# Patient Record
Sex: MALE | Race: WHITE | Employment: OTHER | URBAN - METROPOLITAN AREA
[De-identification: names, ages, dates, MRNs, and addresses within clinical notes are randomized per-mention and may not be internally consistent; named-entity substitution may affect disease eponyms.]

---

## 2017-03-07 ENCOUNTER — OFFICE VISIT (OUTPATIENT)
Dept: CARDIOLOGY CLINIC | Age: 67
End: 2017-03-07

## 2017-03-07 VITALS
BODY MASS INDEX: 44.05 KG/M2 | WEIGHT: 297.4 LBS | RESPIRATION RATE: 18 BRPM | SYSTOLIC BLOOD PRESSURE: 94 MMHG | HEIGHT: 69 IN | DIASTOLIC BLOOD PRESSURE: 60 MMHG | OXYGEN SATURATION: 91 % | HEART RATE: 91 BPM

## 2017-03-07 DIAGNOSIS — N18.4 CKD (CHRONIC KIDNEY DISEASE) STAGE 4, GFR 15-29 ML/MIN (HCC): ICD-10-CM

## 2017-03-07 DIAGNOSIS — R06.02 SOB (SHORTNESS OF BREATH): Primary | ICD-10-CM

## 2017-03-07 DIAGNOSIS — I25.10 CORONARY ARTERY DISEASE INVOLVING NATIVE CORONARY ARTERY OF NATIVE HEART WITHOUT ANGINA PECTORIS: ICD-10-CM

## 2017-03-07 DIAGNOSIS — I48.91 ATRIAL FIBRILLATION, UNSPECIFIED TYPE (HCC): ICD-10-CM

## 2017-03-07 PROBLEM — N18.9 CKD (CHRONIC KIDNEY DISEASE): Status: ACTIVE | Noted: 2017-03-07

## 2017-03-07 PROBLEM — N18.9 CKD (CHRONIC KIDNEY DISEASE): Status: RESOLVED | Noted: 2017-03-07 | Resolved: 2017-03-07

## 2017-03-07 RX ORDER — ACETAMINOPHEN 500 MG
1000 TABLET ORAL
COMMUNITY

## 2017-03-07 RX ORDER — METOPROLOL SUCCINATE 100 MG/1
100 TABLET, EXTENDED RELEASE ORAL 2 TIMES DAILY
Qty: 180 TAB | Refills: 2 | Status: SHIPPED | OUTPATIENT
Start: 2017-03-07 | End: 2017-05-31 | Stop reason: SDUPTHER

## 2017-03-07 RX ORDER — LISINOPRIL 2.5 MG/1
TABLET ORAL
Refills: 3 | COMMUNITY
Start: 2016-12-18 | End: 2017-03-17

## 2017-03-07 RX ORDER — METOLAZONE 2.5 MG/1
2.5 TABLET ORAL DAILY
Qty: 90 TAB | Refills: 2 | Status: SHIPPED | OUTPATIENT
Start: 2017-03-07 | End: 2017-05-25

## 2017-03-07 RX ORDER — PRAVASTATIN SODIUM 40 MG/1
40 TABLET ORAL DAILY
Qty: 90 TAB | Refills: 2 | Status: ON HOLD | OUTPATIENT
Start: 2017-03-07 | End: 2017-03-17

## 2017-03-07 RX ORDER — PRAVASTATIN SODIUM 40 MG/1
TABLET ORAL
Refills: 2 | COMMUNITY
Start: 2016-12-19 | End: 2017-03-07 | Stop reason: SDUPTHER

## 2017-03-07 RX ORDER — FUROSEMIDE 40 MG/1
TABLET ORAL DAILY
COMMUNITY
End: 2017-03-13 | Stop reason: DRUGHIGH

## 2017-03-07 RX ORDER — LEVOTHYROXINE SODIUM 137 UG/1
TABLET ORAL
Refills: 3 | COMMUNITY
Start: 2016-12-19 | End: 2017-07-10 | Stop reason: SDUPTHER

## 2017-03-07 RX ORDER — GLUCOSAMINE SULFATE 1500 MG
2000 POWDER IN PACKET (EA) ORAL DAILY
COMMUNITY
End: 2019-02-11

## 2017-03-07 RX ORDER — METOLAZONE 2.5 MG/1
TABLET ORAL
Refills: 2 | COMMUNITY
Start: 2016-12-20 | End: 2017-03-07 | Stop reason: SDUPTHER

## 2017-03-07 RX ORDER — METOPROLOL SUCCINATE 100 MG/1
TABLET, EXTENDED RELEASE ORAL
Refills: 1 | COMMUNITY
Start: 2017-01-17 | End: 2017-03-07 | Stop reason: SDUPTHER

## 2017-03-07 RX ORDER — TAMSULOSIN HYDROCHLORIDE 0.4 MG/1
CAPSULE ORAL
Refills: 2 | COMMUNITY
Start: 2016-12-14 | End: 2020-01-23

## 2017-03-07 RX ORDER — PARICALCITOL 1 UG/1
1 CAPSULE, LIQUID FILLED ORAL DAILY
COMMUNITY
End: 2019-03-13

## 2017-03-07 RX ORDER — DUTASTERIDE 0.5 MG/1
CAPSULE, LIQUID FILLED ORAL
Refills: 2 | COMMUNITY
Start: 2016-12-14 | End: 2019-02-11

## 2017-03-07 RX ORDER — WARFARIN SODIUM 5 MG/1
TABLET ORAL
Refills: 0 | COMMUNITY
Start: 2016-12-15 | End: 2017-03-07

## 2017-03-07 RX ORDER — MULTIVIT WITH MINERALS/HERBS
1 TABLET ORAL DAILY
COMMUNITY
End: 2017-04-05 | Stop reason: ALTCHOICE

## 2017-03-07 RX ORDER — BISMUTH SUBSALICYLATE 262 MG
1 TABLET,CHEWABLE ORAL DAILY
COMMUNITY
End: 2021-03-19

## 2017-03-07 NOTE — PROGRESS NOTES
[address] [place] [address] 601, [place], [address] [facility]     Paul Gray is a 75 y.o. male who is a new patient referred by Dr. Casey Miles to establish cardiac care. Subjective:     Mr. Zeke Lundberg recently relocated from [place] and presents to the clinic to establish cardiac care. He  has CAD s/p CABG 2010. He was vacationing in [place] when he experienced shortness of breath prompting ED evaluation. He was recommended to have stenting at that time but he waited until he returned to [place] to have this conducted. He reports being in atrial fibrillation for more than a year now. His rhythm is controlled on Metoprolol and Metalozone. He's currently anticoagulated with Coumadin. He denies any bleeding issues. His recent INR was 6 which is unusual for him as he usually ranges in the 2-3 levels. His wife notes worsening bilateral ankle edema. He is adherent on diuretic therapy. He denies any bowel or bladder issues. His activity is limited primarily by bilateral knee arthritis. He currently ambulates with a cane. His blood pressures usually run in the 62R systolic and 95J diastolic range. He suspects this is due combination therapy from rhythm control medications. He denies any lightheadedness or dizziness. He denies any near-syncopal or syncopal episodes. His family history includes CAD on paternal side with stenting. Patient denies any exertional chest pain, palpitations, syncope, orthopnea or paroxysmal nocturnal dyspnea.       Patient Active Problem List    Diagnosis Date Noted    SOB (shortness of breath) 03/07/2017    Atrial fibrillation ([facility] 03/07/2017    Coronary artery disease involving native coronary artery without angina pectoris 03/07/2017    CKD (chronic kidney disease) stage 4, GFR 15-29 ml/min ([address] 03/07/2017      TEJAS Golden  Past Medical History:   Diagnosis Date    CAD (coronary atherosclerotic disease)     S/P CABG x 2 2010      No past surgical history on file.  Allergies   Allergen Reactions    Ciprofloxacin Nausea Only    Percocet [Oxycodone-Acetaminophen] Other (comments)     hallucinations      FH is + for CAD in his father, alive at 80  Social History     Social History    Marital status:      Spouse name: N/A    Number of children: N/A    Years of education: N/A     Occupational History    Not on file. Social History Main Topics    Smoking status: Never Smoker    Smokeless tobacco: Never Used    Alcohol use Yes    Drug use: Not on file    Sexual activity: Not on file     Other Topics Concern    Not on file     Social History Narrative    No narrative on file      Current Outpatient Prescriptions   Medication Sig    dutasteride (AVODART) 0.5 mg capsule TAKE ONE CAPSULE BY MOUTH EVERY DAY    levothyroxine (SYNTHROID) 137 mcg tablet TAKE 1 TABLET(S) EVERY DAY BY ORAL ROUTE FOR 90 DAYS.  lisinopril (PRINIVIL, ZESTRIL) 2.5 mg tablet TAKE 1 TABLET EVERY DAY    tamsulosin (FLOMAX) 0.4 mg capsule TAKE 2 CAPSULES BY MOUTH EVERY DAY    insulin lispro protamine/insulin lispro (HUMALOG MIX 75-25) 100 unit/mL (75-25) injection by SubCUTAneous route.  furosemide (LASIX) 40 mg tablet Take  by mouth daily.  paricalcitol (ZEMPLAR) 1 mcg capsule Take 1 mcg by mouth daily.  multivitamin (ONE A DAY) tablet Take 1 Tab by mouth daily.  b complex vitamins tablet Take 1 Tab by mouth daily.  cholecalciferol (VITAMIN D3) 1,000 unit cap Take  by mouth daily.  calcium-cholecalciferol, d3, (CALCIUM 600 + D) 600-125 mg-unit tab Take  by mouth.  OTHER     acetaminophen (TYLENOL EXTRA STRENGTH) 500 mg tablet Take  by mouth every six (6) hours as needed for Pain.  apixaban (ELIQUIS) 5 mg tablet Take 1 Tab by mouth two (2) times a day. Indications: DEEP VEIN THROMBOSIS PREVENTION    metoprolol succinate (TOPROL-XL) 100 mg tablet Take 1 Tab by mouth two (2) times a day.  pravastatin (PRAVACHOL) 40 mg tablet Take 1 Tab by mouth daily.  metOLazone (ZAROXOLYN) 2.5 mg tablet Take 1 Tab by mouth daily. No current facility-administered medications for this visit. Review of Systems  Constitutional: Negative for fever, chills, malaise/fatigue and diaphoresis. Respiratory: Negative for cough, hemoptysis, sputum production and wheezing. Positive for shortness of breath. Cardiovascular: Negative for chest pain, palpitations, orthopnea, claudication and PND. Posiitive for bilateral ankle edema. Gastrointestinal: Negative for heartburn, nausea, vomiting, blood in stool and melena. Genitourinary: Negative for dysuria and flank pain. Musculoskeletal: Positive for bilateral knee pain. Positive for back pain. Skin: Negative for rash. Neurological: Negative for focal weakness, seizures, loss of consciousness, weakness and headaches. Endo/Heme/Allergies: Does not bruise/bleed easily. Psychiatric/Behavioral: Negative for memory loss. The patient does not have insomnia. Physical Exam:    Visit Vitals    BP 94/60 (BP 1 Location: Right arm, BP Patient Position: Sitting)    Pulse 91    Resp 18    Ht 5' 9\" (1.753 m)    Wt 297 lb 6.4 oz (134.9 kg)    SpO2 91%    BMI 43.92 kg/m2     Wt Readings from Last 3 Encounters:   03/07/17 297 lb 6.4 oz (134.9 kg)     Gen: NAD    Mental Status - Alert. General Appearance - Not in acute distress. Neck - no JVD    Chest and Lung Exam   Inspection: Accessory muscles - No use of accessory muscles in breathing. Auscultation:   Breath sounds: - Normal.     Cardiovascular   Inspection: Jugular vein - Bilateral - Inspection Normal.   Palpation/Percussion:   Apical Impulse: - Normal.   Auscultation: Rhythm - Regular. Heart Sounds - S1 WNL and S2 WNL. No S3 or S4. Murmurs & Other Heart Sounds: Auscultation of the heart reveals - No Murmurs. Peripheral Vascular   Upper Extremity: Inspection - Bilateral - No Cyanotic nailbeds or Digital clubbing.    Lower Extremity:   Palpation: Edema - Bilateral - No edema. Abdomen: Soft, non-tender, bowel sounds are active. Neuro: A&O times 3, CN and motor grossly WNL    Cardiographics  EKG 03/07/17- Atrial fibrillation, diffuse ST depression in 1, 2 AVF and V5-V6     Assessment:     Encounter Diagnoses   Name Primary?  SOB (shortness of breath) Yes    Coronary artery disease involving native coronary artery of native heart without angina pectoris     Atrial fibrillation, unspecified type ([facility]     CKD (chronic kidney disease) stage 4, GFR 15-29 ml/min ([facility])       Plan:     Mr. Sascha Jackson has CAD s/p stenting 2010. He's currently anticoagulated with Coumadin. Denies any bleeding issues, however his last INR was 4.9. I suggested that he discontinue Coumadin and start Eliquis 5mg BID. We gave him the option to start Eliquis if his insurance is able to cover this. Will send prescription today. He's had AF for over a year now with a history of cardioversion. He is currently on Metoprolol  for rhythm control management. Continue with current regimen. I encouraged him to continue monitoring his blood pressure. He continues to experience BETTS. Will get a Lexiscan Cardiolite and echocardiogram to evaluate progressive BETTS and increasing edema.   BETTS was his presenting symptom prior to his CABG      Written by Nathanael Sanchez, as dictated by Rosa Elena Andrews MD.

## 2017-03-13 ENCOUNTER — HOSPITAL ENCOUNTER (INPATIENT)
Age: 67
LOS: 4 days | Discharge: HOME | DRG: 291 | End: 2017-03-17
Attending: EMERGENCY MEDICINE | Admitting: INTERNAL MEDICINE
Payer: MEDICARE

## 2017-03-13 ENCOUNTER — APPOINTMENT (OUTPATIENT)
Dept: GENERAL RADIOLOGY | Age: 67
DRG: 291 | End: 2017-03-13
Attending: EMERGENCY MEDICINE
Payer: MEDICARE

## 2017-03-13 DIAGNOSIS — I50.9 ACUTE ON CHRONIC CONGESTIVE HEART FAILURE, UNSPECIFIED CONGESTIVE HEART FAILURE TYPE: Primary | ICD-10-CM

## 2017-03-13 DIAGNOSIS — R60.0 BILATERAL LEG EDEMA: ICD-10-CM

## 2017-03-13 DIAGNOSIS — R53.83 FATIGUE, UNSPECIFIED TYPE: ICD-10-CM

## 2017-03-13 DIAGNOSIS — R06.09 DYSPNEA ON EXERTION: ICD-10-CM

## 2017-03-13 DIAGNOSIS — J90 PLEURAL EFFUSION: ICD-10-CM

## 2017-03-13 DIAGNOSIS — R60.0 PEDAL EDEMA: ICD-10-CM

## 2017-03-13 DIAGNOSIS — Z71.89 COUNSELING REGARDING ADVANCED CARE PLANNING AND GOALS OF CARE: ICD-10-CM

## 2017-03-13 DIAGNOSIS — R06.02 SOB (SHORTNESS OF BREATH): ICD-10-CM

## 2017-03-13 DIAGNOSIS — I51.7 CARDIOMEGALY: ICD-10-CM

## 2017-03-13 PROBLEM — E66.01 MORBID OBESITY (HCC): Status: ACTIVE | Noted: 2017-03-13

## 2017-03-13 PROBLEM — I48.20 CHRONIC A-FIB (HCC): Status: ACTIVE | Noted: 2017-03-07

## 2017-03-13 PROBLEM — E11.9 DIABETES MELLITUS TYPE 2, CONTROLLED (HCC): Status: ACTIVE | Noted: 2017-03-13

## 2017-03-13 PROBLEM — Z95.1 HX OF CABG: Status: ACTIVE | Noted: 2017-03-13

## 2017-03-13 LAB
ALBUMIN SERPL BCP-MCNC: 3.4 G/DL (ref 3.5–5)
ALBUMIN/GLOB SERPL: 1.2 {RATIO} (ref 1.1–2.2)
ALP SERPL-CCNC: 37 U/L (ref 45–117)
ALT SERPL-CCNC: 24 U/L (ref 12–78)
ANION GAP BLD CALC-SCNC: 10 MMOL/L (ref 5–15)
AST SERPL W P-5'-P-CCNC: 28 U/L (ref 15–37)
BASOPHILS # BLD AUTO: 0 K/UL (ref 0–0.1)
BASOPHILS # BLD: 0 % (ref 0–1)
BILIRUB SERPL-MCNC: 0.6 MG/DL (ref 0.2–1)
BNP SERPL-MCNC: ABNORMAL PG/ML (ref 0–125)
BUN SERPL-MCNC: 148 MG/DL (ref 6–20)
BUN/CREAT SERPL: 31 (ref 12–20)
CALCIUM SERPL-MCNC: 8.5 MG/DL (ref 8.5–10.1)
CHLORIDE SERPL-SCNC: 104 MMOL/L (ref 97–108)
CK MB CFR SERPL CALC: 3.6 % (ref 0–2.5)
CK MB SERPL-MCNC: 3.6 NG/ML (ref 5–25)
CK SERPL-CCNC: 99 U/L (ref 39–308)
CO2 SERPL-SCNC: 28 MMOL/L (ref 21–32)
CREAT SERPL-MCNC: 4.83 MG/DL (ref 0.7–1.3)
DIFFERENTIAL METHOD BLD: ABNORMAL
EOSINOPHIL # BLD: 0.1 K/UL (ref 0–0.4)
EOSINOPHIL NFR BLD: 1 % (ref 0–7)
ERYTHROCYTE [DISTWIDTH] IN BLOOD BY AUTOMATED COUNT: 16.8 % (ref 11.5–14.5)
EST. AVERAGE GLUCOSE BLD GHB EST-MCNC: 183 MG/DL
GLOBULIN SER CALC-MCNC: 2.9 G/DL (ref 2–4)
GLUCOSE BLD STRIP.AUTO-MCNC: 159 MG/DL (ref 65–100)
GLUCOSE BLD STRIP.AUTO-MCNC: 169 MG/DL (ref 65–100)
GLUCOSE SERPL-MCNC: 138 MG/DL (ref 65–100)
HBA1C MFR BLD: 8 % (ref 4.2–6.3)
HCT VFR BLD AUTO: 40.3 % (ref 36.6–50.3)
HGB BLD-MCNC: 12 G/DL (ref 12.1–17)
INR PPP: 2 (ref 0.9–1.1)
LYMPHOCYTES # BLD AUTO: 12 % (ref 12–49)
LYMPHOCYTES # BLD: 0.9 K/UL (ref 0.8–3.5)
MCH RBC QN AUTO: 24.7 PG (ref 26–34)
MCHC RBC AUTO-ENTMCNC: 29.8 G/DL (ref 30–36.5)
MCV RBC AUTO: 82.9 FL (ref 80–99)
MONOCYTES # BLD: 0.7 K/UL (ref 0–1)
MONOCYTES NFR BLD AUTO: 10 % (ref 5–13)
NEUTS SEG # BLD: 5.5 K/UL (ref 1.8–8)
NEUTS SEG NFR BLD AUTO: 77 % (ref 32–75)
PLATELET # BLD AUTO: 149 K/UL (ref 150–400)
POTASSIUM SERPL-SCNC: 5 MMOL/L (ref 3.5–5.1)
PROT SERPL-MCNC: 6.3 G/DL (ref 6.4–8.2)
PROTHROMBIN TIME: 21.1 SEC (ref 9–11.1)
RBC # BLD AUTO: 4.86 M/UL (ref 4.1–5.7)
RBC MORPH BLD: ABNORMAL
SERVICE CMNT-IMP: ABNORMAL
SERVICE CMNT-IMP: ABNORMAL
SODIUM SERPL-SCNC: 142 MMOL/L (ref 136–145)
TROPONIN I SERPL-MCNC: 0.12 NG/ML
TROPONIN I SERPL-MCNC: 0.12 NG/ML
WBC # BLD AUTO: 7.2 K/UL (ref 4.1–11.1)

## 2017-03-13 PROCEDURE — 80053 COMPREHEN METABOLIC PANEL: CPT | Performed by: EMERGENCY MEDICINE

## 2017-03-13 PROCEDURE — 36415 COLL VENOUS BLD VENIPUNCTURE: CPT | Performed by: INTERNAL MEDICINE

## 2017-03-13 PROCEDURE — 65660000000 HC RM CCU STEPDOWN

## 2017-03-13 PROCEDURE — 83880 ASSAY OF NATRIURETIC PEPTIDE: CPT | Performed by: EMERGENCY MEDICINE

## 2017-03-13 PROCEDURE — 96374 THER/PROPH/DIAG INJ IV PUSH: CPT

## 2017-03-13 PROCEDURE — 82550 ASSAY OF CK (CPK): CPT | Performed by: EMERGENCY MEDICINE

## 2017-03-13 PROCEDURE — 71020 XR CHEST PA LAT: CPT

## 2017-03-13 PROCEDURE — 93005 ELECTROCARDIOGRAM TRACING: CPT

## 2017-03-13 PROCEDURE — 85025 COMPLETE CBC W/AUTO DIFF WBC: CPT | Performed by: EMERGENCY MEDICINE

## 2017-03-13 PROCEDURE — 99285 EMERGENCY DEPT VISIT HI MDM: CPT

## 2017-03-13 PROCEDURE — 85610 PROTHROMBIN TIME: CPT | Performed by: INTERNAL MEDICINE

## 2017-03-13 PROCEDURE — 84484 ASSAY OF TROPONIN QUANT: CPT | Performed by: EMERGENCY MEDICINE

## 2017-03-13 PROCEDURE — 83036 HEMOGLOBIN GLYCOSYLATED A1C: CPT | Performed by: INTERNAL MEDICINE

## 2017-03-13 PROCEDURE — 74011250637 HC RX REV CODE- 250/637: Performed by: INTERNAL MEDICINE

## 2017-03-13 PROCEDURE — 82962 GLUCOSE BLOOD TEST: CPT

## 2017-03-13 PROCEDURE — 74011250636 HC RX REV CODE- 250/636: Performed by: INTERNAL MEDICINE

## 2017-03-13 PROCEDURE — 74011250636 HC RX REV CODE- 250/636: Performed by: EMERGENCY MEDICINE

## 2017-03-13 RX ORDER — MAGNESIUM SULFATE 100 %
4 CRYSTALS MISCELLANEOUS AS NEEDED
Status: DISCONTINUED | OUTPATIENT
Start: 2017-03-13 | End: 2017-03-17 | Stop reason: HOSPADM

## 2017-03-13 RX ORDER — FUROSEMIDE 10 MG/ML
40 INJECTION INTRAMUSCULAR; INTRAVENOUS 2 TIMES DAILY
Status: DISCONTINUED | OUTPATIENT
Start: 2017-03-13 | End: 2017-03-13

## 2017-03-13 RX ORDER — FUROSEMIDE 40 MG/1
80 TABLET ORAL DAILY
COMMUNITY
End: 2017-03-17

## 2017-03-13 RX ORDER — BISACODYL 5 MG
5 TABLET, DELAYED RELEASE (ENTERIC COATED) ORAL DAILY PRN
Status: DISCONTINUED | OUTPATIENT
Start: 2017-03-13 | End: 2017-03-17 | Stop reason: HOSPADM

## 2017-03-13 RX ORDER — SODIUM CHLORIDE 0.9 % (FLUSH) 0.9 %
5-10 SYRINGE (ML) INJECTION AS NEEDED
Status: DISCONTINUED | OUTPATIENT
Start: 2017-03-13 | End: 2017-03-17 | Stop reason: HOSPADM

## 2017-03-13 RX ORDER — METOLAZONE 2.5 MG/1
2.5 TABLET ORAL DAILY
Status: DISCONTINUED | OUTPATIENT
Start: 2017-03-14 | End: 2017-03-17 | Stop reason: HOSPADM

## 2017-03-13 RX ORDER — TAMSULOSIN HYDROCHLORIDE 0.4 MG/1
0.4 CAPSULE ORAL DAILY
Status: DISCONTINUED | OUTPATIENT
Start: 2017-03-14 | End: 2017-03-17 | Stop reason: HOSPADM

## 2017-03-13 RX ORDER — METOPROLOL SUCCINATE 50 MG/1
100 TABLET, EXTENDED RELEASE ORAL DAILY
Status: DISCONTINUED | OUTPATIENT
Start: 2017-03-14 | End: 2017-03-17 | Stop reason: HOSPADM

## 2017-03-13 RX ORDER — ONDANSETRON 2 MG/ML
4 INJECTION INTRAMUSCULAR; INTRAVENOUS
Status: DISCONTINUED | OUTPATIENT
Start: 2017-03-13 | End: 2017-03-17 | Stop reason: HOSPADM

## 2017-03-13 RX ORDER — WARFARIN SODIUM 5 MG/1
5 TABLET ORAL DAILY
COMMUNITY
End: 2017-03-17

## 2017-03-13 RX ORDER — FUROSEMIDE 10 MG/ML
80 INJECTION INTRAMUSCULAR; INTRAVENOUS
Status: COMPLETED | OUTPATIENT
Start: 2017-03-13 | End: 2017-03-13

## 2017-03-13 RX ORDER — NALOXONE HYDROCHLORIDE 0.4 MG/ML
0.4 INJECTION, SOLUTION INTRAMUSCULAR; INTRAVENOUS; SUBCUTANEOUS AS NEEDED
Status: DISCONTINUED | OUTPATIENT
Start: 2017-03-13 | End: 2017-03-17 | Stop reason: HOSPADM

## 2017-03-13 RX ORDER — INSULIN LISPRO 100 [IU]/ML
INJECTION, SOLUTION INTRAVENOUS; SUBCUTANEOUS
Status: DISCONTINUED | OUTPATIENT
Start: 2017-03-13 | End: 2017-03-17 | Stop reason: HOSPADM

## 2017-03-13 RX ORDER — FUROSEMIDE 10 MG/ML
40 INJECTION INTRAMUSCULAR; INTRAVENOUS EVERY 12 HOURS
Status: DISCONTINUED | OUTPATIENT
Start: 2017-03-14 | End: 2017-03-14

## 2017-03-13 RX ORDER — PRAVASTATIN SODIUM 40 MG/1
80 TABLET ORAL
Status: DISCONTINUED | OUTPATIENT
Start: 2017-03-13 | End: 2017-03-17 | Stop reason: HOSPADM

## 2017-03-13 RX ORDER — INSULIN LISPRO 100 [IU]/ML
10 INJECTION, SOLUTION INTRAVENOUS; SUBCUTANEOUS
Status: DISCONTINUED | OUTPATIENT
Start: 2017-03-14 | End: 2017-03-16

## 2017-03-13 RX ORDER — HEPARIN SODIUM 5000 [USP'U]/ML
5000 INJECTION, SOLUTION INTRAVENOUS; SUBCUTANEOUS EVERY 8 HOURS
Status: DISCONTINUED | OUTPATIENT
Start: 2017-03-13 | End: 2017-03-13

## 2017-03-13 RX ORDER — DEXTROSE 50 % IN WATER (D50W) INTRAVENOUS SYRINGE
12.5-25 AS NEEDED
Status: DISCONTINUED | OUTPATIENT
Start: 2017-03-13 | End: 2017-03-17 | Stop reason: HOSPADM

## 2017-03-13 RX ORDER — DUTASTERIDE 0.5 MG/1
0.5 CAPSULE, LIQUID FILLED ORAL DAILY
Status: DISCONTINUED | OUTPATIENT
Start: 2017-03-14 | End: 2017-03-17 | Stop reason: HOSPADM

## 2017-03-13 RX ORDER — CALCIUM CARBONATE 600 MG
600 TABLET ORAL
COMMUNITY
End: 2019-02-11

## 2017-03-13 RX ORDER — INSULIN GLARGINE 100 [IU]/ML
35 INJECTION, SOLUTION SUBCUTANEOUS
Status: DISCONTINUED | OUTPATIENT
Start: 2017-03-13 | End: 2017-03-17 | Stop reason: HOSPADM

## 2017-03-13 RX ORDER — ACETAMINOPHEN 325 MG/1
650 TABLET ORAL
Status: DISCONTINUED | OUTPATIENT
Start: 2017-03-13 | End: 2017-03-17 | Stop reason: HOSPADM

## 2017-03-13 RX ORDER — SODIUM CHLORIDE 0.9 % (FLUSH) 0.9 %
5-10 SYRINGE (ML) INJECTION EVERY 8 HOURS
Status: DISCONTINUED | OUTPATIENT
Start: 2017-03-13 | End: 2017-03-17 | Stop reason: HOSPADM

## 2017-03-13 RX ORDER — INSULIN LISPRO 100 [IU]/ML
INJECTION, SOLUTION INTRAVENOUS; SUBCUTANEOUS
Status: DISCONTINUED | OUTPATIENT
Start: 2017-03-13 | End: 2017-03-13

## 2017-03-13 RX ADMIN — INSULIN GLARGINE 35 UNITS: 100 INJECTION, SOLUTION SUBCUTANEOUS at 21:31

## 2017-03-13 RX ADMIN — Medication 10 ML: at 21:32

## 2017-03-13 RX ADMIN — INSULIN LISPRO 2 UNITS: 100 INJECTION, SOLUTION INTRAVENOUS; SUBCUTANEOUS at 19:04

## 2017-03-13 RX ADMIN — PRAVASTATIN SODIUM 80 MG: 40 TABLET ORAL at 21:31

## 2017-03-13 RX ADMIN — FUROSEMIDE 80 MG: 10 INJECTION, SOLUTION INTRAMUSCULAR; INTRAVENOUS at 14:10

## 2017-03-13 NOTE — ROUTINE PROCESS
TRANSFER - OUT REPORT:    Verbal report given to NADINE Jerome(name) on Paul Gray  being transferred to PCU(unit) for routine progression of care       Report consisted of patients Situation, Background, Assessment and   Recommendations(SBAR). Information from the following report(s) SBAR, Kardex, ED Summary, [facility] and Recent Results was reviewed with the receiving nurse. Lines:   Peripheral IV 03/13/17 Right Antecubital (Active)   Site Assessment Clean, dry, & intact 3/13/2017 12:18 PM   Phlebitis Assessment 0 3/13/2017 12:18 PM   Infiltration Assessment 0 3/13/2017 12:18 PM   Dressing Status Clean, dry, & intact 3/13/2017 12:18 PM   Dressing Type Transparent 3/13/2017 12:18 PM   Hub Color/Line Status Pink 3/13/2017 12:18 PM        Opportunity for questions and clarification was provided.       Patient transported with:   Monitor  O2 @ 2 liters

## 2017-03-13 NOTE — ED PROVIDER NOTES
HPI Comments: Alva Woo is a 75 y.o. male with PMhx significant for CAD, T2 DM, and CHF who presents ambulatory to the ED for evaluation of progressively worsening SOB on exertion and orthopnea x 3 weeks. Per wife, pt has been dealing with volume overload x 3 weeks for which he was instructed to double his dose of Lasix which has provided no relief; he is currently taking two 40 mg tablets of Lasix in the morning and occasionally a dose in the evenings as needed. She states that originally the pt was telling her that his breathing was without complications, however, she states they later followed up at a clinic where the pt was referred to nephrology and Dr. Patricio Treviño (Cardiology). It was recommended that he start Eliquis and come off Warfarin before tomorrow's stress test and ultrasound, noting his INR has been \"all over the place;\" his INR was 6 on 3/10/17. He has a hx of CABG in December 2010. She reports some lower extremity swelling in the pt that is usually alleviated at night and worsens as the day progresses. Despite increased Lasix does, pt has been having normal urine output, however, wife notes that he has also decreased his liquid intake due to the suspected volume overload. Pt reports some palpitations, noting he has a hx of palpitations for which he was originally prescribed Warfarin. His blood glucose was 126 this morning. Pt ambulates with the aid of a cane secondary to chronic back pain. He reports additional symptoms of abdominal pain, diarrhea, and rectal pain, noting he has been using Preparation H with some relief. He denies any CP or further symptoms and complaints. PCP: TEJAS Stanley  Cardiology: Dr. Patricio Treviño    There are no other complaints, changes or physical findings at this time. The history is provided by the patient. No  was used.         Past Medical History:   Diagnosis Date    CAD (coronary atherosclerotic disease)     S/P CABG x 2 2010       No past surgical history on file. Family History:   Problem Relation Age of Onset    Heart Attack Father        Social History     Social History    Marital status:      Spouse name: N/A    Number of children: N/A    Years of education: N/A     Occupational History    Not on file. Social History Main Topics    Smoking status: Never Smoker    Smokeless tobacco: Never Used    Alcohol use Yes    Drug use: Not on file    Sexual activity: Not on file     Other Topics Concern    Not on file     Social History Narrative    No narrative on file         ALLERGIES: Ciprofloxacin and Percocet [oxycodone-acetaminophen]    Review of Systems   Respiratory: Positive for shortness of breath. Cardiovascular: Positive for palpitations and leg swelling. Negative for chest pain. Gastrointestinal: Positive for abdominal pain, diarrhea and rectal pain. Musculoskeletal: Positive for back pain (baseline). All other systems reviewed and are negative. Patient Vitals for the past 12 hrs:   Temp Pulse Resp BP SpO2   03/13/17 1630 - (!) 102 - 117/72 96 %   03/13/17 1532 - 97 - - 96 %   03/13/17 1530 - 96 - 108/57 90 %   03/13/17 1501 - 94 - - 97 %   03/13/17 1500 - (!) 101 - 101/59 93 %   03/13/17 1457 - 89 - 133/45 97 %   03/13/17 1410 - (!) 104 18 128/82 96 %   03/13/17 1400 - - - - (!) 89 %   03/13/17 1303 - 99 - - 93 %   03/13/17 1300 - - - 102/54 -   03/13/17 1200 - 97 18 120/52 91 %   03/13/17 1111 97.7 °F (36.5 °C) 95 19 103/49 95 %            Physical Exam   Vital signs and nursing notes reviewed    CONSTITUTIONAL: Alert, in no apparent distress; well-developed; well-nourished. HEAD:  Normocephalic, atraumatic  EYES: PERRL; EOM's intact. ENTM: Nose: no rhinorrhea; Throat: no erythema or exudate, mucous membranes moist  Neck:  Supple. trachea is midline. JVD noted. RESP: No rhonchi or wheezes, There are faint crackles at the bases. CV: S1 and S2 WNL; No murmurs, gallops or rubs.  2+ radial and DP pulses bilaterally. Irregularly irregular rhythm. There is a well healed scar across the sternum. GI: 1+ Edema noted to abdomen, normal bowel sounds, abdomen soft and non-tender. No masses or organomegaly. : No costo-vertebral angle tenderness. BACK:  Non-tender, normal appearance  UPPER EXT:  Normal inspection. no joint or soft tissue swelling  LOWER EXT: No calf tenderness. There is 1+ edema. NEURO: Alert and oriented x3, 5/5 strength and light touch sensation intact in bilateral upper and lower extremities. SKIN: No rashes; Warm and dry. There is a healing chronic ulcer to the right lower leg  PSYCH: Normal mood, normal affect    MDM  Number of Diagnoses or Management Options  Acute on chronic congestive heart failure, unspecified congestive heart failure type [facility]):   Cardiomegaly:   Dyspnea on exertion:   Pedal edema:   Pleural effusion:   Diagnosis management comments: 75 old male failing outpatient management of worsening CHF picture, likely worsened renal function based on information shared by Dr. Patricio Treviño. Plan for admission for inpatient CHF management. Amount and/or Complexity of Data Reviewed  Clinical lab tests: ordered and reviewed  Tests in the radiology section of CPT®: ordered and reviewed  Tests in the medicine section of CPT®: ordered and reviewed  Review and summarize past medical records: yes  Independent visualization of images, tracings, or specimens: yes    Patient Progress  Patient progress: stable    ED Course       Procedures    EKG Interpretation:  1117  Afib with rate of 92. Normal axis. Incomplete RBBB. There is ST depressions in V3-V6 and lead 2. Similar to prior EKG on 3/7/17. CONSULT NOTE:  2:19 Grabiel Zuluaga MD spoke with Dr. Patricio Treviño,  Specialty: Cardiology  Discussed pt's hx, disposition, and available diagnostic and imaging results. Reviewed care plans. Consultant recommends hospitalist admission. He will see the pt in consultation.     PROGRESS NOTE:  2:45 PM  Cardiology at bedside. PROGRESS NOTE:  3:54 PM  Oksana Vazquez NP to admit pt on behalf of Dr. Mammie Felty. LABORATORY TESTS:  Recent Results (from the past 12 hour(s))   EKG, 12 LEAD, INITIAL    Collection Time: 03/13/17 11:17 AM   Result Value Ref Range    Ventricular Rate 92 BPM    Atrial Rate 258 BPM    QRS Duration 102 ms    Q-T Interval 346 ms    QTC Calculation (Bezet) 427 ms    Calculated R Axis 77 degrees    Calculated T Axis -128 degrees    Diagnosis       Atrial fibrillation  Incomplete right bundle branch block  Septal infarct , age undetermined  ST & T wave abnormality, consider inferior ischemia or digitalis effect  ST & T wave abnormality, consider anterolateral ischemia or digitalis effect  Abnormal ECG  No previous ECGs available     CBC WITH AUTOMATED DIFF    Collection Time: 03/13/17 12:15 PM   Result Value Ref Range    WBC 7.2 4.1 - 11.1 K/uL    RBC 4.86 4.10 - 5.70 M/uL    HGB 12.0 (L) 12.1 - 17.0 g/dL    HCT 40.3 36.6 - 50.3 %    MCV 82.9 80.0 - 99.0 FL    MCH 24.7 (L) 26.0 - 34.0 PG    MCHC 29.8 (L) 30.0 - 36.5 g/dL    RDW 16.8 (H) 11.5 - 14.5 %    PLATELET 461 (L) 510 - 400 K/uL    NEUTROPHILS 77 (H) 32 - 75 %    LYMPHOCYTES 12 12 - 49 %    MONOCYTES 10 5 - 13 %    EOSINOPHILS 1 0 - 7 %    BASOPHILS 0 0 - 1 %    ABS. NEUTROPHILS 5.5 1.8 - 8.0 K/UL    ABS. LYMPHOCYTES 0.9 0.8 - 3.5 K/UL    ABS. MONOCYTES 0.7 0.0 - 1.0 K/UL    ABS. EOSINOPHILS 0.1 0.0 - 0.4 K/UL    ABS.  BASOPHILS 0.0 0.0 - 0.1 K/UL    RBC COMMENTS ANISOCYTOSIS  1+        RBC COMMENTS RBC FRAGMENTS  OVALOCYTES  PRESENT        RBC COMMENTS HYPOCHROMIA  1+        DF SMEAR SCANNED     METABOLIC PANEL, COMPREHENSIVE    Collection Time: 03/13/17 12:15 PM   Result Value Ref Range    Sodium 142 136 - 145 mmol/L    Potassium 5.0 3.5 - 5.1 mmol/L    Chloride 104 97 - 108 mmol/L    CO2 28 21 - 32 mmol/L    Anion gap 10 5 - 15 mmol/L    Glucose 138 (H) 65 - 100 mg/dL     (H) 6 - 20 MG/DL    Creatinine 4.83 (H) 0.70 - 1.30 MG/DL    BUN/Creatinine ratio 31 (H) 12 - 20      GFR est AA 15 (L) >60 ml/min/1.73m2    GFR est non-AA 12 (L) >60 ml/min/1.73m2    Calcium 8.5 8.5 - 10.1 MG/DL    Bilirubin, total 0.6 0.2 - 1.0 MG/DL    ALT (SGPT) 24 12 - 78 U/L    AST (SGOT) 28 15 - 37 U/L    Alk. phosphatase 37 (L) 45 - 117 U/L    Protein, total 6.3 (L) 6.4 - 8.2 g/dL    Albumin 3.4 (L) 3.5 - 5.0 g/dL    Globulin 2.9 2.0 - 4.0 g/dL    A-G Ratio 1.2 1.1 - 2.2     CK W/ CKMB & INDEX    Collection Time: 03/13/17 12:15 PM   Result Value Ref Range    CK 99 39 - 308 U/L    CK - MB 3.6 (H) <3.6 NG/ML    CK-MB Index 3.6 (H) 0 - 2.5     TROPONIN I    Collection Time: 03/13/17 12:15 PM   Result Value Ref Range    Troponin-I, Qt. 0.12 (H) <0.05 ng/mL   PRO-BNP    Collection Time: 03/13/17 12:15 PM   Result Value Ref Range    NT pro-BNP 53032 (H) 0 - 125 PG/ML       IMAGING RESULTS:  CXR Results  (Last 48 hours)               03/13/17 1256  XR CHEST PA LAT Final result    Impression:  IMPRESSION:   1. Mild cardiomegaly and small bilateral pleural effusions   2. Possible pulmonary artery hypertension       Narrative:  Exam:  2 view chest       Indication: Shortness of breath, increased lower leg swelling x3 weeks. COMPARISON: None       PA and lateral views demonstrate mild cardiomegaly in this patient status post   median sternotomy. The second from the bottom sternal wire is broken. There are small pleural effusions bilaterally right greater than left. Right   pleural effusion is located in the subpulmonic location. The lungs demonstrate mild atelectasis medially at the right base. There is no   pulmonary edema. Central pulmonary arteries are prominent suggesting pulmonary hypertension. MEDICATIONS GIVEN:  Medications   furosemide (LASIX) injection 80 mg (80 mg IntraVENous Given 3/13/17 1410)       IMPRESSION:  1.  Acute on chronic congestive heart failure, unspecified congestive heart failure type ([address]    2. Cardiomegaly    3. Pleural effusion    4. Dyspnea on exertion    5. Pedal edema        PLAN:  1. Admit to Cardiology     Admit Note:  3:00 PM  Patient is being admitted to the hospital by Jatin Toth NP on behalf of Dr. Yesenia Shah. The results of their tests and reasons for their admission have been discussed with them and/or available family. They convey agreement and understanding for the need to be admitted and for their admission diagnosis. Consultation has been made with the inpatient physician specialist for hospitalization. Attestation: This note is prepared by Alanis Moran, acting as Scribe for Nirmal Upton MD.    Nirmal Upton MD: The scribe's documentation has been prepared under my direction and personally reviewed by me in its entirety. I confirm that the note above accurately reflects all work, treatment, procedures, and medical decision making performed by me.

## 2017-03-13 NOTE — PROGRESS NOTES
Pharmacy Clarification of Prior to Admission Medication Regimen    The patient was interviewed regarding current PTA medication list, use and drug allergies;  wife present in room and obtained permission from patient to discuss drug regimen with visitor(s) present. The patient was questioned regarding use of any other inhalers, topical products, over the counter medications, herbal medications, vitamin products or ophthalmic/nasal/otic medication use. Allergy Update: Confirmed currently documented allergies to ciprofloxacin and oxycodone-APAP    Recommendations/Findings: The following amendments were made to the [facility] [facility] [place] [facility] [address] [facility]:   1) Additions:    Ольга-C   Warfarin   Insulin lispro protamine/insulin lispro corrective doses    2) Deletions:    apixaban    3) Changes:    Acetaminophen: added dose and frequency information   Cholecalciferol: added dose information   Furosemide 40 mg: added dose and frequency information   Pravastatin 40 mg: (Old regimen: Take one tab orally once daily /New regimen: Take two tab orally once daily)    4) Pertinent Pharmacy Findings:   Anticoagulation: patient was on warfarin therapy for his atrial fibrillation, but has been holding as he is being transitioned to apixaban. He was supposed to get his INR checked today, and if it was <2, was supposed to start apixaban. The patient stated that he last took his warfarin therapy on 3/9/17.  Insulin: Patient is currently using up the remaining humalog 75/25 that he has at home. He stated that his insurance has recently increased the price, requiring his provider to transition him to Novalog Mix 70/30. He has not started taking the 70/30 as of yet. -Clarified PTA med list with list provided by patient, information provided by RX query and from patient. PTA medication list was corrected to the following:     Prior to Admission Medications   Prescriptions Last Dose Informant Patient Reported? Taking? ASCORBATE CALCIUM (ROBBY-C PO) 3/12/2017 at Unknown time Self Yes Yes   Sig: Take 500 mg by mouth daily. acetaminophen (TYLENOL EXTRA STRENGTH) 500 mg tablet 3/9/2017 Self Yes No   Sig: Take 1,000 mg by mouth every six (6) hours as needed for Pain. b complex vitamins tablet 3/13/2017 at Unknown time Self Yes Yes   Sig: Take 1 Tab by mouth daily. calcium carbonate (CALTREX) 600 mg calcium (1,500 mg) tablet 3/12/2017 at Unknown time Self Yes Yes   Sig: Take 600 mg by mouth every Tuesday, Thursday, Saturday & . cholecalciferol (VITAMIN D3) 1,000 unit cap 3/13/2017 at Unknown time Self Yes Yes   Sig: Take 3,000 Units by mouth daily. dutasteride (AVODART) 0.5 mg capsule 3/13/2017 at Unknown time Self Yes Yes   Sig: TAKE ONE CAPSULE BY MOUTH EVERY DAY   furosemide (LASIX) 40 mg tablet 3/13/2017 at Unknown time Self Yes Yes   Sig: Take 80 mg by mouth daily. insulin lispro protamine/insulin lispro (HUMALOG MIX 75-25) 100 unit/mL (75-25) injection 3/12/2017 at Unknown time Self Yes Yes   Si Units by SubCUTAneous route daily (after breakfast). Insulin lispro protamine/insulin lispro dosing instructions:  Inject 26 units after breakfast and inject 34 units   insulin lispro protamine/insulin lispro (HUMALOG MIX 75-25) 100 unit/mL (75-25) injection 3/12/2017 at Unknown time Self Yes Yes   Si Units by SubCUTAneous route daily (after dinner). Insulin lispro protamine/insulin lispro dosing instructions:  Inject 26 units after breakfast and inject 34 units   insulin lispro protamine/insulin lispro (HUMALOG MIX 75-25) 100 unit/mL (75-25) injection 3/6/2017 at Unknown time Self Yes Yes   Si-20 Units by SubCUTAneous route two (2) times daily (after meals).  Corrective Coverage to be administered IN ADDITION to scheduled post prandial insulin doses:  B mg/dL= administer an additional 5 units  B mg/dL= administer an additional 10 units  B mg/dL= administer an additional 15 units  B mg/dL= administer an additional 20 units  B mg/dL= administer an additional 5 units   levothyroxine (SYNTHROID) 137 mcg tablet 3/13/2017 at Unknown time Self Yes Yes   Sig: TAKE 1 TABLET(S) EVERY DAY BY ORAL ROUTE FOR 90 DAYS. lisinopril (PRINIVIL, ZESTRIL) 2.5 mg tablet 3/12/2017 at Unknown time Self Yes Yes   Sig: TAKE 1 TABLET qhs   metOLazone (ZAROXOLYN) 2.5 mg tablet 3/13/2017 at Unknown time Self No Yes   Sig: Take 1 Tab by mouth daily. metoprolol succinate (TOPROL-XL) 100 mg tablet 3/13/2017 at Unknown time Self No Yes   Sig: Take 1 Tab by mouth two (2) times a day. multivitamin (ONE A DAY) tablet 3/13/2017 at Unknown time Self Yes Yes   Sig: Take 1 Tab by mouth daily. paricalcitol (ZEMPLAR) 1 mcg capsule 3/13/2017 at Unknown time Self Yes Yes   Sig: Take 1 mcg by mouth daily. pravastatin (PRAVACHOL) 40 mg tablet 3/12/2017 at Unknown time Self No Yes   Sig: Take 1 Tab by mouth daily. Patient taking differently: Take 80 mg by mouth nightly. tamsulosin (FLOMAX) 0.4 mg capsule 3/13/2017 Self Yes No   Sig: TAKE 2 CAPSULES BY MOUTH EVERY DAY   warfarin (COUMADIN) 5 mg tablet Not Taking at Unknown time Self Yes No   Sig: Take 5 mg by mouth daily.       Facility-Administered Medications: None          Thank you,  Leonora Lee, PharmD, BCPS  Clinical Pharmacy Specialist

## 2017-03-13 NOTE — ED NOTES
Verbal orders given by Dr. Sally Mitchell to hold ordered NPH for now and to administer ordered sliding scale insulin.

## 2017-03-13 NOTE — PROGRESS NOTES
Pharmacy Monitoring for Apixaban    Pharmacy review for this 75 y.o. male ordered Apixaban for AF  Major Interacting Medications: None  Platelet Inhibitors: None    Recent Labs      03/13/17   1900  03/13/17   1215   INR  2.0*   --    HGB   --   12.0*   PLT   --   149*   ALB   --   3.4*   SGOT   --   28   TBILI   --   0.6       Child Menon Score, if applicable (Avoid if level C):NA      Impression/Plan: Apixaban 5 mg BID to start 3/14 PM. INR 2 today. Every other day CBC ordered starting 3/16. Thanks    Arturo Schmidt [facility]      Pharmacist review the information below to verify dose and frequency are appropriate and that the patient is a candidate for apixaban.

## 2017-03-13 NOTE — ED NOTES
Pt presents to ED with c/o increasing bilateral lower leg swelling and shortness of breath x 3 weeks. Pt reports he was recently instructed to increase Lasix dose by Dr. Camila Vasquez and pt has done so with little to no relief. Pt placed on cardiac monitor x3. Dr. Armen Cortez at bedside.

## 2017-03-13 NOTE — H&P
Hospitalist Admission Note    NAME: Juanis Gibson   :  1950   MRN:  [id number]     Date/Time:  3/13/2017 5:45 PM    Patient PCP: TEJAS Casey  ________________________________________________________________________    My assessment of this patient's clinical condition and my plan of care is as follows. Assessment / Plan:  Chronic atrial fibrillation  -rate controlled with BB  -was on coumadin, transitioned to Eliquis. Patient states his INR recently checked last Fri was 6.  -will repeat INR  -resume Eliquis when INR <2    Acute on chronic systolic heart failure  Acute hypoxic respiratory failure  Elevated troponin  -trop 0.12, will trend  -EKG showed afib, no ischemic changes  -will continue with IV lasix 40mg BID, asa, bb,   -CXR showed mild cardiomegaly and small bilateral pleural effusions. possible pulmonary artery hypertension  -Echo  -strict I&O, low sait diet, daily wt, fluid restriction  -monitor lytes  -cardiology/palliative consulted for chf bundle    Acute on chronic kidney failure  -per pt, baseline cr 2.5, presenting with cr 4.83  -likely from volume overload  -follows with Dr Laura Malagon, will consult  -bmp    T2DM  -check A1C  -continue home novolog 25units qam and 30units qpm, ssi    Thrombocytopenia  -  -no evidence of bleed    Morbid obesity    Code Status: Full  Surrogate Decision Maker: wife Manpreet Numbers at [phone]    DVT Prophylaxis: heparin  GI Prophylaxis: not indicated    Baseline: independent        Subjective:   CHIEF COMPLAINT: sob    HISTORY OF PRESENT ILLNESS:     Juanis Gibson is a 75 y.o.  male w pmhx significant for htn, CAD s/p CABG in , chronic afib was on coumadin, recently transitioned to eliquis due to erratic INR, systolic heart failure, H0GE, obesity present to ED c/o of worsening sob associated with minimal exertion. Other associate symptoms including >10 wt gain in 1 week, worsening of LE edema.   Pt states he has been compliance with diet restriction and medications, however he continues to have wt gain/le edema and worsening of sob. In the ED, vitals: T97.7, P 95, /49  Labs: trop 0.12, probnp 62737, cr 4.83,     We were asked to admit for work up and evaluation of the above problems. Past Medical History:   Diagnosis Date    CAD (coronary atherosclerotic disease)     [facility]     Diabetes mellitus type 2, controlled ([facility] 3/13/2017    Heart failure ([facility]     Hx of CABG 3/13/2017    CABG in 2010 in [address] Morbid obesity ([facility] 3/13/2017    S/P CABG x 2 2010        Past Surgical History:   Procedure Laterality Date    CARDIAC SURG PROCEDURE UNLIST      CABGx2 in 2010    HX ORTHOPAEDIC      L 3rd toe amputation in 1999       Social History   Substance Use Topics    Smoking status: Never Smoker    Smokeless tobacco: Never Used    Alcohol use Yes      Comment: rare        Family History   Problem Relation Age of Onset    Heart Attack Father      Allergies   Allergen Reactions    Ciprofloxacin Nausea Only    Percocet [Oxycodone-Acetaminophen] Other (comments)     hallucinations        Prior to Admission medications    Medication Sig Start Date End Date Taking? Authorizing Provider   dutasteride (AVODART) 0.5 mg capsule TAKE ONE CAPSULE BY MOUTH EVERY DAY 12/14/16   Historical Provider   levothyroxine (SYNTHROID) 137 mcg tablet TAKE 1 TABLET(S) EVERY DAY BY ORAL ROUTE FOR 90 DAYS. 12/19/16   Historical Provider   lisinopril (PRINIVIL, ZESTRIL) 2.5 mg tablet TAKE 1 TABLET EVERY DAY 12/18/16   Historical Provider   tamsulosin (FLOMAX) 0.4 mg capsule TAKE 2 CAPSULES BY MOUTH EVERY DAY 12/14/16   Historical Provider   insulin lispro protamine/insulin lispro (HUMALOG MIX 75-25) 100 unit/mL (75-25) injection by SubCUTAneous route. Historical Provider   furosemide (LASIX) 40 mg tablet Take  by mouth daily.     Historical Provider   paricalcitol (ZEMPLAR) 1 mcg capsule Take 1 mcg by mouth daily. Historical Provider   multivitamin (ONE A DAY) tablet Take 1 Tab by mouth daily. Historical Provider   b complex vitamins tablet Take 1 Tab by mouth daily. Historical Provider   cholecalciferol (VITAMIN D3) 1,000 unit cap Take  by mouth daily. Historical Provider   calcium-cholecalciferol, d3, (CALCIUM 600 + D) 600-125 mg-unit tab Take  by mouth. Historical Provider   OTHER     Historical Provider   acetaminophen (TYLENOL EXTRA STRENGTH) 500 mg tablet Take  by mouth every six (6) hours as needed for Pain. Historical Provider   apixaban (ELIQUIS) 5 mg tablet Take 1 Tab by mouth two (2) times a day. Indications: DEEP VEIN THROMBOSIS PREVENTION 3/7/17   Evert Santacruz MD   metoprolol succinate (TOPROL-XL) 100 mg tablet Take 1 Tab by mouth two (2) times a day. 3/7/17   SAMINA Teague   pravastatin (PRAVACHOL) 40 mg tablet Take 1 Tab by mouth daily. 3/7/17   SAMINA Teague   metOLazone (ZAROXOLYN) 2.5 mg tablet Take 1 Tab by mouth daily. 3/7/17   SAMINA Teague       REVIEW OF SYSTEMS:     I am not able to complete the review of systems because:    The patient is intubated and sedated    The patient has altered mental status due to his acute medical problems    The patient has baseline aphasia from prior stroke(s)    The patient has baseline dementia and is not reliable historian    The patient is in acute medical distress and unable to provide information           Total of 12 systems reviewed as follows:       POSITIVE= underlined text  Negative = text not underlined  General:  fever, chills, sweats, generalized weakness, weight loss/gain,      loss of appetite   Eyes:    blurred vision, eye pain, loss of vision, double vision  ENT:    rhinorrhea, pharyngitis   Respiratory:   cough, sputum production, SOB, BETTS, wheezing, pleuritic pain   Cardiology:   chest pain, palpitations, orthopnea, PND, edema, syncope   Gastrointestinal:  abdominal pain , N/V, diarrhea, dysphagia, constipation, bleeding   Genitourinary:  frequency, urgency, dysuria, hematuria, incontinence   Muskuloskeletal :  arthralgia, myalgia, back pain  Hematology:  easy bruising, nose or gum bleeding, lymphadenopathy   Dermatological: rash, ulceration, pruritis, color change / jaundice  Endocrine:   hot flashes or polydipsia   Neurological:  headache, dizziness, confusion, focal weakness, paresthesia,     Speech difficulties, memory loss, gait difficulty  Psychological: Feelings of anxiety, depression, agitation    Objective:   VITALS:    Visit Vitals    /72    Pulse (!) 102    Temp 97.7 °F (36.5 °C)    Resp 18    Ht 5' 9\" (1.753 m)    Wt 136.1 kg (300 lb 0.7 oz)    SpO2 96%    BMI 44.31 kg/m2       PHYSICAL EXAM:    General:    Alert, cooperative, no distress, appears stated age. HEENT: Atraumatic, anicteric sclerae, pink conjunctivae     No oral ulcers, mucosa moist, throat clear, dentition fair  Neck:  Supple, symmetrical,  thyroid: non tender  Lungs:   Clear to auscultation bilaterally. No Wheezing or Rhonchi. No rales. Chest wall:  No tenderness  No Accessory muscle use. Heart:   irregular  rhythm,  No  murmur   B/l +3 LE edema with venous stasis changes  Abdomen:   Soft, non-tender. Not distended. Bowel sounds normal  Extremities: No cyanosis. No clubbing      Capillary refill normal,  Radial pulse 2+  Skin:     Not pale. Not Jaundiced  No rashes   Psych:  Good insight  Neurologic: EOMs intact. No facial asymmetry. No aphasia or slurred speech. Symmetrical strength, Sensation grossly intact.  Alert and oriented X 4.     _______________________________________________________________________  Care Plan discussed with:    Comments   Patient x    Family  x wife   RN x    Care Manager                    Consultant:      _______________________________________________________________________  Expected  Disposition:   Home with Family x   HH/PT/OT/RN    SNF/LTC    [facility] ________________________________________________________________________  TOTAL TIME:  60 Minutes    Critical Care Provided     Minutes non procedure based      Comments    x Reviewed previous records   >50% of visit spent in counseling and coordination of care  Discussion with patient and/or family and questions answered       ________________________________________________________________________  Signed: Zeina Flanagan MD    Procedures: see electronic medical records for all procedures/Xrays and details which were not copied into this note but were reviewed prior to creation of Plan. LAB DATA REVIEWED:    Recent Results (from the past 24 hour(s))   EKG, 12 LEAD, INITIAL    Collection Time: 03/13/17 11:17 AM   Result Value Ref Range    Ventricular Rate 92 BPM    Atrial Rate 258 BPM    QRS Duration 102 ms    Q-T Interval 346 ms    QTC Calculation (Bezet) 427 ms    Calculated R Axis 77 degrees    Calculated T Axis -128 degrees    Diagnosis       Atrial fibrillation  Incomplete right bundle branch block  Septal infarct , age undetermined  ST & T wave abnormality, consider inferior ischemia or digitalis effect  ST & T wave abnormality, consider anterolateral ischemia or digitalis effect  Abnormal ECG  No previous ECGs available     CBC WITH AUTOMATED DIFF    Collection Time: 03/13/17 12:15 PM   Result Value Ref Range    WBC 7.2 4.1 - 11.1 K/uL    RBC 4.86 4.10 - 5.70 M/uL    HGB 12.0 (L) 12.1 - 17.0 g/dL    HCT 40.3 36.6 - 50.3 %    MCV 82.9 80.0 - 99.0 FL    MCH 24.7 (L) 26.0 - 34.0 PG    MCHC 29.8 (L) 30.0 - 36.5 g/dL    RDW 16.8 (H) 11.5 - 14.5 %    PLATELET 981 (L) 299 - 400 K/uL    NEUTROPHILS 77 (H) 32 - 75 %    LYMPHOCYTES 12 12 - 49 %    MONOCYTES 10 5 - 13 %    EOSINOPHILS 1 0 - 7 %    BASOPHILS 0 0 - 1 %    ABS. NEUTROPHILS 5.5 1.8 - 8.0 K/UL    ABS. LYMPHOCYTES 0.9 0.8 - 3.5 K/UL    ABS. MONOCYTES 0.7 0.0 - 1.0 K/UL    ABS. EOSINOPHILS 0.1 0.0 - 0.4 K/UL    ABS.  BASOPHILS 0.0 0.0 - 0.1 K/UL    RBC COMMENTS ANISOCYTOSIS  1+        RBC COMMENTS RBC FRAGMENTS  OVALOCYTES  PRESENT        RBC COMMENTS HYPOCHROMIA  1+        DF SMEAR SCANNED     METABOLIC PANEL, COMPREHENSIVE    Collection Time: 03/13/17 12:15 PM   Result Value Ref Range    Sodium 142 136 - 145 mmol/L    Potassium 5.0 3.5 - 5.1 mmol/L    Chloride 104 97 - 108 mmol/L    CO2 28 21 - 32 mmol/L    Anion gap 10 5 - 15 mmol/L    Glucose 138 (H) 65 - 100 mg/dL     (H) 6 - 20 MG/DL    Creatinine 4.83 (H) 0.70 - 1.30 MG/DL    BUN/Creatinine ratio 31 (H) 12 - 20      GFR est AA 15 (L) >60 ml/min/1.73m2    GFR est non-AA 12 (L) >60 ml/min/1.73m2    Calcium 8.5 8.5 - 10.1 MG/DL    Bilirubin, total 0.6 0.2 - 1.0 MG/DL    ALT (SGPT) 24 12 - 78 U/L    AST (SGOT) 28 15 - 37 U/L    Alk.  phosphatase 37 (L) 45 - 117 U/L    Protein, total 6.3 (L) 6.4 - 8.2 g/dL    Albumin 3.4 (L) 3.5 - 5.0 g/dL    Globulin 2.9 2.0 - 4.0 g/dL    A-G Ratio 1.2 1.1 - 2.2     CK W/ CKMB & INDEX    Collection Time: 03/13/17 12:15 PM   Result Value Ref Range    CK 99 39 - 308 U/L    CK - MB 3.6 (H) <3.6 NG/ML    CK-MB Index 3.6 (H) 0 - 2.5     TROPONIN I    Collection Time: 03/13/17 12:15 PM   Result Value Ref Range    Troponin-I, Qt. 0.12 (H) <0.05 ng/mL   PRO-BNP    Collection Time: 03/13/17 12:15 PM   Result Value Ref Range    NT pro-BNP 89305 (H) 0 - 125 PG/ML

## 2017-03-13 NOTE — IP AVS SNAPSHOT
Current Discharge Medication List  
  
START taking these medications Dose & Instructions Dispensing Information Comments Morning Noon Evening Bedtime  
 apixaban 5 mg tablet Commonly known as:  Aminah Kennedy Your last dose was: Your next dose is:    
   
   
 Dose:  5 mg Take 1 Tab by mouth two (2) times a day. Indications: PREVENT THROMBOEMBOLISM IN CHRONIC ATRIAL FIBRILLATION Quantity:  60 Tab Refills:  0  
     
   
   
   
  
 bumetanide 0.5 mg tablet Commonly known as:  Anne-Marie Saint David Your last dose was: Your next dose is:    
   
   
 Dose:  1 mg Take 2 Tabs by mouth two (2) times a day. Quantity:  60 Tab Refills:  0 CONTINUE these medications which have CHANGED Dose & Instructions Dispensing Information Comments Morning Noon Evening Bedtime  
 pravastatin 40 mg tablet Commonly known as:  PRAVACHOL What changed:   
- how much to take - when to take this Your last dose was: Your next dose is:    
   
   
 Dose:  40 mg Take 1 Tab by mouth daily. Quantity:  90 Tab Refills:  2 CONTINUE these medications which have NOT CHANGED Dose & Instructions Dispensing Information Comments Morning Noon Evening Bedtime  
 b complex vitamins tablet Your last dose was: Your next dose is:    
   
   
 Dose:  1 Tab Take 1 Tab by mouth daily. Refills:  0  
     
   
   
   
  
 calcium carbonate 600 mg calcium (1,500 mg) tablet Commonly known as:  Hillery Ryne Your last dose was: Your next dose is:    
   
   
 Dose:  600 mg Take 600 mg by mouth every Tuesday, Thursday, Saturday & Sunday. Refills:  0  
     
   
   
   
  
 dutasteride 0.5 mg capsule Commonly known as:  AVODART Your last dose was: Your next dose is: TAKE ONE CAPSULE BY MOUTH EVERY DAY Refills:  2  ROBBY-C PO  
   
 Your last dose was: Your next dose is:    
   
   
 Dose:  500 mg Take 500 mg by mouth daily. Refills:  0  
     
   
   
   
  
 * HumaLOG Mix 75-25 100 unit/mL (75-25) injection Generic drug:  insulin lispro protamine/insulin lispro Your last dose was: Your next dose is:    
   
   
 Dose:  26 Units 26 Units by SubCUTAneous route daily (after breakfast). Insulin lispro protamine/insulin lispro dosing instructions: Inject 26 units after breakfast and inject 34 units Refills:  0  
     
   
   
   
  
 * HumaLOG Mix 75-25 100 unit/mL (75-25) injection Generic drug:  insulin lispro protamine/insulin lispro Your last dose was: Your next dose is:    
   
   
 Dose:  34 Units 34 Units by SubCUTAneous route daily (after dinner). Insulin lispro protamine/insulin lispro dosing instructions: Inject 26 units after breakfast and inject 34 units Refills:  0  
     
   
   
   
  
 * HumaLOG Mix 75-25 100 unit/mL (75-25) injection Generic drug:  insulin lispro protamine/insulin lispro Your last dose was: Your next dose is:    
   
   
 Dose:  5-20 Units 5-20 Units by SubCUTAneous route two (2) times daily (after meals). Corrective Coverage to be administered IN ADDITION to scheduled post prandial insulin doses: B mg/dL= administer an additional 5 units B mg/dL= administer an additional 10 units B mg/dL= administer an additional 15 units B mg/dL= administer an additional 20 units B mg/dL= administer an additional 5 units Refills:  0  
     
   
   
   
  
 levothyroxine 137 mcg tablet Commonly known as:  SYNTHROID Your last dose was: Your next dose is: TAKE 1 TABLET(S) EVERY DAY BY ORAL ROUTE FOR 90 DAYS. Refills:  3  
     
   
   
   
  
 metOLazone 2.5 mg tablet Commonly known as:  Krysten Austin Your last dose was:     
   
Your next dose is:    
   
   
 Dose:  2.5 mg  
 Take 1 Tab by mouth daily. Quantity:  90 Tab Refills:  2  
     
   
   
   
  
 metoprolol succinate 100 mg tablet Commonly known as:  TOPROL-XL Your last dose was: Your next dose is:    
   
   
 Dose:  100 mg Take 1 Tab by mouth two (2) times a day. Quantity:  180 Tab Refills:  2  
     
   
   
   
  
 multivitamin tablet Commonly known as:  ONE A DAY Your last dose was: Your next dose is:    
   
   
 Dose:  1 Tab Take 1 Tab by mouth daily. Refills:  0  
     
   
   
   
  
 paricalcitol 1 mcg capsule Commonly known as:  Jessie Newcomer Your last dose was: Your next dose is:    
   
   
 Dose:  1 mcg Take 1 mcg by mouth daily. Refills:  0  
     
   
   
   
  
 tamsulosin 0.4 mg capsule Commonly known as:  FLOMAX Your last dose was: Your next dose is: TAKE 2 CAPSULES BY MOUTH EVERY DAY Refills:  2 TYLENOL EXTRA STRENGTH 500 mg tablet Generic drug:  acetaminophen Your last dose was: Your next dose is:    
   
   
 Dose:  1000 mg Take 1,000 mg by mouth every six (6) hours as needed for Pain. Refills:  0  
     
   
   
   
  
 VITAMIN D3 1,000 unit Cap Generic drug:  cholecalciferol Your last dose was: Your next dose is:    
   
   
 Dose:  3000 Units Take 3,000 Units by mouth daily. Refills:  0  
     
   
   
   
  
 * Notice: This list has 3 medication(s) that are the same as other medications prescribed for you. Read the directions carefully, and ask your doctor or other care provider to review them with you. STOP taking these medications   
 furosemide 40 mg tablet Commonly known as:  LASIX  
   
  
 lisinopril 2.5 mg tablet Commonly known as:  PRINIVIL, ZESTRIL  
   
  
 warfarin 5 mg tablet Commonly known as:  COUMADIN  
   
  
  
ASK your doctor about these medications Dose & Instructions Dispensing Information Comments Morning Noon Evening Bedtime LASIX 40 mg tablet Generic drug:  furosemide Your last dose was: Your next dose is: Take  by mouth daily. Refills:  0 Where to Get Your Medications Information on where to get these meds will be given to you by the nurse or doctor. ! Ask your nurse or doctor about these medications  
  apixaban 5 mg tablet  
 bumetanide 0.5 mg tablet

## 2017-03-13 NOTE — IP AVS SNAPSHOT
[address] [facility] 
[phone] Patient: Yesenia Haq MRN: [id number] You are allergic to the following Allergen Reactions Ciprofloxacin Nausea Only Percocet (Oxycodone-Acetaminophen) Other (comments)  
 hallucinations Recent Documentation Height Weight BMI Smoking Status 1.753 m 126.8 kg 41.28 kg/m2 Never Smoker Emergency Contacts Name Discharge Info Relation Home Work Mobile Ana Maria Loaiza  Spouse [3] [phone] About your hospitalization You were admitted on:  March 13, 2017 You last received care in the:  [facility] PROGRESSIVE [facility] You were discharged on:  March 17, 2017 Unit phone number:  [phone] Why you were hospitalized Your primary diagnosis was:  Not on File Your diagnoses also included:  Sob (Shortness Of Breath), Ckd (Chronic Kidney Disease) Stage 4, Gfr 15-29 Ml/Min (Hcc), Hx Of Cabg, Morbid Obesity (Hcc), Diabetes Mellitus Type 2, Controlled (Hcc), Chronic A-Fib (Hcc), Heart Failure (Hcc), Fatigue, Bilateral Leg Edema, Counseling Regarding Advanced Care Planning And Goals Of Care Providers Seen During Your Hospitalizations Provider Role Specialty Primary office phone Rosa Shahid MD Attending Provider Emergency Medicine [phone] Sancho Villela MD Attending Provider Internal Medicine [phone] Your Primary Care Physician (PCP) Primary Care Physician Office Phone Office Fax Waleska Celia [phone] Follow-up Information Follow up With Details Comments Contact Info Clarita Mckeon MD On 3/22/2017 [facility] patient - Cardiology -  Tuesday 3/22/2107 at 10:45am [address] [facility] 
[phone] TEJAS Raphael On 3/31/2017 PCP - Appointment scheduled March 31, 2017 at 1:00pm [address] [place] [address] 60 [address]00 [facility] [place] [address] 
[phone] Ritika Haider MD On 3/17/2017  [address] [facility] [address] [facility] [address] 
[phone] Marcelo Young MD  Nephrology -  called to request 7 day follow up. Amado Gotti will have MD review and call patient to schedule appointment. [place] [address] [address] [facility] 
[phone] Kevin Barcenas MD  Sleep study  consult - referred by Dr. Benjamin Knight (Pulmonary) - Nurse will call patient to schedule appointment. [address] [facility] [address] [facility] [address] 
[phone] [facility] 227  will be providing HH/PT/OT [address] [facility] [address] 
[phone] Your Appointments Wednesday March 22, 2017 10:45 AM EDT HOSPITAL FOLLOW-UP with En Baker MD  
[facility] [address] [place] [address]) [address] [facility]  
[phone] Monday April 03, 2017  2:50 PM EDT New Patient with Yoli Bowden MD  
[facility] [address] [place] [address]) [address] [address] [phone] Current Discharge Medication List  
  
START taking these medications Dose & Instructions Dispensing Information Comments Morning Noon Evening Bedtime  
 apixaban 5 mg tablet Commonly known as:  Rhenda Brent Your last dose was: Your next dose is:    
   
   
 Dose:  5 mg Take 1 Tab by mouth two (2) times a day. Indications: PREVENT THROMBOEMBOLISM IN CHRONIC ATRIAL FIBRILLATION Quantity:  60 Tab Refills:  0  
     
   
   
   
  
 bumetanide 0.5 mg tablet Commonly known as:  Anne-Marie Robbins Your last dose was: Your next dose is:    
   
   
 Dose:  1 mg Take 2 Tabs by mouth two (2) times a day. Quantity:  60 Tab Refills:  0 CONTINUE these medications which have NOT CHANGED Dose & Instructions Dispensing Information Comments Morning Noon Evening Bedtime  
 b complex vitamins tablet Your last dose was: Your next dose is:    
   
   
 Dose:  1 Tab Take 1 Tab by mouth daily. Refills:  0  
     
   
   
   
  
 calcium carbonate 600 mg calcium (1,500 mg) tablet Commonly known as:  Link Perking Your last dose was: Your next dose is:    
   
   
 Dose:  600 mg Take 600 mg by mouth every Tuesday, Thursday, Saturday & Sunday. Refills:  0  
     
   
   
   
  
 dutasteride 0.5 mg capsule Commonly known as:  AVODART Your last dose was: Your next dose is: TAKE ONE CAPSULE BY MOUTH EVERY DAY Refills:  2 ROBBY-C PO Your last dose was: Your next dose is:    
   
   
 Dose:  500 mg Take 500 mg by mouth daily. Refills:  0  
     
   
   
   
  
 * HumaLOG Mix 75-25 100 unit/mL (75-25) injection Generic drug:  insulin lispro protamine/insulin lispro Your last dose was: Your next dose is:    
   
   
 Dose:  26 Units 26 Units by SubCUTAneous route daily (after breakfast). Insulin lispro protamine/insulin lispro dosing instructions: Inject 26 units after breakfast and inject 34 units Refills:  0  
     
   
   
   
  
 * HumaLOG Mix 75-25 100 unit/mL (75-25) injection Generic drug:  insulin lispro protamine/insulin lispro Your last dose was: Your next dose is:    
   
   
 Dose:  34 Units 34 Units by SubCUTAneous route daily (after dinner). Insulin lispro protamine/insulin lispro dosing instructions: Inject 26 units after breakfast and inject 34 units Refills:  0  
     
   
   
   
  
 * HumaLOG Mix 75-25 100 unit/mL (75-25) injection Generic drug:  insulin lispro protamine/insulin lispro Your last dose was: Your next dose is:    
   
   
 Dose:  5-20 Units 5-20 Units by SubCUTAneous route two (2) times daily (after meals). Corrective Coverage to be administered IN ADDITION to scheduled post prandial insulin doses: B mg/dL= administer an additional 5 units B mg/dL= administer an additional 10 units B mg/dL= administer an additional 15 units B mg/dL= administer an additional 20 units B mg/dL= administer an additional 5 units Refills:  0  
     
   
   
   
  
 levothyroxine 137 mcg tablet Commonly known as:  SYNTHROID Your last dose was: Your next dose is: TAKE 1 TABLET(S) EVERY DAY BY ORAL ROUTE FOR 90 DAYS. Refills:  3  
     
   
   
   
  
 metOLazone 2.5 mg tablet Commonly known as:  [place] Har[place] Your last dose was: Your next dose is:    
   
   
 Dose:  2.5 mg Take 1 Tab by mouth daily. Quantity:  90 Tab Refills:  2  
     
   
   
   
  
 metoprolol succinate 100 mg tablet Commonly known as:  TOPROL-XL Your last dose was: Your next dose is:    
   
   
 Dose:  100 mg Take 1 Tab by mouth two (2) times a day. Quantity:  180 Tab Refills:  2  
     
   
   
   
  
 multivitamin tablet Commonly known as:  ONE A DAY Your last dose was: Your next dose is:    
   
   
 Dose:  1 Tab Take 1 Tab by mouth daily. Refills:  0  
     
   
   
   
  
 paricalcitol 1 mcg capsule Commonly known as:  Ethelda Ghee Your last dose was: Your next dose is:    
   
   
 Dose:  1 mcg Take 1 mcg by mouth daily. Refills:  0  
     
   
   
   
  
 pravastatin 40 mg tablet Commonly known as:  PRAVACHOL Your last dose was: Your next dose is:    
   
   
 Dose:  80 mg Take 2 Tabs by mouth nightly. Quantity:  30 Tab Refills:  0  
     
   
   
   
  
 tamsulosin 0.4 mg capsule Commonly known as:  FLOMAX Your last dose was: Your next dose is: TAKE 2 CAPSULES BY MOUTH EVERY DAY Refills:  2 TYLENOL EXTRA STRENGTH 500 mg tablet Generic drug:  acetaminophen Your last dose was: Your next dose is:    
   
   
 Dose:  1000 mg Take 1,000 mg by mouth every six (6) hours as needed for Pain. Refills:  0  
     
   
   
   
  
 VITAMIN D3 1,000 unit Cap Generic drug:  cholecalciferol Your last dose was: Your next dose is:    
   
   
 Dose:  3000 Units Take 3,000 Units by mouth daily. Refills:  0  
     
   
   
   
  
 * Notice: This list has 3 medication(s) that are the same as other medications prescribed for you. Read the directions carefully, and ask your doctor or other care provider to review them with you. STOP taking these medications   
 furosemide 40 mg tablet Commonly known as:  LASIX  
   
  
 lisinopril 2.5 mg tablet Commonly known as:  PRINIVIL, ZESTRIL  
   
  
 warfarin 5 mg tablet Commonly known as:  COUMADIN  
   
  
  
ASK your doctor about these medications Dose & Instructions Dispensing Information Comments Morning Noon Evening Bedtime LASIX 40 mg tablet Generic drug:  furosemide Your last dose was: Your next dose is: Take  by mouth daily. Refills:  0 Where to Get Your Medications These medications were sent to [address] [place] [address] S. [address] [place] [address]. [address] [place] [address] Hours:  24-hours Phone:  [phone]  
  pravastatin 40 mg tablet Information on where to get these meds will be given to you by the nurse or doctor. ! Ask your nurse or doctor about these medications  
  apixaban 5 mg tablet  
 bumetanide 0.5 mg tablet Discharge Instructions None Discharge Instructions Attachments/References HEART FAILURE ZONES: GENERAL INFO (ENGLISH) HEART FAILURE: AVOIDING TRIGGERS (ENGLISH) HEART FAILURE: SELF-CARE: GENERAL INFO (ENGLISH) Discharge Orders None V-Key Announcement We are excited to announce that we are making your provider's discharge notes available to you in V-Key. You will see these notes when they are completed and signed by the physician that discharged you from your recent hospital stay. If you have any questions or concerns about any information you see in V-Key, please call the Health Information Department where you were seen or reach out to your Primary Care Provider for more information about your plan of care. Introducing [facility]! Jairomaggie Fong introduces V-Key patient portal. Now you can access parts of your medical record, email your doctor's office, and request medication refills online. 1. In your internet browser, go to [email or web address] 2. Click on the First Time User? Click Here link in the Sign In box. You will see the New Member Sign Up page. 3. Enter your V-Key Access Code exactly as it appears below. You will not need to use this code after youve completed the sign-up process. If you do not sign up before the expiration date, you must request a new code. · V-Key Access Code: [email or web address] Expires: 6/5/2017 10:26 AM 
 
4. Enter the last four digits of your Social Security Number (xxxx) and Date of Birth (mm/dd/yyyy) as indicated and click Submit. You will be taken to the next sign-up page. 5. Create a V-Key ID. This will be your V-Key login ID and cannot be changed, so think of one that is secure and easy to remember. 6. Create a V-Key password. You can change your password at any time. 7. Enter your Password Reset Question and Answer. This can be used at a later time if you forget your password. 8. Enter your e-mail address. You will receive e-mail notification when new information is available in [address] 9. Click Sign Up. You can now view and download portions of your medical record. 10. Click the Download Summary menu link to download a portable copy of your medical information. If you have questions, please visit the Frequently Asked Questions section of the H-art (WPP) website. Remember, H-art (WPP) is NOT to be used for urgent needs. For medical emergencies, dial 911. Now available from your iPhone and Android! General Information Please provide this summary of care documentation to your next provider. Patient Signature:  ____________________________________________________________ Date:  ____________________________________________________________  
  
Stephen Medina Provider Signature:  ____________________________________________________________ Date:  ____________________________________________________________ More Information Learning About Heart Failure Zones What are heart failure zones? Heart failure zones give you an easy way to see changes in your heart failure symptoms. They also tell you when you need to get help. Check every day to see which zone you are in. Green zone. You are doing well. This is where you want to be. · Your weight is stable. This means it is not going up or down. · You breathe easily. · You are sleeping well. You are able to lie flat without shortness of breath. · You can do your usual activities. Yellow zone. Be careful. Your symptoms are changing. Call your doctor. · You have new or increased shortness of breath. · You are dizzy or lightheaded, or you feel like you may faint. · You have sudden weight gain, such as 3 pounds or more in 2 to 3 days. · You have increased swelling in your legs, ankles, or feet. · You are so tired or weak that you cannot do your usual activities. · You are not sleeping well. Shortness of breath wakes you up at night. You need extra pillows.  
Your doctor's name: ____________________________________________________________ Your doctor's contact information: _________________________________________________ Red zone. This is an emergency. Call 911. You have symptoms of sudden heart failure, such as: 
· You have severe trouble breathing. · You cough up pink, foamy mucus. · You have a new irregular or fast heartbeat. You have symptoms of a heart attack. These may include: · Chest pain or pressure, or a strange feeling in the chest. 
· Sweating. · Shortness of breath. · Nausea or vomiting. · Pain, pressure, or a strange feeling in the back, neck, jaw, or upper belly or in one or both shoulders or arms. · Lightheadedness or sudden weakness. · A fast or irregular heartbeat. If you have symptoms of a heart attack: After you call 911, the  may tell you to chew 1 adult-strength or 2 to 4 low-dose aspirin. Wait for an ambulance. Do not try to drive yourself. Follow-up care is a key part of your treatment and safety. Be sure to make and go to all appointments, and call your doctor if you are having problems. It's also a good idea to know your test results and keep a list of the medicines you take. Where can you learn more? Go to http://shira-mine.info/. Enter T174 in the search box to learn more about \"Learning About Heart Failure Zones. \" Current as of: January 27, 2016 Content Version: 11.1 © 4030-0654 Spin[employer]. Care instructions adapted under license by [employer] (which disclaims liability or warranty for this information). If you have questions about a medical condition or this instruction, always ask your healthcare professional. [place] [address] any warranty or liability for your use of this information. Avoiding Triggers With Heart Failure: Care Instructions Your Care Instructions Triggers are anything that make your heart failure flare up.  A flare-up is also called \"sudden heart failure\" or \"acute heart failure. \" When you have a flare-up, fluid builds up in your lungs, and you have problems breathing. You might need to go to the hospital. By watching for changes in your condition and avoiding triggers, you can prevent heart failure flare-ups. Follow-up care is a key part of your treatment and safety. Be sure to make and go to all appointments, and call your doctor if you are having problems. It's also a good idea to know your test results and keep a list of the medicines you take. How can you care for yourself at home? Watch for changes in your weight and condition · Weigh yourself without clothing at the same time each day. Record your weight. Call your doctor if you gain 3 pounds or more in 2 to 3 days. A sudden weight gain may mean that your heart failure is getting worse. · Keep a daily record of your symptoms. Write down any changes in how you feel, such as new shortness of breath, cough, or problems eating. Also record if your ankles are more swollen than usual and if you have to urinate in the night more often. Note anything that you ate or did that could have triggered these changes. Limit sodium Sodium causes your body to hold on to water, making it harder for your heart to pump. People get most of their sodium from processed foods. Fast food and restaurant meals also tend to be very high in sodium. · Your doctor may suggest that you limit sodium to 2,000 milligrams (mg) a day or less. That is less than 1 teaspoon of salt a day, including all the salt you eat in cooking or in packaged foods. · Read food labels on cans and food packages. They tell you how much sodium you get in one serving. Check the serving size. If you eat more than one serving, you are getting more sodium.  
· Be aware that sodium can come in forms other than salt, including monosodium glutamate (MSG), sodium citrate, and sodium bicarbonate (baking soda). MSG is often added to Asian food. You can sometimes ask for food without MSG or salt. · Slowly reducing salt will help you adjust to the taste. Take the salt shaker off the table. · Flavor your food with garlic, lemon juice, onion, vinegar, herbs, and spices instead of salt. Do not use soy sauce, steak sauce, onion salt, garlic salt, mustard, or ketchup on your food, unless it is labeled \"low-sodium\" or \"low-salt. \" 
· Make your own salad dressings, sauces, and ketchup without adding salt. · Use fresh or frozen ingredients, instead of canned ones, whenever you can. Choose low-sodium canned goods. · Eat less processed food and food from restaurants, including fast food. Exercise as directed Moderate, regular exercise is very good for your heart. It improves your blood flow and helps control your weight. But too much exercise can stress your heart and cause a heart failure flare-up. · Check with your doctor before you start an exercise program. 
· Walking is an easy way to get exercise. Start out slowly. Gradually increase the length and pace of your walk. Swimming, riding a bike, and using a treadmill are also good forms of exercise. · When you exercise, watch for signs that your heart is working too hard. You are pushing yourself too hard if you cannot talk while you are exercising. If you become short of breath or dizzy or have chest pain, stop, sit down, and rest. 
· Do not exercise when you do not feel well. Take medicines correctly · Take your medicines exactly as prescribed. Call your doctor if you think you are having a problem with your medicine. · Make a list of all the medicines you take. Include those prescribed to you by other doctors and any over-the-counter medicines, vitamins, or supplements you take. Take this list with you when you go to any doctor. · Take your medicines at the same time every day.  It may help you to post a list of all the medicines you take every day and what time of day you take them. · Make taking your medicine as simple as you can. Plan times to take your medicines when you are doing other things, such as eating a meal or getting ready for bed. This will make it easier to remember to take your medicines. · Get organized. Use helpful tools, such as daily or weekly pill containers. When should you call for help? Call 911 if you have symptoms of sudden heart failure such as: 
· You have severe trouble breathing. · You cough up pink, foamy mucus. · You have a new irregular or rapid heartbeat. Call your doctor now or seek immediate medical care if: 
· You have new or increased shortness of breath. · You are dizzy or lightheaded, or you feel like you may faint. · You have sudden weight gain, such as 3 pounds or more in 2 to 3 days. · You have increased swelling in your legs, ankles, or feet. · You are suddenly so tired or weak that you cannot do your usual activities. Watch closely for changes in your health, and be sure to contact your doctor if you develop new symptoms. Where can you learn more? Go to http://shira-mine.info/. Enter V392 in the search box to learn more about \"Avoiding Triggers With Heart Failure: Care Instructions. \" Current as of: April 27, 2016 Content Version: 11.1 © 4218-7047 [employer]wise[employer]. Care instructions adapted under license by [employer] (which disclaims liability or warranty for this information). If you have questions about a medical condition or this instruction, always ask your healthcare professional. [place] [address] any warranty or liability for your use of this information. Learning About Self-Care for Heart Failure What is self-care for heart failure? Heart failure usually gets worse over time.  But there are many things you can do to feel better, stay healthy longer, and avoid the hospital. 
Self-care means managing your health by doing certain things every day, like weighing yourself. It's about knowing which symptoms to watch for so you can avoid getting worse. When you practice good self-care, you know when it's time to call your doctor and when your heart failure has turned into an emergency. The lists below can help. Top 5 self-care tips for every day 1. Take your medicines as prescribed. This gives them the best chance of helping you. 2. Watch for signs that you're getting worse. Weighing yourself every day is one of the best ways to do this. Weight gain may be a sign that your body is holding on to too much fluid. Weigh yourself at the same time each day, using the same scale, on a hard, flat surface. The best time is in the morning after you go to the bathroom and before you eat or drink anything. 3. Find out what your triggers are, and learn to avoid them. Triggers are things that make your heart failure worse, often suddenly. A trigger may be eating too much salt, missing a dose of your medicine, or exercising too hard. 4. Limit sodium. This helps keep fluid from building up and makes it easier for your heart to pump. Your doctor may suggest that you limit sodium to 2,000 milligrams (mg) a day or less. That's less than 1 teaspoon. You can stay under this amount by limiting the salt you eat at home and by watching for \"hidden\" sodium when you eat out or shop for food. 5. Try to exercise throughout the week. Exercise makes your heart stronger and can help you avoid symptoms. Walking is a great way to get exercise. If your doctor says it's safe, start out with some short walks, and then slowly build up to longer ones. When should you act? Try to become familiar with signs that mean your heart failure is getting worse. If you need help, talk with your doctor about making a personal plan. Here are some things to watch for as you practice your daily self-care. Call your doctor if: 
· You gain 3 pounds or more over 2 to 3 days. (Or your doctor may tell you how much weight to watch for.) · You have new or worse swelling in your feet, ankles, or legs. · Your breathing gets worse. Activities that did not make you short of breath before are hard for you now. · Your breathing when you lie down is worse than usual, or you wake up at night needing to catch your breath. Be sure to make and go to all of your follow-up appointments. And it's always a good idea to call your doctor anytime you have a sudden change in symptoms. When is it an emergency? Sometimes the symptoms get worse very quickly. This is called sudden heart failure. It causes fluid to build up in your lungs. Sudden heart failure is an emergency. If you have any of these symptoms, you need care right away. Call 911 if: 
· You have severe shortness of breath. · You have an irregular or fast heartbeat. · You cough up foamy, pink mucus. What else can you do to stay healthy? There are other things you can do to take care of your body and your heart. These things will help you feel better. And they will also reduce your risk of heart attack and stroke. · Try to stay at a healthy weight. Eat a healthy diet with lots of fresh fruit, vegetables, and whole grains. · If you smoke, quit. · Limit the amount of alcohol you drink. · Keep high blood pressure and diabetes under control. If you need help, talk with your doctor. If your doctor has not set you up with a cardiac rehabilitation (rehab) program, talk to him or her about whether that is right for you. Cardiac rehab includes exercise, help with diet and lifestyle changes, and emotional support. Also let your doctor know if: 
· You're having trouble sleeping. Sleep is important to your well-being. It also helps your heart work the way it's supposed to.  Your doctor can help you decide if you need treatment for sleep problems. · You're feeling sad and hopeless much of the time, or you are worried and anxious. Heart failure can be hard on your emotions. Treatment with counseling and medicine can help. And when you feel better, you're more likely to take care of yourself. Follow-up care is a key part of your treatment and safety. Be sure to make and go to all appointments, and call your doctor if you are having problems. It's also a good idea to know your test results and keep a list of the medicines you take. Where can you learn more? Go to http://shira-mine.info/. Enter W711 in the search box to learn more about \"Learning About Self-Care for Heart Failure. \" Current as of: January 27, 2016 Content Version: 11.1 © 0085-2532 [employer]. Care instructions adapted under license by [employer] (which disclaims liability or warranty for this information). If you have questions about a medical condition or this instruction, always ask your healthcare professional. [address] any warranty or liability for your use of this information.

## 2017-03-13 NOTE — CONSULTS
[address], [address] [facility]     Date of  Admission: 3/13/2017 11:22 AM     Admission type:Emergency    Consult for: worsening SOB  Consult by: Hospitalist     Subjective:     Ирина Cifuentes is a 75 y.o. male admitted for worsening SOB. Hx of CABG 2010, DM, HTN, chronic afib, CKD. Seen by Dr. Leopoldo Drone on 3/7/17 and scheduled echo and Dina Livingston on Tuesday, 3/14. Diuretics had been increased, but SOB and LE edema worsening. ECG with no acute changes, afib rate controlled. Has received IV lasix 80mg just now. Previous treatment/evaluation includes Coronary Artery Bypass Graft, Percutaneous Coronary Intervention and echocardiogram . Cardiac risk factors: family history, dyslipidemia, diabetes mellitus, obesity, sedentary life style, male gender, hypertension.       Patient Active Problem List    Diagnosis Date Noted    Hx of CABG 03/13/2017    Morbid obesity ([address] 03/13/2017    Diabetes mellitus type 2, controlled ([address] 03/13/2017    SOB (shortness of breath) 03/07/2017    Chronic a-fib ([address] 03/07/2017    Coronary artery disease involving native coronary artery without angina pectoris 03/07/2017    CKD (chronic kidney disease) stage 4, GFR 15-29 ml/min ([address] 03/07/2017      TEJAS Castañeda  Past Medical History:   Diagnosis Date    CAD (coronary atherosclerotic disease)     Diabetes ([address]     Diabetes mellitus type 2, controlled ([address] 3/13/2017    Heart failure ([address]     Hx of CABG 3/13/2017    [place]ABG in 2010 in  [facility] [place] Morbid obesity ([address] 3/13/2017    S/P CABG x 2 2010      Social History     Social History    Marital status:      Spouse name: N/A    Number of children: N/A    Years of education: N/A     Social History Main Topics    Smoking status: Never Smoker    Smokeless tobacco: Never Used    Alcohol use Yes      Comment: rare    Drug use: No    Sexual activity: Not Asked Other Topics Concern    None     Social History Narrative     Allergies   Allergen Reactions    Ciprofloxacin Nausea Only    Percocet [Oxycodone-Acetaminophen] Other (comments)     hallucinations      Family History   Problem Relation Age of Onset    Heart Attack Father       No current facility-administered medications for this encounter. Current Outpatient Prescriptions   Medication Sig    dutasteride (AVODART) 0.5 mg capsule TAKE ONE CAPSULE BY MOUTH EVERY DAY    levothyroxine (SYNTHROID) 137 mcg tablet TAKE 1 TABLET(S) EVERY DAY BY ORAL ROUTE FOR 90 DAYS.  lisinopril (PRINIVIL, ZESTRIL) 2.5 mg tablet TAKE 1 TABLET EVERY DAY    tamsulosin (FLOMAX) 0.4 mg capsule TAKE 2 CAPSULES BY MOUTH EVERY DAY    insulin lispro protamine/insulin lispro (HUMALOG MIX 75-25) 100 unit/mL (75-25) injection by SubCUTAneous route.  furosemide (LASIX) 40 mg tablet Take  by mouth daily.  paricalcitol (ZEMPLAR) 1 mcg capsule Take 1 mcg by mouth daily.  multivitamin (ONE A DAY) tablet Take 1 Tab by mouth daily.  b complex vitamins tablet Take 1 Tab by mouth daily.  cholecalciferol (VITAMIN D3) 1,000 unit cap Take  by mouth daily.  calcium-cholecalciferol, d3, (CALCIUM 600 + D) 600-125 mg-unit tab Take  by mouth.  OTHER     acetaminophen (TYLENOL EXTRA STRENGTH) 500 mg tablet Take  by mouth every six (6) hours as needed for Pain.  apixaban (ELIQUIS) 5 mg tablet Take 1 Tab by mouth two (2) times a day. Indications: DEEP VEIN THROMBOSIS PREVENTION    metoprolol succinate (TOPROL-XL) 100 mg tablet Take 1 Tab by mouth two (2) times a day.  pravastatin (PRAVACHOL) 40 mg tablet Take 1 Tab by mouth daily.  metOLazone (ZAROXOLYN) 2.5 mg tablet Take 1 Tab by mouth daily.         Review of Symptoms:   Constitutional: progressive weakness  Eyes: negative   Ears, nose, mouth, throat, and face: negative  Respiratory: SOB worse, +BETTS, PND, orthopnea  Cardiovascular: denies CP, palpitations - does not feel afib   Gastrointestinal: negative, decreased appetite  Genitourinary:negative   Musculoskeletal:weakness; chronic venous stasis in catherine LE  Neurological: negative   Endocrine: DM          Objective:      Visit Vitals    /57    Pulse 97    Temp 97.7 °F (36.5 °C)    Resp 18    Ht 5' 9\" (1.753 m)    Wt 136.1 kg (300 lb 0.7 oz)    SpO2 96%    BMI 44.31 kg/m2       Physical:   General: morbidly obese  male in no acute distress  Heart: irr, irr , no m/S3/JVD  Lungs: diminished  Abdomen: obese , Soft, +BS, NTND   Extremities: LE catherine+2 edema and venous stasis, errhythemic, ecchymotic  Neurologic: Grossly normal    Data Review:   Recent Labs      03/13/17   1215   WBC  7.2   HGB  12.0*   HCT  40.3   PLT  149*     Recent Labs      03/13/17   1215   NA  142   K  5.0   CL  104   CO2  28   GLU  138*   BUN  148*   CREA  4.83*   CA  8.5   ALB  3.4*   TBILI  0.6   SGOT  28   ALT  24       Recent Labs      03/13/17   1215   TROIQ  0.12*   CPK  99   CKMB  3.6*       No intake or output data in the 24 hours ending 03/13/17 1602     Cardiographics    Telemetry: afib rate controlled  ECG: afib, no acute changes  Echocardiogram: pending  CXRAY:  Mild cardiomegaly and small bilateral pleural effusions  Possible pulmonary artery hypertension       Assessment:       Active Problems:    SOB (shortness of breath) (3/7/2017)      Chronic a-fib ([facility]) (3/7/2017)      CKD (chronic kidney disease) stage 4, GFR 15-29 ml/min ([facility]) (3/7/2017)      Hx of CABG (3/13/2017)      Overview: CABG in 2010 in [place]      Morbid obesity ([facility] (3/13/2017)      Diabetes mellitus type 2, controlled ([facility] (3/13/2017)         Plan:     SOB:  Unclear etiology, hx of CAD, DM, HTN, Afib places patient at higher risk for HF  Echo pending  Chronic kidney disease - may be impacting the symptoms, followed by a Nephrologists  Continue with diuresis, monitoring I/Os, daily weights, labs    CAD:  Continue on ASA, BB, statin  No complaints of chest discomfort or other symptoms or clinical changes that would push for ischemic evaluation at this time. Troponin mild elevation due to CKD. Follow trend    Chronic AFib:   Rate controlled on Toprol and AC - Eliquis  Cardioversion in future? Sleep apnea? ?    Thank you for consulting ULISES Chavez NP

## 2017-03-14 ENCOUNTER — APPOINTMENT (OUTPATIENT)
Dept: ULTRASOUND IMAGING | Age: 67
DRG: 291 | End: 2017-03-14
Attending: INTERNAL MEDICINE
Payer: MEDICARE

## 2017-03-14 LAB
ANION GAP BLD CALC-SCNC: 11 MMOL/L (ref 5–15)
APPEARANCE UR: CLEAR
ATRIAL RATE: 258 BPM
BACTERIA URNS QL MICRO: NEGATIVE /HPF
BASOPHILS # BLD AUTO: 0 K/UL (ref 0–0.1)
BASOPHILS # BLD: 0 % (ref 0–1)
BILIRUB UR QL: NEGATIVE
BNP SERPL-MCNC: 332 PG/ML (ref 0–100)
BUN SERPL-MCNC: 146 MG/DL (ref 6–20)
BUN/CREAT SERPL: 33 (ref 12–20)
CALCIUM SERPL-MCNC: 8.6 MG/DL (ref 8.5–10.1)
CALCULATED R AXIS, ECG10: 77 DEGREES
CALCULATED T AXIS, ECG11: -128 DEGREES
CHLORIDE SERPL-SCNC: 107 MMOL/L (ref 97–108)
CO2 SERPL-SCNC: 26 MMOL/L (ref 21–32)
COLOR UR: ABNORMAL
CREAT SERPL-MCNC: 4.43 MG/DL (ref 0.7–1.3)
CREAT UR-MCNC: 61.6 MG/DL
DIAGNOSIS, 93000: NORMAL
EOSINOPHIL # BLD: 0.1 K/UL (ref 0–0.4)
EOSINOPHIL NFR BLD: 2 % (ref 0–7)
EPITH CASTS URNS QL MICRO: ABNORMAL /LPF
ERYTHROCYTE [DISTWIDTH] IN BLOOD BY AUTOMATED COUNT: 16.6 % (ref 11.5–14.5)
GLUCOSE BLD STRIP.AUTO-MCNC: 109 MG/DL (ref 65–100)
GLUCOSE BLD STRIP.AUTO-MCNC: 130 MG/DL (ref 65–100)
GLUCOSE BLD STRIP.AUTO-MCNC: 137 MG/DL (ref 65–100)
GLUCOSE BLD STRIP.AUTO-MCNC: 172 MG/DL (ref 65–100)
GLUCOSE BLD STRIP.AUTO-MCNC: 73 MG/DL (ref 65–100)
GLUCOSE BLD STRIP.AUTO-MCNC: 74 MG/DL (ref 65–100)
GLUCOSE SERPL-MCNC: 157 MG/DL (ref 65–100)
GLUCOSE UR STRIP.AUTO-MCNC: NEGATIVE MG/DL
HCT VFR BLD AUTO: 37.5 % (ref 36.6–50.3)
HGB BLD-MCNC: 11.3 G/DL (ref 12.1–17)
HGB UR QL STRIP: ABNORMAL
HYALINE CASTS URNS QL MICRO: ABNORMAL /LPF (ref 0–5)
KETONES UR QL STRIP.AUTO: NEGATIVE MG/DL
LEUKOCYTE ESTERASE UR QL STRIP.AUTO: NEGATIVE
LYMPHOCYTES # BLD AUTO: 19 % (ref 12–49)
LYMPHOCYTES # BLD: 1.1 K/UL (ref 0.8–3.5)
MAGNESIUM SERPL-MCNC: 2.8 MG/DL (ref 1.6–2.4)
MCH RBC QN AUTO: 24.7 PG (ref 26–34)
MCHC RBC AUTO-ENTMCNC: 30.1 G/DL (ref 30–36.5)
MCV RBC AUTO: 81.9 FL (ref 80–99)
MONOCYTES # BLD: 0.8 K/UL (ref 0–1)
MONOCYTES NFR BLD AUTO: 13 % (ref 5–13)
NEUTS SEG # BLD: 3.7 K/UL (ref 1.8–8)
NEUTS SEG NFR BLD AUTO: 66 % (ref 32–75)
NITRITE UR QL STRIP.AUTO: NEGATIVE
PH UR STRIP: 5 [PH] (ref 5–8)
PLATELET # BLD AUTO: 129 K/UL (ref 150–400)
POTASSIUM SERPL-SCNC: 4.8 MMOL/L (ref 3.5–5.1)
PROT UR STRIP-MCNC: NEGATIVE MG/DL
PROT UR-MCNC: 23 MG/DL (ref 0–11.9)
PROT/CREAT UR-RTO: 0.4
Q-T INTERVAL, ECG07: 346 MS
QRS DURATION, ECG06: 102 MS
QTC CALCULATION (BEZET), ECG08: 427 MS
RBC # BLD AUTO: 4.58 M/UL (ref 4.1–5.7)
RBC #/AREA URNS HPF: ABNORMAL /HPF (ref 0–5)
SERVICE CMNT-IMP: ABNORMAL
SERVICE CMNT-IMP: NORMAL
SERVICE CMNT-IMP: NORMAL
SODIUM SERPL-SCNC: 144 MMOL/L (ref 136–145)
SP GR UR REFRACTOMETRY: 1.01 (ref 1–1.03)
TROPONIN I SERPL-MCNC: 0.11 NG/ML
UROBILINOGEN UR QL STRIP.AUTO: 0.2 EU/DL (ref 0.2–1)
VENTRICULAR RATE, ECG03: 92 BPM
WBC # BLD AUTO: 5.7 K/UL (ref 4.1–11.1)
WBC URNS QL MICRO: ABNORMAL /HPF (ref 0–4)

## 2017-03-14 PROCEDURE — 76770 US EXAM ABDO BACK WALL COMP: CPT

## 2017-03-14 PROCEDURE — 76450000000

## 2017-03-14 PROCEDURE — 82570 ASSAY OF URINE CREATININE: CPT | Performed by: INTERNAL MEDICINE

## 2017-03-14 PROCEDURE — 84484 ASSAY OF TROPONIN QUANT: CPT | Performed by: INTERNAL MEDICINE

## 2017-03-14 PROCEDURE — G8988 SELF CARE GOAL STATUS: HCPCS | Performed by: OCCUPATIONAL THERAPIST

## 2017-03-14 PROCEDURE — G8987 SELF CARE CURRENT STATUS: HCPCS | Performed by: OCCUPATIONAL THERAPIST

## 2017-03-14 PROCEDURE — 80048 BASIC METABOLIC PNL TOTAL CA: CPT | Performed by: INTERNAL MEDICINE

## 2017-03-14 PROCEDURE — 77030011255 HC DSG AQUACEL AG BMS -A

## 2017-03-14 PROCEDURE — 81001 URINALYSIS AUTO W/SCOPE: CPT | Performed by: INTERNAL MEDICINE

## 2017-03-14 PROCEDURE — 82962 GLUCOSE BLOOD TEST: CPT

## 2017-03-14 PROCEDURE — 36415 COLL VENOUS BLD VENIPUNCTURE: CPT | Performed by: INTERNAL MEDICINE

## 2017-03-14 PROCEDURE — 77010033678 HC OXYGEN DAILY

## 2017-03-14 PROCEDURE — 74011000250 HC RX REV CODE- 250: Performed by: INTERNAL MEDICINE

## 2017-03-14 PROCEDURE — 97535 SELF CARE MNGMENT TRAINING: CPT | Performed by: OCCUPATIONAL THERAPIST

## 2017-03-14 PROCEDURE — 83735 ASSAY OF MAGNESIUM: CPT | Performed by: INTERNAL MEDICINE

## 2017-03-14 PROCEDURE — C8929 TTE W OR WO FOL WCON,DOPPLER: HCPCS

## 2017-03-14 PROCEDURE — 97161 PT EVAL LOW COMPLEX 20 MIN: CPT

## 2017-03-14 PROCEDURE — 77030011256 HC DRSG MEPILEX <16IN NO BORD MOLN -A

## 2017-03-14 PROCEDURE — 74011250637 HC RX REV CODE- 250/637: Performed by: INTERNAL MEDICINE

## 2017-03-14 PROCEDURE — 65660000000 HC RM CCU STEPDOWN

## 2017-03-14 PROCEDURE — 74011250636 HC RX REV CODE- 250/636: Performed by: INTERNAL MEDICINE

## 2017-03-14 PROCEDURE — 97165 OT EVAL LOW COMPLEX 30 MIN: CPT | Performed by: OCCUPATIONAL THERAPIST

## 2017-03-14 PROCEDURE — 97116 GAIT TRAINING THERAPY: CPT

## 2017-03-14 PROCEDURE — 85025 COMPLETE CBC W/AUTO DIFF WBC: CPT | Performed by: INTERNAL MEDICINE

## 2017-03-14 PROCEDURE — 83880 ASSAY OF NATRIURETIC PEPTIDE: CPT | Performed by: NURSE PRACTITIONER

## 2017-03-14 RX ORDER — BUMETANIDE 0.25 MG/ML
1 INJECTION INTRAMUSCULAR; INTRAVENOUS 2 TIMES DAILY
Status: DISCONTINUED | OUTPATIENT
Start: 2017-03-14 | End: 2017-03-14

## 2017-03-14 RX ORDER — SODIUM CHLORIDE 0.9 % (FLUSH) 0.9 %
SYRINGE (ML) INJECTION
Status: DISCONTINUED
Start: 2017-03-14 | End: 2017-03-17 | Stop reason: HOSPADM

## 2017-03-14 RX ORDER — BUMETANIDE 0.25 MG/ML
1 INJECTION INTRAMUSCULAR; INTRAVENOUS 2 TIMES DAILY
Status: DISCONTINUED | OUTPATIENT
Start: 2017-03-15 | End: 2017-03-17 | Stop reason: HOSPADM

## 2017-03-14 RX ORDER — BUMETANIDE 0.25 MG/ML
1 INJECTION INTRAMUSCULAR; INTRAVENOUS ONCE
Status: COMPLETED | OUTPATIENT
Start: 2017-03-14 | End: 2017-03-14

## 2017-03-14 RX ADMIN — Medication 5 ML: at 06:00

## 2017-03-14 RX ADMIN — BUMETANIDE 1 MG: 0.25 INJECTION, SOLUTION INTRAMUSCULAR; INTRAVENOUS at 17:16

## 2017-03-14 RX ADMIN — Medication 10 ML: at 22:00

## 2017-03-14 RX ADMIN — LEVOTHYROXINE SODIUM 137 MCG: 112 TABLET ORAL at 07:40

## 2017-03-14 RX ADMIN — METOPROLOL SUCCINATE 100 MG: 50 TABLET, EXTENDED RELEASE ORAL at 08:22

## 2017-03-14 RX ADMIN — PERFLUTREN 2 ML: 6.52 INJECTION, SUSPENSION INTRAVENOUS at 09:00

## 2017-03-14 RX ADMIN — INSULIN LISPRO 10 UNITS: 100 INJECTION, SOLUTION INTRAVENOUS; SUBCUTANEOUS at 07:40

## 2017-03-14 RX ADMIN — PRAVASTATIN SODIUM 80 MG: 40 TABLET ORAL at 21:21

## 2017-03-14 RX ADMIN — INSULIN LISPRO 10 UNITS: 100 INJECTION, SOLUTION INTRAVENOUS; SUBCUTANEOUS at 11:35

## 2017-03-14 RX ADMIN — FUROSEMIDE 40 MG: 10 INJECTION, SOLUTION INTRAMUSCULAR; INTRAVENOUS at 08:21

## 2017-03-14 RX ADMIN — APIXABAN 5 MG: 5 TABLET, FILM COATED ORAL at 17:16

## 2017-03-14 RX ADMIN — TAMSULOSIN HYDROCHLORIDE 0.4 MG: 0.4 CAPSULE ORAL at 08:21

## 2017-03-14 RX ADMIN — Medication 10 ML: at 14:00

## 2017-03-14 RX ADMIN — DUTASTERIDE 0.5 MG: 0.5 CAPSULE, LIQUID FILLED ORAL at 08:21

## 2017-03-14 RX ADMIN — INSULIN GLARGINE 35 UNITS: 100 INJECTION, SOLUTION SUBCUTANEOUS at 21:21

## 2017-03-14 RX ADMIN — METOLAZONE 2.5 MG: 2.5 TABLET ORAL at 08:21

## 2017-03-14 NOTE — CARDIO/PULMONARY
Cardiopulmonary Rehab Nursing Entry:    Chart reviewed secondary to in-basket referral. Pt 76 yo admitted with acute on chronic systolic heart failure, chronic a-fib. PMHx of CABG, current LVEF 55-60%, never smoker. Pt visited for CHF teaching. The CHF teaching packet and admission medication reconciliation sheet were provided. The pt also received information regarding the low sodium diet. Instruction given on s/s of CHF, checking weight every am and calling MD if weight is up 2-3 lbs in a day or 5 lbs in a week (or as directed by the physician), fluid/Na restrictions, s/s of worsening CHF and when to call MD.  Discussed the CHF \"zones\" and subsequent actions with pt. Reviewed activity as tolerated with frequent rest periods as needed, taking medications as prescribed, and the importance of follow up visits with physician. Pt reports knowledge of low sodium diet and how to read nutritional labels,  Has a scale to weigh on daily,  Adheres to prescriptions and tries to remain physically active. Smoking history assessed, never smoker. Encouraged patient to verbalize concerns/questions. Pt verbalized basic understanding.

## 2017-03-14 NOTE — PROGRESS NOTES
Problem: Mobility Impaired (Adult and Pediatric)  Goal: *Acute Goals and Plan of Care (Insert Text)  Physical Therapy Goals  Initiated 3/14/2017  1. Patient will move from supine to sit and sit to supine in bed with independence within 7 day(s). 2. Patient will transfer from bed to chair and chair to bed with modified independence using the least restrictive device within 7 day(s). 3. Patient will perform sit to stand with modified independence within 7 day(s). 4. Patient will ambulate with modified independence for 100 feet with the least restrictive device within 7 day(s). PHYSICAL THERAPY EVALUATION  Patient: Leighton Pizano (37 y.o. male)  Date: 3/14/2017  Primary Diagnosis: Heart failure ([facility]        Precautions:   Fall      ASSESSMENT :  Based on the objective data described below, the patient presents with impaired balance and gait and decreased tolerance to activity. Patient received reclined in recliner. Noted bilateral LEs with increased edema. Patient on room air upon arrival to room and spO2: 92% at rest. During activity noted spO2 decreased to 86% on room air and recovered to 91% with rest. Notified RN. Patient completed sit<>stand with standby assist and SPC. Patient ambulated within the room with cane and noted increased lateral trunk sway, no LOB. Patient reported he may feel more stable with RW, but would like to try next session with a cane to determine. Patient encouraged to remain OOB and ambulate as tolerated. Patient will continue to benefit from PT to progress mobility as tolerated and reach highest level of independence. Anticipate patient will be able to return home with HHPT versus no needs. .     Patient will benefit from skilled intervention to address the above impairments.   Patients rehabilitation potential is considered to be Good  Factors which may influence rehabilitation potential include:   [ ]         None noted  [ ]         Mental ability/status  [X]         Medical condition  [ ]         Home/family situation and support systems  [ ]         Safety awareness  [ ]         Pain tolerance/management  [ ]         Other:        PLAN :  Recommendations and Planned Interventions:  [X]           Bed Mobility Training             [ ]    Neuromuscular Re-Education  [X]           Transfer Training                   [ ]    Orthotic/Prosthetic Training  [X]           Gait Training                         [ ]    Modalities  [X]           Therapeutic Exercises           [ ]    Edema Management/Control  [X]           Therapeutic Activities            [X]    Patient and Family Training/Education  [ ]           Other (comment):     Frequency/Duration: Patient will be followed by physical therapy  4 times a week to address goals. Discharge Recommendations: Home Health  Further Equipment Recommendations for Discharge: rolling walker       SUBJECTIVE:   Patient stated I have been getting up and down to the bathroom after they have been giving me the diuretic.       OBJECTIVE DATA SUMMARY:   HISTORY:    Past Medical History:   Diagnosis Date    CAD (coronary atherosclerotic disease)      Diabetes ([facility]      Diabetes mellitus type 2, controlled ([facility] 3/13/2017    Heart failure ([facility]      Hx of CABG 3/13/2017     CABG in 2010 in [address] [place] [address] Morbid obesity ([facility] 3/13/2017    S/P CABG x 2 2010     Past Surgical History:   Procedure Laterality Date    CARDIAC SURG PROCEDURE UNLIST         CABGx2 in 2010    HX ORTHOPAEDIC         L 3rd toe amputation in 1999     Prior Level of Function/Home Situation: mod I with use of cane.  Patient reported having 2 falls in the past few months  Personal factors and/or comorbidities impacting plan of care:      Home Situation  Home Environment: Private residence  # Steps to Enter: 1  One/Two Story Residence: One story  Living Alone: No  Support Systems: Spouse/Significant Other/Partner  Patient Expects to be Discharged to[de-identified] Private residence  Current DME Used/Available at Home: Cane, straight     EXAMINATION/PRESENTATION/DECISION MAKING:   Critical Behavior:              Hearing: Auditory  Auditory Impairment: None  Skin:  Wound noted at R medial distal tibia  Edema: increased bilateral LE edema  Range Of Motion:  AROM: Within functional limits           PROM: Within functional limits           Strength:    Strength:  Within functional limits                    Tone & Sensation:                  Sensation: Intact               Coordination:     Vision:      Functional Mobility:  Bed Mobility:              Transfers:  Sit to Stand: Stand-by asssistance  Stand to Sit: Stand-by asssistance                       Balance:   Sitting: Intact  Standing: Impaired  Standing - Static: Good  Standing - Dynamic : Fair  Ambulation/Gait Training:  Distance (ft): 30 Feet (ft)  Assistive Device: Gait belt;Cane, straight  Ambulation - Level of Assistance: Contact guard assistance        Gait Abnormalities: Decreased step clearance;Trunk sway increased        Base of Support: Widened     Speed/Ariane: Pace decreased (<100 feet/min)  Step Length: Right shortened;Left shortened     Functional Measure:  Tinetti test:      Sitting Balance: 1  Arises: 1  Attempts to Rise: 2  Immediate Standing Balance: 1  Standing Balance: 1  Nudged: 1  Eyes Closed: 1  Turn 360 Degrees - Continuous/Discontinuous: 1  Turn 360 Degrees - Steady/Unsteady: 1  Sitting Down: 1  Balance Score: 11  Indication of Gait: 1  R Step Length/Height: 1  L Step Length/Height: 1  R Foot Clearance: 1  L Foot Clearance: 1  Step Symmetry: 1  Step Continuity: 1  Path: 1  Trunk: 1  Walking Time: 0  Gait Score: 9  Total Score: 20         Tinetti Test and G-code impairment scale:  Percentage of Impairment CH     0%    CI     1-19% CJ     20-39% CK     40-59% CL     60-79% CM     80-99% CN      100%   Tinetti  Score 0-28 28 23-27 17-22 12-16 6-11 1-5 0          Tinetti Tool Score Risk of Falls  <19 = High Fall Risk  19-24 = Moderate Fall Risk  25-28 = Low Fall Risk  Efrem SÁNCHEZ. Performance-Oriented Assessment of Mobility Problems in Elderly Patients. [facility]; [id number]. (Scoring Description: PT Bulletin Feb. 10, 1993)     Older adults: Allyson Marino et al, 2009; n = 1000 [facility] elderly evaluated with ABC, HARDY, ADL, and IADL)  · Mean HARDY score for males aged 69-68 years = 26.21(3.40)  · Mean HARDY score for females age 69-68 years = 25.16(4.30)  · Mean HARDY score for males over 80 years = 23.29(6.02)  · Mean HARDY score for females over 80 years = 17.20(8.32)            G codes: In compliance with CMSs Claims Based Outcome Reporting, the following G-code set was chosen for this patient based on their primary functional limitation being treated: The outcome measure chosen to determine the severity of the functional limitation was the Tinetti with a score of 20/28 which was correlated with the impairment scale. · Mobility - Walking and Moving Around:               - CURRENT STATUS:    CJ - 20%-39% impaired, limited or restricted               - GOAL STATUS:           CI - 1%-19% impaired, limited or restricted               - D/C STATUS:                       ---------------To be determined---------------      Physical Therapy Evaluation Charge Determination   History Examination Presentation Decision-Making   HIGH Complexity :3+ comorbidities / personal factors will impact the outcome/ POC  HIGH Complexity : 4+ Standardized tests and measures addressing body structure, function, activity limitation and / or participation in recreation  LOW Complexity : Stable, uncomplicated  Other outcome measures Tintetti  LOW       Based on the above components, the patient evaluation is determined to be of the following complexity level: LOW      Pain:  Pain Scale 1: Numeric (0 - 10)  Pain Intensity 1: 0              Activity Tolerance:   Fair.  SpO2 stable at rest on room air, decreases with activity   Please refer to the flowsheet for vital signs taken during this treatment. After treatment:   [X]         Patient left in no apparent distress sitting up in chair  [ ]         Patient left in no apparent distress in bed  [X]         Call bell left within reach  [X]         Nursing notified  [ ]         Caregiver present  [ ]         Bed alarm activated      COMMUNICATION/EDUCATION:   The patients plan of care was discussed with: Registered Nurse.  [X]         Fall prevention education was provided and the patient/caregiver indicated understanding. [X]         Patient/family have participated as able in goal setting and plan of care. [X]         Patient/family agree to work toward stated goals and plan of care. [ ]         Patient understands intent and goals of therapy, but is neutral about his/her participation. [ ]         Patient is unable to participate in goal setting and plan of care.      Thank you for this referral.  Marlo German, PT, DPT   Time Calculation: 17 mins

## 2017-03-14 NOTE — PROGRESS NOTES
Physical Therapy:    Orders received and chart reviewed. Attempted to see patient for PT eval, however patient off the unit at this time. Will hold at this time and follow up time permitting and patient availability. Thanks.    Marilee Camp, PT, DPT

## 2017-03-14 NOTE — PROGRESS NOTES
Care Management:    Admitted with heart failure and is being treated on PCU. He is a bundle patient and Palliative and Cardiology consulted. Dr Naina Olsen is his cardiologist.     He is active with his PCP. Demographics confirmed. He uses [facility] [address]. Care Management Interventions  PCP Verified by CM: Yes  Palliative Care Consult (Criteria: CHF and RRAT>21): Yes  Mode of Transport at Discharge: Other (see comment) (wife)  Physical Therapy Consult: Yes  Occupational Therapy Consult: Yes  Current Support Network: Lives with Spouse (Lives with wife , is retired and is independent withh ADLs. He no longer drives and his wife transports as needed. He has a cane. )  Confirm Follow Up Transport: Family  Plan discussed with Pt/Family/Caregiver: Yes     Chart reviewed and we will cont to follow. Anticipate home with wife and follow up with Dr Naina Olsen. [address] to Home.      Sandra Barker [facility]

## 2017-03-14 NOTE — PROGRESS NOTES
Hospitalist Progress Note    NAME: Randy Felder   :  1950   MRN:  [id number]       Interim Hospital Summary: 75 y.o. male whom presented on 3/13/2017 with      Assessment / Plan:  Chronic atrial fibrillation  -rate controlled with BB, Eliquis 5mg BID  -was on coumadin, transitioned to Eliquis. Patient states his INR recently checked last Fri was 6. INR on admission 2  -alex  -resume Eliquis when INR <2     Acute on chronic systolic heart failure  Acute hypoxic respiratory failure  Elevated troponin  -trop peaked  -EKG showed afib, no ischemic changes  -transition to IV bumex due to low GFR. Continue ASA, BB  -CXR showed mild cardiomegaly and small bilateral pleural effusions. possible pulmonary artery hypertension  -Echo-awaiting result  -diuresed net -1.5L, wt down ~3 lbs  -strict I&O, low sait diet, daily wt, fluid restriction, lytes  -cardiology followig     Acute on chronic kidney failure  -per pt, baseline cr 2.5, presenting with cr 4.83, not much changed overnight  -likely from volume overload  -nephro following     T2DM  -A1c 8  -lantus, lispro premeals, SSI     Thrombocytopenia  -  -no evidence of bleed     Morbid obesity     Code Status: Full  Surrogate Decision Maker: wife Merced Ya at [phone]     DVT Prophylaxis: heparin  GI Prophylaxis: not indicated     Baseline: independent     Subjective:     Chief Complaint / Reason for Physician Visit  No acute complaints. Had 2 bm today. Hypotensive overnight however asymptomatic    Discussed with RN events overnight.      Review of Systems:  Symptom Y/N Comments  Symptom Y/N Comments   Fever/Chills n   Chest Pain n    Poor Appetite n   Edema y    Cough n   Abdominal Pain n    Sputum    Joint Pain     SOB/BETTS y   Pruritis/Rash     Nausea/vomit    Tolerating PT/OT     Diarrhea    Tolerating Diet y    Constipation    Other       Could NOT obtain due to:      Objective:     VITALS:   Last 24hrs VS reviewed since prior progress note. Most recent are:  Patient Vitals for the past 24 hrs:   Temp Pulse Resp BP SpO2   03/14/17 0726 98 °F (36.7 °C) (!) 101 18 109/56 95 %   03/14/17 0439 - (!) 107 18 (!) 109/26 91 %   03/14/17 0029 97.9 °F (36.6 °C) 97 18 (!) 158/132 93 %   03/13/17 2005 97.9 °F (36.6 °C) 97 18 115/62 97 %   03/13/17 1903 - 99 - - 94 %   03/13/17 1800 - (!) 105 - 132/73 95 %   03/13/17 1730 - 94 - 122/68 94 %   03/13/17 1630 - (!) 102 - 117/72 96 %   03/13/17 1532 - 97 - - 96 %   03/13/17 1530 - 96 - 108/57 90 %   03/13/17 1501 - 94 - - 97 %   03/13/17 1500 - (!) 101 - 101/59 93 %   03/13/17 1457 - 89 - 133/45 97 %   03/13/17 1410 - (!) 104 18 128/82 96 %   03/13/17 1400 - - - - (!) 89 %   03/13/17 1303 - 99 - - 93 %   03/13/17 1300 - - - 102/54 -   03/13/17 1200 - 97 18 120/52 91 %   03/13/17 1111 97.7 °F (36.5 °C) 95 19 103/49 95 %       Intake/Output Summary (Last 24 hours) at 03/14/17 1033  Last data filed at 03/14/17 0555   Gross per 24 hour   Intake              240 ml   Output             1800 ml   Net            -1560 ml        PHYSICAL EXAM:  General: WD, WN. Alert, cooperative, no acute distress    EENT:  EOMI. Anicteric sclerae. MMM  Resp:  diminished breath sound at lung base, no crackles  CV:  Regular  rhythm,  +3LE edema  GI:  Soft, Non distended, Non tender.  +Bowel sounds  Neurologic:  Alert and oriented X 3, normal speech,   Psych:   Good insight. Not anxious nor agitated  Skin:  No rashes.   No jaundice    Reviewed most current lab test results and cultures  YES  Reviewed most current radiology test results   YES  Review and summation of old records today    NO  Reviewed patient's current orders and MAR    YES  PMH/SH reviewed - no change compared to H&P  ________________________________________________________________________  Care Plan discussed with:    Comments   Patient x    Family      RN x    Care Manager     Consultant                        Multidiciplinary team rounds were held today with , nursing, pharmacist and clinical coordinator. Patient's plan of care was discussed; medications were reviewed and discharge planning was addressed. ________________________________________________________________________  Total NON critical care TIME:  35   Minutes    Total CRITICAL CARE TIME Spent:   Minutes non procedure based      Comments   >50% of visit spent in counseling and coordination of care     ________________________________________________________________________  Ellouise Alpers, MD     Procedures: see electronic medical records for all procedures/Xrays and details which were not copied into this note but were reviewed prior to creation of Plan. LABS:  I reviewed today's most current labs and imaging studies.   Pertinent labs include:  Recent Labs      03/14/17   0236  03/13/17   1215   WBC  5.7  7.2   HGB  11.3*  12.0*   HCT  37.5  40.3   PLT  129*  149*     Recent Labs      03/14/17   0236  03/13/17   1900  03/13/17   1215   NA  144   --   142   K  4.8   --   5.0   CL  107   --   104   CO2  26   --   28   GLU  157*   --   138*   BUN  146*   --   148*   CREA  4.43*   --   4.83*   CA  8.6   --   8.5   MG  2.8*   --    --    ALB   --    --   3.4*   TBILI   --    --   0.6   SGOT   --    --   28   ALT   --    --   24   INR   --   2.0*   --        Signed: Ellouise Alpers, MD

## 2017-03-14 NOTE — PROGRESS NOTES
Bedside and Verbal shift change report given to [address] (oncoming nurse) by Rafiq Fuentes (offgoing nurse). Report included the following information SBAR, Kardex, MAR and Recent Results.

## 2017-03-14 NOTE — CONSULTS
Renal --    INO on CKD stage 4-5  Acute systolic CHF  CAD  S/p CABG  Afib    Full note to follow in the morning.     Creta Neat

## 2017-03-14 NOTE — PROGRESS NOTES
[address]  [phone]      Cardiology Progress Note      3/14/2017 3:37 PM    Admit Date: 3/13/2017    Admit Diagnosis:   Heart failure ([address]    Subjective:     Ethelda Eriberto breathing a little better.     Visit Vitals    /72 (BP 1 Location: Right arm, BP Patient Position: At rest)    Pulse 97    Temp 98.2 °F (36.8 °C)    Resp 18    Ht 5' 9\" (1.753 m)    Wt 292 lb 1.8 oz (132.5 kg)    SpO2 93%    BMI 43.14 kg/m2       Current Facility-Administered Medications   Medication Dose Route Frequency    zinc oxide-cod liver oil (DESITIN) 40 % paste   Topical PRN    [START ON 3/15/2017] bumetanide (BUMEX) injection 1 mg  1 mg IntraVENous BID    bumetanide (BUMEX) injection 1 mg  1 mg IntraVENous ONCE    sodium chloride (NS) 0.9 % flush        perflutren lipid microspheres (DEFINITY) 1.1 mg/mL contrast injection        sodium chloride (NS) flush 5-10 mL  5-10 mL IntraVENous Q8H    sodium chloride (NS) flush 5-10 mL  5-10 mL IntraVENous PRN    naloxone (NARCAN) injection 0.4 mg  0.4 mg IntraVENous PRN    ondansetron (ZOFRAN) injection 4 mg  4 mg IntraVENous Q4H PRN    acetaminophen (TYLENOL) tablet 650 mg  650 mg Oral Q4H PRN    bisacodyl (DULCOLAX) tablet 5 mg  5 mg Oral DAILY PRN    glucose chewable tablet 16 g  4 Tab Oral PRN    dextrose (D50W) injection syrg 12.5-25 g  12.5-25 g IntraVENous PRN    glucagon (GLUCAGEN) injection 1 mg  1 mg IntraMUSCular PRN    dutasteride (AVODART) capsule 0.5 mg  0.5 mg Oral DAILY    levothyroxine (SYNTHROID) tablet 137 mcg  137 mcg Oral ACB    metOLazone (ZAROXOLYN) tablet 2.5 mg  2.5 mg Oral DAILY    metoprolol succinate (TOPROL-XL) XL tablet 100 mg  100 mg Oral DAILY    pravastatin (PRAVACHOL) tablet 80 mg  80 mg Oral QHS    tamsulosin (FLOMAX) capsule 0.4 mg  0.4 mg Oral DAILY    insulin lispro (HUMALOG) injection   SubCUTAneous AC&HS    insulin glargine (LANTUS) injection 35 Units  35 Units SubCUTAneous QHS    insulin lispro (HUMALOG) injection 10 Units  10 Units SubCUTAneous TIDAC    apixaban (ELIQUIS) tablet 5 mg  5 mg Oral BID       Objective:      Physical Exam:  General Appearance:    Chest:   Clear  Cardiovascular:  Regular rate and rhythm, no murmur.   Abdomen:   Soft, non-tender, bowel sounds are active.   Extremities: 3+ edema  Skin:  Warm and dry.     Data Review:   Recent Labs      03/14/17   0236  03/13/17   1215   WBC  5.7  7.2   HGB  11.3*  12.0*   HCT  37.5  40.3   PLT  129*  149*     Recent Labs      03/14/17   0236  03/13/17   1900  03/13/17   1215   NA  144   --   142   K  4.8   --   5.0   CL  107   --   104   CO2  26   --   28   GLU  157*   --   138*   BUN  146*   --   148*   CREA  4.43*   --   4.83*   CA  8.6   --   8.5   MG  2.8*   --    --    ALB   --    --   3.4*   TBILI   --    --   0.6   SGOT   --    --   28   ALT   --    --   24   INR   --   2.0*   --        Recent Labs      03/14/17 0236 03/13/17   1900  03/13/17   1215   TROIQ  0.11*  0.12*  0.12*   CPK   --    --   99   CKMB   --    --   3.6*         Intake/Output Summary (Last 24 hours) at 03/14/17 1537  Last data filed at 03/14/17 1400   Gross per 24 hour   Intake              240 ml   Output             3300 ml   Net            -3060 ml        Telemetry:   EKG:  Cxray:    Assessment:     Active Problems:    SOB (shortness of breath) (3/7/2017)      Chronic a-fib ([facility]) (3/7/2017)      CKD (chronic kidney disease) stage 4, GFR 15-29 ml/min ([facility]) (3/7/2017)      Hx of CABG (3/13/2017)      Overview: CABG in 2010 in [place]      Morbid obesity ([facility] (3/13/2017)      Diabetes mellitus type 2, controlled ([facility] (3/13/2017)      Heart failure ([facility] (3/13/2017)        Plan:     Diursed 1.5 liters with 0.4 reduction in Cr.  EF 55%. Continue diuresis.

## 2017-03-14 NOTE — PROGRESS NOTES
Interdisciplinary team rounds were held 3/14/2017 with the following team members:Care Management, Nursing, Nurse Practitioner, Nutrition, Pharmacy and Physical Therapy and the patient. Plan of care discussed. See clinical pathway and/or care plan for interventions and desired outcomes.     Nephro consult- creatinine increased; wound care; cards consult- diuresis; PT/OT

## 2017-03-14 NOTE — PROGRESS NOTES
Problem: Self Care Deficits Care Plan (Adult)  Goal: *Acute Goals and Plan of Care (Insert Text)  Occupational Therapy Goals  Initiated 3/14/2017  1. Patient will perform grooming in standing with modified independence within 7 day(s). 2. Patient will perform upper body dressing and lower body dressing with modified independence, using adaptive aids prn, within 7 day(s). 3. Patient will perform simple home management with modified independence within 7 day(s). 4. Patient will perform toilet transfers with modified independence within 7 day(s). 5. Patient will perform all aspects of toileting with modified independence within 7 day(s). 6. Patient will participate in upper extremity therapeutic exercise/activities with supervision/set-up for 8 minutes within 7 day(s). OCCUPATIONAL THERAPY EVALUATION  Patient: Edin Parkinson (89 y.o. male)  Date: 3/14/2017  Primary Diagnosis: Heart failure ([facility]        Precautions:   Fall      ASSESSMENT :  Based on the objective data described below, the patient presents with baseline impaired vision (blind R eye),  generalized edema (impairing trunk flexion forward), impaired skin integrity (requiring wound care),  decreased balance, mild generalized weakness, and decreased endurance impairing independence, safety and functional mobility. Pt is functioning at supervision to maximal assistance levels for self care and is close CGA for functional mobility (pt insists on using cane but would likely benefit from RW). Pt is planning discharge home with his wife who can assist him as needed. May benefit from [facility] OT services depending on progress. Patient will benefit from skilled intervention to address the above impairments.   Patients rehabilitation potential is considered to be Good  Factors which may influence rehabilitation potential include:   [X]             None noted  [ ]             Mental ability/status  [ ]             Medical condition  [ ] Home/family situation and support systems  [ ]             Safety awareness  [ ]             Pain tolerance/management  [ ]             Other:        PLAN :  Recommendations and Planned Interventions:  [X]               Self Care Training                  [X]        Therapeutic Activities  [X]               Functional Mobility Training    [ ]        Cognitive Retraining  [X]               Therapeutic Exercises           [X]        Endurance Activities  [X]               Balance Training                   [ ]        Neuromuscular Re-Education  [ ]               Visual/Perceptual Training     [X]   Home Safety Training  [X]               Patient Education                 [X]        Family Training/Education  [ ]               Other (comment):     Frequency/Duration: Patient will be followed by occupational therapy 3 times a week to address goals. Discharge Recommendations: To Be Determined-  May benefit from [facility] depending on progress  Further Equipment Recommendations for Discharge: tbd-may benefit from adaptive aids for lower body adls. SUBJECTIVE:   Patient stated I can see light.   (in R eye)      OBJECTIVE DATA SUMMARY:   HISTORY:   Past Medical History:   Diagnosis Date    CAD (coronary atherosclerotic disease)      Diabetes ([facility]      Diabetes mellitus type 2, controlled ([facility] 3/13/2017    Heart failure ([facility]      Hx of CABG 3/13/2017     CABG in 2010 in [address] Morbid obesity ([facility] 3/13/2017    S/P CABG x 2 2010     Past Surgical History:   Procedure Laterality Date    CARDIAC SURG PROCEDURE UNLIST         CABGx2 in 2010    HX ORTHOPAEDIC         L 3rd toe amputation in 1999        Prior Level of Function/Home Situation: Pt reports independence in self care and uses a cane during mobility. Pt's wife drives due to pt' s poor vision.     Expanded or extensive additional review of patient history:      Home Situation  Home Environment: Private residence  # Steps to Enter: 1  One/Two Story Residence: One story  Living Alone: No  Support Systems: Spouse/Significant Other/Partner  Patient Expects to be Discharged to[de-identified] Private residence  Current DME Used/Available at Home[place]  Tub or Shower Type: Shower  [X]  Right hand dominant             [ ]  Left hand dominant     EXAMINATION OF PERFORMANCE DEFICITS:  Cognitive/Behavioral Status:  Neurologic State: Appropriate for age  Orientation Level: Appropriate for age  Cognition: Follows commands  Perception: Appears intact (R eye blindness-ses light)  Perseveration: No perseveration noted  Safety/Judgement: Awareness of environment; Fall prevention; Insight into deficits  Skin: impaired--wound care B LEs--requested to place seat cushion in chair provided by wound care nurse  Edema: significant impairing trunk forward flexion  Hearing: Auditory  Auditory Impairment: None  Vision/Perceptual:    Tracking:  (funcitonally intact L eye; r eye blindness)                      Acuity: Impaired near vision; Impaired far vision (R eye blindness--sees light)       Range of Motion:  BUEs:  Within functional limits  AROM:  (pt is unable to flex trunk forward to reach feet)  PROM: Within functional limits                    Strength:  BUEs   strength equal bilaterally  Strength: Within functional limits              Coordination:     Fine Motor Skills-Upper: Left Intact; Right Intact    Gross Motor Skills-Upper: Left Intact; Right Intact  Tone & Sensation:   Tone is normal     Sensation: Intact                       Balance:  Sitting: Impaired  Sitting - Static: Good (unsupported)  Sitting - Dynamic:  (unable to reach feet, impaired turnk flex)  Standing: Impaired  Standing - Static: Good  Standing - Dynamic : Fair     Functional Mobility and Transfers for ADLs:  Bed Mobility:  Rolling:  (pt seated in chair upon arrival)     Transfers:  Sit to Stand: Stand-by asssistance  Stand to Sit: Stand-by asssistance  Toilet Transfer : Supervision;Stand-by asssistance;Contact guard assistance     ADL Assessment:  Feeding: Independent     Oral Facial Hygiene/Grooming: Setup     Bathing: Moderate assistance (for lower body)     Upper Body Dressing: Minimum assistance     Lower Body Dressing: Maximum assistance     Toileting: Maximum assistance (for standing to use urinal-clothing mgmt and urinal placemnt)                 ADL Intervention and task modifications:   Pt performed functional mobility to bathroom with CGA and reaching for objects including door, despite therapist's warning for safety. Pt stood at toilet to use urinal (I and O with fluid restriction) requiring total assistance for using urinal and managing clothing. Turned to perform toilet transfer using grab bar for support--may benefit from grab bar or safety frame on home toilet depending on his progress,  Supervision with washing hands at sink. Pt returned to chair to perform seated exercises. Pt is unable to reach feet for lower body adls-may benefit from trial of using adaptive aids-sock aide and dressing stick and reacher to  objects from the floor for safety. Cognitive Retraining  Safety/Judgement: Awareness of environment; Fall prevention; Insight into deficits     Therapeutic Exercise: Ankle pumps, rotations seated in chair  Straight leg raises with 5 second hold X3 reps  Arm chair pushups : 5 with 5 second hold.    Educated pt on benefits of exercises for circulation, edema mgmt, pressure relief in sitting, and strengthening  Functional Measure:  Barthel Index:      Bathin  Bladder: 10  Bowels: 10  Groomin  Dressin  Feeding: 10  Mobility: 0  Stairs: 0  Toilet Use: 5  Transfer (Bed to Chair and Back): 10  Total: 55         Barthel and G-code impairment scale:  Percentage of impairment CH  0% CI  1-19% CJ  20-39% CK  40-59% CL  60-79% CM  80-99% CN  100%   Barthel Score 0-100 100 99-80 79-60 59-40 20-39 1-19    0   Barthel Score 0-20 20 17-19 13-16 9-12 5-8 1-4 0      The Barthel ADL Index: Guidelines  1. The index should be used as a record of what a patient does, not as a record of what a patient could do. 2. The main aim is to establish degree of independence from any help, physical or verbal, however minor and for whatever reason. 3. The need for supervision renders the patient not independent. 4. A patient's performance should be established using the best available evidence. Asking the patient, friends/relatives and nurses are the usual sources, but direct observation and common sense are also important. However direct testing is not needed. 5. Usually the patient's performance over the preceding 24-48 hours is important, but occasionally longer periods will be relevant. 6. Middle categories imply that the patient supplies over 50 per cent of the effort. 7. Use of aids to be independent is allowed. Elizabeth Felder., Barthel, D.W. ([id number]). Functional evaluation: the Barthel Index. [facility] (14)2. EZRA Rai, Barby Hopkins., Dina Sanford., [place], [address] [place] [address] (1999). Measuring the change indisability after inpatient rehabilitation; comparison of the responsiveness of the Barthel Index and Functional [place] Measure. Journal of Neurology, Neurosurgery, and Psychiatry, 66(4), 599-026. GRANT Navarro.A, BENITO Nava, & Janet Munoz MJunieA. (2004.) Assessment of post-stroke quality of life in cost-effectiveness studies: The usefulness of the Barthel Index and the EuroQoL-5D. Quality of Life Research, 13, 991-42         G codes: In compliance with CMSs Claims Based Outcome Reporting, the following G-code set was chosen for this patient based on their primary functional limitation being treated: The outcome measure chosen to determine the severity of the functional limitation was the Barthel Index with a score of 55/100 which was correlated with the impairment scale.       · Self Care:  - CURRENT STATUS:    CK - 40%-59% impaired, limited or restricted               - GOAL STATUS:           CJ - 20%-39% impaired, limited or restricted               - D/C STATUS:                       ---------------To be determined---------------      Occupational Therapy Evaluation Charge Determination   History Examination Decision-Making   LOW Complexity : Brief history review  MEDIUM Complexity : 3-5 performance deficits relating to physical, cognitive , or psychosocial skils that result in activity limitations and / or participation restrictions MEDIUM Complexity : Patient may present with comorbidities that affect occupational performnce. Miniml to moderate modification of tasks or assistance (eg, physical or verbal ) with assesment(s) is necessary to enable patient to complete evaluation       Based on the above components, the patient evaluation is determined to be of the following complexity level: LOW   Pain:  Pain Scale 1: Numeric (0 - 10)  Pain Intensity 1: 0              Activity Tolerance:   VSS throughout tx session per monitor  Please refer to the flowsheet for vital signs taken during this treatment. After treatment:   [X] Patient left in no apparent distress sitting up in chair  [ ] Patient left in no apparent distress in bed  [X] Call bell left within reach  [X] Nursing notified  [X] Caregiver present  [ ] Bed alarm activated      COMMUNICATION/EDUCATION:   The patients plan of care was discussed with: Registered Nurse. [ ] Home safety education was provided and the patient/caregiver indicated understanding. [X] Patient/family have participated as able in goal setting and plan of care. [ ] Patient/family agree to work toward stated goals and plan of care. [ ] Patient understands intent and goals of therapy, but is neutral about his/her participation. [ ] Patient is unable to participate in goal setting and plan of care.   This patients plan of care is appropriate for delegation to BERTA.      Thank you for this referral.  Davis Wynn, OTR/L  Time Calculation: 32 mins

## 2017-03-14 NOTE — CONSULTS
Consultation Note    NAME: Alicja Plaza   :  1950   MRN:  [id number]     Date/Time:  3/14/2017 3:41 PM    I have been asked to see this patient by Dr. Earlene Bradnt  for advice/opinion re: INO on CKD stage 4. Assessment :    Plan:  INO on CKD stage 4  DM nephropathy  BPH  Acute systolic chf  H/o cabg  afib   Creatinine on the rise recently (baseline creatinine when seen in [facility] by Nephrologist was 2 to 2.3; in  and  his creatinine was ~ 2.5). His creatinine was up to 3.1 on 3/2/17 initial visit with Dr. Irineo Galeazzi. His creatinine yesterday was 4.8 and 4.4 today. He has volume overload. Some ALVA symptoms, despite being on Proscar and Flomax. Minimal albuminuria at his apt on 3/2. Will repeat a urinalysis, check urine spot protein:creatinine ratio and check a RAUL to r/o obstructive (could explain diuretic failure and rise in creatinine). Continue on bumex iv along with metolazone. Subjective:   CHIEF COMPLAINT:  \"ino on ckd    HISTORY OF PRESENT ILLNESS:     Gabriel Sams is a 75 y.o.   male who has a history of ckd stage 3b to 4 (creatinine with some history of fluctuation per notes from his Nephrologist in [facility], but with a definite trend up as of late). Previous baseline creatinine had been 2 to 2.3, but was 2.5 in December and January, 3.1 a couple of weeks ago and is now in the 4's. He states incomplete feeling of emptying his bladder. Worsened edema for 3 weeks. No nsaids. He denies sodium indiscretion. No recent change in medications. No recent antibiotics. No gross hematuria or foamy urine. No rash. He has had some sob, but mostly increased swelling. He denies missing diuretics.     Past Medical History:   Diagnosis Date    CAD (coronary atherosclerotic disease)     Diabetes ([address]     Diabetes mellitus type 2, controlled ([address] 3/13/2017    Heart failure ([address]     Hx of CABG 3/13/2017    CABG in  in [address] Morbid obesity ([address] 3/13/2017    S/P CABG x 2       Past Surgical History:   Procedure Laterality Date    CARDIAC SURG PROCEDURE UNLIST      CABGx2 in     HX ORTHOPAEDIC      L 3rd toe amputation in      Social History   Substance Use Topics    Smoking status: Never Smoker    Smokeless tobacco: Never Used    Alcohol use Yes      Comment: rare      Family History   Problem Relation Age of Onset    Heart Attack Father       Allergies   Allergen Reactions    Ciprofloxacin Nausea Only    Percocet [Oxycodone-Acetaminophen] Other (comments)     hallucinations      Prior to Admission medications    Medication Sig Start Date End Date Taking? Authorizing Provider   calcium carbonate (CALTREX) 600 mg calcium (1,500 mg) tablet Take 600 mg by mouth every Tuesday, Thursday, Saturday & . Yes Historical Provider   furosemide (LASIX) 40 mg tablet Take 80 mg by mouth daily. Yes Historical Provider   insulin lispro protamine/insulin lispro (HUMALOG MIX 75-25) 100 unit/mL (75-25) injection 34 Units by SubCUTAneous route daily (after dinner). Insulin lispro protamine/insulin lispro dosing instructions:  Inject 26 units after breakfast and inject 34 units   Yes Historical Provider   insulin lispro protamine/insulin lispro (HUMALOG MIX 75-25) 100 unit/mL (75-25) injection 5-20 Units by SubCUTAneous route two (2) times daily (after meals). Corrective Coverage to be administered IN ADDITION to scheduled post prandial insulin doses:  B mg/dL= administer an additional 5 units  B mg/dL= administer an additional 10 units  B mg/dL= administer an additional 15 units  B mg/dL= administer an additional 20 units  B mg/dL= administer an additional 5 units   Yes Historical Provider   ASCORBATE CALCIUM (ROBBY-C PO) Take 500 mg by mouth daily.    Yes Historical Provider   dutasteride (AVODART) 0.5 mg capsule TAKE ONE CAPSULE BY MOUTH EVERY DAY 16  Yes Historical Provider   levothyroxine (SYNTHROID) 137 mcg tablet TAKE 1 TABLET(S) EVERY DAY BY ORAL ROUTE FOR 90 DAYS. 12/19/16  Yes Historical Provider   lisinopril (PRINIVIL, ZESTRIL) 2.5 mg tablet TAKE 1 TABLET qhs 12/18/16  Yes Historical Provider   insulin lispro protamine/insulin lispro (HUMALOG MIX 75-25) 100 unit/mL (75-25) injection 26 Units by SubCUTAneous route daily (after breakfast). Insulin lispro protamine/insulin lispro dosing instructions:  Inject 26 units after breakfast and inject 34 units   Yes Historical Provider   paricalcitol (ZEMPLAR) 1 mcg capsule Take 1 mcg by mouth daily. Yes Historical Provider   multivitamin (ONE A DAY) tablet Take 1 Tab by mouth daily. Yes Historical Provider   b complex vitamins tablet Take 1 Tab by mouth daily. Yes Historical Provider   cholecalciferol (VITAMIN D3) 1,000 unit cap Take 3,000 Units by mouth daily. Yes Historical Provider   metoprolol succinate (TOPROL-XL) 100 mg tablet Take 1 Tab by mouth two (2) times a day. 3/7/17  Yes SAMINA Teague   pravastatin (PRAVACHOL) 40 mg tablet Take 1 Tab by mouth daily. Patient taking differently: Take 80 mg by mouth nightly. 3/7/17  Yes SAMINA Teague   metOLazone (ZAROXOLYN) 2.5 mg tablet Take 1 Tab by mouth daily. 3/7/17  Yes SAMINA Teague   warfarin (COUMADIN) 5 mg tablet Take 5 mg by mouth daily. Historical Provider   tamsulosin (FLOMAX) 0.4 mg capsule TAKE 2 CAPSULES BY MOUTH EVERY DAY 12/14/16   Historical Provider   acetaminophen (TYLENOL EXTRA STRENGTH) 500 mg tablet Take 1,000 mg by mouth every six (6) hours as needed for Pain.     Historical Provider     REVIEW OF SYSTEMS:     []  Unable to obtain reliable ROS due to  [] mental status  [] sedated   [] intubated   [x] Total of 12 systems reviewed as follows:  Constitutional: negative fever, negative chills, negative weight loss  Eyes:   negative visual changes  ENT:   negative sore throat, tongue or lip swelling  Respiratory:  negative cough, + dyspnea  Cards:  negative for chest pain, palpitations, ++lower extremity edema  GI:   negative for nausea, vomiting, diarrhea, and abdominal pain  :  negative for frequency, dysuria  Integument:  negative for rash and pruritus  Heme:  negative for easy bruising and gum/nose bleeding  Musculoskel: negative for myalgias,  back pain and muscle weakness  Neuro:  negative for headaches, dizziness, vertigo  Psych:  negative for feelings of anxiety, depression   Travel?: none    Objective:   VITALS:    Visit Vitals    /72 (BP 1 Location: Right arm, BP Patient Position: At rest)    Pulse 97    Temp 98.2 °F (36.8 °C)    Resp 18    Ht 5' 9\" (1.753 m)    Wt 132.5 kg (292 lb 1.8 oz)    SpO2 93%    BMI 43.14 kg/m2     PHYSICAL EXAM:  Gen:  []  WD []  WN  [] cachectic []  thin [x]  obese []  disheveled             []  ill apearing  []   Critical  []   Chronic    [x]  No acute distress    HEENT:   [x] NC/AT/PERRLA/EOMI    [] pink conjunctivae      [] pale conjunctivae                  PERRL  [] yes  [] no      [] moist mucosa    [] dry mucosa    hearing intact to voice [x] yes  [] No                 NECK:   supple [x] yes  [] no        masses [] yes  [] No               thyroid  []  non tender  []  tender    RESP:   [x] CTA bilaterally/no wheezing/rhonchi/rales/crackles    [] rhonchi bilaterally - no dullness  [] wheezing   [] rhonchi   [] crackles     use of accessory muscles [] yes [x] no    CARD:   []  regular rate and rhythm/No murmurs/rubs/gallops    murmur  [] yes ()  [] no      Rubs  [] yes  [] no       Gallops [] yes  [] no    Rate []  regular  [x]  irregular        carotid bruits  [] Right  []  Left                 LE edema [x] yes  [] no           JVP  [x]  yes   []  no    ABD:    [x] soft/non distended/non tender/+bowel sounds/no HSM    []  Rigid    tenderness [] yes [] no   Liver enlargement  []  yes []  no                Spleen enlargement  []  yes []  no     distended []  yes [] no     bowel sound  [] hypoactive   [] hyperactive    LYMPH:    Neck []  yes [x]  no       Axillae []  yes [] no    SKIN:   Rashes []  yes   [x]  no    Ulcers []  yes   []  no               [] tight to palpitation    skin turgor []  good  [] poor  [] decreased               Cyanosis/clubbing []  yes []  no    NEUR:   [x] cranial nerves II-XII grossly intact       [] Cranial nerves deficit                 []  facial droop    []  slurred speech   [] aphasic     [] Strength normal     []  weakness  []  LUE  []   RUE/ []  LLE  []   RLE    follows commands  [x]  yes []  no           PSYCH:   insight [] poor [x] good   Alert and Oriented to  [x] person  [x] place  [x]  time                    [] depressed [] anxious [] agitated  [] lethargic [] stuporous  [] sedated     LAB DATA REVIEWED:    Recent Labs      03/14/17   0236  03/13/17   1215   WBC  5.7  7.2   HGB  11.3*  12.0*   HCT  37.5  40.3   PLT  129*  149*     Recent Labs      03/14/17   0236  03/13/17   1215   NA  144  142   K  4.8  5.0   CL  107  104   CO2  26  28   BUN  146*  148*   CREA  4.43*  4.83*   GLU  157*  138*   CA  8.6  8.5   MG  2.8*   --      Recent Labs      03/13/17   1215   SGOT  28   ALT  24   AP  37*   TBILI  0.6   ALB  3.4*   GLOB  2.9     Recent Labs      03/13/17   1900   INR  2.0*   PTP  21.1*      No results for input(s): FE, TIBC, PSAT, FERR in the last 72 hours. No results for input(s): PH, PCO2, PO2 in the last 72 hours. Recent Labs      03/13/17   1215   CPK  99   CKMB  3.6*     Lab Results   Component Value Date/Time    Glucose (POC) 137 03/14/2017 11:51 AM    Glucose (POC) 130 03/14/2017 08:01 AM    Glucose (POC) 169 03/13/2017 09:23 PM    Glucose (POC) 159 03/13/2017 06:58 PM       Procedures: see electronic medical records for all procedures/Xrays and details which were not copied into this note but were reviewed prior to creation of Plan.    ________________________________________________________________________       ___________________________________________________  Consulting Physician:  Amisha Ridge, MD

## 2017-03-15 PROBLEM — R60.0 BILATERAL LEG EDEMA: Status: ACTIVE | Noted: 2017-03-15

## 2017-03-15 PROBLEM — R53.83 FATIGUE: Status: ACTIVE | Noted: 2017-03-15

## 2017-03-15 PROBLEM — Z71.89 COUNSELING REGARDING ADVANCED CARE PLANNING AND GOALS OF CARE: Status: ACTIVE | Noted: 2017-03-15

## 2017-03-15 LAB
ANION GAP BLD CALC-SCNC: 9 MMOL/L (ref 5–15)
BUN SERPL-MCNC: 130 MG/DL (ref 6–20)
BUN/CREAT SERPL: 33 (ref 12–20)
CALCIUM SERPL-MCNC: 8.8 MG/DL (ref 8.5–10.1)
CHLORIDE SERPL-SCNC: 106 MMOL/L (ref 97–108)
CO2 SERPL-SCNC: 32 MMOL/L (ref 21–32)
CREAT SERPL-MCNC: 3.98 MG/DL (ref 0.7–1.3)
GLUCOSE BLD STRIP.AUTO-MCNC: 108 MG/DL (ref 65–100)
GLUCOSE BLD STRIP.AUTO-MCNC: 126 MG/DL (ref 65–100)
GLUCOSE BLD STRIP.AUTO-MCNC: 145 MG/DL (ref 65–100)
GLUCOSE BLD STRIP.AUTO-MCNC: 70 MG/DL (ref 65–100)
GLUCOSE BLD STRIP.AUTO-MCNC: 76 MG/DL (ref 65–100)
GLUCOSE BLD STRIP.AUTO-MCNC: 87 MG/DL (ref 65–100)
GLUCOSE SERPL-MCNC: 122 MG/DL (ref 65–100)
MAGNESIUM SERPL-MCNC: 2.7 MG/DL (ref 1.6–2.4)
POTASSIUM SERPL-SCNC: 4.7 MMOL/L (ref 3.5–5.1)
SERVICE CMNT-IMP: ABNORMAL
SERVICE CMNT-IMP: NORMAL
SODIUM SERPL-SCNC: 147 MMOL/L (ref 136–145)

## 2017-03-15 PROCEDURE — 77010033678 HC OXYGEN DAILY

## 2017-03-15 PROCEDURE — 74011250637 HC RX REV CODE- 250/637: Performed by: INTERNAL MEDICINE

## 2017-03-15 PROCEDURE — 77030011256 HC DRSG MEPILEX <16IN NO BORD MOLN -A

## 2017-03-15 PROCEDURE — 97116 GAIT TRAINING THERAPY: CPT

## 2017-03-15 PROCEDURE — 80048 BASIC METABOLIC PNL TOTAL CA: CPT | Performed by: INTERNAL MEDICINE

## 2017-03-15 PROCEDURE — 82962 GLUCOSE BLOOD TEST: CPT

## 2017-03-15 PROCEDURE — 65660000000 HC RM CCU STEPDOWN

## 2017-03-15 PROCEDURE — 74011000258 HC RX REV CODE- 258: Performed by: INTERNAL MEDICINE

## 2017-03-15 PROCEDURE — 83735 ASSAY OF MAGNESIUM: CPT | Performed by: INTERNAL MEDICINE

## 2017-03-15 PROCEDURE — 74011250636 HC RX REV CODE- 250/636: Performed by: INTERNAL MEDICINE

## 2017-03-15 PROCEDURE — 36415 COLL VENOUS BLD VENIPUNCTURE: CPT | Performed by: INTERNAL MEDICINE

## 2017-03-15 PROCEDURE — 74011000250 HC RX REV CODE- 250: Performed by: INTERNAL MEDICINE

## 2017-03-15 RX ADMIN — PRAVASTATIN SODIUM 80 MG: 40 TABLET ORAL at 21:56

## 2017-03-15 RX ADMIN — AMIODARONE HYDROCHLORIDE 0.5 MG/MIN: 50 INJECTION, SOLUTION INTRAVENOUS at 17:17

## 2017-03-15 RX ADMIN — DUTASTERIDE 0.5 MG: 0.5 CAPSULE, LIQUID FILLED ORAL at 09:11

## 2017-03-15 RX ADMIN — INSULIN LISPRO 10 UNITS: 100 INJECTION, SOLUTION INTRAVENOUS; SUBCUTANEOUS at 09:07

## 2017-03-15 RX ADMIN — METOPROLOL SUCCINATE 100 MG: 50 TABLET, EXTENDED RELEASE ORAL at 09:10

## 2017-03-15 RX ADMIN — INSULIN GLARGINE 35 UNITS: 100 INJECTION, SOLUTION SUBCUTANEOUS at 21:55

## 2017-03-15 RX ADMIN — INSULIN LISPRO 10 UNITS: 100 INJECTION, SOLUTION INTRAVENOUS; SUBCUTANEOUS at 12:59

## 2017-03-15 RX ADMIN — APIXABAN 5 MG: 5 TABLET, FILM COATED ORAL at 18:08

## 2017-03-15 RX ADMIN — ACETAMINOPHEN 650 MG: 325 TABLET, FILM COATED ORAL at 09:10

## 2017-03-15 RX ADMIN — Medication 10 ML: at 21:56

## 2017-03-15 RX ADMIN — AMIODARONE HYDROCHLORIDE 1 MG/MIN: 50 INJECTION, SOLUTION INTRAVENOUS at 12:06

## 2017-03-15 RX ADMIN — TAMSULOSIN HYDROCHLORIDE 0.4 MG: 0.4 CAPSULE ORAL at 09:11

## 2017-03-15 RX ADMIN — METOLAZONE 2.5 MG: 2.5 TABLET ORAL at 09:00

## 2017-03-15 RX ADMIN — AMIODARONE HYDROCHLORIDE 150 MG: 50 INJECTION, SOLUTION INTRAVENOUS at 11:24

## 2017-03-15 RX ADMIN — BUMETANIDE 1 MG: 0.25 INJECTION, SOLUTION INTRAMUSCULAR; INTRAVENOUS at 09:08

## 2017-03-15 RX ADMIN — BUMETANIDE 1 MG: 0.25 INJECTION, SOLUTION INTRAMUSCULAR; INTRAVENOUS at 18:08

## 2017-03-15 RX ADMIN — LEVOTHYROXINE SODIUM 137 MCG: 112 TABLET ORAL at 09:27

## 2017-03-15 RX ADMIN — APIXABAN 5 MG: 5 TABLET, FILM COATED ORAL at 09:11

## 2017-03-15 NOTE — CONSULTS
Palliative Medicine Consult  E[place]: [phone] ([phone])    Patient Name: Montana Coats  YOB: 1950    Date of Initial Consult: 03/15/2017  Reason for Consult: care decisions  Requesting Provider: Dr. Navdeep Gautam   Primary Care Physician: TEJAS Hagen      SUMMARY:   Montana Coats is a 75 y.o. gentleman with PMH most significant for CAD s/p CABG in 2010, chronic A fib recently transitioned to Eliquis from Coumadin, systolic heart failure, DM2 who was admitted on 3/13/2017 from home for SOB. Current medical issues leading to Palliative Medicine involvement include: care decisions     PALLIATIVE DIAGNOSES:   1. SOB  2. Fatigue  3. BLE edema  4. Counseling regarding goals of care and advance care planning     PLAN:   Brief Summary of Plan  -visited pt in his rm. No family at bedside.   -goals of care and advance care planning confirmed  -if pt decides he wants to complete AMD during this hospital stay, we can assist with that    1. Goals of Care- confirmed  Pt expressed wishes for full, restorative treatment and all measures that support this. 2. Shared Medical Decision Making- pt makes his own medical decisions    3. Information Sharing-   Pt is unfamiliar w/ palliative medicine. Pt verbalized understanding of all the points made including the following:  -we are an interdisciplinary team serving as a bridge of communication and advocate on behalf of the patient and family when needed  -we are a support care service that, when needed, assists w/ sx mgmt  -though hospice is \"under\" palliative medicine, our service is not hospice nor does a consult visit by our service mean that hospice is being or should be considered  -we are not here to make medical decisions, and our being consulted does not equate to a change in a patient's overall condition    We discussed pt's condition. Pt has good understanding of what brought him into the hospital and current medical treatments.       We discussed range of medical treatment and, separate from this, code status. We spoke about completing an AMD.      Questions and concerns addressed. Supportive listening provided. Pt expresses how nice everyone has been. He asks how come so many folks in this hospital are so nice and great. Pt is from [place] [address] not too far from [facility]. He and his wife have been  12 yrs and been together 36 yrs. No children. Retired. Coming to [facility] was a compromise of her wanting to be in [place] and his wanting to settle in [facility]. 4. Advance Care Planning- pt's code status is confirmed as FULL CODE. Active EMR order reflects this. Pt does not have an AMD \"in any state\". Pt is interested in filling one out. I provided copies of blank ones for him to review. Pt understands the order of surrogate decision making. 5. Psychosocial Support- We will continue to support patient and family during this difficult hospitalization    6. Spiritual- at this time, there are no apparent spiritual needs or concerns. 7. Symptom Management- at this time, there does not appear to be symptom management for which our assistance is needed. I have discussed with patients bedside RN, Manfred Lu. We appreciate her time and input. I have discussed with Dr. Djeah Askew, pt's primary medical team attending. We appreciate her time and input. I have discussed with our palliative care IDT. Thank you for this consult that has provided us with the opportunity to be involved in this patient's care. We will continue to follow along with you. Should you have any questions or concerns prior to our next visit at the bedside, please do not hesitate to contact us at [phone] Tabitha Irwin MD  Palliative Care Team     GOALS OF CARE / TREATMENT PREFERENCES:   [====Goals of Care====]  GOALS OF CARE:  Patient / health care proxy stated goals: full, restorative treatment and all measures that support this.      TREATMENT PREFERENCES:   Code Status: Full Code    Advance Care Planning:  Advance Care Planning 3/13/2017   Patient's Healthcare Decision Maker is: Legal Next of Valentine Bui   Primary Decision Maker Name Lonnie Calderon   Primary Decision Maker Phone Number [phone]   Confirm Advance Directive None     Other:    The palliative care team has discussed with patient / health care proxy about goals of care / treatment preferences for patient.  [====Goals of Care====]         HISTORY:     History obtained from: pt, chart    CHIEF COMPLAINT: SOB and fatigue    HPI/SUBJECTIVE:     The patient is:   [x] Verbal and participatory  [] Non-participatory due to:     Pt is a 76 y/o WM w/ PMH noted above who presented to our ED on Mon having been driven by his wife w/ c/o worsening SOB. Pt reported having significant SOB w/ minimal exertion. He also noted having at least 10# wt increase in the past 1wk. He had noticed worsening of swelling in his legs. He states that he was compliant w/ his diet and took all his meds as directed.       In our ED, T 97.7, P 95, /49  Troponin 0.12, proBNP 88268, Cr 4.83    Clinical Pain Assessment (nonverbal scale for severity on nonverbal patients):   [++++ Clinical Pain Assessment++++]  [++++Pain Severity++++]: Pain: 0  [++++Pain Character++++]:   [++++Pain Duration++++]:   [++++Pain Effect++++]:   [++++Pain Factors++++]:   [++++Pain Frequency++++]:   [++++Pain Location++++]:   [++++ Clinical Pain Assessment++++]     FUNCTIONAL ASSESSMENT:     Palliative Performance Scale (PPS):  PPS: 70       PSYCHOSOCIAL/SPIRITUAL SCREENING:     Advance Care Planning:  Advance Care Planning 3/13/2017   Patient's Healthcare Decision Maker is: Legal Next Marcia Bui   Primary Decision Maker Name Lonnie Calderon   Primary Decision Maker Phone Number [phone]   Confirm Advance Directive None        Any spiritual / Gnosticist concerns:  [] Yes /  [x] No     Caregiver Burnout:  [] Yes /  [] No /  [x] No Caregiver Present Anticipatory grief assessment:   [] Normal  / [] Maladaptive       ESAS Anxiety: Anxiety: 0    ESAS Depression: Depression: 0        REVIEW OF SYSTEMS:     Positive and pertinent negative findings in ROS are noted above in HPI. The following systems were [] reviewed / [] unable to be reviewed as noted in HPI  Other findings are noted below. Systems: constitutional, ears/nose/mouth/throat, respiratory, gastrointestinal, genitourinary, musculoskeletal, integumentary, neurologic, psychiatric, endocrine. Positive findings noted below. Modified ESAS Completed by: provider   Fatigue: 5 Drowsiness: 0   Depression: 0 Pain: 0   Anxiety: 0 Nausea: 0   Anorexia: 0 Dyspnea: 2           Stool Occurrence(s): 1        PHYSICAL EXAM:     From RN flowsheet:  Wt Readings from Last 3 Encounters:   03/15/17 289 lb 11 oz (131.4 kg)   03/07/17 297 lb 6.4 oz (134.9 kg)     Blood pressure 142/69, pulse (!) 102, temperature 98.3 °F (36.8 °C), resp. rate 17, height 5' 9\" (1.753 m), weight 289 lb 11 oz (131.4 kg), SpO2 93 %. Pain Scale 1: Numeric (0 - 10)  Pain Intensity 1: 0  Pain Onset 1: 3 weeks  Pain Location 1: Back  Pain Orientation 1: Lower  Pain Description 1: Aching  Pain Intervention(s) 1: MD notified (comment)    Gen: appears tired, comfortable sitting in chair at bedside, in NAD  HEENT: NC/AT, MMM, NC was just put in place securely  CV: irregularly irregular, no m/r/g appreciated, 2+ pitting bilateral pretibial edema  Pulm: lungs CTAB, no w/r/r, breathing not labored, symmetric  Gastrointestinal: abd SNTND, +bowel sounds throughout, no r/g  Skin: warm, dry. Chronic skin changes evident on distal BLEs. Dressing over the R lower leg that is C/D/I.     Neuro: awake, alert, following commands, moving all extremities, grossly afocal  Psych: full affect, no hallucinations  No obvious si/sx's of agitation or anxiety  Demonstrates capacity per Dejah criteria regarding range of medical treatment, code status, consideration for completing an AMD, surrogate decision making. HISTORY:     Active Problems:    SOB (shortness of breath) (3/7/2017)      Chronic a-fib (HCC) (3/7/2017)      CKD (chronic kidney disease) stage 4, GFR 15-29 ml/min ([facility]) (3/7/2017)      Hx of CABG (3/13/2017)      Overview: CABG in 2010 in [place]      Morbid obesity ([address] (3/13/2017)      Diabetes mellitus type 2, controlled ([address] (3/13/2017)      Heart failure ([address] (3/13/2017)      Past Medical History:   Diagnosis Date    CAD (coronary atherosclerotic disease)     Diabetes ([address]     Diabetes mellitus type 2, controlled ([address] 3/13/2017    Heart failure ([address]     Hx of CABG 3/13/2017    CABG in 2010 in [address] [place] [address] Morbid obesity ([address] 3/13/2017    S/P CABG x 2 2010      Past Surgical History:   Procedure Laterality Date    CARDIAC SURG PROCEDURE UNLIST      CABGx2 in 2010    HX ORTHOPAEDIC      L 3rd toe amputation in 1999      Family History   Problem Relation Age of Onset    Heart Attack Father       History reviewed, no pertinent family history.   Social History   Substance Use Topics    Smoking status: Never Smoker    Smokeless tobacco: Never Used    Alcohol use Yes      Comment: rare     Allergies   Allergen Reactions    Ciprofloxacin Nausea Only    Percocet [Oxycodone-Acetaminophen] Other (comments)     hallucinations      Current Facility-Administered Medications   Medication Dose Route Frequency    amiodarone (CORDARONE) 300 mg in dextrose 5% 250 mL infusion  0.5 mg/min IntraVENous CONTINUOUS    zinc oxide-cod liver oil (DESITIN) 40 % paste   Topical PRN    bumetanide (BUMEX) injection 1 mg  1 mg IntraVENous BID    sodium chloride (NS) 0.9 % flush        perflutren lipid microspheres (DEFINITY) 1.1 mg/mL contrast injection        sodium chloride (NS) flush 5-10 mL  5-10 mL IntraVENous Q8H    sodium chloride (NS) flush 5-10 mL  5-10 mL IntraVENous PRN    naloxone (NARCAN) injection 0.4 mg  0.4 mg IntraVENous PRN    ondansetron (ZOFRAN) injection 4 mg  4 mg IntraVENous Q4H PRN    acetaminophen (TYLENOL) tablet 650 mg  650 mg Oral Q4H PRN    bisacodyl (DULCOLAX) tablet 5 mg  5 mg Oral DAILY PRN    glucose chewable tablet 16 g  4 Tab Oral PRN    dextrose (D50W) injection syrg 12.5-25 g  12.5-25 g IntraVENous PRN    glucagon (GLUCAGEN) injection 1 mg  1 mg IntraMUSCular PRN    dutasteride (AVODART) capsule 0.5 mg  0.5 mg Oral DAILY    levothyroxine (SYNTHROID) tablet 137 mcg  137 mcg Oral ACB    metOLazone (ZAROXOLYN) tablet 2.5 mg  2.5 mg Oral DAILY    metoprolol succinate (TOPROL-XL) XL tablet 100 mg  100 mg Oral DAILY    pravastatin (PRAVACHOL) tablet 80 mg  80 mg Oral QHS    tamsulosin (FLOMAX) capsule 0.4 mg  0.4 mg Oral DAILY    insulin lispro (HUMALOG) injection   SubCUTAneous AC&HS    insulin glargine (LANTUS) injection 35 Units  35 Units SubCUTAneous QHS    insulin lispro (HUMALOG) injection 10 Units  10 Units SubCUTAneous TIDAC    apixaban (ELIQUIS) tablet 5 mg  5 mg Oral BID          LAB AND IMAGING FINDINGS:     Lab Results   Component Value Date/Time    WBC 5.7 03/14/2017 02:36 AM    HGB 11.3 03/14/2017 02:36 AM    PLATELET 036 51/93/6912 02:36 AM     Lab Results   Component Value Date/Time    Sodium 147 03/15/2017 02:01 AM    Potassium 4.7 03/15/2017 02:01 AM    Chloride 106 03/15/2017 02:01 AM    CO2 32 03/15/2017 02:01 AM     03/15/2017 02:01 AM    Creatinine 3.98 03/15/2017 02:01 AM    Calcium 8.8 03/15/2017 02:01 AM    Magnesium 2.7 03/15/2017 02:01 AM      Lab Results   Component Value Date/Time    AST (SGOT) 28 03/13/2017 12:15 PM    Alk.  phosphatase 37 03/13/2017 12:15 PM    Protein, total 6.3 03/13/2017 12:15 PM    Albumin 3.4 03/13/2017 12:15 PM    Globulin 2.9 03/13/2017 12:15 PM     Lab Results   Component Value Date/Time    INR 2.0 03/13/2017 07:00 PM    Prothrombin time 21.1 03/13/2017 07:00 PM      No results found for: IRON, FE, TIBC, IBCT, PSAT, FERR   No results found for: PH, PCO2, PO2  No components found for: [place]   Lab Results   Component Value Date/Time    CK 99 03/13/2017 12:15 PM    CK - MB 3.6 03/13/2017 12:15 PM                Total time: 50 min  Counseling / coordination time: 30 min  > 50% counseling / coordination?: yes    Prolonged service was provided for  []30 min   []75 min in face to face time in the presence of the patient. Time Start:   Time End:   Note: this can only be billed with 48214 (initial) or 45634 (follow up). If multiple start / stop times, list each separately.

## 2017-03-15 NOTE — PROGRESS NOTES
Hospitalist Progress Note    NAME: Forest Friedman   :  1950   MRN:  [id number]       Interim Hospital Summary: 75 y.o. male whom presented on 3/13/2017 with      Assessment / Plan:  Chronic atrial fibrillation  -pt got transitioned IV amio with goals of [facility]   -continue eliquis, BB  -cardiology following, appreciate recs    Acute on chronic systolic heart failure  Acute hypoxic respiratory failure  Elevated troponin  -trop peaked   -EKG showed afib, no ischemic changes  -transition to IV bumex due to low GFR. Continue ASA, BB  -Echo with nl EF and nl L atrium  -strict I&O, low sait diet, daily wt, fluid restriction, lytes     Acute on chronic kidney failure  -per pt, baseline cr 2.5, presenting with cr 4.83, slightly improving today  -likely from volume overload  -nephro following     T2DM  -A1c 8  -lantus, lispro premeals, SSI     Thrombocytopenia  -no evidence of bleed     Morbid obesity     Code Status: Full  Surrogate Decision Maker: wife Maira Teresa Lantigua at [phone]     DVT Prophylaxis: heparin  GI Prophylaxis: not indicated     Baseline: independent     Subjective:     Chief Complaint / Reason for Physician Visit  No acute complaints. Chart reviewed. Discussed with RN events overnight. Review of Systems:  Symptom Y/N Comments  Symptom Y/N Comments   Fever/Chills n   Chest Pain n    Poor Appetite n   Edema y improved   Cough n   Abdominal Pain n    Sputum    Joint Pain     SOB/BETTS n   Pruritis/Rash     Nausea/vomit    Tolerating PT/OT     Diarrhea    Tolerating Diet y    Constipation    Other       Could NOT obtain due to:      Objective:     VITALS:   Last 24hrs VS reviewed since prior progress note.  Most recent are:  Patient Vitals for the past 24 hrs:   Temp Pulse Resp BP SpO2   03/15/17 1356 - 97 - - 91 %   03/15/17 1302 - 91 - 124/55 -   03/15/17 1200 98.2 °F (36.8 °C) 95 16 121/72 93 %   03/15/17 0803 97.8 °F (36.6 °C) (!) 105 18 132/67 97 %   03/15/17 0331 97.9 °F (36.6 °C) (!) 108 20 112/53 93 %   03/15/17 0003 98 °F (36.7 °C) (!) 104 20 125/57 96 %   03/14/17 1945 98.1 °F (36.7 °C) (!) 114 19 148/77 96 %   03/14/17 1605 98.2 °F (36.8 °C) (!) 110 18 140/69 94 %       Intake/Output Summary (Last 24 hours) at 03/15/17 1407  Last data filed at 03/15/17 1206   Gross per 24 hour   Intake              480 ml   Output             2175 ml   Net            -1695 ml        PHYSICAL EXAM:  General: WD, WN. Alert, cooperative, no acute distress    EENT:  EOMI. Anicteric sclerae. MMM  Resp:  CTA b/l, no wheezing,  no crackles  CV:  irregular  rhythm,  +2 LE edema  GI:  Soft, Non distended, Non tender.  +Bowel sounds  Neurologic:  Alert and oriented X 3, normal speech,   Psych:   Good insight. Not anxious nor agitated  Skin:  No rashes. No jaundice    Reviewed most current lab test results and cultures  YES  Reviewed most current radiology test results   YES  Review and summation of old records today    NO  Reviewed patient's current orders and MAR    YES  PMH/SH reviewed - no change compared to H&P  ________________________________________________________________________  Care Plan discussed with:    Comments   Patient x    Family  x wife   RN x    Care Manager     Consultant                        Multidiciplinary team rounds were held today with , nursing, pharmacist and clinical coordinator. Patient's plan of care was discussed; medications were reviewed and discharge planning was addressed.      ________________________________________________________________________  Total NON critical care TIME:  35   Minutes    Total CRITICAL CARE TIME Spent:   Minutes non procedure based      Comments   >50% of visit spent in counseling and coordination of care     ________________________________________________________________________  Manju Lu MD     Procedures: see electronic medical records for all procedures/Xrays and details which were not copied into this note but were reviewed prior to creation of Plan. LABS:  I reviewed today's most current labs and imaging studies.   Pertinent labs include:  Recent Labs      03/14/17   0236  03/13/17   1215   WBC  5.7  7.2   HGB  11.3*  12.0*   HCT  37.5  40.3   PLT  129*  149*     Recent Labs      03/15/17   0201  03/14/17   0236  03/13/17   1900  03/13/17   1215   NA  147*  144   --   142   K  4.7  4.8   --   5.0   CL  106  107   --   104   CO2  32  26   --   28   GLU  122*  157*   --   138*   BUN  130*  146*   --   148*   CREA  3.98*  4.43*   --   4.83*   CA  8.8  8.6   --   8.5   MG  2.7*  2.8*   --    --    ALB   --    --    --   3.4*   TBILI   --    --    --   0.6   SGOT   --    --    --   28   ALT   --    --    --   24   INR   --    --   2.0*   --        Signed: Lexie Medina MD

## 2017-03-15 NOTE — DIABETES MGMT
DTC Progress Note    Recommendations/ Comments: Noted pt hypoglycemic yesterday afternoon. Given pt current renal status may consider changing correctional insulin scale to high sensitivity and decreasing mealtime insulin dose to Humalog 8 units ac tid. Chart reviewed on Trice Cruz for hypoglycemia. Patient is a 75 y.o. male with known history of Type 2 Diabetes on Humalog Mix 75/25 26 units ac b and 34 units ac d at home. A1c:   Lab Results   Component Value Date/Time    Hemoglobin A1c 8.0 03/13/2017 12:15 PM       Recent Glucose Results: Lab Results   Component Value Date/Time     (H) 03/15/2017 02:01 AM    GLUCPOC 126 (H) 03/15/2017 11:43 AM    GLUCPOC 108 (H) 03/15/2017 08:24 AM    GLUCPOC 172 (H) 03/14/2017 09:13 PM        Lab Results   Component Value Date/Time    Creatinine 3.98 03/15/2017 02:01 AM       Active Orders   Diet    DIET DIABETIC CONSISTENT CARB Regular; 2 GM NA (House Low NA); FR 1500ML        PO intake: Patient Vitals for the past 72 hrs:   % Diet Eaten   03/15/17 0900 75 %       Current hospital DM medication:   -Lantus 35 units at bedtime  -Humalog normal sensitivity correction  -Humalog 10 units ac tid    Will continue to follow as needed.     Thank you    Merced Franks, [address] [place] [address]  Office: [phone]

## 2017-03-15 NOTE — PROGRESS NOTES
NAME: Carmella Santana        :  1950        MRN:  [id number]        Assessment :    Plan:  --INO on CKD stage 4  DM/HTN nephropathy  BPH  Acute systolic chf  H/o cabg  afib Creatinine improving (peaked at 4.8 to 4.4 to 4); baseline 2.5?; urine is bland except for some blood; minimal proteinuria; no hydro; the etiology for INO looks to have been cardiorenal syndrome    If creatinine were to halt improvement or if worsens, maybe send a few serologies due to hematuria? Out > in and weight is down with current diuretics - continue as is    Watch sodium level       Subjective:     Chief Complaint:  Theotis Dane is improved. Not sob. Making urine. We discussed the above. No gross hematuria. Review of Systems:    Symptom Y/N Comments  Symptom Y/N Comments   Fever/Chills    Chest Pain n    Poor Appetite n   Edema y    Cough    Abdominal Pain n    Sputum    Joint Pain     SOB/BETTS n   Pruritis/Rash     Nausea/vomit n   Tolerating PT/OT     Diarrhea n   Tolerating Diet     Constipation    Other       Could not obtain due to:      Objective:     VITALS:   Last 24hrs VS reviewed since prior progress note. Most recent are:  Visit Vitals    /53 (BP 1 Location: Left arm, BP Patient Position: At rest)    Pulse (!) 108    Temp 97.9 °F (36.6 °C)    Resp 20    Ht 5' 9\" (1.753 m)    Wt 131.4 kg (289 lb 11 oz)    SpO2 93%    BMI 42.78 kg/m2       Intake/Output Summary (Last 24 hours) at 03/15/17 2323  Last data filed at 03/15/17 0004   Gross per 24 hour   Intake              240 ml   Output             2875 ml   Net            -2635 ml      Telemetry Reviewed:     PHYSICAL EXAM:  General: WD, WN. Alert, cooperative, no acute distress  Resp:  CTA Bilaterally. No Wheezing/Rhonchi/Rales. No access. muscle use  CV:  Regular  rhythm,  No murmur (), No Rubs, No Gallops.  1+ edema  GI:  Soft, Non distended, Non tender.  +Bowel sounds, no HSM      Lab Data Reviewed: (see below)    Medications Reviewed: (see below)    PMH/SH reviewed - no change compared to H&P  ________________________________________________________________________  Care Plan discussed with:  Patient y    Family      RN     Care Manager                    Consultant:          Comments   >50% of visit spent in counseling and coordination of care       ________________________________________________________________________  Lashonda Gabriel MD     Procedures: see electronic medical records for all procedures/Xrays and details which  were not copied into this note but were reviewed prior to creation of Plan. LABS:  Recent Labs      03/14/17   0236  03/13/17   1215   WBC  5.7  7.2   HGB  11.3*  12.0*   HCT  37.5  40.3   PLT  129*  149*     Recent Labs      03/15/17   0201  03/14/17   0236  03/13/17   1215   NA  147*  144  142   K  4.7  4.8  5.0   CL  106  107  104   CO2  32  26  28   BUN  130*  146*  148*   CREA  3.98*  4.43*  4.83*   GLU  122*  157*  138*   CA  8.8  8.6  8.5   MG  2.7*  2.8*   --      Recent Labs      03/13/17   1215   SGOT  28   AP  37*   TP  6.3*   ALB  3.4*   GLOB  2.9     Recent Labs      03/13/17   1900   INR  2.0*   PTP  21.1*      No results for input(s): FE, TIBC, PSAT, FERR in the last 72 hours. No results found for: FOL, RBCF   No results for input(s): PH, PCO2, PO2 in the last 72 hours.   Recent Labs      03/13/17   1215   CPK  99   CKMB  3.6*     No components found for: [place]  Lab Results   Component Value Date/Time    Color YELLOW/STRAW 03/14/2017 04:00 PM    Appearance CLEAR 03/14/2017 04:00 PM    Specific gravity 1.011 03/14/2017 04:00 PM    pH (UA) 5.0 03/14/2017 04:00 PM    Protein NEGATIVE  03/14/2017 04:00 PM    Glucose NEGATIVE  03/14/2017 04:00 PM    Ketone NEGATIVE  03/14/2017 04:00 PM    Bilirubin NEGATIVE  03/14/2017 04:00 PM    Urobilinogen 0.2 03/14/2017 04:00 PM    Nitrites NEGATIVE  03/14/2017 04:00 PM    Leukocyte Esterase NEGATIVE  03/14/2017 04:00 PM    Epithelial cells FEW 03/14/2017 04:00 PM    Bacteria NEGATIVE  03/14/2017 04:00 PM    WBC 0-4 03/14/2017 04:00 PM    RBC 10-20 03/14/2017 04:00 PM       MEDICATIONS:  Current Facility-Administered Medications   Medication Dose Route Frequency    zinc oxide-cod liver oil (DESITIN) 40 % paste   Topical PRN    bumetanide (BUMEX) injection 1 mg  1 mg IntraVENous BID    sodium chloride (NS) 0.9 % flush        perflutren lipid microspheres (DEFINITY) 1.1 mg/mL contrast injection        sodium chloride (NS) flush 5-10 mL  5-10 mL IntraVENous Q8H    sodium chloride (NS) flush 5-10 mL  5-10 mL IntraVENous PRN    naloxone (NARCAN) injection 0.4 mg  0.4 mg IntraVENous PRN    ondansetron (ZOFRAN) injection 4 mg  4 mg IntraVENous Q4H PRN    acetaminophen (TYLENOL) tablet 650 mg  650 mg Oral Q4H PRN    bisacodyl (DULCOLAX) tablet 5 mg  5 mg Oral DAILY PRN    glucose chewable tablet 16 g  4 Tab Oral PRN    dextrose (D50W) injection syrg 12.5-25 g  12.5-25 g IntraVENous PRN    glucagon (GLUCAGEN) injection 1 mg  1 mg IntraMUSCular PRN    dutasteride (AVODART) capsule 0.5 mg  0.5 mg Oral DAILY    levothyroxine (SYNTHROID) tablet 137 mcg  137 mcg Oral ACB    metOLazone (ZAROXOLYN) tablet 2.5 mg  2.5 mg Oral DAILY    metoprolol succinate (TOPROL-XL) XL tablet 100 mg  100 mg Oral DAILY    pravastatin (PRAVACHOL) tablet 80 mg  80 mg Oral QHS    tamsulosin (FLOMAX) capsule 0.4 mg  0.4 mg Oral DAILY    insulin lispro (HUMALOG) injection   SubCUTAneous AC&HS    insulin glargine (LANTUS) injection 35 Units  35 Units SubCUTAneous QHS    insulin lispro (HUMALOG) injection 10 Units  10 Units SubCUTAneous TIDAC    apixaban (ELIQUIS) tablet 5 mg  5 mg Oral BID

## 2017-03-15 NOTE — CARDIO/PULMONARY
Cardiopulmonary Rehab Nursing Entry:     Chart reviewed. Patient is on CHF bundle. Pt 74 yo admitted with acute on chronic systolic heart failure, chronic a-fib. PMHx of CABG, current LVEF 55-60%, never smoker. This was a follow-up visit to answer questions and reinforce prior teaching re: CHF, S&Ss, medication management, Low NA diet, daily weights, when to call the doctor and balancing rest/activity. Met with patient who is sitting up in chair with legs elevated. He states he has not finished reading packet that was given to him as he has been rather tired, but he plans to read it. He is knowledgeable about importance of daily weights and watching salt intake. No questions currently. Will continue to follow.

## 2017-03-15 NOTE — PROGRESS NOTES
1515- Bedside shift change report given to [address] CHAVEZ (oncoming nurse) by Dereje Don (offgoing nurse). Report included the following information SBAR, Kardex, Procedure Summary, Intake/Output, MAR, Recent Results and Cardiac Rhythm afib.

## 2017-03-15 NOTE — PROGRESS NOTES
PCU SHIFT NURSING NOTE      Bedside shift change report given to Queenie by Neeta Reed. Report included the following information SBAR, Kardex, ED Summary, Intake/Output, MAR, Recent Results, Med Rec Status and Cardiac Rhythm AFib. Shift Summary:   1600 Patient sleeping in recliner with a desat in O2 to 78% on RA. Patient remained in high 70s, low 80s until woken up and recovered back to low 90s. O2 2L NC was applied to patient. Will remove when patient wakes up.   1652 Patient's blood glucose 76. Patient given orange juice. Paged Dr. Mihir Mary for ordered 10 units of Humalog scheduled at 36.  1715 Per Dr. Mihir Mary, hold insulin. 1719 Patient's blood glucose 70. Patient currently eating dinner tray. 1737 Patient blood glucose 87. Admission Date 3/13/2017   Admission Diagnosis Heart failure ([facility] [address]   Consults IP CONSULT TO PALLIATIVE CARE - PROVIDER  IP CONSULT TO NEPHROLOGY        Consults   []PT   []OT   []Speech   []Case Management      [] Palliative      Cardiac Monitoring Order   [x]Yes   []No     IV drips   [x]Yes    Drip: Amiodarone       Dose: 1mg/min  Drip:                            Dose:  Drip:                            Dose:   []No     GI Prophylaxis   []Yes   [x]No         DVT Prophylaxis   SCDs:             Deangelo stockings:         [x] Medication   []Contraindicated   []None      Activity Level Activity Level: Up with Assistance     Activity Assistance: Partial (one person)   Purposeful Rounding every 1-2 hour? [x]Yes   Ackerman Score  Total Score: 3   Bed Alarm (If score 3 or >)   []Yes   [] Refused (See signed refusal form in chart)   Romeo Score  Romeo Score: 19   Romeo Score (if score 14 or less)   []PMT consult   []Wound Care consult      []Specialty bed   [] Nutrition consult          Needs prior to discharge:   Home O2 required:    []Yes   []No    If yes, how much O2 required?     Other:    Last Bowel Movement: Last Bowel Movement Date: 03/14/17      Influenza Vaccine Received Flu Vaccine for Current Season (usually Sept-March): Yes        Pneumonia Vaccine           Diet Active Orders   Diet    DIET DIABETIC CONSISTENT CARB Regular; 2 GM NA (House Low NA); FR 1500ML      LDAs               Peripheral IV 03/13/17 Right Antecubital (Active)   Site Assessment Clean, dry, & intact 3/15/2017  9:00 AM   Phlebitis Assessment 0 3/15/2017  9:00 AM   Infiltration Assessment 0 3/15/2017  9:00 AM   Dressing Status Clean, dry, & intact 3/15/2017  9:00 AM   Dressing Type Transparent;Tape 3/15/2017  9:00 AM   Hub Color/Line Status Pink;Flushed;Capped 3/15/2017  9:00 AM   Alcohol Cap Used Yes 3/15/2017  9:00 AM                      Urinary Catheter      Intake & Output   Date 03/14/17 0700 - 03/15/17 0659 03/15/17 0700 - 03/16/17 0659   Shift 7415-6149 3536-9395 24 Hour Total 7834-7064 9472-4905 24 Hour Total   I  N  T  A  K  E   P.O.  240 240 240  240      P. O.  240 240 240  240    Shift Total  (mL/kg)  240  (1.8) 240  (1.8) 240  (1.8)  240  (1.8)   O  U  T  P  U  T   Urine  (mL/kg/hr) 1750  (1.1) 1125  (0.7) 2875  (0.9) 1200  1200      Urine Voided 1750 1125 2875 1200  1200      Urine Occurrence(s)  2 x 2 x       Stool            Stool Occurrence(s) 2 x  2 x       Shift Total  (mL/kg) 1750  (13.2) 1125  (8.6) 2875  (21.9) 1200  (9.1)  1200  (9.1)   NET -1750 -885 -2635 -960  -960   Weight (kg) 132.5 131.4 131.4 131.4 131.4 131.4         Readmission Risk Assessment Tool Score High Risk            21       Total Score        3 Relationship with PCP    2 Patient Living Status    4 More than 1 Admission in calendar year    5 Patient Insurance is Medicare, Medicaid or Self Pay    7 Charlson Comorbidity Score        Criteria that do not apply:    Patient Length of Stay > 5       Expected Length of Stay 4d 14h   Actual Length of Stay 2

## 2017-03-15 NOTE — PROGRESS NOTES
[address] [place] [address]  [phone]      Cardiology Progress Note      3/15/2017 10:49 AM    Admit Date: 3/13/2017    Admit Diagnosis:   Heart failure (HCC)    Subjective:     Marlon Kevin     Visit Vitals    /67 (BP 1 Location: Right arm, BP Patient Position: At rest)    Pulse (!) 105    Temp 97.8 °F (36.6 °C)    Resp 18    Ht 5' 9\" (1.753 m)    Wt 289 lb 11 oz (131.4 kg)    SpO2 97%    BMI 42.78 kg/m2       Current Facility-Administered Medications   Medication Dose Route Frequency    amiodarone (CORDARONE) 150 mg in dextrose 5% 100 mL bolus infusion  150 mg IntraVENous NOW    amiodarone (CORDARONE) 450 mg in dextrose 5% 250 mL infusion  1 mg/min IntraVENous CONTINUOUS    zinc oxide-cod liver oil (DESITIN) 40 % paste   Topical PRN    bumetanide (BUMEX) injection 1 mg  1 mg IntraVENous BID    sodium chloride (NS) 0.9 % flush        perflutren lipid microspheres (DEFINITY) 1.1 mg/mL contrast injection        sodium chloride (NS) flush 5-10 mL  5-10 mL IntraVENous Q8H    sodium chloride (NS) flush 5-10 mL  5-10 mL IntraVENous PRN    naloxone (NARCAN) injection 0.4 mg  0.4 mg IntraVENous PRN    ondansetron (ZOFRAN) injection 4 mg  4 mg IntraVENous Q4H PRN    acetaminophen (TYLENOL) tablet 650 mg  650 mg Oral Q4H PRN    bisacodyl (DULCOLAX) tablet 5 mg  5 mg Oral DAILY PRN    glucose chewable tablet 16 g  4 Tab Oral PRN    dextrose (D50W) injection syrg 12.5-25 g  12.5-25 g IntraVENous PRN    glucagon (GLUCAGEN) injection 1 mg  1 mg IntraMUSCular PRN    dutasteride (AVODART) capsule 0.5 mg  0.5 mg Oral DAILY    levothyroxine (SYNTHROID) tablet 137 mcg  137 mcg Oral ACB    metOLazone (ZAROXOLYN) tablet 2.5 mg  2.5 mg Oral DAILY    metoprolol succinate (TOPROL-XL) XL tablet 100 mg  100 mg Oral DAILY    pravastatin (PRAVACHOL) tablet 80 mg  80 mg Oral QHS    tamsulosin (FLOMAX) capsule 0.4 mg  0.4 mg Oral DAILY    insulin lispro (HUMALOG) injection SubCUTAneous AC&HS    insulin glargine (LANTUS) injection 35 Units  35 Units SubCUTAneous QHS    insulin lispro (HUMALOG) injection 10 Units  10 Units SubCUTAneous TIDAC    apixaban (ELIQUIS) tablet 5 mg  5 mg Oral BID       Objective:      Physical Exam:  General Appearance:    Chest:   Clear  Cardiovascular:  Regular rate and rhythm, no murmur.   Abdomen:   Soft, non-tender, bowel sounds are active.   Extremities:   Skin:  Warm and dry.     Data Review:   Recent Labs      03/14/17   0236  03/13/17   1215   WBC  5.7  7.2   HGB  11.3*  12.0*   HCT  37.5  40.3   PLT  129*  149*     Recent Labs      03/15/17   0201  03/14/17   0236  03/13/17   1900  03/13/17   1215   NA  147*  144   --   142   K  4.7  4.8   --   5.0   CL  106  107   --   104   CO2  32  26   --   28   GLU  122*  157*   --   138*   BUN  130*  146*   --   148*   CREA  3.98*  4.43*   --   4.83*   CA  8.8  8.6   --   8.5   MG  2.7*  2.8*   --    --    ALB   --    --    --   3.4*   TBILI   --    --    --   0.6   SGOT   --    --    --   28   ALT   --    --    --   24   INR   --    --   2.0*   --        Recent Labs      03/14/17   0236  03/13/17   1900  03/13/17   1215   TROIQ  0.11*  0.12*  0.12*   CPK   --    --   99   CKMB   --    --   3.6*         Intake/Output Summary (Last 24 hours) at 03/15/17 1049  Last data filed at 03/15/17 1000   Gross per 24 hour   Intake              480 ml   Output             2825 ml   Net            -2345 ml        Telemetry:   EKG:  Cxray:    Assessment:     Active Problems:    SOB (shortness of breath) (3/7/2017)      Chronic a-fib (HCC) (3/7/2017)      CKD (chronic kidney disease) stage 4, GFR 15-29 ml/min ([facility]) (3/7/2017)      Hx of CABG (3/13/2017)      Overview: CABG in 2010 in [place]      Morbid obesity ([address] (3/13/2017)      Diabetes mellitus type 2, controlled ([address] (3/13/2017)      Heart failure ([address] (3/13/2017)        Plan:     Diuresing well with gradual drop in Cr. LA size normal by echo.   Discussed with EP.  Will load with IV amio for a few days then attempt cardioversion. NSR should be helpful in the presence of diastolic dysfunction.

## 2017-03-15 NOTE — PROGRESS NOTES
PCU SHIFT NURSING NOTE      Bedside and Verbal shift change report given to David New RN (oncoming nurse) by Milton Allred RN (offgoing nurse). Report included the following information SBAR, Kardex, ED Summary, Procedure Summary, Intake/Output, MAR, Recent Results, Med Rec Status, Cardiac Rhythm A-fib and Alarm Parameters . Shift Summary:         Admission Date 3/13/2017   Admission Diagnosis Heart failure ([facility] [address]   Consults IP CONSULT TO PALLIATIVE CARE - PROVIDER  IP CONSULT TO NEPHROLOGY        Consults   [x]PT   [x]OT   []Speech   [x]Case Management      [x] Palliative      Cardiac Monitoring Order   [x]Yes   []No     IV drips   []Yes    Drip:                            Dose:  Drip:                            Dose:  Drip:                            Dose:   [x]No     GI Prophylaxis   [x]Yes   []No         DVT Prophylaxis   SCDs:             Deangelo stockings:         [x] Medication   []Contraindicated   []None      Activity Level Activity Level: Up with Assistance     Activity Assistance: Partial (one person)   Purposeful Rounding every 1-2 hour? [x]Yes   Ackerman Score  Total Score: 3   Bed Alarm (If score 3 or >)   [x]Yes   [] Refused (See signed refusal form in chart)   Romeo Score  Romeo Score: 20   Romeo Score (if score 14 or less)   []PMT consult   [x]Wound Care consult      []Specialty bed   [] Nutrition consult          Needs prior to discharge:   Home O2 required:    []Yes   [x]No    If yes, how much O2 required?     Other:    Last Bowel Movement: Last Bowel Movement Date: 03/14/17      Influenza Vaccine Received Flu Vaccine for Current Season (usually Sept-March): Yes        Pneumonia Vaccine           Diet Active Orders   Diet    DIET DIABETIC CONSISTENT CARB Regular; 2 GM NA (House Low NA); FR 1500ML      LDAs               Peripheral IV 03/13/17 Right Antecubital (Active)   Site Assessment Clean, dry, & intact 3/14/2017  6:13 PM   Phlebitis Assessment 0 3/14/2017  6:13 PM Infiltration Assessment 0 3/14/2017  6:13 PM   Dressing Status Clean, dry, & intact 3/14/2017  6:13 PM   Dressing Type Tape;Transparent 3/14/2017  6:13 PM   Hub Color/Line Status Pink;Capped;Flushed 3/14/2017  6:13 PM                      Urinary Catheter      Intake & Output   Date 03/13/17 1900 - 03/14/17 0659 03/14/17 0700 - 03/15/17 0659   Shift 4432-3702 24 Hour Total 4693-3537 4234-3344 24 Hour Total   I  N  T  A  K  E   P.O. 240 240         P. O. 240 240       Shift Total  (mL/kg) 240  (1.8) 240  (1.8)      O  U  T  P  U  T   Urine  (mL/kg/hr) 1050 1800 1750  (1.1) 275 2025      Urine Voided 1050 1800 0928 642 1447      Urine Occurrence(s) 2 x 2 x       Stool           Stool Occurrence(s) 1 x 1 x 2 x  2 x    Shift Total  (mL/kg) 1050  (7.8) 1800  (13.5) 1750  (13.2) 275  (2.1) 2025  (15.3)   NET -810 -1560 -1750 -275 -2025   Weight (kg) 133.8 133.8 132.5 132.5 132.5         Readmission Risk Assessment Tool Score High Risk            21       Total Score        3 Relationship with PCP    2 Patient Living Status    4 More than 1 Admission in calendar year    5 Patient Insurance is Medicare, Medicaid or Self Pay    7 Charlson Comorbidity Score        Criteria that do not apply:    Patient Length of Stay > 5       Expected Length of Stay 4d 14h   Actual Length of Stay 1

## 2017-03-15 NOTE — PROGRESS NOTES
Heart Failure Care Coordinator visited the patient in room. Provided introduction to self, and explanation of the Care Coordinator role. Patient was provided a copy of the [facility] letter. Patient made aware of contact information should any questions arise before or after discharge.

## 2017-03-15 NOTE — PROGRESS NOTES
Problem: Mobility Impaired (Adult and Pediatric)  Goal: *Acute Goals and Plan of Care (Insert Text)  Physical Therapy Goals  Initiated 3/14/2017  1. Patient will move from supine to sit and sit to supine in bed with independence within 7 day(s). 2. Patient will transfer from bed to chair and chair to bed with modified independence using the least restrictive device within 7 day(s). 3. Patient will perform sit to stand with modified independence within 7 day(s). 4. Patient will ambulate with modified independence for 100 feet with the least restrictive device within 7 day(s). PHYSICAL THERAPY TREATMENT  Patient: Leighton Pizano (13 y.o. male)  Date: 3/15/2017  Diagnosis: Heart failure ([facility] [address] <principal problem not specified>       Precautions: Fall      ASSESSMENT:  Patient demonstrated improved activity tolerance today with improved gait distance and improved subjective report of BETTS. Noted 1 small left knee buckle during static stance but he was able to self-correct and to continue with no further issue. He was modified independent with a cane prior to admission and is showing improved mobility each day. Anticipate discharge home with family with HHPT and potentially a RW (discussed that this may be addressed by HHPT) if balance and endurance remain an issue. Progression toward goals:  [X]    Improving appropriately and progressing toward goals  [ ]    Improving slowly and progressing toward goals  [ ]    Not making progress toward goals and plan of care will be adjusted       PLAN:  Patient continues to benefit from skilled intervention to address the above impairments. Continue treatment per established plan of care. Discharge Recommendations:  Home Health  Further Equipment Recommendations for Discharge:  to be determined          SUBJECTIVE:   Patient stated I feel a little better today.       OBJECTIVE DATA SUMMARY:   Critical Behavior:  Neurologic State: Alert  Orientation Level: Oriented X4  Cognition: Appropriate for age attention/concentration, Follows commands  Safety/Judgement: Awareness of environment, Fall prevention, Insight into deficits  Functional Mobility Training:  Bed Mobility:                    Transfers:  Sit to Stand: Stand-by asssistance  Stand to Sit: Stand-by asssistance                             Balance:  Sitting: Impaired  Sitting - Static: Good (unsupported)  Standing: Impaired  Standing - Static: Good  Standing - Dynamic : Fair  Ambulation/Gait Training:  Distance (ft): 80 Feet (ft)  Assistive Device: Gait belt;Cane, straight  Ambulation - Level of Assistance: Contact guard assistance        Gait Abnormalities: Decreased step clearance;Trunk sway increased        Base of Support: Widened     Speed/Ariane: Pace decreased (<100 feet/min)  Step Length: Right shortened;Left shortened  Pain:  Pain Scale 1: Numeric (0 - 10)  Pain Intensity 1: 0           After treatment:   [X]    Patient left in no apparent distress sitting up in chair  [ ]    Patient left in no apparent distress in bed  [X]    Call bell left within reach  [X]    Nursing notified  [X]    Caregiver present  [ ]    Bed alarm activated      COMMUNICATION/COLLABORATION:   The patients plan of care was discussed with: Registered Nurse     Artemio Sunshine PT, DPT   Time Calculation: 20 mins

## 2017-03-16 LAB
ANION GAP BLD CALC-SCNC: 9 MMOL/L (ref 5–15)
BUN SERPL-MCNC: 114 MG/DL (ref 6–20)
BUN/CREAT SERPL: 34 (ref 12–20)
CALCIUM SERPL-MCNC: 8.9 MG/DL (ref 8.5–10.1)
CHLORIDE SERPL-SCNC: 105 MMOL/L (ref 97–108)
CO2 SERPL-SCNC: 32 MMOL/L (ref 21–32)
CREAT SERPL-MCNC: 3.34 MG/DL (ref 0.7–1.3)
ERYTHROCYTE [DISTWIDTH] IN BLOOD BY AUTOMATED COUNT: 16.6 % (ref 11.5–14.5)
GLUCOSE BLD STRIP.AUTO-MCNC: 108 MG/DL (ref 65–100)
GLUCOSE BLD STRIP.AUTO-MCNC: 110 MG/DL (ref 65–100)
GLUCOSE BLD STRIP.AUTO-MCNC: 126 MG/DL (ref 65–100)
GLUCOSE BLD STRIP.AUTO-MCNC: 199 MG/DL (ref 65–100)
GLUCOSE BLD STRIP.AUTO-MCNC: 58 MG/DL (ref 65–100)
GLUCOSE SERPL-MCNC: 102 MG/DL (ref 65–100)
HCT VFR BLD AUTO: 38.3 % (ref 36.6–50.3)
HGB BLD-MCNC: 11.7 G/DL (ref 12.1–17)
MAGNESIUM SERPL-MCNC: 2.6 MG/DL (ref 1.6–2.4)
MCH RBC QN AUTO: 25.2 PG (ref 26–34)
MCHC RBC AUTO-ENTMCNC: 30.5 G/DL (ref 30–36.5)
MCV RBC AUTO: 82.5 FL (ref 80–99)
PLATELET # BLD AUTO: 134 K/UL (ref 150–400)
POTASSIUM SERPL-SCNC: 4.5 MMOL/L (ref 3.5–5.1)
RBC # BLD AUTO: 4.64 M/UL (ref 4.1–5.7)
SERVICE CMNT-IMP: ABNORMAL
SODIUM SERPL-SCNC: 146 MMOL/L (ref 136–145)
WBC # BLD AUTO: 7.3 K/UL (ref 4.1–11.1)

## 2017-03-16 PROCEDURE — 97535 SELF CARE MNGMENT TRAINING: CPT

## 2017-03-16 PROCEDURE — 80048 BASIC METABOLIC PNL TOTAL CA: CPT | Performed by: INTERNAL MEDICINE

## 2017-03-16 PROCEDURE — 74011250637 HC RX REV CODE- 250/637: Performed by: INTERNAL MEDICINE

## 2017-03-16 PROCEDURE — 36415 COLL VENOUS BLD VENIPUNCTURE: CPT | Performed by: INTERNAL MEDICINE

## 2017-03-16 PROCEDURE — 82962 GLUCOSE BLOOD TEST: CPT

## 2017-03-16 PROCEDURE — 65660000000 HC RM CCU STEPDOWN

## 2017-03-16 PROCEDURE — 74011000258 HC RX REV CODE- 258: Performed by: INTERNAL MEDICINE

## 2017-03-16 PROCEDURE — 85027 COMPLETE CBC AUTOMATED: CPT | Performed by: INTERNAL MEDICINE

## 2017-03-16 PROCEDURE — 74011000250 HC RX REV CODE- 250: Performed by: INTERNAL MEDICINE

## 2017-03-16 PROCEDURE — 74011250636 HC RX REV CODE- 250/636: Performed by: INTERNAL MEDICINE

## 2017-03-16 PROCEDURE — 77010033678 HC OXYGEN DAILY

## 2017-03-16 PROCEDURE — 97116 GAIT TRAINING THERAPY: CPT

## 2017-03-16 PROCEDURE — 83735 ASSAY OF MAGNESIUM: CPT | Performed by: INTERNAL MEDICINE

## 2017-03-16 RX ORDER — INSULIN LISPRO 100 [IU]/ML
10 INJECTION, SOLUTION INTRAVENOUS; SUBCUTANEOUS 2 TIMES DAILY
Status: DISCONTINUED | OUTPATIENT
Start: 2017-03-17 | End: 2017-03-17 | Stop reason: HOSPADM

## 2017-03-16 RX ADMIN — TAMSULOSIN HYDROCHLORIDE 0.4 MG: 0.4 CAPSULE ORAL at 08:55

## 2017-03-16 RX ADMIN — INSULIN GLARGINE 35 UNITS: 100 INJECTION, SOLUTION SUBCUTANEOUS at 22:05

## 2017-03-16 RX ADMIN — METOLAZONE 2.5 MG: 2.5 TABLET ORAL at 08:56

## 2017-03-16 RX ADMIN — Medication 10 ML: at 05:47

## 2017-03-16 RX ADMIN — INSULIN LISPRO 10 UNITS: 100 INJECTION, SOLUTION INTRAVENOUS; SUBCUTANEOUS at 12:10

## 2017-03-16 RX ADMIN — AMIODARONE HYDROCHLORIDE 0.5 MG/MIN: 50 INJECTION, SOLUTION INTRAVENOUS at 12:43

## 2017-03-16 RX ADMIN — PRAVASTATIN SODIUM 80 MG: 40 TABLET ORAL at 22:05

## 2017-03-16 RX ADMIN — AMIODARONE HYDROCHLORIDE 0.5 MG/MIN: 50 INJECTION, SOLUTION INTRAVENOUS at 23:29

## 2017-03-16 RX ADMIN — BUMETANIDE 1 MG: 0.25 INJECTION, SOLUTION INTRAMUSCULAR; INTRAVENOUS at 18:27

## 2017-03-16 RX ADMIN — BUMETANIDE 1 MG: 0.25 INJECTION, SOLUTION INTRAMUSCULAR; INTRAVENOUS at 08:59

## 2017-03-16 RX ADMIN — DUTASTERIDE 0.5 MG: 0.5 CAPSULE, LIQUID FILLED ORAL at 08:56

## 2017-03-16 RX ADMIN — Medication 10 ML: at 14:00

## 2017-03-16 RX ADMIN — APIXABAN 5 MG: 5 TABLET, FILM COATED ORAL at 09:00

## 2017-03-16 RX ADMIN — LEVOTHYROXINE SODIUM 137 MCG: 112 TABLET ORAL at 09:07

## 2017-03-16 RX ADMIN — AMIODARONE HYDROCHLORIDE 0.5 MG/MIN: 50 INJECTION, SOLUTION INTRAVENOUS at 01:07

## 2017-03-16 RX ADMIN — DEXTROSE MONOHYDRATE 25 G: 25 INJECTION, SOLUTION INTRAVENOUS at 17:27

## 2017-03-16 RX ADMIN — METOPROLOL SUCCINATE 100 MG: 50 TABLET, EXTENDED RELEASE ORAL at 08:56

## 2017-03-16 RX ADMIN — APIXABAN 5 MG: 5 TABLET, FILM COATED ORAL at 18:27

## 2017-03-16 RX ADMIN — Medication 10 ML: at 22:05

## 2017-03-16 RX ADMIN — INSULIN LISPRO 10 UNITS: 100 INJECTION, SOLUTION INTRAVENOUS; SUBCUTANEOUS at 09:07

## 2017-03-16 NOTE — DIABETES MGMT
DTC Progress Note    Recommendations/ Comments: Noted pt hypoglycemic after receiving lunchtime insulin dose x2 days. Given pt current renal status may consider changing correctional insulin scale to high sensitivity and decreasing mealtime insulin dose to Humalog 8 units ac tid. Chart reviewed on Ean Forrest for hypoglycemia. Patient is a 75 y.o. male with known history of Type 2 Diabetes on Humalog Mix 75/25 26 units ac b and 34 units ac d at home. A1c:   Lab Results   Component Value Date/Time    Hemoglobin A1c 8.0 03/13/2017 12:15 PM       Recent Glucose Results:   Lab Results   Component Value Date/Time     (H) 03/16/2017 04:33 AM    GLUCPOC 108 (H) 03/16/2017 11:22 AM    GLUCPOC 110 (H) 03/16/2017 07:29 AM    GLUCPOC 145 (H) 03/15/2017 09:50 PM        Lab Results   Component Value Date/Time    Creatinine 3.34 03/16/2017 04:33 AM       Active Orders   Diet    DIET DIABETIC CONSISTENT CARB Regular; 2 GM NA (House Low NA); FR 1500ML        PO intake:   Patient Vitals for the past 72 hrs:   % Diet Eaten   03/15/17 0900 75 %       Current hospital DM medication:   -Lantus 35 units at bedtime  -Humalog normal sensitivity correction  -Humalog 10 units ac tid    Will continue to follow as needed.     Thank you    Rosie Serrano [address] [facility]  Office: [phone]

## 2017-03-16 NOTE — PROGRESS NOTES
[address] [place] [address]  [phone]      Cardiology Progress Note      3/16/2017 3:53 PM    Admit Date: 3/13/2017    Admit Diagnosis:   Heart failure ([address]    Subjective:     Ирина Cifuentes has no new complain. Remains in afib, HR 90s-low 100s; on Amio gtt at 0.5 mg/min. He denies any cp, sob, back pain, palpitation or dizziness.       Visit Vitals    /89 (BP 1 Location: Right arm, BP Patient Position: At rest)    Pulse 99    Temp 98.5 °F (36.9 °C)    Resp 18    Ht 5' 9\" (1.753 m)    Wt 129.6 kg (285 lb 11.5 oz)    SpO2 92%    BMI 42.19 kg/m2       Current Facility-Administered Medications   Medication Dose Route Frequency    amiodarone (CORDARONE) 300 mg in dextrose 5% 250 mL infusion  0.5 mg/min IntraVENous CONTINUOUS    zinc oxide-cod liver oil (DESITIN) 40 % paste   Topical PRN    bumetanide (BUMEX) injection 1 mg  1 mg IntraVENous BID    sodium chloride (NS) 0.9 % flush        perflutren lipid microspheres (DEFINITY) 1.1 mg/mL contrast injection        sodium chloride (NS) flush 5-10 mL  5-10 mL IntraVENous Q8H    sodium chloride (NS) flush 5-10 mL  5-10 mL IntraVENous PRN    naloxone (NARCAN) injection 0.4 mg  0.4 mg IntraVENous PRN    ondansetron (ZOFRAN) injection 4 mg  4 mg IntraVENous Q4H PRN    acetaminophen (TYLENOL) tablet 650 mg  650 mg Oral Q4H PRN    bisacodyl (DULCOLAX) tablet 5 mg  5 mg Oral DAILY PRN    glucose chewable tablet 16 g  4 Tab Oral PRN    dextrose (D50W) injection syrg 12.5-25 g  12.5-25 g IntraVENous PRN    glucagon (GLUCAGEN) injection 1 mg  1 mg IntraMUSCular PRN    dutasteride (AVODART) capsule 0.5 mg  0.5 mg Oral DAILY    levothyroxine (SYNTHROID) tablet 137 mcg  137 mcg Oral ACB    metOLazone (ZAROXOLYN) tablet 2.5 mg  2.5 mg Oral DAILY    metoprolol succinate (TOPROL-XL) XL tablet 100 mg  100 mg Oral DAILY    pravastatin (PRAVACHOL) tablet 80 mg  80 mg Oral QHS    tamsulosin (FLOMAX) capsule 0.4 mg  0.4 mg Oral DAILY    insulin lispro (HUMALOG) injection   SubCUTAneous AC&HS    insulin glargine (LANTUS) injection 35 Units  35 Units SubCUTAneous QHS    insulin lispro (HUMALOG) injection 10 Units  10 Units SubCUTAneous TIDAC    apixaban (ELIQUIS) tablet 5 mg  5 mg Oral BID       Objective:      Physical Exam:  General Appearance:  NAD; obese  Chest:   CTA catherine  Cardiovascular:  IRR, no murmur.   Abdomen:   Soft, non-tender, bowel sounds are active.   Extremities: + 3 LE catherine;   Skin:  Warm and dry.  L foot toe amputation   MSK: enlarged DIP catherine; metacarpophalangeal and subcutaneous nodules on elbows    Data Review:   Recent Labs      03/16/17 0433 03/14/17   0236   WBC  7.3  5.7   HGB  11.7*  11.3*   HCT  38.3  37.5   PLT  134*  129*     Recent Labs      03/16/17   0433  03/15/17   0201  03/14/17   0236  03/13/17   1900   NA  146*  147*  144   --    K  4.5  4.7  4.8   --    CL  105  106  107   --    CO2  32  32  26   --    GLU  102*  122*  157*   --    BUN  114*  130*  146*   --    CREA  3.34*  3.98*  4.43*   --    CA  8.9  8.8  8.6   --    MG  2.6*  2.7*  2.8*   --    INR   --    --    --   2.0*       Recent Labs      03/14/17   0236  03/13/17   1900   TROIQ  0.11*  0.12*         Intake/Output Summary (Last 24 hours) at 03/16/17 1553  Last data filed at 03/16/17 1506   Gross per 24 hour   Intake          4586.25 ml   Output             1600 ml   Net          2986.25 ml        Telemetry: Afib      Assessment:     Active Problems:    SOB (shortness of breath) (3/7/2017)      Chronic a-fib (HCC) (3/7/2017)      CKD (chronic kidney disease) stage 4, GFR 15-29 ml/min (formerly [facility]) (3/7/2017)      Hx of CABG (3/13/2017)      Overview: CABG in 2010 in [place]      Morbid obesity ([facility] [address] (3/13/2017)      Diabetes mellitus type 2, controlled ([facility] [address] (3/13/2017)      Heart failure ([facility] [address] (3/13/2017)      Fatigue (3/15/2017)      Bilateral leg edema (3/15/2017)      Counseling regarding advanced care planning and goals of care (3/15/2017)        Plan:     HFpEF  Risk factors: CAD, DM, HTN, Afib places patient at higher risk for HF  Echo showed LVEF 55-60% with no WMA. There was mild hypokinesis. LA normal in size. Wt down 11 lbs since admission. Continue with IV Bumex, on Metolazone for synergistic effect   Cr slightly improved; likely cardiorenal.  Cont to follow daily wts, lytes, renal fxn, I/Os  Plan for HERMAN and cardioversion in AM      Chronic AFib:   ?sleep apnea. Can refer for OP sleep study.   Rate controlled on Toprol and AC - Eliquis  Loading with IV Amiodarone then attempt [facility]    CAD  Hx CABG x 2 in 2010  Continue on ASA, BB, statin  No complaints of chest discomfort or other symptoms or clinical changes that would push for ischemic evaluation at this time.

## 2017-03-16 NOTE — PROGRESS NOTES
Problem: Self Care Deficits Care Plan (Adult)  Goal: *Acute Goals and Plan of Care (Insert Text)  Occupational Therapy Goals  Initiated 3/14/2017  1. Patient will perform grooming in standing with modified independence within 7 day(s). 2. Patient will perform upper body dressing and lower body dressing with modified independence, using adaptive aids prn, within 7 day(s). 3. Patient will perform simple home management with modified independence within 7 day(s). 4. Patient will perform toilet transfers with modified independence within 7 day(s). 5. Patient will perform all aspects of toileting with modified independence within 7 day(s). 6. Patient will participate in upper extremity therapeutic exercise/activities with supervision/set-up for 8 minutes within 7 day(s). OCCUPATIONAL THERAPY TREATMENT  Patient: Azael Jesus (50 y.o. male)  Date: 3/16/2017  Diagnosis: Heart failure ([facility] [address] <principal problem not specified>       Precautions: Fall      ASSESSMENT:  Nursing cleared patient for therapy. Patient received in recliner with BLE elevated. Very pleasant and motivated. Discussion regarding difficulty with LB dressing secondary to hx of back issues, fluid in abdomin and impaired stamina when in forward flexion. Provided education on LB dressing techniques. Patient demonstrated good understanding with use of reacher and sock aide. Owns reacher and issued a sock aide. Educated on where to purchase additional equipment if needed. (hip kits on ortho unavailable at this time). Progression toward goals:  [X]       Improving appropriately and progressing toward goals  [ ]       Improving slowly and progressing toward goals  [ ]       Not making progress toward goals and plan of care will be adjusted       PLAN:  Patient continues to benefit from skilled intervention to address the above impairments. Continue treatment per established plan of care.   Discharge Recommendations:  Home Health  Further Equipment Recommendations for Discharge:  Dressing AD       SUBJECTIVE:   Patient stated Sofia Howard will be using these.  In regards to dressing AD. OBJECTIVE DATA SUMMARY:   Cognitive/Behavioral Status:  Neurologic State: Alert  Orientation Level: Oriented X4  Cognition: Appropriate decision making     Functional Mobility and Transfers for ADLs:  Bed Mobility:        Transfers:  Sit to Stand: Stand-by asssistance     Balance:  Sitting: Impaired  Sitting - Static: Good (unsupported)  Sitting - Dynamic: Fair (occasional)  Standing: Impaired  Standing - Static: Good  Standing - Dynamic : Fair     ADL Intervention:   Provided education on LB dressing techniques. Provided education on reacher and sock aide through verbal training and demonstration. Completed in sitting with supervision. Owns reacher and issued a sock aide. Educated on where to purchase additional  equipment if needed(long shoe horn, dressing stick, elastic laces. (hip kits on ortho unavailable at this time). Verbal training for use of reacher for threading pants with dressing and toileting. Discussion of energy conservation techniques during ADL tasks secondary to reports of BETTS and fatigue with ADL routine prior to admission. Patient with good understanding. Lower Body Dressing Assistance  Socks: Compensatory technique training;Supervision/set-up        Pain:  Pain Scale 1: Numeric (0 - 10)  Pain Intensity 1: 0              Activity Tolerance:   Good for seated LB dressing techniques.    After treatment:   [X] Patient left in no apparent distress sitting up in chair  [ ] Patient left in no apparent distress in bed  [X] Call bell left within reach  [X] Nursing notified  [ ] Caregiver present  [ ] Bed alarm activated      COMMUNICATION/COLLABORATION:   The patients plan of care was discussed with: Physical Therapist and Registered Nurse and patient     Chaka Ahn OT  Time Calculation: 27 mins

## 2017-03-16 NOTE — PROGRESS NOTES
Problem: Mobility Impaired (Adult and Pediatric)  Goal: *Acute Goals and Plan of Care (Insert Text)  Physical Therapy Goals  Initiated 3/14/2017  1. Patient will move from supine to sit and sit to supine in bed with independence within 7 day(s). 2. Patient will transfer from bed to chair and chair to bed with modified independence using the least restrictive device within 7 day(s). 3. Patient will perform sit to stand with modified independence within 7 day(s). 4. Patient will ambulate with modified independence for 100 feet with the least restrictive device within 7 day(s). PHYSICAL THERAPY TREATMENT  Patient: Jasmyne Martinez (1 y.o. male)  Date: 3/16/2017  Diagnosis: Heart failure (HCC) <principal problem not specified>       Precautions: Fall      ASSESSMENT:  Progressing with overall mobility and he reports feeling better today. No overt knee buckling or LOB noted during today's session and he was able to increase distance by 40' compared to last session. HR ~104 BPM during gait and returned to low 90's with rest. Continue to recommend home with HHPT follow up. Progression toward goals:  [ ]    Improving appropriately and progressing toward goals  [X]    Improving slowly and progressing toward goals  [ ]    Not making progress toward goals and plan of care will be adjusted       PLAN:  Patient continues to benefit from skilled intervention to address the above impairments. Continue treatment per established plan of care. Discharge Recommendations:  Home Health  Further Equipment Recommendations for Discharge:  None       SUBJECTIVE:   Patient stated I feel a little better today. I think my numbers are better medically.       OBJECTIVE DATA SUMMARY:   Critical Behavior:  Neurologic State: Alert  Orientation Level: Oriented X4  Cognition: Appropriate decision making, Appropriate for age attention/concentration, Appropriate safety awareness, Follows commands  Safety/Judgement: Awareness of environment, Fall prevention, Insight into deficits  Functional Mobility Training:  Bed Mobility:                    Transfers:  Sit to Stand: Stand-by asssistance                                Balance:  Sitting: Impaired  Sitting - Static: Good (unsupported)  Sitting - Dynamic: Fair (occasional)  Standing: Impaired  Standing - Static: Good  Standing - Dynamic : Fair  Ambulation/Gait Training:  Distance (ft): 120 Feet (ft)  Assistive Device: Gait belt;Cane, straight  Ambulation - Level of Assistance: Contact guard assistance        Gait Abnormalities: Decreased step clearance;Trunk sway increased        Base of Support: Widened     Speed/Ariane: Pace decreased (<100 feet/min)  Step Length: Right shortened;Left shortened     Pain:  Pain Scale 1: Numeric (0 - 10)  Pain Intensity 1: 0      After treatment:   [X]    Patient left in no apparent distress sitting up in chair  [ ]    Patient left in no apparent distress in bed  [X]    Call bell left within reach  [X]    Nursing notified  [ ]    Caregiver present  [ ]    Bed alarm activated      COMMUNICATION/COLLABORATION:   The patients plan of care was discussed with: Registered Nurse     Mele Claros PT, DPT   Time Calculation: 17 mins

## 2017-03-16 NOTE — PROGRESS NOTES
Hospitalist Progress Note    NAME: Ean Forrest   :  1950   MRN:  [id number]       Interim Hospital Summary: 75 y.o. male whom presented on 3/13/2017 with      Assessment / Plan:  Chronic atrial fibrillation  -continue IV amiodarone with goal of [facility] per cardiology  -continue eliquis, BB  -cardiology following, appreciate recs    Acute on chronic systolic heart failure  Acute hypoxic respiratory failure  Elevated troponin  -trop peaked   -EKG showed afib, no ischemic changes  -transition to IV bumex due to low GFR. Continue ASA, BB  -Echo with nl EF and nl L atrium  -strict I&O, low sait diet, daily wt, fluid restriction, lytes, elevate legs     Acute on chronic kidney failure, improving  -per pt, baseline cr 2.5, presenting with cr 4.83  -likely from volume overload  -nephro following     T2DM  -A1c 8  -lantus, lispro premeals, SSI     Thrombocytopenia  -no evidence of bleed     Morbid obesity     Code Status: Full  Surrogate Decision Maker: wife Meri Chen at [phone]     DVT Prophylaxis: heparin  GI Prophylaxis: not indicated     Baseline: independent     Subjective:     Chief Complaint / Reason for Physician Visit  No acute complaints. Had bm today. Chart reviewed. Discussed with RN events overnight. Review of Systems:  Symptom Y/N Comments  Symptom Y/N Comments   Fever/Chills n   Chest Pain n    Poor Appetite n   Edema y improved   Cough n   Abdominal Pain n    Sputum    Joint Pain     SOB/BETTS n   Pruritis/Rash     Nausea/vomit    Tolerating PT/OT     Diarrhea    Tolerating Diet y    Constipation    Other       Could NOT obtain due to:      Objective:     VITALS:   Last 24hrs VS reviewed since prior progress note.  Most recent are:  Patient Vitals for the past 24 hrs:   Temp Pulse Resp BP SpO2   17 1211 98.5 °F (36.9 °C) 99 18 120/89 92 %   17 1152 - (!) 105 - - -   17 0855 - (!) 104 - 129/84 -   17 0354 98.7 °F (37.1 °C) (!) 102 18 91/77 95 %   03/15/17 2327 98.2 °F (36.8 °C) (!) 104 17 149/75 96 %   03/15/17 1924 98.1 °F (36.7 °C) (!) 102 18 137/76 93 %   03/15/17 1808 - 94 - - -   03/15/17 1532 98.3 °F (36.8 °C) (!) 102 17 142/69 93 %       Intake/Output Summary (Last 24 hours) at 03/16/17 1432  Last data filed at 03/16/17 1224   Gross per 24 hour   Intake          4586.25 ml   Output             1275 ml   Net          3311.25 ml        PHYSICAL EXAM:  General: WD, WN. Alert, cooperative, no acute distress    EENT:  EOMI. Anicteric sclerae. MMM  Resp:  CTA b/l, no wheezing,  no crackles  CV:  irregular  rhythm,  +2 LE edema  GI:  Soft, Non distended, Non tender.  +Bowel sounds  Neurologic:  Alert and oriented X 3, normal speech,   Psych:   Good insight. Not anxious nor agitated  Skin:  No rashes. No jaundice    Reviewed most current lab test results and cultures  YES  Reviewed most current radiology test results   YES  Review and summation of old records today    NO  Reviewed patient's current orders and MAR    YES  PMH/SH reviewed - no change compared to H&P  ________________________________________________________________________  Care Plan discussed with:    Comments   Patient x    Family  x wife   RN x    Care Manager     Consultant                        Multidiciplinary team rounds were held today with , nursing, pharmacist and clinical coordinator. Patient's plan of care was discussed; medications were reviewed and discharge planning was addressed.      ________________________________________________________________________  Total NON critical care TIME:  35   Minutes    Total CRITICAL CARE TIME Spent:   Minutes non procedure based      Comments   >50% of visit spent in counseling and coordination of care     ________________________________________________________________________  Arianna Lechuga MD     Procedures: see electronic medical records for all procedures/Xrays and details which were not copied into this note but were reviewed prior to creation of Plan. LABS:  I reviewed today's most current labs and imaging studies.   Pertinent labs include:  Recent Labs      03/16/17   0433  03/14/17   0236   WBC  7.3  5.7   HGB  11.7*  11.3*   HCT  38.3  37.5   PLT  134*  129*     Recent Labs      03/16/17   0433  03/15/17   0201  03/14/17   0236  03/13/17   1900   NA  146*  147*  144   --    K  4.5  4.7  4.8   --    CL  105  106  107   --    CO2  32  32  26   --    GLU  102*  122*  157*   --    BUN  114*  130*  146*   --    CREA  3.34*  3.98*  4.43*   --    CA  8.9  8.8  8.6   --    MG  2.6*  2.7*  2.8*   --    INR   --    --    --   2.0*       Signed: Immanuel Oconnell MD

## 2017-03-16 NOTE — CARDIO/PULMONARY
Cardiopulmonary Rehab Nursing Entry:    Chart reviewed. Patient is on CHF bundle. Pt 74 yo admitted with acute on chronic systolic heart failure, chronic a-fib. PMHx of CABG, current LVEF 55-60%, never smoker. Pt reporting current plan for IV medicine then electrical procedure. Per Dr. Jessica Ribeiro entry loading on IV amiodarone then plans to attempt cardioversion. Pt provided with instruction on what to expect during cardioversion. Pt verbalized basic understanding of information. This was a follow-up visit to answer questions and reinforce prior teaching re: CHF, S&Ss, medication management, Low NA diet, daily weights and when to call the doctor. Pt reporting he has still not read materials due to feeling poorly. Verbally reinforced information with pt. Pt verbalized basic understanding.

## 2017-03-16 NOTE — PROGRESS NOTES
Received bedside report from Nolvia [facility].    5:00 PM    Talked to Dr. Dayana Gautam about patient's BG being 58. MD modified humalog 10 units 3 times a day before meals to humalog 10 units twice a day before breakfast and lunch. [address] SHIFT NURSING NOTE    Bedside and Verbal shift change report given to Nolvia (oncoming nurse) by Edgardo Greenberg (offgoing nurse). Report included the following information SBAR, Kardex, Intake/Output and Recent Results. SHIFT SUMMARY: see above. Admission Date 3/13/2017   Admission Diagnosis Heart failure ([facility] [address]   Consults IP CONSULT TO PALLIATIVE CARE - PROVIDER  IP CONSULT TO NEPHROLOGY  IP CONSULT TO PALLIATIVE CARE - PROVIDER        Consults   [x] PT   [x] OT   [] Speech   [x] Palliative      [] Hospice    [] Case Management   [] None   Cardiac Monitoring   [x] Yes   [] No     Antibiotics   [] Yes   [x] No   GI Prophylaxis  (Ex: Protonix, Pepcid, etc,.)   [] Yes   [x] No          DVT Prophylaxis   SCDs:             Deangelo stockings:         [x] Medication (Ex: Lovenox, Eliquis, Brilinta, Coumadin,  Heparin, etc..)   [] Contraindicated   [] No VTE needed       Urinary Catheter             LDAs               Peripheral IV 03/16/17 Left Hand (Active)   Site Assessment Clean, dry, & intact 3/16/2017  3:51 PM   Phlebitis Assessment 0 3/16/2017  3:51 PM   Infiltration Assessment 0 3/16/2017  3:51 PM   Dressing Status Clean, dry, & intact 3/16/2017  3:51 PM   Dressing Type Transparent;Tape 3/16/2017  3:51 PM   Hub Color/Line Status Infusing;Blue 3/16/2017  3:51 PM                      I/Os   Intake/Output Summary (Last 24 hours) at 03/16/17 1932  Last data filed at 03/16/17 1840   Gross per 24 hour   Intake          5026.25 ml   Output             1750 ml   Net          3276.25 ml         Activity Level Activity Level:  Up with Assistance     Activity Assistance: Partial (one person)   Diet Active Orders   Diet    DIET DIABETIC CONSISTENT CARB Regular; 2 GM NA (House Low NA); FR 1500ML    DIET NPO    DIET NPO With Meds      Purposeful Rounding every 1-2 hour? [x] Yes    Keke Score  Total Score: 3   Bed Alarm (If score 3 or >)   [] Yes    [] Refused (See signed refusal form in chart)   Romeo Score  Romeo Score: 18       Romeo Score (if score 14 or less)   [] PMT consult   [] Nutrition consult   [x] Wound Care consult      []  Specialty bed         Influenza Vaccine Received Flu Vaccine for Current Season (usually Sept-March): Yes               Needs prior to discharge:   Home O2 required:    [] Yes   [x] No     If yes, how much O2 required?     Other:    Last Bowel Movement Date: 03/14/17   Readmission Risk Assessment Tool Score High Risk            21       Total Score        3 Relationship with PCP    2 Patient Living Status    4 More than 1 Admission in calendar year    5 Patient Insurance is Medicare, Medicaid or Self Pay    7 Charlson Comorbidity Score        Criteria that do not apply:    Patient Length of Stay > 5       Expected Length of Stay 4d 14h   Actual Length of Stay 3

## 2017-03-16 NOTE — PROGRESS NOTES
PCU SHIFT NURSING NOTE      Bedside shift change report given to Alex Rahman RN (oncoming nurse) by Vanessa Marc RN (offgoing nurse). Report included the following information SBAR, Kardex, ED Summary, Intake/Output, MAR, Recent Results and Cardiac Rhythm A Fib. Shift Summary:       Admission Date 3/13/2017   Admission Diagnosis Heart failure ([address]   Consults IP CONSULT TO PALLIATIVE CARE - PROVIDER  IP CONSULT TO NEPHROLOGY  IP CONSULT TO PALLIATIVE CARE - PROVIDER        Consults   []PT   []OT   []Speech   []Case Management      [] Palliative      Cardiac Monitoring Order   []Yes   []No     IV drips   []Yes    Drip:                            Dose:  Drip:                            Dose:  Drip:                            Dose:   []No     GI Prophylaxis   []Yes   []No         DVT Prophylaxis   SCDs:             Deangelo stockings:         [] Medication   []Contraindicated   []None      Activity Level Activity Level: Up with Assistance     Activity Assistance: Partial (one person)   Purposeful Rounding every 1-2 hour? [x]Yes   Ackerman Score  Total Score: 3   Bed Alarm (If score 3 or >)   [x]Yes   [] Refused (See signed refusal form in chart)   Romeo Score  Romeo Score: 18   Romeo Score (if score 14 or less)   []PMT consult   []Wound Care consult      []Specialty bed   [] Nutrition consult          Needs prior to discharge:   Home O2 required:    []Yes   []No    If yes, how much O2 required?     Other:    Last Bowel Movement: Last Bowel Movement Date: 03/14/17      Influenza Vaccine Received Flu Vaccine for Current Season (usually Sept-March): Yes        Pneumonia Vaccine           Diet Active Orders   Diet    DIET DIABETIC CONSISTENT CARB Regular; 2 GM NA (House Low NA); FR 1500ML      LDAs               Peripheral IV 03/13/17 Right Antecubital (Active)   Site Assessment Clean, dry, & intact 3/15/2017  9:00 AM   Phlebitis Assessment 0 3/15/2017  9:00 AM   Infiltration Assessment 0 3/15/2017  9:00 AM   Dressing Status Clean, dry, & intact 3/15/2017  9:00 AM   Dressing Type Transparent;Tape 3/15/2017  9:00 AM   Hub Color/Line Status Pink;Flushed;Capped 3/15/2017  9:00 AM   Alcohol Cap Used Yes 3/15/2017  9:00 AM                      Urinary Catheter      Intake & Output   Date 03/15/17 0700 - 03/16/17 0659 03/16/17 0700 - 03/17/17 0659   Shift 4319-4700 1976-7890 24 Hour Total 9385-5204 7794-6141 24 Hour Total   I  N  T  A  K  E   P. O. 560  560         P. O. 560  560       Shift Total  (mL/kg) 560  (4.3)  560  (4.3)      O  U  T  P  U  T   Urine  (mL/kg/hr) 1200  (0.8) 550 1750         Urine Voided 1074 736 1646       Shift Total  (mL/kg) 1200  (9.1) 550  (4.2) 1750  (13.3)      NET -      Weight (kg) 131.4 131.4 131.4 131.4 131.4 131.4         Readmission Risk Assessment Tool Score High Risk            21       Total Score        3 Relationship with PCP    2 Patient Living Status    4 More than 1 Admission in calendar year    5 Patient Insurance is Medicare, Medicaid or Self Pay    7 Charlson Comorbidity Score        Criteria that do not apply:    Patient Length of Stay > 5       Expected Length of Stay 4d 14h   Actual Length of Stay 3

## 2017-03-16 NOTE — PROGRESS NOTES
NAME: Montana Coats        :  1950        MRN:  [id number]        Assessment :    Plan:  --INO on CKD stage 4  DM/HTN nephropathy  BPH  Acute systolic chf  H/o cabg  afib Creatinine continues to improve. Urine is bland except for some blood; minimal proteinuria; no hydro; the etiology for INO looks to have been cardiorenal syndrome    Sodium stable. Subjective:     Chief Complaint:  \"I feel better. \"  Lots of UO. Less dyspnea. No CP. No N/V. Review of Systems:    Symptom Y/N Comments  Symptom Y/N Comments                                                                             Could not obtain due to:      Objective:     VITALS:   Last 24hrs VS reviewed since prior progress note. Most recent are:  Visit Vitals    /84    Pulse (!) 104    Temp 98.7 °F (37.1 °C)    Resp 18    Ht 5' 9\" (1.753 m)    Wt 129.6 kg (285 lb 11.5 oz)    SpO2 95%    BMI 42.19 kg/m2       Intake/Output Summary (Last 24 hours) at 17 0949  Last data filed at 17 0600   Gross per 24 hour   Intake          4586.25 ml   Output             1625 ml   Net          2961.25 ml      Telemetry Reviewed:     PHYSICAL EXAM:  General: NAD  Few rhonchi  RRR  abd soft  2+ edema      Lab Data Reviewed: (see below)    Medications Reviewed: (see below)    PMH/SH reviewed - no change compared to H&P  ________________________________________________________________________  Care Plan discussed with:  Patient y    [facility]:          Comments   >50% of visit spent in counseling and coordination of care       ________________________________________________________________________  Carlos Zamora MD     Procedures: see electronic medical records for all procedures/Xrays and details which  were not copied into this note but were reviewed prior to creation of Plan.       LABS:  Recent Labs      03/16/17   0433  03/14/17   0236   WBC  7.3  5.7   HGB  11.7*  11.3*   HCT  38.3  37.5   PLT  134*  129*     Recent Labs      03/16/17   0433  03/15/17   0201  03/14/17   0236   NA  146*  147*  144   K  4.5  4.7  4.8   CL  105  106  107   CO2  32  32  26   BUN  114*  130*  146*   CREA  3.34*  3.98*  4.43*   GLU  102*  122*  157*   CA  8.9  8.8  8.6   MG  2.6*  2.7*  2.8*     Recent Labs      03/13/17   1215   SGOT  28   AP  37*   TP  6.3*   ALB  3.4*   GLOB  2.9     Recent Labs      03/13/17   1900   INR  2.0*   PTP  21.1*      No results for input(s): FE, TIBC, PSAT, FERR in the last 72 hours. No results found for: FOL, RBCF   No results for input(s): PH, PCO2, PO2 in the last 72 hours.   Recent Labs      03/13/17   1215   CPK  99   CKMB  3.6*     No components found for: [place]  Lab Results   Component Value Date/Time    Color YELLOW/STRAW 03/14/2017 04:00 PM    Appearance CLEAR 03/14/2017 04:00 PM    Specific gravity 1.011 03/14/2017 04:00 PM    pH (UA) 5.0 03/14/2017 04:00 PM    Protein NEGATIVE  03/14/2017 04:00 PM    Glucose NEGATIVE  03/14/2017 04:00 PM    Ketone NEGATIVE  03/14/2017 04:00 PM    Bilirubin NEGATIVE  03/14/2017 04:00 PM    Urobilinogen 0.2 03/14/2017 04:00 PM    Nitrites NEGATIVE  03/14/2017 04:00 PM    Leukocyte Esterase NEGATIVE  03/14/2017 04:00 PM    Epithelial cells FEW 03/14/2017 04:00 PM    Bacteria NEGATIVE  03/14/2017 04:00 PM    WBC 0-4 03/14/2017 04:00 PM    RBC 10-20 03/14/2017 04:00 PM       MEDICATIONS:  Current Facility-Administered Medications   Medication Dose Route Frequency    amiodarone (CORDARONE) 300 mg in dextrose 5% 250 mL infusion  0.5 mg/min IntraVENous CONTINUOUS    zinc oxide-cod liver oil (DESITIN) 40 % paste   Topical PRN    bumetanide (BUMEX) injection 1 mg  1 mg IntraVENous BID    sodium chloride (NS) 0.9 % flush        perflutren lipid microspheres (DEFINITY) 1.1 mg/mL contrast injection        sodium chloride (NS) flush 5-10 mL  5-10 mL IntraVENous Q8H    sodium chloride (NS) flush 5-10 mL  5-10 mL IntraVENous PRN    naloxone (NARCAN) injection 0.4 mg  0.4 mg IntraVENous PRN    ondansetron (ZOFRAN) injection 4 mg  4 mg IntraVENous Q4H PRN    acetaminophen (TYLENOL) tablet 650 mg  650 mg Oral Q4H PRN    bisacodyl (DULCOLAX) tablet 5 mg  5 mg Oral DAILY PRN    glucose chewable tablet 16 g  4 Tab Oral PRN    dextrose (D50W) injection syrg 12.5-25 g  12.5-25 g IntraVENous PRN    glucagon (GLUCAGEN) injection 1 mg  1 mg IntraMUSCular PRN    dutasteride (AVODART) capsule 0.5 mg  0.5 mg Oral DAILY    levothyroxine (SYNTHROID) tablet 137 mcg  137 mcg Oral ACB    metOLazone (ZAROXOLYN) tablet 2.5 mg  2.5 mg Oral DAILY    metoprolol succinate (TOPROL-XL) XL tablet 100 mg  100 mg Oral DAILY    pravastatin (PRAVACHOL) tablet 80 mg  80 mg Oral QHS    tamsulosin (FLOMAX) capsule 0.4 mg  0.4 mg Oral DAILY    insulin lispro (HUMALOG) injection   SubCUTAneous AC&HS    insulin glargine (LANTUS) injection 35 Units  35 Units SubCUTAneous QHS    insulin lispro (HUMALOG) injection 10 Units  10 Units SubCUTAneous TIDAC    apixaban (ELIQUIS) tablet 5 mg  5 mg Oral BID

## 2017-03-17 VITALS
WEIGHT: 279.54 LBS | DIASTOLIC BLOOD PRESSURE: 81 MMHG | RESPIRATION RATE: 18 BRPM | BODY MASS INDEX: 41.4 KG/M2 | SYSTOLIC BLOOD PRESSURE: 145 MMHG | OXYGEN SATURATION: 93 % | HEIGHT: 69 IN | TEMPERATURE: 97.6 F | HEART RATE: 114 BPM

## 2017-03-17 LAB
ANION GAP BLD CALC-SCNC: 8 MMOL/L (ref 5–15)
BUN SERPL-MCNC: 99 MG/DL (ref 6–20)
BUN/CREAT SERPL: 32 (ref 12–20)
CALCIUM SERPL-MCNC: 9 MG/DL (ref 8.5–10.1)
CHLORIDE SERPL-SCNC: 102 MMOL/L (ref 97–108)
CO2 SERPL-SCNC: 35 MMOL/L (ref 21–32)
CREAT SERPL-MCNC: 3.09 MG/DL (ref 0.7–1.3)
GLUCOSE BLD STRIP.AUTO-MCNC: 117 MG/DL (ref 65–100)
GLUCOSE BLD STRIP.AUTO-MCNC: 131 MG/DL (ref 65–100)
GLUCOSE SERPL-MCNC: 122 MG/DL (ref 65–100)
MAGNESIUM SERPL-MCNC: 2.4 MG/DL (ref 1.6–2.4)
POTASSIUM SERPL-SCNC: 4.3 MMOL/L (ref 3.5–5.1)
SERVICE CMNT-IMP: ABNORMAL
SERVICE CMNT-IMP: ABNORMAL
SODIUM SERPL-SCNC: 145 MMOL/L (ref 136–145)

## 2017-03-17 PROCEDURE — 74011250636 HC RX REV CODE- 250/636

## 2017-03-17 PROCEDURE — 74011000250 HC RX REV CODE- 250

## 2017-03-17 PROCEDURE — B24BZZ4 ULTRASONOGRAPHY OF HEART WITH AORTA, TRANSESOPHAGEAL: ICD-10-PCS | Performed by: INTERNAL MEDICINE

## 2017-03-17 PROCEDURE — 74011250637 HC RX REV CODE- 250/637: Performed by: INTERNAL MEDICINE

## 2017-03-17 PROCEDURE — 74011000250 HC RX REV CODE- 250: Performed by: INTERNAL MEDICINE

## 2017-03-17 PROCEDURE — 83735 ASSAY OF MAGNESIUM: CPT | Performed by: INTERNAL MEDICINE

## 2017-03-17 PROCEDURE — 99152 MOD SED SAME PHYS/QHP 5/>YRS: CPT

## 2017-03-17 PROCEDURE — 36415 COLL VENOUS BLD VENIPUNCTURE: CPT | Performed by: INTERNAL MEDICINE

## 2017-03-17 PROCEDURE — 93325 DOPPLER ECHO COLOR FLOW MAPG: CPT

## 2017-03-17 PROCEDURE — 77030018729 HC ELECTRD DEFIB PAD CARD -B

## 2017-03-17 PROCEDURE — 74011250636 HC RX REV CODE- 250/636: Performed by: INTERNAL MEDICINE

## 2017-03-17 PROCEDURE — 82962 GLUCOSE BLOOD TEST: CPT

## 2017-03-17 PROCEDURE — 80048 BASIC METABOLIC PNL TOTAL CA: CPT | Performed by: INTERNAL MEDICINE

## 2017-03-17 RX ORDER — PRAVASTATIN SODIUM 40 MG/1
80 TABLET ORAL
Qty: 30 TAB | Refills: 0 | Status: SHIPPED | OUTPATIENT
Start: 2017-03-17 | End: 2017-03-22 | Stop reason: SDUPTHER

## 2017-03-17 RX ORDER — FENTANYL CITRATE 50 UG/ML
INJECTION, SOLUTION INTRAMUSCULAR; INTRAVENOUS
Status: COMPLETED
Start: 2017-03-17 | End: 2017-03-17

## 2017-03-17 RX ORDER — LIDOCAINE HYDROCHLORIDE 20 MG/ML
SOLUTION OROPHARYNGEAL
Status: COMPLETED
Start: 2017-03-17 | End: 2017-03-17

## 2017-03-17 RX ORDER — FENTANYL CITRATE 50 UG/ML
25-50 INJECTION, SOLUTION INTRAMUSCULAR; INTRAVENOUS
Status: DISCONTINUED | OUTPATIENT
Start: 2017-03-17 | End: 2017-03-17 | Stop reason: ALTCHOICE

## 2017-03-17 RX ORDER — MIDAZOLAM HYDROCHLORIDE 1 MG/ML
INJECTION, SOLUTION INTRAMUSCULAR; INTRAVENOUS
Status: COMPLETED
Start: 2017-03-17 | End: 2017-03-17

## 2017-03-17 RX ORDER — BUMETANIDE 0.5 MG/1
1 TABLET ORAL 2 TIMES DAILY
Qty: 60 TAB | Refills: 0 | Status: SHIPPED | OUTPATIENT
Start: 2017-03-17 | End: 2017-03-22 | Stop reason: SDUPTHER

## 2017-03-17 RX ORDER — MIDAZOLAM HYDROCHLORIDE 1 MG/ML
.5-2 INJECTION, SOLUTION INTRAMUSCULAR; INTRAVENOUS
Status: DISCONTINUED | OUTPATIENT
Start: 2017-03-17 | End: 2017-03-17 | Stop reason: ALTCHOICE

## 2017-03-17 RX ORDER — LIDOCAINE HYDROCHLORIDE 20 MG/ML
15 SOLUTION OROPHARYNGEAL ONCE
Status: COMPLETED | OUTPATIENT
Start: 2017-03-17 | End: 2017-03-17

## 2017-03-17 RX ADMIN — MIDAZOLAM HYDROCHLORIDE 1 MG: 1 INJECTION, SOLUTION INTRAMUSCULAR; INTRAVENOUS at 10:40

## 2017-03-17 RX ADMIN — LIDOCAINE HYDROCHLORIDE 15 ML: 20 SOLUTION ORAL; TOPICAL at 10:41

## 2017-03-17 RX ADMIN — LIDOCAINE HYDROCHLORIDE 15 ML: 20 SOLUTION OROPHARYNGEAL at 10:41

## 2017-03-17 RX ADMIN — MIDAZOLAM HYDROCHLORIDE 1 MG: 1 INJECTION, SOLUTION INTRAMUSCULAR; INTRAVENOUS at 10:46

## 2017-03-17 RX ADMIN — FENTANYL CITRATE 25 MCG: 50 INJECTION, SOLUTION INTRAMUSCULAR; INTRAVENOUS at 10:40

## 2017-03-17 RX ADMIN — METOPROLOL SUCCINATE 100 MG: 50 TABLET, EXTENDED RELEASE ORAL at 11:58

## 2017-03-17 RX ADMIN — BUMETANIDE 1 MG: 0.25 INJECTION, SOLUTION INTRAMUSCULAR; INTRAVENOUS at 11:57

## 2017-03-17 RX ADMIN — BENZOCAINE, BUTAMBEN, AND TETRACAINE HYDROCHLORIDE 1 SPRAY: .028; .004; .004 AEROSOL, SPRAY TOPICAL at 10:42

## 2017-03-17 RX ADMIN — Medication 10 ML: at 06:00

## 2017-03-17 RX ADMIN — DUTASTERIDE 0.5 MG: 0.5 CAPSULE, LIQUID FILLED ORAL at 11:58

## 2017-03-17 RX ADMIN — METOLAZONE 2.5 MG: 2.5 TABLET ORAL at 11:58

## 2017-03-17 RX ADMIN — APIXABAN 5 MG: 5 TABLET, FILM COATED ORAL at 11:59

## 2017-03-17 RX ADMIN — TAMSULOSIN HYDROCHLORIDE 0.4 MG: 0.4 CAPSULE ORAL at 11:58

## 2017-03-17 RX ADMIN — INSULIN LISPRO 10 UNITS: 100 INJECTION, SOLUTION INTRAVENOUS; SUBCUTANEOUS at 12:18

## 2017-03-17 NOTE — CARDIO/PULMONARY
CP REHAB NOTE    Chart Review: Patient admitted with increasing SOB, weight gain, and worsening LE edema. Afib  Medical History: HTN, CAD, s/p CABG 2010, CKD, HF  EF 55-60%, Echo 3/14/2017  Non Smoker    Patient off floor for HERMAN/DCC. Will continue to follow for teaching.

## 2017-03-17 NOTE — DISCHARGE SUMMARY
Discharge Summary    Name: Jacqueline Boyd  [id number]  YOB: 1950 (Age: 75 y.o.)   Date of Admission: 3/13/2017  Date of Discharge: 3/17/2017  Attending Physician: Arianna Lechuga MD    Discharge Diagnosis:   Chronic atrial fibrillation/elevated troponin  Acute on chronic ckd, stage 3  Morbid obesity  Acute on chronic diastolic HF  I2XT    Consultants: Cardiology and Nephrology    Procedures:   HERMAN 3/17/15  L atrium appendage. There was a probable, spherical, fixed mass. It may  represent a thrombus. Brief Admission History/Reason for Admission Per Arianna Lechuga MD:   Jacqueline Boyd is a 75 y.o.  male w pmhx significant for htn, CAD s/p CABG in 2010, chronic afib was on coumadin, recently transitioned to eliquis due to erratic INR, systolic heart failure, Q4CX, obesity present to ED c/o of worsening sob associated with minimal exertion. Other associate symptoms including >10 wt gain in 1 week, worsening of LE edema. Pt states he has been compliance with diet restriction and medications, however he continues to have wt gain/le edema and worsening of sob. In the ED, vitals: T97.7, P 95, /49  Labs: trop 0.12, probnp 91819, cr 4.83,     Brief Hospital Course by Main Problems:   Chronic atrial fibrillation with rate controlled. Pt was started on IV amiodarone with resumption of eliquis. He underwent HERMAN on 3/17 showed probable thrombus on L atrium appendage so DCC was not performed due to risk of CVA. IV amiodarone discontinued. Patient will resume toprol xl and eliquis for 1 month. He will need to f/u with cardiology in 10-14 days for further titration of meds/DCC. No new antiarrythmic on discharge due to risk of cva if he was to convert out of afib in the next 30 days. Close f/u with cardiology needed.     Acute on chronic diastolic heart failure  Acute hypoxic respiratory failure  Elevated troponin.    Troponin at 0.12 on admission trended down.  EKG showed afib, no ischemic changes. He was given high dose lasix, however transitioned to bumex due to low GFR. Pt diuresed net neg 2.5L with improvement of LE. Educate pt on low salt diet, strict I&O, daily wt. Acute on chronic kidney failure likely from volume overload. Per pt, baseline cr 2.5, presenting with cr 4.83. Significantly improved with aggressive diurese. Lisinopril discontinued for now until cr improves. He will need repeat bmp at nephro outpt follow up.       T2DM, A1c 8. Resume home meds.        Morbid obesity/?JACEY  Body mass index is 41.28 kg/(m^2). He will need outpt sleep study. Discuss with pt.          Discharge Exam:  Patient seen and examined by me on discharge day. Pertinent Findings:  Visit Vitals    /81    Pulse (!) 114    Temp 98 °F (36.7 °C)    Resp 18    Ht 5' 9\" (1.753 m)    Wt 126.8 kg (279 lb 8.7 oz)    SpO2 96%    BMI 41.28 kg/m2     Gen:    Not in distress  Chest: Clear lungs  CVS:   Regular rhythm. No edema  Abd:  Soft, not distended, not tender    Discharge/Recent Laboratory Results:  Recent Labs      03/17/17   0425   NA  145   K  4.3   CL  102   CO2  35*   BUN  99*   GLU  122*   CA  9.0   MG  2.4     Recent Labs      03/16/17   0433   HGB  11.7*   HCT  38.3   WBC  7.3   PLT  134*       Discharge Medications:  Current Discharge Medication List      START taking these medications    Details   apixaban (ELIQUIS) 5 mg tablet Take 1 Tab by mouth two (2) times a day. Indications: PREVENT THROMBOEMBOLISM IN CHRONIC ATRIAL FIBRILLATION  Qty: 60 Tab, Refills: 0      bumetanide (BUMEX) 0.5 mg tablet Take 2 Tabs by mouth two (2) times a day. Qty: 60 Tab, Refills: 0         CONTINUE these medications which have CHANGED    Details   pravastatin (PRAVACHOL) 40 mg tablet Take 2 Tabs by mouth nightly.   Qty: 30 Tab, Refills: 0         CONTINUE these medications which have NOT CHANGED    Details   calcium carbonate (CALTREX) 600 mg calcium (1,500 mg) tablet Take 600 mg by mouth every Tuesday, Thursday, Saturday & . !! insulin lispro protamine/insulin lispro (HUMALOG MIX 75-25) 100 unit/mL (75-25) injection 34 Units by SubCUTAneous route daily (after dinner). Insulin lispro protamine/insulin lispro dosing instructions:  Inject 26 units after breakfast and inject 34 units      !! insulin lispro protamine/insulin lispro (HUMALOG MIX 75-25) 100 unit/mL (75-25) injection 5-20 Units by SubCUTAneous route two (2) times daily (after meals). Corrective Coverage to be administered IN ADDITION to scheduled post prandial insulin doses:  B mg/dL= administer an additional 5 units  B mg/dL= administer an additional 10 units  B mg/dL= administer an additional 15 units  B mg/dL= administer an additional 20 units  B mg/dL= administer an additional 5 units      ASCORBATE CALCIUM (ROBBY-C PO) Take 500 mg by mouth daily. dutasteride (AVODART) 0.5 mg capsule TAKE ONE CAPSULE BY MOUTH EVERY DAY  Refills: 2      levothyroxine (SYNTHROID) 137 mcg tablet TAKE 1 TABLET(S) EVERY DAY BY ORAL ROUTE FOR 90 DAYS. Refills: 3      !! insulin lispro protamine/insulin lispro (HUMALOG MIX 75-25) 100 unit/mL (75-25) injection 26 Units by SubCUTAneous route daily (after breakfast). Insulin lispro protamine/insulin lispro dosing instructions:  Inject 26 units after breakfast and inject 34 units      paricalcitol (ZEMPLAR) 1 mcg capsule Take 1 mcg by mouth daily. multivitamin (ONE A DAY) tablet Take 1 Tab by mouth daily. b complex vitamins tablet Take 1 Tab by mouth daily. cholecalciferol (VITAMIN D3) 1,000 unit cap Take 3,000 Units by mouth daily. metoprolol succinate (TOPROL-XL) 100 mg tablet Take 1 Tab by mouth two (2) times a day. Qty: 180 Tab, Refills: 2      metOLazone (ZAROXOLYN) 2.5 mg tablet Take 1 Tab by mouth daily.   Qty: 90 Tab, Refills: 2      tamsulosin (FLOMAX) 0.4 mg capsule TAKE 2 CAPSULES BY MOUTH EVERY DAY  Refills: 2 acetaminophen (TYLENOL EXTRA STRENGTH) 500 mg tablet Take 1,000 mg by mouth every six (6) hours as needed for Pain. !! - Potential duplicate medications found. Please discuss with provider.       STOP taking these medications       furosemide (LASIX) 40 mg tablet Comments:   Reason for Stopping:         lisinopril (PRINIVIL, ZESTRIL) 2.5 mg tablet Comments:   Reason for Stopping:         warfarin (COUMADIN) 5 mg tablet Comments:   Reason for Stopping:               DISPOSITION:    Home with Family:    Home with HH/PT/OT/RN: x   SNF/LTC:    LARISSA:    OTHER:          Follow up with:   PCP : TEJAS Palomino  Follow-up Information     Follow up With Details Comments Librado Rosado MD In 7 days  [address]  [address]       TEJAS Villa  Please schedule a follow up appointment for 1-3 weeks post hospital.  [address] [address]  [phone]      331Priscilla Todd MD In 5 days  [address] [facility] [address]  [facility]  [facility] [address]      Elena Granger MD In 2 weeks outpt sleep study for ?cherelle [address] [phone]            Diet: cariac    Total time in minutes spent coordinating this discharge (includes going over instructions, follow-up, prescriptions, and preparing report for sign off to her PCP) :  35  minutes

## 2017-03-17 NOTE — PROGRESS NOTES
PCU SHIFT NURSING NOTE      Bedside shift change report given to Queenie by Gold He. Report included the following information SBAR, Kardex, ED Summary, Intake/Output, MAR, Recent Results, Med Rec Status and Cardiac Rhythm AFib. Shift Summary:   1345 Discharge instructions, prescriptions and follow up appts reviewed with patient and patient's wife. All questions were answered. Patient verbalized understanding. IV removed without difficulty. Admission Date 3/13/2017   Admission Diagnosis Heart failure ([facility] [address]   Consults IP CONSULT TO PALLIATIVE CARE - PROVIDER  IP CONSULT TO NEPHROLOGY  IP CONSULT TO PALLIATIVE CARE - PROVIDER        Consults   []PT   []OT   []Speech   []Case Management      [] Palliative      Cardiac Monitoring Order   [x]Yes   []No     IV drips   [x]Yes    Drip: Amiodarone        Dose: 0.5mg/min  Drip:                            Dose:  Drip:                            Dose:   []No     GI Prophylaxis   []Yes   [x]No         DVT Prophylaxis   SCDs:             Deangelo stockings:         [x] Medication   []Contraindicated   []None      Activity Level Activity Level: Up with Assistance     Activity Assistance: Partial (one person)   Purposeful Rounding every 1-2 hour? [x]Yes   Ackerman Score  Total Score: 3   Bed Alarm (If score 3 or >)   []Yes   [] Refused (See signed refusal form in chart)   Romeo Score  Romeo Score: 18   Romeo Score (if score 14 or less)   []PMT consult   []Wound Care consult      []Specialty bed   [] Nutrition consult          Needs prior to discharge:   Home O2 required:    []Yes   [x]No    If yes, how much O2 required?     Other:    Last Bowel Movement: Last Bowel Movement Date: 03/14/17      Influenza Vaccine Received Flu Vaccine for Current Season (usually Sept-March): Yes        Pneumonia Vaccine           Diet Active Orders   Diet    DIET NPO With Meds      LDAs               Peripheral IV 03/16/17 Left Hand (Active)   Site Assessment Clean, dry, & intact 3/17/2017  4:00 AM   Phlebitis Assessment 0 3/17/2017  4:00 AM   Infiltration Assessment 0 3/17/2017  4:00 AM   Dressing Status Clean, dry, & intact 3/17/2017  4:00 AM   Dressing Type Transparent;Tape 3/17/2017  4:00 AM   Hub Color/Line Status Blue;Patent; Infusing 3/17/2017  4:00 AM                      Urinary Catheter      Intake & Output   Date 03/16/17 0700 - 03/17/17 0659 03/17/17 0700 - 03/18/17 0659   Shift 0700-1859 1900-0659 24 Hour Total 0700-1859 1900-0659 24 Hour Total   I  N  T  A  K  E   P.O. 490 350 840         P. O. 490 350 840       I.V.  (mL/kg/hr)  600  (0.4) 600  (0.2)         Amiodarone Volume  600 600       Shift Total  (mL/kg) 490  (3.8) 950  (7.5) 1440  (11.4)      O  U  T  P  U  T   Urine  (mL/kg/hr) 1225  (0.8) 1240  (0.8) 2465  (0.8) 100  100      Urine Voided 1225 1240 2465 100  100      Urine Occurrence(s)  1 x 1 x       Shift Total  (mL/kg) 1225  (9.5) 1240  (9.8) 2465  (19.4) 100  (0.8)  100  (0.8)   NET -735 -290 -1025 -100  -100   Weight (kg) 129.6 126.8 126.8 126.8 126.8 126.8         Readmission Risk Assessment Tool Score High Risk            21       Total Score        3 Relationship with PCP    2 Patient Living Status    4 More than 1 Admission in calendar year    5 Patient Insurance is Medicare, Medicaid or Self Pay    7 Charlson Comorbidity Score        Criteria that do not apply:    Patient Length of Stay > 5       Expected Length of Stay 4d 14h   Actual Length of Stay 4

## 2017-03-17 NOTE — PROGRESS NOTES
PCU SHIFT NURSING NOTE      Bedside shift change report given to Chelsea Tyler RN (oncoming nurse) by Monroe Mark RN (offgoing nurse). Report included the following information SBAR, Kardex, ED Summary, Intake/Output, MAR, Recent Results and Cardiac Rhythm A Fib. Shift Summary:     1:18 AM  Patient sleeping. Desated to the 80%. Put on oxygen via nasal cannula while sleeping. Possible sleep apnea      Admission Date 3/13/2017   Admission Diagnosis Heart failure ([facility] [address]   Consults IP CONSULT TO PALLIATIVE CARE - PROVIDER  IP CONSULT TO NEPHROLOGY  IP CONSULT TO PALLIATIVE CARE - PROVIDER        Consults   [x]PT   [x]OT   []Speech   []Case Management      [] Palliative      Cardiac Monitoring Order   [x]Yes   []No     IV drips   [x]Yes    Drip:  Amiodarone       Dose: 0.5 mg/min  Drip:                            Dose:  Drip:                            Dose:   []No     GI Prophylaxis   []Yes   []No         DVT Prophylaxis   SCDs:             Deangelo stockings:         [x] Medication   []Contraindicated   []None      Activity Level Activity Level: Up with Assistance     Activity Assistance: Partial (one person)   Purposeful Rounding every 1-2 hour? [x]Yes   Ackerman Score  Total Score: 3   Bed Alarm (If score 3 or >)   [x]Yes   [] Refused (See signed refusal form in chart)   Romeo Score  Romeo Score: 18   Romeo Score (if score 14 or less)   []PMT consult   [x]Wound Care consult      []Specialty bed   [] Nutrition consult          Needs prior to discharge:   Home O2 required:    []Yes   [x]No    If yes, how much O2 required?     Other:    Last Bowel Movement: Last Bowel Movement Date: 03/14/17      Influenza Vaccine Received Flu Vaccine for Current Season (usually Sept-March): Yes        Pneumonia Vaccine           Diet Active Orders   Diet    DIET DIABETIC CONSISTENT CARB Regular; 2 GM NA (House Low NA); FR 1500ML    DIET NPO    DIET NPO With Meds      LDAs               Peripheral IV 03/16/17 Left Hand (Active)   Site Assessment Clean, dry, & intact 3/16/2017  3:51 PM   Phlebitis Assessment 0 3/16/2017  3:51 PM   Infiltration Assessment 0 3/16/2017  3:51 PM   Dressing Status Clean, dry, & intact 3/16/2017  3:51 PM   Dressing Type Transparent;Tape 3/16/2017  3:51 PM   Hub Color/Line Status Infusing;Blue 3/16/2017  3:51 PM                      Urinary Catheter      Intake & Output   Date 03/15/17 1900 - 03/16/17 0659 03/16/17 0700 - 03/17/17 0659   Shift 5447-2932 24 Hour Total 6049-0555 7580-8607 24 Hour Total   I  N  T  A  K  E   P.O. 150 710 490  490      P.O. 150 710 490  490    I.V.  (mL/kg/hr) 4116.3 4116.3         Amiodarone Volume 4116.3 4116.3       Shift Total  (mL/kg) 4266.3  (32.9) 4826.3  (37.2) 490  (3.8)  490  (3.8)   O  U  T  P  U  T   Urine  (mL/kg/hr) 825 2025 1225  (0.8) 570 1795      Urine Voided 825 2025 2304 786 5677      Urine Occurrence(s)    1 x 1 x    Shift Total  (mL/kg) 825  (6.4) 2025  (15.6) 1225  (9.5) 570  (4.4) 1795  (13.9)   NET 3441.3 2801.3 -736 -570 -6555   Weight (kg) 129.6 129.6 129.6 129.6 129.6         Readmission Risk Assessment Tool Score High Risk            21       Total Score        3 Relationship with PCP    2 Patient Living Status    4 More than 1 Admission in calendar year    5 Patient Insurance is Medicare, Medicaid or Self Pay    7 Charlson Comorbidity Score        Criteria that do not apply:    Patient Length of Stay > 5       Expected Length of Stay 4d 14h   Actual Length of Stay 3

## 2017-03-17 NOTE — PROGRESS NOTES
Pt sedated with 50 mcg IV Fentanyl and 2 mg IV Versed for HERMAN. Pt tolerated well.  No cardioversion done due to clot in atrial appendage

## 2017-03-17 NOTE — PROGRESS NOTES
Physical Therapy:    Attempted to patient for PT session, however patient off the unit for HERMAN/cardioversion. Will follow up as able and patient availability.  Thank you    Ubaldo Lyles, PT, DPT

## 2017-03-17 NOTE — PROGRESS NOTES
NAME: Juanis Gibson        :  1950        MRN:  [id number]        Assessment :    Plan:  --INO on CKD stage 4  DM/HTN nephropathy  BPH  Acute systolic chf  H/o cabg  afib Creatinine continues to improve. Urine is bland except for some blood; minimal proteinuria; no hydro; the etiology for INO looks to have been cardiorenal syndrome    l see again Monday. Please call if any questions. Subjective:     Chief Complaint:  \"I'm hungry. \"  Lots of UO. Less dyspnea. No CP. No N/V. Review of Systems:    Symptom Y/N Comments  Symptom Y/N Comments                                                                             Could not obtain due to:      Objective:     VITALS:   Last 24hrs VS reviewed since prior progress note.  Most recent are:  Visit Vitals    /81    Pulse (!) 114    Temp 98 °F (36.7 °C)    Resp 18    Ht 5' 9\" (1.753 m)    Wt 126.8 kg (279 lb 8.7 oz)    SpO2 96%    BMI 41.28 kg/m2       Intake/Output Summary (Last 24 hours) at 17 1201  Last data filed at 17 5410   Gross per 24 hour   Intake             1320 ml   Output             2315 ml   Net             -995 ml      Telemetry Reviewed:     PHYSICAL EXAM:  General: NAD  Few rhonchi  RRR  abd soft  2+ edema      Lab Data Reviewed: (see below)    Medications Reviewed: (see below)    PMH/SH reviewed - no change compared to H&P  ________________________________________________________________________  Care Plan discussed with:  Patient y    Family      RN     Care Manager                    Consultant:          Comments   >50% of visit spent in counseling and coordination of care       ________________________________________________________________________  Julianna Vasquez MD     Procedures: see electronic medical records for all procedures/Xrays and details which  were not copied into this note but were reviewed prior to creation of Plan. LABS:  Recent Labs      03/16/17   0433   WBC  7.3   HGB  11.7*   HCT  38.3   PLT  134*     Recent Labs      03/17/17   0425  03/16/17   0433  03/15/17   0201   NA  145  146*  147*   K  4.3  4.5  4.7   CL  102  105  106   CO2  35*  32  32   BUN  99*  114*  130*   CREA  3.09*  3.34*  3.98*   GLU  122*  102*  122*   CA  9.0  8.9  8.8   MG  2.4  2.6*  2.7*     No results for input(s): SGOT, GPT, AP, TBIL, TP, ALB, GLOB, GGT, AML, LPSE in the last 72 hours. No lab exists for component: AMYP, HLPSE  No results for input(s): INR, PTP, APTT in the last 72 hours. No lab exists for component: INREXT, INREXT   No results for input(s): FE, TIBC, PSAT, FERR in the last 72 hours. No results found for: FOL, RBCF   No results for input(s): PH, PCO2, PO2 in the last 72 hours. No results for input(s): CPK, CKMB in the last 72 hours.     No lab exists for component: TROPONINI  No components found for: [place]  Lab Results   Component Value Date/Time    Color YELLOW/STRAW 03/14/2017 04:00 PM    Appearance CLEAR 03/14/2017 04:00 PM    Specific gravity 1.011 03/14/2017 04:00 PM    pH (UA) 5.0 03/14/2017 04:00 PM    Protein NEGATIVE  03/14/2017 04:00 PM    Glucose NEGATIVE  03/14/2017 04:00 PM    Ketone NEGATIVE  03/14/2017 04:00 PM    Bilirubin NEGATIVE  03/14/2017 04:00 PM    Urobilinogen 0.2 03/14/2017 04:00 PM    Nitrites NEGATIVE  03/14/2017 04:00 PM    Leukocyte Esterase NEGATIVE  03/14/2017 04:00 PM    Epithelial cells FEW 03/14/2017 04:00 PM    Bacteria NEGATIVE  03/14/2017 04:00 PM    WBC 0-4 03/14/2017 04:00 PM    RBC 10-20 03/14/2017 04:00 PM       MEDICATIONS:  Current Facility-Administered Medications   Medication Dose Route Frequency    insulin lispro (HUMALOG) injection 10 Units  10 Units SubCUTAneous BID    zinc oxide-cod liver oil (DESITIN) 40 % paste   Topical PRN    bumetanide (BUMEX) injection 1 mg  1 mg IntraVENous BID    sodium chloride (NS) 0.9 % flush        perflutren lipid microspheres (DEFINITY) 1.1 mg/mL contrast injection        sodium chloride (NS) flush 5-10 mL  5-10 mL IntraVENous Q8H    sodium chloride (NS) flush 5-10 mL  5-10 mL IntraVENous PRN    naloxone (NARCAN) injection 0.4 mg  0.4 mg IntraVENous PRN    ondansetron (ZOFRAN) injection 4 mg  4 mg IntraVENous Q4H PRN    acetaminophen (TYLENOL) tablet 650 mg  650 mg Oral Q4H PRN    bisacodyl (DULCOLAX) tablet 5 mg  5 mg Oral DAILY PRN    glucose chewable tablet 16 g  4 Tab Oral PRN    dextrose (D50W) injection syrg 12.5-25 g  12.5-25 g IntraVENous PRN    glucagon (GLUCAGEN) injection 1 mg  1 mg IntraMUSCular PRN    dutasteride (AVODART) capsule 0.5 mg  0.5 mg Oral DAILY    levothyroxine (SYNTHROID) tablet 137 mcg  137 mcg Oral ACB    metOLazone (ZAROXOLYN) tablet 2.5 mg  2.5 mg Oral DAILY    metoprolol succinate (TOPROL-XL) XL tablet 100 mg  100 mg Oral DAILY    pravastatin (PRAVACHOL) tablet 80 mg  80 mg Oral QHS    tamsulosin (FLOMAX) capsule 0.4 mg  0.4 mg Oral DAILY    insulin lispro (HUMALOG) injection   SubCUTAneous AC&HS    insulin glargine (LANTUS) injection 35 Units  35 Units SubCUTAneous QHS    apixaban (ELIQUIS) tablet 5 mg  5 mg Oral BID

## 2017-03-17 NOTE — PROGRESS NOTES
[address] [place] [address]  [phone]      Cardiology Progress Note      3/17/2017 9:15 AM    Admit Date: 3/13/2017    Admit Diagnosis:   Heart failure ([address]    Subjective:      For HERMAN/DCC this am. Remains in afib, on Amiodarone gtt at 0.5 mg/min    Visit Vitals    /80 (BP 1 Location: Left arm, BP Patient Position: At rest;Sitting)    Pulse (!) 101    Temp 98 °F (36.7 °C)    Resp 17    Ht 5' 9\" (1.753 m)    Wt 126.8 kg (279 lb 8.7 oz)    SpO2 93%    BMI 41.28 kg/m2       Current Facility-Administered Medications   Medication Dose Route Frequency    insulin lispro (HUMALOG) injection 10 Units  10 Units SubCUTAneous BID    amiodarone (CORDARONE) 300 mg in dextrose 5% 250 mL infusion  0.5 mg/min IntraVENous CONTINUOUS    zinc oxide-cod liver oil (DESITIN) 40 % paste   Topical PRN    bumetanide (BUMEX) injection 1 mg  1 mg IntraVENous BID    sodium chloride (NS) 0.9 % flush        perflutren lipid microspheres (DEFINITY) 1.1 mg/mL contrast injection        sodium chloride (NS) flush 5-10 mL  5-10 mL IntraVENous Q8H    sodium chloride (NS) flush 5-10 mL  5-10 mL IntraVENous PRN    naloxone (NARCAN) injection 0.4 mg  0.4 mg IntraVENous PRN    ondansetron (ZOFRAN) injection 4 mg  4 mg IntraVENous Q4H PRN    acetaminophen (TYLENOL) tablet 650 mg  650 mg Oral Q4H PRN    bisacodyl (DULCOLAX) tablet 5 mg  5 mg Oral DAILY PRN    glucose chewable tablet 16 g  4 Tab Oral PRN    dextrose (D50W) injection syrg 12.5-25 g  12.5-25 g IntraVENous PRN    glucagon (GLUCAGEN) injection 1 mg  1 mg IntraMUSCular PRN    dutasteride (AVODART) capsule 0.5 mg  0.5 mg Oral DAILY    levothyroxine (SYNTHROID) tablet 137 mcg  137 mcg Oral ACB    metOLazone (ZAROXOLYN) tablet 2.5 mg  2.5 mg Oral DAILY    metoprolol succinate (TOPROL-XL) XL tablet 100 mg  100 mg Oral DAILY    pravastatin (PRAVACHOL) tablet 80 mg  80 mg Oral QHS    tamsulosin (FLOMAX) capsule 0.4 mg  0.4 mg Oral DAILY  insulin lispro (HUMALOG) injection   SubCUTAneous AC&HS    insulin glargine (LANTUS) injection 35 Units  35 Units SubCUTAneous QHS    apixaban (ELIQUIS) tablet 5 mg  5 mg Oral BID       Objective:      Physical Exam:  General Appearance:  NAD; obese  Chest:   CTA throughout; sl diminished at the bases  Cardiovascular:  RRR; no murmur   Abdomen:   Soft, non-tender, bowel sounds are active.   Extremities: +3 LE catherine;  Skin:  Warm and dry. L toe amputation   MSK: enlarged DIP catherine; metacarpophalangeal and subcutaneous nodules on elbows     Data Review:   Recent Labs      03/16/17   0433   WBC  7.3   HGB  11.7*   HCT  38.3   PLT  134*     Recent Labs      03/17/17   0425  03/16/17   0433  03/15/17   0201   NA  145  146*  147*   K  4.3  4.5  4.7   CL  102  105  106   CO2  35*  32  32   GLU  122*  102*  122*   BUN  99*  114*  130*   CREA  3.09*  3.34*  3.98*   CA  9.0  8.9  8.8   MG  2.4  2.6*  2.7*       No results for input(s): TROIQ, CPK, CKMB in the last 72 hours. Intake/Output Summary (Last 24 hours) at 03/17/17 0915  Last data filed at 03/17/17 0633   Gross per 24 hour   Intake             1320 ml   Output             2565 ml   Net            -1245 ml        Telemetry: Afib, 90s-100s  EKG:  Cxray:    Assessment:     Active Problems:    SOB (shortness of breath) (3/7/2017)      Chronic a-fib ([facility]) (3/7/2017)      CKD (chronic kidney disease) stage 4, GFR 15-29 ml/min ([facility]) (3/7/2017)      Hx of CABG (3/13/2017)      Overview: CABG in 2010 in [place]      Morbid obesity ([facility] [address] (3/13/2017)      Diabetes mellitus type 2, controlled ([facility] [address] (3/13/2017)      Heart failure ([facility] [address] (3/13/2017)      Fatigue (3/15/2017)      Bilateral leg edema (3/15/2017)      Counseling regarding advanced care planning and goals of care (3/15/2017)        Plan:     HFpEF  Risk factors: CAD, DM, HTN, Afib places patient at higher risk for HF  Echo showed LVEF 55-60% with no WMA. There was mild hypokinesis. LA normal in size.    Wt down 17 lbs since admission. Continue with IV Bumex, on Metolazone for synergistic effect   Cr slightly improved, down to 3.09 today. Suspect cardiorenal.  Cont to follow daily wts, lytes, renal fxn, I/Os        Chronic AFib:   ?sleep apnea. Can refer for OP sleep study.   Rate controlled on Toprol and AC - Eliquis  On 0.5 mg/min Amiodarone IV  Change to PO amiodarone post HERMAN/DCC  Await HERMAN/DCC this am.      CAD  Hx CABG x 2 in 2010  Continue on ASA, BB, statin  No complaints of chest discomfort or other symptoms or clinical changes that would push for ischemic evaluation at this time.

## 2017-03-17 NOTE — PROGRESS NOTES
CM acknowledged consult for [facility]. Reviewed with patient. FOC copy to patient and on chart. Referral submitted to [facility]. [facility] can service [place], [place]. CM acknowledged consult for Eliquis. Reviewed with patient. Patient states he recently filled an Eliquis rx prior to admission. He states it is at home but does not know the dose or the quantity. [facility] [address] [facility] is preferred pharmacy. Patient has filled a script for Eliquis 5mg BID, qty of 60.  6 refills. Written by Dr. Mahajan Back. [facility]'s [facility]. There was no prior auth required. Follow up appointments scheduled or attempted. Dr. Beka Vazquez office informed Cm that they would call patient to schedule appointment. Obey Beckett informed CM that patient already has appointment scheduled for June 1, 2017. CM informed  that was not acceptable. JACEY sleep study with Dr. Charo Salguero - deferred to Dr. Madisyn Castro (Dr. Charo Salguero does not do sleep studies). Nurse will call patient to schedule appointment. AVS updated.     [address] [facility]  Ext [phone]

## 2017-03-17 NOTE — PROGRESS NOTES
Cm has reivewed follow up appointments/attempts with patient and he has verbalized understanding. Wife is onsite to provide transportation home. Room Air. Patient confirmed he has 5mg Eliquis on hand at home. New script not used. Qty 60. [facility] confirmed 6 refills, written by Dr. Shital Suarez. RN informed that appointments have been scheduled and consults to CM addressed. Care Management Interventions  PCP Verified by CM: Yes  Palliative Care Consult (Criteria: CHF and RRAT>21): Yes  Mode of Transport at Discharge:  Other (see comment) (wife)  Transition of Care Consult (CM Consult): [address]: Yes  Discharge Durable Medical Equipment: No  Physical Therapy Consult: Yes  Occupational Therapy Consult: Yes  Current Support Network: Lives with Spouse  Confirm Follow Up Transport: Family  Plan discussed with Pt/Family/Caregiver: Yes  Freedom of Choice Offered: Yes  Discharge Location  Discharge Placement: Home with home health    Gonzalo Chahal RN CM  Ext [phone]

## 2017-03-17 NOTE — PROGRESS NOTES
Pt arrived to Non-Invasive Lab. Pt identified with 2 patient identifiers. HERMAN procedure reviewed with patient and questions answered.   ASA score 3 per MD. Mallampati score documented on flowsheet

## 2017-03-17 NOTE — PROGRESS NOTES
TRANSFER - OUT REPORT:    Verbal report given to Fredy Alvares on Silva Moritz  being transferred back to [phone] for routine progression of care post HERMAN      Report consisted of patients Situation, Background, Assessment and   Recommendations(SBAR). Information from the following report(s) Procedure Summary was reviewed with the receiving nurse. Lines:   Peripheral IV 03/16/17 Left Hand (Active)   Site Assessment Clean, dry, & intact 3/17/2017  9:00 AM   Phlebitis Assessment 0 3/17/2017  9:00 AM   Infiltration Assessment 0 3/17/2017  9:00 AM   Dressing Status Clean, dry, & intact 3/17/2017  9:00 AM   Dressing Type Tape;Transparent 3/17/2017  9:00 AM   Hub Color/Line Status Blue; Infusing 3/17/2017  9:00 AM        Opportunity for questions and clarification was provided.       Patient transported with:   O2 @ 2 liters

## 2017-03-17 NOTE — PROGRESS NOTES
Pharmacist Discharge Medication Reconciliation    Significant PMH:   Past Medical History:   Diagnosis Date    CAD (coronary atherosclerotic disease)     Diabetes ([facility] [address]     Diabetes mellitus type 2, controlled ([facility] [address] 3/13/2017    Heart failure ([facility] [address]     Hx of CABG 3/13/2017    CABG in 2010 in [address] [place] [address] Street Morbid obesity ([facility] [address] 3/13/2017    S/P CABG x 2      Chief Complaint for this Admission:   Chief Complaint   Patient presents with    Leg Swelling     Bilateral lower legs over past 3 weeks.  Shortness of Breath     Allergies: Ciprofloxacin and Percocet [oxycodone-acetaminophen]    Discharge Medications:   Current Discharge Medication List        START taking these medications    Details   apixaban (ELIQUIS) 5 mg tablet Take 1 Tab by mouth two (2) times a day. Indications: PREVENT THROMBOEMBOLISM IN CHRONIC ATRIAL FIBRILLATION  Qty: 60 Tab, Refills: 0      bumetanide (BUMEX) 0.5 mg tablet Take 2 Tabs by mouth two (2) times a day. Qty: 60 Tab, Refills: 0           CONTINUE these medications which have NOT CHANGED    Details   calcium carbonate (CALTREX) 600 mg calcium (1,500 mg) tablet Take 600 mg by mouth every Tuesday, Thursday, Saturday & . !! insulin lispro protamine/insulin lispro (HUMALOG MIX 75-25) 100 unit/mL (75-25) injection 34 Units by SubCUTAneous route daily (after dinner). Insulin lispro protamine/insulin lispro dosing instructions:  Inject 26 units after breakfast and inject 34 units      !! insulin lispro protamine/insulin lispro (HUMALOG MIX 75-25) 100 unit/mL (75-25) injection 5-20 Units by SubCUTAneous route two (2) times daily (after meals).  Corrective Coverage to be administered IN ADDITION to scheduled post prandial insulin doses:  B mg/dL= administer an additional 5 units  B mg/dL= administer an additional 10 units  B mg/dL= administer an additional 15 units  B mg/dL= administer an additional 20 units  B mg/dL= administer an additional 5 units      ASCORBATE CALCIUM (ROBBY-C PO) Take 500 mg by mouth daily. dutasteride (AVODART) 0.5 mg capsule TAKE ONE CAPSULE BY MOUTH EVERY DAY  Refills: 2      levothyroxine (SYNTHROID) 137 mcg tablet TAKE 1 TABLET(S) EVERY DAY BY ORAL ROUTE FOR 90 DAYS. Refills: 3      !! insulin lispro protamine/insulin lispro (HUMALOG MIX 75-25) 100 unit/mL (75-25) injection 26 Units by SubCUTAneous route daily (after breakfast). Insulin lispro protamine/insulin lispro dosing instructions:  Inject 26 units after breakfast and inject 34 units      paricalcitol (ZEMPLAR) 1 mcg capsule Take 1 mcg by mouth daily. multivitamin (ONE A DAY) tablet Take 1 Tab by mouth daily. b complex vitamins tablet Take 1 Tab by mouth daily. cholecalciferol (VITAMIN D3) 1,000 unit cap Take 3,000 Units by mouth daily. metoprolol succinate (TOPROL-XL) 100 mg tablet Take 1 Tab by mouth two (2) times a day. Qty: 180 Tab, Refills: 2      pravastatin (PRAVACHOL) 40 mg tablet Take 1 Tab by mouth daily. Qty: 90 Tab, Refills: 2      metOLazone (ZAROXOLYN) 2.5 mg tablet Take 1 Tab by mouth daily. Qty: 90 Tab, Refills: 2      tamsulosin (FLOMAX) 0.4 mg capsule TAKE 2 CAPSULES BY MOUTH EVERY DAY  Refills: 2      acetaminophen (TYLENOL EXTRA STRENGTH) 500 mg tablet Take 1,000 mg by mouth every six (6) hours as needed for Pain. !! - Potential duplicate medications found. Please discuss with provider.         STOP taking these medications       furosemide (LASIX) 40 mg tablet Comments:   Reason for Stopping:         lisinopril (PRINIVIL, ZESTRIL) 2.5 mg tablet Comments:   Reason for Stopping:         warfarin (COUMADIN) 5 mg tablet Comments:   Reason for Stopping:               The patient's chart, MAR and AVS were reviewed by Niki Odonnell, PHARMD.

## 2017-03-20 ENCOUNTER — HOME HEALTH ADMISSION (OUTPATIENT)
Dept: HOME HEALTH SERVICES | Facility: HOME HEALTH | Age: 67
End: 2017-03-20
Payer: MEDICARE

## 2017-03-21 ENCOUNTER — HOME CARE VISIT (OUTPATIENT)
Dept: SCHEDULING | Facility: HOME HEALTH | Age: 67
End: 2017-03-21
Payer: MEDICARE

## 2017-03-21 VITALS
HEART RATE: 70 BPM | RESPIRATION RATE: 18 BRPM | DIASTOLIC BLOOD PRESSURE: 70 MMHG | TEMPERATURE: 97.6 F | SYSTOLIC BLOOD PRESSURE: 140 MMHG

## 2017-03-21 PROCEDURE — 400013 HH SOC

## 2017-03-21 PROCEDURE — G0299 HHS/HOSPICE OF RN EA 15 MIN: HCPCS

## 2017-03-21 PROCEDURE — G0151 HHCP-SERV OF PT,EA 15 MIN: HCPCS

## 2017-03-21 PROCEDURE — 3331090001 HH PPS REVENUE CREDIT

## 2017-03-21 PROCEDURE — 3331090002 HH PPS REVENUE DEBIT

## 2017-03-22 ENCOUNTER — OFFICE VISIT (OUTPATIENT)
Dept: CARDIOLOGY CLINIC | Age: 67
End: 2017-03-22

## 2017-03-22 VITALS
HEIGHT: 69 IN | BODY MASS INDEX: 41.13 KG/M2 | OXYGEN SATURATION: 93 % | SYSTOLIC BLOOD PRESSURE: 138 MMHG | DIASTOLIC BLOOD PRESSURE: 86 MMHG | WEIGHT: 277.7 LBS | HEART RATE: 104 BPM

## 2017-03-22 DIAGNOSIS — I27.20 PULMONARY HTN (HCC): ICD-10-CM

## 2017-03-22 DIAGNOSIS — I48.20 CHRONIC A-FIB (HCC): ICD-10-CM

## 2017-03-22 DIAGNOSIS — R60.0 BILATERAL LEG EDEMA: ICD-10-CM

## 2017-03-22 DIAGNOSIS — Z95.1 HX OF CABG: ICD-10-CM

## 2017-03-22 DIAGNOSIS — N18.4 CKD (CHRONIC KIDNEY DISEASE) STAGE 4, GFR 15-29 ML/MIN (HCC): ICD-10-CM

## 2017-03-22 DIAGNOSIS — I49.9 IRREGULAR HEART BEAT: Primary | ICD-10-CM

## 2017-03-22 PROCEDURE — 3331090001 HH PPS REVENUE CREDIT

## 2017-03-22 PROCEDURE — 3331090002 HH PPS REVENUE DEBIT

## 2017-03-22 RX ORDER — PRAVASTATIN SODIUM 80 MG/1
80 TABLET ORAL
Qty: 30 TAB | Refills: 6 | Status: SHIPPED | OUTPATIENT
Start: 2017-03-22 | End: 2018-01-15 | Stop reason: SDUPTHER

## 2017-03-22 RX ORDER — BUMETANIDE 1 MG/1
1 TABLET ORAL 2 TIMES DAILY
Qty: 60 TAB | Refills: 3 | Status: SHIPPED | OUTPATIENT
Start: 2017-03-22 | End: 2017-05-30 | Stop reason: SDUPTHER

## 2017-03-22 NOTE — PROGRESS NOTES
Saad Trejo NP  Subjective/HPI:     Demond Islas is a 75 y.o. male is here for hospital follow-up secondary to edema dyspnea. Reports edema significantly improved with adjustment in diuretics. HERMAN:  Left ventricle: Systolic function was normal. Ejection fraction was  estimated in the range of 55 % to 60 %. There were no regional wall motion  abnormalities. Left atrium: The atrium was mildly to moderately dilated. Left atrial appendage: There was a probable, spherical, fixed mass. It may  represent a thrombus. Right atrium: The atrium was mildly dilated. Mitral valve: There was moderate regurgitation. No obvious mass,  vegetation or thrombus noted. Aortic valve: No obvious mass, vegetation or thrombus noted. Tricuspid valve: There was moderate regurgitation. Pulmonary artery  systolic pressure: 80 mmHg. There was severe pulmonary hypertension    PCP Provider  TEJAS Bond  Past Medical History:   Diagnosis Date    CAD (coronary atherosclerotic disease)     [facility]     Diabetes mellitus type 2, controlled ([facility] 3/13/2017    Heart failure ([facility]     Hx of CABG 3/13/2017    [place]AB[place] in 2010 [place]  [facility] [place] Morbid obesity ([facility] 3/13/2017    S/P CABG x 2 2010      Past Surgical History:   Procedure Laterality Date    CARDIAC SURG PROCEDURE UNLIST      CABGx2 in 2010    HX ORTHOPAEDIC      L 3rd toe amputation in 1999     Allergies   Allergen Reactions    Ciprofloxacin Nausea Only    Percocet [Oxycodone-Acetaminophen] Other (comments)     hallucinations      Family History   Problem Relation Age of Onset    Heart Attack Father       Current Outpatient Prescriptions   Medication Sig    bumetanide (BUMEX) 1 mg tablet Take 1 Tab by mouth two (2) times a day.  pravastatin (PRAVACHOL) 80 mg tablet Take 1 Tab by mouth nightly.  apixaban (ELIQUIS) 5 mg tablet Take 1 Tab by mouth two (2) times a day.  Indications: PREVENT THROMBOEMBOLISM IN CHRONIC ATRIAL FIBRILLATION    calcium carbonate (CALTREX) 600 mg calcium (1,500 mg) tablet Take 600 mg by mouth every Tuesday, Thursday, Saturday & .  insulin lispro protamine/insulin lispro (HUMALOG MIX 75-25) 100 unit/mL (75-25) injection 34 Units by SubCUTAneous route daily (after dinner). Insulin lispro protamine/insulin lispro dosing instructions:  Inject 26 units after breakfast and inject 34 units    insulin lispro protamine/insulin lispro (HUMALOG MIX 75-25) 100 unit/mL (75-25) injection 5-20 Units by SubCUTAneous route two (2) times daily (after meals). Corrective Coverage to be administered IN ADDITION to scheduled post prandial insulin doses:  B mg/dL= administer an additional 5 units  B mg/dL= administer an additional 10 units  B mg/dL= administer an additional 15 units  B mg/dL= administer an additional 20 units  B mg/dL= administer an additional 5 units    ASCORBATE CALCIUM (ROBBY-C PO) Take 500 mg by mouth daily.  dutasteride (AVODART) 0.5 mg capsule TAKE ONE CAPSULE BY MOUTH EVERY DAY    levothyroxine (SYNTHROID) 137 mcg tablet TAKE 1 TABLET(S) EVERY DAY BY ORAL ROUTE FOR 90 DAYS.  tamsulosin (FLOMAX) 0.4 mg capsule TAKE 2 CAPSULES BY MOUTH EVERY DAY    insulin lispro protamine/insulin lispro (HUMALOG MIX 75-25) 100 unit/mL (75-25) injection 26 Units by SubCUTAneous route daily (after breakfast). Insulin lispro protamine/insulin lispro dosing instructions:  Inject 26 units after breakfast and inject 34 units    paricalcitol (ZEMPLAR) 1 mcg capsule Take 1 mcg by mouth daily.  multivitamin (ONE A DAY) tablet Take 1 Tab by mouth daily.  b complex vitamins tablet Take 1 Tab by mouth daily.  cholecalciferol (VITAMIN D3) 1,000 unit cap Take 3,000 Units by mouth daily.  acetaminophen (TYLENOL EXTRA STRENGTH) 500 mg tablet Take 1,000 mg by mouth every six (6) hours as needed for Pain.     metoprolol succinate (TOPROL-XL) 100 mg tablet Take 1 Tab by mouth two (2) times a day.  metOLazone (ZAROXOLYN) 2.5 mg tablet Take 1 Tab by mouth daily. No current facility-administered medications for this visit. Vitals:    03/22/17 1041   BP: 138/86   Pulse: (!) 104   SpO2: 93%   Weight: 277 lb 11.2 oz (126 kg)   Height: 5' 9\" (1.753 m)     Social History     Social History    Marital status:      Spouse name: N/A    Number of children: N/A    Years of education: N/A     Occupational History    Not on file. Social History Main Topics    Smoking status: Never Smoker    Smokeless tobacco: Never Used    Alcohol use Yes      Comment: rare    Drug use: No    Sexual activity: Not on file     Other Topics Concern    Not on file     Social History Narrative       I have reviewed the nurses notes, vitals, problem list, allergy list, medical history, family, social history and medications. Review of Symptoms:    General: Pt denies excessive weight gain or loss. Patient is limited due to dyspnea  HEENT: Denies blurred vision, headaches, epistaxis and difficulty swallowing. Respiratory: Denies shortness of breath, + BETTS, wheezing or stridor. Cardiovascular: Denies precordial pain, palpitations, + edema + PND  Gastrointestinal: Denies poor appetite, indigestion, abdominal pain or blood in stool  Musculoskeletal: Denies pain or swelling from muscles or joints  Neurologic: Denies tremor, paresthesias, or sensory motor disturbance  Skin: Denies rash, itching or texture change. Physical Exam:      General: Well developed, obese in no acute distress, cooperative and alert  HEENT: No carotid bruits, no JVD, trach is midline. Neck Supple, PEERL, EOM intact. Heart:  Irregularly irregular, 2/6 systolic murmur  Respiratory: Clear bilaterally x 4, no wheezing or rales  Abdomen:   Soft, obese non-tender, no masses, bowel sounds are active.   Extremities: +1 bilateral ankle edema, normal cap refill, no cyanosis, atraumatic.    Neuro: A&Ox3, speech clear, gait stable with cane. Skin: Skin color is mildly injected bilateral ankles. Vascular: 2+ pulses symmetric in all extremities    Cardiographics    ECG: Rate controlled atrial fibrillation  Results for orders placed or performed during the hospital encounter of 03/13/17   EKG, 12 LEAD, INITIAL   Result Value Ref Range    Ventricular Rate 92 BPM    Atrial Rate 258 BPM    QRS Duration 102 ms    Q-T Interval 346 ms    QTC Calculation (Bezet) 427 ms    Calculated R Axis 77 degrees    Calculated T Axis -128 degrees    Diagnosis       Atrial fibrillation  Incomplete right bundle branch block  Septal infarct , age undetermined  ST & T wave abnormality, consider inferior ischemia or digitalis effect  ST & T wave abnormality, consider anterolateral ischemia or digitalis effect  Abnormal ECG  No previous ECGs available  Confirmed by Tamie Garcia ([id number]) on 3/14/2017 7:32:52 AM           Cardiology Labs:  No results found for: CHOL, CHOLX, CHLST, CHOLV, 820897, HDL, LDL, DLDL, LDLC, DLDLP, TGL, Alyssa Curl, [id number], TRIGP, CHHD, [facility]    Lab Results   Component Value Date/Time    Sodium 145 03/17/2017 04:25 AM    Potassium 4.3 03/17/2017 04:25 AM    Chloride 102 03/17/2017 04:25 AM    CO2 35 03/17/2017 04:25 AM    Anion gap 8 03/17/2017 04:25 AM    Glucose 122 03/17/2017 04:25 AM    BUN 99 03/17/2017 04:25 AM    Creatinine 3.09 03/17/2017 04:25 AM    BUN/Creatinine ratio 32 03/17/2017 04:25 AM    GFR est AA 25 03/17/2017 04:25 AM    GFR est non-AA 20 03/17/2017 04:25 AM    Calcium 9.0 03/17/2017 04:25 AM    Bilirubin, total 0.6 03/13/2017 12:15 PM    AST (SGOT) 28 03/13/2017 12:15 PM    Alk. phosphatase 37 03/13/2017 12:15 PM    Protein, total 6.3 03/13/2017 12:15 PM    Albumin 3.4 03/13/2017 12:15 PM    Globulin 2.9 03/13/2017 12:15 PM    A-G Ratio 1.2 03/13/2017 12:15 PM    ALT (SGPT) 24 03/13/2017 12:15 PM           Assessment:     Assessment:     Valentín Ty was seen today for irregular heart beat.     Diagnoses and all orders for this visit:    Irregular heart beat  -     AMB POC EKG ROUTINE W/ 12 LEADS, INTER & REP  -     REFERRAL TO SLEEP STUDIES    Hx of CABG  -     REFERRAL TO SLEEP STUDIES    Chronic a-fib ([facility])  -     REFERRAL TO SLEEP STUDIES    Bilateral leg edema  -     REFERRAL TO SLEEP STUDIES    CKD (chronic kidney disease) stage 4, GFR 15-29 ml/min ([facility])  -     REFERRAL TO SLEEP STUDIES    Pulmonary HTN ([facility])  -     REFERRAL TO SLEEP STUDIES    Other orders  -     bumetanide (BUMEX) 1 mg tablet; Take 1 Tab by mouth two (2) times a day. -     pravastatin (PRAVACHOL) 80 mg tablet; Take 1 Tab by mouth nightly. ICD-10-CM ICD-9-CM    1. Irregular heart beat I49.9 427.9 AMB POC EKG ROUTINE W/ 12 LEADS, INTER & REP      REFERRAL TO SLEEP STUDIES   2. Hx of CABG Z95.1 V45.81 REFERRAL TO SLEEP STUDIES   3. Chronic a-fib ([facility]) I48.2 427.31 REFERRAL TO SLEEP STUDIES   4. Bilateral leg edema R60.0 782.3 REFERRAL TO SLEEP STUDIES   5. CKD (chronic kidney disease) stage 4, GFR 15-29 ml/min ([facility]) N18.4 585.4 REFERRAL TO SLEEP STUDIES   6. Pulmonary HTN ([facility]) I27.2 416.8 REFERRAL TO SLEEP STUDIES     Orders Placed This Encounter    REFERRAL TO SLEEP STUDIES     Referral Priority:   Routine     Referral Type:   Consultation     Referral Reason:   Specialty Services Required     Referred to Provider:   Collins Castano MD    AMB POC EKG ROUTINE W/ 12 LEADS, INTER & REP     Order Specific Question:   Reason for Exam:     Answer:   routine    bumetanide (BUMEX) 1 mg tablet     Sig: Take 1 Tab by mouth two (2) times a day. Dispense:  60 Tab     Refill:  3    pravastatin (PRAVACHOL) 80 mg tablet     Sig: Take 1 Tab by mouth nightly. Dispense:  30 Tab     Refill:  6        Plan:     1. Severe pulmonary hypertension: PA pressure 80 mmHg on HERMAN. Will arrange for outpatient sleep study consult has been placed.   Will evaluate for ischemia with Lexiscan, if negative reversible defect will then proceed with right-sided heart cath and initiate pulmonary hypertension treatment. 2.  Coronary artery disease/CABG: Ischemic workup in process with Lexiscan continue current therapy  3. Chronic rate controlled atrial fibrillation: On metoprolol, continue Eliquis. He has a left atrial thrombus so will defer attempts for cardioversion until 6 weeks. Ideally will have right heart cath and HERMAN cardioversion performed during the same admission. 4.  Chronic kidney stage IV followed by nephrology . Bumex 1 mg twice daily with daily Zaroxolyn. Kimani. Follow-up in 1 month.     Samuel Young MD

## 2017-03-22 NOTE — PROGRESS NOTES
Chief Complaint   Patient presents with    Irregular Heart Beat     HF appt, bundle appt [id number]. C/O SOB with exertion.      c

## 2017-03-22 NOTE — MR AVS SNAPSHOT
Visit Information Date & Time Provider Department Dept. Phone Encounter #  
 3/22/2017 10:45 AM Aruna Barrett, [address] [facility] [phone] Your Appointments 4/3/2017  2:50 PM  
New Patient with MD [facility] [address]) Appt Note: np thyroid [address] [id number] [address] Upcoming Health Maintenance Date Due Hepatitis C Screening 1950 FOOT EXAM Q1 1/28/1959 MICROALBUMIN Q1 1/28/1959 EYE EXAM RETINAL OR DILATED Q1 1/28/1959 DTaP/Tdap/Td series (1 - Tdap) 1/28/1970 FOBT Q 1 YEAR AGE 50-75 1/28/1999 ZOSTER VACCINE AGE 60> 1/28/2009 GLAUCOMA SCREENING Q2Y 1/28/2014 Pneumococcal 65+ High/Highest Risk (1 of 2 - PCV13) 1/28/2014 MEDICARE YEARLY EXAM 1/28/2014 INFLUENZA AGE 9 TO ADULT 8/1/2016 HEMOGLOBIN A1C Q6M 9/13/2017 LIPID PANEL Q1 12/15/2017 Allergies as of 3/22/2017  Review Complete On: 3/22/2017 By: Yasmany Bruno LPN Severity Noted Reaction Type Reactions Ciprofloxacin  03/07/2017    Nausea Only Percocet [Oxycodone-acetaminophen]  03/07/2017    Other (comments)  
 hallucinations Current Immunizations  Never Reviewed No immunizations on file. Not reviewed this visit You Were Diagnosed With   
  
 Codes Comments Irregular heart beat    -  Primary ICD-10-CM: I49.9 ICD-9-CM: 427.9 Hx of CABG     ICD-10-CM: Z95.1 ICD-9-CM: V45.81 Chronic a-fib (HCC)     ICD-10-CM: M83.4 ICD-9-CM: 427.31 Bilateral leg edema     ICD-10-CM: R60.0 ICD-9-CM: 782.3 CKD (chronic kidney disease) stage 4, GFR 15-29 ml/min (HCC)     ICD-10-CM: N18.4 ICD-9-CM: 585.4 Pulmonary HTN ([facility]     ICD-10-CM: I27.2 ICD-9-CM: 416.8 Vitals BP Pulse Height(growth percentile) Weight(growth percentile) SpO2 BMI  
 138/86 (BP 1 Location: Left arm, BP Patient Position: Sitting) (!) 104 5' 9\" (1.753 m) 277 lb 11.2 oz (126 kg) 93% 41.01 kg/m2 Smoking Status Never Smoker Vitals History BMI and BSA Data Body Mass Index Body Surface Area 41.01 kg/m 2 2.48 m 2 Preferred Pharmacy Pharmacy Name Phone [id number]- [place], [place] - [address] [phone] Your Updated Medication List  
  
   
This list is accurate as of: 3/22/17 11:56 AM.  Always use your most recent med list.  
  
  
  
  
 apixaban 5 mg tablet Commonly known as:  Princella Rea Take 1 Tab by mouth two (2) times a day. Indications: PREVENT THROMBOEMBOLISM IN CHRONIC ATRIAL FIBRILLATION  
  
 b complex vitamins tablet Take 1 Tab by mouth daily. bumetanide 1 mg tablet Commonly known as:  Marcelle Coreas Take 1 Tab by mouth two (2) times a day. calcium carbonate 600 mg calcium (1,500 mg) tablet Commonly known as:  Candance Kanaris Take 600 mg by mouth every Tuesday, Thursday, Saturday & Sunday. dutasteride 0.5 mg capsule Commonly known as:  AVODART TAKE ONE CAPSULE BY MOUTH EVERY DAY  
  
 ROBBY-C PO Take 500 mg by mouth daily. * HumaLOG Mix 75-25 100 unit/mL (75-25) injection Generic drug:  insulin lispro protamine/insulin lispro 26 Units by SubCUTAneous route daily (after breakfast). Insulin lispro protamine/insulin lispro dosing instructions: Inject 26 units after breakfast and inject 34 units * HumaLOG Mix 75-25 100 unit/mL (75-25) injection Generic drug:  insulin lispro protamine/insulin lispro 34 Units by SubCUTAneous route daily (after dinner). Insulin lispro protamine/insulin lispro dosing instructions: Inject 26 units after breakfast and inject 34 units * HumaLOG Mix 75-25 100 unit/mL (75-25) injection Generic drug:  insulin lispro protamine/insulin lispro 5-20 Units by SubCUTAneous route two (2) times daily (after meals). Corrective Coverage to be administered IN ADDITION to scheduled post prandial insulin doses: B mg/dL= administer an additional 5 units B mg/dL= administer an additional 10 units B mg/dL= administer an additional 15 units B mg/dL= administer an additional 20 units B mg/dL= administer an additional 5 units  
  
 levothyroxine 137 mcg tablet Commonly known as:  SYNTHROID  
TAKE 1 TABLET(S) EVERY DAY BY ORAL ROUTE FOR 90 DAYS. metOLazone 2.5 mg tablet Commonly known as:  Shaun Isanti Take 1 Tab by mouth daily. metoprolol succinate 100 mg tablet Commonly known as:  TOPROL-XL Take 1 Tab by mouth two (2) times a day. multivitamin tablet Commonly known as:  ONE A DAY Take 1 Tab by mouth daily. paricalcitol 1 mcg capsule Commonly known as:  Cyndi Cassis Take 1 mcg by mouth daily. pravastatin 80 mg tablet Commonly known as:  PRAVACHOL Take 1 Tab by mouth nightly. tamsulosin 0.4 mg capsule Commonly known as:  FLOMAX TAKE 2 CAPSULES BY MOUTH EVERY DAY  
  
 TYLENOL EXTRA STRENGTH 500 mg tablet Generic drug:  acetaminophen Take 1,000 mg by mouth every six (6) hours as needed for Pain. VITAMIN D3 1,000 unit Cap Generic drug:  cholecalciferol Take 3,000 Units by mouth daily. * Notice: This list has 3 medication(s) that are the same as other medications prescribed for you. Read the directions carefully, and ask your doctor or other care provider to review them with you. Prescriptions Sent to Pharmacy Refills  
 bumetanide (BUMEX) 1 mg tablet 3 Sig: Take 1 Tab by mouth two (2) times a day. Class: Normal  
 Pharmacy: [facility]/pharmacy [address] Ph #: [phone]  Route: Oral  
 pravastatin (PRAVACHOL) 80 mg tablet 6  
 Sig: Take 1 Tab by mouth nightly. Class: Normal  
 Pharmacy: [facility] [id number] [facility] [address] Ph #: [phone] Route: Oral  
  
We Performed the Following AMB POC EKG ROUTINE W/ 12 LEADS, INTER & REP [75141 CPT(R)] REFERRAL TO SLEEP STUDIES [REF99 Custom] Comments:  
 Please evaluate patient for JACEY To-Do List   
 03/23/2017 To Be Determined Appointment with Litzy Romo OT at [place] [address] Referral Information Referral ID Referred By Referred To  
  
 [id number] JEREMY, Kentrell Og MD   
   [address] [facility] [address] [place], [facility] [address] [facility] Phone: [phone] Fax: [phone] Visits Status Start Date End Date 1 New Request 3/22/17 3/22/18 If your referral has a status of pending review or denied, additional information will be sent to support the outcome of this decision. Introducing [facility]! [facility] introduces Tablus patient portal. Now you can access parts of your medical record, email your doctor's office, and request medication refills online. 1. In your internet browser, go to [email or web address] 2. Click on the First Time User? Click Here link in the Sign In box. You will see the New Member Sign Up page. 3. Enter your Tablus Access Code exactly as it appears below. You will not need to use this code after youve completed the sign-up process. If you do not sign up before the expiration date, you must request a new code. · Tablus Access Code: [email or web address] Expires: 6/5/2017 10:26 AM 
 
4. Enter the last four digits of your Social Security Number (xxxx) and Date of Birth (mm/dd/yyyy) as indicated and click Submit. You will be taken to the next sign-up page. 5. Create a Tablus ID.  This will be your Tablus login ID and cannot be changed, so think of one that is secure and easy to remember. 6. Create a iVengo password. You can change your password at any time. 7. Enter your Password Reset Question and Answer. This can be used at a later time if you forget your password. 8. Enter your e-mail address. You will receive e-mail notification when new information is available in [address] 9. Click Sign Up. You can now view and download portions of your medical record. 10. Click the Download Summary menu link to download a portable copy of your medical information. If you have questions, please visit the Frequently Asked Questions section of the iVengo website. Remember, iVengo is NOT to be used for urgent needs. For medical emergencies, dial 911. Now available from your iPhone and Android! Please provide this summary of care documentation to your next provider. Your primary care clinician is listed as '[employer]. If you have any questions after today's visit, please call [phone].

## 2017-03-23 ENCOUNTER — HOME CARE VISIT (OUTPATIENT)
Dept: SCHEDULING | Facility: HOME HEALTH | Age: 67
End: 2017-03-23
Payer: MEDICARE

## 2017-03-23 VITALS
OXYGEN SATURATION: 98 % | HEART RATE: 76 BPM | SYSTOLIC BLOOD PRESSURE: 130 MMHG | DIASTOLIC BLOOD PRESSURE: 75 MMHG | TEMPERATURE: 98.1 F

## 2017-03-23 PROCEDURE — 3331090002 HH PPS REVENUE DEBIT

## 2017-03-23 PROCEDURE — G0152 HHCP-SERV OF OT,EA 15 MIN: HCPCS

## 2017-03-23 PROCEDURE — 3331090001 HH PPS REVENUE CREDIT

## 2017-03-24 ENCOUNTER — HOME CARE VISIT (OUTPATIENT)
Dept: SCHEDULING | Facility: HOME HEALTH | Age: 67
End: 2017-03-24
Payer: MEDICARE

## 2017-03-24 VITALS — DIASTOLIC BLOOD PRESSURE: 78 MMHG | RESPIRATION RATE: 18 BRPM | TEMPERATURE: 98.4 F | SYSTOLIC BLOOD PRESSURE: 142 MMHG

## 2017-03-24 PROCEDURE — 3331090001 HH PPS REVENUE CREDIT

## 2017-03-24 PROCEDURE — G0151 HHCP-SERV OF PT,EA 15 MIN: HCPCS

## 2017-03-24 PROCEDURE — 3331090002 HH PPS REVENUE DEBIT

## 2017-03-25 PROCEDURE — 3331090002 HH PPS REVENUE DEBIT

## 2017-03-25 PROCEDURE — 3331090001 HH PPS REVENUE CREDIT

## 2017-03-26 PROCEDURE — 3331090001 HH PPS REVENUE CREDIT

## 2017-03-26 PROCEDURE — 3331090002 HH PPS REVENUE DEBIT

## 2017-03-27 ENCOUNTER — HOME CARE VISIT (OUTPATIENT)
Dept: SCHEDULING | Facility: HOME HEALTH | Age: 67
End: 2017-03-27
Payer: MEDICARE

## 2017-03-27 VITALS
OXYGEN SATURATION: 89 % | SYSTOLIC BLOOD PRESSURE: 130 MMHG | DIASTOLIC BLOOD PRESSURE: 80 MMHG | HEART RATE: 88 BPM | TEMPERATURE: 98.3 F

## 2017-03-27 PROCEDURE — G0151 HHCP-SERV OF PT,EA 15 MIN: HCPCS

## 2017-03-27 PROCEDURE — 3331090001 HH PPS REVENUE CREDIT

## 2017-03-27 PROCEDURE — 3331090002 HH PPS REVENUE DEBIT

## 2017-03-27 PROCEDURE — G0152 HHCP-SERV OF OT,EA 15 MIN: HCPCS

## 2017-03-28 VITALS
OXYGEN SATURATION: 97 % | RESPIRATION RATE: 18 BRPM | SYSTOLIC BLOOD PRESSURE: 132 MMHG | HEART RATE: 80 BPM | DIASTOLIC BLOOD PRESSURE: 70 MMHG | TEMPERATURE: 98.2 F

## 2017-03-28 VITALS
OXYGEN SATURATION: 97 % | SYSTOLIC BLOOD PRESSURE: 137 MMHG | TEMPERATURE: 98.2 F | HEART RATE: 81 BPM | RESPIRATION RATE: 17 BRPM | DIASTOLIC BLOOD PRESSURE: 78 MMHG

## 2017-03-28 PROCEDURE — 3331090002 HH PPS REVENUE DEBIT

## 2017-03-28 PROCEDURE — 3331090001 HH PPS REVENUE CREDIT

## 2017-03-29 ENCOUNTER — HOME CARE VISIT (OUTPATIENT)
Dept: SCHEDULING | Facility: HOME HEALTH | Age: 67
End: 2017-03-29
Payer: MEDICARE

## 2017-03-29 VITALS — TEMPERATURE: 98 F | HEART RATE: 97 BPM

## 2017-03-29 PROCEDURE — 3331090002 HH PPS REVENUE DEBIT

## 2017-03-29 PROCEDURE — G0152 HHCP-SERV OF OT,EA 15 MIN: HCPCS

## 2017-03-29 PROCEDURE — 3331090001 HH PPS REVENUE CREDIT

## 2017-03-29 PROCEDURE — G0300 HHS/HOSPICE OF LPN EA 15 MIN: HCPCS

## 2017-03-30 ENCOUNTER — HOME CARE VISIT (OUTPATIENT)
Dept: SCHEDULING | Facility: HOME HEALTH | Age: 67
End: 2017-03-30
Payer: MEDICARE

## 2017-03-30 VITALS
TEMPERATURE: 98 F | RESPIRATION RATE: 18 BRPM | DIASTOLIC BLOOD PRESSURE: 81 MMHG | SYSTOLIC BLOOD PRESSURE: 142 MMHG | OXYGEN SATURATION: 95 % | HEART RATE: 85 BPM

## 2017-03-30 PROCEDURE — 3331090001 HH PPS REVENUE CREDIT

## 2017-03-30 PROCEDURE — 3331090002 HH PPS REVENUE DEBIT

## 2017-03-30 PROCEDURE — G0151 HHCP-SERV OF PT,EA 15 MIN: HCPCS

## 2017-03-31 ENCOUNTER — PATIENT OUTREACH (OUTPATIENT)
Dept: CARDIOLOGY CLINIC | Age: 67
End: 2017-03-31

## 2017-03-31 ENCOUNTER — CLINICAL SUPPORT (OUTPATIENT)
Dept: CARDIOLOGY CLINIC | Age: 67
End: 2017-03-31

## 2017-03-31 ENCOUNTER — HOME CARE VISIT (OUTPATIENT)
Dept: SCHEDULING | Facility: HOME HEALTH | Age: 67
End: 2017-03-31
Payer: MEDICARE

## 2017-03-31 VITALS — WEIGHT: 254 LBS | BODY MASS INDEX: 37.51 KG/M2

## 2017-03-31 DIAGNOSIS — I49.9 IRREGULAR HEART BEAT: Primary | ICD-10-CM

## 2017-03-31 PROCEDURE — 3331090001 HH PPS REVENUE CREDIT

## 2017-03-31 PROCEDURE — 3331090002 HH PPS REVENUE DEBIT

## 2017-03-31 PROCEDURE — G0300 HHS/HOSPICE OF LPN EA 15 MIN: HCPCS

## 2017-03-31 NOTE — PROGRESS NOTES
Heart Failure liaison contacted the patient by telephone to perform CHF bundle Follow Up. Verified  and address as identifiers.       Spoke to patient regarding the following:    [facility] Have you followed up with PCP? Yes Aletha Sanches;     Transportation:  Wife drives  Daily Weight: 254.0lb decrease  Zone: green  Signs/Symptoms: Swelling denies; SOB denies; how many pillows to sleep? one  Diet: low sodium will give information at appointment 4/3/17 per patient request  Exercise: walking outside. Patient-centered Goal patient has requested more information on diet related information, would like to learn more on cooking healthy, and how to portion control  Medications Reconciled at this time:  yes  Home health?: yes and PT, OT    Patient reminded that there is a cardiologist on call 24 hours a day / 7 days a week (M-F 5pm to 8am and from Friday 5pm until Monday 8a for the weekend) should the patient have questions or concerns. Patient reminded to call 911 if situation is emergent or patient feels the situation is emergent. Provided pt with [facility] office telephone number, [phone].     Pt has follow up with PCP on 17  and with cardiology on 4.3.17, Endocrinology Dr Lola Fothergill, Dr Stevie Sacks 17

## 2017-03-31 NOTE — PROGRESS NOTES
Attempted outreach for BUTCH MUNOZ  on home number listed [phone]. HOWIE advised patient to call [phone] for follow up call.

## 2017-04-01 PROCEDURE — 3331090002 HH PPS REVENUE DEBIT

## 2017-04-01 PROCEDURE — 3331090001 HH PPS REVENUE CREDIT

## 2017-04-02 PROCEDURE — 3331090002 HH PPS REVENUE DEBIT

## 2017-04-02 PROCEDURE — 3331090001 HH PPS REVENUE CREDIT

## 2017-04-03 ENCOUNTER — OFFICE VISIT (OUTPATIENT)
Dept: ENDOCRINOLOGY | Age: 67
End: 2017-04-03

## 2017-04-03 ENCOUNTER — CLINICAL SUPPORT (OUTPATIENT)
Dept: CARDIOLOGY CLINIC | Age: 67
End: 2017-04-03

## 2017-04-03 VITALS
SYSTOLIC BLOOD PRESSURE: 130 MMHG | HEART RATE: 66 BPM | OXYGEN SATURATION: 98 % | BODY MASS INDEX: 37.6 KG/M2 | RESPIRATION RATE: 17 BRPM | WEIGHT: 254.6 LBS | TEMPERATURE: 98.2 F | DIASTOLIC BLOOD PRESSURE: 72 MMHG

## 2017-04-03 VITALS
DIASTOLIC BLOOD PRESSURE: 76 MMHG | BODY MASS INDEX: 37.75 KG/M2 | HEIGHT: 69 IN | SYSTOLIC BLOOD PRESSURE: 139 MMHG | HEART RATE: 68 BPM | WEIGHT: 254.9 LBS

## 2017-04-03 DIAGNOSIS — E03.9 HYPOTHYROIDISM, UNSPECIFIED TYPE: ICD-10-CM

## 2017-04-03 DIAGNOSIS — I10 ESSENTIAL HYPERTENSION WITH GOAL BLOOD PRESSURE LESS THAN 130/80: ICD-10-CM

## 2017-04-03 DIAGNOSIS — I25.10 CORONARY ARTERY DISEASE INVOLVING NATIVE CORONARY ARTERY OF NATIVE HEART WITHOUT ANGINA PECTORIS: ICD-10-CM

## 2017-04-03 DIAGNOSIS — R06.02 SOB (SHORTNESS OF BREATH): ICD-10-CM

## 2017-04-03 DIAGNOSIS — E11.49 TYPE 2 DIABETES MELLITUS WITH OTHER DIABETIC NEUROLOGICAL COMPLICATION (HCC): Primary | ICD-10-CM

## 2017-04-03 DIAGNOSIS — I48.91 ATRIAL FIBRILLATION, UNSPECIFIED TYPE (HCC): ICD-10-CM

## 2017-04-03 DIAGNOSIS — N18.4 CKD (CHRONIC KIDNEY DISEASE) STAGE 4, GFR 15-29 ML/MIN (HCC): ICD-10-CM

## 2017-04-03 DIAGNOSIS — E78.5 HYPERLIPIDEMIA, UNSPECIFIED HYPERLIPIDEMIA TYPE: ICD-10-CM

## 2017-04-03 DIAGNOSIS — E55.9 VITAMIN D DEFICIENCY: ICD-10-CM

## 2017-04-03 PROCEDURE — 3331090002 HH PPS REVENUE DEBIT

## 2017-04-03 PROCEDURE — 3331090001 HH PPS REVENUE CREDIT

## 2017-04-03 RX ORDER — KETOCONAZOLE 20 MG/G
CREAM TOPICAL
COMMUNITY
Start: 2017-04-03 | End: 2017-06-22

## 2017-04-03 RX ORDER — INSULIN ASPART 100 [IU]/ML
INJECTION, SUSPENSION SUBCUTANEOUS
COMMUNITY
End: 2017-04-12 | Stop reason: DRUGHIGH

## 2017-04-03 NOTE — PATIENT INSTRUCTIONS
No change in dose today. Send a glucose log in 1-2 weeks so we can make proper changes for you. Fax, email, or mail it to our clinic, no problem  Plan for a 3 month f/ visit    ----------------------------------------------------------------------------------------------------------------------    Below you will find a glucose log sheet which you can use to record your blood sugars. Without checking and recording what your home glucose levels are, it will be difficult to make any changes to your medication dose, even when significant changes may be needed. Please feel free to use the log below to record your home glucose levels. At the very least, I would like for you to login the entire 2-3 weeks just before your visit so we can make your visit much more productive and beneficial to you. GLUCOSE LOG SHEET:    Date Breakfast Lunch Dinner Bedtime Comments ? GLUCOSE LOG SHEET:    Date Breakfast Lunch Dinner Bedtime Comments ? GLUCOSE LOG SHEET:    Date Breakfast Lunch Dinner Bedtime Comments ? Dr. Renetta Yin to basics:    When you have diabetes or pre-diabetes, as you know, your body has difficulty dealing with the sugar it absorbs from your meals.  An unrelated but very relevant term you may have heard before, quantitative easing, has a new meaning: By gradually reducing the load of sugars entering the body, you ease the stress on the body and allow it to catch up with the work it must do. This in turn will allow your body to work better. On top of this, remember one point, the more sugar stress your body has, the more you enter a state of glucose toxicity and this is a state where you become even more resistant. Thats right higher sugar = more resistance, not only to your own bodies attempt to fix the problem but also resistance to your medications. This is why medications work best when a proper diet is followed. Tip 1. Dont forget the protein. When you add a portion of meat or other low carb protein food in your meal, it provides healthy calories which contribute to reducing that feeling of hunger that drives you to eat what you dont want. Tip 2. Portions are a real thing. Before you eat, stop and look at your plate/table. Count how many items have sugars/carbs in them. A sandwich (bread) ? Armand Yuri ? Sweet drink ? Potatoe ? Pasta ? Rice ? You would be surprised when you become aware. Aim for a reasonable portion of carbs, and if you feel you absolutely cannot do this, at least start working toward this. 45-60 grams is usually more than enough in one meal. And YES, than includes the desert! Tip 3. Three meals per day, snack-free in between! Your body needs a break. Eating an adequate meal keeps you from getting hungry and reaching for a snack in between meals. Recall that most of the time, diabetic patients are not treating these snacks. Dont eat dinner late at night, but rather allow more overnight fasting time for your body to recover.  If you eat dinner at 8 PM, try 6 PM.  Sporadic eating is the opposite of what you need, and adjusting to a regular eating schedule such as this will not only be a great benefit to your body, but your medications will also tend to work better at keeping your diabetes under control. Tip 4. There is no best diet when it comes to weight loss. When comparing diets and outcomes, the main ingredient when searching for weight loss was calories ! Lower calories = better weight loss. Pick a healthy diet thats right for you, i.e. diabetes friendly, and evaluate your daily calorie intake. And of course this would be of no use without exercise. Calories in need calories out.

## 2017-04-03 NOTE — MR AVS SNAPSHOT
Visit Information Date & Time Provider Department Dept. Phone Encounter #  
 4/3/2017  2:50 PM Sirena Toledo, [address] [place] [address] Diabetes and Endocrinology [phone] [id number] Your Appointments 4/4/2017 10:20 AM  
New Patient with Tamie Ingram MD  
[address] [facility] [place] ([facility]) Appt Note: NP_ ref by Dr Phil Kimball_ snoring, witnessed apneas, daytime sleepiness, afib_ Medicare + [address] [facility] [address] [address] [facility], [address] [address] Upcoming Health Maintenance Date Due Hepatitis C Screening 1950 FOOT EXAM Q1 1/28/1959 MICROALBUMIN Q1 1/28/1959 EYE EXAM RETINAL OR DILATED Q1 1/28/1959 DTaP/Tdap/Td series (1 - Tdap) 1/28/1970 FOBT Q 1 YEAR AGE 50-75 1/28/1999 ZOSTER VACCINE AGE 60> 1/28/2009 GLAUCOMA SCREENING Q2Y 1/28/2014 Pneumococcal 65+ Low/Medium Risk (1 of 2 - PCV13) 1/28/2014 MEDICARE YEARLY EXAM 1/28/2014 INFLUENZA AGE 9 TO ADULT 8/1/2016 HEMOGLOBIN A1C Q6M 9/13/2017 LIPID PANEL Q1 12/15/2017 Allergies as of 4/3/2017  Review Complete On: 4/3/2017 By: Sirena Toledo MD  
  
 Severity Noted Reaction Type Reactions Ciprofloxacin  03/07/2017    Nausea Only Percocet [Oxycodone-acetaminophen]  03/07/2017    Other (comments)  
 hallucinations Current Immunizations  Never Reviewed No immunizations on file. Not reviewed this visit You Were Diagnosed With   
  
 Codes Comments Type 2 diabetes mellitus with other diabetic neurological complication (HCC)    -  Primary ICD-10-CM: E11.49 
ICD-9-CM: 250.60 Essential hypertension with goal blood pressure less than 130/80     ICD-10-CM: I10 
ICD-9-CM: 401.9 Hyperlipidemia, unspecified hyperlipidemia type     ICD-10-CM: E78.5 ICD-9-CM: 272.4 Vitamin D deficiency     ICD-10-CM: E55.9 ICD-9-CM: 268.9 Hypothyroidism, unspecified type     ICD-10-CM: E03.9 ICD-9-CM: 433. 9 Vitals BP Pulse Height(growth percentile) Weight(growth percentile) BMI Smoking Status 139/76 (BP 1 Location: Left arm, BP Patient Position: Sitting) 68 5' 9\" (1.753 m) 254 lb 14.4 oz (115.6 kg) 37.64 kg/m2 Never Smoker Vitals History BMI and BSA Data Body Mass Index Body Surface Area  
 37.64 kg/m 2 2.37 m 2 Preferred Pharmacy Pharmacy Name Phone [id number] [address] [phone] Your Updated Medication List  
  
   
This list is accurate as of: 4/3/17  3:59 PM.  Always use your most recent med list.  
  
  
  
  
 apixaban 5 mg tablet Commonly known as:  Claudean Fries Take 1 Tab by mouth two (2) times a day. Indications: PREVENT THROMBOEMBOLISM IN CHRONIC ATRIAL FIBRILLATION  
  
 b complex vitamins tablet Take 1 Tab by mouth daily. bumetanide 1 mg tablet Commonly known as:  Beckey Pancoast Take 1 Tab by mouth two (2) times a day. calcium carbonate 600 mg calcium (1,500 mg) tablet Commonly known as:  Milinda Stare Take 600 mg by mouth every Tuesday, Thursday, Saturday & Sunday. dutasteride 0.5 mg capsule Commonly known as:  AVODART TAKE ONE CAPSULE BY MOUTH EVERY DAY  
  
 ROBBY-C PO Take 500 mg by mouth daily. ketoconazole 2 % topical cream  
Commonly known as:  NIZORAL  
  
 levothyroxine 137 mcg tablet Commonly known as:  SYNTHROID  
TAKE 1 TABLET(S) EVERY DAY BY ORAL ROUTE FOR 90 DAYS. metOLazone 2.5 mg tablet Commonly known as:  Aamir Maroon Take 1 Tab by mouth daily. metoprolol succinate 100 mg tablet Commonly known as:  TOPROL-XL Take 1 Tab by mouth two (2) times a day. multivitamin tablet Commonly known as:  ONE A DAY Take 1 Tab by mouth daily. NovoLOG Mix 70-30 FlexPen 100 unit/mL (70-30) Inpn Generic drug:  insulin aspart protamine/insulin aspart by SubCUTAneous route. paricalcitol 1 mcg capsule Commonly known as:  Markell Molly Take 1 mcg by mouth daily. pravastatin 80 mg tablet Commonly known as:  PRAVACHOL Take 1 Tab by mouth nightly. tamsulosin 0.4 mg capsule Commonly known as:  FLOMAX TAKE 2 CAPSULES BY MOUTH EVERY DAY  
  
 TYLENOL EXTRA STRENGTH 500 mg tablet Generic drug:  acetaminophen Take 1,000 mg by mouth every six (6) hours as needed for Pain. VITAMIN D3 1,000 unit Cap Generic drug:  cholecalciferol Take 3,000 Units by mouth daily. We Performed the Following CHOLESTEROL, TOTAL [47535 CPT(R)]  DIABETES FOOT EXAM [HM7 Custom] LDL, DIRECT [id number] CPT(R)] METABOLIC PANEL, COMPREHENSIVE [20467 CPT(R)] MICROALBUMIN, UR, RAND W/ MICROALBUMIN/CREA RATIO [id number] CPT(R)] T4, FREE [id number] CPT(R)] TSH 3RD GENERATION [43726 CPT(R)] VITAMIN D, 25 HYDROXY [id number] CPT(R)] To-Do List   
 04/04/2017 12:00 AM  
  Appointment with Pinky Rivers LPN at [place] [address]  
  
 04/05/2017 1:00 PM  
  Appointment with Juan Diego Tay at [place] [address]  
  
 04/06/2017 To Be Determined Appointment with Juan Diego Tay at [place] [address]  
  
 04/07/2017 To Be Determined Appointment with Pinky Rivers LPN at [place] [address]  
  
 04/13/2017 To Be Determined Appointment with Tressa Akins RN at [place] [address] Patient Instructions No change in dose today. Send a glucose log in 1-2 weeks so we can make proper changes for you. Fax, email, or mail it to our clinic, no problem Plan for a 3 month f/ visit 
 
---------------------------------------------------------------------------------------------------------------------- Below you will find a glucose log sheet which you can use to record your blood sugars. Without checking and recording what your home glucose levels are, it will be difficult to make any changes to your medication dose, even when significant changes may be needed. Please feel free to use the log below to record your home glucose levels. At the very least, I would like for you to login the entire 2-3 weeks just before your visit so we can make your visit much more productive and beneficial to you. GLUCOSE LOG SHEET: 
 
Date Breakfast Lunch Dinner Bedtime Comments ? GLUCOSE LOG SHEET: 
 
Date Breakfast Lunch Dinner Bedtime Comments ? GLUCOSE LOG SHEET: 
 
Date Breakfast Lunch Dinner Bedtime Comments ? Dr. Pack Harsh Back to basics: 
 
When you have diabetes or pre-diabetes, as you know, your body has difficulty dealing with the sugar it absorbs from your meals. An unrelated but very relevant term you may have heard before, quantitative easing, has a new meaning: By gradually reducing the load of sugars entering the body, you ease the stress on the body and allow it to catch up with the work it must do. This in turn will allow your body to work better.  On top of this, remember one point, the more sugar stress your body has, the more you enter a state of glucose toxicity and this is a state where you become even more resistant. Thats right higher sugar = more resistance, not only to your own bodies attempt to fix the problem but also resistance to your medications. This is why medications work best when a proper diet is followed. Tip 1. Dont forget the protein. When you add a portion of meat or other low carb protein food in your meal, it provides healthy calories which contribute to reducing that feeling of hunger that drives you to eat what you dont want. Tip 2. Portions are a real thing. Before you eat, stop and look at your plate/table. Count how many items have sugars/carbs in them. A sandwich (bread) ? Hollis Fennel ? Sweet drink ? Potatoe ? Pasta ? Rice ? You would be surprised when you become aware. Aim for a reasonable portion of carbs, and if you feel you absolutely cannot do this, at least start working toward this. 45-60 grams is usually more than enough in one meal. And YES, than includes the desert! Tip 3. Three meals per day, snack-free in between! Your body needs a break. Eating an adequate meal keeps you from getting hungry and reaching for a snack in between meals. Recall that most of the time, diabetic patients are not treating these snacks. Dont eat dinner late at night, but rather allow more overnight fasting time for your body to recover. If you eat dinner at 8 PM, try 6 PM.  Sporadic eating is the opposite of what you need, and adjusting to a regular eating schedule such as this will not only be a great benefit to your body, but your medications will also tend to work better at keeping your diabetes under control. Tip 4. There is no best diet when it comes to weight loss. When comparing diets and outcomes, the main ingredient when searching for weight loss was calories ! Lower calories = better weight loss.  Pick a healthy diet thats right for you, i.e. diabetes friendly, and evaluate your daily calorie intake. And of course this would be of no use without exercise. Calories in need calories out. Introducing [facility]! Chris Whitaker introduces [employer] patient portal. Now you can access parts of your medical record, email your doctor's office, and request medication refills online. 1. In your internet browser, go to [email or web address] 2. Click on the First Time User? Click Here link in the Sign In box. You will see the New Member Sign Up page. 3. Enter your [employer] Access Code exactly as it appears below. You will not need to use this code after youve completed the sign-up process. If you do not sign up before the expiration date, you must request a new code. · [employer] Access Code: [email or web address] Expires: 6/5/2017 10:26 AM 
 
4. Enter the last four digits of your Social Security Number (xxxx) and Date of Birth (mm/dd/yyyy) as indicated and click Submit. You will be taken to the next sign-up page. 5. Create a [employer]plus ID. This will be your [employer] login ID and cannot be changed, so think of one that is secure and easy to remember. 6. Create a [employer] password. You can change your password at any time. 7. Enter your Password Reset Question and Answer. This can be used at a later time if you forget your password. 8. Enter your e-mail address. You will receive e-mail notification when new information is available in [address] 9. Click Sign Up. You can now view and download portions of your medical record. 10. Click the Download Summary menu link to download a portable copy of your medical information. If you have questions, please visit the Frequently Asked Questions section of the [employer] website. Remember, [employer] is NOT to be used for urgent needs. For medical emergencies, dial 911. Now available from your iPhone and Android! Please provide this summary of care documentation to your next provider. Your primary care clinician is listed as [employer]. If you have any questions after today's visit, please call [phone].

## 2017-04-03 NOTE — PROGRESS NOTES
CONSULTATION REQUESTED BY: Kim Gutiérrez, PA     REASON FOR CONSULT:  Uncontrolled type 2 diabetes and hypothyroidism    CHIEF COMPLAINT: Blood glucose is high    HISTORY OF PRESENT ILLNESS:   Moses Schofield is a 75 y.o. male with a PMHx as noted below who was referred to our endocrinology clinic for evaluation of uncontrolled type 2 diabetes. 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 His health is complicated by CHF, CAD, and CKD, follows with cardiology and nephrology. Moved from [facility] and is establishing care with his needed specialties. Family History of diabetes is positive in his father. Last A1c prior to initial visit is 8% as of 3/13/17  Regimen at time of establishing care includes  - Novolog 70-30 insulin. 26 units / 34 units    Review of home glucose: checks AM/Dinner/Bedtime,  No sugar log, does not remember what it has ranged in the past week. AM       This AM was 136  Lunch  Dinner  Bedtime          Hypothyroidism: On 137 mcg of levothyroxine, had been increased last October 2016.        Vitamin D deficiency on 3000 units daily     PAST MEDICAL/SURGICAL HISTORY:   Past Medical History:   Diagnosis Date    CAD (coronary atherosclerotic disease)     Diabetes ([address]     Diabetes mellitus type 2, controlled ([address] 3/13/2017    Heart failure ([address]     Hx of CABG 3/13/2017    CABG in 2010 in [address] Morbid obesity ([address] 3/13/2017    S/P CABG x 2 2010     Past Surgical History:   Procedure Laterality Date    CARDIAC SURG PROCEDURE UNLIST      CABGx2 in 2010    HX ORTHOPAEDIC      L 3rd toe amputation in 1999       ALLERGIES:   Allergies   Allergen Reactions    Ciprofloxacin Nausea Only    Percocet [Oxycodone-Acetaminophen] Other (comments)     hallucinations       MEDICATIONS ON ADMISSION:     Current Outpatient Prescriptions:     insulin aspart protamine/insulin aspart (NOVOLOG MIX 70-30 FLEXPEN) 100 unit/mL (70-30) inpn, by SubCUTAneous route., Disp: , Rfl:     ketoconazole (NIZORAL) 2 % topical cream, , Disp: , Rfl:     bumetanide (BUMEX) 1 mg tablet, Take 1 Tab by mouth two (2) times a day., Disp: 60 Tab, Rfl: 3    pravastatin (PRAVACHOL) 80 mg tablet, Take 1 Tab by mouth nightly., Disp: 30 Tab, Rfl: 6    apixaban (ELIQUIS) 5 mg tablet, Take 1 Tab by mouth two (2) times a day. Indications: PREVENT THROMBOEMBOLISM IN CHRONIC ATRIAL FIBRILLATION, Disp: 60 Tab, Rfl: 0    calcium carbonate (CALTREX) 600 mg calcium (1,500 mg) tablet, Take 600 mg by mouth every Tuesday, Thursday, Saturday & Sunday. , Disp: , Rfl:     ASCORBATE CALCIUM (ROBBY-C PO), Take 500 mg by mouth daily. , Disp: , Rfl:     dutasteride (AVODART) 0.5 mg capsule, TAKE ONE CAPSULE BY MOUTH EVERY DAY, Disp: , Rfl: 2    levothyroxine (SYNTHROID) 137 mcg tablet, TAKE 1 TABLET(S) EVERY DAY BY ORAL ROUTE FOR 90 DAYS., Disp: , Rfl: 3    tamsulosin (FLOMAX) 0.4 mg capsule, TAKE 2 CAPSULES BY MOUTH EVERY DAY, Disp: , Rfl: 2    paricalcitol (ZEMPLAR) 1 mcg capsule, Take 1 mcg by mouth daily. , Disp: , Rfl:     multivitamin (ONE A DAY) tablet, Take 1 Tab by mouth daily. , Disp: , Rfl:     b complex vitamins tablet, Take 1 Tab by mouth daily. , Disp: , Rfl:     cholecalciferol (VITAMIN D3) 1,000 unit cap, Take 3,000 Units by mouth daily. , Disp: , Rfl:     acetaminophen (TYLENOL EXTRA STRENGTH) 500 mg tablet, Take 1,000 mg by mouth every six (6) hours as needed for Pain., Disp: , Rfl:     metoprolol succinate (TOPROL-XL) 100 mg tablet, Take 1 Tab by mouth two (2) times a day., Disp: 180 Tab, Rfl: 2    metOLazone (ZAROXOLYN) 2.5 mg tablet, Take 1 Tab by mouth daily. , Disp: 80 Tab, Rfl: 2    SOCIAL HISTORY:   Social History     Social History    Marital status:      Spouse name: N/A    Number of children: N/A    Years of education: N/A     Occupational History    Not on file.      Social History Main Topics    Smoking status: Never Smoker    Smokeless tobacco: Never Used    Alcohol use Yes      Comment: rare    Drug use: No    Sexual activity: Not on file     Other Topics Concern    Not on file     Social History Narrative       FAMILY HISTORY:  Family History   Problem Relation Age of Onset    Heart Attack Father        REVIEW OF SYSTEMS: Complete ROS assessed and noted for that which is described above, all else are negative. Eyes: normal  ENT: normal  CVS: normal  Resp: normal  GI: normal  : normal  GYN: normal  Endocrine: normal  Integument: normal  Musculoskeletal: normal  Neuro: normal  Psych: normal      PHYSICAL EXAMINATION:    VITAL SIGNS:  Visit Vitals    /76 (BP 1 Location: Left arm, BP Patient Position: Sitting)    Pulse 68    Ht 5' 9\" (1.753 m)    Wt 254 lb 14.4 oz (115.6 kg)    BMI 37.64 kg/m2       GENERAL: NCAT, Sitting comfortably, NAD  EYES: EOMI, non-icteric, no proptosis  Ear/Nose/Throat: NCAT, no inflammation, no masses  LYMPH NODES: No LAD  CARDIOVASCULAR: S1 S2, RRR, No murmur, 2+ radial pulses  RESPIRATORY: CTA b/l, no wheeze/rales  GASTROINTESTINAL: obese, NT, ND  MUSCULOSKELETAL: Normal ROM, no atrophy  SKIN: warm, no edema/rash/ or other skin changes  NEUROLOGIC: 5/5 power all extremities, see foot exam below, no tremor, AAOx3  PSYCHIATRIC: Normal affect, Normal insight and judgement         Diabetic foot exam performed by Patricia Underwood MD     Measurement  Response Nurse Comment Physician Comment   Monofilament  R - absent sensation  L - Poor sensation     Pulse DP R - 1+ (weak)  L - 2+ (normal)     Vibration R - Normal    L - Normal     Structural deformity R - Yes - 3rd L digit amputation, toe deformities  L - Yes - Toe deformities     Skin Integrity / Deformity R - None  L - None        Reviewed by:             REVIEW OF LABORATORY AND RADIOLOGY DATA:   Labs and documentation have been reviewed as described above.      ASSESSMENT AND PLAN:   Kelli James is a 75 y.o. male with a PMHx as noted above who was referred to our endocrinology clinic for evaluation of uncontrolled type 2 diabetes. Problems:  Type 2 diabetes Uncontrolled  Hyperlipidemia  Hypertension  Hypothyroidism  Vitamin D deficiency    We had the pleasure of reviewing together the basics of diabetes including basic pathophysiology and diabetes care. We further discussed the importance of checking home glucose regularly and taking all of their scheduled medications in order to have the best possible outcome. I was able to answer any questions they had in clinic today and they are invited to reach me if they have any further questions. Based upon our discussion together today we have decided to make the following changes:    PLAN  Type 2 Diabetes  Medications:  Continue Novolog 70-30  26 units / 34 units, will adjust after seeing a glucose record, he does not recall any home values. Advised to check glucose qAM/Dinner/Bedtime  Provided with glucose log sheets for later review. Labs: CMP, FLP, Urine microalbumin  Feet: Examination performed today. Encouraged regular \"self-checks\" at home. Eyes: Refered  Kidney: CKD, GFR 20's, follows with nephrologist    HTN: BP stable on current medications, no changes today. HLD: lipids are tight, on 80mg of pravachol, not sure that he needs a full dose, ie. interactions/sideffects in elderly. Vitamin D deficiency: Check level, continue 3000 units daily  Hypothyroidism: Discussed proper way to take levothyroxine, was taking with other meds. 137 mcg, will adjust based on level. RTC: I would like to see them back in 3 months. Glucose log to be sent to me in 1-2 weeks for adjustment. >60 minutes spent together with patient today of which >50% of this time was spent in counseling and coordination of care. Deangelo Agustin.  [address] [facility] Diabetes & Endocrinology

## 2017-04-04 ENCOUNTER — HOSPITAL ENCOUNTER (OUTPATIENT)
Dept: SLEEP MEDICINE | Age: 67
Discharge: HOME | End: 2017-04-04
Payer: MEDICARE

## 2017-04-04 ENCOUNTER — TELEPHONE (OUTPATIENT)
Dept: CARDIOLOGY CLINIC | Age: 67
End: 2017-04-04

## 2017-04-04 ENCOUNTER — HOME CARE VISIT (OUTPATIENT)
Dept: HOME HEALTH SERVICES | Facility: HOME HEALTH | Age: 67
End: 2017-04-04
Payer: MEDICARE

## 2017-04-04 ENCOUNTER — OFFICE VISIT (OUTPATIENT)
Dept: SLEEP MEDICINE | Age: 67
End: 2017-04-04

## 2017-04-04 VITALS
BODY MASS INDEX: 37.47 KG/M2 | DIASTOLIC BLOOD PRESSURE: 82 MMHG | WEIGHT: 253 LBS | OXYGEN SATURATION: 94 % | TEMPERATURE: 99.1 F | HEIGHT: 69 IN | SYSTOLIC BLOOD PRESSURE: 140 MMHG | HEART RATE: 102 BPM

## 2017-04-04 DIAGNOSIS — E66.9 OBESITY (BMI 30-39.9): ICD-10-CM

## 2017-04-04 DIAGNOSIS — G47.33 OSA (OBSTRUCTIVE SLEEP APNEA): Primary | ICD-10-CM

## 2017-04-04 LAB
25(OH)D3+25(OH)D2 SERPL-MCNC: 46.3 NG/ML (ref 30–100)
ALBUMIN SERPL-MCNC: 4.2 G/DL (ref 3.6–4.8)
ALBUMIN/CREAT UR: 212.9 MG/G CREAT (ref 0–30)
ALBUMIN/GLOB SERPL: 1.4 {RATIO} (ref 1.2–2.2)
ALP SERPL-CCNC: 53 IU/L (ref 39–117)
ALT SERPL-CCNC: 15 IU/L (ref 0–44)
AST SERPL-CCNC: 34 IU/L (ref 0–40)
BILIRUB SERPL-MCNC: 0.7 MG/DL (ref 0–1.2)
BUN SERPL-MCNC: 84 MG/DL (ref 8–27)
BUN/CREAT SERPL: 30 (ref 10–24)
CALCIUM SERPL-MCNC: 9.6 MG/DL (ref 8.6–10.2)
CHLORIDE SERPL-SCNC: 89 MMOL/L (ref 96–106)
CHOLEST SERPL-MCNC: 92 MG/DL (ref 100–199)
CO2 SERPL-SCNC: 30 MMOL/L (ref 18–29)
CREAT SERPL-MCNC: 2.76 MG/DL (ref 0.76–1.27)
CREAT UR-MCNC: 51.8 MG/DL
GLOBULIN SER CALC-MCNC: 2.9 G/DL (ref 1.5–4.5)
GLUCOSE SERPL-MCNC: 207 MG/DL (ref 65–99)
INTERPRETATION: NORMAL
LDLC SERPL DIRECT ASSAY-MCNC: 43 MG/DL (ref 0–99)
Lab: NORMAL
MICROALBUMIN UR-MCNC: 110.3 UG/ML
POTASSIUM SERPL-SCNC: 4.2 MMOL/L (ref 3.5–5.2)
PROT SERPL-MCNC: 7.1 G/DL (ref 6–8.5)
SODIUM SERPL-SCNC: 140 MMOL/L (ref 134–144)
SPECIMEN STATUS REPORT, ROLRST: NORMAL
T4 FREE SERPL-MCNC: 1.75 NG/DL (ref 0.82–1.77)
TSH SERPL DL<=0.005 MIU/L-ACNC: 1.53 UIU/ML (ref 0.45–4.5)

## 2017-04-04 PROCEDURE — 3331090002 HH PPS REVENUE DEBIT

## 2017-04-04 PROCEDURE — 95806 SLEEP STUDY UNATT&RESP EFFT: CPT

## 2017-04-04 PROCEDURE — 3331090001 HH PPS REVENUE CREDIT

## 2017-04-04 NOTE — TELEPHONE ENCOUNTER
Notes Recorded by Emmie Zelaya LPN on 6/0/6153 at 6:10 PM  Verified patient with two identifiers.  Patient aware stress test looks fine.

## 2017-04-04 NOTE — PROGRESS NOTES
[address] [facility] [place]. [place], [address]  Tel.  [phone]  Fax. 100 [facility] 60  [place], [address] [facility]  Tel.  [phone]  Fax. [phone] [address] [address] Kimberly Villalpando   Tel.  [phone]  Fax. [phone]         Subjective:      Bashir Galindo is an 75 y.o. male referred for evaluation for a sleep disorder. He complains of awakening in the middle of the night because of palpitations, back pain associated with tossing and turning. Symptoms began several years ago, unchanged since that time. He usually can fall asleep in 5 minutes. Family or house members note snoring, periods of not breathing. He denies completely or partially paralyzed while falling asleep or waking up. Bashir Galindo does not wake up frequently at night. He is not bothered by waking up too early and left unable to get back to sleep. He actually sleeps about 6 hours at night and wakes up about 3 times during the night. He does not work shifts: Gregoria Nichols indicates he does not get too little sleep at night. His bedtime is 1200. He awakens at 0730. He does not take naps. . He has the following observed behaviors: Pauses in breathing; Nightmares. Other remarks:      Liscomb Sleepiness Score: 5   which reflect moderate daytime drowsiness. Allergies   Allergen Reactions    Ciprofloxacin Nausea Only    Percocet [Oxycodone-Acetaminophen] Other (comments)     hallucinations         Current Outpatient Prescriptions:     insulin aspart protamine/insulin aspart (NOVOLOG MIX 70-30 FLEXPEN) 100 unit/mL (70-30) inpn, by SubCUTAneous route., Disp: , Rfl:     bumetanide (BUMEX) 1 mg tablet, Take 1 Tab by mouth two (2) times a day., Disp: 60 Tab, Rfl: 3    pravastatin (PRAVACHOL) 80 mg tablet, Take 1 Tab by mouth nightly., Disp: 30 Tab, Rfl: 6    apixaban (ELIQUIS) 5 mg tablet, Take 1 Tab by mouth two (2) times a day.  Indications: PREVENT THROMBOEMBOLISM IN CHRONIC ATRIAL FIBRILLATION, Disp: 60 Tab, Rfl: 0    calcium carbonate (CALTREX) 600 mg calcium (1,500 mg) tablet, Take 600 mg by mouth every Tuesday, Thursday, Saturday & Sunday. , Disp: , Rfl:     ASCORBATE CALCIUM (ROBBY-C PO), Take 500 mg by mouth daily. , Disp: , Rfl:     dutasteride (AVODART) 0.5 mg capsule, TAKE ONE CAPSULE BY MOUTH EVERY DAY, Disp: , Rfl: 2    levothyroxine (SYNTHROID) 137 mcg tablet, TAKE 1 TABLET(S) EVERY DAY BY ORAL ROUTE FOR 90 DAYS., Disp: , Rfl: 3    tamsulosin (FLOMAX) 0.4 mg capsule, TAKE 2 CAPSULES BY MOUTH EVERY DAY, Disp: , Rfl: 2    paricalcitol (ZEMPLAR) 1 mcg capsule, Take 1 mcg by mouth daily. , Disp: , Rfl:     multivitamin (ONE A DAY) tablet, Take 1 Tab by mouth daily. , Disp: , Rfl:     b complex vitamins tablet, Take 1 Tab by mouth daily. , Disp: , Rfl:     cholecalciferol (VITAMIN D3) 1,000 unit cap, Take 3,000 Units by mouth daily. , Disp: , Rfl:     acetaminophen (TYLENOL EXTRA STRENGTH) 500 mg tablet, Take 1,000 mg by mouth every six (6) hours as needed for Pain., Disp: , Rfl:     metoprolol succinate (TOPROL-XL) 100 mg tablet, Take 1 Tab by mouth two (2) times a day., Disp: 180 Tab, Rfl: 2    metOLazone (ZAROXOLYN) 2.5 mg tablet, Take 1 Tab by mouth daily. , Disp: 90 Tab, Rfl: 2    ketoconazole (NIZORAL) 2 % topical cream, , Disp: , Rfl:      He  has a past medical history of CAD (coronary atherosclerotic disease); Diabetes ([facility] [address] Diabetes mellitus type 2, controlled ([address] (3/13/2017); Heart failure ([facility] [address] CABG (3/13/2017); Morbid obesity ([facility] [address] (3/13/2017); and S/P CABG x 2 (2010). He  has a past surgical history that includes cardiac surg procedure unlist and orthopaedic. He family history includes Heart Attack in his father. He  reports that he has never smoked. He has never used smokeless tobacco. He reports that he drinks alcohol. He reports that he does not use illicit drugs.      Review of Systems:  Constitutional:  No significant weight loss or weight gain.  Eyes:  No blurred vision. CVS:  No significant chest pain  Pulm:  No significant shortness of breath  GI:  No significant nausea or vomiting  :  No significant nocturia  Musculoskeletal:  + significant back pain at night  Skin:  No significant rashes  Neuro:  No significant dizziness   Psych:  No active mood issues    Sleep Review of Systems: notable for no difficulty falling asleep; frequent awakenings at night;  irregular dreaming noted; no nightmares ; no early morning headaches; no memory problems; no concentration issues; no history of any automobile or occupational accidents due to daytime drowsiness. Objective:     Visit Vitals    /82    Pulse (!) 102    Temp 99.1 °F (37.3 °C)    Ht 5' 9\" (1.753 m)    Wt 253 lb (114.8 kg)    SpO2 94%    BMI 37.36 kg/m2         General:   Not in acute distress   Eyes:  Anicteric sclerae, no obvious strabismus   Nose:  No obvious nasal septum deviation    Oropharynx:   Class 3 oropharyngeal outlet, thick tongue base,, low-lying soft palate, narrow tonsilo-pharyngeal pilars   Tonsils:   tonsils are unappreciated   Neck:   Neck circ. in \"inches\": 15; midline trachea   Chest/Lungs:  Equal lung expansion, clear on auscultation    CVS:  Normal rate, regular rhythm; no JVD   Skin:  Warm to touch; no obvious rashes   Neuro:  No focal deficits ; no obvious tremor    Psych:  Normal affect,  normal countenance;          Assessment:       ICD-10-CM ICD-9-CM    1. JACEY (obstructive sleep apnea) G47.33 327.23 SLEEP STUDY UNATTENDED, RESPIRATORY EFFORT    2. Obesity (BMI 30-39. 9) E66.9 278.00          Plan:     * The patient currently has a High Risk for having sleep apnea. STOP-BANG score 6.  * PSG was recommended for initial evaluation.   We will follow the [employer] Academy of [employer] protocol regarding split-night procedures and offering a trial of Positive Airway Pressure (CPAP, BPAP, etc.)  * because of back pain and restlessness, patient does not believe he will sleep adequately in an attended setting and respectfully defers attended polysomnography  * He was provided information on sleep apnea including coresponding risk factors (CHF, A-Fib) and the importance of proper treatment. * he is willing to undergo unattended sleep apnea testing and ordered as such. * Counseling was provided regarding proper sleep hygiene and safe driving. * We'll call him with test results. I have reviewed/discussed the above normal BMI with the patient. I have recommended the following interventions: dietary management education, guidance, and counseling . Theron Vanessa Thank you for allowing us to participate in your patient's medical care. We'll keep you updated on these investigations.     Huma Brown M.D.  (electronically signed)  Diplomate in Sleep Medicine, [facility]

## 2017-04-04 NOTE — MR AVS SNAPSHOT
Visit Information Date & Time Provider Department Dept. Phone Encounter #  
 4/4/2017 10:20 AM Mony Bertrand, [facility] [id number] Follow-up Instructions Return for call with results. Follow-up and Disposition History Your Appointments 7/6/2017 10:50 AM  
Follow Up with MD Efrain [facility] [address] [facility]) Appt Note: 3 month f/u  Diabetes [address] [place] [id number] [address] [facility] Upcoming Health Maintenance Date Due Hepatitis C Screening 1950 MICROALBUMIN Q1 1/28/1959 EYE EXAM RETINAL OR DILATED Q1 1/28/1959 DTaP/Tdap/Td series (1 - Tdap) 1/28/1970 FOBT Q 1 YEAR AGE 50-75 1/28/1999 ZOSTER VACCINE AGE 60> 1/28/2009 GLAUCOMA SCREENING Q2Y 1/28/2014 Pneumococcal 65+ Low/Medium Risk (1 of 2 - PCV13) 1/28/2014 MEDICARE YEARLY EXAM 1/28/2014 INFLUENZA AGE 9 TO ADULT 8/1/2016 HEMOGLOBIN A1C Q6M 9/13/2017 LIPID PANEL Q1 12/15/2017 FOOT EXAM Q1 4/3/2018 Allergies as of 4/4/2017  Review Complete On: 4/4/2017 By: Mony Bertrand MD  
  
 Severity Noted Reaction Type Reactions Ciprofloxacin  03/07/2017    Nausea Only Percocet [Oxycodone-acetaminophen]  03/07/2017    Other (comments)  
 hallucinations Current Immunizations  Never Reviewed No immunizations on file. Not reviewed this visit You Were Diagnosed With   
  
 Codes Comments JACEY (obstructive sleep apnea)    -  Primary ICD-10-CM: G47.33 
ICD-9-CM: 327.23 Obesity (BMI 30-39. 9)     ICD-10-CM: E66.9 ICD-9-CM: 278.00 Vitals BP Pulse Temp Height(growth percentile) Weight(growth percentile) SpO2  
 140/82 (!) 102 99.1 °F (37.3 °C) 5' 9\" (1.753 m) 253 lb (114.8 kg) 94% BMI Smoking Status 37.36 kg/m2 Never Smoker Vitals History BMI and BSA Data Body Mass Index Body Surface Area  
 37.36 kg/m 2 2.36 m 2 Preferred Pharmacy Pharmacy Name Phone [id number]- [place] - [address] [phone] Your Updated Medication List  
  
   
This list is accurate as of: 4/4/17 11:07 AM.  Always use your most recent med list.  
  
  
  
  
 apixaban 5 mg tablet Commonly known as:  Gege Monserrat Take 1 Tab by mouth two (2) times a day. Indications: PREVENT THROMBOEMBOLISM IN CHRONIC ATRIAL FIBRILLATION  
  
 b complex vitamins tablet Take 1 Tab by mouth daily. bumetanide 1 mg tablet Commonly known as:  Asa Brice[place] Take 1 Tab by mouth two (2) times a day. calcium carbonate 600 mg calcium (1,500 mg) tablet Commonly known as:  Miguel Collie Take 600 mg by mouth every Tuesday, Thursday, Saturday & Sunday. dutasteride 0.5 mg capsule Commonly known as:  AVODART TAKE ONE CAPSULE BY MOUTH EVERY DAY  
  
 ROBBY-C PO Take 500 mg by mouth daily. ketoconazole 2 % topical cream  
Commonly known as:  NIZORAL  
  
 levothyroxine 137 mcg tablet Commonly known as:  SYNTHROID  
TAKE 1 TABLET(S) EVERY DAY BY ORAL ROUTE FOR 90 DAYS. metOLazone 2.5 mg tablet Commonly known as:  Lavenia Kussmaul Take 1 Tab by mouth daily. metoprolol succinate 100 mg tablet Commonly known as:  TOPROL-XL Take 1 Tab by mouth two (2) times a day. multivitamin tablet Commonly known as:  ONE A DAY Take 1 Tab by mouth daily. NovoLOG Mix 70-30 FlexPen 100 unit/mL (70-30) Inpn Generic drug:  insulin aspart protamine/insulin aspart  
by SubCUTAneous route. paricalcitol 1 mcg capsule Commonly known as:  Evie Cloud Take 1 mcg by mouth daily. pravastatin 80 mg tablet Commonly known as:  PRAVACHOL Take 1 Tab by mouth nightly. tamsulosin 0.4 mg capsule Commonly known as:  FLOMAX TAKE 2 CAPSULES BY MOUTH EVERY DAY  
  
 TYLENOL EXTRA STRENGTH 500 mg tablet Generic drug:  acetaminophen Take 1,000 mg by mouth every six (6) hours as needed for Pain. VITAMIN D3 1,000 unit Cap Generic drug:  cholecalciferol Take 3,000 Units by mouth daily. We Performed the Following SLEEP STUDY UNATTENDED, RESPIRATORY EFFORT  [66110 CPT(R)] Follow-up Instructions Return for call with results. To-Do List   
 04/05/2017 1:30 PM  
  Appointment with Catrachito Sandy at [place] [address]  
  
 04/07/2017 To Be Determined Appointment with Randy Pollack LPN at [place] [address]  
  
 04/07/2017 1:00 PM  
  Appointment with Catrachito Sandy at [place] [address]  
  
 04/13/2017 To Be Determined Appointment with Bogdan Hawkins RN at [place] [address] Patient Instructions [address] [facility] [place]. 1668 [facility], [facility]6 [facility] Tel.  [phone] Fax. 100 [facility] 60 [facility] 200 [facility] Tel.  [phone] Fax. [phone] [address] [place] [address] Tel.  [phone] Fax. [phone] Sleep Apnea: After Your Visit Your Care Instructions Sleep apnea occurs when you frequently stop breathing for 10 seconds or longer during sleep. It can be mild to severe, based on the number of times per hour that you stop breathing or have slowed breathing. Blocked or narrowed airways in your nose, mouth, or throat can cause sleep apnea. Your airway can become blocked when your throat muscles and tongue relax during sleep. Sleep apnea is common, occurring in 1 out of 20 individuals. Individuals having any of the following characteristics should be evaluated and treated right away due to high risk and detrimental consequences from untreated sleep apnea: 
1. Obesity 2. Congestive Heart failure 3. Atrial Fibrillation 4. Uncontrolled Hypertension 5. Type II Diabetes 6. Night-time Arrhythmias 7. Stroke 8. Pulmonary Hypertension 9. High-risk Driving Populations (pilots, truck drivers, etc.) 10. Patients Considering Weight-loss Surgery How do you know you have sleep apnea? You probably have sleep apnea if you answer 'yes' to 3 or more of the following questions: S - Have you been told that you Snore? T - Are you often Tired during the day? O - Has anyone Observed you stop breathing while sleeping? P- Do you have (or are being treated for) high blood Pressure? B - Are you obese (Body Mass Index > 35)? A - Is your Age 48years old or older? N - Is your Neck size greater than 16 inches? G - Are you male Gender? A sleep physician can prescribe a breathing device that prevents tissues in the throat from blocking your airway. Or your doctor may recommend using a dental device (oral breathing device) to help keep your airway open. In some cases, surgery may be needed to remove enlarged tissues in the throat. Follow-up care is a key part of your treatment and safety. Be sure to make and go to all appointments, and call your doctor if you are having problems. It's also a good idea to know your test results and keep a list of the medicines you take. How can you care for yourself at home? · Lose weight, if needed. It may reduce the number of times you stop breathing or have slowed breathing. · Go to bed at the same time every night. · Sleep on your side. It may stop mild apnea. If you tend to roll onto your back, sew a pocket in the back of your pajama top. Put a tennis ball into the pocket, and stitch the pocket shut. This will help keep you from sleeping on your back. · Avoid alcohol and medicines such as sleeping pills and sedatives before bed. · Do not smoke. Smoking can make sleep apnea worse. If you need help quitting, talk to your doctor about stop-smoking programs and medicines. These can increase your chances of quitting for good. · Prop up the head of your bed 4 to 6 inches by putting bricks under the legs of the bed. · Treat breathing problems, such as a stuffy nose, caused by a cold or allergies. · Use a continuous positive airway pressure (CPAP) breathing machine if lifestyle changes do not help your apnea and your doctor recommends it. The machine keeps your airway from closing when you sleep. · If CPAP does not help you, ask your doctor whether you should try other breathing machines. A bilevel positive airway pressure machine has two types of air pressureâone for breathing in and one for breathing out. Another device raises or lowers air pressure as needed while you breathe. · If your nose feels dry or bleeds when using one of these machines, talk with your doctor about increasing moisture in the air. A humidifier may help. · If your nose is runny or stuffy from using a breathing machine, talk with your doctor about using decongestants or a corticosteroid nasal spray. When should you call for help? Watch closely for changes in your health, and be sure to contact your doctor if: 
· You still have sleep apnea even though you have made lifestyle changes. · You are thinking of trying a device such as CPAP. · You are having problems using a CPAP or similar machine. Where can you learn more? Go to ANTERIOS.be. Enter V187 in the search box to learn more about \"Sleep Apnea: After Your Visit. \"  
© 6312-5189 Healthwise, Incorporated. Care instructions adapted under license by [employer] (which disclaims liability or warranty for this information). This care instruction is for use with your licensed healthcare professional. If you have questions about a medical condition or this instruction, always ask your healthcare professional. Sandra Humphreys any warranty or liability for your use of this information. PROPER SLEEP HYGIENE What to avoid · Do not have drinks with caffeine, such as coffee or black tea, for 8 hours before bed. · Do not smoke or use other types of tobacco near bedtime. Nicotine is a stimulant and can keep you awake. · Avoid drinking alcohol late in the evening, because it can cause you to wake in the middle of the night. · Do not eat a big meal close to bedtime. If you are hungry, eat a light snack. · Do not drink a lot of water close to bedtime, because the need to urinate may wake you up during the night. · Do not read or watch TV in bed. Use the bed only for sleeping and sexual activity. What to try · Go to bed at the same time every night, and wake up at the same time every morning. Do not take naps during the day. · Keep your bedroom quiet, dark, and cool. · Get regular exercise, but not within 3 to 4 hours of your bedtime. Harles Old · Sleep on a comfortable pillow and mattress. · If watching the clock makes you anxious, turn it facing away from you so you cannot see the time. · If you worry when you lie down, start a worry book. Well before bedtime, write down your worries, and then set the book and your concerns aside. · Try meditation or other relaxation techniques before you go to bed. · If you cannot fall asleep, get up and go to another room until you feel sleepy. Do something relaxing. Repeat your bedtime routine before you go to bed again. · Make your house quiet and calm about an hour before bedtime. Turn down the lights, turn off the TV, log off the computer, and turn down the volume on music. This can help you relax after a busy day. Drowsy Driving The Followap cites drowsiness as a causing factor in more than 904,048 police reported crashes annually, resulting in 76,000 injuries and 1,500 deaths.  Other surveys suggest 55% of people polled have driven while drowsy in the past year, 23% had fallen asleep but not crashed, 3% crashed, and 2% had and accident due to drowsy driving. Who is at risk? Young Drivers: One study of drowsy driving accidents states that 55% of the drivers were under 25 years. Of those, 75% were male. Shift Workers and Travelers: People who work overnight or travel across time zones frequently are at higher risk of experiencing Circadian Rhythm Disorders. They are trying to work and function when their body is programed to sleep. Sleep Deprived: Lack of sleep has a serious impact on your ability to pay attention or focus on a task. Consistently getting less than the average of 8 hours your body needs creates partial or cumulative sleep deprivation. Untreated Sleep Disorders: Sleep Apnea, Narcolepsy, R.L.S., and other sleep disorders (untreated) prevent a person from getting enough restful sleep. This leads to excessive daytime sleepiness and increases the risk for drowsy driving accidents by up to 7 times. Medications / Alcohol: Even over the counter medications can cause drowsiness. Medications that impair a drivers attention should have a warning label. Alcohol naturally makes you sleepy and on its own can cause accidents. Combined with excessive drowsiness its effects are amplified. Signs of Drowsy Driving: * You don't remember driving the last few miles * You may drift out of your vilma * You are unable to focus and your thoughts wander * You may yawn more often than normal 
 * You have difficulty keeping your eyes open / nodding off * Missing traffic signs, speeding, or tailgating Prevention-  
Good sleep hygiene, lifestyle and behavioral choices have the most impact on drowsy driving. There is no substitute for sleep and the average person requires 8 hours nightly. If you find yourself driving drowsy, stop and sleep. Consider the sleep hygiene tips provided during your visit as well.   
 
Medication Refill Policy: Refills for all medications require 1 week advance notice. Please have your pharmacy fax a refill request. We are unable to fax, or call in \"controled substance\" medications and you will need to pick these prescriptions up from our office. MyChart Activation Thank you for requesting access to Zkatter. Please follow the instructions below to securely access and download your online medical record. Zkatter allows you to send messages to your doctor, view your test results, renew your prescriptions, schedule appointments, and more. How Do I Sign Up? 1. In your internet browser, go to [email or web address]. 2. Click on the First Time User? Click Here link in the Sign In box. You will see the New Member Sign Up page. 3. Enter your Zkatter Access Code exactly as it appears below. You will not need to use this code after youve completed the sign-up process. If you do not sign up before the expiration date, you must request a new code. Zkatter Access Code: [email or web address] Expires: 2017 10:26 AM (This is the date your Zkatter access code will ) 4. Enter the last four digits of your Social Security Number (xxxx) and Date of Birth (mm/dd/yyyy) as indicated and click Submit. You will be taken to the next sign-up page. 5. Create a Zkatter ID. This will be your Zkatter login ID and cannot be changed, so think of one that is secure and easy to remember. 6. Create a Zkatter password. You can change your password at any time. 7. Enter your Password Reset Question and Answer. This can be used at a later time if you forget your password. 8. Enter your e-mail address. You will receive e-mail notification when new information is available in [address] 9. Click Sign Up. You can now view and download portions of your medical record. 10. Click the Download Summary menu link to download a portable copy of your medical information. Additional Information If you have questions, please call [phone]. Remember, Michaelhart is NOT to be used for urgent needs. For medical emergencies, dial 911. Starting a Weight Loss Plan: After Your Visit Your Care Instructions If you are thinking about losing weight, it can be hard to know where to start. Your doctor can help you set up a weight loss plan that best meets your needs. You may want to take a class on nutrition or exercise, or join a weight loss support group. If you have questions about how to make changes to your eating or exercise habits, ask your doctor about seeing a registered dietitian or an exercise specialist. 
It can be a big challenge to lose weight. But you do not have to make huge changes at once. Make small changes, and stick with them. When those changes become habit, add a few more changes. If you do not think you are ready to make changes right now, try to pick a date in the future. Make an appointment to see your doctor to discuss whether the time is right for you to start a plan. Follow-up care is a key part of your treatment and safety. Be sure to make and go to all appointments, and call your doctor if you are having problems. It's also a good idea to know your test results and keep a list of the medicines you take. How can you care for yourself at home? · Set realistic goals. Many people expect to lose much more weight than is likely. A weight loss of 5% to 10% of your body weight may be enough to improve your health. · Get family and friends involved to provide support. Talk to them about why you are trying to lose weight, and ask them to help. They can help by participating in exercise and having meals with you, even if they may be eating something different. · Find what works best for you. If you do not have time or do not like to cook, a program that offers meal replacement bars or shakes may be better for you.  Or if you like to prepare meals, finding a plan that includes daily menus and recipes may be best. 
· Ask your doctor about other health professionals who can help you achieve your weight loss goals. ¨ A dietitian can help you make healthy changes in your diet. ¨ An exercise specialist or  can help you develop a safe and effective exercise program. 
¨ A counselor or psychiatrist can help you cope with issues such as depression, anxiety, or family problems that can make it hard to focus on weight loss. · Consider joining a support group for people who are trying to lose weight. Your doctor can suggest groups in your area. Where can you learn more? Go to Geni.be Enter L732 in the search box to learn more about \"Starting a Weight Loss Plan: After Your Visit. \"  
© 4754-8250 Healthwise, Incorporated. Care instructions adapted under license by [facility] (which disclaims liability or warranty for this information). This care instruction is for use with your licensed healthcare professional. If you have questions about a medical condition or this instruction, always ask your healthcare professional. [address] any warranty or liability for your use of this information. Content Version: 5.1.43477; Last Revised: September 1, 2009 Introducing [facility]! [facility] introduces Rypos patient portal. Now you can access parts of your medical record, email your doctor's office, and request medication refills online. 1. In your internet browser, go to [email or web address] 2. Click on the First Time User? Click Here link in the Sign In box. You will see the New Member Sign Up page. 3. Enter your Rypos Access Code exactly as it appears below. You will not need to use this code after youve completed the sign-up process. If you do not sign up before the expiration date, you must request a new code. · Strut Access Code: [email or web address] Expires: 6/5/2017 10:26 AM 
 
4. Enter the last four digits of your Social Security Number (xxxx) and Date of Birth (mm/dd/yyyy) as indicated and click Submit. You will be taken to the next sign-up page. 5. Create a Strut ID. This will be your Strut login ID and cannot be changed, so think of one that is secure and easy to remember. 6. Create a Strut password. You can change your password at any time. 7. Enter your Password Reset Question and Answer. This can be used at a later time if you forget your password. 8. Enter your e-mail address. You will receive e-mail notification when new information is available in [address] 9. Click Sign Up. You can now view and download portions of your medical record. 10. Click the Download Summary menu link to download a portable copy of your medical information. If you have questions, please visit the Frequently Asked Questions section of the Strut website. Remember, Strut is NOT to be used for urgent needs. For medical emergencies, dial 911. Now available from your iPhone and Android! Please provide this summary of care documentation to your next provider. Your primary care clinician is listed as '[employer]. If you have any questions after today's visit, please call [phone].

## 2017-04-04 NOTE — TELEPHONE ENCOUNTER
----- Message from Marek Glover MD sent at 4/4/2017 10:06 AM EDT -----  Joseph Fraser looks fine; let him know

## 2017-04-04 NOTE — PATIENT INSTRUCTIONS
[address] [facility] [place]. [place], [address]  Tel.  [phone]  Fax. 100 [facility] 60  [place], [address] [facility]  Tel.  [phone]  Fax. [phone] [address] [address] Kimberly Villalpando  Tel.  [phone]  Fax. [phone]     Sleep Apnea: After Your Visit  Your Care Instructions  Sleep apnea occurs when you frequently stop breathing for 10 seconds or longer during sleep. It can be mild to severe, based on the number of times per hour that you stop breathing or have slowed breathing. Blocked or narrowed airways in your nose, mouth, or throat can cause sleep apnea. Your airway can become blocked when your throat muscles and tongue relax during sleep. Sleep apnea is common, occurring in 1 out of 20 individuals. Individuals having any of the following characteristics should be evaluated and treated right away due to high risk and detrimental consequences from untreated sleep apnea:  1. Obesity  2. Congestive Heart failure  3. Atrial Fibrillation  4. Uncontrolled Hypertension  5. Type II Diabetes  6. Night-time Arrhythmias  7. Stroke  8. Pulmonary Hypertension  9. High-risk Driving Populations (pilots, truck drivers, etc.)  10. Patients Considering Weight-loss Surgery    How do you know you have sleep apnea? You probably have sleep apnea if you answer 'yes' to 3 or more of the following questions:  S - Have you been told that you Snore? T - Are you often Tired during the day? O - Has anyone Observed you stop breathing while sleeping? P- Do you have (or are being treated for) high blood Pressure? B - Are you obese (Body Mass Index > 35)? A - Is your Age 48years old or older? N - Is your Neck size greater than 16 inches? G - Are you male Gender? A sleep physician can prescribe a breathing device that prevents tissues in the throat from blocking your airway.  Or your doctor may recommend using a dental device (oral breathing device) to help keep your airway open. In some cases, surgery may be needed to remove enlarged tissues in the throat. Follow-up care is a key part of your treatment and safety. Be sure to make and go to all appointments, and call your doctor if you are having problems. It's also a good idea to know your test results and keep a list of the medicines you take. How can you care for yourself at home? · Lose weight, if needed. It may reduce the number of times you stop breathing or have slowed breathing. · Go to bed at the same time every night. · Sleep on your side. It may stop mild apnea. If you tend to roll onto your back, sew a pocket in the back of your pajama top. Put a tennis ball into the pocket, and stitch the pocket shut. This will help keep you from sleeping on your back. · Avoid alcohol and medicines such as sleeping pills and sedatives before bed. · Do not smoke. Smoking can make sleep apnea worse. If you need help quitting, talk to your doctor about stop-smoking programs and medicines. These can increase your chances of quitting for good. · Prop up the head of your bed 4 to 6 inches by putting bricks under the legs of the bed. · Treat breathing problems, such as a stuffy nose, caused by a cold or allergies. · Use a continuous positive airway pressure (CPAP) breathing machine if lifestyle changes do not help your apnea and your doctor recommends it. The machine keeps your airway from closing when you sleep. · If CPAP does not help you, ask your doctor whether you should try other breathing machines. A bilevel positive airway pressure machine has two types of air pressureâone for breathing in and one for breathing out. Another device raises or lowers air pressure as needed while you breathe. · If your nose feels dry or bleeds when using one of these machines, talk with your doctor about increasing moisture in the air. A humidifier may help.   · If your nose is runny or stuffy from using a breathing machine, talk with your doctor about using decongestants or a corticosteroid nasal spray. When should you call for help? Watch closely for changes in your health, and be sure to contact your doctor if:  · You still have sleep apnea even though you have made lifestyle changes. · You are thinking of trying a device such as CPAP. · You are having problems using a CPAP or similar machine. Where can you learn more? Go to [employer]. Enter T028 in the search box to learn more about \"Sleep Apnea: After Your Visit. \"   © 9993-7119 Healthwise, [employer]. Care instructions adapted under license by [employer] (which disclaims liability or warranty for this information). This care instruction is for use with your licensed healthcare professional. If you have questions about a medical condition or this instruction, always ask your healthcare professional. [employer] Or any warranty or liability for your use of this information. PROPER SLEEP HYGIENE    What to avoid  · Do not have drinks with caffeine, such as coffee or black tea, for 8 hours before bed. · Do not smoke or use other types of tobacco near bedtime. Nicotine is a stimulant and can keep you awake. · Avoid drinking alcohol late in the evening, because it can cause you to wake in the middle of the night. · Do not eat a big meal close to bedtime. If you are hungry, eat a light snack. · Do not drink a lot of water close to bedtime, because the need to urinate may wake you up during the night. · Do not read or watch TV in bed. Use the bed only for sleeping and sexual activity. What to try  · Go to bed at the same time every night, and wake up at the same time every morning. Do not take naps during the day. · Keep your bedroom quiet, dark, and cool. · Get regular exercise, but not within 3 to 4 hours of your bedtime. .  · Sleep on a comfortable pillow and mattress.   · If watching the clock makes you anxious, turn it facing away from you so you cannot see the time. · If you worry when you lie down, start a worry book. Well before bedtime, write down your worries, and then set the book and your concerns aside. · Try meditation or other relaxation techniques before you go to bed. · If you cannot fall asleep, get up and go to another room until you feel sleepy. Do something relaxing. Repeat your bedtime routine before you go to bed again. · Make your house quiet and calm about an hour before bedtime. Turn down the lights, turn off the TV, log off the computer, and turn down the volume on music. This can help you relax after a busy day. Drowsy Driving  The [address] [place] [address] Road Traffic Safety Administration cites drowsiness as a causing factor in more than 180,061 police reported crashes annually, resulting in 76,000 injuries and 1,500 deaths. Other surveys suggest 55% of people polled have driven while drowsy in the past year, 23% had fallen asleep but not crashed, 3% crashed, and 2% had and accident due to drowsy driving. Who is at risk? Young Drivers: One study of drowsy driving accidents states that 55% of the drivers were under 25 years. Of those, 75% were male. Shift Workers and Travelers: People who work overnight or travel across time zones frequently are at higher risk of experiencing Circadian Rhythm Disorders. They are trying to work and function when their body is programed to sleep. Sleep Deprived: Lack of sleep has a serious impact on your ability to pay attention or focus on a task. Consistently getting less than the average of 8 hours your body needs creates partial or cumulative sleep deprivation. Untreated Sleep Disorders: Sleep Apnea, Narcolepsy, R.L.S., and other sleep disorders (untreated) prevent a person from getting enough restful sleep. This leads to excessive daytime sleepiness and increases the risk for drowsy driving accidents by up to 7 times.   Medications / Alcohol: Even over the counter medications can cause drowsiness. Medications that impair a drivers attention should have a warning label. Alcohol naturally makes you sleepy and on its own can cause accidents. Combined with excessive drowsiness its effects are amplified. Signs of Drowsy Driving:   * You don't remember driving the last few miles   * You may drift out of your vilma   * You are unable to focus and your thoughts wander   * You may yawn more often than normal   * You have difficulty keeping your eyes open / nodding off   * Missing traffic signs, speeding, or tailgating  Prevention-   Good sleep hygiene, lifestyle and behavioral choices have the most impact on drowsy driving. There is no substitute for sleep and the average person requires 8 hours nightly. If you find yourself driving drowsy, stop and sleep. Consider the sleep hygiene tips provided during your visit as well. Medication Refill Policy: Refills for all medications require 1 week advance notice. Please have your pharmacy fax a refill request. We are unable to fax, or call in \"controled substance\" medications and you will need to pick these prescriptions up from our office. Kindful Activation    Thank you for requesting access to Kindful. Please follow the instructions below to securely access and download your online medical record. Kindful allows you to send messages to your doctor, view your test results, renew your prescriptions, schedule appointments, and more. How Do I Sign Up? 1. In your internet browser, go to [email or web address]. 2. Click on the First Time User? Click Here link in the Sign In box. You will see the New Member Sign Up page. 3. Enter your Kindful Access Code exactly as it appears below. You will not need to use this code after youve completed the sign-up process. If you do not sign up before the expiration date, you must request a new code.     Kindful Access Code: [email or web address]  Expires: 6/5/2017 10:26 AM (This is the date your Vapore access code will )    4. Enter the last four digits of your Social Security Number (xxxx) and Date of Birth (mm/dd/yyyy) as indicated and click Submit. You will be taken to the next sign-up page. 5. Create a Vapore ID. This will be your Vapore login ID and cannot be changed, so think of one that is secure and easy to remember. 6. Create a Vapore password. You can change your password at any time. 7. Enter your Password Reset Question and Answer. This can be used at a later time if you forget your password. 8. Enter your e-mail address. You will receive e-mail notification when new information is available in [address] 9. Click Sign Up. You can now view and download portions of your medical record. 10. Click the Download Summary menu link to download a portable copy of your medical information. Additional Information    If you have questions, please call [phone]. Remember, Vapore is NOT to be used for urgent needs. For medical emergencies, dial 911. Starting a Weight Loss Plan: After Your Visit  Your Care Instructions  If you are thinking about losing weight, it can be hard to know where to start. Your doctor can help you set up a weight loss plan that best meets your needs. You may want to take a class on nutrition or exercise, or join a weight loss support group. If you have questions about how to make changes to your eating or exercise habits, ask your doctor about seeing a registered dietitian or an exercise specialist.  It can be a big challenge to lose weight. But you do not have to make huge changes at once. Make small changes, and stick with them. When those changes become habit, add a few more changes. If you do not think you are ready to make changes right now, try to pick a date in the future. Make an appointment to see your doctor to discuss whether the time is right for you to start a plan. Follow-up care is a key part of your treatment and safety.  Be sure to make and go to all appointments, and call your doctor if you are having problems. It's also a good idea to know your test results and keep a list of the medicines you take. How can you care for yourself at home? · Set realistic goals. Many people expect to lose much more weight than is likely. A weight loss of 5% to 10% of your body weight may be enough to improve your health. · Get family and friends involved to provide support. Talk to them about why you are trying to lose weight, and ask them to help. They can help by participating in exercise and having meals with you, even if they may be eating something different. · Find what works best for you. If you do not have time or do not like to cook, a program that offers meal replacement bars or shakes may be better for you. Or if you like to prepare meals, finding a plan that includes daily menus and recipes may be best.  · Ask your doctor about other health professionals who can help you achieve your weight loss goals. ¨ A dietitian can help you make healthy changes in your diet. ¨ An exercise specialist or  can help you develop a safe and effective exercise program.  ¨ A counselor or psychiatrist can help you cope with issues such as depression, anxiety, or family problems that can make it hard to focus on weight loss. · Consider joining a support group for people who are trying to lose weight. Your doctor can suggest groups in your area. Where can you learn more? Go to Viewfinity.be  Enter U357 in the search box to learn more about \"Starting a Weight Loss Plan: After Your Visit. \"   © 5676-2689 Healthwise, Incorporated. Care instructions adapted under license by [facility] (which disclaims liability or warranty for this information).  This care instruction is for use with your licensed healthcare professional. If you have questions about a medical condition or this instruction, always ask your healthcare professional. [address] any warranty or liability for your use of this information.   Content Version: 3.0.37291; Last Revised: September 1, 2009

## 2017-04-05 ENCOUNTER — HOME CARE VISIT (OUTPATIENT)
Dept: SCHEDULING | Facility: HOME HEALTH | Age: 67
End: 2017-04-05
Payer: MEDICARE

## 2017-04-05 ENCOUNTER — TELEPHONE (OUTPATIENT)
Dept: ENDOCRINOLOGY | Age: 67
End: 2017-04-05

## 2017-04-05 VITALS
RESPIRATION RATE: 17 BRPM | DIASTOLIC BLOOD PRESSURE: 65 MMHG | TEMPERATURE: 99.1 F | HEART RATE: 99 BPM | OXYGEN SATURATION: 94 % | SYSTOLIC BLOOD PRESSURE: 137 MMHG

## 2017-04-05 PROCEDURE — 3331090001 HH PPS REVENUE CREDIT

## 2017-04-05 PROCEDURE — 3331090002 HH PPS REVENUE DEBIT

## 2017-04-05 PROCEDURE — G0151 HHCP-SERV OF PT,EA 15 MIN: HCPCS

## 2017-04-05 NOTE — TELEPHONE ENCOUNTER
----- Message from Rubin Vega MD sent at 4/5/2017 10:12 AM EDT -----  Patients lab results were reviewed. Kidney dysfunction stable compared with prior labs. LDL 43 and total Chol 92, again he should consider reducing his cholesterol medicine to 40mg instead of 80mg at his age to avoid side effects. Thyroid function stable, continue current dose of 137 mcg daily, TSH 1.53, FT4 1.75, may need to adjust on future visit once he starts taking it by itself. Urine protein is elevated as with chronic kidney disease. Vitamin D level is normal at 46, should continue 3000 units of D3 daily. Will see him again on next visit,    Alexandria Hood. [address] [place] [address] Endocrinology  [address] [place] [address]      ----------------------------------------    4/5/2017, 10:26 AM    Attempted to call Gretta Ramsay, reached voice mail. I left a message to return my call. Tor Santiago    -----------------------------------------------------------    Addendum: 4/5/2017, 2:35 PM    Spoke with Gretta Ramsay by phone, and relayed the above message. He understood the information.       Tor Santiago

## 2017-04-06 ENCOUNTER — TELEPHONE (OUTPATIENT)
Dept: SLEEP MEDICINE | Age: 67
End: 2017-04-06

## 2017-04-06 ENCOUNTER — PATIENT OUTREACH (OUTPATIENT)
Dept: CARDIOLOGY CLINIC | Age: 67
End: 2017-04-06

## 2017-04-06 VITALS — BODY MASS INDEX: 36.62 KG/M2 | WEIGHT: 248 LBS

## 2017-04-06 DIAGNOSIS — G47.31 COMPLEX SLEEP APNEA SYNDROME: Primary | ICD-10-CM

## 2017-04-06 PROCEDURE — 3331090002 HH PPS REVENUE DEBIT

## 2017-04-06 PROCEDURE — 3331090001 HH PPS REVENUE CREDIT

## 2017-04-06 NOTE — PROGRESS NOTES
Heart Failure liaison contacted the patient by telephone to perform CHF bundle Follow Up. Verified  and address as identifiers.       Spoke to patient regarding the following:    Have you been to an ER/Hospital [facility]? no     Have you followed up with PCP? Yes Dr Amanda Nissen 248.0lb  Transportation:  Wife drives  Daily Weight: 248lb decrease  Zone: green  Signs/Symptoms: Swelling none; SOB none; how many pillows to sleep? one  Diet: one  Exercise: walks to mailbox. Patient-centered Goal to see a nutritionist and work on his diet  Medications Reconciled at this time:  yes  Home health?: PT, OT has stopped    Patient reminded that there is a cardiologist on call 24 hours a day / 7 days a week (M-F 5pm to 8am and from Friday 5pm until Monday 8a for the weekend) should the patient have questions or concerns. Patient reminded to call 911 if situation is emergent or patient feels the situation is emergent. Provided pt with [facility] office telephone number, [phone]. Pt has follow up with PCP on 1 month and with cardiology on 5/3/17. Patient was seen by the sleep center Dr Mayte Tilley and Endocrinology this week and his PCP. Patient was advised to call Dr Prieto Yin his Endocrinologist to ask for an order for a nutritionist, patient expressed interest in speaking to a dietician. Advised last week before seeing MD, states he forgot to talk with him at his appointment yesterday.

## 2017-04-07 ENCOUNTER — HOME CARE VISIT (OUTPATIENT)
Dept: SCHEDULING | Facility: HOME HEALTH | Age: 67
End: 2017-04-07
Payer: MEDICARE

## 2017-04-07 PROCEDURE — G0151 HHCP-SERV OF PT,EA 15 MIN: HCPCS

## 2017-04-07 PROCEDURE — 3331090001 HH PPS REVENUE CREDIT

## 2017-04-07 PROCEDURE — 3331090002 HH PPS REVENUE DEBIT

## 2017-04-07 NOTE — TELEPHONE ENCOUNTER
Patient returned call regarding results. Requests a call on his home number [phone], as his cell phone is broken.

## 2017-04-08 PROCEDURE — 3331090002 HH PPS REVENUE DEBIT

## 2017-04-08 PROCEDURE — 3331090001 HH PPS REVENUE CREDIT

## 2017-04-09 VITALS
RESPIRATION RATE: 17 BRPM | TEMPERATURE: 98.3 F | DIASTOLIC BLOOD PRESSURE: 74 MMHG | OXYGEN SATURATION: 95 % | SYSTOLIC BLOOD PRESSURE: 134 MMHG | HEART RATE: 84 BPM

## 2017-04-09 PROCEDURE — 3331090002 HH PPS REVENUE DEBIT

## 2017-04-09 PROCEDURE — 3331090001 HH PPS REVENUE CREDIT

## 2017-04-10 ENCOUNTER — HOME CARE VISIT (OUTPATIENT)
Dept: SCHEDULING | Facility: HOME HEALTH | Age: 67
End: 2017-04-10
Payer: MEDICARE

## 2017-04-10 PROCEDURE — 3331090002 HH PPS REVENUE DEBIT

## 2017-04-10 PROCEDURE — G0151 HHCP-SERV OF PT,EA 15 MIN: HCPCS

## 2017-04-10 PROCEDURE — 3331090001 HH PPS REVENUE CREDIT

## 2017-04-11 VITALS
TEMPERATURE: 98.7 F | HEART RATE: 94 BPM | DIASTOLIC BLOOD PRESSURE: 76 MMHG | RESPIRATION RATE: 17 BRPM | SYSTOLIC BLOOD PRESSURE: 134 MMHG | OXYGEN SATURATION: 97 %

## 2017-04-11 PROCEDURE — 3331090001 HH PPS REVENUE CREDIT

## 2017-04-11 PROCEDURE — 3331090002 HH PPS REVENUE DEBIT

## 2017-04-12 ENCOUNTER — HOME CARE VISIT (OUTPATIENT)
Dept: SCHEDULING | Facility: HOME HEALTH | Age: 67
End: 2017-04-12
Payer: MEDICARE

## 2017-04-12 VITALS
HEART RATE: 88 BPM | DIASTOLIC BLOOD PRESSURE: 72 MMHG | SYSTOLIC BLOOD PRESSURE: 122 MMHG | OXYGEN SATURATION: 96 % | BODY MASS INDEX: 35.97 KG/M2 | WEIGHT: 243.6 LBS | RESPIRATION RATE: 18 BRPM | TEMPERATURE: 97.6 F

## 2017-04-12 PROCEDURE — 3331090002 HH PPS REVENUE DEBIT

## 2017-04-12 PROCEDURE — A6212 FOAM DRG <=16 SQ IN W/BORDER: HCPCS

## 2017-04-12 PROCEDURE — 3331090001 HH PPS REVENUE CREDIT

## 2017-04-12 PROCEDURE — A6250 SKIN SEAL PROTECT MOISTURIZR: HCPCS

## 2017-04-12 PROCEDURE — G0299 HHS/HOSPICE OF RN EA 15 MIN: HCPCS

## 2017-04-13 ENCOUNTER — HOME CARE VISIT (OUTPATIENT)
Dept: SCHEDULING | Facility: HOME HEALTH | Age: 67
End: 2017-04-13
Payer: MEDICARE

## 2017-04-13 PROCEDURE — 3331090002 HH PPS REVENUE DEBIT

## 2017-04-13 PROCEDURE — G0151 HHCP-SERV OF PT,EA 15 MIN: HCPCS

## 2017-04-13 PROCEDURE — 3331090001 HH PPS REVENUE CREDIT

## 2017-04-14 ENCOUNTER — TELEPHONE (OUTPATIENT)
Dept: CARDIOLOGY CLINIC | Age: 67
End: 2017-04-14

## 2017-04-14 VITALS
OXYGEN SATURATION: 95 % | TEMPERATURE: 97.9 F | SYSTOLIC BLOOD PRESSURE: 138 MMHG | RESPIRATION RATE: 18 BRPM | HEART RATE: 78 BPM | DIASTOLIC BLOOD PRESSURE: 82 MMHG

## 2017-04-14 PROCEDURE — A6212 FOAM DRG <=16 SQ IN W/BORDER: HCPCS

## 2017-04-14 PROCEDURE — A6250 SKIN SEAL PROTECT MOISTURIZR: HCPCS

## 2017-04-14 PROCEDURE — 3331090002 HH PPS REVENUE DEBIT

## 2017-04-14 PROCEDURE — 3331090001 HH PPS REVENUE CREDIT

## 2017-04-14 NOTE — TELEPHONE ENCOUNTER
Verified patient with two identifiers. Pt called stating he fell last night could not get up had to call 911 to have them pick him up. /70 P 68. No c/o pain, sob, swelling. He has lost 32 lbs in the past month. Please advise.

## 2017-04-14 NOTE — TELEPHONE ENCOUNTER
Pt said he fell last night and call 911, said  He thinks the blumex needs adjusting. Please call.      Thanks

## 2017-04-14 NOTE — TELEPHONE ENCOUNTER
Verified patient with two identifiers. Spoke with pt advising him to stop taking the metolazone and to start taking weight and BP daily to call office in one week. Pt verbalized understanding. Iona Pina ANP   You 6 minutes ago (1:10 PM)                 Is he still on zaroxlyn? If so stop it. If he is not on zaroxlyn but taking bumex bid, lower to once daily. Monitor bps and weight.

## 2017-04-15 PROCEDURE — 3331090002 HH PPS REVENUE DEBIT

## 2017-04-15 PROCEDURE — 3331090001 HH PPS REVENUE CREDIT

## 2017-04-16 PROCEDURE — 3331090001 HH PPS REVENUE CREDIT

## 2017-04-16 PROCEDURE — 3331090002 HH PPS REVENUE DEBIT

## 2017-04-17 ENCOUNTER — HOME CARE VISIT (OUTPATIENT)
Dept: SCHEDULING | Facility: HOME HEALTH | Age: 67
End: 2017-04-17
Payer: MEDICARE

## 2017-04-17 PROCEDURE — 3331090001 HH PPS REVENUE CREDIT

## 2017-04-17 PROCEDURE — 3331090002 HH PPS REVENUE DEBIT

## 2017-04-17 PROCEDURE — G0151 HHCP-SERV OF PT,EA 15 MIN: HCPCS

## 2017-04-18 VITALS
OXYGEN SATURATION: 98 % | TEMPERATURE: 98.9 F | RESPIRATION RATE: 18 BRPM | HEART RATE: 75 BPM | SYSTOLIC BLOOD PRESSURE: 134 MMHG | DIASTOLIC BLOOD PRESSURE: 85 MMHG

## 2017-04-18 PROCEDURE — 3331090001 HH PPS REVENUE CREDIT

## 2017-04-18 PROCEDURE — 3331090002 HH PPS REVENUE DEBIT

## 2017-04-19 ENCOUNTER — PATIENT OUTREACH (OUTPATIENT)
Dept: ENDOCRINOLOGY | Age: 67
End: 2017-04-19

## 2017-04-19 PROCEDURE — 3331090002 HH PPS REVENUE DEBIT

## 2017-04-19 PROCEDURE — 3331090001 HH PPS REVENUE CREDIT

## 2017-04-20 ENCOUNTER — HOME CARE VISIT (OUTPATIENT)
Dept: HOME HEALTH SERVICES | Facility: HOME HEALTH | Age: 67
End: 2017-04-20
Payer: MEDICARE

## 2017-04-20 PROCEDURE — 3331090002 HH PPS REVENUE DEBIT

## 2017-04-20 PROCEDURE — 3331090001 HH PPS REVENUE CREDIT

## 2017-04-21 PROCEDURE — 3331090001 HH PPS REVENUE CREDIT

## 2017-04-21 PROCEDURE — 3331090002 HH PPS REVENUE DEBIT

## 2017-04-22 PROCEDURE — 3331090001 HH PPS REVENUE CREDIT

## 2017-04-22 PROCEDURE — 3331090002 HH PPS REVENUE DEBIT

## 2017-04-23 PROCEDURE — 3331090002 HH PPS REVENUE DEBIT

## 2017-04-23 PROCEDURE — 3331090001 HH PPS REVENUE CREDIT

## 2017-04-24 PROCEDURE — 3331090001 HH PPS REVENUE CREDIT

## 2017-04-24 PROCEDURE — 3331090002 HH PPS REVENUE DEBIT

## 2017-04-25 ENCOUNTER — HOME CARE VISIT (OUTPATIENT)
Dept: SCHEDULING | Facility: HOME HEALTH | Age: 67
End: 2017-04-25
Payer: MEDICARE

## 2017-04-25 VITALS
SYSTOLIC BLOOD PRESSURE: 133 MMHG | DIASTOLIC BLOOD PRESSURE: 83 MMHG | HEART RATE: 86 BPM | RESPIRATION RATE: 18 BRPM | OXYGEN SATURATION: 95 % | TEMPERATURE: 97.6 F

## 2017-04-25 PROCEDURE — G0151 HHCP-SERV OF PT,EA 15 MIN: HCPCS

## 2017-04-25 PROCEDURE — 3331090001 HH PPS REVENUE CREDIT

## 2017-04-25 PROCEDURE — 3331090003 HH PPS REVENUE ADJ

## 2017-04-25 PROCEDURE — 3331090002 HH PPS REVENUE DEBIT

## 2017-04-26 PROCEDURE — 3331090001 HH PPS REVENUE CREDIT

## 2017-04-26 PROCEDURE — 3331090002 HH PPS REVENUE DEBIT

## 2017-04-27 ENCOUNTER — PATIENT OUTREACH (OUTPATIENT)
Dept: CARDIOLOGY CLINIC | Age: 67
End: 2017-04-27

## 2017-04-28 ENCOUNTER — PATIENT OUTREACH (OUTPATIENT)
Dept: CARDIOLOGY CLINIC | Age: 67
End: 2017-04-28

## 2017-04-28 VITALS — BODY MASS INDEX: 35.88 KG/M2 | WEIGHT: 243 LBS

## 2017-04-28 NOTE — PROGRESS NOTES
Heart Failure liaison contacted the patient by telephone to perform CHF bundle Follow Up. Verified  and address as identifiers.       Spoke to patient regarding the following:    Have you been to an ER/Hospital since discharge from [facility]? Have you followed up with PCP? yes; Vivek Carbone    Transportation:  Wife drives to and from appointments  Daily Weight: 243 unchanged  Zone: green  Signs/Symptoms: Swelling denies; SOB denies; how many pillows to sleep? one  Diet: low sodium meeting with endocrine  Exercise: walks. Patient-centered Goal working on diet   Medications Reconciled at this time:  yes  Home health?: no    Patient reminded that there is a cardiologist on call 24 hours a day / 7 days a week (M-F 5pm to 8am and from Friday 5pm until Monday 8a for the weekend) should the patient have questions or concerns. Patient reminded to call 911 if situation is emergent or patient feels the situation is emergent. Provided pt with [facility] office telephone number, [phone]. Pt has follow up with PCP on 5/10/17 and with cardiology on 17.

## 2017-05-05 ENCOUNTER — PATIENT OUTREACH (OUTPATIENT)
Dept: CARDIOLOGY CLINIC | Age: 67
End: 2017-05-05

## 2017-05-05 VITALS — BODY MASS INDEX: 36.03 KG/M2 | WEIGHT: 244 LBS

## 2017-05-05 RX ORDER — GABAPENTIN 100 MG/1
CAPSULE ORAL 3 TIMES DAILY
COMMUNITY
End: 2017-06-22

## 2017-05-05 NOTE — Clinical Note
Patient is concerned that his B/P has been ? up past few days 136/84, 136/80, 142/87 today norm is 90's over 60's patient has been off of his Zaroxolyn since mid April and does take bumex bid. Patient has a follow up next week 5/9/17. Denies swelling, weight is stable.

## 2017-05-05 NOTE — PROGRESS NOTES
Heart Failure liaison contacted the patient by telephone to perform CHF bundle Follow Up. Verified  and address as identifiers.       Spoke to patient regarding the following:    Have you been to an ER/Hospital since discharge from [facility]? Have you followed up with PCP? Ekaterina Howard;    Transportation:  wife  Daily Weight: 244.0lb stab;e   Zone: green  Signs/Symptoms: Swelling denies; SOB denies; how many pillows to sleep? one  Diet: low sodium referred to dietitiignacia [place]  Exercise: patient walks weather permits. Patient-centered Goal to better his diet  Medications Reconciled at this time:  Yes  Home health?: no    Patient reminded that there is a cardiologist on call 24 hours a day / 7 days a week (M-F 5pm to 8am and from Friday 5pm until Monday 8a for the weekend) should the patient have questions or concerns. Patient reminded to call 911 if situation is emergent or patient feels the situation is emergent. Provided pt with [facility] office telephone number, [phone]. Pt has follow up with PCP on 5/10/17 and with cardiology on 17    Patient states his B/P has been up the past few days wed-137/84, Thurs 136/84, and today 142/87 patients normal is .90's over 60's will send message to Nicholas Banda NP for follow up.

## 2017-05-09 ENCOUNTER — OFFICE VISIT (OUTPATIENT)
Dept: CARDIOLOGY CLINIC | Age: 67
End: 2017-05-09

## 2017-05-09 VITALS
HEART RATE: 91 BPM | BODY MASS INDEX: 37.33 KG/M2 | OXYGEN SATURATION: 93 % | HEIGHT: 69 IN | WEIGHT: 252 LBS | SYSTOLIC BLOOD PRESSURE: 120 MMHG | DIASTOLIC BLOOD PRESSURE: 70 MMHG | RESPIRATION RATE: 18 BRPM

## 2017-05-09 DIAGNOSIS — I25.10 CORONARY ARTERY DISEASE INVOLVING NATIVE CORONARY ARTERY OF NATIVE HEART WITHOUT ANGINA PECTORIS: ICD-10-CM

## 2017-05-09 DIAGNOSIS — I48.20 CHRONIC A-FIB (HCC): ICD-10-CM

## 2017-05-09 DIAGNOSIS — E11.9 CONTROLLED TYPE 2 DIABETES MELLITUS WITHOUT COMPLICATION, UNSPECIFIED LONG TERM INSULIN USE STATUS: ICD-10-CM

## 2017-05-09 DIAGNOSIS — E66.01 MORBID OBESITY DUE TO EXCESS CALORIES (HCC): ICD-10-CM

## 2017-05-09 DIAGNOSIS — Z95.1 HX OF CABG: ICD-10-CM

## 2017-05-09 DIAGNOSIS — N18.4 CKD (CHRONIC KIDNEY DISEASE) STAGE 4, GFR 15-29 ML/MIN (HCC): ICD-10-CM

## 2017-05-09 DIAGNOSIS — I49.9 IRREGULAR HEART BEAT: Primary | ICD-10-CM

## 2017-05-09 NOTE — PROGRESS NOTES
Chief Complaint   Patient presents with    Results     here for results and review- pt denies any cardiac symptoms - admits to not eating healthy yesterday

## 2017-05-10 ENCOUNTER — HOSPITAL ENCOUNTER (OUTPATIENT)
Dept: LAB | Age: 67
Discharge: HOME | End: 2017-05-10
Payer: MEDICARE

## 2017-05-10 PROCEDURE — 80048 BASIC METABOLIC PNL TOTAL CA: CPT

## 2017-05-10 PROCEDURE — 36415 COLL VENOUS BLD VENIPUNCTURE: CPT

## 2017-05-10 PROCEDURE — 83735 ASSAY OF MAGNESIUM: CPT

## 2017-05-11 LAB
BUN SERPL-MCNC: 48 MG/DL (ref 8–27)
BUN/CREAT SERPL: 23 (ref 10–24)
CALCIUM SERPL-MCNC: 8.9 MG/DL (ref 8.6–10.2)
CHLORIDE SERPL-SCNC: 97 MMOL/L (ref 96–106)
CO2 SERPL-SCNC: 27 MMOL/L (ref 18–29)
CREAT SERPL-MCNC: 2.05 MG/DL (ref 0.76–1.27)
GLUCOSE SERPL-MCNC: 244 MG/DL (ref 65–99)
MAGNESIUM SERPL-MCNC: 1.8 MG/DL (ref 1.6–2.3)
POTASSIUM SERPL-SCNC: 4.8 MMOL/L (ref 3.5–5.2)
SODIUM SERPL-SCNC: 141 MMOL/L (ref 134–144)

## 2017-05-17 ENCOUNTER — PATIENT OUTREACH (OUTPATIENT)
Dept: CARDIOLOGY CLINIC | Age: 67
End: 2017-05-17

## 2017-05-18 ENCOUNTER — PATIENT OUTREACH (OUTPATIENT)
Dept: CARDIOLOGY CLINIC | Age: 67
End: 2017-05-18

## 2017-05-18 VITALS — WEIGHT: 251 LBS | BODY MASS INDEX: 37.07 KG/M2

## 2017-05-18 NOTE — PROGRESS NOTES
Heart Failure liaison contacted the patient by telephone to perform CHF bundle Follow Up. Verified  and address as identifiers.       Spoke to patient regarding the following:    Have you been to an ER/Hospital since discharge from [facility]? Have you followed up with PCP? no; (Provider name and date if possible)    Transportation:  wife  Daily Weight: 251.0lb (increase or decrease)  Zone: green  Signs/Symptoms: Swelling denies; SOB denies; how many pillows to sleep? 2  Diet: low sodium currently on vacation eating seafood   Exercise: walking. Patient-centered Goal watching diet more closely  Medications Reconciled at this time:  no  Home health?: no    Patient reminded that there is a cardiologist on call 24 hours a day / 7 days a week (M-F 5pm to 8am and from Friday 5pm until Monday 8a for the weekend) should the patient have questions or concerns. Patient reminded to call 911 if situation is emergent or patient feels the situation is emergent. Provided pt with [facility] office telephone number, [phone]. Pt has follow up with PCP on 2 months and with cardiology on 17.

## 2017-05-22 ENCOUNTER — HOSPITAL ENCOUNTER (OUTPATIENT)
Dept: SLEEP MEDICINE | Age: 67
Discharge: HOME | End: 2017-05-22
Payer: MEDICARE

## 2017-05-22 DIAGNOSIS — G47.31 COMPLEX SLEEP APNEA SYNDROME: ICD-10-CM

## 2017-05-22 PROCEDURE — 95811 POLYSOM 6/>YRS CPAP 4/> PARM: CPT

## 2017-05-23 ENCOUNTER — TELEPHONE (OUTPATIENT)
Dept: SLEEP MEDICINE | Age: 67
End: 2017-05-23

## 2017-05-23 ENCOUNTER — PATIENT OUTREACH (OUTPATIENT)
Dept: CARDIOLOGY CLINIC | Age: 67
End: 2017-05-23

## 2017-05-23 VITALS
OXYGEN SATURATION: 94 % | BODY MASS INDEX: 39.62 KG/M2 | WEIGHT: 267.5 LBS | SYSTOLIC BLOOD PRESSURE: 157 MMHG | HEIGHT: 69 IN | TEMPERATURE: 97.1 F | DIASTOLIC BLOOD PRESSURE: 88 MMHG

## 2017-05-23 DIAGNOSIS — G47.33 OSA (OBSTRUCTIVE SLEEP APNEA): Primary | ICD-10-CM

## 2017-05-23 NOTE — TELEPHONE ENCOUNTER
Sub-Optimal titration on BPAP. EPAP not advance appropriately. Orders Placed This Encounter    AMB SUPPLY ORDER     Positive Airway Pressure Bi - Level, Resmed Min EPAP 10 P/S 4 Max IPAP25 cmH2O  Climate line as needed  PAP Mask patient preference,heated humidifer, tubing, filters (all sleep supplies) as needed  Wireless modem enrollment with privileges to change therapy remotely - indefinite. Length of Need = 99 months    Abran Howell M.D.  (electronically signed)  Diplomate in Sleep Medicine, [employer]   * PAP card download in 3 weeks.   PAP clinic if adherence remains poor

## 2017-05-24 ENCOUNTER — DOCUMENTATION ONLY (OUTPATIENT)
Dept: SLEEP MEDICINE | Age: 67
End: 2017-05-24

## 2017-05-24 ENCOUNTER — PATIENT OUTREACH (OUTPATIENT)
Dept: CARDIOLOGY CLINIC | Age: 67
End: 2017-05-24

## 2017-05-24 VITALS — BODY MASS INDEX: 37.51 KG/M2 | WEIGHT: 254 LBS

## 2017-05-24 NOTE — PROGRESS NOTES
Heart Failure liaison contacted the patient by telephone to perform CHF bundle Follow Up. Verified  and address as identifiers.       Spoke to patient regarding the following:    Have you been to an ER/Hospital since discharge from [facility]? Have you followed up with PCP No Gregory Curb;     Transportation:  Yes wife  Daily Weight: 254 (increase or decrease)  Zone: green  Signs/Symptoms: Swelling denies; SOB denies; how many pillows to sleep? 2  Diet: low sodium  Exercise: walks. Patient-centered Goal continue to work on diet and nutrition  Medications Reconciled at this time:  no  Home health?: no    Patient reminded that there is a cardiologist on call 24 hours a day / 7 days a week (M-F 5pm to 8am and from Friday 5pm until Monday 8a for the weekend) should the patient have questions or concerns. Patient reminded to call 911 if situation is emergent or patient feels the situation is emergent. Provided pt with [facility] office telephone number, [phone]. Pt has follow up with PCP in two months and with cardiology on 17. Patient has had a weight increase recently was on vacation last week at the beach, states he has been eating quite a bit, states he is back on track this week and will monitor closely. Will call back on 17. Out of the office 17. Advised to call office if any increased  swelling or weight gain of 2lb in 24 hrs, 3 lb in 3 days. Patient verbalized understanding.

## 2017-05-25 ENCOUNTER — HOSPITAL ENCOUNTER (OUTPATIENT)
Dept: NON INVASIVE DIAGNOSTICS | Age: 67
Discharge: HOME | End: 2017-05-25
Payer: MEDICARE

## 2017-05-25 VITALS
BODY MASS INDEX: 38.51 KG/M2 | OXYGEN SATURATION: 100 % | SYSTOLIC BLOOD PRESSURE: 130 MMHG | DIASTOLIC BLOOD PRESSURE: 57 MMHG | HEART RATE: 76 BPM | RESPIRATION RATE: 18 BRPM | WEIGHT: 260 LBS | HEIGHT: 69 IN

## 2017-05-25 DIAGNOSIS — I48.91 ATRIAL FIBRILLATION (HCC): ICD-10-CM

## 2017-05-25 LAB
ATRIAL RATE: 66 BPM
CALCULATED P AXIS, ECG09: 93 DEGREES
CALCULATED R AXIS, ECG10: 78 DEGREES
CALCULATED T AXIS, ECG11: 159 DEGREES
DIAGNOSIS, 93000: NORMAL
P-R INTERVAL, ECG05: 164 MS
Q-T INTERVAL, ECG07: 434 MS
QRS DURATION, ECG06: 102 MS
QTC CALCULATION (BEZET), ECG08: 454 MS
VENTRICULAR RATE, ECG03: 66 BPM

## 2017-05-25 PROCEDURE — 74011000250 HC RX REV CODE- 250: Performed by: INTERNAL MEDICINE

## 2017-05-25 PROCEDURE — 93005 ELECTROCARDIOGRAM TRACING: CPT

## 2017-05-25 PROCEDURE — 77030018729 HC ELECTRD DEFIB PAD CARD -B

## 2017-05-25 PROCEDURE — 74011000250 HC RX REV CODE- 250

## 2017-05-25 PROCEDURE — 74011250636 HC RX REV CODE- 250/636

## 2017-05-25 PROCEDURE — 96374 THER/PROPH/DIAG INJ IV PUSH: CPT

## 2017-05-25 PROCEDURE — 92960 CARDIOVERSION ELECTRIC EXT: CPT | Performed by: INTERNAL MEDICINE

## 2017-05-25 PROCEDURE — 99153 MOD SED SAME PHYS/QHP EA: CPT

## 2017-05-25 PROCEDURE — 99152 MOD SED SAME PHYS/QHP 5/>YRS: CPT

## 2017-05-25 RX ORDER — LIDOCAINE HYDROCHLORIDE 20 MG/ML
15 SOLUTION OROPHARYNGEAL ONCE
Status: COMPLETED | OUTPATIENT
Start: 2017-05-25 | End: 2017-05-25

## 2017-05-25 RX ORDER — MIDAZOLAM HYDROCHLORIDE 1 MG/ML
INJECTION, SOLUTION INTRAMUSCULAR; INTRAVENOUS
Status: COMPLETED
Start: 2017-05-25 | End: 2017-05-25

## 2017-05-25 RX ORDER — FENTANYL CITRATE 50 UG/ML
25-50 INJECTION, SOLUTION INTRAMUSCULAR; INTRAVENOUS
Status: DISCONTINUED | OUTPATIENT
Start: 2017-05-25 | End: 2017-05-25 | Stop reason: ALTCHOICE

## 2017-05-25 RX ORDER — FENTANYL CITRATE 50 UG/ML
INJECTION, SOLUTION INTRAMUSCULAR; INTRAVENOUS
Status: COMPLETED
Start: 2017-05-25 | End: 2017-05-25

## 2017-05-25 RX ORDER — MIDAZOLAM HYDROCHLORIDE 1 MG/ML
.5-2 INJECTION, SOLUTION INTRAMUSCULAR; INTRAVENOUS
Status: DISCONTINUED | OUTPATIENT
Start: 2017-05-25 | End: 2017-05-25 | Stop reason: ALTCHOICE

## 2017-05-25 RX ORDER — LIDOCAINE HYDROCHLORIDE 20 MG/ML
SOLUTION OROPHARYNGEAL
Status: COMPLETED
Start: 2017-05-25 | End: 2017-05-25

## 2017-05-25 RX ADMIN — MIDAZOLAM HYDROCHLORIDE 1 MG: 1 INJECTION, SOLUTION INTRAMUSCULAR; INTRAVENOUS at 10:18

## 2017-05-25 RX ADMIN — MIDAZOLAM HYDROCHLORIDE 1 MG: 1 INJECTION, SOLUTION INTRAMUSCULAR; INTRAVENOUS at 10:03

## 2017-05-25 RX ADMIN — FENTANYL CITRATE 25 MCG: 50 INJECTION, SOLUTION INTRAMUSCULAR; INTRAVENOUS at 10:02

## 2017-05-25 RX ADMIN — MIDAZOLAM HYDROCHLORIDE 1 MG: 1 INJECTION, SOLUTION INTRAMUSCULAR; INTRAVENOUS at 10:19

## 2017-05-25 RX ADMIN — MIDAZOLAM HYDROCHLORIDE 1 MG: 1 INJECTION, SOLUTION INTRAMUSCULAR; INTRAVENOUS at 10:09

## 2017-05-25 RX ADMIN — LIDOCAINE HYDROCHLORIDE 15 ML: 20 SOLUTION OROPHARYNGEAL at 10:05

## 2017-05-25 RX ADMIN — LIDOCAINE HYDROCHLORIDE 15 ML: 20 SOLUTION ORAL; TOPICAL at 10:05

## 2017-05-25 RX ADMIN — BENZOCAINE, BUTAMBEN, AND TETRACAINE HYDROCHLORIDE 1 SPRAY: .028; .004; .004 AEROSOL, SPRAY TOPICAL at 10:06

## 2017-05-25 NOTE — PROGRESS NOTES
Pt sedated with 50 mcg Iv Fentanyl and 2 mg IV Versed for HERMAN. Pt tolerated well. Pt given an additional 2 mg IV Versed for Elective Cardioversion. Pt given 1 synch shock at 360 joules. Rhythm converted to sinus.

## 2017-05-25 NOTE — PROGRESS NOTES
Patient arrived to Non-Invasive Cardiology Lab for Out Patient Procedure. Staff introduced to patient. Patient identifiers verified with Name and Date of Birth. Procedure verified with patient. Consent forms reviewed and signed by patient or authorized representative and verified. Allergies verified. Patient informed of procedure and plan of care. Questions answered with review. Patient on cardiac monitor, non-invasive blood pressure, SPO2 monitor. On RA. Patient is A&Ox3. Patient reports no complaints. Patient on stretcher, in low position, with side rails up. Patient instructed to call for assistance as needed. Family in waiting room.  ASa 3 per MD

## 2017-05-25 NOTE — PROCEDURES
[address] [address]   CARDIOVERSION       Name:  Ellen Oliver   MR#:  [id number]   :  1950   Account #:  [de-identified]        Date of Adm:  2017       PROCEDURE: Cardioversion. REFERRING PHYSICIAN: David Becker MD    INDICATIONS: Atrial fibrillation. MEDICATIONS USED: Versed 2 mg. DESCRIPTION OF PROCEDURE: After obtaining informed consent, a   transesophageal echocardiogram was performed to rule out left atrial   appendage thrombus. Subsequent to this, the patient was given   additional sedation with constant monitoring of the heart rate, blood   pressure, and oxygen saturation. Synchronous, biphasic electricity   using 360 joules x1 was utilized. The patient converted to sinus   rhythm. COMPLICATIONS: None. ESTIMATED BLOOD LOSS: None. SPECIMENS OBTAINED: None. PLAN:   1. The patient will be started on Rythmol  mg twice daily. 2. EKG in 3 days. 3. Follow up with Dr. Robina Tejeda in 2 weeks.         Janene Van MD      GA / REGINA   D:  2017   10:31   T:  2017   12:44   Job #:  [id number]

## 2017-05-25 NOTE — DISCHARGE INSTRUCTIONS
DISCHARGE SUMMARY from Rocky Zarate RN        The following personal items collected during your admission are returned to you:   Clothing:          PATIENT INSTRUCTIONS: Continue taking all the same medications and in addition start taking Rythmol 225 mg twice daily      If you had a transesophageal echocardiogram, start with sips of water and advance diet as swallowing returns to normal. Avoid any scalding hot beverages for the next 2 hours. The cardioversion procedure can cause redness to the skin where the patches were placed. You may treat this with Aloe or Cortisone cream over the counter lotion. If the skin appears very reddened or blistered contact your physician      Call to make an appointment for an EKG next Tuesday and to see Dr. Alix Will in 2 weeks    What to do at Home:  Recommended activity: No driving today      The discharge information has been reviewed with the PATIENT . The PATIENT  verbalized understanding.

## 2017-05-30 ENCOUNTER — PATIENT OUTREACH (OUTPATIENT)
Dept: CARDIOLOGY CLINIC | Age: 67
End: 2017-05-30

## 2017-05-30 VITALS — WEIGHT: 257 LBS | BODY MASS INDEX: 37.95 KG/M2

## 2017-05-30 RX ORDER — BUMETANIDE 1 MG/1
1 TABLET ORAL 2 TIMES DAILY
Qty: 180 TAB | Refills: 1 | Status: SHIPPED | OUTPATIENT
Start: 2017-05-30 | End: 2017-06-22 | Stop reason: DRUGHIGH

## 2017-05-30 RX ORDER — METOLAZONE 2.5 MG/1
2.5 TABLET ORAL EVERY OTHER DAY
COMMUNITY
End: 2017-06-22 | Stop reason: DRUGHIGH

## 2017-05-30 NOTE — PROGRESS NOTES
Heart Failure liaison contacted the patient by telephone to perform CHF bundle Follow Up. Verified  and address as identifiers.       Spoke to patient regarding the following:    Have you been to an ER/Hospital [facility] [address] [facility]?  no    Have you followed up with PCP? no; (Provider name and date if possible)    Transportation:  wife  Daily Weight: 257.0lb(increase or decrease)  Zone: green  Signs/Symptoms: Swelling slight legs and feet; SOB none; how many pillows to sleep? 2  Diet: low sodium, diabetic  Exercise: walks daily. Patient-centered Goal no, metolazone added every other day by Dr Marcelo Roman  Medications Reconciled at this time:  no  Home health?: no    Patient reminded that there is a cardiologist on call 24 hours a day / 7 days a week (M- 5pm to 8am and from Friday 5pm until Monday 8a for the weekend) should the patient have questions or concerns. Patient reminded to call 911 if situation is emergent or patient feels the situation is emergent. Provided pt with [facility] office telephone number, [phone]. Pt has follow up with PCP on unsure and with cardiology on has EKG on 17 and Card follow up 17. Patients states he had some weight gain over the holiday weekend and called the on call MD Dr Marcelo Roman ordered metolazone 2.5mg every other day. Patients weight has since gone down to 257.0lb today. Has a follow up tomorrow for an EKG will check at that time for follow up on med.

## 2017-05-31 ENCOUNTER — PATIENT OUTREACH (OUTPATIENT)
Dept: CARDIOLOGY CLINIC | Age: 67
End: 2017-05-31

## 2017-05-31 ENCOUNTER — CLINICAL SUPPORT (OUTPATIENT)
Dept: CARDIOLOGY CLINIC | Age: 67
End: 2017-05-31

## 2017-05-31 VITALS
WEIGHT: 276.4 LBS | DIASTOLIC BLOOD PRESSURE: 78 MMHG | HEART RATE: 80 BPM | OXYGEN SATURATION: 93 % | RESPIRATION RATE: 16 BRPM | SYSTOLIC BLOOD PRESSURE: 118 MMHG | BODY MASS INDEX: 40.94 KG/M2 | HEIGHT: 69 IN

## 2017-05-31 DIAGNOSIS — I48.20 CHRONIC A-FIB (HCC): Primary | ICD-10-CM

## 2017-05-31 DIAGNOSIS — R60.0 BILATERAL LEG EDEMA: ICD-10-CM

## 2017-05-31 DIAGNOSIS — I50.9 HEART FAILURE, UNSPECIFIED HEART FAILURE CHRONICITY, UNSPECIFIED HEART FAILURE TYPE: ICD-10-CM

## 2017-05-31 RX ORDER — METOPROLOL SUCCINATE 100 MG/1
50 TABLET, EXTENDED RELEASE ORAL 2 TIMES DAILY
Qty: 180 TAB | Refills: 2
Start: 2017-05-31 | End: 2017-05-31 | Stop reason: SDUPTHER

## 2017-05-31 RX ORDER — PROPAFENONE HYDROCHLORIDE 225 MG/1
CAPSULE, EXTENDED RELEASE ORAL
Refills: 2 | COMMUNITY
Start: 2017-05-25 | End: 2017-05-31 | Stop reason: SDUPTHER

## 2017-05-31 RX ORDER — PROPAFENONE HYDROCHLORIDE 325 MG/1
325 CAPSULE, EXTENDED RELEASE ORAL 2 TIMES DAILY
Qty: 60 CAP | Refills: 1 | Status: SHIPPED | OUTPATIENT
Start: 2017-05-31 | End: 2017-06-08

## 2017-05-31 RX ORDER — METOPROLOL SUCCINATE 50 MG/1
50 TABLET, EXTENDED RELEASE ORAL 2 TIMES DAILY
Qty: 60 TAB | Refills: 6 | Status: SHIPPED | OUTPATIENT
Start: 2017-05-31 | End: 2017-06-08 | Stop reason: SDUPTHER

## 2017-05-31 NOTE — PROGRESS NOTES
Cristhian Holder, SAMINA  Lennox Ken  1950  Jr Harrison MD          Subjective:    Lennox Ken is a 75 y.o. male is here for a follow up onmedication adjustment /EKG. The patient denies chest pain or shortness of breath. Notes weight gain. Has started zaroxlyn every other day per on call MD on Sunday. He would like referral to nutritionist.     Patient Active Problem List    Diagnosis Date Noted    Irregular heart beat 03/22/2017    Fatigue 03/15/2017    Bilateral leg edema 03/15/2017    Counseling regarding advanced care planning and goals of care 03/15/2017    Hx of CABG 03/13/2017    Morbid obesity ([address] 03/13/2017    Diabetes mellitus type 2, controlled ([address] 03/13/2017    Heart failure ([address] 03/13/2017    SOB (shortness of breath) 03/07/2017    Chronic a-fib ([address] 03/07/2017    Coronary artery disease involving native coronary artery without angina pectoris 03/07/2017    CKD (chronic kidney disease) stage 4, GFR 15-29 ml/min ([address] 03/07/2017      Past Medical History:   Diagnosis Date    Arrhythmia     atrial fibrillation 2017    CAD (coronary atherosclerotic disease)     Diabetes ([address]     Diabetes mellitus type 2, controlled ([address] 3/13/2017    Heart failure ([address]     Hx of CABG 3/13/2017    CABG in 2010 in [address] [place] [address] Morbid obesity ([address] 3/13/2017    S/P CABG x 2 2010      Past Surgical History:   Procedure Laterality Date    CARDIAC SURG PROCEDURE UNLIST      CABGx2 in 2010    HX ORTHOPAEDIC      L 3rd toe amputation in 1999     Allergies   Allergen Reactions    Ciprofloxacin Nausea Only    Percocet [Oxycodone-Acetaminophen] Other (comments)     hallucinations      Family History   Problem Relation Age of Onset    Heart Attack Father       Current Outpatient Prescriptions   Medication Sig    propafenone SR (RYTHMOL SR) 325 mg SR capsule Take 1 Cap by mouth two (2) times a day.     metoprolol succinate (TOPROL-XL) 50 mg XL tablet Take 1 Tab by mouth two (2) times a day. Lowered 05/31/17    bumetanide (BUMEX) 1 mg tablet Take 1 Tab by mouth two (2) times a day.  metOLazone (ZAROXOLYN) 2.5 mg tablet Take 2.5 mg by mouth every other day.  gabapentin (NEURONTIN) 100 mg capsule Take  by mouth three (3) times daily.  insulin aspart protamine/insulin aspart (NOVOLOG MIX 70/30) 100 unit/mL (70-30) inpn by SubCUTAneous route two (2) times a day. Sliding scale, 26 in am, 34 in pm, 5 units for every 50 point over 150, hold if bs <100.  pravastatin (PRAVACHOL) 80 mg tablet Take 1 Tab by mouth nightly.  apixaban (ELIQUIS) 5 mg tablet Take 1 Tab by mouth two (2) times a day. Indications: PREVENT THROMBOEMBOLISM IN CHRONIC ATRIAL FIBRILLATION    calcium carbonate (CALTREX) 600 mg calcium (1,500 mg) tablet Take 600 mg by mouth every Tuesday, Thursday, Saturday & Sunday.  dutasteride (AVODART) 0.5 mg capsule TAKE ONE CAPSULE BY MOUTH EVERY DAY    levothyroxine (SYNTHROID) 137 mcg tablet TAKE 1 TABLET(S) EVERY DAY BY ORAL ROUTE FOR 90 DAYS.  tamsulosin (FLOMAX) 0.4 mg capsule TAKE 2 CAPSULES BY MOUTH EVERY DAY    paricalcitol (ZEMPLAR) 1 mcg capsule Take 1 mcg by mouth daily.  multivitamin (ONE A DAY) tablet Take 1 Tab by mouth daily.  cholecalciferol (VITAMIN D3) 1,000 unit cap Take 3,000 Units by mouth daily.  acetaminophen (TYLENOL EXTRA STRENGTH) 500 mg tablet Take 1,000 mg by mouth every six (6) hours as needed for Pain.  ketoconazole (NIZORAL) 2 % topical cream      No current facility-administered medications for this visit. Vitals:    05/31/17 0826 05/31/17 0835   BP: 120/68 118/78   Pulse: 80    Resp: 16    SpO2: 93%    Weight: 276 lb 6.4 oz (125.4 kg)    Height: 5' 9\" (1.753 m)        I have reviewed the nurses notes, vitals, problem list, allergy list, medical history, social history and medications. Review of Systems:    General: Pt is able to conduct ADL's. Reports weight gain, edema.    Respiratory: Denies shortness of breath, BETTS, wheezing or stridor. Cardiovascular: Denies precordial pain, palpitations,r PND  Gastrointestinal: Denies poor appetite, indigestion, abdominal pain or blood in stool  . Physical ExamPhysical Exam:      General: Well developed, in no acute distress. Arvell Priyanka Heart:  Irregularly irregular. No murmur, no S3 or S4. Neuro: A&Ox3, speech clear, gait stable. Assessment:   Assessment:     ICD-10-CM ICD-9-CM    1. Chronic a-fib ([facility]) I48.2 427.31 AMB POC EKG ROUTINE W/ 12 LEADS, INTER & REP      MAGNESIUM      METABOLIC PANEL, BASIC      REFERRAL TO NUTRITION   2. Heart failure, unspecified heart failure chronicity, unspecified heart failure type ([facility]) I50.9 428.9 MAGNESIUM      METABOLIC PANEL, BASIC      REFERRAL TO NUTRITION   3. Bilateral leg edema R60.0 782.3 MAGNESIUM      METABOLIC PANEL, BASIC      REFERRAL TO NUTRITION        Plan:     Blood pressure readings are controlled, instructed patient to continue current medications. He is back in afib at ventricular rate 86. Will lower his toprol to 50mg BID and increase his Rythmol 325 mg BID. Labs prior to follow up in 2 weeks. Pt advised to monitor bps/ hr at home. Visit today with NN - they will contact him in 48 hours.      Iona Blanc, ANP

## 2017-05-31 NOTE — MR AVS SNAPSHOT
Visit Information Date & Time Provider Department Dept. Phone Encounter #  
 5/31/2017  8:30 AM [facility] [phone] [id number] Your Appointments 6/13/2017 10:15 AM  
HOSPITAL FOLLOW-UP with MD [facility] [address]) Appt Note: hosptl /fu 2 wks -lj 5-25-17  
 [address] [place] [address] [address] [id number]  
[phone]  
  
   
 [facility] [address] [address]  
  
    
 7/6/2017 10:50 AM  
Follow Up with MD [facility] [address]) Appt Note: 3 month f/u  Diabetes [address] [id number] [facility] Upcoming Health Maintenance Date Due Hepatitis C Screening 1950 EYE EXAM RETINAL OR DILATED Q1 1/28/1959 DTaP/Tdap/Td series (1 - Tdap) 1/28/1970 FOBT Q 1 YEAR AGE 50-75 1/28/1999 ZOSTER VACCINE AGE 60> 1/28/2009 GLAUCOMA SCREENING Q2Y 1/28/2014 Pneumococcal 65+ Low/Medium Risk (1 of 2 - PCV13) 1/28/2014 MEDICARE YEARLY EXAM 1/28/2014 INFLUENZA AGE 9 TO ADULT 8/1/2017 HEMOGLOBIN A1C Q6M 9/13/2017 FOOT EXAM Q1 4/3/2018 MICROALBUMIN Q1 4/3/2018 LIPID PANEL Q1 4/3/2018 Allergies as of 5/31/2017  Review Complete On: 5/31/2017 By: Azalia Gabriel LPN Severity Noted Reaction Type Reactions Ciprofloxacin  03/07/2017    Nausea Only Percocet [Oxycodone-acetaminophen]  03/07/2017    Other (comments)  
 hallucinations Current Immunizations  Never Reviewed No immunizations on file. Not reviewed this visit You Were Diagnosed With   
  
 Codes Comments Chronic a-fib ([facility])    -  Primary ICD-10-CM: R69.9 ICD-9-CM: 427.31 Heart failure, unspecified heart failure chronicity, unspecified heart failure type ([facility]     ICD-10-CM: I50.9 ICD-9-CM: 428.9 Bilateral leg edema     ICD-10-CM: R60.0 ICD-9-CM: 219. 3 Vitals BP Pulse Resp Height(growth percentile) Weight(growth percentile) SpO2  
 118/78 (BP 1 Location: Right arm, BP Patient Position: Sitting) 80 16 5' 9\" (1.753 m) 276 lb 6.4 oz (125.4 kg) 93% BMI Smoking Status 40.82 kg/m2 Never Smoker Vitals History BMI and BSA Data Body Mass Index Body Surface Area  
 40.82 kg/m 2 2.47 m 2 Preferred Pharmacy Pharmacy Name Phone [id number]- [place], [place] - [address] [phone] Your Updated Medication List  
  
   
This list is accurate as of: 5/31/17  9:09 AM.  Always use your most recent med list.  
  
  
  
  
 apixaban 5 mg tablet Commonly known as:  Carlton Cortez Take 1 Tab by mouth two (2) times a day. Indications: PREVENT THROMBOEMBOLISM IN CHRONIC ATRIAL FIBRILLATION  
  
 bumetanide 1 mg tablet Commonly known as:  Terressa Arriagamaker Take 1 Tab by mouth two (2) times a day. calcium carbonate 600 mg calcium (1,500 mg) tablet Commonly known as:  Imelda Axon Take 600 mg by mouth every Tuesday, Thursday, Saturday & Sunday. dutasteride 0.5 mg capsule Commonly known as:  AVODART TAKE ONE CAPSULE BY MOUTH EVERY DAY  
  
 gabapentin 100 mg capsule Commonly known as:  NEURONTIN Take  by mouth three (3) times daily. insulin aspart protamine/insulin aspart 100 unit/mL (70-30) Inpn Commonly known as:  NOVOLOG MIX 70/30  
by SubCUTAneous route two (2) times a day. Sliding scale, 26 in am, 34 in pm, 5 units for every 50 point over 150, hold if bs <100. ketoconazole 2 % topical cream  
Commonly known as:  NIZORAL  
  
 levothyroxine 137 mcg tablet Commonly known as:  SYNTHROID  
TAKE 1 TABLET(S) EVERY DAY BY ORAL ROUTE FOR 90 DAYS. metOLazone 2.5 mg tablet Commonly known as:  Katy Haus Take 2.5 mg by mouth every other day. metoprolol succinate 100 mg tablet Commonly known as:  TOPROL-XL Take 0.5 Tabs by mouth two (2) times a day. Lowered 05/31/17  
  
 multivitamin tablet Commonly known as:  ONE A DAY Take 1 Tab by mouth daily. paricalcitol 1 mcg capsule Commonly known as:  Corene Milder Take 1 mcg by mouth daily. pravastatin 80 mg tablet Commonly known as:  PRAVACHOL Take 1 Tab by mouth nightly. propafenone  mg SR capsule Commonly known as:  RYTHMOL SR Take 1 Cap by mouth two (2) times a day. tamsulosin 0.4 mg capsule Commonly known as:  FLOMAX TAKE 2 CAPSULES BY MOUTH EVERY DAY  
  
 TYLENOL EXTRA STRENGTH 500 mg tablet Generic drug:  acetaminophen Take 1,000 mg by mouth every six (6) hours as needed for Pain. VITAMIN D3 1,000 unit Cap Generic drug:  cholecalciferol Take 3,000 Units by mouth daily. Prescriptions Sent to Pharmacy Refills  
 propafenone SR (RYTHMOL SR) 325 mg SR capsule 1 Sig: Take 1 Cap by mouth two (2) times a day. Class: Normal  
 Pharmacy: [facility]/pharmacy [address] [place] [address] S. [address] Ph #: [phone] Route: Oral  
  
We Performed the Following AMB POC EKG ROUTINE W/ 12 LEADS, INTER & REP [43573 CPT(R)] MAGNESIUM [id number] CPT(R)] METABOLIC PANEL, BASIC [82753 CPT(R)] REFERRAL TO NUTRITION [REF50 Custom] Comments:  
 Please evaluate patient for CHF, weight management. Please schedule and authorize patient for services Referral Information Referral ID Referred By Referred To  
  
 [id number] YANIRA Osborne [address] [place] [address] [facility] Joshua Whyte Visits Status Start Date End Date 1 New Request 5/31/17 5/31/18 If your referral has a status of pending review or denied, additional information will be sent to support the outcome of this decision. Introducing [facility]!    
 Dorcas Ward introduces TraveDoc patient portal. Now you can access parts of your medical record, email your doctor's office, and request medication refills online. 1. In your internet browser, go to [email or web address] 2. Click on the First Time User? Click Here link in the Sign In box. You will see the New Member Sign Up page. 3. Enter your Shopzilla Access Code exactly as it appears below. You will not need to use this code after youve completed the sign-up process. If you do not sign up before the expiration date, you must request a new code. · Shopzilla Access Code: [email or web address] Expires: 6/5/2017 10:26 AM 
 
4. Enter the last four digits of your Social Security Number (xxxx) and Date of Birth (mm/dd/yyyy) as indicated and click Submit. You will be taken to the next sign-up page. 5. Create a Shopzilla ID. This will be your Shopzilla login ID and cannot be changed, so think of one that is secure and easy to remember. 6. Create a Shopzilla password. You can change your password at any time. 7. Enter your Password Reset Question and Answer. This can be used at a later time if you forget your password. 8. Enter your e-mail address. You will receive e-mail notification when new information is available in [address] 9. Click Sign Up. You can now view and download portions of your medical record. 10. Click the Download Summary menu link to download a portable copy of your medical information. If you have questions, please visit the Frequently Asked Questions section of the Shopzilla website. Remember, Shopzilla is NOT to be used for urgent needs. For medical emergencies, dial 911. Now available from your iPhone and Android! Please provide this summary of care documentation to your next provider. Your primary care clinician is listed as Mack Lakhani. If you have any questions after today's visit, please call [phone].

## 2017-05-31 NOTE — PROGRESS NOTES
This note will not be viewable in [address] Met with the pt at the [facility] office. Pt states understanding that his wt is up and states that he has recently returned from vacation. Pt noted to be SOB and talking in short, winded sounding, sentences. Pt reports that this is a change. Pt reports that leg edema is at baseline and pt states that he understands that need to follow a low Na diet and to limit portion size. Pt states appreciation for follow up calls and states appreciation for meeting with me and Faiza Adkins, HF liaison today. Pt states that he will attempt to walk more in his home and states that he is taking metolazone every other day after calling on call physician this weekend. Patient reminded that there is a cardiologist on call 24 hours a day / 7 days a week (M-F 5pm to 8am and from Friday 5pm until Monday 8a for the weekend) should the patient have questions or concerns. Patient reminded to call 911 if situation is emergent or patient feels the situation is emergent. Will continue to follow closely.

## 2017-06-02 ENCOUNTER — PATIENT OUTREACH (OUTPATIENT)
Dept: CARDIOLOGY CLINIC | Age: 67
End: 2017-06-02

## 2017-06-02 NOTE — PROGRESS NOTES
Spoke to patient states weight is down to 266lb  Denies any shortness of breath states his breathing is better. Patient says he is waiting for the wound clinic to contact him for a follow up, and will call them within the hour if they do not call him back. Patient has a follow up with cardiology 6/13/17. Will call 6/5/17 and will advise on med management of metolazone at that time after talking with Michel Lazo also.

## 2017-06-05 ENCOUNTER — TELEPHONE (OUTPATIENT)
Dept: CARDIOLOGY CLINIC | Age: 67
End: 2017-06-05

## 2017-06-05 ENCOUNTER — HOSPITAL ENCOUNTER (OUTPATIENT)
Dept: LAB | Age: 67
Discharge: HOME | End: 2017-06-05
Payer: MEDICARE

## 2017-06-05 ENCOUNTER — PATIENT OUTREACH (OUTPATIENT)
Dept: CARDIOLOGY CLINIC | Age: 67
End: 2017-06-05

## 2017-06-05 VITALS — BODY MASS INDEX: 41.05 KG/M2 | WEIGHT: 278 LBS

## 2017-06-05 PROCEDURE — 80048 BASIC METABOLIC PNL TOTAL CA: CPT

## 2017-06-05 PROCEDURE — 36415 COLL VENOUS BLD VENIPUNCTURE: CPT

## 2017-06-05 PROCEDURE — 83735 ASSAY OF MAGNESIUM: CPT

## 2017-06-05 NOTE — PROGRESS NOTES
Spoke to patient this morning in regards to weight gain over the weekend, states he did have some chinese food, which is rare. Advised patient this could have attributed to the gain. Spoke to Shruthi Gamez NP per Mariia Burrell advised to continue with medications as directed and will monitor closely. Metolazone every other day. Call on 6/1/17 patient originally stated his weight was 266 lb it was 276 lb. Will document in flowsheet. Weight today is at 276 lb. Patient will continue to call for any increase of 2 lb or more and NN will continue to call every other day until next appointment.

## 2017-06-05 NOTE — TELEPHONE ENCOUNTER
Verified patient with two identifiers. Spoke with patient stated weight has gone up 2lb since Sat, current weight 278lb, /92 HR 84. He also stated has some swelling in ankles, denies any chest pain, sob or palpitations. Please advise.

## 2017-06-06 ENCOUNTER — PATIENT OUTREACH (OUTPATIENT)
Dept: CARDIOLOGY CLINIC | Age: 67
End: 2017-06-06

## 2017-06-06 LAB
BUN SERPL-MCNC: 77 MG/DL (ref 8–27)
BUN/CREAT SERPL: 25 (ref 10–24)
CALCIUM SERPL-MCNC: 8.5 MG/DL (ref 8.6–10.2)
CHLORIDE SERPL-SCNC: 94 MMOL/L (ref 96–106)
CO2 SERPL-SCNC: 26 MMOL/L (ref 18–29)
CREAT SERPL-MCNC: 3.07 MG/DL (ref 0.76–1.27)
GLUCOSE SERPL-MCNC: 160 MG/DL (ref 65–99)
INTERPRETATION: NORMAL
MAGNESIUM SERPL-MCNC: 2.2 MG/DL (ref 1.6–2.3)
POTASSIUM SERPL-SCNC: 4.3 MMOL/L (ref 3.5–5.2)
SODIUM SERPL-SCNC: 143 MMOL/L (ref 134–144)

## 2017-06-06 NOTE — PROGRESS NOTES
Renal fxn worse. Hold zaroxlyn. I believe his afib is the problem. Discussed with Alycia Nunez NP. He can see EP on Thursday at 1.

## 2017-06-06 NOTE — TELEPHONE ENCOUNTER
Kenia Cleveland, DALTON   You 16 hours ago (3:57 PM)                 Heart failure nurse navigators addressed this today with Iona.   (Routing comment)

## 2017-06-06 NOTE — PROGRESS NOTES
This note will not be viewable in [address] Called pt to follow up on hospital visit and to inform pt that he needs to be seen by Dr. John Casper this week at 1p on 6/8/17. LVM on both telephone numbers for pt stating my name, NN at [facility], date and time of call, and my direct office telephone number. Will continue to call until pt returns call or can be reached.

## 2017-06-07 ENCOUNTER — PATIENT OUTREACH (OUTPATIENT)
Dept: CARDIOLOGY CLINIC | Age: 67
End: 2017-06-07

## 2017-06-07 ENCOUNTER — TELEPHONE (OUTPATIENT)
Dept: CARDIOLOGY CLINIC | Age: 67
End: 2017-06-07

## 2017-06-07 ENCOUNTER — PATIENT OUTREACH (OUTPATIENT)
Dept: ENDOCRINOLOGY | Age: 67
End: 2017-06-07

## 2017-06-07 NOTE — PROGRESS NOTES
NN contacted today by cardiovascular NN concerning inability to get in contact with patient. Attempted to contact patient, follow up for chronic condition of diabetes with blood sugar readings and insulin regimen. No answer, left voicemail message to return telephone call. Patient called. Cardiovascular NN notified. Provided NN with e-mail for blood sugar readings: [email or web address]      From: Nikos Silva   Sent: Wednesday, June 07, 2017 11:49 AM  To: '[email or web address]'  Cc: Adrianna Higginbotham  Subject: safemail Blood sugar readings    Mr. Lindsay Call,    Please send me your most recent blood sugar readings for MD review. Thank you!     Karley Ford RN

## 2017-06-07 NOTE — PROGRESS NOTES
This note will not be viewable in [address] Called pt x 4 and LVM to call [facility] NN immediately concerning labs and follow up appt tomorrow 6/8/17 and to hold zaroxlyn. Unable to contact pt. Called [facility] to see if that office has heard from pt. AROLDO Horton has called wound care to follow up there as well. NN at [facility] and they have not heard from pt. Called [facility] office to have a wellness check performed as we cannot reach pt. Spoke with dispatch and with ramonauty Severa Ling who will check on pt. Expressed concerns about pt's health and that he has not returned call and we are in frequent contact with pt.  states that he will check on pt.

## 2017-06-07 NOTE — PROGRESS NOTES
LM on home number listed [phone]  LM on cell number listed [phone]  Called PCP office [phone] patient was in office on 6-1-17 they have not heard from him since appointment. Called wound care clinic at [facility] appt was made for 6-13-17 not made by patient, gave my number if they hear from patient to contact NN.

## 2017-06-08 ENCOUNTER — OFFICE VISIT (OUTPATIENT)
Dept: CARDIOLOGY CLINIC | Age: 67
End: 2017-06-08

## 2017-06-08 ENCOUNTER — PATIENT OUTREACH (OUTPATIENT)
Dept: CARDIOLOGY CLINIC | Age: 67
End: 2017-06-08

## 2017-06-08 VITALS
HEART RATE: 80 BPM | OXYGEN SATURATION: 96 % | SYSTOLIC BLOOD PRESSURE: 118 MMHG | HEIGHT: 69 IN | DIASTOLIC BLOOD PRESSURE: 64 MMHG | BODY MASS INDEX: 41.43 KG/M2 | WEIGHT: 279.7 LBS | RESPIRATION RATE: 18 BRPM

## 2017-06-08 DIAGNOSIS — E11.9 CONTROLLED TYPE 2 DIABETES MELLITUS WITHOUT COMPLICATION, UNSPECIFIED LONG TERM INSULIN USE STATUS: ICD-10-CM

## 2017-06-08 DIAGNOSIS — R60.0 BILATERAL LEG EDEMA: Primary | ICD-10-CM

## 2017-06-08 DIAGNOSIS — N18.4 CKD (CHRONIC KIDNEY DISEASE) STAGE 4, GFR 15-29 ML/MIN (HCC): ICD-10-CM

## 2017-06-08 DIAGNOSIS — I10 ESSENTIAL HYPERTENSION: ICD-10-CM

## 2017-06-08 DIAGNOSIS — I48.20 CHRONIC A-FIB (HCC): ICD-10-CM

## 2017-06-08 DIAGNOSIS — E66.01 MORBID OBESITY DUE TO EXCESS CALORIES (HCC): ICD-10-CM

## 2017-06-08 RX ORDER — METOPROLOL SUCCINATE 50 MG/1
75 TABLET, EXTENDED RELEASE ORAL DAILY
Qty: 60 TAB | Refills: 6 | Status: SHIPPED | OUTPATIENT
Start: 2017-06-08 | End: 2017-06-22 | Stop reason: DRUGHIGH

## 2017-06-08 NOTE — PROGRESS NOTES
This note will not be viewable in [address] Visited pt in the office with his wife. Pt reports that he has been telling his wife one wt and tell us another wt. Pt appears pale straw colored - very different from last office visit. Pt reports that he is taking metolazone and is gaining wt. Pt states that he is afraid. Informed NP Davin Morataya that pt's skin has changed color. Will continue to follow closely.

## 2017-06-08 NOTE — PROGRESS NOTES
Subjective:      Efraín Bansal is a 75 y.o. male is here for EP consult. He has history of chronic AF. Reports weight gain (5-6lbs since last week), swelling BLE. He has wound care scheduled for next week d/t weeping BLE. The patient denies chest pain/ shortness of breath, orthopnea, PND,  palpitations, syncope, presyncope or fatigue.        Patient Active Problem List    Diagnosis Date Noted    Irregular heart beat 03/22/2017    Fatigue 03/15/2017    Bilateral leg edema 03/15/2017    Counseling regarding advanced care planning and goals of care 03/15/2017    Hx of CABG 03/13/2017    Morbid obesity ([address] 03/13/2017    Diabetes mellitus type 2, controlled ([address] 03/13/2017    Heart failure ([address] 03/13/2017    SOB (shortness of breath) 03/07/2017    Chronic a-fib ([address] 03/07/2017    Coronary artery disease involving native coronary artery without angina pectoris 03/07/2017    CKD (chronic kidney disease) stage 4, GFR 15-29 ml/min ([facility]) 03/07/2017      Dayday Hall MD  Past Medical History:   Diagnosis Date    Arrhythmia     atrial fibrillation 2017    CAD (coronary atherosclerotic disease)     Diabetes ([address]     Diabetes mellitus type 2, controlled ([address] 3/13/2017    Heart failure ([address]     Hx of CABG 3/13/2017    CABG in 2010 in [address] [place] [address] Morbid obesity ([address] 3/13/2017    S/P CABG x 2 2010      Past Surgical History:   Procedure Laterality Date    CARDIAC SURG PROCEDURE UNLIST      CABGx2 in 2010    HX ORTHOPAEDIC      L 3rd toe amputation in 1999     Allergies   Allergen Reactions    Ciprofloxacin Nausea Only    Percocet [Oxycodone-Acetaminophen] Other (comments)     hallucinations      Family History   Problem Relation Age of Onset    Heart Attack Father     negative for cardiac disease  Social History     Social History    Marital status:      Spouse name: N/A    Number of children: N/A    Years of education: N/A     Social History Main Topics    Smoking status: Never Smoker    Smokeless tobacco: Never Used    Alcohol use Yes      Comment: rare    Drug use: No    Sexual activity: Not Asked     Other Topics Concern    None     Social History Narrative     Current Outpatient Prescriptions   Medication Sig    propafenone SR (RYTHMOL SR) 325 mg SR capsule Take 1 Cap by mouth two (2) times a day.  metoprolol succinate (TOPROL-XL) 50 mg XL tablet Take 1 Tab by mouth two (2) times a day. Lowered 05/31/17    bumetanide (BUMEX) 1 mg tablet Take 1 Tab by mouth two (2) times a day.  metOLazone (ZAROXOLYN) 2.5 mg tablet Take 2.5 mg by mouth every other day.  gabapentin (NEURONTIN) 100 mg capsule Take  by mouth three (3) times daily.  insulin aspart protamine/insulin aspart (NOVOLOG MIX 70/30) 100 unit/mL (70-30) inpn by SubCUTAneous route two (2) times a day. Sliding scale, 26 in am, 34 in pm, 5 units for every 50 point over 150, hold if bs <100.  pravastatin (PRAVACHOL) 80 mg tablet Take 1 Tab by mouth nightly.  apixaban (ELIQUIS) 5 mg tablet Take 1 Tab by mouth two (2) times a day. Indications: PREVENT THROMBOEMBOLISM IN CHRONIC ATRIAL FIBRILLATION    calcium carbonate (CALTREX) 600 mg calcium (1,500 mg) tablet Take 600 mg by mouth every Tuesday, Thursday, Saturday & Sunday.  dutasteride (AVODART) 0.5 mg capsule TAKE ONE CAPSULE BY MOUTH EVERY DAY    levothyroxine (SYNTHROID) 137 mcg tablet TAKE 1 TABLET(S) EVERY DAY BY ORAL ROUTE FOR 90 DAYS.  tamsulosin (FLOMAX) 0.4 mg capsule TAKE 2 CAPSULES BY MOUTH EVERY DAY    paricalcitol (ZEMPLAR) 1 mcg capsule Take 1 mcg by mouth daily.  multivitamin (ONE A DAY) tablet Take 1 Tab by mouth daily.  cholecalciferol (VITAMIN D3) 1,000 unit cap Take 3,000 Units by mouth daily.  acetaminophen (TYLENOL EXTRA STRENGTH) 500 mg tablet Take 1,000 mg by mouth every six (6) hours as needed for Pain.     ketoconazole (NIZORAL) 2 % topical cream      No current facility-administered medications for this visit.       García Areas:    06/08/17 1304   BP: 118/64   Pulse: 80   Resp: 18   SpO2: 96%   Weight: 279 lb 11.2 oz (126.9 kg)   Height: 5' 9\" (1.753 m)       I have reviewed the nurses notes, vitals, problem list, allergy list, medical history, family, social history and medications. Review of Symptoms:    General: Pt denies excessive weight gain or loss. Pt is able to conduct ADL's  HEENT: Denies blurred vision, headaches, epistaxis and difficulty swallowing. Respiratory: Denies shortness of breath, BETTS, wheezing or stridor. Cardiovascular: Denies precordial pain, palpitations, edema or PND  Gastrointestinal: Denies poor appetite, indigestion, abdominal pain or blood in stool  Urinary: Denies dysuria, pyuria  Musculoskeletal: Denies pain or swelling from muscles or joints  Neurologic: Denies tremor, paresthesias, or sensory motor disturbance  Skin: Denies rash, itching or texture change. Psych: Denies depression      Physical Exam:      General: Well developed, in no acute distress. HEENT: Eyes - PERRL, no jvd  Heart:  +irr irr, Normal S1/S2 negative S3 or S4.  no murmur, gallop or rub.   Respiratory: Clear bilaterally x 4, no wheezing or rales  Abdomen:   Soft, non-tender, bowel sounds are active.   Extremities:  +4 edema with weeping, normal cap refill, no cyanosis. Musculoskeletal: No clubbing  Neuro: A&Ox3, speech clear, gait stable. Skin: Skin color is normal. No rashes or lesions.  Non diaphoretic  Vascular: 2+ pulses symmetric in all extremities    Cardiographics    Ekg: AF    Results for orders placed or performed during the hospital encounter of 05/25/17   EKG, 12 LEAD, INITIAL   Result Value Ref Range    Ventricular Rate 66 BPM    Atrial Rate 66 BPM    P-R Interval 164 ms    QRS Duration 102 ms    Q-T Interval 434 ms    QTC Calculation (Bezet) 454 ms    Calculated P Axis 93 degrees    Calculated R Axis 78 degrees    Calculated T Axis 159 degrees    Diagnosis       Sinus rhythm with premature atrial complexes  ST & T wave abnormality, consider anterior ischemia  When compared with ECG of 13-MAR-2017 11:17,  Sinus rhythm has replaced Atrial fibrillation  Criteria for Septal infarct are no longer present  T wave inversion no longer evident in Inferior leads  Confirmed by PABLO Quintero [id number]) on 5/25/2017 5:21:11 PM           Lab Results   Component Value Date/Time    WBC 7.3 03/16/2017 04:33 AM    HGB 11.7 03/16/2017 04:33 AM    HCT 38.3 03/16/2017 04:33 AM    PLATELET 167 26/25/6687 04:33 AM    MCV 82.5 03/16/2017 04:33 AM      Lab Results   Component Value Date/Time    Sodium 143 06/05/2017 01:48 PM    Potassium 4.3 06/05/2017 01:48 PM    Chloride 94 06/05/2017 01:48 PM    CO2 26 06/05/2017 01:48 PM    Anion gap 8 03/17/2017 04:25 AM    Glucose 160 06/05/2017 01:48 PM    BUN 77 06/05/2017 01:48 PM    Creatinine 3.07 06/05/2017 01:48 PM    BUN/Creatinine ratio 25 06/05/2017 01:48 PM    GFR est AA 23 06/05/2017 01:48 PM    GFR est non-AA 20 06/05/2017 01:48 PM    Calcium 8.5 06/05/2017 01:48 PM    Bilirubin, total 0.7 04/03/2017 04:00 PM    AST (SGOT) 34 04/03/2017 04:00 PM    Alk. phosphatase 53 04/03/2017 04:00 PM    Protein, total 7.1 04/03/2017 04:00 PM    Albumin 4.2 04/03/2017 04:00 PM    Globulin 2.9 03/13/2017 12:15 PM    A-G Ratio 1.4 04/03/2017 04:00 PM    ALT (SGPT) 15 04/03/2017 04:00 PM         Assessment:     Assessment:        ICD-10-CM ICD-9-CM    1. Bilateral leg edema R60.0 782.3 AMB POC EKG ROUTINE W/ 12 LEADS, INTER & REP      REFERRAL TO NEPHROLOGY   2. Chronic a-fib (HCC) I48.2 427.31    3. Controlled type 2 diabetes mellitus without complication, unspecified long term insulin use status (HCC) E11.9 250.00    4. CKD (chronic kidney disease) stage 4, GFR 15-29 ml/min ([facility]) N18.4 585.4    5.  Morbid obesity due to excess calories ([facility]) E66.01 278.01    JACEY    Orders Placed This Encounter    REFERRAL TO NEPHROLOGY     Referral Priority:   Routine     Referral Type:   Consultation     Referral Reason:   Specialty Services Required     Referral Location:   [facility] Referred to Provider: Cecille Rivera MD     Requested Specialty:   Nephrology    AMB POC EKG ROUTINE W/ 12 LEADS, INTER & REP     Order Specific Question:   Reason for Exam:     Answer:   routine        Plan:   Mr. Heena Kim is here for EP consult for chronic AF. He recently moved here from [place] and is establishing with EP. EKG demonstrates AF despite recent cardioversion in May 2017 and Rythmol +Metoprolol. He denies cardiac symptoms other than weight gain and BLE. Echocardiogram in 3/2017 demonstrated EF of 55-60% with LA 4.1cm. He reports compliance with fluid restriction and medication administration. Stop Rythmol and increase Toprol dose back to 75mg daily. Will have him follow up with PCP and initiate nephrology referral for creatinine of 3.07/ BUN 77. Follow up with Dr. Sheldon Broussard as needed. He has significant JACEY and would benefit from CPAP. Continue medical management for DM, obesity, CKD. Thank you for allowing me to participate in Edward Parra 's care.     Jeneal Son, NP    Catracho Swain MD, Kirt Howell

## 2017-06-08 NOTE — PROGRESS NOTES
Met with patient in the room today, and his wife. Per [facility] appointment made with Pulmonology Dr Yg Villatoro for 8/9/17 9 am records to be faxed to [phone] request sent to [facility] with Dr Rupinder Gonzalez for patients records to be sent before appt. Patient will need a CXRAY within 60 days of the appointment. Shayla Rodarte put order in for this.  Will advise patient to have this done before seeing the pulmonologist.

## 2017-06-08 NOTE — MR AVS SNAPSHOT
Visit Information Date & Time Provider Department Dept. Phone Encounter #  
 6/8/2017  1:45 PM Scott Howell Postin, [address] [facility] [phone] [id number] Your Appointments 6/13/2017 10:15 AM  
HOSPITAL FOLLOW-UP with Nhung Brownlee MD  
[facility] [address]) Appt Note: hosptl /fu 2 wks -lj 5-25-17  
 [address] [place] [address] [address] [address]  
[phone]  
  
   
 Wilfredo Whyte [address]  
  
    
 7/6/2017 10:50 AM  
Follow Up with Paolo Bueno MD  
[facility] [address] [facility]) Appt Note: 3 month f/u  Diabetes [address] [id number] [address] [facility] Upcoming Health Maintenance Date Due Hepatitis C Screening 1950 EYE EXAM RETINAL OR DILATED Q1 1/28/1959 DTaP/Tdap/Td series (1 - Tdap) 1/28/1970 FOBT Q 1 YEAR AGE 50-75 1/28/1999 ZOSTER VACCINE AGE 60> 1/28/2009 GLAUCOMA SCREENING Q2Y 1/28/2014 Pneumococcal 65+ Low/Medium Risk (1 of 2 - PCV13) 1/28/2014 MEDICARE YEARLY EXAM 1/28/2014 INFLUENZA AGE 9 TO ADULT 8/1/2017 HEMOGLOBIN A1C Q6M 9/13/2017 FOOT EXAM Q1 4/3/2018 MICROALBUMIN Q1 4/3/2018 LIPID PANEL Q1 4/3/2018 Allergies as of 6/8/2017  Review Complete On: 6/8/2017 By: Robin Oconnor LPN Severity Noted Reaction Type Reactions Ciprofloxacin  03/07/2017    Nausea Only Percocet [Oxycodone-acetaminophen]  03/07/2017    Other (comments)  
 hallucinations Current Immunizations  Never Reviewed No immunizations on file. Not reviewed this visit You Were Diagnosed With   
  
 Codes Comments Bilateral leg edema    -  Primary ICD-10-CM: R60.0 ICD-9-CM: 966. 3 Chronic a-fib (HCC)     ICD-10-CM: K55.1 ICD-9-CM: 427.31  Controlled type 2 diabetes mellitus without complication, unspecified long term insulin use status ([facility]     ICD-10-CM: E11.9 ICD-9-CM: 250.00 CKD (chronic kidney disease) stage 4, GFR 15-29 ml/min ([facility])     ICD-10-CM: N18.4 ICD-9-CM: 585.4 Morbid obesity due to excess calories ([facility])     ICD-10-CM: E66.01 
ICD-9-CM: 278.01 Vitals BP Pulse Resp Height(growth percentile) Weight(growth percentile) SpO2  
 118/64 (BP 1 Location: Left arm, BP Patient Position: Sitting) 80 18 5' 9\" (1.753 m) 279 lb 11.2 oz (126.9 kg) 96% BMI Smoking Status 41.3 kg/m2 Never Smoker Vitals History BMI and BSA Data Body Mass Index Body Surface Area  
 41.3 kg/m 2 2.49 m 2 Preferred Pharmacy Pharmacy Name Phone [id number]- [facility] [address] [phone] Your Updated Medication List  
  
   
This list is accurate as of: 6/8/17  2:42 PM.  Always use your most recent med list.  
  
  
  
  
 apixaban 5 mg tablet Commonly known as:  Michiel Hoes Take 1 Tab by mouth two (2) times a day. Indications: PREVENT THROMBOEMBOLISM IN CHRONIC ATRIAL FIBRILLATION  
  
 bumetanide 1 mg tablet Commonly known as:  James Abide Take 1 Tab by mouth two (2) times a day. calcium carbonate 600 mg calcium (1,500 mg) tablet Commonly known as:  Calista Skgoldie Take 600 mg by mouth every Tuesday, Thursday, Saturday & Sunday. dutasteride 0.5 mg capsule Commonly known as:  AVODART TAKE ONE CAPSULE BY MOUTH EVERY DAY  
  
 gabapentin 100 mg capsule Commonly known as:  NEURONTIN Take  by mouth three (3) times daily. insulin aspart protamine/insulin aspart 100 unit/mL (70-30) Inpn Commonly known as:  NOVOLOG MIX 70/30  
by SubCUTAneous route two (2) times a day. Sliding scale, 26 in am, 34 in pm, 5 units for every 50 point over 150, hold if bs <100. ketoconazole 2 % topical cream  
Commonly known as:  NIZORAL  
  
 levothyroxine 137 mcg tablet Commonly known as:  SYNTHROID  
 TAKE 1 TABLET(S) EVERY DAY BY ORAL ROUTE FOR 90 DAYS. metOLazone 2.5 mg tablet Commonly known as:  Katy Haus Take 2.5 mg by mouth every other day. metoprolol succinate 50 mg XL tablet Commonly known as:  TOPROL-XL Take 1.5 Tabs by mouth daily. multivitamin tablet Commonly known as:  ONE A DAY Take 1 Tab by mouth daily. paricalcitol 1 mcg capsule Commonly known as:  Moisés Pata Take 1 mcg by mouth daily. pravastatin 80 mg tablet Commonly known as:  PRAVACHOL Take 1 Tab by mouth nightly. tamsulosin 0.4 mg capsule Commonly known as:  FLOMAX TAKE 2 CAPSULES BY MOUTH EVERY DAY  
  
 TYLENOL EXTRA STRENGTH 500 mg tablet Generic drug:  acetaminophen Take 1,000 mg by mouth every six (6) hours as needed for Pain. VITAMIN D3 1,000 unit Cap Generic drug:  cholecalciferol Take 3,000 Units by mouth daily. Prescriptions Sent to Pharmacy Refills  
 metoprolol succinate (TOPROL-XL) 50 mg XL tablet 6 Sig: Take 1.5 Tabs by mouth daily. Class: Normal  
 Pharmacy: [facility]pharmacy [address] [place] [address] S. [address] Ph #: [phone] Route: Oral  
  
We Performed the Following AMB POC EKG ROUTINE W/ 12 LEADS, INTER & REP [60976 CPT(R)] REFERRAL TO NEPHROLOGY [LMD87 Custom] Comments:  
 Please evaluate patient for elevated creatinine. To-Do List   
 06/15/2017 8:30 AM  
  Appointment with Aravind Barreto MD at [address] [place] [address]. ([phone]) Referral Information Referral ID Referred By Referred To  
  
 [id number] [facility]   
   [address] [facility] [address] [place], [address] Visits Status Start Date End Date 1 New Request 6/8/17 6/8/18 If your referral has a status of pending review or denied, additional information will be sent to support the outcome of this decision. Introducing [facility]! [employer] introduces [employer] patient portal. Now you can access parts of your medical record, email your doctor's office, and request medication refills online. 1. In your internet browser, go to [email or web address] 2. Click on the First Time User? Click Here link in the Sign In box. You will see the New Member Sign Up page. 3. Enter your [employer] Access Code exactly as it appears below. You will not need to use this code after youve completed the sign-up process. If you do not sign up before the expiration date, you must request a new code. · [employer] Access Code: [email or web address] Expires: 9/5/2017 11:15 AM 
 
4. Enter the last four digits of your Social Security Number (xxxx) and Date of Birth (mm/dd/yyyy) as indicated and click Submit. You will be taken to the next sign-up page. 5. Create a [employer] ID. This will be your [employer] login ID and cannot be changed, so think of one that is secure and easy to remember. 6. Create a [employer] password. You can change your password at any time. 7. Enter your Password Reset Question and Answer. This can be used at a later time if you forget your password. 8. Enter your e-mail address. You will receive e-mail notification when new information is available in [address] 9. Click Sign Up. You can now view and download portions of your medical record. 10. Click the Download Summary menu link to download a portable copy of your medical information. If you have questions, please visit the Frequently Asked Questions section of the [employer] website. Remember, [employer] is NOT to be used for urgent needs. For medical emergencies, dial 911. Now available from your iPhone and Android! Please provide this summary of care documentation to your next provider. Your primary care clinician is listed as Mack Lakhani. If you have any questions after today's visit, please call [phone].

## 2017-06-09 ENCOUNTER — PATIENT OUTREACH (OUTPATIENT)
Dept: CARDIOLOGY CLINIC | Age: 67
End: 2017-06-09

## 2017-06-09 VITALS — WEIGHT: 274 LBS | BODY MASS INDEX: 40.46 KG/M2

## 2017-06-09 DIAGNOSIS — I27.20 PULMONARY HTN (HCC): Primary | ICD-10-CM

## 2017-06-09 DIAGNOSIS — I50.30 DIASTOLIC HEART FAILURE, UNSPECIFIED HEART FAILURE CHRONICITY: ICD-10-CM

## 2017-06-09 DIAGNOSIS — N18.4 CKD (CHRONIC KIDNEY DISEASE) STAGE 4, GFR 15-29 ML/MIN (HCC): ICD-10-CM

## 2017-06-09 NOTE — PROGRESS NOTES
Spoke with Dr. Dee Muniz regarding right heart cath, was under the impression was done at time of cardioversion, was not ordered. Discussed with Dr Mikhail Doshi and nurse navigators to have patient placed on cath sched for 6/19/17 for diagnostic right heart cath for P HTN and medical trials for treatment of Pul HTN with Dr Brandon Moreno. In regards to renal function, holding zaroxolyn over the weekend and plan for repeat labs this weekend to review results Monday.

## 2017-06-09 NOTE — PROGRESS NOTES
Per Shannan Mckeon NP advised patient to have labs drawn on Monday 6/12/17 before appointment with Dr Alix Will on 6/13/17. Patient verbalized understanding.

## 2017-06-12 ENCOUNTER — HOSPITAL ENCOUNTER (OUTPATIENT)
Dept: LAB | Age: 67
Discharge: HOME | End: 2017-06-12
Payer: MEDICARE

## 2017-06-12 ENCOUNTER — PATIENT OUTREACH (OUTPATIENT)
Dept: CARDIOLOGY CLINIC | Age: 67
End: 2017-06-12

## 2017-06-12 VITALS — BODY MASS INDEX: 39.96 KG/M2 | WEIGHT: 270.6 LBS

## 2017-06-12 PROCEDURE — 36415 COLL VENOUS BLD VENIPUNCTURE: CPT

## 2017-06-12 PROCEDURE — 80048 BASIC METABOLIC PNL TOTAL CA: CPT

## 2017-06-12 NOTE — PROGRESS NOTES
Patient called in to advise of daily weight. Patient states his weight today is down to 270.6lb. Patient is feeling better, denies shortness of breath, and states he had his labs drawn today for his follow up with Dr Roberta Madera tomorrow. Will follow up again in 2 days. Advised to call if any questions or concerns.

## 2017-06-13 ENCOUNTER — OFFICE VISIT (OUTPATIENT)
Dept: CARDIOLOGY CLINIC | Age: 67
End: 2017-06-13

## 2017-06-13 VITALS
OXYGEN SATURATION: 93 % | BODY MASS INDEX: 40.29 KG/M2 | SYSTOLIC BLOOD PRESSURE: 124 MMHG | RESPIRATION RATE: 18 BRPM | HEIGHT: 69 IN | DIASTOLIC BLOOD PRESSURE: 70 MMHG | HEART RATE: 79 BPM | WEIGHT: 272 LBS

## 2017-06-13 DIAGNOSIS — R53.83 FATIGUE, UNSPECIFIED TYPE: ICD-10-CM

## 2017-06-13 DIAGNOSIS — I25.10 CORONARY ARTERY DISEASE INVOLVING NATIVE CORONARY ARTERY OF NATIVE HEART WITHOUT ANGINA PECTORIS: ICD-10-CM

## 2017-06-13 DIAGNOSIS — R60.0 BILATERAL LEG EDEMA: ICD-10-CM

## 2017-06-13 DIAGNOSIS — E66.01 MORBID OBESITY DUE TO EXCESS CALORIES (HCC): ICD-10-CM

## 2017-06-13 DIAGNOSIS — I48.20 CHRONIC A-FIB (HCC): ICD-10-CM

## 2017-06-13 DIAGNOSIS — Z95.1 HX OF CABG: ICD-10-CM

## 2017-06-13 DIAGNOSIS — I49.9 IRREGULAR HEART BEAT: Primary | ICD-10-CM

## 2017-06-13 DIAGNOSIS — N18.4 CKD (CHRONIC KIDNEY DISEASE) STAGE 4, GFR 15-29 ML/MIN (HCC): ICD-10-CM

## 2017-06-13 DIAGNOSIS — E11.9 CONTROLLED TYPE 2 DIABETES MELLITUS WITHOUT COMPLICATION, UNSPECIFIED LONG TERM INSULIN USE STATUS: ICD-10-CM

## 2017-06-13 DIAGNOSIS — I27.20 PULMONARY HTN (HCC): ICD-10-CM

## 2017-06-13 DIAGNOSIS — R06.02 SOB (SHORTNESS OF BREATH): ICD-10-CM

## 2017-06-13 LAB
BUN SERPL-MCNC: 79 MG/DL (ref 8–27)
BUN/CREAT SERPL: 30 (ref 10–24)
CALCIUM SERPL-MCNC: 8.6 MG/DL (ref 8.6–10.2)
CHLORIDE SERPL-SCNC: 98 MMOL/L (ref 96–106)
CO2 SERPL-SCNC: 31 MMOL/L (ref 18–29)
CREAT SERPL-MCNC: 2.65 MG/DL (ref 0.76–1.27)
GLUCOSE SERPL-MCNC: 182 MG/DL (ref 65–99)
INTERPRETATION: NORMAL
Lab: NORMAL
POTASSIUM SERPL-SCNC: 4.2 MMOL/L (ref 3.5–5.2)
SODIUM SERPL-SCNC: 146 MMOL/L (ref 134–144)

## 2017-06-13 NOTE — PROGRESS NOTES
Chief Complaint   Patient presents with   [facility] Follow Up     has lost 5-7 lbs, pt denies any cardiac symptoms

## 2017-06-13 NOTE — PROGRESS NOTES
[address] [place] [address] 601, [place], [address] [facility]     Jayden Leary is a 75 y.o. male. Last seen 1 month ago. Subjective:     Mr. Rabia Zapata    He is morbidly obese and is motivated to lose weight. He has JACEY but is not adherent with CPAP. He has CKD due to diastolic dysfunction with recent Creatinine at 3. His BP is at target today. Adherent with BP medications. Denies any lightheadedness or dizziness. AF with controlled rate  Patient denies any exertional chest pain, dyspnea, palpitations, syncope, orthopnea, edema or paroxysmal nocturnal dyspnea.       Patient Active Problem List    Diagnosis Date Noted    Irregular heart beat 03/22/2017    Fatigue 03/15/2017    Bilateral leg edema 03/15/2017    Counseling regarding advanced care planning and goals of care 03/15/2017    Hx of CABG 03/13/2017    Morbid obesity ([address] 03/13/2017    Diabetes mellitus type 2, controlled ([address] 03/13/2017    Heart failure ([address] 03/13/2017    SOB (shortness of breath) 03/07/2017    Chronic a-fib ([address] 03/07/2017    Coronary artery disease involving native coronary artery without angina pectoris 03/07/2017    CKD (chronic kidney disease) stage 4, GFR 15-29 ml/min ([facility]) 03/07/2017      Susan Martin MD  Past Medical History:   Diagnosis Date    Arrhythmia     atrial fibrillation 2017    CAD (coronary atherosclerotic disease)     Diabetes ([address]     Diabetes mellitus type 2, controlled ([address] 3/13/2017    Heart failure ([address]     Hx of CABG 3/13/2017    CABG in 2010 in [address] Morbid obesity ([address] 3/13/2017    S/P CABG x 2 2010      Past Surgical History:   Procedure Laterality Date    CARDIAC SURG PROCEDURE UNLIST      CABGx2 in 2010    HX ORTHOPAEDIC      L 3rd toe amputation in 1999     Allergies   Allergen Reactions    Ciprofloxacin Nausea Only    Percocet [Oxycodone-Acetaminophen] Other (comments)     hallucinations      Family History   Problem Relation Age of Onset    Heart Attack Father       Social History     Social History    Marital status:      Spouse name: N/A    Number of children: N/A    Years of education: N/A     Occupational History    Not on file. Social History Main Topics    Smoking status: Never Smoker    Smokeless tobacco: Never Used    Alcohol use Yes      Comment: rare    Drug use: No    Sexual activity: Not on file     Other Topics Concern    Not on file     Social History Narrative      Current Outpatient Prescriptions   Medication Sig    metoprolol succinate (TOPROL-XL) 50 mg XL tablet Take 1.5 Tabs by mouth daily.  bumetanide (BUMEX) 1 mg tablet Take 1 Tab by mouth two (2) times a day.  insulin aspart protamine/insulin aspart (NOVOLOG MIX 70/30) 100 unit/mL (70-30) inpn by SubCUTAneous route two (2) times a day. Sliding scale, 26 in am, 34 in pm, 5 units for every 50 point over 150, hold if bs <100.  pravastatin (PRAVACHOL) 80 mg tablet Take 1 Tab by mouth nightly.  apixaban (ELIQUIS) 5 mg tablet Take 1 Tab by mouth two (2) times a day. Indications: PREVENT THROMBOEMBOLISM IN CHRONIC ATRIAL FIBRILLATION    calcium carbonate (CALTREX) 600 mg calcium (1,500 mg) tablet Take 600 mg by mouth every Tuesday, Thursday, Saturday & Sunday.  dutasteride (AVODART) 0.5 mg capsule TAKE ONE CAPSULE BY MOUTH EVERY DAY    levothyroxine (SYNTHROID) 137 mcg tablet TAKE 1 TABLET(S) EVERY DAY BY ORAL ROUTE FOR 90 DAYS.  tamsulosin (FLOMAX) 0.4 mg capsule TAKE 2 CAPSULES BY MOUTH EVERY DAY    paricalcitol (ZEMPLAR) 1 mcg capsule Take 1 mcg by mouth daily.  multivitamin (ONE A DAY) tablet Take 1 Tab by mouth daily.  cholecalciferol (VITAMIN D3) 1,000 unit cap Take 3,000 Units by mouth daily.  acetaminophen (TYLENOL EXTRA STRENGTH) 500 mg tablet Take 1,000 mg by mouth every six (6) hours as needed for Pain.  metOLazone (ZAROXOLYN) 2.5 mg tablet Take 2.5 mg by mouth every other day.     gabapentin (NEURONTIN) 100 mg capsule Take  by mouth three (3) times daily.  ketoconazole (NIZORAL) 2 % topical cream      No current facility-administered medications for this visit. Review of Systems  Constitutional: Negative for fever, chills, malaise/fatigue and diaphoresis. Respiratory: Negative for cough, hemoptysis, sputum production, shortness of breath and wheezing. Cardiovascular: Negative for chest pain, palpitations, orthopnea, claudication, leg swelling and PND. Gastrointestinal: Negative for heartburn, nausea, vomiting, blood in stool and melena. Genitourinary: Negative for dysuria and flank pain. Musculoskeletal: Negative for joint pain and back pain. Skin: Negative for rash. Neurological: Negative for focal weakness, seizures, loss of consciousness, weakness and headaches. Endo/Heme/Allergies: Does not bruise/bleed easily. Psychiatric/Behavioral: Negative for memory loss. The patient does not have insomnia. Physical Exam:    Visit Vitals    /70 (BP 1 Location: Left arm, BP Patient Position: Sitting)    Pulse 79    Resp 18    Ht 5' 9\" (1.753 m)    Wt 272 lb (123.4 kg)    SpO2 93%    BMI 40.17 kg/m2     Wt Readings from Last 3 Encounters:   06/13/17 272 lb (123.4 kg)   06/12/17 270 lb 9.6 oz (122.7 kg)   06/09/17 274 lb (124.3 kg)       Gen: NAD    Mental Status - Alert. General Appearance - Not in acute distress. Neck - no JVD    Chest and Lung Exam   Inspection: Accessory muscles - No use of accessory muscles in breathing. Auscultation:   Breath sounds: - Normal.     Cardiovascular   Inspection: Jugular vein - Bilateral - Inspection Normal.   Palpation/Percussion:   Apical Impulse: - Normal.   Auscultation: Rhythm - Regular. Heart Sounds - S1 WNL and S2 WNL. No S3 or S4. Murmurs & Other Heart Sounds: Auscultation of the heart reveals - No Murmurs. Peripheral Vascular   Upper Extremity: Inspection - Bilateral - No Cyanotic nailbeds or Digital clubbing.    Lower Extremity:   Palpation: Edema - Bilateral - No edema. Abdomen: Soft, non-tender, bowel sounds are active. Neuro: A&O times 3, CN and motor grossly WNL    Cardiographics  AF with controlled rate     Assessment:     Encounter Diagnoses   Name Primary?  Irregular heart beat Yes    Chronic a-fib (HCC)     Coronary artery disease involving native coronary artery of native heart without angina pectoris     Bilateral leg edema     Hx of CABG     Fatigue, unspecified type     Controlled type 2 diabetes mellitus without complication, unspecified long term insulin use status (HCC)     Morbid obesity due to excess calories (HCC)     SOB (shortness of breath)     CKD (chronic kidney disease) stage 4, GFR 15-29 ml/min ([address]           Plan: Will hold cardiac cath for now. Get echo after successfully wearing CPAP for one month to assess the effect on his pulmonary HTN. F/U 3 mo. Continue fluid restriction.

## 2017-06-14 ENCOUNTER — PATIENT OUTREACH (OUTPATIENT)
Dept: CARDIOLOGY CLINIC | Age: 67
End: 2017-06-14

## 2017-06-14 VITALS — BODY MASS INDEX: 39.72 KG/M2 | WEIGHT: 269 LBS

## 2017-06-14 NOTE — PROGRESS NOTES
Spoke to patient daily weight down to 269.0lb states appointment yesterday with Dr Lyon Sensor went well, will be wearing his CPAP nightly and denies edema and shortness of breath at this time. Patient states he has an upcoming trip planned for a cruise in July for 3 weeks. Patient advised  to contact the 24/7 number if needed verbalized understanding. Will continue to follow.

## 2017-06-14 NOTE — PROGRESS NOTES
Ayanna: I spoke with Dr. Berry Wahl, if patient not gaining weight he is to continue holding zaroxylen, hold off on cardiac cath as wants 1 month of JACEY treatment with repeat limited ECHO.

## 2017-06-15 ENCOUNTER — HOSPITAL ENCOUNTER (OUTPATIENT)
Dept: WOUND CARE | Age: 67
Discharge: HOME | End: 2017-06-15
Payer: MEDICARE

## 2017-06-15 PROCEDURE — 97598 DBRDMT OPN WND ADDL 20CM/<: CPT | Performed by: OTOLARYNGOLOGY

## 2017-06-15 PROCEDURE — 97597 DBRDMT OPN WND 1ST 20 CM/<: CPT | Performed by: OTOLARYNGOLOGY

## 2017-06-15 RX ORDER — LIDOCAINE HYDROCHLORIDE 20 MG/ML
JELLY TOPICAL
Status: COMPLETED | OUTPATIENT
Start: 2017-06-15 | End: 2017-06-15

## 2017-06-15 RX ADMIN — LIDOCAINE HYDROCHLORIDE: 20 JELLY TOPICAL at 09:13

## 2017-06-15 NOTE — H&P
Widen Nfetaly Coxu, [facility]   WOUND CARE HISTORY AND PHYSICAL       Name:  Shaye Brand   MR#:  [id number]   :  1950   Account #:  [de-identified]        Date of Adm:  06/15/2017       HISTORY: The patient is a 79-year-old male sent by Dr. Susan Martin for a 2-3 week history of a recurring venous leg ulcer. PAST MEDICAL HISTORY: Remarkable for morbid obesity, chronic   atrial fibrillation, lymphedema bilaterally, coronary artery bypass graft   in  while living in [place], type 2 diabetes, heart failure,   shortness of breath, and chronic kidney disease, stage 4. ALLERGIES:   1. CIPROFLOXACIN. 2. PERCOCET. MEDICATIONS: INCLUDE:   1. Metoprolol. 2. Bumex. 3. Insulin. 4. Pravachol. 5. Caltrex. 6. Avodart. 7. Synthroid. 8. Flomax. 9. Zemplar. 10. Multivitamins. 11. Vitamin D. 12. Metolazone. 13. Neurontin. 14. Nizoral.    REVIEW OF SYSTEMS   CONSTITUTIONAL: Negative for fevers, chills, fatigue, diaphoresis. RESPIRATORY: Negative for cough, hemoptysis. CARDIAC: Negative for chest pain. GASTROINTESTINAL: Negative for heartburn, nausea, vomiting. GENITOURINARY: Negative for dysuria. NEUROLOGIC: Alert and oriented. No loss of consciousness or   headaches. FAMILY HISTORY: Remarkable for heart attack in the father. SOCIAL HISTORY: He is , never smoked. Rarely uses alcohol. PHYSICAL EXAMINATION   VITAL SIGNS: Today shows a temperature of 98.0, pulse 98,   respirations 14, blood pressure 110/67. EXTREMITIES: The left lower distal leg shows a superficial ulcer   measuring 10.5 x 12.5 x 0.1 and another smaller ulcer measuring 0.6 x   0.9 x 0.1. Both of these have slough in need of debridement. PROCEDURE: After discussing risks and benefits, obtaining informed   consent, identifying the patient and taking a timeout to discuss   selective debridement, Xylocaine gel was applied.  A 7 mm ring curette   was used to remove a layer of slough from all wounds. He tolerated   the procedure well with minimal bleeding. IMPRESSION:   1. Chronic venous leg ulcer (I87.312). 2. Chronic nonpressure ulcer, left lower leg limited to skin breakdown   (L97.921). 3. Lymphedema, chronic (I89.0). PLAN: We will use Aquacel AG changed every 2 days and a double   Tubigrip until we can obtain vascular studies. This will determine what   strength compression therapy he can tolerate. We will also see if there   are any venous procedures that can be done to prevent recurrent   ulcers. We also discussed nutrition and control of diabetes as essential. I have   also asked him to discuss with Dr. Patrick Goodson, his cardiologist, whether   we can increase his Bumex for a short period of time as this seems to   be the most effective means to rapidly decrease fluid in the lower leg   for healing purposes. We also discussed using a chronic Lympha   Press morning and night as other treatment for his chronic   lymphedema. He and his wife understand the plan and will see us back   on a weekly basis. We discussed the signs and symptoms of infection   with him today.         MD LEAH Zhang / REGINA   D:  06/15/2017   10:22   T:  06/15/2017   10:45   Job #:  [id number]

## 2017-06-19 ENCOUNTER — PATIENT OUTREACH (OUTPATIENT)
Dept: CARDIOLOGY CLINIC | Age: 67
End: 2017-06-19

## 2017-06-19 VITALS — BODY MASS INDEX: 39.87 KG/M2 | WEIGHT: 270 LBS

## 2017-06-19 NOTE — PROGRESS NOTES
Is today a day he takes Metolazone? If not take Metolazone today, continue with same normal regimen after that.  He sees nephrology on Wednesday and can discuss with the MD if changes need to be made to his diuretic regimen

## 2017-06-19 NOTE — PROGRESS NOTES
Spoke to patient per Logan Pinto NP advised to take half of a bumex for increased swelling and call tomorrow with weight and check on swelling at that time. Patient verbalized understanding.

## 2017-06-19 NOTE — PROGRESS NOTES
Patients wife called to say patients weight may be okay but he is swelling in his abdomen. Patient cannot button his pants. Patient has put on a larger size pants and still cannot button them. Weights were put in on previous note. States patient is not wearing his CPAP at night as advised per Dr Robina Tejeda on 6/13/17 visit d/t a recent cold it makes him gurgle. Will send chart to Flo Moreno NP for advice.

## 2017-06-19 NOTE — PROGRESS NOTES
Patient called in to advise his weights over the weekend. States his weight on sat 6/17 268.8lb, sun 6/18 268.6lb, today 270.0lb. Advised will continue to monitor. Patient denies shortness of breath at this time. Patient has a follow up with nephrology this week on 6/21, and will be seeing wound care every Thursday. Patient states he is not wearing his CPAP d/t having a cold. Advised he should be still wearing the CPAP still, states he will. Will continue to monitor.

## 2017-06-22 ENCOUNTER — HOSPITAL ENCOUNTER (INPATIENT)
Age: 67
LOS: 5 days | Discharge: HOME HEALTH | DRG: 242 | End: 2017-06-27
Attending: EMERGENCY MEDICINE | Admitting: INTERNAL MEDICINE
Payer: MEDICARE

## 2017-06-22 ENCOUNTER — APPOINTMENT (OUTPATIENT)
Dept: GENERAL RADIOLOGY | Age: 67
DRG: 242 | End: 2017-06-22
Attending: EMERGENCY MEDICINE
Payer: MEDICARE

## 2017-06-22 ENCOUNTER — HOSPITAL ENCOUNTER (OUTPATIENT)
Dept: WOUND CARE | Age: 67
Discharge: HOME | End: 2017-06-22
Payer: MEDICARE

## 2017-06-22 DIAGNOSIS — Z71.89 COUNSELING REGARDING ADVANCED CARE PLANNING AND GOALS OF CARE: ICD-10-CM

## 2017-06-22 DIAGNOSIS — I50.31 ACUTE DIASTOLIC HEART FAILURE (HCC): ICD-10-CM

## 2017-06-22 DIAGNOSIS — R60.0 LOWER LEG EDEMA: ICD-10-CM

## 2017-06-22 DIAGNOSIS — K59.00 CONSTIPATION, UNSPECIFIED CONSTIPATION TYPE: ICD-10-CM

## 2017-06-22 DIAGNOSIS — R60.0 BILATERAL LEG EDEMA: ICD-10-CM

## 2017-06-22 DIAGNOSIS — R53.81 DEBILITY: ICD-10-CM

## 2017-06-22 DIAGNOSIS — Z71.89 GOALS OF CARE, COUNSELING/DISCUSSION: ICD-10-CM

## 2017-06-22 DIAGNOSIS — J96.01 ACUTE RESPIRATORY FAILURE WITH HYPOXIA (HCC): Primary | ICD-10-CM

## 2017-06-22 DIAGNOSIS — N18.4 CKD (CHRONIC KIDNEY DISEASE) STAGE 4, GFR 15-29 ML/MIN (HCC): ICD-10-CM

## 2017-06-22 DIAGNOSIS — I48.91 ATRIAL FIBRILLATION WITH RVR (HCC): ICD-10-CM

## 2017-06-22 DIAGNOSIS — N17.9 AKI (ACUTE KIDNEY INJURY) (HCC): ICD-10-CM

## 2017-06-22 PROBLEM — I50.30 DIASTOLIC HEART FAILURE (HCC): Status: ACTIVE | Noted: 2017-06-22

## 2017-06-22 LAB
ALBUMIN SERPL BCP-MCNC: 3 G/DL (ref 3.5–5)
ALBUMIN/GLOB SERPL: 0.8 {RATIO} (ref 1.1–2.2)
ALP SERPL-CCNC: 47 U/L (ref 45–117)
ALT SERPL-CCNC: 17 U/L (ref 12–78)
ANION GAP BLD CALC-SCNC: 7 MMOL/L (ref 5–15)
AST SERPL W P-5'-P-CCNC: 29 U/L (ref 15–37)
ATRIAL RATE: 144 BPM
BASOPHILS # BLD AUTO: 0.1 K/UL (ref 0–0.1)
BASOPHILS # BLD: 1 % (ref 0–1)
BILIRUB SERPL-MCNC: 0.5 MG/DL (ref 0.2–1)
BNP SERPL-MCNC: ABNORMAL PG/ML (ref 0–125)
BUN SERPL-MCNC: 96 MG/DL (ref 6–20)
BUN/CREAT SERPL: 24 (ref 12–20)
CALCIUM SERPL-MCNC: 8.5 MG/DL (ref 8.5–10.1)
CALCULATED R AXIS, ECG10: 92 DEGREES
CALCULATED T AXIS, ECG11: -149 DEGREES
CHLORIDE SERPL-SCNC: 100 MMOL/L (ref 97–108)
CK SERPL-CCNC: 71 U/L (ref 39–308)
CO2 SERPL-SCNC: 31 MMOL/L (ref 21–32)
CREAT SERPL-MCNC: 4.03 MG/DL (ref 0.7–1.3)
DIAGNOSIS, 93000: NORMAL
EOSINOPHIL # BLD: 0.1 K/UL (ref 0–0.4)
EOSINOPHIL NFR BLD: 1 % (ref 0–7)
ERYTHROCYTE [DISTWIDTH] IN BLOOD BY AUTOMATED COUNT: 19.5 % (ref 11.5–14.5)
EST. AVERAGE GLUCOSE BLD GHB EST-MCNC: 171 MG/DL
GLOBULIN SER CALC-MCNC: 3.9 G/DL (ref 2–4)
GLUCOSE BLD STRIP.AUTO-MCNC: 147 MG/DL (ref 65–100)
GLUCOSE BLD STRIP.AUTO-MCNC: 183 MG/DL (ref 65–100)
GLUCOSE BLD STRIP.AUTO-MCNC: 199 MG/DL (ref 65–100)
GLUCOSE SERPL-MCNC: 175 MG/DL (ref 65–100)
HBA1C MFR BLD: 7.6 % (ref 4.2–6.3)
HCT VFR BLD AUTO: 44 % (ref 36.6–50.3)
HGB BLD-MCNC: 12.9 G/DL (ref 12.1–17)
LYMPHOCYTES # BLD AUTO: 11 % (ref 12–49)
LYMPHOCYTES # BLD: 0.9 K/UL (ref 0.8–3.5)
MAGNESIUM SERPL-MCNC: 2.5 MG/DL (ref 1.6–2.4)
MCH RBC QN AUTO: 25 PG (ref 26–34)
MCHC RBC AUTO-ENTMCNC: 29.3 G/DL (ref 30–36.5)
MCV RBC AUTO: 85.3 FL (ref 80–99)
MONOCYTES # BLD: 0.9 K/UL (ref 0–1)
MONOCYTES NFR BLD AUTO: 11 % (ref 5–13)
NEUTS SEG # BLD: 6.2 K/UL (ref 1.8–8)
NEUTS SEG NFR BLD AUTO: 76 % (ref 32–75)
PLATELET # BLD AUTO: 182 K/UL (ref 150–400)
POTASSIUM SERPL-SCNC: 5 MMOL/L (ref 3.5–5.1)
PROT SERPL-MCNC: 6.9 G/DL (ref 6.4–8.2)
Q-T INTERVAL, ECG07: 316 MS
QRS DURATION, ECG06: 102 MS
QTC CALCULATION (BEZET), ECG08: 435 MS
RBC # BLD AUTO: 5.16 M/UL (ref 4.1–5.7)
RBC MORPH BLD: ABNORMAL
SERVICE CMNT-IMP: ABNORMAL
SODIUM SERPL-SCNC: 138 MMOL/L (ref 136–145)
TROPONIN I SERPL-MCNC: 0.12 NG/ML
VENTRICULAR RATE, ECG03: 114 BPM
WBC # BLD AUTO: 8.2 K/UL (ref 4.1–11.1)

## 2017-06-22 PROCEDURE — 71010 XR CHEST PORT: CPT

## 2017-06-22 PROCEDURE — 83036 HEMOGLOBIN GLYCOSYLATED A1C: CPT | Performed by: INTERNAL MEDICINE

## 2017-06-22 PROCEDURE — 77030009526 HC GEL CARSYN MDII -A

## 2017-06-22 PROCEDURE — 77030011256 HC DRSG MEPILEX <16IN NO BORD MOLN -A

## 2017-06-22 PROCEDURE — 80053 COMPREHEN METABOLIC PANEL: CPT | Performed by: EMERGENCY MEDICINE

## 2017-06-22 PROCEDURE — 77030019607 HC DSG BURN S&N -A

## 2017-06-22 PROCEDURE — 83735 ASSAY OF MAGNESIUM: CPT | Performed by: EMERGENCY MEDICINE

## 2017-06-22 PROCEDURE — 83880 ASSAY OF NATRIURETIC PEPTIDE: CPT | Performed by: EMERGENCY MEDICINE

## 2017-06-22 PROCEDURE — 65660000000 HC RM CCU STEPDOWN

## 2017-06-22 PROCEDURE — 77030011255 HC DSG AQUACEL AG BMS -A

## 2017-06-22 PROCEDURE — 85025 COMPLETE CBC W/AUTO DIFF WBC: CPT | Performed by: EMERGENCY MEDICINE

## 2017-06-22 PROCEDURE — 93306 TTE W/DOPPLER COMPLETE: CPT

## 2017-06-22 PROCEDURE — 82962 GLUCOSE BLOOD TEST: CPT

## 2017-06-22 PROCEDURE — 93005 ELECTROCARDIOGRAM TRACING: CPT

## 2017-06-22 PROCEDURE — 74011250637 HC RX REV CODE- 250/637: Performed by: NURSE PRACTITIONER

## 2017-06-22 PROCEDURE — 77010033678 HC OXYGEN DAILY

## 2017-06-22 PROCEDURE — 36415 COLL VENOUS BLD VENIPUNCTURE: CPT | Performed by: EMERGENCY MEDICINE

## 2017-06-22 PROCEDURE — 82550 ASSAY OF CK (CPK): CPT | Performed by: EMERGENCY MEDICINE

## 2017-06-22 PROCEDURE — 74011636637 HC RX REV CODE- 636/637: Performed by: INTERNAL MEDICINE

## 2017-06-22 PROCEDURE — 77030018836 HC SOL IRR NACL ICUM -A

## 2017-06-22 PROCEDURE — 76450000000

## 2017-06-22 PROCEDURE — 74011000250 HC RX REV CODE- 250: Performed by: INTERNAL MEDICINE

## 2017-06-22 PROCEDURE — 84484 ASSAY OF TROPONIN QUANT: CPT | Performed by: EMERGENCY MEDICINE

## 2017-06-22 PROCEDURE — 74011250637 HC RX REV CODE- 250/637: Performed by: INTERNAL MEDICINE

## 2017-06-22 PROCEDURE — 99285 EMERGENCY DEPT VISIT HI MDM: CPT

## 2017-06-22 RX ORDER — FACIAL-BODY WIPES
10 EACH TOPICAL DAILY PRN
Status: DISCONTINUED | OUTPATIENT
Start: 2017-06-22 | End: 2017-06-27 | Stop reason: HOSPADM

## 2017-06-22 RX ORDER — DEXTROSE 50 % IN WATER (D50W) INTRAVENOUS SYRINGE
12.5-25 AS NEEDED
Status: DISCONTINUED | OUTPATIENT
Start: 2017-06-22 | End: 2017-06-22

## 2017-06-22 RX ORDER — DUTASTERIDE 0.5 MG/1
0.5 CAPSULE, LIQUID FILLED ORAL DAILY
Status: DISCONTINUED | OUTPATIENT
Start: 2017-06-23 | End: 2017-06-27 | Stop reason: HOSPADM

## 2017-06-22 RX ORDER — METOPROLOL SUCCINATE 50 MG/1
50 TABLET, EXTENDED RELEASE ORAL 2 TIMES DAILY
Status: ON HOLD | COMMUNITY
End: 2017-06-22 | Stop reason: DRUGHIGH

## 2017-06-22 RX ORDER — PRAVASTATIN SODIUM 40 MG/1
80 TABLET ORAL
Status: DISCONTINUED | OUTPATIENT
Start: 2017-06-22 | End: 2017-06-27 | Stop reason: HOSPADM

## 2017-06-22 RX ORDER — SODIUM CHLORIDE 0.9 % (FLUSH) 0.9 %
5-10 SYRINGE (ML) INJECTION AS NEEDED
Status: DISCONTINUED | OUTPATIENT
Start: 2017-06-22 | End: 2017-06-27 | Stop reason: HOSPADM

## 2017-06-22 RX ORDER — DEXTROSE MONOHYDRATE 100 MG/ML
125-250 INJECTION, SOLUTION INTRAVENOUS AS NEEDED
Status: DISCONTINUED | OUTPATIENT
Start: 2017-06-22 | End: 2017-06-27 | Stop reason: HOSPADM

## 2017-06-22 RX ORDER — ACETAMINOPHEN 500 MG
1000 TABLET ORAL
Status: DISCONTINUED | OUTPATIENT
Start: 2017-06-22 | End: 2017-06-27 | Stop reason: HOSPADM

## 2017-06-22 RX ORDER — BUMETANIDE 0.25 MG/ML
1 INJECTION INTRAMUSCULAR; INTRAVENOUS
Status: DISCONTINUED | OUTPATIENT
Start: 2017-06-22 | End: 2017-06-22

## 2017-06-22 RX ORDER — ONDANSETRON 2 MG/ML
4 INJECTION INTRAMUSCULAR; INTRAVENOUS
Status: DISCONTINUED | OUTPATIENT
Start: 2017-06-22 | End: 2017-06-27 | Stop reason: HOSPADM

## 2017-06-22 RX ORDER — GABAPENTIN 100 MG/1
100 CAPSULE ORAL 3 TIMES DAILY
Status: DISCONTINUED | OUTPATIENT
Start: 2017-06-22 | End: 2017-06-22

## 2017-06-22 RX ORDER — METOLAZONE 2.5 MG/1
2.5 TABLET ORAL DAILY
COMMUNITY
End: 2021-03-19

## 2017-06-22 RX ORDER — SODIUM CHLORIDE 0.9 % (FLUSH) 0.9 %
5-10 SYRINGE (ML) INJECTION EVERY 8 HOURS
Status: DISCONTINUED | OUTPATIENT
Start: 2017-06-22 | End: 2017-06-27 | Stop reason: HOSPADM

## 2017-06-22 RX ORDER — METOPROLOL SUCCINATE 100 MG/1
100 TABLET, EXTENDED RELEASE ORAL 2 TIMES DAILY
Status: ON HOLD | COMMUNITY
End: 2017-06-27

## 2017-06-22 RX ORDER — KETOCONAZOLE 20 MG/G
CREAM TOPICAL DAILY
Status: DISCONTINUED | OUTPATIENT
Start: 2017-06-23 | End: 2017-06-27 | Stop reason: HOSPADM

## 2017-06-22 RX ORDER — METOLAZONE 2.5 MG/1
2.5 TABLET ORAL EVERY OTHER DAY
Status: DISCONTINUED | OUTPATIENT
Start: 2017-06-22 | End: 2017-06-23

## 2017-06-22 RX ORDER — KETOCONAZOLE 20 MG/G
CREAM TOPICAL
COMMUNITY
End: 2021-03-19

## 2017-06-22 RX ORDER — MELATONIN
1000 DAILY
Status: DISCONTINUED | OUTPATIENT
Start: 2017-06-23 | End: 2017-06-27 | Stop reason: HOSPADM

## 2017-06-22 RX ORDER — AMOXICILLIN 250 MG
2 CAPSULE ORAL
Status: DISCONTINUED | OUTPATIENT
Start: 2017-06-22 | End: 2017-06-27 | Stop reason: HOSPADM

## 2017-06-22 RX ORDER — MAGNESIUM SULFATE 100 %
4 CRYSTALS MISCELLANEOUS AS NEEDED
Status: DISCONTINUED | OUTPATIENT
Start: 2017-06-22 | End: 2017-06-27 | Stop reason: HOSPADM

## 2017-06-22 RX ORDER — INSULIN LISPRO 100 [IU]/ML
INJECTION, SOLUTION INTRAVENOUS; SUBCUTANEOUS
Status: DISCONTINUED | OUTPATIENT
Start: 2017-06-22 | End: 2017-06-27 | Stop reason: HOSPADM

## 2017-06-22 RX ORDER — BUMETANIDE 0.25 MG/ML
2 INJECTION INTRAMUSCULAR; INTRAVENOUS EVERY 12 HOURS
Status: DISCONTINUED | OUTPATIENT
Start: 2017-06-22 | End: 2017-06-27

## 2017-06-22 RX ORDER — TAMSULOSIN HYDROCHLORIDE 0.4 MG/1
0.4 CAPSULE ORAL DAILY
Status: DISCONTINUED | OUTPATIENT
Start: 2017-06-23 | End: 2017-06-27 | Stop reason: HOSPADM

## 2017-06-22 RX ORDER — CALCIUM CARBONATE 500(1250)
500 TABLET ORAL
Status: DISCONTINUED | OUTPATIENT
Start: 2017-06-22 | End: 2017-06-27 | Stop reason: HOSPADM

## 2017-06-22 RX ORDER — BUMETANIDE 1 MG/1
1.5 TABLET ORAL 2 TIMES DAILY
COMMUNITY
End: 2017-06-27

## 2017-06-22 RX ADMIN — INSULIN LISPRO 25 UNITS: 100 INJECTION, SUSPENSION SUBCUTANEOUS at 17:19

## 2017-06-22 RX ADMIN — Medication 10 ML: at 17:29

## 2017-06-22 RX ADMIN — APIXABAN 5 MG: 5 TABLET, FILM COATED ORAL at 17:29

## 2017-06-22 RX ADMIN — INSULIN LISPRO 2 UNITS: 100 INJECTION, SOLUTION INTRAVENOUS; SUBCUTANEOUS at 12:52

## 2017-06-22 RX ADMIN — PRAVASTATIN SODIUM 80 MG: 40 TABLET ORAL at 21:16

## 2017-06-22 RX ADMIN — METOLAZONE 2.5 MG: 2.5 TABLET ORAL at 17:21

## 2017-06-22 RX ADMIN — BUMETANIDE 2 MG: 0.25 INJECTION INTRAMUSCULAR; INTRAVENOUS at 10:57

## 2017-06-22 RX ADMIN — BUMETANIDE 2 MG: 0.25 INJECTION INTRAMUSCULAR; INTRAVENOUS at 21:15

## 2017-06-22 RX ADMIN — CALCIUM CARBONATE 500 MG: 1250 TABLET ORAL at 17:21

## 2017-06-22 RX ADMIN — DOCUSATE SODIUM AND SENNOSIDES 1 TABLET: 8.6; 5 TABLET, FILM COATED ORAL at 21:15

## 2017-06-22 RX ADMIN — INSULIN LISPRO 2 UNITS: 100 INJECTION, SOLUTION INTRAVENOUS; SUBCUTANEOUS at 17:20

## 2017-06-22 RX ADMIN — Medication 10 ML: at 21:15

## 2017-06-22 NOTE — CONSULTS
Palliative Medicine Consult  Efrain: [phone] ([phone]    Patient Name: Stevan Guerra  YOB: 1950    Date of Initial Consult: 6/22/2017  Reason for Consult: CHF bundle  Requesting Provider: Dr. Keiry Antoine  Primary Care Physician: Av Lu MD      SUMMARY:   Stevan Guerra is a 75 y.o. with a past history of DM, CKD, afib., BLE and ulcers, DM, morbid obesity, CABG X2, [facility] 3/17, who was admitted on 6/22/2017 from home with a diagnosis of generalized weakness, fatigue, SOB and BLE and  ulcers. Current medical issues leading to Palliative Medicine involvement include: SOB, BLE and ulcers, constipation, CHF, weakness, fatigue and weakness. Psychosocial: He is  to [employer]. He has no children. They live in [place]. PALLIATIVE DIAGNOSES:   1. BLE Edema  2. Bilateral lower leg ulcers from edema  3. SOB  4. Weakness  5. Debility- uses rollator       PLAN:   1. Met and introduced myself and palliative medicine to patient. He tells me he came in to the hospital because he was so SOB and his legs were so swollen. He could barely walk. They have blistered and are oozing fluids. He is seen in the wound clinic. He is followed by cardiology- Dr. Rupinder Gonzalez. He was told to take additional Bumex which did not help his SOB or bilateral leg swelling. We talked about Advanced Medical directives. He told me he has thought about it but has never completed one. He tells me \"I guess it is time to do this\". I explained that his wife- [employer] would be his decision maker- [phone]. He tells me he has no children. He thinks he would want her to be his decision maker. He says that she does not know what he wants as they never really talked about it. I asked if we could set up a meeting he told me he would talk to his wife. He accepted my business card. 2. He tells me he has not had a bowel movement in 4 days. Ordered senna with colace.   3. Plan- to meet with wife, offer AMD, talk about code status, possible PC clinic  4. Initial consult note routed to primary continuity provider  5.  Communicated plan of care with: Palliative IDT       GOALS OF CARE / TREATMENT PREFERENCES:   [====Goals of Care====]  GOALS OF CARE:  Patient / health care proxy stated goals: \"to get back home\"      TREATMENT PREFERENCES:   Code Status: Full Code    Advance Care Planning:  Advance Care Planning 6/22/2017   Patient's Healthcare Decision Maker is: Legal Next of Valentine 68   Primary Decision Maker Name Kedar Chapa   Primary Decision Maker Phone Number [phone]   Confirm Advance Directive None       Other:    The palliative care team has discussed with patient / health care proxy about goals of care / treatment preferences for patient.  [====Goals of Care====]         HISTORY:     History obtained from: chart    CHIEF COMPLAINT: bilateral leg edema and soreness    HPI/SUBJECTIVE:    The patient is:   [x] Verbal and participatory  [] Non-participatory due to:   Mr. Rabia Zapata is a 75year old obese male with CKD, CHF, DM and is admitted for SOB and bilateral lower leg edema    Clinical Pain Assessment (nonverbal scale for severity on nonverbal patients):   [++++ Clinical Pain Assessment++++]  [++++Pain Severity++++]: Pain: 3 (bilateral lower legs)  [++++Pain Character++++]: achy  [++++Pain Duration++++]: for weeks  [++++Pain Effect++++]: ability to walk  [++++Pain Factors++++]: CHF, DM  [++++Pain Frequency++++]: constant  [++++Pain Location++++]: bilateral lower legs  [++++ Clinical Pain Assessment++++]     FUNCTIONAL ASSESSMENT:     Palliative Performance Scale (PPS):  PPS: 50       PSYCHOSOCIAL/SPIRITUAL SCREENING:     Advance Care Planning:  Advance Care Planning 6/22/2017   Patient's Healthcare Decision Maker is: Legal Next of Lonnie   Primary Decision Maker Name Kedar Chapa   Primary Decision Maker Phone Number [phone]   Confirm Advance Directive None        Any spiritual / Temple concerns:  [] Yes /  [x] No    Caregiver Burnout:  [] Yes /  [] No /  [x] No Caregiver Present      Anticipatory grief assessment:   [] Normal  / [] Maladaptive       ESAS Anxiety: Anxiety: 0    ESAS Depression: Depression: 0        REVIEW OF SYSTEMS:     Positive and pertinent negative findings in ROS are noted above in HPI. The following systems were [x] reviewed / [] unable to be reviewed as noted in HPI  Other findings are noted below. Systems: constitutional, ears/nose/mouth/throat, respiratory, gastrointestinal, genitourinary, musculoskeletal, integumentary, neurologic, psychiatric, endocrine. Positive findings noted below. Modified ESAS Completed by: provider   Fatigue: 3 Drowsiness: 3   Depression: 0 Pain: 3 (bilateral lower legs)   Anxiety: 0 Nausea: 0   Anorexia: 0 Dyspnea: 5     Constipation: Yes              PHYSICAL EXAM:     From RN flowsheet:  Wt Readings from Last 3 Encounters:   06/22/17 280 lb 6.8 oz (127.2 kg)   06/19/17 270 lb (122.5 kg)   06/14/17 269 lb (122 kg)     Blood pressure 127/86, pulse 97, temperature 97.8 °F (36.6 °C), resp. rate 22, weight 280 lb 6.8 oz (127.2 kg), SpO2 100 %.     Pain Scale 1: Numeric (0 - 10)  Pain Intensity 1: 0                 Last bowel movement, if known: 3 days ago    Constitutional: Obese white male in NAD  Eyes: pupils equal, anicteric  ENMT: no nasal discharge, moist mucous membranes  Cardiovascular: regular rhythm, distal pulses intact  Respiratory: breathing not labored, symmetric  Gastrointestinal: soft non-tender, +bowel sounds, abd distended  Musculoskeletal: no deformity, no tenderness to palpation  Skin: warm, dry, lower legs covered with dressing, several open blisters leaking fluids  Neurologic: following commands, moving all extremities  Psychiatric: full affect, no hallucinations  Other:       HISTORY:     Active Problems:    SOB (shortness of breath) (3/7/2017)      CKD (chronic kidney disease) stage 4, GFR 15-29 ml/min ([facility]) (3/7/2017)      Hx of CABG (3/13/2017)      Overview: CABG in 2010 in [place]      Morbid obesity ([address] (3/13/2017)      Diabetes mellitus type 2, controlled ([address] (3/13/2017)      Fatigue (3/15/2017)      Bilateral leg edema (4/98/6353)      Diastolic heart failure ([address] (6/22/2017)      Past Medical History:   Diagnosis Date    Arrhythmia     atrial fibrillation 2017    CAD (coronary atherosclerotic disease)     Diabetes ([address]     Diabetes mellitus type 2, controlled ([address] 3/13/2017    Heart failure ([address]     Hx of CABG 3/13/2017    CABG in 2010 in [address] [place] [address] Morbid obesity ([address] 3/13/2017    S/P CABG x 2 2010      Past Surgical History:   Procedure Laterality Date    CARDIAC SURG PROCEDURE UNLIST      CABGx2 in 2010    HX ORTHOPAEDIC      L 3rd toe amputation in 1999      Family History   Problem Relation Age of Onset    Heart Attack Father     No Known Problems Mother       History reviewed, no pertinent family history.   Social History   Substance Use Topics    Smoking status: Never Smoker    Smokeless tobacco: Never Used    Alcohol use Yes      Comment: rare     Allergies   Allergen Reactions    Ciprofloxacin Nausea Only    Percocet [Oxycodone-Acetaminophen] Other (comments)     hallucinations      Current Facility-Administered Medications   Medication Dose Route Frequency    sodium chloride (NS) flush 5-10 mL  5-10 mL IntraVENous Q8H    sodium chloride (NS) flush 5-10 mL  5-10 mL IntraVENous PRN    ondansetron (ZOFRAN) injection 4 mg  4 mg IntraVENous Q4H PRN    bisacodyl (DULCOLAX) suppository 10 mg  10 mg Rectal DAILY PRN    acetaminophen (TYLENOL) tablet 1,000 mg  1,000 mg Oral Q6H PRN    apixaban (ELIQUIS) tablet 5 mg  5 mg Oral BID    calcium carbonate (OS-BERT) tablet 500 mg [elemental]  500 mg Oral EVERY TUES,THUR,SAT,SUN    [START ON 6/23/2017] cholecalciferol (VITAMIN D3) tablet 1,000 Units  1,000 Units Oral DAILY    [START ON 6/23/2017] dutasteride (AVODART) capsule 0.5 mg  0.5 mg Oral DAILY    [START ON 6/23/2017] ketoconazole (NIZORAL) 2 % cream   Topical DAILY    [START ON 6/23/2017] levothyroxine (SYNTHROID) tablet 137 mcg  137 mcg Oral ACB    metOLazone (ZAROXOLYN) tablet 2.5 mg  2.5 mg Oral EVERY OTHER DAY    [START ON 6/23/2017] metoprolol succinate (TOPROL-XL) XL tablet 75 mg  75 mg Oral DAILY    pravastatin (PRAVACHOL) tablet 80 mg  80 mg Oral QHS    [START ON 6/23/2017] tamsulosin (FLOMAX) capsule 0.4 mg  0.4 mg Oral DAILY    bumetanide (BUMEX) injection 2 mg  2 mg IntraVENous Q12H    insulin lispro (HUMALOG) injection   SubCUTAneous AC&HS    glucose chewable tablet 16 g  4 Tab Oral PRN    glucagon (GLUCAGEN) injection 1 mg  1 mg IntraMUSCular PRN    dextrose 10% infusion 125-250 mL  125-250 mL IntraVENous PRN    insulin lispro protamine/insulin lispro (HUMALOG MIX 75/25) injection 25 Units  25 Units SubCUTAneous ACB&D    senna-docusate (PERICOLACE) 8.6-50 mg per tablet 2 Tab  2 Tab Oral QHS          LAB AND IMAGING FINDINGS:     Lab Results   Component Value Date/Time    WBC 8.2 06/22/2017 09:47 AM    HGB 12.9 06/22/2017 09:47 AM    PLATELET 843 21/02/7952 09:47 AM     Lab Results   Component Value Date/Time    Sodium 138 06/22/2017 09:47 AM    Potassium 5.0 06/22/2017 09:47 AM    Chloride 100 06/22/2017 09:47 AM    CO2 31 06/22/2017 09:47 AM    BUN 96 06/22/2017 09:47 AM    Creatinine 4.03 06/22/2017 09:47 AM    Calcium 8.5 06/22/2017 09:47 AM    Magnesium 2.5 06/22/2017 09:47 AM      Lab Results   Component Value Date/Time    AST (SGOT) 29 06/22/2017 09:47 AM    Alk.  phosphatase 47 06/22/2017 09:47 AM    Protein, total 6.9 06/22/2017 09:47 AM    Albumin 3.0 06/22/2017 09:47 AM    Globulin 3.9 06/22/2017 09:47 AM     Lab Results   Component Value Date/Time    INR 2.0 03/13/2017 07:00 PM    Prothrombin time 21.1 03/13/2017 07:00 PM      No results found for: IRON, FE, TIBC, IBCT, PSAT, FERR   No results found for: PH, PCO2, PO2  No components found for: [place]   Lab Results Component Value Date/Time    CK 99 03/13/2017 12:15 PM    CK - MB 3.6 03/13/2017 12:15 PM                Total time: 70 minutes  Counseling / coordination time, spent as noted above: 50 minutes  > 50% counseling / coordination?: yes    Prolonged service was provided for  []30 min   []75 min in face to face time in the presence of the patient, spent as noted above. Time Start:   Time End:   Note: this can only be billed with 62425 (initial) or 78750 (follow up). If multiple start / stop times, list each separately.

## 2017-06-22 NOTE — ROUTINE PROCESS
TRANSFER - OUT REPORT:    Verbal report given to Cathy Saleem RN (name) on Beck Leigh  being transferred to PCU(unit) for routine progression of care       Report consisted of patients Situation, Background, Assessment and   Recommendations(SBAR). Information from the following report(s) SBAR and ED Summary was reviewed with the receiving nurse. Lines:   Peripheral IV 06/22/17 Wrist (Active)   Site Assessment Clean, dry, & intact 6/22/2017  9:50 AM   Phlebitis Assessment 0 6/22/2017  9:50 AM   Infiltration Assessment 0 6/22/2017  9:50 AM   Dressing Status Clean, dry, & intact 6/22/2017  9:50 AM   Dressing Type Transparent 6/22/2017  9:50 AM   Hub Color/Line Status Pink;Flushed 6/22/2017  9:50 AM   Action Taken Blood drawn 6/22/2017  9:50 AM        Opportunity for questions and clarification was provided.       Patient transported with:   Registered Nurse

## 2017-06-22 NOTE — PROGRESS NOTES
Pharmacy Medication Reconciliation     The patient was interviewed regarding current PTA medication list, use and drug allergies;  Keenan Bartlett intern was present in room and obtained permission from patient to discuss drug regimen with visitor present. The patient was questioned regarding use of any other inhalers, topical products, over the counter medications, herbal medications, vitamin products or ophthalmic/nasal/otic medication use. Allergy Update: None    Recommendations/Findings: The following amendments were made to the patient's active medication list on file at [facility]:   1) Additions: None    2) Deletions:    Gabapentin 100 mg cap    3) Changes:    Novolog mix 70/30: Patient states being put on sliding scale of                      -26 units AM, 34 units PM, (patient cant remember the cutoff BG)                     -Add 5 units for every 50 point over 150, Hold if BS <100                     -Patient injected 34 unit on 6/21/17 PM ()                     -Patient injected 15 units on 6/22/17 AM (   Metozalone 2.5 mg: patient takes 1 tablet everyday   Metoprolol succinate 100 mg: patient takes 1 tablet BID         -Clarified PTA med list with patient and Rxquery. PTA medication list was corrected to the following:     Prior to Admission Medications   Prescriptions Last Dose Informant Patient Reported? Taking?   acetaminophen (TYLENOL EXTRA STRENGTH) 500 mg tablet 6/22/2017 at 0830 Self Yes Yes   Sig: Take 1,000 mg by mouth every eight (8) hours as needed for Pain. apixaban (ELIQUIS) 5 mg tablet 6/22/2017 at 0830 Self No Yes   Sig: Take 1 Tab by mouth two (2) times a day. Indications: PREVENT THROMBOEMBOLISM IN CHRONIC ATRIAL FIBRILLATION   bumetanide (BUMEX) 1 mg tablet 6/22/2017 at 0830 Self Yes Yes   Sig: Take 1.5 mg by mouth two (2) times a day.    calcium carbonate (CALTREX) 600 mg calcium (1,500 mg) tablet 6/20/2017 at unknown Self Yes Yes   Sig: Take 600 mg by mouth every Tuesday, Thursday, Saturday & Sunday. cholecalciferol (VITAMIN D3) 1,000 unit cap 6/22/2017 at 0830 Self Yes Yes   Sig: Take 3,000 Units by mouth daily. dutasteride (AVODART) 0.5 mg capsule 6/22/2017 at 0830 Self Yes Yes   Sig: TAKE ONE CAPSULE BY MOUTH EVERY DAY   insulin aspart protamine/insulin aspart (NOVOLOG MIX 70/30) 100 unit/mL (70-30) inpn 6/22/2017 at 0830 Self Yes Yes   Sig: by SubCUTAneous route two (2) times a day. Sliding scale:  26 units AM, 34 units PM, Add 5 units for every 50 point over 150, Hold if BS <100  Patient injected 34 unit on 6/21/17 PM (); patient injected 15 units on 6/22/17 AM ()   ketoconazole (NIZORAL) 2 % topical cream 6/21/2017 at 2300 Self Yes Yes   Sig: Apply  to affected area two (2) times daily as needed for Skin Irritation. Indications: rash   levothyroxine (SYNTHROID) 137 mcg tablet 6/22/2017 at 0830time Self Yes Yes   Sig: TAKE 1 TABLET(S) EVERY DAY BY ORAL ROUTE FOR 90 DAYS.   metOLazone (ZAROXOLYN) 2.5 mg tablet 6/22/2017 at 0830 Self Yes Yes   Sig: Take 2.5 mg by mouth daily. metoprolol succinate (TOPROL-XL) 100 mg tablet  Self Yes Yes   Sig: Take 100 mg by mouth two (2) times a day. multivitamin (ONE A DAY) tablet 6/21/2017 at 2300 Self Yes Yes   Sig: Take 1 Tab by mouth daily. paricalcitol (ZEMPLAR) 1 mcg capsule 6/22/2017 at 0830 Self Yes Yes   Sig: Take 1 mcg by mouth daily. pravastatin (PRAVACHOL) 80 mg tablet 6/21/2017 at 2300 Self No Yes   Sig: Take 1 Tab by mouth nightly.    tamsulosin (FLOMAX) 0.4 mg capsule 6/22/2017 at 0830 Self Yes Yes   Sig: TAKE 2 CAPSULES BY MOUTH EVERY DAY      Facility-Administered Medications: None          Thank you,  Thad Leyden

## 2017-06-22 NOTE — PROGRESS NOTES
Bedside shift change report given to Denisa Auguste RN (oncoming nurse) by Sonali Holden RN (offgoing nurse). Report included the following information SBAR, Kardex, Intake/Output, MAR, Recent Results, Med Rec Status and Cardiac Rhythm NSR.

## 2017-06-22 NOTE — H&P
Hospitalist Admission Note    NAME: Cordelia    :  1950   MRN:  [id number]     Date/Time:  2017 10:56 AM    Patient PCP: Alexis Luo MD  ________________________________________________________________________    My assessment of this patient's clinical condition and my plan of care is as follows. Assessment / Plan:  Acute on chronic diastolic heart failure  Acute hypoxic respiratory failure  Chronic atrial fibrillation  Elevated troponin  -last Echo in 3/2017 with EF 55-60%  -probnp ~ 30K  -repeat Echo  -O2 suppl, wean as tolerated  -IV bumex 2mg, metolazone, BB, Eliquis  -monitor I&O, daily wt, fluid restrict  -trend trop, replete electrolytes  -cardiology consulted     Acute on chronic kidney injury  -baseline cr on 3/2017 was 2.65, as per cardiology's outpt f/u note, cr ~3. This is likely due to volume overload  -follows with Dr Edith Cosme, had labs done at the office  -follow cr, avoid nephrotoxin agents  -consult nephro if does not improve with diuresing     T2DM  -check A1C  -restart home insulin at lower dose, SSI  -diabetic diet    BPH  -cont' dutasteride, flomax    Hypothyroidism  -cont' synthroid    B/l LE wound  Wound care consulted   Morbid obesity    Code Status: Full  Surrogate Decision Maker: wife    DVT Prophylaxis: Eliquis  GI Prophylaxis: not indicated    Baseline: independent        Subjective:   CHIEF COMPLAINT: sob, generalized weakness    HISTORY OF PRESENT ILLNESS:     Eulalio Nixon is a 75 y.o.  male w pmhx significant for htn, CAD s/p CABG in , chronic afib was on eliquis, diastolic heart failure EF 55-60% in 3/2017, t2dm, morbid obesity present to ED c/o of worsening sob associated with generalzied weakness. Other associated symptoms including progressive wt gained as per pt, since he was taken off metolazone. Patient was doing well since last discharged on 3/2017, was losing wt.  States his bumex dosing had been adjusted multiple times and also was taken off metolazone. He noticed wt gained started when he was taken off metolazone. He saw his nephrologist yesterday, had labs done and was re-started on metolazone. Pt woke up this AM, noticed worsening of sob with minimal exertion, generalized weakness which lead him to the ED for further evaluation. In the ED, pt was hypoxic at 89% on RA (normally not on O2). Vitals: T 98.9, P 111, /73, SpO2 89% on RA. Pertinent labs:  Cr 4.03 (last discharged cr 2.65, but cardiology's last outpt note states he was 3), probnp ~30K, trop 0.12  CXR showed small b/l effusions with underlying atelectasis. Other than sob/wt gain/generalized weakness, pt denies any cp, palpitations, n/v/d. We were asked to admit for work up and evaluation of the above problems. Past Medical History:   Diagnosis Date    Arrhythmia     atrial fibrillation 2017    CAD (coronary atherosclerotic disease)     Diabetes ([facility]     Diabetes mellitus type 2, controlled ([facility] 3/13/2017    Heart failure ([facility]     Hx of CABG 3/13/2017    CABG in 2010 in [address] Morbid obesity ([facility] 3/13/2017    S/P CABG x 2 2010        Past Surgical History:   Procedure Laterality Date    CARDIAC SURG PROCEDURE UNLIST      CABGx2 in 2010    HX ORTHOPAEDIC      L 3rd toe amputation in 1999       Social History   Substance Use Topics    Smoking status: Never Smoker    Smokeless tobacco: Never Used    Alcohol use Yes      Comment: rare        Family History   Problem Relation Age of Onset    Heart Attack Father      Allergies   Allergen Reactions    Ciprofloxacin Nausea Only    Percocet [Oxycodone-Acetaminophen] Other (comments)     hallucinations        Prior to Admission medications    Medication Sig Start Date End Date Taking? Authorizing Provider   metoprolol succinate (TOPROL-XL) 50 mg XL tablet Take 1.5 Tabs by mouth daily.  6/8/17   Maria Del Rosario Mccord NP   bumetanide (BUMEX) 1 mg tablet Take 1 Tab by mouth two (2) times a day. 5/30/17   SAMINA Teague   metOLazone (ZAROXOLYN) 2.5 mg tablet Take 2.5 mg by mouth every other day. Historical Provider   gabapentin (NEURONTIN) 100 mg capsule Take  by mouth three (3) times daily. Historical Provider   insulin aspart protamine/insulin aspart (NOVOLOG MIX 70/30) 100 unit/mL (70-30) inpn by SubCUTAneous route two (2) times a day. Sliding scale, 26 in am, 34 in pm, 5 units for every 50 point over 150, hold if bs <100. 4/12/17   Historical Provider   ketoconazole (NIZORAL) 2 % topical cream  4/3/17   Historical Provider   pravastatin (PRAVACHOL) 80 mg tablet Take 1 Tab by mouth nightly. 3/22/17   Brain Moreland NP   apixaban (ELIQUIS) 5 mg tablet Take 1 Tab by mouth two (2) times a day. Indications: PREVENT THROMBOEMBOLISM IN CHRONIC ATRIAL FIBRILLATION 3/17/17   Eloisa Webb MD   calcium carbonate (CALTREX) 600 mg calcium (1,500 mg) tablet Take 600 mg by mouth every Tuesday, Thursday, Saturday & Sunday. Historical Provider   dutasteride (AVODART) 0.5 mg capsule TAKE ONE CAPSULE BY MOUTH EVERY DAY 12/14/16   Historical Provider   levothyroxine (SYNTHROID) 137 mcg tablet TAKE 1 TABLET(S) EVERY DAY BY ORAL ROUTE FOR 90 DAYS. 12/19/16   Historical Provider   tamsulosin (FLOMAX) 0.4 mg capsule TAKE 2 CAPSULES BY MOUTH EVERY DAY 12/14/16   Historical Provider   paricalcitol (ZEMPLAR) 1 mcg capsule Take 1 mcg by mouth daily. Historical Provider   multivitamin (ONE A DAY) tablet Take 1 Tab by mouth daily. Historical Provider   cholecalciferol (VITAMIN D3) 1,000 unit cap Take 3,000 Units by mouth daily. Historical Provider   acetaminophen (TYLENOL EXTRA STRENGTH) 500 mg tablet Take 1,000 mg by mouth every six (6) hours as needed for Pain. Historical Provider       REVIEW OF SYSTEMS:     I am not able to complete the review of systems because:    The patient is intubated and sedated    The patient has altered mental status due to his acute medical problems    The patient has baseline aphasia from prior stroke(s)    The patient has baseline dementia and is not reliable historian    The patient is in acute medical distress and unable to provide information           Total of 12 systems reviewed as follows:       POSITIVE= BOLD text  Negative = text not underlined  General:  fever, chills, sweats, generalized weakness, weight gain,      loss of appetite   Eyes:    blurred vision, eye pain, loss of vision, double vision  ENT:    rhinorrhea, pharyngitis   Respiratory:   cough, sputum production, SOB, BETTS, wheezing, pleuritic pain   Cardiology:   chest pain, palpitations, orthopnea, PND, edema, syncope   Gastrointestinal:  abdominal pain , N/V, diarrhea, dysphagia, constipation, bleeding   Genitourinary:  frequency, urgency, dysuria, hematuria, incontinence   Muskuloskeletal :  arthralgia, myalgia, back pain  Hematology:  easy bruising, nose or gum bleeding, lymphadenopathy   Dermatological: rash, ulceration, pruritis, color change/ blisters on b/l legs / jaundice  Endocrine:   hot flashes or polydipsia   Neurological:  headache, dizziness, confusion, focal weakness, paresthesia,     Speech difficulties, memory loss, gait difficulty  Psychological: Feelings of anxiety, depression, agitation    Objective:   VITALS:    Visit Vitals    /73    Pulse (!) 102    Temp 98.9 °F (37.2 °C)    Resp 21    SpO2 97%       PHYSICAL EXAM:    General:    Obese male up in chair on 2LNC, NAD    HEENT: Atraumatic, anicteric sclerae, pink conjunctivae     No oral ulcers, mucosa moist, throat clear, dentition fair  Neck:  Supple, symmetrical,  thyroid: non tender  Lungs:   diminished breath sound at lung base, no wheeing, no accessory muscle use  Chest wall:  No tenderness  Heart:   Irregular rhythm,  No  murmur   +3 edema  Abdomen:   Soft, obese, NT.  +BS  Extremities: No cyanosis.   No clubbing,      Skin turgor normal, Capillary refill normal, Radial dial pulse 2+  Skin:     B/l venous stasis changes on LE with bliters covered by bandages   Psych:  Good insight. Not depressed. Not anxious or agitated. Neurologic: EOMs intact. No facial asymmetry. No aphasia or slurred speech. Symmetrical strength, Sensation grossly intact. Alert and oriented X 4.     _______________________________________________________________________  Care Plan discussed with:    Comments   Patient x    Family  x    RN x    Care Manager                    Consultant:      _______________________________________________________________________  Expected  Disposition:   Home with Family    HH/PT/OT/RN x   SNF/LTC    LARISSA    ________________________________________________________________________  TOTAL TIME: 72 Minutes    Critical Care Provided     Minutes non procedure based      Comments     Reviewed previous records   >50% of visit spent in counseling and coordination of care  Discussion with patient and/or family and questions answered       ________________________________________________________________________  Signed: Tanvi Hernandez MD    Procedures: see electronic medical records for all procedures/Xrays and details which were not copied into this note but were reviewed prior to creation of Plan.     LAB DATA REVIEWED:    Recent Results (from the past 24 hour(s))   EKG, 12 LEAD, INITIAL    Collection Time: 06/22/17  9:38 AM   Result Value Ref Range    Ventricular Rate 114 BPM    Atrial Rate 144 BPM    QRS Duration 102 ms    Q-T Interval 316 ms    QTC Calculation (Bezet) 435 ms    Calculated R Axis 92 degrees    Calculated T Axis -149 degrees    Diagnosis       Atrial fibrillation with rapid ventricular response  Rightward axis  Low voltage QRS  Incomplete right bundle branch block  Septal infarct , age undetermined  ST & T wave abnormality, consider inferior ischemia  ST & T wave abnormality, consider anterolateral ischemia  When compared with ECG of 25-MAY-2017 10:44,  Significant changes have occurred     CBC WITH AUTOMATED DIFF Collection Time: 06/22/17  9:47 AM   Result Value Ref Range    WBC 8.2 4.1 - 11.1 K/uL    RBC 5.16 4.10 - 5.70 M/uL    HGB 12.9 12.1 - 17.0 g/dL    HCT 44.0 36.6 - 50.3 %    MCV 85.3 80.0 - 99.0 FL    MCH 25.0 (L) 26.0 - 34.0 PG    MCHC 29.3 (L) 30.0 - 36.5 g/dL    RDW 19.5 (H) 11.5 - 14.5 %    PLATELET 351 309 - 272 K/uL    NEUTROPHILS 76 (H) 32 - 75 %    LYMPHOCYTES 11 (L) 12 - 49 %    MONOCYTES 11 5 - 13 %    EOSINOPHILS 1 0 - 7 %    BASOPHILS 1 0 - 1 %    ABS. NEUTROPHILS 6.2 1.8 - 8.0 K/UL    ABS. LYMPHOCYTES 0.9 0.8 - 3.5 K/UL    ABS. MONOCYTES 0.9 0.0 - 1.0 K/UL    ABS. EOSINOPHILS 0.1 0.0 - 0.4 K/UL    ABS. BASOPHILS 0.1 0.0 - 0.1 K/UL    RBC COMMENTS TEARDROP CELLS  PRESENT        RBC COMMENTS OVALOCYTES  1+        RBC COMMENTS POLYCHROMASIA  PRESENT        RBC COMMENTS POIKILOCYTOSIS  1+        RBC COMMENTS ANISOCYTOSIS  1+       METABOLIC PANEL, COMPREHENSIVE    Collection Time: 06/22/17  9:47 AM   Result Value Ref Range    Sodium 138 136 - 145 mmol/L    Potassium 5.0 3.5 - 5.1 mmol/L    Chloride 100 97 - 108 mmol/L    CO2 31 21 - 32 mmol/L    Anion gap 7 5 - 15 mmol/L    Glucose 175 (H) 65 - 100 mg/dL    BUN 96 (H) 6 - 20 MG/DL    Creatinine 4.03 (H) 0.70 - 1.30 MG/DL    BUN/Creatinine ratio 24 (H) 12 - 20      GFR est AA 18 (L) >60 ml/min/1.73m2    GFR est non-AA 15 (L) >60 ml/min/1.73m2    Calcium 8.5 8.5 - 10.1 MG/DL    Bilirubin, total 0.5 0.2 - 1.0 MG/DL    ALT (SGPT) 17 12 - 78 U/L    AST (SGOT) 29 15 - 37 U/L    Alk.  phosphatase 47 45 - 117 U/L    Protein, total 6.9 6.4 - 8.2 g/dL    Albumin 3.0 (L) 3.5 - 5.0 g/dL    Globulin 3.9 2.0 - 4.0 g/dL    A-G Ratio 0.8 (L) 1.1 - 2.2     CK W/ REFLX CKMB    Collection Time: 06/22/17  9:47 AM   Result Value Ref Range    CK 71 39 - 308 U/L   TROPONIN I    Collection Time: 06/22/17  9:47 AM   Result Value Ref Range    Troponin-I, Qt. 0.12 (H) <0.05 ng/mL   PRO-BNP    Collection Time: 06/22/17  9:47 AM   Result Value Ref Range    NT pro-BNP 60585 (H) 0 - 125 PG/ML   MAGNESIUM    Collection Time: 06/22/17  9:47 AM   Result Value Ref Range    Magnesium 2.5 (H) 1.6 - 2.4 mg/dL

## 2017-06-22 NOTE — WOUND CARE
Wound care consult for POA BLE wounds. Patient is a 74 y/o CM with acute on chronic CHF, acute resp failure, chronic a-fib, CKD, T2DM, BPH, morbi obesity (5'9\", 280 lbs), CABG in 2010. Patient had just started at [facility] for LLE venous stasis ulcer and missed his appointment there today secondary to ED visit admit to hospital. Patient was scheduled for an KERI here tomorrow to check to see if candidate for compression therapy to heal LLE. He has now opened a second smaller area on RLE from spontaneous blisters from fluid overload. WOUND POA CONDITIONS        Wound Leg Left; Lower;Medial (Active)   DRESSING STATUS Removed; Saturated 6/22/2017  2:03 PM   DRESSING TYPE Dry dressing 6/22/2017  2:03 PM   Non-Pressure Injury Partial thickness (epider/derm) 6/22/2017  2:03 PM   Wound Length (cm) 12 cm 6/22/2017  2:03 PM   Wound Width (cm) 12 cm 6/22/2017  2:03 PM   Wound Depth (cm) 0.1 6/22/2017  2:03 PM   Wound Surface area (cm^3) 14.4 cm^2 6/22/2017  2:03 PM   Condition of Base [place] 6/22/2017  2:03 PM   Condition of Edges Open 6/22/2017  2:03 PM   Tissue Type Pink 6/22/2017  2:03 PM   Drainage Amount  Moderate 6/22/2017  2:03 PM   Drainage Color Serous 6/22/2017  2:03 PM   Wound Odor None 6/22/2017  2:03 PM   Periwound Skin Condition Macerated;Edematous 6/22/2017  2:03 PM   Cleansing and Cleansing Agents  Normal saline 6/22/2017  2:03 PM   Dressing Type Applied Silver products; Foam 6/22/2017  2:03 PM   Procedure Tolerated Well 6/22/2017  2:03 PM   Number of days:0       Wound Leg Right; Lower; Anterior (Active)   DRESSING STATUS Removed 6/22/2017  2:03 PM   DRESSING TYPE Hydrocolloid 6/22/2017  2:03 PM   Non-Pressure Injury Partial thickness (epider/derm) 6/22/2017  2:03 PM   Wound Length (cm) 2 cm 6/22/2017  2:03 PM   Wound Width (cm) 2 cm 6/22/2017  2:03 PM   Wound Depth (cm) 0.1 6/22/2017  2:03 PM   Wound Surface area (cm^3) 0.4 cm^2 6/22/2017  2:03 PM   Condition of Base [place] 6/22/2017  2:03 PM   Condition of Edges Open 6/22/2017  2:03 PM   Tissue Type Pink 6/22/2017  2:03 PM   Drainage Amount  Small  6/22/2017  2:03 PM   Drainage Color Serous 6/22/2017  2:03 PM   Wound Odor None 6/22/2017  2:03 PM   Periwound Skin Condition Edematous 6/22/2017  2:03 PM   Cleansing and Cleansing Agents  Normal saline 6/22/2017  2:03 PM   Dressing Type Applied Silver products; Foam 6/22/2017  2:03 PM   Procedure Tolerated Well 6/22/2017  2:03 PM   Number of days:0           Recommend:  BLE wounds: cleanse with NS, wipe wound base clean and dry. Apply a smear of Carrysen wound gel over open skin and cover with Aquacell-AG silver hydrafiber. Cover LLE with Exu-dry pads and secure with kami. Keep legs elevated in and out of bed.   Karl Harris RN, CWON, zone ph# [phone]

## 2017-06-22 NOTE — IP AVS SNAPSHOT
[address] [facility] 
[phone] Patient: Merissa Messer MRN: [id number] You are allergic to the following Allergen Reactions Ciprofloxacin Nausea Only Percocet (Oxycodone-Acetaminophen) Other (comments)  
 hallucinations Recent Documentation Weight BMI Smoking Status  
  
  
  
 120.8 kg 39.33 kg/m2 Never Smoker Emergency Contacts Name Discharge Info Relation Home Work Mobile Ana Maria Loaiza DISCHARGE CAREGIVER [3] Spouse [3] [phone] About your hospitalization You were admitted on:  June 22, 2017 You last received care in the:  [facility] You were discharged on:  June 27, 2017 Unit phone number:  [phone] Why you were hospitalized Your primary diagnosis was:  Not on File Your diagnoses also included:  Diastolic Heart Failure (Hcc), Sob (Shortness Of Breath), Morbid Obesity (Hcc), Hx Of Cabg, Fatigue, Diabetes Mellitus Type 2, Controlled (Hcc), Ckd (Chronic Kidney Disease) Stage 4, Gfr 15-29 Ml/Min (Hcc), Bilateral Leg Edema, Chronic A-Fib (Hcc), Andi (Obstructive Sleep Apnea), Pacemaker, H/O Atrioventricular Luz Maria Ablation Providers Seen During Your Hospitalizations Provider Role Specialty Primary office phone Rose Hernandez MD Attending Provider Emergency Medicine [phone] Rylan Bower MD Attending Provider Internal Medicine [phone] Your Primary Care Physician (PCP) Primary Care Physician Office Phone Office Fax Getachew Guerrero [phone] Follow-up Information Follow up With Details Comments Contact Info [facility] Office on [place] [facility] [address] [phone] [address] [place] [address] 
1st Floor [place] [address] 
[phone] Manuel Head MD On 6/28/2017 hospital follow up to set up home health for wound care and PT-- 6/28/17--1pm [address] [place] [address] 
[phone] Allan Mendez MD Go on 7/7/2017 Nephrology follow up at 2:00 PM [address] [facility] 
[phone] Joseph Saldaña MD Go on 7/11/2017 Cardiology follow up at 2:30 PM for pacemaker check [address] [facility] 
[phone] [address] Mccormack On 7/6/2017 Appt. 7/6/17-- 945 am--with Dr. Antonio Guajardo [facility] [address]   [address] [address] [phone] Your Appointments Thursday July 06, 2017  9:45 AM EDT  
WOUND CARE FOLLOW UP with Elias Galvez MD  
[facility] OP [facility]OUND [facility]ARE ([address]) [address] [facility] [address] [address]  
[phone] Thursday July 06, 2017 10:50 AM EDT Follow Up with Myrtis Najjar, MD  
[facility] [address] [place] [address] Road) [address] [id number] [phone] Tuesday July 11, 2017  2:30 PM EDT  
PACEMAKER with PACEMAKER, [facility] [address]) [address] [facility]  
[phone] Current Discharge Medication List  
  
START taking these medications Dose & Instructions Dispensing Information Comments Morning Noon Evening Bedtime  
 allopurinol 300 mg tablet Commonly known as:  Pearl Bhatti Your last dose was: Your next dose is:    
   
   
 Dose:  300 mg Take 1 Tab by mouth daily. Quantity:  30 Tab Refills:  1  
     
   
   
   
  
 potassium chloride SR 20 mEq tablet Commonly known as:  K-TAB Your last dose was: Your next dose is:    
   
   
 Dose:  20 mEq Take 1 Tab by mouth daily. Quantity:  30 Tab Refills:  1 CONTINUE these medications which have CHANGED Dose & Instructions Dispensing Information Comments Morning Noon Evening Bedtime  
 bumetanide 2 mg tablet Commonly known as:  Jose Neeraj What changed:   
- medication strength 
- how much to take - Another medication with the same name was removed. Continue taking this medication, and follow the directions you see here. Your last dose was: Your next dose is:    
   
   
 Dose:  2 mg Take 1 Tab by mouth two (2) times a day. Quantity:  60 Tab Refills:  1  
     
   
   
   
  
 metOLazone 2.5 mg tablet Commonly known as:  Alberto Lindsay What changed:  Another medication with the same name was removed. Continue taking this medication, and follow the directions you see here. Your last dose was: Your next dose is:    
   
   
 Dose:  2.5 mg Take 2.5 mg by mouth daily. Refills:  0  
     
   
   
   
  
 metoprolol succinate 100 mg tablet Commonly known as:  TOPROL-XL What changed:   
- medication strength 
- how much to take - Another medication with the same name was removed. Continue taking this medication, and follow the directions you see here. Your last dose was: Your next dose is:    
   
   
 Dose:  150 mg Take 1.5 Tabs by mouth daily. Quantity:  45 Tab Refills:  1 CONTINUE these medications which have NOT CHANGED Dose & Instructions Dispensing Information Comments Morning Noon Evening Bedtime  
 apixaban 5 mg tablet Commonly known as:  Bridgette Bradford Your last dose was: Your next dose is:    
   
   
 Dose:  5 mg Take 1 Tab by mouth two (2) times a day. Indications: PREVENT THROMBOEMBOLISM IN CHRONIC ATRIAL FIBRILLATION Quantity:  60 Tab Refills:  0  
     
   
   
   
  
 calcium carbonate 600 mg calcium (1,500 mg) tablet Commonly known as:  Creasie Old Glory Your last dose was: Your next dose is:    
   
   
 Dose:  600 mg Take 600 mg by mouth every Tuesday, Thursday, Saturday & Sunday. Refills:  0  
     
   
   
   
  
 dutasteride 0.5 mg capsule Commonly known as:  AVODART Your last dose was: Your next dose is: TAKE ONE CAPSULE BY MOUTH EVERY DAY Refills:  2  
     
   
   
   
  
 insulin aspart protamine/insulin aspart 100 unit/mL (70-30) Inpn Commonly known as:  NOVOLOG MIX 70/30 Your last dose was: Your next dose is:    
   
   
 by SubCUTAneous route two (2) times a day. Sliding scale: 26 units AM, 34 units PM, Add 5 units for every 50 point over 150, Hold if BS <100 Patient injected 34 unit on 6/21/17 PM (); patient injected 15 units on 6/22/17 AM () Refills:  0  
     
   
   
   
  
 ketoconazole 2 % topical cream  
Commonly known as:  NIZORAL Your last dose was: Your next dose is:    
   
   
 Apply  to affected area two (2) times daily as needed for Skin Irritation. Indications: rash Refills:  0  
     
   
   
   
  
 levothyroxine 137 mcg tablet Commonly known as:  SYNTHROID Your last dose was: Your next dose is: TAKE 1 TABLET(S) EVERY DAY BY ORAL ROUTE FOR 90 DAYS. Refills:  3  
     
   
   
   
  
 multivitamin tablet Commonly known as:  ONE A DAY Your last dose was: Your next dose is:    
   
   
 Dose:  1 Tab Take 1 Tab by mouth daily. Refills:  0  
     
   
   
   
  
 paricalcitol 1 mcg capsule Commonly known as:  Evie Cloud Your last dose was: Your next dose is:    
   
   
 Dose:  1 mcg Take 1 mcg by mouth daily. Refills:  0  
     
   
   
   
  
 pravastatin 80 mg tablet Commonly known as:  PRAVACHOL Your last dose was: Your next dose is:    
   
   
 Dose:  80 mg Take 1 Tab by mouth nightly. Quantity:  30 Tab Refills:  6 tamsulosin 0.4 mg capsule Commonly known as:  FLOMAX Your last dose was: Your next dose is: TAKE 2 CAPSULES BY MOUTH EVERY DAY Refills:  2 TYLENOL EXTRA STRENGTH 500 mg tablet Generic drug:  acetaminophen Your last dose was: Your next dose is:    
   
   
 Dose:  1000 mg Take 1,000 mg by mouth every eight (8) hours as needed for Pain. Refills:  0  
     
   
   
   
  
 VITAMIN D3 1,000 unit Cap Generic drug:  cholecalciferol Your last dose was: Your next dose is:    
   
   
 Dose:  3000 Units Take 3,000 Units by mouth daily. Refills:  0 Where to Get Your Medications Information on where to get these meds will be given to you by the nurse or doctor. ! Ask your nurse or doctor about these medications  
  allopurinol 300 mg tablet  
 bumetanide 2 mg tablet  
 metoprolol succinate 100 mg tablet  
 potassium chloride SR 20 mEq tablet Discharge Instructions Daily weights: 
Notify Dr. Yahaira Hanna for a weight gain of 3 pounds or greater in one day or 5 pounds in one week. Low Sodium Diet as per CHF Education Discharge Instructions Attachments/References HEART FAILURE ZONES: GENERAL INFO (ENGLISH) HEART FAILURE: AVOIDING TRIGGERS (ENGLISH) HEART FAILURE: SELF-CARE: GENERAL INFO (ENGLISH) Discharge Orders None [employer] Announcement We are excited to announce that we are making your provider's discharge notes available to you in Talkray. You will see these notes when they are completed and signed by the physician that discharged you from your recent hospital stay. If you have any questions or concerns about any information you see in Snap[employer]ail[employer], please call the Health Information Department where you were seen or reach out to your Primary Care Provider for more information about your plan of care. Introducing [facility]! [place] introduces [employer] patient portal. Now you can access parts of your medical record, email your doctor's office, and request medication refills online. 1. In your internet browser, go to [email or web address] 2. Click on the First Time User? Click Here link in the Sign In box. You will see the New Member Sign Up page. 3. Enter your [employer] Access Code exactly as it appears below. You will not need to use this code after youve completed the sign-up process. If you do not sign up before the expiration date, you must request a new code. · [employer] Access Code: [email or web address] Expires: 9/5/2017 11:15 AM 
 
4. Enter the last four digits of your Social Security Number (xxxx) and Date of Birth (mm/dd/yyyy) as indicated and click Submit. You will be taken to the next sign-up page. 5. Create a [employer] ID. This will be your [employer] login ID and cannot be changed, so think of one that is secure and easy to remember. 6. Create a [employer] password. You can change your password at any time. 7. Enter your Password Reset Question and Answer. This can be used at a later time if you forget your password. 8. Enter your e-mail address. You will receive e-mail notification when new information is available in [address] 9. Click Sign Up. You can now view and download portions of your medical record. 10. Click the Download Summary menu link to download a portable copy of your medical information. If you have questions, please visit the Frequently Asked Questions section of the [employer] website. Remember, [employer] is NOT to be used for urgent needs. For medical emergencies, dial 911. Now available from your iPhone and Android! General Information Please provide this summary of care documentation to your next provider. Patient Signature:  ____________________________________________________________ Date:  ____________________________________________________________  
  
[facility] Star Provider Signature:  ____________________________________________________________ Date:  ____________________________________________________________ More Information Learning About Heart Failure Zones What are heart failure zones? Heart failure zones give you an easy way to see changes in your heart failure symptoms. They also tell you when you need to get help. Check every day to see which zone you are in. Green zone. You are doing well. This is where you want to be. · Your weight is stable. This means it is not going up or down. · You breathe easily. · You are sleeping well. You are able to lie flat without shortness of breath. · You can do your usual activities. Yellow zone. Be careful. Your symptoms are changing. Call your doctor. · You have new or increased shortness of breath. · You are dizzy or lightheaded, or you feel like you may faint. · You have sudden weight gain, such as more than 2 to 3 pounds in a day or 5 pounds in a week. (Your doctor may suggest a different range of weight gain.) · You have increased swelling in your legs, ankles, or feet. · You are so tired or weak that you cannot do your usual activities. · You are not sleeping well. Shortness of breath wakes you up at night. You need extra pillows. Your doctor's name: ____________________________________________________________ Your doctor's contact information: _________________________________________________ Red zone. This is an emergency. Call 911. You have symptoms of sudden heart failure, such as: 
· You have severe trouble breathing. · You cough up pink, foamy mucus. · You have a new irregular or fast heartbeat. You have symptoms of a heart attack. These may include: · Chest pain or pressure, or a strange feeling in the chest. 
· Sweating. · Shortness of breath. · Nausea or vomiting. · Pain, pressure, or a strange feeling in the back, neck, jaw, or upper belly or in one or both shoulders or arms. · Lightheadedness or sudden weakness. · A fast or irregular heartbeat. If you have symptoms of a heart attack: After you call 911, the  may tell you to chew 1 adult-strength or 2 to 4 low-dose aspirin. Wait for an ambulance. Do not try to drive yourself. Follow-up care is a key part of your treatment and safety. Be sure to make and go to all appointments, and call your doctor if you are having problems. It's also a good idea to know your test results and keep a list of the medicines you take. Where can you learn more? Go to http://shira-mine.info/. Enter T174 in the search box to learn more about \"Learning About Heart Failure Zones. \" Current as of: February 23, 2017 Content Version: 11.3 © 5343-1360 [employer]. Care instructions adapted under license by [employer] (which disclaims liability or warranty for this information). If you have questions about a medical condition or this instruction, always ask your healthcare professional. [address] any warranty or liability for your use of this information. Avoiding Triggers With Heart Failure: Care Instructions Your Care Instructions Triggers are anything that make your heart failure flare up. A flare-up is also called \"sudden heart failure\" or \"acute heart failure. \" When you have a flare-up, fluid builds up in your lungs, and you have problems breathing. You might need to go to the hospital. By watching for changes in your condition and avoiding triggers, you can prevent heart failure flare-ups. Follow-up care is a key part of your treatment and safety. Be sure to make and go to all appointments, and call your doctor if you are having problems. It's also a good idea to know your test results and keep a list of the medicines you take. How can you care for yourself at home? Watch for changes in your weight and condition · Weigh yourself without clothing at the same time each day. Record your weight. Call your doctor if you have sudden weight gain, such as more than 2 to 3 pounds in a day or 5 pounds in a week. (Your doctor may suggest a different range of weight gain.) A sudden weight gain may mean that your heart failure is getting worse. · Keep a daily record of your symptoms. Write down any changes in how you feel, such as new shortness of breath, cough, or problems eating. Also record if your ankles are more swollen than usual and if you feel more tired than usual. Note anything that you ate or did that could have triggered these changes. Limit sodium Sodium causes your body to hold on to extra water. This may cause your heart failure symptoms to get worse. People get most of their sodium from processed foods. Fast food and restaurant meals also tend to be very high in sodium. · Your doctor may suggest that you limit sodium to 2,000 milligrams (mg) a day or less. That is less than 1 teaspoon of salt a day, including all the salt you eat in cooking or in packaged foods. · Read food labels on cans and food packages. They tell you how much sodium you get in one serving. Check the serving size. If you eat more than one serving, you are getting more sodium. · Be aware that sodium can come in forms other than salt, including monosodium glutamate (MSG), sodium citrate, and sodium bicarbonate (baking soda). MSG is often added to Asian food. You can sometimes ask for food without MSG or salt. · Slowly reducing salt will help you adjust to the taste. Take the salt shaker off the table. · Flavor your food with garlic, lemon juice, onion, vinegar, herbs, and spices instead of salt. Do not use soy sauce, steak sauce, onion salt, garlic salt, mustard, or ketchup on your food, unless it is labeled \"low-sodium\" or \"low-salt. \" 
 · Make your own salad dressings, sauces, and ketchup without adding salt. · Use fresh or frozen ingredients, instead of canned ones, whenever you can. Choose low-sodium canned goods. · Eat less processed food and food from restaurants, including fast food. Exercise as directed Moderate, regular exercise is very good for your heart. It improves your blood flow and helps control your weight. But too much exercise can stress your heart and cause a heart failure flare-up. · Check with your doctor before you start an exercise program. 
· Walking is an easy way to get exercise. Start out slowly. Gradually increase the length and pace of your walk. Swimming, riding a bike, and using a treadmill are also good forms of exercise. · When you exercise, watch for signs that your heart is working too hard. You are pushing yourself too hard if you cannot talk while you are exercising. If you become short of breath or dizzy or have chest pain, stop, sit down, and rest. 
· Do not exercise when you do not feel well. Take medicines correctly · Take your medicines exactly as prescribed. Call your doctor if you think you are having a problem with your medicine. · Make a list of all the medicines you take. Include those prescribed to you by other doctors and any over-the-counter medicines, vitamins, or supplements you take. Take this list with you when you go to any doctor. · Take your medicines at the same time every day. It may help you to post a list of all the medicines you take every day and what time of day you take them. · Make taking your medicine as simple as you can. Plan times to take your medicines when you are doing other things, such as eating a meal or getting ready for bed. This will make it easier to remember to take your medicines. · Get organized. Use helpful tools, such as daily or weekly pill containers. When should you call for help? Call 911 if you have symptoms of sudden heart failure such as: · You have severe trouble breathing. · You cough up pink, foamy mucus. · You have a new irregular or rapid heartbeat. Call your doctor now or seek immediate medical care if: 
· You have new or increased shortness of breath. · You are dizzy or lightheaded, or you feel like you may faint. · You have sudden weight gain, such as more than 2 to 3 pounds in a day or 5 pounds in a week. (Your doctor may suggest a different range of weight gain.) · You have increased swelling in your legs, ankles, or feet. · You are suddenly so tired or weak that you cannot do your usual activities. Watch closely for changes in your health, and be sure to contact your doctor if you develop new symptoms. Where can you learn more? Go to http://shira-mine.info/. Enter A925 in the search box to learn more about \"Avoiding Triggers With Heart Failure: Care Instructions. \" Current as of: February 23, 2017 Content Version: 11.3 © 5526-9234 [employer]. Care instructions adapted under license by [employer] (which disclaims liability or warranty for this information). If you have questions about a medical condition or this instruction, always ask your healthcare professional. [address] any warranty or liability for your use of this information. Learning About Self-Care for Heart Failure What is self-care for heart failure? Heart failure usually gets worse over time. But there are many things you can do to feel better, stay healthy longer, and avoid the hospital. 
Self-care means managing your health by doing certain things every day, like weighing yourself. It's about knowing which symptoms to watch for so you can avoid getting worse. When you practice good self-care, you know when it's time to call your doctor and when your heart failure has turned into an emergency. The lists below can help. Top 5 self-care tips for every day 1. Take your medicines as prescribed. This gives them the best chance of helping you. 2. Watch for signs that you're getting worse. Weighing yourself every day is one of the best ways to do this. Weight gain may be a sign that your body is holding on to too much fluid. Weigh yourself at the same time each day, using the same scale, on a hard, flat surface. The best time is in the morning after you go to the bathroom and before you eat or drink anything. 3. Find out what your triggers are, and learn to avoid them. Triggers are things that make your heart failure worse, often suddenly. A trigger may be eating too much salt, missing a dose of your medicine, or exercising too hard. 4. Limit sodium. This helps keep fluid from building up and may help you feel better. Your doctor may suggest that you limit sodium to 2,000 milligrams (mg) a day or less. That's less than 1 teaspoon. You can stay under this amount by limiting the salt you eat at home and by watching for \"hidden\" sodium when you eat out or shop for food. 5. Try to exercise throughout the week. Exercise makes your heart stronger and can help you avoid symptoms. Walking is a great way to get exercise. If your doctor says it's safe, start out with some short walks, and then slowly build up to longer ones. When should you act? Try to become familiar with signs that mean your heart failure is getting worse. If you need help, talk with your doctor about making a personal plan. Here are some things to watch for as you practice your daily self-care. Call your doctor if: 
· You have sudden weight gain, such as more than 2 to 3 pounds in a day or 5 pounds in a week. (Your doctor may suggest a different range of weight gain.) · You have new or worse swelling in your feet, ankles, or legs. · Your breathing gets worse. Activities that did not make you short of breath before are hard for you now. · Your breathing when you lie down is worse than usual, or you wake up at night needing to catch your breath. Be sure to make and go to all of your follow-up appointments. And it's always a good idea to call your doctor anytime you have a sudden change in symptoms. When is it an emergency? Sometimes the symptoms get worse very quickly. This is called sudden heart failure. It causes fluid to build up in your lungs. Sudden heart failure is an emergency. If you have any of these symptoms, you need care right away. Call 911 if: 
· You have severe shortness of breath. · You have an irregular or fast heartbeat. · You cough up foamy, pink mucus. What else can you do to stay healthy? There are other things you can do to take care of your body and your heart. These things will help you feel better. And they will also reduce your risk of heart attack and stroke. · Try to stay at a healthy weight. Eat a healthy diet with lots of fresh fruit, vegetables, and whole grains. · If you smoke, quit. · Limit the amount of alcohol you drink. · Keep high blood pressure and diabetes under control. If you need help, talk with your doctor. If your doctor has not set you up with a cardiac rehabilitation (rehab) program, talk to him or her about whether that is right for you. Cardiac rehab includes exercise, help with diet and lifestyle changes, and emotional support. Also let your doctor know if: 
· You're having trouble sleeping. Sleep is important to your well-being. It also helps your heart work the way it's supposed to. Your doctor can help you decide if you need treatment for sleep problems. · You're feeling sad or hopeless much of the time, or you are worried and anxious. Heart failure can be hard on your emotions. Treatment with counseling and medicine can help. And when you feel better, you're more likely to take care of yourself. Follow-up care is a key part of your treatment and safety.  Be sure to make and go to all appointments, and call your doctor if you are having problems. It's also a good idea to know your test results and keep a list of the medicines you take. Where can you learn more? Go to http://shira-mine.info/. Enter Q853 in the search box to learn more about \"Learning About Self-Care for Heart Failure. \" Current as of: March 16, 2017 Content Version: 11.3 © 9594-0853 [employer]. Care instructions adapted under license by [employer] (which disclaims liability or warranty for this information). If you have questions about a medical condition or this instruction, always ask your healthcare professional. [address] any warranty or liability for your use of this information.

## 2017-06-22 NOTE — CONSULTS
[address] [facility]     Date of  Admission: 6/22/2017  9:34 AM     Admission type:Emergency    Consult for: DHF exacerbation  Consult by: Hospitalist     Subjective:     Pawan Frye is a 75 y.o. male admitted for Diastolic heart failure ([facility] Hx of chronic DHF, followed by Dr. Robina Tejeda and Nephrology, recently seen by both with additional bumex ordered, but without appropriate diuresis. Hx of CABG, Afib (successful [facility] 3/2017) HTN, CKD. ECG afib with RVR no acute changes, troponin 0.12 nonspecific with CKD and afib with RVR. He has received IV diuretics with no output yet. Previous treatment/evaluation includes Coronary Artery Bypass Graft, Percutaneous Coronary Intervention, echocardiogram and cardiac catheterization . Cardiac risk factors: family history, dyslipidemia, diabetes mellitus, obesity, sedentary life style, male gender, hypertension.       Patient Active Problem List    Diagnosis Date Noted    Diastolic heart failure ([facility] [address] 06/22/2017    Irregular heart beat 03/22/2017    Fatigue 03/15/2017    Bilateral leg edema 03/15/2017    Counseling regarding advanced care planning and goals of care 03/15/2017    Hx of CABG 03/13/2017    Morbid obesity ([address] 03/13/2017    Diabetes mellitus type 2, controlled ([address] 03/13/2017    Heart failure ([address] 03/13/2017    SOB (shortness of breath) 03/07/2017    Chronic a-fib ([address] 03/07/2017    Coronary artery disease involving native coronary artery without angina pectoris 03/07/2017    CKD (chronic kidney disease) stage 4, GFR 15-29 ml/min ([facility]) 03/07/2017      Curtis Richter MD  Past Medical History:   Diagnosis Date    Arrhythmia     atrial fibrillation 2017    CAD (coronary atherosclerotic disease)     Diabetes ([address]     Diabetes mellitus type 2, controlled ([address] 3/13/2017    Heart failure ([address]     Hx of CABG 3/13/2017 CABG in 2010 in [address] Morbid obesity ([facility] [address] 3/13/2017    S/P CABG x 2 2010      Social History     Social History    Marital status:      Spouse name: N/A    Number of children: N/A    Years of education: N/A     Social History Main Topics    Smoking status: Never Smoker    Smokeless tobacco: Never Used    Alcohol use Yes      Comment: rare    Drug use: No    Sexual activity: Not Asked     Other Topics Concern    None     Social History Narrative     Allergies   Allergen Reactions    Ciprofloxacin Nausea Only    Percocet [Oxycodone-Acetaminophen] Other (comments)     hallucinations      Family History   Problem Relation Age of Onset    Heart Attack Father     No Known Problems Mother       Current Facility-Administered Medications   Medication Dose Route Frequency    sodium chloride (NS) flush 5-10 mL  5-10 mL IntraVENous Q8H    sodium chloride (NS) flush 5-10 mL  5-10 mL IntraVENous PRN    ondansetron (ZOFRAN) injection 4 mg  4 mg IntraVENous Q4H PRN    bisacodyl (DULCOLAX) suppository 10 mg  10 mg Rectal DAILY PRN    bumetanide (BUMEX) injection 2 mg  2 mg IntraVENous Q12H    insulin lispro (HUMALOG) injection   SubCUTAneous AC&HS    glucose chewable tablet 16 g  4 Tab Oral PRN    glucagon (GLUCAGEN) injection 1 mg  1 mg IntraMUSCular PRN    dextrose 10% infusion 125-250 mL  125-250 mL IntraVENous PRN    insulin lispro protamine/insulin lispro (HUMALOG MIX 75/25) injection 25 Units  25 Units SubCUTAneous ACB&D        Review of Symptoms:   Constitutional: negative  Eyes: negative   Ears, nose, mouth, throat, and face: negative  Respiratory: chronic BETTS, worse within last week  Cardiovascular: denies CP, palpitations  Gastrointestinal: negative  Genitourinary:negative   Musculoskeletal:increased weakness and fatigue; catherine LE chronic venous stasis with wounds - followed by wound clinic  Neurological: negative   Endocrine: negative          Objective:      Visit Vitals    BP 127/86 (BP 1 Location: Left arm, BP Patient Position: At rest)    Pulse 97    Temp 97.8 °F (36.6 °C)    Resp 22    Wt 127.2 kg (280 lb 6.8 oz)    SpO2 100%    BMI 41.41 kg/m2       Physical:   General: morbidly obese  male in no acute distress  Heart: irr,irr no m/S3/JVD,   Lungs: diminished BS at bases  Abdomen: Soft, +BS, NTND   Extremities: LE catherine discolored, tender, edematous, taut and multiple wounds with dressings intact  Neurologic: Grossly normal    Data Review:   Recent Labs      06/22/17   0947   WBC  8.2   HGB  12.9   HCT  44.0   PLT  182     Recent Labs      06/22/17   0947   NA  138   K  5.0   CL  100   CO2  31   GLU  175*   BUN  96*   CREA  4.03*   CA  8.5   MG  2.5*   ALB  3.0*   TBILI  0.5   SGOT  29   ALT  17       Recent Labs      06/22/17   0947   TROIQ  0.12*       No intake or output data in the 24 hours ending 06/22/17 1432     Cardiographics    Telemetry: afib with RVR  ECG: afib with RVR   Echocardiogram:     3/2017 [facility]  Left ventricle: Systolic function was normal. Ejection fraction was  estimated in the range of 55 % to 60 %. There were no regional wall motion  abnormalities. Left atrium: The atrium was mildly to moderately dilated. Left atrial appendage: There was a probable, spherical, fixed mass. It may  represent a thrombus. Right atrium: The atrium was mildly dilated. Mitral valve: There was moderate regurgitation. No obvious mass,  vegetation or thrombus noted. Aortic valve: No obvious mass, vegetation or thrombus noted. Tricuspid valve: There was moderate regurgitation. Pulmonary artery  systolic pressure: 80 mmHg. There was severe pulmonary hypertension      CXRAY:Impression: Small bilateral effusions with underlying atelectasis.        Assessment:       Active Problems:    SOB (shortness of breath) (3/7/2017)      CKD (chronic kidney disease) stage 4, GFR 15-29 ml/min ([facility]) (3/7/2017)      Hx of CABG (3/13/2017)      Overview: CABG in 2010 in [place]      Morbid obesity ([facility] (3/13/2017)      Diabetes mellitus type 2, controlled ([facility] (3/13/2017)      Fatigue (3/15/2017)      Bilateral leg edema (7/93/7643)      Diastolic heart failure ([facility] (6/22/2017)         Plan:     Chronic on acute diastolic HF:  Agree with aggressive diuresis as Nephrology OKs  He may be trending toward dialysis as managing his volume is becoming increasingly more difficult  Known severe pulm HTN with volume adds to worsening symptoms  Was scheduled to [address] for further evaluation? ? Not sure why canceled  Monitor I/Os, labs and daily weights    No further cardiology evaluation necessary at this time. CKD:   In combination with DHF, rationale why volume overloaded  Nephrology following    Thank you for consulting ULISES Lowe NP

## 2017-06-22 NOTE — PROGRESS NOTES
[facility], [address]   WOUND CARE PROGRESS NOTE       Name:  Fei Tejeda   MR#:  [id number]   :  1950   Account #:  [de-identified]        Date of Adm:  2017       DATE OF SERVICE: 2017    The patient missed his appointment today at the wound care center   because he was seen in the emergency room for weakness and   shortness of breath. He is admitted. I hope he continues with the   diuretic therapy and we will have inpatient wound care and see him. He is to return to the clinic as soon as he is discharged.         MD LEAH Bocanegra / DESHAWN   D:  2017   11:58   T:  2017   12:16   Job #:  [id number]

## 2017-06-22 NOTE — ED PROVIDER NOTES
HPI Comments: Sancho Bernard, 75 y.o. Male with h/o CKD, heart failure and Afib, presents ambulatory with wife to [facility] ED with cc of increased generalized fatigue and shortness of breath for the last few days. Patient states that he saw his cardiologist Dr. Alix Will at [facility], who increased his Bumex dose, and also saw his nephrologist Dr. Edwar Ramires yesterday who increased his dose further. Wife reports that the pt is now instructed to take 1.5 mg Bumex BID for his h/o diastolic heart failure. He denies orthopnea and PND. His sxs are associated with an increase in leg swelling and waking up to urinate at night. Patient also p/w BLE ulcers for which he is to see a wound care clinic today for follow up. Wife states that she has been changing the wound dressings at home as instructed, and last took the pt to wound care 1 week ago. He specifically denies fevers, chills, cough, chest pain, N/V/D, or abd pain. Patient noted to be hypoxic on room air in triage at 89-90% and started on O2 NC. Patient takes Eliquis for his Afib. PCP: Demetra Lyle MD  Cardiology: Alix Will MD  Nephrology: Edwar Ramires MD    PMHx significant for: CAD, heart failure, DM, CABG x 2, DM, Afib  PSHx significant for: cabg x 2  Social history significant for: - Tobacco, - EtOH, - Illicit Drug Use    There are no other complaints, changes, or physical findings at this time. Written by Kamryn Brock ED Scribe, as dictated by Raymond Small MD.     The history is provided by the patient. No  was used.         Past Medical History:   Diagnosis Date    Arrhythmia     atrial fibrillation 2017    CAD (coronary atherosclerotic disease)     Diabetes ([address]     Diabetes mellitus type 2, controlled ([address] 3/13/2017    Heart failure ([address]     Hx of CABG 3/13/2017    CABG in [address] in [address] Morbid obesity ([address] 3/13/2017    S/P CABG x 2 2010       Past Surgical History:   Procedure Laterality Date    CARDIAC SURG PROCEDURE UNLIST      CABGx2 in 2010    HX ORTHOPAEDIC      L 3rd toe amputation in 1999         Family History:   Problem Relation Age of Onset    Heart Attack Father     No Known Problems Mother        Social History     Social History    Marital status:      Spouse name: N/A    Number of children: N/A    Years of education: N/A     Occupational History    Not on file. Social History Main Topics    Smoking status: Never Smoker    Smokeless tobacco: Never Used    Alcohol use Yes      Comment: rare    Drug use: No    Sexual activity: Not on file     Other Topics Concern    Not on file     Social History Narrative         ALLERGIES: Ciprofloxacin and Percocet [oxycodone-acetaminophen]    Review of Systems   Constitutional: Positive for fatigue. Negative for chills and fever. HENT: Negative for congestion, rhinorrhea and sore throat. Eyes: Negative for pain, discharge and visual disturbance. Respiratory: Positive for shortness of breath. Negative for cough, chest tightness and wheezing. Positive for room air hypoxia. Negative for orthopnea. Cardiovascular: Positive for leg swelling. Negative for chest pain and palpitations. Gastrointestinal: Negative for abdominal pain, constipation, diarrhea, nausea and vomiting. Genitourinary: Positive for frequency. Negative for dysuria and hematuria. Musculoskeletal: Negative for arthralgias, back pain and myalgias. Skin: Positive for wound. Negative for rash. Neurological: Negative for dizziness, weakness, light-headedness and headaches. Psychiatric/Behavioral: Negative. Vitals:    06/22/17 1030 06/22/17 1045 06/22/17 1100 06/22/17 1115   BP: (!) 126/91 119/77 128/75 111/66   Pulse: (!) 103 100 99 95   Resp: 24 24 23 17   Temp:       SpO2: 96% 98% 98% 97%            Physical Exam   Constitutional: He is oriented to person, place, and time. He appears well-developed and well-nourished. He appears ill (chronically).  No distress. Obese. HENT:   Head: Normocephalic and atraumatic. Eyes: EOM are normal. Right eye exhibits no discharge. Left eye exhibits no discharge. No scleral icterus. Neck: Normal range of motion. Neck supple. No tracheal deviation present. Cardiovascular: Normal heart sounds and intact distal pulses. An irregular rhythm present. Tachycardia present. Exam reveals no gallop and no friction rub. No murmur heard. 2+ BLE edema   Pulmonary/Chest: Effort normal. No respiratory distress. He has decreased breath sounds in the right lower field and the left lower field. He has no wheezes. He has no rales. Abdominal: Soft. He exhibits no distension. There is no tenderness. Musculoskeletal: Normal range of motion. He exhibits no edema. Lymphadenopathy:     He has no cervical adenopathy. Neurological: He is alert and oriented to person, place, and time. Chronic R upper eyelid droop o/w with facial symmetry. nml speech. 5/5 strength of all extremities. Skin: Skin is warm and dry. No rash noted. LARGE WOUND TO ANTERIOR L LEG WITH MILD ERYTHEMA AND WARMTH. BLISTERING TO THE RLE. Psychiatric: He has a normal mood and affect. Nursing note and vitals reviewed. MDM  Number of Diagnoses or Management Options  Diagnosis management comments:     Differential includes heart failure, pulmonary edema, pleural effusion, pneumonia, ACS  - CBC, CMP, Gabriel, BNP  - CXR  - Diurese as needed  - Plan to admit given O2 requirement      ED Course       Procedures    EKG interpretation: (Preliminary) 09:38  Rhythm: atrial fib w/RVR; and irregular. Rate (approx.): 114; Axis: normal; QRS interval: normal ; ST/T wave: depression and inversion; in  Lead: inferolateral leads; Other findings: IRBBB.   Written by Deniz Dias, ED Scribe, as dictated by George Small MD.      CRITICAL CARE NOTE :    9:56 AM  IMPENDING DETERIORATION -Respiratory and Cardiovascular  ASSOCIATED RISK FACTORS - Hypoxia and Dysrhythmia  MANAGEMENT- Bedside Assessment and Supervision of Care  INTERPRETATION -  Xrays, ECG and Blood Pressure  INTERVENTIONS - Oxygen, diuresis  CASE REVIEW - Hospitalist, Medical Sub-Specialist, Nursing and Family  TREATMENT RESPONSE -Unchanged   PERFORMED BY - Self    NOTES:   I have spent 45 minutes of critical care time involved in lab review, consultations with specialist, family decision- making, bedside attention and documentation. During this entire length of time I was immediately available to the patient. Storm Becker MD     Progress Note:  10:27 AM  Updated and counseled pt and pt's wife on results, plan and need for cardiology consult. Recommended hospitalization. Patient expresses understanding and agrees with plan. Addressed questions. Written by Peggy Daley ED Scribe, as dictated by Strom Becker MD.     Consult Note:  10:33 Kwan Juárez MD spoke with Dr. Anastacio Harley,  Specialty: hospitalist  Discussed pt's hx, disposition, and available diagnostic and imaging results. Reviewed care plans. Consultant agrees with plans as outlined. Dr. Irma Julian accepts the patient for hospitalization. Written by OMERO Ortizibe, as dictated by Storm Becker MD.     Consult Note:  10:48 Kwan Juárez MD spoke with Jo-Ann Padilla NP,  Specialty: cardiology   Discussed pt's hx, disposition, and available diagnostic and imaging results. Reviewed care plans. Consultant agrees with plans as outlined. Puma Boykin states the cardiology group will evaluate the patient.    Written by Peggy Daley ED Scribe, as dictated by Storm Becker MD.        LABORATORY TESTS:  Recent Results (from the past 12 hour(s))   EKG, 12 LEAD, INITIAL    Collection Time: 06/22/17  9:38 AM   Result Value Ref Range    Ventricular Rate 114 BPM    Atrial Rate 144 BPM    QRS Duration 102 ms    Q-T Interval 316 ms    QTC Calculation (Bezet) 435 ms    Calculated R Axis 92 degrees    Calculated T Axis -149 degrees    Diagnosis       Atrial fibrillation with rapid ventricular response  Rightward axis  Low voltage QRS  Incomplete right bundle branch block  Septal infarct , age undetermined  ST & T wave abnormality, consider inferior ischemia  ST & T wave abnormality, consider anterolateral ischemia  When compared with ECG of 25-MAY-2017 10:44,  Significant changes have occurred     CBC WITH AUTOMATED DIFF    Collection Time: 06/22/17  9:47 AM   Result Value Ref Range    WBC 8.2 4.1 - 11.1 K/uL    RBC 5.16 4.10 - 5.70 M/uL    HGB 12.9 12.1 - 17.0 g/dL    HCT 44.0 36.6 - 50.3 %    MCV 85.3 80.0 - 99.0 FL    MCH 25.0 (L) 26.0 - 34.0 PG    MCHC 29.3 (L) 30.0 - 36.5 g/dL    RDW 19.5 (H) 11.5 - 14.5 %    PLATELET 595 836 - 718 K/uL    NEUTROPHILS 76 (H) 32 - 75 %    LYMPHOCYTES 11 (L) 12 - 49 %    MONOCYTES 11 5 - 13 %    EOSINOPHILS 1 0 - 7 %    BASOPHILS 1 0 - 1 %    ABS. NEUTROPHILS 6.2 1.8 - 8.0 K/UL    ABS. LYMPHOCYTES 0.9 0.8 - 3.5 K/UL    ABS. MONOCYTES 0.9 0.0 - 1.0 K/UL    ABS. EOSINOPHILS 0.1 0.0 - 0.4 K/UL    ABS. BASOPHILS 0.1 0.0 - 0.1 K/UL    RBC COMMENTS TEARDROP CELLS  PRESENT        RBC COMMENTS OVALOCYTES  1+        RBC COMMENTS POLYCHROMASIA  PRESENT        RBC COMMENTS POIKILOCYTOSIS  1+        RBC COMMENTS ANISOCYTOSIS  1+       METABOLIC PANEL, COMPREHENSIVE    Collection Time: 06/22/17  9:47 AM   Result Value Ref Range    Sodium 138 136 - 145 mmol/L    Potassium 5.0 3.5 - 5.1 mmol/L    Chloride 100 97 - 108 mmol/L    CO2 31 21 - 32 mmol/L    Anion gap 7 5 - 15 mmol/L    Glucose 175 (H) 65 - 100 mg/dL    BUN 96 (H) 6 - 20 MG/DL    Creatinine 4.03 (H) 0.70 - 1.30 MG/DL    BUN/Creatinine ratio 24 (H) 12 - 20      GFR est AA 18 (L) >60 ml/min/1.73m2    GFR est non-AA 15 (L) >60 ml/min/1.73m2    Calcium 8.5 8.5 - 10.1 MG/DL    Bilirubin, total 0.5 0.2 - 1.0 MG/DL    ALT (SGPT) 17 12 - 78 U/L    AST (SGOT) 29 15 - 37 U/L    Alk.  phosphatase 47 45 - 117 U/L    Protein, total 6.9 6.4 - 8.2 g/dL Albumin 3.0 (L) 3.5 - 5.0 g/dL    Globulin 3.9 2.0 - 4.0 g/dL    A-G Ratio 0.8 (L) 1.1 - 2.2     CK W/ REFLX CKMB    Collection Time: 06/22/17  9:47 AM   Result Value Ref Range    CK 71 39 - 308 U/L   TROPONIN I    Collection Time: 06/22/17  9:47 AM   Result Value Ref Range    Troponin-I, Qt. 0.12 (H) <0.05 ng/mL   PRO-BNP    Collection Time: 06/22/17  9:47 AM   Result Value Ref Range    NT pro-BNP 30190 (H) 0 - 125 PG/ML   MAGNESIUM    Collection Time: 06/22/17  9:47 AM   Result Value Ref Range    Magnesium 2.5 (H) 1.6 - 2.4 mg/dL       IMAGING RESULTS:  XR CHEST PORT   Final Result   Indication: Shortness of breath     Comparison: 3/13/2017     Portable exam of the chest obtained at 948 demonstrates stable cardiomegaly. The  patient is status post median sternotomy. There are small bilateral pleural  effusions, right greater than left, with underlying atelectasis. The osseous  structures are unremarkable.     IMPRESSION  Impression: Small bilateral effusions with underlying atelectasis. MEDICATIONS GIVEN:  Medications   sodium chloride (NS) flush 5-10 mL (not administered)   sodium chloride (NS) flush 5-10 mL (not administered)   ondansetron (ZOFRAN) injection 4 mg (not administered)   bisacodyl (DULCOLAX) suppository 10 mg (not administered)   bumetanide (BUMEX) injection 2 mg (2 mg IntraVENous Given 6/22/17 1057)   insulin lispro (HUMALOG) injection (not administered)   glucose chewable tablet 16 g (not administered)   glucagon (GLUCAGEN) injection 1 mg (not administered)   dextrose 10% infusion 125-250 mL (not administered)   insulin lispro protamine/insulin lispro (HUMALOG MIX 75/25) injection 25 Units (not administered)       IMPRESSION:  1. Acute respiratory failure with hypoxia ([address]    2. Acute diastolic heart failure ([address]    3. Atrial fibrillation with RVR ([address]    4. INO (acute kidney injury) ([address]        PLAN:  1. Admit to Dr. Abdifatah Rojas.     Admit Note:  10:33 AM  Pt is being admitted by Dr. Umu Jacobs Gerald Naik, hospitalist. The results of their tests and reason(s) for their admission have been discussed with pt and/or available family. They convey agreement and understanding for the need to be admitted and for admission diagnosis. This note is prepared by Mesfin Barlow, acting as a Scribe for Edel Newman MD.    Edel Newman MD: The scribe's documentation has been prepared under my direction and personally reviewed by me in its entirety. I confirm that the notes above accurately reflects all work, treatment, procedures, and medical decision making performed by me.

## 2017-06-22 NOTE — PROGRESS NOTES
PCU SHIFT NURSING NOTE      1910: Bedside shift change report given to Antonina Teixeira RN (oncoming nurse) by Savanah Carbone RN (offgoing nurse). Report included the following information SBAR, Kardex, ED Summary, Intake/Output, MAR, Recent Results and Cardiac Rhythm AFIB. Shift Summary:   0102: TRANSFER - IN REPORT:    [facility] [address] [facility] (name) on [place]   being received from ED (unit) for routine progression of care      Report consisted of patients Situation, Background, Assessment and   Recommendations(SBAR). Information from the following report(s) SBAR, Kardex, ED Summary, Intake/Output, MAR, Recent Results and Cardiac Rhythm AFIB was reviewed with the receiving nurse. Opportunity for questions and clarification was provided. Assessment completed upon patients arrival to unit and care assumed. Primary Nurse Kevin Resendiz RN and Rosalva Corado RN performed a dual skin assessment on this patient Impairment noted- see wound doc flow sheet  Romeo score is 17    Assessment performed. Patient is resting comfortably in bed and has no c/o of pain. Visit Vitals    /86 (BP 1 Location: Left arm, BP Patient Position: At rest)    Pulse 97    Temp 97.8 °F (36.6 °C)    Resp 22    Wt 127.2 kg (280 lb 6.8 oz)    SpO2 100%    BMI 41.41 kg/m2       1445: Patient off floor for echo. 1530: Patient arrived back to unit.        Admission Date 6/22/2017   Admission Diagnosis Diastolic heart failure ([facility] [address]   Consults IP CONSULT TO CARDIOLOGY  IP CONSULT TO PALLIATIVE CARE - PROVIDER        Consults   []PT   []OT   []Speech   []Case Management      [] Palliative      Cardiac Monitoring Order   [x]Yes   []No     IV drips   []Yes    Drip:                            Dose:  Drip:                            Dose:  Drip:                            Dose:   [x]No     GI Prophylaxis   [x]Yes   []No         DVT Prophylaxis   SCDs:             Deangelo stockings:         [x] Medication []Contraindicated   []None      Activity Level           Purposeful Rounding every 1-2 hour? [x]Yes   Ackerman Score  Total Score: 3   Bed Alarm (If score 3 or >)   []Yes   [] Refused (See signed refusal form in chart)   Romeo Score      Romeo Score (if score 14 or less)   []PMT consult   [x]Wound Care consult      []Specialty bed   [] Nutrition consult          Needs prior to discharge:   Home O2 required:    []Yes   [x]No    If yes, how much O2 required?     Other:    Last Bowel Movement:        Influenza Vaccine          Pneumonia Vaccine           Diet Active Orders   Diet    DIET DIABETIC CONSISTENT CARB Regular; 2 GM NA (House Low NA); FR 1500ML      LDAs               Peripheral IV 06/22/17 Wrist (Active)   Site Assessment Clean, dry, & intact 6/22/2017  9:50 AM   Phlebitis Assessment 0 6/22/2017  9:50 AM   Infiltration Assessment 0 6/22/2017  9:50 AM   Dressing Status Clean, dry, & intact 6/22/2017  9:50 AM   Dressing Type Transparent 6/22/2017  9:50 AM   Hub Color/Line Status Pink;Flushed 6/22/2017  9:50 AM   Action Taken Blood drawn 6/22/2017  9:50 AM                      Urinary Catheter      Intake & Output        Readmission Risk Assessment Tool Score High Risk            21       Total Score        3 Relationship with PCP    2 Patient Living Status    4 More than 1 Admission in calendar year    5 Patient Insurance is Medicare, Medicaid or Self Pay    7 Charlson Comorbidity Score        Criteria that do not apply:    Patient Length of Stay > 5       Expected Length of Stay - - -   Actual Length of Stay 0

## 2017-06-22 NOTE — DISCHARGE INSTRUCTIONS
Daily weights:  Notify Dr. Weston Shannon for a weight gain of 3 pounds or greater in one day or 5 pounds in one week.   Low Sodium Diet as per CHF Education

## 2017-06-23 PROBLEM — G47.33 OSA (OBSTRUCTIVE SLEEP APNEA): Status: ACTIVE | Noted: 2017-06-23

## 2017-06-23 LAB
ANION GAP BLD CALC-SCNC: 6 MMOL/L (ref 5–15)
APPEARANCE UR: CLEAR
BACTERIA URNS QL MICRO: NEGATIVE /HPF
BASOPHILS # BLD AUTO: 0 K/UL (ref 0–0.1)
BASOPHILS # BLD: 0 % (ref 0–1)
BILIRUB UR QL: NEGATIVE
BUN SERPL-MCNC: 98 MG/DL (ref 6–20)
BUN/CREAT SERPL: 27 (ref 12–20)
CALCIUM SERPL-MCNC: 9 MG/DL (ref 8.5–10.1)
CHLORIDE SERPL-SCNC: 101 MMOL/L (ref 97–108)
CO2 SERPL-SCNC: 34 MMOL/L (ref 21–32)
COLOR UR: ABNORMAL
CREAT SERPL-MCNC: 3.6 MG/DL (ref 0.7–1.3)
DIFFERENTIAL METHOD BLD: ABNORMAL
EOSINOPHIL # BLD: 0.3 K/UL (ref 0–0.4)
EOSINOPHIL NFR BLD: 4 % (ref 0–7)
EPITH CASTS URNS QL MICRO: ABNORMAL /LPF
ERYTHROCYTE [DISTWIDTH] IN BLOOD BY AUTOMATED COUNT: 19.1 % (ref 11.5–14.5)
GLUCOSE BLD STRIP.AUTO-MCNC: 107 MG/DL (ref 65–100)
GLUCOSE BLD STRIP.AUTO-MCNC: 111 MG/DL (ref 65–100)
GLUCOSE BLD STRIP.AUTO-MCNC: 188 MG/DL (ref 65–100)
GLUCOSE BLD STRIP.AUTO-MCNC: 97 MG/DL (ref 65–100)
GLUCOSE SERPL-MCNC: 122 MG/DL (ref 65–100)
GLUCOSE UR STRIP.AUTO-MCNC: NEGATIVE MG/DL
HCT VFR BLD AUTO: 41.6 % (ref 36.6–50.3)
HGB BLD-MCNC: 12.2 G/DL (ref 12.1–17)
HGB UR QL STRIP: NEGATIVE
HYALINE CASTS URNS QL MICRO: ABNORMAL /LPF (ref 0–5)
KETONES UR QL STRIP.AUTO: NEGATIVE MG/DL
LEUKOCYTE ESTERASE UR QL STRIP.AUTO: NEGATIVE
LYMPHOCYTES # BLD AUTO: 21 % (ref 12–49)
LYMPHOCYTES # BLD: 1.4 K/UL (ref 0.8–3.5)
MAGNESIUM SERPL-MCNC: 2.7 MG/DL (ref 1.6–2.4)
MCH RBC QN AUTO: 25.2 PG (ref 26–34)
MCHC RBC AUTO-ENTMCNC: 29.3 G/DL (ref 30–36.5)
MCV RBC AUTO: 85.8 FL (ref 80–99)
MONOCYTES # BLD: 0.8 K/UL (ref 0–1)
MONOCYTES NFR BLD AUTO: 12 % (ref 5–13)
NEUTS SEG # BLD: 4.4 K/UL (ref 1.8–8)
NEUTS SEG NFR BLD AUTO: 63 % (ref 32–75)
NITRITE UR QL STRIP.AUTO: NEGATIVE
PH UR STRIP: 5.5 [PH] (ref 5–8)
PLATELET # BLD AUTO: 137 K/UL (ref 150–400)
POTASSIUM SERPL-SCNC: 4.2 MMOL/L (ref 3.5–5.1)
PROT UR STRIP-MCNC: ABNORMAL MG/DL
RBC # BLD AUTO: 4.85 M/UL (ref 4.1–5.7)
RBC #/AREA URNS HPF: ABNORMAL /HPF (ref 0–5)
RBC MORPH BLD: ABNORMAL
SERVICE CMNT-IMP: ABNORMAL
SERVICE CMNT-IMP: NORMAL
SODIUM SERPL-SCNC: 141 MMOL/L (ref 136–145)
SP GR UR REFRACTOMETRY: 1.01 (ref 1–1.03)
TROPONIN I SERPL-MCNC: 0.42 NG/ML
UROBILINOGEN UR QL STRIP.AUTO: 0.2 EU/DL (ref 0.2–1)
WBC # BLD AUTO: 6.9 K/UL (ref 4.1–11.1)
WBC URNS QL MICRO: ABNORMAL /HPF (ref 0–4)

## 2017-06-23 PROCEDURE — 02HK3JZ INSERTION OF PACEMAKER LEAD INTO RIGHT VENTRICLE, PERCUTANEOUS APPROACH: ICD-10-PCS | Performed by: INTERNAL MEDICINE

## 2017-06-23 PROCEDURE — 74011636637 HC RX REV CODE- 636/637: Performed by: INTERNAL MEDICINE

## 2017-06-23 PROCEDURE — 0JH607Z INSERTION OF CARDIAC RESYNCHRONIZATION PACEMAKER PULSE GENERATOR INTO CHEST SUBCUTANEOUS TISSUE AND FASCIA, OPEN APPROACH: ICD-10-PCS | Performed by: INTERNAL MEDICINE

## 2017-06-23 PROCEDURE — 02583ZZ DESTRUCTION OF CONDUCTION MECHANISM, PERCUTANEOUS APPROACH: ICD-10-PCS | Performed by: INTERNAL MEDICINE

## 2017-06-23 PROCEDURE — 85025 COMPLETE CBC W/AUTO DIFF WBC: CPT | Performed by: INTERNAL MEDICINE

## 2017-06-23 PROCEDURE — 74011250637 HC RX REV CODE- 250/637: Performed by: NURSE PRACTITIONER

## 2017-06-23 PROCEDURE — 36415 COLL VENOUS BLD VENIPUNCTURE: CPT | Performed by: INTERNAL MEDICINE

## 2017-06-23 PROCEDURE — 83735 ASSAY OF MAGNESIUM: CPT | Performed by: INTERNAL MEDICINE

## 2017-06-23 PROCEDURE — 74011250636 HC RX REV CODE- 250/636: Performed by: INTERNAL MEDICINE

## 2017-06-23 PROCEDURE — 97161 PT EVAL LOW COMPLEX 20 MIN: CPT

## 2017-06-23 PROCEDURE — 65660000000 HC RM CCU STEPDOWN

## 2017-06-23 PROCEDURE — 97116 GAIT TRAINING THERAPY: CPT

## 2017-06-23 PROCEDURE — 77010033678 HC OXYGEN DAILY

## 2017-06-23 PROCEDURE — 82962 GLUCOSE BLOOD TEST: CPT

## 2017-06-23 PROCEDURE — 74011000250 HC RX REV CODE- 250: Performed by: INTERNAL MEDICINE

## 2017-06-23 PROCEDURE — 81001 URINALYSIS AUTO W/SCOPE: CPT | Performed by: INTERNAL MEDICINE

## 2017-06-23 PROCEDURE — 80048 BASIC METABOLIC PNL TOTAL CA: CPT | Performed by: INTERNAL MEDICINE

## 2017-06-23 PROCEDURE — 74011250637 HC RX REV CODE- 250/637: Performed by: INTERNAL MEDICINE

## 2017-06-23 PROCEDURE — 02K83ZZ MAP CONDUCTION MECHANISM, PERCUTANEOUS APPROACH: ICD-10-PCS | Performed by: INTERNAL MEDICINE

## 2017-06-23 PROCEDURE — 84484 ASSAY OF TROPONIN QUANT: CPT | Performed by: INTERNAL MEDICINE

## 2017-06-23 PROCEDURE — 93922 UPR/L XTREMITY ART 2 LEVELS: CPT

## 2017-06-23 RX ORDER — METOPROLOL SUCCINATE 50 MG/1
100 TABLET, EXTENDED RELEASE ORAL 2 TIMES DAILY
Status: DISCONTINUED | OUTPATIENT
Start: 2017-06-23 | End: 2017-06-26

## 2017-06-23 RX ADMIN — APIXABAN 5 MG: 5 TABLET, FILM COATED ORAL at 20:18

## 2017-06-23 RX ADMIN — BUMETANIDE 2 MG: 0.25 INJECTION INTRAMUSCULAR; INTRAVENOUS at 09:02

## 2017-06-23 RX ADMIN — KETOCONAZOLE: 20 CREAM TOPICAL at 09:05

## 2017-06-23 RX ADMIN — DUTASTERIDE 0.5 MG: 0.5 CAPSULE, LIQUID FILLED ORAL at 09:01

## 2017-06-23 RX ADMIN — APIXABAN 5 MG: 5 TABLET, FILM COATED ORAL at 09:02

## 2017-06-23 RX ADMIN — METOPROLOL SUCCINATE 100 MG: 50 TABLET, EXTENDED RELEASE ORAL at 17:40

## 2017-06-23 RX ADMIN — VITAMIN D, TAB 1000IU (100/BT) 1000 UNITS: 25 TAB at 09:01

## 2017-06-23 RX ADMIN — TAMSULOSIN HYDROCHLORIDE 0.4 MG: 0.4 CAPSULE ORAL at 09:02

## 2017-06-23 RX ADMIN — CHLOROTHIAZIDE SODIUM 125 MG: 500 INJECTION, POWDER, LYOPHILIZED, FOR SOLUTION INTRAVENOUS at 12:36

## 2017-06-23 RX ADMIN — Medication 10 ML: at 03:20

## 2017-06-23 RX ADMIN — INSULIN LISPRO 2 UNITS: 100 INJECTION, SOLUTION INTRAVENOUS; SUBCUTANEOUS at 12:37

## 2017-06-23 RX ADMIN — INSULIN LISPRO 25 UNITS: 100 INJECTION, SOLUTION INTRAVENOUS; SUBCUTANEOUS at 17:40

## 2017-06-23 RX ADMIN — INSULIN LISPRO 25 UNITS: 100 INJECTION, SUSPENSION SUBCUTANEOUS at 09:02

## 2017-06-23 RX ADMIN — PRAVASTATIN SODIUM 80 MG: 40 TABLET ORAL at 21:08

## 2017-06-23 RX ADMIN — BUMETANIDE 2 MG: 0.25 INJECTION INTRAMUSCULAR; INTRAVENOUS at 20:18

## 2017-06-23 RX ADMIN — LEVOTHYROXINE SODIUM 137 MCG: 112 TABLET ORAL at 09:01

## 2017-06-23 RX ADMIN — Medication 10 ML: at 21:08

## 2017-06-23 RX ADMIN — Medication 10 ML: at 20:18

## 2017-06-23 RX ADMIN — METOPROLOL SUCCINATE 75 MG: 50 TABLET, EXTENDED RELEASE ORAL at 09:01

## 2017-06-23 NOTE — PROGRESS NOTES
Cm met with patient for initial consult. Patient admitted on 401 for diastolic heart failure. Pt confirmed name and  as pt identifiers. Patient is sitting up in chair, nurse at chairside. Cm received permission to continue. Wife in room. Patient lives with his wife in a single story home, 1/2 step for entry. Patient does not drive, otherwise he reports being independent with ADL'S, IADL's. DME - rollator \"I have a cane but don't use it\"    Preferred pharmacy is [address]. [facility] - [address] in the past.  SNF - no previous experience. PT order. If HH is recommended patient would like referral submitted to [address]. CM will continue to follow for discharge needs. Care Management Interventions  PCP Verified by CM: Yes  Mode of Transport at Discharge:  Other (see comment) (wife)  Transition of Care Consult (CM Consult): Discharge Planning  MyChart Signup: Yes  Current Support Network: Lives with Spouse  Confirm Follow Up Transport: Family (wife)  Discharge Location  Discharge Placement: Home with home health     Luke Drake RN CM  Ext [phone]

## 2017-06-23 NOTE — PROGRESS NOTES
Visited by Ly Zamora Partner on 6/23/17.     Frida Green, Lead [facility] Paging Service  [phone])

## 2017-06-23 NOTE — PROGRESS NOTES
Problem: Mobility Impaired (Adult and Pediatric)  Goal: *Acute Goals and Plan of Care (Insert Text)  Physical Therapy Goals  Initiated 6/23/2017  1. Patient will move from supine to sit and sit to supine in bed with independence within 7 day(s). 2. Patient will transfer from bed to chair and chair to bed with modified independence using the least restrictive device within 7 day(s). 3. Patient will perform sit to stand with modified independence within 7 day(s). 4. Patient will ambulate with modified independence for 120 feet with the least restrictive device within 7 day(s). PHYSICAL THERAPY EVALUATION  Patient: Bashir Galindo (25 y.o. male)  Date: 6/23/2017  Primary Diagnosis: Diastolic heart failure [facility])        Precautions:   Fall      ASSESSMENT :  Based on the objective data described below, the patient presents with decreased tolerance to activity and impaired gait and balance after admission for volume overload due to acute on chronic CHF. Patient reported increased edema in bilateral LEs impacting ambulation and balance. Patient tolerated evaluation fairly well. Patient completed sit<>stand with rollator with standby assist and increased effort. Patient ambulated with rollator x 80 feet with standby assist. Patient demonstrated decreased ryan, wide SOBEIDA and noted increased work of breathing after ambulation. Provided verbal cues for pursed lip breathing techniques. Patient remained seated in chair and bilateral LEs elevated. Patient encouraged to remain OOB and ambulate with RN staff over the weekend and will follow up Monday if remains in house. D/c Rec: home with HHPT. .     Patient will benefit from skilled intervention to address the above impairments.   Patients rehabilitation potential is considered to be Good  Factors which may influence rehabilitation potential include:   [ ]         None noted  [ ]         Mental ability/status  [X]         Medical condition  [ ]         Home/family situation and support systems  [ ]         Safety awareness  [ ]         Pain tolerance/management  [ ]         Other:        PLAN :  Recommendations and Planned Interventions:  [X]           Bed Mobility Training             [X]    Neuromuscular Re-Education  [X]           Transfer Training                   [ ]    Orthotic/Prosthetic Training  [X]           Gait Training                         [ ]    Modalities  [X]           Therapeutic Exercises           [ ]    Edema Management/Control  [X]           Therapeutic Activities            [X]    Patient and Family Training/Education  [ ]           Other (comment):     Frequency/Duration: Patient will be followed by physical therapy  4 times a week to address goals. Discharge Recommendations: Home Health  Further Equipment Recommendations for Discharge: pt owns rollator       SUBJECTIVE:   Patient stated My butt hurts in these chairs.       OBJECTIVE DATA SUMMARY:   HISTORY:    Past Medical History:   Diagnosis Date    Arrhythmia       atrial fibrillation 2017    CAD (coronary atherosclerotic disease)      Diabetes ([facility]      Diabetes mellitus type 2, controlled ([facility] [facility] 3/13/2017    Heart failure ([facility] [address] [facility]      Hx of CABG 3/13/2017     CABG in 2010 in [address] Morbid obesity ([facility] [address] 3/13/2017    JACEY (obstructive sleep apnea) 6/23/2017    S/P CABG x 2 2010     Past Surgical History:   Procedure Laterality Date    CARDIAC SURG PROCEDURE UNLIST         CABGx2 in 2010    HX ORTHOPAEDIC         L 3rd toe amputation in 1999     Prior Level of Function/Home Situation: mod I with use of rollator within the home and in the community; lives with supportive wife  Personal factors and/or comorbidities impacting plan of care:      Home Situation  Home Environment: Private residence  One/Two Story Residence: One story  Living Alone: No  Support Systems: Spouse/Significant Other/Partner  Patient Expects to be Discharged to[de-identified] Private residence  Current DME Used/Available at Home: Jacob Alicia, rollator, Shower chair, Grab bars  Tub or Shower Type: Shower     EXAMINATION/PRESENTATION/DECISION MAKING:   Critical Behavior:  Neurologic State: Agitated           Hearing: Auditory  Auditory Impairment: None  Skin:  Dressings to bilateral LEs intact  Edema: noted increased edema in bilateral LEs  Range Of Motion:  AROM: Generally decreased, functional                       Strength:    Strength: Generally decreased, functional                    Tone & Sensation:                  Sensation: Intact               Coordination:     Vision:      Functional Mobility:  Bed Mobility:              Transfers:  Sit to Stand: Stand-by asssistance  Stand to Sit: Stand-by asssistance                       Balance:   Sitting: Intact  Standing: Intact; With support  Ambulation/Gait Training:  Distance (ft): 80 Feet (ft)  Assistive Device: Gait belt;Walker, rollator  Ambulation - Level of Assistance: Contact guard assistance        Gait Abnormalities: Decreased step clearance;Trunk sway increased        Base of Support: Widened     Speed/Ariane: Shuffled;Pace decreased (<100 feet/min)  Step Length: Right shortened;Left shortened                                           Stairs:                           Therapeutic Exercises:         Functional Measure:  Tinetti test:      Sitting Balance: 1  Arises: 1  Attempts to Rise: 2  Immediate Standing Balance: 1  Standing Balance: 1  Nudged: 1  Eyes Closed: 1  Turn 360 Degrees - Continuous/Discontinuous: 1  Turn 360 Degrees - Steady/Unsteady: 1  Sitting Down: 1  Balance Score: 11  Indication of Gait: 1  R Step Length/Height: 1  L Step Length/Height: 1  R Foot Clearance: 1  L Foot Clearance: 1  Step Symmetry: 1  Step Continuity: 1  Path: 1  Trunk: 0  Walking Time: 1  Gait Score: 9  Total Score: 20         Tinetti Test and G-code impairment scale:  Percentage of Impairment CH     0%    CI     1-19% CJ     20-39% CK     40-59% CL     60-79% CM     80-99% CN      100% Tinetti  Score 0-28 28 23-27 17-22 12-16 6-11 1-5 0          Tinetti Tool Score Risk of Falls  <19 = High Fall Risk  19-24 = Moderate Fall Risk  25-28 = Low Fall Risk  Princeetti ME. Performance-Oriented Assessment of Mobility Problems in Elderly Patients. [facility]; [id number]. (Scoring Description: PT Bulletin Feb. 10, 1993)     Older adults: Jose Guillaume et al, 2009; n = 1000 [facility] elderly evaluated with ABC, HARDY, ADL, and IADL)  · Mean HARDY score for males aged 69-68 years = 26.21(3.40)  · Mean HARDY score for females age 69-68 years = 25.16(4.30)  · Mean HARDY score for males over 80 years = 23.29(6.02)  · Mean HARDY score for females over 80 years = 17.20(8.32)            G codes: In compliance with CMSs Claims Based Outcome Reporting, the following G-code set was chosen for this patient based on their primary functional limitation being treated: The outcome measure chosen to determine the severity of the functional limitation was the Tinetti with a score of 20/28 which was correlated with the impairment scale. · Mobility - Walking and Moving Around:               - CURRENT STATUS:    CJ - 20%-39% impaired, limited or restricted               - GOAL STATUS:           CI - 1%-19% impaired, limited or restricted               - D/C STATUS:                       ---------------To be determined---------------       Based on the above components, the patient evaluation is determined to be of the following complexity level: LOW      Pain:  Pain Scale 1: Numeric (0 - 10)  Pain Intensity 1: 0              Activity Tolerance:   Good. VSS  Please refer to the flowsheet for vital signs taken during this treatment.   After treatment:   [X]         Patient left in no apparent distress sitting up in chair  [ ]         Patient left in no apparent distress in bed  [X]         Call bell left within reach  [X]         Nursing notified  [X]         Caregiver present  [ ]         Bed alarm activated COMMUNICATION/EDUCATION:   The patients plan of care was discussed with: Registered Nurse.  [X]         Fall prevention education was provided and the patient/caregiver indicated understanding. [X]         Patient/family have participated as able in goal setting and plan of care. [X]         Patient/family agree to work toward stated goals and plan of care. [ ]         Patient understands intent and goals of therapy, but is neutral about his/her participation. [ ]         Patient is unable to participate in goal setting and plan of care.      Thank you for this referral.  Meera Alfaro, PT , DPT   Time Calculation: 19 mins

## 2017-06-23 NOTE — PROGRESS NOTES
Failed attempt to  Meet with patient. Patient is presently not available.       aMlia Reeves, RN CM  Ext [phone]

## 2017-06-23 NOTE — CONSULTS
Palliative Medicine Consult  Ef[place]: [phone] ([phone])    Patient Name: Beck Leigh  YOB: 1950    Date of Initial Consult: 6/22/2017  Reason for Consult: CHF bundle  Requesting Provider: Dr. Logan Aguilar  Primary Care Physician: Kris Schaeffer MD      SUMMARY:   Beck Leigh is a 75 y.o. with a past history of DM, CKD, afib., BLE and ulcers, DM, morbid obesity, CABG X2, [facility] and [facility] 3/17, who was admitted on 6/22/2017 from home with a diagnosis of generalized weakness, fatigue, SOB and BLE and  ulcers. Current medical issues leading to Palliative Medicine involvement include: SOB, BLE and ulcers, constipation, CHF, weakness, fatigue and weakness. Psychosocial: He is  to [employer]. He has no children. They live in [place] [place]. PALLIATIVE DIAGNOSES:   1. BLE Edema  2. Bilateral lower leg ulcers from edema  3. SOB  4. Weakness  5. Debility- uses rollator       PLAN:   1. Met and introduced myself and palliative medicine to his wife at the bedside. Patient remembers talking to me. They are not wanting to make any changes at this point. I gave them a blank AMD to look over. Asked them to call me if they would like to complete or need my assistance. 2. He tells me he has not had a bowel movement in 4 days. Ordered senna with colace. 3. Plan- to meet with wife, offer AMD, talk about code status, possible PC clinic  4. Initial consult note routed to primary continuity provider  5.  Communicated plan of care with: Palliative IDT       GOALS OF CARE / TREATMENT PREFERENCES:   [====Goals of Care====]  GOALS OF CARE:  Patient / health care proxy stated goals: \"to get back home\"      TREATMENT PREFERENCES:   Code Status: Full Code    Advance Care Planning:  Advance Care Planning 6/22/2017   Patient's Healthcare Decision Maker is: Legal Next of Valentine Bui   Primary Decision Maker Name [employer]   Primary Decision Maker Phone Number [phone]   Confirm Advance Directive None Other:    The palliative care team has discussed with patient / health care proxy about goals of care / treatment preferences for patient.  [====Goals of Care====]         HISTORY:     History obtained from: chart    CHIEF COMPLAINT: bilateral leg edema and soreness    HPI/SUBJECTIVE:    The patient is:   [x] Verbal and participatory  [] Non-participatory due to:   Mr. Milan Murphy is a 75year old obese male with CKD, CHF, DM and is admitted for SOB and bilateral lower leg edema    Clinical Pain Assessment (nonverbal scale for severity on nonverbal patients):   [++++ Clinical Pain Assessment++++]  [++++Pain Severity++++]: Pain: 3 (bilateral lower legs)  [++++Pain Character++++]: achy  [++++Pain Duration++++]: for weeks  [++++Pain Effect++++]: ability to walk  [++++Pain Factors++++]: CHF, DM  [++++Pain Frequency++++]: constant  [++++Pain Location++++]: bilateral lower legs  [++++ Clinical Pain Assessment++++]     FUNCTIONAL ASSESSMENT:     Palliative Performance Scale (PPS):  PPS: 50       PSYCHOSOCIAL/SPIRITUAL SCREENING:     Advance Care Planning:  Advance Care Planning 6/22/2017   Patient's Healthcare Decision Maker is: Legal Next [address]   Primary Decision Maker Name Starr Ramirez   Primary Decision Maker Phone Number [phone]   Confirm Advance Directive None        Any spiritual / Methodist concerns:  [] Yes /  [x] No    Caregiver Burnout:  [] Yes /  [] No /  [x] No Caregiver Present      Anticipatory grief assessment:   [] Normal  / [] Maladaptive       ESAS Anxiety: Anxiety: 0    ESAS Depression: Depression: 0        REVIEW OF SYSTEMS:     Positive and pertinent negative findings in ROS are noted above in HPI. The following systems were [x] reviewed / [] unable to be reviewed as noted in HPI  Other findings are noted below. Systems: constitutional, ears/nose/mouth/throat, respiratory, gastrointestinal, genitourinary, musculoskeletal, integumentary, neurologic, psychiatric, endocrine.  Positive findings noted below. Modified ESAS Completed by: provider   Fatigue: 3 Drowsiness: 3   Depression: 0 Pain: 3 (bilateral lower legs)   Anxiety: 0 Nausea: 0   Anorexia: 0 Dyspnea: 5     Constipation: Yes     Stool Occurrence(s): 1        PHYSICAL EXAM:     From RN flowsheet:  Wt Readings from Last 3 Encounters:   06/23/17 278 lb 14.1 oz (126.5 kg)   06/19/17 270 lb (122.5 kg)   06/14/17 269 lb (122 kg)     Blood pressure 117/71, pulse (!) 107, temperature 97.6 °F (36.4 °C), resp. rate 18, weight 278 lb 14.1 oz (126.5 kg), SpO2 90 %.     Pain Scale 1: Numeric (0 - 10)  Pain Intensity 1: 0                 Last bowel movement, if known: 3 days ago    Constitutional: Obese white male in NAD  Eyes: pupils equal, anicteric  ENMT: no nasal discharge, moist mucous membranes  Cardiovascular: regular rhythm, distal pulses intact  Respiratory: breathing not labored, symmetric  Gastrointestinal: soft non-tender, +bowel sounds, abd distended  Musculoskeletal: no deformity, no tenderness to palpation  Skin: warm, dry, lower legs covered with dressing, several open blisters leaking fluids  Neurologic: following commands, moving all extremities  Psychiatric: full affect, no hallucinations  Other:       HISTORY:     Active Problems:    SOB (shortness of breath) (3/7/2017)      CKD (chronic kidney disease) stage 4, GFR 15-29 ml/min ([facility]) (3/7/2017)      Hx of CABG (3/13/2017)      Overview: CABG in 2010 in [place]      Morbid obesity ([address] (3/13/2017)      Diabetes mellitus type 2, controlled ([address] (3/13/2017)      Fatigue (3/15/2017)      Bilateral leg edema (2/18/8954)      Diastolic heart failure ([address] (6/22/2017)      Past Medical History:   Diagnosis Date    Arrhythmia     atrial fibrillation 2017    CAD (coronary atherosclerotic disease)     Diabetes ([address]     Diabetes mellitus type 2, controlled ([address] 3/13/2017    Heart failure ([address]     Hx of CABG 3/13/2017    CABG in 2010 in [address] [place] [address] Morbid obesity ([address] 3/13/2017    S/P CABG x 2 2010      Past Surgical History:   Procedure Laterality Date    CARDIAC SURG PROCEDURE UNLIST      CABGx2 in 2010    HX ORTHOPAEDIC      L 3rd toe amputation in 1999      Family History   Problem Relation Age of Onset    Heart Attack Father     No Known Problems Mother       History reviewed, no pertinent family history.   Social History   Substance Use Topics    Smoking status: Never Smoker    Smokeless tobacco: Never Used    Alcohol use Yes      Comment: rare     Allergies   Allergen Reactions    Ciprofloxacin Nausea Only    Percocet [Oxycodone-Acetaminophen] Other (comments)     hallucinations      Current Facility-Administered Medications   Medication Dose Route Frequency    chlorothiazide (DIURIL) 125 mg in sterile water (preservative free) 4.5 mL injection  125 mg IntraVENous DAILY    metoprolol succinate (TOPROL-XL) XL tablet 100 mg  100 mg Oral BID    sodium chloride (NS) flush 5-10 mL  5-10 mL IntraVENous Q8H    sodium chloride (NS) flush 5-10 mL  5-10 mL IntraVENous PRN    ondansetron (ZOFRAN) injection 4 mg  4 mg IntraVENous Q4H PRN    bisacodyl (DULCOLAX) suppository 10 mg  10 mg Rectal DAILY PRN    acetaminophen (TYLENOL) tablet 1,000 mg  1,000 mg Oral Q6H PRN    apixaban (ELIQUIS) tablet 5 mg  5 mg Oral BID    calcium carbonate (OS-BERT) tablet 500 mg [elemental]  500 mg Oral EVERY TUES,THUR,SAT,SUN    cholecalciferol (VITAMIN D3) tablet 1,000 Units  1,000 Units Oral DAILY    dutasteride (AVODART) capsule 0.5 mg  0.5 mg Oral DAILY    ketoconazole (NIZORAL) 2 % cream   Topical DAILY    levothyroxine (SYNTHROID) tablet 137 mcg  137 mcg Oral ACB    pravastatin (PRAVACHOL) tablet 80 mg  80 mg Oral QHS    tamsulosin (FLOMAX) capsule 0.4 mg  0.4 mg Oral DAILY    bumetanide (BUMEX) injection 2 mg  2 mg IntraVENous Q12H    insulin lispro (HUMALOG) injection   SubCUTAneous AC&HS    glucose chewable tablet 16 g  4 Tab Oral PRN    glucagon (GLUCAGEN) injection 1 mg  1 mg IntraMUSCular PRN    dextrose 10% infusion 125-250 mL  125-250 mL IntraVENous PRN    insulin lispro protamine/insulin lispro (HUMALOG MIX 75/25) injection 25 Units  25 Units SubCUTAneous ACB&D    senna-docusate (PERICOLACE) 8.6-50 mg per tablet 2 Tab  2 Tab Oral QHS          LAB AND IMAGING FINDINGS:     Lab Results   Component Value Date/Time    WBC 6.9 06/23/2017 01:56 AM    HGB 12.2 06/23/2017 01:56 AM    PLATELET 591 69/75/4580 01:56 AM     Lab Results   Component Value Date/Time    Sodium 141 06/23/2017 01:56 AM    Potassium 4.2 06/23/2017 01:56 AM    Chloride 101 06/23/2017 01:56 AM    CO2 34 06/23/2017 01:56 AM    BUN 98 06/23/2017 01:56 AM    Creatinine 3.60 06/23/2017 01:56 AM    Calcium 9.0 06/23/2017 01:56 AM    Magnesium 2.7 06/23/2017 01:56 AM      Lab Results   Component Value Date/Time    AST (SGOT) 29 06/22/2017 09:47 AM    Alk. phosphatase 47 06/22/2017 09:47 AM    Protein, total 6.9 06/22/2017 09:47 AM    Albumin 3.0 06/22/2017 09:47 AM    Globulin 3.9 06/22/2017 09:47 AM     Lab Results   Component Value Date/Time    INR 2.0 03/13/2017 07:00 PM    Prothrombin time 21.1 03/13/2017 07:00 PM      No results found for: IRON, FE, TIBC, IBCT, PSAT, FERR   No results found for: PH, PCO2, PO2  No components found for: [place]   Lab Results   Component Value Date/Time    CK 99 03/13/2017 12:15 PM    CK - MB 3.6 03/13/2017 12:15 PM                Total time: 25 minutes  Counseling / coordination time, spent as noted above: 10 minutes  > 50% counseling / coordination?: yes    Prolonged service was provided for  []30 min   []75 min in face to face time in the presence of the patient, spent as noted above. Time Start:   Time End:   Note: this can only be billed with 38213 (initial) or 42258 (follow up). If multiple start / stop times, list each separately.

## 2017-06-23 NOTE — PROCEDURES
[facility]  *** FINAL REPORT ***    Name: Rachel Alaniz  MRN: [id number]    Inpatient  : 1950  HIS Order #: [id number]  [address] [facility] Visit #: [id number]  Date: 2017    TYPE OF TEST: Peripheral Arterial Testing    REASON FOR TEST  Extremity ulceration (both sides)    Right Leg  Segmentals: Noncompressible                     mmHg  Brachial         161  High thigh  Low thigh  Calf  Posterior tibial  Dorsalis pedis  Peroneal  Metatarsal  Toe pressure     103  Doppler:    Abnormal  PVR:  Ankle/Brachial:    Site of occlusive disease:-  femoral and popliteal segments    Left Leg  Segmentals: Noncompressible                     mmHg  Brachial         161  High thigh  Low thigh  Calf  Posterior tibial  Dorsalis pedis  Peroneal  Metatarsal  Toe pressure      84  Doppler:    Abnormal  PVR:  Ankle/Brachial:    Site of occlusive disease:-  femoral and popliteal segments    INTERPRETATION/FINDINGS  PROCEDURE:  Ankle, brachial and digital arterial pressures, CW Doppler   waveforms and digital PPG waveforms were performed. 1. Disease is located in the femoral and popliteal segments on the  right side. 2. Abnormality of the right lower extremity continuous wave Doppler  signals noted. 3. Disease is located in the femoral and popliteal segments on the  left side. 4. Abnormality of the left lower extremity continuous wave Doppler  signals noted. 5. Segmental pressures in both legs are not measurable due to  extensive arterial calcification. The ankle/brachial indexes are  therefore unreliable predictors of distal arterial perfusion. 6. The right great toe/brachial index is 0.64 and the left great  toe/brachial index is 0.52    ADDITIONAL COMMENTS    Technically limited do to patient motion and extensive arterial wall  calciofication.   Toe pressure measurements suggest moderate arterial  disease bilaterally    I have personally reviewed the data relevant to the interpretation of  this  study. TECHNOLOGIST: Abbey Cleveland. SADIQ Bray, TRAVONMS  Signed: 06/23/2017 10:57 AM    PHYSICIAN: Dmitry Modi.  Casey Self MD  Signed: 06/23/2017 04:29 PM

## 2017-06-23 NOTE — CONSULTS
[facility] Consult Note        NAME: Ayse Newsome       :  1950       MRN:  [id number]     Date/Time: 2017    Risk of deterioration: medium       Assessment:    Plan:  Volume overload/Anasarca  INO/CKD IV  Hx of CABG/CHF  Obesity  DM Nephropathy   Add iv diuril to current regimen  If no response, change scheduled bumex to bumex gtt  If no response, then UF    Pt initially rude and condescending. Wasn't particularly receptive to me. See below. D/W dr. Edgar Chase   Asked to see for management of volume overload, renal failure. Pt just seen in office this week by  and pt had gained 20 pounds (pt had not notified anyone)-diuretics were adjusted. Then pt developed sob (didn't share this with [facility])--subsequently admitted. I have reviewed old [facility] records- see MTM note from 3/17-bun 148 then/creat 4.8-did not require hd/uf at that time. Subjective:     Chief Complaint:  \"Were you just going to stand out there in the hallway all day? \"  \"they've got my medicines messed up\"  When I told him of my plan he said, \"you just want me to spend all day in the bathroom or what? \"    Review of Systems: (+) worsening edema/legs, abd with skin breakdown  (+) sig 20 pound weight gain since last dc--(+) sob/guerra, decreased ability to ambulate-(-) f/c/dysuria    Objective:     VITALS:   Last 24hrs VS reviewed since prior progress note.  Most recent are:  Visit Vitals    /67    Pulse (!) 106    Temp 97.3 °F (36.3 °C)    Resp 20    Wt 126.5 kg (278 lb 14.1 oz)    SpO2 95%    BMI 41.18 kg/m2     SpO2 Readings from Last 6 Encounters:   17 95%   17 93%   17 96%   17 93%   17 100%   17 94%    O2 Flow Rate (L/min): 1 l/min     Intake/Output Summary (Last 24 hours) at 17 1130  Last data filed at 17 1001   Gross per 24 hour   Intake              490 ml   Output             1400 ml   Net             -910 ml         Telemetry Reviewed      PHYSICAL EXAM:    General   WDWN obese WM in NARD  Respiratory   Clear anteriorly, decreased bs in bases, poor pt effort  Cardiology  RRR  Abdominal  Obese, can't assess organomegaly  Extremities  (+) tight edema with bullae and venous stasis changes (chronic)  Psychological  Affect--irritated              Lab Data Reviewed: (see below)    Medications Reviewed: (see below)    PMH/SH reviewed - no change compared to H&P  ________________________________________  ___________________________________________________    Attending Physician: Evita Lewis MD     ____________________________________________________  MEDICATIONS:  Current Facility-Administered Medications   Medication Dose Route Frequency    chlorothiazide (DIURIL) 125 mg in sterile water (preservative free) 4.5 mL injection  125 mg IntraVENous DAILY    sodium chloride (NS) flush 5-10 mL  5-10 mL IntraVENous Q8H    sodium chloride (NS) flush 5-10 mL  5-10 mL IntraVENous PRN    ondansetron (ZOFRAN) injection 4 mg  4 mg IntraVENous Q4H PRN    bisacodyl (DULCOLAX) suppository 10 mg  10 mg Rectal DAILY PRN    acetaminophen (TYLENOL) tablet 1,000 mg  1,000 mg Oral Q6H PRN    apixaban (ELIQUIS) tablet 5 mg  5 mg Oral BID    calcium carbonate (OS-BERT) tablet 500 mg [elemental]  500 mg Oral EVERY TUES,THUR,SAT,SUN    cholecalciferol (VITAMIN D3) tablet 1,000 Units  1,000 Units Oral DAILY    dutasteride (AVODART) capsule 0.5 mg  0.5 mg Oral DAILY    ketoconazole (NIZORAL) 2 % cream   Topical DAILY    levothyroxine (SYNTHROID) tablet 137 mcg  137 mcg Oral ACB    metoprolol succinate (TOPROL-XL) XL tablet 75 mg  75 mg Oral DAILY    pravastatin (PRAVACHOL) tablet 80 mg  80 mg Oral QHS    tamsulosin (FLOMAX) capsule 0.4 mg  0.4 mg Oral DAILY    bumetanide (BUMEX) injection 2 mg  2 mg IntraVENous Q12H    insulin lispro (HUMALOG) injection   SubCUTAneous AC&HS    glucose chewable tablet 16 g  4 Tab Oral PRN    glucagon (GLUCAGEN) injection 1 mg  1 mg IntraMUSCular PRN    dextrose 10% infusion 125-250 mL  125-250 mL IntraVENous PRN    insulin lispro protamine/insulin lispro (HUMALOG MIX 75/25) injection 25 Units  25 Units SubCUTAneous ACB&D    senna-docusate (PERICOLACE) 8.6-50 mg per tablet 2 Tab  2 Tab Oral QHS        LABS:  Recent Labs      06/23/17   0156  06/22/17   0947   WBC  6.9  8.2   HGB  12.2  12.9   HCT  41.6  44.0   PLT  137*  182     Recent Labs      06/23/17   0156  06/22/17   0947   NA  141  138   K  4.2  5.0   CL  101  100   CO2  34*  31   BUN  98*  96*   CREA  3.60*  4.03*   GLU  122*  175*   CA  9.0  8.5   MG  2.7*  2.5*     Recent Labs      06/22/17   0947   SGOT  29   ALT  17   AP  47   TBILI  0.5   TP  6.9   ALB  3.0*   GLOB  3.9     No results for input(s): INR, PTP, APTT in the last 72 hours. No lab exists for component: INREXT   No results for input(s): FE, TIBC, PSAT, FERR in the last 72 hours. No results for input(s): PH, PCO2, PO2 in the last 72 hours.   Recent Labs      06/23/17   0156  06/22/17   0947   TROIQ  0.42*  0.12*     Lab Results   Component Value Date/Time    Glucose (POC) 188 06/23/2017 11:16 AM    Glucose (POC) 111 06/23/2017 07:37 AM    Glucose (POC) 199 06/22/2017 09:23 PM    Glucose (POC) 183 06/22/2017 04:33 PM    Glucose (POC) 147 06/22/2017 12:14 PM

## 2017-06-23 NOTE — PROGRESS NOTES
Bedside shift change report given to Azar Wen RN (oncoming nurse) by Jayda Pang RN (offgoing nurse). Report included the following information SBAR, Kardex, Intake/Output, MAR, Recent Results, Med Rec Status and Cardiac Rhythm AFIB. Patient states he feels \"too unsteady\" to use the urinal. Patient voiding in the commode throughout the night.

## 2017-06-23 NOTE — PROGRESS NOTES
[address]  [phone]      Cardiology Progress Note      6/23/2017 11:00AM    Admit Date: 6/22/2017    Admit Diagnosis:   Diastolic heart failure (HCC)    Subjective:     Beck Asa less SOB, feeling like he is less edematous. Only documented 1.4L, patients states he is not using urinal all the time - re-emphasized use. Down 2#s. Remains in afib with RVR. Review of medications by pharmacy shows that he should be on Toprol XL 100mg BID, now only getting 75mg BID, adjusting dose for this evening. Over the last few weeks had gained over 20#s. Additional PO diuretics did not help, Nephrology now following, adding IV diuril and if this is not successful, change to bumex drip, and then if still no success UF.     Visit Vitals    /71    Pulse (!) 107    Temp 97.6 °F (36.4 °C)    Resp 18    Wt 126.5 kg (278 lb 14.1 oz)    SpO2 90%    BMI 41.18 kg/m2       Current Facility-Administered Medications   Medication Dose Route Frequency    chlorothiazide (DIURIL) 125 mg in sterile water (preservative free) 4.5 mL injection  125 mg IntraVENous DAILY    metoprolol succinate (TOPROL-XL) XL tablet 100 mg  100 mg Oral BID    sodium chloride (NS) flush 5-10 mL  5-10 mL IntraVENous Q8H    sodium chloride (NS) flush 5-10 mL  5-10 mL IntraVENous PRN    ondansetron (ZOFRAN) injection 4 mg  4 mg IntraVENous Q4H PRN    bisacodyl (DULCOLAX) suppository 10 mg  10 mg Rectal DAILY PRN    acetaminophen (TYLENOL) tablet 1,000 mg  1,000 mg Oral Q6H PRN    apixaban (ELIQUIS) tablet 5 mg  5 mg Oral BID    calcium carbonate (OS-BERT) tablet 500 mg [elemental]  500 mg Oral EVERY TUES,THUR,SAT,SUN    cholecalciferol (VITAMIN D3) tablet 1,000 Units  1,000 Units Oral DAILY    dutasteride (AVODART) capsule 0.5 mg  0.5 mg Oral DAILY    ketoconazole (NIZORAL) 2 % cream   Topical DAILY    levothyroxine (SYNTHROID) tablet 137 mcg  137 mcg Oral ACB    pravastatin (PRAVACHOL) tablet 80 mg  80 mg Oral QHS    tamsulosin (FLOMAX) capsule 0.4 mg  0.4 mg Oral DAILY    bumetanide (BUMEX) injection 2 mg  2 mg IntraVENous Q12H    insulin lispro (HUMALOG) injection   SubCUTAneous AC&HS    glucose chewable tablet 16 g  4 Tab Oral PRN    glucagon (GLUCAGEN) injection 1 mg  1 mg IntraMUSCular PRN    dextrose 10% infusion 125-250 mL  125-250 mL IntraVENous PRN    insulin lispro protamine/insulin lispro (HUMALOG MIX 75/25) injection 25 Units  25 Units SubCUTAneous ACB&D    senna-docusate (PERICOLACE) 8.6-50 mg per tablet 2 Tab  2 Tab Oral QHS       Objective:      Physical Exam:  General Appearance:  morbidly obese  male in no acute distress  Chest:   Clear  Cardiovascular:  irr, irr  no murmur.   Abdomen:   Soft, non-tender, bowel sounds are active.   Extremities: chronic venous stasis with multiple wounds, weeping skin catherine  Skin:  Warm and dry.     Data Review:   Recent Labs      06/23/17   0156  06/22/17   0947   WBC  6.9  8.2   HGB  12.2  12.9   HCT  41.6  44.0   PLT  137*  182     Recent Labs      06/23/17   0156  06/22/17   0947   NA  141  138   K  4.2  5.0   CL  101  100   CO2  34*  31   GLU  122*  175*   BUN  98*  96*   CREA  3.60*  4.03*   CA  9.0  8.5   MG  2.7*  2.5*   ALB   --   3.0*   TBILI   --   0.5   SGOT   --   29   ALT   --   17       Recent Labs      06/23/17   0156  06/22/17   0947   TROIQ  0.42*  0.12*         Intake/Output Summary (Last 24 hours) at 06/23/17 1425  Last data filed at 06/23/17 1001   Gross per 24 hour   Intake              490 ml   Output             1100 ml   Net             -610 ml        Telemetry: afib with RVR at 110bpm  EKG:  Cxray:    Assessment:     Active Problems:    SOB (shortness of breath) (3/7/2017)      Chronic a-fib (HCC) (3/7/2017)      CKD (chronic kidney disease) stage 4, GFR 15-29 ml/min (HCC) (3/7/2017)      Hx of CABG (3/13/2017)      Overview: CABG in 2010 in [place]      Morbid obesity ([facility] [address] (3/13/2017)      Diabetes mellitus type 2, controlled ([facility] [address] (3/13/2017)      Fatigue (3/15/2017)      Bilateral leg edema (9/59/8726)      Diastolic heart failure ([facility] [address] (6/22/2017)      JACEY (obstructive sleep apnea) (6/23/2017)        Plan:     Chronic on acute diastolic HF:  Agree with aggressive diuresis as Nephrology OKs  He may be trending toward dialysis as managing his volume is becoming increasingly more difficult  Known severe pulm HTN with volume adds to worsening symptoms  Was scheduled to [address] for further evaluation? ? Not sure why canceled  Questionable compliance with diet, intake  Monitor I/Os, labs and daily weights     No further cardiology evaluation necessary at this time.       CKD: In combination with DHF, rationale why volume overloaded  Nephrology following    Afib:  Patient can feel palpitations but unsure how long he has been back in afib  [facility] in 3/2017 followed by Dr. Caity Lorenzo on BB for rate control   Eliquis for [facility]    JACEY:  Hx of but does not use CPAP      Pt seen and examined in details. Agree with NP A&P. BB dose increased to 100 mg bid today for better rate control since rate is slightly up. D/w pt.      Ceferino Chavez MD

## 2017-06-23 NOTE — PROGRESS NOTES
Hospitalist Progress Note    NAME: Lor Ponce   :  1950   MRN:  [id number]       Assessment / Plan:  Interim Hospital Summary:       Guillermo Dunham on chronic diastolic heart failure causing Acute hypoxic respiratory failure  With fluid overload and worsening ckd  Chronic atrial fibrillation  Elevated troponin      -last Echo in 3/2017 with EF 55-60%  -O2 suppl, wean as tolerated  - seen by cardiology and nephrology  bumex iv 2mg q 12hours, diuril  Cont apixaban, metoprolol and pravastatin     Acute on ckd stage stage IV  -baseline cr on 3/2017 was 2.65, as per cardiology's outpt f/u note, cr ~3. This is likely due to volume overload  -follows with Dr Chetna Mathew, had labs done at the office  -follow cr, avoid nephrotoxin agents  -nephrology following      T2DM  -check A1C 7.6  -restart home insulin at lower dose, SSI  PTA 26 units am and 34 units pm add 5 units for every 50 after 150, hold if BS <100  -diabetic diet     BPH  -cont' dutasteride, flomax     Hypothyroidism  -cont' synthroid     B/l LE wound  Wound care consulted   Morbid obesity     Code Status: Full  Surrogate Decision Maker: wife     DVT Prophylaxis: Eliquis  GI Prophylaxis: not indicated     Baseline: independent        Body mass index is 41.18 kg/(m^2). Subjective:     Chief Complaint / Reason for Physician Visit  My medications are not right    Discussed with RN events overnight. Review of Systems:  Symptom Y/N Comments  Symptom Y/N Comments   Fever/Chills n   Chest Pain n    Poor Appetite    Edema     Cough n   Abdominal Pain n    Sputum    Joint Pain     SOB/BETTS n   Pruritis/Rash     Nausea/vomit    Tolerating PT/OT     Diarrhea    Tolerating Diet     Constipation    Other       Could NOT obtain due to:      Objective:     VITALS:   Last 24hrs VS reviewed since prior progress note.  Most recent are:  Patient Vitals for the past 24 hrs:   Temp Pulse Resp BP SpO2   17 0810 97.3 °F (36.3 °C) (!) 106 20 129/67 95 % 06/23/17 0310 97.2 °F (36.2 °C) 97 20 112/57 100 %   06/22/17 2304 98.1 °F (36.7 °C) 98 22 117/50 94 %   06/22/17 2016 97.4 °F (36.3 °C) 91 22 131/65 94 %   06/22/17 1604 97.7 °F (36.5 °C) 96 22 117/68 100 %       Intake/Output Summary (Last 24 hours) at 06/23/17 1320  Last data filed at 06/23/17 1001   Gross per 24 hour   Intake              490 ml   Output             1100 ml   Net             -610 ml        PHYSICAL EXAM:  General: WD, WN. Alert, cooperative, no acute distress    EENT:  EOMI. Anicteric sclerae. MMM  Resp:  B/l basal crackles. No accessory muscle use  CV:  Regular  rhythm,  2+ edema  GI:  Soft, Non distended, Non tender.  +Bowel sounds  Neurologic:  Alert and oriented X 3, normal speech,   Psych:   Good insight. Not anxious nor agitated  Skin:  No rashes. No jaundice    Reviewed most current lab test results and cultures  YES  Reviewed most current radiology test results   YES  Review and summation of old records today    NO  Reviewed patient's current orders and MAR    YES  PMH/ reviewed - no change compared to H&P  ________________________________________________________________________  Care Plan discussed with:    Comments   Patient y    Family  y    RN y    Care Manager     Consultant                        Multidiciplinary team rounds were held today with , nursing, pharmacist and clinical coordinator. Patient's plan of care was discussed; medications were reviewed and discharge planning was addressed.      ________________________________________________________________________  Total NON critical care TIME:  35   Minutes    Total CRITICAL CARE TIME Spent:   Minutes non procedure based      Comments   >50% of visit spent in counseling and coordination of care     ________________________________________________________________________  Molly Jon MD     Procedures: see electronic medical records for all procedures/Xrays and details which were not copied into this note but were reviewed prior to creation of Plan. LABS:  I reviewed today's most current labs and imaging studies.   Pertinent labs include:  Recent Labs      06/23/17   0156  06/22/17   0947   WBC  6.9  8.2   HGB  12.2  12.9   HCT  41.6  44.0   PLT  137*  182     Recent Labs      06/23/17   0156  06/22/17   0947   NA  141  138   K  4.2  5.0   CL  101  100   CO2  34*  31   GLU  122*  175*   BUN  98*  96*   CREA  3.60*  4.03*   CA  9.0  8.5   MG  2.7*  2.5*   ALB   --   3.0*   TBILI   --   0.5   SGOT   --   29   ALT   --   17       Signed: Primo Zarate MD

## 2017-06-23 NOTE — CARDIO/PULMONARY
Cardiopulmonary Rehab:     Chart reviewed. Pt is on the CHF bundle list. Pt is a 75 y.o. male admitted with acute on chronic CHF, acute resp failure, chronic a-fib, CKD, T2DM, BPH, morbid obesity (5'9\", 280 lbs), CABG in 2010. Pt last visited by CHF nurse for cardiac teaching during recent hospitalization on 3/16/17. Pt visited, wife at the bedside. This was a follow-up visit to answer questions and reinforce prior teaching re: CHF, S&Ss, medication management, Low NA diet, daily weights and when to call the doctor. Pt reports he weighs daily and has noticed an increase over the weeks. States his doctors were adjusting his medications and they were aware in the fluctuations in his weight. He correctly identified the name name and purpose of his diuretic however states a new one was just added and is unsure of the name. Pt has maintained a low sodium diet at home. Report his food tends to get bland. Suggested herbs and spices that can he added and advised him to revisit the CHF teaching folder. He has been in physical therapy for his back. He recently developed a sore on his foot and hasn't attended in the last week. Plans to return once his sore heals. Pt/wife verbalized understanding.

## 2017-06-23 NOTE — PROGRESS NOTES
Heart Failure Care Coordinator visited the patient in room. Provided introduction to self, and explanation of the Care Coordinator role. Patient was provided a copy of the [facility] letter. Patient made aware of contact information should any questions arise before or after discharge. Patient stated they have functioning scales. Reinforced the importance of checking weight every AM and calling MD if weight is up 3 lbs in a day or 5 lbs in a week (or per MD guidelines). Discussed with patient the importance of developing a plan in regards to CHF signs/symptoms. \"who would you call when you notice obvious signs and symptoms of heart failure such as weight gain, lower ext. Edema and SOB\"? Pt currently aware of CHF bundle program. Per patient he would contact Dr Vang Number office for any S/S CHF.

## 2017-06-24 LAB
ALBUMIN SERPL BCP-MCNC: 2.9 G/DL (ref 3.5–5)
ALBUMIN/GLOB SERPL: 0.8 {RATIO} (ref 1.1–2.2)
ALP SERPL-CCNC: 44 U/L (ref 45–117)
ALT SERPL-CCNC: 15 U/L (ref 12–78)
ANION GAP BLD CALC-SCNC: 5 MMOL/L (ref 5–15)
AST SERPL W P-5'-P-CCNC: 23 U/L (ref 15–37)
BILIRUB SERPL-MCNC: 0.7 MG/DL (ref 0.2–1)
BUN SERPL-MCNC: 91 MG/DL (ref 6–20)
BUN/CREAT SERPL: 29 (ref 12–20)
CALCIUM SERPL-MCNC: 8.8 MG/DL (ref 8.5–10.1)
CALCIUM SERPL-MCNC: 8.9 MG/DL (ref 8.5–10.1)
CHLORIDE SERPL-SCNC: 98 MMOL/L (ref 97–108)
CO2 SERPL-SCNC: 38 MMOL/L (ref 21–32)
CREAT SERPL-MCNC: 3.15 MG/DL (ref 0.7–1.3)
ERYTHROCYTE [DISTWIDTH] IN BLOOD BY AUTOMATED COUNT: 18.9 % (ref 11.5–14.5)
GLOBULIN SER CALC-MCNC: 3.7 G/DL (ref 2–4)
GLUCOSE BLD STRIP.AUTO-MCNC: 111 MG/DL (ref 65–100)
GLUCOSE BLD STRIP.AUTO-MCNC: 132 MG/DL (ref 65–100)
GLUCOSE BLD STRIP.AUTO-MCNC: 159 MG/DL (ref 65–100)
GLUCOSE BLD STRIP.AUTO-MCNC: 180 MG/DL (ref 65–100)
GLUCOSE SERPL-MCNC: 73 MG/DL (ref 65–100)
HCT VFR BLD AUTO: 39.3 % (ref 36.6–50.3)
HGB BLD-MCNC: 11.8 G/DL (ref 12.1–17)
MAGNESIUM SERPL-MCNC: 2.3 MG/DL (ref 1.6–2.4)
MCH RBC QN AUTO: 25.3 PG (ref 26–34)
MCHC RBC AUTO-ENTMCNC: 30 G/DL (ref 30–36.5)
MCV RBC AUTO: 84.3 FL (ref 80–99)
PHOSPHATE SERPL-MCNC: 4 MG/DL (ref 2.6–4.7)
PLATELET # BLD AUTO: 138 K/UL (ref 150–400)
POTASSIUM SERPL-SCNC: 3.5 MMOL/L (ref 3.5–5.1)
PROT SERPL-MCNC: 6.6 G/DL (ref 6.4–8.2)
PTH-INTACT SERPL-MCNC: 132.1 PG/ML (ref 14–72)
RBC # BLD AUTO: 4.66 M/UL (ref 4.1–5.7)
SERVICE CMNT-IMP: ABNORMAL
SODIUM SERPL-SCNC: 141 MMOL/L (ref 136–145)
URATE SERPL-MCNC: 12 MG/DL (ref 3.5–7.2)
WBC # BLD AUTO: 7.7 K/UL (ref 4.1–11.1)

## 2017-06-24 PROCEDURE — 84100 ASSAY OF PHOSPHORUS: CPT | Performed by: INTERNAL MEDICINE

## 2017-06-24 PROCEDURE — 82962 GLUCOSE BLOOD TEST: CPT

## 2017-06-24 PROCEDURE — 77010033678 HC OXYGEN DAILY

## 2017-06-24 PROCEDURE — 74011250636 HC RX REV CODE- 250/636: Performed by: INTERNAL MEDICINE

## 2017-06-24 PROCEDURE — 74011636637 HC RX REV CODE- 636/637: Performed by: INTERNAL MEDICINE

## 2017-06-24 PROCEDURE — 80053 COMPREHEN METABOLIC PANEL: CPT | Performed by: INTERNAL MEDICINE

## 2017-06-24 PROCEDURE — 74011000250 HC RX REV CODE- 250: Performed by: INTERNAL MEDICINE

## 2017-06-24 PROCEDURE — 65660000000 HC RM CCU STEPDOWN

## 2017-06-24 PROCEDURE — 74011250637 HC RX REV CODE- 250/637: Performed by: INTERNAL MEDICINE

## 2017-06-24 PROCEDURE — 74011250637 HC RX REV CODE- 250/637: Performed by: NURSE PRACTITIONER

## 2017-06-24 PROCEDURE — 85027 COMPLETE CBC AUTOMATED: CPT | Performed by: HOSPITALIST

## 2017-06-24 PROCEDURE — 77030011255 HC DSG AQUACEL AG BMS -A

## 2017-06-24 PROCEDURE — 83735 ASSAY OF MAGNESIUM: CPT | Performed by: INTERNAL MEDICINE

## 2017-06-24 PROCEDURE — 84550 ASSAY OF BLOOD/URIC ACID: CPT | Performed by: INTERNAL MEDICINE

## 2017-06-24 PROCEDURE — 77030019607 HC DSG BURN S&N -A

## 2017-06-24 PROCEDURE — 36415 COLL VENOUS BLD VENIPUNCTURE: CPT | Performed by: HOSPITALIST

## 2017-06-24 PROCEDURE — 83970 ASSAY OF PARATHORMONE: CPT | Performed by: INTERNAL MEDICINE

## 2017-06-24 RX ORDER — POTASSIUM CHLORIDE 750 MG/1
20 TABLET, FILM COATED, EXTENDED RELEASE ORAL 2 TIMES DAILY
Status: DISCONTINUED | OUTPATIENT
Start: 2017-06-24 | End: 2017-06-27 | Stop reason: HOSPADM

## 2017-06-24 RX ORDER — SORBITOL SOLUTION 70 %
30 SOLUTION, ORAL MISCELLANEOUS DAILY PRN
Status: DISCONTINUED | OUTPATIENT
Start: 2017-06-24 | End: 2017-06-27 | Stop reason: HOSPADM

## 2017-06-24 RX ORDER — DILTIAZEM HYDROCHLORIDE 30 MG/1
30 TABLET, FILM COATED ORAL
Status: DISCONTINUED | OUTPATIENT
Start: 2017-06-24 | End: 2017-06-26

## 2017-06-24 RX ORDER — ALLOPURINOL 100 MG/1
300 TABLET ORAL DAILY
Status: DISCONTINUED | OUTPATIENT
Start: 2017-06-24 | End: 2017-06-27 | Stop reason: HOSPADM

## 2017-06-24 RX ADMIN — POTASSIUM CHLORIDE 20 MEQ: 750 TABLET, FILM COATED, EXTENDED RELEASE ORAL at 18:27

## 2017-06-24 RX ADMIN — DOCUSATE SODIUM AND SENNOSIDES 2 TABLET: 8.6; 5 TABLET, FILM COATED ORAL at 21:28

## 2017-06-24 RX ADMIN — POTASSIUM CHLORIDE 20 MEQ: 750 TABLET, FILM COATED, EXTENDED RELEASE ORAL at 12:07

## 2017-06-24 RX ADMIN — DILTIAZEM HYDROCHLORIDE 30 MG: 30 TABLET, FILM COATED ORAL at 18:27

## 2017-06-24 RX ADMIN — Medication 10 ML: at 18:28

## 2017-06-24 RX ADMIN — APIXABAN 5 MG: 5 TABLET, FILM COATED ORAL at 08:57

## 2017-06-24 RX ADMIN — INSULIN LISPRO 25 UNITS: 100 INJECTION, SUSPENSION SUBCUTANEOUS at 08:57

## 2017-06-24 RX ADMIN — DUTASTERIDE 0.5 MG: 0.5 CAPSULE, LIQUID FILLED ORAL at 08:58

## 2017-06-24 RX ADMIN — APIXABAN 5 MG: 5 TABLET, FILM COATED ORAL at 21:27

## 2017-06-24 RX ADMIN — INSULIN LISPRO 25 UNITS: 100 INJECTION, SUSPENSION SUBCUTANEOUS at 18:29

## 2017-06-24 RX ADMIN — TAMSULOSIN HYDROCHLORIDE 0.4 MG: 0.4 CAPSULE ORAL at 08:58

## 2017-06-24 RX ADMIN — LEVOTHYROXINE SODIUM 137 MCG: 112 TABLET ORAL at 08:58

## 2017-06-24 RX ADMIN — CHLOROTHIAZIDE SODIUM 125 MG: 500 INJECTION, POWDER, LYOPHILIZED, FOR SOLUTION INTRAVENOUS at 09:04

## 2017-06-24 RX ADMIN — Medication 10 ML: at 03:15

## 2017-06-24 RX ADMIN — ALLOPURINOL 300 MG: 300 TABLET ORAL at 12:07

## 2017-06-24 RX ADMIN — METOPROLOL SUCCINATE 100 MG: 50 TABLET, EXTENDED RELEASE ORAL at 08:58

## 2017-06-24 RX ADMIN — CALCIUM CARBONATE 500 MG: 1250 TABLET ORAL at 18:26

## 2017-06-24 RX ADMIN — BUMETANIDE 2 MG: 0.25 INJECTION INTRAMUSCULAR; INTRAVENOUS at 08:58

## 2017-06-24 RX ADMIN — DILTIAZEM HYDROCHLORIDE 30 MG: 30 TABLET, FILM COATED ORAL at 21:28

## 2017-06-24 RX ADMIN — VITAMIN D, TAB 1000IU (100/BT) 1000 UNITS: 25 TAB at 08:58

## 2017-06-24 RX ADMIN — INSULIN LISPRO 2 UNITS: 100 INJECTION, SOLUTION INTRAVENOUS; SUBCUTANEOUS at 18:28

## 2017-06-24 RX ADMIN — PRAVASTATIN SODIUM 80 MG: 40 TABLET ORAL at 21:28

## 2017-06-24 RX ADMIN — BUMETANIDE 2 MG: 0.25 INJECTION INTRAMUSCULAR; INTRAVENOUS at 18:27

## 2017-06-24 RX ADMIN — KETOCONAZOLE: 20 CREAM TOPICAL at 08:59

## 2017-06-24 RX ADMIN — Medication 10 ML: at 21:28

## 2017-06-24 RX ADMIN — METOPROLOL SUCCINATE 100 MG: 50 TABLET, EXTENDED RELEASE ORAL at 18:27

## 2017-06-24 NOTE — PROGRESS NOTES
Hospitalist Progress Note    NAME: Kaitlin Corado   :  1950   MRN:  [id number]       Assessment / Plan:  Interim Hospital Summary:        Acute on chronic diastolic heart failure causing Acute hypoxic respiratory failure  With fluid overload and worsening ckd  Chronic atrial fibrillation  Elevated troponin      -last Echo in 3/2017 with EF 55-60%  - seen by cardiology and nephrology  bumex iv 2mg q 12hours, diuril as tolerted  Cont apixaban, metoprolol and pravastatin    Per cardiology notes- was scheduled for [facility] for further evaluation of severe pulmonary  Hypertension, not sure why cancelled  No further cardiology evaluation at this time     Acute on ckd stage stage IV  -baseline cr on 3/2017 was 2.65, as per cardiology's outpt f/u note, cr ~3. This is likely due to volume overload  -follows with Dr Mariann Kwon, had labs done at the office  -follow cr, avoid nephrotoxin agents  -nephrology following  - bumex and diuril as above      T2DM  -check A1C 7.6  -restart home insulin at lower dose, SSI  PTA 26 units am and 34 units pm add 5 units for every 50 after 150, hold if BS <100  -diabetic diet     BPH  -cont' dutasteride, flomax     Hypothyroidism  -cont' synthroid     B/l LE wound  Wound care consulted   KERI with femoral and popliteal segments on the right side with PVD  KERI is unrealiable predictor of distal arterial perfusion as due to extensive arterial calcification  Moderate PVD    Morbid obesity     Code Status: Full  Surrogate Decision Maker: wife     DVT Prophylaxis: Eliquis  GI Prophylaxis: not indicated     Baseline: independent      Body mass index is 40.43 kg/(m^2). Needs HH     Subjective:     Chief Complaint / Reason for Physician Visit  Follow up on fluid overload and acute on chronic diastolic chf  Out put not accurate, was voiding in the commode    Discussed with RN events overnight.      Review of Systems:  Symptom Y/N Comments  Symptom Y/N Comments   Fever/Chills n   Chest Pain n Poor Appetite    Edema     Cough n   Abdominal Pain n    Sputum    Joint Pain     SOB/BETTS n   Pruritis/Rash     Nausea/vomit    Tolerating PT/OT     Diarrhea    Tolerating Diet     Constipation    Other       Could NOT obtain due to:      Objective:     VITALS:   Last 24hrs VS reviewed since prior progress note. Most recent are:  Patient Vitals for the past 24 hrs:   Temp Pulse Resp BP SpO2   06/24/17 0335 98.3 °F (36.8 °C) (!) 102 20 131/74 96 %   06/23/17 2258 97.5 °F (36.4 °C) (!) 104 20 151/84 96 %   06/23/17 1921 98.3 °F (36.8 °C) (!) 103 20 135/65 94 %   06/23/17 1630 97.8 °F (36.6 °C) (!) 124 18 115/70 -   06/23/17 1242 97.6 °F (36.4 °C) (!) 107 18 117/71 90 %       Intake/Output Summary (Last 24 hours) at 06/24/17 0841  Last data filed at 06/23/17 1946   Gross per 24 hour   Intake              460 ml   Output              600 ml   Net             -140 ml        PHYSICAL EXAM:  General: WD, WN. Alert, cooperative,  no acute distress    EENT:  EOMI. Anicteric sclerae. MMM  Resp:  B/l basal crackles  No accessory muscle use  CV:  Regular  Rhythm, 2+edema  GI:  Soft, Non distended, Non tender.  +Bowel sounds  Neurologic:  Alert and oriented X 3, normal speech,   Psych:   Good insight. Not anxious nor agitated  Skin:  No rashes. No jaundice    Reviewed most current lab test results and cultures  YES  Reviewed most current radiology test results   YES  Review and summation of old records today    NO  Reviewed patient's current orders and MAR    YES  PMH/ reviewed - no change compared to H&P  ________________________________________________________________________  Care Plan discussed with:    Comments   Patient y    Family      RN y    Care Manager     Consultant                        Multidiciplinary team rounds were held today with , nursing, pharmacist and clinical coordinator. Patient's plan of care was discussed; medications were reviewed and discharge planning was addressed. ________________________________________________________________________  Total NON critical care TIME:  25  Minutes    Total CRITICAL CARE TIME Spent:   Minutes non procedure based      Comments   >50% of visit spent in counseling and coordination of care     ________________________________________________________________________  Madeline Last MD     Procedures: see electronic medical records for all procedures/Xrays and details which were not copied into this note but were reviewed prior to creation of Plan. LABS:  I reviewed today's most current labs and imaging studies.   Pertinent labs include:  Recent Labs      06/24/17 0332 06/23/17   0156  06/22/17   0947   WBC  7.7  6.9  8.2   HGB  11.8*  12.2  12.9   HCT  39.3  41.6  44.0   PLT  138*  137*  182     Recent Labs      06/24/17 0332 06/23/17   0156  06/22/17   0947   NA  141  141  138   K  3.5  4.2  5.0   CL  98  101  100   CO2  38*  34*  31   GLU  73  122*  175*   BUN  91*  98*  96*   CREA  3.15*  3.60*  4.03*   CA  8.8  8.9  9.0  8.5   MG  2.3  2.7*  2.5*   PHOS  4.0   --    --    ALB  2.9*   --   3.0*   TBILI  0.7   --   0.5   SGOT  23   --   29   ALT  15   --   17       Signed: Madeline Last MD

## 2017-06-24 NOTE — PROGRESS NOTES
[facility] Consult Note        NAME: Kaitlin Corado       :  1950       MRN:  [id number]     Date/Time: 2017    Risk of deterioration: medium       Assessment:    Plan:  Volume overload/Anasarca  ION/CKD IV  Hx of CABG/CHF  Obesity  DM Nephropathy  Secondary PTH Continue iv diuril prior to bumex  If no response, change scheduled bumex to bumex gtt  If no response, then UF  Watch CO2    Seems to be responding by weights--if accurate, he won't allow a fenton, and he won't save all his urine for measurement--    Added allopurinol for hyperuricemia   -ok to be off zemplar a couple days (it is NF here and I wouldn't dare start a medicine without discussing it with him first)     Subjective:     Chief Complaint:  \"I need my paricalcitol, nobody started it\"    Review of Systems: (+) worsening edema/legs, abd with skin breakdown  (+) sig 20 pound weight gain since last dc--(+) sob/guerra, decreased ability to ambulate-(-) f/c/dysuria    Objective:     VITALS:   Last 24hrs VS reviewed since prior progress note.  Most recent are:  Visit Vitals    /73    Pulse (!) 107    Temp 98.1 °F (36.7 °C)    Resp 20    Wt 124.2 kg (273 lb 13 oz)    SpO2 95%    BMI 40.43 kg/m2     SpO2 Readings from Last 6 Encounters:   17 95%   17 93%   17 96%   17 93%   17 100%   17 94%    O2 Flow Rate (L/min): 2.5 l/min       Intake/Output Summary (Last 24 hours) at 17 1129  Last data filed at 17 0941   Gross per 24 hour   Intake              460 ml   Output              600 ml   Net             -140 ml         Telemetry Reviewed      PHYSICAL EXAM:    General   WDWN obese WM in NARD  Respiratory   Clear anteriorly, decreased bs in bases, poor pt effort  Cardiology  RRR  Abdominal  Obese, can't assess organomegaly  Extremities  (+) tight edema with bullae and venous stasis changes (chronic)--may be a little better  Psychological  Affect--irritated--still     Lab Data Reviewed: (see below)    Medications Reviewed: (see below)    PMH/SH reviewed - no change compared to H&P  ________________________________________  ___________________________________________________    Attending Physician: Jia Gibbons MD     ____________________________________________________  MEDICATIONS:  Current Facility-Administered Medications   Medication Dose Route Frequency    allopurinol (ZYLOPRIM) tablet 300 mg  300 mg Oral DAILY    potassium chloride SR (KLOR-CON 10) tablet 20 mEq  20 mEq Oral BID    sorbitol 70 % solution 30 mL  30 mL Oral DAILY PRN    chlorothiazide (DIURIL) 125 mg in sterile water (preservative free) 4.5 mL injection  125 mg IntraVENous DAILY    metoprolol succinate (TOPROL-XL) XL tablet 100 mg  100 mg Oral BID    sodium chloride (NS) flush 5-10 mL  5-10 mL IntraVENous Q8H    sodium chloride (NS) flush 5-10 mL  5-10 mL IntraVENous PRN    ondansetron (ZOFRAN) injection 4 mg  4 mg IntraVENous Q4H PRN    bisacodyl (DULCOLAX) suppository 10 mg  10 mg Rectal DAILY PRN    acetaminophen (TYLENOL) tablet 1,000 mg  1,000 mg Oral Q6H PRN    apixaban (ELIQUIS) tablet 5 mg  5 mg Oral BID    calcium carbonate (OS-BERT) tablet 500 mg [elemental]  500 mg Oral EVERY TUES,THUR,SAT,SUN    cholecalciferol (VITAMIN D3) tablet 1,000 Units  1,000 Units Oral DAILY    dutasteride (AVODART) capsule 0.5 mg  0.5 mg Oral DAILY    ketoconazole (NIZORAL) 2 % cream   Topical DAILY    levothyroxine (SYNTHROID) tablet 137 mcg  137 mcg Oral ACB    pravastatin (PRAVACHOL) tablet 80 mg  80 mg Oral QHS    tamsulosin (FLOMAX) capsule 0.4 mg  0.4 mg Oral DAILY    bumetanide (BUMEX) injection 2 mg  2 mg IntraVENous Q12H    insulin lispro (HUMALOG) injection   SubCUTAneous AC&HS    glucose chewable tablet 16 g  4 Tab Oral PRN    glucagon (GLUCAGEN) injection 1 mg  1 mg IntraMUSCular PRN    dextrose 10% infusion 125-250 mL  125-250 mL IntraVENous PRN    insulin lispro protamine/insulin lispro (HUMALOG MIX 75/25) injection 25 Units  25 Units SubCUTAneous ACB&D    senna-docusate (PERICOLACE) 8.6-50 mg per tablet 2 Tab  2 Tab Oral QHS        LABS:  Recent Labs      06/24/17   0332 06/23/17 0156   WBC  7.7  6.9   HGB  11.8*  12.2   HCT  39.3  41.6   PLT  138*  137*     Recent Labs      06/24/17   0332  06/23/17 0156  06/22/17   0947   NA  141  141  138   K  3.5  4.2  5.0   CL  98  101  100   CO2  38*  34*  31   BUN  91*  98*  96*   CREA  3.15*  3.60*  4.03*   GLU  73  122*  175*   CA  8.8  8.9  9.0  8.5   MG  2.3  2.7*  2.5*   PHOS  4.0   --    --    URICA  12.0*   --    --      Recent Labs      06/24/17 0332 06/22/17   0947   SGOT  23  29   ALT  15  17   AP  44*  47   TBILI  0.7  0.5   TP  6.6  6.9   ALB  2.9*  3.0*   GLOB  3.7  3.9     No results for input(s): INR, PTP, APTT in the last 72 hours. No lab exists for component: INREXT, INREXT   No results for input(s): FE, TIBC, PSAT, FERR in the last 72 hours. No results for input(s): PH, PCO2, PO2 in the last 72 hours.   Recent Labs      06/23/17 0156 06/22/17 0947   TROIQ  0.42*  0.12*     Lab Results   Component Value Date/Time    Glucose (POC) 132 06/24/2017 11:22 AM    Glucose (POC) 111 06/24/2017 07:36 AM    Glucose (POC) 97 06/23/2017 08:52 PM    Glucose (POC) 107 06/23/2017 04:34 PM    Glucose (POC) 188 06/23/2017 11:16 AM

## 2017-06-24 NOTE — PROGRESS NOTES
[address] SHIFT NURSING NOTE    Bedside and Verbal shift change report given to (oncoming nurse) by  Clarisa haas). Report included the following information Kardex. SHIFT SUMMARY:         Admission Date 6/22/2017   Admission Diagnosis Diastolic heart failure ([address]   Consults IP CONSULT TO CARDIOLOGY  IP CONSULT TO PALLIATIVE CARE - PROVIDER  IP CONSULT TO NEPHROLOGY        Consults   [] PT   [] OT   [] Speech   [] Palliative      [] Hospice    [] Case Management   [] None   Cardiac Monitoring   [] Yes   [] No     Antibiotics   [] Yes   [] No   GI Prophylaxis  (Ex: Protonix, Pepcid, etc,.)   [] Yes   [] No          DVT Prophylaxis   SCDs:             Deangelo stockings:         [x] Medication (Ex: Lovenox, Eliquis, Brilinta, Coumadin,  Heparin, etc..)   [] Contraindicated   [] No VTE needed       Urinary Catheter             LDAs               Peripheral IV 06/22/17 Wrist (Active)   Site Assessment Clean, dry, & intact 6/24/2017  7:34 PM   Phlebitis Assessment 0 6/24/2017  7:34 PM   Infiltration Assessment 0 6/24/2017  7:34 PM   Dressing Status Clean, dry, & intact 6/24/2017  7:34 PM   Dressing Type Stabilization/securement device;Transparent 6/24/2017  7:34 PM   Hub Color/Line Status Pink;Capped;Flushed;Patent 6/24/2017  7:34 PM   Action Taken Blood drawn 6/22/2017  9:50 AM                      I/Os   Intake/Output Summary (Last 24 hours) at 06/24/17 1941  Last data filed at 06/24/17 1910   Gross per 24 hour   Intake              700 ml   Output             1425 ml   Net             -725 ml         Activity Level Activity Level: Up with Assistance     Activity Assistance: Partial (one person)   Diet Active Orders   Diet    DIET DIABETIC CONSISTENT CARB Regular; 2 GM NA (House Low NA); FR 1500ML      Purposeful Rounding every 1-2 hour?    [x] Yes    Keke Score  Total Score: 4   Bed Alarm (If score 3 or >)   [] Yes    [] Refused (See signed refusal form in chart)   Romeo Score  Romeo Score: 19       Romeo Score (if score 14 or less)   [] PMT consult   [] Nutrition consult   [] Wound Care consult      []  Specialty bed         Influenza Vaccine Received Flu Vaccine for Current Season (usually Sept-March): Not Flu Season               Needs prior to discharge:   Home O2 required:    [] Yes   [x] No     If yes, how much O2 required?     Other:    Last Bowel Movement Date: 06/24/17   Readmission Risk Assessment Tool Score High Risk            21       Total Score        3 Relationship with PCP    2 Patient Living Status    4 More than 1 Admission in calendar year    5 Patient Insurance is Medicare, Medicaid or Self Pay    7 Charlson Comorbidity Score        Criteria that do not apply:    Patient Length of Stay > 5       Expected Length of Stay 3d 12h   Actual Length of Stay 2

## 2017-06-24 NOTE — PROGRESS NOTES
Bedside shift change report given to Lily Kennedy RN (oncoming nurse) by NADINE Rowland (offgoing nurse). Report included the following information SBAR, Kardex, Intake/Output, MAR, Recent Results, Med Rec Status and Cardiac Rhythm AFIB.

## 2017-06-24 NOTE — CARDIO/PULMONARY
Cardiopulmonary Rehab:      Chart reviewed. Pt is on the CHF bundle list. Pt is a 75 y.o. male admitted with acute on chronic CHF, acute resp failure, chronic a-fib, CKD, T2DM, BPH, morbid obesity (5'9\", 280 lbs), CABG in 2010.     Pt last visited by CHF nurse for cardiac teaching during recent hospitalization on 3/16/17.     Pt visited, wife at the bedside. This was a follow-up visit to answer questions and reinforce prior teaching re: CHF, S&Ss, medication management, Low NA diet, daily weights and when to call the doctor. Patient and wife well versed in CHF management. Patient expressing frustration with recent medication adjustments and subsequent hospital admission. They are eager to talk with MD today to discuss situation.     Patient and wife have no questions at this time and appreciate follow up.

## 2017-06-24 NOTE — PROGRESS NOTES
Cardiology Progress Note            [address]  [phone]    6/24/2017 12:51 PM    Admit Date: 6/22/2017    Admit Diagnosis: Diastolic heart failure ([address]    Subjective:     Lor Ponce admits to fatigue. His AF is not rate controlled.     Visit Vitals    /73    Pulse (!) 107    Temp 98.1 °F (36.7 °C)    Resp 20    Wt 273 lb 13 oz (124.2 kg)    SpO2 95%    BMI 40.43 kg/m2     Current Facility-Administered Medications   Medication Dose Route Frequency    allopurinol (ZYLOPRIM) tablet 300 mg  300 mg Oral DAILY    potassium chloride SR (KLOR-CON 10) tablet 20 mEq  20 mEq Oral BID    sorbitol 70 % solution 30 mL  30 mL Oral DAILY PRN    dilTIAZem (CARDIZEM) IR tablet 30 mg  30 mg Oral AC&HS    chlorothiazide (DIURIL) 125 mg in sterile water (preservative free) 4.5 mL injection  125 mg IntraVENous DAILY    metoprolol succinate (TOPROL-XL) XL tablet 100 mg  100 mg Oral BID    sodium chloride (NS) flush 5-10 mL  5-10 mL IntraVENous Q8H    sodium chloride (NS) flush 5-10 mL  5-10 mL IntraVENous PRN    ondansetron (ZOFRAN) injection 4 mg  4 mg IntraVENous Q4H PRN    bisacodyl (DULCOLAX) suppository 10 mg  10 mg Rectal DAILY PRN    acetaminophen (TYLENOL) tablet 1,000 mg  1,000 mg Oral Q6H PRN    apixaban (ELIQUIS) tablet 5 mg  5 mg Oral BID    calcium carbonate (OS-BERT) tablet 500 mg [elemental]  500 mg Oral EVERY TUES,THUR,SAT,SUN    cholecalciferol (VITAMIN D3) tablet 1,000 Units  1,000 Units Oral DAILY    dutasteride (AVODART) capsule 0.5 mg  0.5 mg Oral DAILY    ketoconazole (NIZORAL) 2 % cream   Topical DAILY    levothyroxine (SYNTHROID) tablet 137 mcg  137 mcg Oral ACB    pravastatin (PRAVACHOL) tablet 80 mg  80 mg Oral QHS    tamsulosin (FLOMAX) capsule 0.4 mg  0.4 mg Oral DAILY    bumetanide (BUMEX) injection 2 mg  2 mg IntraVENous Q12H    insulin lispro (HUMALOG) injection   SubCUTAneous AC&HS    glucose chewable tablet 16 g  4 Tab Oral PRN    glucagon (GLUCAGEN) injection 1 mg  1 mg IntraMUSCular PRN    dextrose 10% infusion 125-250 mL  125-250 mL IntraVENous PRN    insulin lispro protamine/insulin lispro (HUMALOG MIX 75/25) injection 25 Units  25 Units SubCUTAneous ACB&D    senna-docusate (PERICOLACE) 8.6-50 mg per tablet 2 Tab  2 Tab Oral QHS         Objective:      Visit Vitals    /73    Pulse (!) 107    Temp 98.1 °F (36.7 °C)    Resp 20    Wt 273 lb 13 oz (124.2 kg)    SpO2 95%    BMI 40.43 kg/m2       Physical Exam:  Abdomen: soft, non-tender  Extremities: +edema  Heart: irr irr, tachy  Lungs: clear to auscultation bilaterally  Pulses: 2+ and symmetric    Data Review:   Labs:    Recent Labs      06/24/17   0332  06/23/17   0156  06/22/17   0947   WBC  7.7  6.9  8.2   HGB  11.8*  12.2  12.9   HCT  39.3  41.6  44.0   PLT  138*  137*  182     Recent Labs      06/24/17   0332  06/23/17   0156  06/22/17   0947   NA  141  141  138   K  3.5  4.2  5.0   CL  98  101  100   CO2  38*  34*  31   GLU  73  122*  175*   BUN  91*  98*  96*   CREA  3.15*  3.60*  4.03*   CA  8.8  8.9  9.0  8.5   MG  2.3  2.7*  2.5*   PHOS  4.0   --    --    ALB  2.9*   --   3.0*   TBILI  0.7   --   0.5   SGOT  23   --   29   ALT  15   --   17       Recent Labs      06/23/17   0156  06/22/17   0947   TROIQ  0.42*  0.12*         Intake/Output Summary (Last 24 hours) at 06/24/17 1251  Last data filed at 06/24/17 0941   Gross per 24 hour   Intake              460 ml   Output              600 ml   Net             -140 ml        Telemetry: afib with rvr    Assessment:     Active Problems:    SOB (shortness of breath) (3/7/2017)      Chronic a-fib (HCC) (3/7/2017)      CKD (chronic kidney disease) stage 4, GFR 15-29 ml/min ([facility]) (3/7/2017)      Hx of CABG (3/13/2017)      Overview: CABG in 2010 in [place]      Morbid obesity ([facility] (3/13/2017)      Diabetes mellitus type 2, controlled ([facility] (3/13/2017)      Fatigue (3/15/2017)      Bilateral leg edema (5/61/1671)      Diastolic heart failure ([facility] (6/22/2017)      JACEY (obstructive sleep apnea) (6/23/2017)        Plan:     Cordelia  is not rate controlled. Will start dilt in addition to his toprol. Cont oac.     Radha Shetty MD, [facility]    6/24/2017

## 2017-06-25 LAB
ANION GAP BLD CALC-SCNC: 5 MMOL/L (ref 5–15)
BUN SERPL-MCNC: 84 MG/DL (ref 6–20)
BUN/CREAT SERPL: 30 (ref 12–20)
CALCIUM SERPL-MCNC: 9 MG/DL (ref 8.5–10.1)
CHLORIDE SERPL-SCNC: 96 MMOL/L (ref 97–108)
CO2 SERPL-SCNC: 39 MMOL/L (ref 21–32)
CREAT SERPL-MCNC: 2.82 MG/DL (ref 0.7–1.3)
GLUCOSE BLD STRIP.AUTO-MCNC: 112 MG/DL (ref 65–100)
GLUCOSE BLD STRIP.AUTO-MCNC: 155 MG/DL (ref 65–100)
GLUCOSE BLD STRIP.AUTO-MCNC: 197 MG/DL (ref 65–100)
GLUCOSE BLD STRIP.AUTO-MCNC: 200 MG/DL (ref 65–100)
GLUCOSE SERPL-MCNC: 92 MG/DL (ref 65–100)
MAGNESIUM SERPL-MCNC: 2.3 MG/DL (ref 1.6–2.4)
PHOSPHATE SERPL-MCNC: 3 MG/DL (ref 2.6–4.7)
POTASSIUM SERPL-SCNC: 3.8 MMOL/L (ref 3.5–5.1)
SERVICE CMNT-IMP: ABNORMAL
SODIUM SERPL-SCNC: 140 MMOL/L (ref 136–145)

## 2017-06-25 PROCEDURE — 74011250637 HC RX REV CODE- 250/637: Performed by: NURSE PRACTITIONER

## 2017-06-25 PROCEDURE — 65660000000 HC RM CCU STEPDOWN

## 2017-06-25 PROCEDURE — 74011250637 HC RX REV CODE- 250/637: Performed by: INTERNAL MEDICINE

## 2017-06-25 PROCEDURE — 74011250636 HC RX REV CODE- 250/636: Performed by: INTERNAL MEDICINE

## 2017-06-25 PROCEDURE — 74011000250 HC RX REV CODE- 250: Performed by: INTERNAL MEDICINE

## 2017-06-25 PROCEDURE — 82962 GLUCOSE BLOOD TEST: CPT

## 2017-06-25 PROCEDURE — 84100 ASSAY OF PHOSPHORUS: CPT | Performed by: INTERNAL MEDICINE

## 2017-06-25 PROCEDURE — 80048 BASIC METABOLIC PNL TOTAL CA: CPT | Performed by: INTERNAL MEDICINE

## 2017-06-25 PROCEDURE — 74011636637 HC RX REV CODE- 636/637: Performed by: HOSPITALIST

## 2017-06-25 PROCEDURE — 36415 COLL VENOUS BLD VENIPUNCTURE: CPT | Performed by: INTERNAL MEDICINE

## 2017-06-25 PROCEDURE — 83735 ASSAY OF MAGNESIUM: CPT | Performed by: INTERNAL MEDICINE

## 2017-06-25 PROCEDURE — 74011636637 HC RX REV CODE- 636/637: Performed by: INTERNAL MEDICINE

## 2017-06-25 RX ADMIN — METOPROLOL SUCCINATE 100 MG: 50 TABLET, EXTENDED RELEASE ORAL at 17:18

## 2017-06-25 RX ADMIN — ALLOPURINOL 300 MG: 300 TABLET ORAL at 10:30

## 2017-06-25 RX ADMIN — LEVOTHYROXINE SODIUM 137 MCG: 112 TABLET ORAL at 09:43

## 2017-06-25 RX ADMIN — INSULIN LISPRO 2 UNITS: 100 INJECTION, SOLUTION INTRAVENOUS; SUBCUTANEOUS at 07:30

## 2017-06-25 RX ADMIN — PRAVASTATIN SODIUM 80 MG: 40 TABLET ORAL at 22:13

## 2017-06-25 RX ADMIN — POTASSIUM CHLORIDE 20 MEQ: 750 TABLET, FILM COATED, EXTENDED RELEASE ORAL at 17:18

## 2017-06-25 RX ADMIN — KETOCONAZOLE: 20 CREAM TOPICAL at 09:00

## 2017-06-25 RX ADMIN — Medication 10 ML: at 22:13

## 2017-06-25 RX ADMIN — BUMETANIDE 2 MG: 0.25 INJECTION INTRAMUSCULAR; INTRAVENOUS at 17:18

## 2017-06-25 RX ADMIN — APIXABAN 5 MG: 5 TABLET, FILM COATED ORAL at 09:44

## 2017-06-25 RX ADMIN — CHLOROTHIAZIDE SODIUM 125 MG: 500 INJECTION, POWDER, LYOPHILIZED, FOR SOLUTION INTRAVENOUS at 11:51

## 2017-06-25 RX ADMIN — METOPROLOL SUCCINATE 100 MG: 50 TABLET, EXTENDED RELEASE ORAL at 09:44

## 2017-06-25 RX ADMIN — DILTIAZEM HYDROCHLORIDE 30 MG: 30 TABLET, FILM COATED ORAL at 12:25

## 2017-06-25 RX ADMIN — DILTIAZEM HYDROCHLORIDE 30 MG: 30 TABLET, FILM COATED ORAL at 22:13

## 2017-06-25 RX ADMIN — DUTASTERIDE 0.5 MG: 0.5 CAPSULE, LIQUID FILLED ORAL at 09:43

## 2017-06-25 RX ADMIN — DILTIAZEM HYDROCHLORIDE 30 MG: 30 TABLET, FILM COATED ORAL at 17:18

## 2017-06-25 RX ADMIN — CALCIUM CARBONATE 500 MG: 1250 TABLET ORAL at 17:18

## 2017-06-25 RX ADMIN — INSULIN LISPRO 25 UNITS: 100 INJECTION, SUSPENSION SUBCUTANEOUS at 09:43

## 2017-06-25 RX ADMIN — INSULIN LISPRO 27 UNITS: 100 INJECTION, SUSPENSION SUBCUTANEOUS at 17:17

## 2017-06-25 RX ADMIN — DILTIAZEM HYDROCHLORIDE 30 MG: 30 TABLET, FILM COATED ORAL at 09:43

## 2017-06-25 RX ADMIN — POTASSIUM CHLORIDE 20 MEQ: 750 TABLET, FILM COATED, EXTENDED RELEASE ORAL at 09:44

## 2017-06-25 RX ADMIN — TAMSULOSIN HYDROCHLORIDE 0.4 MG: 0.4 CAPSULE ORAL at 09:44

## 2017-06-25 RX ADMIN — INSULIN LISPRO 2 UNITS: 100 INJECTION, SOLUTION INTRAVENOUS; SUBCUTANEOUS at 12:25

## 2017-06-25 RX ADMIN — APIXABAN 5 MG: 5 TABLET, FILM COATED ORAL at 22:13

## 2017-06-25 RX ADMIN — BUMETANIDE 2 MG: 0.25 INJECTION INTRAMUSCULAR; INTRAVENOUS at 09:44

## 2017-06-25 RX ADMIN — VITAMIN D, TAB 1000IU (100/BT) 1000 UNITS: 25 TAB at 09:43

## 2017-06-25 RX ADMIN — INSULIN LISPRO 3 UNITS: 100 INJECTION, SOLUTION INTRAVENOUS; SUBCUTANEOUS at 17:17

## 2017-06-25 RX ADMIN — Medication 5 ML: at 14:00

## 2017-06-25 RX ADMIN — Medication 10 ML: at 06:55

## 2017-06-25 NOTE — PROGRESS NOTES
Hospitalist Progress Note    NAME: Moisés Doherty   :  1950   MRN:  [id number]       Assessment / Plan:  Interim Hospital Summary:        Acute on chronic diastolic heart failure causing Acute hypoxic respiratory failure  With fluid overload and worsening ckd  Chronic atrial fibrillation  Elevated troponin      -last Echo in 3/2017 with EF 55-60%  - seen by cardiology and nephrology  bumex iv 2mg q 12hours, diuril as tolerted  Cont apixaban, metoprolol and pravastatin, added diltiazem    Per cardiology notes- was scheduled for [facility] for further evaluation of severe pulmonary  Hypertension, not sure why cancelled  Plan for av ablation noted per the cardiology     Acute on ckd stage stage IV  -baseline cr on 3/2017 was 2.65, as per cardiology's outpt f/u note, cr ~3. This is likely due to volume overload  -follows with Dr Rima Villa, had labs done at the office  -follow cr, avoid nephrotoxin agents  -nephrology following  - bumex and diuril as above      T2DM  -check A1C 7.6  Optimize insulin as appropriate, SSI  PTA 26 units am and 34 units pm add 5 units for every 50 after 150, hold if BS <100  -diabetic diet     BPH  -cont' dutasteride, flomax     Hypothyroidism  -cont' synthroid     B/l LE wound  Wound care consulted   KERI with femoral and popliteal segments on the right side with PVD  KERI is unrealiable predictor of distal arterial perfusion as due to extensive arterial calcification  Moderate PVD    Morbid obesity     Code Status: Full  Surrogate Decision Maker: wife     DVT Prophylaxis: Eliquis  GI Prophylaxis: not indicated     Baseline: independent      Body mass index is 39.58 kg/(m^2). Needs HH     Subjective:     Chief Complaint / Reason for Physician Visit  Follow up on fluid overload and acute on chronic diastolic chf  I was not give my medications,     Discussed with RN events overnight.      Review of Systems:  Symptom Y/N Comments  Symptom Y/N Comments   Fever/Chills n   Chest Pain n Poor Appetite    Edema     Cough n   Abdominal Pain n    Sputum    Joint Pain     SOB/BETTS n   Pruritis/Rash     Nausea/vomit    Tolerating PT/OT     Diarrhea    Tolerating Diet     Constipation    Other       Could NOT obtain due to:      Objective:     VITALS:   Last 24hrs VS reviewed since prior progress note. Most recent are:  Patient Vitals for the past 24 hrs:   Temp Pulse Resp BP SpO2   06/25/17 1140 97.4 °F (36.3 °C) 95 18 144/77 91 %   06/25/17 0812 98.3 °F (36.8 °C) (!) 110 18 122/59 90 %   06/25/17 0356 98.6 °F (37 °C) 100 20 135/67 93 %   06/24/17 2300 98.9 °F (37.2 °C) 100 20 148/65 94 %   06/24/17 2025 98.7 °F (37.1 °C) 98 20 132/72 95 %   06/24/17 1504 98.1 °F (36.7 °C) (!) 114 20 143/78 91 %       Intake/Output Summary (Last 24 hours) at 06/25/17 1326  Last data filed at 06/25/17 0400   Gross per 24 hour   Intake              150 ml   Output             1825 ml   Net            -1675 ml        PHYSICAL EXAM:  General: WD, WN. Alert, cooperative,   no acute distress    EENT:  EOMI. Anicteric sclerae. MMM  Resp:  Bibasilar crackles  No accessory muscle use  CV:  Regular  Rhythm, 2+ edema  GI:  Soft, Non distended, Non tender.  +Bowel sounds  Neurologic:  Alert and oriented X 3, normal speech,   Psych:   Good insight. Not anxious nor agitated  Skin:  No rashes. No jaundice    Reviewed most current lab test results and cultures  YES  Reviewed most current radiology test results   YES  Review and summation of old records today    NO  Reviewed patient's current orders and MAR    YES  PMH/SH reviewed - no change compared to H&P  ________________________________________________________________________  Care Plan discussed with:    Comments   Patient y    Family      RN y    Care Manager     Consultant                        Multidiciplinary team rounds were held today with , nursing, pharmacist and clinical coordinator.   Patient's plan of care was discussed; medications were reviewed and discharge planning was addressed. ________________________________________________________________________  Total NON critical care TIME:  25 Minutes    Total CRITICAL CARE TIME Spent:   Minutes non procedure based      Comments   >50% of visit spent in counseling and coordination of care     ________________________________________________________________________  Mukesh Morgan MD     Procedures: see electronic medical records for all procedures/Xrays and details which were not copied into this note but were reviewed prior to creation of Plan. LABS:  I reviewed today's most current labs and imaging studies.   Pertinent labs include:  Recent Labs      06/24/17 0332 06/23/17 0156   WBC  7.7  6.9   HGB  11.8*  12.2   HCT  39.3  41.6   PLT  138*  137*     Recent Labs      06/25/17 0222 06/24/17 0332 06/23/17 0156   NA  140  141  141   K  3.8  3.5  4.2   CL  96*  98  101   CO2  39*  38*  34*   GLU  92  73  122*   BUN  84*  91*  98*   CREA  2.82*  3.15*  3.60*   CA  9.0  8.8  8.9  9.0   MG  2.3  2.3  2.7*   PHOS  3.0  4.0   --    ALB   --   2.9*   --    TBILI   --   0.7   --    SGOT   --   23   --    ALT   --   15   --        Signed: Mukesh Morgan MD

## 2017-06-25 NOTE — PROGRESS NOTES
Bedside shift change report given to Gary Sánchez (oncoming nurse) by Usama Aguilar RN (offgoing nurse).  Report included the following information SBAR. '

## 2017-06-25 NOTE — PROGRESS NOTES
[facility] Progress Note        NAME: Moisés Doherty       :  1950       MRN:  [id number]     Date/Time: 2017    Risk of deterioration: medium       Assessment:    Plan:  Volume overload/Anasarca  INO/CKD IV  Hx of CABG/CHF  Obesity  DM Nephropathy  Secondary PTH  Arrythymia Continue iv diuril prior to bumex  If no response, change scheduled bumex to bumex gtt  If no response, then UF  Watch CO2-at 39, creatinine improving    For EP eval tomorrow  Weight down 5 kg---  Seems to be responding to diuresis by weights--if accurate, he won't allow a fenton, and he won't save all his urine for measurement--    Added allopurinol for hyperuricemia   -     Subjective:     Chief Complaint: \"This room is no good\"    Review of Systems:everything is wrong, hard to pinpoint it to just one thing--the bed, the bathroom, the room--nothing is right, he's sob/his legs hurt and are swollen, etc.     Objective:     VITALS:   Last 24hrs VS reviewed since prior progress note.  Most recent are:  Visit Vitals    /77 (BP 1 Location: Left arm, BP Patient Position: Sitting)    Pulse 95    Temp 97.4 °F (36.3 °C)    Resp 18    Wt 121.6 kg (268 lb)    SpO2 91%    BMI 39.58 kg/m2     SpO2 Readings from Last 6 Encounters:   17 91%   17 93%   17 96%   17 93%   17 100%   17 94%    O2 Flow Rate (L/min): 2.5 l/min       Intake/Output Summary (Last 24 hours) at 17 1545  Last data filed at 17 0400   Gross per 24 hour   Intake              150 ml   Output             1825 ml   Net            -1675 ml         Telemetry Reviewed      PHYSICAL EXAM:    General   WDWN obese WM in NARD  Respiratory   Clear anteriorly, decreased bs in bases, poor pt effort  Cardiology  RRR  Abdominal  Obese, can't assess organomegaly  Extremities  (+) tight edema with bullae and venous stasis changes (chronic)--may be a little better  Psychological  Affect--flat     Lab Data Reviewed: (see below)    Medications Reviewed: (see below)    PMH/SH reviewed - no change compared to H&P  ________________________________________  ___________________________________________________    Attending Physician: Adeline Marcelo MD     ____________________________________________________  MEDICATIONS:  Current Facility-Administered Medications   Medication Dose Route Frequency    insulin lispro protamine/insulin lispro (HUMALOG MIX 75/25) injection 27 Units  27 Units SubCUTAneous ACB&D    allopurinol (ZYLOPRIM) tablet 300 mg  300 mg Oral DAILY    potassium chloride SR (KLOR-CON 10) tablet 20 mEq  20 mEq Oral BID    sorbitol 70 % solution 30 mL  30 mL Oral DAILY PRN    dilTIAZem (CARDIZEM) IR tablet 30 mg  30 mg Oral AC&HS    chlorothiazide (DIURIL) 125 mg in sterile water (preservative free) 4.5 mL injection  125 mg IntraVENous DAILY    metoprolol succinate (TOPROL-XL) XL tablet 100 mg  100 mg Oral BID    sodium chloride (NS) flush 5-10 mL  5-10 mL IntraVENous Q8H    sodium chloride (NS) flush 5-10 mL  5-10 mL IntraVENous PRN    ondansetron (ZOFRAN) injection 4 mg  4 mg IntraVENous Q4H PRN    bisacodyl (DULCOLAX) suppository 10 mg  10 mg Rectal DAILY PRN    acetaminophen (TYLENOL) tablet 1,000 mg  1,000 mg Oral Q6H PRN    apixaban (ELIQUIS) tablet 5 mg  5 mg Oral BID    calcium carbonate (OS-BERT) tablet 500 mg [elemental]  500 mg Oral EVERY TUES,THUR,SAT,SUN    cholecalciferol (VITAMIN D3) tablet 1,000 Units  1,000 Units Oral DAILY    dutasteride (AVODART) capsule 0.5 mg  0.5 mg Oral DAILY    ketoconazole (NIZORAL) 2 % cream   Topical DAILY    levothyroxine (SYNTHROID) tablet 137 mcg  137 mcg Oral ACB    pravastatin (PRAVACHOL) tablet 80 mg  80 mg Oral QHS    tamsulosin (FLOMAX) capsule 0.4 mg  0.4 mg Oral DAILY    bumetanide (BUMEX) injection 2 mg  2 mg IntraVENous Q12H    insulin lispro (HUMALOG) injection   SubCUTAneous AC&HS    glucose chewable tablet 16 g  4 Tab Oral PRN    glucagon (GLUCAGEN) injection 1 mg  1 mg IntraMUSCular PRN    dextrose 10% infusion 125-250 mL  125-250 mL IntraVENous PRN    senna-docusate (PERICOLACE) 8.6-50 mg per tablet 2 Tab  2 Tab Oral QHS        LABS:  Recent Labs      06/24/17 0332 06/23/17 0156   WBC  7.7  6.9   HGB  11.8*  12.2   HCT  39.3  41.6   PLT  138*  137*     Recent Labs      06/25/17   0222  06/24/17 0332 06/23/17 0156   NA  140  141  141   K  3.8  3.5  4.2   CL  96*  98  101   CO2  39*  38*  34*   BUN  84*  91*  98*   CREA  2.82*  3.15*  3.60*   GLU  92  73  122*   CA  9.0  8.8  8.9  9.0   MG  2.3  2.3  2.7*   PHOS  3.0  4.0   --    URICA   --   12.0*   --      Recent Labs      06/24/17 0332   SGOT  23   ALT  15   AP  44*   TBILI  0.7   TP  6.6   ALB  2.9*   GLOB  3.7     No results for input(s): INR, PTP, APTT in the last 72 hours. No lab exists for component: INREXT, INREXT   No results for input(s): FE, TIBC, PSAT, FERR in the last 72 hours. No results for input(s): PH, PCO2, PO2 in the last 72 hours.   Recent Labs      06/23/17 0156   TROIQ  0.42*     Lab Results   Component Value Date/Time    Glucose (POC) 197 06/25/2017 11:58 AM    Glucose (POC) 155 06/25/2017 09:11 AM    Glucose (POC) 159 06/24/2017 09:32 PM    Glucose (POC) 180 06/24/2017 04:40 PM    Glucose (POC) 132 06/24/2017 11:22 AM

## 2017-06-25 NOTE — PROGRESS NOTES
Bedside and Verbal shift change report given to Quintin Potts (oncoming nurse) by Khalida Ibanez RN (offgoing nurse). Report included the following information SBAR, Kardex, Intake/Output, MAR, Recent Results and Cardiac Rhythm AFIB.

## 2017-06-25 NOTE — PROGRESS NOTES
Cardiology Progress Note            [address]  [phone]    6/25/2017 12:44 PM    Admit Date: 6/22/2017    Admit Diagnosis: Diastolic heart failure (HCC)    Subjective:     Memo Hernandez  Is still tachy and sob    Visit Vitals    /77 (BP 1 Location: Left arm, BP Patient Position: Sitting)    Pulse 95    Temp 97.4 °F (36.3 °C)    Resp 18    Wt 268 lb (121.6 kg)    SpO2 91%    BMI 39.58 kg/m2     Current Facility-Administered Medications   Medication Dose Route Frequency    allopurinol (ZYLOPRIM) tablet 300 mg  300 mg Oral DAILY    potassium chloride SR (KLOR-CON 10) tablet 20 mEq  20 mEq Oral BID    sorbitol 70 % solution 30 mL  30 mL Oral DAILY PRN    dilTIAZem (CARDIZEM) IR tablet 30 mg  30 mg Oral AC&HS    chlorothiazide (DIURIL) 125 mg in sterile water (preservative free) 4.5 mL injection  125 mg IntraVENous DAILY    metoprolol succinate (TOPROL-XL) XL tablet 100 mg  100 mg Oral BID    sodium chloride (NS) flush 5-10 mL  5-10 mL IntraVENous Q8H    sodium chloride (NS) flush 5-10 mL  5-10 mL IntraVENous PRN    ondansetron (ZOFRAN) injection 4 mg  4 mg IntraVENous Q4H PRN    bisacodyl (DULCOLAX) suppository 10 mg  10 mg Rectal DAILY PRN    acetaminophen (TYLENOL) tablet 1,000 mg  1,000 mg Oral Q6H PRN    apixaban (ELIQUIS) tablet 5 mg  5 mg Oral BID    calcium carbonate (OS-BERT) tablet 500 mg [elemental]  500 mg Oral EVERY TUES,THUR,SAT,SUN    cholecalciferol (VITAMIN D3) tablet 1,000 Units  1,000 Units Oral DAILY    dutasteride (AVODART) capsule 0.5 mg  0.5 mg Oral DAILY    ketoconazole (NIZORAL) 2 % cream   Topical DAILY    levothyroxine (SYNTHROID) tablet 137 mcg  137 mcg Oral ACB    pravastatin (PRAVACHOL) tablet 80 mg  80 mg Oral QHS    tamsulosin (FLOMAX) capsule 0.4 mg  0.4 mg Oral DAILY    bumetanide (BUMEX) injection 2 mg  2 mg IntraVENous Q12H    insulin lispro (HUMALOG) injection   SubCUTAneous AC&HS    glucose chewable tablet 16 g  4 Tab Oral PRN    glucagon (GLUCAGEN) injection 1 mg  1 mg IntraMUSCular PRN    dextrose 10% infusion 125-250 mL  125-250 mL IntraVENous PRN    insulin lispro protamine/insulin lispro (HUMALOG MIX 75/25) injection 25 Units  25 Units SubCUTAneous ACB&D    senna-docusate (PERICOLACE) 8.6-50 mg per tablet 2 Tab  2 Tab Oral QHS         Objective:      Visit Vitals    /77 (BP 1 Location: Left arm, BP Patient Position: Sitting)    Pulse 95    Temp 97.4 °F (36.3 °C)    Resp 18    Wt 268 lb (121.6 kg)    SpO2 91%    BMI 39.58 kg/m2       Physical Exam:  Abdomen: soft, non-tender  Extremities: 3+ edema, weeping wounds dressing c/d/i  Heart: irr irr, tachy  Lungs: clear to auscultation bilaterally  Pulses: 2+ and symmetric    Data Review:   Labs:    Recent Labs      06/24/17   0332  06/23/17   0156   WBC  7.7  6.9   HGB  11.8*  12.2   HCT  39.3  41.6   PLT  138*  137*     Recent Labs      06/25/17   0222  06/24/17   0332  06/23/17   0156   NA  140  141  141   K  3.8  3.5  4.2   CL  96*  98  101   CO2  39*  38*  34*   GLU  92  73  122*   BUN  84*  91*  98*   CREA  2.82*  3.15*  3.60*   CA  9.0  8.8  8.9  9.0   MG  2.3  2.3  2.7*   PHOS  3.0  4.0   --    ALB   --   2.9*   --    TBILI   --   0.7   --    SGOT   --   23   --    ALT   --   15   --        Recent Labs      06/23/17   0156   TROIQ  0.42*         Intake/Output Summary (Last 24 hours) at 06/25/17 1244  Last data filed at 06/25/17 0400   Gross per 24 hour   Intake              390 ml   Output             2325 ml   Net            -1935 ml        Telemetry: afib with rvr    Assessment:     Active Problems:    SOB (shortness of breath) (3/7/2017)      Chronic a-fib (HCC) (3/7/2017)      CKD (chronic kidney disease) stage 4, GFR 15-29 ml/min ([facility]) (3/7/2017)      Hx of CABG (3/13/2017)      Overview: CABG in 2010 in [place]      Morbid obesity ([facility] (3/13/2017)      Diabetes mellitus type 2, controlled ([facility] (3/13/2017)      Fatigue (3/15/2017)      Bilateral leg edema (4/29/0609)      Diastolic heart failure ([address] (6/22/2017)      JACEY (obstructive sleep apnea) (6/23/2017)        Plan:     Lennox Ken is in afib with rvr on dilt and bid toprol. His lvef is 40-45. He would likely benefit from av node ablation/biv pacemaker. I discussed the risks/benefits/alternatives of the procedure with the patient. Risks include (but are not limited to) bleeding, heart block, infection, cva/mi/tamponade/death. The patient understands and agrees to proceed. Will get wound care to see him tmrw. If wound care states that his wounds are improving, will proceed tmrw. Thank you for this interesting consultation.       Arianna Caraballo MD, [facility]    6/25/2017

## 2017-06-26 ENCOUNTER — ANESTHESIA (OUTPATIENT)
Dept: NON INVASIVE DIAGNOSTICS | Age: 67
DRG: 242 | End: 2017-06-26
Payer: MEDICARE

## 2017-06-26 ENCOUNTER — TELEPHONE (OUTPATIENT)
Dept: SLEEP MEDICINE | Age: 67
End: 2017-06-26

## 2017-06-26 ENCOUNTER — ANESTHESIA EVENT (OUTPATIENT)
Dept: NON INVASIVE DIAGNOSTICS | Age: 67
DRG: 242 | End: 2017-06-26
Payer: MEDICARE

## 2017-06-26 ENCOUNTER — APPOINTMENT (OUTPATIENT)
Dept: GENERAL RADIOLOGY | Age: 67
DRG: 242 | End: 2017-06-26
Attending: INTERNAL MEDICINE
Payer: MEDICARE

## 2017-06-26 PROBLEM — Z98.890 H/O ATRIOVENTRICULAR NODAL ABLATION: Status: ACTIVE | Noted: 2017-06-26

## 2017-06-26 PROBLEM — Z95.0 PACEMAKER: Status: ACTIVE | Noted: 2017-06-26

## 2017-06-26 LAB
ANION GAP BLD CALC-SCNC: 6 MMOL/L (ref 5–15)
BUN SERPL-MCNC: 84 MG/DL (ref 6–20)
BUN/CREAT SERPL: 29 (ref 12–20)
CALCIUM SERPL-MCNC: 9 MG/DL (ref 8.5–10.1)
CHLORIDE SERPL-SCNC: 96 MMOL/L (ref 97–108)
CO2 SERPL-SCNC: 39 MMOL/L (ref 21–32)
CREAT SERPL-MCNC: 2.86 MG/DL (ref 0.7–1.3)
ERYTHROCYTE [DISTWIDTH] IN BLOOD BY AUTOMATED COUNT: 18.3 % (ref 11.5–14.5)
GLUCOSE BLD STRIP.AUTO-MCNC: 145 MG/DL (ref 65–100)
GLUCOSE BLD STRIP.AUTO-MCNC: 159 MG/DL (ref 65–100)
GLUCOSE BLD STRIP.AUTO-MCNC: 204 MG/DL (ref 65–100)
GLUCOSE SERPL-MCNC: 163 MG/DL (ref 65–100)
HCT VFR BLD AUTO: 39.8 % (ref 36.6–50.3)
HGB BLD-MCNC: 11.9 G/DL (ref 12.1–17)
MAGNESIUM SERPL-MCNC: 2.1 MG/DL (ref 1.6–2.4)
MCH RBC QN AUTO: 24.7 PG (ref 26–34)
MCHC RBC AUTO-ENTMCNC: 29.9 G/DL (ref 30–36.5)
MCV RBC AUTO: 82.7 FL (ref 80–99)
PHOSPHATE SERPL-MCNC: 3.1 MG/DL (ref 2.6–4.7)
PLATELET # BLD AUTO: 143 K/UL (ref 150–400)
POTASSIUM SERPL-SCNC: 4.2 MMOL/L (ref 3.5–5.1)
RBC # BLD AUTO: 4.81 M/UL (ref 4.1–5.7)
SERVICE CMNT-IMP: ABNORMAL
SODIUM SERPL-SCNC: 141 MMOL/L (ref 136–145)
WBC # BLD AUTO: 6.5 K/UL (ref 4.1–11.1)

## 2017-06-26 PROCEDURE — 74011250636 HC RX REV CODE- 250/636: Performed by: INTERNAL MEDICINE

## 2017-06-26 PROCEDURE — 77030016894 HC CBL EP DX CATH3 STJU -B

## 2017-06-26 PROCEDURE — 74011250637 HC RX REV CODE- 250/637: Performed by: INTERNAL MEDICINE

## 2017-06-26 PROCEDURE — C1769 GUIDE WIRE: HCPCS

## 2017-06-26 PROCEDURE — 85027 COMPLETE CBC AUTOMATED: CPT | Performed by: INTERNAL MEDICINE

## 2017-06-26 PROCEDURE — C2621 PMKR, OTHER THAN SING/DUAL: HCPCS

## 2017-06-26 PROCEDURE — 65270000029 HC RM PRIVATE

## 2017-06-26 PROCEDURE — C1894 INTRO/SHEATH, NON-LASER: HCPCS

## 2017-06-26 PROCEDURE — C1751 CATH, INF, PER/CENT/MIDLINE: HCPCS

## 2017-06-26 PROCEDURE — 74011250636 HC RX REV CODE- 250/636

## 2017-06-26 PROCEDURE — 74011000250 HC RX REV CODE- 250

## 2017-06-26 PROCEDURE — 82962 GLUCOSE BLOOD TEST: CPT

## 2017-06-26 PROCEDURE — 74011000250 HC RX REV CODE- 250: Performed by: INTERNAL MEDICINE

## 2017-06-26 PROCEDURE — C1730 CATH, EP, 19 OR FEW ELECT: HCPCS

## 2017-06-26 PROCEDURE — C1887 CATHETER, GUIDING: HCPCS

## 2017-06-26 PROCEDURE — L3670 SO ACRO/CLAV CAN WEB PRE OTS: HCPCS

## 2017-06-26 PROCEDURE — 77030018729 HC ELECTRD DEFIB PAD CARD -B

## 2017-06-26 PROCEDURE — C1898 LEAD, PMKR, OTHER THAN TRANS: HCPCS

## 2017-06-26 PROCEDURE — 74011636637 HC RX REV CODE- 636/637: Performed by: HOSPITALIST

## 2017-06-26 PROCEDURE — 80048 BASIC METABOLIC PNL TOTAL CA: CPT | Performed by: INTERNAL MEDICINE

## 2017-06-26 PROCEDURE — 84100 ASSAY OF PHOSPHORUS: CPT | Performed by: INTERNAL MEDICINE

## 2017-06-26 PROCEDURE — 74011636320 HC RX REV CODE- 636/320: Performed by: INTERNAL MEDICINE

## 2017-06-26 PROCEDURE — 93613 INTRACARDIAC EPHYS 3D MAPG: CPT

## 2017-06-26 PROCEDURE — 71010 XR CHEST PORT: CPT

## 2017-06-26 PROCEDURE — 77030030806 HC PTCH ENSIT NAVX STJU -G

## 2017-06-26 PROCEDURE — 77010033678 HC OXYGEN DAILY

## 2017-06-26 PROCEDURE — 77030012468 HC VLV BLEEDBK CNTRL ABBT -B

## 2017-06-26 PROCEDURE — 77030031139 HC SUT VCRL2 J&J -A

## 2017-06-26 PROCEDURE — 77030018836 HC SOL IRR NACL ICUM -A

## 2017-06-26 PROCEDURE — C1733 CATH, EP, OTHR THAN COOL-TIP: HCPCS

## 2017-06-26 PROCEDURE — C1900 LEAD, CORONARY VENOUS: HCPCS

## 2017-06-26 PROCEDURE — 77030011640 HC PAD GRND REM COVD -A

## 2017-06-26 PROCEDURE — 77030002996 HC SUT SLK J&J -A

## 2017-06-26 PROCEDURE — 99153 MOD SED SAME PHYS/QHP EA: CPT

## 2017-06-26 PROCEDURE — 99152 MOD SED SAME PHYS/QHP 5/>YRS: CPT

## 2017-06-26 PROCEDURE — 36415 COLL VENOUS BLD VENIPUNCTURE: CPT | Performed by: INTERNAL MEDICINE

## 2017-06-26 PROCEDURE — 83735 ASSAY OF MAGNESIUM: CPT | Performed by: INTERNAL MEDICINE

## 2017-06-26 PROCEDURE — C1892 INTRO/SHEATH,FIXED,PEEL-AWAY: HCPCS

## 2017-06-26 PROCEDURE — 77030004964 HC CBL EP ABLAT BSC -C

## 2017-06-26 PROCEDURE — 77030029065 HC DRSG HEMO QCLOT ZMED -B

## 2017-06-26 PROCEDURE — 74011636637 HC RX REV CODE- 636/637: Performed by: INTERNAL MEDICINE

## 2017-06-26 RX ORDER — MIDAZOLAM HYDROCHLORIDE 1 MG/ML
1-5 INJECTION, SOLUTION INTRAMUSCULAR; INTRAVENOUS
Status: DISCONTINUED | OUTPATIENT
Start: 2017-06-26 | End: 2017-06-27 | Stop reason: HOSPADM

## 2017-06-26 RX ORDER — SODIUM CHLORIDE 9 MG/ML
INJECTION, SOLUTION INTRAVENOUS
Status: DISCONTINUED | OUTPATIENT
Start: 2017-06-26 | End: 2017-06-26 | Stop reason: HOSPADM

## 2017-06-26 RX ORDER — MIDAZOLAM HYDROCHLORIDE 1 MG/ML
INJECTION, SOLUTION INTRAMUSCULAR; INTRAVENOUS AS NEEDED
Status: DISCONTINUED | OUTPATIENT
Start: 2017-06-26 | End: 2017-06-26 | Stop reason: HOSPADM

## 2017-06-26 RX ORDER — HEPARIN SODIUM 200 [USP'U]/100ML
1500 INJECTION, SOLUTION INTRAVENOUS ONCE
Status: COMPLETED | OUTPATIENT
Start: 2017-06-26 | End: 2017-06-26

## 2017-06-26 RX ORDER — DOBUTAMINE HYDROCHLORIDE 200 MG/100ML
2.5-1 INJECTION INTRAVENOUS
Status: DISCONTINUED | OUTPATIENT
Start: 2017-06-26 | End: 2017-06-27 | Stop reason: HOSPADM

## 2017-06-26 RX ORDER — DOBUTAMINE HYDROCHLORIDE 200 MG/100ML
INJECTION INTRAVENOUS
Status: COMPLETED
Start: 2017-06-26 | End: 2017-06-26

## 2017-06-26 RX ORDER — FENTANYL CITRATE 50 UG/ML
INJECTION, SOLUTION INTRAMUSCULAR; INTRAVENOUS
Status: DISPENSED
Start: 2017-06-26 | End: 2017-06-26

## 2017-06-26 RX ORDER — HEPARIN SODIUM 200 [USP'U]/100ML
INJECTION, SOLUTION INTRAVENOUS
Status: DISPENSED
Start: 2017-06-26 | End: 2017-06-26

## 2017-06-26 RX ORDER — NALOXONE HYDROCHLORIDE 0.4 MG/ML
0.4 INJECTION, SOLUTION INTRAMUSCULAR; INTRAVENOUS; SUBCUTANEOUS AS NEEDED
Status: DISCONTINUED | OUTPATIENT
Start: 2017-06-26 | End: 2017-06-27 | Stop reason: HOSPADM

## 2017-06-26 RX ORDER — KETAMINE HYDROCHLORIDE 100 MG/ML
INJECTION, SOLUTION INTRAMUSCULAR; INTRAVENOUS
Status: DISPENSED
Start: 2017-06-26 | End: 2017-06-26

## 2017-06-26 RX ORDER — FENTANYL CITRATE 50 UG/ML
INJECTION, SOLUTION INTRAMUSCULAR; INTRAVENOUS AS NEEDED
Status: DISCONTINUED | OUTPATIENT
Start: 2017-06-26 | End: 2017-06-26 | Stop reason: HOSPADM

## 2017-06-26 RX ORDER — SODIUM CHLORIDE 900 MG/100ML
INJECTION INTRAVENOUS
Status: DISCONTINUED
Start: 2017-06-26 | End: 2017-06-27 | Stop reason: HOSPADM

## 2017-06-26 RX ORDER — PROPOFOL 10 MG/ML
INJECTION, EMULSION INTRAVENOUS
Status: DISCONTINUED | OUTPATIENT
Start: 2017-06-26 | End: 2017-06-26 | Stop reason: HOSPADM

## 2017-06-26 RX ORDER — LIDOCAINE HYDROCHLORIDE 10 MG/ML
INJECTION INFILTRATION; PERINEURAL
Status: COMPLETED
Start: 2017-06-26 | End: 2017-06-26

## 2017-06-26 RX ORDER — MIDAZOLAM HYDROCHLORIDE 1 MG/ML
INJECTION, SOLUTION INTRAMUSCULAR; INTRAVENOUS
Status: DISPENSED
Start: 2017-06-26 | End: 2017-06-26

## 2017-06-26 RX ORDER — LIDOCAINE HYDROCHLORIDE 10 MG/ML
1-40 INJECTION INFILTRATION; PERINEURAL
Status: DISCONTINUED | OUTPATIENT
Start: 2017-06-26 | End: 2017-06-27 | Stop reason: HOSPADM

## 2017-06-26 RX ORDER — SODIUM CHLORIDE 0.9 % (FLUSH) 0.9 %
5-10 SYRINGE (ML) INJECTION AS NEEDED
Status: DISCONTINUED | OUTPATIENT
Start: 2017-06-26 | End: 2017-06-27 | Stop reason: HOSPADM

## 2017-06-26 RX ORDER — FENTANYL CITRATE 50 UG/ML
12.5-5 INJECTION, SOLUTION INTRAMUSCULAR; INTRAVENOUS
Status: DISCONTINUED | OUTPATIENT
Start: 2017-06-26 | End: 2017-06-27 | Stop reason: HOSPADM

## 2017-06-26 RX ORDER — KETAMINE HYDROCHLORIDE 10 MG/ML
INJECTION, SOLUTION INTRAMUSCULAR; INTRAVENOUS AS NEEDED
Status: DISCONTINUED | OUTPATIENT
Start: 2017-06-26 | End: 2017-06-26 | Stop reason: HOSPADM

## 2017-06-26 RX ORDER — SODIUM CHLORIDE 0.9 % (FLUSH) 0.9 %
5-10 SYRINGE (ML) INJECTION EVERY 8 HOURS
Status: DISCONTINUED | OUTPATIENT
Start: 2017-06-26 | End: 2017-06-27 | Stop reason: HOSPADM

## 2017-06-26 RX ORDER — BACITRACIN 50000 [IU]/1
INJECTION, POWDER, FOR SOLUTION INTRAMUSCULAR
Status: COMPLETED
Start: 2017-06-26 | End: 2017-06-26

## 2017-06-26 RX ORDER — METOPROLOL SUCCINATE 50 MG/1
100 TABLET, EXTENDED RELEASE ORAL DAILY
Status: DISCONTINUED | OUTPATIENT
Start: 2017-06-27 | End: 2017-06-27

## 2017-06-26 RX ORDER — HEPARIN SODIUM 200 [USP'U]/100ML
INJECTION, SOLUTION INTRAVENOUS
Status: COMPLETED
Start: 2017-06-26 | End: 2017-06-26

## 2017-06-26 RX ORDER — BACITRACIN 50000 [IU]/1
50000 INJECTION, POWDER, FOR SOLUTION INTRAMUSCULAR ONCE
Status: COMPLETED | OUTPATIENT
Start: 2017-06-26 | End: 2017-06-26

## 2017-06-26 RX ORDER — VANCOMYCIN HYDROCHLORIDE 1 G/20ML
INJECTION, POWDER, LYOPHILIZED, FOR SOLUTION INTRAVENOUS
Status: DISCONTINUED
Start: 2017-06-26 | End: 2017-06-27 | Stop reason: HOSPADM

## 2017-06-26 RX ADMIN — INSULIN LISPRO 3 UNITS: 100 INJECTION, SOLUTION INTRAVENOUS; SUBCUTANEOUS at 16:55

## 2017-06-26 RX ADMIN — INSULIN LISPRO 2 UNITS: 100 INJECTION, SOLUTION INTRAVENOUS; SUBCUTANEOUS at 13:58

## 2017-06-26 RX ADMIN — FENTANYL CITRATE 50 MCG: 50 INJECTION, SOLUTION INTRAMUSCULAR; INTRAVENOUS at 10:59

## 2017-06-26 RX ADMIN — BACITRACIN 50000 UNITS: 5000 INJECTION, POWDER, FOR SOLUTION INTRAMUSCULAR at 11:35

## 2017-06-26 RX ADMIN — LIDOCAINE HYDROCHLORIDE 10 ML: 10 INJECTION INFILTRATION; PERINEURAL at 11:53

## 2017-06-26 RX ADMIN — SODIUM CHLORIDE: 9 INJECTION, SOLUTION INTRAVENOUS at 10:48

## 2017-06-26 RX ADMIN — CHLOROTHIAZIDE SODIUM 125 MG: 500 INJECTION, POWDER, LYOPHILIZED, FOR SOLUTION INTRAVENOUS at 13:51

## 2017-06-26 RX ADMIN — ALLOPURINOL 300 MG: 300 TABLET ORAL at 13:50

## 2017-06-26 RX ADMIN — BACITRACIN 50000 UNITS: 50000 INJECTION, POWDER, FOR SOLUTION INTRAMUSCULAR at 11:35

## 2017-06-26 RX ADMIN — VITAMIN D, TAB 1000IU (100/BT) 1000 UNITS: 25 TAB at 13:50

## 2017-06-26 RX ADMIN — DOBUTAMINE HYDROCHLORIDE 5 MCG/KG/MIN: 200 INJECTION INTRAVENOUS at 11:59

## 2017-06-26 RX ADMIN — KETAMINE HYDROCHLORIDE 10 MG: 10 INJECTION, SOLUTION INTRAMUSCULAR; INTRAVENOUS at 10:59

## 2017-06-26 RX ADMIN — Medication 10 ML: at 22:08

## 2017-06-26 RX ADMIN — APIXABAN 5 MG: 5 TABLET, FILM COATED ORAL at 22:08

## 2017-06-26 RX ADMIN — DUTASTERIDE 0.5 MG: 0.5 CAPSULE, LIQUID FILLED ORAL at 13:59

## 2017-06-26 RX ADMIN — INSULIN LISPRO 27 UNITS: 100 INJECTION, SUSPENSION SUBCUTANEOUS at 16:30

## 2017-06-26 RX ADMIN — LIDOCAINE HYDROCHLORIDE 10 ML: 10 INJECTION, SOLUTION INFILTRATION; PERINEURAL at 11:53

## 2017-06-26 RX ADMIN — LIDOCAINE HYDROCHLORIDE 30 ML: 10 INJECTION, SOLUTION INFILTRATION; PERINEURAL at 11:16

## 2017-06-26 RX ADMIN — POTASSIUM CHLORIDE 20 MEQ: 750 TABLET, FILM COATED, EXTENDED RELEASE ORAL at 13:50

## 2017-06-26 RX ADMIN — TAMSULOSIN HYDROCHLORIDE 0.4 MG: 0.4 CAPSULE ORAL at 13:50

## 2017-06-26 RX ADMIN — PROPOFOL 25 MCG/KG/MIN: 10 INJECTION, EMULSION INTRAVENOUS at 11:00

## 2017-06-26 RX ADMIN — IOPAMIDOL 40 ML: 755 INJECTION, SOLUTION INTRAVENOUS at 11:27

## 2017-06-26 RX ADMIN — Medication 10 ML: at 06:50

## 2017-06-26 RX ADMIN — VANCOMYCIN HYDROCHLORIDE 1000 MG: 1 INJECTION, POWDER, LYOPHILIZED, FOR SOLUTION INTRAVENOUS at 11:00

## 2017-06-26 RX ADMIN — Medication 10 ML: at 14:05

## 2017-06-26 RX ADMIN — HEPARIN SODIUM 3000 UNITS: 200 INJECTION, SOLUTION INTRAVENOUS at 11:25

## 2017-06-26 RX ADMIN — LIDOCAINE HYDROCHLORIDE 30 ML: 10 INJECTION INFILTRATION; PERINEURAL at 11:16

## 2017-06-26 RX ADMIN — MIDAZOLAM HYDROCHLORIDE 2 MG: 1 INJECTION, SOLUTION INTRAMUSCULAR; INTRAVENOUS at 10:59

## 2017-06-26 RX ADMIN — DOBUTAMINE HYDROCHLORIDE 5 MCG/KG/MIN: 200 INJECTION INTRAVENOUS at 12:05

## 2017-06-26 RX ADMIN — BUMETANIDE 2 MG: 0.25 INJECTION INTRAMUSCULAR; INTRAVENOUS at 16:55

## 2017-06-26 RX ADMIN — PRAVASTATIN SODIUM 80 MG: 40 TABLET ORAL at 22:08

## 2017-06-26 NOTE — PROGRESS NOTES
Therapy has recommended home health and consult placed to Gina Nunez as patient requested. Will follow for needs may change to rehab placement. Consult placed to James Phillips.

## 2017-06-26 NOTE — PROCEDURES
[address] [place] [address]  [phone]    Indications and Pre-Procedure Diagnosis:  Chau Wilkins is a 75 y.o. male with CHF, cardiomyopathy and chronic AF with rvr is referred for BiV pacemaker. The left ventricular ejection fraction is 40-45% and the patient is NYHA Class III/IV. Post Procedure Diagnosis:    cardiomyopathy  CHF  afib with tachycardia    BIV-Pacemaker Implant Procedure and Findings:  Informed consent was obtained and the patient was premedicated with vancomycin. The procedure was performed under local anesthesia. Continuous pulse oximetry and cuff pressure were monitored. During the procedure, the patient received Versed, Fentanyl and Propofol for sedation per anesthesia personnel. The left deltopectoral area was prepped and draped in the usual sterile fashion and was liberally infiltrated with 1% lidocaine. An incision was made over the left subpectoral area and a generator pocket was manually dissected. Access was achieved in the left axillary vein under fluoroscopic guidance and using the seldinger technique. Through the left axillary vein, pacing leads were positioned in appropriate regions in the right heart chambers where satisfactory pacing and sensing parameters were measured. A venogram was performed to assess patency of the coronary sinus, and a pacing lead was positioned appropriately to stimulate the left ventricle. Stability of the leads was assessed with deep breathing and there was no diaphragmatic pacing at 10V output. The leads were anchored using the sleeves and a pulse generator pocket fashioned using blunt dissection. The leads were then connected to the pulse generator. The pulse generator pocket was then liberally infiltrated with bacitracin solution, and the device implanted with a single silk fixation suture in the header to prevent migration.  The wound was closed in layers using continuous 2-0 Vicryl and 4-0 Vicryl ending with a sub-cuticular closure. Fluoroscopy and total procedure times were 3 and 45 minutes respectively. Estimated blood loss <10 ml. Sharp count: correct. Specimen(s) collected: none. The following procedure related complication occurred: none. The following problems were encountered: none. Findings: successful bi-ventricular pacemaker placement.     Device Data Measurements:  Lead Sensing (mV) Threshold (V)Pulse Width (ms) Impedance (Ohms)    RV 25  0.6  0.5   888  CS 11.1  1.3  0.5   1781       Final Programmed Parameters  Bradycardia pacing rate  70 bpm  Pacing Mode    VVIR  Pacing Output    3.5 V@ 0.5 ms (RV) 3.5 V@ 0.5 (LV)    Supplies Summary available in the chart    Karl Guzman MD, [facility]

## 2017-06-26 NOTE — PROGRESS NOTES
Attempted to see pt this pm and pt is on bed rest.  Will continue to follow and tx when bed rest order is d/c'd and when cleared by nursing.

## 2017-06-26 NOTE — ANESTHESIA PREPROCEDURE EVALUATION
Anesthetic History   No history of anesthetic complications            Review of Systems / Medical History  Patient summary reviewed, nursing notes reviewed and pertinent labs reviewed    Pulmonary        Sleep apnea: BiPAP  Shortness of breath         Neuro/Psych   Within defined limits           Cardiovascular            Dysrhythmias : atrial fibrillation  Past MI (2010), CAD, CABG (2010) and hyperlipidemia    Exercise tolerance: <4 METS  Comments: Ejection fraction was estimated in the range of 40 % to 45 %.    GI/Hepatic/Renal         Renal disease: CRI       Endo/Other    Diabetes: using insulin    Morbid obesity and anemia     Other Findings              Physical Exam    Airway  Mallampati: II  TM Distance: 4 - 6 cm  Neck ROM: normal range of motion   Mouth opening: Normal     Cardiovascular  Regular rate and rhythm,  S1 and S2 normal,  no murmur, click, rub, or gallop             Dental  No notable dental hx       Pulmonary  Breath sounds clear to auscultation               Abdominal  GI exam deferred       Other Findings            Anesthetic Plan    ASA: 4  Anesthesia type: total IV anesthesia and MAC          Induction: Intravenous  Anesthetic plan and risks discussed with: Patient

## 2017-06-26 NOTE — WOUND CARE
Wound care consulted to re-assess patients BLE wounds by Dr Jayson Cortes to determine if they were infected. Infection would prevent patient from getting an EP procedure today. Please see WC note on 6/22/17 for pt and wound hx. Patients wounds do not appear infected and we have been using Aquacell-AG since admit 6/22/17. Wounds appear partial thickness, scant amount of sero-sang drainage. WOUND POA CONDITIONS        Wound Leg Left; Lower;Medial (Active)   DRESSING STATUS Changed per order 6/26/2017  9:19 AM   DRESSING TYPE Gauze wrap (kami) 6/25/2017  4:06 PM   Non-Pressure Injury Partial thickness (epider/derm) 6/25/2017  4:06 PM   Wound Length (cm) 12 cm 6/22/2017  2:03 PM   Wound Width (cm) 12 cm 6/22/2017  2:03 PM   Wound Depth (cm) 0.1 6/22/2017  2:03 PM   Wound Surface area (cm^3) 14.4 cm^2 6/22/2017  2:03 PM   Condition of Base [place] 6/25/2017  4:06 PM   Condition of Edges Open 6/22/2017  2:03 PM   Tissue Type Pink 6/25/2017  4:06 PM   Drainage Amount  Scant 6/26/2017  9:19 AM   Drainage Color Serosanguinous 6/26/2017  9:19 AM   Wound Odor None 6/26/2017  9:19 AM   Periwound Skin Condition Edematous 6/26/2017  9:19 AM   Cleansing and Cleansing Agents  Normal saline 6/26/2017  9:19 AM   Dressing Type Applied Wound gel;Silver products 6/26/2017  9:19 AM   Procedure Tolerated Well 6/26/2017  9:19 AM   Number of days:4       Wound Leg Right; Lower; Anterior (Active)   DRESSING STATUS Changed per order 6/26/2017  9:19 AM   DRESSING TYPE Gauze wrap (kami); Silver products 6/25/2017  4:06 PM   Non-Pressure Injury Partial thickness (epider/derm) 6/25/2017  4:06 PM   Wound Length (cm) 1.5 cm 6/26/2017  9:19 AM   Wound Width (cm) 1 cm 6/26/2017  9:19 AM   Wound Depth (cm) 0.1 6/26/2017  9:19 AM   Wound Surface area (cm^3) 0.15 cm^2 6/26/2017  9:19 AM   Change in Wound Size % 62.5 6/26/2017  9:19 AM   Condition of Base [place] 6/26/2017  9:19 AM   Condition of Edges Open 6/22/2017  2:03 PM   Tissue Type Pink 6/25/2017  4:06 PM Drainage Amount  Scant 6/26/2017  9:19 AM   Drainage Color Serosanguinous 6/26/2017  9:19 AM   Wound Odor None 6/26/2017  9:19 AM   Periwound Skin Condition Edematous 6/26/2017  9:19 AM   Cleansing and Cleansing Agents  Normal saline 6/26/2017  9:19 AM   Dressing Type Applied Wound gel;Silver products 6/26/2017  9:19 AM   Procedure Tolerated Well 6/26/2017  9:19 AM   Number of days:4           Wound Care nurse changed dressings per orders.   Damion Quintanilla, RN, CWON, zone ph# [phone]

## 2017-06-26 NOTE — PROCEDURES
[address] [facility] [place], [address] [place] [address]  [phone]    Indications and Pre-Procedure Diagnosis:  Stevan Guerra is a 75 y.o. male with AF with rvr, cardiomyopathy and CHF is referred for electr-physiologic evaluation and intervention. The left ventricular ejection fraction is 40-45% and the patient is NYHA Class III/IV. Post Procedure Diagnosis    Atrial fibrillation with rvr  Cardiomyopathy  CHF    AV Luz Maria Ablation Procedure  After informed consent was obtained, the patient was brought back to the EP lab in fasting condition. The presenting rhythm was atrial fibrillation. The patient received Versed and Fentanyl for conscious sedation per nursing personnel. Venous sheaths were placed in the right femoral vein using sterile Seldinger technique. Mapping was performed using standard catheter-based techniques and 3-D electro-anatomic mapping. A quadrapolar catheter was placed in the His position for mapping. An Blazer 8F/10mm Large Curve ablation catheter was advanced to the AV junction and 2 RF lesions totaling 4 minutes were applied resulting in complete heart block. Dobutamine at 5 mcg/min was started and no increased ventricular rates were noted. A slow ventricular escape rhythm of 40 bpm was present. Rapid atrial pacing to 200 ms, on and off dobutamine, demonstrated antegrade AV block. Rapid ventricular pacing to 600 ms, on and off dobutamine, demonstrated retrograde VA block. At the end of the procedure, catheters and sheaths were removed and manual compression held for hemostasis. Fluoroscopy and total procedure times were 3 and 30 minutes respectively. Estimated blood loss: < 10 ml. Sharp counts: correct. Specimen (s) collected: none. The following procedure related complication occurred: none. The following problems were encountered: none.     Conduction Intervals (ms)  H-V QRS Q-T R-R   59 46 132 483     AV Luz Maria Conduction    VA Block when pacing at 600 ms    AV Wenckebach when pacing at 200 ms      Findings and Summary: This study demonstrates:  1. Successful AV node ablation    Recommendations:    1. [address]  2. VVIR 75 bpm      Thank you for allowing me to participate in this patients care.     Cruz Felix MD, Tara Salinas

## 2017-06-26 NOTE — PROGRESS NOTES
Cardiology Progress Note            [address], [place], [address] [facility]  [phone]    6/26/2017 12:08 PM    Admit Date: 6/22/2017    Admit Diagnosis: Diastolic heart failure [facility])    Subjective:     Cassandra Gray   denies chest pain.  Appreciate wound care input - no ulcers presents - nl wbc and afebrile    Visit Vitals    /65 (BP 1 Location: Left arm, BP Patient Position: At rest)    Pulse 83    Temp 98.2 °F (36.8 °C)    Resp 18    Wt 266 lb 15.6 oz (121.1 kg)    SpO2 99%    BMI 39.43 kg/m2     Current Facility-Administered Medications   Medication Dose Route Frequency    DOBUTamine (DOBUTREX) 500 mg/250 mL (2,000 mcg/mL) infusion  2.5-10 mcg/kg/min IntraVENous TITRATE    vancomycin (VANCOCIN) 1,000 mg in 0.9% sodium chloride (MBP/ADV) 250 mL  1,000 mg IntraVENous ONCE    fentaNYL citrate (PF) injection 12.5-50 mcg  12.5-50 mcg IntraVENous Multiple    midazolam (VERSED) injection 1-5 mg  1-5 mg IntraVENous Multiple    lidocaine (XYLOCAINE) 10 mg/mL (1 %) injection 1-40 mL  1-40 mL SubCUTAneous Multiple    ADDaptor        vancomycin (VANCOCIN) 1,000 mg injection        0.9% sodium chloride (MBP/ADV) 0.9 % infusion        fentaNYL citrate (PF) 50 mcg/mL injection        midazolam (VERSED) 1 mg/mL injection        ketamine (KETALAR) 100 mg/mL injection        heparinized saline 2 units/mL 1,000 unit/500 mL infusion        sodium chloride (NS) flush 5-10 mL  5-10 mL IntraVENous Q8H    sodium chloride (NS) flush 5-10 mL  5-10 mL IntraVENous PRN    naloxone (NARCAN) injection 0.4 mg  0.4 mg IntraVENous PRN    [START ON 6/27/2017] vancomycin (VANCOCIN) 1,000 mg in 0.9% sodium chloride (MBP/ADV) 250 mL  1 g IntraVENous ONCE    insulin lispro protamine/insulin lispro (HUMALOG MIX 75/25) injection 27 Units  27 Units SubCUTAneous ACB&D    allopurinol (ZYLOPRIM) tablet 300 mg  300 mg Oral DAILY    potassium chloride SR (KLOR-CON 10) tablet 20 mEq  20 mEq Oral BID    sorbitol 70 % solution 30 mL  30 mL Oral DAILY PRN    dilTIAZem (CARDIZEM) IR tablet 30 mg  30 mg Oral AC&HS    chlorothiazide (DIURIL) 125 mg in sterile water (preservative free) 4.5 mL injection  125 mg IntraVENous DAILY    metoprolol succinate (TOPROL-XL) XL tablet 100 mg  100 mg Oral BID    sodium chloride (NS) flush 5-10 mL  5-10 mL IntraVENous Q8H    sodium chloride (NS) flush 5-10 mL  5-10 mL IntraVENous PRN    ondansetron (ZOFRAN) injection 4 mg  4 mg IntraVENous Q4H PRN    bisacodyl (DULCOLAX) suppository 10 mg  10 mg Rectal DAILY PRN    acetaminophen (TYLENOL) tablet 1,000 mg  1,000 mg Oral Q6H PRN    apixaban (ELIQUIS) tablet 5 mg  5 mg Oral BID    calcium carbonate (OS-BERT) tablet 500 mg [elemental]  500 mg Oral EVERY TUES,THUR,SAT,SUN    cholecalciferol (VITAMIN D3) tablet 1,000 Units  1,000 Units Oral DAILY    dutasteride (AVODART) capsule 0.5 mg  0.5 mg Oral DAILY    ketoconazole (NIZORAL) 2 % cream   Topical DAILY    levothyroxine (SYNTHROID) tablet 137 mcg  137 mcg Oral ACB    pravastatin (PRAVACHOL) tablet 80 mg  80 mg Oral QHS    tamsulosin (FLOMAX) capsule 0.4 mg  0.4 mg Oral DAILY    bumetanide (BUMEX) injection 2 mg  2 mg IntraVENous Q12H    insulin lispro (HUMALOG) injection   SubCUTAneous AC&HS    glucose chewable tablet 16 g  4 Tab Oral PRN    glucagon (GLUCAGEN) injection 1 mg  1 mg IntraMUSCular PRN    dextrose 10% infusion 125-250 mL  125-250 mL IntraVENous PRN    senna-docusate (PERICOLACE) 8.6-50 mg per tablet 2 Tab  2 Tab Oral QHS     Facility-Administered Medications Ordered in Other Encounters   Medication Dose Route Frequency    midazolam (VERSED) injection   IntraVENous PRN    fentaNYL citrate (PF) injection    PRN    ketamine (KETALAR) 10 mg/mL injection   IntraVENous PRN    propofol (DIPRIVAN) 10 mg/mL injection   IntraVENous CONTINUOUS    0.9% sodium chloride infusion   IntraVENous CONTINUOUS         Objective:      Visit Vitals    /65 (BP 1 Location: Left arm, BP Patient Position: At rest)    Pulse 83    Temp 98.2 °F (36.8 °C)    Resp 18    Wt 266 lb 15.6 oz (121.1 kg)    SpO2 99%    BMI 39.43 kg/m2       Physical Exam:  Abdomen: soft, non-tender  Extremities: extremities normal  Heart: regular rate and rhythm  Lungs: clear to auscultation bilaterally  Pulses: 2+ and symmetric    Data Review:   Labs:    Recent Labs      06/26/17 0229 06/24/17 0332   WBC  6.5  7.7   HGB  11.9*  11.8*   HCT  39.8  39.3   PLT  143*  138*     Recent Labs      06/26/17 0229 06/25/17 0222 06/24/17 0332   NA  141  140  141   K  4.2  3.8  3.5   CL  96*  96*  98   CO2  39*  39*  38*   GLU  163*  92  73   BUN  84*  84*  91*   CREA  2.86*  2.82*  3.15*   CA  9.0  9.0  8.8  8.9   MG  2.1  2.3  2.3   PHOS  3.1  3.0  4.0   ALB   --    --   2.9*   TBILI   --    --   0.7   SGOT   --    --   23   ALT   --    --   15       No results for input(s): TROIQ, CPK, CKMB in the last 72 hours. Intake/Output Summary (Last 24 hours) at 06/26/17 1208  Last data filed at 06/26/17 1146   Gross per 24 hour   Intake             1050 ml   Output             1310 ml   Net             -260 ml        Telemetry: v paced    Assessment:     Active Problems:    SOB (shortness of breath) (3/7/2017)      Chronic a-fib ([facility]) (3/7/2017)      CKD (chronic kidney disease) stage 4, GFR 15-29 ml/min ([facility]) (3/7/2017)      Hx of CABG (3/13/2017)      Overview: CABG in 2010 in [place]      Morbid obesity ([facility] [address] (3/13/2017)      Diabetes mellitus type 2, controlled ([facility] [address] (3/13/2017)      Fatigue (3/15/2017)      Bilateral leg edema (2/30/3290)      Diastolic heart failure ([facility]) (6/22/2017)      JACEY (obstructive sleep apnea) (6/23/2017)        Plan:     Janes Hicks is sp biv ppm and av node ablation. Will dc diltiazem. cxr pending. If cxr wnl, then ok for dc tmrw from EP standpoint.  Dr Krystal Calle to follow for general cardiology issues    Paulette Lima MD, [facility]    6/26/2017

## 2017-06-26 NOTE — INTERDISCIPLINARY ROUNDS
Cardiopulmonary Care Interdisciplinary rounds were held today to discuss patient plan of care and outcomes. The following members were present: PT, NP/Physician, Pharmacy, Nursing, Nutritionist and Case Management.       Plan of Care: Continue current treatment plan, Pacemaker today

## 2017-06-26 NOTE — PROGRESS NOTES
NAME: Dario Patel        :  1950        MRN:  [id number]        Assessment :    Plan:  --Volume overload/Anasarca  INO/CKD IV  Hx of CABG/CHF  Obesity  DM Nephropathy  Secondary PTH --I/O incomplete. Edema seems better. Would continue for now. Creatinine about the same. Labs in AM.       Subjective:     Chief Complaint:  \" I'm tired. That bed is terrible. \"  Thinks legs are less swollen. + dyspnea. No cough. No CP. Review of Systems:    Symptom Y/N Comments  Symptom Y/N Comments   Fever/Chills    Chest Pain     Poor Appetite    Edema     Cough    Abdominal Pain     Sputum    Joint Pain     SOB/BETTS    Pruritis/Rash     Nausea/vomit    Tolerating PT/OT     Diarrhea    Tolerating Diet     Constipation    Other       Could not obtain due to:      Objective:     VITALS:   Last 24hrs VS reviewed since prior progress note.  Most recent are:  Visit Vitals    /65 (BP 1 Location: Left arm, BP Patient Position: At rest)    Pulse 83    Temp 98.2 °F (36.8 °C)    Resp 18    Wt 121.1 kg (266 lb 15.6 oz)    SpO2 99%    BMI 39.43 kg/m2       Intake/Output Summary (Last 24 hours) at 17 0834  Last data filed at 17 0766   Gross per 24 hour   Intake              950 ml   Output             1100 ml   Net             -150 ml      Telemetry Reviewed:     PHYSICAL EXAM:  General: NAD  Few rhonchi  abd soft  2+ edema      Lab Data Reviewed: (see below)    Medications Reviewed: (see below)    PMH/SH reviewed - no change compared to H&P  ________________________________________________________________________  Care Plan discussed with:  Patient     Family      RN     Care Manager                    Consultant:          Comments   >50% of visit spent in counseling and coordination of care       ________________________________________________________________________  Arsh David MD     Procedures: see electronic medical records for all procedures/Xrays and details which  were not copied into this note but were reviewed prior to creation of Plan. LABS:  Recent Labs      06/26/17 0229 06/24/17 0332   WBC  6.5  7.7   HGB  11.9*  11.8*   HCT  39.8  39.3   PLT  143*  138*     Recent Labs      06/26/17 0229 06/25/17 0222 06/24/17 0332   NA  141  140  141   K  4.2  3.8  3.5   CL  96*  96*  98   CO2  39*  39*  38*   BUN  84*  84*  91*   CREA  2.86*  2.82*  3.15*   GLU  163*  92  73   CA  9.0  9.0  8.8  8.9   MG  2.1  2.3  2.3   PHOS  3.1  3.0  4.0   URICA   --    --   12.0*     Recent Labs      06/24/17 0332   SGOT  23   AP  44*   TP  6.6   ALB  2.9*   GLOB  3.7     No results for input(s): INR, PTP, APTT in the last 72 hours. No lab exists for component: INREXT   No results for input(s): FE, TIBC, PSAT, FERR in the last 72 hours. No results found for: FOL, RBCF   No results for input(s): PH, PCO2, PO2 in the last 72 hours. No results for input(s): CPK, CKMB in the last 72 hours.     No lab exists for component: TROPONINI  No components found for: [place]  Lab Results   Component Value Date/Time    Color YELLOW/STRAW 06/23/2017 12:15 PM    Appearance CLEAR 06/23/2017 12:15 PM    Specific gravity 1.010 06/23/2017 12:15 PM    pH (UA) 5.5 06/23/2017 12:15 PM    Protein TRACE 06/23/2017 12:15 PM    Glucose NEGATIVE  06/23/2017 12:15 PM    Ketone NEGATIVE  06/23/2017 12:15 PM    Bilirubin NEGATIVE  06/23/2017 12:15 PM    Urobilinogen 0.2 06/23/2017 12:15 PM    Nitrites NEGATIVE  06/23/2017 12:15 PM    Leukocyte Esterase NEGATIVE  06/23/2017 12:15 PM    Epithelial cells FEW 06/23/2017 12:15 PM    Bacteria NEGATIVE  06/23/2017 12:15 PM    WBC 0-4 06/23/2017 12:15 PM    RBC 0-5 06/23/2017 12:15 PM       MEDICATIONS:  Current Facility-Administered Medications   Medication Dose Route Frequency    insulin lispro protamine/insulin lispro (HUMALOG MIX 75/25) injection 27 Units  27 Units SubCUTAneous ACB&D    allopurinol (ZYLOPRIM) tablet 300 mg  300 mg Oral DAILY    potassium chloride SR (KLOR-CON 10) tablet 20 mEq  20 mEq Oral BID    sorbitol 70 % solution 30 mL  30 mL Oral DAILY PRN    dilTIAZem (CARDIZEM) IR tablet 30 mg  30 mg Oral AC&HS    chlorothiazide (DIURIL) 125 mg in sterile water (preservative free) 4.5 mL injection  125 mg IntraVENous DAILY    metoprolol succinate (TOPROL-XL) XL tablet 100 mg  100 mg Oral BID    sodium chloride (NS) flush 5-10 mL  5-10 mL IntraVENous Q8H    sodium chloride (NS) flush 5-10 mL  5-10 mL IntraVENous PRN    ondansetron (ZOFRAN) injection 4 mg  4 mg IntraVENous Q4H PRN    bisacodyl (DULCOLAX) suppository 10 mg  10 mg Rectal DAILY PRN    acetaminophen (TYLENOL) tablet 1,000 mg  1,000 mg Oral Q6H PRN    apixaban (ELIQUIS) tablet 5 mg  5 mg Oral BID    calcium carbonate (OS-BERT) tablet 500 mg [elemental]  500 mg Oral EVERY TUES,THUR,SAT,SUN    cholecalciferol (VITAMIN D3) tablet 1,000 Units  1,000 Units Oral DAILY    dutasteride (AVODART) capsule 0.5 mg  0.5 mg Oral DAILY    ketoconazole (NIZORAL) 2 % cream   Topical DAILY    levothyroxine (SYNTHROID) tablet 137 mcg  137 mcg Oral ACB    pravastatin (PRAVACHOL) tablet 80 mg  80 mg Oral QHS    tamsulosin (FLOMAX) capsule 0.4 mg  0.4 mg Oral DAILY    bumetanide (BUMEX) injection 2 mg  2 mg IntraVENous Q12H    insulin lispro (HUMALOG) injection   SubCUTAneous AC&HS    glucose chewable tablet 16 g  4 Tab Oral PRN    glucagon (GLUCAGEN) injection 1 mg  1 mg IntraMUSCular PRN    dextrose 10% infusion 125-250 mL  125-250 mL IntraVENous PRN    senna-docusate (PERICOLACE) 8.6-50 mg per tablet 2 Tab  2 Tab Oral QHS

## 2017-06-26 NOTE — PROGRESS NOTES
Hospitalist Progress Note    NAME: Lor Ponce   :  1950   MRN:  [id number]       Assessment / Plan:  Interim Hospital Summary:        Acute on chronic diastolic heart failure causing Acute hypoxic respiratory failure  With fluid overload and worsening ckd  Chronic atrial fibrillation  Elevated troponin      -last Echo in 3/2017 with EF 55-60%  - seen by cardiology and nephrology  bumex iv 2mg q 12hours, diuril as tolerted  Cont apixaban, metoprolol and pravastatin, added diltiazem    Per cardiology notes- was scheduled for [facility] for further evaluation of severe pulmonary  Hypertension, not sure why cancelled  Plan for av ablation noted for today.      Acute on ckd stage stage IV  -baseline cr on 3/2017 was 2.65, as per cardiology's outpt f/u note, cr ~3. This is likely due to volume overload  -follows with Dr Chetna Mathew, had labs done at the office  -follow cr, avoid nephrotoxin agents  -nephrology following  - bumex and diuril as above      T2DM  -check A1C 7.6  Optimize insulin as appropriate, SSI  PTA 26 units am and 34 units pm add 5 units for every 50 after 150, hold if BS <100  -diabetic diet     BPH  -cont' dutasteride, flomax     Hypothyroidism  -cont' synthroid     B/l LE wound  Wound care consulted   KERI with femoral and popliteal segments on the right side with PVD  KERI is unrealiable predictor of distal arterial perfusion as due to extensive arterial calcification  Moderate PVD    Morbid obesity     Code Status: Full  Surrogate Decision Maker: wife     DVT Prophylaxis: Eliquis  GI Prophylaxis: not indicated     Baseline: independent      Body mass index is 39.43 kg/(m^2). Needs HH     Subjective:     Chief Complaint / Reason for Physician Visit  Follow up on fluid overload and acute on chronic diastolic chf  For av ablation today    Discussed with RN events overnight.      Review of Systems:  Symptom Y/N Comments  Symptom Y/N Comments   Fever/Chills n   Chest Pain n    Poor Appetite Edema     Cough n   Abdominal Pain n    Sputum    Joint Pain     SOB/BETTS n   Pruritis/Rash     Nausea/vomit    Tolerating PT/OT     Diarrhea    Tolerating Diet     Constipation    Other       Could NOT obtain due to:      Objective:     VITALS:   Last 24hrs VS reviewed since prior progress note. Most recent are:  Patient Vitals for the past 24 hrs:   Temp Pulse Resp BP SpO2   06/26/17 0724 98.2 °F (36.8 °C) 83 18 135/65 99 %   06/26/17 0227 98.9 °F (37.2 °C) 86 20 133/80 96 %   06/26/17 0103 - - - - 95 %   06/26/17 0056 - - - - (!) 86 %   06/25/17 2238 98.4 °F (36.9 °C) 91 20 136/55 93 %   06/25/17 1921 98.5 °F (36.9 °C) 80 20 127/67 93 %   06/25/17 1627 98.2 °F (36.8 °C) 92 20 124/60 92 %       Intake/Output Summary (Last 24 hours) at 06/26/17 1141  Last data filed at 06/26/17 0909   Gross per 24 hour   Intake              950 ml   Output             1300 ml   Net             -350 ml        PHYSICAL EXAM:  General: WD, WN. Alert, cooperative,  no acute distress    EENT:  EOMI. Anicteric sclerae. MMM  Resp:  Bibasilar crackles. No accessory muscle use  CV:  Regular  Rhythm, 2+ edema  GI:  Soft, Non distended, Non tender.  +Bowel sounds  Neurologic:  Alert and oriented X 3, normal speech,   Psych:   Good insight. Not anxious nor agitated  Skin:  No rashes. No jaundice    Reviewed most current lab test results and cultures  YES  Reviewed most current radiology test results   YES  Review and summation of old records today    NO  Reviewed patient's current orders and MAR    YES  PMH/SH reviewed - no change compared to H&P  ________________________________________________________________________  Care Plan discussed with:    Comments   Patient y    Family      RN y    Care Manager     Consultant                        Multidiciplinary team rounds were held today with , nursing, pharmacist and clinical coordinator.   Patient's plan of care was discussed; medications were reviewed and discharge planning was addressed. ________________________________________________________________________  Total NON critical care TIME:  25 Minutes    Total CRITICAL CARE TIME Spent:   Minutes non procedure based      Comments   >50% of visit spent in counseling and coordination of care     ________________________________________________________________________  Amanda Dumont MD     Procedures: see electronic medical records for all procedures/Xrays and details which were not copied into this note but were reviewed prior to creation of Plan. LABS:  I reviewed today's most current labs and imaging studies.   Pertinent labs include:  Recent Labs      06/26/17 0229 06/24/17 0332   WBC  6.5  7.7   HGB  11.9*  11.8*   HCT  39.8  39.3   PLT  143*  138*     Recent Labs      06/26/17 0229 06/25/17 0222 06/24/17 0332   NA  141  140  141   K  4.2  3.8  3.5   CL  96*  96*  98   CO2  39*  39*  38*   GLU  163*  92  73   BUN  84*  84*  91*   CREA  2.86*  2.82*  3.15*   CA  9.0  9.0  8.8  8.9   MG  2.1  2.3  2.3   PHOS  3.1  3.0  4.0   ALB   --    --   2.9*   TBILI   --    --   0.7   SGOT   --    --   23   ALT   --    --   15       Signed: Amanda Dumont MD

## 2017-06-26 NOTE — CARDIO/PULMONARY
CP REHAB NOTE    Chart reviewed. Pt is on the CHF bundle list. Pt is a 75 y.o. male admitted with acute on chronic CHF, acute resp failure, chronic a-fib, CKD, T2DM, BPH, morbid obesity (5'9\", 280 lbs), CABG in 2010. S/p Ablation and BiV ppm 6/26/2017  EF 40-45%, Echo 6/22/2017  Non Smoker    Met with wife for post procedure teaching as  was still quite drowsy. Printed material given and discussed re: pacemakers, pacemaker discharge instructions and the TLC diet. Discussed post pacemaker instructions including: restrictions for the affected arm (no raising the arm above shoulder level, no heavy lifting for 30 days), monitoring for infection, avoiding impacts/pressure to the site, avoiding extreme activities, when to call the doctor, use of cell phones and microwaves and avoiding strong magnetic fields. Printed material given and discussed re: cardiac ablation, post ablation instructions, healthy heart diet, and risk factors for CVD. Discussed post ablation restrictions (Avoiding heavy lifting and keeping the site clean and dry), what to do if bleeding/bruising is noted at the insertion site and when to call the dorctor. Also discussed heart healthy habits  and avoiding cardiac risk factors. When asked if patient eats low sodium she stated they try but feels patient is in denial and tends to eat too much and drink too many sodas, etc. Explained the relationship between sodium and fluid and stressed the importance of daily weights and following low sodium diet. Wife stated that on last admission they pulled off 20 pounds of fluid and then patient went home and lost 20 more pounds. Again tried to stress patient becoming more compliant with CHF guidelines. Will continue to follow.

## 2017-06-26 NOTE — PROGRESS NOTES
Bedside report received from [facility].     10:45 AM- Patient off floor to Cath Lab      TRANSFER - OUT REPORT:    Verbal report given to Iona (name) on Gretta Ramsay  being transferred to IVCU(unit) for routine progression of care       Report consisted of patients Situation, Background, Assessment and   Recommendations(SBAR). Information from the following report(s) SBAR, Kardex, Procedure Summary, Intake/Output, MAR and Recent Results was reviewed with the receiving nurse. Lines:   Peripheral IV 06/22/17 Wrist (Active)   Site Assessment Clean, dry, & intact 6/26/2017  8:05 AM   Phlebitis Assessment 0 6/26/2017  8:05 AM   Infiltration Assessment 0 6/26/2017  8:05 AM   Dressing Status Clean, dry, & intact 6/26/2017  8:05 AM   Dressing Type Transparent 6/26/2017  8:05 AM   Hub Color/Line Status Pink;Patent 6/26/2017  8:05 AM   Action Taken Blood drawn 6/22/2017  9:50 AM        Opportunity for questions and clarification was provided.

## 2017-06-27 VITALS
HEART RATE: 75 BPM | DIASTOLIC BLOOD PRESSURE: 90 MMHG | RESPIRATION RATE: 18 BRPM | TEMPERATURE: 98.5 F | BODY MASS INDEX: 39.33 KG/M2 | SYSTOLIC BLOOD PRESSURE: 156 MMHG | WEIGHT: 266.32 LBS | OXYGEN SATURATION: 92 %

## 2017-06-27 LAB
ANION GAP BLD CALC-SCNC: 7 MMOL/L (ref 5–15)
BUN SERPL-MCNC: 78 MG/DL (ref 6–20)
BUN/CREAT SERPL: 29 (ref 12–20)
CALCIUM SERPL-MCNC: 8.7 MG/DL (ref 8.5–10.1)
CHLORIDE SERPL-SCNC: 94 MMOL/L (ref 97–108)
CO2 SERPL-SCNC: 37 MMOL/L (ref 21–32)
CREAT SERPL-MCNC: 2.67 MG/DL (ref 0.7–1.3)
ERYTHROCYTE [DISTWIDTH] IN BLOOD BY AUTOMATED COUNT: 17.9 % (ref 11.5–14.5)
GLUCOSE BLD STRIP.AUTO-MCNC: 142 MG/DL (ref 65–100)
GLUCOSE BLD STRIP.AUTO-MCNC: 177 MG/DL (ref 65–100)
GLUCOSE BLD STRIP.AUTO-MCNC: 264 MG/DL (ref 65–100)
GLUCOSE SERPL-MCNC: 189 MG/DL (ref 65–100)
HCT VFR BLD AUTO: 39.8 % (ref 36.6–50.3)
HGB BLD-MCNC: 12 G/DL (ref 12.1–17)
MCH RBC QN AUTO: 25.4 PG (ref 26–34)
MCHC RBC AUTO-ENTMCNC: 30.2 G/DL (ref 30–36.5)
MCV RBC AUTO: 84.3 FL (ref 80–99)
PLATELET # BLD AUTO: 139 K/UL (ref 150–400)
POTASSIUM SERPL-SCNC: 4.3 MMOL/L (ref 3.5–5.1)
RBC # BLD AUTO: 4.72 M/UL (ref 4.1–5.7)
SERVICE CMNT-IMP: ABNORMAL
SODIUM SERPL-SCNC: 138 MMOL/L (ref 136–145)
WBC # BLD AUTO: 6.5 K/UL (ref 4.1–11.1)

## 2017-06-27 PROCEDURE — 74011000250 HC RX REV CODE- 250: Performed by: INTERNAL MEDICINE

## 2017-06-27 PROCEDURE — 74011636637 HC RX REV CODE- 636/637: Performed by: HOSPITALIST

## 2017-06-27 PROCEDURE — 74011250637 HC RX REV CODE- 250/637: Performed by: NURSE PRACTITIONER

## 2017-06-27 PROCEDURE — 77030019607 HC DSG BURN S&N -A

## 2017-06-27 PROCEDURE — 74011250636 HC RX REV CODE- 250/636: Performed by: INTERNAL MEDICINE

## 2017-06-27 PROCEDURE — 74011250637 HC RX REV CODE- 250/637: Performed by: INTERNAL MEDICINE

## 2017-06-27 PROCEDURE — 80048 BASIC METABOLIC PNL TOTAL CA: CPT | Performed by: INTERNAL MEDICINE

## 2017-06-27 PROCEDURE — 36415 COLL VENOUS BLD VENIPUNCTURE: CPT | Performed by: INTERNAL MEDICINE

## 2017-06-27 PROCEDURE — 74011636637 HC RX REV CODE- 636/637: Performed by: INTERNAL MEDICINE

## 2017-06-27 PROCEDURE — 85027 COMPLETE CBC AUTOMATED: CPT | Performed by: HOSPITALIST

## 2017-06-27 PROCEDURE — 97116 GAIT TRAINING THERAPY: CPT

## 2017-06-27 PROCEDURE — 77030011255 HC DSG AQUACEL AG BMS -A

## 2017-06-27 RX ORDER — BUMETANIDE 1 MG/1
2 TABLET ORAL 2 TIMES DAILY
Status: DISCONTINUED | OUTPATIENT
Start: 2017-06-27 | End: 2017-06-27 | Stop reason: HOSPADM

## 2017-06-27 RX ORDER — POTASSIUM CHLORIDE 1500 MG/1
20 TABLET, FILM COATED, EXTENDED RELEASE ORAL DAILY
Qty: 30 TAB | Refills: 1 | Status: ON HOLD | OUTPATIENT
Start: 2017-06-27 | End: 2019-03-26 | Stop reason: CLARIF

## 2017-06-27 RX ORDER — BUMETANIDE 2 MG/1
2 TABLET ORAL 2 TIMES DAILY
Qty: 60 TAB | Refills: 1 | Status: SHIPPED | OUTPATIENT
Start: 2017-06-27 | End: 2017-07-07 | Stop reason: SDUPTHER

## 2017-06-27 RX ORDER — METOPROLOL SUCCINATE 50 MG/1
50 TABLET, EXTENDED RELEASE ORAL ONCE
Status: COMPLETED | OUTPATIENT
Start: 2017-06-27 | End: 2017-06-27

## 2017-06-27 RX ORDER — METOPROLOL SUCCINATE 100 MG/1
150 TABLET, EXTENDED RELEASE ORAL 2 TIMES DAILY
Qty: 45 TAB | Refills: 1 | Status: SHIPPED | OUTPATIENT
Start: 2017-06-27 | End: 2017-06-27

## 2017-06-27 RX ORDER — METOPROLOL SUCCINATE 50 MG/1
150 TABLET, EXTENDED RELEASE ORAL DAILY
Status: DISCONTINUED | OUTPATIENT
Start: 2017-06-28 | End: 2017-06-27 | Stop reason: HOSPADM

## 2017-06-27 RX ORDER — METOPROLOL SUCCINATE 100 MG/1
150 TABLET, EXTENDED RELEASE ORAL DAILY
Qty: 45 TAB | Refills: 1 | Status: SHIPPED | OUTPATIENT
Start: 2017-06-27 | End: 2017-07-07 | Stop reason: SDUPTHER

## 2017-06-27 RX ORDER — ALLOPURINOL 300 MG/1
300 TABLET ORAL DAILY
Qty: 30 TAB | Refills: 1 | Status: SHIPPED | OUTPATIENT
Start: 2017-06-27 | End: 2017-08-16

## 2017-06-27 RX ADMIN — VITAMIN D, TAB 1000IU (100/BT) 1000 UNITS: 25 TAB at 08:20

## 2017-06-27 RX ADMIN — APIXABAN 5 MG: 5 TABLET, FILM COATED ORAL at 08:20

## 2017-06-27 RX ADMIN — POTASSIUM CHLORIDE 20 MEQ: 750 TABLET, FILM COATED, EXTENDED RELEASE ORAL at 08:20

## 2017-06-27 RX ADMIN — ALLOPURINOL 300 MG: 300 TABLET ORAL at 08:20

## 2017-06-27 RX ADMIN — DUTASTERIDE 0.5 MG: 0.5 CAPSULE, LIQUID FILLED ORAL at 08:24

## 2017-06-27 RX ADMIN — ACETAMINOPHEN 1000 MG: 500 TABLET ORAL at 11:15

## 2017-06-27 RX ADMIN — INSULIN LISPRO 2 UNITS: 100 INJECTION, SOLUTION INTRAVENOUS; SUBCUTANEOUS at 09:08

## 2017-06-27 RX ADMIN — INSULIN LISPRO 27 UNITS: 100 INJECTION, SUSPENSION SUBCUTANEOUS at 09:08

## 2017-06-27 RX ADMIN — LEVOTHYROXINE SODIUM 137 MCG: 112 TABLET ORAL at 08:20

## 2017-06-27 RX ADMIN — METOPROLOL SUCCINATE 50 MG: 50 TABLET, EXTENDED RELEASE ORAL at 13:26

## 2017-06-27 RX ADMIN — TAMSULOSIN HYDROCHLORIDE 0.4 MG: 0.4 CAPSULE ORAL at 08:20

## 2017-06-27 RX ADMIN — ACETAMINOPHEN 1000 MG: 500 TABLET ORAL at 04:44

## 2017-06-27 RX ADMIN — CHLOROTHIAZIDE SODIUM 125 MG: 500 INJECTION, POWDER, LYOPHILIZED, FOR SOLUTION INTRAVENOUS at 08:25

## 2017-06-27 RX ADMIN — VANCOMYCIN HYDROCHLORIDE 1000 MG: 1 INJECTION, POWDER, LYOPHILIZED, FOR SOLUTION INTRAVENOUS at 11:03

## 2017-06-27 RX ADMIN — Medication 10 ML: at 06:00

## 2017-06-27 RX ADMIN — Medication 10 ML: at 08:19

## 2017-06-27 RX ADMIN — INSULIN LISPRO 5 UNITS: 100 INJECTION, SOLUTION INTRAVENOUS; SUBCUTANEOUS at 13:25

## 2017-06-27 RX ADMIN — BUMETANIDE 2 MG: 0.25 INJECTION INTRAMUSCULAR; INTRAVENOUS at 08:15

## 2017-06-27 RX ADMIN — METOPROLOL SUCCINATE 100 MG: 50 TABLET, EXTENDED RELEASE ORAL at 08:20

## 2017-06-27 NOTE — PROGRESS NOTES
Pt and wife given d/c instructions, Rx's, d/c iv and telemetry. Pt taken by volunteer WC to main entrance.

## 2017-06-27 NOTE — PROGRESS NOTES
Bedside shift change report given to Dayana Lynch RN  (oncoming nurse) by Gary Brar RN  (offgoing nurse). Report included the following information SBAR, Procedure Summary and Recent Results.

## 2017-06-27 NOTE — PROGRESS NOTES
[address] [place] [address]  [phone]      Cardiology Progress Note      6/27/2017 845 AM    Admit Date: 6/22/2017    Admit Diagnosis:   Diastolic heart failure [facility])    Subjective:     Preeti Montez is a 75 y.o. male s/p AVN ablation and pacemaker placement yesterday. Mr. Rebekah Levine states he's feeling okay this morning. He endorses soreness and tenderness at his pacemaker site and he's irritated that breakfast has not arrived yet. He denies chest pain, SOB, palpitations.       Visit Vitals    /67 (BP 1 Location: Right arm, BP Patient Position: At rest;Sitting)    Pulse 75    Temp 98.5 °F (36.9 °C)    Resp 18    Wt 120.8 kg (266 lb 5.1 oz)    SpO2 95%    BMI 39.33 kg/m2       Current Facility-Administered Medications   Medication Dose Route Frequency    DOBUTamine (DOBUTREX) 500 mg/250 mL (2,000 mcg/mL) infusion  2.5-10 mcg/kg/min IntraVENous TITRATE    fentaNYL citrate (PF) injection 12.5-50 mcg  12.5-50 mcg IntraVENous Multiple    midazolam (VERSED) injection 1-5 mg  1-5 mg IntraVENous Multiple    lidocaine (XYLOCAINE) 10 mg/mL (1 %) injection 1-40 mL  1-40 mL SubCUTAneous Multiple    ADDaptor        vancomycin (VANCOCIN) 1,000 mg injection        0.9% sodium chloride (MBP/ADV) 0.9 % infusion        sodium chloride (NS) flush 5-10 mL  5-10 mL IntraVENous Q8H    sodium chloride (NS) flush 5-10 mL  5-10 mL IntraVENous PRN    naloxone (NARCAN) injection 0.4 mg  0.4 mg IntraVENous PRN    vancomycin (VANCOCIN) 1,000 mg in 0.9% sodium chloride (MBP/ADV) 250 mL  1 g IntraVENous ONCE    metoprolol succinate (TOPROL-XL) XL tablet 100 mg  100 mg Oral DAILY    insulin lispro protamine/insulin lispro (HUMALOG MIX 75/25) injection 27 Units  27 Units SubCUTAneous ACB&D    allopurinol (ZYLOPRIM) tablet 300 mg  300 mg Oral DAILY    potassium chloride SR (KLOR-CON 10) tablet 20 mEq  20 mEq Oral BID    sorbitol 70 % solution 30 mL  30 mL Oral DAILY PRN    chlorothiazide (DIURIL) 125 mg in sterile water (preservative free) 4.5 mL injection  125 mg IntraVENous DAILY    sodium chloride (NS) flush 5-10 mL  5-10 mL IntraVENous Q8H    sodium chloride (NS) flush 5-10 mL  5-10 mL IntraVENous PRN    ondansetron (ZOFRAN) injection 4 mg  4 mg IntraVENous Q4H PRN    bisacodyl (DULCOLAX) suppository 10 mg  10 mg Rectal DAILY PRN    acetaminophen (TYLENOL) tablet 1,000 mg  1,000 mg Oral Q6H PRN    apixaban (ELIQUIS) tablet 5 mg  5 mg Oral BID    calcium carbonate (OS-BERT) tablet 500 mg [elemental]  500 mg Oral EVERY TUES,THUR,SAT,SUN    cholecalciferol (VITAMIN D3) tablet 1,000 Units  1,000 Units Oral DAILY    dutasteride (AVODART) capsule 0.5 mg  0.5 mg Oral DAILY    ketoconazole (NIZORAL) 2 % cream   Topical DAILY    levothyroxine (SYNTHROID) tablet 137 mcg  137 mcg Oral ACB    pravastatin (PRAVACHOL) tablet 80 mg  80 mg Oral QHS    tamsulosin (FLOMAX) capsule 0.4 mg  0.4 mg Oral DAILY    bumetanide (BUMEX) injection 2 mg  2 mg IntraVENous Q12H    insulin lispro (HUMALOG) injection   SubCUTAneous AC&HS    glucose chewable tablet 16 g  4 Tab Oral PRN    glucagon (GLUCAGEN) injection 1 mg  1 mg IntraMUSCular PRN    dextrose 10% infusion 125-250 mL  125-250 mL IntraVENous PRN    senna-docusate (PERICOLACE) 8.6-50 mg per tablet 2 Tab  2 Tab Oral QHS       Objective:      Physical Exam:  General Appearance:   obese  male sitting in chair in NAD speaking on phone   Chest:   Clear  Cardiovascular:  Regular rate and rhythm, no murmur.   Abdomen:   Soft, non-tender, bowel sounds are active.   Extremities: palpable distal pulses; no edema. Skin:  Warm and dry. dressing RLE.  L infraclavicular site CDI without swelling or bleeding. R groin CDI without swelling, bleeding, or bruit.       Data Review:   Recent Labs      06/27/17   0256  06/26/17   0229   WBC  6.5  6.5   HGB  12.0*  11.9*   HCT  39.8  39.8   PLT  139*  143*     Recent Labs      06/27/17   025 06/26/17 0229 06/25/17 0222   NA  138  141  140   K  4.3  4.2  3.8   CL  94*  96*  96*   CO2  37*  39*  39*   GLU  189*  163*  92   BUN  78*  84*  84*   CREA  2.67*  2.86*  2.82*   CA  8.7  9.0  9.0   MG   --   2.1  2.3   PHOS   --   3.1  3.0       No results for input(s): TROIQ, CPK, CKMB in the last 72 hours. Intake/Output Summary (Last 24 hours) at 06/27/17 2922  Last data filed at 06/27/17 0522   Gross per 24 hour   Intake              390 ml   Output              986 ml   Net             -596 ml        Telemetry: v-pacing   Cxray: no pneumothorax post-pacemaker;  2 leads identified     Assessment:     Active Problems:    SOB (shortness of breath) (3/7/2017)      Chronic a-fib ([facility]) (3/7/2017)      CKD (chronic kidney disease) stage 4, GFR 15-29 ml/min ([facility]) (3/7/2017)      Hx of CABG (3/13/2017)      Overview: CABG in 2010 in [place]      Morbid obesity ([facility] (3/13/2017)      Diabetes mellitus type 2, controlled ([facility] (3/13/2017)      Fatigue (3/15/2017)      Bilateral leg edema (7/42/1111)      Diastolic heart failure ([facility]) (6/22/2017)      JACEY (obstructive sleep apnea) (6/23/2017)      Pacemaker (6/26/2017)      Overview: 6/26/17: James Bullock      H/O atrioventricular anastacio ablation (6/26/2017)      Overview: 6/26/17        Plan:     Cassandra Gray is recovering post-procedure. L infraclavicular site dressing is CDI without swelling or bleeding. R groin site CDI without swelling, bleeding, or bruit. .  VSS. Rhythm v-paced. Cassandra Gray denies complaints at this time. Patient is okay for discharge from an EP standpoint. Continue eliquis and BB. Cardizem discontinued. Trista Da Silva, DALTON  DNP, RN, AGACNP-BC      Pt seen and examined. Agree with assessment and plan as documented above.     Netta Goncalves MD, Vy Baptiste

## 2017-06-27 NOTE — PROGRESS NOTES
NAME: Sancho Bernard        :  1950        MRN:  [id number]        Assessment :    Plan:  --Volume overload/Anasarca  INO/CKD IV  Hx of CABG/CHF  Obesity  DM Nephropathy  Secondary PTH --  Edema seems better. Would continue diuretics. Creatinine a little better. OK for D/C from my point of view. Will be happy to see as outpatient. Subjective:     Chief Complaint:  \" I feel better. \" Less edema. More alert. No dyspnea. No cough. No CP. Review of Systems:    Symptom Y/N Comments  Symptom Y/N Comments   Fever/Chills    Chest Pain     Poor Appetite    Edema     Cough    Abdominal Pain     Sputum    Joint Pain     SOB/BETTS    Pruritis/Rash     Nausea/vomit    Tolerating PT/OT     Diarrhea    Tolerating Diet     Constipation    Other       Could not obtain due to:      Objective:     VITALS:   Last 24hrs VS reviewed since prior progress note.  Most recent are:  Visit Vitals    /67 (BP 1 Location: Right arm, BP Patient Position: At rest;Sitting)    Pulse 75    Temp 98.5 °F (36.9 °C)    Resp 18    Wt 120.8 kg (266 lb 5.1 oz)    SpO2 95%    BMI 39.33 kg/m2       Intake/Output Summary (Last 24 hours) at 17 0841  Last data filed at 17 0522   Gross per 24 hour   Intake              390 ml   Output             1186 ml   Net             -796 ml      Telemetry Reviewed:     PHYSICAL EXAM:  General: NAD  Few rhonchi  abd soft  2+ edema      Lab Data Reviewed: (see below)    Medications Reviewed: (see below)    PMH/SH reviewed - no change compared to H&P  ________________________________________________________________________  Care Plan discussed with:  Patient     Family      RN     Care Manager                    Consultant:          Comments   >50% of visit spent in counseling and coordination of care       ________________________________________________________________________  Antionette Mohs Raman Louie MD     Procedures: see electronic medical records for all procedures/Xrays and details which  were not copied into this note but were reviewed prior to creation of Plan. LABS:  Recent Labs      06/27/17 0256 06/26/17 0229   WBC  6.5  6.5   HGB  12.0*  11.9*   HCT  39.8  39.8   PLT  139*  143*     Recent Labs      06/27/17 0256 06/26/17 0229 06/25/17 0222   NA  138  141  140   K  4.3  4.2  3.8   CL  94*  96*  96*   CO2  37*  39*  39*   BUN  78*  84*  84*   CREA  2.67*  2.86*  2.82*   GLU  189*  163*  92   CA  8.7  9.0  9.0   MG   --   2.1  2.3   PHOS   --   3.1  3.0     No results for input(s): SGOT, GPT, AP, TBIL, TP, ALB, GLOB, GGT, AML, LPSE in the last 72 hours. No lab exists for component: AMYP, HLPSE  No results for input(s): INR, PTP, APTT in the last 72 hours. No lab exists for component: INREXT, INREXT   No results for input(s): FE, TIBC, PSAT, FERR in the last 72 hours. No results found for: FOL, RBCF   No results for input(s): PH, PCO2, PO2 in the last 72 hours. No results for input(s): CPK, CKMB in the last 72 hours.     No lab exists for component: TROPONINI  No components found for: [place]  Lab Results   Component Value Date/Time    Color YELLOW/STRAW 06/23/2017 12:15 PM    Appearance CLEAR 06/23/2017 12:15 PM    Specific gravity 1.010 06/23/2017 12:15 PM    pH (UA) 5.5 06/23/2017 12:15 PM    Protein TRACE 06/23/2017 12:15 PM    Glucose NEGATIVE  06/23/2017 12:15 PM    Ketone NEGATIVE  06/23/2017 12:15 PM    Bilirubin NEGATIVE  06/23/2017 12:15 PM    Urobilinogen 0.2 06/23/2017 12:15 PM    Nitrites NEGATIVE  06/23/2017 12:15 PM    Leukocyte Esterase NEGATIVE  06/23/2017 12:15 PM    Epithelial cells FEW 06/23/2017 12:15 PM    Bacteria NEGATIVE  06/23/2017 12:15 PM    WBC 0-4 06/23/2017 12:15 PM    RBC 0-5 06/23/2017 12:15 PM       MEDICATIONS:  Current Facility-Administered Medications   Medication Dose Route Frequency    DOBUTamine (DOBUTREX) 500 mg/250 mL (2,000 mcg/mL) infusion  2.5-10 mcg/kg/min IntraVENous TITRATE    fentaNYL citrate (PF) injection 12.5-50 mcg  12.5-50 mcg IntraVENous Multiple    midazolam (VERSED) injection 1-5 mg  1-5 mg IntraVENous Multiple    lidocaine (XYLOCAINE) 10 mg/mL (1 %) injection 1-40 mL  1-40 mL SubCUTAneous Multiple    ADDaptor        vancomycin (VANCOCIN) 1,000 mg injection        0.9% sodium chloride (MBP/ADV) 0.9 % infusion        sodium chloride (NS) flush 5-10 mL  5-10 mL IntraVENous Q8H    sodium chloride (NS) flush 5-10 mL  5-10 mL IntraVENous PRN    naloxone (NARCAN) injection 0.4 mg  0.4 mg IntraVENous PRN    vancomycin (VANCOCIN) 1,000 mg in 0.9% sodium chloride (MBP/ADV) 250 mL  1 g IntraVENous ONCE    metoprolol succinate (TOPROL-XL) XL tablet 100 mg  100 mg Oral DAILY    insulin lispro protamine/insulin lispro (HUMALOG MIX 75/25) injection 27 Units  27 Units SubCUTAneous ACB&D    allopurinol (ZYLOPRIM) tablet 300 mg  300 mg Oral DAILY    potassium chloride SR (KLOR-CON 10) tablet 20 mEq  20 mEq Oral BID    sorbitol 70 % solution 30 mL  30 mL Oral DAILY PRN    chlorothiazide (DIURIL) 125 mg in sterile water (preservative free) 4.5 mL injection  125 mg IntraVENous DAILY    sodium chloride (NS) flush 5-10 mL  5-10 mL IntraVENous Q8H    sodium chloride (NS) flush 5-10 mL  5-10 mL IntraVENous PRN    ondansetron (ZOFRAN) injection 4 mg  4 mg IntraVENous Q4H PRN    bisacodyl (DULCOLAX) suppository 10 mg  10 mg Rectal DAILY PRN    acetaminophen (TYLENOL) tablet 1,000 mg  1,000 mg Oral Q6H PRN    apixaban (ELIQUIS) tablet 5 mg  5 mg Oral BID    calcium carbonate (OS-BERT) tablet 500 mg [elemental]  500 mg Oral EVERY TUES,THUR,SAT,SUN    cholecalciferol (VITAMIN D3) tablet 1,000 Units  1,000 Units Oral DAILY    dutasteride (AVODART) capsule 0.5 mg  0.5 mg Oral DAILY    ketoconazole (NIZORAL) 2 % cream   Topical DAILY    levothyroxine (SYNTHROID) tablet 137 mcg  137 mcg Oral ACB    pravastatin (PRAVACHOL) tablet 80 mg  80 mg Oral QHS    tamsulosin (FLOMAX) capsule 0.4 mg  0.4 mg Oral DAILY    bumetanide (BUMEX) injection 2 mg  2 mg IntraVENous Q12H    insulin lispro (HUMALOG) injection   SubCUTAneous AC&HS    glucose chewable tablet 16 g  4 Tab Oral PRN    glucagon (GLUCAGEN) injection 1 mg  1 mg IntraMUSCular PRN    dextrose 10% infusion 125-250 mL  125-250 mL IntraVENous PRN    senna-docusate (PERICOLACE) 8.6-50 mg per tablet 2 Tab  2 Tab Oral QHS

## 2017-06-27 NOTE — PROGRESS NOTES
CM received a handoff at 3 PM.  CM was unable to type this note so I am documenting this in the chart. Betty Chung  was unable to take this Pt. Dr. Ivon Hawkins will not agree to sign the [facility] orders. He requires that his Pt are seen in the office post hospital discharge. He will evaluate home care needs and arrange [facility] services as he sees fit. Therefore, [facility] unable to take the referral.      CM specialist made PCP follow up with Dr. Ivon Hawkins on 6/28/17 at 1 PM so that he could decide if [facility] is appropriate at visit. CM made specialist follow up visits as well. Pt is going to [facility] on 7/6/17 at 945 AM.      CM also made a referral to [facility] to have a Care Coordinator follow up with the Pt to discuss community resources that will help keep Pt in home for as long as possible. Pt is discharged home. Care Management Interventions  PCP Verified by CM: Yes  Palliative Care Consult (Criteria: CHF and RRAT>21): Yes  Mode of Transport at Discharge: Other (see comment) (Family transported home in car)  Transition of Care Consult (CM Consult): Discharge Planning, Other (Dr. Ivon Hawkins would not sign  orders. PCP visit scheduled 6/28/17 at 1 PM.  Wound care clinic scheduled.)  Tracie Signup: Yes  Discharge Durable Medical Equipment: No  Physical Therapy Consult: Yes  Current Support Network: Lives with Spouse  Confirm Follow Up Transport: Family  Plan discussed with Pt/Family/Caregiver: Yes  Freedom of Choice Offered: Yes  Discharge Location  Discharge Placement: Home ([facility]--must see Dr. Ivon Hawkins 1st on Wed.   Hoping that he will follow [facility] orders. )    Alex Black 211

## 2017-06-27 NOTE — DISCHARGE SUMMARY
Hospitalist Discharge Summary     Patient ID:  Izzy Quach  [id number]  67 y.o.  1950    PCP on record: Jonathon Watkins MD    Admit date: 6/22/2017  Discharge date and time: 6/27/2017      DISCHARGE DIAGNOSIS:    Acute on chronic diastolic chf  Chronic afib  CKD stage 4    CONSULTATIONS:  IP CONSULT TO CARDIOLOGY  IP CONSULT TO PALLIATIVE CARE - PROVIDER  IP CONSULT TO NEPHROLOGY    Excerpted HPI from H&P of Adele Combs MD:  Monica Lopez is a 75 y.o.  male w pmhx significant for htn, CAD s/p CABG in 2010, chronic afib was on eliquis, diastolic heart failure EF 55-60% in 3/2017, t2dm, morbid obesity present to ED c/o of worsening sob associated with generalzied weakness. Other associated symptoms including progressive wt gained as per pt, since he was taken off metolazone. Patient was doing well since last discharged on 3/2017, was losing wt. States his bumex dosing had been adjusted multiple times and also was taken off metolazone. He noticed wt gained started when he was taken off metolazone. He saw his nephrologist yesterday, had labs done and was re-started on metolazone. Pt woke up this AM, noticed worsening of sob with minimal exertion, generalized weakness which lead him to the ED for further evaluation. In the ED, pt was hypoxic at 89% on RA (normally not on O2). Vitals: T 98.9, P 111, /73, SpO2 89% on RA. Pertinent labs:  Cr 4.03 (last discharged cr 2.65, but cardiology's last outpt note states he was 3), probnp ~30K, trop 0.12  CXR showed small b/l effusions with underlying atelectasis. Other than sob/wt gain/generalized weakness, pt denies any cp, palpitations, n/v/d.       We were asked to admit for work up and evaluation of the above problems. ______________________________________________________________________  DISCHARGE SUMMARY/HOSPITAL COURSE:  for full details see H&P, daily progress notes, labs, consult notes.      Acute on chronic diastolic heart failure causing Acute hypoxic respiratory failure  With fluid overload and worsening ckd  Chronic atrial fibrillation  Elevated troponin     -last Echo in 3/2017 with EF 55-60%  - seen by cardiology and nephrology  - treated with iv bumex and iv diuril  Plan to discharge on bumex 2mg q 12 hours and metalozone  Follow up with dr. Veronica Raymundo as out patient, needs close monitoring, high risk of recurrent admissions  Cont apixaban, metoprolol and pravastatin,      Per cardiology notes- was scheduled for [facility] for further evaluation of severe pulmonary Hypertension, not sure why cancelled  S/p AV ablation on 6/26  Plan to follow up with dr. Collado Staff as out patient      Acute on ckd stage stage IV  -baseline cr on 3/2017 was 2.65,   -plan as above        T2DM  -check A1C 7.6  PTA 26 units am and 34 units pm add 5 units for every 50 after 150, hold if BS <100  -diabetic diet  Cont home insulin      BPH  -cont' dutasteride, flomax      Hypothyroidism  -cont' synthroid      B/l LE wound  Wound care consulted   KERI with femoral and popliteal segments on the right side with PVD  KERI is unrealiable predictor of distal arterial perfusion as due to extensive arterial calcification  Moderate PVD     Morbid obesity        Overall poor long term prognosis, seen by the palliative medicine to address goals of care  AMD given by them, pt did not want to make any changes at this time  Very high risk of recurrent hospitalizations      _______________________________________________________________________  Patient seen and examined by me on discharge day. Pertinent Findings:  Gen:    Not in distress  Chest: Clear lungs  CVS:   Regular rhythm.   No edema  Abd:  Soft, not distended, not tender  Neuro:  Alert, and oriented  _______________________________________________________________________  DISCHARGE MEDICATIONS:   Current Discharge Medication List      START taking these medications    Details   allopurinol (ZYLOPRIM) 300 mg tablet Take 1 Tab by mouth daily. Qty: 30 Tab, Refills: 1      potassium chloride SR (K-TAB) 20 mEq tablet Take 1 Tab by mouth daily. Qty: 30 Tab, Refills: 1         CONTINUE these medications which have CHANGED    Details   bumetanide (BUMEX) 2 mg tablet Take 1 Tab by mouth two (2) times a day. Qty: 60 Tab, Refills: 1      metoprolol succinate (TOPROL-XL) 100 mg tablet Take 1.5 Tabs by mouth daily. Qty: 45 Tab, Refills: 1         CONTINUE these medications which have NOT CHANGED    Details   ketoconazole (NIZORAL) 2 % topical cream Apply  to affected area two (2) times daily as needed for Skin Irritation. Indications: rash      metOLazone (ZAROXOLYN) 2.5 mg tablet Take 2.5 mg by mouth daily. insulin aspart protamine/insulin aspart (NOVOLOG MIX 70/30) 100 unit/mL (70-30) inpn by SubCUTAneous route two (2) times a day. Sliding scale:  26 units AM, 34 units PM, Add 5 units for every 50 point over 150, Hold if BS <100  Patient injected 34 unit on 6/21/17 PM (); patient injected 15 units on 6/22/17 AM ()      pravastatin (PRAVACHOL) 80 mg tablet Take 1 Tab by mouth nightly. Qty: 30 Tab, Refills: 6      apixaban (ELIQUIS) 5 mg tablet Take 1 Tab by mouth two (2) times a day. Indications: PREVENT THROMBOEMBOLISM IN CHRONIC ATRIAL FIBRILLATION  Qty: 60 Tab, Refills: 0      calcium carbonate (CALTREX) 600 mg calcium (1,500 mg) tablet Take 600 mg by mouth every Tuesday, Thursday, Saturday & Sunday. dutasteride (AVODART) 0.5 mg capsule TAKE ONE CAPSULE BY MOUTH EVERY DAY  Refills: 2      levothyroxine (SYNTHROID) 137 mcg tablet TAKE 1 TABLET(S) EVERY DAY BY ORAL ROUTE FOR 90 DAYS. Refills: 3      tamsulosin (FLOMAX) 0.4 mg capsule TAKE 2 CAPSULES BY MOUTH EVERY DAY  Refills: 2      paricalcitol (ZEMPLAR) 1 mcg capsule Take 1 mcg by mouth daily. multivitamin (ONE A DAY) tablet Take 1 Tab by mouth daily. cholecalciferol (VITAMIN D3) 1,000 unit cap Take 3,000 Units by mouth daily. acetaminophen (TYLENOL EXTRA STRENGTH) 500 mg tablet Take 1,000 mg by mouth every eight (8) hours as needed for Pain. My Recommended Diet, Activity, Wound Care, and follow-up labs are listed in the patient's Discharge Insturctions which I have personally completed and reviewed.     _______________________________________________________________________  DISPOSITION:    Home with Family:    Home with HH/PT/OT/RN: y   SNF/LTC:    LARISSA:    OTHER:        Condition at Discharge:  Stable  _______________________________________________________________________  Follow up with:   PCP : Herber Lai MD  Follow-up Information     Follow up With Details Comments Contact [facility] on Aging   [facility] [id number]  [address]  [address]  [place] [address]  [address] [place] [address] [address] [place] [address]      Lise Chappell MD Go on 7/7/2017 Nephrology follow up at 2:00 PM [address]  [address] [facility] [address]  [facility] [address]  [phone]      Jj Baumann MD Go on 7/11/2017 Cardiology follow up at 2:30 PM for pacemaker check [address] [facility]  [facility] [address] [phone]                Total time in minutes spent coordinating this discharge (includes going over instructions, follow-up, prescriptions, and preparing report for sign off to her PCP) : 35 minutes    Signed:  Keila Partida MD

## 2017-06-27 NOTE — PROGRESS NOTES
Problem: Mobility Impaired (Adult and Pediatric)  Goal: *Acute Goals and Plan of Care (Insert Text)  Physical Therapy Goals  Initiated 6/23/2017  1. Patient will move from supine to sit and sit to supine in bed with independence within 7 day(s). 2. Patient will transfer from bed to chair and chair to bed with modified independence using the least restrictive device within 7 day(s). 3. Patient will perform sit to stand with modified independence within 7 day(s). 4. Patient will ambulate with modified independence for 120 feet with the least restrictive device within 7 day(s). PHYSICAL THERAPY TREATMENT  Patient: Memo Hernandez (58 y.o. male)  Date: 6/27/2017  Diagnosis: Diastolic heart failure [facility])        Precautions: Fall  Chart, physical therapy assessment, plan of care and goals were reviewed. ASSESSMENT: pt progressing towards goals, no LOB, min SOB, fatigues quickly, did well with transfers, will need HHPT for increased strength and endurance, vc's for safety. Progression toward goals:  [ ]      Improving appropriately and progressing toward goals  [X]      Improving slowly and progressing toward goals  [ ]      Not making progress toward goals and plan of care will be adjusted       PLAN:  Patient continues to benefit from skilled intervention to address the above impairments. Continue treatment per established plan of care. Discharge Recommendations:  Home Health  Further Equipment Recommendations for Discharge: has straight cane and rollator       OBJECTIVE DATA SUMMARY:      Critical Behavior:  Neurologic State: Drowsy     Functional Mobility Training:  Bed Mobility: pt sitting in chair on arrival     Transfers:  Sit to Stand: Stand-by asssistance  Stand to Sit: Stand-by asssistance  Interventions: Verbal cues  Level of Assistance: Stand-by asssistance      Balance:  Sitting: Intact; Without support  Standing: Intact; With support      Ambulation/Gait Training:  Distance (ft): 50 Feet (ft)  Assistive Device: Gait belt;Cane, straight  Ambulation - Level of Assistance: Contact guard assistance  Gait Abnormalities: Decreased step clearance;Trunk sway increased  Right Side Weight Bearing: Full  Left Side Weight Bearing: Full  Base of Support: Widened  Speed/Ariane: Slow  Step Length: Left shortened;Right shortened     Pain:  Pain Scale 1: Numeric (0 - 10)  Pain Intensity 1: 4  Pain Location 1: Chest  Pain Orientation 1: Left  Pain Description 1: Aching  Pain Intervention(s) 1: Medication (see MAR)      Activity Tolerance: poor     After treatment:   [X] Patient left in no apparent distress sitting up in chair  [ ] Patient left in no apparent distress in bed  [X] Call bell left within reach  [X] Nursing notified  [X] Caregiver present  [ ] Bed alarm activated      COMMUNICATION/COLLABORATION:   The patients plan of care was discussed with: Registered Nurse     Gavin Rasheed PTA   Time Calculation: 20 mins

## 2017-06-27 NOTE — PROGRESS NOTES
[id number] [phone]      Cardiology Progress Note      6/27/2017 845 AM    Admit Date: 6/22/2017    Admit Diagnosis:   Diastolic heart failure [facility])    Subjective:     Sancho Bernard is a 75 y.o. male who was admitted for diastolic HF and underwent an AVN ablation with pacemaker placement on (6/26). Overnight events:  -BP elevated  -creatinine decrease to 2.67  -weight stable   -Mr. Brittney Chan state's he's feeling fine today other than some site soreness at his pacemaker and being frustrated that breakfast has not arrived. No chest pain, palpitations, SOB.       Visit Vitals    /67 (BP 1 Location: Right arm, BP Patient Position: At rest;Sitting)    Pulse 75    Temp 98.5 °F (36.9 °C)    Resp 18    Wt 120.8 kg (266 lb 5.1 oz)    SpO2 95%    BMI 39.33 kg/m2       Current Facility-Administered Medications   Medication Dose Route Frequency    DOBUTamine (DOBUTREX) 500 mg/250 mL (2,000 mcg/mL) infusion  2.5-10 mcg/kg/min IntraVENous TITRATE    fentaNYL citrate (PF) injection 12.5-50 mcg  12.5-50 mcg IntraVENous Multiple    midazolam (VERSED) injection 1-5 mg  1-5 mg IntraVENous Multiple    lidocaine (XYLOCAINE) 10 mg/mL (1 %) injection 1-40 mL  1-40 mL SubCUTAneous Multiple    ADDaptor        vancomycin (VANCOCIN) 1,000 mg injection        0.9% sodium chloride (MBP/ADV) 0.9 % infusion        sodium chloride (NS) flush 5-10 mL  5-10 mL IntraVENous Q8H    sodium chloride (NS) flush 5-10 mL  5-10 mL IntraVENous PRN    naloxone (NARCAN) injection 0.4 mg  0.4 mg IntraVENous PRN    vancomycin (VANCOCIN) 1,000 mg in 0.9% sodium chloride (MBP/ADV) 250 mL  1 g IntraVENous ONCE    metoprolol succinate (TOPROL-XL) XL tablet 100 mg  100 mg Oral DAILY    insulin lispro protamine/insulin lispro (HUMALOG MIX 75/25) injection 27 Units  27 Units SubCUTAneous ACB&D    allopurinol (ZYLOPRIM) tablet 300 mg  300 mg Oral DAILY    potassium chloride SR (KLOR-CON 10) tablet 20 mEq  20 mEq Oral BID    sorbitol 70 % solution 30 mL  30 mL Oral DAILY PRN    chlorothiazide (DIURIL) 125 mg in sterile water (preservative free) 4.5 mL injection  125 mg IntraVENous DAILY    sodium chloride (NS) flush 5-10 mL  5-10 mL IntraVENous Q8H    sodium chloride (NS) flush 5-10 mL  5-10 mL IntraVENous PRN    ondansetron (ZOFRAN) injection 4 mg  4 mg IntraVENous Q4H PRN    bisacodyl (DULCOLAX) suppository 10 mg  10 mg Rectal DAILY PRN    acetaminophen (TYLENOL) tablet 1,000 mg  1,000 mg Oral Q6H PRN    apixaban (ELIQUIS) tablet 5 mg  5 mg Oral BID    calcium carbonate (OS-BERT) tablet 500 mg [elemental]  500 mg Oral EVERY TUES,THUR,SAT,SUN    cholecalciferol (VITAMIN D3) tablet 1,000 Units  1,000 Units Oral DAILY    dutasteride (AVODART) capsule 0.5 mg  0.5 mg Oral DAILY    ketoconazole (NIZORAL) 2 % cream   Topical DAILY    levothyroxine (SYNTHROID) tablet 137 mcg  137 mcg Oral ACB    pravastatin (PRAVACHOL) tablet 80 mg  80 mg Oral QHS    tamsulosin (FLOMAX) capsule 0.4 mg  0.4 mg Oral DAILY    bumetanide (BUMEX) injection 2 mg  2 mg IntraVENous Q12H    insulin lispro (HUMALOG) injection   SubCUTAneous AC&HS    glucose chewable tablet 16 g  4 Tab Oral PRN    glucagon (GLUCAGEN) injection 1 mg  1 mg IntraMUSCular PRN    dextrose 10% infusion 125-250 mL  125-250 mL IntraVENous PRN    senna-docusate (PERICOLACE) 8.6-50 mg per tablet 2 Tab  2 Tab Oral QHS       Objective:      Physical Exam:  General Appearance:   obese  male sitting in chair in NAD speaking on phone   Chest:   Clear  Cardiovascular:  Regular rate and rhythm, no murmur.   Abdomen:   Soft, non-tender, bowel sounds are active.   Extremities: palpable distal pulses; no edema. Skin:              Warm and dry. dressing RLE.  L infraclavicular site CDI without swelling or bleeding.   R groin CDI without swelling, bleeding, or bruit.         Data Review:   Recent Labs      06/27/17   0256  06/26/17   0227 WBC  6.5  6.5   HGB  12.0*  11.9*   HCT  39.8  39.8   PLT  139*  143*     Recent Labs      06/27/17   0256  06/26/17   0229  06/25/17   0222   NA  138  141  140   K  4.3  4.2  3.8   CL  94*  96*  96*   CO2  37*  39*  39*   GLU  189*  163*  92   BUN  78*  84*  84*   CREA  2.67*  2.86*  2.82*   CA  8.7  9.0  9.0   MG   --   2.1  2.3   PHOS   --   3.1  3.0       No results for input(s): TROIQ, CPK, CKMB in the last 72 hours. Intake/Output Summary (Last 24 hours) at 06/27/17 1027  Last data filed at 06/27/17 0522   Gross per 24 hour   Intake              390 ml   Output              986 ml   Net             -596 ml        Telemetry: v-pacing   Cxray: no pneumothorax post-pacemaker;  2 leads identified     Assessment:     Active Problems:    SOB (shortness of breath) (3/7/2017)      Chronic a-fib (HCC) (3/7/2017)      CKD (chronic kidney disease) stage 4, GFR 15-29 ml/min ([facility]) (3/7/2017)      Hx of CABG (3/13/2017)      Overview: CABG in 2010 in [place]      Morbid obesity ([facility] [address] (3/13/2017)      Diabetes mellitus type 2, controlled ([facility] [address] (3/13/2017)      Fatigue (3/15/2017)      Bilateral leg edema (5/37/0120)      Diastolic heart failure (HCC) (6/22/2017)      JACEY (obstructive sleep apnea) (6/23/2017)      Pacemaker (6/26/2017)      Overview: 6/26/17: Marielos Adkins      H/O atrioventricular anastacio ablation (6/26/2017)      Overview: 6/26/17        Plan:     Chronic a-fib: s/p AVN ablation and pacemaker placement. · Continue OAC ,BB       Diastolic HF: weight stable. Creatinine improving. Net negative almost 4L    · Switch to PO bumex. Would continue on increased dose at discharge until follow-up with Dr. Dee Muniz. Will need quick follow-up after discharge due to HF. · Has been getting chorothiazide instead of metolazone per nephrology during admission - has been tolerating well, so would continue at discharge and can be modified at follow up if needed.     · Continue BB, but will increase dose due to HTN    · Can titrate HTN meds further if needed out-patient.   No ACEi due to kidney function      CKD:  Per nephrology    Diabetes:  Per hospitalist        Patient is ready for discharge from a cardiology perspective         Sonja Ellis NP  DNP, RN, AGACNP-BC

## 2017-06-27 NOTE — ANESTHESIA POSTPROCEDURE EVALUATION
Post-Anesthesia Evaluation and Assessment    Patient: Izzy Quach MRN: [id number]  SSN: [id number]    YOB: 1950  Age: 75 y.o. Sex: male       Cardiovascular Function/Vital Signs  Visit Vitals    /67 (BP 1 Location: Right arm, BP Patient Position: At rest;Sitting)    Pulse 75    Temp 36.9 °C (98.5 °F)    Resp 18    Wt 120.8 kg (266 lb 5.1 oz)    SpO2 95%    BMI 39.33 kg/m2       Patient is status post total IV anesthesia, MAC anesthesia for * No procedures listed *. Nausea/Vomiting: None    Postoperative hydration reviewed and adequate. Pain:  Pain Scale 1: Numeric (0 - 10) (06/27/17 0630)  Pain Intensity 1: 5 (06/27/17 0630)   Managed    Neurological Status:   Neuro  Neurologic State: Drowsy (06/26/17 1955)   At baseline    Mental Status and Level of Consciousness: Arousable    Pulmonary Status:   O2 Device: Room air (06/27/17 0253)   Adequate oxygenation and airway patent    Complications related to anesthesia: None    Post-anesthesia assessment completed.  No concerns    Signed By: Karime Mitchell MD     June 27, 2017

## 2017-06-27 NOTE — CARDIO/PULMONARY
CP REHAB NOTE     Chart reviewed.  Pt is a 75 y.o. male admitted with acute on chronic CHF, acute resp failure, chronic a-fib, CKD, T2DM, BPH, morbid obesity (5'9\", 280 lbs), CABG in 2010. S/p Ablation and BiV ppm 6/26/2017. Was on CHF bundle-now on CHF watch list.  EF 40-45%, Echo 6/22/2017  Non Smoker. Met with pt sitting up in chair with feet elevated. This was a follow-up visit to answer questions and reinforce prior teaching re: CHF, S&Ss, medication management, Low NA diet, daily weights, when to call the doctor and balancing rest/activity. Lives with wife and she does grocery shopping and reads labels. States he is aware of what he is supposed to be doing  And kept answers short. All questions answered. Understanding verbalized. Post pacemaker teaching previously done-reminded of s/s infection and restrictions.

## 2017-06-27 NOTE — HOME CARE
Home Health Care Discharge Planning: [facility]  Face to Face Encounter      NAME: Bryce Keene   :  1950   MRN:  [id number]     Primary Diagnosis: sob    Date of Face to Face:  2017 4:15 PM                                  Face to Face Encounter findings are related to primary reason for home care:   YES    1. I certify that the patient needs intermittent skilled nursing care, physical therapy and/or speech therapy. I will not be following this patient in the Community and Dr. Chaim Lee MD will be responsible for signing the [address] 2. Initial Orders for Care: home health PT and wound care    3. I certify that this patient is homebound because of illness or injury, need the aid of supportive devices such as crutches, canes, wheelchairs, and walkers; the use of special transportation; or the assistance of another person in order to leave their place of residence. There exists a normal inability to leave home and leaving home requires a considerable and taxing effort. 4. I certify that this patient is under my care and that I had a Face-to-Face Encounter that meets the physician Face-to-Face Encounter requirements.   Document the physical findings from the Face-to-Face Encounter that support the need for skilled services: gait training, wound care    Kyung Green MD  Discharging Physician  Office: [phone]  Fax:   [phone]

## 2017-06-29 ENCOUNTER — PATIENT OUTREACH (OUTPATIENT)
Dept: CARDIOLOGY CLINIC | Age: 67
End: 2017-06-29

## 2017-06-29 NOTE — PROGRESS NOTES
This note will not be viewable in [address] NN SUMAYA Post Hospitalization     Called pt to follow up on hospital visit to [facility] from 17 to 17 with a diagnosis of DHF. Pt verified  and address. NN role explained to pt and pt states understanding. Called pt and he confirmed  and address. Performed medication reconciliation with pt and pt states understanding on how to take his medications. We reviewed follow up appts and pt was not scheduled for cardiology. Chart sent to  as well as DALTON Reid for review. Pt reports that he is feeling better and is using a walker to ambulate. Pt reports that he has appt with PCP on 17 and PCP will determine if pt needs home health. Pt reports understanding the importance of following a low Na diet and watching fluid intake. Patient reminded that there is a cardiologist on call 24 hours a day / 7 days a week (M-F 5pm to 8am and from Friday 5pm until Monday 8a for the weekend) should the patient have questions or concerns. Patient reminded to call 911 if situation is emergent or patient feels the situation is emergent. Will continue to follow pt closely. Pt inquired about lab work concerning renal function and will address with DALTON Pina. Pt is to go to [facility] on  for a few weeks on a bus tour. Pt encouraged to be aware of dietary restraints. Physician discharge summary states:        AVS includes:   You are allergic to the following      Allergen Reactions     Ciprofloxacin Nausea Only     Percocet (Oxycodone-Acetaminophen) Other (comments) hallucinations     Recent Documentation      Weight BMI Smoking Status       120.8 kg 39.33 kg/m2 Never Smoker       Emergency Contacts Name Discharge Info Relation Home Work Mobile     ModaleAna Maria     DISCHARGE CAREGIVER [3]     Spouse [3]     [phone]       About your hospitalization      You were admitted on:  2017     You last received care in the:  [facility] You were discharged on:  June 27, 2017     Unit phone number:  [phone]     Why you were hospitalized      Your primary diagnosis was:  Not on File     Your diagnoses also included:  Diastolic Heart Failure (Hcc), Sob (Shortness Of Breath), Morbid Obesity (Hcc), Hx Of Cabg, Fatigue, Diabetes Mellitus Type 2, Controlled (Hcc), Ckd (Chronic Kidney Disease) Stage 4, Gfr 15-29 Ml/Min (Hcc), Bilateral Leg Edema, Chronic A-Fib (Hcc), Andi (Obstructive Sleep Apnea), Pacemaker, H/O Atrioventricular Luz Maria Ablation                                                     Providers Seen During Your Hospitalizations                 Provider     Role     Specialty     Primary office phone                 Kaycee Arreola MD     Attending Provider     Emergency Medicine     [phone]             Yfn Carbajal MD     Attending Provider     Internal Medicine     [phone]                  Your Primary Care Physician (PCP)                 Primary Care Physician     Office Phone     Office Fax                 Zeke Chandra     [phone]                  Follow-up Information                 Follow up With     Details     Comments     Contact Info                 [facility] on [address] [facility] [address]    [facility]                 [address]    1st Floor    Christine Melendrez MD     On 6/28/2017     hospital follow up to set up home health for wound care and PT-- 6/28/17--1pm     [address]    [phone]               Malia Rivero MD     Go on 7/7/2017     Nephrology follow up at 2:00 PM     [facility] [address]    [phone]               Margot Michelle MD     Go on 7/11/2017     Cardiology follow up at 2:30 PM for pacemaker check     [id number] Cardiology [place] [address] [facility] [address]    [phone]               [address]     On 7/6/2017     Appt. 7/6/17-- 945 am--with Dr. Cristina Mckinney    [phone]                  Your Appointments                            Thursday July 06, 2017  9:45 AM EDT       WOUND CARE FOLLOW UP with Michael Iyer MD       [facility] OP WOUND CARE [address])                 [address] [facility]    [address] [address]       [phone]                                        Thursday July 06, 2017 10:50 AM EDT       Follow Up with Raciel Oliver MD       [facility] [address] [place] [address])                 [address] [place] [address] [address] [facility]       [phone]                                        Tuesday July 11, 2017  2:30 PM EDT       PACEMAKER with PACEMAKER, [facility] [address] [facility])                 [address] [facility]    [address] [address]       [phone]                                                                                    Current Discharge Medication List                 START taking these medications                         Dose & Instructions     Dispensing Information     Comments             Morning     Noon     Evening     Bedtime                     allopurinol 300 mg tablet       Commonly known as:  ZYLOPRIM                Your last dose was: Your next dose is:                                     Dose:  300 mg       Take 1 Tab by mouth daily.                  Quantity:  30 Tab       Refills:  1                                                                                                     potassium chloride SR 20 mEq tablet       Commonly known as:  K-TAB                Your last dose was: Your next dose is:                                     Dose:  20 mEq       Take 1 Tab by mouth daily. Quantity:  30 Tab       Refills:  1                                                                                                      CONTINUE these medications which have CHANGED                         Dose & Instructions     Dispensing Information     Comments             Morning     Noon     Evening     Bedtime                     bumetanide 2 mg tablet       Commonly known as:  BUMEX       What changed:      - medication strength    - how much to take    - Another medication with the same name was removed. Continue taking this medication, and follow the directions you see here. Your last dose was: Your next dose is:                                     Dose:  2 mg       Take 1 Tab by mouth two (2) times a day. Quantity:  60 Tab       Refills:  1                                                                                                     metOLazone 2.5 mg tablet       Commonly known as:  ZAROXOLYN       What changed:  Another medication with the same name was removed. Continue taking this medication, and follow the directions you see here. Your last dose was: Your next dose is:                                     Dose:  2.5 mg       Take 2.5 mg by mouth daily. Refills:  0                                                                                                     metoprolol succinate 100 mg tablet       Commonly known as:  TOPROL-XL       What changed:      - medication strength    - how much to take    - Another medication with the same name was removed. Continue taking this medication, and follow the directions you see here. Your last dose was:                   Your next dose is:                                     Dose:  150 mg Take 1.5 Tabs by mouth daily. Quantity:  45 Tab       Refills:  1                                                                                                      CONTINUE these medications which have NOT CHANGED                         Dose & Instructions     Dispensing Information     Comments             Morning     Noon     Evening     Bedtime                     apixaban 5 mg tablet       Commonly known as:  ELIQUIS                Your last dose was: Your next dose is:                                     Dose:  5 mg       Take 1 Tab by mouth two (2) times a day. Indications: PREVENT THROMBOEMBOLISM IN CHRONIC ATRIAL FIBRILLATION                 Quantity:  60 Tab       Refills:  0                                                                                                     calcium carbonate 600 mg calcium (1,500 mg) tablet       Commonly known as:  CALTREX                Your last dose was: Your next dose is:                                     Dose:  600 mg       Take 600 mg by mouth every Tuesday, Thursday, Saturday & Sunday. Refills:  0                                                                                                     dutasteride 0.5 mg capsule       Commonly known as:  AVODART                Your last dose was: Your next dose is:                                     TAKE ONE CAPSULE BY MOUTH EVERY DAY                 Refills:  2                                                                                                     insulin aspart protamine/insulin aspart 100 unit/mL (70-30) Inpn       Commonly known as:  NOVOLOG MIX 70/30                Your last dose was: Your next dose is:                                     by SubCUTAneous route two (2) times a day.  Sliding scale: 26 units AM, 34 units PM, Add 5 units for every 50 point over 150, Hold if BS <100 Patient injected 34 unit on 6/21/17 PM (); patient injected 15 units on 6/22/17 AM ()                 Refills:  0                                                                                                     ketoconazole 2 % topical cream       Commonly known as:  NIZORAL                Your last dose was: Your next dose is:                                     Apply  to affected area two (2) times daily as needed for Skin Irritation. Indications: rash                 Refills:  0                                                                                                     levothyroxine 137 mcg tablet       Commonly known as:  SYNTHROID                Your last dose was: Your next dose is:                                     TAKE 1 TABLET(S) EVERY DAY BY ORAL ROUTE FOR 90 DAYS. Refills:  3                                                                                                     multivitamin tablet       Commonly known as:  ONE A DAY                Your last dose was: Your next dose is:                                     Dose:  1 Tab       Take 1 Tab by mouth daily. Refills:  0                                                                                                     paricalcitol 1 mcg capsule       Commonly known as:  ZEMPLAR                Your last dose was: Your next dose is:                                     Dose:  1 mcg       Take 1 mcg by mouth daily. Refills:  0                                                                                                     pravastatin 80 mg tablet       Commonly known as:  PRAVACHOL                Your last dose was: Your next dose is:                                     Dose:  80 mg       Take 1 Tab by mouth nightly.                  Quantity:  30 Tab       Refills:  6 tamsulosin 0.4 mg capsule       Commonly known as:  FLOMAX                Your last dose was: Your next dose is:                                     TAKE 2 CAPSULES BY MOUTH EVERY DAY                 Refills:  2                                                                                                     TYLENOL EXTRA STRENGTH 500 mg tablet       Generic drug:  acetaminophen                Your last dose was: Your next dose is:                                     Dose:  1000 mg       Take 1,000 mg by mouth every eight (8) hours as needed for Pain. Refills:  0                                                                                                     VITAMIN D3 1,000 unit Cap       Generic drug:  cholecalciferol                Your last dose was: Your next dose is:                                     Dose:  3000 Units       Take 3,000 Units by mouth daily. Refills:  0                                                                                                              Where to Get Your Medications                 Information on where to get these meds will be given to you by the nurse or doctor. ! Ask your nurse or doctor about these medications                      allopurinol 300 mg tablet            bumetanide 2 mg tablet            metoprolol succinate 100 mg tablet            potassium chloride SR 20 mEq tablet                                                 Discharge Instructions                    Daily weights:    Notify Dr. Rupinder Gonzalez for a weight gain of 3 pounds or greater in one day or 5 pounds in one week.     Low Sodium Diet as per CHF Education        Discharge Instructions Attachments/References                       HEART FAILURE ZONES: GENERAL INFO (ENGLISH)    HEART FAILURE: AVOIDING TRIGGERS (ENGLISH)    HEART FAILURE: SELF-CARE: GENERAL INFO (ENGLISH)                   Discharge Orders                       None                   Bulbstorm Announcement                         We are excited to announce that we are making your provider's discharge notes available to you in Bulbstorm. You will see these notes when they are completed and signed by the physician that discharged you from your recent hospital stay. If you have any questions or concerns about any information you see in Bulbstorm, please call the Health Information Department where you were seen or reach out to your Primary Care Provider for more information about your plan of care. Introducing [facility]! Toribio Nyhan introduces Bulbstorm patient portal. Now you can access parts of your medical record, email your doctor's office, and request medication refills online. 1.In your internet browser, go to [email or web address]       2. Click on the First Time User? Click Here link in the Sign In box. You will see the New Member Sign Up page. 3.Enter your Bulbstorm Access Code exactly as it appears below. You will not need to use this code after youve completed the sign-up process. If you do not sign up before the expiration date, you must request a new code. Bulbstorm Access Code: [email or web address]      Expires: 9/5/2017 11:15 AM           4. Enter the last four digits of your Social Security Number (xxxx) and Date of Birth (mm/dd/yyyy) as indicated and click Submit. You will be taken to the next sign-up page. 5.Create a Bulbstorm ID. This will be your Bulbstorm login ID and cannot be changed, so think of one that is secure and easy to remember. 6.Create a Bulbstorm password. You can change your password at any time. 7.Enter your Password Reset Question and Answer. This can be used at a later time if you forget your password. 8.Enter your e-mail address.  You will receive e-mail notification when new information is available in [address] 9.Click Sign Up. You can now view and download portions of your medical record. 10.Click the [employer] link to download a portable copy of your medical information. If you have questions, please visit the Frequently Asked Questions section of the [employer] website. Remember, [employer] is NOT to be used for urgent needs. For medical emergencies, dial 911. Now available from your iPhone and Android! General Information                                                 Please provide this summary of care documentation to your next provider. Patient Signature:  ____________________________________________________________             Date:  ____________________________________________________________                               `                                   Provider Signature:  ____________________________________________________________             Date:  ____________________________________________________________                                           More Information                     Learning About Heart Failure Zones    What are heart failure zones? Heart failure zones give you an easy way to see changes in your heart failure symptoms. They also tell you when you need to get help. Check every day to see which zone you are in. Green zone. You are doing well. This is where you want to be. Your weight is stable. This means it is not going up or down. You breathe easily. You are sleeping well. You are able to lie flat without shortness of breath. You can do your usual activities. Yellow zone. Be careful. Your symptoms are changing. Call your doctor. You have new or increased shortness of breath.       You are dizzy or lightheaded, or you feel like you may faint.      You have sudden weight gain, such as more than 2 to 3 pounds in a day or 5 pounds in a week. (Your doctor may suggest a different range of weight gain.)      You have increased swelling in your legs, ankles, or feet. You are so tired or weak that you cannot do your usual activities. You are not sleeping well. Shortness of breath wakes you up at night. You need extra pillows. Your doctor's name: ____________________________________________________________    Your doctor's contact information: _________________________________________________    Red zone. This is an emergency. Call 911. You have symptoms of sudden heart failure, such as:    You have severe trouble breathing. You cough up pink, foamy mucus. You have a new irregular or fast heartbeat. You have symptoms of a heart attack. These may include:    Chest pain or pressure, or a strange feeling in the chest.      Sweating. Shortness of breath. Nausea or vomiting. Pain, pressure, or a strange feeling in the back, neck, jaw, or upper belly or in one or both shoulders or arms. Lightheadedness or sudden weakness. A fast or irregular heartbeat. If you have symptoms of a heart attack: After you call 911, the  may tell you to chew 1 adult-strength or 2 to 4 low-dose aspirin. Wait for an ambulance. Do not try to drive yourself. Follow-up care is a key part of your treatment and safety. Be sure to make and go to all appointments, and call your doctor if you are having problems. It's also a good idea to know your test results and keep a list of the medicines you take. Where can you learn more? Go to http://shira-mine.info/. Enter T174 in the search box to learn more about \"Learning About Heart Failure Zones. \"    Current as of: February 23, 2017    Content Version: 11.3    © 5693-6083 Healthwise, [employer].  Care instructions adapted under license by [address] (which disclaims liability or warranty for this information). If you have questions about a medical condition or this instruction, always ask your healthcare professional. [address] any warranty or liability for your use of this information. Avoiding Triggers With Heart Failure: Care Instructions    Your Care Instructions    Triggers are anything that make your heart failure flare up. A flare-up is also called \"sudden heart failure\" or \"acute heart failure. \" When you have a flare-up, fluid builds up in your lungs, and you have problems breathing. You might need to go to the hospital. By watching for changes in your condition and avoiding triggers, you can prevent heart failure flare-ups. Follow-up care is a key part of your treatment and safety. Be sure to make and go to all appointments, and call your doctor if you are having problems. It's also a good idea to know your test results and keep a list of the medicines you take. How can you care for yourself at home? Watch for changes in your weight and condition    Carol Phillipam yourself without clothing at the same time each day. Record your weight. Call your doctor if you have sudden weight gain, such as more than 2 to 3 pounds in a day or 5 pounds in a week. (Your doctor may suggest a different range of weight gain.) A sudden weight gain may mean that your heart failure is getting worse. Keep a daily record of your symptoms. Write down any changes in how you feel, such as new shortness of breath, cough, or problems eating. Also record if your ankles are more swollen than usual and if you feel more tired than usual. Note anything that you ate or did that could have triggered these changes. Limit sodium    Sodium causes your body to hold on to extra water. This may cause your heart failure symptoms to get worse. People get most of their sodium from processed foods.  Fast food and restaurant meals also tend to be very high in sodium. Your doctor may suggest that you limit sodium to 2,000 milligrams (mg) a day or less. That is less than 1 teaspoon of salt a day, including all the salt you eat in cooking or in packaged foods. Read food labels on cans and food packages. They tell you how much sodium you get in one serving. Check the serving size. If you eat more than one serving, you are getting more sodium. Be aware that sodium can come in forms other than salt, including monosodium glutamate (MSG), sodium citrate, and sodium bicarbonate (baking soda). MSG is often added to Asian food. You can sometimes ask for food without MSG or salt. Slowly reducing salt will help you adjust to the taste. Take the salt shaker off the table. Flavor your food with garlic, lemon juice, onion, vinegar, herbs, and spices instead of salt. Do not use soy sauce, steak sauce, onion salt, garlic salt, mustard, or ketchup on your food, unless it is labeled \"low-sodium\" or \"low-salt. \"      Sidra Belch your own salad dressings, sauces, and ketchup without adding salt. Use fresh or frozen ingredients, instead of canned ones, whenever you can. Choose low-sodium canned goods. Eat less processed food and food from restaurants, including fast food. Exercise as directed    Moderate, regular exercise is very good for your heart. It improves your blood flow and helps control your weight. But too much exercise can stress your heart and cause a heart failure flare-up. Check with your doctor before you start an exercise program.      Gib Riling is an easy way to get exercise. Start out slowly. Gradually increase the length and pace of your walk. Swimming, riding a bike, and using a treadmill are also good forms of exercise. When you exercise, watch for signs that your heart is working too hard. You are pushing yourself too hard if you cannot talk while you are exercising.  If you become short of breath or dizzy or have chest pain, stop, sit down, and rest.      Do not exercise when you do not feel well. Take medicines correctly    Take your medicines exactly as prescribed. Call your doctor if you think you are having a problem with your medicine. Make a list of all the medicines you take. Include those prescribed to you by other doctors and any over-the-counter medicines, vitamins, or supplements you take. Take this list with you when you go to any doctor. Take your medicines at the same time every day. It may help you to post a list of all the medicines you take every day and what time of day you take them. Make taking your medicine as simple as you can. Plan times to take your medicines when you are doing other things, such as eating a meal or getting ready for bed. This will make it easier to remember to take your medicines. Get organized. Use helpful tools, such as daily or weekly pill containers. When should you call for help? Call 911 if you have symptoms of sudden heart failure such as:    You have severe trouble breathing. You cough up pink, foamy mucus. You have a new irregular or rapid heartbeat. Call your doctor now or seek immediate medical care if:    You have new or increased shortness of breath. You are dizzy or lightheaded, or you feel like you may faint. You have sudden weight gain, such as more than 2 to 3 pounds in a day or 5 pounds in a week. (Your doctor may suggest a different range of weight gain.)      You have increased swelling in your legs, ankles, or feet. You are suddenly so tired or weak that you cannot do your usual activities. Watch closely for changes in your health, and be sure to contact your doctor if you develop new symptoms. Where can you learn more? Go to [email or web address]     Enter Y007 in the search box to learn more about \"Avoiding Triggers With Heart Failure: Care Instructions. \"    Current as of: February 23, 2017    Content Version: 11.3    © 2436-4531 [employer]. Care instructions adapted under license by [employer] (which disclaims liability or warranty for this information). If you have questions about a medical condition or this instruction, always ask your healthcare professional. [address] any warranty or liability for your use of this information. Learning About Self-Care for Heart Failure    What is self-care for heart failure? Heart failure usually gets worse over time. But there are many things you can do to feel better, stay healthy longer, and avoid the hospital.    Self-care means managing your health by doing certain things every day, like weighing yourself. It's about knowing which symptoms to watch for so you can avoid getting worse. When you practice good self-care, you know when it's time to call your doctor and when your heart failure has turned into an emergency. The lists below can help. Top 5 self-care tips for every day    1. Take your medicines as prescribed. This gives them the best chance of helping you. 2.Watch for signs that you're getting worse. Weighing yourself every day is one of the best ways to do this. Weight gain may be a sign that your body is holding on to too much fluid. Weigh yourself at the same time each day, using the same scale, on a hard, flat surface. The best time is in the morning after you go to the bathroom and before you eat or drink anything. 3.Find out what your triggers are, and learn to avoid them. Triggers are things that make your heart failure worse, often suddenly. A trigger may be eating too much salt, missing a dose of your medicine, or exercising too hard. 4.Limit sodium. This helps keep fluid from building up and may help you feel better.  Your doctor may suggest that you limit sodium to 2,000 milligrams (mg) a day or less. That's less than 1 teaspoon. You can stay under this amount by limiting the salt you eat at home and by watching for \"hidden\" sodium when you eat out or shop for food. 5.Try to exercise throughout the week. Exercise makes your heart stronger and can help you avoid symptoms. Walking is a great way to get exercise. If your doctor says it's safe, start out with some short walks, and then slowly build up to longer ones. When should you act? Try to become familiar with signs that mean your heart failure is getting worse. If you need help, talk with your doctor about making a personal plan. Here are some things to watch for as you practice your daily self-care. Call your doctor if:    You have sudden weight gain, such as more than 2 to 3 pounds in a day or 5 pounds in a week. (Your doctor may suggest a different range of weight gain.)      You have new or worse swelling in your feet, ankles, or legs. Your breathing gets worse. Activities that did not make you short of breath before are hard for you now. Your breathing when you lie down is worse than usual, or you wake up at night needing to catch your breath. Be sure to make and go to all of your follow-up appointments. And it's always a good idea to call your doctor anytime you have a sudden change in symptoms. When is it an emergency? Sometimes the symptoms get worse very quickly. This is called sudden heart failure. It causes fluid to build up in your lungs. Sudden heart failure is an emergency. If you have any of these symptoms, you need care right away. Call 911 if:    You have severe shortness of breath. You have an irregular or fast heartbeat. You cough up foamy, pink mucus. What else can you do to stay healthy? There are other things you can do to take care of your body and your heart. These things will help you feel better.  And they will also reduce your risk of heart attack and stroke. Try to stay at a healthy weight. Eat a healthy diet with lots of fresh fruit, vegetables, and whole grains. If you smoke, quit. Limit the amount of alcohol you drink. Keep high blood pressure and diabetes under control. If you need help, talk with your doctor. If your doctor has not set you up with a cardiac rehabilitation (rehab) program, talk to him or her about whether that is right for you. Cardiac rehab includes exercise, help with diet and lifestyle changes, and emotional support. Also let your doctor know if:    You're having trouble sleeping. Sleep is important to your well-being. It also helps your heart work the way it's supposed to. Your doctor can help you decide if you need treatment for sleep problems. You're feeling sad or hopeless much of the time, or you are worried and anxious. Heart failure can be hard on your emotions. Treatment with counseling and medicine can help. And when you feel better, you're more likely to take care of yourself. Follow-up care is a key part of your treatment and safety. Be sure to make and go to all appointments, and call your doctor if you are having problems. It's also a good idea to know your test results and keep a list of the medicines you take. Most recent lab work:   Results for Shaye Brand (MRN [id number]) as of 6/29/2017 11:24   Ref.  Range 6/27/2017 02:56   WBC Latest Ref Range: 4.1 - 11.1 K/uL 6.5   RBC Latest Ref Range: 4.10 - 5.70 M/uL 4.72   HGB Latest Ref Range: 12.1 - 17.0 g/dL 12.0 (L)   HCT Latest Ref Range: 36.6 - 50.3 % 39.8   MCV Latest Ref Range: 80.0 - 99.0 FL 84.3   MCH Latest Ref Range: 26.0 - 34.0 PG 25.4 (L)   MCHC Latest Ref Range: 30.0 - 36.5 g/dL 30.2   RDW Latest Ref Range: 11.5 - 14.5 % 17.9 (H)   PLATELET Latest Ref Range: 150 - 400 K/uL 139 (L)   Sodium Latest Ref Range: 136 - 145 mmol/L 138   Potassium Latest Ref Range: 3.5 - 5.1 mmol/L 4.3   Chloride Latest Ref Range: 97 - 108 mmol/L 94 (L)   CO2 Latest Ref Range: 21 - 32 mmol/L 37 (H)   Anion gap Latest Ref Range: 5 - 15 mmol/L 7   Glucose Latest Ref Range: 65 - 100 mg/dL 189 (H)   BUN Latest Ref Range: 6 - 20 MG/DL 78 (H)   Creatinine Latest Ref Range: 0.70 - 1.30 MG/DL 2.67 (H)   BUN/Creatinine ratio Latest Ref Range: 12 - 20   29 (H)   Calcium Latest Ref Range: 8.5 - 10.1 MG/DL 8.7   GFR est non-AA Latest Ref Range: >60 ml/min/1.73m2 24 (L)   GFR est AA Latest Ref Range: >60 ml/min/1.73m2 29 (L)     Risk Assessment score:  Medium Risk            16       Total Score        3 Relationship with PCP    2 Patient Living Status    4 More than 1 Admission in calendar year    7 Charlson Comorbidity Score        Criteria that do not apply:    Patient Length of Stay > 5    Patient Insurance is Medicare, Medicaid or Self Pay      Advanced Care Plan:  Not on file    Barriers include:  Dietary     Patient reminded that there is a cardiologist on call 24 hours a day / 7 days a week should the patient have questions or concerns. Patient reminded to call 911 if situation is emergent or patient feels the situation is emergent. Plan of Care:  Pt is to follow up with PCP on 6/30/17 and has other specialists appts as well. Pt needs cardiology appt as well as EP appt.

## 2017-06-29 NOTE — Clinical Note
Norman Lobo - meenu Monson - labs? ? Julito Magana - pt needs to see Dr. Karel Hill in a week.   HF discharge

## 2017-07-06 ENCOUNTER — HOSPITAL ENCOUNTER (OUTPATIENT)
Dept: WOUND CARE | Age: 67
Discharge: HOME | End: 2017-07-06
Payer: MEDICARE

## 2017-07-06 ENCOUNTER — OFFICE VISIT (OUTPATIENT)
Dept: ENDOCRINOLOGY | Age: 67
End: 2017-07-06

## 2017-07-06 VITALS
HEIGHT: 69 IN | WEIGHT: 257.2 LBS | BODY MASS INDEX: 38.09 KG/M2 | DIASTOLIC BLOOD PRESSURE: 79 MMHG | HEART RATE: 76 BPM | SYSTOLIC BLOOD PRESSURE: 139 MMHG

## 2017-07-06 DIAGNOSIS — E55.9 VITAMIN D DEFICIENCY: ICD-10-CM

## 2017-07-06 DIAGNOSIS — E78.5 HYPERLIPIDEMIA, UNSPECIFIED HYPERLIPIDEMIA TYPE: ICD-10-CM

## 2017-07-06 DIAGNOSIS — E03.9 HYPOTHYROIDISM, UNSPECIFIED TYPE: ICD-10-CM

## 2017-07-06 DIAGNOSIS — E11.49 TYPE 2 DIABETES MELLITUS WITH OTHER DIABETIC NEUROLOGICAL COMPLICATION (HCC): Primary | ICD-10-CM

## 2017-07-06 DIAGNOSIS — I10 ESSENTIAL HYPERTENSION WITH GOAL BLOOD PRESSURE LESS THAN 130/80: ICD-10-CM

## 2017-07-06 PROCEDURE — 97598 DBRDMT OPN WND ADDL 20CM/<: CPT | Performed by: OTOLARYNGOLOGY

## 2017-07-06 PROCEDURE — 97597 DBRDMT OPN WND 1ST 20 CM/<: CPT | Performed by: OTOLARYNGOLOGY

## 2017-07-06 RX ORDER — LIDOCAINE HYDROCHLORIDE 40 MG/ML
SOLUTION TOPICAL ONCE
Status: COMPLETED | OUTPATIENT
Start: 2017-07-06 | End: 2017-07-06

## 2017-07-06 RX ADMIN — LIDOCAINE HYDROCHLORIDE: 40 SOLUTION TOPICAL at 10:00

## 2017-07-06 NOTE — PATIENT INSTRUCTIONS
Novolog 70-30 insulin:  Breakfast 26 units  Dinner 34 units; You can reduce this dose by 2 units every couple days to keep sugars in the 140-160 range. Your doing much better but our fucus should be on getting your dinner time blood sugars a bit lower. Continue current dose of levothyroxine, take levothyroxine with a glass of water on an empty stomach each day in the mornings, 1 hour prior to ingesting any food or other medications, including vitamins. I will adjust your dose based on todays levels. Continue vitamin D 3 at 3000 units daily      ----------------------------------------------------------------------------------------------------------------------    Below you will find a glucose log sheet which you can use to record your blood sugars. Without checking and recording what your home glucose levels are, it will be difficult to make any changes to your medication dose, even when significant changes may be needed. Please feel free to use the log below to record your home glucose levels. At the very least, I would like for you to login the entire 2-3 weeks just before your visit so we can make your visit much more productive and beneficial to you. GLUCOSE LOG SHEET:    Date Breakfast Lunch Dinner Bedtime Comments ? GLUCOSE LOG SHEET:    Date Breakfast Lunch Dinner Bedtime Comments ? GLUCOSE LOG SHEET:    Date Breakfast Lunch Dinner Bedtime Comments ? Dr. Obdulia Kang to basics:    When you have diabetes or pre-diabetes, as you know, your body has difficulty dealing with the sugar it absorbs from your meals. An unrelated but very relevant term you may have heard before, quantitative easing, has a new meaning: By gradually reducing the load of sugars entering the body, you ease the stress on the body and allow it to catch up with the work it must do. This in turn will allow your body to work better. On top of this, remember one point, the more sugar stress your body has, the more you enter a state of glucose toxicity and this is a state where you become even more resistant. Thats right higher sugar = more resistance, not only to your own bodies attempt to fix the problem but also resistance to your medications. This is why medications work best when a proper diet is followed. Tip 1. Dont forget the protein. When you add a portion of meat or other low carb protein food in your meal, it provides healthy calories which contribute to reducing that feeling of hunger that drives you to eat what you dont want. Tip 2. Portions are a real thing. Before you eat, stop and look at your plate/table. Count how many items have sugars/carbs in them. A sandwich (bread) ? Harrie Seton ? Sweet drink ? Potatoe ? Pasta ? Rice ? You would be surprised when you become aware. Aim for a reasonable portion of carbs, and if you feel you absolutely cannot do this, at least start working toward this. 45-60 grams is usually more than enough in one meal. And YES, than includes the desert! Tip 3. Three meals per day, snack-free in between! Your body needs a break. Eating an adequate meal keeps you from getting hungry and reaching for a snack in between meals.  Recall that most of the time, diabetic patients are not treating these snacks. Dont eat dinner late at night, but rather allow more overnight fasting time for your body to recover. If you eat dinner at 8 PM, try 6 PM.  Sporadic eating is the opposite of what you need, and adjusting to a regular eating schedule such as this will not only be a great benefit to your body, but your medications will also tend to work better at keeping your diabetes under control. Tip 4. There is no best diet when it comes to weight loss. When comparing diets and outcomes, the main ingredient when searching for weight loss was calories ! Lower calories = better weight loss. Pick a healthy diet thats right for you, i.e. diabetes friendly, and evaluate your daily calorie intake. And of course this would be of no use without exercise. Calories in need calories out.

## 2017-07-06 NOTE — PROGRESS NOTES
[address]   WOUND CARE PROGRESS NOTE       Name:  Yani Hauser   MR#:  [id number]   :  1950   Account #:  [de-identified]        Date of Adm:  2017       DATE OF SERVICE:  2017     SUBJECTIVE: The patient was last seen at his initial visit on   06/15/2017. He subsequently was admitted to the hospital several days   later for worsening heart failure. He received a pacemaker, and   discharged on additional medications, which include Bumex, Toprol   and Eliquis for his heart, along with his previous medications. He is   accompanied by his wife today, and they report no significant changes   in his health, although his leg edema is markedly improved and he has   lost over 25 pounds , which is certainly helping with his venous   disease. OBJECTIVE: Today, he is awake and alert, afebrile at 97.9, pulse 76,   respirations 14, blood pressure 151/78. The left proximal lower leg has a very small ulcer measuring 0.6 x 0.9 x   0.1. The left lower leg has improved, and measures 9 x 9.1 x 0.1, and   has a layer of slough in need of debridement. I do not see any signs of   infection, odor or drainage today. The right leg has 3 small new ulcers,   which are acute. IMPRESSION: Bilateral venous ulcers (I87.313, L97.911, L97.921 and   E11.621). PLAN: After discussing risks and benefits, obtaining informed consent,   identifying the patient and taking a time-out to discuss selective   debridement, Xylocaine gel was applied to both legs. A 7 mm ring   curette was used to remove slough from both legs. He tolerated the   procedure well with no complications. We will continue with the   Aquacel AG and double Tubigrips. They obtained an KERI while in the   hospital that was equivocal in that segmental pressures in both legs   are not measurable due to extensive arterial calcification.  He had toe   pressures of 0.64 on the right and 0.52 on the left. I will seek an   informal consultation with our vascular surgeons to determine whether   additional studies need to be performed. I have given him a   prescription for compression stockings in the 20-30 mmHg pressure   range. He will see us back in 1 week. He plans to see Dr. Caren Garcia for followup soon. This is certainly a   medical problem with his heart failure and fluid accumulation in the   legs, which then leads to the skin breakdown. We will continue to work   with his physicians to heal these legs and try to prevent further   ulcerations.         MD LEAH Sorenson / Ellen Mccloud   D:  07/06/2017   10:19   T:  07/06/2017   12:54   Job #:  [id number]

## 2017-07-06 NOTE — PROGRESS NOTES
CHIEF COMPLAINT: f/u uncontrolled type 2 diabetes    HISTORY OF PRESENT ILLNESS:   Leonor Muir is a 75 y.o. male with a PMHx as noted below who presents for evaluation of uncontrolled type 2 diabetes. Mr. 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 His health is complicated by CHF, CAD, and CKD, follows with cardiology and nephrology. His A1c prior to initial visit was 8% and was noted to be on novolog 70/30 insulin. He did not have a record of his home blood sugars and did not recall any of his home numbers and was requested to start monitoring home blood sugar for same dose adjustments to his regimen. A1c in June was 7.6%. Regimen:  - Novolog 70-30 insulin. 26 units (takes 16 if glucose <150)/ 34 units    Review of home glucose: checks AM/Dinner/Bedtime (keeping a good record log for review)      Breakfast: 150-180 average, no lows  Dinner: 175-250 average, no lows  Bedtime: 120 -150 average        Hypothyroidism: On 137 mcg of levothyroxine, as advised how to take properly as was taking it with other medications.       Vitamin D deficiency on 3000 units daily, last level we checked was 55, stable     PAST MEDICAL/SURGICAL HISTORY:   Past Medical History:   Diagnosis Date    Arrhythmia     atrial fibrillation 2017    CAD (coronary atherosclerotic disease)     [facility]     Diabetes mellitus type 2, controlled ([facility] 3/13/2017    Heart failure ([facility]     Hx of CABG 3/13/2017    CABG in 2010 in [address] Morbid obesity ([facility] 3/13/2017    JACEY (obstructive sleep apnea) 6/23/2017    S/P CABG x 2 2010     Past Surgical History:   Procedure Laterality Date    CARDIAC SURG PROCEDURE UNLIST      CABGx2 in 2010    HX ORTHOPAEDIC      L 3rd toe amputation in 1999       ALLERGIES:   Allergies   Allergen Reactions    Ciprofloxacin Nausea Only    Percocet [Oxycodone-Acetaminophen] Other (comments) hallucinations       MEDICATIONS ON ADMISSION:     Current Outpatient Prescriptions:     allopurinol (ZYLOPRIM) 300 mg tablet, Take 1 Tab by mouth daily. , Disp: 30 Tab, Rfl: 1    potassium chloride SR (K-TAB) 20 mEq tablet, Take 1 Tab by mouth daily. , Disp: 30 Tab, Rfl: 1    bumetanide (BUMEX) 2 mg tablet, Take 1 Tab by mouth two (2) times a day., Disp: 60 Tab, Rfl: 1    metoprolol succinate (TOPROL-XL) 100 mg tablet, Take 1.5 Tabs by mouth daily. , Disp: 45 Tab, Rfl: 1    metOLazone (ZAROXOLYN) 2.5 mg tablet, Take 2.5 mg by mouth daily. , Disp: , Rfl:     insulin aspart protamine/insulin aspart (NOVOLOG MIX 70/30) 100 unit/mL (70-30) inpn, by SubCUTAneous route two (2) times a day. Sliding scale: 26 units AM, 34 units PM, Add 5 units for every 50 point over 150, Hold if BS <100 Patient injected 34 unit on 6/21/17 PM (); patient injected 15 units on 6/22/17 AM (), Disp: , Rfl:     pravastatin (PRAVACHOL) 80 mg tablet, Take 1 Tab by mouth nightly., Disp: 30 Tab, Rfl: 6    apixaban (ELIQUIS) 5 mg tablet, Take 1 Tab by mouth two (2) times a day. Indications: PREVENT THROMBOEMBOLISM IN CHRONIC ATRIAL FIBRILLATION, Disp: 60 Tab, Rfl: 0    calcium carbonate (CALTREX) 600 mg calcium (1,500 mg) tablet, Take 600 mg by mouth every Tuesday, Thursday, Saturday & Sunday. , Disp: , Rfl:     dutasteride (AVODART) 0.5 mg capsule, TAKE ONE CAPSULE BY MOUTH EVERY DAY, Disp: , Rfl: 2    levothyroxine (SYNTHROID) 137 mcg tablet, TAKE 1 TABLET(S) EVERY DAY BY ORAL ROUTE FOR 90 DAYS., Disp: , Rfl: 3    tamsulosin (FLOMAX) 0.4 mg capsule, TAKE 2 CAPSULES BY MOUTH EVERY DAY, Disp: , Rfl: 2    paricalcitol (ZEMPLAR) 1 mcg capsule, Take 1 mcg by mouth daily. , Disp: , Rfl:     multivitamin (ONE A DAY) tablet, Take 1 Tab by mouth daily. , Disp: , Rfl:     cholecalciferol (VITAMIN D3) 1,000 unit cap, Take 3,000 Units by mouth daily. , Disp: , Rfl:     acetaminophen (TYLENOL EXTRA STRENGTH) 500 mg tablet, Take 1,000 mg by mouth every eight (8) hours as needed for Pain., Disp: , Rfl:     ketoconazole (NIZORAL) 2 % topical cream, Apply  to affected area two (2) times daily as needed for Skin Irritation. Indications: rash, Disp: , Rfl:   No current facility-administered medications for this visit. SOCIAL HISTORY:   Social History     Social History    Marital status:      Spouse name: N/A    Number of children: N/A    Years of education: N/A     Occupational History    Not on file. Social History Main Topics    Smoking status: Never Smoker    Smokeless tobacco: Never Used    Alcohol use Yes      Comment: rare    Drug use: No    Sexual activity: Not on file     Other Topics Concern    Not on file     Social History Narrative       FAMILY HISTORY:  Family History   Problem Relation Age of Onset    Heart Attack Father     No Known Problems Mother        REVIEW OF SYSTEMS: Complete ROS assessed and noted for that which is described above, all else are negative.   Eyes: normal  ENT: normal  CVS: normal  Resp: normal  GI: normal  : normal  GYN: normal  Endocrine: normal  Integument: normal  Musculoskeletal: normal  Neuro: normal  Psych: normal      PHYSICAL EXAMINATION:    VITAL SIGNS:  Visit Vitals    /79 (BP 1 Location: Left arm, BP Patient Position: Sitting)    Pulse 76    Ht 5' 9\" (1.753 m)    Wt 257 lb 3.2 oz (116.7 kg)    BMI 37.98 kg/m2       GENERAL: NCAT, Sitting comfortably, NAD  EYES: EOMI, non-icteric, no proptosis  Ear/Nose/Throat: NCAT, no inflammation, no masses  LYMPH NODES: No LAD  CARDIOVASCULAR: S1 S2, RRR, No murmur, 2+ radial pulses  RESPIRATORY: CTA b/l, no wheeze/rales  GASTROINTESTINAL: obese, NT, ND  MUSCULOSKELETAL: Normal ROM, no atrophy  SKIN: warm, no edema/rash/ or other skin changes  NEUROLOGIC: 5/5 power all extremities, see foot exam below, no tremor, AAOx3  PSYCHIATRIC: Normal affect, Normal insight and judgement      REVIEW OF LABORATORY AND RADIOLOGY DATA:   Labs and documentation have been reviewed as described above. ASSESSMENT AND PLAN:   Mila Serrano is a 75 y.o. male with a PMHx as noted above who presents for f/u evaluation of uncontrolled type 2 diabetes. Problems:  Type 2 diabetes Uncontrolled  Hyperlipidemia  Hypertension  Hypothyroidism  Vitamin D deficiency    Post prandial hyperglycemia as noted by high numbers at dinner, other numbers are acceptable. We did not have this knowledge before since he just started recording numbers as per my request on last visit. Notable also is that he was only taking 16 units with breakfast when his glucose was <150 since he felt maybe it would be too much when is sugars are normal, and this is the reason for the highs at dinner time. He is advised to take the full 26 units at breakfast, which can be adjusted later as needed. PLAN  Type 2 Diabetes  Medications:  Novolog 70-30: 26 units / 34 units, advised to reduced inner dose by 2 units every few days as needed to keep at goal, especially with increasing AM dose. Advised to check glucose qAM/Dinner/Bedtime  Provided with glucose log sheets for later review. HTN: BP stable on current medications, no changes today. HLD: lipids are tight, recommended 40mg of pravastatin instead of 80  Vitamin D deficiency: continue 3000 units daily, level was at goal  Hypothyroidism: Now that he is taking properly, will check level today and adjust as appropriate, 137 mcg daily for now. RTC: I would like to see him back in 3 months. Lan Diehl.  [address] [facility]

## 2017-07-07 ENCOUNTER — PATIENT OUTREACH (OUTPATIENT)
Dept: CARDIOLOGY CLINIC | Age: 67
End: 2017-07-07

## 2017-07-07 ENCOUNTER — OFFICE VISIT (OUTPATIENT)
Dept: CARDIOLOGY CLINIC | Age: 67
End: 2017-07-07

## 2017-07-07 VITALS
OXYGEN SATURATION: 92 % | HEIGHT: 69 IN | WEIGHT: 254.9 LBS | HEART RATE: 74 BPM | DIASTOLIC BLOOD PRESSURE: 64 MMHG | SYSTOLIC BLOOD PRESSURE: 108 MMHG | BODY MASS INDEX: 37.75 KG/M2 | RESPIRATION RATE: 16 BRPM

## 2017-07-07 DIAGNOSIS — Z95.0 PACEMAKER: ICD-10-CM

## 2017-07-07 DIAGNOSIS — E11.9 CONTROLLED TYPE 2 DIABETES MELLITUS WITHOUT COMPLICATION, UNSPECIFIED LONG TERM INSULIN USE STATUS: ICD-10-CM

## 2017-07-07 DIAGNOSIS — Z98.890 H/O ATRIOVENTRICULAR NODAL ABLATION: ICD-10-CM

## 2017-07-07 DIAGNOSIS — I25.10 CORONARY ARTERY DISEASE INVOLVING NATIVE CORONARY ARTERY OF NATIVE HEART WITHOUT ANGINA PECTORIS: ICD-10-CM

## 2017-07-07 DIAGNOSIS — N18.4 CKD (CHRONIC KIDNEY DISEASE) STAGE 4, GFR 15-29 ML/MIN (HCC): ICD-10-CM

## 2017-07-07 DIAGNOSIS — I48.20 CHRONIC A-FIB (HCC): Primary | ICD-10-CM

## 2017-07-07 DIAGNOSIS — I50.32 CHRONIC DIASTOLIC HEART FAILURE (HCC): ICD-10-CM

## 2017-07-07 DIAGNOSIS — G47.33 OSA (OBSTRUCTIVE SLEEP APNEA): ICD-10-CM

## 2017-07-07 DIAGNOSIS — Z95.1 HX OF CABG: ICD-10-CM

## 2017-07-07 LAB
T4 FREE SERPL-MCNC: 1.62 NG/DL (ref 0.82–1.77)
TSH SERPL DL<=0.005 MIU/L-ACNC: 3.09 UIU/ML (ref 0.45–4.5)

## 2017-07-07 RX ORDER — BUMETANIDE 1 MG/1
TABLET ORAL
COMMUNITY
Start: 2017-06-19 | End: 2017-07-07 | Stop reason: DRUGHIGH

## 2017-07-07 RX ORDER — METOPROLOL SUCCINATE 50 MG/1
TABLET, EXTENDED RELEASE ORAL
Refills: 6 | COMMUNITY
Start: 2017-06-23 | End: 2017-07-07 | Stop reason: DRUGHIGH

## 2017-07-07 RX ORDER — INSULIN ASPART 100 [IU]/ML
INJECTION, SUSPENSION SUBCUTANEOUS
COMMUNITY
Start: 2017-05-11 | End: 2017-07-07 | Stop reason: DRUGHIGH

## 2017-07-07 RX ORDER — ACETAMINOPHEN 500 MG
1000 TABLET ORAL
COMMUNITY
End: 2017-07-07 | Stop reason: SDUPTHER

## 2017-07-07 RX ORDER — PARICALCITOL 1 UG/1
1 CAPSULE, LIQUID FILLED ORAL
COMMUNITY
End: 2017-07-07 | Stop reason: SDUPTHER

## 2017-07-07 RX ORDER — PROPAFENONE HYDROCHLORIDE 225 MG/1
CAPSULE, EXTENDED RELEASE ORAL
COMMUNITY
Start: 2017-06-20 | End: 2017-07-07

## 2017-07-07 RX ORDER — DUTASTERIDE 0.5 MG/1
CAPSULE, LIQUID FILLED ORAL
COMMUNITY
Start: 2016-12-14 | End: 2017-07-07 | Stop reason: SDUPTHER

## 2017-07-07 RX ORDER — METOLAZONE 2.5 MG/1
2.5 TABLET ORAL
COMMUNITY
End: 2017-07-07 | Stop reason: SDUPTHER

## 2017-07-07 RX ORDER — PROPAFENONE HYDROCHLORIDE 325 MG/1
325 CAPSULE, EXTENDED RELEASE ORAL
COMMUNITY
Start: 2017-05-31 | End: 2017-07-07

## 2017-07-07 RX ORDER — GABAPENTIN 100 MG/1
CAPSULE ORAL
COMMUNITY
Start: 2017-05-03 | End: 2017-07-07 | Stop reason: SDUPTHER

## 2017-07-07 RX ORDER — GABAPENTIN 100 MG/1
CAPSULE ORAL
Refills: 11 | COMMUNITY
Start: 2017-05-03 | End: 2017-07-07

## 2017-07-07 RX ORDER — LEVOTHYROXINE SODIUM 137 UG/1
TABLET ORAL
COMMUNITY
Start: 2016-12-19 | End: 2017-07-07 | Stop reason: SDUPTHER

## 2017-07-07 RX ORDER — PROPAFENONE HYDROCHLORIDE 325 MG/1
CAPSULE, EXTENDED RELEASE ORAL
Refills: 1 | COMMUNITY
Start: 2017-05-31 | End: 2017-07-07

## 2017-07-07 RX ORDER — METOPROLOL SUCCINATE 100 MG/1
150 TABLET, EXTENDED RELEASE ORAL DAILY
Qty: 135 TAB | Refills: 3 | Status: SHIPPED | OUTPATIENT
Start: 2017-07-07 | End: 2018-03-27 | Stop reason: SDUPTHER

## 2017-07-07 RX ORDER — BUMETANIDE 0.5 MG/1
1 TABLET ORAL
COMMUNITY
Start: 2017-03-17 | End: 2017-07-07 | Stop reason: DRUGHIGH

## 2017-07-07 RX ORDER — METOPROLOL SUCCINATE 50 MG/1
50 TABLET, EXTENDED RELEASE ORAL
COMMUNITY
Start: 2017-05-31 | End: 2017-07-07

## 2017-07-07 RX ORDER — BUMETANIDE 2 MG/1
2 TABLET ORAL 2 TIMES DAILY
Qty: 180 TAB | Refills: 3 | Status: SHIPPED | OUTPATIENT
Start: 2017-07-07 | End: 2018-04-18 | Stop reason: SDUPTHER

## 2017-07-07 RX ORDER — PRAVASTATIN SODIUM 80 MG/1
80 TABLET ORAL
COMMUNITY
Start: 2017-03-22 | End: 2017-07-07 | Stop reason: SDUPTHER

## 2017-07-07 NOTE — PROGRESS NOTES
Chief Complaint   Patient presents with   [facility] Follow Up     for diastolic heart failure    Leg Swelling     bilateral

## 2017-07-07 NOTE — PROGRESS NOTES
Spoke to patient in office today. Patient doing much better since discharge from hospital.  Weights trending down. At 254.0lb. Trip planned for [facility] on 7/19/17. Discussed low salt diet, and s/s and what to watch for will contact patient next week before leaving.

## 2017-07-07 NOTE — PROGRESS NOTES
Subjective/HPI:     Christiano Zuniga is a 75 y.o. male is here for f/u appt after hospitalization for acute on chronic diastolic HF with worsening CKD, and afib with RVR and subsequent AV anastacio ablation with pacemaker. He reports doing much better. SOB is much improved. His weight is steadily decreasing. He had wound care completed yesterday. The patient denies chest pain, orthopnea, PND, palpitations, syncope, presyncope or fatigue. He is leaving to go to [facility] on the 19th. PCP Provider  Corinna Cranker, MD  Past Medical History:   Diagnosis Date    Arrhythmia     atrial fibrillation 2017    CAD (coronary atherosclerotic disease)     [facility]     Diabetes mellitus type 2, controlled ([facility] 3/13/2017    Heart failure ([facility]     Hx of CABG 3/13/2017    CABG in 2010 in [address] Morbid obesity ([facility] 3/13/2017    JACEY (obstructive sleep apnea) 6/23/2017    S/P CABG x 2 2010      Past Surgical History:   Procedure Laterality Date    CARDIAC SURG PROCEDURE UNLIST      CABGx2 in 2010    HX ORTHOPAEDIC      L 3rd toe amputation in 1999     Allergies   Allergen Reactions    Ciprofloxacin Nausea Only    Percocet [Oxycodone-Acetaminophen] Other (comments)     hallucinations      Family History   Problem Relation Age of Onset    Heart Attack Father     No Known Problems Mother       Current Outpatient Prescriptions   Medication Sig    bumetanide (BUMEX) 2 mg tablet Take 1 Tab by mouth two (2) times a day.  metoprolol succinate (TOPROL-XL) 100 mg tablet Take 1.5 Tabs by mouth daily.  apixaban (ELIQUIS) 5 mg tablet Take 1 Tab by mouth two (2) times a day. Indications: PREVENT THROMBOEMBOLISM IN CHRONIC ATRIAL FIBRILLATION    allopurinol (ZYLOPRIM) 300 mg tablet Take 1 Tab by mouth daily.  potassium chloride SR (K-TAB) 20 mEq tablet Take 1 Tab by mouth daily.     ketoconazole (NIZORAL) 2 % topical cream Apply  to affected area two (2) times daily as needed for Skin Irritation. Indications: rash    metOLazone (ZAROXOLYN) 2.5 mg tablet Take 2.5 mg by mouth daily.  insulin aspart protamine/insulin aspart (NOVOLOG MIX 70/30) 100 unit/mL (70-30) inpn by SubCUTAneous route two (2) times a day. Sliding scale:  26 units AM, 34 units PM, Add 5 units for every 50 point over 150, Hold if BS <100  Patient injected 34 unit on 6/21/17 PM (); patient injected 15 units on 6/22/17 AM ()    pravastatin (PRAVACHOL) 80 mg tablet Take 1 Tab by mouth nightly.  calcium carbonate (CALTREX) 600 mg calcium (1,500 mg) tablet Take 600 mg by mouth every Tuesday, Thursday, Saturday & Sunday.  dutasteride (AVODART) 0.5 mg capsule TAKE ONE CAPSULE BY MOUTH EVERY DAY    levothyroxine (SYNTHROID) 137 mcg tablet TAKE 1 TABLET(S) EVERY DAY BY ORAL ROUTE FOR 90 DAYS.  tamsulosin (FLOMAX) 0.4 mg capsule TAKE 2 CAPSULES BY MOUTH EVERY DAY    paricalcitol (ZEMPLAR) 1 mcg capsule Take 1 mcg by mouth daily.  multivitamin (ONE A DAY) tablet Take 1 Tab by mouth daily.  cholecalciferol (VITAMIN D3) 1,000 unit cap Take 3,000 Units by mouth daily.  acetaminophen (TYLENOL EXTRA STRENGTH) 500 mg tablet Take 1,000 mg by mouth every eight (8) hours as needed for Pain. No current facility-administered medications for this visit. Vitals:    07/07/17 1434   BP: 108/64   Pulse: 74   Resp: 16   SpO2: 92%   Weight: 254 lb 14.4 oz (115.6 kg)   Height: 5' 9\" (1.753 m)     Social History     Social History    Marital status:      Spouse name: N/A    Number of children: N/A    Years of education: N/A     Occupational History    Not on file.      Social History Main Topics    Smoking status: Never Smoker    Smokeless tobacco: Never Used    Alcohol use Yes      Comment: rare    Drug use: No    Sexual activity: Not on file     Other Topics Concern    Not on file     Social History Narrative       I have reviewed the nurses notes, vitals, problem list, allergy list, medical history, family, social history and medications. Review of Symptoms:    General: Pt denies excessive weight gain or loss. Pt is able to conduct ADL's  HEENT: Denies blurred vision, headaches, epistaxis and difficulty swallowing. Respiratory: Denies worsening shortness of breath, BETTS, wheezing or stridor. Cardiovascular: Denies precordial pain, palpitations, denies worsening edema or PND  Gastrointestinal: Denies poor appetite, indigestion, abdominal pain or blood in stool  Urinary: Denies dysuria, pyuria  Musculoskeletal: Denies pain or swelling from muscles or joints  Neurologic: Denies tremor, paresthesias, or sensory motor disturbance  Skin: Denies texture change. + catherine leg wound care  Psych: Denies depression        Physical Exam:      General: Well developed, in no acute distress  HEENT: No carotid bruits, no JVD, trach is midline. Neck Supple, PEERL, EOM intact. Heart:  Normal S1/S2 negative S3 or S4. Regular, no murmur, gallop or rub.   Respiratory: Clear bilaterally x 4, no wheezing or rales  Abdomen:   Soft, non-tender, no masses, bowel sounds are active.   Extremities:  catherine calves to knee with wrap/dsg intact, atraumatic. Neuro: A&Ox3, speech clear, gait stable. Skin: Skin color is normal. Non diaphoretic.  Left subclavian pacemaker dsg dry and intact with old blood noted  Psychiatric- calm, cooperative    Cardiographics    ECG: V paced    Results for orders placed or performed during the hospital encounter of 06/22/17   EKG, 12 LEAD, INITIAL   Result Value Ref Range    Ventricular Rate 114 BPM    Atrial Rate 144 BPM    QRS Duration 102 ms    Q-T Interval 316 ms    QTC Calculation (Bezet) 435 ms    Calculated R Axis 92 degrees    Calculated T Axis -149 degrees    Diagnosis       Atrial fibrillation with rapid ventricular response  Rightward axis  Low voltage QRS  Incomplete right bundle branch block  Nonspecific ST abnormality  Confirmed by Julieta Juares ([id number]) on 6/22/2017 2:35:37 PM Cardiology Labs:  Lab Results   Component Value Date/Time    Cholesterol, total 92 04/03/2017 04:00 PM    LDL,Direct 43 04/03/2017 04:00 PM       Lab Results   Component Value Date/Time    Sodium 138 06/27/2017 02:56 AM    Potassium 4.3 06/27/2017 02:56 AM    Chloride 94 06/27/2017 02:56 AM    CO2 37 06/27/2017 02:56 AM    Anion gap 7 06/27/2017 02:56 AM    Glucose 189 06/27/2017 02:56 AM    BUN 78 06/27/2017 02:56 AM    Creatinine 2.67 06/27/2017 02:56 AM    BUN/Creatinine ratio 29 06/27/2017 02:56 AM    GFR est AA 29 06/27/2017 02:56 AM    GFR est non-AA 24 06/27/2017 02:56 AM    Calcium 8.7 06/27/2017 02:56 AM    AST (SGOT) 23 06/24/2017 03:32 AM    Alk. phosphatase 44 06/24/2017 03:32 AM    Protein, total 6.6 06/24/2017 03:32 AM    Albumin 2.9 06/24/2017 03:32 AM    Globulin 3.7 06/24/2017 03:32 AM    A-G Ratio 0.8 06/24/2017 03:32 AM    ALT (SGPT) 15 06/24/2017 03:32 AM           Assessment:     Assessment:     Tirso Brar was seen today for hospital follow up and leg swelling. Diagnoses and all orders for this visit:    Chronic [facility]fib ([facility]  -     AMB POC EKG ROUTINE W/ 12 LEADS, INTER & REP    Chronic diastolic heart failure ([facility]    Coronary artery disease involving native coronary artery of native heart without angina pectoris    Pacemaker    H/O atrioventricular anastacio ablation    JACEY (obstructive sleep apnea)    Hx of CABG    Controlled type 2 diabetes mellitus without complication, unspecified long term insulin use status (HCC)    CKD (chronic kidney disease) stage 4, GFR 15-29 ml/min ([facility])    Other orders  -     bumetanide (BUMEX) 2 mg tablet; Take 1 Tab by mouth two (2) times a day. -     metoprolol succinate (TOPROL-XL) 100 mg tablet; Take 1.5 Tabs by mouth daily. -     apixaban (ELIQUIS) 5 mg tablet; Take 1 Tab by mouth two (2) times a day. Indications: PREVENT THROMBOEMBOLISM IN CHRONIC ATRIAL FIBRILLATION        ICD-10-CM ICD-9-CM    1.  Chronic a-fib (HCC) I48.2 427.31 AMB POC EKG ROUTINE W/ 12 LEADS, INTER & REP   2. Chronic diastolic heart failure ([facility]) I50.32 428.32    3. Coronary artery disease involving native coronary artery of native heart without angina pectoris I25.10 414.01    4. Pacemaker Z95.0 V45.01    5. H/O atrioventricular anastacio ablation Z98.890 V15.1    6. JACEY (obstructive sleep apnea) G47.33 327.23    7. Hx of CABG Z95.1 V45.81    8. Controlled type 2 diabetes mellitus without complication, unspecified long term insulin use status ([facility]) E11.9 250.00    9. CKD (chronic kidney disease) stage 4, GFR 15-29 ml/min ([facility]) N18.4 585.4      Orders Placed This Encounter    AMB POC EKG ROUTINE W/ 12 LEADS, INTER & REP     Order Specific Question:   Reason for Exam:     Answer:   Routine    DISCONTD: bumetanide (BUMEX) 1 mg tablet    DISCONTD: bumetanide (BUMEX) 0.5 mg tablet     Sig: Take 1 mg by mouth.  DISCONTD: metoprolol succinate (TOPROL-XL) 50 mg XL tablet     Sig: TAKE 1-1/2 TABLETS BY MOUTH DAILY. Refill:  6    DISCONTD: metoprolol succinate (TOPROL-XL) 50 mg XL tablet     Sig: Take 50 mg by mouth.  DISCONTD: metOLazone (ZAROXOLYN) 2.5 mg tablet     Sig: Take 2.5 mg by mouth.  DISCONTD: insulin aspart protamine/insulin aspart (NOVOLOG MIX 70-30 FLEXPEN) 100 unit/mL (70-30) inpn     Sig: INJECT UP TO 80 UNITS DAILY SUBCUTANEOUS    DISCONTD: gabapentin (NEURONTIN) 100 mg capsule     Sig: TAKE 1 CAPSULE BY MOUTH ON DAYS 1- 3 THEN TWICE DAILY ON DAYS 4- 6 THEN THREE TIMES DAILY THEREAFTER     Refill:  11    DISCONTD: gabapentin (NEURONTIN) 100 mg capsule     Sig: TAKE 1 CAPSULE BY MOUTH ON DAYS 1- 3 THEN TWICE DAILY ON DAYS 4- 6 THEN THREE TIMES DAILY THEREAFTER    DISCONTD: propafenone SR (RYTHMOL SR) 325 mg SR capsule     Sig: TAKE 1 CAP BY MOUTH TWO (2) TIMES A DAY. Refill:  1    DISCONTD: propafenone SR (RYTHMOL SR) 225 mg SR capsule    DISCONTD: propafenone SR (RYTHMOL SR) 325 mg SR capsule     Sig: Take 325 mg by mouth.     DISCONTD: acetaminophen (TYLENOL) 500 mg tablet     Sig: Take 1,000 mg by mouth.  DISCONTD: paricalcitol (ZEMPLAR) 1 mcg capsule     Sig: Take 1 mcg by mouth.  DISCONTD: levothyroxine (SYNTHROID) 137 mcg tablet     Sig: TAKE 1 TABLET(S) EVERY DAY BY ORAL ROUTE FOR 90 DAYS.  DISCONTD: dutasteride (AVODART) 0.5 mg capsule     Sig: TAKE ONE CAPSULE BY MOUTH EVERY DAY    DISCONTD: apixaban (ELIQUIS) 5 mg tablet     Sig: Take 5 mg by mouth.  DISCONTD: pravastatin (PRAVACHOL) 80 mg tablet     Sig: Take 80 mg by mouth.  bumetanide (BUMEX) 2 mg tablet     Sig: Take 1 Tab by mouth two (2) times a day. Dispense:  180 Tab     Refill:  3    metoprolol succinate (TOPROL-XL) 100 mg tablet     Sig: Take 1.5 Tabs by mouth daily. Dispense:  135 Tab     Refill:  3    apixaban (ELIQUIS) 5 mg tablet     Sig: Take 1 Tab by mouth two (2) times a day. Indications: PREVENT THROMBOEMBOLISM IN CHRONIC ATRIAL FIBRILLATION     Dispense:  180 Tab     Refill:  3        Plan:     Acute on chronic diastolic heart failure causing Acute hypoxic respiratory failure- pt denies sob/orthopnea/PND, or worsening edema. Weight trending down, 254lbs today. CHF compensated. Continue current medications. Discussed limitation of salt as much as possible while on cruise, daily weights in doctor office on ship, adjust Metolazone per Dr Malia Cedeño instructions    Chronic atrial fibrillation s/p AV anastacio ablation and pacemaker- V paced, has appt next week in device clinic, dsg intact, no active bleeding on dsg noted. Continue Metoprolol and Eliquis    CKD-pt followed by Dr Jil Orellana with appt today. Continue current meds. JACEY- reminded importance of use of CPAP to help with pulm htn. F/U when he returns home from his cruise.     Josefina Headley MD

## 2017-07-10 ENCOUNTER — TELEPHONE (OUTPATIENT)
Dept: ENDOCRINOLOGY | Age: 67
End: 2017-07-10

## 2017-07-10 RX ORDER — LEVOTHYROXINE SODIUM 137 UG/1
TABLET ORAL
Qty: 90 TAB | Refills: 3 | Status: SHIPPED | OUTPATIENT
Start: 2017-07-10 | End: 2018-06-18 | Stop reason: SDUPTHER

## 2017-07-10 NOTE — TELEPHONE ENCOUNTER
Patient is calling for recent lab results. Patient contact: [phone].     Thanks  Alanna Carreno

## 2017-07-10 NOTE — PROGRESS NOTES
Patients thyroid function is normal,  He can continue with the 137 mcg of levothyroxine daily. Patient take levothyroxine with a glass of water on an empty stomach each day in the mornings, 1 hour prior to ingesting any food or other medications, including vitamins. I will send a lab letter to patients home,    Yudith Cisneros.  [address] [facility]  [address] [place] [address]

## 2017-07-11 ENCOUNTER — CLINICAL SUPPORT (OUTPATIENT)
Dept: CARDIOLOGY CLINIC | Age: 67
End: 2017-07-11

## 2017-07-11 DIAGNOSIS — Z95.0 PACEMAKER: Primary | ICD-10-CM

## 2017-07-11 DIAGNOSIS — I48.20 CHRONIC A-FIB (HCC): Primary | ICD-10-CM

## 2017-07-11 DIAGNOSIS — Z98.890 H/O ATRIOVENTRICULAR NODAL ABLATION: ICD-10-CM

## 2017-07-11 DIAGNOSIS — Z95.0 PACEMAKER: ICD-10-CM

## 2017-07-11 DIAGNOSIS — Z51.89 VISIT FOR WOUND CHECK: ICD-10-CM

## 2017-07-11 NOTE — PROGRESS NOTES
See device report - BSC BiV PM in office post op check, reviewed post op instructions with patient and wife, both verbalized understanding. Next check due in 3 months.

## 2017-07-11 NOTE — PROGRESS NOTES
Mila Serrano  1950  Mely Francis MD          Subjective:    Patient is here for 2 week site check and device interrogation after pacemaker implantation. The patient denies chest pain or shortness of breath.      Patient Active Problem List    Diagnosis Date Noted    Pacemaker 06/26/2017    H/O atrioventricular anastacio ablation 06/26/2017    JACEY (obstructive sleep apnea) 34/11/3363    Diastolic heart failure ([address] 06/22/2017    Irregular heart beat 03/22/2017    Fatigue 03/15/2017    Bilateral leg edema 03/15/2017    Counseling regarding advanced care planning and goals of care 03/15/2017    Hx of CABG 03/13/2017    Morbid obesity ([address] 03/13/2017    Diabetes mellitus type 2, controlled ([address] 03/13/2017    Heart failure ([address] 03/13/2017    SOB (shortness of breath) 03/07/2017    Chronic a-fib ([address] 03/07/2017    Coronary artery disease involving native coronary artery without angina pectoris 03/07/2017    CKD (chronic kidney disease) stage 4, GFR 15-29 ml/min ([address] 03/07/2017      Past Medical History:   Diagnosis Date    Arrhythmia     atrial fibrillation 2017    CAD (coronary atherosclerotic disease)     Diabetes ([address]     Diabetes mellitus type 2, controlled ([address] 3/13/2017    Heart failure ([address]     Hx of CABG 3/13/2017    [address]G in [address] Morbid obesity ([address] 3/13/2017    JACEY (obstructive sleep apnea) 6/23/2017    S/P CABG x 2 2010      Past Surgical History:   Procedure Laterality Date    CARDIAC SURG PROCEDURE UNLIST      CABGx2 in 2010    HX ORTHOPAEDIC      L 3rd toe amputation in 1999     Allergies   Allergen Reactions    Ciprofloxacin Nausea Only    Percocet [Oxycodone-Acetaminophen] Other (comments)     hallucinations      Family History   Problem Relation Age of Onset    Heart Attack Father     No Known Problems Mother       Current Outpatient Prescriptions   Medication Sig    levothyroxine (SYNTHROID) 137 mcg tablet TAKE 1 TABLET(S) EVERY DAY BY ORAL ROUTE FOR 90 DAYS.  bumetanide (BUMEX) 2 mg tablet Take 1 Tab by mouth two (2) times a day.  metoprolol succinate (TOPROL-XL) 100 mg tablet Take 1.5 Tabs by mouth daily.  apixaban (ELIQUIS) 5 mg tablet Take 1 Tab by mouth two (2) times a day. Indications: PREVENT THROMBOEMBOLISM IN CHRONIC ATRIAL FIBRILLATION    allopurinol (ZYLOPRIM) 300 mg tablet Take 1 Tab by mouth daily.  potassium chloride SR (K-TAB) 20 mEq tablet Take 1 Tab by mouth daily.  ketoconazole (NIZORAL) 2 % topical cream Apply  to affected area two (2) times daily as needed for Skin Irritation. Indications: rash    metOLazone (ZAROXOLYN) 2.5 mg tablet Take 2.5 mg by mouth daily.  insulin aspart protamine/insulin aspart (NOVOLOG MIX 70/30) 100 unit/mL (70-30) inpn by SubCUTAneous route two (2) times a day. Sliding scale:  26 units AM, 34 units PM, Add 5 units for every 50 point over 150, Hold if BS <100  Patient injected 34 unit on 6/21/17 PM (); patient injected 15 units on 6/22/17 AM ()    pravastatin (PRAVACHOL) 80 mg tablet Take 1 Tab by mouth nightly.  calcium carbonate (CALTREX) 600 mg calcium (1,500 mg) tablet Take 600 mg by mouth every Tuesday, Thursday, Saturday & Sunday.  dutasteride (AVODART) 0.5 mg capsule TAKE ONE CAPSULE BY MOUTH EVERY DAY    tamsulosin (FLOMAX) 0.4 mg capsule TAKE 2 CAPSULES BY MOUTH EVERY DAY    paricalcitol (ZEMPLAR) 1 mcg capsule Take 1 mcg by mouth daily.  multivitamin (ONE A DAY) tablet Take 1 Tab by mouth daily.  cholecalciferol (VITAMIN D3) 1,000 unit cap Take 3,000 Units by mouth daily.  acetaminophen (TYLENOL EXTRA STRENGTH) 500 mg tablet Take 1,000 mg by mouth every eight (8) hours as needed for Pain. No current facility-administered medications for this visit. Review of Systems:    General: Pt denies excessive weight gain or loss.  Pt is able to conduct ADL's  Respiratory: Denies shortness of breath, BETTS, wheezing or stridor. Cardiovascular: Denies precordial pain, palpitations, edema or PND        Physical ExamPhysical Exam:      General: Well developed, in no acute distress. .  Chest: left subclavian pacer site approximated well, steri-strips intact with dried sanguinous drainage present. Neuro: A&Ox3, speech clear, gait stable. Assessment:   Assessment:     ICD-10-CM ICD-9-CM    1. Chronic a-fib (HCC) I48.2 427.31    2. Pacemaker Z95.0 V45.01    3. Visit for wound check Z51.89 V58.89         Plan:     Patient feels well following BI-V pacemaker and AV anastacio ablation. Left subclavian pacemaker site approximated well, dried sanguinous drainage present on steri strips. Steri-strips are intact. No erythema or heat. Follow up as planned.      Renuka Sexton NP

## 2017-07-12 ENCOUNTER — PATIENT OUTREACH (OUTPATIENT)
Dept: CARDIOLOGY CLINIC | Age: 67
End: 2017-07-12

## 2017-07-12 VITALS — BODY MASS INDEX: 36.48 KG/M2 | WEIGHT: 247 LBS

## 2017-07-12 NOTE — PROGRESS NOTES
Patient was in the office for a follow up on device check. Went to see patient and talk about upcoming trip to [facility]. Patient will be leaving on 7/18/17 for 2 weeks driving to [place] than going on an extended cruise. Discussed low salt diet and taking copy of medical records and meeting with the MD on the ship. Patient aware of s/s to watch for. Weight at home today 247.0lb. Denies shortness of breath, decreased swelling in bilateral legs. Patient states he is feeling much better. Will contact patient upon his return.

## 2017-07-13 ENCOUNTER — HOSPITAL ENCOUNTER (OUTPATIENT)
Dept: WOUND CARE | Age: 67
Discharge: HOME | End: 2017-07-13
Payer: MEDICARE

## 2017-07-13 PROCEDURE — 97597 DBRDMT OPN WND 1ST 20 CM/<: CPT | Performed by: OTOLARYNGOLOGY

## 2017-07-13 PROCEDURE — 97598 DBRDMT OPN WND ADDL 20CM/<: CPT | Performed by: OTOLARYNGOLOGY

## 2017-07-13 RX ORDER — LIDOCAINE HYDROCHLORIDE 40 MG/ML
SOLUTION TOPICAL AS NEEDED
Status: DISCONTINUED | OUTPATIENT
Start: 2017-07-13 | End: 2017-07-17 | Stop reason: HOSPADM

## 2017-07-13 RX ADMIN — LIDOCAINE HYDROCHLORIDE: 40 SOLUTION TOPICAL at 09:50

## 2017-07-17 ENCOUNTER — PATIENT OUTREACH (OUTPATIENT)
Dept: CARDIOLOGY CLINIC | Age: 67
End: 2017-07-17

## 2017-07-17 VITALS — WEIGHT: 236 LBS | BODY MASS INDEX: 34.85 KG/M2

## 2017-07-17 NOTE — PROGRESS NOTES
Panel Manager contacted the patient by telephone to perform CHF previous bundle follow Up. Spoke to patient regarding the following:    Have you been to an ER/Hospital since discharge from [facility]? No      Have you followed up with PCP? yes; Ángel Caldwellf    Transportation:  Wife  Daily Weight: 236.0lb decrease  Zone: green   Signs/Symptoms: Swelling denies; SOB denies; how many pillows to sleep? 2  Diet: low sodium  Exercise: walks in house. Medications Reconciled at this time:  no  Home health?: today was last visit discharged  Has wound clinic tomorrow      Patient reminded that there is a cardiologist on call 24 hours a day / 7 days a week (M-F 5pm to 8am and from Friday 5pm until Monday 8a for the weekend) should the patient have questions or concerns. Patient reminded to call 911 if situation is emergent or patient feels the situation is emergent. Provided pt with cardiology  office telephone number. Pt has follow up with PCP in 2 months and with cardiology on 9/28/17    Patient was discharged from [facility] today and has an appointment with wound care tomorrow. Patient is leaving in two days for an extended trip to [facility]. Patient returns 8/12/17 will call after he returns. Patient has medical records and will visit MD on the ship. Patient excited and also cautioned in regards to diet and foods to eat while out of the country. Will try to maintain daily weights and record.

## 2017-07-18 ENCOUNTER — TELEPHONE (OUTPATIENT)
Dept: SLEEP MEDICINE | Age: 67
End: 2017-07-18

## 2017-07-18 ENCOUNTER — HOSPITAL ENCOUNTER (OUTPATIENT)
Dept: WOUND CARE | Age: 67
Discharge: HOME | End: 2017-07-18
Payer: MEDICARE

## 2017-07-18 PROCEDURE — 97598 DBRDMT OPN WND ADDL 20CM/<: CPT | Performed by: PLASTIC SURGERY

## 2017-07-18 PROCEDURE — 97597 DBRDMT OPN WND 1ST 20 CM/<: CPT | Performed by: PLASTIC SURGERY

## 2017-07-18 RX ORDER — LIDOCAINE HYDROCHLORIDE 20 MG/ML
JELLY TOPICAL AS NEEDED
Status: DISCONTINUED | OUTPATIENT
Start: 2017-07-18 | End: 2017-07-22 | Stop reason: HOSPADM

## 2017-07-18 RX ADMIN — LIDOCAINE HYDROCHLORIDE: 20 JELLY TOPICAL at 09:04

## 2017-07-20 NOTE — PROGRESS NOTES
[address]   WOUND CARE PROGRESS NOTE       Name:  Antony Son   MR#:  [id number]   :  1950   Account #:  [de-identified]        Date of Adm:  2017       DATE OF SERVICE:  2017    The patient returns with bilateral lower extremity venous stasis ulcers. He is   leaving for a 3 week cruise tomorrow. He has a new rash which he believes   was caused by allopurinol which he started taking recently. He otherwise has   no complaints. He is using Aquacel AG on his legs and changing the dressing   every other day. PHYSICAL EXAM: Reveals bilateral leg ulcers to be clean and granulating   for the most part. There is some yellowish slough along the largest ulcer on   the anterior left leg. This will require debridement. ASSESSMENT:  Bilateral lower extremity venous stasis ulcers,   improved. Left requires selective debridement. PROCEDURE:  Selective debridement. DESCRIPTION OF PROCEDURE:  Under topical anesthesia, nonviable   tissue is sharply excised from the base of the left leg ulcer using a ring curette,   measuring approximately 2 x 3 cm total.  Blood loss was less than 5 mL, and   there were no specimens or complications. The wound was dressed with   Aquacel AG and covered with his compression stocking. PLAN: Continue wound care as prescribed with daily compression stockings. He and his wife will change the bandage every other day while on this cruise. He will followup with Dr. Brennan Standing upon his return.         Marce Patel MD        / Sascha Benson   D:  2017   10:05   T:  2017   12:25   Job #:  [id number]

## 2017-08-16 ENCOUNTER — PATIENT OUTREACH (OUTPATIENT)
Dept: CARDIOLOGY CLINIC | Age: 67
End: 2017-08-16

## 2017-08-16 VITALS — WEIGHT: 230 LBS | BODY MASS INDEX: 33.97 KG/M2

## 2017-08-16 NOTE — PROGRESS NOTES
Panel Manager contacted the patient by telephone to perform CHF follow Up. Spoke to patient regarding the following:    Have you been to an ER/Hospital since discharge from [facility]? No      Have you followed up with PCP? yes; Gerald Tamayo    Transportation:  wife  Daily Weight: 230.0lb decrease  Zone: green   Signs/Symptoms: Swelling denies; SOB denies; how many pillows to sleep? 2-3  Diet: low sodium, diabetic  Exercise: walks. Medications Reconciled at this time:  no, d/c'd allopurinol d/t allergy  Home health?: no patient goes to wound clinic    Patients just returned home from a three week cruise abroad. Patient was very excited to say he had lost weight on his trip. Current weight at 230.0lb   Patient did not use his CPAP while on his trip, and states he slept fine. Patient has an upcoming appt with the sleep center on 9/5/17 and wants to know if he can be seen sooner, and if he needs a new sleep study d/t losing so much weight lately, patient would also like to know if his meds should be adjusted d/t this also. Will send a message to Valentín Cohen NP for advice. Patients allopurinol was stopped d/t an allergic reaction added this to his allergy list. Patient has a follow up with nephrology today will contact me if his meds are changed. Patient also needs a follow up with cardiology. Will send message to schedule him. Patient reminded that there is a cardiologist on call 24 hours a day / 7 days a week (M-F 5pm to 8am and from Friday 5pm until Monday 8a for the weekend) should the patient have questions or concerns. Patient reminded to call 911 if situation is emergent or patient feels the situation is emergent. Provided pt with cardiology office telephone number and Panel Manager direct line. Pt has follow up with PCP in 1 month and with cardiology on 10/10/17. This note will not be viewable in [address]

## 2017-08-17 ENCOUNTER — PATIENT OUTREACH (OUTPATIENT)
Dept: CARDIOLOGY CLINIC | Age: 67
End: 2017-08-17

## 2017-08-17 ENCOUNTER — HOSPITAL ENCOUNTER (OUTPATIENT)
Dept: WOUND CARE | Age: 67
Discharge: HOME | End: 2017-08-17
Payer: MEDICARE

## 2017-08-17 PROCEDURE — 99213 OFFICE O/P EST LOW 20 MIN: CPT | Performed by: OTOLARYNGOLOGY

## 2017-08-17 NOTE — PROGRESS NOTES
Spoke to patient in regards to scheduling appt advised per Twan Emerson to follow up in early to [facility] September will send to Kim Solorzano for scheduling. Patient saw Dr Tiago Morin and had some changes in his meds they have been recorded on the Med reconciliation.

## 2017-08-17 NOTE — PROGRESS NOTES
Melissa [address] [place]   WOUND CARE PROGRESS NOTE       Name:  Nasrin Allred   MR#:  [id number]   :  1950   Account #:  [de-identified]        Date of Adm:  2017       DATE OF SERVICE: 2017    SUBJECTIVE: The patient returns after a 2-week vacation for   evaluation of bilateral venous leg ulcers. I87.11, [id number] L97.921 and   E11.621. He states that he is continuing to lose weight. PHYSICAL EXAMINATION   On exam, he is awake and alert, afebrile. The previous ulcers have   healed. He has 2 new areas to the anterior left lower leg and medial   malleolar area that are clean and acute. PLAN: We will continue with hydrogel soaked Aquacel AG and a 3-  layer wrap and see him back in 1 week. He has demonstrated that he   is able to heel in a reasonable and effective length of time and with   continued diuresis and leg compression, I believe he will continue   complete healing. He will see the nurses back in 1 week for a nursing   visit in my absence. We have also told about using a compression   wrap device such as a CircAid lights, and we will get these ordered for   him and he will bring them the next time we see him.         MD LEAH Landis / Piper Hurley   D:  2017   10:01   T:  2017   12:44   Job #:  [id number]

## 2017-08-31 ENCOUNTER — HOSPITAL ENCOUNTER (OUTPATIENT)
Dept: WOUND CARE | Age: 67
Discharge: HOME | End: 2017-08-31
Payer: MEDICARE

## 2017-08-31 PROCEDURE — 99213 OFFICE O/P EST LOW 20 MIN: CPT | Performed by: OTOLARYNGOLOGY

## 2017-09-06 ENCOUNTER — PATIENT OUTREACH (OUTPATIENT)
Dept: CARDIOLOGY CLINIC | Age: 67
End: 2017-09-06

## 2017-09-06 VITALS — BODY MASS INDEX: 33.67 KG/M2 | WEIGHT: 228 LBS

## 2017-09-06 RX ORDER — FEBUXOSTAT 40 MG/1
40 TABLET, FILM COATED ORAL DAILY
COMMUNITY
End: 2021-03-19

## 2017-09-06 NOTE — PROGRESS NOTES
Panel manager contacted the patient by telephone to perform CHF follow Up. Spoke to patient regarding the following:    Have you been to an ER/Hospital since discharge from [facility]? No      Have you followed up with PCP? yes; Georgi Saldivar    Transportation:  wife  Daily Weight: 228.0lb decrease  Zone: green   Signs/Symptoms: Swelling denies; SOB denies; how many pillows to sleep? 2  Diet: low sodium  Exercise: walks. Medications Reconciled at this time:  no   Home health?: no currently going to wound clinic weekly    Patient has complaints of bilateral arm pain which radiates down to hands, states it feels like something he cant describe. Possibly like muscle pain. Patient denies any heavy lifting or anything out of the ordinary. Patient states he started a new medicine Uloric which he started two weeks ago. Sent a message to NP Julee Peabody for advice will await response. Patient reminded that there is a cardiologist on call 24 hours a day / 7 days a week (M-F 5pm to 8am and from Friday 5pm until Monday 8a for the weekend) should the patient have questions or concerns. Patient reminded to call 911 if situation is emergent or patient feels the situation is emergent. Provided pt with cardiology office telephone number and panel managers direct line. Pt has follow up with PCP on 9/26/17 and with cardiology on 9/12/17.

## 2017-09-07 ENCOUNTER — PATIENT OUTREACH (OUTPATIENT)
Dept: CARDIOLOGY CLINIC | Age: 67
End: 2017-09-07

## 2017-09-07 ENCOUNTER — HOSPITAL ENCOUNTER (OUTPATIENT)
Dept: WOUND CARE | Age: 67
Discharge: HOME | End: 2017-09-07
Payer: MEDICARE

## 2017-09-07 PROCEDURE — 99212 OFFICE O/P EST SF 10 MIN: CPT | Performed by: OTOLARYNGOLOGY

## 2017-09-07 NOTE — PROGRESS NOTES
Melissa [address] [place]   WOUND CARE PROGRESS NOTE       Name:  Roderick Friedman   MR#:  [id number]   :  1950   Account #:  [de-identified]        Date of Adm:  2017       DATE OF SERVICE: 2017    SUBJECTIVE: The patient returns for followup today of venous   insufficiency ulcer to the left lower leg (I87.312, L97.921 and E11.621). No change in his health or medications. OBJECTIVE: On exam today, he is an awake, alert and afebrile. Vital   signs stable. The wounds appear to be healed. There is a small eschar   over all 3 little ones, but there is no accumulated fluid or purulence   noted. We will use some Adaptic and Tubigrip today. I have given him   a prescription for Sigvaris comfort stockings in the 20-30 mmHg range. He is to keep his legs moisturized and the compression stockings on   the morning to night. He is welcome to return in the future for   any recurring ulcerations.         Salli Hamman, MD EMN / [facility]   D:  2017   10:35   T:  2017   11:21   Job #:  [id number]

## 2017-09-07 NOTE — PROGRESS NOTES
Met with patient in person at the [facility] to check in on how he was. Patient took a trip to [facility] in July and had not seen him since. Reached out via phone and has had great progress with his weight loss and was very proud to tell me he had lost weight on his three week trip abroad. Seeing the patient today there was a significant change in his demeanor and his attitude. Patient was very happy to see myself and nurse navigator Merrick Stevenson. Patients appearance was significantly improved. Patient was smiling and very proud to be living again.

## 2017-09-27 ENCOUNTER — PATIENT OUTREACH (OUTPATIENT)
Dept: CARDIOLOGY CLINIC | Age: 67
End: 2017-09-27

## 2017-09-27 VITALS — BODY MASS INDEX: 33.82 KG/M2 | WEIGHT: 229 LBS

## 2017-09-27 NOTE — PROGRESS NOTES
Panel manager contacted the patient by telephone to perform CHF follow Up. Spoke to patient regarding the following:    Have you been to an ER/Hospital since discharge from [facility]? No      Have you followed up with PCP? yes; (Provider name and date if possible)    Transportation:  wife  Daily Weight: 229.0lb increase 1 lb  Zone: green   Signs/Symptoms: Swelling denies; SOB denies; how many pillows to sleep? 2  Diet: low sodium  Exercise: walks. Medications Reconciled at this time:  no  Home health?: seen wound care weekly for leg    @goal@    Patient reminded that there is a cardiologist on call 24 hours a day / 7 days a week (M-F 5pm to 8am and from Friday 5pm until Monday 8a for the weekend) should the patient have questions or concerns. Patient reminded to call 911 if situation is emergent or patient feels the situation is emergent. Provided pt with cardiology office telephone number and panel managers direct line. Patient states he is currently seeing some marks which he describes as bruise marks on his left arm since starting eliquis, thinks it is from the medication. Patient is traveling to [address] [place] [address] on 10/3/17 and would like to know if he could get an appt with Dr Pooja Benavides sooner before going. Will send a message to  Alexander Krause. Spoke to Alexander Krause and Dr Pooja Benavides is currently on vacation first appt is 10/9/17, offered appt with NP Xenia Hendrickson patient states he will wait until her returns from [place]. Pt has follow up with PCP in November and with cardiology on 10/9/17.

## 2017-10-02 ENCOUNTER — PATIENT OUTREACH (OUTPATIENT)
Dept: CARDIOLOGY CLINIC | Age: 67
End: 2017-10-02

## 2017-10-02 ENCOUNTER — OFFICE VISIT (OUTPATIENT)
Dept: CARDIOLOGY CLINIC | Age: 67
End: 2017-10-02

## 2017-10-02 VITALS
WEIGHT: 234.8 LBS | HEART RATE: 72 BPM | BODY MASS INDEX: 34.78 KG/M2 | HEIGHT: 69 IN | SYSTOLIC BLOOD PRESSURE: 112 MMHG | OXYGEN SATURATION: 97 % | DIASTOLIC BLOOD PRESSURE: 70 MMHG | RESPIRATION RATE: 16 BRPM

## 2017-10-02 DIAGNOSIS — I50.32 CHRONIC DIASTOLIC HEART FAILURE (HCC): ICD-10-CM

## 2017-10-02 DIAGNOSIS — I48.20 CHRONIC A-FIB (HCC): ICD-10-CM

## 2017-10-02 DIAGNOSIS — Z95.1 HX OF CABG: ICD-10-CM

## 2017-10-02 DIAGNOSIS — R58 ECCHYMOSIS OF FOREARM: Primary | ICD-10-CM

## 2017-10-02 NOTE — PROGRESS NOTES
Brody Hector DNP, ANP-BC  Subjective/HPI:     Yesica Henriquez is a 75 y.o. male is here for symptom based appointment, request that his left forearm developed spontaneous ecchymosis a few days ago. He also has a small ecchymosis in the left upper arm however that is where he reports he had an injection. Denies any other areas of bruising bleeding, no changes in stool. Would also like to discuss a lower dose of Eliquis or changing it to once a day. PCP Provider  Silva Orozco MD  Past Medical History:   Diagnosis Date    Arrhythmia     atrial fibrillation 2017    CAD (coronary atherosclerotic disease)     Diabetes ([facility]     Diabetes mellitus type 2, controlled ([facility] 3/13/2017    Heart failure ([facility]     Hx of CABG 3/13/2017    CABG in 2010 in  [place] Morbid obesity ([facility] 3/13/2017    JACEY (obstructive sleep apnea) 6/23/2017    S/P CABG x 2 2010      Past Surgical History:   Procedure Laterality Date    CARDIAC SURG PROCEDURE UNLIST      CABGx2 in 2010    HX ORTHOPAEDIC      L 3rd toe amputation in 1999     Allergies   Allergen Reactions    Allopurinol Rash    Ciprofloxacin Nausea Only    Percocet [Oxycodone-Acetaminophen] Other (comments)     hallucinations      Family History   Problem Relation Age of Onset    Heart Attack Father     No Known Problems Mother       Current Outpatient Prescriptions   Medication Sig    febuxostat (ULORIC) 40 mg tab tablet Take 40 mg by mouth daily.  levothyroxine (SYNTHROID) 137 mcg tablet TAKE 1 TABLET(S) EVERY DAY BY ORAL ROUTE FOR 90 DAYS.  bumetanide (BUMEX) 2 mg tablet Take 1 Tab by mouth two (2) times a day.  metoprolol succinate (TOPROL-XL) 100 mg tablet Take 1.5 Tabs by mouth daily.  apixaban (ELIQUIS) 5 mg tablet Take 1 Tab by mouth two (2) times a day. Indications: PREVENT THROMBOEMBOLISM IN CHRONIC ATRIAL FIBRILLATION    potassium chloride SR (K-TAB) 20 mEq tablet Take 1 Tab by mouth daily.     ketoconazole (NIZORAL) 2 % topical cream Apply  to affected area two (2) times daily as needed for Skin Irritation. Indications: rash    metOLazone (ZAROXOLYN) 2.5 mg tablet Take 2.5 mg by mouth daily.  insulin aspart protamine/insulin aspart (NOVOLOG MIX 70/30) 100 unit/mL (70-30) inpn by SubCUTAneous route two (2) times a day. Sliding scale:  26 units AM, 34 units PM, Add 5 units for every 50 point over 150, Hold if BS <100  Patient injected 34 unit on 6/21/17 PM (); patient injected 15 units on 6/22/17 AM ()    pravastatin (PRAVACHOL) 80 mg tablet Take 1 Tab by mouth nightly.  calcium carbonate (CALTREX) 600 mg calcium (1,500 mg) tablet Take 600 mg by mouth every Tuesday, Thursday, Saturday & Sunday.  dutasteride (AVODART) 0.5 mg capsule TAKE ONE CAPSULE BY MOUTH EVERY DAY    tamsulosin (FLOMAX) 0.4 mg capsule TAKE 2 CAPSULES BY MOUTH EVERY DAY    paricalcitol (ZEMPLAR) 1 mcg capsule Take 1 mcg by mouth daily.  multivitamin (ONE A DAY) tablet Take 1 Tab by mouth daily.  acetaminophen (TYLENOL EXTRA STRENGTH) 500 mg tablet Take 1,000 mg by mouth every eight (8) hours as needed for Pain.  cholecalciferol (VITAMIN D3) 1,000 unit cap Take 3,000 Units by mouth daily. No current facility-administered medications for this visit. Vitals:    10/02/17 1123   BP: 112/70   Pulse: 72   Resp: 16   SpO2: 97%   Weight: 234 lb 12.8 oz (106.5 kg)   Height: 5' 9\" (1.753 m)     Social History     Social History    Marital status:      Spouse name: N/A    Number of children: N/A    Years of education: N/A     Occupational History    Not on file.      Social History Main Topics    Smoking status: Never Smoker    Smokeless tobacco: Never Used    Alcohol use Yes      Comment: rare    Drug use: No    Sexual activity: Not on file     Other Topics Concern    Not on file     Social History Narrative       I have reviewed the nurses notes, vitals, problem list, allergy list, medical history, family, social history and medications. Review of Symptoms:    General: Pt denies excessive weight gain or loss. Pt is able to conduct ADL's  HEENT: Denies blurred vision, headaches, epistaxis and difficulty swallowing. Respiratory: Denies shortness of breath, BETTS, wheezing or stridor. Cardiovascular: Denies precordial pain, palpitations, edema or PND  Gastrointestinal: Denies poor appetite, indigestion, abdominal pain or blood in stool    Physical Exam:      General: Well developed, in no acute distress, cooperative and alert  HEENT: No carotid bruits, no JVD, trach is midline. Neck Supple,  Heart:  Normal S1/S2 negative S3 or S4. Respiratory: Clear bilaterally  Abdomen:   Soft, non-tender, no masses, bowel sounds are active.   Extremities:  No edema, normal cap refill, no cyanosis, atraumatic. Neuro: A&Ox3, speech clear, gait stable. Skin: 1 cm x 5 cm left forearm ecchymosis. ,  No swelling, not firm    Cardiographics    ECG:   Results for orders placed or performed during the hospital encounter of 06/22/17   EKG, 12 LEAD, INITIAL   Result Value Ref Range    Ventricular Rate 114 BPM    Atrial Rate 144 BPM    QRS Duration 102 ms    Q-T Interval 316 ms    QTC Calculation (Bezet) 435 ms    Calculated R Axis 92 degrees    Calculated T Axis -149 degrees    Diagnosis       Atrial fibrillation with rapid ventricular response  Rightward axis  Low voltage QRS  Incomplete right bundle branch block  Nonspecific ST abnormality  Confirmed by Bunny Oliver ([id number]) on 6/22/2017 2:35:37 PM           Cardiology Labs:  Lab Results   Component Value Date/Time    Cholesterol, total 92 04/03/2017 04:00 PM    LDL,Direct 43 04/03/2017 04:00 PM       Lab Results   Component Value Date/Time    Sodium 138 06/27/2017 02:56 AM    Potassium 4.3 06/27/2017 02:56 AM    Chloride 94 06/27/2017 02:56 AM    CO2 37 06/27/2017 02:56 AM    Anion gap 7 06/27/2017 02:56 AM    Glucose 189 06/27/2017 02:56 AM    BUN 78 06/27/2017 02:56 AM    Creatinine 2.67 06/27/2017 02:56 AM    BUN/Creatinine ratio 29 06/27/2017 02:56 AM    GFR est AA 29 06/27/2017 02:56 AM    GFR est non-AA 24 06/27/2017 02:56 AM    Calcium 8.7 06/27/2017 02:56 AM    Bilirubin, total 0.7 06/24/2017 03:32 AM    AST (SGOT) 23 06/24/2017 03:32 AM    Alk. phosphatase 44 06/24/2017 03:32 AM    Protein, total 6.6 06/24/2017 03:32 AM    Albumin 2.9 06/24/2017 03:32 AM    Globulin 3.7 06/24/2017 03:32 AM    A-G Ratio 0.8 06/24/2017 03:32 AM    ALT (SGPT) 15 06/24/2017 03:32 AM           Assessment:     Assessment:     Diagnoses and all orders for this visit:    1. Ecchymosis of forearm    2. Chronic a-fib (HCC)  -     AMB POC EKG ROUTINE W/ 12 LEADS, INTER & REP    3. Chronic diastolic heart failure ([address]    4. Hx of CABG        ICD-10-CM ICD-9-CM    1. Ecchymosis of forearm R58 459.89    2. Chronic a-fib (HCC) I48.2 427.31 AMB POC EKG ROUTINE W/ 12 LEADS, INTER & REP   3. Chronic diastolic heart failure (HCC) I50.32 428.32    4. Hx of CABG Z95.1 V45.81      Orders Placed This Encounter    AMB POC EKG ROUTINE W/ 12 LEADS, INTER & REP     Order Specific Question:   Reason for Exam:     Answer:   routine        Plan:     Patient presents with superficial left forearm ecchymosis approximately 5 cm x 1 cm. No other areas of bruising indicated other than left deltoid. Discussed with patient the benefits of continued Eliquis in the setting of heart failure with chronic atrial fibrillation and agrees to continue. Based on the therapeutic and appropriate use guidelines he will remain on Eliquis 5 mg twice a day. Blood pressure: Controlled. Follow-up with Dr. Shahnaz Allen as planned.   Brody Hector NP

## 2017-10-02 NOTE — PROGRESS NOTES
Chief Complaint   Patient presents with    Bleeding/Bruising     C/O bruising , wants to discuss Eliquis

## 2017-10-02 NOTE — MR AVS SNAPSHOT
Visit Information Date & Time Provider Department Dept. Phone Encounter #  
 10/2/2017 11:00 AM Sky Joseph, [facility] [phone] [id number] Your Appointments 10/9/2017 10:15 AM  
1 MONTH with Marina Vargas MD  
[facility]) Appt Note: 1mo f/u ; prev bundle pt; per nurse isabella jenkins [id number] lj; rs fr 9/12/17 Dr Tod Carrel out sick. Alem Chance; .  
 [address] [facility] [address]  
[phone] [address] [facility] [address]  
  
    
 10/10/2017 11:00 AM  
3 MONTH with Jorje Doyle MD  
[facility]) Appt Note: BSC BiV PM 3mo post op Dr. Evita Verma, discuss latitude; lvm for pt to call back and r/s; 8-14-17 pt rsched lj 8-14-17  
 [address] [facility] [address]  
[phone] [address] [facility] [address] [address]  
  
    
 10/10/2017 11:15 AM  
PACEMAKER with PACEMAKER, [facility]) Appt Note: BSC BiV PM 3mo post op Dr. Evita Verma, discuss latitude; lvm for pt to call back and r/s; pacemaker daniel 8-14-17 [address]  
[phone] [address] [facility] [address]  
  
    
 10/13/2017  9:30 AM  
Follow Up with Cierra Anderson MD  
[facility]) Appt Note: 3 Month Follow Up  
 [address] [phone] [address] [place] [address]  
  
    
 10/19/2017 10:00 AM  
Any with Eloisa Person MD  
[address] [place] [address] ([facility]) Appt Note: f/up to be reevaluated [id number] [address] [phone] [address] [place] [address]., [address] [phone] Upcoming [facility] Date Due Hepatitis C Screening 1950 EYE EXAM RETINAL OR DILATED Q1 1/28/1959 DTaP/Tdap/Td series (1 - Tdap) 1/28/1970 FOBT Q 1 YEAR AGE 50-75 1/28/1999 ZOSTER VACCINE AGE 60> 11/28/2008 GLAUCOMA SCREENING Q2Y 1/28/2014 Pneumococcal 65+ Low/Medium Risk (1 of 2 - PCV13) 1/28/2014 MEDICARE YEARLY EXAM 1/28/2014 INFLUENZA AGE 9 TO ADULT 8/1/2017 HEMOGLOBIN A1C Q6M 12/22/2017 FOOT EXAM Q1 4/3/2018 MICROALBUMIN Q1 4/3/2018 LIPID PANEL Q1 4/3/2018 Allergies as of 10/2/2017  Review Complete On: 10/2/2017 By: Deon Castaneda MD  
  
 Severity Noted Reaction Type Reactions Allopurinol High 08/16/2017   Systemic Rash Ciprofloxacin  03/07/2017    Nausea Only Percocet [Oxycodone-acetaminophen]  03/07/2017    Other (comments)  
 hallucinations Current Immunizations  Never Reviewed No immunizations on file. Not reviewed this visit You Were Diagnosed With   
  
 Codes Comments Chronic a-fib (HCC)    -  Primary ICD-10-CM: X07.2 ICD-9-CM: 427.31 Chronic diastolic heart failure (HCC)     ICD-10-CM: I50.32 
ICD-9-CM: 428.32 Hx of CABG     ICD-10-CM: Z95.1 ICD-9-CM: V45.81 Vitals BP Pulse Resp Height(growth percentile) Weight(growth percentile) SpO2  
 112/70 (BP 1 Location: Right arm, BP Patient Position: Sitting) 72 16 5' 9\" (1.753 m) 234 lb 12.8 oz (106.5 kg) 97% BMI Smoking Status 34.67 kg/m2 Never Smoker Vitals History BMI and BSA Data Body Mass Index Body Surface Area  
 34.67 kg/m 2 2.28 m 2 Preferred Pharmacy Pharmacy Name Phone [id number]- [place], [place] - [address] [phone] Your Updated Medication List  
  
   
This list is accurate as of: 10/2/17 11:56 AM.  Always use your most recent med list.  
  
  
  
  
 apixaban 5 mg tablet Commonly known as:  Toni Mejia Take 1 Tab by mouth two (2) times a day. Indications: PREVENT THROMBOEMBOLISM IN CHRONIC ATRIAL FIBRILLATION  
  
 bumetanide 2 mg tablet Commonly known as:  Radha Oas Take 1 Tab by mouth two (2) times a day. calcium carbonate 600 mg calcium (1,500 mg) tablet Commonly known as:  Melly Salle Take 600 mg by mouth every Tuesday, Thursday, Saturday & Sunday. dutasteride 0.5 mg capsule Commonly known as:  AVODART TAKE ONE CAPSULE BY MOUTH EVERY DAY  
  
 insulin aspart protamine/insulin aspart 100 unit/mL (70-30) Inpn Commonly known as:  NOVOLOG MIX 70/30  
by SubCUTAneous route two (2) times a day. Sliding scale: 26 units AM, 34 units PM, Add 5 units for every 50 point over 150, Hold if BS <100 Patient injected 34 unit on 6/21/17 PM (); patient injected 15 units on 6/22/17 AM ()  
  
 ketoconazole 2 % topical cream  
Commonly known as:  NIZORAL Apply  to affected area two (2) times daily as needed for Skin Irritation. Indications: rash  
  
 levothyroxine 137 mcg tablet Commonly known as:  SYNTHROID  
TAKE 1 TABLET(S) EVERY DAY BY ORAL ROUTE FOR 90 DAYS. metOLazone 2.5 mg tablet Commonly known as:  Samul [place] Take 2.5 mg by mouth daily. metoprolol succinate 100 mg tablet Commonly known as:  TOPROL-XL Take 1.5 Tabs by mouth daily. multivitamin tablet Commonly known as:  ONE A DAY Take 1 Tab by mouth daily. paricalcitol 1 mcg capsule Commonly known as:  Bolivar Economy Take 1 mcg by mouth daily. potassium chloride SR 20 mEq tablet Commonly known as:  K-TAB Take 1 Tab by mouth daily. pravastatin 80 mg tablet Commonly known as:  PRAVACHOL Take 1 Tab by mouth nightly. tamsulosin 0.4 mg capsule Commonly known as:  FLOMAX TAKE 2 CAPSULES BY MOUTH EVERY DAY  
  
 TYLENOL EXTRA STRENGTH 500 mg tablet Generic drug:  acetaminophen Take 1,000 mg by mouth every eight (8) hours as needed for Pain. ULORIC 40 mg Tab tablet Generic drug:  febuxostat Take 40 mg by mouth daily. VITAMIN D3 1,000 unit Cap Generic drug:  cholecalciferol Take 3,000 Units by mouth daily. We Performed the Following AMB POC EKG ROUTINE W/ 12 LEADS, INTER & REP [69752 CPT(R)] Introducing [facility]! [facility] introduces [email or web address] patient portal. Now you can access parts of your medical record, email your doctor's office, and request medication refills online. 1. In your internet browser, go to [email or web address] 2. Click on the First Time User? Click Here link in the Sign In box. You will see the New Member Sign Up page. 3. Enter your [email or web address] Access Code exactly as it appears below. You will not need to use this code after youve completed the sign-up process. If you do not sign up before the expiration date, you must request a new code. · [email or web address] Access Code: [email or web address] Expires: 12/6/2017  9:50 AM 
 
4. Enter the last four digits of your Social Security Number (xxxx) and Date of Birth (mm/dd/yyyy) as indicated and click Submit. You will be taken to the next sign-up page. 5. Create a [email or web address] ID. This will be your [email or web address] login ID and cannot be changed, so think of one that is secure and easy to remember. 6. Create a [email or web address] password. You can change your password at any time. 7. Enter your Password Reset Question and Answer. This can be used at a later time if you forget your password. 8. Enter your e-mail address. You will receive e-mail notification when new information is available in [address] 9. Click Sign Up. You can now view and download portions of your medical record. 10. Click the Download Summary menu link to download a portable copy of your medical information. If you have questions, please visit the Frequently Asked Questions section of the [email or web address] website. Remember, [email or web address] is NOT to be used for urgent needs. For medical emergencies, dial 911. Now available from your iPhone and Android! Please provide this summary of care documentation to your next provider. Your primary care clinician is listed as Mack Lakhani. If you have any questions after today's visit, please call [phone].

## 2017-10-02 NOTE — PROGRESS NOTES
Patient called to state he has some bruising on his lower left arm that is getting larger, states this bruising has started since starting his Eliquis. Patient denies any pain or bleeding with the bruising, and states it has gotten larger.   Spoke to Suzanne White NP and advised patient to come in for appointment now on Dr Flaquita Degroot this am.

## 2017-10-09 ENCOUNTER — OFFICE VISIT (OUTPATIENT)
Dept: CARDIOLOGY CLINIC | Age: 67
End: 2017-10-09

## 2017-10-09 VITALS
DIASTOLIC BLOOD PRESSURE: 62 MMHG | HEIGHT: 69 IN | SYSTOLIC BLOOD PRESSURE: 114 MMHG | HEART RATE: 79 BPM | BODY MASS INDEX: 34.46 KG/M2 | RESPIRATION RATE: 16 BRPM | OXYGEN SATURATION: 93 % | WEIGHT: 232.65 LBS

## 2017-10-09 DIAGNOSIS — Z95.1 HX OF CABG: ICD-10-CM

## 2017-10-09 DIAGNOSIS — I50.32 CHRONIC DIASTOLIC HEART FAILURE (HCC): ICD-10-CM

## 2017-10-09 DIAGNOSIS — R58 ECCHYMOSIS OF FOREARM: ICD-10-CM

## 2017-10-09 DIAGNOSIS — E66.01 MORBID OBESITY DUE TO EXCESS CALORIES (HCC): ICD-10-CM

## 2017-10-09 DIAGNOSIS — I48.20 CHRONIC A-FIB (HCC): Primary | ICD-10-CM

## 2017-10-09 NOTE — PROGRESS NOTES
Chief Complaint   Patient presents with    Irregular Heart Beat     1 mo f/u    Coronary Artery Disease     \"

## 2017-10-09 NOTE — MR AVS SNAPSHOT
Visit Information Date & Time Provider Department Dept. Phone Encounter #  
 10/9/2017 10:15 AM Sylvia Mullins, 1024 [facility] [phone] [id number] Your Appointments 10/10/2017 11:00 AM  
3 MONTH with Hernan Cutler MD  
[facility]) Appt Note: BSC BiV PM 3mo post op Dr. Maris Li, discuss latitude; lvm for pt to call back and r/s; 8-14-17 pt rsched lj 8-14-17  
 [address] [facility] [address]  
[phone] [address] [facility] [address]  
  
    
 10/10/2017 11:15 AM  
PACEMAKER with PACEMAKER, [facility]) Appt Note: BSC BiV PM 3mo post op Dr. Maris Li, discuss latitude; lvm for pt to call back and r/s; pacemaker daniel 8-14-17 [address]  
[phone] [address] [facility] [address]  
  
    
 10/13/2017  9:30 AM  
Follow Up with Rubina Nash MD  
[facility]) Appt Note: 3 Month Follow Up  
 [address] [facility] [address] [phone] [address] [place] [address]  
  
    
 10/19/2017 10:00 AM  
Any with Valerie Perdomo MD  
[address] [place] [address] ([facility]) Appt Note: f/up to be reevaluated 305 [facility] [address] [address] [address] [place] [address]., [address] [address] Upcoming Health Maintenance Date Due Hepatitis C Screening 1950 EYE EXAM RETINAL OR DILATED Q1 1/28/1959 DTaP/Tdap/Td series (1 - Tdap) 1/28/1970 FOBT Q 1 YEAR AGE 50-75 1/28/1999 ZOSTER VACCINE AGE 60> 11/28/2008 GLAUCOMA SCREENING Q2Y 1/28/2014 Pneumococcal 65+ Low/Medium Risk (1 of 2 - PCV13) 1/28/2014 MEDICARE YEARLY EXAM 1/28/2014 INFLUENZA AGE 9 TO ADULT 8/1/2017 HEMOGLOBIN A1C Q6M 12/22/2017 FOOT EXAM Q1 4/3/2018 MICROALBUMIN Q1 4/3/2018 LIPID PANEL Q1 4/3/2018 Allergies as of 10/9/2017  Review Complete On: 10/9/2017 By: Kristy Brar Severity Noted Reaction Type Reactions Allopurinol High 08/16/2017   Systemic Rash Ciprofloxacin  03/07/2017    Nausea Only Percocet [Oxycodone-acetaminophen]  03/07/2017    Other (comments)  
 hallucinations Current Immunizations  Never Reviewed No immunizations on file. Not reviewed this visit You Were Diagnosed With   
  
 Codes Comments Chronic a-fib (HCC)    -  Primary ICD-10-CM: A07.7 ICD-9-CM: 427.31 Vitals BP Pulse Resp Height(growth percentile) Weight(growth percentile) SpO2  
 114/62 (BP 1 Location: Right arm, BP Patient Position: Sitting) 79 16 5' 9\" (1.753 m) 232 lb 10.4 oz (105.5 kg) 93% BMI Smoking Status 34.36 kg/m2 Never Smoker Vitals History BMI and BSA Data Body Mass Index Body Surface Area  
 34.36 kg/m 2 2.27 m 2 Preferred Pharmacy Pharmacy Name Phone [facility]/PH[facility]ACY #[id number]- [place], [place] - [address] [phone] Your Updated Medication List  
  
   
This list is accurate as of: 10/9/17 10:56 AM.  Always use your most recent med list.  
  
  
  
  
 apixaban 5 mg tablet Commonly known as:  Cliffton Rubinstein Take 1 Tab by mouth two (2) times a day. Indications: PREVENT THROMBOEMBOLISM IN CHRONIC ATRIAL FIBRILLATION  
  
 bumetanide 2 mg tablet Commonly known as:  Radha Oas Take 1 Tab by mouth two (2) times a day. calcium carbonate 600 mg calcium (1,500 mg) tablet Commonly known as:  Melly Salle Take 600 mg by mouth every Tuesday, Thursday, Saturday & Sunday. dutasteride 0.5 mg capsule Commonly known as:  AVODART TAKE ONE CAPSULE BY MOUTH EVERY DAY  
  
 insulin aspart protamine/insulin aspart 100 unit/mL (70-30) Inpn Commonly known as:  NOVOLOG MIX 70/30  
by SubCUTAneous route two (2) times a day. Sliding scale: 26 units AM, 34 units PM, Add 5 units for every 50 point over 150, Hold if BS <100 Patient injected 34 unit on 6/21/17 PM (); patient injected 15 units on 6/22/17 AM ()  
  
 ketoconazole 2 % topical cream  
Commonly known as:  NIZORAL Apply  to affected area two (2) times daily as needed for Skin Irritation. Indications: rash  
  
 levothyroxine 137 mcg tablet Commonly known as:  SYNTHROID  
TAKE 1 TABLET(S) EVERY DAY BY ORAL ROUTE FOR 90 DAYS. metOLazone 2.5 mg tablet Commonly known as:  Lisa Ripper Take 2.5 mg by mouth daily. metoprolol succinate 100 mg tablet Commonly known as:  TOPROL-XL Take 1.5 Tabs by mouth daily. multivitamin tablet Commonly known as:  ONE A DAY Take 1 Tab by mouth daily. paricalcitol 1 mcg capsule Commonly known as:  Vielka Mack Take 1 mcg by mouth daily. potassium chloride SR 20 mEq tablet Commonly known as:  K-TAB Take 1 Tab by mouth daily. pravastatin 80 mg tablet Commonly known as:  PRAVACHOL Take 1 Tab by mouth nightly. tamsulosin 0.4 mg capsule Commonly known as:  FLOMAX TAKE 2 CAPSULES BY MOUTH EVERY DAY  
  
 TYLENOL EXTRA STRENGTH 500 mg tablet Generic drug:  acetaminophen Take 1,000 mg by mouth every eight (8) hours as needed for Pain. ULORIC 40 mg Tab tablet Generic drug:  febuxostat Take 40 mg by mouth daily. VITAMIN D3 1,000 unit Cap Generic drug:  cholecalciferol Take 3,000 Units by mouth daily. We Performed the Following AMB POC EKG ROUTINE W/ 12 LEADS, INTER & REP [33136 CPT(R)] Introducing [facility]! [employer] W[employer] introduces [employer] patient portal. Now you can access parts of your medical record, email your doctor's office, and request medication refills online. 1. In your internet browser, go to [email or web address] It Is 2. Click on the First Time User? Click Here link in the Sign In box. You will see the New Member Sign Up page. 3. Enter your [employer] Access Code exactly as it appears below. You will not need to use this code after youve completed the sign-up process. If you do not sign up before the expiration date, you must request a new code. · [employer] Access Code: [email or web address] Expires: 12/6/2017  9:50 AM 
 
4. Enter the last four digits of your Social Security Number (xxxx) and Date of Birth (mm/dd/yyyy) as indicated and click Submit. You will be taken to the next sign-up page. 5. Create a [employer] ID. This will be your [employer] login ID and cannot be changed, so think of one that is secure and easy to remember. 6. Create a [employer] password. You can change your password at any time. 7. Enter your Password Reset Question and Answer. This can be used at a later time if you forget your password. 8. Enter your e-mail address. You will receive e-mail notification when new information is available in [address] 9. Click Sign Up. You can now view and download portions of your medical record. 10. Click the Download Summary menu link to download a portable copy of your medical information. If you have questions, please visit the Frequently Asked Questions section of the [employer] website. Remember, [employer] is NOT to be used for urgent needs. For medical emergencies, dial 911. Now available from your iPhone and Android! Please provide this summary of care documentation to your next provider. Your primary care clinician is listed as Mack Lakhani. If you have any questions after today's visit, please call [phone].

## 2017-10-10 ENCOUNTER — OFFICE VISIT (OUTPATIENT)
Dept: CARDIOLOGY CLINIC | Age: 67
End: 2017-10-10

## 2017-10-10 ENCOUNTER — CLINICAL SUPPORT (OUTPATIENT)
Dept: CARDIOLOGY CLINIC | Age: 67
End: 2017-10-10

## 2017-10-10 VITALS
RESPIRATION RATE: 16 BRPM | BODY MASS INDEX: 34.63 KG/M2 | WEIGHT: 233.8 LBS | OXYGEN SATURATION: 98 % | DIASTOLIC BLOOD PRESSURE: 70 MMHG | HEIGHT: 69 IN | SYSTOLIC BLOOD PRESSURE: 122 MMHG | HEART RATE: 75 BPM

## 2017-10-10 DIAGNOSIS — Z95.0 PACEMAKER: Primary | ICD-10-CM

## 2017-10-10 DIAGNOSIS — I42.0 DILATED CARDIOMYOPATHY (HCC): Chronic | ICD-10-CM

## 2017-10-10 DIAGNOSIS — Z98.890 H/O ATRIOVENTRICULAR NODAL ABLATION: ICD-10-CM

## 2017-10-10 DIAGNOSIS — I48.20 CHRONIC A-FIB (HCC): ICD-10-CM

## 2017-10-10 DIAGNOSIS — I50.22 CHRONIC SYSTOLIC HEART FAILURE (HCC): ICD-10-CM

## 2017-10-10 DIAGNOSIS — I48.20 CHRONIC A-FIB (HCC): Primary | ICD-10-CM

## 2017-10-10 PROBLEM — I42.9 CARDIOMYOPATHY (HCC): Chronic | Status: ACTIVE | Noted: 2017-10-10

## 2017-10-10 NOTE — PROGRESS NOTES
Subjective:      Todd Ponce is a 75 y.o. male is here for device follow up. He has some sob with exertion.       Patient Active Problem List    Diagnosis Date Noted    Cardiomyopathy [facility]) 10/10/2017    Pacemaker 06/26/2017    H/O atrioventricular anastacio ablation 06/26/2017    JACEY (obstructive sleep apnea) 66/64/0593    Diastolic heart failure ([address] 06/22/2017    Irregular heart beat 03/22/2017    Fatigue 03/15/2017    Bilateral leg edema 03/15/2017    Counseling regarding advanced care planning and goals of care 03/15/2017    Hx of CABG 03/13/2017    Morbid obesity ([address] 03/13/2017    Diabetes mellitus type 2, controlled ([address] 03/13/2017    Heart failure ([address] 03/13/2017    SOB (shortness of breath) 03/07/2017    Chronic a-fib ([address] 03/07/2017    Coronary artery disease involving native coronary artery without angina pectoris 03/07/2017    CKD (chronic kidney disease) stage 4, GFR 15-29 ml/min ([facility]) 03/07/2017      Sharda Kaplan MD  Past Medical History:   Diagnosis Date    Arrhythmia     atrial fibrillation 2017    CAD (coronary atherosclerotic disease)     Diabetes ([address]     Diabetes mellitus type 2, controlled ([address] 3/13/2017    Heart failure ([address]     Hx of CABG 3/13/2017    CABG in 2010 [place] Morbid obesity ([address] 3/13/2017    JACEY (obstructive sleep apnea) 6/23/2017    S/P CABG x 2 2010      Past Surgical History:   Procedure Laterality Date    CARDIAC SURG PROCEDURE UNLIST      CABGx2 in 2010    HX ORTHOPAEDIC      L 3rd toe amputation in 1999     Allergies   Allergen Reactions    Allopurinol Rash    Ciprofloxacin Nausea Only    Percocet [Oxycodone-Acetaminophen] Other (comments)     hallucinations      Family History   Problem Relation Age of Onset    Heart Attack Father     No Known Problems Mother     negative for cardiac disease  Social History     Social History    Marital status:      Spouse name: N/A    Number of children: N/A    Years of education: N/A     Social History Main Topics    Smoking status: Never Smoker    Smokeless tobacco: Never Used    Alcohol use Yes      Comment: rare    Drug use: No    Sexual activity: Not Asked     Other Topics Concern    None     Social History Narrative     Current Outpatient Prescriptions   Medication Sig    febuxostat (ULORIC) 40 mg tab tablet Take 40 mg by mouth daily.  levothyroxine (SYNTHROID) 137 mcg tablet TAKE 1 TABLET(S) EVERY DAY BY ORAL ROUTE FOR 90 DAYS.  bumetanide (BUMEX) 2 mg tablet Take 1 Tab by mouth two (2) times a day.  metoprolol succinate (TOPROL-XL) 100 mg tablet Take 1.5 Tabs by mouth daily.  apixaban (ELIQUIS) 5 mg tablet Take 1 Tab by mouth two (2) times a day. Indications: PREVENT THROMBOEMBOLISM IN CHRONIC ATRIAL FIBRILLATION    potassium chloride SR (K-TAB) 20 mEq tablet Take 1 Tab by mouth daily.  ketoconazole (NIZORAL) 2 % topical cream Apply  to affected area two (2) times daily as needed for Skin Irritation. Indications: rash    metOLazone (ZAROXOLYN) 2.5 mg tablet Take 2.5 mg by mouth daily.  insulin aspart protamine/insulin aspart (NOVOLOG MIX 70/30) 100 unit/mL (70-30) inpn by SubCUTAneous route two (2) times a day. Sliding scale:  26 units AM, 34 units PM, Add 5 units for every 50 point over 150, Hold if BS <100  Patient injected 34 unit on 6/21/17 PM (); patient injected 15 units on 6/22/17 AM ()    pravastatin (PRAVACHOL) 80 mg tablet Take 1 Tab by mouth nightly.  calcium carbonate (CALTREX) 600 mg calcium (1,500 mg) tablet Take 600 mg by mouth every Tuesday, Thursday, Saturday & Sunday.  dutasteride (AVODART) 0.5 mg capsule TAKE ONE CAPSULE BY MOUTH EVERY DAY    tamsulosin (FLOMAX) 0.4 mg capsule TAKE 2 CAPSULES BY MOUTH EVERY DAY    paricalcitol (ZEMPLAR) 1 mcg capsule Take 1 mcg by mouth daily.  multivitamin (ONE A DAY) tablet Take 1 Tab by mouth daily.     acetaminophen (TYLENOL EXTRA STRENGTH) 500 mg tablet Take 1,000 mg by mouth every eight (8) hours as needed for Pain.  cholecalciferol (VITAMIN D3) 1,000 unit cap Take 3,000 Units by mouth daily. No current facility-administered medications for this visit. Vitals:    10/10/17 1054   BP: 122/70   Pulse: 75   Resp: 16   SpO2: 98%   Weight: 233 lb 12.8 oz (106.1 kg)   Height: 5' 9\" (1.753 m)       I have reviewed the nurses notes, vitals, problem list, allergy list, medical history, family, social history and medications. Review of Symptoms:    General: Pt denies excessive weight gain or loss. Pt is able to conduct ADL's  HEENT: Denies blurred vision, headaches, epistaxis and difficulty swallowing. Respiratory: + shortness of breath, BETTS, denies wheezing or stridor. Cardiovascular: Denies precordial pain, palpitations, edema or PND  Gastrointestinal: Denies poor appetite, indigestion, abdominal pain or blood in stool  Urinary: Denies dysuria, pyuria  Musculoskeletal: Denies pain or swelling from muscles or joints  Neurologic: Denies tremor, paresthesias, or sensory motor disturbance  Skin: Denies rash, itching or texture change. Psych: Denies depression      Physical Exam:      General: Well developed, in no acute distress. HEENT: Eyes - PERRL, no jvd  Heart:  Normal S1/S2 negative S3 or S4. Regular, no murmur, gallop or rub.   Respiratory: Clear bilaterally x 4, no wheezing or rales  Abdomen:   Soft, non-tender, bowel sounds are active.   Extremities:  No edema, normal cap refill, no cyanosis. Musculoskeletal: No clubbing  Neuro: A&Ox3, speech clear, gait stable. Skin: Skin color is normal. No rashes or lesions.  Non diaphoretic  Vascular: 2+ pulses symmetric in all extremities    Cardiographics    Ekg: biv paced    Pacer - biv paced    Results for orders placed or performed during the hospital encounter of 06/22/17   EKG, 12 LEAD, INITIAL   Result Value Ref Range    Ventricular Rate 114 BPM    Atrial Rate 144 BPM    QRS Duration 102 ms    Q-T Interval 316 ms    QTC Calculation (Bezet) 435 ms    Calculated R Axis 92 degrees    Calculated T Axis -149 degrees    Diagnosis       Atrial fibrillation with rapid ventricular response  Rightward axis  Low voltage QRS  Incomplete right bundle branch block  Nonspecific ST abnormality  Confirmed by Blaze Coburn ([id number]) on 6/22/2017 2:35:37 PM           Lab Results   Component Value Date/Time    WBC 6.5 06/27/2017 02:56 AM    HGB 12.0 06/27/2017 02:56 AM    HCT 39.8 06/27/2017 02:56 AM    PLATELET 680 09/51/5632 02:56 AM    MCV 84.3 06/27/2017 02:56 AM      Lab Results   Component Value Date/Time    Sodium 138 06/27/2017 02:56 AM    Potassium 4.3 06/27/2017 02:56 AM    Chloride 94 06/27/2017 02:56 AM    CO2 37 06/27/2017 02:56 AM    Anion gap 7 06/27/2017 02:56 AM    Glucose 189 06/27/2017 02:56 AM    BUN 78 06/27/2017 02:56 AM    Creatinine 2.67 06/27/2017 02:56 AM    BUN/Creatinine ratio 29 06/27/2017 02:56 AM    GFR est AA 29 06/27/2017 02:56 AM    GFR est non-AA 24 06/27/2017 02:56 AM    Calcium 8.7 06/27/2017 02:56 AM    Bilirubin, total 0.7 06/24/2017 03:32 AM    AST (SGOT) 23 06/24/2017 03:32 AM    Alk. phosphatase 44 06/24/2017 03:32 AM    Protein, total 6.6 06/24/2017 03:32 AM    Albumin 2.9 06/24/2017 03:32 AM    Globulin 3.7 06/24/2017 03:32 AM    A-G Ratio 0.8 06/24/2017 03:32 AM    ALT (SGPT) 15 06/24/2017 03:32 AM         Assessment:     Assessment:        ICD-10-CM ICD-9-CM    1. Chronic a-fib (HCC) I48.2 427.31 AMB POC EKG ROUTINE W/ 12 LEADS, INTER & REP      2D ECHO COMPLETE ADULT (TTE) W OR WO CONTR   2. Chronic systolic heart failure (HCC) I50.22 428.22 AMB POC EKG ROUTINE W/ 12 LEADS, INTER & REP      2D ECHO COMPLETE ADULT (TTE) W OR WO CONTR   3.  Dilated cardiomyopathy (HCC) I42.0 425.4 AMB POC EKG ROUTINE W/ 12 LEADS, INTER & REP      2D ECHO COMPLETE ADULT (TTE) W OR WO CONTR     Orders Placed This Encounter    AMB POC EKG ROUTINE W/ 12 LEADS, INTER & REP     Order Specific Question:   Reason for Exam:     Answer:   routine    2D ECHO COMPLETE ADULT (TTE) W OR WO CONTR     Standing Status:   Future     Standing Expiration Date:   4/10/2018     Order Specific Question:   Reason for Exam:     Answer:   cardiomyopathy     Order Specific Question:   Contrast Enhancement (Bubble Study, Definity, Optison) may be used if criteria listed in established evidence-based protocol has been identified. Answer:   Yes        Plan:   Mr Dmitry Pedro is having sob with exertion. We will repeat an echo to reassess his LVEF. Cont med rx for his AF and cardiomyopathy. He will follow up in the device clinic per routine and with me in one year. Continue medical management for AF, cardiomyopathy and DM. Thank you for allowing me to participate in Lino Dietrich 's care.     Vladimir Rodas MD, Louise Llamas

## 2017-10-10 NOTE — PROGRESS NOTES
See device report - BSC BiV PM in office device check, next check in 6 months. Enrolled in remote home monitoring. Monitor will be shipped to patient.

## 2017-10-10 NOTE — MR AVS SNAPSHOT
Visit Information Date & Time Provider Department Dept. Phone Encounter #  
 10/10/2017 11:00 AM Margot Arevalo, 1024 [facility] [phone] [id number] Your Appointments 10/13/2017  9:30 AM  
Follow Up with Reuben Sharma MD  
[facility] [address] [place] [address]) Appt Note: 3 Month Follow Up  
 [address] [facility] [address] [address] [address]  
  
    
 10/19/2017 10:00 AM  
Any with Lea Stack MD  
[address] [facility] ([address]) Appt Note: f/up to be reevaluated 305 [facility] [address] [address] [facility] [address] 4/18/2018  9:45 AM  
6 MONTH with Rudolph Ordonez MD  
[facility] [address] [facility]) Appt Note: Per Dr. Margarita [facility]  
[phone] [address] [facility] Upcoming Health Maintenance Date Due Hepatitis C Screening 1950 EYE EXAM RETINAL OR DILATED Q1 1/28/1959 DTaP/Tdap/Td series (1 - Tdap) 1/28/1970 FOBT Q 1 YEAR AGE 50-75 1/28/1999 ZOSTER VACCINE AGE 60> 11/28/2008 GLAUCOMA SCREENING Q2Y 1/28/2014 Pneumococcal 65+ Low/Medium Risk (1 of 2 - PCV13) 1/28/2014 MEDICARE YEARLY EXAM 1/28/2014 INFLUENZA AGE 9 TO ADULT 8/1/2017 HEMOGLOBIN A1C Q6M 12/22/2017 FOOT EXAM Q1 4/3/2018 MICROALBUMIN Q1 4/3/2018 LIPID PANEL Q1 4/3/2018 Allergies as of 10/10/2017  Review Complete On: 10/10/2017 By: Liliya De La Garza MD  
  
 Severity Noted Reaction Type Reactions Allopurinol High 08/16/2017   Systemic Rash Ciprofloxacin  03/07/2017    Nausea Only Percocet [Oxycodone-acetaminophen]  03/07/2017    Other (comments)  
 hallucinations Current Immunizations  Never Reviewed No immunizations on file. Not reviewed this visit You Were Diagnosed With   
  
 Codes Comments Chronic a-fib (HCC)    -  Primary ICD-10-CM: U48.4 ICD-9-CM: 427.31 Chronic systolic heart failure (HCC)     ICD-10-CM: I50.22 ICD-9-CM: 428.22 Dilated cardiomyopathy ([facility]     ICD-10-CM: I42.0 ICD-9-CM: 425.4 Vitals BP Pulse Resp Height(growth percentile) Weight(growth percentile) SpO2  
 122/70 (BP 1 Location: Right arm, BP Patient Position: Sitting) 75 16 5' 9\" (1.753 m) 233 lb 12.8 oz (106.1 kg) 98% BMI Smoking Status 34.53 kg/m2 Never Smoker Vitals History BMI and BSA Data Body Mass Index Body Surface Area 34.53 kg/m 2 2.27 m 2 Preferred Pharmacy Pharmacy Name Phone [facility]/PHARMACY #[id number]- [place], [place] - [address] [phone] Your Updated Medication List  
  
   
This list is accurate as of: 10/10/17 11:33 AM.  Always use your most recent med list.  
  
  
  
  
 apixaban 5 mg tablet Commonly known as:  Linh Alexandre Take 1 Tab by mouth two (2) times a day. Indications: PREVENT THROMBOEMBOLISM IN CHRONIC ATRIAL FIBRILLATION  
  
 bumetanide 2 mg tablet Commonly known as:  Kayla Solis Take 1 Tab by mouth two (2) times a day. calcium carbonate 600 mg calcium (1,500 mg) tablet Commonly known as:  Sj Ross Take 600 mg by mouth every Tuesday, Thursday, Saturday & Sunday. dutasteride 0.5 mg capsule Commonly known as:  AVODART TAKE ONE CAPSULE BY MOUTH EVERY DAY  
  
 insulin aspart protamine/insulin aspart 100 unit/mL (70-30) Inpn Commonly known as:  NOVOLOG MIX 70/30  
by SubCUTAneous route two (2) times a day. Sliding scale: 26 units AM, 34 units PM, Add 5 units for every 50 point over 150, Hold if BS <100 Patient injected 34 unit on 6/21/17 PM (); patient injected 15 units on 6/22/17 AM () ketoconazole 2 % topical cream  
Commonly known as:  NIZORAL Apply  to affected area two (2) times daily as needed for Skin Irritation. Indications: rash  
  
 levothyroxine 137 mcg tablet Commonly known as:  SYNTHROID  
TAKE 1 TABLET(S) EVERY DAY BY ORAL ROUTE FOR 90 DAYS. metOLazone 2.5 mg tablet Commonly known as:  Carlo New Take 2.5 mg by mouth daily. metoprolol succinate 100 mg tablet Commonly known as:  TOPROL-XL Take 1.5 Tabs by mouth daily. multivitamin tablet Commonly known as:  ONE A DAY Take 1 Tab by mouth daily. paricalcitol 1 mcg capsule Commonly known as:  Clarnce Hides Take 1 mcg by mouth daily. potassium chloride SR 20 mEq tablet Commonly known as:  K-TAB Take 1 Tab by mouth daily. pravastatin 80 mg tablet Commonly known as:  PRAVACHOL Take 1 Tab by mouth nightly. tamsulosin 0.4 mg capsule Commonly known as:  FLOMAX TAKE 2 CAPSULES BY MOUTH EVERY DAY  
  
 TYLENOL EXTRA STRENGTH 500 mg tablet Generic drug:  acetaminophen Take 1,000 mg by mouth every eight (8) hours as needed for Pain. ULORIC 40 mg Tab tablet Generic drug:  febuxostat Take 40 mg by mouth daily. VITAMIN D3 1,000 unit Cap Generic drug:  cholecalciferol Take 3,000 Units by mouth daily. We Performed the Following AMB POC EKG ROUTINE W/ 12 LEADS, INTER & REP [95688 CPT(R)] To-Do List   
 10/27/2017 ECHO:  2D ECHO COMPLETE ADULT (TTE) W OR WO CONTR Introducing [facility]! [employer] introduces [employer] patient portal. Now you can access parts of your medical record, email your doctor's office, and request medication refills online. 1. In your internet browser, go to [email or web address] 2. Click on the First Time User? Click Here link in the Sign In box. You will see the New Member Sign Up page. 3. Enter your [employer] Access Code exactly as it appears below.  You will not need to use this code after youve completed the sign-up process. If you do not sign up before the expiration date, you must request a new code. · [employer] Access Code: [email or web address] Expires: 12/6/2017  9:50 AM 
 
4. Enter the last four digits of your Social Security Number (xxxx) and Date of Birth (mm/dd/yyyy) as indicated and click Submit. You will be taken to the next sign-up page. 5. Create a [employer] ID. This will be your [employer] login ID and cannot be changed, so think of one that is secure and easy to remember. 6. Create a [employer] password. You can change your password at any time. 7. Enter your Password Reset Question and Answer. This can be used at a later time if you forget your password. 8. Enter your e-mail address. You will receive e-mail notification when new information is available in [address] 9. Click Sign Up. You can now view and download portions of your medical record. 10. Click the Download Summary menu link to download a portable copy of your medical information. If you have questions, please visit the Frequently Asked Questions section of the [employer] website. Remember, [employer] is NOT to be used for urgent needs. For medical emergencies, dial 911. Now available from your iPhone and Android! Please provide this summary of care documentation to your next provider. Your primary care clinician is listed as Mack Lakhani. If you have any questions after today's visit, please call [phone].

## 2017-10-13 ENCOUNTER — OFFICE VISIT (OUTPATIENT)
Dept: ENDOCRINOLOGY | Age: 67
End: 2017-10-13

## 2017-10-13 ENCOUNTER — TELEPHONE (OUTPATIENT)
Dept: ENDOCRINOLOGY | Age: 67
End: 2017-10-13

## 2017-10-13 VITALS
HEIGHT: 69 IN | SYSTOLIC BLOOD PRESSURE: 136 MMHG | BODY MASS INDEX: 34.69 KG/M2 | DIASTOLIC BLOOD PRESSURE: 83 MMHG | WEIGHT: 234.2 LBS | HEART RATE: 75 BPM

## 2017-10-13 DIAGNOSIS — E11.22 TYPE 2 DIABETES MELLITUS WITH STAGE 4 CHRONIC KIDNEY DISEASE, WITH LONG-TERM CURRENT USE OF INSULIN (HCC): Primary | ICD-10-CM

## 2017-10-13 DIAGNOSIS — Z79.4 TYPE 2 DIABETES MELLITUS WITH STAGE 4 CHRONIC KIDNEY DISEASE, WITH LONG-TERM CURRENT USE OF INSULIN (HCC): Primary | ICD-10-CM

## 2017-10-13 DIAGNOSIS — E78.5 HYPERLIPIDEMIA, UNSPECIFIED HYPERLIPIDEMIA TYPE: ICD-10-CM

## 2017-10-13 DIAGNOSIS — I10 ESSENTIAL HYPERTENSION WITH GOAL BLOOD PRESSURE LESS THAN 130/80: ICD-10-CM

## 2017-10-13 DIAGNOSIS — N18.4 TYPE 2 DIABETES MELLITUS WITH STAGE 4 CHRONIC KIDNEY DISEASE, WITH LONG-TERM CURRENT USE OF INSULIN (HCC): Primary | ICD-10-CM

## 2017-10-13 LAB — HBA1C MFR BLD HPLC: 7.1 %

## 2017-10-13 RX ORDER — LANCETS
EACH MISCELLANEOUS
Qty: 4 PACKAGE | Refills: 7 | Status: ON HOLD | OUTPATIENT
Start: 2017-10-13 | End: 2019-03-26 | Stop reason: CLARIF

## 2017-10-13 RX ORDER — PEN NEEDLE, DIABETIC 31 GX3/16"
NEEDLE, DISPOSABLE MISCELLANEOUS
Qty: 100 PEN NEEDLE | Refills: 3 | Status: ON HOLD | OUTPATIENT
Start: 2017-10-13 | End: 2019-03-26 | Stop reason: CLARIF

## 2017-10-13 NOTE — PATIENT INSTRUCTIONS
Novolog 70-30:              30 units  Breakfast / 34 units Dinner    ----------------------------------------------------------------------------------------------------------------------    Below you will find a glucose log sheet which you can use to record your blood sugars. Without checking and recording what your home glucose levels are, it will be difficult to make any changes to your medication dose, even when significant changes may be needed. Please feel free to use the log below to record your home glucose levels. At the very least, I would like for you to login the entire 2-3 weeks just before your visit so we can make your visit much more productive and beneficial to you. GLUCOSE LOG SHEET:    Date Breakfast Lunch Dinner Bedtime Comments ? GLUCOSE LOG SHEET:    Date Breakfast Lunch Dinner Bedtime Comments ? GLUCOSE LOG SHEET:    Date Breakfast Lunch Dinner Bedtime Comments ?

## 2017-10-13 NOTE — MR AVS SNAPSHOT
Visit Information Date & Time Provider Department Dept. Phone Encounter #  
 10/13/2017  9:30 AM Nicolas Cleary, [facility] [phone] [id number] Follow-up Instructions Return in about 3 months (around 1/13/2018). Your Appointments 10/19/2017 10:00 AM  
Any with Niya Gutierrez MD  
[facility] (Simran Billings) Appt Note: f/up to be reevaluated [address] [place] [address], [address] [phone] [address] [place] [address], [address] [phone]  
  
    
 10/23/2017 11:00 AM  
ECHO CARDIOGRAMS 2D with [address] [place] [address] [facility]) Appt Note: $0cp  ekr [address]  
[phone] [address]  
  
    
 4/18/2018  9:45 AM  
6 MONTH with Chele Morales MD  
[place] [facility]) Appt Note: Per Dr. Nova Guadalupe [facility]  
[phone] [address]  
  
    
 4/18/2018  9:45 AM  
PACEMAKER with PACEMAKER, [facility]) Appt Note: 6mo bsc bivpm drubaldo also [address]  
[phone] [address] [address]  
  
    
  
 1/10/2018  8:00 AM  
REMOTE OFFICE VISIT with [facility]) Appt Note: NOT AN OFFICE VISIT - remote bsc biv pm  
 [facility]  
[phone] [address] Upcoming Health Maintenance Date Due Hepatitis C Screening 1950 EYE EXAM RETINAL OR DILATED Q1 1/28/1959 DTaP/Tdap/Td series (1 - Tdap) 1/28/1970 FOBT Q 1 YEAR AGE 50-75 1/28/1999 ZOSTER VACCINE AGE 60> 11/28/2008 GLAUCOMA SCREENING Q2Y 1/28/2014 Pneumococcal 65+ Low/Medium Risk (1 of 2 - PCV13) 1/28/2014 MEDICARE YEARLY EXAM 1/28/2014 INFLUENZA AGE 9 TO ADULT 8/1/2017 HEMOGLOBIN A1C Q6M 12/22/2017 FOOT EXAM Q1 4/3/2018 MICROALBUMIN Q1 4/3/2018 LIPID PANEL Q1 4/3/2018 Allergies as of 10/13/2017  Review Complete On: 10/13/2017 By: Odalys [place] MD  
  
 Severity Noted Reaction Type Reactions Allopurinol High 08/16/2017   Systemic Rash Ciprofloxacin  03/07/2017    Nausea Only Percocet [Oxycodone-acetaminophen]  03/07/2017    Other (comments)  
 hallucinations Current Immunizations  Never Reviewed No immunizations on file. Not reviewed this visit You Were Diagnosed With   
  
 Codes Comments Type 2 diabetes mellitus with stage 4 chronic kidney disease, with long-term current use of insulin (HCC)    -  Primary ICD-10-CM: E11.22, N18.4, Z79.4 ICD-9-CM: 250.40, 585.4, V58.67 Essential hypertension with goal blood pressure less than 130/80     ICD-10-CM: I10 
ICD-9-CM: 401.9 Hyperlipidemia, unspecified hyperlipidemia type     ICD-10-CM: E78.5 ICD-9-CM: 272.4 Vitals BP Pulse Height(growth percentile) Weight(growth percentile) BMI Smoking Status 136/83 (BP 1 Location: Right arm, BP Patient Position: Sitting) 75 5' 9\" (1.753 m) 234 lb 3.2 oz (106.2 kg) 34.59 kg/m2 Never Smoker Vitals History BMI and BSA Data Body Mass Index Body Surface Area 34.59 kg/m 2 2.27 m 2 Preferred Pharmacy Pharmacy Name Phone [id number]- [place], [place] - [address] [phone] Your Updated Medication List  
  
   
This list is accurate as of: 10/13/17  9:59 AM.  Always use your most recent med list.  
  
  
  
  
 apixaban 5 mg tablet Commonly known as:  Dewayne Ruiz Take 1 Tab by mouth two (2) times a day.  Indications: PREVENT THROMBOEMBOLISM IN CHRONIC ATRIAL FIBRILLATION  
  
 bumetanide 2 mg tablet Commonly known as:  Lorenza Lima Take 1 Tab by mouth two (2) times a day. calcium carbonate 600 mg calcium (1,500 mg) tablet Commonly known as:  Jacob Ulrich Take 600 mg by mouth every Tuesday, Thursday, Saturday & Sunday. dutasteride 0.5 mg capsule Commonly known as:  AVODART TAKE ONE CAPSULE BY MOUTH EVERY DAY  
  
 insulin aspart protamine/insulin aspart 100 unit/mL (70-30) Inpn Commonly known as:  NOVOLOG MIX 70/30  
by SubCUTAneous route two (2) times a day. Sliding scale: 26 units AM, 34 units PM, Add 5 units for every 50 point over 150, Hold if BS <100 Patient injected 34 unit on 6/21/17 PM (); patient injected 15 units on 6/22/17 AM ()  
  
 ketoconazole 2 % topical cream  
Commonly known as:  NIZORAL Apply  to affected area two (2) times daily as needed for Skin Irritation. Indications: rash  
  
 levothyroxine 137 mcg tablet Commonly known as:  SYNTHROID  
TAKE 1 TABLET(S) EVERY DAY BY ORAL ROUTE FOR 90 DAYS. metOLazone 2.5 mg tablet Commonly known as:  [place] Take 2.5 mg by mouth daily. metoprolol succinate 100 mg tablet Commonly known as:  TOPROL-XL Take 1.5 Tabs by mouth daily. multivitamin tablet Commonly known as:  ONE A DAY Take 1 Tab by mouth daily. paricalcitol 1 mcg capsule Commonly known as:  [place] Sleight Take 1 mcg by mouth daily. potassium chloride SR 20 mEq tablet Commonly known as:  K-TAB Take 1 Tab by mouth daily. pravastatin 80 mg tablet Commonly known as:  PRAVACHOL Take 1 Tab by mouth nightly. tamsulosin 0.4 mg capsule Commonly known as:  FLOMAX TAKE 2 CAPSULES BY MOUTH EVERY DAY  
  
 TYLENOL EXTRA STRENGTH 500 mg tablet Generic drug:  acetaminophen Take 1,000 mg by mouth every eight (8) hours as needed for Pain. ULORIC 40 mg Tab tablet Generic drug:  febuxostat Take 40 mg by mouth daily. VITAMIN D3 1,000 unit Cap Generic drug:  cholecalciferol Take 3,000 Units by mouth daily. Follow-up Instructions Return in about 3 months (around 1/13/2018). Patient Instructions Novolog 70-30:              30 units  Breakfast / 34 units Dinner 
 
---------------------------------------------------------------------------------------------------------------------- Below you will find a glucose log sheet which you can use to record your blood sugars. Without checking and recording what your home glucose levels are, it will be difficult to make any changes to your medication dose, even when significant changes may be needed. Please feel free to use the log below to record your home glucose levels. At the very least, I would like for you to login the entire 2-3 weeks just before your visit so we can make your visit much more productive and beneficial to you. GLUCOSE LOG SHEET: 
 
Date Breakfast Lunch Dinner Bedtime Comments ? GLUCOSE LOG SHEET: 
 
Date Breakfast Lunch Dinner Bedtime Comments ? GLUCOSE LOG SHEET: 
 
Date Breakfast Lunch Dinner Bedtime Comments ? Introducing [facility]!    
 Meagan Goldsmith introduces [employer] patient portal. Now you can access parts of your medical record, email your doctor's office, and request medication refills online. 1. In your internet browser, go to [email or web address] 2. Click on the First Time User? Click Here link in the Sign In box. You will see the New Member Sign Up page. 3. Enter your [employer] Access Code exactly as it appears below. You will not need to use this code after youve completed the sign-up process. If you do not sign up before the expiration date, you must request a new code. · [employer] Access Code: [email or web address] Expires: 12/6/2017  9:50 AM 
 
4. Enter the last four digits of your Social Security Number (xxxx) and Date of Birth (mm/dd/yyyy) as indicated and click Submit. You will be taken to the next sign-up page. 5. Create a [employer] ID. This will be your [employer] login ID and cannot be changed, so think of one that is secure and easy to remember. 6. Create a [employer] password. You can change your password at any time. 7. Enter your Password Reset Question and Answer. This can be used at a later time if you forget your password. 8. Enter your e-mail address. You will receive e-mail notification when new information is available in [address] 9. Click Sign Up. You can now view and download portions of your medical record. 10. Click the Download Summary menu link to download a portable copy of your medical information. If you have questions, please visit the Frequently Asked Questions section of the [employer] website. Remember, [employer] is NOT to be used for urgent needs. For medical emergencies, dial 911. Now available from your iPhone and Android! Please provide this summary of care documentation to your next provider. Your primary care clinician is listed as Mack Lakhani. If you have any questions after today's visit, please call [phone].

## 2017-10-13 NOTE — TELEPHONE ENCOUNTER
I returned Mr Randy Delgadillo call and he stated he needed a script for pen needles done. I have done this and sent to Dr. Leocadia Lundborg to sign.   Kelly Rivas

## 2017-10-13 NOTE — TELEPHONE ENCOUNTER
Patient called stating he would like to speak with a nurse in regards to Novolog needles. He states the wrong prescriptions were called in.  Please advise [phone]

## 2017-10-13 NOTE — PROGRESS NOTES
Steffen Barrientos MD          NAME:  Abdirashid Fortune   :   1950   MRN:   [id number]   PCP:  Elana Silverio MD           Subjective: The patient is a 75y.o. year old male  who returns for a routine follow-up. Since the last visit, patient reports no change in exercise tolerance, chest pain, edema, medication intolerance, palpitations, shortness of breath, PND/orthopnea wheezing, sputum, syncope, dizziness or light headedness. Doing well. Past Medical History:   Diagnosis Date    Arrhythmia     atrial fibrillation     CAD (coronary atherosclerotic disease)     [facility]     Diabetes mellitus type 2, controlled ([facility] 3/13/2017    Heart failure ([facility]     Hx of CABG 3/13/2017    CABG in  in  [address] Morbid obesity ([facility] 3/13/2017    JACEY (obstructive sleep apnea) 2017    S/P CABG x 2         ICD-10-CM ICD-9-CM    1. Chronic a-fib ([facility]) I48.2 427.31 AMB POC EKG ROUTINE W/ 12 LEADS, INTER & REP   2. Chronic diastolic heart failure (HCC) I50.32 428.32    3. Hx of CABG Z95.1 V45.81    4. Morbid obesity due to excess calories ([facility]) E66.01 278.01    5. Ecchymosis of forearm R58 459.89       Social History   Substance Use Topics    Smoking status: Never Smoker    Smokeless tobacco: Never Used    Alcohol use Yes      Comment: rare      Family History   Problem Relation Age of Onset    Heart Attack Father     No Known Problems Mother         Review of Systems  Cardiovascular: Negative except as noted in HPI      Objective:       Vitals:    10/09/17 1013   BP: 114/62   Pulse: 79   Resp: 16   SpO2: 93%   Weight: 232 lb 10.4 oz (105.5 kg)   Height: 5' 9\" (1.753 m)    Body mass index is 34.36 kg/(m^2). General PE  Mental Status - Alert. General Appearance - Not in acute distress. Chest and Lung Exam   Inspection: Accessory muscles - No use of accessory muscles in breathing.   Auscultation:   Breath sounds: - Normal.    Cardiovascular   Inspection: Jugular vein - Bilateral - Inspection Normal.  Palpation/Percussion:   Apical Impulse: - Normal.  Auscultation: Rhythm - Regular. Heart Sounds - S1 WNL and S2 WNL. No S3 or S4. Murmurs & Other Heart Sounds: Auscultation of the heart reveals - No Murmurs. Peripheral Vascular   Upper Extremity: Inspection - Bilateral - No Cyanotic nailbeds or Digital clubbing. Lower Extremity:   Palpation: Edema - Bilateral - No edema. Data Review:     EKG -  EKG: paced vent rhythm. LABS- @brieflabs@      Allergies reviewed  Allergies   Allergen Reactions    Allopurinol Rash    Ciprofloxacin Nausea Only    Percocet [Oxycodone-Acetaminophen] Other (comments)     hallucinations       Medications reviewed  Current Outpatient Prescriptions   Medication Sig    febuxostat (ULORIC) 40 mg tab tablet Take 40 mg by mouth daily.  levothyroxine (SYNTHROID) 137 mcg tablet TAKE 1 TABLET(S) EVERY DAY BY ORAL ROUTE FOR 90 DAYS.  bumetanide (BUMEX) 2 mg tablet Take 1 Tab by mouth two (2) times a day.  metoprolol succinate (TOPROL-XL) 100 mg tablet Take 1.5 Tabs by mouth daily.  apixaban (ELIQUIS) 5 mg tablet Take 1 Tab by mouth two (2) times a day. Indications: PREVENT THROMBOEMBOLISM IN CHRONIC ATRIAL FIBRILLATION    potassium chloride SR (K-TAB) 20 mEq tablet Take 1 Tab by mouth daily.  ketoconazole (NIZORAL) 2 % topical cream Apply  to affected area two (2) times daily as needed for Skin Irritation. Indications: rash    metOLazone (ZAROXOLYN) 2.5 mg tablet Take 2.5 mg by mouth daily.  insulin aspart protamine/insulin aspart (NOVOLOG MIX 70/30) 100 unit/mL (70-30) inpn by SubCUTAneous route two (2) times a day. Sliding scale:  26 units AM, 34 units PM, Add 5 units for every 50 point over 150, Hold if BS <100  Patient injected 34 unit on 6/21/17 PM (); patient injected 15 units on 6/22/17 AM ()    pravastatin (PRAVACHOL) 80 mg tablet Take 1 Tab by mouth nightly.     calcium carbonate (CALTREX) 600 mg calcium (1,500 mg) tablet Take 600 mg by mouth every Tuesday, Thursday, Saturday & Sunday.  dutasteride (AVODART) 0.5 mg capsule TAKE ONE CAPSULE BY MOUTH EVERY DAY    tamsulosin (FLOMAX) 0.4 mg capsule TAKE 2 CAPSULES BY MOUTH EVERY DAY    paricalcitol (ZEMPLAR) 1 mcg capsule Take 1 mcg by mouth daily.  multivitamin (ONE A DAY) tablet Take 1 Tab by mouth daily.  acetaminophen (TYLENOL EXTRA STRENGTH) 500 mg tablet Take 1,000 mg by mouth every eight (8) hours as needed for Pain.  glucose blood VI test strips (PHARMACIST CHOICE) strip One Touch Ultra Strips; Check glucose 3-4x times daily, Diagnosis E11.22    Lancets misc Use preferred brand; Check glucose 3-4x times daily, Diagnosis E11.22    cholecalciferol (VITAMIN D3) 1,000 unit cap Take 3,000 Units by mouth daily. No current facility-administered medications for this visit. Assessment:       ICD-10-CM ICD-9-CM    1. Chronic a-fib (HCC) I48.2 427.31 AMB POC EKG ROUTINE W/ 12 LEADS, INTER & REP   2. Chronic diastolic heart failure (HCC) I50.32 428.32    3. Hx of CABG Z95.1 V45.81    4. Morbid obesity due to excess calories ([facility]) E66.01 278.01    5. Ecchymosis of forearm R58 459.89         Orders Placed This Encounter    AMB POC EKG ROUTINE W/ 12 LEADS, INTER & REP     Order Specific Question:   Reason for Exam:     Answer:   Routine       Plan:     CHF comp. No angina. Paced rhythm with underlying rate controlled AF. Ecchymosis resolved L forearm.     Jose Taylor MD

## 2017-10-13 NOTE — PROGRESS NOTES
CHIEF COMPLAINT: f/u uncontrolled type 2 diabetes    HISTORY OF PRESENT ILLNESS:   Abdirashid Fortune is a 75 y.o. male with a PMHx as noted below who presents for evaluation of uncontrolled type 2 diabetes. HISTORY  Mr. 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 His health is complicated by CHF, CAD, and CKD, follows with cardiology and nephrology. His A1c prior to initial visit was 8% and was noted to be on novolog 70/30 insulin. He did not have a record of his home blood sugars and did not recall any of his home numbers and was requested to start monitoring home blood sugar for same dose adjustments to his regimen. INTERVAL HISTORY:      POC A1c today is 7.1%, doing better, though still with what appears as post prandial hyperglycemia by dinner time. Regimen:  - Novolog 70-30 insulin.     26 units / 34 units    Review of home glucose: checks AM/Dinner/Bedtime (keeping a good record log for review)      Breakfast: 120-170  Dinner: 180-240  Bedtime: 120 -150 average        Hypothyroidism: On 137 mcg of levothyroxine, prior level normal      Vitamin D deficiency on 3000 units daily, last level we checked was 46, stable     PAST MEDICAL/SURGICAL HISTORY:   Past Medical History:   Diagnosis Date    Arrhythmia     atrial fibrillation 2017    CAD (coronary atherosclerotic disease)     Diabetes ([address]     Diabetes mellitus type 2, controlled ([address] 3/13/2017    [facility]     Hx of CABG 3/13/2017    CABG in 2010 in [address] Morbid obesity ([facility] 3/13/2017    JACEY (obstructive sleep apnea) 6/23/2017    S/P CABG x 2 2010     Past Surgical History:   Procedure Laterality Date    CARDIAC SURG PROCEDURE UNLIST      CABGx2 in 2010    HX ORTHOPAEDIC      L 3rd toe amputation in 1999       ALLERGIES:   Allergies   Allergen Reactions    Allopurinol Rash    Ciprofloxacin Nausea Only    Percocet [Oxycodone-Acetaminophen] Other (comments)     hallucinations       MEDICATIONS ON ADMISSION:     Current Outpatient Prescriptions:     febuxostat (ULORIC) 40 mg tab tablet, Take 40 mg by mouth daily. , Disp: , Rfl:     levothyroxine (SYNTHROID) 137 mcg tablet, TAKE 1 TABLET(S) EVERY DAY BY ORAL ROUTE FOR 90 DAYS., Disp: 90 Tab, Rfl: 3    bumetanide (BUMEX) 2 mg tablet, Take 1 Tab by mouth two (2) times a day., Disp: 180 Tab, Rfl: 3    metoprolol succinate (TOPROL-XL) 100 mg tablet, Take 1.5 Tabs by mouth daily. , Disp: 135 Tab, Rfl: 3    apixaban (ELIQUIS) 5 mg tablet, Take 1 Tab by mouth two (2) times a day. Indications: PREVENT THROMBOEMBOLISM IN CHRONIC ATRIAL FIBRILLATION, Disp: 180 Tab, Rfl: 3    potassium chloride SR (K-TAB) 20 mEq tablet, Take 1 Tab by mouth daily. , Disp: 30 Tab, Rfl: 1    ketoconazole (NIZORAL) 2 % topical cream, Apply  to affected area two (2) times daily as needed for Skin Irritation. Indications: rash, Disp: , Rfl:     metOLazone (ZAROXOLYN) 2.5 mg tablet, Take 2.5 mg by mouth daily. , Disp: , Rfl:     insulin aspart protamine/insulin aspart (NOVOLOG MIX 70/30) 100 unit/mL (70-30) inpn, by SubCUTAneous route two (2) times a day. Sliding scale: 26 units AM, 34 units PM, Add 5 units for every 50 point over 150, Hold if BS <100 Patient injected 34 unit on 6/21/17 PM (); patient injected 15 units on 6/22/17 AM (), Disp: , Rfl:     pravastatin (PRAVACHOL) 80 mg tablet, Take 1 Tab by mouth nightly., Disp: 30 Tab, Rfl: 6    calcium carbonate (CALTREX) 600 mg calcium (1,500 mg) tablet, Take 600 mg by mouth every Tuesday, Thursday, Saturday & Sunday. , Disp: , Rfl:     dutasteride (AVODART) 0.5 mg capsule, TAKE ONE CAPSULE BY MOUTH EVERY DAY, Disp: , Rfl: 2    tamsulosin (FLOMAX) 0.4 mg capsule, TAKE 2 CAPSULES BY MOUTH EVERY DAY, Disp: , Rfl: 2    paricalcitol (ZEMPLAR) 1 mcg capsule, Take 1 mcg by mouth daily. , Disp: , Rfl:     multivitamin (ONE A DAY) tablet, Take 1 Tab by mouth daily. , Disp: , Rfl:     acetaminophen (TYLENOL EXTRA STRENGTH) 500 mg tablet, Take 1,000 mg by mouth every eight (8) hours as needed for Pain., Disp: , Rfl:     cholecalciferol (VITAMIN D3) 1,000 unit cap, Take 3,000 Units by mouth daily. , Disp: , Rfl:     SOCIAL HISTORY:   Social History     Social History    Marital status:      Spouse name: N/A    Number of children: N/A    Years of education: N/A     Occupational History    Not on file. Social History Main Topics    Smoking status: Never Smoker    Smokeless tobacco: Never Used    Alcohol use Yes      Comment: rare    Drug use: No    Sexual activity: Not on file     Other Topics Concern    Not on file     Social History Narrative       FAMILY HISTORY:  Family History   Problem Relation Age of Onset    Heart Attack Father     No Known Problems Mother        REVIEW OF SYSTEMS: Complete ROS assessed and noted for that which is described above, all else are negative.   Eyes: normal  ENT: normal  CVS: normal  Resp: normal  GI: normal  : normal  GYN: normal  Endocrine: normal  Integument: normal  Musculoskeletal: normal  Neuro: normal  Psych: normal      PHYSICAL EXAMINATION:    VITAL SIGNS:  Visit Vitals    /83 (BP 1 Location: Right arm, BP Patient Position: Sitting)    Pulse 75    Ht 5' 9\" (1.753 m)    Wt 234 lb 3.2 oz (106.2 kg)    BMI 34.59 kg/m2       GENERAL: NCAT, Sitting comfortably, NAD  EYES: EOMI, non-icteric, no proptosis  Ear/Nose/Throat: NCAT, no inflammation, no masses  LYMPH NODES: No LAD  CARDIOVASCULAR: S1 S2, RRR, No murmur, 2+ radial pulses  RESPIRATORY: CTA b/l, no wheeze/rales  GASTROINTESTINAL:  NT, ND  MUSCULOSKELETAL: Normal ROM, no atrophy  SKIN: warm, no edema/rash/ or other skin changes  NEUROLOGIC: 5/5 power all extremities, see foot exam below, no tremor, AAOx3  PSYCHIATRIC: Normal affect, Normal insight and judgement      REVIEW OF LABORATORY AND RADIOLOGY DATA:   Labs and documentation have been reviewed as described above. ASSESSMENT AND PLAN:   Todd Bertrand is a 75 y.o. male with a PMHx as noted above who presents for f/u evaluation of uncontrolled type 2 diabetes. Problems:  Type 2 diabetes Uncontrolled  Hyperlipidemia  Hypertension  Hypothyroidism  Vitamin D deficiency     PLAN  Type 2 Diabetes  Medications:  Novolog 70-30: 30 units / 34 units  Advised to check glucose qAM/Dinner/Bedtime  Provided with glucose log sheets for later review. HTN: BP stable on current medications, no changes today. HLD: lipids are tight, recommended 40mg of pravastatin instead of 80 considering his age and fall risk, excessive doses of meds unnecessarily can lead to side effects and complications, still taking 80mg per cardiology, will yield to their reference in that case. Vitamin D deficiency: he had discontinued, recommended restarting 1-3000 units D3 daily  Hypothyroidism: 137 mcg once daily    RTC: I would like to see him back in 3 months. Maged Bosch.  [facility]

## 2017-10-23 ENCOUNTER — CLINICAL SUPPORT (OUTPATIENT)
Dept: CARDIOLOGY CLINIC | Age: 67
End: 2017-10-23

## 2017-10-23 DIAGNOSIS — I50.22 CHRONIC SYSTOLIC HEART FAILURE (HCC): ICD-10-CM

## 2017-10-23 DIAGNOSIS — I48.20 CHRONIC A-FIB (HCC): ICD-10-CM

## 2017-10-23 DIAGNOSIS — I42.0 DILATED CARDIOMYOPATHY (HCC): Chronic | ICD-10-CM

## 2017-10-27 ENCOUNTER — PATIENT OUTREACH (OUTPATIENT)
Dept: CARDIOLOGY CLINIC | Age: 67
End: 2017-10-27

## 2017-10-27 ENCOUNTER — TELEPHONE (OUTPATIENT)
Dept: CARDIOLOGY CLINIC | Age: 67
End: 2017-10-27

## 2017-10-27 VITALS — BODY MASS INDEX: 33.82 KG/M2 | WEIGHT: 229 LBS

## 2017-10-27 NOTE — TELEPHONE ENCOUNTER
----- Message from Marlyn Craig MD sent at 10/25/2017  9:54 AM EDT -----  pls let him know that his lvef is wnl, la is mod dil  ----- Message -----     From: Ezekiel Rodriguez Result In     Sent: 10/24/2017   5:25 PM       To:  Marlyn Craig MD

## 2017-10-27 NOTE — PROGRESS NOTES
Panel manager contacted the patient by telephone to perform CHF bundle follow Up. Spoke to patient regarding the following:    Have you been [facility] an ER[facility]      Have you followed up with PCP? yes; Natalie Najera    Transportation:  wife  Daily Weight: 229.0lb stable no change  Zone: green   Signs/Symptoms: Swelling denies; SOB denies; how many pillows to sleep? 2  Diet: low sodium  Exercise: walks. Medications Reconciled at this time:  no  Home health?: no  Social Support: wife and extended family    Patient reminded that there is a cardiologist on call 24 hours a day / 7 days a week (M-F 5pm to 8am and from Friday 5pm until Monday 8a for the weekend) should the patient have questions or concerns. Patient reminded to call 911 if situation is emergent or patient feels the situation is emergent. Provided pt with cardiology office telephone number and panel managers direct line. Pt has follow up with PCP on 11/3/17 and with cardiology on 1/10/17. Patient doing well, traveled to [place] for a family event did not have any issues. Patient will be traveling to [place] soon for 10 days. Reminded to continue to watch diet and also while driving long distance to stop every couple hours to get out of vehicle to walk. Patient verbalized understanding.   Will follow up with patient again in 30 days

## 2017-11-06 RX ORDER — PEN NEEDLE, DIABETIC 31 GX3/16"
NEEDLE, DISPOSABLE MISCELLANEOUS
Qty: 300 PEN NEEDLE | Refills: 3 | Status: SHIPPED | OUTPATIENT
Start: 2017-11-06 | End: 2018-12-21 | Stop reason: SDUPTHER

## 2017-11-27 ENCOUNTER — TELEPHONE (OUTPATIENT)
Dept: CARDIOLOGY CLINIC | Age: 67
End: 2017-11-27

## 2017-11-27 NOTE — TELEPHONE ENCOUNTER
Pt states that he has received the \"machine\" and was told to call the office when he did. Please call patient back when available.     Thanks,    [employer]

## 2017-11-29 ENCOUNTER — PATIENT OUTREACH (OUTPATIENT)
Dept: CARDIOLOGY CLINIC | Age: 67
End: 2017-11-29

## 2017-11-30 ENCOUNTER — PATIENT OUTREACH (OUTPATIENT)
Dept: CARDIOLOGY CLINIC | Age: 67
End: 2017-11-30

## 2017-11-30 VITALS — BODY MASS INDEX: 33.67 KG/M2 | WEIGHT: 228 LBS

## 2017-11-30 NOTE — PROGRESS NOTES
Called previous bundle follow up, spoke to patient in regards to monthly check in. Patient states he has been doing well. Denies any swelling or edema in hands, feet or legs. Denies any shortness of breath  Weight today at 238.0lb. Patient recently traveled to [place] for 10 days, and will be traveling to [place] in December for the christmas holidays. Patient states he has been feeling well over all. Patient has no complaints at this time. Advised patient of the 24/7 number and he can call NN if any questions or concerns. Went over medications with patient and states he has a follow up with his PCP and cardiology scheduled.     Will continue to monitor

## 2018-01-02 ENCOUNTER — PATIENT OUTREACH (OUTPATIENT)
Dept: CARDIOLOGY CLINIC | Age: 68
End: 2018-01-02

## 2018-01-10 ENCOUNTER — OFFICE VISIT (OUTPATIENT)
Dept: CARDIOLOGY CLINIC | Age: 68
End: 2018-01-10

## 2018-01-10 DIAGNOSIS — I42.9 CARDIOMYOPATHY, UNSPECIFIED TYPE (HCC): Chronic | ICD-10-CM

## 2018-01-10 DIAGNOSIS — Z95.0 PACEMAKER: Primary | ICD-10-CM

## 2018-01-10 DIAGNOSIS — Z98.890 H/O ATRIOVENTRICULAR NODAL ABLATION: ICD-10-CM

## 2018-01-10 NOTE — PROGRESS NOTES
See device report - BSC BiV PM remote send, next remote send in 3 months. LV pacing lead impedance out of range - scheduled office visit for 1/11/18 to check the LV lead.

## 2018-01-11 ENCOUNTER — OFFICE VISIT (OUTPATIENT)
Dept: ENDOCRINOLOGY | Age: 68
End: 2018-01-11

## 2018-01-11 VITALS
BODY MASS INDEX: 35.32 KG/M2 | HEART RATE: 76 BPM | HEIGHT: 69 IN | DIASTOLIC BLOOD PRESSURE: 81 MMHG | SYSTOLIC BLOOD PRESSURE: 132 MMHG | WEIGHT: 238.5 LBS

## 2018-01-11 DIAGNOSIS — E11.22 TYPE 2 DIABETES MELLITUS WITH STAGE 4 CHRONIC KIDNEY DISEASE, WITH LONG-TERM CURRENT USE OF INSULIN (HCC): Primary | ICD-10-CM

## 2018-01-11 DIAGNOSIS — I10 ESSENTIAL HYPERTENSION WITH GOAL BLOOD PRESSURE LESS THAN 130/80: ICD-10-CM

## 2018-01-11 DIAGNOSIS — E03.9 HYPOTHYROIDISM, UNSPECIFIED TYPE: ICD-10-CM

## 2018-01-11 DIAGNOSIS — N18.4 TYPE 2 DIABETES MELLITUS WITH STAGE 4 CHRONIC KIDNEY DISEASE, WITH LONG-TERM CURRENT USE OF INSULIN (HCC): Primary | ICD-10-CM

## 2018-01-11 DIAGNOSIS — E78.5 HYPERLIPIDEMIA, UNSPECIFIED HYPERLIPIDEMIA TYPE: ICD-10-CM

## 2018-01-11 DIAGNOSIS — Z79.4 TYPE 2 DIABETES MELLITUS WITH STAGE 4 CHRONIC KIDNEY DISEASE, WITH LONG-TERM CURRENT USE OF INSULIN (HCC): Primary | ICD-10-CM

## 2018-01-11 DIAGNOSIS — E55.9 VITAMIN D DEFICIENCY: ICD-10-CM

## 2018-01-11 LAB — HBA1C MFR BLD HPLC: 6.8 %

## 2018-01-11 RX ORDER — DICLOFENAC SODIUM 10 MG/G
GEL TOPICAL
COMMUNITY
Start: 2018-01-03 | End: 2018-10-23

## 2018-01-11 NOTE — MR AVS SNAPSHOT
Visit Information Date & Time Provider Department Dept. Phone Encounter #  
 1/11/2018 12:10 PM Nina Barr, [address] [facility] [phone] [id number] Follow-up Instructions Return in about 3 months (around 4/11/2018). Your Appointments 4/18/2018  9:45 AM  
6 MONTH with Lea Plunkett MD  
[facility] [address] [facility]) Appt Note: Per Dr. Jaxson Alexandre [facility]  
[phone] [address] [facility]  
  
    
 4/18/2018  9:45 AM  
PACEMAKER with PACEMAKER, [facility] [address]) Appt Note: 6mo bsc bivpm drk also [address] [facility]  
[phone] [address] [facility]  
  
    
  
 4/11/2018  8:00 AM  
REMOTE OFFICE VISIT with [facility] [address]) Appt Note: NOT AN OFFICE VISIT - REMOTE BSC PM  
 [address] [facility]  
[phone] [address] [facility] Upcoming Health Maintenance Date Due Hepatitis C Screening 1950 DTaP/Tdap/Td series (1 - Tdap) 1/28/1970 FOBT Q 1 YEAR AGE 50-75 1/28/1999 ZOSTER VACCINE AGE 60> 11/28/2008 Pneumococcal 65+ Low/Medium Risk (1 of 2 - PCV13) 1/28/2014 MEDICARE YEARLY EXAM 1/28/2014 Influenza Age 5 to Adult 8/1/2017 FOOT EXAM Q1 4/3/2018 MICROALBUMIN Q1 4/3/2018 LIPID PANEL Q1 4/3/2018 HEMOGLOBIN A1C Q6M 4/13/2018 EYE EXAM RETINAL OR DILATED Q1 10/17/2018 GLAUCOMA SCREENING Q2Y 10/17/2019 Allergies as of 1/11/2018  Review Complete On: 1/11/2018 By: Nina Barr MD  
  
 Severity Noted Reaction Type Reactions Allopurinol High 08/16/2017   Systemic Rash Ciprofloxacin  03/07/2017    Nausea Only Percocet [Oxycodone-acetaminophen]  03/07/2017    Other (comments) hallucinations Current Immunizations  Never Reviewed No immunizations on file. Not reviewed this visit You Were Diagnosed With   
  
 Codes Comments Type 2 diabetes mellitus with stage 4 chronic kidney disease, with long-term current use of insulin (HCC)    -  Primary ICD-10-CM: E11.22, N18.4, Z79.4 ICD-9-CM: 250.40, 585.4, V58.67 Essential hypertension with goal blood pressure less than 130/80     ICD-10-CM: I10 
ICD-9-CM: 401.9 Hyperlipidemia, unspecified hyperlipidemia type     ICD-10-CM: E78.5 ICD-9-CM: 272.4 Hypothyroidism, unspecified type     ICD-10-CM: E03.9 ICD-9-CM: 087. 9 Vitamin D deficiency     ICD-10-CM: E55.9 ICD-9-CM: 268.9 Vitals BP Pulse Height(growth percentile) Weight(growth percentile) BMI Smoking Status 132/81 (BP 1 Location: Right arm, BP Patient Position: Sitting) 76 5' 9\" (1.753 m) 238 lb 8 oz (108.2 kg) 35.22 kg/m2 Never Smoker Vitals History BMI and BSA Data Body Mass Index Body Surface Area  
 35.22 kg/m 2 2.3 m 2 Preferred Pharmacy Pharmacy Name Phone [id number]- [place], [place] - [address] [phone] Your Updated Medication List  
  
   
This list is accurate as of: 1/11/18 12:16 PM.  Always use your most recent med list.  
  
  
  
  
 apixaban 5 mg tablet Commonly known as:  Miranda [employer] Take 1 Tab by mouth two (2) times a day. Indications: PREVENT THROMBOEMBOLISM IN CHRONIC ATRIAL FIBRILLATION  
  
 bumetanide 2 mg tablet Commonly known as:  Eugene Aurora Take 1 Tab by mouth two (2) times a day. calcium carbonate 600 mg calcium (1,500 mg) tablet Commonly known as:  Elaine Fort Take 600 mg by mouth every Tuesday, Thursday, Saturday & Sunday. diclofenac 1 % Gel Commonly known as:  VOLTAREN Apply two grams by topical route four times daily to the affected area (s). dutasteride 0.5 mg capsule Commonly known as:  AVODART TAKE ONE CAPSULE BY MOUTH EVERY DAY  
  
 glucose blood VI test strips strip Commonly known as:  PHARMACIST CHOICE One Touch Ultra Strips; Check glucose 3-4x times daily, Diagnosis E11.22  
  
 insulin aspart protamine/insulin aspart 100 unit/mL (70-30) Inpn Commonly known as:  NOVOLOG MIX 70/30  
by SubCUTAneous route two (2) times a day. Sliding scale: 30 units AM, 34 units PM, Add 1 units for every 5 point over 150, Hold if BS <100 * Insulin Needles (Disposable) 32 gauge x 5/32\" Ndle Commonly known as:  Justina Pen Needle Use with Novolog 70/30 pen * Insulin Needles (Disposable) 31 gauge x 3/16\" Ndle Commonly known as:  BD INSULIN PEN NEEDLE UF MINI Use with Novolog 3 times per day  
  
 ketoconazole 2 % topical cream  
Commonly known as:  NIZORAL Apply  to affected area two (2) times daily as needed for Skin Irritation. Indications: rash Lancets Misc Use preferred brand; Check glucose 3-4x times daily, Diagnosis E11.22  
  
 levothyroxine 137 mcg tablet Commonly known as:  SYNTHROID  
TAKE 1 TABLET(S) EVERY DAY BY ORAL ROUTE FOR 90 DAYS. metOLazone 2.5 mg tablet Commonly known as:  Lavena Cough Take 2.5 mg by mouth daily. metoprolol succinate 100 mg tablet Commonly known as:  TOPROL-XL Take 1.5 Tabs by mouth daily. multivitamin tablet Commonly known as:  ONE A DAY Take 1 Tab by mouth daily. paricalcitol 1 mcg capsule Commonly known as:  Barb Cristal Take 1 mcg by mouth daily. potassium chloride SR 20 mEq tablet Commonly known as:  K-TAB Take 1 Tab by mouth daily. pravastatin 80 mg tablet Commonly known as:  PRAVACHOL Take 1 Tab by mouth nightly. tamsulosin 0.4 mg capsule Commonly known as:  FLOMAX TAKE 2 CAPSULES BY MOUTH EVERY DAY  
  
 TYLENOL EXTRA STRENGTH 500 mg tablet Generic drug:  acetaminophen Take 1,000 mg by mouth every eight (8) hours as needed for Pain. ULORIC 40 mg Tab tablet Generic drug:  febuxostat Take 40 mg by mouth daily. VITAMIN D3 1,000 unit Cap Generic drug:  cholecalciferol Take 2,000 Units by mouth daily. * Notice: This list has 2 medication(s) that are the same as other medications prescribed for you. Read the directions carefully, and ask your doctor or other care provider to review them with you. We Performed the Following AMB POC HEMOGLOBIN A1C [72445 CPT(R)] HEMOGLOBIN A1C WITH EAG [44336 CPT(R)] LIPID PANEL [69691 CPT(R)] METABOLIC PANEL, COMPREHENSIVE [12151 CPT(R)] MICROALBUMIN, UR, RAND W/ MICROALBUMIN/CREA RATIO [id number] CPT(R)] T4, FREE [id number] CPT(R)] TSH 3RD GENERATION [21412 CPT(R)] Follow-up Instructions Return in about 3 months (around 4/11/2018). Patient Instructions Novolog 70-30:   30 units / 34 units Plan to repeat blood work at the lab few days before next visit, Plan to return in 3 months,  
 
Call with concerns, 
 
---------------------------------------------------------------------------------------------------------------------- Below you will find a glucose log sheet which you can use to record your blood sugars. Without checking and recording what your home glucose levels are, it will be difficult to make any changes to your medication dose, even when significant changes may be needed. Please feel free to use the log below to record your home glucose levels. At the very least, I would like for you to login the entire 2-3 weeks just before your visit so we can make your visit much more productive and beneficial to you. GLUCOSE LOG SHEET: 
 
Date Breakfast Lunch Dinner Bedtime Comments ? GLUCOSE LOG SHEET: 
 
 Date Breakfast Lunch Dinner Bedtime Comments ? GLUCOSE LOG SHEET: 
 
Date Breakfast Lunch Dinner Bedtime Comments ? Dr. Roberto Mullins Back to basics: 
 
When you have diabetes or pre-diabetes, as you know, your body has difficulty dealing with the sugar it absorbs from your meals. An unrelated but very relevant term you may have heard before, quantitative easing, has a new meaning: By gradually reducing the load of sugars entering the body, you ease the stress on the body and allow it to catch up with the work it must do. This in turn will allow your body to work better. On top of this, remember one point, the more sugar stress your body has, the more you enter a state of glucose toxicity and this is a state where you become even more resistant. Thats right higher sugar = more resistance, not only to your own bodies attempt to fix the problem but also resistance to your medications. This is why medications work best when a proper diet is followed. Tip 1. Dont forget the protein. When you add a portion of meat or other low carb protein food in your meal, it provides healthy calories which contribute to reducing that feeling of hunger that drives you to eat what you dont want. Tip 2. Portions are a real thing. Before you eat, stop and look at your plate/table. Count how many items have sugars/carbs in them. A sandwich (bread) ? Evalee Fausto ? Sweet drink ? Potatoe ? Pasta ? Rice ?  You would be surprised when you become aware. Aim for a reasonable portion of carbs, and if you feel you absolutely cannot do this, at least start working toward this. 45-60 grams is usually more than enough in one meal. And YES, than includes the desert! Tip 3. Three meals per day, snack-free in between! Your body needs a break. Eating an adequate meal keeps you from getting hungry and reaching for a snack in between meals. Recall that most of the time, diabetic patients are not treating these snacks. Dont eat dinner late at night, but rather allow more overnight fasting time for your body to recover. If you eat dinner at 8 PM, try 6 PM.  Sporadic eating is the opposite of what you need, and adjusting to a regular eating schedule such as this will not only be a great benefit to your body, but your medications will also tend to work better at keeping your diabetes under control. Tip 4. There is no best diet when it comes to weight loss. When comparing diets and outcomes, the main ingredient when searching for weight loss was calories ! Lower calories = better weight loss. Pick a healthy diet thats right for you, i.e. diabetes friendly, and evaluate your daily calorie intake. And of course this would be of no use without exercise. Calories in need calories out. Introducing [facility]! [employer] introduces Neurodyn patient portal. Now you can access parts of your medical record, email your doctor's office, and request medication refills online. 1. In your internet browser, go to [email or web address] 2. Click on the First Time User? Click Here link in the Sign In box. You will see the New Member Sign Up page. 3. Enter your Neurodyn Access Code exactly as it appears below. You will not need to use this code after youve completed the sign-up process. If you do not sign up before the expiration date, you must request a new code. · Neurodyn Access Code: [email or web address] Expires: 4/10/2018  2:17 PM 
 
4. Enter the last four digits of your Social Security Number (xxxx) and Date of Birth (mm/dd/yyyy) as indicated and click Submit. You will be taken to the next sign-up page. 5. Create a Nativoo ID. This will be your Nativoo login ID and cannot be changed, so think of one that is secure and easy to remember. 6. Create a Nativoo password. You can change your password at any time. 7. Enter your Password Reset Question and Answer. This can be used at a later time if you forget your password. 8. Enter your e-mail address. You will receive e-mail notification when new information is available in [address] 9. Click Sign Up. You can now view and download portions of your medical record. 10. Click the Download Summary menu link to download a portable copy of your medical information. If you have questions, please visit the Frequently Asked Questions section of the Nativoo website. Remember, Nativoo is NOT to be used for urgent needs. For medical emergencies, dial 911. Now available from your iPhone and Android! Please provide this summary of care documentation to your next provider. Your primary care clinician is listed as Mack Lakhani. If you have any questions after today's visit, please call [phone].

## 2018-01-11 NOTE — PROGRESS NOTES
CHIEF COMPLAINT: f/u uncontrolled type 2 diabetes    HISTORY OF PRESENT ILLNESS:   Faustina Lucero is a 75 y.o. male with a PMHx as noted below who presents for evaluation of uncontrolled type 2 diabetes. HISTORY  Mr. 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 His health is complicated by CHF, CAD, and CKD, follows with cardiology and nephrology. His A1c prior to initial visit was 8% and was noted to be on novolog 70/30 insulin. He did not have a record of his home blood sugars and did not recall any of his home numbers and was requested to start monitoring home blood sugar for same dose adjustments to his regimen. INTERVAL HISTORY:      POC A1c today is 6.8%, doing better. Regimen:  - Novolog 70-30 insulin.     30 units / 34 units    Review of home glucose: checks AM/Dinner/Bedtime (keeping a good record log for review)      Breakfast: 114-184  Dinner: 150-200  Bedtime: 104-200        Hypothyroidism: On 137 mcg of levothyroxine, prior level normal      Vitamin D deficiency on 3000 units daily    PAST MEDICAL/SURGICAL HISTORY:   Past Medical History:   Diagnosis Date    Arrhythmia     atrial fibrillation 2017    CAD (coronary atherosclerotic disease)     Diabetes ([address]     Diabetes mellitus type 2, controlled ([address] 3/13/2017    [facility] failure [facility]     Hx of CABG 3/13/2017    CABG in 2010 in [address] [place] [address] Morbid obesity ([facility] 3/13/2017    JACEY (obstructive sleep apnea) 6/23/2017    S/P CABG x 2 2010     Past Surgical History:   Procedure Laterality Date    CARDIAC SURG PROCEDURE UNLIST      CABGx2 in 2010    HX ORTHOPAEDIC      L 3rd toe amputation in 1999       ALLERGIES:   Allergies   Allergen Reactions    Allopurinol Rash    Ciprofloxacin Nausea Only    Percocet [Oxycodone-Acetaminophen] Other (comments)     hallucinations       MEDICATIONS ON ADMISSION:     Current Outpatient Prescriptions:     diclofenac (VOLTAREN) 1 % gel, Apply two grams by topical route four times daily to the affected area (s)., Disp: , Rfl:     Insulin Needles, Disposable, (BD INSULIN PEN NEEDLE UF MINI) 31 gauge x 3/16\" ndle, Use with Novolog 3 times per day, Disp: 300 Pen Needle, Rfl: 3    glucose blood VI test strips (PHARMACIST CHOICE) strip, One Touch Ultra Strips; Check glucose 3-4x times daily, Diagnosis E11.22, Disp: 400 Strip, Rfl: 3    Lancets misc, Use preferred brand; Check glucose 3-4x times daily, Diagnosis E11.22, Disp: 4 Package, Rfl: 7    Insulin Needles, Disposable, (KAUSHIK PEN NEEDLE) 32 gauge x 5/32\" ndle, Use with Novolog 70/30 pen, Disp: 100 Pen Needle, Rfl: 3    febuxostat (ULORIC) 40 mg tab tablet, Take 40 mg by mouth daily. , Disp: , Rfl:     levothyroxine (SYNTHROID) 137 mcg tablet, TAKE 1 TABLET(S) EVERY DAY BY ORAL ROUTE FOR 90 DAYS., Disp: 90 Tab, Rfl: 3    bumetanide (BUMEX) 2 mg tablet, Take 1 Tab by mouth two (2) times a day., Disp: 180 Tab, Rfl: 3    metoprolol succinate (TOPROL-XL) 100 mg tablet, Take 1.5 Tabs by mouth daily. , Disp: 135 Tab, Rfl: 3    apixaban (ELIQUIS) 5 mg tablet, Take 1 Tab by mouth two (2) times a day. Indications: PREVENT THROMBOEMBOLISM IN CHRONIC ATRIAL FIBRILLATION, Disp: 180 Tab, Rfl: 3    potassium chloride SR (K-TAB) 20 mEq tablet, Take 1 Tab by mouth daily. (Patient taking differently: Take 20 mEq by mouth daily. Take 2 20 mEq tabs for three weeks), Disp: 30 Tab, Rfl: 1    ketoconazole (NIZORAL) 2 % topical cream, Apply  to affected area two (2) times daily as needed for Skin Irritation. Indications: rash, Disp: , Rfl:     metOLazone (ZAROXOLYN) 2.5 mg tablet, Take 2.5 mg by mouth daily. , Disp: , Rfl:     insulin aspart protamine/insulin aspart (NOVOLOG MIX 70/30) 100 unit/mL (70-30) inpn, by SubCUTAneous route two (2) times a day.  Sliding scale: 30 units AM, 34 units PM, Add 1 units for every 5 point over 150, Hold if BS <100, Disp: , Rfl:     pravastatin (PRAVACHOL) 80 mg tablet, Take 1 Tab by mouth nightly., Disp: 30 Tab, Rfl: 6    calcium carbonate (CALTREX) 600 mg calcium (1,500 mg) tablet, Take 600 mg by mouth every Tuesday, Thursday, Saturday & Sunday. , Disp: , Rfl:     dutasteride (AVODART) 0.5 mg capsule, TAKE ONE CAPSULE BY MOUTH EVERY DAY, Disp: , Rfl: 2    tamsulosin (FLOMAX) 0.4 mg capsule, TAKE 2 CAPSULES BY MOUTH EVERY DAY, Disp: , Rfl: 2    paricalcitol (ZEMPLAR) 1 mcg capsule, Take 1 mcg by mouth daily. , Disp: , Rfl:     multivitamin (ONE A DAY) tablet, Take 1 Tab by mouth daily. , Disp: , Rfl:     cholecalciferol (VITAMIN D3) 1,000 unit cap, Take 2,000 Units by mouth daily. , Disp: , Rfl:     acetaminophen (TYLENOL EXTRA STRENGTH) 500 mg tablet, Take 1,000 mg by mouth every eight (8) hours as needed for Pain., Disp: , Rfl:     SOCIAL HISTORY:   Social History     Social History    Marital status:      Spouse name: N/A    Number of children: N/A    Years of education: N/A     Occupational History    Not on file. Social History Main Topics    Smoking status: Never Smoker    Smokeless tobacco: Never Used    Alcohol use Yes      Comment: rare    Drug use: No    Sexual activity: Not on file     Other Topics Concern    Not on file     Social History Narrative       FAMILY HISTORY:  Family History   Problem Relation Age of Onset    Heart Attack Father     No Known Problems Mother        REVIEW OF SYSTEMS: Complete ROS assessed and noted for that which is described above, all else are negative.   Eyes: normal  ENT: normal  CVS: normal  Resp: normal  GI: normal  : normal  GYN: normal  Endocrine: normal  Integument: normal  Musculoskeletal: normal  Neuro: normal  Psych: normal      PHYSICAL EXAMINATION:    VITAL SIGNS:  Visit Vitals    /81 (BP 1 Location: Right arm, BP Patient Position: Sitting)    Pulse 76    Ht 5' 9\" (1.753 m)    Wt 238 lb 8 oz (108.2 kg)    BMI 35.22 kg/m2       GENERAL: NCAT, Sitting comfortably, NAD  EYES: EOMI, non-icteric, no proptosis  Ear/Nose/Throat: NCAT, no inflammation, no masses  LYMPH NODES: No LAD  CARDIOVASCULAR: S1 S2, RRR, No murmur, 2+ radial pulses  RESPIRATORY: CTA b/l, no wheeze/rales  GASTROINTESTINAL:  NT, ND  MUSCULOSKELETAL: Normal ROM, no atrophy  SKIN: warm, no edema/rash/ or other skin changes  NEUROLOGIC: 5/5 power all extremities, see foot exam below, no tremor, AAOx3  PSYCHIATRIC: Normal affect, Normal insight and judgement      REVIEW OF LABORATORY AND RADIOLOGY DATA:   Labs and documentation have been reviewed as described above. ASSESSMENT AND PLAN:   Calvin Muhammad is a 75 y.o. male with a PMHx as noted above who presents for f/u evaluation of uncontrolled type 2 diabetes. Problems:  Type 2 diabetes  Hyperlipidemia  Hypertension  Hypothyroidism  Vitamin D deficiency     PLAN  Type 2 Diabetes  Medications:  Novolog 70-30:   30 units / 34 units  Advised to check glucose qAM/Dinner/Bedtime  Provided with glucose log sheets for later review. HTN: BP stable on current medications, no changes today. HLD: recommended 40mg of pravastatin instead of 80, he will discuss with cardiology  Vitamin D deficiency: continue 3000 units D3 daily  Hypothyroidism: 137 mcg once daily, will check levels on f/u with pre-labs    Labs: I will obtain A1c/CMP/Lipids/Urine Microalbumin/TSH/FT4 as prelabs for next visit, ordered. RTC: I would like to see him back in 3 months. Karen Lange.  [address] [facility]

## 2018-01-11 NOTE — PATIENT INSTRUCTIONS
Novolog 70-30:   30 units / 34 units    Plan to repeat blood work at the lab few days before next visit,    Plan to return in 3 months,     Call with concerns,    ----------------------------------------------------------------------------------------------------------------------    Below you will find a glucose log sheet which you can use to record your blood sugars. Without checking and recording what your home glucose levels are, it will be difficult to make any changes to your medication dose, even when significant changes may be needed. Please feel free to use the log below to record your home glucose levels. At the very least, I would like for you to login the entire 2-3 weeks just before your visit so we can make your visit much more productive and beneficial to you. GLUCOSE LOG SHEET:    Date Breakfast Lunch Dinner Bedtime Comments ? GLUCOSE LOG SHEET:    Date Breakfast Lunch Dinner Bedtime Comments ? GLUCOSE LOG SHEET:    Date Breakfast Lunch Dinner Bedtime Comments ? Dr. Maryanne Millan to basics:    When you have diabetes or pre-diabetes, as you know, your body has difficulty dealing with the sugar it absorbs from your meals.  An unrelated but very relevant term you may have heard before, quantitative easing, has a new meaning: By gradually reducing the load of sugars entering the body, you ease the stress on the body and allow it to catch up with the work it must do. This in turn will allow your body to work better. On top of this, remember one point, the more sugar stress your body has, the more you enter a state of glucose toxicity and this is a state where you become even more resistant. Thats right higher sugar = more resistance, not only to your own bodies attempt to fix the problem but also resistance to your medications. This is why medications work best when a proper diet is followed. Tip 1. Dont forget the protein. When you add a portion of meat or other low carb protein food in your meal, it provides healthy calories which contribute to reducing that feeling of hunger that drives you to eat what you dont want. Tip 2. Portions are a real thing. Before you eat, stop and look at your plate/table. Count how many items have sugars/carbs in them. A sandwich (bread) ? Matteo Crank ? Sweet drink ? Potatoe ? Pasta ? Rice ? You would be surprised when you become aware. Aim for a reasonable portion of carbs, and if you feel you absolutely cannot do this, at least start working toward this. 45-60 grams is usually more than enough in one meal. And YES, than includes the desert! Tip 3. Three meals per day, snack-free in between! Your body needs a break. Eating an adequate meal keeps you from getting hungry and reaching for a snack in between meals. Recall that most of the time, diabetic patients are not treating these snacks. Dont eat dinner late at night, but rather allow more overnight fasting time for your body to recover.  If you eat dinner at 8 PM, try 6 PM.  Sporadic eating is the opposite of what you need, and adjusting to a regular eating schedule such as this will not only be a great benefit to your body, but your medications will also tend to work better at keeping your diabetes under control. Tip 4. There is no best diet when it comes to weight loss. When comparing diets and outcomes, the main ingredient when searching for weight loss was calories ! Lower calories = better weight loss. Pick a healthy diet thats right for you, i.e. diabetes friendly, and evaluate your daily calorie intake. And of course this would be of no use without exercise. Calories in need calories out.

## 2018-01-15 RX ORDER — PRAVASTATIN SODIUM 80 MG/1
TABLET ORAL
Qty: 30 TAB | Refills: 6 | Status: SHIPPED | OUTPATIENT
Start: 2018-01-15 | End: 2018-03-27 | Stop reason: SDUPTHER

## 2018-03-16 ENCOUNTER — PATIENT OUTREACH (OUTPATIENT)
Dept: CARDIOLOGY CLINIC | Age: 68
End: 2018-03-16

## 2018-03-21 ENCOUNTER — PATIENT OUTREACH (OUTPATIENT)
Dept: CARDIOLOGY CLINIC | Age: 68
End: 2018-03-21

## 2018-03-21 NOTE — PROGRESS NOTES
Patient returned Panel managers call. Patient states his weights have been good today at 235.0lb, has been on a 10 day cruise to [place], [place]. Patient has been eating healthy choices low sodium. Exercising while on vacation walking. Patient denies any edema in hands, feet or ankles. Denies any shortness of breath. Patient doing well. Patient has upcoming appointment 4/18/18 with Dr Marcelo Moses and Dr Siu  will check in with him at that time.

## 2018-03-27 RX ORDER — METOPROLOL SUCCINATE 100 MG/1
150 TABLET, EXTENDED RELEASE ORAL DAILY
Qty: 135 TAB | Refills: 0 | Status: SHIPPED | OUTPATIENT
Start: 2018-03-27 | End: 2018-08-15

## 2018-03-27 RX ORDER — PRAVASTATIN SODIUM 80 MG/1
TABLET ORAL
Qty: 90 TAB | Refills: 0 | Status: SHIPPED | OUTPATIENT
Start: 2018-03-27 | End: 2018-05-01 | Stop reason: SDUPTHER

## 2018-04-11 ENCOUNTER — OFFICE VISIT (OUTPATIENT)
Dept: CARDIOLOGY CLINIC | Age: 68
End: 2018-04-11

## 2018-04-11 DIAGNOSIS — I42.9 CARDIOMYOPATHY, UNSPECIFIED TYPE (HCC): Chronic | ICD-10-CM

## 2018-04-11 DIAGNOSIS — Z98.890 H/O ATRIOVENTRICULAR NODAL ABLATION: ICD-10-CM

## 2018-04-11 DIAGNOSIS — Z95.0 PACEMAKER: Primary | ICD-10-CM

## 2018-04-11 LAB
ALBUMIN SERPL-MCNC: 3.8 G/DL (ref 3.6–4.8)
ALBUMIN/CREAT UR: 93 MG/G CREAT (ref 0–30)
ALBUMIN/GLOB SERPL: 1.2 {RATIO} (ref 1.2–2.2)
ALP SERPL-CCNC: 46 IU/L (ref 39–117)
ALT SERPL-CCNC: 19 IU/L (ref 0–44)
AST SERPL-CCNC: 32 IU/L (ref 0–40)
BILIRUB SERPL-MCNC: 0.4 MG/DL (ref 0–1.2)
BUN SERPL-MCNC: 87 MG/DL (ref 8–27)
BUN/CREAT SERPL: 27 (ref 10–24)
CALCIUM SERPL-MCNC: 11.2 MG/DL (ref 8.6–10.2)
CHLORIDE SERPL-SCNC: 92 MMOL/L (ref 96–106)
CHOLEST SERPL-MCNC: 108 MG/DL (ref 100–199)
CO2 SERPL-SCNC: 35 MMOL/L (ref 18–29)
CREAT SERPL-MCNC: 3.22 MG/DL (ref 0.76–1.27)
CREAT UR-MCNC: 47.1 MG/DL
EST. AVERAGE GLUCOSE BLD GHB EST-MCNC: 154 MG/DL
GFR SERPLBLD CREATININE-BSD FMLA CKD-EPI: 19 ML/MIN/1.73
GFR SERPLBLD CREATININE-BSD FMLA CKD-EPI: 21 ML/MIN/1.73
GLOBULIN SER CALC-MCNC: 3.2 G/DL (ref 1.5–4.5)
GLUCOSE SERPL-MCNC: 136 MG/DL (ref 65–99)
HBA1C MFR BLD: 7 % (ref 4.8–5.6)
HDLC SERPL-MCNC: 39 MG/DL
INTERPRETATION, 910389: NORMAL
INTERPRETATION: NORMAL
LDLC SERPL CALC-MCNC: 55 MG/DL (ref 0–99)
Lab: NORMAL
MICROALBUMIN UR-MCNC: 43.8 UG/ML
PDF IMAGE, 910387: NORMAL
POTASSIUM SERPL-SCNC: 4.2 MMOL/L (ref 3.5–5.2)
PROT SERPL-MCNC: 7 G/DL (ref 6–8.5)
SODIUM SERPL-SCNC: 143 MMOL/L (ref 134–144)
T4 FREE SERPL-MCNC: 1.57 NG/DL (ref 0.82–1.77)
TRIGL SERPL-MCNC: 70 MG/DL (ref 0–149)
TSH SERPL DL<=0.005 MIU/L-ACNC: 1.41 UIU/ML (ref 0.45–4.5)
VLDLC SERPL CALC-MCNC: 14 MG/DL (ref 5–40)

## 2018-04-18 ENCOUNTER — TELEPHONE (OUTPATIENT)
Dept: CARDIOLOGY CLINIC | Age: 68
End: 2018-04-18

## 2018-04-18 ENCOUNTER — CLINICAL SUPPORT (OUTPATIENT)
Dept: CARDIOLOGY CLINIC | Age: 68
End: 2018-04-18

## 2018-04-18 VITALS
HEART RATE: 76 BPM | WEIGHT: 240.8 LBS | OXYGEN SATURATION: 92 % | SYSTOLIC BLOOD PRESSURE: 124 MMHG | HEIGHT: 69 IN | DIASTOLIC BLOOD PRESSURE: 74 MMHG | BODY MASS INDEX: 35.66 KG/M2

## 2018-04-18 DIAGNOSIS — Z95.0 PACEMAKER: Primary | ICD-10-CM

## 2018-04-18 DIAGNOSIS — I50.32 CHRONIC DIASTOLIC HEART FAILURE (HCC): Primary | ICD-10-CM

## 2018-04-18 DIAGNOSIS — I50.32 CHRONIC DIASTOLIC HEART FAILURE (HCC): ICD-10-CM

## 2018-04-18 DIAGNOSIS — I48.20 CHRONIC A-FIB (HCC): ICD-10-CM

## 2018-04-18 DIAGNOSIS — I25.10 CORONARY ARTERY DISEASE INVOLVING NATIVE CORONARY ARTERY OF NATIVE HEART WITHOUT ANGINA PECTORIS: ICD-10-CM

## 2018-04-18 DIAGNOSIS — N18.4 CKD (CHRONIC KIDNEY DISEASE) STAGE 4, GFR 15-29 ML/MIN (HCC): ICD-10-CM

## 2018-04-18 RX ORDER — METOPROLOL SUCCINATE 50 MG/1
150 TABLET, EXTENDED RELEASE ORAL DAILY
Qty: 270 TAB | Refills: 1 | Status: SHIPPED | OUTPATIENT
Start: 2018-04-18 | End: 2018-08-15

## 2018-04-18 RX ORDER — METOPROLOL SUCCINATE 50 MG/1
150 TABLET, EXTENDED RELEASE ORAL DAILY
COMMUNITY
End: 2018-04-18 | Stop reason: SDUPTHER

## 2018-04-18 RX ORDER — BUMETANIDE 2 MG/1
TABLET ORAL
Qty: 180 TAB | Refills: 3 | Status: ON HOLD | OUTPATIENT
Start: 2018-04-18 | End: 2019-03-26 | Stop reason: CLARIF

## 2018-04-18 NOTE — TELEPHONE ENCOUNTER
Please call patient regarding the medication change. His insurance company will not cover it. Please advise.

## 2018-04-18 NOTE — PROGRESS NOTES
Itz Mcdonald DNP, ANP-BC  Subjective/HPI:     Jr Byers is a 70 y.o. male is here for routine f/u. The patient denies chest pain/ shortness of breath, orthopnea, PND, LE edema, palpitations, syncope, presyncope or fatigue. Patient reports feeling in his usual state of health, dealing with pain and discomfort from rheumatoid arthritis. Reviewed recent weight gain according to our scales, he states that he did put on a moderate amount of weight secondary to dietary indiscretions and has lost 6-7 pounds in the last 2 weeks and plans to continue to adjust his diet to lose weight. He denies orthopnea or paroxysmal nocturnal dyspnea. PCP Provider  Chino Mishra MD  Past Medical History:   Diagnosis Date    Arrhythmia     atrial fibrillation 2017    CAD (coronary atherosclerotic disease)     [facility]     Diabetes mellitus type 2, controlled ([facility] 3/13/2017    Heart failure ([facility]     Hx of CABG 3/13/2017    [facility] in 2010 [facility] Morbid obesity ([facility] 3/13/2017    JACEY (obstructive sleep apnea) 6/23/2017    S/P CABG x 2 2010      Past Surgical History:   Procedure Laterality Date    CARDIAC SURG PROCEDURE UNLIST      CABGx2 in 2010    HX ORTHOPAEDIC      L 3rd toe amputation in 1999     Allergies   Allergen Reactions    Allopurinol Rash    Ciprofloxacin Nausea Only    Percocet [Oxycodone-Acetaminophen] Other (comments)     hallucinations      Family History   Problem Relation Age of Onset    Heart Attack Father     No Known Problems Mother       Current Outpatient Prescriptions   Medication Sig    bumetanide (BUMEX) 2 mg tablet 1 tab in AM and 1/2 tab in the afternoon.  metoprolol succinate (TOPROL-XL) 100 mg tablet Take 1.5 Tabs by mouth daily.  pravastatin (PRAVACHOL) 80 mg tablet TAKE 1 TABLET BY MOUTH IN THE EVENING    diclofenac (VOLTAREN) 1 % gel Apply two grams by topical route four times daily to the affected area (s).     Insulin Needles, Disposable, (BD INSULIN PEN NEEDLE UF MINI) 31 gauge x 3/16\" ndle Use with Novolog 3 times per day    glucose blood VI test strips (PHARMACIST CHOICE) strip One Touch Ultra Strips; Check glucose 3-4x times daily, Diagnosis E11.22    Lancets misc Use preferred brand; Check glucose 3-4x times daily, Diagnosis E11.22    Insulin Needles, Disposable, (KAUSHIK PEN NEEDLE) 32 gauge x 5/32\" ndle Use with Novolog 70/30 pen    febuxostat (ULORIC) 40 mg tab tablet Take 40 mg by mouth daily.  levothyroxine (SYNTHROID) 137 mcg tablet TAKE 1 TABLET(S) EVERY DAY BY ORAL ROUTE FOR 90 DAYS.  apixaban (ELIQUIS) 5 mg tablet Take 1 Tab by mouth two (2) times a day. Indications: PREVENT THROMBOEMBOLISM IN CHRONIC ATRIAL FIBRILLATION    potassium chloride SR (K-TAB) 20 mEq tablet Take 1 Tab by mouth daily. (Patient taking differently: Take 20 mEq by mouth daily. Take 2 20 mEq tabs for three weeks)    metOLazone (ZAROXOLYN) 2.5 mg tablet Take 2.5 mg by mouth daily.  insulin aspart protamine/insulin aspart (NOVOLOG MIX 70/30) 100 unit/mL (70-30) inpn by SubCUTAneous route two (2) times a day. Sliding scale:  30 units AM, 34 units PM, Add 1 units for every 5 point over 150, Hold if BS <100    calcium carbonate (CALTREX) 600 mg calcium (1,500 mg) tablet Take 600 mg by mouth every Tuesday, Thursday, Saturday & Sunday.  dutasteride (AVODART) 0.5 mg capsule TAKE ONE CAPSULE BY MOUTH EVERY DAY    tamsulosin (FLOMAX) 0.4 mg capsule TAKE 2 CAPSULES BY MOUTH EVERY DAY    paricalcitol (ZEMPLAR) 1 mcg capsule Take 1 mcg by mouth daily.  multivitamin (ONE A DAY) tablet Take 1 Tab by mouth daily.  cholecalciferol (VITAMIN D3) 1,000 unit cap Take 2,000 Units by mouth daily.  acetaminophen (TYLENOL EXTRA STRENGTH) 500 mg tablet Take 1,000 mg by mouth every eight (8) hours as needed for Pain.  ketoconazole (NIZORAL) 2 % topical cream Apply  to affected area two (2) times daily as needed for Skin Irritation.  Indications: rash     No current facility-administered medications for this visit. Vitals:    04/18/18 0852 04/18/18 0858   BP: 126/78 124/74   Pulse: 76    SpO2: 92%    Weight: 240 lb 12.8 oz (109.2 kg)    Height: 5' 9\" (1.753 m)      Social History     Social History    Marital status:      Spouse name: N/A    Number of children: N/A    Years of education: N/A     Occupational History    Not on file. Social History Main Topics    Smoking status: Never Smoker    Smokeless tobacco: Never Used    Alcohol use Yes      Comment: rare    Drug use: No    Sexual activity: Not on file     Other Topics Concern    Not on file     Social History Narrative       I have reviewed the nurses notes, vitals, problem list, allergy list, medical history, family, social history and medications. Review of Symptoms:    General: Pt denies excessive weight gain or loss. Pt is able to conduct ADL's  HEENT: Denies blurred vision, headaches, epistaxis and difficulty swallowing. Respiratory: Denies shortness of breath, BETTS, wheezing or stridor. Cardiovascular: Denies precordial pain, palpitations, edema or PND  Gastrointestinal: Denies poor appetite, indigestion, abdominal pain or blood in stool  Musculoskeletal: + Chronic joint pain  Neurologic: Denies tremor, paresthesias, or sensory motor disturbance  Skin: Denies rash, itching or texture change. Physical Exam:      General: Well developed, in no acute distress, cooperative and alert  HEENT: No carotid bruits, no JVD, trach is midline. Neck Supple, PEERL, EOM intact. Heart:  Normal S1/S2 negative S3 or S4. Regular, no murmur, gallop or rub.   Respiratory: Clear bilaterally x 4, no wheezing or rales  Abdomen:   Soft, non-tender, no masses, bowel sounds are active.   Extremities: Trace bilateral nonpitting dependent edema, normal cap refill, no cyanosis, atraumatic.    Neuro: A&Ox3, speech clear, gait stable with rolling walker  Skin: Bilateral lower extremity skin around the ankles slightly darkened and reddened, no signs of skin breakdown. Vascular: 2+ pulses bilateral radial pulses. Cardiographics    ECG: Paced  Results for orders placed or performed during the hospital encounter of 06/22/17   EKG, 12 LEAD, INITIAL   Result Value Ref Range    Ventricular Rate 114 BPM    Atrial Rate 144 BPM    QRS Duration 102 ms    Q-T Interval 316 ms    QTC Calculation (Bezet) 435 ms    Calculated R Axis 92 degrees    Calculated T Axis -149 degrees    Diagnosis       Atrial fibrillation with rapid ventricular response  Rightward axis  Low voltage QRS  Incomplete right bundle branch block  Nonspecific ST abnormality  Confirmed by Myrna Jaramillo ([id number]) on 6/22/2017 2:35:37 PM           Cardiology Labs:  Lab Results   Component Value Date/Time    Cholesterol, total 108 04/10/2018 08:43 AM    HDL Cholesterol 39 (L) 04/10/2018 08:43 AM    LDL,Direct 43 04/03/2017 04:00 PM    LDL, calculated 55 04/10/2018 08:43 AM    Triglyceride 70 04/10/2018 08:43 AM       Lab Results   Component Value Date/Time    Sodium 143 04/10/2018 08:43 AM    Potassium 4.2 04/10/2018 08:43 AM    Chloride 92 (L) 04/10/2018 08:43 AM    CO2 35 (H) 04/10/2018 08:43 AM    Anion gap 7 06/27/2017 02:56 AM    Glucose 136 (H) 04/10/2018 08:43 AM    BUN 87 (HH) 04/10/2018 08:43 AM    Creatinine 3.22 (H) 04/10/2018 08:43 AM    BUN/Creatinine ratio 27 (H) 04/10/2018 08:43 AM    GFR est AA 21 (L) 04/10/2018 08:43 AM    GFR est non-AA 19 (L) 04/10/2018 08:43 AM    Calcium 11.2 (H) 04/10/2018 08:43 AM    Bilirubin, total 0.4 04/10/2018 08:43 AM    AST (SGOT) 32 04/10/2018 08:43 AM    Alk. phosphatase 46 04/10/2018 08:43 AM    Protein, total 7.0 04/10/2018 08:43 AM    Albumin 3.8 04/10/2018 08:43 AM    Globulin 3.7 06/24/2017 03:32 AM    A-G Ratio 1.2 04/10/2018 08:43 AM    ALT (SGPT) 19 04/10/2018 08:43 AM           Assessment:     Assessment:     Diagnoses and all orders for this visit:    1. Chronic diastolic heart failure ([facility]    2. Chronic a-fib (HCC)  -     AMB POC EKG ROUTINE W/ 12 LEADS, INTER & REP    3. Coronary artery disease involving native coronary artery of native heart without angina pectoris    4. CKD (chronic kidney disease) stage 4, GFR 15-29 ml/min ([facility])    Other orders  -     bumetanide (BUMEX) 2 mg tablet; 1 tab in AM and 1/2 tab in the afternoon. ICD-10-CM ICD-9-CM    1. Chronic diastolic heart failure ([facility]) I50.32 428.32    2. Chronic a-fib ([facility]) I48.2 427.31 AMB POC EKG ROUTINE W/ 12 LEADS, INTER & REP   3. Coronary artery disease involving native coronary artery of native heart without angina pectoris I25.10 414.01    4. CKD (chronic kidney disease) stage 4, GFR 15-29 ml/min ([facility]) N18.4 585.4      Orders Placed This Encounter    AMB POC EKG ROUTINE W/ 12 LEADS, INTER & REP     Order Specific Question:   Reason for Exam:     Answer:   ROUTINE    bumetanide (BUMEX) 2 mg tablet     Si tab in AM and 1/2 tab in the afternoon. Dispense:  180 Tab     Refill:  3        Plan:     1. Diastolic heart failure: Clinically compensated asymptomatic patient requesting to reduce Bumex dosing. Discussed with patient can reduce to 2 mg in the morning 1 mg in the afternoon, should he feel dyspneic, increasing edema or weight he is to return back to 2 mg twice a day. 2.  Atherosclerotic heart disease: Clinically stable continue current medications  3. Atrial fibrillation: Paced, continue Eliquis. 4.  Hypertension: Controlled  5. Hyperlipidemia on Pravachol 80 mg LDL 55 earlier this year  Follow-up in 6 months    Marycarmen Kennedy MD    This note was created using voice recognition software. Despite editing, there may be syntax errors.

## 2018-04-18 NOTE — MR AVS SNAPSHOT
[address] [facility] 
[phone] Patient: Savannah Gaspar MRN: [id number] GBO:[birth year] Visit Information Date & Time Provider Department Dept. Phone Encounter #  
 4/18/2018  9:45 AM PACEMAKER, [facility] [phone] [id number] Your Appointments 4/19/2018  9:30 AM  
Follow Up with Sabino Da Silva MD  
[facility] [address] [place] [address]) Appt Note: 3 month follow up  
 [phone] [id number] [address] 7/18/2018  8:00 AM  
PROCEDURE with [facility] [address] [place] [address] Road) Appt Note: NOT AN OFFICE VISIT - REMOTE BSC PM  
 [address] [facility]  
[phone] [address] [facility]  
  
    
 10/23/2018 11:15 AM  
PROCEDURE with PACEMAKER, [facility] [address]) Appt Note: 6mo bsc dcpm  
 [address] [facility]  
[phone] [address] [facility]  
  
    
 10/23/2018 11:15 AM  
PROCEDURE with Alise Yarbrough MD  
[facility] [address] [facility]) Appt Note: annual with device check [address] [facility]  
[phone] [address] [facility]  
  
    
 10/30/2018  9:45 AM  
ESTABLISHED PATIENT with Chuckie Forbes MD  
[facility] [address] ([address]) Appt Note: 6 month f/u  
 [address] [facility] [address]  
[phone]  
  
   
 [address] Upcoming Health Maintenance Date Due Hepatitis C Screening 1950 DTaP/Tdap/Td series (1 - Tdap) 1/28/1970 FOBT Q 1 YEAR AGE 50-75 1/28/1999 ZOSTER VACCINE AGE 60> 11/28/2008 Pneumococcal 65+ Low/Medium Risk (1 of 2 - PCV13) 1/28/2014 Influenza Age 5 to Adult 8/1/2017 MEDICARE YEARLY EXAM 3/14/2018 FOOT EXAM Q1 4/3/2018 HEMOGLOBIN A1C Q6M 10/10/2018 EYE EXAM RETINAL OR DILATED Q1 10/17/2018 MICROALBUMIN Q1 4/10/2019 LIPID PANEL Q1 4/10/2019 GLAUCOMA SCREENING Q2Y 10/17/2019 Allergies as of 4/18/2018  Review Complete On: 4/18/2018 By: Tamra Segovia MD  
  
 Severity Noted Reaction Type Reactions Allopurinol High 08/16/2017   Systemic Rash Ciprofloxacin  03/07/2017    Nausea Only Percocet [Oxycodone-acetaminophen]  03/07/2017    Other (comments)  
 hallucinations Current Immunizations  Never Reviewed No immunizations on file. Not reviewed this visit You Were Diagnosed With   
  
 Codes Comments Pacemaker    -  Primary ICD-10-CM: Z95.0 ICD-9-CM: V45.01 Chronic a-fib (HCC)     ICD-10-CM: M31.7 ICD-9-CM: 427.31 Chronic diastolic heart failure (HCC)     ICD-10-CM: I50.32 
ICD-9-CM: 428.32 Vitals Smoking Status Never Smoker Preferred Pharmacy Pharmacy Name Phone [id number]- [place], [place] - [address] [phone] Your Updated Medication List  
  
   
This list is accurate as of 4/18/18 10:42 AM.  Always use your most recent med list.  
  
  
  
  
 apixaban 5 mg tablet Commonly known as:  Zachary Rosenberg Take 1 Tab by mouth two (2) times a day. Indications: PREVENT THROMBOEMBOLISM IN CHRONIC ATRIAL FIBRILLATION  
  
 bumetanide 2 mg tablet Commonly known as:  BUMEX  
1 tab in AM and 1/2 tab in the afternoon. calcium carbonate 600 mg calcium (1,500 mg) tablet Commonly known as:  Scar Gerardo Take 600 mg by mouth every Tuesday, Thursday, Saturday & Sunday. diclofenac 1 % Gel Commonly known as:  VOLTAREN  
 Apply two grams by topical route four times daily to the affected area (s). dutasteride 0.5 mg capsule Commonly known as:  AVODART TAKE ONE CAPSULE BY MOUTH EVERY DAY  
  
 glucose blood VI test strips strip Commonly known as:  PHARMACIST CHOICE One Touch Ultra Strips; Check glucose 3-4x times daily, Diagnosis E11.22  
  
 insulin aspart protamine/insulin aspart 100 unit/mL (70-30) Inpn Commonly known as:  NOVOLOG MIX 70/30  
by SubCUTAneous route two (2) times a day. Sliding scale: 30 units AM, 34 units PM, Add 1 units for every 5 point over 150, Hold if BS <100 * Insulin Needles (Disposable) 32 gauge x 5/32\" Ndle Commonly known as:  Justina Pen Needle Use with Novolog 70/30 pen * Insulin Needles (Disposable) 31 gauge x 3/16\" Ndle Commonly known as:  BD INSULIN PEN NEEDLE UF MINI Use with Novolog 3 times per day  
  
 ketoconazole 2 % topical cream  
Commonly known as:  NIZORAL Apply  to affected area two (2) times daily as needed for Skin Irritation. Indications: rash Lancets Misc Use preferred brand; Check glucose 3-4x times daily, Diagnosis E11.22  
  
 levothyroxine 137 mcg tablet Commonly known as:  SYNTHROID  
TAKE 1 TABLET(S) EVERY DAY BY ORAL ROUTE FOR 90 DAYS. metOLazone 2.5 mg tablet Commonly known as:  [place] Take 2.5 mg by mouth daily. metoprolol succinate 100 mg tablet Commonly known as:  TOPROL-XL Take 1.5 Tabs by mouth daily. multivitamin tablet Commonly known as:  ONE A DAY Take 1 Tab by mouth daily. paricalcitol 1 mcg capsule Commonly known as:  Marissa Cairo Take 1 mcg by mouth daily. potassium chloride SR 20 mEq tablet Commonly known as:  K-TAB Take 1 Tab by mouth daily. pravastatin 80 mg tablet Commonly known as:  PRAVACHOL  
TAKE 1 TABLET BY MOUTH IN THE EVENING  
  
 tamsulosin 0.4 mg capsule Commonly known as:  FLOMAX TAKE 2 CAPSULES BY MOUTH EVERY DAY  
  
 TYLENOL EXTRA STRENGTH 500 mg tablet Generic drug:  acetaminophen Take 1,000 mg by mouth every eight (8) hours as needed for Pain. ULORIC 40 mg Tab tablet Generic drug:  febuxostat Take 40 mg by mouth daily. VITAMIN D3 1,000 unit Cap Generic drug:  cholecalciferol Take 2,000 Units by mouth daily. * Notice: This list has 2 medication(s) that are the same as other medications prescribed for you. Read the directions carefully, and ask your doctor or other care provider to review them with you. We Performed the Following PACEMAKER CHECK [id number] CPT(R)] Introducing [facility]! [facility] introduces [employer] patient portal. Now you can access parts of your medical record, email your doctor's office, and request medication refills online. 1. In your internet browser, go to [email or web address] 2. Click on the First Time User? Click Here link in the Sign In box. You will see the New Member Sign Up page. 3. Enter your [employer] Access Code exactly as it appears below. You will not need to use this code after youve completed the sign-up process. If you do not sign up before the expiration date, you must request a new code. · [employer] Access Code: [email or web address] Expires: 7/10/2018  2:21 PM 
 
4. Enter the last four digits of your Social Security Number (xxxx) and Date of Birth (mm/dd/yyyy) as indicated and click Submit. You will be taken to the next sign-up page. 5. Create a Register My InfoÂ®t ID. This will be your [employer] login ID and cannot be changed, so think of one that is secure and easy to remember. 6. Create a [employer] password. You can change your password at any time. 7. Enter your Password Reset Question and Answer. This can be used at a later time if you forget your password. 8. Enter your e-mail address. You will receive e-mail notification when new information is available in [address] 9. Click Sign Up. You can now view and download portions of your medical record. 10. Click the Download Summary menu link to download a portable copy of your medical information. If you have questions, please visit the Frequently Asked Questions section of the [employer] website. Remember, [employer] is NOT to be used for urgent needs. For medical emergencies, dial 911. Now available from your iPhone and Android! Please provide this summary of care documentation to your next provider. Your primary care clinician is listed as Mack Lakhani. If you have any questions after today's visit, please call [phone].

## 2018-04-18 NOTE — PROGRESS NOTES
1. Have you been to the ER, urgent care clinic since your last visit? Hospitalized since your last visit? 2. Have you seen or consulted any other health care providers outside of the [facility] since your last visit? Include any pap smears or colon screening.

## 2018-04-19 ENCOUNTER — OFFICE VISIT (OUTPATIENT)
Dept: ENDOCRINOLOGY | Age: 68
End: 2018-04-19

## 2018-04-19 VITALS
HEART RATE: 75 BPM | BODY MASS INDEX: 35.31 KG/M2 | WEIGHT: 239.1 LBS | SYSTOLIC BLOOD PRESSURE: 123 MMHG | DIASTOLIC BLOOD PRESSURE: 77 MMHG

## 2018-04-19 DIAGNOSIS — I10 ESSENTIAL HYPERTENSION WITH GOAL BLOOD PRESSURE LESS THAN 130/80: ICD-10-CM

## 2018-04-19 DIAGNOSIS — E78.5 HYPERLIPIDEMIA, UNSPECIFIED HYPERLIPIDEMIA TYPE: ICD-10-CM

## 2018-04-19 DIAGNOSIS — N18.4 TYPE 2 DIABETES MELLITUS WITH STAGE 4 CHRONIC KIDNEY DISEASE, WITH LONG-TERM CURRENT USE OF INSULIN (HCC): Primary | ICD-10-CM

## 2018-04-19 DIAGNOSIS — E55.9 VITAMIN D DEFICIENCY: ICD-10-CM

## 2018-04-19 DIAGNOSIS — E11.22 TYPE 2 DIABETES MELLITUS WITH STAGE 4 CHRONIC KIDNEY DISEASE, WITH LONG-TERM CURRENT USE OF INSULIN (HCC): Primary | ICD-10-CM

## 2018-04-19 DIAGNOSIS — E03.9 HYPOTHYROIDISM, UNSPECIFIED TYPE: ICD-10-CM

## 2018-04-19 DIAGNOSIS — Z79.4 TYPE 2 DIABETES MELLITUS WITH STAGE 4 CHRONIC KIDNEY DISEASE, WITH LONG-TERM CURRENT USE OF INSULIN (HCC): Primary | ICD-10-CM

## 2018-04-19 RX ORDER — INSULIN ASPART 100 [IU]/ML
INJECTION, SUSPENSION SUBCUTANEOUS
Qty: 25 PEN | Refills: 3 | Status: SHIPPED | OUTPATIENT
Start: 2018-04-19 | End: 2019-03-19 | Stop reason: SDUPTHER

## 2018-04-19 NOTE — MR AVS SNAPSHOT
[address] [facility] [address] [address] [phone] Patient: Dillan Breen MRN: [id number] UXE:[id number] Visit Information Date & Time Provider Department Dept. Phone Encounter #  
 4/19/2018  9:30 AM Mikki Michelle [facility] [phone] [id number] Follow-up Instructions Return in about 3 months (around 7/19/2018). Your Appointments 7/18/2018  8:00 AM  
PROCEDURE with [facility] Taisha Sharif) Appt Note: NOT AN OFFICE VISIT - REMOTE BSC PM  
 [address]  
[phone] [address]  
  
    
 10/23/2018 11:15 AM  
PROCEDURE with PACEMAKER, [facility] Taisha Sharif) Appt Note: 6mo bsc dcpm  
 [address]  
[phone] [address]  
  
    
 10/23/2018 11:15 AM  
PROCEDURE with Cierra Nolasco MD  
[facility]) Appt Note: annual with device check [address]  
[phone] [address]  
  
    
 10/30/2018  9:45 AM  
ESTABLISHED PATIENT with Michela Prabhakar MD  
[facility] ([place]) Appt Note: 6 month f/u  
 [address] [facility] [address]  
[phone]  
  
   
 [address] Upcoming Health Maintenance Date Due Hepatitis C Screening 1950 DTaP/Tdap/Td series (1 - Tdap) 1/28/1970 FOBT Q 1 YEAR AGE 50-75 1/28/1999 ZOSTER VACCINE AGE 60> 11/28/2008 Pneumococcal 65+ Low/Medium Risk (1 of 2 - PCV13) 1/28/2014 Influenza Age 5 to Adult 8/1/2017 MEDICARE YEARLY EXAM 3/14/2018 FOOT EXAM Q1 4/3/2018 HEMOGLOBIN A1C Q6M 10/10/2018 EYE EXAM RETINAL OR DILATED Q1 10/17/2018 MICROALBUMIN Q1 4/10/2019 LIPID PANEL Q1 4/10/2019 GLAUCOMA SCREENING Q2Y 10/17/2019 Allergies as of 4/19/2018  Review Complete On: 4/19/2018 By: Nikki Small MD  
  
 Severity Noted Reaction Type Reactions Allopurinol High 08/16/2017   Systemic Rash Ciprofloxacin  03/07/2017    Nausea Only Percocet [Oxycodone-acetaminophen]  03/07/2017    Other (comments)  
 hallucinations Current Immunizations  Never Reviewed No immunizations on file. Not reviewed this visit You Were Diagnosed With   
  
 Codes Comments Type 2 diabetes mellitus with stage 4 chronic kidney disease, with long-term current use of insulin (HCC)    -  Primary ICD-10-CM: E11.22, N18.4, Z79.4 ICD-9-CM: 250.40, 585.4, V58.67 Essential hypertension with goal blood pressure less than 130/80     ICD-10-CM: I10 
ICD-9-CM: 401.9 Hyperlipidemia, unspecified hyperlipidemia type     ICD-10-CM: E78.5 ICD-9-CM: 272.4 Hypothyroidism, unspecified type     ICD-10-CM: E03.9 ICD-9-CM: 520. 9 Vitamin D deficiency     ICD-10-CM: E55.9 ICD-9-CM: 268.9 Vitals BP Pulse Weight(growth percentile) BMI Smoking Status 123/77 (BP 1 Location: Left arm, BP Patient Position: Sitting) 75 239 lb 1.6 oz (108.5 kg) 35.31 kg/m2 Never Smoker BMI and BSA Data Body Mass Index Body Surface Area  
 35.31 kg/m 2 2.3 m 2 Preferred Pharmacy Pharmacy Name Phone [facility]Y [facility][id number], [place] - [address] [phone] Your Updated Medication List  
  
   
This list is accurate as of 4/19/18  9:50 AM.  Always use your most recent med list.  
  
  
  
  
 apixaban 5 mg tablet Commonly known as:  Gay Seed Take 1 Tab by mouth two (2) times a day. Indications: PREVENT THROMBOEMBOLISM IN CHRONIC ATRIAL FIBRILLATION  
  
 bumetanide 2 mg tablet Commonly known as:  BUMEX  
1 tab in AM and 1/2 tab in the afternoon. calcium carbonate 600 mg calcium (1,500 mg) tablet Commonly known as:  Miguel Nickels Take 600 mg by mouth every Tuesday, Thursday, Saturday & Sunday. diclofenac 1 % Gel Commonly known as:  VOLTAREN Apply two grams by topical route four times daily to the affected area (s). dutasteride 0.5 mg capsule Commonly known as:  AVODART TAKE ONE CAPSULE BY MOUTH EVERY DAY  
  
 glucose blood VI test strips strip Commonly known as:  PHARMACIST CHOICE One Touch Ultra Strips; Check glucose 3-4x times daily, Diagnosis E11.22  
  
 insulin aspart protamine/insulin aspart 100 unit/mL (70-30) Inpn Commonly known as:  NOVOLOG MIX 70/30  
34 units breakfast, 34 units dinner + correction scale (up to 80 units/day) * Insulin Needles (Disposable) 32 gauge x 5/32\" Ndle Commonly known as:  Justina Pen Needle Use with Novolog 70/30 pen * Insulin Needles (Disposable) 31 gauge x 3/16\" Ndle Commonly known as:  BD INSULIN PEN NEEDLE UF MINI Use with Novolog 3 times per day  
  
 ketoconazole 2 % topical cream  
Commonly known as:  NIZORAL Apply  to affected area two (2) times daily as needed for Skin Irritation. Indications: rash Lancets Misc Use preferred brand; Check glucose 3-4x times daily, Diagnosis E11.22  
  
 levothyroxine 137 mcg tablet Commonly known as:  SYNTHROID  
TAKE 1 TABLET(S) EVERY DAY BY ORAL ROUTE FOR 90 DAYS. metOLazone 2.5 mg tablet Commonly known as:  Lee Lobstein Take 2.5 mg by mouth daily. * metoprolol succinate 100 mg tablet Commonly known as:  TOPROL-XL Take 1.5 Tabs by mouth daily. * metoprolol succinate 50 mg XL tablet Commonly known as:  TOPROL-XL Take 3 Tabs by mouth daily. multivitamin tablet Commonly known as:  ONE A DAY Take 1 Tab by mouth daily. paricalcitol 1 mcg capsule Commonly known as:  Faith Satchel Take 1 mcg by mouth daily. potassium chloride SR 20 mEq tablet Commonly known as:  K-TAB Take 1 Tab by mouth daily. pravastatin 80 mg tablet Commonly known as:  PRAVACHOL  
TAKE 1 TABLET BY MOUTH IN THE EVENING  
  
 tamsulosin 0.4 mg capsule Commonly known as:  FLOMAX TAKE 2 CAPSULES BY MOUTH EVERY DAY  
  
 TYLENOL EXTRA STRENGTH 500 mg tablet Generic drug:  acetaminophen Take 1,000 mg by mouth every eight (8) hours as needed for Pain. ULORIC 40 mg Tab tablet Generic drug:  febuxostat Take 40 mg by mouth daily. VITAMIN D3 1,000 unit Cap Generic drug:  cholecalciferol Take 2,000 Units by mouth daily. * Notice: This list has 4 medication(s) that are the same as other medications prescribed for you. Read the directions carefully, and ask your doctor or other care provider to review them with you. Prescriptions Sent to Pharmacy Refills  
 insulin aspart protamine/insulin aspart (NOVOLOG MIX 70/30) 100 unit/mL (70-30) inpn 3 Si units breakfast, 34 units dinner + correction scale (up to 80 units/day) Class: Normal  
 Pharmacy: [facility]/pharmacy [address] [place] [address] [address] Ph #: [phone] We Performed the Following  DIABETES FOOT EXAM [[facility]] Follow-up Instructions Return in about 3 months (around 2018). Patient Instructions Novolog 70/30 insulin: 34 units with breakfast, 34 units with dinner IF GLUCOSE IS:                 THEN TAKE: 
    0   Extra Unit 151-200    1   Extra Unit 201-250    2   Extra Units 251-300    3   Extra Units 301-350    4   Extra Units 351-400    5   Extra Units Please note our new policy, you must arrive to the clinic 15 minutes before your appointment time to allow enough time for proper check-in, adequate time to spend with your doctor, and also to respect the appointment time of the next patient.  Not arriving 15 minutes in advance may result in having your appointment rescheduled for the next available day/time. 
---------------------------------------------------------------------------------------------------------------------- Below you will find a glucose log sheet which you can use to record your blood sugars. Without checking and recording what your home glucose levels are, it will be difficult to make any changes to your medication dose, even when significant changes may be needed. Please feel free to use the log below to record your home glucose levels. At the very least, I would like for you to login the entire 2-3 weeks just before your visit so we can make your visit much more productive and beneficial to you. GLUCOSE LOG SHEET: 
 
Date Breakfast Lunch Dinner Bedtime Comments ? GLUCOSE LOG SHEET: 
 
Date Breakfast Lunch Dinner Bedtime Comments ? GLUCOSE LOG SHEET: 
 
Date Breakfast Lunch Dinner Bedtime Comments ? \ 
 
 
 
  
Introducing [facility]! Blair Jackson introduces [employer] patient portal. Now you can access parts of your medical record, email your doctor's office, and request medication refills online. 1. In your internet browser, go to [email or web address] (formerly [email or web address] ([employer]) 2. Click on the First Time User? Click Here link in the Sign In box. You will see the New Member Sign Up page. 3. Enter your Ondax Access Code exactly as it appears below. You will not need to use this code after youve completed the sign-up process. If you do not sign up before the expiration date, you must request a new code. · On[employer] Access Code: [email or web address] Expires: 7/10/2018  2:21 PM 
 
4. Enter the last four digits of your Social Security Number (xxxx) and Date of Birth (mm/dd/yyyy) as indicated and click Submit. You will be taken to the next sign-up page. 5. Create a Ondax ID. This will be your Ondax login ID and cannot be changed, so think of one that is secure and easy to remember. 6. Create a Ondax password. You can change your password at any time. 7. Enter your Password Reset Question and Answer. This can be used at a later time if you forget your password. 8. Enter your e-mail address. You will receive e-mail notification when new information is available in [address] 9. Click Sign Up. You can now view and download portions of your medical record. 10. Click the Download Summary menu link to download a portable copy of your medical information. If you have questions, please visit the Frequently Asked Questions section of the [employer] website. Remember, On[employer] is NOT to be used for urgent needs. For medical emergencies, dial 911. Now available from your iPhone and Android! Please provide this summary of care documentation to your next provider. Your primary care clinician is listed as Mack Lakhani. If you have any questions after today's visit, please call [phone].

## 2018-04-19 NOTE — PROGRESS NOTES
CHIEF COMPLAINT: f/u uncontrolled type 2 diabetes    HISTORY OF PRESENT ILLNESS:   Bruce Jackson is a 70 y.o. male with a PMHx as noted below who presents for evaluation of uncontrolled type 2 diabetes. HISTORY  Mr. 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 His health is complicated by CHF, CAD, and CKD, follows with cardiology and nephrology. His A1c prior to initial visit was 8% and was noted to be on novolog 70/30 insulin. He did not have a record of his home blood sugars and did not recall any of his home numbers and was requested to start monitoring home blood sugar for same dose adjustments to his regimen. INTERVAL HISTORY:      POC A1c today is 7.0%        Regimen:  - Novolog 70-30 insulin.     30 units / 34 units    Review of home glucose: checks AM/Dinner/Bedtime      Breakfast: 130-170 mostly  Dinner: 190-200 mostly  Bedtime:  130-190 mostly        Hypothyroidism: On 137 mcg of levothyroxine, TSH now 1.4, stable      Vitamin D deficiency on 3000 units daily, continues to take regularly    PAST MEDICAL/SURGICAL HISTORY:   Past Medical History:   Diagnosis Date    Arrhythmia     atrial fibrillation 2017    CAD (coronary atherosclerotic disease)     Diabetes ([address]     Diabetes mellitus type 2, controlled ([address] 3/13/2017    Heart failure ([address]     Hx of CABG 3/13/2017    CABG in 2010 in [address] [place] [address] Morbid obesity ([address] 3/13/2017    JACEY (obstructive sleep apnea) 6/23/2017    S/P CABG x 2 2010     Past Surgical History:   Procedure Laterality Date    CARDIAC SURG PROCEDURE UNLIST      CABGx2 in 2010    HX ORTHOPAEDIC      L 3rd toe amputation in 1999       ALLERGIES:   Allergies   Allergen Reactions    Allopurinol Rash    Ciprofloxacin Nausea Only    Percocet [Oxycodone-Acetaminophen] Other (comments)     hallucinations       MEDICATIONS ON ADMISSION:     Current Outpatient Prescriptions:     bumetanide (BUMEX) 2 mg tablet, 1 tab in AM and 1/2 tab in the afternoon. , Disp: 180 Tab, Rfl: 3    metoprolol succinate (TOPROL-XL) 100 mg tablet, Take 1.5 Tabs by mouth daily. , Disp: 135 Tab, Rfl: 0    pravastatin (PRAVACHOL) 80 mg tablet, TAKE 1 TABLET BY MOUTH IN THE EVENING, Disp: 90 Tab, Rfl: 0    Insulin Needles, Disposable, (BD INSULIN PEN NEEDLE UF MINI) 31 gauge x 3/16\" ndle, Use with Novolog 3 times per day, Disp: 300 Pen Needle, Rfl: 3    glucose blood VI test strips (PHARMACIST CHOICE) strip, One Touch Ultra Strips; Check glucose 3-4x times daily, Diagnosis E11.22, Disp: 400 Strip, Rfl: 3    Lancets misc, Use preferred brand; Check glucose 3-4x times daily, Diagnosis E11.22, Disp: 4 Package, Rfl: 7    Insulin Needles, Disposable, (KAUSHIK PEN NEEDLE) 32 gauge x 5/32\" ndle, Use with Novolog 70/30 pen, Disp: 100 Pen Needle, Rfl: 3    febuxostat (ULORIC) 40 mg tab tablet, Take 40 mg by mouth daily. , Disp: , Rfl:     levothyroxine (SYNTHROID) 137 mcg tablet, TAKE 1 TABLET(S) EVERY DAY BY ORAL ROUTE FOR 90 DAYS., Disp: 90 Tab, Rfl: 3    apixaban (ELIQUIS) 5 mg tablet, Take 1 Tab by mouth two (2) times a day. Indications: PREVENT THROMBOEMBOLISM IN CHRONIC ATRIAL FIBRILLATION, Disp: 180 Tab, Rfl: 3    potassium chloride SR (K-TAB) 20 mEq tablet, Take 1 Tab by mouth daily. (Patient taking differently: Take 20 mEq by mouth daily. Take 2 20 mEq tabs for three weeks), Disp: 30 Tab, Rfl: 1    ketoconazole (NIZORAL) 2 % topical cream, Apply  to affected area two (2) times daily as needed for Skin Irritation. Indications: rash, Disp: , Rfl:     metOLazone (ZAROXOLYN) 2.5 mg tablet, Take 2.5 mg by mouth daily. , Disp: , Rfl:     insulin aspart protamine/insulin aspart (NOVOLOG MIX 70/30) 100 unit/mL (70-30) inpn, by SubCUTAneous route two (2) times a day.  Sliding scale: 30 units AM, 34 units PM, Add 1 units for every 5 point over 150, Hold if BS <100, Disp: , Rfl:     calcium carbonate (CALTREX) 600 mg calcium (1,500 mg) tablet, Take 600 mg by mouth every Tuesday, Thursday, Saturday & Sunday. , Disp: , Rfl:     dutasteride (AVODART) 0.5 mg capsule, TAKE ONE CAPSULE BY MOUTH EVERY DAY, Disp: , Rfl: 2    tamsulosin (FLOMAX) 0.4 mg capsule, TAKE 2 CAPSULES BY MOUTH EVERY DAY, Disp: , Rfl: 2    paricalcitol (ZEMPLAR) 1 mcg capsule, Take 1 mcg by mouth daily. , Disp: , Rfl:     multivitamin (ONE A DAY) tablet, Take 1 Tab by mouth daily. , Disp: , Rfl:     cholecalciferol (VITAMIN D3) 1,000 unit cap, Take 2,000 Units by mouth daily. , Disp: , Rfl:     acetaminophen (TYLENOL EXTRA STRENGTH) 500 mg tablet, Take 1,000 mg by mouth every eight (8) hours as needed for Pain., Disp: , Rfl:     metoprolol succinate (TOPROL-XL) 50 mg XL tablet, Take 3 Tabs by mouth daily. , Disp: 270 Tab, Rfl: 1    diclofenac (VOLTAREN) 1 % gel, Apply two grams by topical route four times daily to the affected area (s)., Disp: , Rfl:     SOCIAL HISTORY:   Social History     Social History    Marital status:      Spouse name: N/A    Number of children: N/A    Years of education: N/A     Occupational History    Not on file. Social History Main Topics    Smoking status: Never Smoker    Smokeless tobacco: Never Used    Alcohol use Yes      Comment: rare    Drug use: No    Sexual activity: Not on file     Other Topics Concern    Not on file     Social History Narrative       FAMILY HISTORY:  Family History   Problem Relation Age of Onset    Heart Attack Father     No Known Problems Mother        REVIEW OF SYSTEMS: Complete ROS assessed and noted for that which is described above, all else are negative.   Eyes: normal  ENT: normal  CVS: normal  Resp: normal  GI: normal  : normal  GYN: normal  Endocrine: normal  Integument: normal  Musculoskeletal: normal  Neuro: normal  Psych: normal      PHYSICAL EXAMINATION:    VITAL SIGNS:  Visit Vitals    /77 (BP 1 Location: Left arm, BP Patient Position: Sitting)    Pulse 75    Wt 239 lb 1.6 oz (108.5 kg)    BMI 35.31 kg/m2       GENERAL: NCAT, Sitting comfortably, NAD  EYES: EOMI, non-icteric, no proptosis  Ear/Nose/Throat: NCAT, no inflammation, no masses  LYMPH NODES: No LAD  CARDIOVASCULAR: S1 S2, RRR, No murmur, 2+ radial pulses  RESPIRATORY: CTA b/l, no wheeze/rales  GASTROINTESTINAL:  NT, ND  MUSCULOSKELETAL: Normal ROM, no atrophy  SKIN: warm, no edema/rash/ or other skin changes  NEUROLOGIC: 5/5 power all extremities, see foot exam below, no tremor, AAOx3  PSYCHIATRIC: Normal affect, Normal insight and judgement           Diabetic foot exam performed by Maggie Person MD     Measurement  Response Nurse Comment Physician Comment   Monofilament  R - absent sensation with micro filament  L - absent sensation with micro filament     Pulse DP R - 1+ (weak)  L - 1+ (weak)     Vibration R - Absent    L - Absent     Structural deformity R - Yes - wrapped toe, underlying ulcer  L - Yes - amputated 3rd digit     Skin Integrity / Deformity R - Yes - as above  L - Mild - hyperpigmented        Reviewed by:          REVIEW OF LABORATORY AND RADIOLOGY DATA:   Labs and documentation have been reviewed as described above. ASSESSMENT AND PLAN:   Jr Byers is a 70 y.o. male with a PMHx as noted above who presents for f/u evaluation of uncontrolled type 2 diabetes. Problems:  Type 2 diabetes  Hyperlipidemia  Hypertension  Hypothyroidism  Vitamin D deficiency     Day time post prandial hyperglycemia, despite A1c of 7.0%, not consistent with home blood sugars. Not clear if receiving epogen etc. Follows with nephrology. Foot exam completed today. Lipids stable. Blood pressure stable. Plan as below. PLAN  Type 2 Diabetes  Medications:  Novolog 70-30:   34 units / 34 units  Correction 1:50>150  Advised to check glucose qAM/Dinner/Bedtime  Provided with glucose log sheets for later review. HTN: BP stable on current medications, no changes today.   HLD: stable, continue statin  Vitamin D deficiency: continue 3000 units D3 daily  Hypothyroidism: 137 mcg once daily, will check levels on f/u with pre-labs      RTC: I would like to see him back in 3 months. Yahaira Jacob.  [address] [facility]

## 2018-04-19 NOTE — PATIENT INSTRUCTIONS
Novolog 70/30 insulin: 34 units with breakfast, 34 units with dinner    IF GLUCOSE IS:                 THEN TAKE:      0   Extra Unit  151-200    1   Extra Unit  201-250    2   Extra Units  251-300    3   Extra Units  301-350    4   Extra Units  351-400    5   Extra Units      Please note our new policy, you must arrive to the clinic 15 minutes before your appointment time to allow enough time for proper check-in, adequate time to spend with your doctor, and also to respect the appointment time of the next patient. Not arriving 15 minutes in advance may result in having your appointment rescheduled for the next available day/time.  ----------------------------------------------------------------------------------------------------------------------    Below you will find a glucose log sheet which you can use to record your blood sugars. Without checking and recording what your home glucose levels are, it will be difficult to make any changes to your medication dose, even when significant changes may be needed. Please feel free to use the log below to record your home glucose levels. At the very least, I would like for you to login the entire 2-3 weeks just before your visit so we can make your visit much more productive and beneficial to you. GLUCOSE LOG SHEET:    Date Breakfast Lunch Dinner Bedtime Comments ? GLUCOSE LOG SHEET:    Date Breakfast Lunch Dinner Bedtime Comments ?                                                                                                                                                                                                                                                  GLUCOSE LOG SHEET:    Date Breakfast Lunch Dinner Bedtime Comments ?                                                                                                                                                                                                                                                    \

## 2018-04-20 NOTE — TELEPHONE ENCOUNTER
Spoke with patient  Verified patient with 2 patient identifiers    Informed will need to order metoprolol 100 mg take 1.5 tabs daily to equal dose of 150 mg daily. Insurance doesn't cover Metoprolol 50 mg , take 3 tabs daily to make 150 mg dose. Patient says will discuss with insurance company.

## 2018-05-01 RX ORDER — PRAVASTATIN SODIUM 80 MG/1
TABLET ORAL
Qty: 90 TAB | Refills: 0 | Status: SHIPPED | OUTPATIENT
Start: 2018-05-01 | End: 2018-10-15 | Stop reason: SDUPTHER

## 2018-05-03 ENCOUNTER — PATIENT OUTREACH (OUTPATIENT)
Dept: CARDIOLOGY CLINIC | Age: 68
End: 2018-05-03

## 2018-05-03 NOTE — PROGRESS NOTES
Patient has graduated from the Complex Case Management  program on 5/4/18. Patient's symptoms are stable at this time. Patient/family has the ability to self-manage. Care management goals have been completed at this time. No further nurse navigator follow up scheduled. Pt has nurse navigator's contact information for any further questions, concerns, or needs.   Patients upcoming visits:  Future Appointments  Date Time Provider James Nalini   7/18/2018 8:00 AM REMOTE_[facility] DONNA SCHED   7/20/2018 10:30 AM Jasmeet Sanches MD [address]   10/23/2018 11:15 AM Scott Almonte MD [facility] DONNA SCHED   10/23/2018 11:15 AM PACEMAKER, Chante Shah DONNA SCHED   10/30/2018 9:45 AM Laurence Samson MD [address] [place] [address]

## 2018-05-16 RX ORDER — METOLAZONE 2.5 MG/1
2.5 TABLET ORAL DAILY
Qty: 90 TAB | Refills: 1 | OUTPATIENT
Start: 2018-05-16

## 2018-05-17 ENCOUNTER — TELEPHONE (OUTPATIENT)
Dept: CARDIOLOGY CLINIC | Age: 68
End: 2018-05-17

## 2018-05-17 NOTE — TELEPHONE ENCOUNTER
Refused Medications  metOLazone (ZAROXOLYN) 2.5 mg tablet  Take 1 Tab by mouth daily. Disp: 90 Tab Refills: 1    Class: Normal Start: 5/16/2018   Refused by: Hollie Bey NP  Refusal reason: other    Medication Refill            DALTON Gandara 16 hours ago (4:24 PM)                 4/18 was not a regular doctor's visit but a pacemaker check visit. We did not prescribed Metolazone   His kidney function is steadily getting worse.  F/u with PCP or nephrologist   THANKS (Routing comment)                        You  Hollie Bey NP Yesterday (8:36 AM)                   LOV 4/2018   FU scheduled 10/30/2018 (Routing comment)              Spoke with patient  Verified patient with 2 patient identifiers    Informed need refer to PCP or nehrologist reference metolazone refill.   Patient verbalized understanding, will consult with nephrologist.

## 2018-05-27 NOTE — MR AVS SNAPSHOT
ASSUMED CARE AT 1930. PATIENT RESTING IN BED WITH LLE UP ON THREE PILLOWS.
TAKES PILLS WHOLE WITH WATER. DENIES PAIN AT THIS TIME. UP TO TOILET VIA W/C.
HAD LARGE SOFT BM. STAND PIVOT WITH CUEING. ABLE TO DO HYGIENE AFTER VOIDING,
BUT HAD TROUBLE WITH HYGIENE AFTER BM, WITH NURSING COMPLETING HYGIENE.
WORKING ON BALANCE WITH NBW LLE, RT HAND NEUROPATHY INTERFERES WITH HOLDING
ONTO WALKER FOR BALANCE, AND NEEDS HELP WITH CLOTHING ADJUSTMENTS DUE TO
BALANCE AND NEUROPATHY OF RT HAND. HIGHLY MOTIVATED AND IS WORKING HARD TO
GAIN STRENGTH. HAD QUESTIONS ABOUT DISCHARGE DATE AND PENDING FOLLOW UP
APPOINTMENT WITH SURGEON. REINFORCED PRIOR EDUCATION ABOUT TEAM MEETINGS, AND
THAT [facility] WILL MAKE ARRANGEMENTS FOR FOLLOWUP APPOINTMENTS. VERBALIZED
UNDERSTANDING BUT NEEDS REINFORCEMENT. HOURLY ROUNDS CONTINUE. BED ALARM ON.
CALL LITE IN REACH. Visit Information Date & Time Provider Department Dept. Phone Encounter #  
 7/6/2017 10:50 AM Barry Fajardo, [address] [facility] [phone] [id number] Follow-up Instructions Return in about 3 months (around 10/6/2017). Your Appointments 7/7/2017  3:15 PM  
HOSPITAL FOLLOW-UP with Charly Sunshine MD  
[facility] [address]) Appt Note: per Ayanna   ekr [address]  
[phone] [address]  
  
    
 7/11/2017  2:30 PM  
PACEMAKER with PACEMAKER, [facility] [address]) Appt Note: 2 week follow up [address]  
[phone] [address] Upcoming Health Maintenance Date Due Hepatitis C Screening 1950 EYE EXAM RETINAL OR DILATED Q1 1/28/1959 DTaP/Tdap/Td series (1 - Tdap) 1/28/1970 FOBT Q 1 YEAR AGE 50-75 1/28/1999 ZOSTER VACCINE AGE 60> 1/28/2009 GLAUCOMA SCREENING Q2Y 1/28/2014 Pneumococcal 65+ Low/Medium Risk (1 of 2 - PCV13) 1/28/2014 MEDICARE YEARLY EXAM 1/28/2014 INFLUENZA AGE 9 TO ADULT 8/1/2017 HEMOGLOBIN A1C Q6M 12/22/2017 FOOT EXAM Q1 4/3/2018 MICROALBUMIN Q1 4/3/2018 LIPID PANEL Q1 4/3/2018 Allergies as of 7/6/2017  Review Complete On: 7/6/2017 By: Ben Ramirez Severity Noted Reaction Type Reactions Ciprofloxacin  03/07/2017    Nausea Only Percocet [Oxycodone-acetaminophen]  03/07/2017    Other (comments)  
 hallucinations Current Immunizations  Never Reviewed No immunizations on file. Not reviewed this visit You Were Diagnosed With   
  
 Codes Comments Type 2 diabetes mellitus with other diabetic neurological complication (HCC)    -  Primary ICD-10-CM: E11.49 
ICD-9-CM: 250.60 Essential hypertension with goal blood pressure less than 130/80     ICD-10-CM: I10 
ICD-9-CM: 401.9 Hyperlipidemia, unspecified hyperlipidemia type     ICD-10-CM: E78.5 ICD-9-CM: 272.4 Vitamin D deficiency     ICD-10-CM: E55.9 ICD-9-CM: 268.9 Hypothyroidism, unspecified type     ICD-10-CM: E03.9 ICD-9-CM: 366. 9 Vitals BP Pulse Height(growth percentile) Weight(growth percentile) BMI Smoking Status 139/79 (BP 1 Location: Left arm, BP Patient Position: Sitting) 76 5' 9\" (1.753 m) 257 lb 3.2 oz (116.7 kg) 37.98 kg/m2 Never Smoker BMI and BSA Data Body Mass Index Body Surface Area  
 37.98 kg/m 2 2.38 m 2 Preferred Pharmacy Pharmacy Name Phone [id number] [id number] [phone] Your Updated Medication List  
  
   
This list is accurate as of: 7/6/17 11:25 AM.  Always use your most recent med list.  
  
  
  
  
 allopurinol 300 mg tablet Commonly known as:  Marny Gale Take 1 Tab by mouth daily. apixaban 5 mg tablet Commonly known as:  Artice Wiliam Take 1 Tab by mouth two (2) times a day. Indications: PREVENT THROMBOEMBOLISM IN CHRONIC ATRIAL FIBRILLATION  
  
 bumetanide 2 mg tablet Commonly known as:  Yuni Lathe Take 1 Tab by mouth two (2) times a day. calcium carbonate 600 mg calcium (1,500 mg) tablet Commonly known as:  Giang Owen Take 600 mg by mouth every Tuesday, Thursday, Saturday & Sunday. dutasteride 0.5 mg capsule Commonly known as:  AVODART TAKE ONE CAPSULE BY MOUTH EVERY DAY  
  
 insulin aspart protamine/insulin aspart 100 unit/mL (70-30) Inpn Commonly known as:  NOVOLOG MIX 70/30  
by SubCUTAneous route two (2) times a day.  Sliding scale: 26 units AM, 34 units PM, Add 5 units for every 50 point over 150, Hold if BS <100 Patient injected 34 unit on 6/21/17 PM (); patient injected 15 units on 6/22/17 AM ()  
  
 ketoconazole 2 % topical cream  
 Commonly known as:  NIZORAL Apply  to affected area two (2) times daily as needed for Skin Irritation. Indications: rash  
  
 levothyroxine 137 mcg tablet Commonly known as:  SYNTHROID  
TAKE 1 TABLET(S) EVERY DAY BY ORAL ROUTE FOR 90 DAYS. metOLazone 2.5 mg tablet Commonly known as:  Shanti Pathfork Take 2.5 mg by mouth daily. metoprolol succinate 100 mg tablet Commonly known as:  TOPROL-XL Take 1.5 Tabs by mouth daily. multivitamin tablet Commonly known as:  ONE A DAY Take 1 Tab by mouth daily. paricalcitol 1 mcg capsule Commonly known as:  Classie Him Take 1 mcg by mouth daily. potassium chloride SR 20 mEq tablet Commonly known as:  K-TAB Take 1 Tab by mouth daily. pravastatin 80 mg tablet Commonly known as:  PRAVACHOL Take 1 Tab by mouth nightly. tamsulosin 0.4 mg capsule Commonly known as:  FLOMAX TAKE 2 CAPSULES BY MOUTH EVERY DAY  
  
 TYLENOL EXTRA STRENGTH 500 mg tablet Generic drug:  acetaminophen Take 1,000 mg by mouth every eight (8) hours as needed for Pain. VITAMIN D3 1,000 unit Cap Generic drug:  cholecalciferol Take 3,000 Units by mouth daily. We Performed the Following T4, FREE V214011 CPT(R)] TSH 3RD GENERATION [75740 CPT(R)] Follow-up Instructions Return in about 3 months (around 10/6/2017). To-Do List   
 07/13/2017 9:30 AM  
  Appointment with Katelynn Singh MD at [address] [place] [address]. ([phone]) Patient Instructions Novolog 70-30 insulin: 
Breakfast 26 units Dinner 34 units; You can reduce this dose by 2 units every couple days to keep sugars in the 140-160 range. Your doing much better but our fucus should be on getting your dinner time blood sugars a bit lower.   
 
Continue current dose of levothyroxine, take levothyroxine with a glass of water on an empty stomach each day in the mornings, 1 hour prior to ingesting any food or other medications, including vitamins. I will adjust your dose based on todays levels. Continue vitamin D 3 at 3000 units daily 
 
 
---------------------------------------------------------------------------------------------------------------------- Below you will find a glucose log sheet which you can use to record your blood sugars. Without checking and recording what your home glucose levels are, it will be difficult to make any changes to your medication dose, even when significant changes may be needed. Please feel free to use the log below to record your home glucose levels. At the very least, I would like for you to login the entire 2-3 weeks just before your visit so we can make your visit much more productive and beneficial to you. GLUCOSE LOG SHEET: 
 
Date Breakfast Lunch Dinner Bedtime Comments ? GLUCOSE LOG SHEET: 
 
Date Breakfast Lunch Dinner Bedtime Comments ? GLUCOSE LOG SHEET: 
 
Date Breakfast Lunch Dinner Bedtime Comments ? Dr. Carmelita Arias Back to basics: 
 
When you have diabetes or pre-diabetes, as you know, your body has difficulty dealing with the sugar it absorbs from your meals.  An unrelated but very relevant term you may have heard before, quantitative easing, has a new meaning: By gradually reducing the load of sugars entering the body, you ease the stress on the body and allow it to catch up with the work it must do. This in turn will allow your body to work better. On top of this, remember one point, the more sugar stress your body has, the more you enter a state of glucose toxicity and this is a state where you become even more resistant. Thats right higher sugar = more resistance, not only to your own bodies attempt to fix the problem but also resistance to your medications. This is why medications work best when a proper diet is followed. Tip 1. Dont forget the protein. When you add a portion of meat or other low carb protein food in your meal, it provides healthy calories which contribute to reducing that feeling of hunger that drives you to eat what you dont want. Tip 2. Portions are a real thing. Before you eat, stop and look at your plate/table. Count how many items have sugars/carbs in them. A sandwich (bread) ? Abe Sevin ? Sweet drink ? Potatoe ? Pasta ? Rice ? You would be surprised when you become aware. Aim for a reasonable portion of carbs, and if you feel you absolutely cannot do this, at least start working toward this. 45-60 grams is usually more than enough in one meal. And YES, than includes the desert! Tip 3. Three meals per day, snack-free in between! Your body needs a break. Eating an adequate meal keeps you from getting hungry and reaching for a snack in between meals. Recall that most of the time, diabetic patients are not treating these snacks. Dont eat dinner late at night, but rather allow more overnight fasting time for your body to recover.  If you eat dinner at 8 PM, try 6 PM.  Sporadic eating is the opposite of what you need, and adjusting to a regular eating schedule such as this will not only be a great benefit to your body, but your medications will also tend to work better at keeping your diabetes under control. Tip 4. There is no best diet when it comes to weight loss. When comparing diets and outcomes, the main ingredient when searching for weight loss was calories ! Lower calories = better weight loss. Pick a healthy diet thats right for you, i.e. diabetes friendly, and evaluate your daily calorie intake. And of course this would be of no use without exercise. Calories in need calories out. Introducing [facility] SERVICES! Blair Jackson introduces Dsg.nr patient portal. Now you can access parts of your medical record, email your doctor's office, and request medication refills online. 1. In your internet browser, go to [email or web address] 2. Click on the First Time User? Click Here link in the Sign In box. You will see the New Member Sign Up page. 3. Enter your Dsg[employer][email or web address] Access Code exactly as it appears below. You will not need to use this code after youve completed the sign-up process. If you do not sign up before the expiration date, you must request a new code. · Dsg[employer]nr Access Code: [email or web address] Expires: 9/5/2017 11:15 AM 
 
4. Enter the last four digits of your Social Security Number (xxxx) and Date of Birth (mm/dd/yyyy) as indicated and click Submit. You will be taken to the next sign-up page. 5. Create a Dsg.nr ID. This will be your Dsg[employer]nr login ID and cannot be changed, so think of one that is secure and easy to remember. 6. Create a Dsg[employer]nr password. You can change your password at any time. 7. Enter your Password Reset Question and Answer. This can be used at a later time if you forget your password. 8. Enter your e-mail address. You will receive e-mail notification when new information is available in [address] 9. Click Sign Up. You can now view and download portions of your medical record. 10. Click the Download Summary menu link to download a portable copy of your medical information. If you have questions, please visit the Frequently Asked Questions section of the [email or web address] website. Remember, [email or web address] is NOT to be used for urgent needs. For medical emergencies, dial 911. Now available from your iPhone and Android! Please provide this summary of care documentation to your next provider. Your primary care clinician is listed as Mack Lakhani. If you have any questions after today's visit, please call [phone].

## 2018-06-15 ENCOUNTER — HOSPITAL ENCOUNTER (OUTPATIENT)
Dept: WOUND CARE | Age: 68
Discharge: HOME | End: 2018-06-15
Payer: MEDICARE

## 2018-06-15 VITALS
RESPIRATION RATE: 18 BRPM | TEMPERATURE: 98.1 F | HEART RATE: 76 BPM | DIASTOLIC BLOOD PRESSURE: 83 MMHG | SYSTOLIC BLOOD PRESSURE: 131 MMHG

## 2018-06-15 PROBLEM — L89.312 STAGE II PRESSURE ULCER OF RIGHT BUTTOCK (HCC): Status: ACTIVE | Noted: 2018-06-15

## 2018-06-15 PROCEDURE — 74011000250 HC RX REV CODE- 250: Performed by: OTOLARYNGOLOGY

## 2018-06-15 PROCEDURE — 97597 DBRDMT OPN WND 1ST 20 CM/<: CPT

## 2018-06-15 PROCEDURE — 99214 OFFICE O/P EST MOD 30 MIN: CPT

## 2018-06-15 RX ORDER — LIDOCAINE 40 MG/G
CREAM TOPICAL
Status: COMPLETED | OUTPATIENT
Start: 2018-06-15 | End: 2018-06-15

## 2018-06-15 RX ADMIN — LIDOCAINE: 40 CREAM TOPICAL at 09:40

## 2018-06-15 NOTE — WOUND CARE
06/15/18 0932   Wound Buttocks Right   Date First Assessed/Time First Assessed: 06/15/18 0931   POA: Yes  Wound Type: Diabetic  Location: Buttocks  Orientation: Right   DRESSING TYPE Open to air   Wound Length (cm) 0.2 cm   Wound Width (cm) 0.2 cm   Wound Depth (cm) 0.1   Wound Surface area (cm^2) 0.04 cm^2   Condition of Base Pink;Slough   Condition of Edges Open   Drainage Amount  None   Wound Odor None   Cleansing and Cleansing Agents  Normal saline

## 2018-06-15 NOTE — WOUND CARE
Wound Location: Right buttock  Wound Dressing Placed: Duoderm per MD order  Tolerated:  Well  Pain: Denies  Discharged to: Home  Discharged with: Self  Assistive device: Rollator

## 2018-06-15 NOTE — H&P
[address]  WOUND CARE HISTORY AND PHYSICAL      Name:ALDO ANDERSON  MR#: [id number]  : 1950  ACCOUNT #: [de-identified]   ADMIT DATE: 06/15/2018    SUBJECTIVE:  The patient is a former patient of this wound center who was treated for a venous leg ulcer. He now complains of pain and a small ulcer of the right buttocks whenever he sits or lies on this area. He has no pain or discomfort when he is not putting pressure on it. He is a type 2 diabetic. He has stage IV chronic kidney disease along with atrial fibrillation and congestive heart failure. HE IS ALLERGIC TO CIPROFLOXACIN AND PERCOCET. There have been no other changes noted since he was last seen here in 2017. PHYSICAL EXAMINATION:  VITAL SIGNS:  His temperature is 98.1, pulse 76, respirations 18, blood pressure 131/83. EXTREMITIES:  Examination of the right medial gluteus reveals a small ulcer of 0.2 x 0.2 x 0.1 cm. It is exquisitely tender and it is covered with slough. It is recommended that this wound be debrided. I explained the risks and benefits. The patient understands and wishes to proceed. Therefore, topical Xylocaine was applied for 15 minutes. With a 5 mm ring curette, the wound was debrided of slough, devitalized tissue and a small callus. Hemostasis was achieved with gentle pressure until dry. The entire area is moist, consistent with a diaper dermatitis. ASSESSMENT AND PLAN:  For a stage II pressure ulcer of the right gluteus L89.312 in a type 2 diabetic, E11.622, we are going to try to treat this as simply as possible since it is such a difficult location for the patient to address. For now, we are going to apply DuoDERM and have this changed as necessary. If it falls off or if it becomes soiled, it will be changed. It is okay to wash this area and to shower with soap and water to pat it dry and to reapply a new disc of DuoDERM.   The patient must offload this area when he is lying in bed, by lying on his left side down, right side down, or on his abdomen. He has been using a donut. I have asked him to purchase an air waffle pad which he will cover with fabric, and to he will make sure that there is only enough air in it, so that when he is sitting, he will be 1/2 inch above the seat surface below him. I will see him in 2 weeks. If he has completely healed at that time, I will then start him on a regimen of Comfort Shield Barrier Cream Cloths and Sween Cream.  All questions were answered, and his condition at discharge is stable.       MD DEBBY Galicia/PEE  D: 06/15/2018 10:31     T: 06/15/2018 10:59  JOB #: [id number]  CC: Marco Murray MD

## 2018-06-19 RX ORDER — LEVOTHYROXINE SODIUM 137 UG/1
TABLET ORAL
Qty: 90 TAB | Refills: 3 | Status: SHIPPED | OUTPATIENT
Start: 2018-06-19 | End: 2019-06-17 | Stop reason: SDUPTHER

## 2018-06-25 RX ORDER — APIXABAN 5 MG/1
TABLET, FILM COATED ORAL
Qty: 180 TAB | Refills: 3 | Status: SHIPPED | OUTPATIENT
Start: 2018-06-25 | End: 2019-06-21 | Stop reason: SDUPTHER

## 2018-06-29 ENCOUNTER — HOSPITAL ENCOUNTER (OUTPATIENT)
Dept: WOUND CARE | Age: 68
Discharge: HOME | End: 2018-06-29
Payer: MEDICARE

## 2018-06-29 VITALS
SYSTOLIC BLOOD PRESSURE: 137 MMHG | DIASTOLIC BLOOD PRESSURE: 77 MMHG | TEMPERATURE: 97.1 F | RESPIRATION RATE: 16 BRPM | HEART RATE: 75 BPM

## 2018-06-29 PROCEDURE — 99214 OFFICE O/P EST MOD 30 MIN: CPT

## 2018-06-29 NOTE — WOUND CARE
Visit Vitals    /77 (BP 1 Location: Left arm, BP Patient Position: At rest;Sitting)    Pulse 75    Temp 97.1 °F (36.2 °C)    Resp 16

## 2018-06-29 NOTE — WOUND CARE
06/29/18 1022   Wound Buttocks Right   Date First Assessed/Time First Assessed: 06/15/18 0931   POA: Yes  Wound Type: Diabetic  Location: Buttocks  Orientation: Right   Cleansing and Cleansing Agents  Normal saline   Dressing Type Applied (marathon barrier)   Procedure Tolerated Well   OUTPATIENT WOUND CARE DISCHARGE NOTE  Wound Dressing Applied - Burleigh barrier   Pain - improved  Ambulatory Status - walker  Accompanied by - self in stable condition. Follow Up Appointments -  2 weeks  Discharge Instructions Reviewed. Instructed to cleanse wound and apply Comfort care barrier cream wipe then Sween cream 2x/day. Instructed to obtain Waffle cushion to use whenever sitting. Instructed to read inflation/use instructions and to inflate so can fold in half as over inflating will cause more pressure. Called [facility] who said they had a cushion In stock. Asked it be held for patient and gave patient address and phone number so he could pick it up. He reports he tried to order one on line but it was not in stock. Instructed to call The [address] with any questions or concerns. Understanding verbalized via teach back.

## 2018-07-13 ENCOUNTER — HOSPITAL ENCOUNTER (OUTPATIENT)
Dept: WOUND CARE | Age: 68
Discharge: HOME | End: 2018-07-13
Payer: MEDICARE

## 2018-07-13 VITALS
SYSTOLIC BLOOD PRESSURE: 149 MMHG | TEMPERATURE: 97.6 F | HEART RATE: 74 BPM | RESPIRATION RATE: 16 BRPM | DIASTOLIC BLOOD PRESSURE: 75 MMHG

## 2018-07-13 PROCEDURE — 74011000250 HC RX REV CODE- 250: Performed by: OTOLARYNGOLOGY

## 2018-07-13 PROCEDURE — 17250 CHEM CAUT OF GRANLTJ TISSUE: CPT

## 2018-07-13 RX ORDER — LIDOCAINE 40 MG/G
CREAM TOPICAL ONCE
Status: COMPLETED | OUTPATIENT
Start: 2018-07-13 | End: 2018-07-13

## 2018-07-13 RX ADMIN — LIDOCAINE: 40 CREAM TOPICAL at 10:30

## 2018-07-13 NOTE — WOUND CARE
Visit Vitals    /75 (BP 1 Location: Left arm, BP Patient Position: At rest;Sitting)    Pulse 74    Temp 97.6 °F (36.4 °C)    Resp 16               07/13/18 1020   Wound Buttocks Right   Date First Assessed/Time First Assessed: 06/15/18 0931   POA: Yes  Wound Type: Diabetic  Location: Buttocks  Orientation: Right   DRESSING TYPE Open to air  (moisture barrier wipes, pt was unable to obtain Sween cream)   Wound Length (cm) 0.2 cm   Wound Width (cm) 0.2 cm   Wound Depth (cm) 0.1   Wound Surface area (cm^2) 0.04 cm^2   Condition of Base Pink   Condition of Edges Open   Tissue Type Pink   Tissue Type Percent Pink 100   Drainage Amount  None   Wound Odor None   Periwound Skin Condition Intact   Cleansing and Cleansing Agents  Normal saline

## 2018-07-13 NOTE — WOUND CARE
07/13/18 1045   Wound Buttocks Right   Date First Assessed/Time First Assessed: 06/15/18 0931   POA: Yes  Wound Type: Diabetic  Location: Buttocks  Orientation: Right   Cleansing and Cleansing Agents  Soap and water   Dressing Type Applied Other (Comment)  (Desitin and tegaderm)   Patient able to ambulate to car using walker. No s/s of distress/compliants of pain. Patient to follow up in clinic with MD in 1 week.

## 2018-07-13 NOTE — PROGRESS NOTES
[address]  WOUND CARE PROGRESS NOTE    Sergio Fortune  MR#: [id number]  : 1950  ACCOUNT #: [de-identified]   DATE OF SERVICE: 2018    The patient returns for treatment of a stage II right medial gluteal pressure ulcer, L87.312, in a type 2 diabetic, E11.622, with diaper dermatitis, L22. The patient was last seen on . I recommended he get an air waffle pad, which he has been using. He got a different brand of a Comfort Shield barrier cream cloth but was unable to find Sween Cream.  To review, we applied DuoDERM the first time we saw him. We recommended barrier cream cloths and Sween Cream 2 weeks ago. He continues to complain of exquisite pain over the right medial buttocks. There have been no changes noted in his medications, allergies or review of systems. His temperature today is 97.6, pulse 74, respirations 16, blood pressure 149/75. There is a small persistent tiny wound of 0.2 x 0.2 x 0.1 cm. It is clean. To palpation there is no deep foreign body. There is no purulent material.  It simply appears to be a very small, discrete, clean wound. It is recommended today to simply cauterize this to see if this will help stimulate growth. I explained the risks and benefits. The patient understands and wished to proceed. Therefore, topical Xylocaine was applied for 10 minutes. With the use of a single stick of silver nitrate the area was cauterized and then irrigated clear with sterile saline. ASSESSMENT AND PLAN:  We are going to dress this area over the next 2 weeks with Desitin followed by Tegaderm. I am going to see the patient again in followup in 2 weeks. If he is completely unimproved in spite of doing everything correctly, including offloading, I feel that an excision of this area might be of benefit. Obviously, I am reluctant to take a 0.2 x 0.2 cm wound and make it 0.5 cm, but in fact that might be what is required. He understands the plan.   I will see him again in 2 weeks.       MD DEBBY Sosa / Paras Arzate  D: 07/13/2018 11:06     T: 07/13/2018 11:38  JOB #: [id number]

## 2018-07-18 ENCOUNTER — CLINICAL SUPPORT (OUTPATIENT)
Dept: CARDIOLOGY CLINIC | Age: 68
End: 2018-07-18

## 2018-07-18 DIAGNOSIS — Z95.0 PACEMAKER: Primary | ICD-10-CM

## 2018-07-18 DIAGNOSIS — Z98.890 H/O ATRIOVENTRICULAR NODAL ABLATION: ICD-10-CM

## 2018-07-18 DIAGNOSIS — I48.20 CHRONIC A-FIB (HCC): ICD-10-CM

## 2018-07-20 ENCOUNTER — OFFICE VISIT (OUTPATIENT)
Dept: ENDOCRINOLOGY | Age: 68
End: 2018-07-20

## 2018-07-20 VITALS
BODY MASS INDEX: 35.87 KG/M2 | HEIGHT: 69 IN | SYSTOLIC BLOOD PRESSURE: 132 MMHG | DIASTOLIC BLOOD PRESSURE: 78 MMHG | WEIGHT: 242.2 LBS | HEART RATE: 74 BPM

## 2018-07-20 DIAGNOSIS — E03.9 HYPOTHYROIDISM, UNSPECIFIED TYPE: ICD-10-CM

## 2018-07-20 DIAGNOSIS — I10 ESSENTIAL HYPERTENSION WITH GOAL BLOOD PRESSURE LESS THAN 130/80: ICD-10-CM

## 2018-07-20 DIAGNOSIS — E78.5 HYPERLIPIDEMIA, UNSPECIFIED HYPERLIPIDEMIA TYPE: ICD-10-CM

## 2018-07-20 DIAGNOSIS — E11.22 TYPE 2 DIABETES MELLITUS WITH STAGE 4 CHRONIC KIDNEY DISEASE, WITH LONG-TERM CURRENT USE OF INSULIN (HCC): Primary | ICD-10-CM

## 2018-07-20 DIAGNOSIS — Z79.4 TYPE 2 DIABETES MELLITUS WITH STAGE 4 CHRONIC KIDNEY DISEASE, WITH LONG-TERM CURRENT USE OF INSULIN (HCC): Primary | ICD-10-CM

## 2018-07-20 DIAGNOSIS — N18.4 TYPE 2 DIABETES MELLITUS WITH STAGE 4 CHRONIC KIDNEY DISEASE, WITH LONG-TERM CURRENT USE OF INSULIN (HCC): Primary | ICD-10-CM

## 2018-07-20 DIAGNOSIS — E55.9 VITAMIN D DEFICIENCY: ICD-10-CM

## 2018-07-20 LAB — HBA1C MFR BLD HPLC: 6.3 %

## 2018-07-20 NOTE — MR AVS SNAPSHOT
[address] [address] [phone] Patient: Wendy Kulkarni MRN: [id number] BTP:5/34/2780 Visit Information Date & Time Provider Department Dept. Phone Encounter #  
 7/20/2018 10:30 AM Terell Villar, [address] [facility] [id number] Follow-up Instructions Return in about 3 months (around 10/20/2018). Your Appointments 10/23/2018 11:15 AM  
PROCEDURE with PACEMAKER, [facility]) Appt Note: 6mo bsc dcpm  
 [address]  
[phone] [address]  
  
    
 10/23/2018 11:15 AM  
PROCEDURE with Viet Rodarte MD  
[facility]) Appt Note: annual with device check [address]  
[phone] [address]  
  
    
 10/30/2018  9:45 AM  
ESTABLISHED PATIENT with Colton Schmidt MD  
[facility] ([facility]) Appt Note: 6 month f/u  
 [address] [place] [address]  
[phone]  
  
   
 [facility] [id number]  
  
    
 1/16/2019  8:00 AM  
PROCEDURE with [facility]) Appt Note: NOT AN OFFICE VISIT -REMOTE BSC PM  
 [address]  
[phone] [address] Upcoming Health Maintenance Date Due Hepatitis C Screening 1950 DTaP/Tdap/Td series (1 - Tdap) 1/28/1970 FOBT Q 1 YEAR AGE 50-75 1/28/1999 ZOSTER VACCINE AGE 60> 11/28/2008 Pneumococcal 65+ Low/Medium Risk (1 of 2 - PCV13) 1/28/2014 MEDICARE YEARLY EXAM 3/14/2018 Influenza Age 5 to Adult 8/1/2018 HEMOGLOBIN A1C Q6M 10/10/2018 MICROALBUMIN Q1 4/10/2019 LIPID PANEL Q1 4/10/2019 FOOT EXAM Q1 4/19/2019 EYE EXAM RETINAL OR DILATED Q1 6/11/2019 GLAUCOMA SCREENING Q2Y 6/11/2020 Allergies as of 7/20/2018  Review Complete On: 7/20/2018 By: Robert Zurita MD  
  
 Severity Noted Reaction Type Reactions Allopurinol High 08/16/2017   Systemic Rash  
 Gabapentin Medium 06/15/2018    Drowsiness Ciprofloxacin  03/07/2017    Nausea Only Percocet [Oxycodone-acetaminophen]  03/07/2017    Other (comments)  
 hallucinations Current Immunizations  Never Reviewed No immunizations on file. Not reviewed this visit You Were Diagnosed With   
  
 Codes Comments Type 2 diabetes mellitus with stage 4 chronic kidney disease, with long-term current use of insulin (HCC)    -  Primary ICD-10-CM: E11.22, N18.4, Z79.4 ICD-9-CM: 250.40, 585.4, V58.67 Essential hypertension with goal blood pressure less than 130/80     ICD-10-CM: I10 
ICD-9-CM: 401.9 Hyperlipidemia, unspecified hyperlipidemia type     ICD-10-CM: E78.5 ICD-9-CM: 272.4 Hypothyroidism, unspecified type     ICD-10-CM: E03.9 ICD-9-CM: 457. 9 Vitamin D deficiency     ICD-10-CM: E55.9 ICD-9-CM: 268.9 Vitals BP Pulse Height(growth percentile) Weight(growth percentile) BMI Smoking Status 132/78 (BP 1 Location: Right arm, BP Patient Position: Sitting) 74 5' 9\" (1.753 m) 242 lb 3.2 oz (109.9 kg) 35.77 kg/m2 Never Smoker Vitals History BMI and BSA Data Body Mass Index Body Surface Area 35.77 kg/m 2 2.31 m 2 Preferred Pharmacy Pharmacy Name Phone [id number]- [place], [place] - [address] [phone] Your Updated Medication List  
  
   
This list is accurate as of 7/20/18 10:59 AM.  Always use your most recent med list.  
  
  
  
  
 bumetanide 2 mg tablet Commonly known as:  BUMEX  
1 tab in AM and 1/2 tab in the afternoon. calcium carbonate 600 mg calcium (1,500 mg) tablet Commonly known as:  Bijal Alcantarat Take 600 mg by mouth every Tuesday, Thursday, Saturday & Sunday. diclofenac 1 % Gel Commonly known as:  VOLTAREN Apply two grams by topical route four times daily to the affected area (s). dutasteride 0.5 mg capsule Commonly known as:  AVODART TAKE ONE CAPSULE BY MOUTH EVERY DAY  
  
 ELIQUIS 5 mg tablet Generic drug:  apixaban TAKE 1 TABLET BY MOUTH TWICE A DAY  
  
 glucose blood VI test strips strip Commonly known as:  PHARMACIST CHOICE One Touch Ultra Strips; Check glucose 3-4x times daily, Diagnosis E11.22  
  
 insulin aspart protamine/insulin aspart 100 unit/mL (70-30) Inpn Commonly known as:  NOVOLOG MIX 70/30  
34 units breakfast, 34 units dinner + correction scale (up to 80 units/day) * Insulin Needles (Disposable) 32 gauge x 5/32\" Ndle Commonly known as:  Justina Pen Needle Use with Novolog 70/30 pen * Insulin Needles (Disposable) 31 gauge x 3/16\" Ndle Commonly known as:  BD ULTRA-FINE MINI PEN NEEDLE Use with Novolog 3 times per day  
  
 ketoconazole 2 % topical cream  
Commonly known as:  NIZORAL Apply  to affected area two (2) times daily as needed for Skin Irritation. Indications: rash Lancets Misc Use preferred brand; Check glucose 3-4x times daily, Diagnosis E11.22  
  
 levothyroxine 137 mcg tablet Commonly known as:  SYNTHROID  
TAKE 1 TABLET(S) EVERY DAY BY ORAL ROUTE FOR 90 DAYS. metOLazone 2.5 mg tablet Commonly known as:  Pily Dhaliwal Take 2.5 mg by mouth daily. * metoprolol succinate 100 mg tablet Commonly known as:  TOPROL-XL Take 1.5 Tabs by mouth daily. * metoprolol succinate 50 mg XL tablet Commonly known as:  TOPROL-XL Take 3 Tabs by mouth daily. multivitamin tablet Commonly known as:  ONE A DAY Take 1 Tab by mouth daily. paricalcitol 1 mcg capsule Commonly known as:  Gala Cool  
 Take 1 mcg by mouth daily. potassium chloride SR 20 mEq tablet Commonly known as:  K-TAB Take 1 Tab by mouth daily. pravastatin 80 mg tablet Commonly known as:  PRAVACHOL  
TAKE 1 TABLET BY MOUTH IN THE EVENING  
  
 tamsulosin 0.4 mg capsule Commonly known as:  FLOMAX TAKE 2 CAPSULES BY MOUTH EVERY DAY  
  
 TYLENOL EXTRA STRENGTH 500 mg tablet Generic drug:  acetaminophen Take 1,000 mg by mouth every eight (8) hours as needed for Pain. ULORIC 40 mg Tab tablet Generic drug:  febuxostat Take 40 mg by mouth daily. VITAMIN D3 1,000 unit Cap Generic drug:  cholecalciferol Take 2,000 Units by mouth daily. * Notice: This list has 4 medication(s) that are the same as other medications prescribed for you. Read the directions carefully, and ask your doctor or other care provider to review them with you. We Performed the Following AMB POC HEMOGLOBIN A1C [64196 CPT(R)] Follow-up Instructions Return in about 3 months (around 10/20/2018). To-Do List   
 07/27/2018 10:15 AM  
  Appointment with Narinder Webb MD at [address] [place] [address]. ([phone]) Patient Instructions Novolog 70-30:   34 units breakfast / 34 units dinner IF GLUCOSE IS:                 THEN TAKE: 
    0   Extra Unit 151-200   1   Extra Unit 201-250   2   Extra Units 251-300   3   Extra Units 301-350   4   Extra Units 351-400   5   Extra Units Erythropoietin (EPO) ? Please note our new policy, you must arrive to the clinic 15 minutes before your appointment time to allow enough time for proper check-in, adequate time to spend with your doctor, and also to respect the appointment time of the next patient.  Not arriving 15 minutes in advance may result in having your appointment rescheduled for the next available day/time. 
---------------------------------------------------------------------------------------------------------------------- 
 
 Below you will find a glucose log sheet which you can use to record your blood sugars. Without checking and recording what your home glucose levels are, it will be difficult to make any changes to your medication dose, even when significant changes may be needed. Please feel free to use the log below to record your home glucose levels. At the very least, I would like for you to login the entire 2-3 weeks just before your visit so we can make your visit much more productive and beneficial to you. GLUCOSE LOG SHEET: 
 
Date Breakfast Lunch Dinner Bedtime Comments ? GLUCOSE LOG SHEET: 
 
Date Breakfast Lunch Dinner Bedtime Comments ? GLUCOSE LOG SHEET: 
 
Date Breakfast Lunch Dinner Bedtime Comments ? Introducing hospitals & HEALTH SERVICES! Uriel Teixeira introduces [employer] patient portal. Now you can access parts of your medical record, email your doctor's office, and request medication refills online. 1. In your internet browser, go to [email or web address] 2. Click on the First Time User? Click Here link in the Sign In box. You will see the New Member Sign Up page. 3. Enter your [employer] Access Code exactly as it appears below.  You will not need to use this code after youve completed the sign-up process. If you do not sign up before the expiration date, you must request a new code. · [employer] Access Code: [email or web address] Expires: 10/11/2018  9:40 AM 
 
4. Enter the last four digits of your Social Security Number (xxxx) and Date of Birth (mm/dd/yyyy) as indicated and click Submit. You will be taken to the next sign-up page. 5. Create a [employer] ID. This will be your [employer] login ID and cannot be changed, so think of one that is secure and easy to remember. 6. Create a [employer] password. You can change your password at any time. 7. Enter your Password Reset Question and Answer. This can be used at a later time if you forget your password. 8. Enter your e-mail address. You will receive e-mail notification when new information is available in [address] 9. Click Sign Up. You can now view and download portions of your medical record. 10. Click the Download Summary menu link to download a portable copy of your medical information. If you have questions, please visit the Frequently Asked Questions section of the [employer] website. Remember, [employer] is NOT to be used for urgent needs. For medical emergencies, dial 911. Now available from your iPhone and Android! Please provide this summary of care documentation to your next provider. Your primary care clinician is listed as Mack Lakhani. If you have any questions after today's visit, please call [phone].

## 2018-07-20 NOTE — PATIENT INSTRUCTIONS
Novolog 70-30:   34 units breakfast / 34 units dinner    IF GLUCOSE IS:                 THEN TAKE:      0   Extra Unit  151-200   1   Extra Unit  201-250   2   Extra Units  251-300   3   Extra Units  301-350   4   Extra Units  351-400   5   Extra Units    Erythropoietin (EPO) ? Please note our new policy, you must arrive to the clinic 15 minutes before your appointment time to allow enough time for proper check-in, adequate time to spend with your doctor, and also to respect the appointment time of the next patient. Not arriving 15 minutes in advance may result in having your appointment rescheduled for the next available day/time.  ----------------------------------------------------------------------------------------------------------------------    Below you will find a glucose log sheet which you can use to record your blood sugars. Without checking and recording what your home glucose levels are, it will be difficult to make any changes to your medication dose, even when significant changes may be needed. Please feel free to use the log below to record your home glucose levels. At the very least, I would like for you to login the entire 2-3 weeks just before your visit so we can make your visit much more productive and beneficial to you. GLUCOSE LOG SHEET:    Date Breakfast Lunch Dinner Bedtime Comments ? GLUCOSE LOG SHEET:    Date Breakfast Lunch Dinner Bedtime Comments ?                                                                                                                                                                                                                                                  GLUCOSE LOG SHEET:    Date Breakfast Lunch Dinner Bedtime Comments ?

## 2018-07-20 NOTE — PROGRESS NOTES
CHIEF COMPLAINT: f/u uncontrolled type 2 diabetes    HISTORY OF PRESENT ILLNESS:   Cami Eden is a 70 y.o. male with a PMHx as noted below who presents for evaluation of uncontrolled type 2 diabetes. HISTORY  Mr. 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 His health is complicated by CHF, CAD, and CKD, follows with cardiology and nephrology. His A1c prior to initial visit was 8% and was noted to be on novolog 70/30 insulin. He did not have a record of his home blood sugars and did not recall any of his home numbers and was requested to start monitoring home blood sugar for better diabetes management. INTERVAL HISTORY:      POC A1c previously 7.0%, today is 6.3%        Regimen:  - Novolog 70-30 insulin.     34 units BID AC        Correction of 1:50>150    Review of home glucose:       Breakfast: 115-170  Dinner:   variable  Bedtime:   mostly      Lows: infrequent, had a level of 67 at bedtime on one occasion        Hypothyroidism: On 137 mcg of levothyroxine      Vitamin D deficiency on 3000 units daily, continues to take regularly    Review of most recent diabetes-related labs:  Lab Results   Component Value Date    HBA1C 7.0 (H) 04/10/2018    HBA1C 7.6 (H) 06/22/2017    HBA1C 8.0 (H) 03/13/2017    JGJ0DJTX 6.8 01/11/2018    TDH5AFXJ 7.1 10/13/2017    CHOL 108 04/10/2018    LDLC 55 04/10/2018    LDL 43 04/03/2017    GFRAA 21 (L) 04/10/2018    GFRNA 19 (L) 04/10/2018    MCACR 93.0 (H) 04/10/2018    TSH 1.410 04/10/2018    VITD3 46.3 04/03/2017     Lab Key:  [id number] = IA-2 pancreatic islet cell autoantibody  CPEPL = C-peptide level  :EXT = External Lab  GADLT = ANGELIQUE-65 autoantibody   INSUL = Insulin level  MCACR (or MALBEXT) = Urine Microalbumin (or External UM)      PAST MEDICAL/SURGICAL HISTORY:   Past Medical History:   Diagnosis Date    Arrhythmia     atrial fibrillation 2017    CAD (coronary atherosclerotic disease)     Chronic pain     Diabetes ([facility]     Diabetes mellitus type 2, controlled ([facility] 3/13/2017    Heart failure ([facility]     Hx of CABG 3/13/2017    CABG in 2010 in [address] Hypertension     Morbid obesity ([facility] 3/13/2017    JACEY (obstructive sleep apnea) 6/23/2017    S/P CABG x 2 2010    Thyroid disease      Past Surgical History:   Procedure Laterality Date    CARDIAC SURG PROCEDURE UNLIST      CABGx2 in 2010    HX ORTHOPAEDIC      L 3rd toe amputation in 1999       ALLERGIES:   Allergies   Allergen Reactions    Allopurinol Rash    Gabapentin Drowsiness    Ciprofloxacin Nausea Only    Percocet [Oxycodone-Acetaminophen] Other (comments)     hallucinations       MEDICATIONS ON ADMISSION:     Current Outpatient Prescriptions:     ELIQUIS 5 mg tablet, TAKE 1 TABLET BY MOUTH TWICE A DAY, Disp: 180 Tab, Rfl: 3    levothyroxine (SYNTHROID) 137 mcg tablet, TAKE 1 TABLET(S) EVERY DAY BY ORAL ROUTE FOR 90 DAYS., Disp: 90 Tab, Rfl: 3    pravastatin (PRAVACHOL) 80 mg tablet, TAKE 1 TABLET BY MOUTH IN THE EVENING, Disp: 90 Tab, Rfl: 0    insulin aspart protamine/insulin aspart (NOVOLOG MIX 70/30) 100 unit/mL (70-30) inpn, 34 units breakfast, 34 units dinner + correction scale (up to 80 units/day), Disp: 25 Pen, Rfl: 3    bumetanide (BUMEX) 2 mg tablet, 1 tab in AM and 1/2 tab in the afternoon. , Disp: 180 Tab, Rfl: 3    metoprolol succinate (TOPROL-XL) 50 mg XL tablet, Take 3 Tabs by mouth daily. , Disp: 270 Tab, Rfl: 1    metoprolol succinate (TOPROL-XL) 100 mg tablet, Take 1.5 Tabs by mouth daily. , Disp: 135 Tab, Rfl: 0    diclofenac (VOLTAREN) 1 % gel, Apply two grams by topical route four times daily to the affected area (s)., Disp: , Rfl:     Insulin Needles, Disposable, (BD INSULIN PEN NEEDLE UF MINI) 31 gauge x 3/16\" ndle, Use with Novolog 3 times per day, Disp: 300 Pen Needle, Rfl: 3    glucose blood VI test strips (PHARMACIST CHOICE) strip, One Touch Ultra Strips;  Check glucose 3-4x times daily, Diagnosis E11.22, Disp: 400 Strip, Rfl: 3    Lancets misc, Use preferred brand; Check glucose 3-4x times daily, Diagnosis E11.22, Disp: 4 Package, Rfl: 7    Insulin Needles, Disposable, (KAUSHIK PEN NEEDLE) 32 gauge x 5/32\" ndle, Use with Novolog 70/30 pen, Disp: 100 Pen Needle, Rfl: 3    febuxostat (ULORIC) 40 mg tab tablet, Take 40 mg by mouth daily. , Disp: , Rfl:     potassium chloride SR (K-TAB) 20 mEq tablet, Take 1 Tab by mouth daily. (Patient taking differently: Take 20 mEq by mouth daily. Take 2 20 mEq tabs for three weeks), Disp: 30 Tab, Rfl: 1    ketoconazole (NIZORAL) 2 % topical cream, Apply  to affected area two (2) times daily as needed for Skin Irritation. Indications: rash, Disp: , Rfl:     metOLazone (ZAROXOLYN) 2.5 mg tablet, Take 2.5 mg by mouth daily. , Disp: , Rfl:     calcium carbonate (CALTREX) 600 mg calcium (1,500 mg) tablet, Take 600 mg by mouth every Tuesday, Thursday, Saturday & Sunday. , Disp: , Rfl:     dutasteride (AVODART) 0.5 mg capsule, TAKE ONE CAPSULE BY MOUTH EVERY DAY, Disp: , Rfl: 2    tamsulosin (FLOMAX) 0.4 mg capsule, TAKE 2 CAPSULES BY MOUTH EVERY DAY, Disp: , Rfl: 2    paricalcitol (ZEMPLAR) 1 mcg capsule, Take 1 mcg by mouth daily. , Disp: , Rfl:     multivitamin (ONE A DAY) tablet, Take 1 Tab by mouth daily. , Disp: , Rfl:     cholecalciferol (VITAMIN D3) 1,000 unit cap, Take 2,000 Units by mouth daily. , Disp: , Rfl:     acetaminophen (TYLENOL EXTRA STRENGTH) 500 mg tablet, Take 1,000 mg by mouth every eight (8) hours as needed for Pain., Disp: , Rfl:     SOCIAL HISTORY:   Social History     Social History    Marital status:      Spouse name: N/A    Number of children: N/A    Years of education: N/A     Occupational History    Not on file.      Social History Main Topics    Smoking status: Never Smoker    Smokeless tobacco: Never Used    Alcohol use Yes      Comment: rare    Drug use: No    Sexual activity: Not on file     Other Topics Concern    Not on file     Social History Narrative       FAMILY HISTORY:  Family History   Problem Relation Age of Onset    Heart Attack Father     No Known Problems Mother        REVIEW OF SYSTEMS: Complete ROS assessed and noted for that which is described above, all else are negative. Eyes: normal  ENT: normal  CVS: normal  Resp: normal  GI: normal  : normal  GYN: normal  Endocrine: normal  Integument: normal  Musculoskeletal: normal  Neuro: normal  Psych: normal      PHYSICAL EXAMINATION:    VITAL SIGNS:  Visit Vitals    /84 (BP 1 Location: Left arm, BP Patient Position: Sitting)    Pulse 76    Ht 5' 9\" (1.753 m)    Wt 242 lb 3.2 oz (109.9 kg)    BMI 35.77 kg/m2       GENERAL: NCAT, Sitting comfortably, NAD  EYES: EOMI, non-icteric, no proptosis  Ear/Nose/Throat: NCAT, no inflammation, no masses  LYMPH NODES: No LAD  CARDIOVASCULAR: S1 S2, RRR, No murmur, 2+ radial pulses  RESPIRATORY: CTA b/l, no wheeze/rales  GASTROINTESTINAL:  NT, ND  MUSCULOSKELETAL: Normal ROM, no atrophy  SKIN: warm, no edema/rash/ or other skin changes  NEUROLOGIC: 5/5 power all extremities, see foot exam below, no tremor, AAOx3  PSYCHIATRIC: Normal affect, Normal insight and judgement    REVIEW OF LABORATORY AND RADIOLOGY DATA:   Labs and documentation have been reviewed as described above. ASSESSMENT AND PLAN:   Una Martin is a 70 y.o. male with a PMHx as noted above who presents for f/u evaluation of uncontrolled type 2 diabetes. Problems:  Type 2 diabetes  Hyperlipidemia  Hypertension  Hypothyroidism  Vitamin D deficiency     PLAN  Type 2 Diabetes  Medications:  Novolog 70-30:   34 units / 34 units  Correction 1:50>150  Advised to check glucose qAM/Dinner/Bedtime  Provided with glucose log sheets for later review.     HTN: BP generally stable on current medications  HLD: lipids reviewed, controlled, continue statin  Vitamin D deficiency: continue 3000 units D3 daily  Hypothyroidism: 137 mcg once daily, prior levels stable in April 2018. Clinically euthyroid today. RTC: I would like to see him back in 3 months. Mayo Bran.  [address] [facility]

## 2018-07-27 ENCOUNTER — HOSPITAL ENCOUNTER (OUTPATIENT)
Dept: WOUND CARE | Age: 68
Discharge: HOME | End: 2018-07-27
Payer: MEDICARE

## 2018-07-27 VITALS
DIASTOLIC BLOOD PRESSURE: 70 MMHG | TEMPERATURE: 97.7 F | SYSTOLIC BLOOD PRESSURE: 126 MMHG | RESPIRATION RATE: 16 BRPM | HEART RATE: 76 BPM

## 2018-07-27 PROCEDURE — 99214 OFFICE O/P EST MOD 30 MIN: CPT

## 2018-07-27 NOTE — WOUND CARE
07/27/18 1135   Wound Buttocks Right   Date First Assessed/Time First Assessed: 06/15/18 0931   POA: Yes  Wound Type: Diabetic  Location: Buttocks  Orientation: Right   Cleansing and Cleansing Agents  Normal saline   Dressing Type Applied Zinc based paste  (tagaderm)   Procedure Tolerated Well   Wound care done per MD orders. Patient able to ambulate using walker to go to car to go home. Patient to follow up in 2 weeks in clinic with MD.  No s/s of distress or complaints of pain.

## 2018-07-27 NOTE — PROGRESS NOTES
[address] 
WOUND CARE PROGRESS NOTE Stacey Gay 
MR#: [id number] : 1950 ACCOUNT #: [de-identified] DATE OF SERVICE: 2018 SUBJECTIVE:  The patient returns for treatment of a wound to the left medial buttocks (L87.312) in a type 2 diabetic (E11.622), with diaper dermatitis (L22). We applied silver nitrate to the wound and the patient has been using Desitin followed by Tegaderm after a daily shower. He did purchase 3 air waffle pads to use and keep around the house. He has had a good two weeks and has had no problems, nor changes in his medications, allergies or review of systems. OBJECTIVE:  His temperature today is 97.7, pulse 76, respirations 16, blood pressure 126/70. Examination of the medial buttocks shows that the wound is 0.1 x 0.1 x 0.1 cm. It is nowhere near as tender as it was previously. The patient is comfortable and feels that it is greatly improved. ASSESSMENT AND PLAN:  I explained to the patient the 2 options. If he is comfortable, we can simply continue this regimen and see him again in 2 weeks. If he wishes, we can do an excisional biopsy to make sure there is not a foreign body in this area that is causing this persistent wound, but since he understands it and is comfortable with how he has improved, we are simply going to continue the regimen and see him again in 2 weeks. He will continue the daily Desitin followed by Tegaderm. He will continue to offload with use of an air waffle pad. His condition on discharge is stable. MD DEBBY Mariscal / PEE 
D: 2018 11:39    
T: 2018 11:48 JOB #: [id number]

## 2018-07-27 NOTE — WOUND CARE
07/27/18 1052   Wound Buttocks Right   Date First Assessed/Time First Assessed: 06/15/18 0931   POA: Yes  Wound Type: Diabetic  Location: Buttocks  Orientation: Right   DRESSING STATUS Clean, dry, and intact   DRESSING TYPE Other (Comment)  (tagaderm)   Wound Length (cm) 0.1 cm   Wound Width (cm) 0.1 cm   Wound Depth (cm) 0.1   Wound Surface area (cm^2) 0.01 cm^2   Tissue Type Percent Pink 100   Drainage Amount  None   Periwound Skin Condition Intact   Cleansing and Cleansing Agents  Normal saline   Patient able to ambulate into clinic using walker. No s/s of distress or complaints of pain.

## 2018-08-10 ENCOUNTER — HOSPITAL ENCOUNTER (OUTPATIENT)
Dept: WOUND CARE | Age: 68
Discharge: HOME | End: 2018-08-10
Payer: MEDICARE

## 2018-08-10 VITALS
RESPIRATION RATE: 16 BRPM | DIASTOLIC BLOOD PRESSURE: 72 MMHG | HEART RATE: 75 BPM | TEMPERATURE: 97 F | SYSTOLIC BLOOD PRESSURE: 141 MMHG
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
## 2018-08-10 PROCEDURE — 97597 DBRDMT OPN WND 1ST 20 CM/<: CPT

## 2018-08-10 RX ORDER — METOPROLOL SUCCINATE 50 MG/1
TABLET, EXTENDED RELEASE ORAL
Qty: 60 TAB | Refills: 6 | OUTPATIENT
Start: 2018-08-10

## 2018-08-10 NOTE — WOUND CARE
08/10/18 0948   Wound Buttocks Right   Date First Assessed/Time First Assessed: 06/15/18 0931   POA: Yes  Wound Type: Diabetic  Location: Buttocks  Orientation: Right   DRESSING TYPE Open to air   Wound Length (cm) 0 cm   Wound Width (cm) 0 cm   Wound Depth (cm) 0   Wound Surface area (cm^2) 0 cm^2   Change in Wound Size % 100

## 2018-08-10 NOTE — PROGRESS NOTES
[address]  WOUND CARE PROGRESS NOTE    Ari Henriquez  MR#: [id number]  : 1950  ACCOUNT #: [de-identified]   DATE OF SERVICE: 08/10/2018    HISTORY OF PRESENT ILLNESS:  The patient returns for a wound of his right medial buttock, L87.312 in a type 2 diabetic, E11.622, with a diaper dermatitis, L22. He was last seen 2 weeks ago. He has been using Desitin and Tegaderm and an air waffle pad. He still feels that there is something irritating him in that area. There have been no other changes noted in his medications, allergies, or review of systems. PHYSICAL EXAMINATION:  VITAL SIGNS:  Temperature is 97, pulse 75, respirations 16, blood pressure 141/72. SKIN:  He had a scab over the area of concern. I do not feel anything abnormal.  I do not see anything abnormal, but I explained to him that there might be a small foreign body there, and therefore, we applied Xylocaine 4% cream for 10 minutes. I explained to him that I was going to remove the skin and debride this until I can make sure we are down to healthy tissue. I explained the risks and benefits. He understands and wished to proceed. Therefore, topical Xylocaine was applied for 10 minutes. With use of a 5 mm ring curette, the scab and thickened skin was debrided. I kept going down to healthy bleeding subcutaneous tissue. I inspected everything on a dry gauze and it appears that there is a small foreign body, which looks like a tiny black thorn. Hemostasis was achieved with silver nitrate followed by saline. Desitin was then applied. ASSESSMENT AND PLAN:  The patient will call me with a report in 1 week. I would expect him to feel great by then and he will simply follow up with me if he is not thrilled with his progress at that time. I think at this point we removed the foreign body and this should help resolution of his symptoms.       MD DEBBY Boogie / VIDA  D: 08/10/2018 10:52     T: 08/10/2018 11:34  JOB #: [id number]

## 2018-08-10 NOTE — WOUND CARE
08/10/18 0948   Wound Buttocks Right   Date First Assessed/Time First Assessed: 06/15/18 0931   POA: Yes  Wound Type: Diabetic  Location: Buttocks  Orientation: Right   DRESSING TYPE Open to air   Wound Length (cm) 0 cm   Wound Width (cm) 0 cm   Wound Depth (cm) 0   Wound Surface area (cm^2) 0 cm^2   Change in Wound Size % 100   Dressing Type Applied (Desitin to open area.)   Wound Location: right buttock  Wound Dressing Placed: as above per MD order  Tolerated: well  Pain: denies      Discharged to: home  Discharged with: self  Assistive device: walker    Follow up in 2 weeks but will call Dr. Ольга Curry in 1 week if healed.

## 2018-08-13 NOTE — TELEPHONE ENCOUNTER
Spoke with patient - he would like a 90 days rx. He is taking 150mg of Metoprolol daily. He would like to have the 50mg tabs so he does not have to cut the pills in half.

## 2018-08-15 RX ORDER — METOPROLOL SUCCINATE 50 MG/1
150 TABLET, EXTENDED RELEASE ORAL DAILY
Qty: 270 TAB | Refills: 1 | Status: SHIPPED | OUTPATIENT
Start: 2018-08-15 | End: 2018-10-30 | Stop reason: SDUPTHER

## 2018-08-21 ENCOUNTER — TELEPHONE (OUTPATIENT)
Dept: WOUND CARE | Age: 68
End: 2018-08-21

## 2018-08-21 NOTE — TELEPHONE ENCOUNTER
TC from Dr. Warren Harrison with request to contact patient to find out if he continues to have pain in his buttock wound and if so he would like patient to have an ultrasound prior to his next MD appointment. Called patient who reports he continues to have pain but it is improved with medication MD prescribed. He reports he is going out of town and will call back to make an appointment. Instructed that if he continues to have any pain to contact the clinic so an ultrasound can be ordered prior to seeing Dr. Warren Harrison so the results will be available for his appointment. Report called to Dr. Warren Harrison.

## 2018-09-18 ENCOUNTER — TELEPHONE (OUTPATIENT)
Dept: WOUND CARE | Age: 68
End: 2018-09-18

## 2018-09-18 NOTE — TELEPHONE ENCOUNTER
TC to patient to assess if has continued pain in R buttock lesion. Patient reports that he does. Instructed to obtain ultrasound prior to MD visit 9/21/18. Faxed order to outpatient scheduling who will make appt. With patient.

## 2018-09-19 ENCOUNTER — HOSPITAL ENCOUNTER (OUTPATIENT)
Dept: ULTRASOUND IMAGING | Age: 68
Discharge: HOME | End: 2018-09-19
Attending: OTOLARYNGOLOGY
Payer: MEDICARE
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
## 2018-09-19 PROCEDURE — 76882 US LMTD JT/FCL EVL NVASC XTR: CPT

## 2018-09-21 ENCOUNTER — HOSPITAL ENCOUNTER (OUTPATIENT)
Dept: WOUND CARE | Age: 68
Discharge: HOME | End: 2018-09-21
Payer: MEDICARE

## 2018-09-21 VITALS
HEART RATE: 76 BPM | TEMPERATURE: 98.2 F | SYSTOLIC BLOOD PRESSURE: 157 MMHG | DIASTOLIC BLOOD PRESSURE: 90 MMHG | RESPIRATION RATE: 16 BRPM

## 2018-09-21 PROCEDURE — 99214 OFFICE O/P EST MOD 30 MIN: CPT

## 2018-09-21 PROCEDURE — 74011000250 HC RX REV CODE- 250: Performed by: OTOLARYNGOLOGY

## 2018-09-21 RX ORDER — LIDOCAINE 40 MG/G
CREAM TOPICAL
Status: COMPLETED | OUTPATIENT
Start: 2018-09-21 | End: 2018-09-21

## 2018-09-21 RX ADMIN — LIDOCAINE: 40 CREAM TOPICAL at 10:32

## 2018-09-21 NOTE — PROGRESS NOTES
[address] 
WOUND CARE PROGRESS NOTE Briana Abbott 
MR#: [id number] : 1950 ACCOUNT #: [de-identified] DATE OF SERVICE: 2018 HISTORY OF PRESENT ILLNESS:  The patient returns for treatment of a wound of the right medial buttock, L87.312, in a type 2 diabetic, E11.622, with a diaper rash, L22. We have been treating his wound with Desitin, Tegaderm and an air waffle pad. A small foreign body was removed in the past.  I was hopeful that once this was done, he would be completely symptom free. He was recently on a cruise and when he was in [facility], he had bleeding from the area of the wound. He went to the emergency room and no treatment was necessary. Over the phone, I ordered an ultrasound of this area. It shows no foreign body, nor fluid collection, but does show hypervascularity, possibly inflammatory, but nonspecific. The patient continues to have pain, especially when he is sitting. He has had no other problems, nor changes in his medications, allergies or review of systems. PHYSICAL EXAMINATION:   
VITAL SIGNS:  His temperature is 98.2, pulse 76, respirations 16, blood pressure 157/90. WOUND EXAMINATION:  The area of concern is 0.1 x 0.1 x 0.1 cm. It is clean. It is noninflamed. It looks normal, but it is severely symptomatic. I explained to the patient that he has not gotten better with conventional therapy. I do not see anything worrisome and  I do not feel a foreign body, and the ultrasound was normal.  Therefore, I cleaned the wound and the scott-wound with alcohol. I then applied multiple layers of Marathon to the area to form a nice healthy barrier. ASSESSMENT AND PLAN:  The patient will be able to shower in 24 hours. He was given another sample of Marathon to use when this first layer starts to come off.   I am asking him to return to this office in 2 weeks to be seen by Dr. Lita Lopez for his impressions and whether or not he might feel that it is indicated to do an excisional biopsy of this area to try to remove any source of chronic pain such as a small foreign body that is not visible or pyoderma gangrenosum, even though this does not appear to be that sort of pathology. At this point, I can think of no other therapies that might be of benefit and that is why I am thinking of an excisional biopsy. The patient understands the plan. All questions were answered. CONDITION ON DISCHARGE:  Stable. MD DEBBY Colon / PEE 
D: 09/21/2018 11:08    
T: 09/21/2018 11:17 
JOB #: [id number]

## 2018-09-21 NOTE — WOUND CARE
09/21/18 1059   Wound Buttocks Right   Date First Assessed/Time First Assessed: 06/15/18 0931   POA: Yes  Wound Type: Diabetic  Location: Buttocks  Orientation: Right   Dressing Type Applied (Marathon applied by Dr. Yolande Osgood.)   Wound Location: right gluteal  Wound Dressing Placed: [place]  Tolerated: well  Pain: denies  Discharged to: home  Discharged with: self  Assistive device: none    Follow up with Dr. Aracelis Muniz in 2 weeks.

## 2018-09-21 NOTE — WOUND CARE
Visit Vitals    /90 (BP 1 Location: Left arm)    Pulse 76    Temp 98.2 °F (36.8 °C)    Resp 16               09/21/18 1042   Wound Buttocks Right   Date First Assessed/Time First Assessed: 06/15/18 0931   POA: Yes  Wound Type: Diabetic  Location: Buttocks  Orientation: Right   DRESSING TYPE (desitin)   Wound Length (cm) 0.1 cm   Wound Width (cm) 0.1 cm   Wound Depth (cm) 0.1   Wound Surface area (cm^2) 0.01 cm^2   Change in Wound Size % 75   Condition of Base Pink   Condition of Edges Open   Drainage Amount  Scant   Drainage Color Serosanguinous   Wound Odor None   Periwound Skin Condition Intact; Erythema, blanchable   Cleansing and Cleansing Agents  Normal saline

## 2018-10-02 ENCOUNTER — HOSPITAL ENCOUNTER (OUTPATIENT)
Dept: WOUND CARE | Age: 68
Discharge: HOME | End: 2018-10-02
Payer: MEDICARE

## 2018-10-02 VITALS
HEART RATE: 80 BPM | SYSTOLIC BLOOD PRESSURE: 135 MMHG | DIASTOLIC BLOOD PRESSURE: 79 MMHG | TEMPERATURE: 97.9 F | RESPIRATION RATE: 16 BRPM

## 2018-10-02 PROCEDURE — 99213 OFFICE O/P EST LOW 20 MIN: CPT

## 2018-10-02 PROCEDURE — 99214 OFFICE O/P EST MOD 30 MIN: CPT

## 2018-10-02 NOTE — WOUND CARE
Wound Location: Right buttock Healed  Wound Dressing Placed: pt. will place desitin ointment at home.   Pain: denies      Discharged to: home  Discharged with: Self  Assistive device: Rollator

## 2018-10-02 NOTE — WOUND CARE
10/02/18 1041   Wound Buttocks Right   Date First Assessed/Time First Assessed: 06/15/18 0931   POA: Yes  Wound Type: Diabetic  Location: Buttocks  Orientation: Right   Wound Length (cm) 0 cm  (No open wound. Presents as red spot.)   Wound Width (cm) 0 cm  (No open wound. Presents as red spot.)   Wound Depth (cm) 0  (No open wound.  Presents as red spot.)   Wound Surface area (cm^2) 0 cm^2   Change in Wound Size % 100   Condition of Base Other (comment)  (Red, intact skin.)   Condition of Edges Closed   Tissue Type Red   Drainage Amount  None   Drainage Color Serosanguinous   Wound Odor None   Periwound Skin Condition Excoriated  (Dry, flaky skin.)   Cleansing and Cleansing Agents  Soap and water;Normal saline

## 2018-10-15 RX ORDER — PRAVASTATIN SODIUM 80 MG/1
TABLET ORAL
Qty: 90 TAB | Refills: 0 | Status: SHIPPED | OUTPATIENT
Start: 2018-10-15 | End: 2019-06-26

## 2018-10-16 RX ORDER — PRAVASTATIN SODIUM 80 MG/1
TABLET ORAL
Qty: 90 TAB | Refills: 0 | Status: SHIPPED | OUTPATIENT
Start: 2018-10-16 | End: 2018-10-22 | Stop reason: SDUPTHER

## 2018-10-19 NOTE — PROGRESS NOTES
[address] 
WOUND CARE PROGRESS NOTE Marianne Gaona 
MR#: [id number] : 1950 ACCOUNT #: [de-identified] DATE OF SERVICE: 10/02/2018 SUBJECTIVE:  The patient returns to the wound center with a chronic right buttock wound. His wound is actually healed, but is still painful. I am being asked for a second opinion regarding treatment. PHYSICAL EXAMINATION:  Reveals a completely healed wound of the right buttocks. It is mildly tender to palpation. There is no evidence of foreign body or open wound or infection. ASSESSMENT AND PLAN:  No open wound, infection, or evidence of foreign body. Recommend repositioning as needed and time. Excisional biopsy would likely lead to further complication and pain. He will follow up with Dr. Radha Salvador as scheduled. MD DAINA Lomas / [place] 
D: 10/19/2018 14:27 T: 10/19/2018 15:13 
JOB #: [id number]

## 2018-10-22 ENCOUNTER — OFFICE VISIT (OUTPATIENT)
Dept: ENDOCRINOLOGY | Age: 68
End: 2018-10-22

## 2018-10-22 VITALS
WEIGHT: 244.9 LBS | BODY MASS INDEX: 36.27 KG/M2 | HEART RATE: 75 BPM | DIASTOLIC BLOOD PRESSURE: 87 MMHG | SYSTOLIC BLOOD PRESSURE: 155 MMHG | HEIGHT: 69 IN

## 2018-10-22 DIAGNOSIS — E03.9 HYPOTHYROIDISM, UNSPECIFIED TYPE: ICD-10-CM

## 2018-10-22 DIAGNOSIS — E78.5 HYPERLIPIDEMIA, UNSPECIFIED HYPERLIPIDEMIA TYPE: ICD-10-CM

## 2018-10-22 DIAGNOSIS — E11.22 TYPE 2 DIABETES MELLITUS WITH STAGE 4 CHRONIC KIDNEY DISEASE, WITH LONG-TERM CURRENT USE OF INSULIN (HCC): Primary | ICD-10-CM

## 2018-10-22 DIAGNOSIS — N18.4 TYPE 2 DIABETES MELLITUS WITH STAGE 4 CHRONIC KIDNEY DISEASE, WITH LONG-TERM CURRENT USE OF INSULIN (HCC): Primary | ICD-10-CM

## 2018-10-22 DIAGNOSIS — I10 ESSENTIAL HYPERTENSION WITH GOAL BLOOD PRESSURE LESS THAN 130/80: ICD-10-CM

## 2018-10-22 DIAGNOSIS — Z79.4 TYPE 2 DIABETES MELLITUS WITH STAGE 4 CHRONIC KIDNEY DISEASE, WITH LONG-TERM CURRENT USE OF INSULIN (HCC): Primary | ICD-10-CM

## 2018-10-22 LAB — HBA1C MFR BLD HPLC: 6.3 %

## 2018-10-22 RX ORDER — MIRABEGRON 25 MG/1
TABLET, FILM COATED, EXTENDED RELEASE ORAL
Refills: 12 | COMMUNITY
Start: 2018-10-02 | End: 2021-03-19

## 2018-10-22 NOTE — PATIENT INSTRUCTIONS
Novolog 70-30:     34 units with breakfast  34 units with dinner    Correction Scale: 1 unit for every 50 above 150    IF GLUCOSE IS:                 THEN TAKE:      0   Extra Unit  151-200   1   Extra Unit  201-250   2   Extra Units  251-300   3   Extra Units  301-350   4   Extra Units  351-400   5   Extra Units    Example: My planned insulin dose:    ____ Units    +    ____ Extra Correction Units  =  ____ total units to take together as one injection. Please note our new policy, you must arrive to the clinic 15 minutes before your appointment time to allow enough time for proper check-in, adequate time to spend with your doctor, and also to respect the appointment time of the next patient. Not arriving 15 minutes in advance may result in having your appointment rescheduled for the next available day/time.  ----------------------------------------------------------------------------------------------------------------------    Below you will find a glucose log sheet which you can use to record your blood sugars. Without checking and recording what your home glucose levels are, it will be difficult to make any changes to your medication dose, even when significant changes may be needed. Please feel free to use the log below to record your home glucose levels. At the very least, I would like for you to login the entire 2-3 weeks just before your visit so we can make your visit much more productive and beneficial to you. GLUCOSE LOG SHEET:    Date Breakfast Lunch Dinner Bedtime Comments ? GLUCOSE LOG SHEET:    Date Breakfast Lunch Dinner Bedtime Comments ? GLUCOSE LOG SHEET:    Date Breakfast Lunch Dinner Bedtime Comments ?

## 2018-10-23 ENCOUNTER — OFFICE VISIT (OUTPATIENT)
Dept: CARDIOLOGY CLINIC | Age: 68
End: 2018-10-23

## 2018-10-23 ENCOUNTER — CLINICAL SUPPORT (OUTPATIENT)
Dept: CARDIOLOGY CLINIC | Age: 68
End: 2018-10-23

## 2018-10-23 VITALS
WEIGHT: 246.2 LBS | OXYGEN SATURATION: 98 % | RESPIRATION RATE: 16 BRPM | DIASTOLIC BLOOD PRESSURE: 82 MMHG | HEIGHT: 69 IN | HEART RATE: 77 BPM | SYSTOLIC BLOOD PRESSURE: 142 MMHG | BODY MASS INDEX: 36.46 KG/M2

## 2018-10-23 DIAGNOSIS — I10 ESSENTIAL HYPERTENSION: ICD-10-CM

## 2018-10-23 DIAGNOSIS — I50.22 CHRONIC SYSTOLIC HEART FAILURE (HCC): ICD-10-CM

## 2018-10-23 DIAGNOSIS — N18.4 TYPE 2 DIABETES MELLITUS WITH STAGE 4 CHRONIC KIDNEY DISEASE, WITH LONG-TERM CURRENT USE OF INSULIN (HCC): ICD-10-CM

## 2018-10-23 DIAGNOSIS — Z79.4 TYPE 2 DIABETES MELLITUS WITH STAGE 4 CHRONIC KIDNEY DISEASE, WITH LONG-TERM CURRENT USE OF INSULIN (HCC): ICD-10-CM

## 2018-10-23 DIAGNOSIS — I42.9 CARDIOMYOPATHY, UNSPECIFIED TYPE (HCC): ICD-10-CM

## 2018-10-23 DIAGNOSIS — E11.22 TYPE 2 DIABETES MELLITUS WITH STAGE 4 CHRONIC KIDNEY DISEASE, WITH LONG-TERM CURRENT USE OF INSULIN (HCC): ICD-10-CM

## 2018-10-23 DIAGNOSIS — I42.0 DILATED CARDIOMYOPATHY (HCC): ICD-10-CM

## 2018-10-23 DIAGNOSIS — I50.32 CHRONIC DIASTOLIC HEART FAILURE (HCC): ICD-10-CM

## 2018-10-23 DIAGNOSIS — Z98.890 H/O ATRIOVENTRICULAR NODAL ABLATION: ICD-10-CM

## 2018-10-23 DIAGNOSIS — I48.20 CHRONIC A-FIB (HCC): Primary | ICD-10-CM

## 2018-10-23 DIAGNOSIS — Z95.0 PACEMAKER: ICD-10-CM

## 2018-10-23 DIAGNOSIS — I25.10 CORONARY ARTERY DISEASE INVOLVING NATIVE CORONARY ARTERY OF NATIVE HEART WITHOUT ANGINA PECTORIS: ICD-10-CM

## 2018-10-23 DIAGNOSIS — Z95.0 CARDIAC PACEMAKER IN SITU: Primary | ICD-10-CM

## 2018-10-23 PROBLEM — E11.21 TYPE 2 DIABETES WITH NEPHROPATHY (HCC): Status: ACTIVE | Noted: 2018-10-23

## 2018-10-23 RX ORDER — METOPROLOL SUCCINATE 100 MG/1
100 TABLET, EXTENDED RELEASE ORAL DAILY
COMMUNITY
End: 2020-03-06

## 2018-10-23 NOTE — PROGRESS NOTES
Subjective:      Stephani Shane is a 70 y.o. male is here for device follow up. He is doing well. The patient denies chest pain/ shortness of breath, orthopnea, PND, LE edema, palpitations, syncope, presyncope or fatigue.        Patient Active Problem List    Diagnosis Date Noted    Type 2 diabetes with nephropathy ([address] 10/23/2018    Other foreign body or object entering through skin, subsequent encounter 08/10/2018    Stage II pressure ulcer of right buttock 06/15/2018    Cardiomyopathy ([address] 10/10/2017    Pacemaker 06/26/2017    H/O atrioventricular anastacio ablation 06/26/2017    JACEY (obstructive sleep apnea) 00/01/8415    Diastolic heart failure ([address] 06/22/2017    Irregular heart beat 03/22/2017    Fatigue 03/15/2017    Bilateral leg edema 03/15/2017    Counseling regarding advanced care planning and goals of care 03/15/2017    Hx of CABG 03/13/2017    Morbid obesity ([address] 03/13/2017    Diabetes mellitus type 2, controlled ([address] 03/13/2017    Heart failure ([address] 03/13/2017    SOB (shortness of breath) 03/07/2017    Chronic a-fib ([address] 03/07/2017    Coronary artery disease involving native coronary artery without angina pectoris 03/07/2017    CKD (chronic kidney disease) stage 4, GFR 15-29 ml/min ([address] 03/07/2017      Cally Karimi MD  Past Medical History:   Diagnosis Date    Arrhythmia     atrial fibrillation 2017    CAD (coronary atherosclerotic disease)     Chronic pain     Diabetes ([address]     Diabetes mellitus type 2, controlled ([address] 3/13/2017    Heart failure ([address]     Hx of CABG 3/13/2017    CABG in 2010 in [address] Hypertension     Morbid obesity ([address] 3/13/2017    JACEY (obstructive sleep apnea) 6/23/2017    S/P CABG x 2 2010    Thyroid disease       Past Surgical History:   Procedure Laterality Date    CARDIAC SURG PROCEDURE UNLIST      CABGx2 in 2010    HX ORTHOPAEDIC      L 3rd toe amputation in 1999     Allergies   Allergen Reactions    Allopurinol Rash    Gabapentin Drowsiness    Ciprofloxacin Nausea Only    Percocet [Oxycodone-Acetaminophen] Other (comments)     hallucinations      Family History   Problem Relation Age of Onset    Heart Attack Father     No Known Problems Mother     negative for cardiac disease  Social History     Socioeconomic History    Marital status:      Spouse name: Not on file    Number of children: Not on file    Years of education: Not on file    Highest education level: Not on file   Social Needs    Financial resource strain: Not on file    Food insecurity - worry: Not on file    Food insecurity - inability: Not on file   [employer] needs - medical: Not on file   [employer] needs - non-medical: Not on file   Occupational History    Not on file   Tobacco Use    Smoking status: Never Smoker    Smokeless tobacco: Never Used   Substance and Sexual Activity    Alcohol use: Yes     Comment: rare    Drug use: No    Sexual activity: Not on file   Other Topics Concern    Not on file   Social History Narrative    Not on file     Current Outpatient Medications   Medication Sig    metoprolol succinate (TOPROL-XL) 100 mg tablet Take 100 mg by mouth daily. Takes 150 mg daily    MYRBETRIQ 25 mg ER tablet TAKE 1 TABLET BY MOUTH EVERY DAY    pravastatin (PRAVACHOL) 80 mg tablet TAKE 1 TABLET BY MOUTH IN THE EVENING    metoprolol succinate (TOPROL-XL) 50 mg XL tablet Take 3 Tabs by mouth daily. (Patient taking differently: Take 50 mg by mouth daily. Takes 150 mg daily)    ELIQUIS 5 mg tablet TAKE 1 TABLET BY MOUTH TWICE A DAY    levothyroxine (SYNTHROID) 137 mcg tablet TAKE 1 TABLET(S) EVERY DAY BY ORAL ROUTE FOR 90 DAYS.  insulin aspart protamine/insulin aspart (NOVOLOG MIX 70/30) 100 unit/mL (70-30) inpn 34 units breakfast, 34 units dinner + correction scale (up to 80 units/day)    bumetanide (BUMEX) 2 mg tablet 1 tab in AM and 1/2 tab in the afternoon.     Insulin Needles, Disposable, (BD INSULIN PEN NEEDLE UF MINI) 31 gauge x 3/16\" ndle Use with Novolog 3 times per day    glucose blood VI test strips (PHARMACIST CHOICE) strip One Touch Ultra Strips; Check glucose 3-4x times daily, Diagnosis E11.22    Lancets misc Use preferred brand; Check glucose 3-4x times daily, Diagnosis E11.22    Insulin Needles, Disposable, (KAUSHIK PEN NEEDLE) 32 gauge x 5/32\" ndle Use with Novolog 70/30 pen    febuxostat (ULORIC) 40 mg tab tablet Take 40 mg by mouth daily.  potassium chloride SR (K-TAB) 20 mEq tablet Take 1 Tab by mouth daily. (Patient taking differently: Take 20 mEq by mouth daily. Take 2 20 mEq tabs for three weeks)    ketoconazole (NIZORAL) 2 % topical cream Apply  to affected area two (2) times daily as needed for Skin Irritation. Indications: rash    metOLazone (ZAROXOLYN) 2.5 mg tablet Take 2.5 mg by mouth daily.  calcium carbonate (CALTREX) 600 mg calcium (1,500 mg) tablet Take 600 mg by mouth every Tuesday, Thursday, Saturday & Sunday.  dutasteride (AVODART) 0.5 mg capsule TAKE ONE CAPSULE BY MOUTH EVERY DAY    tamsulosin (FLOMAX) 0.4 mg capsule TAKE 2 CAPSULES BY MOUTH EVERY DAY    paricalcitol (ZEMPLAR) 1 mcg capsule Take 1 mcg by mouth daily.  multivitamin (ONE A DAY) tablet Take 1 Tab by mouth daily.  cholecalciferol (VITAMIN D3) 1,000 unit cap Take 2,000 Units by mouth daily.  acetaminophen (TYLENOL EXTRA STRENGTH) 500 mg tablet Take 1,000 mg by mouth every eight (8) hours as needed for Pain. No current facility-administered medications for this visit. Vitals:    10/23/18 1143 10/23/18 1200   BP: 150/80 142/82   Pulse: 77    Resp: 16    SpO2: 98%    Weight: 246 lb 3.2 oz (111.7 kg)    Height: 5' 9\" (1.753 m)        I have reviewed the nurses notes, vitals, problem list, allergy list, medical history, family, social history and medications. Review of Symptoms:    General: Pt denies excessive weight gain or loss.  Pt is able to conduct ADL's  HEENT: Denies blurred vision, headaches, epistaxis and difficulty swallowing. Respiratory: Denies shortness of breath, BETTS, wheezing or stridor. Cardiovascular: Denies precordial pain, palpitations, edema or PND  Gastrointestinal: Denies poor appetite, indigestion, abdominal pain or blood in stool  Urinary: Denies dysuria, pyuria  Musculoskeletal: Denies pain or swelling from muscles or joints  Neurologic: Denies tremor, paresthesias, or sensory motor disturbance  Skin: Denies rash, itching or texture change. Psych: Denies depression      Physical Exam:      General: Well developed, in no acute distress. HEENT: Eyes - PERRL, no jvd  Heart:  Normal S1/S2 negative S3 or S4. Regular, no murmur, gallop or rub.   Respiratory: Clear bilaterally x 4, no wheezing or rales  Abdomen:   Soft, non-tender, bowel sounds are active.   Extremities:  No edema, normal cap refill, no cyanosis. Musculoskeletal: No clubbing  Neuro: A&Ox3, speech clear, gait stable. Skin: Skin color is normal. No rashes or lesions.  Non diaphoretic  Vascular: 2+ pulses symmetric in all extremities    Cardiographics    Ekg:  vpacing    Results for orders placed or performed during the hospital encounter of 06/22/17   EKG, 12 LEAD, INITIAL   Result Value Ref Range    Ventricular Rate 114 BPM    Atrial Rate 144 BPM    QRS Duration 102 ms    Q-T Interval 316 ms    QTC Calculation (Bezet) 435 ms    Calculated R Axis 92 degrees    Calculated T Axis -149 degrees    Diagnosis       Atrial fibrillation with rapid ventricular response  Rightward axis  Low voltage QRS  Incomplete right bundle branch block  Nonspecific ST abnormality  Confirmed by Rolan Brush ([id number]) on 6/22/2017 2:35:37 PM           Lab Results   Component Value Date/Time    WBC 6.5 06/27/2017 02:56 AM    HGB 12.0 (L) 06/27/2017 02:56 AM    HCT 39.8 06/27/2017 02:56 AM    PLATELET 405 (L) 21/65/4304 02:56 AM    MCV 84.3 06/27/2017 02:56 AM      Lab Results   Component Value Date/Time    Sodium 143 04/10/2018 08:43 AM    Potassium 4.2 04/10/2018 08:43 AM    Chloride 92 (L) 04/10/2018 08:43 AM    CO2 35 (H) 04/10/2018 08:43 AM    Anion gap 7 06/27/2017 02:56 AM    Glucose 136 (H) 04/10/2018 08:43 AM    BUN 87 (HH) 04/10/2018 08:43 AM    Creatinine 3.22 (H) 04/10/2018 08:43 AM    BUN/Creatinine ratio 27 (H) 04/10/2018 08:43 AM    GFR est AA 21 (L) 04/10/2018 08:43 AM    GFR est non-AA 19 (L) 04/10/2018 08:43 AM    Calcium 11.2 (H) 04/10/2018 08:43 AM    Bilirubin, total 0.4 04/10/2018 08:43 AM    AST (SGOT) 32 04/10/2018 08:43 AM    Alk. phosphatase 46 04/10/2018 08:43 AM    Protein, total 7.0 04/10/2018 08:43 AM    Albumin 3.8 04/10/2018 08:43 AM    Globulin 3.7 06/24/2017 03:32 AM    A-G Ratio 1.2 04/10/2018 08:43 AM    ALT (SGPT) 19 04/10/2018 08:43 AM         Assessment:     Assessment:        ICD-10-CM ICD-9-CM    1. Chronic a-fib (HCC) I48.2 427.31 AMB POC EKG ROUTINE W/ 12 LEADS, INTER & REP   2. Pacemaker Z95.0 V45.01    3. H/O atrioventricular anastacio ablation Z98.890 V15.1    4. Essential hypertension I10 401.9    5. Coronary artery disease involving native coronary artery of native heart without angina pectoris I25.10 414.01    6. Dilated cardiomyopathy (HCC) I42.0 425.4      Orders Placed This Encounter    AMB POC EKG ROUTINE W/ 12 LEADS, INTER & REP     Order Specific Question:   Reason for Exam:     Answer:   routine    metoprolol succinate (TOPROL-XL) 100 mg tablet     Sig: Take 100 mg by mouth daily. Takes 150 mg daily        Plan:   Mr. Larissa Crawford is here for annual device follow up. He is doing well without cardiac complaint. EKG shows vpacing and device interrogation demonstrates normal functioning with 100% BIV pacing. No episodes. Continue current medical therapy and follow up. Continue medical management for AF, HTN, CAD. Thank you for allowing me to participate in Pascual  's care. Keri Prater MD    Patient seen and examined by me with nurse practitioner.   I personally performed all components of the history, physical, and medical decision making and agree with the assessment and plan with minor modifications as noted. biv paced sp avn abl. Cont oac for AF. Cont med rx for htn and cad.     Yunier Chavez MD, [facility]

## 2018-10-30 ENCOUNTER — OFFICE VISIT (OUTPATIENT)
Dept: CARDIOLOGY CLINIC | Age: 68
End: 2018-10-30

## 2018-10-30 VITALS
RESPIRATION RATE: 18 BRPM | OXYGEN SATURATION: 95 % | HEART RATE: 75 BPM | DIASTOLIC BLOOD PRESSURE: 82 MMHG | HEIGHT: 69 IN | SYSTOLIC BLOOD PRESSURE: 138 MMHG | BODY MASS INDEX: 37.06 KG/M2 | WEIGHT: 250.2 LBS

## 2018-10-30 DIAGNOSIS — Z95.0 PACEMAKER: ICD-10-CM

## 2018-10-30 DIAGNOSIS — Z95.1 HX OF CABG: ICD-10-CM

## 2018-10-30 DIAGNOSIS — I10 ESSENTIAL HYPERTENSION: ICD-10-CM

## 2018-10-30 DIAGNOSIS — I42.0 DILATED CARDIOMYOPATHY (HCC): ICD-10-CM

## 2018-10-30 DIAGNOSIS — Z98.890 H/O ATRIOVENTRICULAR NODAL ABLATION: ICD-10-CM

## 2018-10-30 DIAGNOSIS — I27.20 PULMONARY HTN (HCC): ICD-10-CM

## 2018-10-30 DIAGNOSIS — I48.20 CHRONIC A-FIB (HCC): Primary | ICD-10-CM

## 2018-10-30 DIAGNOSIS — I25.10 CORONARY ARTERY DISEASE INVOLVING NATIVE CORONARY ARTERY OF NATIVE HEART WITHOUT ANGINA PECTORIS: ICD-10-CM

## 2018-10-30 RX ORDER — METOPROLOL SUCCINATE 50 MG/1
50 TABLET, EXTENDED RELEASE ORAL DAILY
Qty: 90 TAB | Refills: 3 | Status: SHIPPED | OUTPATIENT
Start: 2018-10-30 | End: 2019-03-13

## 2018-10-30 NOTE — PROGRESS NOTES
Nicolas Pearce MD          NAME:  Eve Blackwood   :   1950   MRN:   [id number]   PCP:  Vincent Doan MD           Subjective: The patient is a 70y.o. year old male who returns for a routine follow-up. Last seen by me 1 year ago. Patient states he is doing well overall. His only complaint is easy bleeding while on Eliquis. Patient has had his pacemaker in for 1 year and states the battery will be good for another 11 years. Since the last visit, patient reports no change in exercise tolerance, chest pain, edema, medication intolerance, palpitations, shortness of breath, PND/orthopnea wheezing, sputum, syncope, dizziness or light headedness. Doing well. Past Medical History:   Diagnosis Date    Arrhythmia     atrial fibrillation     CAD (coronary atherosclerotic disease)     Chronic pain     Diabetes [facility]     Diabetes mellitus type 2, controlled ([facility] 3/13/2017    Heart failure ([facility]     Hx of CABG 3/13/2017    CABG in  in [address] Hypertension     Morbid obesity ([facility] 3/13/2017    JACEY (obstructive sleep apnea) 2017    S/P CABG x 2     Thyroid disease         ICD-10-CM ICD-9-CM    1. Chronic a-fib (HCC) I48.2 427.31    2. Pacemaker Z95.0 V45.01    3. H/O atrioventricular anastacio ablation Z98.890 V15.1    4. Essential hypertension I10 401.9    5. Coronary artery disease involving native coronary artery of native heart without angina pectoris I25.10 414.01    6. Dilated cardiomyopathy (HCC) I42.0 425.4    7. Hx of CABG Z95.1 V45.81    8.  Pulmonary HTN ([facility]) I27.20 416.8       Social History     Tobacco Use    Smoking status: Never Smoker    Smokeless tobacco: Never Used   Substance Use Topics    Alcohol use: Yes     Comment: rare      Family History   Problem Relation Age of Onset    Heart Attack Father     No Known Problems Mother         Review of Systems  Cardiovascular: Negative except as noted in HPI      Objective:       Vitals:    10/30/18 1119 BP: 138/82   Pulse: 75   Resp: 18   SpO2: 95%   Weight: 250 lb 3.2 oz (113.5 kg)   Height: 5' 9\" (1.753 m)    Body mass index is 36.95 kg/m². General PE  Mental Status - Alert. General Appearance - Not in acute distress. Chest and Lung Exam   Inspection: Accessory muscles - No use of accessory muscles in breathing. Auscultation:   Breath sounds: - Normal.    Cardiovascular   Inspection: Jugular vein - Bilateral - Inspection Normal.  Palpation/Percussion:   Apical Impulse: - Normal.  Auscultation: Rhythm - Regular. Heart Sounds - S1 WNL and S2 WNL. No S3 or S4. Murmurs & Other Heart Sounds: Auscultation of the heart reveals - No Murmurs. Peripheral Vascular   Upper Extremity: Inspection - Bilateral - No Cyanotic nailbeds or Digital clubbing. Lower Extremity:   Palpation: Edema - Bilateral - No edema. Data Review:     EKG -  EKG 10/23/18: paced rhythm with underlying atrial fibrillation. LABS- @brieflabs@      Allergies reviewed  Allergies   Allergen Reactions    Allopurinol Rash    Gabapentin Drowsiness    Ciprofloxacin Nausea Only    Percocet [Oxycodone-Acetaminophen] Other (comments)     hallucinations       Medications reviewed  Current Outpatient Medications   Medication Sig    glucose blood VI test strips (PHARMACIST CHOICE) strip One Touch Ultra Strips; Check glucose 3 times daily, Diagnosis E11.22    metoprolol succinate (TOPROL-XL) 100 mg tablet Take 100 mg by mouth daily. Takes 150 mg daily    MYRBETRIQ 25 mg ER tablet TAKE 1 TABLET BY MOUTH EVERY DAY    pravastatin (PRAVACHOL) 80 mg tablet TAKE 1 TABLET BY MOUTH IN THE EVENING    metoprolol succinate (TOPROL-XL) 50 mg XL tablet Take 3 Tabs by mouth daily. (Patient taking differently: Take 50 mg by mouth daily. Takes 150 mg daily)    ELIQUIS 5 mg tablet TAKE 1 TABLET BY MOUTH TWICE A DAY    levothyroxine (SYNTHROID) 137 mcg tablet TAKE 1 TABLET(S) EVERY DAY BY ORAL ROUTE FOR 90 DAYS.     insulin aspart protamine/insulin aspart (NOVOLOG MIX 70/30) 100 unit/mL (70-30) inpn 34 units breakfast, 34 units dinner + correction scale (up to 80 units/day)    bumetanide (BUMEX) 2 mg tablet 1 tab in AM and 1/2 tab in the afternoon.  Insulin Needles, Disposable, (BD INSULIN PEN NEEDLE UF MINI) 31 gauge x 3/16\" ndle Use with Novolog 3 times per day    Lancets misc Use preferred brand; Check glucose 3-4x times daily, Diagnosis E11.22    Insulin Needles, Disposable, (KAUSHIK PEN NEEDLE) 32 gauge x 5/32\" ndle Use with Novolog 70/30 pen    febuxostat (ULORIC) 40 mg tab tablet Take 40 mg by mouth daily.  potassium chloride SR (K-TAB) 20 mEq tablet Take 1 Tab by mouth daily. (Patient taking differently: Take 20 mEq by mouth daily. Take 2 20 mEq tabs for three weeks)    ketoconazole (NIZORAL) 2 % topical cream Apply  to affected area two (2) times daily as needed for Skin Irritation. Indications: rash    metOLazone (ZAROXOLYN) 2.5 mg tablet Take 2.5 mg by mouth daily.  calcium carbonate (CALTREX) 600 mg calcium (1,500 mg) tablet Take 600 mg by mouth every Tuesday, Thursday, Saturday & Sunday.  dutasteride (AVODART) 0.5 mg capsule TAKE ONE CAPSULE BY MOUTH EVERY DAY    tamsulosin (FLOMAX) 0.4 mg capsule TAKE 2 CAPSULES BY MOUTH EVERY DAY    paricalcitol (ZEMPLAR) 1 mcg capsule Take 1 mcg by mouth daily.  multivitamin (ONE A DAY) tablet Take 1 Tab by mouth daily.  cholecalciferol (VITAMIN D3) 1,000 unit cap Take 2,000 Units by mouth daily.  acetaminophen (TYLENOL EXTRA STRENGTH) 500 mg tablet Take 1,000 mg by mouth every eight (8) hours as needed for Pain. No current facility-administered medications for this visit. Assessment:       ICD-10-CM ICD-9-CM    1. Chronic a-fib (HCC) I48.2 427.31    2. Pacemaker Z95.0 V45.01    3. H/O atrioventricular anastacio ablation Z98.890 V15.1    4. Essential hypertension I10 401.9    5.  Coronary artery disease involving native coronary artery of native heart without angina pectoris I25.10 414.01    6. Dilated cardiomyopathy (HCC) I42.0 425.4    7. Hx of CABG Z95.1 V45.81    8. Pulmonary HTN (HCC) I27.20 416.8         No orders of the defined types were placed in this encounter. Plan:     Mr. Sam Stevenson is doing well. CHF comp. No angina. He had some increased bleeding on L forearm due to Eliquis, which has resolved. Last EKG on 10/23/18 showed paced rhythm with underlying rate controlled AF. BP today is fine. Last pacer check was good. Follow up in 1 year or PRN. Written by Chinedu Logan, as dictated by Dr. Cary Beckett.     Rafael Escobar MD

## 2018-10-30 NOTE — PROGRESS NOTES
1. Have you been to the ER, urgent care clinic since your last visit? Hospitalized since your last visit? NO.    2. Have you seen or consulted any other health care providers outside of the [address] [place] [address] since your last visit? Include any pap smears or colon screening. NO. Chief Complaint   Patient presents with    Other     [facility] ON 10/23/18       PT NEEDS METOPROLOL 50 MG SENT TO PHARMACY.

## 2018-12-21 ENCOUNTER — HOSPITAL ENCOUNTER (OUTPATIENT)
Dept: WOUND CARE | Age: 68
Discharge: HOME | End: 2018-12-21
Payer: MEDICARE

## 2018-12-21 VITALS
TEMPERATURE: 97.5 F | RESPIRATION RATE: 16 BRPM | HEART RATE: 76 BPM | DIASTOLIC BLOOD PRESSURE: 65 MMHG | SYSTOLIC BLOOD PRESSURE: 107 MMHG

## 2018-12-21 PROCEDURE — 99213 OFFICE O/P EST LOW 20 MIN: CPT

## 2018-12-21 NOTE — PROGRESS NOTES
[address]  WOUND CARE PROGRESS NOTE    Austin Pablo.  MR#: [id number]  : 1950  ACCOUNT #: [de-identified]   DATE OF SERVICE: 2018    HISTORY OF PRESENT ILLNESS:  The patient was last seen by me at the [address] [facility] on  for wound of the right medial buttocks L89.311, in a type 2 diabetic, E11.622 and diaper dermatitis L22. At that point, he still had a sensation of pain. There was no visible wound. I applied [place] to the wound and asked him to return to see Dr. Cl Agrawal here at the [address] [facility],[address] for his impressions and recommendations. Dr. Cl Agrawal saw him on 10/02, saw no wound. No foreign body, no infection and recommended no further treatment. The patient continues to use a cushion as an air waffle pad to protect the area and was fine until about 2 weeks ago when he started noticing pain and throbbing in the same area. PHYSICAL EXAMINATION:  Her temperature today is 97.5, pulse 76, respirations 16, blood pressure 107/65. Examination of the area of concern shows no foreign body, no infection, and no open wound. It was elected to simply paint this area with  [place] again which seemed to be effective in the past.  I gave him another one to take at home to use in a few days whenever this treatment starts peeling off and he will call me with the report over the next 2 weeks. I do not want to make him come in if I am not seeing anything but he understands as I told him in the past that if he continues to have symptoms, I feel that the only other treatment that I could recommend would be to make a wide excision of the area, but we could get into difficulty if he has an area of bleeding, etc.  He understands the plan. He will continue to offload the area, but he will also use a pillow under the right buttocks to shift his weight to the left, so there is no pressure over this area.       MD DEBBY Osman / WILL  D: 2018 11:37     T: 2018 11:52  JOB #: [id number]

## 2018-12-23 RX ORDER — PEN NEEDLE, DIABETIC 31 GX3/16"
NEEDLE, DISPOSABLE MISCELLANEOUS
Qty: 300 PEN NEEDLE | Refills: 3 | Status: ON HOLD | OUTPATIENT
Start: 2018-12-23 | End: 2019-03-26 | Stop reason: CLARIF

## 2019-01-14 RX ORDER — PRAVASTATIN SODIUM 80 MG/1
TABLET ORAL
Qty: 90 TAB | Refills: 0 | Status: SHIPPED | OUTPATIENT
Start: 2019-01-14 | End: 2019-02-11 | Stop reason: SDUPTHER

## 2019-01-30 ENCOUNTER — CLINICAL SUPPORT (OUTPATIENT)
Dept: CARDIOLOGY CLINIC | Age: 69
End: 2019-01-30

## 2019-01-30 DIAGNOSIS — I48.20 CHRONIC A-FIB (HCC): ICD-10-CM

## 2019-01-30 DIAGNOSIS — Z95.0 PACEMAKER: Primary | ICD-10-CM

## 2019-01-30 DIAGNOSIS — I42.0 DILATED CARDIOMYOPATHY (HCC): ICD-10-CM

## 2019-01-31 RX ORDER — BUMETANIDE 1 MG/1
TABLET ORAL
Qty: 90 TAB | Refills: 3 | Status: SHIPPED | OUTPATIENT
Start: 2019-01-31 | End: 2019-03-23

## 2019-02-11 ENCOUNTER — OFFICE VISIT (OUTPATIENT)
Dept: ENDOCRINOLOGY | Age: 69
End: 2019-02-11

## 2019-02-11 VITALS
BODY MASS INDEX: 36.88 KG/M2 | HEIGHT: 69 IN | HEART RATE: 75 BPM | WEIGHT: 249 LBS | DIASTOLIC BLOOD PRESSURE: 88 MMHG | SYSTOLIC BLOOD PRESSURE: 161 MMHG

## 2019-02-11 DIAGNOSIS — N18.4 TYPE 2 DIABETES MELLITUS WITH STAGE 4 CHRONIC KIDNEY DISEASE, WITH LONG-TERM CURRENT USE OF INSULIN (HCC): Primary | ICD-10-CM

## 2019-02-11 DIAGNOSIS — E78.5 HYPERLIPIDEMIA, UNSPECIFIED HYPERLIPIDEMIA TYPE: ICD-10-CM

## 2019-02-11 DIAGNOSIS — I10 ESSENTIAL HYPERTENSION WITH GOAL BLOOD PRESSURE LESS THAN 130/80: ICD-10-CM

## 2019-02-11 DIAGNOSIS — E03.9 HYPOTHYROIDISM, UNSPECIFIED TYPE: ICD-10-CM

## 2019-02-11 DIAGNOSIS — Z79.4 TYPE 2 DIABETES MELLITUS WITH STAGE 4 CHRONIC KIDNEY DISEASE, WITH LONG-TERM CURRENT USE OF INSULIN (HCC): Primary | ICD-10-CM

## 2019-02-11 DIAGNOSIS — E11.22 TYPE 2 DIABETES MELLITUS WITH STAGE 4 CHRONIC KIDNEY DISEASE, WITH LONG-TERM CURRENT USE OF INSULIN (HCC): Primary | ICD-10-CM

## 2019-02-11 LAB — HBA1C MFR BLD HPLC: 6.7 %

## 2019-02-11 NOTE — PROGRESS NOTES
CHIEF COMPLAINT: f/u uncontrolled type 2 diabetes    HISTORY OF PRESENT ILLNESS:   Vania Miguel is a 78 y.o. male with a PMHx as noted below who presents for evaluation of uncontrolled type 2 diabetes. HISTORY    Mr. Dayana Torres was diagnosed with diabetes in 1999 at the time which he also had an amputation of the 3rd digit of left foot. His health is complicated by CHF, CAD, and CKD, follows with cardiology and nephrology. His A1c prior to initial visit was 8% and was noted to be on novolog 70/30 insulin. He started recording his sugars, and we were able to get his sugars under better control. INTERVAL HISTORY:   POC A1c today is 6.7%    Regimen:  Novolog 70-30 insulin.     34 units BID AC  Correction of 1:50>150    Review of home glucose:   Data Reviewed  Breakfast: 120-160 mostly  Dinner:  160-180 mostly  Bedtime:   mostly  Occasionally checked overnight, now lows at this time, that had some a few months ago  Lows: no values <70    Hypothyroidism: On 137 mcg of levothyroxine  Vitamin D deficiency on 3000 units daily, continues to take regularly    Review of most recent diabetes-related labs:  Lab Results   Component Value Date    HBA1C 7.0 (H) 04/10/2018    HBA1C 7.6 (H) 06/22/2017    HBA1C 8.0 (H) 03/13/2017    SML4MEBK 6.3 10/22/2018    FHT5IVDU 6.3 07/20/2018    OHC6LUKF 6.8 01/11/2018    CHOL 108 04/10/2018    LDLC 55 04/10/2018    LDL 43 04/03/2017    GFRAA 21 (L) 04/10/2018    GFRNA 19 (L) 04/10/2018    MCACR 93.0 (H) 04/10/2018    TSH 1.410 04/10/2018    VITD3 46.3 04/03/2017     Lab Key:  [id number] = IA-2 pancreatic islet cell autoantibody  CPEPL = C-peptide level  :EXT = External Lab  GADLT = ANGELIQUE-65 autoantibody   INSUL = Insulin level  MCACR (or MALBEXT) = Urine Microalbumin (or External UM)      PAST MEDICAL/SURGICAL HISTORY:   Past Medical History:   Diagnosis Date    Arrhythmia     atrial fibrillation 2017    CAD (coronary atherosclerotic disease)     Chronic pain     Diabetes ([address]  Diabetes mellitus type 2, [facility] 3/13/2017    Heart failure ([facility]     Hx of CABG 3/13/2017    CABG in 2010 in [address] Hypertension     Morbid obesity ([facility] 3/13/2017    JACEY (obstructive sleep apnea) 6/23/2017    S/P CABG x 2 2010    Thyroid disease      Past Surgical History:   Procedure Laterality Date    CARDIAC SURG PROCEDURE UNLIST      CABGx2 in 2010    HX ORTHOPAEDIC      L 3rd toe amputation in 1999       ALLERGIES:   Allergies   Allergen Reactions    Allopurinol Rash    Gabapentin Drowsiness    Ciprofloxacin Nausea Only    Percocet [Oxycodone-Acetaminophen] Other (comments)     hallucinations       MEDICATIONS ON ADMISSION:     Current Outpatient Medications:     bumetanide (BUMEX) 1 mg tablet, TAKE 1 TABLET BY MOUTH EVERY EVENING, Disp: 90 Tab, Rfl: 3    Insulin Needles, Disposable, (BD ULTRA-FINE MINI PEN NEEDLE) 31 gauge x 3/16\" ndle, USE WITH NOVOLOG 3 TIMES PER DAY, Disp: 300 Pen Needle, Rfl: 3    metoprolol succinate (TOPROL-XL) 50 mg XL tablet, Take 1 Tab by mouth daily. , Disp: 90 Tab, Rfl: 3    glucose blood VI test strips (PHARMACIST CHOICE) strip, One Touch Ultra Strips; Check glucose 3 times daily, Diagnosis E11.22, Disp: 300 Strip, Rfl: 3    metoprolol succinate (TOPROL-XL) 100 mg tablet, Take 100 mg by mouth daily.  Takes 150 mg daily, Disp: , Rfl:     MYRBETRIQ 25 mg ER tablet, TAKE 1 TABLET BY MOUTH EVERY DAY, Disp: , Rfl: 12    pravastatin (PRAVACHOL) 80 mg tablet, TAKE 1 TABLET BY MOUTH IN THE EVENING, Disp: 90 Tab, Rfl: 0    ELIQUIS 5 mg tablet, TAKE 1 TABLET BY MOUTH TWICE A DAY, Disp: 180 Tab, Rfl: 3    levothyroxine (SYNTHROID) 137 mcg tablet, TAKE 1 TABLET(S) EVERY DAY BY ORAL ROUTE FOR 90 DAYS., Disp: 90 Tab, Rfl: 3    insulin aspart protamine/insulin aspart (NOVOLOG MIX 70/30) 100 unit/mL (70-30) inpn, 34 units breakfast, 34 units dinner + correction scale (up to 80 units/day), Disp: 25 Pen, Rfl: 3    bumetanide (BUMEX) 2 mg tablet, 1 tab in AM and 1/2 tab in the afternoon. , Disp: 180 Tab, Rfl: 3    Lancets misc, Use preferred brand; Check glucose 3-4x times daily, Diagnosis E11.22, Disp: 4 Package, Rfl: 7    Insulin Needles, Disposable, (KAUSHIK PEN NEEDLE) 32 gauge x 5/32\" ndle, Use with Novolog 70/30 pen, Disp: 100 Pen Needle, Rfl: 3    febuxostat (ULORIC) 40 mg tab tablet, Take 40 mg by mouth daily. , Disp: , Rfl:     potassium chloride SR (K-TAB) 20 mEq tablet, Take 1 Tab by mouth daily. (Patient taking differently: Take 20 mEq by mouth daily. Take 2 20 mEq tabs for three weeks), Disp: 30 Tab, Rfl: 1    ketoconazole (NIZORAL) 2 % topical cream, Apply  to affected area two (2) times daily as needed for Skin Irritation. Indications: rash, Disp: , Rfl:     metOLazone (ZAROXOLYN) 2.5 mg tablet, Take 2.5 mg by mouth daily. , Disp: , Rfl:     tamsulosin (FLOMAX) 0.4 mg capsule, TAKE 2 CAPSULES BY MOUTH EVERY DAY, Disp: , Rfl: 2    multivitamin (ONE A DAY) tablet, Take 1 Tab by mouth daily. , Disp: , Rfl:     paricalcitol (ZEMPLAR) 1 mcg capsule, Take 1 mcg by mouth daily. , Disp: , Rfl:     acetaminophen (TYLENOL EXTRA STRENGTH) 500 mg tablet, Take 1,000 mg by mouth every eight (8) hours as needed for Pain., Disp: , Rfl:     SOCIAL HISTORY:   Social History     Socioeconomic History    Marital status:      Spouse name: Not on file    Number of children: Not on file    Years of education: Not on file    Highest education level: Not on file   Social Needs    Financial resource strain: Not on file    Food insecurity - worry: Not on file    Food insecurity - inability: Not on file   [employer] needs - medical: Not on file   [employer] needs - non-medical: Not on file   Occupational History    Not on file   Tobacco Use    Smoking status: Never Smoker    Smokeless tobacco: Never Used   Substance and Sexual Activity    Alcohol use: Yes     Comment: rare    Drug use: No    Sexual activity: Not on file   Other Topics Concern    Not on file   Social History Narrative    Not on file       FAMILY HISTORY:  Family History   Problem Relation Age of Onset    Heart Attack Father     No Known Problems Mother        REVIEW OF SYSTEMS: Complete ROS assessed and noted for that which is described above, all else are negative. Eyes: normal  ENT: normal  CVS: normal  Resp: normal  GI: normal  : normal  GYN: normal  Endocrine: normal  Integument: normal  Musculoskeletal: normal  Neuro: normal  Psych: normal    PHYSICAL EXAMINATION:    VITAL SIGNS:  Visit Vitals  /88 (BP 1 Location: Right arm, BP Patient Position: Sitting)   Pulse 75   Ht 5' 9\" (1.753 m)   Wt 249 lb (112.9 kg)   BMI 36.77 kg/m²       GENERAL: NCAT, Sitting comfortably, NAD  EYES: EOMI, non-icteric, no proptosis  Ear/Nose/Throat: NCAT, no inflammation, no masses  LYMPH NODES: No LAD  CARDIOVASCULAR: S1 S2, RRR, No murmur, 2+ radial pulses  RESPIRATORY: CTA b/l, no wheeze/rales  GASTROINTESTINAL:  NT, ND  MUSCULOSKELETAL: Normal ROM, no atrophy  SKIN: warm, no edema/rash/ or other skin changes  NEUROLOGIC: 5/5 power all extremities, see foot exam below, no tremor, AAOx3  PSYCHIATRIC: Normal affect, Normal insight and judgement    REVIEW OF LABORATORY AND RADIOLOGY DATA:   Labs and documentation have been reviewed as described above. ASSESSMENT AND PLAN:   Nany Gonzalez is a 78 y.o. male with a PMHx as noted above who presents for f/u evaluation of uncontrolled type 2 diabetes. Problems:  Type 2 diabetes  Hyperlipidemia  Hypertension  Hypothyroidism  Vitamin D deficiency     * He has been having pain in his fingers from finger sticks. He is inquiring about how to proceed. We discussed the freestyle jaqueline system. I believe he is a good candidate for this. I discussed and printed out the medicare buyers guide for his review. * He has inquired about trulicity. We discussed it.  I am not sure that it will replace all of the insulin he is currently taking, but may be compatible with his chronic kidney disease. He will think about it more and we will consider trial on the next visit. He is advised he may need to check with his insurance about his potential out of pocket costs. * DM / thyroid pre-labs ordered for next visit. PLAN  Type 2 Diabetes  Medications:  Novolog 70-30:   34 units / 34 units  Correction 1:50>150  Advised to check glucose qAM/Dinner/Bedtime  Provided with glucose log sheets for later review. HTN: BP usually generally stable on current medications  HLD: lipids reviewed, controlled, continue statin  Vitamin D deficiency: continue 3000 units D3 daily  Hypothyroidism: 137 mcg once daily, checking level with prelabs    RTC: I would like to see him back in 4 months.  >25 minutes spent together with patient today of which >50% of this time was spent in counseling and coordination of care. Rahul Gonzalez.  [address] [facility]

## 2019-03-13 ENCOUNTER — HOSPITAL ENCOUNTER (OUTPATIENT)
Dept: WOUND CARE | Age: 69
Discharge: HOME | End: 2019-03-13
Payer: MEDICARE

## 2019-03-13 VITALS
HEART RATE: 80 BPM | TEMPERATURE: 97.7 F | RESPIRATION RATE: 18 BRPM | SYSTOLIC BLOOD PRESSURE: 147 MMHG | DIASTOLIC BLOOD PRESSURE: 79 MMHG

## 2019-03-13 PROBLEM — L97.919 IDIOPATHIC CHRONIC VENOUS HYPERTENSION OF RIGHT LEG WITH ULCER (HCC): Status: ACTIVE | Noted: 2019-03-13

## 2019-03-13 PROBLEM — I87.311 IDIOPATHIC CHRONIC VENOUS HYPERTENSION OF RIGHT LEG WITH ULCER (HCC): Status: ACTIVE | Noted: 2019-03-13

## 2019-03-13 PROCEDURE — 99213 OFFICE O/P EST LOW 20 MIN: CPT

## 2019-03-13 PROCEDURE — 74011000250 HC RX REV CODE- 250: Performed by: ORTHOPAEDIC SURGERY

## 2019-03-13 PROCEDURE — 97597 DBRDMT OPN WND 1ST 20 CM/<: CPT

## 2019-03-13 RX ORDER — LIDOCAINE 40 MG/G
CREAM TOPICAL AS NEEDED
Status: DISCONTINUED | OUTPATIENT
Start: 2019-03-13 | End: 2019-03-16 | Stop reason: HOSPADM

## 2019-03-13 RX ORDER — DUTASTERIDE 0.5 MG/1
0.5 CAPSULE, LIQUID FILLED ORAL DAILY
COMMUNITY

## 2019-03-13 RX ADMIN — LIDOCAINE: 40 CREAM TOPICAL at 12:21

## 2019-03-13 NOTE — H&P
Wound Center  History and Physical    Date of Service: 3/13/2019     Date of Initial Visit for Current Problem:  3/13/2019    Subjective:     Chief Complaint:  Shawnee Woodward is a 78 y.o. abese, diabetic  male who presents with R lower leg medial wound of 1 weeks duration. Referred by:  Self    HPI:   Patient was on a cruise, when he developed a blister, which burst and became a wound. Wound caused by: See above. Current wound care:  Offloading wound: no  Appetite: good  Wound associated pain: mild  Diabetic: Yes  Smoker: No    Past Medical History:   Diagnosis Date    Arrhythmia     atrial fibrillation 2017    CAD (coronary atherosclerotic disease)     Chronic pain     Diabetes ([address]     Diabetes mellitus type 2, controlled ([address] 3/13/2017    [facility]     Hx of CABG 3/13/2017    CABG in 2010 in [address] Hypertension     Morbid obesity ([facility] 3/13/2017    JACEY (obstructive sleep apnea) 6/23/2017    S/P CABG x 2 2010    Thyroid disease       Past Surgical History:   Procedure Laterality Date    CARDIAC SURG PROCEDURE UNLIST      CABGx2 in 2010    HX ORTHOPAEDIC      L 3rd toe amputation in 1999     Family History   Problem Relation Age of Onset    Heart Attack Father     No Known Problems Mother       Social History     Tobacco Use    Smoking status: Never Smoker    Smokeless tobacco: Never Used   Substance Use Topics    Alcohol use: Yes     Comment: rare       Prior to Admission medications    Medication Sig Start Date End Date Taking? Authorizing Provider   dutasteride (AVODART) 0.5 mg capsule Take 0.5 mg by mouth daily.    Yes Provider, Historical   bumetanide (BUMEX) 1 mg tablet TAKE 1 TABLET BY MOUTH EVERY EVENING 1/31/19   Mariana Sanchez MD   Insulin Needles, Disposable, (BD ULTRA-FINE MINI PEN NEEDLE) 31 gauge x 3/16\" ndle USE WITH NOVOLOG 3 TIMES PER DAY 12/23/18   Kaitlin Amado MD   glucose blood VI test strips (PHARMACIST CHOICE) strip One Touch Ultra Strips; Check glucose 3 times daily, Diagnosis E11.22 10/24/18   Renae Javier MD   metoprolol succinate (TOPROL-XL) 100 mg tablet Take 100 mg by mouth daily. Takes 150 mg daily    Provider, Historical   MYRBETRIQ 25 mg ER tablet TAKE 1 TABLET BY MOUTH EVERY DAY 10/2/18   Provider, Historical   pravastatin (PRAVACHOL) 80 mg tablet TAKE 1 TABLET BY MOUTH IN THE EVENING 10/15/18   Jose E Sample, NP   ELIQUIS 5 mg tablet TAKE 1 TABLET BY MOUTH TWICE A DAY 6/25/18   Flavio Schafer MD   levothyroxine (SYNTHROID) 137 mcg tablet TAKE 1 TABLET(S) EVERY DAY BY ORAL ROUTE FOR 90 DAYS. 6/19/18   Renae Javier MD   insulin aspart protamine/insulin aspart (NOVOLOG MIX 70/30) 100 unit/mL (70-30) inpn 34 units breakfast, 34 units dinner + correction scale (up to 80 units/day) 4/19/18   Renae Javier MD   bumetanide (BUMEX) 2 mg tablet 1 tab in AM and 1/2 tab in the afternoon. Patient taking differently: Take 2 mg by mouth every morning. 1 tab in AM and 1/2 tab in the afternoon. 4/18/18   Jose E Sample, NP   Lancets misc Use preferred brand; Check glucose 3-4x times daily, Diagnosis E11.22 10/13/17   Renae Javier MD   Insulin Needles, Disposable, (KAUSHIK PEN NEEDLE) 32 gauge x 5/32\" ndle Use with Novolog 70/30 pen 10/13/17   Renae Javier MD   febuxostat (ULORIC) 40 mg tab tablet Take 40 mg by mouth daily. Provider, Historical   potassium chloride SR (K-TAB) 20 mEq tablet Take 1 Tab by mouth daily. Patient taking differently: Take 20 mEq by mouth daily. Take 2 20 mEq tabs for three weeks 6/27/17   Keira Serrano MD   ketoconazole (NIZORAL) 2 % topical cream Apply  to affected area two (2) times daily as needed for Skin Irritation. Indications: rash    Provider, Historical   metOLazone (ZAROXOLYN) 2.5 mg tablet Take 2.5 mg by mouth daily.     Provider, Historical   tamsulosin (FLOMAX) 0.4 mg capsule TAKE 2 CAPSULES BY MOUTH EVERY DAY 12/14/16   Provider, Historical   multivitamin (ONE A DAY) tablet Take 1 Tab by mouth daily. Provider, Historical   acetaminophen (TYLENOL EXTRA STRENGTH) 500 mg tablet Take 1,000 mg by mouth every eight (8) hours as needed for Pain. Provider, Historical     Allergies   Allergen Reactions    Allopurinol Rash    Gabapentin Drowsiness    Ciprofloxacin Nausea Only    Percocet [Oxycodone-Acetaminophen] Other (comments)     hallucinations        Review of Systems:  A comprehensive review of systems was negative except for that written in the History of Present Illness. and PMH. Objective:     Physical Exam:     Visit Vitals  /79 (BP 1 Location: Right arm, BP Patient Position: Sitting)   Pulse 80   Temp 97.7 °F (36.5 °C)   Resp 18     General: well developed, very well nourished, pleasant , NAD. Hygiene good  Psych: cooperative. Pleasant. No anxiety or depression. Normal mood and affect. Neuro: alert and oriented to person/place/situation. Otherwise nonfocal.  HEENT: Normocephalic, atraumatic. EOMI. Conjunctiva clear. No scleral icterus. Chest: Respirations nonlabored. Abdomen:  Nondistended. Lower extremities: color normal; temperature normal. Hair growth is not present. Calves are supple, nontender, approximately equally sized in comparison. Had formal ABIs in 2017 - incompressible vessels, TBI = 0.64. Ulcer Location: R lower leg medial   Etiology: venous stasis  Wound Length: 4.5 cm  Wound Width :0.5 cm  Wound Depth :0.1  Ulcer bed: Fibrotic slough over partial thickness skin injury. Periwound: Normal  Exudate: Moderate amount Serous exudate  Depth of Wound: Breakdown of Skin        Assessment:     78 y.o. male with R lower leg medial venous stasis ulcer. Plan:   Slough removed from wound with 4x4. Tolerated well. Discussed 3 layer wrap, which he states he has had before. Therefore, 3 layer wrap applied. Told to remove it himself or return if it becomes uncomfortable in any way, would then place double tubigrip.     Gave venous ulcer exercise sheet, sheet to purchase compression stockings and [place]. Dressing:  Xeroform, Exudry, 3 layer wrap. Patient understood and agrees with plan. Questions answered. Weekly visits and serial debridements also discussed.   Follow up with me in 1 week    Signed By: Lizzy Jane MD     March 13, 2019

## 2019-03-13 NOTE — WOUND CARE
Discharge note     03/13/19 1254   Wound Leg Lower Right;Medial   Date First Assessed/Time First Assessed: 03/13/19 1213   Wound Type: (c) Venous; Blister/bullae  Location: Leg Lower  Orientation: Right;Medial   Dressing Type Applied (xeroform, exudry, 3 layer wrap)   Discharged from wound center, ambulatory with rollator/assistive device, with spouse. Ositout Ring Has apt to return in 1 week. . Understands to contact wound center for any concerns about 3 layer wrap. Pain 0/10.

## 2019-03-17 NOTE — PROGRESS NOTES
CHIEF COMPLAINT: f/u uncontrolled type 2 diabetes    HISTORY OF PRESENT ILLNESS:   Tu Ramirez is a 70 y.o. male with a PMHx as noted below who presents for evaluation of uncontrolled type 2 diabetes. HISTORY    Mr. Sera Scott was diagnosed with diabetes in 1999 at the time which he also had an amputation of the 3rd digit of left foot. His health is complicated by CHF, CAD, and CKD, follows with cardiology and nephrology. His A1c prior to initial visit was 8% and was noted to be on novolog 70/30 insulin. He did not have a record of his home blood sugars and did not recall any of his home numbers and was requested to start monitoring home blood sugar for better diabetes management. INTERVAL HISTORY:   POC A1c previously 7.0%, remains at 6.3%    Regimen:  Novolog 70-30 insulin.     34 units BID AC  Correction of 1:50>150    Review of home glucose:   Data Reviewed  Breakfast: 130-160 mostly  Dinner:  140-180 mostly  Bedtime:  , variable  Lows: no values <70    Hypothyroidism: On 137 mcg of levothyroxine  Vitamin D deficiency on 3000 units daily, continues to take regularly    Review of most recent diabetes-related labs:  Lab Results   Component Value Date    HBA1C 7.0 (H) 04/10/2018    HBA1C 7.6 (H) 06/22/2017    HBA1C 8.0 (H) 03/13/2017    LWA2GQNP 6.3 07/20/2018    UBC8WWSH 6.8 01/11/2018    BOK0TXFM 7.1 10/13/2017    CHOL 108 04/10/2018    LDLC 55 04/10/2018    LDL 43 04/03/2017    GFRAA 21 (L) 04/10/2018    GFRNA 19 (L) 04/10/2018    MCACR 93.0 (H) 04/10/2018    TSH 1.410 04/10/2018    VITD3 46.3 04/03/2017     Lab Key:  [id number] = IA-2 pancreatic islet cell autoantibody  CPEPL = C-peptide level  :EXT = External Lab  GADLT = ANGELIQUE-65 autoantibody   INSUL = Insulin level  MCACR (or MALBEXT) = Urine Microalbumin (or External UM)      PAST MEDICAL/SURGICAL HISTORY:   Past Medical History:   Diagnosis Date    Arrhythmia     atrial fibrillation 2017    CAD (coronary atherosclerotic disease)     Chronic pain     [facility]     Diabetes mellitus type 2, controlled ([facility] 3/13/2017    Heart failure ([facility]     Hx of CABG 3/13/2017    CABG in 2010 in [address] Hypertension     Morbid obesity ([facility] 3/13/2017    JACEY (obstructive sleep apnea) 6/23/2017    S/P CABG x 2 2010    Thyroid disease      Past Surgical History:   Procedure Laterality Date    CARDIAC SURG PROCEDURE UNLIST      CABGx2 in 2010    HX ORTHOPAEDIC      L 3rd toe amputation in 1999       ALLERGIES:   Allergies   Allergen Reactions    Allopurinol Rash    Gabapentin Drowsiness    Ciprofloxacin Nausea Only    Percocet [Oxycodone-Acetaminophen] Other (comments)     hallucinations       MEDICATIONS ON ADMISSION:     Current Outpatient Medications:     MYRBETRIQ 25 mg ER tablet, TAKE 1 TABLET BY MOUTH EVERY DAY, Disp: , Rfl: 12    pravastatin (PRAVACHOL) 80 mg tablet, TAKE 1 TABLET BY MOUTH IN THE EVENING, Disp: 90 Tab, Rfl: 0    metoprolol succinate (TOPROL-XL) 50 mg XL tablet, Take 3 Tabs by mouth daily. , Disp: 270 Tab, Rfl: 1    ELIQUIS 5 mg tablet, TAKE 1 TABLET BY MOUTH TWICE A DAY, Disp: 180 Tab, Rfl: 3    levothyroxine (SYNTHROID) 137 mcg tablet, TAKE 1 TABLET(S) EVERY DAY BY ORAL ROUTE FOR 90 DAYS., Disp: 90 Tab, Rfl: 3    insulin aspart protamine/insulin aspart (NOVOLOG MIX 70/30) 100 unit/mL (70-30) inpn, 34 units breakfast, 34 units dinner + correction scale (up to 80 units/day), Disp: 25 Pen, Rfl: 3    bumetanide (BUMEX) 2 mg tablet, 1 tab in AM and 1/2 tab in the afternoon. , Disp: 180 Tab, Rfl: 3    diclofenac (VOLTAREN) 1 % gel, Apply two grams by topical route four times daily to the affected area (s)., Disp: , Rfl:     Insulin Needles, Disposable, (BD INSULIN PEN NEEDLE UF MINI) 31 gauge x 3/16\" ndle, Use with Novolog 3 times per day, Disp: 300 Pen Needle, Rfl: 3    glucose blood VI test strips (PHARMACIST CHOICE) strip, One Touch Ultra Strips;  Check glucose 3-4x times daily, Diagnosis E11.22, Disp: 400 Strip, Rfl: 3    Lancets misc, Use preferred brand; Check glucose 3-4x times daily, Diagnosis E11.22, Disp: 4 Package, Rfl: 7    Insulin Needles, Disposable, (KAUSHIK PEN NEEDLE) 32 gauge x 5/32\" ndle, Use with Novolog 70/30 pen, Disp: 100 Pen Needle, Rfl: 3    febuxostat (ULORIC) 40 mg tab tablet, Take 40 mg by mouth daily. , Disp: , Rfl:     potassium chloride SR (K-TAB) 20 mEq tablet, Take 1 Tab by mouth daily. (Patient taking differently: Take 20 mEq by mouth daily. Take 2 20 mEq tabs for three weeks), Disp: 30 Tab, Rfl: 1    ketoconazole (NIZORAL) 2 % topical cream, Apply  to affected area two (2) times daily as needed for Skin Irritation. Indications: rash, Disp: , Rfl:     metOLazone (ZAROXOLYN) 2.5 mg tablet, Take 2.5 mg by mouth daily. , Disp: , Rfl:     calcium carbonate (CALTREX) 600 mg calcium (1,500 mg) tablet, Take 600 mg by mouth every Tuesday, Thursday, Saturday & Sunday. , Disp: , Rfl:     dutasteride (AVODART) 0.5 mg capsule, TAKE ONE CAPSULE BY MOUTH EVERY DAY, Disp: , Rfl: 2    tamsulosin (FLOMAX) 0.4 mg capsule, TAKE 2 CAPSULES BY MOUTH EVERY DAY, Disp: , Rfl: 2    paricalcitol (ZEMPLAR) 1 mcg capsule, Take 1 mcg by mouth daily. , Disp: , Rfl:     multivitamin (ONE A DAY) tablet, Take 1 Tab by mouth daily. , Disp: , Rfl:     cholecalciferol (VITAMIN D3) 1,000 unit cap, Take 2,000 Units by mouth daily. , Disp: , Rfl:     acetaminophen (TYLENOL EXTRA STRENGTH) 500 mg tablet, Take 1,000 mg by mouth every eight (8) hours as needed for Pain., Disp: , Rfl:     SOCIAL HISTORY:   Social History     Socioeconomic History    Marital status:      Spouse name: Not on file    Number of children: Not on file    Years of education: Not on file    Highest education level: Not on file   Social Needs    Financial resource strain: Not on file    Food insecurity - worry: Not on file    Food insecurity - inability: Not on file   NMRKT needs - medical: Not on file   NMRKT needs - non-medical: Not on file   Occupational History    Not on file   Tobacco Use    Smoking status: Never Smoker    Smokeless tobacco: Never Used   Substance and Sexual Activity    Alcohol use: Yes     Comment: rare    Drug use: No    Sexual activity: Not on file   Other Topics Concern    Not on file   Social History Narrative    Not on file       FAMILY HISTORY:  Family History   Problem Relation Age of Onset    Heart Attack Father     No Known Problems Mother        REVIEW OF SYSTEMS: Complete ROS assessed and noted for that which is described above, all else are negative. Eyes: normal  ENT: normal  CVS: normal  Resp: normal  GI: normal  : normal  GYN: normal  Endocrine: normal  Integument: normal  Musculoskeletal: normal  Neuro: normal  Psych: normal      PHYSICAL EXAMINATION:    VITAL SIGNS:  Visit Vitals  /87 (BP 1 Location: Right arm, BP Patient Position: Sitting)   Pulse 75   Ht 5' 9\" (1.753 m)   Wt 244 lb 14.4 oz (111.1 kg)   BMI 36.17 kg/m²       GENERAL: NCAT, Sitting comfortably, NAD  EYES: EOMI, non-icteric, no proptosis  Ear/Nose/Throat: NCAT, no inflammation, no masses  LYMPH NODES: No LAD  CARDIOVASCULAR: S1 S2, RRR, No murmur, 2+ radial pulses  RESPIRATORY: CTA b/l, no wheeze/rales  GASTROINTESTINAL:  NT, ND  MUSCULOSKELETAL: Normal ROM, no atrophy  SKIN: warm, no edema/rash/ or other skin changes  NEUROLOGIC: 5/5 power all extremities, see foot exam below, no tremor, AAOx3  PSYCHIATRIC: Normal affect, Normal insight and judgement    REVIEW OF LABORATORY AND RADIOLOGY DATA:   Labs and documentation have been reviewed as described above. ASSESSMENT AND PLAN:   Pascual Harper is a 70 y.o. male with a PMHx as noted above who presents for f/u evaluation of uncontrolled type 2 diabetes.      Problems:  Type 2 diabetes  Hyperlipidemia  Hypertension  Hypothyroidism  Vitamin D deficiency     PLAN  Type 2 Diabetes  Medications:  Novolog 70-30:   34 units / 34 units  Correction 1:50>150  Advised to check glucose qAM/Dinner/Bedtime  Provided with glucose log sheets for later review. HTN: BP usually generally stable on current medications  HLD: lipids reviewed, controlled, continue statin  Vitamin D deficiency: continue 3000 units D3 daily  Hypothyroidism: 137 mcg once daily, prior levels stable in April 2018. Remains clinically euthyroid today. RTC: I would like to see him back in 3 months.  >25 minutes spent together with patient today of which >50% of this time was spent in counseling and coordination of care. Sujit Mendiola.  [id number] [facility] Private car

## 2019-03-18 RX ORDER — PRAVASTATIN SODIUM 80 MG/1
TABLET ORAL
Qty: 90 TAB | Refills: 0 | Status: SHIPPED | OUTPATIENT
Start: 2019-03-18 | End: 2019-03-19 | Stop reason: SDUPTHER

## 2019-03-19 ENCOUNTER — OFFICE VISIT (OUTPATIENT)
Dept: CARDIOLOGY CLINIC | Age: 69
End: 2019-03-19

## 2019-03-19 VITALS
BODY MASS INDEX: 37.62 KG/M2 | RESPIRATION RATE: 18 BRPM | HEIGHT: 69 IN | HEART RATE: 75 BPM | WEIGHT: 254 LBS | OXYGEN SATURATION: 92 % | DIASTOLIC BLOOD PRESSURE: 66 MMHG | SYSTOLIC BLOOD PRESSURE: 140 MMHG

## 2019-03-19 DIAGNOSIS — I42.0 DILATED CARDIOMYOPATHY (HCC): ICD-10-CM

## 2019-03-19 DIAGNOSIS — I10 ESSENTIAL HYPERTENSION: ICD-10-CM

## 2019-03-19 DIAGNOSIS — N18.4 CKD (CHRONIC KIDNEY DISEASE) STAGE 4, GFR 15-29 ML/MIN (HCC): ICD-10-CM

## 2019-03-19 DIAGNOSIS — I25.10 CORONARY ARTERY DISEASE INVOLVING NATIVE CORONARY ARTERY OF NATIVE HEART WITHOUT ANGINA PECTORIS: ICD-10-CM

## 2019-03-19 DIAGNOSIS — R06.02 SOB (SHORTNESS OF BREATH): ICD-10-CM

## 2019-03-19 DIAGNOSIS — I27.20 PULMONARY HTN (HCC): ICD-10-CM

## 2019-03-19 DIAGNOSIS — Z95.1 HX OF CABG: ICD-10-CM

## 2019-03-19 DIAGNOSIS — R63.5 WEIGHT GAIN: ICD-10-CM

## 2019-03-19 DIAGNOSIS — I48.20 CHRONIC A-FIB (HCC): ICD-10-CM

## 2019-03-19 DIAGNOSIS — Z95.0 PACEMAKER: Primary | ICD-10-CM

## 2019-03-19 RX ORDER — INSULIN ASPART 100 [IU]/ML
INJECTION, SUSPENSION SUBCUTANEOUS
Qty: 25 PEN | Refills: 3 | Status: SHIPPED | OUTPATIENT
Start: 2019-03-19 | End: 2019-04-16 | Stop reason: SDUPTHER

## 2019-03-19 NOTE — PROGRESS NOTES
Heriberto Anderson MD      NAME:  Augustine Early :   1950 MRN:   [id number] PCP:  Dylan Pierre MD 
 
    
 
Subjective: The patient is a 78y.o. year old male who returns for a routine follow-up. Last seen by me 5 months prior. Mr. Donavan Razo reports increased swelling with associated shortness of breath. He states he returned from a cruise one week prior. He is adherent with Bumex 1 mg QAM and 2 mg QPM. He has also taken Metolazone 2.5 mg every day since the cruise, and states his weight has been trending up. He has a wound on his right leg, followed by wound care. Patient denies any exertional chest pain, palpitations, syncope, or paroxysmal nocturnal dyspnea. Past Medical History:  
Diagnosis Date  Arrhythmia   
 atrial fibrillation   CAD (coronary atherosclerotic disease)  Chronic pain  Diabetes [facility]  Diabetes mellitus type 2, controlled ([facility] 3/13/2017  Heart failure ([facility]  Hx of CABG 3/13/2017 CABG in  in [place]  Hypertension  Morbid obesity ([facility] 3/13/2017  JACEY (obstructive sleep apnea) 2017  S/P CABG x 2   Thyroid disease ICD-10-CM ICD-9-CM 1. Pacemaker Z95.0 V45.01 AMB POC EKG ROUTINE W/ 12 LEADS, INTER & REP 2. Chronic a-fib ([facility]) I48.2 427.31 AMB POC EKG ROUTINE W/ 12 LEADS, INTER & REP 3. Dilated cardiomyopathy ([facility]) I42.0 425.4 AMB POC EKG ROUTINE W/ 12 LEADS, INTER & REP 4. Essential hypertension I10 401.9 AMB POC EKG ROUTINE W/ 12 LEADS, INTER & REP 5. Coronary artery disease involving native coronary artery of native heart without angina pectoris I25.10 414.01 AMB POC EKG ROUTINE W/ 12 LEADS, INTER & REP 6. Hx of CABG Z95.1 V45.81 AMB POC EKG ROUTINE W/ 12 LEADS, INTER & REP 7. Pulmonary HTN ([facility]) I27.20 416.8 AMB POC EKG ROUTINE W/ 12 LEADS, INTER & REP 8. CKD (chronic kidney disease) stage 4, GFR 15-29 ml/min ([facility]) N18.4 585.4 9. Weight gain R63.5 783.1 10. SOB (shortness of breath) R06.02 786.05 Social History Tobacco Use  Smoking status: Never Smoker  Smokeless tobacco: Never Used Substance Use Topics  Alcohol use: Yes Comment: rare Family History Problem Relation Age of Onset  Heart Attack Father  No Known Problems Mother Review of SystemsCardiovascular: Negative except as noted in HPI Objective:  
 
 
Vitals:  
 03/19/19 1104 BP: 140/66 Pulse: 75 Resp: 18 SpO2: 92% Weight: 254 lb (115.2 kg) Height: 5' 9\" (1.753 m) Body mass index is 37.51 kg/m². Dulcie Sami Mental Status - Alert. General Appearance - Not in acute distress. Chest and Lung Exam  Inspection: Accessory muscles - No use of accessory muscles in breathing. Auscultation:  
Breath sounds: - Normal. 
 
Cardiovascular  
Inspection: Jugular vein - Bilateral - Inspection Normal. 
Palpation/Percussion:  
Apical Impulse: - Normal. 
Auscultation: Rhythm - Regular. Heart Sounds - S1 WNL and S2 WNL. No S3 or S4. Murmurs & Other Heart Sounds: Auscultation of the heart reveals - No Murmurs. Peripheral Vascular  
Upper Extremity: Inspection - Bilateral - No Cyanotic nailbeds or Digital clubbing. Lower Extremity:  
Palpation: Edema - Bilateral - No edema. Data Review:  
 
EKG -  EKG 10/23/18: paced rhythm with underlying atrial fibrillation. LABS- @brieflabs@ Allergies reviewed Allergies Allergen Reactions  Allopurinol Rash  Gabapentin Drowsiness  Ciprofloxacin Nausea Only  Percocet [Oxycodone-Acetaminophen] Other (comments)  
  hallucinations Medications reviewed Current Outpatient Medications Medication Sig  dutasteride (AVODART) 0.5 mg capsule Take 0.5 mg by mouth daily.   
 bumetanide (BUMEX) 1 mg tablet TAKE 1 TABLET BY MOUTH EVERY EVENING  
 Insulin Needles, Disposable, (BD ULTRA-FINE MINI PEN NEEDLE) 31 gauge x 3/16\" ndle USE WITH NOVOLOG 3 TIMES PER DAY  
  glucose blood VI test strips (PHARMACIST CHOICE) strip One Touch Ultra Strips; Check glucose 3 times daily, Diagnosis E11.22  
 metoprolol succinate (TOPROL-XL) 100 mg tablet Take 100 mg by mouth daily. Takes 150 mg daily  MYRBETRIQ 25 mg ER tablet TAKE 1 TABLET BY MOUTH EVERY DAY  pravastatin (PRAVACHOL) 80 mg tablet TAKE 1 TABLET BY MOUTH IN THE EVENING  
 ELIQUIS 5 mg tablet TAKE 1 TABLET BY MOUTH TWICE A DAY  levothyroxine (SYNTHROID) 137 mcg tablet TAKE 1 TABLET(S) EVERY DAY BY ORAL ROUTE FOR 90 DAYS.  bumetanide (BUMEX) 2 mg tablet 1 tab in AM and 1/2 tab in the afternoon. (Patient taking differently: Take 2 mg by mouth every morning. 1 tab in AM and 1 tab in the afternoon.)  Lancets misc Use preferred brand; Check glucose 3-4x times daily, Diagnosis E11.22  
 Insulin Needles, Disposable, (KAUSHIK PEN NEEDLE) 32 gauge x 5/32\" ndle Use with Novolog 70/30 pen  febuxostat (ULORIC) 40 mg tab tablet Take 40 mg by mouth daily.  potassium chloride SR (K-TAB) 20 mEq tablet Take 1 Tab by mouth daily. (Patient taking differently: Take 20 mEq by mouth daily. Take 2 20 mEq tabs for three weeks)  ketoconazole (NIZORAL) 2 % topical cream Apply  to affected area two (2) times daily as needed for Skin Irritation. Indications: rash  metOLazone (ZAROXOLYN) 2.5 mg tablet Take 2.5 mg by mouth daily.  tamsulosin (FLOMAX) 0.4 mg capsule TAKE 2 CAPSULES BY MOUTH EVERY DAY  multivitamin (ONE A DAY) tablet Take 1 Tab by mouth daily.  acetaminophen (TYLENOL EXTRA STRENGTH) 500 mg tablet Take 1,000 mg by mouth every eight (8) hours as needed for Pain.  insulin aspart protamine/insulin aspart (NOVOLOG MIX 70/30) 100 unit/mL (70-30) inpn 34 units breakfast, 34 units dinner + correction scale (up to 80 units/day) No current facility-administered medications for this visit. Assessment: ICD-10-CM ICD-9-CM 1.  Pacemaker Z95.0 V45.01 AMB POC EKG ROUTINE W/ 12 LEADS, INTER & REP  
 2. Chronic a-fib ([facility]) I48.2 427.31 AMB POC EKG ROUTINE W/ 12 LEADS, INTER & REP 3. Dilated cardiomyopathy ([facility]) I42.0 425.4 AMB POC EKG ROUTINE W/ 12 LEADS, INTER & REP 4. Essential hypertension I10 401.9 AMB POC EKG ROUTINE W/ 12 LEADS, INTER & REP 5. Coronary artery disease involving native coronary artery of native heart without angina pectoris I25.10 414.01 AMB POC EKG ROUTINE W/ 12 LEADS, INTER & REP 6. Hx of CABG Z95.1 V45.81 AMB POC EKG ROUTINE W/ 12 LEADS, INTER & REP 7. Pulmonary HTN ([facility]) I27.20 416.8 AMB POC EKG ROUTINE W/ 12 LEADS, INTER & REP 8. CKD (chronic kidney disease) stage 4, GFR 15-29 ml/min ([facility]) N18.4 585.4 9. Weight gain R63.5 783.1 10. SOB (shortness of breath) R06.02 786.05 Orders Placed This Encounter  AMB POC EKG ROUTINE W/ 12 LEADS, INTER & REP Order Specific Question:   Reason for Exam: Answer:   ROUTINE Plan: Mr. Valentin Morrison reports increased swelling with some associated SOB. Per chart review, he has a 10 lb weight gain since his last appointment. Last echo was 10/2017 was normal. BP today is 140/66. I advised him to see his nephrologist for CKD. I informed him he could increase Metolazone to 5 mg for 1-2 days to see if swelling improves. Continue Bumex 1 mg QAM and 2 mg QPM.    
 
Follow up 1 month after he sees his Nephrologist.  
 
Written by Nasim Bravo, as dictated by Dr. Alonzo Boyd.  
 
Jia Orozco MD

## 2019-03-19 NOTE — PROGRESS NOTES
1. Have you been to the ER, urgent care clinic since your last visit? Hospitalized since your last visit? NO. 
 
2. Have you seen or consulted any other health care providers outside of the [address] [place] [address] since your last visit? Include any pap smears or colon screening. NO. Chief Complaint Patient presents with  Follow-up LAST SEEN OCT. 2018-ESTEPHANIAOE, 10-15 LBS-WOUND ON  RT LEG THAT WOUND CARE IS TREATING

## 2019-03-20 ENCOUNTER — HOSPITAL ENCOUNTER (OUTPATIENT)
Dept: WOUND CARE | Age: 69
Discharge: HOME | End: 2019-03-20
Payer: MEDICARE

## 2019-03-20 VITALS
HEART RATE: 73 BPM | RESPIRATION RATE: 16 BRPM | DIASTOLIC BLOOD PRESSURE: 60 MMHG | TEMPERATURE: 97 F | SYSTOLIC BLOOD PRESSURE: 128 MMHG

## 2019-03-20 PROCEDURE — 97597 DBRDMT OPN WND 1ST 20 CM/<: CPT

## 2019-03-20 NOTE — WOUND CARE
03/20/19 1245 Wound Leg Lower Right;Medial  
Date First Assessed/Time First Assessed: 03/13/19 1213   Wound Type: (c) Venous; Blister/bullae  Location: Leg Lower  Orientation: Right;Medial  
Dressing Type Applied Silver products; Alginate; Absorptive; Compression Wrap/Venous Stasis (Aquacel Ag,exudry, 3 layers A&D) Wound Procedure Type Selective Debridement Procedure Time Out 3526 Consent Obtained  Yes Procedure Bleeding Moderate Procedure Hemostasis  Pressure Procedure Instrument  Curette Procedure Pain Scale Numeric 0/10 Debridement Procedure Performed by Dr Coronado [place] Procedure Procedure Pain Scale Numeric 0/10 Procedure Tolerated Well Patient was discharged with Self to home and was with walker. In stable condition with c/o pain:__0/10_

## 2019-03-20 NOTE — WOUND CARE
03/20/19 1226 Wound Leg Lower Right;Medial  
Date First Assessed/Time First Assessed: 03/13/19 1213   Wound Type: (c) Venous; Blister/bullae  Location: Leg Lower  Orientation: Right;Medial  
Dressing Status  Removed Dressing Type  Compression Wrap/Venous Stasis Wound Length (cm) 4.5 cm Wound Width (cm) 3 cm Wound Depth (cm) 0.1 Wound Surface area (cm^2) 13.5 cm^2 Change in Wound Size % -500 Drainage Amount  Moderate Drainage Color Serosanguinous Wound Odor None Periwound Skin Condition Macerated Cleansing and Cleansing Agents  Soap and water;Normal saline

## 2019-03-20 NOTE — PROGRESS NOTES
Wound Center Progress Note 
  
Date of Service: 3/20/2019  
  
Date of Initial Visit for Current Problem:  3/13/2019 
  
Subjective:  
  
Chief Complaint: 
Dillan Breen is a 78 y.o. abese, diabetic  male who presents with R lower leg medial wound of 1 weeks duration. 
  
Referred by:  Self 
  
HPI:   Patient was on a cruise, when he developed a blister, which burst and became a wound. Wound caused by: See above. Current wound care: 
Offloading wound: no Appetite: good Wound associated pain: mild Diabetic: Yes Smoker: No 
  
    
Past Medical History:  
Diagnosis Date  Arrhythmia    
  atrial fibrillation 2017  CAD (coronary atherosclerotic disease)    
 Chronic pain    
 Diabetes ([address]    
 Diabetes mellitus type 2, controlled ([address] 3/13/2017  Heart failure ([facility]    
 Hx of CABG 3/13/2017  
  CABG in 2010 in [place]  Hypertension    
 Morbid obesity ([facility] 3/13/2017  JACEY (obstructive sleep apnea) 6/23/2017  S/P CABG x 2 2010  Thyroid disease    
  
     
Past Surgical History:  
Procedure Laterality Date  CARDIAC SURG PROCEDURE UNLIST      
  CABGx2 in 2010  HX ORTHOPAEDIC      
  L 3rd toe amputation in 1999 Allergies Allergen Reactions  Allopurinol Rash  Gabapentin Drowsiness  Ciprofloxacin Nausea Only  Percocet [Oxycodone-Acetaminophen] Other (comments)  
    hallucinations  
  
  
Review of Systems: A comprehensive review of systems was negative except for that written in the History of Present Illness. and PMH.  
  
Objective:  
  
Physical Exam:  
  
Visit Vitals:  VSS, Afebrile 
   
   
   
   
  
General: well developed, very well nourished, pleasant , NAD. Hygiene good Psych: cooperative. Pleasant. No anxiety or depression. Normal mood and affect. Neuro: alert and oriented to person/place/situation. Otherwise nonfocal. 
HEENT: Normocephalic, atraumatic. EOMI. Conjunctiva clear. No scleral icterus. Chest: Respirations nonlabored. Abdomen:  Nondistended. 
  
Lower extremities: color normal; temperature normal. Hair growth is not present. Calves are supple, nontender, approximately equally sized in comparison.  
  
Had formal ABIs in 2017 - incompressible vessels, TBI = 0.64. 
  
Ulcer Location: R lower leg medial  
Etiology: venous stasis Wound Length: 4.5 cm Wound Width : 3.0 cm Wound Depth :  0.1 cm Ulcer bed: Fibrotic slough over partial thickness skin injury. Periwound: Normal 
Exudate: Moderate amount Serous exudate Depth of Wound: Breakdown of Skin   
  
  
Assessment:  
  
78 y.o. male with R lower leg medial venous stasis ulcer. 
  
  
Plan:  
Slough removed from wound with curette. Tolerated well.   
  
Discussed 3 layer wrap, which he states he has had before. Therefore, 3 layer wrap applied. Told to remove it himself or return if it becomes uncomfortable in any way, would then place double tubigrip. 
  
Gave venous ulcer exercise sheet, sheet to purchase compression stockings and  n Jarrod. 
  
Dressing:  Aquacell Ag, Exudry, 3 layer wrap. 
  
Patient understood and agrees with plan. Questions answered. 
  
Weekly visits and serial debridements also discussed.  
Follow up with me in 1 week 
  
Signed By: Chuy Jones MD

## 2019-03-23 ENCOUNTER — APPOINTMENT (OUTPATIENT)
Dept: GENERAL RADIOLOGY | Age: 69
DRG: 291 | End: 2019-03-23
Attending: EMERGENCY MEDICINE
Payer: MEDICARE

## 2019-03-23 ENCOUNTER — HOSPITAL ENCOUNTER (INPATIENT)
Age: 69
LOS: 4 days | Discharge: HOME | DRG: 291 | End: 2019-03-27
Attending: EMERGENCY MEDICINE | Admitting: INTERNAL MEDICINE
Payer: MEDICARE

## 2019-03-23 DIAGNOSIS — I50.33 ACUTE ON CHRONIC DIASTOLIC (CONGESTIVE) HEART FAILURE (HCC): ICD-10-CM

## 2019-03-23 DIAGNOSIS — I13.2 CARDIORENAL SYNDROME WITH RENAL FAILURE, STAGE 5 CHRONIC KIDNEY DISEASE OR END STAGE RENAL DISEASE, WITH HEART FAILURE (HCC): ICD-10-CM

## 2019-03-23 DIAGNOSIS — N18.4 ACUTE RENAL FAILURE WITH OTHER SPECIFIED PATHOLOGICAL KIDNEY LESION SUPERIMPOSED ON STAGE 4 CHRONIC KIDNEY DISEASE (HCC): ICD-10-CM

## 2019-03-23 DIAGNOSIS — J96.01 ACUTE RESPIRATORY FAILURE WITH HYPOXIA (HCC): Primary | ICD-10-CM

## 2019-03-23 DIAGNOSIS — R06.03 RESPIRATORY DISTRESS: ICD-10-CM

## 2019-03-23 DIAGNOSIS — I10 ACCELERATED HYPERTENSION: ICD-10-CM

## 2019-03-23 DIAGNOSIS — N17.8 ACUTE RENAL FAILURE WITH OTHER SPECIFIED PATHOLOGICAL KIDNEY LESION SUPERIMPOSED ON STAGE 4 CHRONIC KIDNEY DISEASE (HCC): ICD-10-CM

## 2019-03-23 LAB
ALBUMIN SERPL-MCNC: 3.1 G/DL (ref 3.5–5)
ALBUMIN/GLOB SERPL: 0.7 {RATIO} (ref 1.1–2.2)
ALP SERPL-CCNC: 51 U/L (ref 45–117)
ALT SERPL-CCNC: 20 U/L (ref 12–78)
ANION GAP SERPL CALC-SCNC: 7 MMOL/L (ref 5–15)
AST SERPL-CCNC: 41 U/L (ref 15–37)
ATRIAL RATE: 75 BPM
BASOPHILS # BLD: 0 K/UL (ref 0–0.1)
BASOPHILS NFR BLD: 0 % (ref 0–1)
BILIRUB SERPL-MCNC: 0.5 MG/DL (ref 0.2–1)
BNP SERPL-MCNC: ABNORMAL PG/ML
BUN SERPL-MCNC: 110 MG/DL (ref 6–20)
BUN/CREAT SERPL: 32 (ref 12–20)
CALCIUM SERPL-MCNC: 9.1 MG/DL (ref 8.5–10.1)
CALCULATED R AXIS, ECG10: -86 DEGREES
CALCULATED T AXIS, ECG11: 110 DEGREES
CHLORIDE SERPL-SCNC: 99 MMOL/L (ref 97–108)
CO2 SERPL-SCNC: 35 MMOL/L (ref 21–32)
CREAT SERPL-MCNC: 3.39 MG/DL (ref 0.7–1.3)
DIAGNOSIS, 93000: NORMAL
DIFFERENTIAL METHOD BLD: ABNORMAL
EOSINOPHIL # BLD: 0.2 K/UL (ref 0–0.4)
EOSINOPHIL NFR BLD: 2 % (ref 0–7)
ERYTHROCYTE [DISTWIDTH] IN BLOOD BY AUTOMATED COUNT: 16.3 % (ref 11.5–14.5)
FLUAV AG NPH QL IA: NEGATIVE
FLUBV AG NOSE QL IA: NEGATIVE
GLOBULIN SER CALC-MCNC: 4.3 G/DL (ref 2–4)
GLUCOSE BLD STRIP.AUTO-MCNC: 163 MG/DL (ref 65–100)
GLUCOSE BLD STRIP.AUTO-MCNC: 168 MG/DL (ref 65–100)
GLUCOSE BLD STRIP.AUTO-MCNC: 169 MG/DL (ref 65–100)
GLUCOSE BLD STRIP.AUTO-MCNC: 211 MG/DL (ref 65–100)
GLUCOSE SERPL-MCNC: 133 MG/DL (ref 65–100)
HCT VFR BLD AUTO: 37.1 % (ref 36.6–50.3)
HGB BLD-MCNC: 11.3 G/DL (ref 12.1–17)
IMM GRANULOCYTES # BLD AUTO: 0 K/UL (ref 0–0.04)
IMM GRANULOCYTES NFR BLD AUTO: 0 % (ref 0–0.5)
LYMPHOCYTES # BLD: 1.1 K/UL (ref 0.8–3.5)
LYMPHOCYTES NFR BLD: 15 % (ref 12–49)
MAGNESIUM SERPL-MCNC: 2.3 MG/DL (ref 1.6–2.4)
MCH RBC QN AUTO: 25.5 PG (ref 26–34)
MCHC RBC AUTO-ENTMCNC: 30.5 G/DL (ref 30–36.5)
MCV RBC AUTO: 83.7 FL (ref 80–99)
MONOCYTES # BLD: 0.8 K/UL (ref 0–1)
MONOCYTES NFR BLD: 11 % (ref 5–13)
NEUTS SEG # BLD: 5 K/UL (ref 1.8–8)
NEUTS SEG NFR BLD: 72 % (ref 32–75)
NRBC # BLD: 0 K/UL (ref 0–0.01)
NRBC BLD-RTO: 0 PER 100 WBC
PLATELET # BLD AUTO: 238 K/UL (ref 150–400)
PMV BLD AUTO: 11.8 FL (ref 8.9–12.9)
POTASSIUM SERPL-SCNC: 4.5 MMOL/L (ref 3.5–5.1)
PROT SERPL-MCNC: 7.4 G/DL (ref 6.4–8.2)
Q-T INTERVAL, ECG07: 456 MS
QRS DURATION, ECG06: 144 MS
QTC CALCULATION (BEZET), ECG08: 509 MS
RBC # BLD AUTO: 4.43 M/UL (ref 4.1–5.7)
SERVICE CMNT-IMP: ABNORMAL
SODIUM SERPL-SCNC: 141 MMOL/L (ref 136–145)
TROPONIN I SERPL-MCNC: 0.07 NG/ML
VENTRICULAR RATE, ECG03: 75 BPM
WBC # BLD AUTO: 7 K/UL (ref 4.1–11.1)

## 2019-03-23 PROCEDURE — 85025 COMPLETE CBC W/AUTO DIFF WBC: CPT

## 2019-03-23 PROCEDURE — 74011636637 HC RX REV CODE- 636/637: Performed by: INTERNAL MEDICINE

## 2019-03-23 PROCEDURE — 83735 ASSAY OF MAGNESIUM: CPT

## 2019-03-23 PROCEDURE — 93005 ELECTROCARDIOGRAM TRACING: CPT

## 2019-03-23 PROCEDURE — 74011250637 HC RX REV CODE- 250/637: Performed by: INTERNAL MEDICINE

## 2019-03-23 PROCEDURE — 77010033678 HC OXYGEN DAILY

## 2019-03-23 PROCEDURE — 99285 EMERGENCY DEPT VISIT HI MDM: CPT

## 2019-03-23 PROCEDURE — 87804 INFLUENZA ASSAY W/OPTIC: CPT

## 2019-03-23 PROCEDURE — 74011000250 HC RX REV CODE- 250: Performed by: INTERNAL MEDICINE

## 2019-03-23 PROCEDURE — 83880 ASSAY OF NATRIURETIC PEPTIDE: CPT

## 2019-03-23 PROCEDURE — 80053 COMPREHEN METABOLIC PANEL: CPT

## 2019-03-23 PROCEDURE — 36415 COLL VENOUS BLD VENIPUNCTURE: CPT

## 2019-03-23 PROCEDURE — 82962 GLUCOSE BLOOD TEST: CPT

## 2019-03-23 PROCEDURE — 65270000029 HC RM PRIVATE

## 2019-03-23 PROCEDURE — 84484 ASSAY OF TROPONIN QUANT: CPT

## 2019-03-23 PROCEDURE — 71045 X-RAY EXAM CHEST 1 VIEW: CPT

## 2019-03-23 RX ORDER — SODIUM CHLORIDE 0.9 % (FLUSH) 0.9 %
5-40 SYRINGE (ML) INJECTION EVERY 8 HOURS
Status: DISCONTINUED | OUTPATIENT
Start: 2019-03-23 | End: 2019-03-27 | Stop reason: HOSPADM

## 2019-03-23 RX ORDER — BUMETANIDE 0.25 MG/ML
2 INJECTION INTRAMUSCULAR; INTRAVENOUS EVERY 12 HOURS
Status: DISCONTINUED | OUTPATIENT
Start: 2019-03-23 | End: 2019-03-27 | Stop reason: HOSPADM

## 2019-03-23 RX ORDER — METOLAZONE 2.5 MG/1
2.5 TABLET ORAL DAILY
Status: DISCONTINUED | OUTPATIENT
Start: 2019-03-23 | End: 2019-03-27 | Stop reason: HOSPADM

## 2019-03-23 RX ORDER — INSULIN LISPRO 100 [IU]/ML
INJECTION, SOLUTION INTRAVENOUS; SUBCUTANEOUS
Status: DISCONTINUED | OUTPATIENT
Start: 2019-03-23 | End: 2019-03-27 | Stop reason: HOSPADM

## 2019-03-23 RX ORDER — ONDANSETRON 2 MG/ML
4 INJECTION INTRAMUSCULAR; INTRAVENOUS
Status: DISCONTINUED | OUTPATIENT
Start: 2019-03-23 | End: 2019-03-27 | Stop reason: HOSPADM

## 2019-03-23 RX ORDER — DEXTROSE MONOHYDRATE 25 G/50ML
12.5-25 INJECTION, SOLUTION INTRAVENOUS AS NEEDED
Status: DISCONTINUED | OUTPATIENT
Start: 2019-03-23 | End: 2019-03-27 | Stop reason: HOSPADM

## 2019-03-23 RX ORDER — ACETAMINOPHEN 325 MG/1
650 TABLET ORAL
Status: DISCONTINUED | OUTPATIENT
Start: 2019-03-23 | End: 2019-03-27 | Stop reason: HOSPADM

## 2019-03-23 RX ORDER — DUTASTERIDE 0.5 MG/1
0.5 CAPSULE, LIQUID FILLED ORAL DAILY
Status: DISCONTINUED | OUTPATIENT
Start: 2019-03-23 | End: 2019-03-27 | Stop reason: HOSPADM

## 2019-03-23 RX ORDER — MAGNESIUM SULFATE 100 %
4 CRYSTALS MISCELLANEOUS AS NEEDED
Status: DISCONTINUED | OUTPATIENT
Start: 2019-03-23 | End: 2019-03-27 | Stop reason: HOSPADM

## 2019-03-23 RX ORDER — PRAVASTATIN SODIUM 40 MG/1
80 TABLET ORAL
Status: DISCONTINUED | OUTPATIENT
Start: 2019-03-23 | End: 2019-03-27 | Stop reason: HOSPADM

## 2019-03-23 RX ORDER — TAMSULOSIN HYDROCHLORIDE 0.4 MG/1
0.4 CAPSULE ORAL DAILY
Status: DISCONTINUED | OUTPATIENT
Start: 2019-03-23 | End: 2019-03-27 | Stop reason: HOSPADM

## 2019-03-23 RX ORDER — SODIUM CHLORIDE 0.9 % (FLUSH) 0.9 %
5-40 SYRINGE (ML) INJECTION AS NEEDED
Status: DISCONTINUED | OUTPATIENT
Start: 2019-03-23 | End: 2019-03-27 | Stop reason: HOSPADM

## 2019-03-23 RX ORDER — METOPROLOL SUCCINATE 50 MG/1
150 TABLET, EXTENDED RELEASE ORAL DAILY
Status: DISCONTINUED | OUTPATIENT
Start: 2019-03-23 | End: 2019-03-27 | Stop reason: HOSPADM

## 2019-03-23 RX ORDER — FEBUXOSTAT 40 MG/1
40 TABLET, FILM COATED ORAL DAILY
Status: DISCONTINUED | OUTPATIENT
Start: 2019-03-23 | End: 2019-03-27 | Stop reason: HOSPADM

## 2019-03-23 RX ADMIN — LEVOTHYROXINE SODIUM 137 MCG: 0.11 TABLET ORAL at 06:29

## 2019-03-23 RX ADMIN — HUMAN INSULIN 15 UNITS: 100 INJECTION, SUSPENSION SUBCUTANEOUS at 18:41

## 2019-03-23 RX ADMIN — METOPROLOL SUCCINATE 150 MG: 50 TABLET, EXTENDED RELEASE ORAL at 08:55

## 2019-03-23 RX ADMIN — APIXABAN 5 MG: 5 TABLET, FILM COATED ORAL at 18:41

## 2019-03-23 RX ADMIN — Medication 10 ML: at 12:58

## 2019-03-23 RX ADMIN — Medication 10 ML: at 06:37

## 2019-03-23 RX ADMIN — APIXABAN 5 MG: 5 TABLET, FILM COATED ORAL at 08:56

## 2019-03-23 RX ADMIN — Medication 10 ML: at 22:19

## 2019-03-23 RX ADMIN — HUMAN INSULIN 15 UNITS: 100 INJECTION, SUSPENSION SUBCUTANEOUS at 08:55

## 2019-03-23 RX ADMIN — METOLAZONE 2.5 MG: 2.5 TABLET ORAL at 08:57

## 2019-03-23 RX ADMIN — ACETAMINOPHEN 650 MG: 325 TABLET ORAL at 22:22

## 2019-03-23 RX ADMIN — INSULIN LISPRO 2 UNITS: 100 INJECTION, SOLUTION INTRAVENOUS; SUBCUTANEOUS at 08:55

## 2019-03-23 RX ADMIN — INSULIN LISPRO 3 UNITS: 100 INJECTION, SOLUTION INTRAVENOUS; SUBCUTANEOUS at 12:57

## 2019-03-23 RX ADMIN — TAMSULOSIN HYDROCHLORIDE 0.4 MG: 0.4 CAPSULE ORAL at 08:57

## 2019-03-23 RX ADMIN — MIRABEGRON 25 MG: 25 TABLET, FILM COATED, EXTENDED RELEASE ORAL at 08:56

## 2019-03-23 RX ADMIN — FEBUXOSTAT 40 MG: 40 TABLET ORAL at 08:56

## 2019-03-23 RX ADMIN — BUMETANIDE 2 MG: 0.25 INJECTION INTRAMUSCULAR; INTRAVENOUS at 06:28

## 2019-03-23 RX ADMIN — Medication 10 ML: at 12:57

## 2019-03-23 RX ADMIN — BUMETANIDE 2 MG: 0.25 INJECTION INTRAMUSCULAR; INTRAVENOUS at 22:22

## 2019-03-23 RX ADMIN — PRAVASTATIN SODIUM 80 MG: 40 TABLET ORAL at 22:19

## 2019-03-23 RX ADMIN — INSULIN LISPRO 2 UNITS: 100 INJECTION, SOLUTION INTRAVENOUS; SUBCUTANEOUS at 18:41

## 2019-03-23 RX ADMIN — DUTASTERIDE 0.5 MG: 0.5 CAPSULE, LIQUID FILLED ORAL at 08:56

## 2019-03-23 NOTE — ED NOTES
Assumed care. Received report from Shira Allen, [facility]. Assisted patient with changing into gown. Pt on full cardiac monitor. Pacemaker in place. SR on monitor w/ HR 70's. Pt medicated per orders and given urinal/hat. Spouse at bedside. Will continue to monitor.

## 2019-03-23 NOTE — CONSULTS
Subjective:                 [address], [place], [address] [facility]  [phone]    Date of  Admission: 3/23/2019  2:14 AM     Admission type:Emergency    Kyaw Vo is a 78 y.o. male admitted for Acute respiratory failure with hypoxia ([address] [J96.01]. He presented with sob and hypoxia. bnp elevated in the setting of ckd.  He denies cp    Patient Active Problem List    Diagnosis Date Noted    Acute respiratory failure with hypoxia ([address] 03/23/2019    HTN (hypertension) 03/23/2019    Idiopathic chronic venous hypertension of right leg with ulcer ([address] 03/13/2019    Type 2 diabetes with nephropathy ([address] 10/23/2018    Other foreign body or object entering through skin, subsequent encounter 08/10/2018    Stage II pressure ulcer of right buttock 06/15/2018    Cardiomyopathy ([address] 10/10/2017    Pacemaker 06/26/2017    H/O atrioventricular anastacio ablation 06/26/2017    JACEY (obstructive sleep apnea) 79/30/2984    Diastolic heart failure ([address] 06/22/2017    Irregular heart beat 03/22/2017    Fatigue 03/15/2017    Bilateral leg edema 03/15/2017    Counseling regarding advanced care planning and goals of care 03/15/2017    Hx of CABG 03/13/2017    Morbid obesity ([address] 03/13/2017    Diabetes mellitus type 2, controlled ([address] 03/13/2017    Heart failure ([address] 03/13/2017    SOB (shortness of breath) 03/07/2017    Chronic a-fib ([address] 03/07/2017    Coronary artery disease involving native coronary artery without angina pectoris 03/07/2017    CKD (chronic kidney disease) stage 4, GFR 15-29 ml/min ([address] 03/07/2017      Lanis Bumpers, MD  Past Medical History:   Diagnosis Date    Arrhythmia     atrial fibrillation 2017    CAD (coronary atherosclerotic disease)     Chronic pain     Diabetes ([address]     Diabetes mellitus type 2, controlled ([address] 3/13/2017    Heart failure ([address]     Hx of CABG 3/13/2017    CABG in 2010 in [address] [place] [address] Hypertension     Morbid obesity ([address] 3/13/2017    JACEY (obstructive sleep apnea) 6/23/2017    S/P CABG x 2 2010    Thyroid disease       Past Surgical History:   Procedure Laterality Date    CARDIAC SURG PROCEDURE UNLIST      CABGx2 in 2010    HX ORTHOPAEDIC      L 3rd toe amputation in 1999     Allergies   Allergen Reactions    Allopurinol Rash    Gabapentin Drowsiness    Ciprofloxacin Nausea Only    Percocet [Oxycodone-Acetaminophen] Other (comments)     hallucinations      Social History     Tobacco Use    Smoking status: Never Smoker    Smokeless tobacco: Never Used   Substance Use Topics    Alcohol use: Yes     Comment: rare    Drug use: No     Family History   Problem Relation Age of Onset    Heart Attack Father     No Known Problems Mother       Current Facility-Administered Medications   Medication Dose Route Frequency    sodium chloride (NS) flush 5-40 mL  5-40 mL IntraVENous Q8H    sodium chloride (NS) flush 5-40 mL  5-40 mL IntraVENous PRN    bumetanide (BUMEX) injection 2 mg  2 mg IntraVENous Q12H    sodium chloride (NS) flush 5-40 mL  5-40 mL IntraVENous Q8H    sodium chloride (NS) flush 5-40 mL  5-40 mL IntraVENous PRN    acetaminophen (TYLENOL) tablet 650 mg  650 mg Oral Q6H PRN    ondansetron (ZOFRAN) injection 4 mg  4 mg IntraVENous Q4H PRN    nitroglycerin (NITROBID) 2 % ointment 1 Inch  1 Inch Topical Q6H PRN    insulin lispro (HUMALOG) injection   SubCUTAneous AC&HS    glucose chewable tablet 16 g  4 Tab Oral PRN    dextrose (D50W) injection syrg 12.5-25 g  12.5-25 g IntraVENous PRN    glucagon (GLUCAGEN) injection 1 mg  1 mg IntraMUSCular PRN    dutasteride (AVODART) capsule 0.5 mg  0.5 mg Oral DAILY    apixaban (ELIQUIS) tablet 5 mg  5 mg Oral BID    febuxostat (ULORIC) tablet 40 mg  40 mg Oral DAILY    levothyroxine (SYNTHROID) tablet 137 mcg  137 mcg Oral 6am    mirabegron ER (MYRBETRIQ) tablet 25 mg  25 mg Oral DAILY    metoprolol succinate (TOPROL-XL) XL tablet 150 mg  150 mg Oral DAILY    metOLazone (ZAROXOLYN) tablet 2.5 mg  2.5 mg Oral DAILY    pravastatin (PRAVACHOL) tablet 80 mg  80 mg Oral QHS    tamsulosin (FLOMAX) capsule 0.4 mg  0.4 mg Oral DAILY    insulin NPH (NOVOLIN N, HUMULIN N) injection 15 Units  15 Units SubCUTAneous ACB&D     Current Outpatient Medications   Medication Sig    dutasteride (AVODART) 0.5 mg capsule Take 0.5 mg by mouth daily.  metoprolol succinate (TOPROL-XL) 100 mg tablet Take 100 mg by mouth daily. Takes 150 mg daily    MYRBETRIQ 25 mg ER tablet TAKE 1 TABLET BY MOUTH EVERY DAY    pravastatin (PRAVACHOL) 80 mg tablet TAKE 1 TABLET BY MOUTH IN THE EVENING    ELIQUIS 5 mg tablet TAKE 1 TABLET BY MOUTH TWICE A DAY    levothyroxine (SYNTHROID) 137 mcg tablet TAKE 1 TABLET(S) EVERY DAY BY ORAL ROUTE FOR 90 DAYS.  bumetanide (BUMEX) 2 mg tablet 1 tab in AM and 1/2 tab in the afternoon. (Patient taking differently: Take 2 mg by mouth every morning. 1 tab in AM and 1 tab in the afternoon.)    febuxostat (ULORIC) 40 mg tab tablet Take 40 mg by mouth daily.  potassium chloride SR (K-TAB) 20 mEq tablet Take 1 Tab by mouth daily. (Patient taking differently: Take 20 mEq by mouth daily. Take 2 20 mEq tabs for three weeks)    metOLazone (ZAROXOLYN) 2.5 mg tablet Take 2.5 mg by mouth daily.  tamsulosin (FLOMAX) 0.4 mg capsule TAKE 2 CAPSULES BY MOUTH EVERY DAY    insulin aspart protamine/insulin aspart (NOVOLOG MIX 70/30) 100 unit/mL (70-30) inpn 34 units breakfast, 34 units dinner + correction scale (up to 80 units/day)    Insulin Needles, Disposable, (BD ULTRA-FINE MINI PEN NEEDLE) 31 gauge x 3/16\" ndle USE WITH NOVOLOG 3 TIMES PER DAY    glucose blood VI test strips (PHARMACIST CHOICE) strip One Touch Ultra Strips; Check glucose 3 times daily, Diagnosis E11.22    Lancets misc Use preferred brand;  Check glucose 3-4x times daily, Diagnosis E11.22    Insulin Needles, Disposable, (KAUSHIK PEN NEEDLE) 32 gauge x 5/32\" ndle Use with Novolog 70/30 pen    ketoconazole (NIZORAL) 2 % topical cream Apply  to affected area two (2) times daily as needed for Skin Irritation. Indications: rash    multivitamin (ONE A DAY) tablet Take 1 Tab by mouth daily.  acetaminophen (TYLENOL EXTRA STRENGTH) 500 mg tablet Take 1,000 mg by mouth every eight (8) hours as needed for Pain. Review of Symptoms:  Constitutional: negative  Eyes: negative  Ears, nose, mouth, throat, and face: negative  Respiratory: sob  Cardiovascular: negative  Gastrointestinal: negative  Genitourinary: negative  Musculoskeletal: negative  Neurological: negative  Behvioral/Psych: negative  Endocrine: negative     Subjective:      Visit Vitals  /81 (BP 1 Location: Right arm, BP Patient Position: At rest)   Pulse 76   Temp 98.8 °F (37.1 °C)   Resp 18   Ht 5' 9\" (1.753 m)   Wt 254 lb 13.6 oz (115.6 kg)   SpO2 97%   BMI 37.64 kg/m²       Physical Exam  Abdomen: soft, non-tender.    Extremities: extremities normal  Heart: regular rate and rhythm  Lungs: decreased bs at bases  Pulses: 2+ and symmetric    Cardiographics    Telemetry: v paced    ECG: v paced    Echocardiogram: pending    Labs:  Recent Labs     03/23/19  0231   WBC 7.0   HGB 11.3*   HCT 37.1        Recent Labs     03/23/19  0231      K 4.5   CL 99   CO2 35*   *   *   CREA 3.39*   CA 9.1   MG 2.3   ALB 3.1*   TBILI 0.5   SGOT 41*   ALT 20       Recent Labs     03/23/19  0231   TROIQ 0.07*         Intake/Output Summary (Last 24 hours) at 3/23/2019 1010  Last data filed at 3/23/2019 8810  Gross per 24 hour   Intake 360 ml   Output 550 ml   Net -190 ml         Assessment:     Assessment:       Active Problems:    Chronic a-fib ([facility]) (3/7/2017)      CKD (chronic kidney disease) stage 4, GFR 15-29 ml/min ([facility]) (3/7/2017)      Hx of CABG (3/13/2017)      Overview: CABG in 2010 in [place]      Diabetes mellitus type 2, controlled ([facility] (2/60/8531)      Diastolic heart failure ([facility] (6/22/2017)      Pacemaker (6/26/2017) Overview: 6/26/17: [employer] Bi[employer]      H/O atrioventricular anastacio ablation (6/26/2017)      Overview: 6/26/17      Type 2 diabetes with nephropathy ([facility] (10/23/2018)      Acute respiratory failure with hypoxia ([facility] (3/23/2019)      HTN (hypertension) (3/23/2019)         Plan:     Lino Dietrich is a pleasant gentleman with permanent af sp av node ablation and vvi pacemaker. Will repeat echo (echo in 2017 demonstrated nl lvef). Trop minimally elevated in the setting of ckd. Will obtain second set but not likely due to nstemi. Cont oac, bb and agree with diuresis. Will cont to follow.  Thank you for this consultation     Vladimir Rodas MD, [facility]    3/23/2019

## 2019-03-23 NOTE — PROGRESS NOTES
TRANSFER - OUT REPORT: 
 
Verbal report given to Afshan Jackson, (name) on Shasta Bondurant  being transferred to [facility](unit) for routine progression of care Report consisted of patients Situation, Background, Assessment and  
Recommendations(SBAR). Information from the following report(s) SBAR, Kardex, ED Summary, Intake/Output and MAR was reviewed with the receiving nurse. Lines:  
Peripheral IV 03/23/19 Left Antecubital (Active) Site Assessment Clean, dry, & intact 3/23/2019  2:45 AM  
Phlebitis Assessment 0 3/23/2019  2:45 AM  
Infiltration Assessment 0 3/23/2019  2:45 AM  
Dressing Status Clean, dry, & intact 3/23/2019  2:45 AM  
Dressing Type Transparent;Tape 3/23/2019  2:45 AM  
Hub Color/Line Status Pink;Patent; Flushed 3/23/2019  2:45 AM  
Action Taken Blood drawn 3/23/2019  2:45 AM  
  
 
Opportunity for questions and clarification was provided. Patient transported with: 
 Monitor

## 2019-03-23 NOTE — ED PROVIDER NOTES
EMERGENCY DEPARTMENT HISTORY AND PHYSICAL EXAM 
 
 
Date: 3/23/2019 Patient Name: Todd Bertrand History of Presenting Illness Chief Complaint Patient presents with  Shortness of Breath  
  not able to lay down in bed due to SOB History Provided By: Patient HPI: Todd Bertrand, 78 y.o. male with PMHx significant for atrial fib on chronic anticoagulation, CAD with a history of CABG, status post pacemaker placement, type 2 diabetes presents amatory to the emergency room with acute onset of shortness of breath this evening upon going to bed. Patient states that he has a history of dilated cardiomyopathy and was seen by Dr. Lori Nguyen office earlier this week and had his diuretics increased. Patient reports taking 2 mg of Bumex in the morning 2 mg of Bumex at night which has been increased from 1 mg of Bumex at night. .  Patient went to Dr. Lori Nguyen office earlier this week and was advised to follow-up with nephrology as patient has a history of stage IV chronic kidney disease. Patient denies any chronic oxygen use. Patient reports a nonproductive cough and associated chills but no fever. Patient states that he is on Eliquis and therefore has chronic bruising throughout his body. Patient denies any history of dark stools. Patient denies any history of DVT or PE. There are no other modifying factors at this time. Per chart review patient did have an increased weight gain since his last appointment. His last echocardiogram was in October 2017 which was normal.  Patient did increase his metolazone to 5 mg for 2 days without improvement of his symptoms. There are no other modifying factors to his H&P at this point. PCP: Moisés Trujillo MD 
 
There are no other complaints, changes or physical findings at this time. Past History Past Medical History: 
Past Medical History:  
Diagnosis Date  Arrhythmia   
 atrial fibrillation 2017  CAD (coronary atherosclerotic disease)  Chronic pain  Diabetes ([facility]  Diabetes mellitus type 2, controlled ([facility] 3/13/2017  Heart failure ([facility]  Hx of CABG 3/13/2017 CABG in 2010 in [place]  Hypertension  Morbid obesity ([facility] 3/13/2017  JACEY (obstructive sleep apnea) 6/23/2017  S/P CABG x 2 2010  Thyroid disease Past Surgical History: 
Past Surgical History:  
Procedure Laterality Date  CARDIAC SURG PROCEDURE UNLIST CABGx2 in 2010  HX ORTHOPAEDIC    
 L 3rd toe amputation in 1999 Family History: 
Family History Problem Relation Age of Onset  Heart Attack Father  No Known Problems Mother Social History: 
Social History Tobacco Use  Smoking status: Never Smoker  Smokeless tobacco: Never Used Substance Use Topics  Alcohol use: Yes Comment: rare  Drug use: No  
 
 
Allergies: Allergies Allergen Reactions  Allopurinol Rash  Gabapentin Drowsiness  Ciprofloxacin Nausea Only  Percocet [Oxycodone-Acetaminophen] Other (comments)  
  hallucinations Medications: No current facility-administered medications on file prior to encounter. Current Outpatient Medications on File Prior to Encounter Medication Sig Dispense Refill  dutasteride (AVODART) 0.5 mg capsule Take 0.5 mg by mouth daily.  metoprolol succinate (TOPROL-XL) 100 mg tablet Take 100 mg by mouth daily. Takes 150 mg daily  MYRBETRIQ 25 mg ER tablet TAKE 1 TABLET BY MOUTH EVERY DAY  12  
 pravastatin (PRAVACHOL) 80 mg tablet TAKE 1 TABLET BY MOUTH IN THE EVENING 90 Tab 0  
 ELIQUIS 5 mg tablet TAKE 1 TABLET BY MOUTH TWICE A  Tab 3  
 levothyroxine (SYNTHROID) 137 mcg tablet TAKE 1 TABLET(S) EVERY DAY BY ORAL ROUTE FOR 90 DAYS. 90 Tab 3  
 bumetanide (BUMEX) 2 mg tablet 1 tab in AM and 1/2 tab in the afternoon. (Patient taking differently: Take 2 mg by mouth every morning.  1 tab in AM and 1 tab in the afternoon.) 180 Tab 3  
 febuxostat (ULORIC) 40 mg tab tablet Take 40 mg by mouth daily.  potassium chloride SR (K-TAB) 20 mEq tablet Take 1 Tab by mouth daily. (Patient taking differently: Take 20 mEq by mouth daily. Take 2 20 mEq tabs for three weeks) 30 Tab 1  
 metOLazone (ZAROXOLYN) 2.5 mg tablet Take 2.5 mg by mouth daily.  tamsulosin (FLOMAX) 0.4 mg capsule TAKE 2 CAPSULES BY MOUTH EVERY DAY  2  
 insulin aspart protamine/insulin aspart (NOVOLOG MIX 70/30) 100 unit/mL (70-30) inpn 34 units breakfast, 34 units dinner + correction scale (up to 80 units/day) 25 Pen 3  
 Insulin Needles, Disposable, (BD ULTRA-FINE MINI PEN NEEDLE) 31 gauge x 3/16\" ndle USE WITH NOVOLOG 3 TIMES PER  Pen Needle 3  
 glucose blood VI test strips (PHARMACIST CHOICE) strip One Touch Ultra Strips; Check glucose 3 times daily, Diagnosis E11.22 300 Strip 3  Lancets misc Use preferred brand; Check glucose 3-4x times daily, Diagnosis E11.22 4 Package 7  Insulin Needles, Disposable, (KAUSHIK PEN NEEDLE) 32 gauge x 5/32\" ndle Use with Novolog 70/30 pen 100 Pen Needle 3  
 ketoconazole (NIZORAL) 2 % topical cream Apply  to affected area two (2) times daily as needed for Skin Irritation. Indications: rash  multivitamin (ONE A DAY) tablet Take 1 Tab by mouth daily.  acetaminophen (TYLENOL EXTRA STRENGTH) 500 mg tablet Take 1,000 mg by mouth every eight (8) hours as needed for Pain. Review of Systems Review of Systems Constitutional: Negative. Negative for chills and fever. HENT: Negative. Negative for congestion, facial swelling, rhinorrhea, sore throat, trouble swallowing and voice change. Eyes: Negative. Respiratory: Positive for chest tightness and shortness of breath. Negative for apnea, cough and wheezing. Cardiovascular: Positive for leg swelling. Negative for chest pain and palpitations. Gastrointestinal: Negative. Negative for abdominal distention, abdominal pain, blood in stool, constipation, diarrhea, nausea and vomiting. Endocrine: Negative. Negative for cold intolerance, heat intolerance and polyuria. Genitourinary: Negative. Negative for difficulty urinating, dysuria, flank pain, frequency, hematuria and urgency. Musculoskeletal: Negative. Negative for arthralgias, back pain, myalgias, neck pain and neck stiffness. Skin: Negative. Negative for color change and rash. Neurological: Negative. Negative for dizziness, syncope, facial asymmetry, speech difficulty, weakness, light-headedness, numbness and headaches. Hematological: Negative. Does not bruise/bleed easily. Psychiatric/Behavioral: Negative. Negative for confusion and self-injury. The patient is not nervous/anxious. Physical Exam  
Physical Exam  
Constitutional: He is oriented to person, place, and time. He is cooperative. He appears toxic. He has a sickly appearance. He appears ill. He appears distressed. Elderly  male sitting upright in bed appears older than stated age. Currently on 88% on room air, placed on 2 L of oxygen with improvement of symptoms. HENT:  
Head: Normocephalic and atraumatic. Mouth/Throat: Mucous membranes are normal. No posterior oropharyngeal erythema. Eyes: Pupils are equal, round, and reactive to light. Conjunctivae and EOM are normal.  
Neck: Normal range of motion. Cardiovascular: Normal rate, regular rhythm, normal heart sounds and intact distal pulses. Exam reveals no gallop and no friction rub. No murmur heard. Positive JVD Pulmonary/Chest: Accessory muscle usage present. He is in respiratory distress. He has no wheezes. He has rales. He exhibits no tenderness. Abdominal: Soft. Bowel sounds are normal. He exhibits no distension and no mass. There is no tenderness. There is no rebound and no guarding. Musculoskeletal: Normal range of motion. He exhibits no edema, tenderness or deformity. Neurological: He is alert and oriented to person, place, and time. He displays normal reflexes. No cranial nerve deficit. He exhibits normal muscle tone. Coordination normal.  
Skin: Skin is warm. No rash noted. He is diaphoretic. Psychiatric: He has a normal mood and affect. Nursing note and vitals reviewed. Diagnostic Study Results Labs - Recent Results (from the past 24 hour(s)) EKG, 12 LEAD, INITIAL Collection Time: 03/23/19  2:16 AM  
Result Value Ref Range Ventricular Rate 75 BPM  
 Atrial Rate 75 BPM  
 QRS Duration 144 ms Q-T Interval 456 ms  
 QTC Calculation (Bezet) 509 ms Calculated R Axis -86 degrees Calculated T Axis 110 degrees Diagnosis Ventricular-paced rhythm Biventricular pacemaker detected When compared with ECG of 22-JUN-2017 09:38, Electronic ventricular pacemaker has replaced Atrial fibrillation Vent. rate has decreased BY  39 BPM 
  
CBC WITH AUTOMATED DIFF Collection Time: 03/23/19  2:31 AM  
Result Value Ref Range WBC 7.0 4.1 - 11.1 K/uL  
 RBC 4.43 4.10 - 5.70 M/uL  
 HGB 11.3 (L) 12.1 - 17.0 g/dL HCT 37.1 36.6 - 50.3 % MCV 83.7 80.0 - 99.0 FL  
 MCH 25.5 (L) 26.0 - 34.0 PG  
 MCHC 30.5 30.0 - 36.5 g/dL  
 RDW 16.3 (H) 11.5 - 14.5 % PLATELET 993 499 - 536 K/uL MPV 11.8 8.9 - 12.9 FL  
 NRBC 0.0 0  WBC ABSOLUTE NRBC 0.00 0.00 - 0.01 K/uL NEUTROPHILS 72 32 - 75 % LYMPHOCYTES 15 12 - 49 % MONOCYTES 11 5 - 13 % EOSINOPHILS 2 0 - 7 % BASOPHILS 0 0 - 1 % IMMATURE GRANULOCYTES 0 0.0 - 0.5 % ABS. NEUTROPHILS 5.0 1.8 - 8.0 K/UL  
 ABS. LYMPHOCYTES 1.1 0.8 - 3.5 K/UL  
 ABS. MONOCYTES 0.8 0.0 - 1.0 K/UL  
 ABS. EOSINOPHILS 0.2 0.0 - 0.4 K/UL  
 ABS. BASOPHILS 0.0 0.0 - 0.1 K/UL  
 ABS. IMM. GRANS. 0.0 0.00 - 0.04 K/UL  
 DF AUTOMATED METABOLIC PANEL, COMPREHENSIVE  Collection Time: 03/23/19  2:31 AM  
 Result Value Ref Range Sodium 141 136 - 145 mmol/L Potassium 4.5 3.5 - 5.1 mmol/L Chloride 99 97 - 108 mmol/L  
 CO2 35 (H) 21 - 32 mmol/L Anion gap 7 5 - 15 mmol/L Glucose 133 (H) 65 - 100 mg/dL  (H) 6 - 20 MG/DL Creatinine 3.39 (H) 0.70 - 1.30 MG/DL  
 BUN/Creatinine ratio 32 (H) 12 - 20 GFR est AA 22 (L) >60 ml/min/1.73m2 GFR est non-AA 18 (L) >60 ml/min/1.73m2 Calcium 9.1 8.5 - 10.1 MG/DL Bilirubin, total 0.5 0.2 - 1.0 MG/DL  
 ALT (SGPT) 20 12 - 78 U/L  
 AST (SGOT) 41 (H) 15 - 37 U/L Alk. phosphatase 51 45 - 117 U/L Protein, total 7.4 6.4 - 8.2 g/dL Albumin 3.1 (L) 3.5 - 5.0 g/dL Globulin 4.3 (H) 2.0 - 4.0 g/dL A-G Ratio 0.7 (L) 1.1 - 2.2    
TROPONIN I Collection Time: 03/23/19  2:31 AM  
Result Value Ref Range Troponin-I, Qt. 0.07 (H) <0.05 ng/mL NT-PRO BNP Collection Time: 03/23/19  2:31 AM  
Result Value Ref Range NT pro-BNP 11,558 (H) <125 PG/ML  
INFLUENZA A & B AG (RAPID TEST) Collection Time: 03/23/19  2:31 AM  
Result Value Ref Range Influenza A Antigen NEGATIVE  NEG Influenza B Antigen NEGATIVE  NEG MAGNESIUM Collection Time: 03/23/19  2:31 AM  
Result Value Ref Range Magnesium 2.3 1.6 - 2.4 mg/dL GLUCOSE, POC Collection Time: 03/23/19  7:43 AM  
Result Value Ref Range Glucose (POC) 169 (H) 65 - 100 mg/dL Performed by THAO VILLARREAL(C0N) Radiologic Studies -  
XR CHEST PORT Final Result IMPRESSION:   
1. Small left pleural effusion and left basilar atelectasis. 2. Unchanged cardiomegaly. CT Results  (Last 48 hours) None CXR Results  (Last 48 hours) 03/23/19 0301  XR CHEST PORT Final result Impression:  IMPRESSION:   
1. Small left pleural effusion and left basilar atelectasis. 2. Unchanged cardiomegaly. Narrative:  EXAM:  XR CHEST PORT INDICATION:   SOB  
   
COMPARISON: Chest radiograph 6/26/2017. FINDINGS: AP radiograph of the chest was obtained. Left chest pacemaker with leads in the right ventricle and coronary sinus. Unchanged cardiomegaly. No evidence of focal consolidation. Small left pleural  
effusion and left basilar atelectasis. No pneumothorax. Stable postsurgical  
changes of CABG with calcifications of the thoracic aorta noted. No acute  
osseous abnormality. Medical Decision Making I am the first provider for this patient. I reviewed the vital signs, available nursing notes, past medical history, past surgical history, family history and social history. Vital Signs-Reviewed the patient's vital signs. Patient Vitals for the past 24 hrs: 
 Temp Pulse Resp BP SpO2  
03/23/19 0630  75 18 178/83 97 % 03/23/19 0245  75 20  96 % 03/23/19 0238  75 19  (!) 89 % 03/23/19 0215 97.3 °F (36.3 °C) 73 20 155/73 94 % Pulse Oximetry Analysis - 89% on RA Cardiac Monitor:  
Rate: 75 bpm 
Rhythm: Normal Sinus Rhythm ED EKG interpretation: 
Rhythm: paced; and regular . Rate (approx.): 75; Axis: normal; P wave: normal; QRS interval: normal ; ST/T wave: normal; Other findings: normal. This EKG was interpreted by Heriberto Caban M.D. Records Reviewed: Nursing Notes, Old Medical Records, Previous electrocardiograms, Previous Radiology Studies and Previous Laboratory Studies Provider Notes (Medical Decision Making):  
Patient presents with acute dyspnea. DDx: asthma, copd, pna, pulmonary edema, acute bronchitis, ACS, ptx, pna. Will obtain EKG, labs, CXR, provide O2 as needed for hypoxia, treat symptomatically and reassess. Will continue to monitor closely in ED. ED Course:  
Initial assessment performed. The patients presenting problems have been discussed, and they are in agreement with the care plan formulated and outlined with them. I have encouraged them to ask questions as they arise throughout their visit. ALCOHOL/SUBSTANCE ABUSE COUNSELING: 
 Upon evaluation, pt endorsed recent alcohol/illicit drug use. For approximately 15 minutes, pt has been counseled on the dangers of alcohol and illicit drug use on their health, and they were encouraged to quit as soon as possible in order to decrease further risks to their health. Pt has conveyed their understanding of the risks involved should they continue to use these products. HYPERTENSION COUNSELING Education was provided to the patient today regarding their hypertension. Patient is made aware of their elevated blood pressure and is instructed to follow up this week with their Primary Care for a recheck. Patient is counseled regarding consequences of chronic, uncontrolled hypertension including kidney disease, heart disease, stroke or even death. Patient states their understanding and agrees to follow up this week. Additionally, during their visit, I discussed sodium restriction, maintaining ideal body weight and regular exercise program as physiologic means to achieve blood pressure control. The patient will strive towards this. I reviewed our electronic medical record system for any past medical records that were available that may contribute to the patient's current condition, the nursing notes and vital signs from today's visit. Genesis Rosado MD 
Medications Administered During ED Course: 
Medications  
sodium chloride (NS) flush 5-40 mL (has no administration in time range)  
sodium chloride (NS) flush 5-40 mL (has no administration in time range) bumetanide (BUMEX) injection 2 mg (2 mg IntraVENous Given 3/23/19 0628)  
sodium chloride (NS) flush 5-40 mL (10 mL IntraVENous Given 3/23/19 0637)  
sodium chloride (NS) flush 5-40 mL (has no administration in time range)  
acetaminophen (TYLENOL) tablet 650 mg (has no administration in time range)  
ondansetron (ZOFRAN) injection 4 mg (has no administration in time range)  
nitroglycerin (NITROBID) 2 % ointment 1 Inch (has no administration in time range)  
insulin lispro (HUMALOG) injection (has no administration in time range) glucose chewable tablet 16 g (has no administration in time range) dextrose (D50W) injection syrg 12.5-25 g (has no administration in time range) glucagon (GLUCAGEN) injection 1 mg (has no administration in time range) dutasteride (AVODART) capsule 0.5 mg (has no administration in time range)  
apixaban (ELIQUIS) tablet 5 mg (has no administration in time range)  
febuxostat (ULORIC) tablet 40 mg (has no administration in time range) levothyroxine (SYNTHROID) tablet 137 mcg (137 mcg Oral Given 3/23/19 0629)  
mirabegron ER (MYRBETRIQ) tablet 25 mg (has no administration in time range) metoprolol succinate (TOPROL-XL) XL tablet 150 mg (has no administration in time range) metOLazone (ZAROXOLYN) tablet 2.5 mg (has no administration in time range) pravastatin (PRAVACHOL) tablet 80 mg (0 mg Oral Held 3/23/19 0554)  
tamsulosin (FLOMAX) capsule 0.4 mg (has no administration in time range)  
insulin NPH (NOVOLIN N, HUMULIN N) injection 15 Units (has no administration in time range) ED Course as of Mar 23 0806 Sat Mar 23, 2019  
0443 CONSULT NOTE:  
4:44 AM 
Mahogany Conrad MD spoke with Gopi Pathak NP Specialty: Cardiologist 
Discussed pt's hx, disposition, and available diagnostic and imaging results. Reviewed care plans. Agree with management and plan thus far. Consultant will consult in the morning. Concern for cardiorenal syndrome. [HW] 7025 CONSULT NOTE:  
5:26 aWde Hernandez MD spoke with Dr. Wilfred Moya, Specialty: Hospitalist 
Discussed pt's hx, disposition, and available diagnostic and imaging results. Reviewed care plans. Agree with management and plan thus far. Consultant will evaluate pt for admission. [HW] ED Course User Index 
[HW] Harmony Pascual MD  
 
Critical Care Time: CRITICAL CARE NOTE : 
IMPENDING DETERIORATION -Airway, Respiratory, Cardiovascular, Metabolic and Renal 
 ASSOCIATED RISK FACTORS - Hypotension, Shock, Hypoxia, Dysrhythmia and Metabolic changes MANAGEMENT- Bedside Assessment and Supervision of Care INTERPRETATION -  Xrays, Blood Gases, ECG, Blood Pressure and Cardiac Output Measures INTERVENTIONS - hemodynamic mngmt, Metobolic interventions and management of acute respiratory failure requiring oxygen therapy, management of acute on chronic diastolic CHF requiring IV diuresis. Patient requiring frequent hemodynamic monitoring and reassessment. CASE REVIEW - Hospitalist, Medical Sub-Specialist, Nursing and Family TREATMENT RESPONSE -Improved PERFORMED BY - Self NOTES   : 
 
I have spent 70 minutes of critical care time involved in lab review, consultations with specialist, family decision- making, bedside attention and documentation. During this entire length of time I was immediately available to the patient . Critical Care: The reason for providing this level of medical care for this critically ill patient was due to a critical illness that impaired one or more vital organ systems, such that there was a high probability of imminent or life threatening deterioration in the patient's condition. This care involved high complexity decision making to assess, manipulate, and support vital system functions, to treat this degree of vital organ system failure, and to prevent further life threatening deterioration of the patients condition. Jhon Castañeda MD 
 
Disposition: ADMIT Patient is being admitted to the hospital.  The results of their tests and reasons for their admission have been discussed with them and/or available family. They convey agreement and understanding for the need to be admitted and for their admission diagnosis. Consultation has been made with the inpatient physician specialist for hospitalization. Diagnosis Clinical Impression: 1. Acute respiratory failure with hypoxia ([address] 2. Cardiorenal syndrome with renal failure, stage 5 chronic kidney disease or end stage renal disease, with heart failure ([address] 3. Respiratory distress 4. Accelerated hypertension 5. Acute renal failure with other specified pathological kidney lesion superimposed on stage 4 chronic kidney disease ([address] 6. Acute on chronic diastolic (congestive) heart failure ([address] Attestation: 
 
I personally performed the services described in this documentation on this date 3/23/2019 for Gita Tiffany. I have reviewed and verified that all the information is accurate and complete. Celena Velarde MD 
 
 
This note will not be viewable in [address]

## 2019-03-23 NOTE — ED NOTES
Assumed care of patient at this time. Pt reports shortness of breath since Tuesday. He saw Dr. Freedom Warren and was told to go to the nephrologist. Pt has an appointment on Monday at the nephrologist. Pt reports having a harder time breathing while laying flat and has been needing to sleep in the recliner. Pt denies weight gain. States he has weeping in his right leg and has seen wound care

## 2019-03-23 NOTE — CONSULTS
Consultation Note    NAME: Piyush Owusu   :  1950   MRN:  [id number]     Date/Time:  3/23/2019 4:18 PM    I have been asked to see this patient by Dr. Deon Bower  for advice/opinion re: INO on CKD 4. Assessment :    Plan:  INO on CKD stage 4 (baseline creatinine ~ 3)  Volume overload/JHON  Morbid obesity  CABG/diastolic dysfunction  SHPT  BPH Creatinine today is 3.4; progression of disease? Cardiorenal syndrome? Home bumex was 2 mg po bid for the past few days; agree with Bumex 2 mg iv bid for now and give with metolazone 2.5 mg daily    Monitor weight, I&O and edema to direct changes in diuretics    Low salt diet, daily weight    Check bladder scan for PVR    No RRT or UF needed at this time       Subjective:   CHIEF COMPLAINT:  ino    HISTORY OF PRESENT ILLNESS:     Kristofer Box is a 78 y.o.   male who has a history of the below. Last saw Dr. Andrews Sarmiento in early February. Weight 248 pounds. Creatinine lately has been ~ 3. He gained 10 pounds in the last month. A couple of weeks ago he was on a cruise. He developed edema and a blister during the cruise. Went to wound care on the boat and once back here. He went to see the Cardiologist earlier this week and his Bumex was increased from 2 mg/1mg to 2 mg bid. He takes metolazone 2.5 prn and has been taking every other day for the past week or so. Orthopnea last night. BETTS. Follows a low salt diet, but didn't request special meals on the cruise this go around.     Past Medical History:   Diagnosis Date    Arrhythmia     atrial fibrillation     CAD (coronary atherosclerotic disease)     Chronic pain     Diabetes ([address]     Diabetes mellitus type 2, controlled ([address] 3/13/2017    Heart failure ([address]     Hx of CABG 3/13/2017    CABG in 2010 in [address] [place] [address] Hypertension     Morbid obesity ([address] 3/13/2017    JACEY (obstructive sleep apnea) 2017    S/P CABG x 2     Thyroid disease       Past Surgical History:   Procedure Laterality Date    CARDIAC SURG PROCEDURE UNLIST      CABGx2 in 2010    HX ORTHOPAEDIC      L 3rd toe amputation in 1999     Social History     Tobacco Use    Smoking status: Never Smoker    Smokeless tobacco: Never Used   Substance Use Topics    Alcohol use: Yes     Comment: rare      Family History   Problem Relation Age of Onset    Heart Attack Father     No Known Problems Mother       Allergies   Allergen Reactions    Allopurinol Rash    Gabapentin Drowsiness    Ciprofloxacin Nausea Only    Percocet [Oxycodone-Acetaminophen] Other (comments)     hallucinations      Prior to Admission medications    Medication Sig Start Date End Date Taking? Authorizing Provider   dutasteride (AVODART) 0.5 mg capsule Take 0.5 mg by mouth daily. Yes Provider, Historical   metoprolol succinate (TOPROL-XL) 100 mg tablet Take 100 mg by mouth daily. Takes 150 mg daily   Yes Provider, Historical   MYRBETRIQ 25 mg ER tablet TAKE 1 TABLET BY MOUTH EVERY DAY 10/2/18  Yes Provider, Historical   pravastatin (PRAVACHOL) 80 mg tablet TAKE 1 TABLET BY MOUTH IN THE EVENING 10/15/18  Yes Priya Jason NP   ELIQUIS 5 mg tablet TAKE 1 TABLET BY MOUTH TWICE A DAY 6/25/18  Yes Florence Oneal MD   levothyroxine (SYNTHROID) 137 mcg tablet TAKE 1 TABLET(S) EVERY DAY BY ORAL ROUTE FOR 90 DAYS. 6/19/18  Yes Gaye Armendariz MD   bumetanide (BUMEX) 2 mg tablet 1 tab in AM and 1/2 tab in the afternoon. Patient taking differently: Take 2 mg by mouth every morning. 1 tab in AM and 1 tab in the afternoon. 4/18/18  Yes Priya Jason NP   febuxostat (ULORIC) 40 mg tab tablet Take 40 mg by mouth daily. Yes Provider, Historical   potassium chloride SR (K-TAB) 20 mEq tablet Take 1 Tab by mouth daily. Patient taking differently: Take 20 mEq by mouth daily. Take 2 20 mEq tabs for three weeks 6/27/17  Yes Talon Javier MD   metOLazone (ZAROXOLYN) 2.5 mg tablet Take 2.5 mg by mouth daily.    Yes Provider, Historical   tamsulosin (FLOMAX) 0.4 mg capsule TAKE 2 CAPSULES BY MOUTH EVERY DAY 12/14/16  Yes Provider, Historical   insulin aspart protamine/insulin aspart (NOVOLOG MIX 70/30) 100 unit/mL (70-30) inpn 34 units breakfast, 34 units dinner + correction scale (up to 80 units/day) 3/19/19   Bre Owens MD   Insulin Needles, Disposable, (BD ULTRA-FINE MINI PEN NEEDLE) 31 gauge x 3/16\" ndle USE WITH NOVOLOG 3 TIMES PER DAY 12/23/18   Bre Owens MD   glucose blood VI test strips (PHARMACIST CHOICE) strip One Touch Ultra Strips; Check glucose 3 times daily, Diagnosis E11.22 10/24/18   Bre Owens MD   Lancets misc Use preferred brand; Check glucose 3-4x times daily, Diagnosis E11.22 10/13/17   Bre Owens MD   Insulin Needles, Disposable, (KAUSHIK PEN NEEDLE) 32 gauge x 5/32\" ndle Use with Novolog 70/30 pen 10/13/17   Bre Owens MD   ketoconazole (NIZORAL) 2 % topical cream Apply  to affected area two (2) times daily as needed for Skin Irritation. Indications: rash    Provider, Historical   multivitamin (ONE A DAY) tablet Take 1 Tab by mouth daily. Provider, Historical   acetaminophen (TYLENOL EXTRA STRENGTH) 500 mg tablet Take 1,000 mg by mouth every eight (8) hours as needed for Pain.     Provider, Historical     REVIEW OF SYSTEMS:     []  Unable to obtain reliable ROS due to  [] mental status  [] sedated   [] intubated   [x] Total of 12 systems reviewed as follows:  Constitutional: negative fever, negative chills, negative weight loss  Eyes:   negative visual changes  ENT:   negative sore throat, tongue or lip swelling  Respiratory:  negative cough, +dyspnea  Cards:  negative for chest pain, palpitations,+ lower extremity edema  GI:   negative for nausea, vomiting, diarrhea, and abdominal pain  :  negative for frequency, dysuria  Integument:  negative for rash and pruritus  Heme:  negative for easy bruising and gum/nose bleeding  Musculoskel: negative for myalgias,  back pain and muscle weakness  Neuro:  negative for headaches, dizziness, vertigo  Psych:  negative for feelings of anxiety, depression   Travel?: none    Objective:   VITALS:    Visit Vitals  /83   Pulse 75   Temp 98.8 °F (37.1 °C)   Resp 21   Ht 5' 9\" (1.753 m)   Wt 115.6 kg (254 lb 13.6 oz)   SpO2 97%   BMI 37.64 kg/m²     PHYSICAL EXAM:  Gen:  []  WD []  WN  [] cachectic []  thin [x]  obese []  disheveled             [x]  ill apearing  []   Critical  []   Chronic    []  No acute distress    HEENT:   [x] NC/AT/PERRLA/EOMI    [] pink conjunctivae      [] pale conjunctivae                  PERRL  [] yes  [] no      [] moist mucosa    [] dry mucosa    hearing intact to voice [x] yes  [] No                 NECK:   supple [x] yes  [] no        masses [] yes  [] No               thyroid  []  non tender  []  tender    RESP:   [x] CTA bilaterally/no wheezing/rhonchi/rales/crackles    [] rhonchi bilaterally - no dullness  [] wheezing   [] rhonchi   [] crackles     use of accessory muscles [] yes [x] no    CARD:   [x]  regular rate and rhythm/No murmurs/rubs/gallops    murmur  [] yes ()  [] no      Rubs  [] yes  [] no       Gallops [] yes  [] no    Rate []  regular  []  irregular        carotid bruits  [] Right  []  Left                 LE edema [x] yes  [] no           JVP  []  yes   []  no    ABD:    [x] soft/non distended/non tender/+bowel sounds/no HSM    []  Rigid    tenderness [] yes [] no   Liver enlargement  []  yes []  no                Spleen enlargement  []  yes []  no     distended []  yes [] no     bowel sound  [] hypoactive   [] hyperactive    LYMPH:    Neck []  yes [x]  no       Axillae []  yes []  no    SKIN:   Rashes []  yes   [x]  no    Ulcers []  yes   []  no               [] tight to palpitation    skin turgor [x]  good  [] poor  [] decreased               Cyanosis/clubbing []  yes []  no    NEUR:   [x] cranial nerves II-XII grossly intact       [] Cranial nerves deficit                 []  facial droop    []  slurred speech   [] aphasic     [] Strength normal     []  weakness  []  LUE  []   RUE/ []  LLE  []   RLE    follows commands  [x]  yes []  no           PSYCH:   insight [] poor [x] good   Alert and Oriented to  [x] person  [x] place  [x]  time                    [] depressed [] anxious [] agitated  [] lethargic [] stuporous  [] sedated     LAB DATA REVIEWED:    Recent Labs     03/23/19 0231   WBC 7.0   HGB 11.3*   HCT 37.1        Recent Labs     03/23/19 0231      K 4.5   CL 99   CO2 35*   *   CREA 3.39*   *   CA 9.1   MG 2.3     Recent Labs     03/23/19 0231   SGOT 41*   ALT 20   AP 51   TBILI 0.5   ALB 3.1*   GLOB 4.3*     No results for input(s): INR, PTP, APTT in the last 72 hours. No lab exists for component: INREXT   No results for input(s): FE, TIBC, PSAT, FERR in the last 72 hours. No results for input(s): PH, PCO2, PO2 in the last 72 hours. No results for input(s): CPK, CKMB in the last 72 hours. No lab exists for component: TROPONINI  Lab Results   Component Value Date/Time    Glucose (POC) 211 (H) 03/23/2019 12:23 PM    Glucose (POC) 169 (H) 03/23/2019 07:43 AM    Glucose (POC) 264 (H) 06/27/2017 12:17 PM    Glucose (POC) 177 (H) 06/27/2017 08:01 AM    Glucose (POC) 142 (H) 06/26/2017 10:06 PM       Procedures: see electronic medical records for all procedures/Xrays and details which were not copied into this note but were reviewed prior to creation of Plan.    ________________________________________________________________________       ___________________________________________________  Consulting Physician:  Solomon Puentes MD

## 2019-03-23 NOTE — H&P
Hospitalist Admission NoteNAME: Sergio Deshpande :  1950 MRN:  [id number] Date/Time:  3/23/2019 5:56 AM 
 
Patient PCP: Ana Luisa Bolton MD 
______________________________________________________________________ Given the patient's current clinical presentation, I have a high level of concern for decompensation if discharged from the emergency department. Complex decision making was performed, which includes reviewing the patient's available past medical records, laboratory results, and x-ray films. My assessment of this patient's clinical condition and my plan of care is as follows. Assessment / Plan: 
Acute respiratory failure with hypoxia POA Due to Acute on chronic diastolic heart failure POA In settings of CKD4 Bumex 2mg IV BID, first dose now Monitor I/os, daily weight and lytes Continue Zaroxy[facility] Nephrology consult ED consulted cardiology Chronic a-fib s/p ablation and s/p PPM 
HTN Continue BB and Eliquis CAD Hx of CABG On Eliquis Diabetes mellitus type 2 Takes 70/30 34 units BID at home Will place NPH 15 units BID for now along with SSI Code Status: Full Surrogate Decision Maker: Wife DVT Prophylaxis: Eliquis Baseline: functional  
  
Subjective: CHIEF COMPLAINT: shortness of breath HISTORY OF PRESENT ILLNESS:    
Montana Coats is a 78 y.o.  male who presents with worsening shortness of breath. As per patient, he is been having worsening shortness of breath for past 2 weeks. Pt reported Chronic leg swelling, weight gain 10lbs in a month. Pt reported orthopnea and worsening dyspnea. Pt reported he saw his cardiologist today who recommended to [address] his nephrologist but appointment was in several days but he continue to feel short winded so came to ED. In ED pt noted to have elevated proBNP and hypoxix. We were asked to admit for work up and evaluation of the above problems. Past Medical History:  
Diagnosis Date  Arrhythmia   
 atrial fibrillation 2017  CAD (coronary atherosclerotic disease)  Chronic pain  [facility]  Diabetes mellitus type 2, controlled ([facility] 3/13/2017  [facility] failure [facility]  Hx of CABG 3/13/2017 CABG in 2010 in [place]  Hypertension  Morbid obesity ([facility] 3/13/2017  JACEY (obstructive sleep apnea) 6/23/2017  S/P CABG x 2 2010  Thyroid disease Past Surgical History:  
Procedure Laterality Date  CARDIAC SURG PROCEDURE UNLIST CABGx2 in 2010  HX ORTHOPAEDIC    
 L 3rd toe amputation in 1999 Social History Tobacco Use  Smoking status: Never Smoker  Smokeless tobacco: Never Used Substance Use Topics  Alcohol use: Yes Comment: rare Family History Problem Relation Age of Onset  Heart Attack Father  No Known Problems Mother Allergies Allergen Reactions  Allopurinol Rash  Gabapentin Drowsiness  Ciprofloxacin Nausea Only  Percocet [Oxycodone-Acetaminophen] Other (comments)  
  hallucinations Prior to Admission medications Medication Sig Start Date End Date Taking? Authorizing Provider  
dutasteride (AVODART) 0.5 mg capsule Take 0.5 mg by mouth daily. Yes Provider, Historical  
metoprolol succinate (TOPROL-XL) 100 mg tablet Take 100 mg by mouth daily. Takes 150 mg daily   Yes Provider, Historical  
MYRBETRIQ 25 mg ER tablet TAKE 1 TABLET BY MOUTH EVERY DAY 10/2/18  Yes Provider, Historical  
pravastatin (PRAVACHOL) 80 mg tablet TAKE 1 TABLET BY MOUTH IN THE EVENING 10/15/18  Yes Petr Roberts NP  
ELIQUIS 5 mg tablet TAKE 1 TABLET BY MOUTH TWICE A DAY 6/25/18  Yes Dede Ramesh MD  
levothyroxine (SYNTHROID) 137 mcg tablet TAKE 1 TABLET(S) EVERY DAY BY ORAL ROUTE FOR 90 DAYS. 6/19/18  Yes Silvana Robin MD  
bumetanide (BUMEX) 2 mg tablet 1 tab in AM and 1/2 tab in the afternoon. Patient taking differently: Take 2 mg by mouth every morning.  1 tab in AM and 1 tab in the afternoon. 4/18/18  Yes Belinda Estrada NP  
febuxostat (ULORIC) 40 mg tab tablet Take 40 mg by mouth daily. Yes Provider, Historical  
potassium chloride SR (K-TAB) 20 mEq tablet Take 1 Tab by mouth daily. Patient taking differently: Take 20 mEq by mouth daily. Take 2 20 mEq tabs for three weeks 6/27/17  Yes Mita Miller MD  
metOLazone (ZAROXOLYN) 2.5 mg tablet Take 2.5 mg by mouth daily. Yes Provider, Historical  
tamsulosin (FLOMAX) 0.4 mg capsule TAKE 2 CAPSULES BY MOUTH EVERY DAY 12/14/16  Yes Provider, Historical  
insulin aspart protamine/insulin aspart (NOVOLOG MIX 70/30) 100 unit/mL (70-30) inpn 34 units breakfast, 34 units dinner + correction scale (up to 80 units/day) 3/19/19   Zachery Duarte MD  
Insulin Needles, Disposable, (BD ULTRA-FINE MINI PEN NEEDLE) 31 gauge x 3/16\" ndle USE WITH NOVOLOG 3 TIMES PER DAY 12/23/18   Zachery Duarte MD  
glucose blood VI test strips (PHARMACIST CHOICE) strip One Touch Ultra Strips; Check glucose 3 times daily, Diagnosis E11.22 10/24/18   Zachery Duarte MD  
Lancets misc Use preferred brand; Check glucose 3-4x times daily, Diagnosis E11.22 10/13/17   Zachery Duarte MD  
Insulin Needles, Disposable, (KAUSHIK PEN NEEDLE) 32 gauge x 5/32\" ndle Use with Novolog 70/30 pen 10/13/17   Zachery Duarte MD  
ketoconazole (NIZORAL) 2 % topical cream Apply  to affected area two (2) times daily as needed for Skin Irritation. Indications: rash    Provider, Historical  
multivitamin (ONE A DAY) tablet Take 1 Tab by mouth daily. Provider, Historical  
acetaminophen (TYLENOL EXTRA STRENGTH) 500 mg tablet Take 1,000 mg by mouth every eight (8) hours as needed for Pain. Provider, Historical  
 
 
REVIEW OF SYSTEMS:    
I am not able to complete the review of systems because: The patient is intubated and sedated The patient has altered mental status due to his acute medical problems The patient has baseline aphasia from prior stroke(s) The patient has baseline dementia and is not reliable historian The patient is in acute medical distress and unable to provide information Total of 12 systems reviewed as follows:   
   POSITIVE= underlined text  Negative = text not underlined General:  fever, chills, sweats, generalized weakness, weight gain,  
   loss of appetite Eyes:    blurred vision, eye pain, loss of vision, double vision ENT:    rhinorrhea, pharyngitis Respiratory:   cough, sputum production, SOB, BETTS, wheezing, pleuritic pain  
Cardiology:   chest pain, palpitations, orthopnea, PND, edema, syncope Gastrointestinal:  abdominal pain , N/V, diarrhea, dysphagia, constipation, bleeding Genitourinary:  frequency, urgency, dysuria, hematuria, incontinence Muskuloskeletal :  arthralgia, myalgia, back pain Hematology:  easy bruising, nose or gum bleeding, lymphadenopathy Dermatological: rash, ulceration, pruritis, color change / jaundice Endocrine:   hot flashes or polydipsia Neurological:  headache, dizziness, confusion, focal weakness, paresthesia, Speech difficulties, memory loss, gait difficulty Psychological: Feelings of anxiety, depression, agitation Objective: VITALS:   
Visit Vitals /73 (BP 1 Location: Left arm, BP Patient Position: Sitting) Pulse 75 Temp 97.3 °F (36.3 °C) Resp 20 Ht 5' 9\" (1.753 m) Wt 115.6 kg (254 lb 13.6 oz) SpO2 96% BMI 37.64 kg/m² PHYSICAL EXAM: 
 
General:    Alert, cooperative, no distress, appears stated age. HEENT: Atraumatic, anicteric sclerae, pink conjunctivae No oral ulcers, mucosa moist, throat clear, dentition fair Neck:  Supple, symmetrical,  thyroid: non tender Lungs:   crackles. No Wheezing or Rhonchi. No rales. Chest wall:  No tenderness  No Accessory muscle use. Heart:   Regular  rhythm,  No  murmur  ++++ edema Abdomen:   Soft, non-tender. Not distended. Bowel sounds normal 
Extremities: No cyanosis. No clubbing,   
  Skin turgor normal, Capillary refill normal, Radial dial pulse 2+ Skin:     Not pale. Not Jaundiced  No rashes Psych:  Good insight. Not depressed. Not anxious or agitated. Neurologic: EOMs intact. No facial asymmetry. No aphasia or slurred speech. Symmetrical strength, Sensation grossly intact. Alert and oriented X 4.  
 
_______________________________________________________________________ Care Plan discussed with: 
  Comments Patient y Family RN y   
Care Manager Consultant:  manny JAMIL physician  
_______________________________________________________________________ Expected  Disposition:  
Home with Family y HH/PT/OT/RN   
SNF/LTC   
[facility]   
________________________________________________________________________ TOTAL TIME: 60 Minutes Critical Care Provided    Minutes non procedure based Comments  
 y Reviewed previous records  
>50% of visit spent in counseling and coordination of care y Discussion with patient and family and questions answered 
  
 
________________________________________________________________________ Signed: Socorro Elise MD 
 
Procedures: see electronic medical records for all procedures/Xrays and details which were not copied into this note but were reviewed prior to creation of Plan. LAB DATA REVIEWED:   
Recent Results (from the past 24 hour(s)) EKG, 12 LEAD, INITIAL Collection Time: 03/23/19  2:16 AM  
Result Value Ref Range Ventricular Rate 75 BPM  
 Atrial Rate 75 BPM  
 QRS Duration 144 ms Q-T Interval 456 ms  
 QTC Calculation (Bezet) 509 ms Calculated R Axis -86 degrees Calculated T Axis 110 degrees Diagnosis Ventricular-paced rhythm Biventricular pacemaker detected When compared with ECG of 22-JUN-2017 09:38, Electronic ventricular pacemaker has replaced Atrial fibrillation Vent. rate has decreased BY  39 BPM 
  
CBC WITH AUTOMATED DIFF Collection Time: 03/23/19  2:31 AM  
Result Value Ref Range WBC 7.0 4.1 - 11.1 K/uL  
 RBC 4.43 4.10 - 5.70 M/uL  
 HGB 11.3 (L) 12.1 - 17.0 g/dL HCT 37.1 36.6 - 50.3 % MCV 83.7 80.0 - 99.0 FL  
 MCH 25.5 (L) 26.0 - 34.0 PG  
 MCHC 30.5 30.0 - 36.5 g/dL  
 RDW 16.3 (H) 11.5 - 14.5 % PLATELET 638 557 - 940 K/uL MPV 11.8 8.9 - 12.9 FL  
 NRBC 0.0 0  WBC ABSOLUTE NRBC 0.00 0.00 - 0.01 K/uL NEUTROPHILS 72 32 - 75 % LYMPHOCYTES 15 12 - 49 % MONOCYTES 11 5 - 13 % EOSINOPHILS 2 0 - 7 % BASOPHILS 0 0 - 1 % IMMATURE GRANULOCYTES 0 0.0 - 0.5 % ABS. NEUTROPHILS 5.0 1.8 - 8.0 K/UL  
 ABS. LYMPHOCYTES 1.1 0.8 - 3.5 K/UL  
 ABS. MONOCYTES 0.8 0.0 - 1.0 K/UL  
 ABS. EOSINOPHILS 0.2 0.0 - 0.4 K/UL  
 ABS. BASOPHILS 0.0 0.0 - 0.1 K/UL  
 ABS. IMM. GRANS. 0.0 0.00 - 0.04 K/UL  
 DF AUTOMATED METABOLIC PANEL, COMPREHENSIVE Collection Time: 03/23/19  2:31 AM  
Result Value Ref Range Sodium 141 136 - 145 mmol/L Potassium 4.5 3.5 - 5.1 mmol/L Chloride 99 97 - 108 mmol/L  
 CO2 35 (H) 21 - 32 mmol/L Anion gap 7 5 - 15 mmol/L Glucose 133 (H) 65 - 100 mg/dL  (H) 6 - 20 MG/DL Creatinine 3.39 (H) 0.70 - 1.30 MG/DL  
 BUN/Creatinine ratio 32 (H) 12 - 20 GFR est AA 22 (L) >60 ml/min/1.73m2 GFR est non-AA 18 (L) >60 ml/min/1.73m2 Calcium 9.1 8.5 - 10.1 MG/DL Bilirubin, total 0.5 0.2 - 1.0 MG/DL  
 ALT (SGPT) 20 12 - 78 U/L  
 AST (SGOT) 41 (H) 15 - 37 U/L Alk. phosphatase 51 45 - 117 U/L Protein, total 7.4 6.4 - 8.2 g/dL Albumin 3.1 (L) 3.5 - 5.0 g/dL Globulin 4.3 (H) 2.0 - 4.0 g/dL A-G Ratio 0.7 (L) 1.1 - 2.2    
TROPONIN I Collection Time: 03/23/19  2:31 AM  
Result Value Ref Range Troponin-I, Qt. 0.07 (H) <0.05 ng/mL NT-PRO BNP Collection Time: 03/23/19  2:31 AM  
Result Value Ref Range NT pro-BNP 11,558 (H) <125 PG/ML  
INFLUENZA A & B AG (RAPID TEST) Collection Time: 03/23/19  2:31 AM  
Result Value Ref Range Influenza A Antigen NEGATIVE  NEG Influenza B Antigen NEGATIVE  NEG MAGNESIUM Collection Time: 03/23/19  2:31 AM  
Result Value Ref Range Magnesium 2.3 1.6 - 2.4 mg/dL

## 2019-03-23 NOTE — PROGRESS NOTES
Progress Note Pt Name  Angie Saravia Date of Birth 1950 Medical Record Number  [id number] Age  78 y.o. PCP Tessie Shaikh MD  
Admit date:  3/23/2019 Room Number  ER29/29  @ [facility] Date of Service  3/23/2019 Admission Diagnoses:  Acute respiratory failure with hypoxia Assessment and plan:  
 
Acute respiratory failure with hypoxia POA Due to Acute on chronic diastolic heart failure POA In settings of CKD4 Bumex 2mg IV BID, Monitor I/O Daily weight and lytes Continue Zaroxylyn Nephrology consult appreciated  
  
Chronic a-fib s/p ablation and s/p PPM 
HTN Continue BB and Eliquis 
  
CAD Hx of CABG On Eliquis 
  
Diabetes mellitus type 2 Takes 70/30 34 units BID at home Will place NPH 15 units BID for now along with SSI 
  
Code Status: Full Surrogate Decision Maker: Wife 
  
DVT Prophylaxis: Eliquis 
  
Baseline: functional  
 
 
 
  
CODE STATUS   Full Functional Status  Pt is  and lives with his wife Surrogate decision maker:  Pt's wife Prophylaxis   Hep SQ Discharge Plan:  Danilo Almanzar PT, OT, RN, Misc Benefit:  Payor: [place] MED[place]ARE / Plan: [place] MEDICARE PART A & B / Product Type: Medicare /   
Isolation :  There are currently no Active Isolations ADT status:  INPATIENT Query   None noted today Prognosis   Fair Social issues  Date  Comment 03/24/19  
4:07 PM  Met with his wife in the room. We all talked about clinical issues in simple language and answered all questions Subjective Data \"I feel a lot better \" Review of Systems - History obtained from spouse and the patient Respiratory ROS: no cough, shortness of breath, or wheezing Cardiovascular ROS: no chest pain or dyspnea on exertion Objective Data Comments  Pleasant gentleman sitting on bedside chair in no distress His wife is in the room. Patient Vitals for the past 24 hrs: 
 BP  
03/23/19 1500 166/83 03/23/19 1400 154/81  
03/23/19 1130 166/90  
03/23/19 1030 165/81  
03/23/19 0800 162/81  
03/23/19 0630 178/83  
03/23/19 0215 155/73 Patient Vitals for the past 24 hrs: 
 Pulse 03/23/19 1500 75  
03/23/19 1400 75  
03/23/19 1130 75  
03/23/19 1030 76  
03/23/19 0800 76  
03/23/19 0630 75  
03/23/19 0245 75  
03/23/19 0238 75  
03/23/19 0215 73 Patient Vitals for the past 24 hrs: 
 Resp  
03/23/19 1500 21  
03/23/19 1400 20  
03/23/19 1130 22  
03/23/19 1030 19  
03/23/19 0800 18  
03/23/19 0630 18  
03/23/19 0245 20  
03/23/19 0238 19  
03/23/19 0215 20 Patient Vitals for the past 24 hrs: 
 Temp  
03/23/19 0800 98.8 °F (37.1 °C)  
03/23/19 0215 97.3 °F (36.3 °C) SpO2 Readings from Last 6 Encounters:  
03/23/19 97% 03/19/19 92% 10/30/18 95% 10/23/18 98% 04/18/18 92% 10/10/17 98% O2 Flow Rate (L/min): 2 l/min  O2 Device: Nasal cannula Body mass index is 37.64 kg/m². - 
Wt Readings from Last 10 Encounters:  
03/23/19 115.6 kg (254 lb 13.6 oz) 03/19/19 115.2 kg (254 lb) 02/11/19 112.9 kg (249 lb) 10/30/18 113.5 kg (250 lb 3.2 oz) 10/23/18 111.7 kg (246 lb 3.2 oz) 10/22/18 111.1 kg (244 lb 14.4 oz)  
07/20/18 109.9 kg (242 lb 3.2 oz) 04/19/18 108.5 kg (239 lb 1.6 oz) 04/18/18 109.2 kg (240 lb 12.8 oz) 01/11/18 108.2 kg (238 lb 8 oz) Physical Exam:            
General:  Alert, cooperative,  
well noursished,  
well developed,  
appears stated age Ears/Eyes:  Hearing intact Sclera anicteric. Pupils equal  
Mouth/Throat:  Mucous membranes normal pink and moist 
Oral pharynx clear Neck:    
Lungs:  Trachea midline Chest excursion symmetrical  
Auscultation B/L Symmetrical with Vesicular breath sounds No Crepitations noted Percussion note resonant on mid Clavicular line; no sign of pneumothorax CVS:  Regular rate and rhythm  
 murmur, No click, rub or gallop S1 normal  
S2 normal  
Pedal pulses  b/l symmetrical  
Abdomen:  Obese Soft, non-tender Bowel sounds normal 
No distension Percussion note tympanitic Extremities:  Advanced RA changes on small and large joints noted. No cyanosis, jaundice No edema noted No sign of DVT/cord like lesion on palpation No sign of acute trauma Age appropriate OA changes noted. Skin:   
Skin color, texture, turgor normal. no acute rash or lesions Lymph nodes: Musculoskeletal Muscle bulk B/L symmetrical  
Neuro Cranial nerves are intact,  
motor movement b/l symmetrical, Sensory evaluation b/l symmetrical   
Psych:  Alert and oriented,  
normal mood & affect Medications reviewed Current Facility-Administered Medications Medication Dose Route Frequency  sodium chloride (NS) flush 5-40 mL  5-40 mL IntraVENous Q8H  
 sodium chloride (NS) flush 5-40 mL  5-40 mL IntraVENous PRN  
 bumetanide (BUMEX) injection 2 mg  2 mg IntraVENous Q12H  
 sodium chloride (NS) flush 5-40 mL  5-40 mL IntraVENous Q8H  
 sodium chloride (NS) flush 5-40 mL  5-40 mL IntraVENous PRN  
 acetaminophen (TYLENOL) tablet 650 mg  650 mg Oral Q6H PRN  
 ondansetron (ZOFRAN) injection 4 mg  4 mg IntraVENous Q4H PRN  
 nitroglycerin (NITROBID) 2 % ointment 1 Inch  1 Inch Topical Q6H PRN  
 insulin lispro (HUMALOG) injection   SubCUTAneous AC&HS  
 glucose chewable tablet 16 g  4 Tab Oral PRN  
 dextrose (D50W) injection syrg 12.5-25 g  12.5-25 g IntraVENous PRN  
 glucagon (GLUCAGEN) injection 1 mg  1 mg IntraMUSCular PRN  
 dutasteride (AVODART) capsule 0.5 mg  0.5 mg Oral DAILY  apixaban (ELIQUIS) tablet 5 mg  5 mg Oral BID  febuxostat (ULORIC) tablet 40 mg  40 mg Oral DAILY  levothyroxine (SYNTHROID) tablet 137 mcg  137 mcg Oral 6am  
 mirabegron ER (MYRBETRIQ) tablet 25 mg  25 mg Oral DAILY  metoprolol succinate (TOPROL-XL) XL tablet 150 mg  150 mg Oral DAILY  metOLazone (ZAROXOLYN) tablet 2.5 mg  2.5 mg Oral DAILY  pravastatin (PRAVACHOL) tablet 80 mg  80 mg Oral QHS  tamsulosin (FLOMAX) capsule 0.4 mg  0.4 mg Oral DAILY  insulin NPH (NOVOLIN N, HUMULIN N) injection 15 Units  15 Units SubCUTAneous ACB&D Current Outpatient Medications Medication Sig  dutasteride (AVODART) 0.5 mg capsule Take 0.5 mg by mouth daily.  metoprolol succinate (TOPROL-XL) 100 mg tablet Take 100 mg by mouth daily. Takes 150 mg daily  MYRBETRIQ 25 mg ER tablet TAKE 1 TABLET BY MOUTH EVERY DAY  pravastatin (PRAVACHOL) 80 mg tablet TAKE 1 TABLET BY MOUTH IN THE EVENING  
 ELIQUIS 5 mg tablet TAKE 1 TABLET BY MOUTH TWICE A DAY  levothyroxine (SYNTHROID) 137 mcg tablet TAKE 1 TABLET(S) EVERY DAY BY ORAL ROUTE FOR 90 DAYS.  bumetanide (BUMEX) 2 mg tablet 1 tab in AM and 1/2 tab in the afternoon. (Patient taking differently: Take 2 mg by mouth every morning. 1 tab in AM and 1 tab in the afternoon.)  febuxostat (ULORIC) 40 mg tab tablet Take 40 mg by mouth daily.  potassium chloride SR (K-TAB) 20 mEq tablet Take 1 Tab by mouth daily. (Patient taking differently: Take 20 mEq by mouth daily. Take 2 20 mEq tabs for three weeks)  metOLazone (ZAROXOLYN) 2.5 mg tablet Take 2.5 mg by mouth daily.  tamsulosin (FLOMAX) 0.4 mg capsule TAKE 2 CAPSULES BY MOUTH EVERY DAY  insulin aspart protamine/insulin aspart (NOVOLOG MIX 70/30) 100 unit/mL (70-30) inpn 34 units breakfast, 34 units dinner + correction scale (up to 80 units/day)  Insulin Needles, Disposable, (BD ULTRA-FINE MINI PEN NEEDLE) 31 gauge x 3/16\" ndle USE WITH NOVOLOG 3 TIMES PER DAY  glucose blood VI test strips (PHARMACIST CHOICE) strip One Touch Ultra Strips; Check glucose 3 times daily, Diagnosis E11.22  
 Lancets misc Use preferred brand; Check glucose 3-4x times daily, Diagnosis E11.22  
 Insulin Needles, Disposable, (KAUSHIK PEN NEEDLE) 32 gauge x 5/32\" ndle Use with Novolog 70/30 pen  ketoconazole (NIZORAL) 2 % topical cream Apply  to affected area two (2) times daily as needed for Skin Irritation. Indications: rash  multivitamin (ONE A DAY) tablet Take 1 Tab by mouth daily.  acetaminophen (TYLENOL EXTRA STRENGTH) 500 mg tablet Take 1,000 mg by mouth every eight (8) hours as needed for Pain. Relevant other informations: Other medical conditions listed in [facility] problem list section; all of these and other pertinent data were taken into consideration when treatment plan is developed and customized to this patient's unique overall circumstances and needs. We have reviewed available old medical records within the constraints of this admission process. Data Review:  
Recent Days: 
All Micro Results Procedure Component Value Units Date/Time INFLUENZA A & B AG (RAPID TEST) [[id number]] Collected:  03/23/19 0231 Order Status:  Completed Specimen:  Nasopharyngeal from Nasal washing Updated:  03/23/19 3562 Influenza A Antigen NEGATIVE Influenza B Antigen NEGATIVE Recent Labs  
  03/23/19 0231 WBC 7.0 HGB 11.3* HCT 37.1  Recent Labs  
  03/23/19 0231   
K 4.5  
CL 99  
CO2 35* * * CREA 3.39* CA 9.1 MG 2.3 ALB 3.1* TBILI 0.5 SGOT 41* ALT 20 Lab Results Component Value Date/Time TSH 1.410 04/10/2018 08:43 AM  
 
 
 
  
Care Plan discussed with:Patient/Family and Nurse Other medical conditions are listed in the active hospital problem list section; these and other pertinent data were taken into consideration when the treatment plan was developed and customized to this patient's unique overall circumstances and needs. High complexity decision making was performed for this patient who is at high risk for decompensation with multiple organ involvement.   
Today total floor/unit time was 35 minutes while caring for this patient and greater than 50% of that time was spent with patient (and/or family) coordinating patients clinical issues; this includes time spent during multidisciplinary rounds. Kirk Moreau MD MPH FACP   
3/23/2019

## 2019-03-24 LAB
ALBUMIN SERPL-MCNC: 2.9 G/DL (ref 3.5–5)
ALBUMIN/GLOB SERPL: 0.7 {RATIO} (ref 1.1–2.2)
ALP SERPL-CCNC: 46 U/L (ref 45–117)
ALT SERPL-CCNC: 17 U/L (ref 12–78)
ANION GAP SERPL CALC-SCNC: 9 MMOL/L (ref 5–15)
AST SERPL-CCNC: 29 U/L (ref 15–37)
BASOPHILS # BLD: 0 K/UL (ref 0–0.1)
BASOPHILS NFR BLD: 0 % (ref 0–1)
BILIRUB SERPL-MCNC: 0.5 MG/DL (ref 0.2–1)
BUN SERPL-MCNC: 100 MG/DL (ref 6–20)
BUN/CREAT SERPL: 35 (ref 12–20)
CALCIUM SERPL-MCNC: 9.2 MG/DL (ref 8.5–10.1)
CHLORIDE SERPL-SCNC: 100 MMOL/L (ref 97–108)
CO2 SERPL-SCNC: 30 MMOL/L (ref 21–32)
CREAT SERPL-MCNC: 2.88 MG/DL (ref 0.7–1.3)
DIFFERENTIAL METHOD BLD: ABNORMAL
EOSINOPHIL # BLD: 0.2 K/UL (ref 0–0.4)
EOSINOPHIL NFR BLD: 3 % (ref 0–7)
ERYTHROCYTE [DISTWIDTH] IN BLOOD BY AUTOMATED COUNT: 15.6 % (ref 11.5–14.5)
GLOBULIN SER CALC-MCNC: 4.2 G/DL (ref 2–4)
GLUCOSE BLD STRIP.AUTO-MCNC: 121 MG/DL (ref 65–100)
GLUCOSE BLD STRIP.AUTO-MCNC: 130 MG/DL (ref 65–100)
GLUCOSE BLD STRIP.AUTO-MCNC: 167 MG/DL (ref 65–100)
GLUCOSE BLD STRIP.AUTO-MCNC: 217 MG/DL (ref 65–100)
GLUCOSE SERPL-MCNC: 114 MG/DL (ref 65–100)
HCT VFR BLD AUTO: 37.2 % (ref 36.6–50.3)
HGB BLD-MCNC: 10.9 G/DL (ref 12.1–17)
IMM GRANULOCYTES # BLD AUTO: 0 K/UL (ref 0–0.04)
IMM GRANULOCYTES NFR BLD AUTO: 0 % (ref 0–0.5)
LYMPHOCYTES # BLD: 0.9 K/UL (ref 0.8–3.5)
LYMPHOCYTES NFR BLD: 15 % (ref 12–49)
MCH RBC QN AUTO: 24.7 PG (ref 26–34)
MCHC RBC AUTO-ENTMCNC: 29.3 G/DL (ref 30–36.5)
MCV RBC AUTO: 84.2 FL (ref 80–99)
MONOCYTES # BLD: 0.7 K/UL (ref 0–1)
MONOCYTES NFR BLD: 11 % (ref 5–13)
NEUTS SEG # BLD: 4.5 K/UL (ref 1.8–8)
NEUTS SEG NFR BLD: 71 % (ref 32–75)
NRBC # BLD: 0 K/UL (ref 0–0.01)
NRBC BLD-RTO: 0 PER 100 WBC
PLATELET # BLD AUTO: 138 K/UL (ref 150–400)
PLATELET COMMENTS,PCOM: ABNORMAL
POTASSIUM SERPL-SCNC: 3.7 MMOL/L (ref 3.5–5.1)
PROT SERPL-MCNC: 7.1 G/DL (ref 6.4–8.2)
RBC # BLD AUTO: 4.42 M/UL (ref 4.1–5.7)
RBC MORPH BLD: ABNORMAL
SERVICE CMNT-IMP: ABNORMAL
SODIUM SERPL-SCNC: 139 MMOL/L (ref 136–145)
WBC # BLD AUTO: 6.3 K/UL (ref 4.1–11.1)

## 2019-03-24 PROCEDURE — 85025 COMPLETE CBC W/AUTO DIFF WBC: CPT

## 2019-03-24 PROCEDURE — 80053 COMPREHEN METABOLIC PANEL: CPT

## 2019-03-24 PROCEDURE — 74011000250 HC RX REV CODE- 250: Performed by: INTERNAL MEDICINE

## 2019-03-24 PROCEDURE — 74011636637 HC RX REV CODE- 636/637: Performed by: INTERNAL MEDICINE

## 2019-03-24 PROCEDURE — 65270000029 HC RM PRIVATE

## 2019-03-24 PROCEDURE — 36415 COLL VENOUS BLD VENIPUNCTURE: CPT

## 2019-03-24 PROCEDURE — 74011250637 HC RX REV CODE- 250/637: Performed by: INTERNAL MEDICINE

## 2019-03-24 PROCEDURE — 82962 GLUCOSE BLOOD TEST: CPT

## 2019-03-24 RX ADMIN — METOPROLOL SUCCINATE 150 MG: 50 TABLET, EXTENDED RELEASE ORAL at 09:16

## 2019-03-24 RX ADMIN — HUMAN INSULIN 15 UNITS: 100 INJECTION, SUSPENSION SUBCUTANEOUS at 17:11

## 2019-03-24 RX ADMIN — INSULIN LISPRO 3 UNITS: 100 INJECTION, SOLUTION INTRAVENOUS; SUBCUTANEOUS at 13:30

## 2019-03-24 RX ADMIN — LEVOTHYROXINE SODIUM 137 MCG: 0.11 TABLET ORAL at 05:27

## 2019-03-24 RX ADMIN — ACETAMINOPHEN 650 MG: 325 TABLET ORAL at 17:49

## 2019-03-24 RX ADMIN — BUMETANIDE 2 MG: 0.25 INJECTION INTRAMUSCULAR; INTRAVENOUS at 09:16

## 2019-03-24 RX ADMIN — FEBUXOSTAT 40 MG: 40 TABLET ORAL at 09:17

## 2019-03-24 RX ADMIN — APIXABAN 5 MG: 5 TABLET, FILM COATED ORAL at 17:49

## 2019-03-24 RX ADMIN — HUMAN INSULIN 15 UNITS: 100 INJECTION, SUSPENSION SUBCUTANEOUS at 09:22

## 2019-03-24 RX ADMIN — Medication 10 ML: at 13:33

## 2019-03-24 RX ADMIN — METOLAZONE 2.5 MG: 2.5 TABLET ORAL at 09:48

## 2019-03-24 RX ADMIN — DUTASTERIDE 0.5 MG: 0.5 CAPSULE, LIQUID FILLED ORAL at 09:15

## 2019-03-24 RX ADMIN — PRAVASTATIN SODIUM 80 MG: 40 TABLET ORAL at 22:30

## 2019-03-24 RX ADMIN — APIXABAN 5 MG: 5 TABLET, FILM COATED ORAL at 09:16

## 2019-03-24 RX ADMIN — BUMETANIDE 2 MG: 0.25 INJECTION INTRAMUSCULAR; INTRAVENOUS at 22:30

## 2019-03-24 RX ADMIN — TAMSULOSIN HYDROCHLORIDE 0.4 MG: 0.4 CAPSULE ORAL at 09:17

## 2019-03-24 RX ADMIN — Medication 10 ML: at 05:27

## 2019-03-24 RX ADMIN — MIRABEGRON 25 MG: 25 TABLET, FILM COATED, EXTENDED RELEASE ORAL at 09:17

## 2019-03-24 RX ADMIN — INSULIN LISPRO 2 UNITS: 100 INJECTION, SOLUTION INTRAVENOUS; SUBCUTANEOUS at 17:39

## 2019-03-24 NOTE — PROGRESS NOTES
Cardiac Electrophysiology Progress Note [address], [place], [address] [facility]  [phone] 
 
3/24/2019 9:27 AM 
 
Admit Date: 3/23/2019 Admit Diagnosis: Acute respiratory failure with hypoxia ([address] [J96.01] Subjective:  
 
Malu Chen   denies chest pain, chest pressure/discomfort. reports congestion, lower extremity edema. Visit Vitals /78 (BP 1 Location: Right arm, BP Patient Position: Sitting) Pulse 70 Temp 97.7 °F (36.5 °C) Resp 18 Ht 5' 9\" (1.753 m) Wt 250 lb 1.6 oz (113.4 kg) SpO2 94% BMI 36.93 kg/m² Current Facility-Administered Medications Medication Dose Route Frequency  sodium chloride (NS) flush 5-40 mL  5-40 mL IntraVENous Q8H  
 sodium chloride (NS) flush 5-40 mL  5-40 mL IntraVENous PRN  
 bumetanide (BUMEX) injection 2 mg  2 mg IntraVENous Q12H  
 sodium chloride (NS) flush 5-40 mL  5-40 mL IntraVENous Q8H  
 sodium chloride (NS) flush 5-40 mL  5-40 mL IntraVENous PRN  
 acetaminophen (TYLENOL) tablet 650 mg  650 mg Oral Q6H PRN  
 ondansetron (ZOFRAN) injection 4 mg  4 mg IntraVENous Q4H PRN  
 nitroglycerin (NITROBID) 2 % ointment 1 Inch  1 Inch Topical Q6H PRN  
 insulin lispro (HUMALOG) injection   SubCUTAneous AC&HS  
 glucose chewable tablet 16 g  4 Tab Oral PRN  
 dextrose (D50W) injection syrg 12.5-25 g  12.5-25 g IntraVENous PRN  
 glucagon (GLUCAGEN) injection 1 mg  1 mg IntraMUSCular PRN  
 dutasteride (AVODART) capsule 0.5 mg  0.5 mg Oral DAILY  apixaban (ELIQUIS) tablet 5 mg  5 mg Oral BID  febuxostat (ULORIC) tablet 40 mg  40 mg Oral DAILY  levothyroxine (SYNTHROID) tablet 137 mcg  137 mcg Oral 6am  
 mirabegron ER (MYRBETRIQ) tablet 25 mg  25 mg Oral DAILY  metoprolol succinate (TOPROL-XL) XL tablet 150 mg  150 mg Oral DAILY  metOLazone (ZAROXOLYN) tablet 2.5 mg  2.5 mg Oral DAILY  pravastatin (PRAVACHOL) tablet 80 mg  80 mg Oral QHS  tamsulosin (FLOMAX) capsule 0.4 mg  0.4 mg Oral DAILY  insulin NPH (NOVOLIN N, HUMULIN N) injection 15 Units  15 Units SubCUTAneous ACB&D Objective:  
  
Visit Vitals /78 (BP 1 Location: Right arm, BP Patient Position: Sitting) Pulse 70 Temp 97.7 °F (36.5 °C) Resp 18 Ht 5' 9\" (1.753 m) Wt 250 lb 1.6 oz (113.4 kg) SpO2 94% BMI 36.93 kg/m² Physical Exam: Abdomen: soft, non-tender Extremities: r leg wrapped, left ++ edema. Heart: regular rate and rhythm Lungs: clear to auscultation bilaterally Pulses: 2+ and symmetric Data Review:  
Labs:   
Recent Labs  
  03/24/19 0221 03/23/19 
0231 WBC 6.3 7.0 HGB 10.9* 11.3* HCT 37.2 37.1 * 238 Recent Labs  
  03/24/19 0221 03/23/19 
0231  141  
K 3.7 4.5  
 99 CO2 30 35* * 133* * 110* CREA 2.88* 3.39* CA 9.2 9.1 MG  --  2.3 ALB 2.9* 3.1* TBILI 0.5 0.5 SGOT 29 41* ALT 17 20 Recent Labs  
  03/23/19 
0231 TROIQ 0.07* Intake/Output Summary (Last 24 hours) at 3/24/2019 5695 Last data filed at 3/23/2019 2040 Gross per 24 hour Intake 240 ml Output 1025 ml Net -785 ml Telemetry: V paced 72 Assessment:  
 
Active Problems: 
  Chronic a-fib ([facility] (3/7/2017) CKD (chronic kidney disease) stage 4, GFR 15-29 ml/min ([facility]) (3/7/2017) Hx of CABG (3/13/2017) Overview: CABG in 2010 in [place] Diabetes mellitus type 2, controlled ([facility] (3/13/2017) Diastolic heart failure ([facility] (6/22/2017) Pacemaker (6/26/2017) Overview: 6/26/17: [employer] 
 
  H/O atrioventricular anastacio ablation (6/26/2017) Overview: 6/26/17 Type 2 diabetes with nephropathy ([facility] (10/23/2018) Acute [facility] failure with hypoxia [facility] (3/23/2019) HTN (hypertension) (3/23/2019) Plan:  
 
Angie Grave is pleasant 78year old male with permanent af sp av node ablation and VVI pacemaker. Will repeat echo (echo in 2017 demonstrated nl lvef). Trop minimally elevated in the setting of ckd. Cr improving. Cont oac, bb and agree with diuresis. Dr Alma Rosa Olivia to follow tomorrow. SAMINA Langston Patient seen and examined by me with nurse practitioner. I personally performed all components of the history, physical, and medical decision making and agree with the assessment and plan with minor modifications as noted. Continues to diurese. Echo still pending. Cont bb and oac.   
 
Liliya De La Garza MD, [facility] 
 
 
 
3/24/2019 
9:27 AM

## 2019-03-24 NOTE — ROUTINE PROCESS
Bedside shift change report given to Linda Hayes (oncoming nurse) by Carlie Chawla (offgoing nurse). Report included the following information SBAR, Kardex, Intake/Output and MAR.

## 2019-03-24 NOTE — PROGRESS NOTES
I met with pt and his wife in the ER room 29 while he was getting ready to go upstairs in his room. He stated that he felt better, and conveyed appreciation for help. His Urine output had been satisfactory with clear lung fields on quick examination. Chart reviewed and plans noted. Isaura Grande MD MPH FACP  
8:50 AM 
3/24/2019 [delayed entry 3/24/2019 ]

## 2019-03-24 NOTE — ROUTINE PROCESS
General Surgery End of Shift Nursing Note Bedside shift change report given to Tito Zoereji Mindy Domingo (oncoming nurse) by [address] [facility] (offgoing nurse). Report included the following information SBAR, Kardex, Intake/Output and MAR. Shift worked:   7a-7p Summary of shift:    Uneventful. Placed a foam dressing with barrier cream on R buttocks for wound that was present upon arrival.   
Issues for physician to address:   none Number times ambulated in hallway past shift: 0 Number of times OOB to chair past shift: 3 Pain Management: 
Current medication: See [facility] Patient states pain is manageable on current pain medication: YES 
 
GI: 
 
Current diet:  DIET DIABETIC CONSISTENT CARB Regular; 2 GM NA (House Low NA); FR 1200ML Tolerating current diet: YES Passing flatus: YES Last Bowel Movement:3/22/19 Patient Safety: 
 
Falls Score: 2 Bed Alarm On? No 
Sitter? No 
 
Marisol Baez

## 2019-03-24 NOTE — PROGRESS NOTES
NAME: Leah Burt :  1950 MRN:  [id number] Assessment :    Plan: 
--INO on CKD stage 4 (baseline creatinine ~ 3) Volume overload/JHON Morbid obesity CABG/diastolic dysfunction SHPT 
BPH Creatinine better today (3.4 to 2.9); Cardiorenal syndrome? 
  
Bumex 2 mg iv bid and metolazone 2.5 mg daily; O>I and weight is down 4 pounds 
  
Monitor weight, I&O and edema to direct changes in diuretics 
  
Low salt diet, daily weight 
  
No PVR 
  
No RRT or UF needed at this time Subjective: Chief Complaint:    Sitting on the side of the bed. Slept better last night and breathing/congestion is better. Legs still swollen Review of Systems: 
 
Symptom Y/N Comments  Symptom Y/N Comments Fever/Chills    Chest Pain Poor Appetite    Edema y Cough    Abdominal Pain Sputum    Joint Pain SOB/BETTS y better  Pruritis/Rash Nausea/vomit    Tolerating PT/OT Diarrhea    Tolerating Diet Constipation    Other Could not obtain due to:   
 
Objective: VITALS:  
Last 24hrs VS reviewed since prior progress note. Most recent are: 
Visit Vitals /67 (BP 1 Location: Right arm, BP Patient Position: At rest) Pulse 74 Temp 98.4 °F (36.9 °C) Resp 18 Ht 5' 9\" (1.753 m) Wt 115.6 kg (254 lb 13.6 oz) SpO2 96% BMI 37.64 kg/m² Intake/Output Summary (Last 24 hours) at 3/24/2019 0630 Last data filed at 3/23/2019 2040 Gross per 24 hour Intake 840 ml Output 1575 ml Net -735 ml Telemetry Reviewed: PHYSICAL EXAM: 
General: obese. Alert, cooperative, no acute distress Resp:  CTA Bilaterally. No Wheezing/Rhonchi/Rales. No access. muscle use CV:  Regular  rhythm,  No murmur (), No Rubs, No Gallops. ++ edema GI:  Soft, Non distended, Non tender.  +Bowel sounds, no HSM Lab Data Reviewed: (see below) Medications Reviewed: (see below) PMH/SH reviewed - no change compared to H&P 
________________________________________________________________________ Care Plan discussed with: 
Patient y Family  y   
RN Care Manager Consultant:     
 
  Comments >50% of visit spent in counseling and coordination of care    
 
________________________________________________________________________ Rodger Edwards MD  
 
Procedures: see electronic medical records for all procedures/Xrays and details which 
were not copied into this note but were reviewed prior to creation of Plan. LABS: 
Recent Labs  
  03/24/19 0221 03/23/19 0231 WBC 6.3 7.0 HGB 10.9* 11.3* HCT 37.2 37.1 * 238 Recent Labs  
  03/24/19 0221 03/23/19 0231  141  
K 3.7 4.5  
 99 CO2 30 35* * 110* CREA 2.88* 3.39* * 133* CA 9.2 9.1 MG  --  2.3 Recent Labs  
  03/24/19 0221 03/23/19 0231 SGOT 29 41* AP 46 51  
TP 7.1 7.4 ALB 2.9* 3.1*  
GLOB 4.2* 4.3* No results for input(s): INR, PTP, APTT in the last 72 hours. No lab exists for component: INREXT No results for input(s): FE, TIBC, PSAT, FERR in the last 72 hours. No results found for: FOL, RBCF No results for input(s): PH, PCO2, PO2 in the last 72 hours. No results for input(s): CPK, CKMB in the last 72 hours. No lab exists for component: TROPONINI No components found for: [place] Lab Results Component Value Date/Time  Color YELLOW/STRAW 06/23/2017 12:15 PM  
 Appearance CLEAR 06/23/2017 12:15 PM  
 Specific gravity 1.010 06/23/2017 12:15 PM  
 pH (UA) 5.5 06/23/2017 12:15 PM  
 Protein TRACE (A) 06/23/2017 12:15 PM  
 Glucose NEGATIVE  06/23/2017 12:15 PM  
 Ketone NEGATIVE  06/23/2017 12:15 PM  
 Bilirubin NEGATIVE  06/23/2017 12:15 PM  
 Urobilinogen 0.2 06/23/2017 12:15 PM  
 Nitrites NEGATIVE  06/23/2017 12:15 PM  
 Leukocyte Esterase NEGATIVE  06/23/2017 12:15 PM  
 Epithelial cells FEW 06/23/2017 12:15 PM  
 Bacteria NEGATIVE  06/23/2017 12:15 PM  
 WBC 0-4 06/23/2017 12:15 PM  
 RBC 0-5 06/23/2017 12:15 PM  
 
 
MEDICATIONS: 
Current Facility-Administered Medications Medication Dose Route Frequency  sodium chloride (NS) flush 5-40 mL  5-40 mL IntraVENous Q8H  
 sodium chloride (NS) flush 5-40 mL  5-40 mL IntraVENous PRN  
 bumetanide (BUMEX) injection 2 mg  2 mg IntraVENous Q12H  
 sodium chloride (NS) flush 5-40 mL  5-40 mL IntraVENous Q8H  
 sodium chloride (NS) flush 5-40 mL  5-40 mL IntraVENous PRN  
 acetaminophen (TYLENOL) tablet 650 mg  650 mg Oral Q6H PRN  
 ondansetron (ZOFRAN) injection 4 mg  4 mg IntraVENous Q4H PRN  
 nitroglycerin (NITROBID) 2 % ointment 1 Inch  1 Inch Topical Q6H PRN  
 insulin lispro (HUMALOG) injection   SubCUTAneous AC&HS  
 glucose chewable tablet 16 g  4 Tab Oral PRN  
 dextrose (D50W) injection syrg 12.5-25 g  12.5-25 g IntraVENous PRN  
 glucagon (GLUCAGEN) injection 1 mg  1 mg IntraMUSCular PRN  
 dutasteride (AVODART) capsule 0.5 mg  0.5 mg Oral DAILY  apixaban (ELIQUIS) tablet 5 mg  5 mg Oral BID  febuxostat (ULORIC) tablet 40 mg  40 mg Oral DAILY  levothyroxine (SYNTHROID) tablet 137 mcg  137 mcg Oral 6am  
 mirabegron ER (MYRBETRIQ) tablet 25 mg  25 mg Oral DAILY  metoprolol succinate (TOPROL-XL) XL tablet 150 mg  150 mg Oral DAILY  metOLazone (ZAROXOLYN) tablet 2.5 mg  2.5 mg Oral DAILY  pravastatin (PRAVACHOL) tablet 80 mg  80 mg Oral QHS  tamsulosin (FLOMAX) capsule 0.4 mg  0.4 mg Oral DAILY  insulin NPH (NOVOLIN N, HUMULIN N) injection 15 Units  15 Units SubCUTAneous ACB&D

## 2019-03-25 ENCOUNTER — DOCUMENTATION ONLY (OUTPATIENT)
Dept: CASE MANAGEMENT | Age: 69
End: 2019-03-25

## 2019-03-25 ENCOUNTER — PATIENT OUTREACH (OUTPATIENT)
Dept: CARDIOLOGY CLINIC | Age: 69
End: 2019-03-25

## 2019-03-25 ENCOUNTER — APPOINTMENT (OUTPATIENT)
Dept: NON INVASIVE DIAGNOSTICS | Age: 69
DRG: 291 | End: 2019-03-25
Attending: INTERNAL MEDICINE
Payer: MEDICARE

## 2019-03-25 LAB
ANION GAP SERPL CALC-SCNC: 9 MMOL/L (ref 5–15)
BUN SERPL-MCNC: 109 MG/DL (ref 6–20)
BUN/CREAT SERPL: 37 (ref 12–20)
CALCIUM SERPL-MCNC: 9.1 MG/DL (ref 8.5–10.1)
CHLORIDE SERPL-SCNC: 97 MMOL/L (ref 97–108)
CO2 SERPL-SCNC: 32 MMOL/L (ref 21–32)
CREAT SERPL-MCNC: 2.98 MG/DL (ref 0.7–1.3)
ECHO AO ROOT DIAM: 3.4 CM
ECHO AV AREA PEAK VELOCITY: 2.8 CM2
ECHO AV AREA/BSA PEAK VELOCITY: 1.2 CM2/M2
ECHO AV CUSP MM: 0 CM
ECHO AV PEAK GRADIENT: 5 MMHG
ECHO AV PEAK VELOCITY: 111.31 CM/S
ECHO EST RA PRESSURE: 10 MMHG
ECHO LA MAJOR AXIS: 3.43 CM
ECHO LA TO AORTIC ROOT RATIO: 1.01
ECHO LV INTERNAL DIMENSION DIASTOLIC: 3.97 CM (ref 4.2–5.9)
ECHO LV INTERNAL DIMENSION SYSTOLIC: 3.35 CM
ECHO LV IVSD: 2.2 CM (ref 0.6–1)
ECHO LV MASS 2D: 494.1 G (ref 88–224)
ECHO LV MASS INDEX 2D: 219.6 G/M2 (ref 49–115)
ECHO LV POSTERIOR WALL DIASTOLIC: 2.03 CM (ref 0.6–1)
ECHO LV POSTERIOR WALL SYSTOLIC: 1.09 CM
ECHO LVOT DIAM: 2.09 CM
ECHO LVOT PEAK GRADIENT: 3.2 MMHG
ECHO LVOT PEAK VELOCITY: 89.69 CM/S
ECHO LVOT SV: 48.5 ML
ECHO LVOT VTI: 14.08 CM
ECHO MV A VELOCITY: 0 CM/S
ECHO MV E DECELERATION TIME (DT): 0 MS
ECHO MV E VELOCITY: 0 CM/S
ECHO MV PRESSURE HALF TIME (PHT): 0 MS
ECHO MV REGURGITANT PEAK GRADIENT: 3.5 MMHG
ECHO MV REGURGITANT PEAK VELOCITY: 93.93 CM/S
ECHO PULMONARY ARTERY SYSTOLIC PRESSURE (PASP): 48.9 MMHG
ECHO RIGHT VENTRICULAR SYSTOLIC PRESSURE (RVSP): 48.9 MMHG
ECHO TV MAX VELOCITY: 364.67 CM/S
ECHO TV PEAK GRADIENT: 53.2 MMHG
ECHO TV REGURGITANT MAX VELOCITY: 312.03 CM/S
ECHO TV REGURGITANT PEAK GRADIENT: 38.9 MMHG
GLUCOSE BLD STRIP.AUTO-MCNC: 126 MG/DL (ref 65–100)
GLUCOSE BLD STRIP.AUTO-MCNC: 155 MG/DL (ref 65–100)
GLUCOSE BLD STRIP.AUTO-MCNC: 188 MG/DL (ref 65–100)
GLUCOSE BLD STRIP.AUTO-MCNC: 193 MG/DL (ref 65–100)
GLUCOSE SERPL-MCNC: 104 MG/DL (ref 65–100)
MAGNESIUM SERPL-MCNC: 2.2 MG/DL (ref 1.6–2.4)
POTASSIUM SERPL-SCNC: 3.5 MMOL/L (ref 3.5–5.1)
SERVICE CMNT-IMP: ABNORMAL
SODIUM SERPL-SCNC: 138 MMOL/L (ref 136–145)

## 2019-03-25 PROCEDURE — 83735 ASSAY OF MAGNESIUM: CPT

## 2019-03-25 PROCEDURE — 93306 TTE W/DOPPLER COMPLETE: CPT

## 2019-03-25 PROCEDURE — 65270000029 HC RM PRIVATE

## 2019-03-25 PROCEDURE — 74011250637 HC RX REV CODE- 250/637: Performed by: INTERNAL MEDICINE

## 2019-03-25 PROCEDURE — 36415 COLL VENOUS BLD VENIPUNCTURE: CPT

## 2019-03-25 PROCEDURE — 82962 GLUCOSE BLOOD TEST: CPT

## 2019-03-25 PROCEDURE — 80048 BASIC METABOLIC PNL TOTAL CA: CPT

## 2019-03-25 PROCEDURE — 74011250637 HC RX REV CODE- 250/637: Performed by: HOSPITALIST

## 2019-03-25 PROCEDURE — 74011636637 HC RX REV CODE- 636/637: Performed by: INTERNAL MEDICINE

## 2019-03-25 PROCEDURE — 74011000250 HC RX REV CODE- 250: Performed by: INTERNAL MEDICINE

## 2019-03-25 RX ORDER — LANOLIN ALCOHOL/MO/W.PET/CERES
1.5 CREAM (GRAM) TOPICAL ONCE
Status: COMPLETED | OUTPATIENT
Start: 2019-03-25 | End: 2019-03-25

## 2019-03-25 RX ADMIN — DUTASTERIDE 0.5 MG: 0.5 CAPSULE, LIQUID FILLED ORAL at 08:50

## 2019-03-25 RX ADMIN — BUMETANIDE 2 MG: 0.25 INJECTION INTRAMUSCULAR; INTRAVENOUS at 08:50

## 2019-03-25 RX ADMIN — Medication 10 ML: at 14:07

## 2019-03-25 RX ADMIN — PRAVASTATIN SODIUM 80 MG: 40 TABLET ORAL at 22:43

## 2019-03-25 RX ADMIN — APIXABAN 5 MG: 5 TABLET, FILM COATED ORAL at 08:50

## 2019-03-25 RX ADMIN — INSULIN LISPRO 2 UNITS: 100 INJECTION, SOLUTION INTRAVENOUS; SUBCUTANEOUS at 17:45

## 2019-03-25 RX ADMIN — MELATONIN TAB 3 MG 1.5 MG: 3 TAB at 22:43

## 2019-03-25 RX ADMIN — METOLAZONE 2.5 MG: 2.5 TABLET ORAL at 08:50

## 2019-03-25 RX ADMIN — FEBUXOSTAT 40 MG: 40 TABLET ORAL at 09:04

## 2019-03-25 RX ADMIN — TAMSULOSIN HYDROCHLORIDE 0.4 MG: 0.4 CAPSULE ORAL at 08:50

## 2019-03-25 RX ADMIN — APIXABAN 5 MG: 5 TABLET, FILM COATED ORAL at 17:45

## 2019-03-25 RX ADMIN — HUMAN INSULIN 15 UNITS: 100 INJECTION, SUSPENSION SUBCUTANEOUS at 17:45

## 2019-03-25 RX ADMIN — MIRABEGRON 25 MG: 25 TABLET, FILM COATED, EXTENDED RELEASE ORAL at 09:04

## 2019-03-25 RX ADMIN — LEVOTHYROXINE SODIUM 137 MCG: 0.11 TABLET ORAL at 06:37

## 2019-03-25 RX ADMIN — BUMETANIDE 2 MG: 0.25 INJECTION INTRAMUSCULAR; INTRAVENOUS at 22:42

## 2019-03-25 RX ADMIN — METOPROLOL SUCCINATE 150 MG: 50 TABLET, EXTENDED RELEASE ORAL at 08:50

## 2019-03-25 RX ADMIN — HUMAN INSULIN 15 UNITS: 100 INJECTION, SUSPENSION SUBCUTANEOUS at 08:50

## 2019-03-25 RX ADMIN — INSULIN LISPRO 2 UNITS: 100 INJECTION, SOLUTION INTRAVENOUS; SUBCUTANEOUS at 14:06

## 2019-03-25 NOTE — PROGRESS NOTES
[address] [place] [address] met with patient today to provide an introduction to self, the [id number] at [facility]. Bundled Payment Care Program: 
 
Patient was provided a copy of the [facility] letter. Patient states that they have a functioning scale at home. Patient was not provided a scale during this visit. Reinforced the importance of checking their weight at the same time daily and calling the cardiologist if their weight is up 3 lbs. in a day or 5 lbs. in a week (or per MD guidelines). Patient  has not received a \"Living with Heart Failure\" patient education book. Hug At Home Program: 
 
Provided patient demonstration of [employer] program on training iPad. Patient was interested in the howsimple 5 at [facility] nurse set up patients profile on the [address] [facility] and assisted patient in  setting up a PIN for iPad, Logging into iPad, recording data on iPad, exploring FAQ section, exploring sodium mahesh Patient was educated on the return process for iPad at the completion of the [employer]methods2 [employer] program.  
 
All questions answered at this time. Provided patient contact number for the [address] [place] [address] for further questions or concerns. Patient verbalized understanding of all information discussed. Given Ipad #[id number]. Wife Kris Carr will help document. Appointments:  
 
Patient stated best day(s) of the week for provider appointment(s): any day Patient stated best time of day for provider appointment(s):  After 11 am

## 2019-03-25 NOTE — PROGRESS NOTES
Hospitalist Progress Note NAME: Piyush Owusu :  1950 MRN:  [id number] Assessment / Plan: 
Acute respiratory failure with hypoxia POA Due to Acute on chronic diastolic heart failure  POA,  ECHO Pending In settings of CKD4, renal function worse today, following nephrology recommendations Bumex 2mg IV BID, Monitor I/O Daily weight and lytes Continue Zaroxylyn Nephrology consult appreciated  
 
  
Chronic a-fib s/p ablation and s/p PPM 
HTN Continue BB and Eliquis 
  
CAD Hx of CABG  
On Eliquis 
  
Diabetes mellitus type 2 Takes 70/30 34 units BID at home Will place NPH 15 units BID for now along with SSI 
  
Code Status: Full Surrogate Decision Maker: Wife 
  
DVT Prophylaxis: Eliquis 
  
Baseline: functional  
  
 
 
 
Subjective: Chief Complaint / Reason for Physician Visit \"feeling better today \". Discussed with RN events overnight. Review of Systems: 
Symptom Y/N Comments  Symptom Y/N Comments Fever/Chills    Chest Pain Poor Appetite    Edema Cough    Abdominal Pain Sputum    Joint Pain SOB/BETTS    Pruritis/Rash Nausea/vomit    Tolerating PT/OT Diarrhea    Tolerating Diet Constipation    Other Could NOT obtain due to:   
 
Objective: VITALS:  
Last 24hrs VS reviewed since prior progress note. Most recent are: 
Patient Vitals for the past 24 hrs: 
 Temp Pulse Resp BP SpO2  
19 1147 98.2 °F (36.8 °C) 75 18 151/72 95 % 19 0745 97.6 °F (36.4 °C) 74 16 145/70 100 % 19 0334 98.1 °F (36.7 °C) 75 18 119/55 95 % 19 2324 98.2 °F (36.8 °C) 75 18 134/69 91 %  
19 1947 98.4 °F (36.9 °C) 72 18 131/61 96 % 19 1615 97.7 °F (36.5 °C) 75 18 131/67 96 % 19 1214 98 °F (36.7 °C) 76 18 152/73 100 % Intake/Output Summary (Last 24 hours) at 3/25/2019 1201 Last data filed at 3/25/2019 4105 Gross per 24 hour Intake 600 ml Output 1350 ml Net -750 ml PHYSICAL EXAM: 
 General: WD, WN. Alert, cooperative, no acute distress   
EENT:  EOMI. Anicteric sclerae. MMM Resp:  CTA bilaterally, no wheezing positive for basilar crackles  rales. No accessory muscle use CV:  Regular  rhythm,  No edema GI:  Soft, Non distended, Non tender.  +Bowel sounds Neurologic:  Alert and oriented X 3, normal speech, Psych:   Good insight. Not anxious nor agitated Skin:  No rashes. No jaundice Reviewed most current lab test results and cultures  YES Reviewed most current radiology test results   YES Review and summation of old records today    NO Reviewed patient's current orders and MAR    YES 
PMH/SH reviewed - no change compared to H&P 
________________________________________________________________________ Care Plan discussed with: 
  Comments Patient Family RN Care Manager Consultant Multidiciplinary team rounds were held today with , nursing, pharmacist and clinical coordinator. Patient's plan of care was discussed; medications were reviewed and discharge planning was addressed. ________________________________________________________________________ Total NON critical care TIME:  35   Minutes Total CRITICAL CARE TIME Spent:   Minutes non procedure based Comments >50% of visit spent in counseling and coordination of care    
________________________________________________________________________ Milo Curling, MD  
 
Procedures: see electronic medical records for all procedures/Xrays and details which were not copied into this note but were reviewed prior to creation of Plan. LABS: 
I reviewed today's most current labs and imaging studies. Pertinent labs include: 
Recent Labs  
  03/24/19 0221 03/23/19 0231 WBC 6.3 7.0 HGB 10.9* 11.3* HCT 37.2 37.1 * 238 Recent Labs  
  03/25/19 0527 03/24/19 0221 03/23/19 0231  139 141  
K 3.5 3.7 4.5  
CL 97 100 99 CO2 32 30 35* * 114* 133* * 100* 110* CREA 2.98* 2.88* 3.39* CA 9.1 9.2 9.1 MG 2.2  --  2.3 ALB  --  2.9* 3.1* TBILI  --  0.5 0.5 SGOT  --  29 41* ALT  --  17 20 Signed: Heaven Quiles MD

## 2019-03-25 NOTE — PROGRESS NOTES
Problem: Falls - Risk of 
Goal: *Absence of Falls Description Document Elham Beans Fall Risk and appropriate interventions in the flowsheet. Outcome: Progressing Towards Goal 
  
Problem: Patient Education: Go to Patient Education Activity Goal: Patient/Family Education Outcome: Progressing Towards Goal 
  
Problem: Pressure Injury - Risk of 
Goal: *Prevention of pressure injury Description Document Romeo Scale and appropriate interventions in the flowsheet. Outcome: Progressing Towards Goal 
  
Problem: Patient Education: Go to Patient Education Activity Goal: Patient/Family Education Outcome: Progressing Towards Goal 
  
Problem: Falls - Risk of 
Goal: *Absence of Falls Description Document Elham Beans Fall Risk and appropriate interventions in the flowsheet. Outcome: Progressing Towards Goal 
  
Problem: Patient Education: Go to Patient Education Activity Goal: Patient/Family Education Outcome: Progressing Towards Goal 
  
Problem: Pressure Injury - Risk of 
Goal: *Prevention of pressure injury Description Document Romeo Scale and appropriate interventions in the flowsheet. Outcome: Progressing Towards Goal 
  
Problem: Patient Education: Go to Patient Education Activity Goal: Patient/Family Education Outcome: Progressing Towards Goal 
  
Problem: Falls - Risk of 
Goal: *Absence of Falls Description Document Elham Beans Fall Risk and appropriate interventions in the flowsheet. Outcome: Progressing Towards Goal 
  
Problem: Patient Education: Go to Patient Education Activity Goal: Patient/Family Education Outcome: Progressing Towards Goal 
  
Problem: Pressure Injury - Risk of 
Goal: *Prevention of pressure injury Description Document Romeo Scale and appropriate interventions in the flowsheet. Outcome: Progressing Towards Goal 
  
Problem: Patient Education: Go to Patient Education Activity Goal: Patient/Family Education Outcome: Progressing Towards Goal

## 2019-03-25 NOTE — PROGRESS NOTES
[facility] [address] [facility], [address]  [phone] Cardiology Progress Note 3/25/2019 2:28 PM 
 
Admit Date: 3/23/2019 Admit Diagnosis:  
Acute respiratory failure with hypoxia ([address] [J96.01] Subjective:  
 
Leigh Ann Lemus Breathing better Visit Vitals /72 (BP 1 Location: Right arm, BP Patient Position: At rest;Sitting) Pulse 75 Temp 98.2 °F (36.8 °C) Resp 18 Ht 5' 9\" (1.753 m) Wt 244 lb 7.8 oz (110.9 kg) SpO2 95% BMI 36.10 kg/m² Current Facility-Administered Medications Medication Dose Route Frequency  sodium chloride (NS) flush 5-40 mL  5-40 mL IntraVENous Q8H  
 sodium chloride (NS) flush 5-40 mL  5-40 mL IntraVENous PRN  
 bumetanide (BUMEX) injection 2 mg  2 mg IntraVENous Q12H  
 sodium chloride (NS) flush 5-40 mL  5-40 mL IntraVENous Q8H  
 sodium chloride (NS) flush 5-40 mL  5-40 mL IntraVENous PRN  
 acetaminophen (TYLENOL) tablet 650 mg  650 mg Oral Q6H PRN  
 ondansetron (ZOFRAN) injection 4 mg  4 mg IntraVENous Q4H PRN  
 nitroglycerin (NITROBID) 2 % ointment 1 Inch  1 Inch Topical Q6H PRN  
 insulin lispro (HUMALOG) injection   SubCUTAneous AC&HS  
 glucose chewable tablet 16 g  4 Tab Oral PRN  
 dextrose (D50) infusion 12.5-25 g  12.5-25 g IntraVENous PRN  
 glucagon (GLUCAGEN) injection 1 mg  1 mg IntraMUSCular PRN  
 dutasteride (AVODART) capsule 0.5 mg  0.5 mg Oral DAILY  apixaban (ELIQUIS) tablet 5 mg  5 mg Oral BID  febuxostat (ULORIC) tablet 40 mg  40 mg Oral DAILY  levothyroxine (SYNTHROID) tablet 137 mcg  137 mcg Oral 6am  
 mirabegron ER (MYRBETRIQ) tablet 25 mg  25 mg Oral DAILY  metoprolol succinate (TOPROL-XL) XL tablet 150 mg  150 mg Oral DAILY  metOLazone (ZAROXOLYN) tablet 2.5 mg  2.5 mg Oral DAILY  pravastatin (PRAVACHOL) tablet 80 mg  80 mg Oral QHS  tamsulosin (FLOMAX) capsule 0.4 mg  0.4 mg Oral DAILY  insulin NPH (NOVOLIN N, HUMULIN N) injection 15 Units  15 Units SubCUTAneous ACB&D Objective:  
  
Physical Exam: 
General Appearance:   
Chest:   Clear Cardiovascular: RRR Extremities: 2+ edema Skin:  Warm and dry.  
 
Data Review:  
Recent Labs  
  03/24/19 0221 03/23/19 
0231 WBC 6.3 7.0 HGB 10.9* 11.3* HCT 37.2 37.1 * 238 Recent Labs  
  03/25/19 
0527 03/24/19 0221 03/23/19 
0231  139 141  
K 3.5 3.7 4.5  
CL 97 100 99 CO2 32 30 35* * 114* 133* * 100* 110* CREA 2.98* 2.88* 3.39* CA 9.1 9.2 9.1 MG 2.2  --  2.3 ALB  --  2.9* 3.1* TBILI  --  0.5 0.5 SGOT  --  29 41* ALT  --  17 20 Recent Labs  
  03/23/19 0231 TROIQ 0.07* Intake/Output Summary (Last 24 hours) at 3/25/2019 1428 Last data filed at 3/25/2019 3240 Gross per 24 hour Intake 600 ml Output 1350 ml Net -750 ml Telemetry:  
EKG: 
Cxray: 
 
Assessment:  
 
Active Problems: 
  Chronic a-fib ([address] (3/7/2017) CKD (chronic kidney disease) stage 4, GFR 15-29 ml/min ([facility]) (3/7/2017) Hx of CABG (3/13/2017) Overview: CABG in 2010 in [place] Diabetes mellitus type 2, controlled ([address] (3/13/2017) Diastolic heart failure ([address] (6/22/2017) Pacemaker (6/26/2017) Overview: 6/26/17: [employer] BiV 
 
  H/O atrioventricular anastacio ablation (6/26/2017) Overview: 6/26/17 Type 2 diabetes with nephropathy ([address] (10/23/2018) Acute respiratory failure with hypoxia ([address] (3/23/2019) HTN (hypertension) (3/23/2019) Plan: CHF responding; Diuretics per Dr Argueta Fearing; EF normal by echo Shayna Flores M.D., [facility]

## 2019-03-25 NOTE — PROGRESS NOTES
Progress Note Patient: Todd Ponce MRN: [id number]  SSN: [id number] YOB: 1950  Age: 78 y.o. Sex: male Admit Date: 3/23/2019 LOS: 2 days Subjective:  
 
Pt in chair 4 times today, uneventful day pt has no concerns or reports of pain Objective:  
 
Vitals:  
 03/24/19 2324 03/25/19 4771 03/25/19 0745 03/25/19 0424 BP: 134/69 119/55 145/70 Pulse: 75 75 74 Resp: 18 18 16 Temp: 98.2 °F (36.8 °C) 98.1 °F (36.7 °C) 97.6 °F (36.4 °C) SpO2: 91% 95% 100% Weight:    110.9 kg (244 lb 7.8 oz) Height:      
  
 
Intake and Output: 
Current Shift: No intake/output data recorded. Last three shifts: 03/23 1901 - 03/25 0700 In: 840 [P.O.:840] Out: 2075 [Urine:2075] Physical Exam:  
GENERAL: alert, cooperative, no distress, appears stated age Lab/Data Review: All lab results for the last 24 hours reviewed. Assessment:  
 
Active Problems: 
  Chronic a-fib ([address] (3/7/2017) CKD (chronic kidney disease) stage 4, GFR 15-29 ml/min ([facility]) (3/7/2017) Hx of CABG (3/13/2017) Overview: CABG in 2010 in [place] Diabetes mellitus type 2, controlled ([address] (3/13/2017) Diastolic heart failure ([address] (6/22/2017) Pacemaker (6/26/2017) Overview: 6/26/17: [employer] Bi[employer] 
 
  H/O atrioventricular anastacio ablation (6/26/2017) Overview: 6/26/17 Type 2 diabetes with nephropathy ([address] (10/23/2018) Acute respiratory failure with hypoxia ([address] (3/23/2019) HTN (hypertension) (3/23/2019) Plan:  
 
 
Signed By: Jessa Mendoza RN   
 March 25, 2019

## 2019-03-25 NOTE — PROGRESS NOTES
Transitional Care Team: Initial HUG Note    Date of Assessment: 03/25/19  Time of Assessment:  5:06 PM  Hospital Nurse Navigator: Ruslan Lai RN    Karen Camp is a 78 y.o. male inpatient at [id number] [place] [address]. Assessment & Plan   Pt A+O, Denies dyspnea. Has had Decompensated HF before. Diuresing well. Has accepted [facility] for hug at home program         Primary Diagnosis  Heart failure    Advance Directive:  none. Current Code Status:  Full code    Referral to to Hospice/ Palliative Care Appropriate: no    Awareness of Medical Conditions (Trajectory of illness and pts expectations). Still in early stages- pt anticipates will get worse in the future    Discharge Needs (to include safety issues) none    Barriers Identified none  Patient is willing to go to SNF/Inpatient Rehab if recommended: if recommended    Medication Reconciliation:  Was not performed. Await final med profile    Can patient afford medications:  yes    Who manages medications at home self      Chart reviewed by me and HUG (Healthy Understanding of Goals) program introduced to patient/family. The Transitional Care Team bridges the gaps in care and education surrounding discharge from the acute care facility. The objective is to empower the patient and family in taking a proactive role in the task of preventing readmission within the first thirty days after discharge from the acute care setting, The team is also involved in the efforts to reduce readmission to the acute care setting after stabilization and discharge from the acute care environment either to skilled nursing facilities or community. [facility] RN/NP will return with [facility] Calendar/ follow up appointments/ Ambulatory Nurse Navigation name and contact information when the patient is ready for discharge.     Future Appointments   Date Time Provider James Gayle   3/27/2019 10:15 AM [facility] WOUND CARE [facility]   5/1/2019  8:00 AM REMOTE[facility] DONNA SCHED   6/12/2019 10:30 AM Justin Ash MD RDE SERENITY [address] [facility]   11/19/2019 10:00 AM Lluvia Bashir  [facility]   12/5/2019  3:15 PM PACEMAKER, Doragnieszka THOMPSON SCHED   12/5/2019  3:40 PM Karlene Gates NP [address] [facility]       Non-[facility] follow up appointment(s): none    [facility] call information given to spouse  Number to call is [phone]  Calls are accepted everyday of the week from 9am - 9pm.    Patient education focused on readmission zones as described as: The Red Zone: High risk for readmission, days 1-21   The Yellow Zone: Moderate  risk for readmission, days 22-29   The Green Zone: Lower risk for readmission, days 30 and after    The TC Team will follow patient from a distance while inpatient as well as be available for further transition disposition as needed. The SUMAYA TEAM will continue to offer support during the 30- 90 day discharge from acute care setting. Notified Ambulatory , HF bundle RN.     Past Medical History:   Diagnosis Date    Arrhythmia     atrial fibrillation 2017    CAD (coronary atherosclerotic disease)     Chronic pain     Diabetes ([address]     Diabetes mellitus type 2, controlled ([address] 3/13/2017    Heart failure ([address]     Hx of CABG 3/13/2017    CABG in 2010 in [address] [place] [address] Street Hypertension     Morbid obesity ([address] 3/13/2017    JACEY (obstructive sleep apnea) 6/23/2017    S/P CABG x 2 2010    Thyroid disease        Signed By: Julien Lazo RN     March 25, 2019

## 2019-03-25 NOTE — PROGRESS NOTES
Reason for Admission:  Acute respiratory failure with hypoxia RRAT Score:  24 Resources/supports as identified by patient/family:   Pt has supportive wife who was at pt bedside Top Challenges facing patient (as identified by patient/family and CM): Finances/Medication cost? No concerns with obtaining medications Transportation? Pt wife transports to all [facility] Support system or lack thereof? Pt has supportive wife that is very involved in pt care Living arrangements? Pt resides in a 1 story home (half step to entry) in a senior community Self-care/ADLs/Cognition? Pt alert and oriented x4. He is able to complete his ADLs/IADLs. Pt uses primarily uses rollator to ambulate and a cane for short distances. Current Advanced Directive/Advance Care Plan: No plan on file at this time Plan for utilizing home health:   No pan to utilize [facility] at this time but pt may need for wound Likelihood of readmission: high based on RRAT Transition of Care Plan:  CM met with pt to discuss d/c planning needs. CM verified pt demographic, insurance, and pcp information. Pt resides with his wife in a 55+ community. Pt has supportive wife that assist pt with care needs. Pt declines need for additional DME at this time. Pt to return home with his wife. Pt may need HH for wound care at discharge. CM will continue to follow pt for d/c planning needs. Janine Cristobal, Care Manager [phone]

## 2019-03-25 NOTE — WOUND CARE
Wound care nurse consult from staff nurse stating \" pre existing wound to RLE managed by wound clinic\". Patient is a 77 y/o CM admitted for acute respiratory failure. Past Medical History:  
Diagnosis Date  Arrhythmia   
 atrial fibrillation 2017  CAD (coronary atherosclerotic disease)  Chronic pain  Diabetes ([address]  Diabetes mellitus type 2, controlled ([facility] 3/13/2017  Heart failure ([facility]  Hx of CABG 3/13/2017 CABG in 2010 in [place]  Hypertension  Morbid obesity ([facility] 3/13/2017  JACEY (obstructive sleep apnea) 6/23/2017  S/P CABG x 2 2010  Thyroid disease Patient developed a venous stasis wound to RLE he is followed at [facility]. He was seen there 3/20 (Wednesday) and had a 3 layer compression wrap applied to RLE. He is to follow-up there this Wednesday 3/27 for wrap removal, wound assessment and possible compression wrap or Tubi-. No breakthrough drainage and wrap is still holding up on leg. If patient is still here Wednesday, please re-consult wound Care to remove RLE compression wrap. Thank you.  
 
Wilda Mcguire RN, [employer]

## 2019-03-25 NOTE — PROGRESS NOTES
[address]  [phone] Cardiology Progress Note 3/25/2019 2:56 PM 
 
Admit Date: 3/23/2019 Admit Diagnosis:  
Acute respiratory failure with hypoxia ([address] [J96.01] Subjective:  
 
Ciro Morton feeling better, diuresing well. Repeat echo with EF 50-55% Visit Vitals /72 (BP 1 Location: Right arm, BP Patient Position: At rest;Sitting) Pulse 75 Temp 98.2 °F (36.8 °C) Resp 18 Ht 5' 9\" (1.753 m) Wt 110.9 kg (244 lb 7.8 oz) SpO2 95% BMI 36.10 kg/m² Current Facility-Administered Medications Medication Dose Route Frequency  sodium chloride (NS) flush 5-40 mL  5-40 mL IntraVENous Q8H  
 sodium chloride (NS) flush 5-40 mL  5-40 mL IntraVENous PRN  
 bumetanide (BUMEX) injection 2 mg  2 mg IntraVENous Q12H  
 sodium chloride (NS) flush 5-40 mL  5-40 mL IntraVENous Q8H  
 sodium chloride (NS) flush 5-40 mL  5-40 mL IntraVENous PRN  
 acetaminophen (TYLENOL) tablet 650 mg  650 mg Oral Q6H PRN  
 ondansetron (ZOFRAN) injection 4 mg  4 mg IntraVENous Q4H PRN  
 nitroglycerin (NITROBID) 2 % ointment 1 Inch  1 Inch Topical Q6H PRN  
 insulin lispro (HUMALOG) injection   SubCUTAneous AC&HS  
 glucose chewable tablet 16 g  4 Tab Oral PRN  
 dextrose (D50) infusion 12.5-25 g  12.5-25 g IntraVENous PRN  
 glucagon (GLUCAGEN) injection 1 mg  1 mg IntraMUSCular PRN  
 dutasteride (AVODART) capsule 0.5 mg  0.5 mg Oral DAILY  apixaban (ELIQUIS) tablet 5 mg  5 mg Oral BID  febuxostat (ULORIC) tablet 40 mg  40 mg Oral DAILY  levothyroxine (SYNTHROID) tablet 137 mcg  137 mcg Oral 6am  
 mirabegron ER (MYRBETRIQ) tablet 25 mg  25 mg Oral DAILY  metoprolol succinate (TOPROL-XL) XL tablet 150 mg  150 mg Oral DAILY  metOLazone (ZAROXOLYN) tablet 2.5 mg  2.5 mg Oral DAILY  pravastatin (PRAVACHOL) tablet 80 mg  80 mg Oral QHS  tamsulosin (FLOMAX) capsule 0.4 mg  0.4 mg Oral DAILY  insulin NPH (NOVOLIN N, HUMULIN N) injection 15 Units  15 Units SubCUTAneous ACB&D Objective:  
  
Physical Exam: 
General Appearance:  obese  male in no acute distress Chest:   Clear Cardiovascular:  Regular rate and rhythm, no murmur.  
Abdomen:   Soft, non-tender, bowel sounds are active.  
Extremities: RLE with chronic edema, wound, dressing D/I, trace-+1 LE edema Skin:  Warm and dry.  
 
Data Review:  
Recent Labs  
  03/24/19 0221 03/23/19 
0231 WBC 6.3 7.0 HGB 10.9* 11.3* HCT 37.2 37.1 * 238 Recent Labs  
  03/25/19 
0527 03/24/19 0221 03/23/19 0231  139 141  
K 3.5 3.7 4.5  
CL 97 100 99 CO2 32 30 35* * 114* 133* * 100* 110* CREA 2.98* 2.88* 3.39* CA 9.1 9.2 9.1 MG 2.2  --  2.3 ALB  --  2.9* 3.1* TBILI  --  0.5 0.5 SGOT  --  29 41* ALT  --  17 20 Recent Labs  
  03/23/19 0231 TROIQ 0.07* Intake/Output Summary (Last 24 hours) at 3/25/2019 1456 Last data filed at 3/25/2019 0859 Gross per 24 hour Intake 600 ml Output 950 ml Net -350 ml Telemetry: paced Cxray:.  
Small left pleural effusion and left basilar atelectasis. 2. Unchanged cardiomegaly Echo: 3/24/19 · Estimated left ventricular ejection fraction is 56 - 60%. Left ventricular mild concentric hypertrophy. · Mitral valve non-specific thickening. Mild mitral valve regurgitation. · Mild tricuspid valve regurgitation is present. Mild to moderate pulmonary hypertension is present. Assessment:  
 
Active Problems: 
  Chronic a-fib ([address] (3/7/2017) CKD (chronic kidney disease) stage 4, GFR 15-29 ml/min ([facility]) (3/7/2017) Hx of CABG (3/13/2017) Overview: CABG in 2010 in [place] Diabetes mellitus type 2, controlled ([address] (3/13/2017) Diastolic heart failure ([address] (6/22/2017) Pacemaker (6/26/2017) Overview: 6/26/17: [employer] BiV 
 
  H/O atrioventricular anastacio ablation (6/26/2017) Overview: 6/26/17 Type 2 diabetes with nephropathy ([facility] (10/23/2018) Acute [facility] failure with hypoxia [facility] (3/23/2019) HTN (hypertension) (3/23/2019) Plan:  
 
Acute on chronic DHF: (EF 55%, DHF 3/24/19) Diuresing well, continue on bumex BID and Metolazone per Nephrology Renal function stable Monitor I/Os, daily weights, Labs. Weight down 8#s, Elevated troponin: Not ACS, r/t INO Yohan Hampton ACNP Cardiology

## 2019-03-26 LAB
ANION GAP SERPL CALC-SCNC: 10 MMOL/L (ref 5–15)
BUN SERPL-MCNC: 105 MG/DL (ref 6–20)
BUN/CREAT SERPL: 36 (ref 12–20)
CALCIUM SERPL-MCNC: 9 MG/DL (ref 8.5–10.1)
CHLORIDE SERPL-SCNC: 94 MMOL/L (ref 97–108)
CO2 SERPL-SCNC: 32 MMOL/L (ref 21–32)
CREAT SERPL-MCNC: 2.94 MG/DL (ref 0.7–1.3)
GLUCOSE BLD STRIP.AUTO-MCNC: 147 MG/DL (ref 65–100)
GLUCOSE BLD STRIP.AUTO-MCNC: 168 MG/DL (ref 65–100)
GLUCOSE BLD STRIP.AUTO-MCNC: 194 MG/DL (ref 65–100)
GLUCOSE BLD STRIP.AUTO-MCNC: 201 MG/DL (ref 65–100)
GLUCOSE SERPL-MCNC: 134 MG/DL (ref 65–100)
POTASSIUM SERPL-SCNC: 3.2 MMOL/L (ref 3.5–5.1)
SERVICE CMNT-IMP: ABNORMAL
SODIUM SERPL-SCNC: 136 MMOL/L (ref 136–145)

## 2019-03-26 PROCEDURE — 74011000250 HC RX REV CODE- 250: Performed by: INTERNAL MEDICINE

## 2019-03-26 PROCEDURE — 74011250637 HC RX REV CODE- 250/637: Performed by: INTERNAL MEDICINE

## 2019-03-26 PROCEDURE — 74011636637 HC RX REV CODE- 636/637: Performed by: INTERNAL MEDICINE

## 2019-03-26 PROCEDURE — 80048 BASIC METABOLIC PNL TOTAL CA: CPT

## 2019-03-26 PROCEDURE — 36415 COLL VENOUS BLD VENIPUNCTURE: CPT

## 2019-03-26 PROCEDURE — 65270000029 HC RM PRIVATE

## 2019-03-26 PROCEDURE — 82962 GLUCOSE BLOOD TEST: CPT

## 2019-03-26 RX ORDER — HYDROCODONE BITARTRATE AND ACETAMINOPHEN 5; 325 MG/1; MG/1
1 TABLET ORAL
Status: DISCONTINUED | OUTPATIENT
Start: 2019-03-26 | End: 2019-03-27 | Stop reason: HOSPADM

## 2019-03-26 RX ORDER — BUMETANIDE 1 MG/1
1 TABLET ORAL 2 TIMES DAILY
Status: ON HOLD | COMMUNITY
End: 2019-03-27 | Stop reason: SDUPTHER

## 2019-03-26 RX ORDER — POTASSIUM CHLORIDE 1500 MG/1
20 TABLET, FILM COATED, EXTENDED RELEASE ORAL DAILY
COMMUNITY
End: 2020-02-13

## 2019-03-26 RX ORDER — POTASSIUM CHLORIDE 20 MEQ/1
40 TABLET, EXTENDED RELEASE ORAL DAILY
Status: COMPLETED | OUTPATIENT
Start: 2019-03-26 | End: 2019-03-26

## 2019-03-26 RX ORDER — METOPROLOL SUCCINATE 50 MG/1
50 TABLET, EXTENDED RELEASE ORAL DAILY
COMMUNITY
End: 2019-09-24 | Stop reason: SDUPTHER

## 2019-03-26 RX ORDER — POTASSIUM CHLORIDE 1500 MG/1
40 TABLET, FILM COATED, EXTENDED RELEASE ORAL
COMMUNITY
End: 2019-03-27

## 2019-03-26 RX ADMIN — Medication 10 ML: at 08:27

## 2019-03-26 RX ADMIN — APIXABAN 5 MG: 5 TABLET, FILM COATED ORAL at 08:23

## 2019-03-26 RX ADMIN — BUMETANIDE 2 MG: 0.25 INJECTION INTRAMUSCULAR; INTRAVENOUS at 08:24

## 2019-03-26 RX ADMIN — Medication 10 ML: at 22:14

## 2019-03-26 RX ADMIN — MIRABEGRON 25 MG: 25 TABLET, FILM COATED, EXTENDED RELEASE ORAL at 09:55

## 2019-03-26 RX ADMIN — DUTASTERIDE 0.5 MG: 0.5 CAPSULE, LIQUID FILLED ORAL at 08:23

## 2019-03-26 RX ADMIN — POTASSIUM CHLORIDE 40 MEQ: 20 TABLET, EXTENDED RELEASE ORAL at 08:24

## 2019-03-26 RX ADMIN — HYDROCODONE BITARTRATE AND ACETAMINOPHEN 1 TABLET: 5; 325 TABLET ORAL at 17:59

## 2019-03-26 RX ADMIN — METOPROLOL SUCCINATE 150 MG: 50 TABLET, EXTENDED RELEASE ORAL at 08:23

## 2019-03-26 RX ADMIN — Medication 10 ML: at 18:01

## 2019-03-26 RX ADMIN — METOLAZONE 2.5 MG: 2.5 TABLET ORAL at 08:23

## 2019-03-26 RX ADMIN — HUMAN INSULIN 15 UNITS: 100 INJECTION, SUSPENSION SUBCUTANEOUS at 08:25

## 2019-03-26 RX ADMIN — Medication 10 ML: at 18:02

## 2019-03-26 RX ADMIN — LEVOTHYROXINE SODIUM 137 MCG: 0.11 TABLET ORAL at 08:26

## 2019-03-26 RX ADMIN — INSULIN LISPRO 2 UNITS: 100 INJECTION, SOLUTION INTRAVENOUS; SUBCUTANEOUS at 08:24

## 2019-03-26 RX ADMIN — APIXABAN 5 MG: 5 TABLET, FILM COATED ORAL at 18:00

## 2019-03-26 RX ADMIN — FEBUXOSTAT 40 MG: 40 TABLET ORAL at 09:55

## 2019-03-26 RX ADMIN — INSULIN LISPRO 3 UNITS: 100 INJECTION, SOLUTION INTRAVENOUS; SUBCUTANEOUS at 18:00

## 2019-03-26 RX ADMIN — ACETAMINOPHEN 650 MG: 325 TABLET ORAL at 22:21

## 2019-03-26 RX ADMIN — PRAVASTATIN SODIUM 80 MG: 40 TABLET ORAL at 22:13

## 2019-03-26 RX ADMIN — TAMSULOSIN HYDROCHLORIDE 0.4 MG: 0.4 CAPSULE ORAL at 08:24

## 2019-03-26 RX ADMIN — BUMETANIDE 2 MG: 0.25 INJECTION INTRAMUSCULAR; INTRAVENOUS at 22:13

## 2019-03-26 RX ADMIN — HUMAN INSULIN 15 UNITS: 100 INJECTION, SUSPENSION SUBCUTANEOUS at 18:00

## 2019-03-26 NOTE — PROGRESS NOTES
Hospitalist Progress Note NAME: Tsering Franz :  1950 MRN:  [id number] Assessment / Plan: 
Acute respiratory failure with hypoxia POA Due to Acute on chronic diastolic heart failure,  POA CKD4 
-Echo with EF 56-60%, mild concentric hypertrophy   
-cont' IV bumex BID, Zaroxylyn  
-monitor I/O, daily weight and lytes - cardiology and nephrology consult appreciated  
   
Hypokalemia 
-replete with PO KCl Chronic a-fib s/p ablation and s/p PPM 
HTN 
-continue BB and Eliquis 
  
CAD Hx of CABG  
-on Eliquis 
  
Diabetes mellitus type 2 
-takes70/30 34 units BID at home 
-con't NPH 15 units BID for now along with SSI 
  
Chronic neck pain 
-tylenol, norco prn Code Status: Full Surrogate Decision Maker: Wife  
DVT Prophylaxis: Eliquis  
Baseline: functional  
 
Subjective: Chief Complaint / Reason for Physician Visit Pt seen at bedside, complains of chronic back pain. Discussed with RN events overnight. Review of Systems: 
Symptom Y/N Comments  Symptom Y/N Comments Fever/Chills n   Chest Pain n   
Poor Appetite    Edema Cough    Abdominal Pain n   
Sputum    Joint Pain SOB/BETTS n   Pruritis/Rash Nausea/vomit    Tolerating PT/OT Diarrhea    Tolerating Diet Constipation    Other Could NOT obtain due to:   
 
Objective: VITALS:  
Last 24hrs VS reviewed since prior progress note. Most recent are: 
Patient Vitals for the past 24 hrs: 
 Temp Pulse Resp BP SpO2  
19 0740 99.3 °F (37.4 °C) 75 18 130/74 94 % 19 0442 99.3 °F (37.4 °C) 78 17 144/69 96 % 19 0014 99.1 °F (37.3 °C) 75 16 135/67 92 % 19 2037 97.4 °F (36.3 °C) 76 17 143/78 98 % 19 1545 97.6 °F (36.4 °C) 75 18 154/71 94 % 19 1147 98.2 °F (36.8 °C) 75 18 151/72 95 % Intake/Output Summary (Last 24 hours) at 3/26/2019 1124 Last data filed at 3/26/2019 1824 Gross per 24 hour Intake 1150 ml Output 1300 ml Net -150 ml PHYSICAL EXAM: 
 General: WD, WN. Alert, cooperative, no acute distress   
EENT:  EOMI. Anicteric sclerae. MMM Resp:  CTA bilaterally, no wheezing or rales. No accessory muscle use CV:  Regular  rhythm,  No edema GI:  Soft, Non distended, Non tender.  +Bowel sounds Neurologic:  Alert and oriented X 3, normal speech, Psych:   Good insight. Not anxious nor agitated Skin:  No rashes. No jaundice Reviewed most current lab test results and cultures  YES Reviewed most current radiology test results   YES Review and summation of old records today    NO Reviewed patient's current orders and MAR    YES 
PMH/SH reviewed - no change compared to H&P 
________________________________________________________________________ Care Plan discussed with: 
  Comments Patient x Family RN x Care Manager Consultant Multidiciplinary team rounds were held today with , nursing, pharmacist and clinical coordinator. Patient's plan of care was discussed; medications were reviewed and discharge planning was addressed. ________________________________________________________________________ Total NON critical care TIME:  35   Minutes Total CRITICAL CARE TIME Spent:   Minutes non procedure based Comments >50% of visit spent in counseling and coordination of care    
________________________________________________________________________ Manju Lu MD  
 
Procedures: see electronic medical records for all procedures/Xrays and details which were not copied into this note but were reviewed prior to creation of Plan. LABS: 
I reviewed today's most current labs and imaging studies. Pertinent labs include: 
Recent Labs  
  03/24/19 
0221 WBC 6.3 HGB 10.9* HCT 37.2 * Recent Labs  
  03/26/19 
0502 03/25/19 
0527 03/24/19 
0221  138 139  
K 3.2* 3.5 3.7 CL 94* 97 100 CO2 32 32 30 * 104* 114* * 109* 100* CREA 2.94* 2.98* 2.88* CA 9.0 9.1 9.2 MG  --  2.2  --   
ALB  --   --  2.9* TBILI  --   --  0.5 SGOT  --   --  29 ALT  --   --  17 Signed: Laura Brandt MD

## 2019-03-26 NOTE — PROGRESS NOTES
NAME: Cathy Walker :  1950 MRN:  [id number] Assessment :    Plan: 
--INO on CKD stage 4 (baseline creatinine ~ 3) Volume overload/JHON Morbid obesity CABG/diastolic dysfunction SHPT 
BPH Creatinine stable (~3); Cardiorenal syndrome? 
  
Continue Bumex 2 mg iv bid and metolazone 2.5 mg daily; O>I and weight is down 14 pounds (now 240 #'s) 
  Monitor weight, I&O and edema to direct changes in diuretics 
  
Low salt diet, daily weight PO K today Subjective: Chief Complaint:    Sitting in chair. Some sob last night and still sob and congested today. Legs still swollen. Previous \"dry weight\" was ~ 227 pounds. We reviewed the above. Review of Systems: 
 
Symptom Y/N Comments  Symptom Y/N Comments Fever/Chills    Chest Pain Poor Appetite    Edema y Cough    Abdominal Pain Sputum    Joint Pain SOB/BETTS y   Pruritis/Rash Nausea/vomit    Tolerating PT/OT Diarrhea    Tolerating Diet Constipation    Other Could not obtain due to:   
 
Objective: VITALS:  
Last 24hrs VS reviewed since prior progress note. Most recent are: 
Visit Vitals /69 Pulse 78 Temp 99.3 °F (37.4 °C) Resp 17 Ht 5' 9\" (1.753 m) Wt 110.9 kg (244 lb 7.8 oz) SpO2 96% BMI 36.10 kg/m² Intake/Output Summary (Last 24 hours) at 3/26/2019 6646 Last data filed at 3/26/2019 0083 Gross per 24 hour Intake 1150 ml Output 1300 ml Net -150 ml Telemetry Reviewed: PHYSICAL EXAM: 
General: obese. Alert, cooperative, no acute distress Resp:  CTA Bilaterally. No Wheezing/Rhonchi/Rales. No access. muscle use CV:  Regular  rhythm,  No murmur (), No Rubs, No Gallops. ++ edema GI:  Soft, Non distended, Non tender.  +Bowel sounds, no HSM Lab Data Reviewed: (see below) Medications Reviewed: (see below) PMH/SH reviewed - no change compared to H&P 
 ________________________________________________________________________ Care Plan discussed with: 
Patient y Family  y   
RN Care Manager Consultant:     
 
  Comments >50% of visit spent in counseling and coordination of care    
 
________________________________________________________________________ Sandra Warren MD  
 
Procedures: see electronic medical records for all procedures/Xrays and details which 
were not copied into this note but were reviewed prior to creation of Plan. LABS: 
Recent Labs  
  03/24/19 0221 WBC 6.3 HGB 10.9* HCT 37.2 * Recent Labs  
  03/26/19 
0502 03/25/19 
0527 03/24/19 0221  138 139  
K 3.2* 3.5 3.7 CL 94* 97 100 CO2 32 32 30 * 109* 100* CREA 2.94* 2.98* 2.88* * 104* 114* CA 9.0 9.1 9.2 MG  --  2.2  --   
 
Recent Labs  
  03/24/19 0221 SGOT 29 AP 46  
TP 7.1 ALB 2.9*  
GLOB 4.2* No results for input(s): INR, PTP, APTT in the last 72 hours. No lab exists for component: INREXT, INREXT No results for input(s): FE, TIBC, PSAT, FERR in the last 72 hours. No results found for: FOL, RBCF No results for input(s): PH, PCO2, PO2 in the last 72 hours. No results for input(s): CPK, CKMB in the last 72 hours. No lab exists for component: TROPONINI No components found for: [place] Lab Results Component Value Date/Time  Color YELLOW/STRAW 06/23/2017 12:15 PM  
 Appearance CLEAR 06/23/2017 12:15 PM  
 Specific gravity 1.010 06/23/2017 12:15 PM  
 pH (UA) 5.5 06/23/2017 12:15 PM  
 Protein TRACE (A) 06/23/2017 12:15 PM  
 Glucose NEGATIVE  06/23/2017 12:15 PM  
 Ketone NEGATIVE  06/23/2017 12:15 PM  
 Bilirubin NEGATIVE  06/23/2017 12:15 PM  
 Urobilinogen 0.2 06/23/2017 12:15 PM  
 Nitrites NEGATIVE  06/23/2017 12:15 PM  
 Leukocyte Esterase NEGATIVE  06/23/2017 12:15 PM  
 Epithelial cells FEW 06/23/2017 12:15 PM  
 Bacteria NEGATIVE  06/23/2017 12:15 PM  
 WBC 0-4 06/23/2017 12:15 PM  
 RBC 0-5 06/23/2017 12:15 PM  
 
 
MEDICATIONS: 
Current Facility-Administered Medications Medication Dose Route Frequency  sodium chloride (NS) flush 5-40 mL  5-40 mL IntraVENous Q8H  
 sodium chloride (NS) flush 5-40 mL  5-40 mL IntraVENous PRN  
 bumetanide (BUMEX) injection 2 mg  2 mg IntraVENous Q12H  
 sodium chloride (NS) flush 5-40 mL  5-40 mL IntraVENous Q8H  
 sodium chloride (NS) flush 5-40 mL  5-40 mL IntraVENous PRN  
 acetaminophen (TYLENOL) tablet 650 mg  650 mg Oral Q6H PRN  
 ondansetron (ZOFRAN) injection 4 mg  4 mg IntraVENous Q4H PRN  
 nitroglycerin (NITROBID) 2 % ointment 1 Inch  1 Inch Topical Q6H PRN  
 insulin lispro (HUMALOG) injection   SubCUTAneous AC&HS  
 glucose chewable tablet 16 g  4 Tab Oral PRN  
 dextrose (D50) infusion 12.5-25 g  12.5-25 g IntraVENous PRN  
 glucagon (GLUCAGEN) injection 1 mg  1 mg IntraMUSCular PRN  
 dutasteride (AVODART) capsule 0.5 mg  0.5 mg Oral DAILY  apixaban (ELIQUIS) tablet 5 mg  5 mg Oral BID  febuxostat (ULORIC) tablet 40 mg  40 mg Oral DAILY  levothyroxine (SYNTHROID) tablet 137 mcg  137 mcg Oral 6am  
 mirabegron ER (MYRBETRIQ) tablet 25 mg  25 mg Oral DAILY  metoprolol succinate (TOPROL-XL) XL tablet 150 mg  150 mg Oral DAILY  metOLazone (ZAROXOLYN) tablet 2.5 mg  2.5 mg Oral DAILY  pravastatin (PRAVACHOL) tablet 80 mg  80 mg Oral QHS  tamsulosin (FLOMAX) capsule 0.4 mg  0.4 mg Oral DAILY  insulin NPH (NOVOLIN N, HUMULIN N) injection 15 Units  15 Units SubCUTAneous ACB&D

## 2019-03-26 NOTE — PROGRESS NOTES
[address] [place] [address]  [phone] Cardiology Progress Note 3/26/2019 10:30AM 
 
Admit Date: 3/23/2019 Admit Diagnosis:  
Acute respiratory failure with hypoxia ([address] [J96.01] Subjective:  
 
Lily Hutching feeling better, diuresing well. Visit Vitals /74 Pulse 75 Temp 99.3 °F (37.4 °C) Resp 18 Ht 5' 9\" (1.753 m) Wt 109.3 kg (240 lb 15.4 oz) SpO2 94% BMI 35.58 kg/m² Current Facility-Administered Medications Medication Dose Route Frequency  sodium chloride (NS) flush 5-40 mL  5-40 mL IntraVENous Q8H  
 sodium chloride (NS) flush 5-40 mL  5-40 mL IntraVENous PRN  
 bumetanide (BUMEX) injection 2 mg  2 mg IntraVENous Q12H  
 sodium chloride (NS) flush 5-40 mL  5-40 mL IntraVENous Q8H  
 sodium chloride (NS) flush 5-40 mL  5-40 mL IntraVENous PRN  
 acetaminophen (TYLENOL) tablet 650 mg  650 mg Oral Q6H PRN  
 ondansetron (ZOFRAN) injection 4 mg  4 mg IntraVENous Q4H PRN  
 nitroglycerin (NITROBID) 2 % ointment 1 Inch  1 Inch Topical Q6H PRN  
 insulin lispro (HUMALOG) injection   SubCUTAneous AC&HS  
 glucose chewable tablet 16 g  4 Tab Oral PRN  
 dextrose (D50) infusion 12.5-25 g  12.5-25 g IntraVENous PRN  
 glucagon (GLUCAGEN) injection 1 mg  1 mg IntraMUSCular PRN  
 dutasteride (AVODART) capsule 0.5 mg  0.5 mg Oral DAILY  apixaban (ELIQUIS) tablet 5 mg  5 mg Oral BID  febuxostat (ULORIC) tablet 40 mg  40 mg Oral DAILY  levothyroxine (SYNTHROID) tablet 137 mcg  137 mcg Oral 6am  
 mirabegron ER (MYRBETRIQ) tablet 25 mg  25 mg Oral DAILY  metoprolol succinate (TOPROL-XL) XL tablet 150 mg  150 mg Oral DAILY  metOLazone (ZAROXOLYN) tablet 2.5 mg  2.5 mg Oral DAILY  pravastatin (PRAVACHOL) tablet 80 mg  80 mg Oral QHS  tamsulosin (FLOMAX) capsule 0.4 mg  0.4 mg Oral DAILY  insulin NPH (NOVOLIN N, HUMULIN N) injection 15 Units  15 Units SubCUTAneous ACB&D Objective:  
  
Physical Exam: General Appearance:  obese  male in no acute distress Chest:   Clear Cardiovascular:  Regular rate and rhythm, no murmur.  
Abdomen:   Soft, non-tender, bowel sounds are active.  
Extremities: RLE with chronic edema, wound, dressing D/I, trace-+1 LE edema Skin:  Warm and dry.  
 
Data Review:  
Recent Labs  
  03/24/19 
0221 WBC 6.3 HGB 10.9* HCT 37.2 * Recent Labs  
  03/26/19 
0502 03/25/19 
0527 03/24/19 
0221  138 139  
K 3.2* 3.5 3.7 CL 94* 97 100 CO2 32 32 30 * 104* 114* * 109* 100* CREA 2.94* 2.98* 2.88* CA 9.0 9.1 9.2 MG  --  2.2  --   
ALB  --   --  2.9* TBILI  --   --  0.5 SGOT  --   --  29 ALT  --   --  17 No results for input(s): TROIQ, CPK, CKMB in the last 72 hours. Intake/Output Summary (Last 24 hours) at 3/26/2019 1131 Last data filed at 3/26/2019 1561 Gross per 24 hour Intake 1150 ml Output 1300 ml Net -150 ml Telemetry: paced Cxray:.  
Small left pleural effusion and left basilar atelectasis. 2. Unchanged cardiomegaly Echo: 3/24/19 · Estimated left ventricular ejection fraction is 56 - 60%. Left ventricular mild concentric hypertrophy. · Mitral valve non-specific thickening. Mild mitral valve regurgitation. · Mild tricuspid valve regurgitation is present. Mild to moderate pulmonary hypertension is present. Assessment:  
 
Active Problems: 
  Chronic a-fib ([address] (3/7/2017) CKD (chronic kidney disease) stage 4, GFR 15-29 ml/min ([facility]) (3/7/2017) Hx of CABG (3/13/2017) Overview: CABG in 2010 in [place] Diabetes mellitus type 2, controlled ([facility] (3/13/2017) Diastolic heart failure ([facility] (6/22/2017) Pacemaker (6/26/2017) Overview: 6/26/17: [employer] 
 
  H/O atrioventricular anastacio ablation (6/26/2017) Overview: 6/26/17 Type 2 diabetes with nephropathy ([facility] (10/23/2018) Acute respiratory failure with hypoxia ([facility] (3/23/2019) HTN (hypertension) (3/23/2019) Plan:  
 
Acute on chronic DHF: (EF 55%, DHF 3/24/19) Diuresing well, neg 1.8 L, down 14#s. continue on bumex BID and Metolazone per Nephrology Renal function stable Monitor I/Os, daily weights, Labs. Weight down 8#s, Elevated troponin: Not ACS, r/t INO Continue on BB, statin Chronic Afib: 
Paced, continue on Eliquis Reford Plaster ACNP Cardiology

## 2019-03-26 NOTE — PROGRESS NOTES
**Consult Information** 
Member Facility: [address] [place] [address],[address] Facility MRN: [id number] Consult ID: [id number] Facility Time Zone: ET 
Date and Time of Consult: 03/25/2019 10:28:18 PM 
Requesting Clinician: Gay Isidro Patient Name: Dana Parra Date of Birth: [id number] Gender: Male **Clinical Note** Clinical Note: Patient requesting Melatonin for sleep. Patient is currently stable according to nursing. Will order.

## 2019-03-26 NOTE — PROGRESS NOTES
Problem: Falls - Risk of 
Goal: *Absence of Falls Description Document Edgar Brunner Fall Risk and appropriate interventions in the flowsheet. Outcome: Progressing Towards Goal 
  
Problem: Patient Education: Go to Patient Education Activity Goal: Patient/Family Education Outcome: Progressing Towards Goal 
  
Problem: Pressure Injury - Risk of 
Goal: *Prevention of pressure injury Description Document Romeo Scale and appropriate interventions in the flowsheet. Outcome: Progressing Towards Goal 
  
Problem: Patient Education: Go to Patient Education Activity Goal: Patient/Family Education Outcome: Progressing Towards Goal

## 2019-03-26 NOTE — PROGRESS NOTES
Pharmacy Medication Reconciliation The patient was able to be interviewed regarding clarification of the prior to admission medication regimen. Patient was also questioned regarding use of any other inhalers, topical products, over the counter medications, herbal medications, vitamin products or ophthalmic/nasal/otic medication use. Information Obtained From: patient, Rx query Recommendations/Findings: The following amendments were made to the patient's active medication list on file at [address]:  
 
1) Additions: none 2) Removals: none 3) Changes: 
bumetanide (BUMEX) 1 mg tablet (Old regimen: bumetanide (BUMEX) 2m tab in AM and 1/2 tab in the afternoon /New regimen: 1 tablet every morning and 1 tablet every evening) metoprolol succinate (TOPROL-XL) 100 mg tablet (Old regimen: 1 tab QD /New regimen: 150mg QD) potassium chloride SR (K-TAB) 20 mEq tablet (Old regimen: 1 tablet daily /New regimen: 1 tab (20 mEq) 3x/week AND 2 tabs (40 mEq) 4x/week) 4) Pertinent Pharmacy Findings: 
Identified High Alert Medication Information Current Anticoagulants: 
Name: ELIQUIS 5 mg tablet PTA medication list was corrected to the following:  
 
Prior to Admission Medications Prescriptions Last Dose Informant Patient Reported? Taking? ELIQUIS 5 mg tablet  Self Yes Yes Sig: TAKE 1 TABLET BY MOUTH TWICE A DAY  
MYRBETRIQ 25 mg ER tablet  Self Yes Yes Sig: TAKE 1 TABLET BY MOUTH EVERY DAY  
acetaminophen (TYLENOL EXTRA STRENGTH) 500 mg tablet 3/25/2019 at Unknown time Self Yes Yes Sig: Take 1,000 mg by mouth every eight (8) hours as needed for Pain. bumetanide (BUMEX) 1 mg tablet  Self Yes Yes Sig: Take 1 mg by mouth two (2) times a day. Patient takes 1 tablet every morning and 1 tablet every evening  
dutasteride (AVODART) 0.5 mg capsule  Self Yes Yes Sig: Take 0.5 mg by mouth daily. febuxostat (ULORIC) 40 mg tab tablet  Self Yes Yes Sig: Take 40 mg by mouth daily. insulin aspart protamine/insulin aspart (NOVOLOG MIX 70/30) 100 unit/mL (70-30) inpn  Self Yes Yes Si units breakfast, 34 units dinner + correction scale (up to 80 units/day)  
ketoconazole (NIZORAL) 2 % topical cream  Self Yes Yes Sig: Apply  to affected area two (2) times daily as needed for Skin Irritation. Indications: rash  
levothyroxine (SYNTHROID) 137 mcg tablet  Self Yes Yes Sig: TAKE 1 TABLET(S) EVERY DAY BY ORAL ROUTE FOR 90 DAYS. metOLazone (ZAROXOLYN) 2.5 mg tablet  Self Yes Yes Sig: Take 2.5 mg by mouth daily. metoprolol succinate (TOPROL XL) 50 mg XL tablet  Self Yes Yes Sig: Take 50 mg by mouth daily. Patient takes 150mg total daily  
metoprolol succinate (TOPROL-XL) 100 mg tablet  Self Yes Yes Sig: Take 100 mg by mouth daily. Patient takes 150mg total daily  
multivitamin (ONE A DAY) tablet  Self Yes Yes Sig: Take 1 Tab by mouth daily. potassium chloride SR (K-TAB) 20 mEq tablet  Self Yes Yes Sig: Take 40 mEq by mouth four (4) days a week. Patient takes 2 tablets (40 mEq) 4 times per week. potassium chloride SR (K-TAB) 20 mEq tablet  Self Yes Yes Sig: Take 20 mEq by mouth three (3) days a week. Patient takes 1 tablet (20 mEq) 3 times a week  
pravastatin (PRAVACHOL) 80 mg tablet  Self No Yes Sig: TAKE 1 TABLET BY MOUTH IN THE EVENING  
tamsulosin (FLOMAX) 0.4 mg capsule  Self Yes Yes Sig: TAKE 2 CAPSULES BY MOUTH EVERY DAY Facility-Administered Medications: None Thank you, Burt Alejandro PharmD. Candidate, Class of 2019

## 2019-03-26 NOTE — PROGRESS NOTES
NAME: Inez Payne :  1950 MRN:  [id number] Assessment :    Plan: 
--INO on CKD stage 4 (baseline creatinine ~ 3) Volume overload/JHON Morbid obesity CABG/diastolic dysfunction SHPT 
BPH Creatinine stable today (~3); Cardiorenal syndrome? 
  
Continue Bumex 2 mg iv bid and metolazone 2.5 mg daily; O>I and weight is down 10 pounds 
  
Monitor weight, I&O and edema to direct changes in diuretics 
  
Low salt diet, daily weight 
  
No PVR 
  
No RRT or UF needed at this time Subjective: Chief Complaint:    Sitting in chair. Some sob last night and still sob and congested today. Legs still swollen Review of Systems: 
 
Symptom Y/N Comments  Symptom Y/N Comments Fever/Chills    Chest Pain Poor Appetite    Edema y Cough    Abdominal Pain Sputum    Joint Pain SOB/BETTS y   Pruritis/Rash Nausea/vomit    Tolerating PT/OT Diarrhea    Tolerating Diet Constipation    Other Could not obtain due to:   
 
Objective: VITALS:  
Last 24hrs VS reviewed since prior progress note. Most recent are: 
Visit Vitals /71 (BP 1 Location: Left arm, BP Patient Position: At rest;Supine) Pulse 75 Temp 97.6 °F (36.4 °C) Resp 18 Ht 5' 9\" (1.753 m) Wt 110.9 kg (244 lb 7.8 oz) SpO2 94% BMI 36.10 kg/m² Intake/Output Summary (Last 24 hours) at 3/25/2019 2014 Last data filed at 3/25/2019 1652 Gross per 24 hour Intake 1200 ml Output 1650 ml Net -450 ml Telemetry Reviewed: PHYSICAL EXAM: 
General: obese. Alert, cooperative, no acute distress Resp:  CTA Bilaterally. No Wheezing/Rhonchi/Rales. No access. muscle use CV:  Regular  rhythm,  No murmur (), No Rubs, No Gallops. ++ edema GI:  Soft, Non distended, Non tender.  +Bowel sounds, no HSM Lab Data Reviewed: (see below) Medications Reviewed: (see below) PMH/SH reviewed - no change compared to H&P 
________________________________________________________________________ Care Plan discussed with: 
Patient y Family  y   
RN Care Manager Consultant:     
 
  Comments >50% of visit spent in counseling and coordination of care    
 
________________________________________________________________________ Elenita Cheney MD  
 
Procedures: see electronic medical records for all procedures/Xrays and details which 
were not copied into this note but were reviewed prior to creation of Plan. LABS: 
Recent Labs  
  03/24/19 0221 03/23/19 0231 WBC 6.3 7.0 HGB 10.9* 11.3* HCT 37.2 37.1 * 238 Recent Labs  
  03/25/19 0527 03/24/19 0221 03/23/19 0231  139 141  
K 3.5 3.7 4.5  
CL 97 100 99 CO2 32 30 35* * 100* 110* CREA 2.98* 2.88* 3.39* * 114* 133* CA 9.1 9.2 9.1 MG 2.2  --  2.3 Recent Labs  
  03/24/19 0221 03/23/19 0231 SGOT 29 41* AP 46 51  
TP 7.1 7.4 ALB 2.9* 3.1*  
GLOB 4.2* 4.3* No results for input(s): INR, PTP, APTT in the last 72 hours. No lab exists for component: INREXT, INREXT No results for input(s): FE, TIBC, PSAT, FERR in the last 72 hours. No results found for: FOL, RBCF No results for input(s): PH, PCO2, PO2 in the last 72 hours. No results for input(s): CPK, CKMB in the last 72 hours. No lab exists for component: TROPONINI No components found for: [place] Lab Results Component Value Date/Time  Color YELLOW/STRAW 06/23/2017 12:15 PM  
 Appearance CLEAR 06/23/2017 12:15 PM  
 Specific gravity 1.010 06/23/2017 12:15 PM  
 pH (UA) 5.5 06/23/2017 12:15 PM  
 Protein TRACE (A) 06/23/2017 12:15 PM  
 Glucose NEGATIVE  06/23/2017 12:15 PM  
 Ketone NEGATIVE  06/23/2017 12:15 PM  
 Bilirubin NEGATIVE  06/23/2017 12:15 PM  
 Urobilinogen 0.2 06/23/2017 12:15 PM  
 Nitrites NEGATIVE  06/23/2017 12:15 PM  
 Leukocyte Esterase NEGATIVE  06/23/2017 12:15 PM  
 Epithelial cells FEW 06/23/2017 12:15 PM  
 Bacteria NEGATIVE  06/23/2017 12:15 PM  
 WBC 0-4 06/23/2017 12:15 PM  
 RBC 0-5 06/23/2017 12:15 PM  
 
 
MEDICATIONS: 
Current Facility-Administered Medications Medication Dose Route Frequency  sodium chloride (NS) flush 5-40 mL  5-40 mL IntraVENous Q8H  
 sodium chloride (NS) flush 5-40 mL  5-40 mL IntraVENous PRN  
 bumetanide (BUMEX) injection 2 mg  2 mg IntraVENous Q12H  
 sodium chloride (NS) flush 5-40 mL  5-40 mL IntraVENous Q8H  
 sodium chloride (NS) flush 5-40 mL  5-40 mL IntraVENous PRN  
 acetaminophen (TYLENOL) tablet 650 mg  650 mg Oral Q6H PRN  
 ondansetron (ZOFRAN) injection 4 mg  4 mg IntraVENous Q4H PRN  
 nitroglycerin (NITROBID) 2 % ointment 1 Inch  1 Inch Topical Q6H PRN  
 insulin lispro (HUMALOG) injection   SubCUTAneous AC&HS  
 glucose chewable tablet 16 g  4 Tab Oral PRN  
 dextrose (D50) infusion 12.5-25 g  12.5-25 g IntraVENous PRN  
 glucagon (GLUCAGEN) injection 1 mg  1 mg IntraMUSCular PRN  
 dutasteride (AVODART) capsule 0.5 mg  0.5 mg Oral DAILY  apixaban (ELIQUIS) tablet 5 mg  5 mg Oral BID  febuxostat (ULORIC) tablet 40 mg  40 mg Oral DAILY  levothyroxine (SYNTHROID) tablet 137 mcg  137 mcg Oral 6am  
 mirabegron ER (MYRBETRIQ) tablet 25 mg  25 mg Oral DAILY  metoprolol succinate (TOPROL-XL) XL tablet 150 mg  150 mg Oral DAILY  metOLazone (ZAROXOLYN) tablet 2.5 mg  2.5 mg Oral DAILY  pravastatin (PRAVACHOL) tablet 80 mg  80 mg Oral QHS  tamsulosin (FLOMAX) capsule 0.4 mg  0.4 mg Oral DAILY  insulin NPH (NOVOLIN N, HUMULIN N) injection 15 Units  15 Units SubCUTAneous ACB&D

## 2019-03-27 ENCOUNTER — DOCUMENTATION ONLY (OUTPATIENT)
Dept: CASE MANAGEMENT | Age: 69
End: 2019-03-27

## 2019-03-27 VITALS
HEART RATE: 72 BPM | HEIGHT: 69 IN | RESPIRATION RATE: 16 BRPM | SYSTOLIC BLOOD PRESSURE: 162 MMHG | BODY MASS INDEX: 35.3 KG/M2 | TEMPERATURE: 97.4 F | DIASTOLIC BLOOD PRESSURE: 81 MMHG | WEIGHT: 238.32 LBS | OXYGEN SATURATION: 96 %

## 2019-03-27 LAB
ANION GAP SERPL CALC-SCNC: 8 MMOL/L (ref 5–15)
BASOPHILS # BLD: 0 K/UL (ref 0–0.1)
BASOPHILS NFR BLD: 0 % (ref 0–1)
BUN SERPL-MCNC: 109 MG/DL (ref 6–20)
BUN/CREAT SERPL: 36 (ref 12–20)
CALCIUM SERPL-MCNC: 9.2 MG/DL (ref 8.5–10.1)
CHLORIDE SERPL-SCNC: 97 MMOL/L (ref 97–108)
CO2 SERPL-SCNC: 34 MMOL/L (ref 21–32)
CREAT SERPL-MCNC: 3.05 MG/DL (ref 0.7–1.3)
DIFFERENTIAL METHOD BLD: ABNORMAL
EOSINOPHIL # BLD: 0.1 K/UL (ref 0–0.4)
EOSINOPHIL NFR BLD: 2 % (ref 0–7)
ERYTHROCYTE [DISTWIDTH] IN BLOOD BY AUTOMATED COUNT: 15.5 % (ref 11.5–14.5)
GLUCOSE BLD STRIP.AUTO-MCNC: 124 MG/DL (ref 65–100)
GLUCOSE SERPL-MCNC: 110 MG/DL (ref 65–100)
HCT VFR BLD AUTO: 37.2 % (ref 36.6–50.3)
HGB BLD-MCNC: 11.3 G/DL (ref 12.1–17)
IMM GRANULOCYTES # BLD AUTO: 0 K/UL (ref 0–0.04)
IMM GRANULOCYTES NFR BLD AUTO: 0 % (ref 0–0.5)
LYMPHOCYTES # BLD: 1 K/UL (ref 0.8–3.5)
LYMPHOCYTES NFR BLD: 18 % (ref 12–49)
MAGNESIUM SERPL-MCNC: 2.1 MG/DL (ref 1.6–2.4)
MCH RBC QN AUTO: 25.1 PG (ref 26–34)
MCHC RBC AUTO-ENTMCNC: 30.4 G/DL (ref 30–36.5)
MCV RBC AUTO: 82.5 FL (ref 80–99)
MONOCYTES # BLD: 0.7 K/UL (ref 0–1)
MONOCYTES NFR BLD: 13 % (ref 5–13)
NEUTS SEG # BLD: 3.6 K/UL (ref 1.8–8)
NEUTS SEG NFR BLD: 67 % (ref 32–75)
NRBC # BLD: 0 K/UL (ref 0–0.01)
NRBC BLD-RTO: 0 PER 100 WBC
PLATELET # BLD AUTO: 149 K/UL (ref 150–400)
PMV BLD AUTO: 10.7 FL (ref 8.9–12.9)
POTASSIUM SERPL-SCNC: 3.5 MMOL/L (ref 3.5–5.1)
RBC # BLD AUTO: 4.51 M/UL (ref 4.1–5.7)
SERVICE CMNT-IMP: ABNORMAL
SODIUM SERPL-SCNC: 139 MMOL/L (ref 136–145)
WBC # BLD AUTO: 5.5 K/UL (ref 4.1–11.1)

## 2019-03-27 PROCEDURE — 77030011255 HC DSG AQUACEL AG BMS -A

## 2019-03-27 PROCEDURE — 74011000250 HC RX REV CODE- 250: Performed by: INTERNAL MEDICINE

## 2019-03-27 PROCEDURE — 74011250637 HC RX REV CODE- 250/637: Performed by: INTERNAL MEDICINE

## 2019-03-27 PROCEDURE — 85025 COMPLETE CBC W/AUTO DIFF WBC: CPT

## 2019-03-27 PROCEDURE — 80048 BASIC METABOLIC PNL TOTAL CA: CPT

## 2019-03-27 PROCEDURE — 74011636637 HC RX REV CODE- 636/637: Performed by: INTERNAL MEDICINE

## 2019-03-27 PROCEDURE — 29581 APPL MULTLAYER CMPRN SYS LEG: CPT

## 2019-03-27 PROCEDURE — 82962 GLUCOSE BLOOD TEST: CPT

## 2019-03-27 PROCEDURE — 2W1LX6Z COMPRESSION OF RIGHT LOWER EXTREMITY USING PRESSURE DRESSING: ICD-10-PCS | Performed by: INTERNAL MEDICINE

## 2019-03-27 PROCEDURE — 36415 COLL VENOUS BLD VENIPUNCTURE: CPT

## 2019-03-27 PROCEDURE — 77030020821 HC BNDG COMPR KT S&N -A

## 2019-03-27 PROCEDURE — 83735 ASSAY OF MAGNESIUM: CPT

## 2019-03-27 RX ORDER — POTASSIUM CHLORIDE 20 MEQ/1
40 TABLET, EXTENDED RELEASE ORAL DAILY
Status: COMPLETED | OUTPATIENT
Start: 2019-03-27 | End: 2019-03-27

## 2019-03-27 RX ORDER — BUMETANIDE 1 MG/1
2 TABLET ORAL 2 TIMES DAILY
Qty: 60 TAB | Refills: 1 | Status: SHIPPED | OUTPATIENT
Start: 2019-03-27 | End: 2019-04-24

## 2019-03-27 RX ADMIN — MIRABEGRON 25 MG: 25 TABLET, FILM COATED, EXTENDED RELEASE ORAL at 08:55

## 2019-03-27 RX ADMIN — Medication 10 ML: at 05:19

## 2019-03-27 RX ADMIN — DUTASTERIDE 0.5 MG: 0.5 CAPSULE, LIQUID FILLED ORAL at 08:55

## 2019-03-27 RX ADMIN — POTASSIUM CHLORIDE 40 MEQ: 20 TABLET, EXTENDED RELEASE ORAL at 08:53

## 2019-03-27 RX ADMIN — TAMSULOSIN HYDROCHLORIDE 0.4 MG: 0.4 CAPSULE ORAL at 08:53

## 2019-03-27 RX ADMIN — METOLAZONE 2.5 MG: 2.5 TABLET ORAL at 08:52

## 2019-03-27 RX ADMIN — BUMETANIDE 2 MG: 0.25 INJECTION INTRAMUSCULAR; INTRAVENOUS at 08:52

## 2019-03-27 RX ADMIN — LEVOTHYROXINE SODIUM 137 MCG: 0.11 TABLET ORAL at 05:20

## 2019-03-27 RX ADMIN — Medication 10 ML: at 05:20

## 2019-03-27 RX ADMIN — FEBUXOSTAT 40 MG: 40 TABLET ORAL at 08:55

## 2019-03-27 RX ADMIN — METOPROLOL SUCCINATE 150 MG: 50 TABLET, EXTENDED RELEASE ORAL at 08:52

## 2019-03-27 RX ADMIN — HYDROCODONE BITARTRATE AND ACETAMINOPHEN 1 TABLET: 5; 325 TABLET ORAL at 08:52

## 2019-03-27 RX ADMIN — APIXABAN 5 MG: 5 TABLET, FILM COATED ORAL at 08:52

## 2019-03-27 RX ADMIN — HUMAN INSULIN 15 UNITS: 100 INJECTION, SUSPENSION SUBCUTANEOUS at 08:53

## 2019-03-27 NOTE — PROGRESS NOTES
[address] [place] [address]  [phone] Cardiology Progress Note 3/27/2019 10:30AM 
 
Admit Date: 3/23/2019 Admit Diagnosis:  
Acute respiratory failure with hypoxia ([address] [J96.01] Subjective:  
 
Philly Hall feeling better, diuresing well. Visit Vitals /70 Pulse 74 Temp 97.7 °F (36.5 °C) Resp 18 Ht 5' 9\" (1.753 m) Wt 108.1 kg (238 lb 5.1 oz) SpO2 99% BMI 35.19 kg/m² Current Facility-Administered Medications Medication Dose Route Frequency  HYDROcodone-acetaminophen (NORCO) 5-325 mg per tablet 1 Tab  1 Tab Oral Q6H PRN  
 sodium chloride (NS) flush 5-40 mL  5-40 mL IntraVENous Q8H  
 sodium chloride (NS) flush 5-40 mL  5-40 mL IntraVENous PRN  
 bumetanide (BUMEX) injection 2 mg  2 mg IntraVENous Q12H  
 sodium chloride (NS) flush 5-40 mL  5-40 mL IntraVENous Q8H  
 sodium chloride (NS) flush 5-40 mL  5-40 mL IntraVENous PRN  
 acetaminophen (TYLENOL) tablet 650 mg  650 mg Oral Q6H PRN  
 ondansetron (ZOFRAN) injection 4 mg  4 mg IntraVENous Q4H PRN  
 nitroglycerin (NITROBID) 2 % ointment 1 Inch  1 Inch Topical Q6H PRN  
 insulin lispro (HUMALOG) injection   SubCUTAneous AC&HS  
 glucose chewable tablet 16 g  4 Tab Oral PRN  
 dextrose (D50) infusion 12.5-25 g  12.5-25 g IntraVENous PRN  
 glucagon (GLUCAGEN) injection 1 mg  1 mg IntraMUSCular PRN  
 dutasteride (AVODART) capsule 0.5 mg  0.5 mg Oral DAILY  apixaban (ELIQUIS) tablet 5 mg  5 mg Oral BID  febuxostat (ULORIC) tablet 40 mg  40 mg Oral DAILY  levothyroxine (SYNTHROID) tablet 137 mcg  137 mcg Oral 6am  
 mirabegron ER (MYRBETRIQ) tablet 25 mg  25 mg Oral DAILY  metoprolol succinate (TOPROL-XL) XL tablet 150 mg  150 mg Oral DAILY  metOLazone (ZAROXOLYN) tablet 2.5 mg  2.5 mg Oral DAILY  pravastatin (PRAVACHOL) tablet 80 mg  80 mg Oral QHS  tamsulosin (FLOMAX) capsule 0.4 mg  0.4 mg Oral DAILY  insulin NPH (NOVOLIN N, HUMULIN N) injection 15 Units  15 Units SubCUTAneous ACB&D Objective:  
  
Physical Exam: 
General Appearance:  obese  male in no acute distress Chest:   Clear Cardiovascular:  Regular rate and rhythm, no murmur.  
Abdomen:   Soft, non-tender, bowel sounds are active.  
Extremities: RLE with chronic edema, wound, dressing D/I, trace-+1 LE edema Skin:  Warm and dry.  
 
Data Review:  
Recent Labs  
  03/27/19 
0550 WBC 5.5 HGB 11.3* HCT 37.2 * Recent Labs  
  03/27/19 
0550 03/26/19 
0502 03/25/19 
3494  136 138  
K 3.5 3.2* 3.5 CL 97 94* 97  
CO2 34* 32 32 * 134* 104* * 105* 109* CREA 3.05* 2.94* 2.98* CA 9.2 9.0 9.1 MG 2.1  --  2.2 No results for input(s): TROIQ, CPK, CKMB in the last 72 hours. Intake/Output Summary (Last 24 hours) at 3/27/2019 1055 Last data filed at 3/27/2019 8898 Gross per 24 hour Intake 630 ml Output 900 ml Net -270 ml Telemetry: paced Cxray:.  
Small left pleural effusion and left basilar atelectasis. 2. Unchanged cardiomegaly Echo: 3/24/19 · Estimated left ventricular ejection fraction is 56 - 60%. Left ventricular mild concentric hypertrophy. · Mitral valve non-specific thickening. Mild mitral valve regurgitation. · Mild tricuspid valve regurgitation is present. Mild to moderate pulmonary hypertension is present. Assessment:  
 
Active Problems: 
  Chronic a-fib ([address] (3/7/2017) CKD (chronic kidney disease) stage 4, GFR 15-29 ml/min ([facility]) (3/7/2017) Hx of CABG (3/13/2017) Overview: CABG in 2010 in [place] Diabetes mellitus type 2, controlled ([address] (3/13/2017) Diastolic heart failure ([address] (6/22/2017) Pacemaker (6/26/2017) Overview: 6/26/17: [employer] 
 
  H/O atrioventricular anastacio ablation (6/26/2017) Overview: 6/26/17 Type 2 diabetes with nephropathy ([facility] (10/23/2018) Acute respiratory failure with hypoxia ([facility] (3/23/2019) HTN (hypertension) (3/23/2019) Plan:  
 
Acute on chronic DHF: (EF 55%, DHF 3/24/19) Diuresing well, neg 1.8 L, down 14#s. continue on bumex BID and Metolazone per Nephrology Renal function stable Monitor I/Os, daily weights, Labs. Weight down 8#s, Elevated troponin: Not ACS, r/t INO Continue on BB, statin Chronic Afib: 
Paced, continue on Eliquis Carolann Dine ACNP Cardiology

## 2019-03-27 NOTE — DISCHARGE SUMMARY
Discharge Summary      Name: Inez Payne  [id number]  YOB: 1950 (Age: 78 y.o.)   Date of Admission: 3/23/2019  Date of Discharge: 3/27/2019  Attending Physician: Shital Santiago MD    Discharge Diagnosis:   Acute respiratory failure with hypoxia POA  Due to Acute on chronic diastolic heart failure,  POA  CKD4  Hypokalemia   Chronic a-fib s/p ablation and s/p PPM  HTN  CAD s/p CABG    Diabetes mellitus type 2    Chronic neck   Consultations:  IP CONSULT TO CARDIOLOGY  IP CONSULT TO HOSPITALIST  IP CONSULT TO NEPHROLOGY  IP CONSULT TO NEPHROLOGY    Brief Admission History/Reason for Admission Per Uriah Bustos MD:   Magdalena Alonzo is a 78 y.o.  male who presents with worsening shortness of breath. As per patient, he is been having worsening shortness of breath for past 2 weeks. Pt reported Chronic leg swelling, weight gain 10lbs in a month. Pt reported orthopnea and worsening dyspnea. Pt reported he saw his cardiologist today who recommended to [address] his nephrologist but appointment was in several days but he continue to feel short winded so came to ED. In ED pt noted to have elevated proBNP and hypoxix. Brief Hospital Course by Main Problems:   Acute respiratory failure with hypoxia POA  Due to Acute on chronic diastolic heart failure,  POA  CKD4  Echo with EF 56-60%, mild concentric hypertrophy in the setting of elevated probnp. Patient diuresed with IV bumex 2mg BID in additional to zaroxylyn. He was followed by nephrology and cardiology while inpt. His edema improved with wt down to 238 lbs. He is stable on RA and feels great. Labs are stable. Will transition him to PO bumex 2mg BID on discharge. Pt will need to cont' xaroxylyn. He will need to f/u with nephrology on 3/29 to reevaluate and change medications dosing, labs as needed. Addendum: discussed with Dr Isaak Hilliard, she spoke to pt in regards to changing bumex dosing to 3mg BID.   Pt has enough extra bumex 1mg at home which he will take in additional to the 2mg BID prescribed. He will f/u with her outpt for adjustment of meds as needed.      Hypokalemia  Replete with PO KCl, cont' PO suppl.     Chronic a-fib s/p ablation and s/p PPM  HTN  CAD s/p CABG  Continue BB and Eliquis      Diabetes mellitus type 2, resume home meds. Chronic neck pain, stable. Cont' tylenol prn    Discharge Exam:  Patient seen and examined by me on discharge day. Pertinent Findings:  Visit Vitals  /70   Pulse 74   Temp 97.7 °F (36.5 °C)   Resp 18   Ht 5' 9\" (1.753 m)   Wt 108.1 kg (238 lb 5.1 oz)   SpO2 99%   BMI 35.19 kg/m²     Gen:    Not in distress  Chest: Clear lungs  CVS:   Regular rhythm. No edema  Abd:  Soft, not distended, not tender    Discharge/Recent Laboratory Results:  Recent Labs     03/27/19  0550      K 3.5   CL 97   CO2 34*   *   *   CA 9.2   MG 2.1     Recent Labs     03/27/19  0550   HGB 11.3*   HCT 37.2   WBC 5.5   *       Discharge Medications:  Current Discharge Medication List      CONTINUE these medications which have CHANGED    Details   bumetanide (BUMEX) 1 mg tablet Take 2 Tabs by mouth two (2) times a day. Patient takes 1 tablet every morning and 1 tablet every evening  Qty: 60 Tab, Refills: 1         CONTINUE these medications which have NOT CHANGED    Details   !! metoprolol succinate (TOPROL XL) 50 mg XL tablet Take 50 mg by mouth daily. Patient takes 150mg total daily      potassium chloride SR (K-TAB) 20 mEq tablet Take 20 mEq by mouth three (3) days a week. Patient takes 1 tablet (20 mEq) 3 times a week      insulin aspart protamine/insulin aspart (NOVOLOG MIX 70/30) 100 unit/mL (70-30) inpn 34 units breakfast, 34 units dinner + correction scale (up to 80 units/day)  Qty: 25 Pen, Refills: 3      !! metoprolol succinate (TOPROL-XL) 100 mg tablet Take 100 mg by mouth daily.  Patient takes 150mg total daily      MYRBETRIQ 25 mg ER tablet TAKE 1 TABLET BY MOUTH EVERY DAY  Refills: 12      pravastatin (PRAVACHOL) 80 mg tablet TAKE 1 TABLET BY MOUTH IN THE EVENING  Qty: 90 Tab, Refills: 0      febuxostat (ULORIC) 40 mg tab tablet Take 40 mg by mouth daily. ketoconazole (NIZORAL) 2 % topical cream Apply  to affected area two (2) times daily as needed for Skin Irritation. Indications: rash      metOLazone (ZAROXOLYN) 2.5 mg tablet Take 2.5 mg by mouth daily. tamsulosin (FLOMAX) 0.4 mg capsule TAKE 2 CAPSULES BY MOUTH EVERY DAY  Refills: 2      multivitamin (ONE A DAY) tablet Take 1 Tab by mouth daily. acetaminophen (TYLENOL EXTRA STRENGTH) 500 mg tablet Take 1,000 mg by mouth every eight (8) hours as needed for Pain. !! - Potential duplicate medications found. Please discuss with provider.           DISPOSITION:    Home with Family: x   Home with HH/PT/OT/RN:    SNF/LTC:    LARISSA:    OTHER:          Follow up with:   PCP : Ezra Vinson MD  Follow-up Information     Follow up With Specialties Details Why Contact Info    Ezra Vinson MD Family Practice In 5 days  [address] [facility]  [phone]      Cristobal Isbell MD Nephrology On 3/29/2019  [address] [facility]  [address]  [address] [facility] Pierre [facility] Cardiology, [address] [facility] Surgery In 2 weeks  [address]  [phone]            Code: Full  Diet: cardiac    Total time in minutes spent coordinating this discharge (includes going over instructions, follow-up, prescriptions, and preparing report for sign off to her PCP) :  35 minutes

## 2019-03-27 NOTE — PROGRESS NOTES
Plan: Home with family. Follow-up appts scheduled. CM contacted office of nephrologist and pt pcp to schedule appts. Pt and wife declined need for anything additional at discharge. CM has completed the d/c planning needs of the pt at this time. Care Management Interventions PCP Verified by CM: Yes Mode of Transport at Discharge: Other (see comment) Transition of Care Consult (CM Consult): Discharge Planning Current Support Network: Lives with Spouse, Own Home Confirm Follow Up Transport: Family Plan discussed with Pt/Family/Caregiver: Yes Sylwia Priest, Care Manager [phone]

## 2019-03-27 NOTE — PROGRESS NOTES
Follow up with PCP AMANDA Casper 3/29/19 @ 1:30 and cardiology Dr Shyla Akins at [facility] office 4/9/19 @ 10:15. On AVS and Hug team notified.

## 2019-03-27 NOTE — PROGRESS NOTES
Progress Note Patient: Urvashi Jesus MRN: [id number]  SSN: [id number] YOB: 1950  Age: 78 y.o. Sex: male Admit Date: 3/23/2019 LOS: 3 days Subjective:  
 
non eventful day pt complained of pain 1 time and was medicated, pain managed well with meds, was in chair multi times today Objective:  
 
Vitals:  
 03/26/19 1011 03/26/19 1207 03/26/19 1601 03/26/19 1840 BP:  141/67 150/80 107/85 Pulse:  75 80 75 Resp:  18 16 16 Temp:  98.7 °F (37.1 °C) 97.5 °F (36.4 °C) 98.4 °F (36.9 °C) SpO2:  100% 100% 100% Weight: 109.3 kg (240 lb 15.4 oz) Height:      
  
 
Intake and Output: 
Current Shift: No intake/output data recorded. Last three shifts: 03/25 0701 - 03/26 1900 In: 4825 [P.O.:1820] Out: 1300 [Urine:1300] Physical Exam:  
GENERAL: alert, cooperative, no distress, appears stated age Lab/Data Review: 
Lab results reviewed. For significant abnormal values and values requiring intervention, see assessment and plan. Assessment:  
 
Active Problems: 
  Chronic a-fib ([address] (3/7/2017) CKD (chronic kidney disease) stage 4, GFR 15-29 ml/min ([facility]) (3/7/2017) Hx of CABG (3/13/2017) Overview: CABG in 2010 in [place] Diabetes mellitus type 2, controlled ([address] (3/13/2017) Diastolic heart failure ([address] (6/22/2017) Pacemaker (6/26/2017) Overview: 6/26/17: [employer] Bi[employer] 
 
  H/O atrioventricular anastacio ablation (6/26/2017) Overview: 6/26/17 Type 2 diabetes with nephropathy ([address] (10/23/2018) Acute respiratory failure with hypoxia ([address] (3/23/2019) HTN (hypertension) (3/23/2019) Plan:  
 
 
 
Signed By: Blane Broussard RN   
 March 26, 2019

## 2019-03-27 NOTE — PROGRESS NOTES
Transitional Care Team:  SINAN Note    Date of Assessment: 03/27/19  Time of Assessment:  7:54 AM    Lotus Tamayo is a 78 y.o. male inpatient at [address] [facility]. Assessment & Plan   Pt A+O, denies dyspnea. Aware of follow up appointments. Primary Diagnosis: heart failure  Advance Directive:  Has no advanced care plan , declines to discuss. Current Code Status:  full    Referral to Hospice/ Palliative Care Appropriate: no.    Awareness of Medical Conditions: (Trajectory of illness and pts expectations: somewhat in denial of his trajectory of illness, expects will continue to recover with future episodes of decompensated HF     Discharge Needs: (to include safety issues) none    Barriers Identified: none    Patient is willing to go to SNF/Inpatient Rehab if recommended: no    Medication Review:  Was performed. Can patient afford medications:  yes. Patient is Compliant with Medication regimen:yes    Who manages medications at home: self    Best Patient Contact Number: [phone]    HUG (Healthy Understanding of Goals) program introduced to patient/family. The Transitional Care Team bridges the gaps in care and education surrounding discharge from the acute care facility. The objective is to empower the patient and family in taking a proactive role in preventing readmission within the first thirty days after discharge. The team is also involved in the efforts to reduce readmission to the acute care setting after stabilization and discharge from the acute care environment either to skilled nursing facilities or community. [facility] RN/NP reviewed [facility] Calendar/ follow up appointments/ Ambulatory Nurse Navigator name and contact information .     Future Appointments   Date Time Provider James Gayle   4/9/2019 10:15 AM Aiden Reyna MD [address] [place] [address]   5/1/2019  8:00 AM [facility] [address] [facility]   6/12/2019 10:30 AM Tamara Reveles MD [facility] [address] [facility]   11/19/2019 10:00 AM Gabbi Soto MD [address] [place] [address]   12/5/2019  3:15 PM PACEMAKER, [facility] [address] [facility]   12/5/2019  3:40 PM Wang Zuleta NP [address] [facility]       Non-[facility] follow up appointment(s): aware    [facility]: call information given to patient       Patient education focused on readmission zones as described as: The Red Zone: High risk for readmission, days 1-21  The Yellow Zone: Moderate  risk for readmission, days 22-29   The Green Zone: Lower risk for readmission, days                30 and after    [facility] Team will attempt to follow the patient from a distance while inpatient as well as be available for further transition/disposition needs. The [facility] team will continue to offer support during the 30- 90 day discharge from acute care setting. Will notify Ambulatory , HF bundleRN.     Past Medical History:   Diagnosis Date    Arrhythmia     atrial fibrillation 2017    CAD (coronary atherosclerotic disease)     Chronic pain     Diabetes ([address]     Diabetes mellitus type 2, controlled ([address] 3/13/2017    Heart failure ([address]     Hx of CABG 3/13/2017    [address]ABG in [address] Hypertension     Morbid obesity ([address] 3/13/2017    JACEY (obstructive sleep apnea) 6/23/2017    S/P CABG x 2 2010    Thyroid disease

## 2019-03-27 NOTE — WOUND CARE
Wound care Nurse consult to remove RLE 3 layer compression wrap, do wound care and replace with 3-layer compression wrap. Lloyd Kaye Patient missed his appointment at [facility] today and is also being discharged home today. WOUND POA CONDITIONS Wound Leg Lower Right;Medial (Active) Dressing Status  Changed per order 3/27/2019 11:34 AM  
Dressing Type  Compression Wrap/Venous Stasis 3/27/2019 11:34 AM  
Splint Type/Material Other(Comment) 3/27/2019 11:34 AM  
Non-Pressure Injury Partial thickness (epider/derm) 3/27/2019 11:34 AM  
Wound Length (cm) 4 cm 3/27/2019 11:34 AM  
Wound Width (cm) 2.3 cm 3/27/2019 11:34 AM  
Wound Depth (cm) 0.2 3/27/2019 11:34 AM  
Wound Surface area (cm^2) 9.2 cm^2 3/27/2019 11:34 AM  
Change in Wound Size % -308.89 3/27/2019 11:34 AM  
Condition of Base [place] 3/27/2019 11:34 AM  
Condition of Edges Open 3/27/2019 11:34 AM  
Tissue Type Red;Pink 3/27/2019 11:34 AM  
Tissue Type Percent Pink 95 3/27/2019 11:34 AM  
Tissue Type Percent Red 5 3/27/2019 11:34 AM  
Drainage Amount  Scant 3/27/2019 11:34 AM  
Drainage Color Serosanguinous 3/27/2019 11:34 AM  
Wound Odor None 3/27/2019 11:34 AM  
Periwound Skin Condition Intact 3/27/2019 11:34 AM  
Dressing Type Applied Compression Wrap/Venous Stasis; Xeroform 3/27/2019 11:34 AM  
Procedure Tolerated Well 3/27/2019 11:34 AM  
Number of days: 14 Leg washed with soap and water and Aloe Vesta skin protectant ointment applied to intact skin. Wound cleansed with dermal wound cleanser, wiped clean, covered with Xerofrom medicated petroleum gauze and ABD before 3-layer compression wrap applied. Plan: Patient d/c to home with follow-up at [facility] next week.  
 
Andres Cha RN, [employer]

## 2019-03-27 NOTE — PROGRESS NOTES
Medicare pt has received, reviewed, and signed 2nd IM letter informing them of their right to appeal the discharge. Signed copied has been placed on pt bedside chart. Marisol Berg [phone]

## 2019-03-28 ENCOUNTER — PATIENT OUTREACH (OUTPATIENT)
Dept: FAMILY MEDICINE CLINIC | Age: 69
End: 2019-03-28

## 2019-03-28 NOTE — LETTER
3/29/2019 3:26 PM 
 
Mr. Jose Antonio Felix 
[address] [facility] [address] [address] I am a Nurse Navigator working with [employer]  . Part of my job is to follow up with patients who have been discharged from the hospital or emergency department to see how they are feeling, answer any questions they may have about their visit and also make sure they have a follow-up appointment to see their primary care doctor. I have been unable to reach you by telephone and wanted to follow up with you. In the meantime, if you have any questions or concerns, please feel free to call me on my direct line at [phone]  between 8 am to 5 pm. 
 
Thank you for allowing us to participate in your care! Sincerely, Cristofer Lantigua RN

## 2019-03-29 ENCOUNTER — PATIENT OUTREACH (OUTPATIENT)
Dept: FAMILY MEDICINE CLINIC | Age: 69
End: 2019-03-29

## 2019-03-29 NOTE — PROGRESS NOTES
Debbiekenton Cole Vish  Attends follow-up appointments as directed. 3/29/19 PSR at Dr. Jeannie De La Torre office reports patient is scheduled for hospital follow up on 4/8/19. Per review of chart wound care scheduled for 4/1/19 and cardiology is currently scheduled for 4/9/19. Contact cardiology to attempt to move appointment to earlier date. Will monitor at next week outreach for attendance. ELIZABETH  Reduce risk of CHF exacerbations and complications. 3/29/19 Attempted to contact patient and EC. Unable to reach message left on mobile number with no return call. Home number listed does not connect. Get in contact letter mailed to address on file. Will outreach next week.  ELIZABETH

## 2019-04-01 ENCOUNTER — HOSPITAL ENCOUNTER (OUTPATIENT)
Dept: WOUND CARE | Age: 69
Discharge: HOME | End: 2019-04-01
Payer: MEDICARE

## 2019-04-01 VITALS
SYSTOLIC BLOOD PRESSURE: 158 MMHG | TEMPERATURE: 96.9 F | RESPIRATION RATE: 16 BRPM | DIASTOLIC BLOOD PRESSURE: 84 MMHG | HEART RATE: 74 BPM

## 2019-04-01 DIAGNOSIS — L89.312 STAGE II PRESSURE ULCER OF RIGHT BUTTOCK (HCC): ICD-10-CM
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
## 2019-04-01 PROBLEM — L89.313 DECUBITUS ULCER OF RIGHT BUTTOCK, STAGE 3 (HCC): Status: ACTIVE | Noted: 2019-04-01

## 2019-04-01 PROCEDURE — 74011000250 HC RX REV CODE- 250: Performed by: ORTHOPAEDIC SURGERY

## 2019-04-01 PROCEDURE — 29581 APPL MULTLAYER CMPRN SYS LEG: CPT

## 2019-04-01 RX ORDER — LIDOCAINE 40 MG/G
CREAM TOPICAL
Status: COMPLETED | OUTPATIENT
Start: 2019-04-01 | End: 2019-04-01

## 2019-04-01 RX ADMIN — LIDOCAINE: 40 CREAM TOPICAL at 16:00

## 2019-04-01 NOTE — PROGRESS NOTES
Wound Center  Progress Note     Date of Service: 4/1/2019      Date of Initial Visit for Current Problem:  3/13/2019     Subjective:      Chief Complaint:  Jeremiah Adkins a 78 y.o. abese, diabetic  male who presents with R lower leg medial wound, due to venous disease. He was in the hospital last week for difficulty breathing due to fluid retention. He was diuresed, and his Bumex dose increased. He also told us about a small, chronic decubitus on his right buttock. He is treating with Silvia, wonders what else he can do.     Referred by:  Self     HPI:   Patient was on a cruise, when he developed a blister, which burst and became a wound. Wound caused by: See above. Current wound care:  Offloading wound: no  Appetite: good  Wound associated pain: mild  Diabetic: Yes  Smoker: No             Past Medical History:   Diagnosis Date    Arrhythmia       atrial fibrillation 2017    CAD (coronary atherosclerotic disease)      Chronic pain      Diabetes ([address]      Diabetes mellitus type 2, controlled [facility] 3/13/2017    Heart failure ([facility]      Hx of CABG 3/13/2017     [place]      Morbid obesity ([facility] 3/13/2017    JACEY (obstructive sleep apnea) 6/23/2017    S/P CABG x 2 2010    Thyroid disease                  Past Surgical History:   Procedure Laterality Date    CARDIAC SURG PROCEDURE UNLIST         CABGx2 in 2010    HX ORTHOPAEDIC         L 3rd toe amputation in 1999               Allergies   Allergen Reactions    Allopurinol Rash    Gabapentin Drowsiness    Ciprofloxacin Nausea Only    Percocet [Oxycodone-Acetaminophen] Other (comments)       hallucinations         Review of Systems:  A comprehensive review of systems was negative except for that written in the History of Present Illness. and PMH.       Objective:      Physical Exam:      Visit Vitals:  VSS, Afebrile                             General: well developed, very well nourished, pleasant , NAD. Hygiene good  Psych: cooperative. Pleasant. No anxiety or depression. Normal mood and affect. Neuro: alert and oriented to person/place/situation. Otherwise nonfocal.  HEENT: Normocephalic, atraumatic. EOMI. Conjunctiva clear. No scleral icterus. Chest: Respirations nonlabored. Abdomen:  Nondistended.     Lower extremities: color normal; temperature normal. Hair growth is not present. Calves are supple, nontender, approximately equally sized in comparison.      Had formal ABIs in 2017 - incompressible vessels, TBI = 0.64.     Ulcer Location: R lower leg medial   Etiology: venous stasis  Wound Length: 4.2 cm  Wound Width : 2.5 cm  Wound Depth :  0.1 cm  Ulcer bed: Clean   Periwound: Normal  Exudate: Moderate amount Serous exudate  Depth of Wound: Breakdown of Skin          Assessment:      78 y. o. male with R lower leg medial venous stasis ulcer.        Plan:   Discussed 3 layer wrap, which he states he has had before.  Therefore, Aquacell Ag and a 3 layer wrap applied.  Told to remove it himself or return if it becomes uncomfortable in any way, would then place double tubigrip.     Gave venous ulcer exercise sheet, sheet to purchase compression stockings and  n Jarrod.     Dressing:  Aquacell Ag, Exudry, 3 layer wrap. We took a quick look at his small decubitus ulcer on his right buttock, which measured about 0.4 cm in diameter and was clean, with a normal periwound. We recommended Duoderm and unloading.     Patient understood and agrees with plan. Questions answered.     Weekly visits and serial debridements also discussed.   Follow up with me in 1 week     Signed By: Florence Francisco MD

## 2019-04-01 NOTE — PROGRESS NOTES
Goals  Attends follow-up appointments as directed. 3/29/19 PSR at Dr. Carlie Jarrell office reports patient is scheduled for hospital follow up on 4/8/19. Per review of chart wound care scheduled for 4/1/19 and cardiology is currently scheduled for 4/9/19. Contact cardiology to attempt to move appointment to earlier date. Will monitor at next week outreach for attendance. ELIZABETH 
 
3/29/19 @ [phone] patient reports attending nephrology appointment today. Will monitor for attendance of appointments at next week outreach. ELIZABETH  Reduce risk of CHF exacerbations and complications. 3/29/19 Attempted to contact patient and EC. Unable to reach message left on mobile number with no return call. Home number listed does not connect. Get in contact letter mailed to address on file. Will outreach next week. [facility] 
 
3/29/19@ 6813 Patient reports weight as 234.4 and he entered into ipad. Denies SOB, chest pain, edema. Patient will continue to monitor CHF S&S and enter weights into ipad. Will follow up with patient next week.  Rosa Elena Payne

## 2019-04-01 NOTE — WOUND CARE
04/01/19 1513   Wound Leg Lower Right;Medial   Date First Assessed/Time First Assessed: 03/13/19 1213   Wound Type: (c) Venous; Blister/bullae  Location: Leg Lower  Orientation: Right;Medial   Dressing Status  Removed   Dressing Type  Compression Wrap/Venous Stasis; Xeroform; Aquacel   Wound Length (cm) 4.2 cm   Wound Width (cm) 2.5 cm   Wound Depth (cm) 0.1   Wound Surface area (cm^2) 10.5 cm^2   Change in Wound Size % -366.67   Condition of Base [place];Granulation;Slough   Drainage Amount  Moderate   Drainage Color Serosanguinous   Wound Odor None   Periwound Skin Condition Macerated

## 2019-04-01 NOTE — WOUND CARE
04/01/19 1601   Wound Leg Lower Right;Medial   Date First Assessed/Time First Assessed: 03/13/19 1213   Wound Type: (c) Venous; Blister/bullae  Location: Leg Lower  Orientation: Right;Medial   Cleansing and Cleansing Agents  Normal saline   Dressing Type Applied   (aquacel AG,3 layer wrap)   Procedure Time Out 1600   Post Procedure Pain Scale Numeric 0/10   Procedure Tolerated Well   Wound Buttocks letf buttocks 04/01/19   Date First Assessed/Time First Assessed: 04/01/19 1600   Wound Approximate Age at First Assessment (Weeks): 1 weeks  Primary Wound Type: Skin Tear  Location: Buttocks  Wound Description: letf buttocks  Date of First Observation: 04/01/19   Dressing Status   (intact)   Dressing Type Transparent film   Wound Length (cm) 0.5 cm   Wound Width (cm) 0.5 cm   Wound Depth (cm) 0.1 cm   Wound Volume (cm^3) 0.02 cm^3   Condition of Base Pink   Condition of Edges Open   Drainage Amount Scant   Drainage Color   (red)   Wound Odor None   Christy-wound Assessment Intact   Dressing Type Applied Hydrocolloid   Wound Procedure Type   (4p)   Procedure Tolerated Well   Discharged from wound center, ambulatory with rollator. He was given some thin dupderm to use on his buttocks to see if this will help. .. Has apt to return to wound center in 1  Week.

## 2019-04-04 ENCOUNTER — PATIENT OUTREACH (OUTPATIENT)
Dept: FAMILY MEDICINE CLINIC | Age: 69
End: 2019-04-04

## 2019-04-04 NOTE — PROGRESS NOTES
Goals  Attends follow-up appointments as directed. 3/29/19 PSR at Dr. Carlie Jarrell office reports patient is scheduled for hospital follow up on 4/8/19. Per review of chart wound care scheduled for 4/1/19 and cardiology is currently scheduled for 4/9/19. Contact cardiology to attempt to move appointment to earlier date. Will monitor at next week outreach for attendance. [facility] 
 
3/29/19 @ [phone] patient reports attending nephrology appointment today. Will monitor for attendance of appointments at next week outreach. ELIZABETH 
 
4/4/19 Patient report attending wound care appointment on 4/1/19. ELIZABETH  Reduce risk of CHF exacerbations and complications. 3/29/19 Attempted to contact patient and EC. Unable to reach message left on mobile number with no return call. Home number listed does not connect. Get in contact letter mailed to address on file. Will outreach next week. ELIZABETH 
 
3/29/19@ 1553 Patient reports weight as 234.4 and he entered into ipad. Denies SOB, chest pain, edema. Patient will continue to monitor CHF S&S and enter weights into ipad. Will follow up with patient next week. [facility] 
 
4/4/19 Patient reports weight yesterday as 232.4 and today is 234.2. Denies SOB, chest pain, abdominal and ankle swelling. He has watched diet closely today . Patient will continue to monitor CHF S&S and report on Ipad and writer at next week outreach.  ELIZABETH

## 2019-04-09 ENCOUNTER — OFFICE VISIT (OUTPATIENT)
Dept: CARDIOLOGY CLINIC | Age: 69
End: 2019-04-09

## 2019-04-09 VITALS
SYSTOLIC BLOOD PRESSURE: 142 MMHG | HEIGHT: 69 IN | OXYGEN SATURATION: 92 % | DIASTOLIC BLOOD PRESSURE: 78 MMHG | RESPIRATION RATE: 18 BRPM | BODY MASS INDEX: 35.1 KG/M2 | HEART RATE: 80 BPM | WEIGHT: 237 LBS

## 2019-04-09 DIAGNOSIS — Z95.1 HX OF CABG: Primary | ICD-10-CM

## 2019-04-09 DIAGNOSIS — R06.02 SOB (SHORTNESS OF BREATH): ICD-10-CM

## 2019-04-09 RX ORDER — BUMETANIDE 1 MG/1
TABLET ORAL
Refills: 3 | COMMUNITY
Start: 2019-03-17 | End: 2021-03-19

## 2019-04-09 NOTE — PROGRESS NOTES
1. Have you been to the ER, urgent care clinic since your last visit? Hospitalized since your last visit? [id number] [facility] [place] 3/23/19. 2. Have you seen or consulted any other health care providers outside of the [address] [place] [address] since your last visit? Include any pap smears or colon screening. NO.       Chief Complaint   Patient presents with   PETE Smiley

## 2019-04-09 NOTE — PROGRESS NOTES
Wilda Mooney MD          NAME:  Malu Chen   :   1950   MRN:   [id number]   PCP:  Allen Fowler MD      Subjective: The patient is a 78y.o. year old male. Last seen by me 1 month prior. Mr. Johanna Gómez states he is doing well. His weight has been stable. He saw his Nephrologist last week and was started on Metolazone Tuesday, Thursday and Saturday, and Bumex 3 mg daily until he is 230 lbs, then decrease to Bumex 2 mg daily. He is seeing his Nephrologist again in two days. Patient denies any exertional chest pain, dyspnea, palpitations, syncope, orthopnea, edema or paroxysmal nocturnal dyspnea. He is here today with his wife. Past Medical History:   Diagnosis Date    Arrhythmia     atrial fibrillation     CAD (coronary atherosclerotic disease)     Chronic pain     [facility]     Diabetes mellitus type 2, controlled ([facility] [address] 3/13/2017    Heart failure ([facility] [address]     Hx of CABG 3/13/2017    CABG in  in  [address] Hypertension     Morbid obesity ([facility] [address] 3/13/2017    JACEY (obstructive sleep apnea) 2017    S/P CABG x 2     Thyroid disease         ICD-10-CM ICD-9-CM    1. Hx of CABG Z95.1 V45.81 AMB POC EKG ROUTINE W/ 12 LEADS, INTER & REP   2. SOB (shortness of breath) R06.02 786.05 AMB POC EKG ROUTINE W/ 12 LEADS, INTER & REP      Social History     Tobacco Use    Smoking status: Never Smoker    Smokeless tobacco: Never Used   Substance Use Topics    Alcohol use: Yes     Comment: rare      Family History   Problem Relation Age of Onset    Heart Attack Father     No Known Problems Mother         Review of Systems  Cardiovascular: Negative except as noted in HPI      Objective:       Vitals:    19 1011   BP: 142/78   Pulse: 80   Resp: 18   SpO2: 92%   Weight: 237 lb (107.5 kg)   Height: 5' 9\" (1.753 m)    Body mass index is 35 kg/m². General PE  Mental Status - Alert. General Appearance - Not in acute distress.     Chest and Lung Exam   Inspection: Accessory muscles - No use of accessory muscles in breathing. Auscultation:   Breath sounds: - Normal.    Cardiovascular   Inspection: Jugular vein - Bilateral - Inspection Normal.  Palpation/Percussion:   Apical Impulse: - Normal.  Auscultation: Rhythm - Regular. Heart Sounds - S1 WNL and S2 WNL. No S3 or S4. Murmurs & Other Heart Sounds: Auscultation of the heart reveals - No Murmurs. Peripheral Vascular   Upper Extremity: Inspection - Bilateral - No Cyanotic nailbeds or Digital clubbing. Lower Extremity:   Palpation: Edema - Bilateral - No edema. Data Review:     EKG -  EKG 10/23/18: paced rhythm with underlying atrial fibrillation. Allergies reviewed  Allergies   Allergen Reactions    Allopurinol Rash    Gabapentin Drowsiness    Ciprofloxacin Nausea Only    Percocet [Oxycodone-Acetaminophen] Other (comments)     hallucinations     Medications reviewed  Current Outpatient Medications   Medication Sig    bumetanide (BUMEX) 1 mg tablet Take 2 Tabs by mouth two (2) times a day. Patient takes 1 tablet every morning and 1 tablet every evening    metoprolol succinate (TOPROL XL) 50 mg XL tablet Take 50 mg by mouth daily. Patient takes 150mg total daily    potassium chloride SR (K-TAB) 20 mEq tablet Take 20 mEq by mouth three (3) days a week. Patient takes 1 tablet (20 mEq) 3 times a week    insulin aspart protamine/insulin aspart (NOVOLOG MIX 70/30) 100 unit/mL (70-30) inpn 34 units breakfast, 34 units dinner + correction scale (up to 80 units/day)    dutasteride (AVODART) 0.5 mg capsule Take 0.5 mg by mouth daily.  metoprolol succinate (TOPROL-XL) 100 mg tablet Take 100 mg by mouth daily.  Patient takes 150mg total daily    MYRBETRIQ 25 mg ER tablet TAKE 1 TABLET BY MOUTH EVERY DAY    pravastatin (PRAVACHOL) 80 mg tablet TAKE 1 TABLET BY MOUTH IN THE EVENING    ELIQUIS 5 mg tablet TAKE 1 TABLET BY MOUTH TWICE A DAY    levothyroxine (SYNTHROID) 137 mcg tablet TAKE 1 TABLET(S) EVERY DAY BY ORAL ROUTE FOR 90 DAYS.  febuxostat (ULORIC) 40 mg tab tablet Take 40 mg by mouth daily.  ketoconazole (NIZORAL) 2 % topical cream Apply  to affected area two (2) times daily as needed for Skin Irritation. Indications: rash    metOLazone (ZAROXOLYN) 2.5 mg tablet Take 2.5 mg by mouth daily. Indications: TAKING TUESDAY, THURSDAY AND SATURDAY    tamsulosin (FLOMAX) 0.4 mg capsule TAKE 2 CAPSULES BY MOUTH EVERY DAY    multivitamin (ONE A DAY) tablet Take 1 Tab by mouth daily.  acetaminophen (TYLENOL EXTRA STRENGTH) 500 mg tablet Take 1,000 mg by mouth every eight (8) hours as needed for Pain.  bumetanide (BUMEX) 2 mg tablet TAKE 1 TAB IN AM AND 1/2 TAB IN THE AFTERNOON. No current facility-administered medications for this visit. Assessment:       ICD-10-CM ICD-9-CM    1. Hx of CABG Z95.1 V45.81 AMB POC EKG ROUTINE W/ 12 LEADS, INTER & REP   2. SOB (shortness of breath) R06.02 786.05 AMB POC EKG ROUTINE W/ 12 LEADS, INTER & REP     Orders Placed This Encounter    AMB POC EKG ROUTINE W/ 12 LEADS, INTER & REP     Order Specific Question:   Reason for Exam:     Answer:   ROUTINE    bumetanide (BUMEX) 2 mg tablet     Sig: TAKE 1 TAB IN AM AND 1/2 TAB IN THE AFTERNOON. Refill:  3     Plan:     Mr. Maryana Manriquez weight has decreased 17 lbs since discharge from hospital on 3/23/19, and has been stable since last appointment. His diuretic regimen is managed by his Nephrologist. Continue current regimen. Follow up as scheduled 11/2019, sooner PRN    Written by Yon Gaxiola, as dictated by Dr. Andrés Richmond.     Kelli Storm MD

## 2019-04-10 ENCOUNTER — HOSPITAL ENCOUNTER (OUTPATIENT)
Dept: WOUND CARE | Age: 69
Discharge: HOME | End: 2019-04-10
Payer: MEDICARE

## 2019-04-10 ENCOUNTER — PATIENT OUTREACH (OUTPATIENT)
Dept: FAMILY MEDICINE CLINIC | Age: 69
End: 2019-04-10

## 2019-04-10 VITALS
SYSTOLIC BLOOD PRESSURE: 150 MMHG | TEMPERATURE: 98.4 F | HEART RATE: 75 BPM | DIASTOLIC BLOOD PRESSURE: 82 MMHG | RESPIRATION RATE: 16 BRPM

## 2019-04-10 PROCEDURE — 29581 APPL MULTLAYER CMPRN SYS LEG: CPT

## 2019-04-10 PROCEDURE — 74011000250 HC RX REV CODE- 250: Performed by: ORTHOPAEDIC SURGERY

## 2019-04-10 RX ADMIN — Medication: at 11:13

## 2019-04-10 NOTE — WOUND CARE
04/10/19 1056   Wound Leg Lower Right;Medial   Date First Assessed/Time First Assessed: 03/13/19 1213   Wound Type: (c) Venous; Blister/bullae  Location: Leg Lower  Orientation: Right;Medial   Dressing Status  Removed   Dressing Type  Aquacel; Compression Wrap/Venous Stasis   Wound Length (cm) 2.8 cm   Wound Width (cm) 1.8 cm   Wound Depth (cm) 0.1   Wound Surface area (cm^2) 5.04 cm^2   Change in Wound Size % -124   Condition of Base Granulation   Condition of Edges Open   Drainage Amount  Small    Drainage Color Serosanguinous   Wound Odor None   Periwound Skin Condition Intact   Cleansing and Cleansing Agents  Normal saline   Wound Buttocks Right  buttocks 04/01/19   Date First Assessed/Time First Assessed: 04/01/19 1600   Wound Approximate Age at First Assessment (Weeks): 1 weeks  Primary Wound Type: Skin Tear  Location: Buttocks  Wound Location Orientation: Right  Wound Description:  buttocks  Date of First Obse. ..    Dressing Type Open to air   Wound Length (cm) 0 cm   Wound Width (cm) 0 cm   Wound Depth (cm) 0 cm   Wound Volume (cm^3) 0 cm^3   Condition of Base   (scabbed)   Drainage Amount None   Wound Odor None

## 2019-04-10 NOTE — PROGRESS NOTES
Wound Center  Progress Note     Date of Service: 4/10/2019      Date of Initial Visit for Current Problem:  4/1/2019     Subjective:      Chief Complaint:  Kenia Hernandez a 78 y.o. abese, diabetic  male who presents with R lower leg medial wound, due to venous disease. He was in the hospital last week for difficulty breathing due to fluid retention. He was diuresed, and his Bumex dose increased.       He also told us about a small, chronic decubitus on his right buttock. He is treating with Silvia, wonders what else he can do.     Referred by:  Self     HPI:   Patient was on a cruise, when he developed a blister, which burst and became a wound. Wound caused by: See above. Current wound care:  Offloading wound: no  Appetite: good  Wound associated pain: mild  Diabetic: Yes  Smoker: No             Past Medical History:   Diagnosis Date    Arrhythmia       atrial fibrillation 2017    CAD (coronary atherosclerotic disease)      Chronic pain      Diabetes ([address]      Diabetes mellitus type 2, controlled ([facility] [address] 3/13/2017    Heart failure ([facility] [address]      Hx of CABG 3/13/2017     CABG in 2010 in  [employer] [place]pertension      Morbid obesity ([facility] [address] 3/13/2017    JACEY (obstructive sleep apnea) 6/23/2017    S/P CABG x 2 2010    Thyroid disease                  Past Surgical History:   Procedure Laterality Date    CARDIAC SURG PROCEDURE UNLIST         CABGx2 in 2010    HX ORTHOPAEDIC         L 3rd toe amputation in 1999                   Allergies   Allergen Reactions    Allopurinol Rash    Gabapentin Drowsiness    Ciprofloxacin Nausea Only    Percocet [Oxycodone-Acetaminophen] Other (comments)       hallucinations         Review of Systems:  A comprehensive review of systems was negative except for that written in the History of Present Illness. and PMH.       Objective:      Physical Exam:      Visit Vitals:  VSS, Afebrile                             General: well developed, very well nourished, pleasant , NAD. Hygiene good  Psych: cooperative. Pleasant. No anxiety or depression. Normal mood and affect. Neuro: alert and oriented to person/place/situation. Otherwise nonfocal.  HEENT: Normocephalic, atraumatic. EOMI. Conjunctiva clear. No scleral icterus. Chest: Respirations nonlabored. Abdomen:  Nondistended.     Lower extremities: color normal; temperature normal. Hair growth is not present. Calves are supple, nontender, approximately equally sized in comparison.      Had formal ABIs in 2017 - incompressible vessels, TBI = 0.64.     Ulcer Location: R lower leg medial   Etiology: venous stasis  Wound Length: 2.8 cm  Wound Width : 1.8 cm  Wound Depth :  0.1 cm  Ulcer bed: Clean   Periwound: Normal  Exudate: Moderate amount Serous exudate  Depth of Wound: Breakdown of Skin          Assessment:      78 y. o. male with R lower leg medial venous stasis ulcer.        Plan:   Aquacell Ag and a 3 layer wrap applied.  Told to remove it himself or return if it becomes uncomfortable in any way, would then place double tubigrip.     Gave venous ulcer exercise sheet on first visit, sheet to purchase compression stockings and  n Jarrod.     Dressing:  Aquacell Ag, Exudry, 3 layer wrap.     We took a quick look at his small decubitus ulcer on his right buttock, which is now healed.   Patient understood and agrees with plan. Questions answered.     Weekly visits and serial debridements also discussed.   Follow up with me in 1 week     Signed By: Kelly Jovel MD

## 2019-04-10 NOTE — WOUND CARE
04/10/19 1158   Wound Leg Lower Right;Medial   Date First Assessed/Time First Assessed: 03/13/19 1213   Wound Type: (c) Venous; Blister/bullae  Location: Leg Lower  Orientation: Right;Medial   Dressing Type Applied   (Aquacel AG, 4x4's, 3 layer wrap)   Discharged from wound center, ambulatory, with assistive device. . Pain 0/10. Has apt to return in 1 week. Ethelene Gauss

## 2019-04-10 NOTE — PROGRESS NOTES
Goals  Attends follow-up appointments as directed. 3/29/19 PSR at Dr. Danni Chan office reports patient is scheduled for hospital follow up on 4/8/19. Per review of chart wound care scheduled for 4/1/19 and cardiology is currently scheduled for 4/9/19. Contact cardiology to attempt to move appointment to earlier date. Will monitor at next week outreach for attendance. ELIZABETH 
 
3/29/19 @ 46 patient reports attending nephrology appointment today. Will monitor for attendance of appointments at next week outreach. ELIZABETH 
 
4/4/19 Patient report attending wound care appointment on 4/1/19. ELIZABETH 
 
4/10/19 Patient reports attending cardio appointment on 4/9/19 and wound care 4/10/19. Follow up with Nephrology on 4/11/19. Will monitor for attendance. ELIZABETH  Reduce risk of CHF exacerbations and complications. 3/29/19 Attempted to contact patient and EC. Unable to reach message left on mobile number with no return call. Home number listed does not connect. Get in contact letter mailed to address on file. Will outreach next week. [facility] 
 
3/29/19@ 1553 Patient reports weight as 234.4 and he entered into ipad. Denies SOB, chest pain, edema. Patient will continue to monitor CHF S&S and enter weights into ipad. Will follow up with patient next week.  
 
4/4/19 Patient reports weight yesterday as 232.4 and today is 234.2. Denies SOB, chest pain, abdominal and ankle swelling. He has watched diet closely today . Patient will continue to monitor CHF S&S and report on Ipad and writer at next week outreach. [facility] 
 
4/10/19 Patient reports He saw his Nephrologist last week and was started on Metolazone Tuesday, Thursday and Saturday, and Bumex 3 mg daily until he is 230 lbs, then decrease to Bumex 2 mg daily He will follow up with nephrology on 4/11/19. Denies CHF S&S. Will follow up with patient next week and he will report CHF S&S and weights.  [facility]

## 2019-04-15 ENCOUNTER — PATIENT OUTREACH (OUTPATIENT)
Dept: FAMILY MEDICINE CLINIC | Age: 69
End: 2019-04-15

## 2019-04-15 NOTE — PROGRESS NOTES
Message received from [facility] that patient weight is up 3 lbs. Will follow up with patient. Goals  Attends follow-up appointments as directed. 3/29/19 PSR at Dr. Zahra Azul office reports patient is scheduled for hospital follow up on 4/8/19. Per review of chart wound care scheduled for 4/1/19 and cardiology is currently scheduled for 4/9/19. Contact cardiology to attempt to move appointment to earlier date. Will monitor at next week outreach for attendance. [facility] 
 
3/29/19 @ [phone] patient reports attending nephrology appointment today. Will monitor for attendance of appointments at next week outreach. [facility] 
 
4/4/19 Patient report attending wound care appointment on 4/1/19. [facility] 
 
4/10/19 Patient reports attending cardio appointment on 4/9/19 and wound care 4/10/19. Follow up with Nephrology on 4/11/19. Will monitor for attendance. [facility] 
 
4/15/19 Patient reports attendance of Nephrology appointment with no changes to current plan of care. [facility]  Reduce risk of CHF exacerbations and complications. 3/29/19 Attempted to contact patient and EC. Unable to reach message left on mobile number with no return call. Home number listed does not connect. Get in contact letter mailed to address on file. Will outreach next week. [facility] 
 
3/29/19@ 1553 Patient reports weight as 234.4 and he entered into ipad. Denies SOB, chest pain, edema. Patient will continue to monitor CHF S&S and enter weights into ipad. Will follow up with patient next week. [facility] 
 
4/4/19 Patient reports weight yesterday as 232.4 and today is 234.2. Denies SOB, chest pain, abdominal and ankle swelling. He has watched diet closely today . Patient will continue to monitor CHF S&S and report on Ipad and writer at next week outreach.  [facility] 
 
4/10/19 Patient reports He saw his Nephrologist last week and was started on Metolazone Tuesday, Thursday and Saturday, and Bumex 3 mg daily until he is 230 lbs, then decrease to Bumex 2 mg daily He will follow up with nephrology on 4/11/19. Denies CHF S&S. Will follow up with patient next week and he will report CHF S&S and weights. [facility] 
 
4/15/19 Patient reports weight today as 231. He is following low Na diet, denies SOB, has minimal edema. He is currently taking Metolazone Tuesday, Thursday and Saturday, and Bumex 3 mg daily until he is 230 lbs, then decrease to Bumex 2 mg daily. Will monitor CHF S&S and report to writer at [facility] in 4 days.  ELIZABETH

## 2019-04-16 RX ORDER — INSULIN ASPART 100 [IU]/ML
INJECTION, SUSPENSION SUBCUTANEOUS
Qty: 25 PEN | Refills: 3 | Status: SHIPPED | OUTPATIENT
Start: 2019-04-16 | End: 2019-10-16 | Stop reason: ALTCHOICE

## 2019-04-17 ENCOUNTER — HOSPITAL ENCOUNTER (OUTPATIENT)
Dept: WOUND CARE | Age: 69
Discharge: HOME | End: 2019-04-17
Payer: MEDICARE

## 2019-04-17 VITALS
RESPIRATION RATE: 16 BRPM | SYSTOLIC BLOOD PRESSURE: 156 MMHG | DIASTOLIC BLOOD PRESSURE: 84 MMHG | HEART RATE: 84 BPM | TEMPERATURE: 97.8 F

## 2019-04-17 PROCEDURE — 97597 DBRDMT OPN WND 1ST 20 CM/<: CPT

## 2019-04-17 PROCEDURE — 74011000250 HC RX REV CODE- 250: Performed by: ORTHOPAEDIC SURGERY

## 2019-04-17 RX ADMIN — Medication: at 13:00

## 2019-04-17 NOTE — WOUND CARE
04/17/19 1206   Wound Leg Lower Right;Medial   Date First Assessed/Time First Assessed: 03/13/19 1213   Wound Type: (c) Venous; Blister/bullae  Location: Leg Lower  Orientation: Right;Medial   Dressing Status  Breakthrough drainage;Removed   Dressing Type  Aquacel; Compression Wrap/Venous Stasis   Wound Length (cm) 1.7 cm   Wound Width (cm) 1.2 cm   Wound Depth (cm) 0.1   Wound Surface area (cm^2) 2.04 cm^2   Change in Wound Size % 9.33   Drainage Amount  Scant   Drainage Color Serosanguinous   Wound Odor None   Periwound Skin Condition Intact   Cleansing and Cleansing Agents  Soap and water;Normal saline     Visit Vitals  /84   Pulse 84   Temp 97.8 °F (36.6 °C)   Resp 16

## 2019-04-17 NOTE — WOUND CARE
04/17/19 1220   Wound Leg Lower Right;Medial   Date First Assessed/Time First Assessed: 03/13/19 1213   Wound Type: (c) Venous; Blister/bullae  Location: Leg Lower  Orientation: Right;Medial   Wound Procedure Type Selective Debridement   Procedure Time Out 1220   Consent Obtained  Yes   Procedure Bleeding Minimal   Procedure Hemostasis  Pressure   Procedure Instrument  Curette   Procedure Pain Scale Numeric 0/10   Debridement Procedure Performed by Dr Na Mahan   Post-Procedure Length (cm) 1.7 cm   Post-Procedure Width (cm) 1.2 cm   Post-Procedure Depth (cm) 0.1 cm   Post-Procedure Volume (cm^3) 0.2 cm^3   Post-Procedure Surface Area (cm^2) 2.04 cm^2   Post Procedure Pain Scale Numeric 0/10   Procedure Tolerated Well

## 2019-04-17 NOTE — PROGRESS NOTES
Wound Center  Progress Note     Date of Service: 4/17/2019      Date of Initial Visit for Current Problem:  4/1/2019     Subjective:      Chief Complaint:  Elijah Brought a 78 y.o. abese, diabetic  male who presents with R lower leg medial wound, due to venous disease.  He was in the hospital last week for difficulty breathing due to fluid retention. He was diuresed, and his Bumex dose increased.       He also told us about a small, chronic decubitus on his right buttock. He is treating with Silvia, wonders what else he can do.     Referred by:  Self     HPI:   Patient was on a cruise, when he developed a blister, which burst and became a wound. Wound caused by: See above. Current wound care:  Offloading wound: no  Appetite: good  Wound associated pain: mild  Diabetic: Yes  Smoker: No             Past Medical History:   Diagnosis Date    Arrhythmia       atrial fibrillation 2017    CAD (coronary atherosclerotic disease)      Chronic pain      Diabetes ([address]      Diabetes mellitus type 2, controlled ([address] 3/13/2017    [facility] [address]      Hx of CABG 3/13/2017     CABG in 2010 in  [place] [place] [facility]      Morbid obesity ([facility] 3/13/2017    JACEY (obstructive sleep apnea) 6/23/2017    S/P CABG x 2 2010    Thyroid disease                  Past Surgical History:   Procedure Laterality Date    CARDIAC SURG PROCEDURE UNLIST         CABGx2 in 2010    HX ORTHOPAEDIC         L 3rd toe amputation in 1999                   Allergies   Allergen Reactions    Allopurinol Rash    Gabapentin Drowsiness    Ciprofloxacin Nausea Only    Percocet [Oxycodone-Acetaminophen] Other (comments)       hallucinations         Review of Systems:  A comprehensive review of systems was negative except for that written in the History of Present Illness. and PMH.       Objective:      Physical Exam:      Visit Vitals:  VSS, Afebrile                             General: well developed, very well nourished, pleasant , NAD. Hygiene good  Psych: cooperative. Pleasant. No anxiety or depression. Normal mood and affect. Neuro: alert and oriented to person/place/situation. Otherwise nonfocal.  HEENT: Normocephalic, atraumatic. EOMI. Conjunctiva clear. No scleral icterus. Chest: Respirations nonlabored. Abdomen:  Nondistended.     Lower extremities: color normal; temperature normal. Hair growth is not present. Calves are supple, nontender, approximately equally sized in comparison.      Had formal ABIs in 2017 - incompressible vessels, TBI = 0.64.     Ulcer Location: R lower leg medial   Etiology: venous stasis  Wound Length: 1.7 cm  Wound Width : 1.2 cm  Wound Depth :  0.1 cm  Ulcer bed: Clean   Periwound: Normal  Exudate: Moderate amount Serous exudate  Depth of Wound: Breakdown of Skin          Assessment:      78 y. o. male with R lower leg medial venous stasis ulcer.        Plan:   Ulcer Debridement    Ulcer Number 1; Right Calf To Fat Layer;  Venous      Character of Ulcer: Improved    Indication for Debridement: Slough, Exudate    Pre debridement measurements:   Wound Length: 1.7 cm    Wound Width :1.2 cm    Wound Depth :0.1    Risks of procedure were discussed with patient. Consent has been signed. Anesthetic: Lidocaine 4% topical cream     Level of Debridement: Fibrin/ Exudate/ Debris/ Biofilm     Tissue and/or Material removed: Fibrin/ Slough    Type of Tissue: Non- Viable      Pre-debridement severity: Fat Layer Exposed     Post debridement severity: Fat Layer exposed    Instrument(s) used: Curette    Bleeding controlled with: Pressure    Estimated blood loss: Minimal    Post debridement measurements:   Wound Length: 1.7 cm  Wound Width :1.2 cm  Wound Depth :0.1    Post procedural pain: none    Patient tolerated procedure well.      Aquacell Ag and a 3 layer wrap applied.  Told to remove it himself or return if it becomes uncomfortable in any way, would then place double tubigrip.     Gave venous ulcer exercise sheet on first visit, sheet to purchase compression stockings and Nirav n Jarrod.     Dressing:  Aquacell Ag, Exudry, 3 layer wrap.     We took a quick look at his small decubitus ulcer on his right buttock, which is now healed.   Patient understood and agrees with plan. Questions answered.     Weekly visits and serial debridements also discussed.   Follow up with me in 1 week     Signed By: Alka Meehan MD

## 2019-04-19 ENCOUNTER — PATIENT OUTREACH (OUTPATIENT)
Dept: FAMILY MEDICINE CLINIC | Age: 69
End: 2019-04-19

## 2019-04-19 NOTE — PROGRESS NOTES
Goals  Attends follow-up appointments as directed. 3/29/19 PSR at Dr. Lavinia Kehr office reports patient is scheduled for hospital follow up on 4/8/19. Per review of chart wound care scheduled for 4/1/19 and cardiology is currently scheduled for 4/9/19. Contact cardiology to attempt to move appointment to earlier date. Will monitor at next week outreach for attendance. [facility] 
 
3/29/19 @ [phone] patient reports attending nephrology appointment today. Will monitor for attendance of appointments at next week outreach. [facility] 
 
4/4/19 Patient report attending wound care appointment on 4/1/19. [facility] 
 
4/10/19 Patient reports attending cardio appointment on 4/9/19 and wound care 4/10/19. Follow up with Nephrology on 4/11/19. Will monitor for attendance. [facility] 
 
4/15/19 Patient reports attendance of Nephrology appointment with no changes to current plan of care. ELIZABETH  Reduce risk of CHF exacerbations and complications. 3/29/19 Attempted to contact patient and EC. Unable to reach message left on mobile number with no return call. Home number listed does not connect. Get in contact letter mailed to address on file. Will outreach next week. [facility] 
 
3/29/19@ 1553 Patient reports weight as 234.4 and he entered into ipad. Denies SOB, chest pain, edema. Patient will continue to monitor CHF S&S and enter weights into ipad. Will follow up with patient next week. [facility] 
 
4/4/19 Patient reports weight yesterday as 232.4 and today is 234.2. Denies SOB, chest pain, abdominal and ankle swelling. He has watched diet closely today . Patient will continue to monitor CHF S&S and report on Ipad and writer at next week outreach. [facility] 
 
4/10/19 Patient reports He saw his Nephrologist last week and was started on Metolazone Tuesday, Thursday and Saturday, and Bumex 3 mg daily until he is 230 lbs, then decrease to Bumex 2 mg daily He will follow up with nephrology on 4/11/19. Denies CHF S&S.  Will follow up with patient next week and he will report CHF S&S and weights. [facility] 
 
4/15/19 Patient reports weight today as 231. He is following low Na diet, denies SOB, has minimal edema. He is currently taking Metolazone Tuesday, Thursday and Saturday, and Bumex 3 mg daily until he is 230 lbs, then decrease to Bumex 2 mg daily. Will monitor CHF S&S and report to writer at [facility] in 4 days. [facility] 
 
4/19/19 Patient reports weight is 231 today. He eats lots of salad and does not exercise. Patient has agreed to swap dressing for lighter versions. And to do a minium of 2 laps around his home everyday and report at next week outreach.  [facility]

## 2019-04-22 ENCOUNTER — PATIENT OUTREACH (OUTPATIENT)
Dept: FAMILY MEDICINE CLINIC | Age: 69
End: 2019-04-22

## 2019-04-24 ENCOUNTER — HOSPITAL ENCOUNTER (OUTPATIENT)
Dept: WOUND CARE | Age: 69
Discharge: HOME | End: 2019-04-24
Payer: MEDICARE

## 2019-04-24 VITALS
RESPIRATION RATE: 16 BRPM | TEMPERATURE: 97.8 F | HEART RATE: 75 BPM | DIASTOLIC BLOOD PRESSURE: 85 MMHG | SYSTOLIC BLOOD PRESSURE: 142 MMHG

## 2019-04-24 VITALS — WEIGHT: 229.2 LBS | BODY MASS INDEX: 33.85 KG/M2

## 2019-04-24 PROCEDURE — 17250 CHEM CAUT OF GRANLTJ TISSUE: CPT

## 2019-04-24 PROCEDURE — 74011000250 HC RX REV CODE- 250: Performed by: ORTHOPAEDIC SURGERY

## 2019-04-24 RX ORDER — TRAMADOL HYDROCHLORIDE 50 MG/1
50 TABLET ORAL
COMMUNITY
End: 2020-01-30

## 2019-04-24 RX ADMIN — Medication: at 11:32

## 2019-04-24 NOTE — PROGRESS NOTES
Goals  Attends follow-up appointments as directed. 3/29/19 PSR at Dr. Sylwia Boyd office reports patient is scheduled for hospital follow up on 4/8/19. Per review of chart wound care scheduled for 4/1/19 and cardiology is currently scheduled for 4/9/19. Contact cardiology to attempt to move appointment to earlier date. Will monitor at next week outreach for attendance. [facility] 
 
3/29/19 @ [id number] patient reports attending nephrology appointment today. Will monitor for attendance of appointments at next week outreach. [facility] 
 
4/4/19 Patient report attending wound care appointment on 4/1/19. [facility] 
 
4/10/19 Patient reports attending cardio appointment on 4/9/19 and wound care 4/10/19. Follow up with Nephrology on 4/11/19. Will monitor for attendance. [facility] 
 
4/15/19 Patient reports attendance of Nephrology appointment with no changes to current plan of care. [facility] 
 
4/22/19 Rachel with Dr. Sylwia Boyd office reports patient attended appointment on 4/8/19. [facility] 
 
4/24/19 Patient is scheduled to since dietician on 4/30/19. May need to reschedule will report to writer new date if rescheduled. ELIZABETH  Reduce risk of CHF exacerbations and complications. 3/29/19 Attempted to contact patient and EC. Unable to reach message left on mobile number with no return call. Home number listed does not connect. Get in contact letter mailed to address on file. Will outreach next week. [facility] 
 
3/29/19@ 1553 Patient reports weight as 234.4 and he entered into ipad. Denies SOB, chest pain, edema. Patient will continue to monitor CHF S&S and enter weights into ipad. Will follow up with patient next week. [facility] 
 
4/4/19 Patient reports weight yesterday as 232.4 and today is 234.2. Denies SOB, chest pain, abdominal and ankle swelling. He has watched diet closely today . Patient will continue to monitor CHF S&S and report on Ipad and writer at next week outreach.  [facility] 
 
4/10/19 Patient reports He saw his Nephrologist last week and was started on Metolazone Tuesday, Thursday and Saturday, and Bumex 3 mg daily until he is 230 lbs, then decrease to Bumex 2 mg daily He will follow up with nephrology on 4/11/19. Denies CHF S&S. Will follow up with patient next week and he will report CHF S&S and weights. [facility] 
 
4/15/19 Patient reports weight today as 231. He is following low Na diet, denies SOB, has minimal edema. He is currently taking Metolazone Tuesday, Thursday and Saturday, and Bumex 3 mg daily until he is 230 lbs, then decrease to Bumex 2 mg daily. Will monitor CHF S&S and report to writer at [facility] in 4 days. [facility] 
 
4/19/19 Patient reports weight is 231 today. He eats lots of salad and does not exercise. Patient has agreed to swap dressing for lighter versions. And to do a minium of 2 laps around his home everyday and report at next week outreach. [facility] 
 
4/24/19 Patient reports feeling the best he has in a long time. Weight is 229.2. Has began making laps but did not disclose the number. Patient will continue to walk and increase number of laps by 2 and report at next week outreach.  [address]

## 2019-04-24 NOTE — WOUND CARE
04/24/19 1116   Wound Leg Lower Right;Medial   Date First Assessed/Time First Assessed: 03/13/19 1213   Wound Type: (c) Venous; Blister/bullae  Location: Leg Lower  Orientation: Right;Medial   Dressing Status  Removed   Dressing Type  4 x 4;Aquacel; Compression Wrap/Venous Stasis;Silver products   Wound Length (cm) 1 cm   Wound Width (cm) 0.4 cm   Wound Depth (cm) 0.1   Wound Surface area (cm^2) 0.4 cm^2   Change in Wound Size % 82.22   Condition of Base Granulation;Pink;Slough   Condition of Edges Open   Drainage Amount  Small    Drainage Color Serosanguinous   Wound Odor None   Periwound Skin Condition Intact   Cleansing and Cleansing Agents  Soap and water;Normal saline

## 2019-04-24 NOTE — PROGRESS NOTES
Wound Center  Progress Note     Date of Service: 4/17/2019      Date of Initial Visit for Current Problem:  4/1/2019     Subjective:      Chief Complaint:  Gage Wright a 78 y.o. abese, diabetic  male who presents with R lower leg medial wound, due to venous disease.  He was in the hospital last week for difficulty breathing due to fluid retention. He was diuresed, and his Bumex dose increased.       He also told us about a small, chronic decubitus on his right buttock. He is treating with Silvia, wonders what else he can do.     Referred by:  Self     HPI:   Patient was on a cruise, when he developed a blister, which burst and became a wound. Wound caused by: See above. Current wound care:  Offloading wound: no  Appetite: good  Wound associated pain: mild  Diabetic: Yes  Smoker: No             Past Medical History:   Diagnosis Date    Arrhythmia       atrial fibrillation 2017    CAD (coronary atherosclerotic disease)      Chronic pain      Diabetes ([address]      Diabetes mellitus type 2, controlled ([facility] 3/13/2017    Heart failure ([facility]      Hx of CABG 3/13/2017     CABG in 2010 in  [employer] [place] [facility]      Morbid obesity ([facility] 3/13/2017    JACEY (obstructive sleep apnea) 6/23/2017    S/P CABG x 2 2010    Thyroid disease                  Past Surgical History:   Procedure Laterality Date    CARDIAC SURG PROCEDURE UNLIST         CABGx2 in 2010    HX ORTHOPAEDIC         L 3rd toe amputation in 1999                   Allergies   Allergen Reactions    Allopurinol Rash    Gabapentin Drowsiness    Ciprofloxacin Nausea Only    Percocet [Oxycodone-Acetaminophen] Other (comments)       hallucinations         Review of Systems:  A comprehensive review of systems was negative except for that written in the History of Present Illness. and PMH.       Objective:      Physical Exam:      Visit Vitals:  VSS, Afebrile                             General: well developed, very well nourished, pleasant , NAD. Hygiene good  Psych: cooperative. Pleasant. No anxiety or depression. Normal mood and affect. Neuro: alert and oriented to person/place/situation. Otherwise nonfocal.  HEENT: Normocephalic, atraumatic. EOMI. Conjunctiva clear. No scleral icterus. Chest: Respirations nonlabored. Abdomen:  Nondistended.     Lower extremities: color normal; temperature normal. Hair growth is not present. Calves are supple, nontender, approximately equally sized in comparison.      Had formal ABIs in 2017 - incompressible vessels, TBI = 0.64.     Ulcer Location: R lower leg medial   Etiology: venous stasis  Wound Length: 1.0 cm  Wound Width : 0.4 cm  Wound Depth :  0.1 cm  Ulcer bed: Clean   Periwound: Normal  Exudate: Moderate amount Serous exudate  Depth of Wound: Breakdown of Skin          Assessment:      78 y. o. male with R lower leg medial venous stasis ulcer.        Plan:   Treated wound with silver nitrate.      Aquacell Ag and a 3 layer wrap applied.  Told to remove it himself or return if it becomes uncomfortable in any way, would then place double tubigrip.     Gave venous ulcer exercise sheet on first visit, sheet to purchase compression stockings and  n Jarrod.     Dressing:  Aquacell Ag, Exudry, 3 layer wrap. He has a [place] up at the end of May.     Weekly visits and serial debridements also discussed.   Follow up with me in 1 week     Signed By: Rei Rosenberg MD

## 2019-04-24 NOTE — WOUND CARE
04/24/19 1159   Wound Leg Lower Right;Medial   Date First Assessed/Time First Assessed: 03/13/19 1213   Wound Type: (c) Venous; Blister/bullae  Location: Leg Lower  Orientation: Right;Medial   Dressing Type Applied Silver products; Alginate;ABD pad;Compression Wrap/Venous Stasis  (Aquacel Ag, A&D ,3 layer)   Wound Procedure Type Other  (chemical cauterize)   Procedure Time Out 1159   Consent Obtained  Yes   Procedure Hemostasis  Silver Nitrate   Procedure Instrument    (sliver nitrate)   Procedure Pain Scale Numeric 0/10   Debridement Procedure Performed by Dr Solo Wong   Post Procedure Pain Scale Numeric 0/10   Procedure Tolerated Well   Dressing change done by Alex Vaughn Rn. Patient was discharged with Self to home and was with walker.  In stable condition with c/o pain:0__/10_

## 2019-05-01 ENCOUNTER — CLINICAL SUPPORT (OUTPATIENT)
Dept: CARDIOLOGY CLINIC | Age: 69
End: 2019-05-01

## 2019-05-01 ENCOUNTER — HOSPITAL ENCOUNTER (OUTPATIENT)
Dept: WOUND CARE | Age: 69
Discharge: HOME | End: 2019-05-01
Payer: MEDICARE

## 2019-05-01 VITALS — DIASTOLIC BLOOD PRESSURE: 89 MMHG | RESPIRATION RATE: 18 BRPM | TEMPERATURE: 97.9 F | SYSTOLIC BLOOD PRESSURE: 156 MMHG

## 2019-05-01 DIAGNOSIS — Z95.0 CARDIAC PACEMAKER IN SITU: Primary | ICD-10-CM

## 2019-05-01 DIAGNOSIS — I42.9 CARDIOMYOPATHY, UNSPECIFIED TYPE (HCC): ICD-10-CM

## 2019-05-01 DIAGNOSIS — L89.312 STAGE II PRESSURE ULCER OF RIGHT BUTTOCK (HCC): ICD-10-CM

## 2019-05-01 DIAGNOSIS — I48.20 CHRONIC A-FIB (HCC): ICD-10-CM
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
## 2019-05-01 PROCEDURE — 99213 OFFICE O/P EST LOW 20 MIN: CPT

## 2019-05-01 RX ORDER — LANOLIN ALCOHOL/MO/W.PET/CERES
325 CREAM (GRAM) TOPICAL
COMMUNITY
End: 2021-01-01

## 2019-05-01 NOTE — PROGRESS NOTES
Wound Center Progress Note 
  
Date of Service: 5/1/2019  
  
Date of Initial Visit for Current Problem:  4/1/2019 
  
Subjective:  
  
Chief Complaint: 
Donal Ramirez a 78 y.o. abese, diabetic  male who presents with R lower leg medial wound, due to venous disease.  He was in the hospital last week for difficulty breathing due to fluid retention. He was diuresed, and his Bumex dose increased.   
  
He also told us about a small, chronic decubitus on his right buttock. He is treating with Silvia, wonders what else he can do. 
  
Referred by:  Self 
  
HPI:   Patient was on a cruise, when he developed a blister, which burst and became a wound. Wound caused by: See above. Current wound care: 
Offloading wound: no Appetite: good Wound associated pain: mild Diabetic: Yes Smoker: No 
  
       
Past Medical History:  
Diagnosis Date  Arrhythmia    
  atrial fibrillation 2017  CAD (coronary atherosclerotic disease)    
 Chronic pain    
 Diabetes ([address]    
 Diabetes mellitus type 2, controlled ([address] 3/13/2017  [facility] ([facility]    
 Hx of CABG 3/13/2017  
  CABG in 2010 in [place]  Hypertension    
 Morbid obesity ([address] 3/13/2017  JACEY (obstructive sleep apnea) 6/23/2017  S/P CABG x 2 2010  Thyroid disease    
  
         
Past Surgical History:  
Procedure Laterality Date  CARDIAC SURG PROCEDURE UNLIST      
  CABGx2 in 2010  HX ORTHOPAEDIC      
  L 3rd toe amputation in 1999  
  
  
         
Allergies Allergen Reactions  Allopurinol Rash  Gabapentin Drowsiness  Ciprofloxacin Nausea Only  Percocet [Oxycodone-Acetaminophen] Other (comments)  
    hallucinations  
  
  
Review of Systems: A comprehensive review of systems was negative except for that written in the History of Present Illness. and PMH.   
  
Objective:  
  
Physical Exam:  
  
Visit Vitals:  VSS, Afebrile 
     
     
     
     
  
 General: well developed, very well nourished, pleasant , NAD. Hygiene good Psych: cooperative. Pleasant. No anxiety or depression. Normal mood and affect. Neuro: alert and oriented to person/place/situation. Otherwise nonfocal. 
HEENT: Normocephalic, atraumatic. EOMI. Conjunctiva clear. No scleral icterus. Chest: Respirations nonlabored. Abdomen:  Nondistended. 
  
Lower extremities: color normal; temperature normal. Hair growth is not present. Calves are supple, nontender, approximately equally sized in comparison.  
  
Had formal ABIs in 2017 - incompressible vessels, TBI = 0.64. 
  
Ulcer Location: R lower leg medial  
Etiology: venous stasis Healed! !  
  
Assessment:  
  
78 y. o. male with healed R lower leg medial venous stasis ulcer. 
  
  
Plan:  
 Wear compression stockings. 
  
He has a [place] at the end of May. 
  
 
Follow up as needed.  
 
 
Signed By: Charito Marroquin MD

## 2019-05-01 NOTE — WOUND CARE
Patient discharged to home ambulatory with walker. Wounds resolved, no further wound center follow-up required. He was discharged with Tubigrip size F double layer as temporary stocking and information to purchase 15-20mm/hg knee high compression stockings.

## 2019-05-01 NOTE — WOUND CARE
05/01/19 1035 Wound Leg Lower Right;Medial  
Date First Assessed/Time First Assessed: 03/13/19 1213   Wound Type: (c) Venous; Blister/bullae  Location: Leg Lower  Orientation: Right;Medial  
Dressing Status  Removed Dressing Type  4 x 4;Aquacel; Compression Wrap/Venous Stasis;Silver products Wound Length (cm) 0 cm Wound Width (cm) 0 cm Wound Depth (cm) 0 Wound Surface area (cm^2) 0 cm^2 Change in Wound Size % 100 Condition of Base Other (comment) (Epithelial tissue) Condition of Edges Closed Drainage Amount  None Wound Odor None Periwound Skin Condition Intact Cleansing and Cleansing Agents  Soap and water;Normal saline 05/01/19 1035 Wound Leg Lower Right;Medial  
Date First Assessed/Time First Assessed: 03/13/19 1213   Wound Type: (c) Venous; Blister/bullae  Location: Leg Lower  Orientation: Right;Medial  
Dressing Status  Removed Dressing Type  4 x 4;Aquacel; Compression Wrap/Venous Stasis;Silver products Wound Length (cm) 0 cm Wound Width (cm) 0 cm Wound Depth (cm) 0 Wound Surface area (cm^2) 0 cm^2 Change in Wound Size % 100 Condition of Base Other (comment) (Epithelial tissue) Condition of Edges Closed Drainage Amount  None Wound Odor None Periwound Skin Condition Intact Cleansing and Cleansing Agents  Soap and water;Normal saline

## 2019-05-02 ENCOUNTER — PATIENT OUTREACH (OUTPATIENT)
Dept: FAMILY MEDICINE CLINIC | Age: 69
End: 2019-05-02

## 2019-05-02 NOTE — PROGRESS NOTES
Goals  Attends follow-up appointments as directed. 3/29/19 PSR at Dr. January Ceja office reports patient is scheduled for hospital follow up on 4/8/19. Per review of chart wound care scheduled for 4/1/19 and cardiology is currently scheduled for 4/9/19. Contact cardiology to attempt to move appointment to earlier date. Will monitor at next week outreach for attendance. \[facility]\"" 
 
3/29/19 @ [id number] patient reports attending nephrology appointment today. Will monitor for attendance of appointments at next week outreach. \[facility]\"" 
 
4/4/19 Patient report attending wound care appointment on 4/1/19. \[facility]\"" 
 
4/10/19 Patient reports attending cardio appointment on 4/9/19 and wound care 4/10/19. Follow up with Nephrology on 4/11/19. Will monitor for attendance. \[facility]\"" 
 
4/15/19 Patient reports attendance of Nephrology appointment with no changes to current plan of care. \[facility]\"" 
 
4/22/19 Rachel with Dr. January Ceja office reports patient attended appointment on 4/8/19. \[facility]\"" 
 
4/24/19 Patient is scheduled to since dietician on 4/30/19. May need to reschedule will report to writer new date if rescheduled. \[facility]\"" 
 
5/2/19 Patient report he is scheduled to see dietician 5/3/19. Will report attendance at next week outreach. ELIZABETH  Reduce risk of CHF exacerbations and complications. 3/29/19 Attempted to contact patient and EC. Unable to reach message left on mobile number with no return call. Home number listed does not connect. Get in contact letter mailed to address on file. Will outreach next week. [facility] 
 
3/29/19@ 1553 Patient reports weight as 234.4 and he entered into ipad. Denies SOB, chest pain, edema. Patient will continue to monitor CHF S&S and enter weights into ipad. Will follow up with patient next week. [facility] 
 
4/4/19 Patient reports weight yesterday as 232.4 and today is 234.2. Denies SOB, chest pain, abdominal and ankle swelling. He has watched diet closely today .  Patient will continue to monitor CHF S&S and report on Ipad and writer at next week outreach. [facility] 
 
4/10/19 Patient reports He saw his Nephrologist last week and was started on Metolazone Tuesday, Thursday and Saturday, and Bumex 3 mg daily until he is 230 lbs, then decrease to Bumex 2 mg daily He will follow up with nephrology on 4/11/19. Denies CHF S&S. Will follow up with patient next week and he will report CHF S&S and weights. [facility] 
 
4/15/19 Patient reports weight today as 231. He is following low Na diet, denies SOB, has minimal edema. He is currently taking Metolazone Tuesday, Thursday and Saturday, and Bumex 3 mg daily until he is 230 lbs, then decrease to Bumex 2 mg daily. Will monitor CHF S&S and report to writer at [facility] in 4 days. [facility] 
 
4/19/19 Patient reports weight is 231 today. He eats lots of salad and does not exercise. Patient has agreed to swap dressing for lighter versions. And to do a minium of 2 laps around his home everyday and report at next week outreach. [facility] 
 
4/24/19 Patient reports feeling the best he has in a long time. Weight is 229.2. Has began making laps but did not disclose the number. Patient will continue to walk and increase number of laps by 2 and report at next week outreach. [facility] 
 
5/2/19 Per ipad reading patient weigh has been 228-230 lbs. Patient reports he is following low Na diet. Currently out to lunch with daughter. Patient advised he has completed Ipad program and will need to return in box provided. Patient agreed to return ipad and continue to monitor weight and record to report at next week outreach.  [facility]

## 2019-05-03 ENCOUNTER — HOSPITAL ENCOUNTER (OUTPATIENT)
Dept: DIABETES SERVICES | Age: 69
Discharge: HOME | End: 2019-05-03
Attending: INTERNAL MEDICINE
Payer: MEDICARE

## 2019-05-03 DIAGNOSIS — E11.22 DIABETES MELLITUS WITH STAGE 4 CHRONIC KIDNEY DISEASE (HCC): ICD-10-CM

## 2019-05-03 DIAGNOSIS — Z79.4 ENCOUNTER FOR LONG-TERM (CURRENT) USE OF INSULIN (HCC): ICD-10-CM

## 2019-05-03 DIAGNOSIS — N18.4 DIABETES MELLITUS WITH STAGE 4 CHRONIC KIDNEY DISEASE (HCC): ICD-10-CM

## 2019-05-03 PROCEDURE — 97802 MEDICAL NUTRITION INDIV IN: CPT

## 2019-05-08 ENCOUNTER — HOSPITAL ENCOUNTER (OUTPATIENT)
Dept: WOUND CARE | Age: 69
End: 2019-05-08
Payer: MEDICARE

## 2019-05-10 NOTE — DIABETES MGMT
Diabetes Treatment  Center - Nutrition Evaluation       DATE: 5/10/2019      REFERRING PHYSICIAN: Dylan Ho  NAME: Ciro Morton : 1950 AGE: 78 y.o. GENDER: male  REASON FOR VISIT: Wt loss with CKD 4    ASSESSMENT:  Past Medical Hx: DM Type 2, CHF, HTN, MI, CKD4. Pt and his wife both shared that his creatinine level has been stable for few months. LABS:   Lab Results   Component Value Date/Time    Hemoglobin A1c 7.0 (H) 04/10/2018 08:43 AM   Recent values with Endocrinology 19:  6.7%      Lab Results   Component Value Date/Time    Creatinine 3.05 (H) 2019 05:50 AM     CrCl cannot be calculated (Unknown ideal weight.). Lab Results   Component Value Date/Time    Cholesterol, total 108 04/10/2018 08:43 AM    HDL Cholesterol 39 (L) 04/10/2018 08:43 AM    LDL,Direct 43 2017 04:00 PM    LDL, calculated 55 04/10/2018 08:43 AM    Triglyceride 70 04/10/2018 08:43 AM       MEDICATIONS/SUPPLEMENTS:     Prior to Admission medications    Medication Sig Start Date End Date Taking? Authorizing Provider   ferrous sulfate (IRON) 325 mg (65 mg iron) tablet Take 325 mg by mouth Daily (before breakfast). Provider, Historical   traMADol (ULTRAM) 50 mg tablet Take 50 mg by mouth every six (6) hours as needed for Pain. Indications: Prescribed by Dentist after Dental/Oral Procedure. Provider, Historical   insulin aspart protamine/insulin aspart (NOVOLOG MIX 70/30) 100 unit/mL (70-30) inpn 34 units breakfast, 34 units dinner + correction scale (up to 80 units/day) 19   Samuel Carias MD   [facility]) 1 mg tablet 3 tabs BID 3/17/19   Provider, Historical   metoprolol succinate (TOPROL XL) 50 mg XL tablet Take 50 mg by mouth daily. Patient takes 150mg total daily    Provider, Historical   potassium chloride SR (K-TAB) 20 mEq tablet Take 20 mEq by mouth daily. Patient takes 40 meq every Monday/Wednesday/Friday,   And 20 meq every Tuesday, Thursday and Saturday.     Provider, Historical   dutasteride (AVODART) 0.5 mg capsule Take 0.5 mg by mouth daily. Provider, Historical   metoprolol succinate (TOPROL-XL) 100 mg tablet Take 100 mg by mouth daily. Patient takes 150mg total daily    Provider, Historical   MYRBETRIQ 25 mg ER tablet TAKE 1 TABLET BY MOUTH EVERY DAY 10/2/18   Provider, Historical   pravastatin (PRAVACHOL) 80 mg tablet TAKE 1 TABLET BY MOUTH IN THE EVENING 10/15/18   Rosaline Bradshaw NP   ELIQUIS 5 mg tablet TAKE 1 TABLET BY MOUTH TWICE A DAY 6/25/18   Althea Javier MD   levothyroxine (SYNTHROID) 137 mcg tablet TAKE 1 TABLET(S) EVERY DAY BY ORAL ROUTE FOR 90 DAYS. 6/19/18   Mar Mauricio MD   febuxostat (ULORIC) 40 mg tab tablet Take 40 mg by mouth daily. Provider, Historical   ketoconazole (NIZORAL) 2 % topical cream Apply  to affected area two (2) times daily as needed for Skin Irritation. Indications: rash    Provider, Historical   metOLazone (ZAROXOLYN) 2.5 mg tablet Take 2.5 mg by mouth daily. Indications: TAKING TUESDAY AND SATURDAY    Provider, Historical   tamsulosin (FLOMAX) 0.4 mg capsule TAKE 2 CAPSULES BY MOUTH EVERY DAY 12/14/16   Provider, Historical   multivitamin (ONE A DAY) tablet Take 1 Tab by mouth daily. Provider, Historical   acetaminophen (TYLENOL EXTRA STRENGTH) 500 mg tablet Take 1,000 mg by mouth every eight (8) hours as needed for Pain. Provider, Historical       FOOD ALLERGIES/INTOLERANCES: none    ANTHROPOMETRICS:    Ht Readings from Last 1 Encounters:   04/09/19 5' 9\" (1.753 m)      Wt Readings from Last 1 Encounters:   04/24/19 104 kg (229 lb 3.2 oz)      HUK:289.8 # +/- 10%    BMI: 33.89       Reported Wt Hx:  Pt shared that he has been actively making changes to his diet. He shared that he has decreased his portions sizes - with dining out daily with his wife, they are focusing on sharing meals and eating lighter portions. He has decreased his weight 67.5 lb through diet changes over the past 12 months.       Estimated Nutritional Needs ([facility] equation):   RMR 1793.6 x SedentaryActivity Factor: 1.2 = EEE: 2152 kcal/day    Weight Management: EEE - Weight Loss (-500 kcal) = Total Kcals: 1652 kcal/day    Reported Diet Hx:  Pt has recently made significant changes to his food habits by decreasing portions. He currently avoids sweet beverages, uses appropriate sugar free products - syrups and dessert times (limiting to single servings). They are dining out less (usually lunch) while eating breakfast and dinner at home most days. Suzanne Hernandez shared that they do use some commercially made items for dinner entrees and she will prepare items from scratch on a limited basis. Andrei Morillo shared that he was quite the foody and he shared that some of his success comes because \"no one in [place] can make good bread\". Therefore, he has been decreasing his rolls and breads. We discussed his portions with meals and focused on meeting carbohydrate needs ~ 170 g daily (consistent with ~ 45-50g per meal and a 20g snack that he is currently consuming) and his daily protein needs 0.8 g/kg (~6-7 oz daily). Andrei Morillo shared that he does not add salt to foods nor does Suzanne Hernandez add more with food preparation. Andrei Morillo has made a special effort to increase his non starchy vegetables with each meal to help with satiety. Per our discussion, by my best estimate using diet recall and food models/measuring tools, Andrei Morillo has decreased his portions by ~ 1/2. Exercise/Physical Actvity: none at present. We discussed that this can assist with improving his insulin resistance and decrease his doses - which may help with wt loss. He is limited with his CHF to how much exercise he can do - felt that he could do small time increments and more often in the day. Environmental/Social: He and Suzanne Hernandez have moved from [place] x3 years ago and are still making transition to lifestyle and cultural changes.   They agree that this is more affordable and that it still permits them to dine out. They are participating in community events around food but feel that he can continue to adapt his portions to facilitate his wt loss goal.     NUTRITION DIAGNOSIS:  Obesity related to history of excess kcal intakes and inactivity as supported by BMI 33.89. NUTRITION INTERVENTION:  1) consider using measuring tools to help guide portion control. 2) continue with monitor sodium intakes using nutrition labels. 3) reviewed his food preferences and cultural food practices that will need significant changes should his CKD progress to stage 5.    4) provided guidelines for sodium and shared food lists for phosphorus and potassium which provided better understanding for interventions now to delay future restrictions on these minerals. 5) reinforced balanced meals and protein portions to help with glycemic control. PATIENT GOALS:  3) try to include exercise/activity daily - consider using chair exercises provided and avoid resistance training. Starting with 10 minute segments daily and increasing to x3 per day after meals. Specific tips and techniques to facilitate compliance with above recommendations were provided and discussed. Pt was strongly encourage to begin making necessary changes now and follow as scheduled       If you have any questions please feel free to contact me at [phone].        Eric Martinez RD CDE

## 2019-05-14 ENCOUNTER — PATIENT OUTREACH (OUTPATIENT)
Dept: FAMILY MEDICINE CLINIC | Age: 69
End: 2019-05-14

## 2019-05-21 ENCOUNTER — PATIENT OUTREACH (OUTPATIENT)
Dept: FAMILY MEDICINE CLINIC | Age: 69
End: 2019-05-21

## 2019-05-21 NOTE — PROGRESS NOTES
Goals      Attends follow-up appointments as directed. 3/29/19 PSR at Dr. Neema Grover office reports patient is scheduled for hospital follow up on 4/8/19. Per review of chart wound care scheduled for 4/1/19 and cardiology is currently scheduled for 4/9/19. Contact cardiology to attempt to move appointment to earlier date. Will monitor at next week outreach for attendance. [facility]    3/29/19 @ [phone] patient reports attending nephrology appointment today. Will monitor for attendance of appointments at next week outreach. [facility]    4/4/19 Patient report attending wound care appointment on 4/1/19. [facility]    4/10/19 Patient reports attending cardio appointment on 4/9/19 and wound care 4/10/19. Follow up with Nephrology on 4/11/19. Will monitor for attendance. [facility]    4/15/19 Patient reports attendance of Nephrology appointment with no changes to current plan of care. [facility]    4/22/19 Rachel with Dr. Neema Grover office reports patient attended appointment on 4/8/19. [facility]    4/24/19 Patient is scheduled to since dietician on 4/30/19. May need to reschedule will report to writer new date if rescheduled. [facility]    5/2/19 Patient report he is scheduled to see dietician 5/3/19. Will report attendance at next week outreach. [facility]    5/21/19 Patient reports attendance to dietician appoinment       Reduce risk of CHF exacerbations and complications. 3/29/19 Attempted to contact patient and EC. Unable to reach message left on mobile number with no return call. Home number listed does not connect. Get in contact letter mailed to address on file. Will outreach next week. [facility]    3/29/19@ 1553 Patient reports weight as 234.4 and he entered into ipad. Denies SOB, chest pain, edema. Patient will continue to monitor CHF S&S and enter weights into ipad. Will follow up with patient next week.     4/4/19 Patient reports weight yesterday as 232.4 and today is 234.2. Denies SOB, chest pain, abdominal and ankle swelling. He has watched diet closely today . Patient will continue to monitor CHF S&S and report on Ipad and writer at next week outreach. [facility]    4/10/19 Patient reports He saw his Nephrologist last week and was started on Metolazone Tuesday, Thursday and Saturday, and Bumex 3 mg daily until he is 230 lbs, then decrease to Bumex 2 mg daily He will follow up with nephrology on 4/11/19. Denies CHF S&S. Will follow up with patient next week and he will report CHF S&S and weights. [facility]    4/15/19 Patient reports weight today as 231. He is following low Na diet, denies SOB, has minimal edema. He is currently taking Metolazone Tuesday, Thursday and Saturday, and Bumex 3 mg daily until he is 230 lbs, then decrease to Bumex 2 mg daily. Will monitor CHF S&S and report to writer at [facility] in 4 days. [facility]    4/19/19 Patient reports weight is 231 today. He eats lots of salad and does not exercise. Patient has agreed to swap dressing for lighter versions. And to do a minium of 2 laps around his home everyday and report at next week outreach. [facility]    4/24/19 Patient reports feeling the best he has in a long time. Weight is 229.2. Has began making laps but did not disclose the number. Patient will continue to walk and increase number of laps by 2 and report at next week outreach. [facility]    5/2/19 Per ipad reading patient weigh has been 228-230 lbs. Patient reports he is following low Na diet. Currently out to lunch with daughter. Patient advised he has completed Ipad program and will need to return in box provided. Patient agreed to return ipad and continue to monitor weight and record to report at next week outreach. [facility]    5/21/19 Patient reports weight is 231 today. Patient denies SOB, chest pain, fever. Saw dietician but at this time remains the same. She provided him with information and exercises that he has started. Patient informed writer he will be out of town from 5/23/19-6/10/19. Request no call while away.  Writer reminded patient to continue with low Na diet and weighing daily even while on vacation. Will outreach patient in 3 weeks.  ELIZABETH

## 2019-06-12 ENCOUNTER — PATIENT OUTREACH (OUTPATIENT)
Dept: FAMILY MEDICINE CLINIC | Age: 69
End: 2019-06-12

## 2019-06-12 ENCOUNTER — OFFICE VISIT (OUTPATIENT)
Dept: ENDOCRINOLOGY | Age: 69
End: 2019-06-12

## 2019-06-12 VITALS
BODY MASS INDEX: 34.8 KG/M2 | HEIGHT: 69 IN | HEART RATE: 76 BPM | SYSTOLIC BLOOD PRESSURE: 131 MMHG | WEIGHT: 235 LBS | DIASTOLIC BLOOD PRESSURE: 80 MMHG

## 2019-06-12 DIAGNOSIS — Z79.4 TYPE 2 DIABETES MELLITUS WITH STAGE 4 CHRONIC KIDNEY DISEASE, WITH LONG-TERM CURRENT USE OF INSULIN (HCC): Primary | ICD-10-CM

## 2019-06-12 DIAGNOSIS — I10 ESSENTIAL HYPERTENSION WITH GOAL BLOOD PRESSURE LESS THAN 130/80: ICD-10-CM

## 2019-06-12 DIAGNOSIS — E11.22 TYPE 2 DIABETES MELLITUS WITH STAGE 4 CHRONIC KIDNEY DISEASE, WITH LONG-TERM CURRENT USE OF INSULIN (HCC): Primary | ICD-10-CM

## 2019-06-12 DIAGNOSIS — E55.9 VITAMIN D DEFICIENCY: ICD-10-CM

## 2019-06-12 DIAGNOSIS — N18.4 TYPE 2 DIABETES MELLITUS WITH STAGE 4 CHRONIC KIDNEY DISEASE, WITH LONG-TERM CURRENT USE OF INSULIN (HCC): Primary | ICD-10-CM

## 2019-06-12 DIAGNOSIS — E03.9 HYPOTHYROIDISM, UNSPECIFIED TYPE: ICD-10-CM

## 2019-06-12 DIAGNOSIS — E78.5 HYPERLIPIDEMIA, UNSPECIFIED HYPERLIPIDEMIA TYPE: ICD-10-CM

## 2019-06-12 LAB — HBA1C MFR BLD HPLC: 6.5 %

## 2019-06-12 NOTE — PROGRESS NOTES
CHIEF COMPLAINT: f/u uncontrolled type 2 diabetes    HISTORY OF PRESENT ILLNESS:   Inder Rose is a 78 y.o. male with a PMHx as noted below who presents for evaluation of uncontrolled type 2 diabetes. POC A1c today is 6.5%    Regimen:  Novolog 70-30 insulin.     34 units BID AC  Correction of 1:50>150    Review of home glucose:   No log or meter with him today  Reports low blood sugar at night twice per week    Hypothyroidism: On 137 mcg of levothyroxine  Vitamin D deficiency: was taken off D by nephrologist per patient    Review of most recent diabetes-related labs:  Lab Results   Component Value Date    HBA1C 7.0 (H) 04/10/2018    HBA1C 7.6 (H) 06/22/2017    HBA1C 8.0 (H) 03/13/2017    MRZ7GNAX 6.7 02/11/2019    ONH2NJVI 6.3 10/22/2018    BUQ6VLJH 6.3 07/20/2018    CHOL 108 04/10/2018    LDLC 55 04/10/2018    LDL 43 04/03/2017    GFRAA 25 (L) 03/27/2019    GFRNA 20 (L) 03/27/2019    MCACR 93.0 (H) 04/10/2018    TSH 1.410 04/10/2018    VITD3 46.3 04/03/2017     Lab Key:  [id number] = IA-2 pancreatic islet cell autoantibody  CPEPL = C-peptide level  :EXT = External Lab  GADLT = ANGELIQUE-65 autoantibody   INSUL = Insulin level  MCACR (or MALBEXT) = Urine Microalbumin (or External UM)      PAST MEDICAL/SURGICAL HISTORY:   Past Medical History:   Diagnosis Date    Arrhythmia     atrial fibrillation 2017    CAD (coronary atherosclerotic disease)     Chronic pain     Diabetes ([address]     Diabetes mellitus type 2, controlled ([address] 3/13/2017    Heart failure ([address]     Hx of CABG 3/13/2017    CABG in 2010 in [address] Hypertension     Morbid obesity ([address] 3/13/2017    JACEY (obstructive sleep apnea) 6/23/2017    S/P CABG x 2 2010    Thyroid disease      Past Surgical History:   Procedure Laterality Date    CARDIAC SURG PROCEDURE UNLIST      CABGx2 in 2010    HX ORTHOPAEDIC      L 3rd toe amputation in 1999       ALLERGIES:   Allergies   Allergen Reactions    Allopurinol Rash    Gabapentin Drowsiness    Ciprofloxacin Nausea Only    Percocet [Oxycodone-Acetaminophen] Other (comments)     hallucinations       MEDICATIONS ON ADMISSION:     Current Outpatient Medications:     ferrous sulfate (IRON) 325 mg (65 mg iron) tablet, Take 325 mg by mouth Daily (before breakfast). , Disp: , Rfl:     insulin aspart protamine/insulin aspart (NOVOLOG MIX 70/30) 100 unit/mL (70-30) inpn, 34 units breakfast, 34 units dinner + correction scale (up to 80 units/day), Disp: 25 Pen, Rfl: 3    bumetanide (BUMEX) 1 mg tablet, 3 tabs BID, Disp: , Rfl: 3    metoprolol succinate (TOPROL XL) 50 mg XL tablet, Take 50 mg by mouth daily. Patient takes 150mg total daily, Disp: , Rfl:     potassium chloride SR (K-TAB) 20 mEq tablet, Take 20 mEq by mouth daily. Patient takes 40 meq every Monday/Wednesday/Friday,  And 20 meq every Tuesday, Thursday, Saturday and Sunday, Disp: , Rfl:     dutasteride (AVODART) 0.5 mg capsule, Take 0.5 mg by mouth daily. , Disp: , Rfl:     metoprolol succinate (TOPROL-XL) 100 mg tablet, Take 100 mg by mouth daily. Patient takes 150mg total daily, Disp: , Rfl:     MYRBETRIQ 25 mg ER tablet, TAKE 1 TABLET BY MOUTH EVERY DAY, Disp: , Rfl: 12    pravastatin (PRAVACHOL) 80 mg tablet, TAKE 1 TABLET BY MOUTH IN THE EVENING, Disp: 90 Tab, Rfl: 0    ELIQUIS 5 mg tablet, TAKE 1 TABLET BY MOUTH TWICE A DAY, Disp: 180 Tab, Rfl: 3    levothyroxine (SYNTHROID) 137 mcg tablet, TAKE 1 TABLET(S) EVERY DAY BY ORAL ROUTE FOR 90 DAYS., Disp: 90 Tab, Rfl: 3    febuxostat (ULORIC) 40 mg tab tablet, Take 40 mg by mouth daily. , Disp: , Rfl:     metOLazone (ZAROXOLYN) 2.5 mg tablet, Take 2.5 mg by mouth daily. Indications: TAKING TUESDAY AND SATURDAY, Disp: , Rfl:     tamsulosin (FLOMAX) 0.4 mg capsule, TAKE 2 CAPSULES BY MOUTH EVERY DAY, Disp: , Rfl: 2    multivitamin (ONE A DAY) tablet, Take 1 Tab by mouth daily. , Disp: , Rfl:     traMADol (ULTRAM) 50 mg tablet, Take 50 mg by mouth every six (6) hours as needed for Pain. Indications: Prescribed by Dentist after Dental/Oral Procedure., Disp: , Rfl:     ketoconazole (NIZORAL) 2 % topical cream, Apply  to affected area two (2) times daily as needed for Skin Irritation.  Indications: rash, Disp: , Rfl:     acetaminophen (TYLENOL EXTRA STRENGTH) 500 mg tablet, Take 1,000 mg by mouth every eight (8) hours as needed for Pain., Disp: , Rfl:     SOCIAL HISTORY:   Social History     Socioeconomic History    Marital status:      Spouse name: Not on file    Number of children: Not on file    Years of education: Not on file    Highest education level: Not on file   Occupational History    Not on file   Social Needs    Financial resource strain: Not on file    Food insecurity:     Worry: Not on file     Inability: Not on file    Transportation needs:     Medical: Not on file     Non-medical: Not on file   Tobacco Use    Smoking status: Never Smoker    Smokeless tobacco: Never Used   Substance and Sexual Activity    Alcohol use: Yes     Comment: rare    Drug use: No    Sexual activity: Not on file   Lifestyle    Physical activity:     Days per week: Not on file     Minutes per session: Not on file    Stress: Not on file   Relationships    Social connections:     Talks on phone: Not on file     Gets together: Not on file     Attends Denominational service: Not on file     Active member of club or organization: Not on file     Attends meetings of clubs or organizations: Not on file     Relationship status: Not on file    Intimate partner violence:     Fear of current or ex partner: Not on file     Emotionally abused: Not on file     Physically abused: Not on file     Forced sexual activity: Not on file   Other Topics Concern     Service Not Asked    Blood Transfusions Not Asked    Caffeine Concern Not Asked    Occupational Exposure Not Asked   Myer Hazards Not Asked    Sleep Concern Not Asked    Stress Concern Not Asked    Weight Concern Not Asked    Special Diet Not Asked    Back Care Not Asked    Exercise Not Asked    Bike Helmet Not Asked   [address] [facility] Not Asked    Self-Exams Not Asked   Social History Narrative    Not on file       FAMILY HISTORY:  Family History   Problem Relation Age of Onset    Heart Attack Father     No Known Problems Mother        REVIEW OF SYSTEMS: Complete ROS assessed and noted for that which is described above, all else are negative. Eyes: normal  ENT: normal  CVS: normal  Resp: normal  GI: normal  : normal  GYN: normal  Endocrine: normal  Integument: normal  Musculoskeletal: normal  Neuro: normal  Psych: normal    PHYSICAL EXAMINATION:    VITAL SIGNS:  Visit Vitals  /78 (BP 1 Location: Left arm, BP Patient Position: Sitting)   Pulse 75   Ht 5' 9\" (1.753 m)   Wt 235 lb (106.6 kg)   BMI 34.70 kg/m²       GENERAL: NCAT, Sitting comfortably, NAD  EYES: EOMI, non-icteric, no proptosis  Ear/Nose/Throat: NCAT, no inflammation, no masses  LYMPH NODES: No LAD  CARDIOVASCULAR: S1 S2, RRR, No murmur, 2+ radial pulses  RESPIRATORY: CTA b/l, no wheeze/rales  GASTROINTESTINAL:  NT, ND  MUSCULOSKELETAL: Normal ROM, no atrophy  SKIN: warm, no edema/rash/ or other skin changes  NEUROLOGIC: 5/5 power all extremities, see foot exam below, no tremor, AAOx3  PSYCHIATRIC: Normal affect, Normal insight and judgement    REVIEW OF LABORATORY AND RADIOLOGY DATA:   Labs and documentation have been reviewed as described above. ASSESSMENT AND PLAN:   Ulysses Naegeli is a 78 y.o. male with a PMHx as noted above who presents for f/u evaluation of uncontrolled type 2 diabetes. Problems:  Type 2 diabetes  Hyperlipidemia  Hypertension  Hypothyroidism  Vitamin D deficiency     Patient with lows overnight twice per week and so we will cut back his dinner dose of insulin a bit.  He is inquiring about trulicity    PLAN  Type 2 Diabetes  Medications:  Novolog 70-30:   34 units / 28 units  Correction 1:50>150  Advised to check glucose 3-4 times daily  Provided with glucose log sheets for later review. HTN: BP usually generally stable on current medications  HLD: on statin, check today  Vitamin D deficiency: off per nephrologist  Hypothyroidism: 137 mcg once daily, TSH today    RTC: I would like to see him back in 4 months.  >25 minutes spent together with patient today of which >50% of this time was spent in counseling and coordination of care. Andrez Zuluaga.  [address] [facility]

## 2019-06-12 NOTE — PATIENT INSTRUCTIONS
Novolog 70-30:     34 units with breakfast  28 units with dinner    Correction Scale: 1 unit for every 50 above 150    IF GLUCOSE IS:                 THEN TAKE:      0   Extra Unit  151-200   1   Extra Unit  201-250   2   Extra Units  251-300   3   Extra Units  301-350   4   Extra Units  351-400   5   Extra Units    Example: My planned insulin dose:    ____ Units    +    ____ Extra Correction Units  =  ____ total units to take together as one injection. Please note our new policy, you must arrive to the clinic 15 minutes before your appointment time to allow enough time for proper check-in, adequate time to spend with your doctor, and also to respect the appointment time of the next patient. Not arriving 15 minutes in advance may result in having your appointment rescheduled for the next available day/time.  ----------------------------------------------------------------------------------------------------------------------    Below you will find a glucose log sheet which you can use to record your blood sugars. Without checking and recording what your home glucose levels are, it will be difficult to make any changes to your medication dose, even when significant changes may be needed. Please feel free to use the log below to record your home glucose levels. At the very least, I would like for you to login the entire 2-3 weeks just before your visit so we can make your visit much more productive and beneficial to you. GLUCOSE LOG SHEET:    Date Breakfast Lunch Dinner Bedtime Comments ? GLUCOSE LOG SHEET:    Date Breakfast Lunch Dinner Bedtime Comments ? GLUCOSE LOG SHEET:    Date Breakfast Lunch Dinner Bedtime Comments ?

## 2019-06-13 LAB
ALBUMIN SERPL-MCNC: 3.9 G/DL (ref 3.5–4.8)
ALBUMIN/CREAT UR: 137.2 MG/G CREAT (ref 0–30)
ALBUMIN/GLOB SERPL: 1.3 {RATIO} (ref 1.2–2.2)
ALP SERPL-CCNC: 56 IU/L (ref 39–117)
ALT SERPL-CCNC: 16 IU/L (ref 0–44)
AST SERPL-CCNC: 33 IU/L (ref 0–40)
BILIRUB SERPL-MCNC: 0.4 MG/DL (ref 0–1.2)
BUN SERPL-MCNC: 90 MG/DL (ref 8–27)
BUN/CREAT SERPL: 30 (ref 10–24)
CALCIUM SERPL-MCNC: 9.3 MG/DL (ref 8.6–10.2)
CHLORIDE SERPL-SCNC: 95 MMOL/L (ref 96–106)
CHOLEST SERPL-MCNC: 109 MG/DL (ref 100–199)
CO2 SERPL-SCNC: 28 MMOL/L (ref 20–29)
CREAT SERPL-MCNC: 2.99 MG/DL (ref 0.76–1.27)
CREAT UR-MCNC: 43 MG/DL
GLOBULIN SER CALC-MCNC: 3 G/DL (ref 1.5–4.5)
GLUCOSE SERPL-MCNC: 125 MG/DL (ref 65–99)
HDLC SERPL-MCNC: 42 MG/DL
INTERPRETATION, 910389: NORMAL
INTERPRETATION: NORMAL
LDLC SERPL CALC-MCNC: 51 MG/DL (ref 0–99)
Lab: NORMAL
MICROALBUMIN UR-MCNC: 59 UG/ML
PDF IMAGE, 910387: NORMAL
POTASSIUM SERPL-SCNC: 3.9 MMOL/L (ref 3.5–5.2)
PROT SERPL-MCNC: 6.9 G/DL (ref 6–8.5)
SODIUM SERPL-SCNC: 143 MMOL/L (ref 134–144)
T4 FREE SERPL-MCNC: 1.72 NG/DL (ref 0.82–1.77)
TRIGL SERPL-MCNC: 78 MG/DL (ref 0–149)
TSH SERPL DL<=0.005 MIU/L-ACNC: 0.96 UIU/ML (ref 0.45–4.5)
VLDLC SERPL CALC-MCNC: 16 MG/DL (ref 5–40)

## 2019-06-13 NOTE — PROGRESS NOTES
Goals      Reduce risk of CHF exacerbations and complications. 3/29/19 Attempted to contact patient and EC. Unable to reach message left on mobile number with no return call. Home number listed does not connect. Get in contact letter mailed to address on file. Will outreach next week. [facility]    3/29/19@ 1553 Patient reports weight as 234.4 and he entered into ipad. Denies SOB, chest pain, edema. Patient will continue to monitor CHF S&S and enter weights into ipad. Will follow up with patient next week. [facility]    4/4/19 Patient reports weight yesterday as 232.4 and today is 234.2. Denies SOB, chest pain, abdominal and ankle swelling. He has watched diet closely today . Patient will continue to monitor CHF S&S and report on Ipad and writer at next week outreach. [facility]    4/10/19 Patient reports He saw his Nephrologist last week and was started on Metolazone Tuesday, Thursday and Saturday, and Bumex 3 mg daily until he is 230 lbs, then decrease to Bumex 2 mg daily He will follow up with nephrology on 4/11/19. Denies CHF S&S. Will follow up with patient next week and he will report CHF S&S and weights. [facility]    4/15/19 Patient reports weight today as 231. He is following low Na diet, denies SOB, has minimal edema. He is currently taking Metolazone Tuesday, Thursday and Saturday, and Bumex 3 mg daily until he is 230 lbs, then decrease to Bumex 2 mg daily. Will monitor CHF S&S and report to writer at [facility] in 4 days. [facility]    4/19/19 Patient reports weight is 231 today. He eats lots of salad and does not exercise. Patient has agreed to swap dressing for lighter versions. And to do a minium of 2 laps around his home everyday and report at next week outreach. [facility]    4/24/19 Patient reports feeling the best he has in a long time. Weight is 229.2. Has began making laps but did not disclose the number. Patient will continue to walk and increase number of laps by 2 and report at next week outreach.  [facility]    5/2/19 Per ipad reading patient weigh has been 228-230 lbs. Patient reports he is following low Na diet. Currently out to lunch with daughter. Patient advised he has completed Ipad program and will need to return in box provided. Patient agreed to return ipad and continue to monitor weight and record to report at next week outreach. \[facility]\""    5/21/19 Patient reports weight is 231 today. Patient denies SOB, chest pain, fever. Saw dietician but at this time remains the same. She provided him with information and exercises that he has started. Patient informed writer he will be out of town from 5/23/19-6/10/19. Request no call while away. Writer reminded patient to continue with low Na diet and weighing daily even while on vacation. Will outreach patient in 3 weeks. \[facility]\""    6/13/19 Patient reports weight is stable at 233. Patient denies SOB, edema, chest pain, and increase in pillow use. Patient stated he walked while on vacation but is not at home. Patient agrees to begin walking in home 1-2 laps 3/7 days this week. He will report at next week outreach.  \[facility]\""

## 2019-06-14 NOTE — PROGRESS NOTES
Diabetes labs reviewed,   Sending patient a lab letter,   Elana Tabor a copy to Dr. Sadia Zavala.  [address] [facility]  [address] [place] [address]

## 2019-06-18 RX ORDER — LEVOTHYROXINE SODIUM 137 UG/1
TABLET ORAL
Qty: 90 TAB | Refills: 3 | Status: SHIPPED | OUTPATIENT
Start: 2019-06-18 | End: 2020-07-13

## 2019-06-21 RX ORDER — BUMETANIDE 2 MG/1
TABLET ORAL
Qty: 135 TAB | Refills: 5 | OUTPATIENT
Start: 2019-06-21

## 2019-06-24 ENCOUNTER — PATIENT OUTREACH (OUTPATIENT)
Dept: FAMILY MEDICINE CLINIC | Age: 69
End: 2019-06-24

## 2019-06-24 RX ORDER — PRAVASTATIN SODIUM 80 MG/1
TABLET ORAL
Qty: 90 TAB | Refills: 0 | Status: SHIPPED | OUTPATIENT
Start: 2019-06-24 | End: 2019-09-21 | Stop reason: SDUPTHER

## 2019-06-24 NOTE — PROGRESS NOTES
Patient has graduated from the Disease Specific Care Management  program on 6/24/19. Patient's symptoms are stable at this time. Patient/family has the ability to self-manage. Care management goals have been completed at this time. No further nurse navigator follow up scheduled. Pt has nurse navigator's contact information for any further questions, concerns, or needs.   Patients upcoming visits:    Future Appointments   Date Time Provider James Gayle   6/26/2019 10:45 AM [facility] WOUND CARE [facility]   7/31/2019  8:00 AM REMOTE_RCA [facility] DONNA SCHED   10/16/2019 10:50 AM Julien Walker MD [facility]   11/19/2019 10:00 AM Sherwin Silva  [facility]   12/5/2019  3:15 PM PACEMAKER, Stuart Pink DONNA SCHED   12/5/2019  3:40 PM Juanjo Jacobs NP [address] [facility]

## 2019-06-26 ENCOUNTER — HOSPITAL ENCOUNTER (OUTPATIENT)
Dept: WOUND CARE | Age: 69
Discharge: HOME | End: 2019-06-26
Payer: MEDICARE

## 2019-06-26 VITALS
RESPIRATION RATE: 18 BRPM | TEMPERATURE: 98.2 F | SYSTOLIC BLOOD PRESSURE: 176 MMHG | DIASTOLIC BLOOD PRESSURE: 94 MMHG | HEART RATE: 76 BPM

## 2019-06-26 DIAGNOSIS — L89.312 STAGE II PRESSURE ULCER OF RIGHT BUTTOCK (HCC): ICD-10-CM
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
## 2019-06-26 PROCEDURE — 74011000250 HC RX REV CODE- 250: Performed by: ORTHOPAEDIC SURGERY

## 2019-06-26 PROCEDURE — 99214 OFFICE O/P EST MOD 30 MIN: CPT

## 2019-06-26 RX ADMIN — Medication: at 10:56

## 2019-06-26 NOTE — WOUND CARE
06/26/19 1053 Wound Buttocks Right Date First Assessed/Time First Assessed: 06/26/19 1052   Wound Approximate Age at First Assessment (Weeks): 4 weeks  Primary Wound Type: (c) Other (comment)  Location: Buttocks  Wound Location Orientation: Right Dressing Status Other (Comment) (open to air) Non-staged Wound Description Partial thickness Wound Length (cm) 0.5 cm Wound Width (cm) 0.25 cm Wound Depth (cm) 0.1 cm Wound Volume (cm^3) 0.01 cm^3 Condition of Base Pink Drainage Amount None Wound Odor None Cleansing and Cleansing Agents  Normal saline Visit Vitals BP (!) 176/94 (BP 1 Location: Right arm, BP Patient Position: Sitting) Pulse 76 Temp 98.2 °F (36.8 °C) Resp 18

## 2019-06-26 NOTE — WOUND CARE
06/26/19 1120 Wound Buttocks Right Date First Assessed/Time First Assessed: 06/26/19 1052   Wound Approximate Age at First Assessment (Weeks): 4 weeks  Primary Wound Type: (c) Other (comment)  Location: Buttocks  Wound Location Orientation: Right Dressing Type Applied (Duoderm) Patient was discharged with Self to home and was ambulatory/walker. In stable condition with c/o pain:0__/10_

## 2019-06-26 NOTE — PROGRESS NOTES
Wound Center  Progress Note     Date of Service: 6/26/2019      Date of Initial Visit for Current Problem:  6/26/2019     Subjective:      Chief Complaint:  Kenia Hernandez a 78 y.o. abese, diabetic  male who presents with a small, chronic decubitus on his right buttock. He is treating with Silvia, wonders what else he can do.     Referred by:  Self     HPI:     Wound caused by: See above. Current wound care:  Offloading wound: not really  Appetite: good  Wound associated pain: mild  Diabetic: Yes  Smoker: No             Past Medical History:   Diagnosis Date    Arrhythmia       atrial fibrillation 2017    CAD (coronary atherosclerotic disease)      Chronic pain      Diabetes ([address]      Diabetes mellitus type 2, controlled ([facility] 3/13/2017    Heart failure ([facility]      Hx of CABG 3/13/2017     CABG in 2010 in [address] [place] [address] Hypertension      Morbid obesity ([facility] 3/13/2017    JACEY (obstructive sleep apnea) 6/23/2017    S/P CABG x 2 2010    Thyroid disease                  Past Surgical History:   Procedure Laterality Date    CARDIAC SURG PROCEDURE UNLIST         CABGx2 in 2010    HX ORTHOPAEDIC         L 3rd toe amputation in 1999                   Allergies   Allergen Reactions    Allopurinol Rash    Gabapentin Drowsiness    Ciprofloxacin Nausea Only    Percocet [Oxycodone-Acetaminophen] Other (comments)       hallucinations         Review of Systems:  A comprehensive review of systems was negative except for that written in the History of Present Illness. and PMH.       Objective:      Physical Exam:      Visit Vitals:  VSS, Afebrile                             General: well developed, very well nourished, pleasant , NAD. Hygiene good  Psych: cooperative. Pleasant. No anxiety or depression. Normal mood and affect. Neuro: alert and oriented to person/place/situation. Otherwise nonfocal.  HEENT: Normocephalic, atraumatic. EOMI. Conjunctiva clear. No scleral icterus.    Chest: Respirations nonlabored. Abdomen:  Nondistended.     Lower extremities: color normal; temperature normal. Hair growth is not present. Calves are supple, nontender, approximately equally sized in comparison.      Had formal ABIs in 2017 - incompressible vessels, TBI = 0.64.     Ulcer Location: Right buttock   Etiology: Pressure  Wound size: 0.5 x 0.25 x 0.1 cm  Partial thickness skin. No subQ showing. Periwound: normal     Assessment:      78 y. o. male with small stage 2 decubitus, right buttock.        Plan:     Offloading - try memory foam donut. Duoderm, change every 5 days or so.   Follow up in 2 weeks.         Signed By: Randal Morrow MD

## 2019-07-08 ENCOUNTER — HOSPITAL ENCOUNTER (OUTPATIENT)
Dept: WOUND CARE | Age: 69
Discharge: HOME | End: 2019-07-08
Payer: MEDICARE

## 2019-07-08 VITALS
TEMPERATURE: 97.4 F | HEART RATE: 76 BPM | RESPIRATION RATE: 18 BRPM | DIASTOLIC BLOOD PRESSURE: 79 MMHG | SYSTOLIC BLOOD PRESSURE: 121 MMHG
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
## 2019-07-08 DIAGNOSIS — L89.312 STAGE II PRESSURE ULCER OF RIGHT BUTTOCK (HCC): ICD-10-CM

## 2019-07-08 PROCEDURE — 17250 CHEM CAUT OF GRANLTJ TISSUE: CPT

## 2019-07-08 PROCEDURE — 74011000250 HC RX REV CODE- 250: Performed by: ORTHOPAEDIC SURGERY

## 2019-07-08 RX ORDER — METOPROLOL SUCCINATE 100 MG/1
TABLET, EXTENDED RELEASE ORAL
Qty: 135 TAB | Refills: 1 | Status: SHIPPED | OUTPATIENT
Start: 2019-07-08 | End: 2019-09-24 | Stop reason: SDUPTHER

## 2019-07-08 RX ADMIN — Medication: at 14:07

## 2019-07-08 NOTE — WOUND CARE
07/08/19 1443 Wound Buttocks Right Date First Assessed/Time First Assessed: 06/26/19 1052   Wound Approximate Age at First Assessment (Weeks): 4 weeks  Primary Wound Type: (c) Other (comment)  Location: Buttocks  Wound Location Orientation: Right Dressing Type Applied (Aqucel Ag, border foam) Wound Procedure Type Other Procedure Time Out 4761 Consent Obtained  Yes Procedure Bleeding None Procedure Hemostasis  Silver Nitrate Procedure Pain Scale Numeric 0/10 Debridement Procedure Performed by DR Eliana Bautista Post Procedure Pain Scale Numeric 0/10 Procedure Tolerated Well Patient was discharged with Self to home and was with walker. In stable condition with c/o pain:__0/10_

## 2019-07-08 NOTE — PROGRESS NOTES
Wound Center  Progress Note     Date of Service: 7/8/2019      Date of Initial Visit for Current Problem:  6/26/2019     Subjective:      Chief Complaint:  Kalin Jenkins a 78 y. o. obese, diabetic  male who presents with a small, chronic decubitus on his right buttock. He is treating with Silvia, wonders what else he can do.     Referred by:  Self     HPI:     Wound caused by: See above. Current wound care:  Offloading wound: not really  Appetite: good  Wound associated pain: mild  Diabetic: Yes  Smoker: No             Past Medical History:   Diagnosis Date    Arrhythmia       atrial fibrillation 2017    CAD (coronary atherosclerotic disease)      Chronic pain      [facility]      Diabetes mellitus type 2, controlled ([facility] 3/13/2017    Heart failure ([facility]      Hx of CABG 3/13/2017     CABG in 2010 in [address] [place] [address] Hypertension      Morbid obesity ([facility] 3/13/2017    JACEY (obstructive sleep apnea) 6/23/2017    S/P CABG x 2 2010    Thyroid disease                  Past Surgical History:   Procedure Laterality Date    CARDIAC SURG PROCEDURE UNLIST         CABGx2 in 2010    HX ORTHOPAEDIC         L 3rd toe amputation in 1999                   Allergies   Allergen Reactions    Allopurinol Rash    Gabapentin Drowsiness    Ciprofloxacin Nausea Only    Percocet [Oxycodone-Acetaminophen] Other (comments)       hallucinations         Review of Systems:  A comprehensive review of systems was negative except for that written in the History of Present Illness. and PMH.       Objective:      Physical Exam:      Visit Vitals:  VSS, Afebrile                             General: well developed, very well nourished, pleasant , NAD. Hygiene good  Psych: cooperative. Pleasant. No anxiety or depression. Normal mood and affect. Neuro: alert and oriented to person/place/situation. Otherwise nonfocal.  HEENT: Normocephalic, atraumatic. EOMI. Conjunctiva clear. No scleral icterus.    Chest: Respirations nonlabored. Abdomen:  Nondistended.     Lower extremities: color normal; temperature normal. Hair growth is not present. Calves are supple, nontender, approximately equally sized in comparison.      Had formal ABIs in 2017 - incompressible vessels, TBI = 0.64.     Ulcer Location: Right buttock   Etiology: Pressure  Wound size: 0.5 x 0.25 x 0.1 cm  Partial thickness skin. No subQ showing. New split in skin near wound. Periwound: mildly erythematous.     Assessment:      78 y. o. male with small stage 2 decubitus, right buttock.        Plan:      Offloading - try memory foam donut. Silver nitrate placed on open wounds.   Aquacell Ag, border foam.  Follow up in 2 weeks.         Signed By: Cheri Blackburn MD

## 2019-07-08 NOTE — WOUND CARE
07/08/19 1403 Wound Buttocks Right Date First Assessed/Time First Assessed: 06/26/19 1052   Wound Approximate Age at First Assessment (Weeks): 4 weeks  Primary Wound Type: (c) Other (comment)  Location: Buttocks  Wound Location Orientation: Right Dressing Status (open to air) Pressure Injury Stage 2 Wound Length (cm) 0.5 cm Wound Width (cm) 0.25 cm Wound Depth (cm) 0.1 cm Wound Volume (cm^3) 0.01 cm^3 Condition of Base Pink Drainage Amount None Wound Odor None Cleansing and Cleansing Agents  Normal saline Visit Vitals /79 (BP 1 Location: Right arm, BP Patient Position: Sitting) Pulse 76 Temp 97.4 °F (36.3 °C) Resp 18

## 2019-07-19 ENCOUNTER — DOCUMENTATION ONLY (OUTPATIENT)
Dept: DERMATOLOGY | Facility: AMBULATORY SURGERY CENTER | Age: 69
End: 2019-07-19

## 2019-07-19 ENCOUNTER — OFFICE VISIT (OUTPATIENT)
Dept: DERMATOLOGY | Facility: AMBULATORY SURGERY CENTER | Age: 69
End: 2019-07-19

## 2019-07-19 VITALS
SYSTOLIC BLOOD PRESSURE: 132 MMHG | TEMPERATURE: 99.3 F | BODY MASS INDEX: 34.8 KG/M2 | WEIGHT: 235 LBS | HEIGHT: 69 IN | HEART RATE: 75 BPM | DIASTOLIC BLOOD PRESSURE: 78 MMHG | OXYGEN SATURATION: 99 %

## 2019-07-19 DIAGNOSIS — L57.0 ACTINIC KERATOSIS: Primary | ICD-10-CM

## 2019-07-19 NOTE — PROGRESS NOTES
Robin Austin is a 78 y.o. male presents to the office today with the following:  Chief Complaint   Patient presents with    Skin Exam     Consult MOHS surgery       Allergies   Allergen Reactions    Allopurinol Rash    Gabapentin Drowsiness    Ciprofloxacin Nausea Only    Percocet [Oxycodone-Acetaminophen] Other (comments)     hallucinations     Current Outpatient Medications   Medication Sig    metoprolol succinate (TOPROL-XL) 100 mg tablet TAKE 1.5 TABS BY MOUTH EVERY DAY    metoprolol succinate (TOPROL-XL) 100 mg tablet TAKE 1.5 TABS BY MOUTH EVERY DAY    pravastatin (PRAVACHOL) 80 mg tablet TAKE 1 TABLET BY MOUTH IN THE EVENING    apixaban (ELIQUIS) 5 mg tablet TAKE 1 TABLET BY MOUTH TWICE A DAY    levothyroxine (SYNTHROID) 137 mcg tablet TAKE 1 TABLET(S) EVERY DAY BY ORAL ROUTE FOR 90 DAYS.  ferrous sulfate (IRON) 325 mg (65 mg iron) tablet Take 325 mg by mouth Daily (before breakfast).  insulin aspart protamine/insulin aspart (NOVOLOG MIX 70/30) 100 unit/mL (70-30) inpn 34 units breakfast, 34 units dinner + correction scale (up to 80 units/day) (Patient taking differently: 34 units breakfast, 28 units dinner + correction scale (up to 80 units/day))    bumetanide (BUMEX) 1 mg tablet 3 tabs BID    metoprolol succinate (TOPROL XL) 50 mg XL tablet Take 50 mg by mouth daily. Patient takes 150mg total daily    potassium chloride SR (K-TAB) 20 mEq tablet Take 20 mEq by mouth daily. Patient takes 40 meq every Monday/Wednesday/Friday,   And 20 meq every Tuesday, Thursday, Saturday and Sunday    dutasteride (AVODART) 0.5 mg capsule Take 0.5 mg by mouth daily.  metoprolol succinate (TOPROL-XL) 100 mg tablet Take 100 mg by mouth daily. Patient takes 150mg total daily    MYRBETRIQ 25 mg ER tablet TAKE 1 TABLET BY MOUTH EVERY DAY    febuxostat (ULORIC) 40 mg tab tablet Take 40 mg by mouth daily.     ketoconazole (NIZORAL) 2 % topical cream Apply  to affected area two (2) times daily as needed for Skin Irritation. Indications: rash    metOLazone (ZAROXOLYN) 2.5 mg tablet Take 2.5 mg by mouth daily. Indications: TAKING TUESDAY AND SATURDAY    tamsulosin (FLOMAX) 0.4 mg capsule TAKE 2 CAPSULES BY MOUTH EVERY DAY    multivitamin (ONE A DAY) tablet Take 1 Tab by mouth daily.  acetaminophen (TYLENOL EXTRA STRENGTH) 500 mg tablet Take 1,000 mg by mouth every eight (8) hours as needed for Pain.  traMADol (ULTRAM) 50 mg tablet Take 50 mg by mouth every six (6) hours as needed for Pain. Indications: Prescribed by Dentist after Dental/Oral Procedure. No current facility-administered medications for this visit.         Past Medical History:   Diagnosis Date    Arrhythmia     atrial fibrillation 2017    CAD (coronary atherosclerotic disease)     Chronic pain     [facility]     Diabetes mellitus type 2, controlled ([facility] 3/13/2017    Heart failure ([facility]     Hx of CABG 3/13/2017    CABG in 2010 in [address] Hypertension     Morbid obesity ([facility] 3/13/2017    JACEY (obstructive sleep apnea) 6/23/2017    S/P CABG x 2 2010    Skin cancer     Thyroid disease      Past Surgical History:   Procedure Laterality Date    CARDIAC SURG PROCEDURE UNLIST      CABGx2 in 2010    HX ORTHOPAEDIC      L 3rd toe amputation in 1999     Social History     Socioeconomic History    Marital status:      Spouse name: Not on file    Number of children: Not on file    Years of education: Not on file    Highest education level: Not on file   Tobacco Use    Smoking status: Never Smoker    Smokeless tobacco: Never Used   Substance and Sexual Activity    Alcohol use: Yes     Comment: rare    Drug use: No   Other Topics Concern     Family History   Problem Relation Age of Onset    Heart Attack Father     No Known Problems Mother           78-year-old  male presents for consultation prior to undergoing surgical treatment of a lesion on the left side of face and was referred by Mayito Chamberlain NP. She recently biopsied a lesion on the left side of the face, left lateral cheek, that was found to be actinic keratosis at least on histology. The comment for the histology noted that the base of the lesion was transected and that squamous cell carcinoma could not be ruled out. This is the patient's first potential skin cancer. It has been present for several years and has been frozen a couple of times. It was tender prior to biopsy and is . There is an additional scaly lesion behind it that is been present for approximately the same amount of time it has never been biopsied or treated. The patient takes Eliquis for A. fib. He has a pacemaker. He does not have any artificial joints or heart valves. He does not have any allergies to any antibiotics or lidocaine. He does not have hepatitis C or HIV. Review of Systems   Constitutional: Negative for chills, fever and weight loss. Eyes: Negative for blurred vision. Respiratory: Negative for shortness of breath. Cardiovascular: Negative for chest pain and leg swelling. Gastrointestinal: Positive for diarrhea. Negative for nausea and vomiting. Musculoskeletal: Positive for joint pain. Neurological: Negative for headaches. Endo/Heme/Allergies: Bruises/bleeds easily. Visit Vitals  /78 (BP 1 Location: Left arm, BP Patient Position: Sitting)   Pulse 75   Temp 99.3 °F (37.4 °C) (Oral)   Ht 5' 9\" (1.753 m)   Wt 106.6 kg (235 lb)   SpO2 99%   BMI 34.70 kg/m²     Physical Exam   Constitutional: He is oriented to person, place, and time and well-developed, well-nourished, and in no distress. HENT:   Head: Normocephalic. Eyes: EOM are normal.   Pulmonary/Chest: Effort normal.   Neurological: He is alert and oriented to person, place, and time. Skin:   On the left lateral cheek there is a pink scar with an adjacent scaly papule. There are scattered hypopigmented patches on the bilateral cheeks.   There are scattered brown stuck on papules on the bilateral cheeks. 1. Actinic keratosis-transected on biopsy but suspicious for invasive SCC  This lesion will be rebiopsied on the date of surgery. If invasive SCC is found the lesion in question and the clinically apparent scaly keratotic plaque located posteriorly will be treated with Mohs surgery. Given the size, location and indistinct clinical margins of the tumor the appropriate treatment is Mohs micrographic surgery. I discussed the procedure with the patient today including the risks such as bleeding, scar, infection, damage to underlying sensory/motor nerves and blood vessels. We discussed expectations for day of surgery, immediate postop recovery and long-term scar maturation. I outlined the clinical margins of the tumor to show the patient in the mirror, and the patient agrees that the appropriate site is identified. I also discussed potential repair options including primary closure and advancement flap. We discussed the immediate postop period and the potential for developing postoperative ecchymosis and edema. Additional reconstructive options that were discussed included referral to facial plastics. All of the patient's questions were answered today and I am happy to speak via telephone regarding any additional questions. Follow-up and Dispositions    · Return in about 2 weeks (around 8/2/2019) for Excision or Mohs.          Lovely Hodges MD

## 2019-07-19 NOTE — PROGRESS NOTES
Mohs Pre-Op Assessment    Patient Appointment Date: 8/12/19 0800    Farhad Mcneil, 78 y.o., male      does confirm site: left side of face  Brief description of tumor: scc  does ID site. (Can they still visibly see the site)  does not have Hepatitis C   does not have HIV (If YES, set up consult appointment)    Allergies: Allergies   Allergen Reactions    Allopurinol Rash    Gabapentin Drowsiness    Ciprofloxacin Nausea Only    Percocet [Oxycodone-Acetaminophen] Other (comments)     hallucinations       does have an Electrical Implanted Device (Pacemaker, AICD, brain stimulator, etc.)    does not need antibiotics  If yes, antibiotic used:na; and needs it because na (valve replacement, etc.)  Can patient get antibiotic from primary care provider NO    is not taking NSAIDs  If yes, is taking na, and was asked to d/c 2 days prior to surgery and informed to resume taking 2 days after surgery. Patient can switch to a Tylenol based medication. is not taking aspirin  If yes, is taking because of na (i.e. heart attack, stroke, TIA, bypass surgery, etc.)    is not taking Garlic  is not taking Ginkgo  is not taking Ginseng  is not taking Fish oils  is not taking Vit E    does take a blood thinner(i.e. Coumadin/Warfarin, Plavix, Brilinta, Pradaxa, Xarelto, Effient, Eliquis, Savaysa)  If taking Coumadin needs to have PT/INR drawn and faxed results within a week of surgery    does not have a blood disorder (CLL, AML, PV)  is not on Meds for blood disorder, pt on:  is not on Chemo for blood disorder    has not had a organ transplant  has not had a bone marrow transplant      Pre operative assessment questions asked to patient. Patient has a general understanding of the procedure, and has been versed that there will be local anesthesia used in the procedure and that He will be ok to drive themselves to and from the appointment.      Patient has been notified to arrive 15-20 minutes early and they may eat or drink before arriving.

## 2019-07-22 ENCOUNTER — APPOINTMENT (OUTPATIENT)
Dept: WOUND CARE | Age: 69
End: 2019-07-22
Payer: MEDICARE

## 2019-07-26 ENCOUNTER — HOSPITAL ENCOUNTER (OUTPATIENT)
Dept: WOUND CARE | Age: 69
Discharge: HOME | End: 2019-07-26
Payer: MEDICARE

## 2019-07-26 VITALS — SYSTOLIC BLOOD PRESSURE: 142 MMHG | RESPIRATION RATE: 18 BRPM | HEART RATE: 75 BPM | DIASTOLIC BLOOD PRESSURE: 80 MMHG

## 2019-07-26 PROBLEM — L22 DIAPER OR NAPKIN RASH: Status: ACTIVE | Noted: 2019-07-26

## 2019-07-26 PROCEDURE — 99213 OFFICE O/P EST LOW 20 MIN: CPT

## 2019-07-26 NOTE — PROGRESS NOTES
[address]  WOUND CARE PROGRESS NOTE    Name:  Kelly Pepe  MR#:  [id number]  :  1950  ACCOUNT #:  [de-identified]  DATE OF SERVICE:  2019      SUBJECTIVE:  The patient returns for a longstanding problem with pain in the right medial buttocks, where he has intermittent stage II pressure ulcers L89.312, and diaper dermatitis L22. He has tried many different treatments including Tegaderm, DuoDerm, and Aquacel. He has a foam pad, he has a donut, he has an air-waffle pad, but unfortunately he has not been covering this with fabric. He presents now with persistent pain and he has been using wipes, which are over-the-counter Preparation H wipes. OBJECTIVE:  His temperature is 98, pulse 75, respirations 18, blood pressure 142/80. Examination of the right buttocks reveals 3 small punctate areas. There is no fluid, there is no pus, there is no induration, but it is exquisitely tender. ASSESSMENT AND PLAN:  I explained to the patient that he does have an issue with a stage II pressure ulcer L89.312, but upon questioning, he does have occasional loss of bowel movements and has a \"diaper\" dermatitis. Therefore, we want to offload this area as well as possible. I want him to use more breathable underwear, so he is not keeping moisture in the area. I want him to get barrier cream cloths and to wipe this area daily and after every bowel movement. He will then apply Venelex cream 3-4 times per day and I am going to see him again in followup in 2 weeks. He is going to do all he can to offload the area and keep all pressure off.   He is going to do everything to allow more air flow and to prevent fluid trapping and hopefully we will be able to get this area to finally heal.  To recall, in the past I even got an ultrasound of the area looking for a foreign body and that did not reveal anything abnormal and I have always thought that if he did not improve, he might need simple excision of the skin since he might have a small foreign body that has remained trapped beneath the skin, acting as a nidus for pressure and pain. I will see him in 2 weeks and make a determination at that time. All questions were answered. His condition on discharge is stable.       Luz Marina Ordaz MD      [id number]  D:  07/26/2019 12:07  T:  07/26/2019 12:17  JOB #:  [id number]

## 2019-07-26 NOTE — WOUND CARE
07/26/19 1107   Wound Buttocks Right   Date First Assessed/Time First Assessed: 06/26/19 1052   Wound Approximate Age at First Assessment (Weeks): 4 weeks  Primary Wound Type: (c) Other (comment)  Location: Buttocks  Wound Location Orientation: Right   Dressing Type Open to air   Wound Length (cm) 0.1 cm   Wound Width (cm) 0.1 cm   Wound Depth (cm) 0.1 cm   Wound Volume (cm^3) 0 cm^3   Condition of Base Pink   Drainage Amount None   Wound Odor None   Christy-wound Assessment Other (Comment)  (Thin, dry, friable.)   Cleansing and Cleansing Agents  Normal saline

## 2019-07-26 NOTE — WOUND CARE
07/26/19 1145   Wound Buttocks Right   Date First Assessed/Time First Assessed: 06/26/19 1052   Wound Approximate Age at First Assessment (Weeks): 4 weeks  Primary Wound Type: (c) Pressure Injury  Location: Buttocks  Wound Location Orientation: Right   Dressing Type Applied   (No dressing today, patient will apply Venelex later today)

## 2019-07-31 ENCOUNTER — OFFICE VISIT (OUTPATIENT)
Dept: CARDIOLOGY CLINIC | Age: 69
End: 2019-07-31

## 2019-07-31 DIAGNOSIS — Z95.0 CARDIAC PACEMAKER IN SITU: Primary | ICD-10-CM

## 2019-07-31 DIAGNOSIS — I42.9 CARDIOMYOPATHY, UNSPECIFIED TYPE (HCC): ICD-10-CM

## 2019-08-09 ENCOUNTER — HOSPITAL ENCOUNTER (OUTPATIENT)
Dept: WOUND CARE | Age: 69
Discharge: HOME | End: 2019-08-09
Payer: MEDICARE

## 2019-08-09 VITALS
DIASTOLIC BLOOD PRESSURE: 88 MMHG | RESPIRATION RATE: 18 BRPM | SYSTOLIC BLOOD PRESSURE: 148 MMHG | TEMPERATURE: 97.8 F | HEART RATE: 76 BPM

## 2019-08-09 DIAGNOSIS — L22 DIAPER OR NAPKIN RASH: ICD-10-CM
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
## 2019-08-09 DIAGNOSIS — L89.312 STAGE II PRESSURE ULCER OF RIGHT BUTTOCK (HCC): ICD-10-CM

## 2019-08-09 PROCEDURE — 74011000250 HC RX REV CODE- 250: Performed by: OTOLARYNGOLOGY

## 2019-08-09 PROCEDURE — 99213 OFFICE O/P EST LOW 20 MIN: CPT

## 2019-08-09 RX ADMIN — Medication: at 11:15

## 2019-08-09 NOTE — WOUND CARE
08/09/19 1103 Wound Buttocks Right Date First Assessed/Time First Assessed: 06/26/19 1052   Wound Approximate Age at First Assessment (Weeks): 4 weeks  Primary Wound Type: (c) Pressure Injury  Location: Buttocks  Wound Location Orientation: Right Dressing Type Open to air Wound Length (cm) 0.1 cm Wound Width (cm) 0.1 cm Wound Depth (cm) 0.1 cm Wound Volume (cm^3) 0 cm^3 Drainage Amount None Wound Odor None

## 2019-08-09 NOTE — PROGRESS NOTES
[address]  WOUND CARE PROGRESS NOTE    Name:  Renaee Essex  MR#:  [id number]  :  1950  ACCOUNT #:  [de-identified]  DATE OF SERVICE:  2019    SUBJECTIVE:  The patient returns for treatment of a stage II pressure ulcer of the right buttock, L89.312, with diaper dermatitis L22. He was last seen on . At that time, the area remained quite tender and there were 3 small skin tears. In the past, we had found a small foreign body and  I got an ultrasound but saw no further foreign bodies, but he continued to have significant pain over this area and occasional skin breakdown. He has been offloading the area. He is using more breathable underwear, so as not to trap moisture, and we started using barrier cream cloths and Venelex 3-4 times per day. Upon questioning, he feels that his pain is much improved and he is quite pleased with the progress that has been made. OBJECTIVE:  His temperature is 97.8, pulse is 76, respirations 18, blood pressure 148/88. Examination of the area of the buttock shows no open wound. There is a small plaque, but no open area, so everything has healed well. ASSESSMENT AND PLAN:  This area has been of concern of the patient with significant pain for a prolonged period of time. He is going to see Dr. Miller Sue for Mohs surgery on Monday, , and I have asked him to talk to Dr. Miller Sue at that time for him to evaluate this area and to consider excising it just to make sure there is not a foreign body or any other area of concern deep to the skin, which could be contributing to his pain. The patient understands that Dr. Miller Sue might say that the wound has healed, the pain is better, and he might simply want to follow this over time to see how things are doing or he might say, on the other hand, that this has been present for a prolonged period time and has still been a nuisance and an excision might be of benefit.     I will see the patient again in followup in 3 weeks. He will continue to adhere to the same regimen we had him on including the Venelex. All questions were answered. His condition on discharge is stable. Romayne Dare, MD      [id number]  D:  08/09/2019 11:29  T:  08/09/2019 11:38  JOB #:  [id number]  CC:   Flavio Be MD

## 2019-08-09 NOTE — WOUND CARE
08/09/19 1130 Wound Buttocks Right Date First Assessed/Time First Assessed: 06/26/19 1052   Wound Approximate Age at First Assessment (Weeks): 4 weeks  Primary Wound Type: (c) Pressure Injury  Location: Buttocks  Wound Location Orientation: Right Dressing Type Applied (Pt will apply Venelex when he returns home) Patient discharged to home via walker, denied pain at time of discharge. He will continue with venelex ointment 2 to 3 times daily and return to clinic in 3 weeks for MD follow-up.

## 2019-08-30 ENCOUNTER — HOSPITAL ENCOUNTER (OUTPATIENT)
Dept: WOUND CARE | Age: 69
Discharge: HOME | End: 2019-08-30
Payer: MEDICARE

## 2019-08-30 VITALS
RESPIRATION RATE: 18 BRPM | HEART RATE: 75 BPM | DIASTOLIC BLOOD PRESSURE: 77 MMHG | SYSTOLIC BLOOD PRESSURE: 136 MMHG | TEMPERATURE: 97.8 F

## 2019-08-30 PROCEDURE — 99212 OFFICE O/P EST SF 10 MIN: CPT

## 2019-08-30 RX ORDER — POLYETHYLENE GLYCOL 3350 17 G/17G
17 POWDER, FOR SOLUTION ORAL AS NEEDED
COMMUNITY
End: 2020-09-24

## 2019-08-30 NOTE — WOUND CARE
Clinic Level of Care Assessment    NAME:  Farhad Mcneil  YOB: 1950 GENDER: male  MEDICAL RECORD NUMBER:  [id number]   DATE:  8/30/2019      Wound Count Document in [facility]  Number of Wounds Assessed Points   No Wounds/Ulcers []   0   Less than Three Wounds/Ulcers [x]   1   3-6 Wounds/Ulcers []   2   Greater than 6 Wounds/Ulcers []   3     Ambulation Status Document in Coord/IVY/Mobility tab  Status Definition Points   Independent Independently able to ambulate. Fully able (without any assistance) to get on/off exam table/chair. []   0   Minimal Physical Assistance Requires physical assistance of one person to ambulate and/or position patient to be examined. Includes necessary physical assistance to position lower extremities on/off stool. [x]   1   Moderate Physical Assistance Requires at least one staff member to physically assist patient in ambulating into treatment room, and on/off exam table. []   2   Full Assistance Requires assistance of at least two staff members to transfer patient into treatment room and/or on/off exam table/chair. \"Total Transfer\". []   3     Dressing Complexity Document in LDA and Write Appropriate Order  Complexity Definition Points   No Dressing  [x]   0   Simple Minimal, simple dressing. i.e. Band-aid, gauze, simple wrap. []   1   Intermediate Moderately complicated requiring licensed personnel to apply i.e. collagen matrix, ointments, gels, alginates. []   2   Complex Complicated requiring licensed personnel to apply dressings 6 or more wounds. []   3     Teaching Effort Document in Education Tab   Effort Definition Points   No Teaching  []   0   Simple Reinforce two or less topics. Document in Education navigator. [x]   1   Intermediate Reinforce three to five topics and/or one additional   new topic. Document in Education navigator. []   2   Complex Teach more than one new topic.  New patient information   packet reviewed and/or reinforce more than three topics. Document in Education navigator. HBO initial instruction. []   3       Patient Assessment and Planning  Planning Definition Points   Simple Multiple System Simple: Simple follow-up with routine assessment and planning. If Discharged, instructions and long term/follow-up care given to patient/caregiver. Discharged, instructions and/or After Visit Summary given to patient/caregiver and instructions completed. [x]   1   Intermediate Multiple System Intermediate: Contact with outside resources; i.e. Telephone calls to home health, [facility]. May include filling out forms and writing letters, arranging transportation, communication with insurance , vendors, etc.  Discharged, instructions and/or After Visit Summary given to patient/caregiver and instructions completed. []   2   Complex Multiple System Complex: Full, comprehensive assessment and planning. Follow the entire navigator under Wound Visit charting filling out each tab which includes OP Adm Database Screening, Education and CarePlan  HBO risk assessment completed. Discharged, instructions and/or After Visit Summary given to patient/caregiver and instructions completed.    []   3           Is this the Patient's First Visit to the [address] [place] [address] Road  Yes      Is this Patient Established @ [facility]  Yes             Clinical Level of Care      Points  0-2  Level 1 []     Points  3-5  Level 2 [x]     Points  6-9  Level 3 []     Points  10-12  Level 4 []     Points  13-15  Level 5 []       Electronically signed by Héctor Evans RN on 8/30/2019 at 1:57 PM

## 2019-08-30 NOTE — PROGRESS NOTES
[address]  WOUND CARE PROGRESS NOTE    Name:  Lotus Barrett  MR#:  [id number]  :  1950  ACCOUNT #:  [de-identified]  DATE OF SERVICE:  2019    SUBJECTIVE:  The patient returns for concerns about an area of pain on the right medial buttocks. This has been present for more than one year. We have tried multiple modalities and are currently using Venelex, which seems to help more than anything but he still has pain and tenderness over this area. At one point, I debrided the area and a small black piece of hard foreign material, possibly a sticker, was removed. I saw nothing more on further inspection and obtained an ultrasound, showing no evidence of a retained foreign body. Nevertheless, the patient has exquisite tenderness over this area in spite of offloading it by using an air-waffle pad and instructions to keep pressure off of it. He was last seen 3 weeks ago. At that time, the area of concern felt great and looked well. He now presents complaining of pain again. OBJECTIVE:  His temperature is 97.8, pulse 75, respirations 18, blood pressure 136/77. WOUND EXAMINATION:  Examination of the right medial buttocks just shows dry desquamated adherent skin. This was gently removed with a gauze. There is a small fissure from where the adherent skin was in contact with the underlying skin. I do not feel a foreign body, I do not see any foreign body, and there is no actual wound. ASSESSMENT/PLAN:  I explained to the patient that we have tried everything I am capable of doing here. What I think would possibly be of most benefit would be an elliptical excision just in the event that there is a foreign body deep in this area that I cannot see that is causing the persistent and recurrent pain.   Since I do not have the capacity to do that here, nor I do not have the instruments nor the exposure, I am asking him to see his dermatologist, Dr. Bere Hernandez for her input and treatment of this area. I will see the patient again on a p.r.n. Basis, since if Dr. Brandi Edwards is going to be treating him there will be no need to make him come here also. On the other hand, if she is unsuccessful in bringing this issue to closure, I will see him again in followup. All questions were answered. His condition on discharge is stable.       Emily Jackson MD      [id number]  D:  08/30/2019 12:00  T:  08/30/2019 12:09  JOB #:  [id number]  CC:  Neil Bryan MD

## 2019-09-03 ENCOUNTER — TELEPHONE (OUTPATIENT)
Dept: CARDIOLOGY CLINIC | Age: 69
End: 2019-09-03

## 2019-09-03 NOTE — TELEPHONE ENCOUNTER
Please call Ty with [facility] [phone].     Did we rcvd fax for cardiac clearance    Thanks  Marcianne Meckel

## 2019-09-04 NOTE — TELEPHONE ENCOUNTER
Attempt to return call to Ty at [facility], not available. Was placed on hold for 15 min. Need to advise, Pt need appointment prior to clearance. LOV 4/9/2019  Pt has appointment 11/19/2019 which is prior to colonoscopy scheduled 11/26/2019  However, I havenot received clearance form.

## 2019-09-20 RX ORDER — METOPROLOL SUCCINATE 50 MG/1
TABLET, EXTENDED RELEASE ORAL
Qty: 90 TAB | Refills: 3 | OUTPATIENT
Start: 2019-09-20

## 2019-09-23 RX ORDER — PRAVASTATIN SODIUM 80 MG/1
TABLET ORAL
Qty: 90 TAB | Refills: 0 | Status: SHIPPED | OUTPATIENT
Start: 2019-09-23 | End: 2020-01-10

## 2019-09-24 RX ORDER — METOPROLOL SUCCINATE 50 MG/1
50 TABLET, EXTENDED RELEASE ORAL DAILY
Qty: 90 TAB | Refills: 3 | Status: SHIPPED | OUTPATIENT
Start: 2019-09-24 | End: 2020-08-27

## 2019-09-24 NOTE — TELEPHONE ENCOUNTER
Spoke to patient using 2 identifiers. Pt stated he takes metoprolol 150 mg daily. He takes 100 mg and a 50 mg daily.

## 2019-10-16 ENCOUNTER — OFFICE VISIT (OUTPATIENT)
Dept: ENDOCRINOLOGY | Age: 69
End: 2019-10-16

## 2019-10-16 VITALS
OXYGEN SATURATION: 98 % | TEMPERATURE: 97.6 F | HEART RATE: 75 BPM | HEIGHT: 69 IN | SYSTOLIC BLOOD PRESSURE: 148 MMHG | DIASTOLIC BLOOD PRESSURE: 86 MMHG | WEIGHT: 239.2 LBS | RESPIRATION RATE: 16 BRPM | BODY MASS INDEX: 35.43 KG/M2

## 2019-10-16 DIAGNOSIS — E11.9 CONTROLLED TYPE 2 DIABETES MELLITUS WITHOUT COMPLICATION, UNSPECIFIED WHETHER LONG TERM INSULIN USE (HCC): Primary | ICD-10-CM

## 2019-10-16 DIAGNOSIS — E78.5 HYPERLIPIDEMIA, UNSPECIFIED HYPERLIPIDEMIA TYPE: ICD-10-CM

## 2019-10-16 DIAGNOSIS — I10 ESSENTIAL HYPERTENSION WITH GOAL BLOOD PRESSURE LESS THAN 130/80: ICD-10-CM

## 2019-10-16 DIAGNOSIS — E03.9 HYPOTHYROIDISM, UNSPECIFIED TYPE: ICD-10-CM

## 2019-10-16 LAB — HBA1C MFR BLD HPLC: 6.6 %

## 2019-10-16 RX ORDER — PEN NEEDLE, DIABETIC 31 GX3/16"
NEEDLE, DISPOSABLE MISCELLANEOUS
Qty: 400 PEN NEEDLE | Refills: 3 | Status: SHIPPED | OUTPATIENT
Start: 2019-10-16 | End: 2021-03-19

## 2019-10-16 RX ORDER — INSULIN GLARGINE 100 [IU]/ML
INJECTION, SOLUTION SUBCUTANEOUS
Qty: 10 PEN | Refills: 3 | Status: SHIPPED | OUTPATIENT
Start: 2019-10-16 | End: 2019-10-16 | Stop reason: SDUPTHER

## 2019-10-16 RX ORDER — INSULIN GLARGINE 100 [IU]/ML
INJECTION, SOLUTION SUBCUTANEOUS
Qty: 10 PEN | Refills: 3 | Status: SHIPPED | OUTPATIENT
Start: 2019-10-16 | End: 2020-12-28

## 2019-10-16 RX ORDER — INSULIN ASPART 100 [IU]/ML
INJECTION, SOLUTION INTRAVENOUS; SUBCUTANEOUS
Qty: 10 PEN | Refills: 3 | Status: SHIPPED | OUTPATIENT
Start: 2019-10-16 | End: 2020-08-24

## 2019-10-16 NOTE — PATIENT INSTRUCTIONS
STOP Novolog 70-30:   34 units / 28 units Start Glargine (basaglar vs lantus) Insulin: 30 units once each bedtime Start Humalog/Novolog: 10 units with each meal 
 
Correction Scale: 1 unit for every 50 above 150 IF GLUCOSE IS:                 THEN TAKE: 
   0   Extra Unit 151-200   1   Extra Unit 201-250   2   Extra Units 251-300   3   Extra Units 301-350   4   Extra Units 351-400   5   Extra Units Example: My planned insulin dose:    ____ Units    +    ____ Extra Correction Units  =  ____ total units to take together as one injection. Please note our new policy, you must arrive to the clinic 15 minutes before your appointment time to allow enough time for proper check-in, adequate time to spend with your doctor, and also to respect the appointment time of the next patient. Not arriving 15 minutes in advance may result in having your appointment rescheduled for the next available day/time. 
---------------------------------------------------------------------------------------------------------------------- Below you will find a glucose log sheet which you can use to record your blood sugars. Without checking and recording what your home glucose levels are, it will be difficult to make any changes to your medication dose, even when significant changes may be needed. Please feel free to use the log below to record your home glucose levels. At the very least, I would like for you to login the entire 2-3 weeks just before your visit so we can make your visit much more productive and beneficial to you. GLUCOSE LOG SHEET: 
 
Date Breakfast Lunch Dinner Bedtime Comments ? GLUCOSE LOG SHEET: 
 
Date Breakfast Lunch Dinner Bedtime Comments ? GLUCOSE LOG SHEET: 
 
Date Breakfast Lunch Dinner Bedtime Comments ?

## 2019-10-16 NOTE — PROGRESS NOTES
Identified pt with two pt identifiers(name and ). Reviewed record in preparation for visit and have obtained necessary documentation. Chief Complaint   Patient presents with    Diabetes     Follow up      Vitals:    10/16/19 1153 10/16/19 1202   BP: (!) 152/92 148/86   BP 1 Location: Left arm Left arm   BP Patient Position: Sitting Sitting   Pulse: 75    Resp: 16    Temp: 97.6 °F (36.4 °C)    TempSrc: Oral    SpO2: 98%    Weight: 239 lb 3.2 oz (108.5 kg)    Height: 5' 9\" (1.753 m)      1. Have you been to the ER, urgent care clinic since your last visit? Hospitalized since your last visit? No    2. Have you seen or consulted any other health care providers outside of the [address] [place] [address] since your last visit? Include any pap smears or colon screening.  No

## 2019-10-16 NOTE — PROGRESS NOTES
CHIEF COMPLAINT: f/u uncontrolled type 2 diabetes    HISTORY OF PRESENT ILLNESS:   Merry Viveros is a 78 y.o. male with a PMHx as noted below who presents for evaluation of uncontrolled type 2 diabetes. POC A1c today is 6.6%. Labs up to date, needs a DM foot exam today. Regimen:  Novolog 70-30 insulin.     34 units with breakfast, 28 units with dinner  Correction of 1:50>150    Review of home glucose:   Log reviewed:   -200 mostly  Lunch: stable when checked  Dinner 160-200 mostly  Bedtime  mostly, 69 x 1    Hypothyroidism: On 137 mcg of levothyroxine  Vitamin D deficiency: was taken off D by nephrologist per patient    Review of most recent diabetes-related labs:  Lab Results   Component Value Date    HBA1C 7.0 (H) 04/10/2018    HBA1C 7.6 (H) 06/22/2017    HBA1C 8.0 (H) 03/13/2017    XYZ3KUQX 6.6 10/16/2019    FSP9MYQW 6.5 06/12/2019    NWD6JRRK 6.7 02/11/2019    CHOL 109 06/12/2019    LDLC 51 06/12/2019    LDL 43 04/03/2017    GFRAA 23 (L) 06/12/2019    GFRNA 20 (L) 06/12/2019    MCACR 137.2 (H) 06/12/2019    TSH 0.957 06/12/2019    VITD3 46.3 04/03/2017     Lab Key:  [id number] = IA-2 pancreatic islet cell autoantibody  CPEPL = C-peptide level  :EXT = External Lab  GADLT = ANGELIQUE-65 autoantibody   INSUL = Insulin level  MCACR (or MALBEXT) = Urine Microalbumin (or External UM)      PAST MEDICAL/SURGICAL HISTORY:   Past Medical History:   Diagnosis Date    Arrhythmia     atrial fibrillation 2017    CAD (coronary atherosclerotic disease)     Chronic pain     Diabetes ([address]     Diabetes mellitus type 2, controlled ([address] 3/13/2017    Heart failure ([address]     Hx of CABG 3/13/2017    CABG in 2010 in [address] Hypertension     Morbid obesity ([address] 3/13/2017    JACEY (obstructive sleep apnea) 6/23/2017    S/P CABG x 2 2010    Skin cancer     Thyroid disease      Past Surgical History:   Procedure Laterality Date    CARDIAC SURG PROCEDURE UNLIST      CABGx2 in 2010    HX ORTHOPAEDIC      L 3rd toe amputation in 1999       ALLERGIES:   Allergies   Allergen Reactions    Allopurinol Rash    Gabapentin Drowsiness    Ciprofloxacin Nausea Only    Percocet [Oxycodone-Acetaminophen] Other (comments)     hallucinations       MEDICATIONS ON ADMISSION:     Current Outpatient Medications:     metoprolol succinate (TOPROL XL) 50 mg XL tablet, Take 1 Tab by mouth daily. Patient takes 150mg total daily, Disp: 90 Tab, Rfl: 3    pravastatin (PRAVACHOL) 80 mg tablet, TAKE 1 TABLET BY MOUTH IN THE EVENING, Disp: 90 Tab, Rfl: 0    polyethylene glycol (MIRALAX) 17 gram packet, Take 17 g by mouth daily. , Disp: , Rfl:     apixaban (ELIQUIS) 5 mg tablet, TAKE 1 TABLET BY MOUTH TWICE A DAY, Disp: 180 Tab, Rfl: 3    levothyroxine (SYNTHROID) 137 mcg tablet, TAKE 1 TABLET(S) EVERY DAY BY ORAL ROUTE FOR 90 DAYS., Disp: 90 Tab, Rfl: 3    ferrous sulfate (IRON) 325 mg (65 mg iron) tablet, Take 325 mg by mouth Daily (before breakfast). , Disp: , Rfl:     insulin aspart protamine/insulin aspart (NOVOLOG MIX 70/30) 100 unit/mL (70-30) inpn, 34 units breakfast, 34 units dinner + correction scale (up to 80 units/day) (Patient taking differently: 34 units breakfast, 28 units dinner + correction scale (up to 80 units/day)), Disp: 25 Pen, Rfl: 3    bumetanide (BUMEX) 1 mg tablet, 3 tabs BID, Disp: , Rfl: 3    potassium chloride SR (K-TAB) 20 mEq tablet, Take 20 mEq by mouth daily. Patient takes 40 meq every Monday/Wednesday/Friday,  And 20 meq every Tuesday, Thursday, Saturday and Sunday, Disp: , Rfl:     dutasteride (AVODART) 0.5 mg capsule, Take 0.5 mg by mouth daily. , Disp: , Rfl:     metoprolol succinate (TOPROL-XL) 100 mg tablet, Take 100 mg by mouth daily. Patient takes 150mg total daily, Disp: , Rfl:     MYRBETRIQ 25 mg ER tablet, TAKE 1 TABLET BY MOUTH EVERY DAY, Disp: , Rfl: 12    febuxostat (ULORIC) 40 mg tab tablet, Take 40 mg by mouth daily. , Disp: , Rfl:     ketoconazole (NIZORAL) 2 % topical cream, Apply  to affected area two (2) times daily as needed for Skin Irritation. Indications: rash, Disp: , Rfl:     metOLazone (ZAROXOLYN) 2.5 mg tablet, Take 2.5 mg by mouth daily. Indications: TAKING TUESDAY AND SATURDAY, Disp: , Rfl:     tamsulosin (FLOMAX) 0.4 mg capsule, TAKE 2 CAPSULES BY MOUTH EVERY DAY, Disp: , Rfl: 2    multivitamin (ONE A DAY) tablet, Take 1 Tab by mouth daily. , Disp: , Rfl:     acetaminophen (TYLENOL EXTRA STRENGTH) 500 mg tablet, Take 1,000 mg by mouth every eight (8) hours as needed for Pain., Disp: , Rfl:     traMADol (ULTRAM) 50 mg tablet, Take 50 mg by mouth every six (6) hours as needed for Pain.  Indications: Prescribed by Dentist after Dental/Oral Procedure., Disp: , Rfl:     SOCIAL HISTORY:   Social History     Socioeconomic History    Marital status:      Spouse name: Not on file    Number of children: Not on file    Years of education: Not on file    Highest education level: Not on file   Occupational History    Not on file   Social Needs    Financial resource strain: Not on file    Food insecurity:     Worry: Not on file     Inability: Not on file    Transportation needs:     Medical: Not on file     Non-medical: Not on file   Tobacco Use    Smoking status: Never Smoker    Smokeless tobacco: Never Used   Substance and Sexual Activity    Alcohol use: Yes     Comment: rare    Drug use: No    Sexual activity: Not on file   Lifestyle    Physical activity:     Days per week: Not on file     Minutes per session: Not on file    Stress: Not on file   Relationships    Social connections:     Talks on phone: Not on file     Gets together: Not on file     Attends Church service: Not on file     Active member of club or organization: Not on file     Attends meetings of clubs or organizations: Not on file     Relationship status: Not on file    Intimate partner violence:     Fear of current or ex partner: Not on file     Emotionally abused: Not on file     Physically abused: Not on file     Forced sexual activity: Not on file   Other Topics Concern     Service Not Asked    Blood Transfusions Not Asked    Caffeine Concern Not Asked    Occupational Exposure Not Asked    Hobby Hazards Not Asked    Sleep Concern Not Asked    Stress Concern Not Asked    Weight Concern Not Asked    Special Diet Not Asked    Back Care Not Asked    Exercise Not Asked    Bike Helmet Not Asked   [address] Not Asked    Self-Exams Not Asked   Social History Narrative    Not on file       FAMILY HISTORY:  Family History   Problem Relation Age of Onset    Heart Attack Father     No Known Problems Mother        REVIEW OF SYSTEMS: Complete ROS assessed and noted for that which is described above, all else are negative.   Eyes: normal  ENT: normal  CVS: normal  Resp: normal  GI: normal  : normal  GYN: normal  Endocrine: normal  Integument: normal  Musculoskeletal: normal  Neuro: normal  Psych: normal    PHYSICAL EXAMINATION:    VITAL SIGNS:  Visit Vitals  /86 (BP 1 Location: Left arm, BP Patient Position: Sitting)   Pulse 75   Temp 97.6 °F (36.4 °C) (Oral)   Resp 16   Ht 5' 9\" (1.753 m)   Wt 239 lb 3.2 oz (108.5 kg)   SpO2 98%   BMI 35.32 kg/m²       GENERAL: NCAT, Sitting comfortably, NAD  EYES: EOMI, non-icteric, no proptosis  Ear/Nose/Throat: NCAT, no inflammation, no masses  LYMPH NODES: No LAD  CARDIOVASCULAR: S1 S2, RRR, No murmur, 2+ radial pulses  RESPIRATORY: CTA b/l, no wheeze/rales  GASTROINTESTINAL:  NT, ND  MUSCULOSKELETAL: Normal ROM, no atrophy  SKIN: warm, no edema/rash/ or other skin changes  NEUROLOGIC: 5/5 power all extremities, see foot exam below, no tremor, AAOx3  PSYCHIATRIC: Normal affect, Normal insight and judgement    Diabetic foot exam:     Left Foot:   Visual Exam: amputation- 3rd digit   Pulse DP: 1+ (weak)   Filament test: absent sensation    Vibratory sensation: Vibratory sensation: absent       Right Foot:   Visual Exam: normal    Pulse DP: 1+ (weak)   Filament test: absent sensation    Vibratory sensation: Vibratory sensation: absent    * I am treating the patient under a comprehensive plan of care for diabetes and the patient would benefit from diabetic footwear to protect their feet, and this includes shoes and insoles. Follows with podiatrist    REVIEW OF LABORATORY AND RADIOLOGY DATA:   Labs and documentation have been reviewed as described above. ASSESSMENT AND PLAN:   Aleksandar Lai is a 78 y.o. male with a PMHx as noted above who presents for f/u evaluation of uncontrolled type 2 diabetes. Problems:  Type 2 diabetes  Hyperlipidemia  Hypertension  Hypothyroidism  Vitamin D deficiency     Patient with low-normal sugars at bedtime but still rising overnight. In this case 70/30 insulin will not be the best solution but rather a basal bolus with long and short acting insulin: ** Note that patient's fingertips are really callused and even injured from fingersticks repeatedly. On occasion he requires 3 attempts to get blood from his finger. I am recommending the freestyle jaqueline sensor / kit for him as he needs this. PLAN  Type 2 Diabetes  Medications:  STOP Novolog 70-30:   34 units / 28 units  Start Glargine (basaglar vs lantus) Insulin: 30 units once each bedtime  Start Humalog/Novolog: 10 units with each meal  Correction 1:50>150         Advised to check glucose 4 times daily  Provided with glucose log sheets for later review. HTN: BP usually generally stable on current medications  HLD: on statin, reviewed, stable  Vitamin D deficiency: off per nephrologist   Hypothyroidism: 137 mcg once daily, normal TSH on last visit,    RTC: I would like to see him back in 4 months.  >25 minutes spent together with patient today of which >50% of this time was spent in counseling and coordination of care. Buzzy Boeck.  [address] [facility]

## 2019-10-18 ENCOUNTER — OFFICE VISIT (OUTPATIENT)
Dept: SURGERY | Age: 69
End: 2019-10-18

## 2019-10-18 ENCOUNTER — TELEPHONE (OUTPATIENT)
Dept: DERMATOLOGY | Facility: AMBULATORY SURGERY CENTER | Age: 69
End: 2019-10-18

## 2019-10-18 VITALS
BODY MASS INDEX: 35.53 KG/M2 | OXYGEN SATURATION: 96 % | DIASTOLIC BLOOD PRESSURE: 62 MMHG | RESPIRATION RATE: 16 BRPM | HEIGHT: 69 IN | SYSTOLIC BLOOD PRESSURE: 124 MMHG | WEIGHT: 239.9 LBS | TEMPERATURE: 97.3 F | HEART RATE: 70 BPM

## 2019-10-18 DIAGNOSIS — L02.419 THIGH ABSCESS: Primary | ICD-10-CM

## 2019-10-18 RX ORDER — LIDOCAINE HYDROCHLORIDE AND EPINEPHRINE 10; 10 MG/ML; UG/ML
1.5 INJECTION, SOLUTION INFILTRATION; PERINEURAL ONCE
Qty: 1 VIAL | Refills: 0 | Status: SHIPPED | OUTPATIENT
Start: 2019-10-18 | End: 2019-10-18 | Stop reason: CLARIF

## 2019-10-18 RX ORDER — LIDOCAINE HYDROCHLORIDE AND EPINEPHRINE 10; 10 MG/ML; UG/ML
1.5 INJECTION, SOLUTION INFILTRATION; PERINEURAL ONCE
Qty: 1 VIAL | Refills: 0
Start: 2019-10-18 | End: 2019-10-18

## 2019-10-18 NOTE — TELEPHONE ENCOUNTER
Made phone call to patient, verified patient with two identifiers, name and date of birth. Patient stated he has current infection in his leg and he is taking antibiotics. Patient asked if it was ok for him to still have surgery on 10/22/19.   Informed patient he is ok to come to his appointment on 10/22/19 per MD.

## 2019-10-18 NOTE — PROGRESS NOTES
Procedure Note    Pre OP Dx:  Right thigh abscess  Post OP Dx Right thigh abscess  Procedure Incision and drainage of right thigh abscess  Surgeon Madeline  Anesthesia 1% Lidocaine with epi  10 ml  EBL   Minimal  Pathology Cultures obtained    Procedure  After informed consent and time out, the right anterior thigh was prepped with betadyne and draped sterilely. Local anesthetic was injected into the skin and subcutaneous tissues around the abscess. A 2 x 5 cm cruciate skin incision was made over the abscess and the cavity was debrided. Cultures were obtained. The wound was packed with saline moistened gauze. The patient tolerated the procedure well.     Heena Campos MD FACS

## 2019-10-18 NOTE — PROGRESS NOTES
HISTORY OF PRESENT ILLNESS  Bo Boles is a 78 y.o. male who comes in for consultation by Dr Mega Silva for a buttock cyst but also complains about a right thigh infection  Cyst   Pertinent negatives include no chest pain, no abdominal pain, no headaches and no shortness of breath. he has left buttock pain and has been managed at the wound clinic for a pressure ulcer in the area previously. He comes in for another opinion about a possible cyst.   He also has developed a right thigh wound and drainage/tenderness. He was initially started on Doxycycline but was changes to Bactrim yesterday. He denies fever chills or sweats. He also has chronic dermatophytosis treated with fluconazole weekly.        Past Medical History:   Diagnosis Date    Arrhythmia     atrial fibrillation 2017    Arthritis     CAD (coronary atherosclerotic disease)     Chronic kidney disease     Chronic pain     Congestive heart failure ([address]     Diabetes ([address]     Diabetes mellitus type 2, controlled ([address] 3/13/2017    Heart failure ([address]     Hx of CABG 3/13/2017    CABG in 2010 in [address] [place] [address] Street Hypertension     Long term current use of anticoagulant therapy     Morbid obesity ([address] 3/13/2017    JACEY (obstructive sleep apnea) 6/23/2017    S/P CABG x 2 2010    Skin cancer     Thyroid disease      Past Surgical History:   Procedure Laterality Date    CARDIAC SURG PROCEDURE UNLIST      CABGx2 in 2010    HX ORTHOPAEDIC      L 3rd toe amputation in 1999    HX PACEMAKER  2018    Dr Darryl Paniagua     Family History   Problem Relation Age of Onset    Heart Attack Father     Cancer Father         lymph node    Cancer Mother         breast     Social History     Tobacco Use    Smoking status: Never Smoker    Smokeless tobacco: Never Used   Substance Use Topics    Alcohol use: Yes     Comment: rare    Drug use: No     Current Outpatient Medications   Medication Sig    insulin aspart U-100 (NOVOLOG FLEXPEN U-100 INSULIN) 100 unit/mL (3 mL) inpn Inject 10 units with each meal, 3 times daily (may substitute humalog if preferred)    Insulin Needles, Disposable, (BD ULTRA-FINE MINI PEN NEEDLE) 31 gauge x 3/16\" ndle Use with insulin pens 4 times daily    metoprolol succinate (TOPROL XL) 50 mg XL tablet Take 1 Tab by mouth daily. Patient takes 150mg total daily    pravastatin (PRAVACHOL) 80 mg tablet TAKE 1 TABLET BY MOUTH IN THE EVENING    apixaban (ELIQUIS) 5 mg tablet TAKE 1 TABLET BY MOUTH TWICE A DAY    levothyroxine (SYNTHROID) 137 mcg tablet TAKE 1 TABLET(S) EVERY DAY BY ORAL ROUTE FOR 90 DAYS.  ferrous sulfate (IRON) 325 mg (65 mg iron) tablet Take 325 mg by mouth Daily (before breakfast).  bumetanide (BUMEX) 1 mg tablet 3 tabs BID    potassium chloride SR (K-TAB) 20 mEq tablet Take 20 mEq by mouth daily. Patient takes 40 meq every Monday/Wednesday/Friday,   And 20 meq every Tuesday, Thursday, Saturday and Sunday    dutasteride (AVODART) 0.5 mg capsule Take 0.5 mg by mouth daily.  metoprolol succinate (TOPROL-XL) 100 mg tablet Take 100 mg by mouth daily. Patient takes 150mg total daily    MYRBETRIQ 25 mg ER tablet TAKE 1 TABLET BY MOUTH EVERY DAY    febuxostat (ULORIC) 40 mg tab tablet Take 40 mg by mouth daily.  ketoconazole (NIZORAL) 2 % topical cream Apply  to affected area two (2) times daily as needed for Skin Irritation. Indications: rash    metOLazone (ZAROXOLYN) 2.5 mg tablet Take 2.5 mg by mouth daily. Indications: TAKING TUESDAY AND SATURDAY    tamsulosin (FLOMAX) 0.4 mg capsule TAKE 2 CAPSULES BY MOUTH EVERY DAY    multivitamin (ONE A DAY) tablet Take 1 Tab by mouth daily.  acetaminophen (TYLENOL EXTRA STRENGTH) 500 mg tablet Take 1,000 mg by mouth every eight (8) hours as needed for Pain.     insulin glargine (LANTUS,BASAGLAR) 100 unit/mL (3 mL) inpn 30 unit injection once each bedtime (basaglar vs lantus whichever preferred)    polyethylene glycol (MIRALAX) 17 gram packet Take 17 g by mouth daily.    traMADol (ULTRAM) 50 mg tablet Take 50 mg by mouth every six (6) hours as needed for Pain. Indications: Prescribed by Dentist after Dental/Oral Procedure. No current facility-administered medications for this visit. Allergies   Allergen Reactions    Allopurinol Rash    Gabapentin Drowsiness    Ciprofloxacin Nausea Only    Percocet [Oxycodone-Acetaminophen] Other (comments)     hallucinations         Review of Systems   Constitutional: Negative for chills, diaphoresis, fever, malaise/fatigue and weight loss. HENT: Negative for congestion, ear pain and sore throat. Eyes: Negative for blurred vision and pain. Respiratory: Negative for cough, hemoptysis, sputum production, shortness of breath, wheezing and stridor. Cardiovascular: Negative for chest pain, palpitations, orthopnea, claudication, leg swelling and PND. Gastrointestinal: Positive for constipation and diarrhea. Negative for abdominal pain, blood in stool, heartburn, melena, nausea and vomiting. Genitourinary: Positive for frequency and urgency. Negative for dysuria, flank pain and hematuria. Musculoskeletal: Negative for back pain, joint pain, myalgias and neck pain. Skin: Negative for itching and rash. Neurological: Positive for sensory change (numbness) and weakness (uses walker). Negative for dizziness, tremors, focal weakness, seizures and headaches. Endo/Heme/Allergies: Negative for polydipsia. Psychiatric/Behavioral: Negative for depression and memory loss. The patient is not nervous/anxious. There were no vitals taken for this visit. Physical Exam   Constitutional: He is oriented to person, place, and time. He appears well-developed and well-nourished. No distress. HENT:   Head: Normocephalic and atraumatic. Mouth/Throat: Oropharynx is clear and moist. No oropharyngeal exudate. Eyes: Pupils are equal, round, and reactive to light. Conjunctivae and EOM are normal. No scleral icterus.    Neck: Normal range of motion. Neck supple. No tracheal deviation present. No thyromegaly present. Cardiovascular: Normal rate, regular rhythm and normal heart sounds. Exam reveals no gallop and no friction rub. No murmur heard. Pulmonary/Chest: Effort normal and breath sounds normal. No stridor. No respiratory distress. He has no wheezes. He has no rales. Abdominal: Soft. Normal appearance and bowel sounds are normal. He exhibits no distension, no pulsatile liver and no mass. There is no hepatosplenomegaly. There is no tenderness. There is no rebound, no guarding and no CVA tenderness. No hernia. Hernia confirmed negative in the right inguinal area and confirmed negative in the left inguinal area. Genitourinary: Testes normal. Cremasteric reflex is present. Circumcised. Musculoskeletal: Normal range of motion. He exhibits no edema or tenderness. Lymphadenopathy:     He has no cervical adenopathy. Right: No inguinal adenopathy present. Left: No inguinal adenopathy present. Neurological: He is alert and oriented to person, place, and time. No cranial nerve deficit. Coordination normal.   Skin: Skin is warm and dry. Rash (ring like rash on legs and trunk) noted. He is not diaphoretic. No erythema. Right anterior thigh with 8 cm erythema was fluctuance and central ulceration of the area    Buttock with two small dry areas without mass or ulceration   Psychiatric: He has a normal mood and affect. His behavior is normal. Judgment and thought content normal.       ASSESSMENT and PLAN  1. Right thigh abscess. I explained to him about the anatomy and pathophysiology of abscesses and worsening infection without drainage. Risks of incision and drainage include, but are not limited to, bleeding, worsening infection, need for local wound care, poor healing/cosmesis. He wishes to undergo I and D in the office today    2. Right buttock is healed but he has pain.   I do not appreciated a cyst at this time and even if there was something small I would be extremely concerned that the wound would not heal.  Favor observation    3. Severe obesity. Body mass index is 35.43 kg/m². recommended diet modification and increased activity  4. IDDM type 2. Increased risk for infections and poor healing  5. Essential hypertension.   Stable on rx  On eliquis    Will get home health to assist with wound care  Damp to dry saline BID  RTC 2 weeks    Sujit Minor MD FACS

## 2019-10-18 NOTE — PROGRESS NOTES
[facility]  OFFICE PROCEDURE PROGRESS NOTE        Chart reviewed for the following:   Navdeep HICKMAN LPN, have reviewed the History, Physical and updated the Allergic reactions for [address] performed immediately prior to start of procedure:   Jose Carlos Chen LPN, have performed the following reviews on Soy Jaramillo prior to the start of the procedure:            * Patient was identified by name and date of birth   * Agreement on procedure being performed was verified  * Risks and Benefits explained to the patient  * Procedure site verified and marked as necessary  * Patient was positioned for comfort  * Consent was signed and verified     Time: 12:00pm      Date of procedure: 10/18/2019    Procedure performed by:  Sujit Minor MD    Provider assisted by: Radha Gonzalez LPN    Patient assisted by: spouse    How tolerated by patient: tolerated the procedure well with no complications    Post Procedural Pain Scale: 0 - No Hurt    Comments: none

## 2019-10-18 NOTE — PATIENT INSTRUCTIONS
Remove top dressing. Removed packing. Place new gauge in saline solution, ring out excess. Pack the wound. Cover with gauze.

## 2019-10-19 ENCOUNTER — HOSPITAL ENCOUNTER (EMERGENCY)
Age: 69
Discharge: HOME | End: 2019-10-19
Attending: EMERGENCY MEDICINE
Payer: MEDICARE

## 2019-10-19 VITALS
HEIGHT: 69 IN | TEMPERATURE: 97.8 F | WEIGHT: 243.83 LBS | BODY MASS INDEX: 36.11 KG/M2 | DIASTOLIC BLOOD PRESSURE: 78 MMHG | RESPIRATION RATE: 16 BRPM | OXYGEN SATURATION: 100 % | HEART RATE: 78 BPM | SYSTOLIC BLOOD PRESSURE: 128 MMHG

## 2019-10-19 DIAGNOSIS — L02.419 THIGH ABSCESS: Primary | ICD-10-CM

## 2019-10-19 PROCEDURE — 74011000250 HC RX REV CODE- 250: Performed by: EMERGENCY MEDICINE

## 2019-10-19 PROCEDURE — 99283 EMERGENCY DEPT VISIT LOW MDM: CPT

## 2019-10-19 PROCEDURE — 77030019895 HC PCKNG STRP IODO -A

## 2019-10-19 RX ORDER — LIDOCAINE 40 MG/G
CREAM TOPICAL
Qty: 50 G | Refills: 0 | Status: SHIPPED | OUTPATIENT
Start: 2019-10-19 | End: 2020-01-23

## 2019-10-19 RX ORDER — LIDOCAINE 40 MG/G
CREAM TOPICAL ONCE
Status: COMPLETED | OUTPATIENT
Start: 2019-10-19 | End: 2019-10-19

## 2019-10-19 RX ADMIN — LIDOCAINE: 4 CREAM TOPICAL at 03:46

## 2019-10-19 NOTE — DISCHARGE INSTRUCTIONS

## 2019-10-19 NOTE — ED PROVIDER NOTES
EMERGENCY DEPARTMENT HISTORY AND PHYSICAL EXAM      Date: 10/19/2019  Patient Name: Abi Boyle    Please note that this dictation was completed with [employer], the computer voice recognition software. Quite often unanticipated grammatical, syntax, homophones, and other interpretive errors are inadvertently transcribed by the computer software. Please disregard these errors. Please excuse any errors that have escaped final proofreading. History of Presenting Illness     Chief Complaint   Patient presents with    Wound Check       History Provided By: Patient    HPI: Abi Boyle, 78 y.o. male, presenting the emergency department complaining of bleeding and a wound to the right thigh. He was seen at his doctor's office today and diagnosed with an abscess. I&D was performed in the office, packing placed. Started on antibiotics. Does take Eliquis. Reports bleeding from the wound, pain with repacking. Told to repack the wound twice daily. They are concerned about the need for repacking, or concerned about the bleeding. Patient admits to pain at the surgical site, rates the pain as moderate. Worse with palpation. PCP: Vanetta Gosselin, MD    No current facility-administered medications on file prior to encounter. Current Outpatient Medications on File Prior to Encounter   Medication Sig Dispense Refill    insulin aspart U-100 (NOVOLOG FLEXPEN U-100 INSULIN) 100 unit/mL (3 mL) inpn Inject 10 units with each meal, 3 times daily (may substitute humalog if preferred) 10 Pen 3    Insulin Needles, Disposable, (BD ULTRA-FINE MINI PEN NEEDLE) 31 gauge x 3/16\" ndle Use with insulin pens 4 times daily 400 Pen Needle 3    insulin glargine (LANTUS,BASAGLAR) 100 unit/mL (3 mL) inpn 30 unit injection once each bedtime (basaglar vs lantus whichever preferred) 10 Pen 3    metoprolol succinate (TOPROL XL) 50 mg XL tablet Take 1 Tab by mouth daily.  Patient takes 150mg total daily 90 Tab 3    pravastatin (PRAVACHOL) 80 mg tablet TAKE 1 TABLET BY MOUTH IN THE EVENING 90 Tab 0    polyethylene glycol (MIRALAX) 17 gram packet Take 17 g by mouth daily.  apixaban (ELIQUIS) 5 mg tablet TAKE 1 TABLET BY MOUTH TWICE A  Tab 3    levothyroxine (SYNTHROID) 137 mcg tablet TAKE 1 TABLET(S) EVERY DAY BY ORAL ROUTE FOR 90 DAYS. 90 Tab 3    ferrous sulfate (IRON) 325 mg (65 mg iron) tablet Take 325 mg by mouth Daily (before breakfast).  traMADol (ULTRAM) 50 mg tablet Take 50 mg by mouth every six (6) hours as needed for Pain. Indications: Prescribed by Dentist after Dental/Oral Procedure.  bumetanide (BUMEX) 1 mg tablet 3 tabs BID  3    potassium chloride SR (K-TAB) 20 mEq tablet Take 20 mEq by mouth daily. Patient takes 40 meq every Monday/Wednesday/Friday,   And 20 meq every Tuesday, Thursday, Saturday and Sunday      dutasteride (AVODART) 0.5 mg capsule Take 0.5 mg by mouth daily.  metoprolol succinate (TOPROL-XL) 100 mg tablet Take 100 mg by mouth daily. Patient takes 150mg total daily      MYRBETRIQ 25 mg ER tablet TAKE 1 TABLET BY MOUTH EVERY DAY  12    febuxostat (ULORIC) 40 mg tab tablet Take 40 mg by mouth daily.  ketoconazole (NIZORAL) 2 % topical cream Apply  to affected area two (2) times daily as needed for Skin Irritation. Indications: rash      metOLazone (ZAROXOLYN) 2.5 mg tablet Take 2.5 mg by mouth daily. Indications: TAKING TUESDAY AND SATURDAY      tamsulosin (FLOMAX) 0.4 mg capsule TAKE 2 CAPSULES BY MOUTH EVERY DAY  2    multivitamin (ONE A DAY) tablet Take 1 Tab by mouth daily.  acetaminophen (TYLENOL EXTRA STRENGTH) 500 mg tablet Take 1,000 mg by mouth every eight (8) hours as needed for Pain.          Past History     Past Medical History:  Past Medical History:   Diagnosis Date    Arrhythmia     atrial fibrillation 2017    Arthritis     CAD (coronary atherosclerotic disease)     Chronic kidney disease     Chronic pain     [facility]     Diabetes [facility]     Diabetes mellitus type 2, controlled ([facility] 3/13/2017    Heart failure ([address]     Hx of CABG 3/13/2017    CABG in 2010 in [address] Hypertension     Long term current use of anticoagulant therapy     Morbid obesity ([facility] 3/13/2017    JACEY (obstructive sleep apnea) 6/23/2017    S/P CABG x 2 2010    Skin cancer     Thyroid disease        Past Surgical History:  Past Surgical History:   Procedure Laterality Date    CARDIAC SURG PROCEDURE UNLIST      CABGx2 in 2010    HX ORTHOPAEDIC      L 3rd toe amputation in 1999    HX PACEMAKER  2018    Dr John Dawson       Family History:  Family History   Problem Relation Age of Onset    Heart Attack Father     Cancer Father         lymph node    Cancer Mother         breast       Social History:  Social History     Tobacco Use    Smoking status: Never Smoker    Smokeless tobacco: Never Used   Substance Use Topics    Alcohol use: Yes     Comment: rare    Drug use: No       Allergies: Allergies   Allergen Reactions    Allopurinol Rash    Gabapentin Drowsiness    Ciprofloxacin Nausea Only    Percocet [Oxycodone-Acetaminophen] Other (comments)     hallucinations         Review of Systems   Review of Systems   Constitutional: Negative for chills and fever. HENT: Negative for congestion and sore throat. Eyes: Negative for visual disturbance. Respiratory: Negative for cough and shortness of breath. Cardiovascular: Negative for chest pain and leg swelling. Gastrointestinal: Negative for abdominal pain, blood in stool, diarrhea and nausea. Endocrine: Negative for polyuria. Genitourinary: Negative for dysuria and testicular pain. Musculoskeletal: Negative for arthralgias, joint swelling and myalgias. Skin: Positive for wound. Negative for rash. Allergic/Immunologic: Negative for immunocompromised state. Neurological: Negative for weakness and headaches. Hematological: Bruises/bleeds easily. Psychiatric/Behavioral: Negative for confusion. Physical Exam   Physical Exam   Constitutional: oriented to person, place, and time. appears well-developed and well-nourished. HENT:   Head: Normocephalic and atraumatic. Neck trachea midline cardiovascular: Normal peripheral perfusion  Pulmonary/Chest: No respiratory distress, equal chest rise  Abdominal: Nondistended abdomen  Musculoskeletal: No deformity. Wound on the right thigh with packing in place, blood around the wound. No active ongoing hemorrhage. Purulent drainage. No surrounding cellulitis. Neurological: alert and oriented to person, place, and time. Skin: No rash noted. No erythema. Psychiatric: behavior is normal.   Nursing note and vitals reviewed. Diagnostic Study Results     Labs -   No results found for this or any previous visit (from the past 12 hour(s)). Radiologic Studies -   No orders to display     CT Results  (Last 48 hours)    None        CXR Results  (Last 48 hours)    None            Medical Decision Making   I am the first provider for this patient. I reviewed the vital signs, available nursing notes, past medical history, past surgical history, family history and social history. Vital Signs-Reviewed the patient's vital signs. No data found. Records Reviewed: Nursing Notes and Old Medical Records    Provider Notes (Medical Decision Making):   Consistent with abscess status post I&D. Requesting help with drainage and packing  ED Course:   Initial assessment performed. The patients presenting problems have been discussed, and they are in agreement with the care plan formulated and outlined with them. I have encouraged them to ask questions as they arise throughout their visit. Critical Care Time:   none    Disposition:  DISCHARGE NOTE  Patients results have been reviewed with them.   Patient and/or family have verbally conveyed their understanding and agreement of the patient's signs, symptoms, diagnosis, treatment and prognosis and additionally agree to follow up as recommended or return to the Emergency Room should their condition change or have any new concerns prior to their follow-up appointment. Patient verbally agrees with the care-plan and verbally conveys that all of their questions have been answered. Discharge instructions have also been provided to the patient with some educational information regarding their diagnosis as well a list of reasons why they would want to return to the ER prior to their follow-up appointment should their condition change. PLAN:  1. Discharge Medication List as of 10/19/2019  3:38 AM      START taking these medications    Details   lidocaine (LMX 4) 4 % topical cream Apply  to affected area daily as needed (before packing change). , Normal, Disp-50 g, R-0         CONTINUE these medications which have NOT CHANGED    Details   insulin aspart U-100 (NOVOLOG FLEXPEN U-100 INSULIN) 100 unit/mL (3 mL) inpn Inject 10 units with each meal, 3 times daily (may substitute humalog if preferred), Normal, Disp-10 Pen, R-3      Insulin Needles, Disposable, (BD ULTRA-FINE MINI PEN NEEDLE) 31 gauge x 3/16\" ndle Use with insulin pens 4 times daily, Normal, Disp-400 Pen Needle, R-3      insulin glargine (LANTUS,BASAGLAR) 100 unit/mL (3 mL) inpn 30 unit injection once each bedtime (basaglar vs lantus whichever preferred), Normal, Disp-10 Pen, R-3      !! metoprolol succinate (TOPROL XL) 50 mg XL tablet Take 1 Tab by mouth daily. Patient takes 150mg total daily, Normal, Disp-90 Tab, R-3      pravastatin (PRAVACHOL) 80 mg tablet TAKE 1 TABLET BY MOUTH IN THE EVENING, Normal, Disp-90 Tab, R-0      polyethylene glycol (MIRALAX) 17 gram packet Take 17 g by mouth daily. , Historical Med      apixaban (ELIQUIS) 5 mg tablet TAKE 1 TABLET BY MOUTH TWICE A DAY, Normal, Disp-180 Tab, R-3      levothyroxine (SYNTHROID) 137 mcg tablet TAKE 1 TABLET(S) EVERY DAY BY ORAL ROUTE FOR 90 DAYS., Normal, Disp-90 Tab, R-3      ferrous sulfate (IRON) 325 mg (65 mg iron) tablet Take 325 mg by mouth Daily (before breakfast). , Historical Med      traMADol (ULTRAM) 50 mg tablet Take 50 mg by mouth every six (6) hours as needed for Pain. Indications: Prescribed by Dentist after Dental/Oral Procedure., Historical Med      bumetanide (BUMEX) 1 mg tablet 3 tabs BID, Historical Med, R-3      potassium chloride SR (K-TAB) 20 mEq tablet Take 20 mEq by mouth daily. Patient takes 40 meq every Monday/Wednesday/Friday,   And 20 meq every Tuesday, Thursday, Saturday and , Historical Med      dutasteride (AVODART) 0.5 mg capsule Take 0.5 mg by mouth daily. , Historical Med      !! metoprolol succinate (TOPROL-XL) 100 mg tablet Take 100 mg by mouth daily. Patient takes 150mg total daily, Historical Med      MYRBETRIQ 25 mg ER tablet TAKE 1 TABLET BY MOUTH EVERY DAY, Historical Med, R-12, SHEILA      febuxostat (ULORIC) 40 mg tab tablet Take 40 mg by mouth daily. , Historical Med      ketoconazole (NIZORAL) 2 % topical cream Apply  to affected area two (2) times daily as needed for Skin Irritation. Indications: rash, Historical Med      metOLazone (ZAROXOLYN) 2.5 mg tablet Take 2.5 mg by mouth daily. Indications: TAKING TUESDAY AND SATURDAY, Historical Med      tamsulosin (FLOMAX) 0.4 mg capsule TAKE 2 CAPSULES BY MOUTH EVERY DAY, Historical Med, R-2      multivitamin (ONE A DAY) tablet Take 1 Tab by mouth daily. , Historical Med      acetaminophen (TYLENOL EXTRA STRENGTH) 500 mg tablet Take 1,000 mg by mouth every eight (8) hours as needed for Pain., Historical Med       !! - Potential duplicate medications found. Please discuss with provider.       STOP taking these medications       lidocaine-EPINEPHrine (XYLOCAINE) 1 %-1:100,000 injection Comments:   Reason for Stoppin.   Follow-up Information     Follow up With Specialties Details Why Contact Info    Kurt Schmidt MD [facility]   [address]  [phone]      Your surgeon  Schedule an appointment as soon as possible for a visit      [facility] EMERGENCY DEPT Emergency Medicine  If symptoms worsen [address] [place] [address] Drive  [address] [facility]  [phone]          Return to ED if worse     Diagnosis     Clinical Impression:   1. Thigh abscess        Attestations:   This note was completed by Robert Gonzalez DO

## 2019-10-21 ENCOUNTER — TELEPHONE (OUTPATIENT)
Dept: SURGERY | Age: 69
End: 2019-10-21

## 2019-10-21 NOTE — TELEPHONE ENCOUNTER
Patient is currently taking an antibiotic \"Sulfamethoxazole\" that was prescribed by Dr. Garrett James. Patient states the antibiotic is making him sick to his stomach. Dr. Annette Tai instructed pt to call Dr. Ro Sarmiento to see if there is something else that can be prescribed.

## 2019-10-21 NOTE — TELEPHONE ENCOUNTER
Pt said bactrim making him sick @ the stomach. Dr. Roberta Rodarte recommend that he call Dr. Jean Tran for new  Script. He is mot taking this med on empty stomach. Please advise.

## 2019-10-22 ENCOUNTER — TELEPHONE (OUTPATIENT)
Dept: SURGERY | Age: 69
End: 2019-10-22

## 2019-10-22 ENCOUNTER — HOME HEALTH ADMISSION (OUTPATIENT)
Dept: HOME HEALTH SERVICES | Facility: HOME HEALTH | Age: 69
End: 2019-10-22
Payer: MEDICARE

## 2019-10-22 DIAGNOSIS — L02.91 ABSCESS: Primary | ICD-10-CM

## 2019-10-22 LAB — BACTERIA SPEC AEROBE CULT: ABNORMAL

## 2019-10-22 RX ORDER — CLINDAMYCIN HYDROCHLORIDE 300 MG/1
300 CAPSULE ORAL 3 TIMES DAILY
Qty: 30 CAP | Refills: 0 | Status: SHIPPED | OUTPATIENT
Start: 2019-10-22 | End: 2019-11-01

## 2019-10-22 RX ORDER — CIPROFLOXACIN 500 MG/1
500 TABLET ORAL 2 TIMES DAILY
Qty: 20 TAB | Refills: 0 | Status: SHIPPED | OUTPATIENT
Start: 2019-10-22 | End: 2019-11-01

## 2019-10-22 RX ORDER — ONDANSETRON 4 MG/1
4 TABLET, ORALLY DISINTEGRATING ORAL
Qty: 15 TAB | Refills: 0 | Status: SHIPPED | OUTPATIENT
Start: 2019-10-22 | End: 2019-11-01

## 2019-10-22 NOTE — TELEPHONE ENCOUNTER
Left message for pt home health received referral and will contact him. Call the office back if not heard from them by 10/24.

## 2019-10-22 NOTE — TELEPHONE ENCOUNTER
Spoke with [facility] received referral for pt. They will see pt on 10/24 or 10/25 to evaluate right thigh wound.

## 2019-10-22 NOTE — TELEPHONE ENCOUNTER
N/V with bactrim      Cultures still pending    Recommended probiotics  Stop Bactirm  Will rx clindamycin instead while awaiting cultures    Home health still has not contacted him  Will have office staff research this to make certain they will be coming    Keep planned f/u    Adryan Granger MD FACS

## 2019-10-22 NOTE — TELEPHONE ENCOUNTER
He has heavy growth of Serratia  It is sensitive to cipro and bactrim but other abx would need to be IV    He had N/V with bactrim    He reports remote hx Nausea with cipro but is willing to try it again    Will rx cipro  Rx zofran for the nausea    He will stop the Bactrim and clindamycin    Jason Siddiqui MD FACS

## 2019-10-24 ENCOUNTER — HOME CARE VISIT (OUTPATIENT)
Dept: SCHEDULING | Facility: HOME HEALTH | Age: 69
End: 2019-10-24
Payer: MEDICARE

## 2019-10-24 VITALS
DIASTOLIC BLOOD PRESSURE: 78 MMHG | RESPIRATION RATE: 18 BRPM | OXYGEN SATURATION: 98 % | HEART RATE: 76 BPM | SYSTOLIC BLOOD PRESSURE: 128 MMHG | TEMPERATURE: 97.5 F

## 2019-10-24 PROCEDURE — 3331090001 HH PPS REVENUE CREDIT

## 2019-10-24 PROCEDURE — G0299 HHS/HOSPICE OF RN EA 15 MIN: HCPCS

## 2019-10-24 PROCEDURE — 3331090002 HH PPS REVENUE DEBIT

## 2019-10-24 PROCEDURE — 400013 HH SOC

## 2019-10-25 PROCEDURE — 3331090001 HH PPS REVENUE CREDIT

## 2019-10-25 PROCEDURE — 3331090002 HH PPS REVENUE DEBIT

## 2019-10-26 PROCEDURE — 3331090002 HH PPS REVENUE DEBIT

## 2019-10-26 PROCEDURE — 3331090001 HH PPS REVENUE CREDIT

## 2019-10-27 PROCEDURE — 3331090001 HH PPS REVENUE CREDIT

## 2019-10-27 PROCEDURE — 3331090002 HH PPS REVENUE DEBIT

## 2019-10-28 ENCOUNTER — HOME CARE VISIT (OUTPATIENT)
Dept: SCHEDULING | Facility: HOME HEALTH | Age: 69
End: 2019-10-28
Payer: MEDICARE

## 2019-10-28 VITALS
TEMPERATURE: 98.3 F | RESPIRATION RATE: 18 BRPM | OXYGEN SATURATION: 98 % | DIASTOLIC BLOOD PRESSURE: 82 MMHG | SYSTOLIC BLOOD PRESSURE: 136 MMHG | HEART RATE: 76 BPM

## 2019-10-28 PROCEDURE — A4452 WATERPROOF TAPE: HCPCS

## 2019-10-28 PROCEDURE — 3331090002 HH PPS REVENUE DEBIT

## 2019-10-28 PROCEDURE — A9270 NON-COVERED ITEM OR SERVICE: HCPCS

## 2019-10-28 PROCEDURE — A6216 NON-STERILE GAUZE<=16 SQ IN: HCPCS

## 2019-10-28 PROCEDURE — A4216 STERILE WATER/SALINE, 10 ML: HCPCS

## 2019-10-28 PROCEDURE — G0299 HHS/HOSPICE OF RN EA 15 MIN: HCPCS

## 2019-10-28 PROCEDURE — 3331090001 HH PPS REVENUE CREDIT

## 2019-10-29 PROCEDURE — 3331090001 HH PPS REVENUE CREDIT

## 2019-10-29 PROCEDURE — 3331090002 HH PPS REVENUE DEBIT

## 2019-10-30 ENCOUNTER — OFFICE VISIT (OUTPATIENT)
Dept: CARDIOLOGY CLINIC | Age: 69
End: 2019-10-30

## 2019-10-30 ENCOUNTER — HOME CARE VISIT (OUTPATIENT)
Dept: SCHEDULING | Facility: HOME HEALTH | Age: 69
End: 2019-10-30
Payer: MEDICARE

## 2019-10-30 VITALS
HEART RATE: 75 BPM | DIASTOLIC BLOOD PRESSURE: 80 MMHG | TEMPERATURE: 97 F | OXYGEN SATURATION: 100 % | SYSTOLIC BLOOD PRESSURE: 120 MMHG

## 2019-10-30 DIAGNOSIS — I48.20 CHRONIC A-FIB (HCC): ICD-10-CM

## 2019-10-30 DIAGNOSIS — Z95.0 CARDIAC PACEMAKER IN SITU: Primary | ICD-10-CM

## 2019-10-30 PROCEDURE — G0300 HHS/HOSPICE OF LPN EA 15 MIN: HCPCS

## 2019-10-30 PROCEDURE — 3331090002 HH PPS REVENUE DEBIT

## 2019-10-30 PROCEDURE — 3331090001 HH PPS REVENUE CREDIT

## 2019-10-31 PROCEDURE — 3331090002 HH PPS REVENUE DEBIT

## 2019-10-31 PROCEDURE — 3331090001 HH PPS REVENUE CREDIT

## 2019-11-01 ENCOUNTER — OFFICE VISIT (OUTPATIENT)
Dept: SURGERY | Age: 69
End: 2019-11-01

## 2019-11-01 VITALS
WEIGHT: 243.8 LBS | TEMPERATURE: 96.8 F | SYSTOLIC BLOOD PRESSURE: 147 MMHG | DIASTOLIC BLOOD PRESSURE: 52 MMHG | HEART RATE: 75 BPM | BODY MASS INDEX: 36 KG/M2

## 2019-11-01 DIAGNOSIS — Z09 POSTOPERATIVE EXAMINATION: Primary | ICD-10-CM

## 2019-11-01 PROCEDURE — 3331090002 HH PPS REVENUE DEBIT

## 2019-11-01 PROCEDURE — 3331090001 HH PPS REVENUE CREDIT

## 2019-11-01 NOTE — PROGRESS NOTES
Surgery  Follow up  Procedure: I and ID thigh abscess  OR date:  10/18/2019  MICRO:  Serratia marcces    S I feel ok    Visit Vitals  /52 (BP 1 Location: Left arm, BP Patient Position: Sitting)   Pulse 75   Temp 96.8 °F (36 °C)   Wt 110.6 kg (243 lb 12.8 oz)   BMI 36.00 kg/m²       O Incisions healing well with excellent granulation tissue   Wound 1 x 3 x 1 cm deep    A/P Doing well   Unable to tolerate abx due to N/V and stopped them   Other abx are only IV given susceptibilities   Continue local wound care   Antibacterial soap   RTC 4 weeks    Bruna Steiner MD FACS

## 2019-11-02 PROCEDURE — 3331090001 HH PPS REVENUE CREDIT

## 2019-11-02 PROCEDURE — 3331090002 HH PPS REVENUE DEBIT

## 2019-11-03 PROCEDURE — 3331090002 HH PPS REVENUE DEBIT

## 2019-11-03 PROCEDURE — 3331090001 HH PPS REVENUE CREDIT

## 2019-11-04 PROCEDURE — 3331090002 HH PPS REVENUE DEBIT

## 2019-11-04 PROCEDURE — 3331090001 HH PPS REVENUE CREDIT

## 2019-11-05 PROCEDURE — 3331090001 HH PPS REVENUE CREDIT

## 2019-11-05 PROCEDURE — 3331090002 HH PPS REVENUE DEBIT

## 2019-11-06 ENCOUNTER — HOME CARE VISIT (OUTPATIENT)
Dept: SCHEDULING | Facility: HOME HEALTH | Age: 69
End: 2019-11-06
Payer: MEDICARE

## 2019-11-06 PROCEDURE — G0300 HHS/HOSPICE OF LPN EA 15 MIN: HCPCS

## 2019-11-06 PROCEDURE — 3331090001 HH PPS REVENUE CREDIT

## 2019-11-06 PROCEDURE — 3331090002 HH PPS REVENUE DEBIT

## 2019-11-07 VITALS
OXYGEN SATURATION: 98 % | TEMPERATURE: 98.2 F | DIASTOLIC BLOOD PRESSURE: 100 MMHG | SYSTOLIC BLOOD PRESSURE: 140 MMHG | HEART RATE: 75 BPM

## 2019-11-07 PROCEDURE — 3331090002 HH PPS REVENUE DEBIT

## 2019-11-07 PROCEDURE — 3331090001 HH PPS REVENUE CREDIT

## 2019-11-08 PROCEDURE — 3331090001 HH PPS REVENUE CREDIT

## 2019-11-08 PROCEDURE — 3331090002 HH PPS REVENUE DEBIT

## 2019-11-09 PROCEDURE — 3331090002 HH PPS REVENUE DEBIT

## 2019-11-09 PROCEDURE — 3331090001 HH PPS REVENUE CREDIT

## 2019-11-10 PROCEDURE — 3331090001 HH PPS REVENUE CREDIT

## 2019-11-10 PROCEDURE — 3331090002 HH PPS REVENUE DEBIT

## 2019-11-11 PROCEDURE — 3331090002 HH PPS REVENUE DEBIT

## 2019-11-11 PROCEDURE — 3331090001 HH PPS REVENUE CREDIT

## 2019-11-12 ENCOUNTER — HOME CARE VISIT (OUTPATIENT)
Dept: SCHEDULING | Facility: HOME HEALTH | Age: 69
End: 2019-11-12
Payer: MEDICARE

## 2019-11-12 PROCEDURE — 3331090002 HH PPS REVENUE DEBIT

## 2019-11-12 PROCEDURE — G0300 HHS/HOSPICE OF LPN EA 15 MIN: HCPCS

## 2019-11-12 PROCEDURE — 3331090001 HH PPS REVENUE CREDIT

## 2019-11-13 VITALS
DIASTOLIC BLOOD PRESSURE: 80 MMHG | TEMPERATURE: 98.4 F | OXYGEN SATURATION: 98 % | HEART RATE: 75 BPM | SYSTOLIC BLOOD PRESSURE: 122 MMHG

## 2019-11-13 PROCEDURE — 3331090002 HH PPS REVENUE DEBIT

## 2019-11-13 PROCEDURE — 3331090001 HH PPS REVENUE CREDIT

## 2019-11-14 PROCEDURE — 3331090001 HH PPS REVENUE CREDIT

## 2019-11-14 PROCEDURE — 3331090002 HH PPS REVENUE DEBIT

## 2019-11-15 PROCEDURE — 3331090001 HH PPS REVENUE CREDIT

## 2019-11-15 PROCEDURE — 3331090002 HH PPS REVENUE DEBIT

## 2019-11-16 PROCEDURE — 3331090002 HH PPS REVENUE DEBIT

## 2019-11-16 PROCEDURE — 3331090001 HH PPS REVENUE CREDIT

## 2019-11-17 PROCEDURE — 3331090002 HH PPS REVENUE DEBIT

## 2019-11-17 PROCEDURE — 3331090001 HH PPS REVENUE CREDIT

## 2019-11-18 PROCEDURE — 3331090001 HH PPS REVENUE CREDIT

## 2019-11-18 PROCEDURE — 3331090002 HH PPS REVENUE DEBIT

## 2019-11-19 ENCOUNTER — OFFICE VISIT (OUTPATIENT)
Dept: CARDIOLOGY CLINIC | Age: 69
End: 2019-11-19

## 2019-11-19 VITALS
SYSTOLIC BLOOD PRESSURE: 150 MMHG | HEART RATE: 75 BPM | OXYGEN SATURATION: 94 % | HEIGHT: 69 IN | DIASTOLIC BLOOD PRESSURE: 80 MMHG | BODY MASS INDEX: 36.61 KG/M2 | RESPIRATION RATE: 18 BRPM | WEIGHT: 247.2 LBS

## 2019-11-19 DIAGNOSIS — E11.21 TYPE 2 DIABETES WITH NEPHROPATHY (HCC): ICD-10-CM

## 2019-11-19 DIAGNOSIS — Z95.1 HX OF CABG: ICD-10-CM

## 2019-11-19 DIAGNOSIS — I42.9 CARDIOMYOPATHY, UNSPECIFIED TYPE (HCC): ICD-10-CM

## 2019-11-19 DIAGNOSIS — N18.4 CKD (CHRONIC KIDNEY DISEASE) STAGE 4, GFR 15-29 ML/MIN (HCC): ICD-10-CM

## 2019-11-19 DIAGNOSIS — Z95.0 PACEMAKER: ICD-10-CM

## 2019-11-19 DIAGNOSIS — I25.10 CORONARY ARTERY DISEASE INVOLVING NATIVE CORONARY ARTERY OF NATIVE HEART WITHOUT ANGINA PECTORIS: Primary | ICD-10-CM

## 2019-11-19 PROCEDURE — 3331090002 HH PPS REVENUE DEBIT

## 2019-11-19 PROCEDURE — 3331090001 HH PPS REVENUE CREDIT

## 2019-11-19 NOTE — PROGRESS NOTES
Javier Haywood MD          NAME:  Abi Boyle   :   1950   MRN:   [id number]   PCP:  Vanetta Gosselin, MD      Subjective: The patient is a 78 y.o. male. Last seen by me 7 months ago. Abi Boyle reports he is feeling well with no interval cardiac sxs. He has gained some weight and his swelling has increased. He complains of blotches on his skin d/t Eliquis. He will see his nephrologist in 2 weeks. He ambulates with a rollator. Patient denies any exertional chest pain, dyspnea, palpitations, syncope, orthopnea, edema or paroxysmal nocturnal dyspnea. Past Medical History:   Diagnosis Date    Arrhythmia     atrial fibrillation     Arthritis     CAD (coronary atherosclerotic disease)     Chronic kidney disease     Chronic pain     Congestive heart failure ([address]     Diabetes ([address]     Diabetes mellitus type 2, controlled ([address] 3/13/2017    Heart failure ([address]     Hx of CABG 3/13/2017    CABG in  in [address] [place] [address] Hypertension     Long term current use of anticoagulant therapy     Morbid obesity ([address] 3/13/2017    JACEY (obstructive sleep apnea) 2017    S/P CABG x 2     Skin cancer     Thyroid disease         ICD-10-CM ICD-9-CM    1. Coronary artery disease involving native coronary artery of native heart without angina pectoris I25.10 414.01 AMB POC EKG ROUTINE W/ 12 LEADS, INTER & REP   2. Hx of CABG Z95.1 V45.81 AMB POC EKG ROUTINE W/ 12 LEADS, INTER & REP   3. Pacemaker Z95.0 V45.01 AMB POC EKG ROUTINE W/ 12 LEADS, INTER & REP   4. Cardiomyopathy, unspecified type (HCC) I42.9 425.4    5. Type 2 diabetes with nephropathy (HCC) E11.21 250.40      583.81    6.  CKD (chronic kidney disease) stage 4, GFR 15-29 ml/min ([facility]) N18.4 585.4       Social History     Tobacco Use    Smoking status: Never Smoker    Smokeless tobacco: Never Used   Substance Use Topics    Alcohol use: Yes     Comment: rare      Family History   Problem Relation Age of Onset    Heart Attack Father     Cancer Father         lymph node    Cancer Mother         breast        Review of Systems  Cardiovascular: Negative except as noted in HPI      Objective:       Vitals:    11/19/19 1018   BP: 150/80   Pulse: 75   Resp: 18   SpO2: 94%   Weight: 247 lb 3.2 oz (112.1 kg)   Height: 5' 9\" (1.753 m)    Body mass index is 36.51 kg/m². General PE  Mental Status - Alert. General Appearance - Not in acute distress. Chest and Lung Exam   Inspection: Accessory muscles - No use of accessory muscles in breathing. Auscultation:   Breath sounds: - Normal.    Cardiovascular   Inspection: Jugular vein - Bilateral - Inspection Normal.  Palpation/Percussion:   Apical Impulse: - Normal.  Auscultation: Rhythm - Regular. Heart Sounds - S1 WNL and S2 WNL. No S3 or S4. Murmurs & Other Heart Sounds: Auscultation of the heart reveals - No Murmurs. Peripheral Vascular   Upper Extremity: Inspection - Bilateral - No Cyanotic nailbeds or Digital clubbing. Lower Extremity:   Palpation: Edema - Bilateral - No edema. Skin: +ecchymoses    Data Review:     EKG -  11/19/19 - SR, occasional PVC, RBBB    Allergies reviewed  Allergies   Allergen Reactions    Allopurinol Rash    Gabapentin Drowsiness    Ciprofloxacin Nausea Only    Percocet [Oxycodone-Acetaminophen] Other (comments)     hallucinations     Medications reviewed  Current Outpatient Medications   Medication Sig    lidocaine (LMX 4) 4 % topical cream Apply  to affected area daily as needed (before packing change).     insulin aspart U-100 (NOVOLOG FLEXPEN U-100 INSULIN) 100 unit/mL (3 mL) inpn Inject 10 units with each meal, 3 times daily (may substitute humalog if preferred)    Insulin Needles, Disposable, (BD ULTRA-FINE MINI PEN NEEDLE) 31 gauge x 3/16\" ndle Use with insulin pens 4 times daily    insulin glargine (LANTUS,BASAGLAR) 100 unit/mL (3 mL) inpn 30 unit injection once each bedtime (basaglar vs lantus whichever preferred)    metoprolol succinate (TOPROL XL) 50 mg XL tablet Take 1 Tab by mouth daily. Patient takes 150mg total daily    pravastatin (PRAVACHOL) 80 mg tablet TAKE 1 TABLET BY MOUTH IN THE EVENING    polyethylene glycol (MIRALAX) 17 gram packet Take 17 g by mouth as needed (constipation).  apixaban (ELIQUIS) 5 mg tablet TAKE 1 TABLET BY MOUTH TWICE A DAY    levothyroxine (SYNTHROID) 137 mcg tablet TAKE 1 TABLET(S) EVERY DAY BY ORAL ROUTE FOR 90 DAYS.  ferrous sulfate (IRON) 325 mg (65 mg iron) tablet Take 325 mg by mouth Daily (before breakfast).  traMADol (ULTRAM) 50 mg tablet Take 50 mg by mouth every six (6) hours as needed for Pain. Indications: Prescribed by Dentist after Dental/Oral Procedure.  bumetanide (BUMEX) 1 mg tablet 3 tabs BID    potassium chloride SR (K-TAB) 20 mEq tablet Take 20 mEq by mouth daily. Patient takes 40 meq every Monday/Wednesday/Friday,   And 20 meq every Tuesday, Thursday, Saturday and Sunday    dutasteride (AVODART) 0.5 mg capsule Take 0.5 mg by mouth daily.  metoprolol succinate (TOPROL-XL) 100 mg tablet Take 100 mg by mouth daily. Patient takes 150mg total daily    MYRBETRIQ 25 mg ER tablet TAKE 1 TABLET BY MOUTH EVERY DAY    febuxostat (ULORIC) 40 mg tab tablet Take 40 mg by mouth daily.  ketoconazole (NIZORAL) 2 % topical cream Apply  to affected area two (2) times daily as needed for Skin Irritation. Indications: rash    metOLazone (ZAROXOLYN) 2.5 mg tablet Take 2.5 mg by mouth daily. Indications: TAKING TUESDAY AND SATURDAY    tamsulosin (FLOMAX) 0.4 mg capsule TAKE 2 CAPSULES BY MOUTH EVERY DAY    multivitamin (ONE A DAY) tablet Take 1 Tab by mouth daily.  acetaminophen (TYLENOL EXTRA STRENGTH) 500 mg tablet Take 1,000 mg by mouth every eight (8) hours as needed for Pain. No current facility-administered medications for this visit. Assessment:       ICD-10-CM ICD-9-CM    1.  Coronary artery disease involving native coronary artery of native heart without angina pectoris I25.10 414.01 AMB POC EKG ROUTINE W/ 12 LEADS, INTER & REP   2. Hx of CABG Z95.1 V45.81 AMB POC EKG ROUTINE W/ 12 LEADS, INTER & REP   3. Pacemaker Z95.0 V45.01 AMB POC EKG ROUTINE W/ 12 LEADS, INTER & REP   4. Cardiomyopathy, unspecified type ([facility]) I42.9 425.4    5. Type 2 diabetes with nephropathy ([facility]) E11.21 250.40      583.81    6. CKD (chronic kidney disease) stage 4, GFR 15-29 ml/min ([facility]) N18.4 585.4      Orders Placed This Encounter    AMB POC EKG ROUTINE W/ 12 LEADS, INTER & REP     Order Specific Question:   Reason for Exam:     Answer:   routine     Plan:     Mr. Ross Anton is feeling well overall, though he has gained some weight and his swelling has increased. His BP is under control. His EKG today is normal. No angina.  ekg OK. Continue current regimen. Follow up in 6 months or sooner PRN. Written by Kaz Sharma, as dictated by Claudette Rodriguez M.D.      Tiesha Pate MD

## 2019-11-19 NOTE — PROGRESS NOTES
1. Have you been to the ER, urgent care clinic since your last visit? Hospitalized since your last visit? Seen on 10/19/19 for sore on leg. 2. Have you seen or consulted any other health care providers outside of the [address] [place] [address] since your last visit? Include any pap smears or colon screening.    No.      Chief Complaint   Patient presents with    Follow-up     6 month- pt denies any cardiac symptoms

## 2019-11-19 NOTE — LETTER
11/20/19 Patient: Charanjit Corado YOB: 1950 Date of Visit: 11/19/2019 [address] [address] VIA In Basket Dear Suze Hdz MD, Thank you for referring Mr. Richard Johnson to [facility] for evaluation. My notes for this consultation are attached. If you have questions, please do not hesitate to call me. I look forward to following your patient along with you.  
 
 
Sincerely, 
 
Diamante Angel MD

## 2019-11-20 PROCEDURE — 3331090002 HH PPS REVENUE DEBIT

## 2019-11-20 PROCEDURE — 3331090001 HH PPS REVENUE CREDIT

## 2019-11-21 ENCOUNTER — HOME CARE VISIT (OUTPATIENT)
Dept: SCHEDULING | Facility: HOME HEALTH | Age: 69
End: 2019-11-21
Payer: MEDICARE

## 2019-11-21 VITALS
SYSTOLIC BLOOD PRESSURE: 126 MMHG | DIASTOLIC BLOOD PRESSURE: 76 MMHG | HEART RATE: 78 BPM | OXYGEN SATURATION: 97 % | RESPIRATION RATE: 18 BRPM | TEMPERATURE: 97.7 F

## 2019-11-21 PROCEDURE — 3331090001 HH PPS REVENUE CREDIT

## 2019-11-21 PROCEDURE — 3331090002 HH PPS REVENUE DEBIT

## 2019-11-21 PROCEDURE — G0299 HHS/HOSPICE OF RN EA 15 MIN: HCPCS

## 2019-12-02 ENCOUNTER — OFFICE VISIT (OUTPATIENT)
Dept: SURGERY | Age: 69
End: 2019-12-02

## 2019-12-02 VITALS
SYSTOLIC BLOOD PRESSURE: 136 MMHG | WEIGHT: 248.3 LBS | HEART RATE: 75 BPM | RESPIRATION RATE: 18 BRPM | HEIGHT: 69 IN | DIASTOLIC BLOOD PRESSURE: 57 MMHG | BODY MASS INDEX: 36.78 KG/M2 | TEMPERATURE: 95.2 F | OXYGEN SATURATION: 94 %

## 2019-12-02 DIAGNOSIS — Z09 POSTOPERATIVE EXAMINATION: Primary | ICD-10-CM

## 2019-12-02 NOTE — PROGRESS NOTES
Surgery  Follow up  Procedure: I and ID thigh abscess  OR date:  10/18/2019  MICRO:  Serratia marcces    S I feel ok    Visit Vitals  /57 (BP 1 Location: Left arm, BP Patient Position: Sitting)   Pulse 75   Temp 95.2 °F (35.1 °C) (Oral)   Resp 18   Ht 5' 9\" (1.753 m)   Wt 112.6 kg (248 lb 4.8 oz)   SpO2 94%   BMI 36.67 kg/m²       O Incisions healing well with excellent granulation tissue   Superficial eschar 0.4 x 1.5 cm    A/P Doing well   Unable to tolerate abx due to N/V and stopped them   Other abx are only IV given susceptibilities   Continue local wound care change band aid daily   Antibacterial soap   RTC prn    Adryan Granger MD FACS

## 2019-12-02 NOTE — PROGRESS NOTES
Chief Complaint   Patient presents with    Follow-up     1 month follow-up       1. Have you been to the ER, urgent care clinic since your last visit?no  Hospitalized since your last visit?no    2. Have you seen or consulted any other health care providers outside of the [address] [place] [address] since your last visit? no Include any pap smears or colon screening.

## 2019-12-26 NOTE — TELEPHONE ENCOUNTER
Medicare will only cover 3 times per day testing. Advised patient to  a store brand glucometer and test the other time per day to equal 4 times per day. Patient agreed.

## 2020-01-07 ENCOUNTER — OFFICE VISIT (OUTPATIENT)
Dept: CARDIOLOGY CLINIC | Age: 70
End: 2020-01-07

## 2020-01-07 ENCOUNTER — CLINICAL SUPPORT (OUTPATIENT)
Dept: CARDIOLOGY CLINIC | Age: 70
End: 2020-01-07

## 2020-01-07 VITALS
BODY MASS INDEX: 37.38 KG/M2 | SYSTOLIC BLOOD PRESSURE: 120 MMHG | RESPIRATION RATE: 18 BRPM | WEIGHT: 252.4 LBS | HEIGHT: 69 IN | DIASTOLIC BLOOD PRESSURE: 64 MMHG | OXYGEN SATURATION: 98 % | HEART RATE: 76 BPM

## 2020-01-07 DIAGNOSIS — I48.20 CHRONIC A-FIB (HCC): ICD-10-CM

## 2020-01-07 DIAGNOSIS — E66.01 MORBID OBESITY (HCC): ICD-10-CM

## 2020-01-07 DIAGNOSIS — Z95.1 HX OF CABG: ICD-10-CM

## 2020-01-07 DIAGNOSIS — E11.21 TYPE 2 DIABETES WITH NEPHROPATHY (HCC): ICD-10-CM

## 2020-01-07 DIAGNOSIS — E11.9 CONTROLLED TYPE 2 DIABETES MELLITUS WITHOUT COMPLICATION, UNSPECIFIED WHETHER LONG TERM INSULIN USE (HCC): ICD-10-CM

## 2020-01-07 DIAGNOSIS — E78.2 MIXED HYPERLIPIDEMIA: ICD-10-CM

## 2020-01-07 DIAGNOSIS — I10 ESSENTIAL HYPERTENSION: Primary | ICD-10-CM

## 2020-01-07 DIAGNOSIS — Z98.890 H/O ATRIOVENTRICULAR NODAL ABLATION: ICD-10-CM

## 2020-01-07 DIAGNOSIS — Z95.0 CARDIAC PACEMAKER IN SITU: Primary | ICD-10-CM

## 2020-01-07 RX ORDER — POTASSIUM CHLORIDE 600 MG/1
16 TABLET, FILM COATED, EXTENDED RELEASE ORAL DAILY
COMMUNITY
End: 2021-03-19

## 2020-01-07 NOTE — PROGRESS NOTES
Subjective:      Kyaw Vo is a 78 y.o. male is here for EP consult. The patient denies chest pain/ shortness of breath, orthopnea, PND, LE edema, palpitations, syncope, presyncope or fatigue.        Patient Active Problem List    Diagnosis Date Noted    Thigh abscess 10/18/2019    Diaper or napkin rash 07/26/2019    Decubitus ulcer of right buttock, stage 3 ([address] 04/01/2019    Acute respiratory failure with hypoxia ([address] 03/23/2019    HTN (hypertension) 03/23/2019    Idiopathic chronic venous hypertension of right leg with ulcer ([address] 03/13/2019    Type 2 diabetes with nephropathy ([address] 10/23/2018    Other foreign body or object entering through skin, subsequent encounter 08/10/2018    Stage II pressure ulcer of right buttock ([address] 06/15/2018    Cardiomyopathy ([address] 10/10/2017    Pacemaker 06/26/2017    H/O atrioventricular anastacio ablation 06/26/2017    JACEY (obstructive sleep apnea) 81/97/2077    Diastolic heart failure ([address] 06/22/2017    Irregular heart beat 03/22/2017    Fatigue 03/15/2017    Bilateral leg edema 03/15/2017    Counseling regarding advanced care planning and goals of care 03/15/2017    Hx of CABG 03/13/2017    Morbid obesity ([address] 03/13/2017    Diabetes mellitus type 2, controlled ([address] 03/13/2017    Heart failure ([address] 03/13/2017    SOB (shortness of breath) 03/07/2017    Chronic a-fib 03/07/2017    Coronary artery disease involving native coronary artery without angina pectoris 03/07/2017    CKD (chronic kidney disease) stage 4, GFR 15-29 ml/min ([address] 03/07/2017      Lanis Bumpers, MD  Past Medical History:   Diagnosis Date    Arrhythmia     atrial fibrillation 2017    Arthritis     CAD (coronary atherosclerotic disease)     Chronic kidney disease     Chronic pain     Congestive heart failure ([address]     Diabetes ([address]     Diabetes mellitus type 2, controlled ([address] 3/13/2017    Heart failure ([address]     Hx of CABG 3/13/2017    [facility] in 2010 [facility]    Hypertension     Long term current use of anticoagulant therapy     Morbid obesity ([address] 3/13/2017    JACEY (obstructive sleep apnea) 6/23/2017    S/P CABG x 2 2010    Skin cancer     Thyroid disease       Past Surgical History:   Procedure Laterality Date    CARDIAC SURG PROCEDURE UNLIST      CABGx2 in 2010    HX ORTHOPAEDIC      L 3rd toe amputation in 1999    HX PACEMAKER  2018    Dr Evita Verma     Allergies   Allergen Reactions    Allopurinol Rash    Gabapentin Drowsiness    Ciprofloxacin Nausea Only    Percocet [Oxycodone-Acetaminophen] Other (comments)     hallucinations      Family History   Problem Relation Age of Onset    Heart Attack Father     Cancer Father         lymph node    Cancer Mother         breast    negative for cardiac disease  Social History     Socioeconomic History    Marital status:      Spouse name: Not on file    Number of children: Not on file    Years of education: Not on file    Highest education level: Not on file   Tobacco Use    Smoking status: Never Smoker    Smokeless tobacco: Never Used   Substance and Sexual Activity    Alcohol use: Yes     Comment: rare    Drug use: No   Other Topics Concern     Current Outpatient Medications   Medication Sig    potassium chloride SR (KLOR-CON 8) 8 mEq tablet Take 16 mEq by mouth daily.  glucose blood VI test strips (ONETOUCH ULTRA BLUE TEST STRIP) strip Use to test blood sugars 3 times per day, per Medicare requirement. DX Code: E11.9    lidocaine (LMX 4) 4 % topical cream Apply  to affected area daily as needed (before packing change).     insulin aspart U-100 (NOVOLOG FLEXPEN U-100 INSULIN) 100 unit/mL (3 mL) inpn Inject 10 units with each meal, 3 times daily (may substitute humalog if preferred)    Insulin Needles, Disposable, (BD ULTRA-FINE MINI PEN NEEDLE) 31 gauge x 3/16\" ndle Use with insulin pens 4 times daily    insulin glargine (LANTUS,BASAGLAR) 100 unit/mL (3 mL) inpn 30 unit injection once each bedtime (basaglar vs lantus whichever preferred)    metoprolol succinate (TOPROL XL) 50 mg XL tablet Take 1 Tab by mouth daily. Patient takes 150mg total daily    pravastatin (PRAVACHOL) 80 mg tablet TAKE 1 TABLET BY MOUTH IN THE EVENING    polyethylene glycol (MIRALAX) 17 gram packet Take 17 g by mouth as needed (constipation).  apixaban (ELIQUIS) 5 mg tablet TAKE 1 TABLET BY MOUTH TWICE A DAY    levothyroxine (SYNTHROID) 137 mcg tablet TAKE 1 TABLET(S) EVERY DAY BY ORAL ROUTE FOR 90 DAYS.  ferrous sulfate (IRON) 325 mg (65 mg iron) tablet Take 325 mg by mouth Daily (before breakfast).  traMADol (ULTRAM) 50 mg tablet Take 50 mg by mouth every six (6) hours as needed for Pain. Indications: Prescribed by Dentist after Dental/Oral Procedure.  bumetanide (BUMEX) 1 mg tablet 3 tabs BID    dutasteride (AVODART) 0.5 mg capsule Take 0.5 mg by mouth daily.  metoprolol succinate (TOPROL-XL) 100 mg tablet Take 100 mg by mouth daily. Patient takes 150mg total daily    MYRBETRIQ 25 mg ER tablet TAKE 1 TABLET BY MOUTH EVERY DAY    febuxostat (ULORIC) 40 mg tab tablet Take 40 mg by mouth daily.  ketoconazole (NIZORAL) 2 % topical cream Apply  to affected area two (2) times daily as needed for Skin Irritation. Indications: rash    metOLazone (ZAROXOLYN) 2.5 mg tablet Take 2.5 mg by mouth daily. Indications: TAKING TUESDAY AND SATURDAY    multivitamin (ONE A DAY) tablet Take 1 Tab by mouth daily.  acetaminophen (TYLENOL EXTRA STRENGTH) 500 mg tablet Take 1,000 mg by mouth every eight (8) hours as needed for Pain.  potassium chloride SR (K-TAB) 20 mEq tablet Take 20 mEq by mouth daily. Patient takes 40 meq every Monday/Wednesday/Friday,   And 20 meq every Tuesday, Thursday, Saturday and Sunday    tamsulosin (FLOMAX) 0.4 mg capsule TAKE 2 CAPSULES BY MOUTH EVERY DAY     No current facility-administered medications for this visit.        Vitals:    01/07/20 1010   BP: 120/64   Pulse: 76 Resp: 18   SpO2: 98%   Weight: 252 lb 6.4 oz (114.5 kg)   Height: 5' 9\" (1.753 m)       I have reviewed the nurses notes, vitals, problem list, allergy list, medical history, family, social history and medications. Review of Symptoms:    General: Pt denies excessive weight gain or loss. Pt is able to conduct ADL's  HEENT: Denies blurred vision, headaches, epistaxis and difficulty swallowing. Respiratory: Denies shortness of breath, BETTS, wheezing or stridor. Cardiovascular: Denies precordial pain, palpitations, edema or PND  Gastrointestinal: Denies poor appetite, indigestion, abdominal pain or blood in stool  Urinary: Denies dysuria, pyuria  Musculoskeletal: Denies pain or swelling from muscles or joints  Neurologic: Denies tremor, paresthesias, or sensory motor disturbance  Skin: Denies rash, itching or texture change. Psych: Denies depression      Physical Exam:      General: Well developed, in no acute distress. HEENT: Eyes - PERRL, no jvd  Heart:  Normal S1/S2 negative S3 or S4. Regular, no murmur, gallop or rub.   Respiratory: Clear bilaterally x 4, no wheezing or rales  Abdomen:   Soft, non-tender, bowel sounds are active.   Extremities:  No edema, normal cap refill, no cyanosis. Musculoskeletal: No clubbing  Neuro: A&Ox3, speech clear, gait stable. Skin: Skin color is normal. No rashes or lesions.  Non diaphoretic  Vascular: 2+ pulses symmetric in all extremities    Cardiographics    Pacer - biv paced 96%    Results for orders placed or performed during the hospital encounter of 03/23/19   EKG, 12 LEAD, INITIAL   Result Value Ref Range    Ventricular Rate 75 BPM    Atrial Rate 75 BPM    QRS Duration 144 ms    Q-T Interval 456 ms    QTC Calculation (Bezet) 509 ms    Calculated R Axis -86 degrees    Calculated T Axis 110 degrees    Diagnosis       Ventricular-paced rhythm  Biventricular pacemaker detected  When compared with ECG of 22-JUN-2017 09:38,  Electronic ventricular pacemaker has replaced Atrial fibrillation  Vent. rate has decreased BY  39 BPM  Confirmed by Tayla Last ([id number]) on 3/23/2019 5:19:49 PM           Lab Results   Component Value Date/Time    WBC 5.5 03/27/2019 05:50 AM    HGB 11.3 (L) 03/27/2019 05:50 AM    HCT 37.2 03/27/2019 05:50 AM    PLATELET 866 (L) 99/50/2875 05:50 AM    MCV 82.5 03/27/2019 05:50 AM      Lab Results   Component Value Date/Time    Sodium 143 06/12/2019 10:56 AM    Potassium 3.9 06/12/2019 10:56 AM    Chloride 95 (L) 06/12/2019 10:56 AM    CO2 28 06/12/2019 10:56 AM    Anion gap 8 03/27/2019 05:50 AM    Glucose 125 (H) 06/12/2019 10:56 AM    BUN 90 (HH) 06/12/2019 10:56 AM    Creatinine 2.99 (H) 06/12/2019 10:56 AM    BUN/Creatinine ratio 30 (H) 06/12/2019 10:56 AM    GFR est AA 23 (L) 06/12/2019 10:56 AM    GFR est non-AA 20 (L) 06/12/2019 10:56 AM    Calcium 9.3 06/12/2019 10:56 AM    Bilirubin, total 0.4 06/12/2019 10:56 AM    AST (SGOT) 33 06/12/2019 10:56 AM    Alk. phosphatase 56 06/12/2019 10:56 AM    Protein, total 6.9 06/12/2019 10:56 AM    Albumin 3.9 06/12/2019 10:56 AM    Globulin 4.2 (H) 03/24/2019 02:21 AM    A-G Ratio 1.3 06/12/2019 10:56 AM    ALT (SGPT) 16 06/12/2019 10:56 AM         Assessment:     Assessment:        ICD-10-CM ICD-9-CM    1. Essential hypertension I10 401.9    2. Type 2 diabetes with nephropathy (HCC) E11.21 250.40      583.81    3. Morbid obesity ([address] E66.01 278.01    4. Chronic a-fib I48.20 427.31    5. Controlled type 2 diabetes mellitus without complication, unspecified whether long term insulin use (HCC) E11.9 250.00    6. Mixed hyperlipidemia E78.2 272.2    7. H/O atrioventricular anastacio ablation Z98.890 V15.1    8. Hx of CABG Z95.1 V45.81      Orders Placed This Encounter    potassium chloride SR (KLOR-CON 8) 8 mEq tablet     Sig: Take 16 mEq by mouth daily. Plan:   Mr Lars Denis is biv paced sp av node abl. Cont oac for AF. Cont med rx for htn and hyperlipidema. F/u in one year.     Continue medical management for cad, af, htn and hyperlipidemia. Thank you for allowing me to participate in Cami Eden 's care.     Deneen Minor MD, Paras Bhatt

## 2020-01-07 NOTE — PROGRESS NOTES
1. Have you been to the ER, urgent care clinic since your last visit? Hospitalized since your last visit? No    2. Have you seen or consulted any other health care providers outside of the [address] [place] [address] since your last visit? Include any pap smears or colon screening. No    Chief Complaint   Patient presents with    Pacemaker Check     Yearly appt. Denied cardiac symptoms.

## 2020-01-10 RX ORDER — PRAVASTATIN SODIUM 80 MG/1
TABLET ORAL
Qty: 90 TAB | Refills: 0 | Status: SHIPPED | OUTPATIENT
Start: 2020-01-10 | End: 2020-04-03

## 2020-01-23 ENCOUNTER — HOSPITAL ENCOUNTER (OUTPATIENT)
Dept: WOUND CARE | Age: 70
Discharge: HOME | End: 2020-01-23
Payer: MEDICARE

## 2020-01-23 VITALS
DIASTOLIC BLOOD PRESSURE: 76 MMHG | TEMPERATURE: 96.7 F | RESPIRATION RATE: 18 BRPM | SYSTOLIC BLOOD PRESSURE: 173 MMHG | HEART RATE: 75 BPM

## 2020-01-23 PROBLEM — L97.912 NON-PRESSURE CHRONIC ULCER OF RIGHT LOWER LEG WITH FAT LAYER EXPOSED (HCC): Status: ACTIVE | Noted: 2020-01-23

## 2020-01-23 PROBLEM — E11.621 DIABETIC ULCER OF RIGHT GREAT TOE (HCC): Status: ACTIVE | Noted: 2020-01-23

## 2020-01-23 PROBLEM — L97.922 NON-PRESSURE CHRONIC ULCER OF LEFT LOWER LEG WITH FAT LAYER EXPOSED (HCC): Status: ACTIVE | Noted: 2020-01-23

## 2020-01-23 PROBLEM — L97.519 DIABETIC ULCER OF RIGHT GREAT TOE (HCC): Status: ACTIVE | Noted: 2020-01-23

## 2020-01-23 PROCEDURE — 99213 OFFICE O/P EST LOW 20 MIN: CPT

## 2020-01-23 NOTE — H&P
[address]  HISTORY AND PHYSICAL    Name:  Eris Sher  MR#:  [id number]  :  1950  ACCOUNT #:  [de-identified]  ADMIT DATE:  2020    HISTORY OF PRESENT ILLNESS:  The patient is a 78-year-old man referred to the [address] [place] [address] regarding open wounds on both lower legs. The patient reports that he had blisters, which recently opened on the lower legs. He has previous history of ulcerations in the same area in the past.    The patient has history of chronic lower extremity edema. He takes Bumex 2 mg p.o. b.i.d. and metolazone three times per week. He reports that the diuretics do produce significant diuretic response. The patient reports that he has been drinking a lot of fluid over the course of the day, mainly water. One week ago, he reduced fluid intake. He weighed himself daily and his weight has been slowly declining since changing fluid intake. The patient has history of cardiomyopathy and congestive heart failure. He had chronic AFib and was treated with pacemaker and AV node ablation. There is history of coronary artery disease, diabetes mellitus type 2 controlled, hypertension, morbid obesity, sleep apnea not presently treated, and history of coronary bypass. The patient reports that he can walk short distances using a walker. He has a lot of joint problems in knee and hip as well as back problems. He reports that he would get short of breath on walking 100 feet with his walker. The patient does sleep lying in bed at night. The patient has history of chronic kidney disease stage IV. He is followed by Dr. Lona Paget. He had blood work done within the last day or two for Dr. Marco Lopez. The patient has never smoked. He rarely uses alcohol. PHYSICAL EXAMINATION:  GENERAL:  The patient is an alert, overweight man in no acute distress. VITAL SIGNS:  Blood pressure 172/76, pulse 75, temperature 96.7.   HEAD AND NECK:  No jaundice, mass, or thyromegaly. LUNGS:  Clear bilaterally without rales, rhonchi, or wheezes. HEART:  Regular without murmur, gallop, or rub. ABDOMEN:  Soft. NEUROLOGIC:  The patient is alert and oriented. He moves all extremities equally. Facial movement is symmetrical.  Speech is normal.    WOUND EXAMINATION:  Examination of the lower extremities revealed no palpable dorsalis pedis or posterior tibial pulse on either side. There is hyperpigmentation of the lower half of the right and left lower legs. There is 1+ pitting edema in the lower legs bilaterally. On the right side, there is an ulcer on the right medial lower leg, having an appearance of a ruptured bulla with dimensions 9.3 x 4.3 x 0.1 cm. On the right lateral lower leg, there is an ulcer 1.9 x 0.7 x 0.1 cm. There is an ulcer on right medial great toe,  1.4 x 0.6 x 0.1 cm. On right lateral great toe there is an ulcer with dimensions  0.6 x 0.6 x 0.1 cm. Left lower leg medially, there is a wound 1.5 x 5.7 x 0.1 cm. All these wounds are shallow with a relatively clean surface. ASSESSMENT AND PLAN:  I explained to the patient that he should continue taking his diuretics and continue limiting fluid intake particularly between meals, so that he achieves a gradual increase in weight related to loss of edema. He should follow up with Dr. Andriy Samson as planned regarding his renal function. I will order bilateral ankle-brachial indices to better determine the arterial perfusion to his legs and feet. We are limited now in terms of amount of compression that can be applied until we obtain those results. Toe wounds will be treated with Xeroform and foam dressing. His wounds are medial and lateral right great toe. Lower leg wounds will be treated with Xeroform gauze and roll gauze. Kit Bonine will be placed bilaterally to apply mild compression from the base of the toes to the upper calf.     Dressings will be changed at least three times per week, and more often if there is a lot of drainage. He may at some point need venous testing, but I think it is likely that his edema is mainly a systemic origin at present. The patient will follow up in one week. FINAL DIAGNOSES:  Bilateral leg edema, bilateral lower leg nonpressure ulcers, right great toe ulcer x2, diabetes mellitus.         MD SANTOS [email or web address]  D:  01/23/2020 10:44  T:  01/23/2020 13:43  JOB #:  [id number]

## 2020-01-23 NOTE — WOUND CARE
01/23/20 1110   Wound Leg lower Medial;Right #1 01/23/20   Date First Assessed: 01/23/20   Present on Hospital Admission: No  Wound Approximate Age at First Assessment (Weeks): 1 weeks  Primary Wound Type: (c)   Location: Leg lower  Wound Location Orientation: Medial;Right  Wound Description: #1  Date of Firs. .. Dressing Type Applied ABD pad;Gauze wrap (kami); Special tape (comment); Xeroform  (Double tubi)   Wound Leg lower Right;Lateral #2 01/23/20   Date First Assessed/Time First Assessed: 01/23/20 0936   Wound Approximate Age at First Assessment (Weeks): 1 weeks  Primary Wound Type: Venous Ulcer  Location: Leg lower  Wound Location Orientation: Right;Lateral  Wound Description: #2  Date of First... Dressing Type Applied Xeroform;ABD pad   Wound Toe (Comment  which one) Right;Medial #3 01/23/20   Date First Assessed/Time First Assessed: 01/23/20 0939   Wound Approximate Age at First Assessment (Weeks): 4 weeks  Primary Wound Type: (c) Friction  Location: (c) Toe (Comment  which one)  Wound Location Orientation: Right;Medial  Wound Description:. .. Dressing Type Applied Xeroform;Gauze;Special tape (comment)   Wound Toe (Comment  which one) Right;Lateral #4 01/23/20   Date First Assessed/Time First Assessed: 01/23/20 0943   Wound Approximate Age at First Assessment (Weeks): 2 weeks  Primary Wound Type: Friction  Location: (c) Toe (Comment  which one)  Wound Location Orientation: Right;Lateral  Wound Description: #4. .. Dressing Type Applied Xeroform   Wound Leg lower Left;Medial #5 01/23/20   Date First Assessed/Time First Assessed: 01/23/20 0944   Wound Approximate Age at First Assessment (Weeks): 1 weeks  Primary Wound Type: (c)   Location: Leg lower  Wound Location Orientation: Left;Medial  Wound Description: #5  Date of First Observati. .. Dressing Type Applied Xeroform;ABD pad;Gauze wrap (kami); Special tape (comment)  (Double tubi )   Discharge Condition: Stable     Pain: 0    Ambulatory Status: Walking and Walker     Discharge Destination: Home     Transportation: Car    Accompanied by: Family/Caregiver     Discharge instructions reviewed with Patient and Family/Caregiver  and copy or written instructions have been provided. All questions/concerns have been addressed at this time.

## 2020-01-23 NOTE — DISCHARGE INSTRUCTIONS
Discharge Instructions for  [facility]  [address]  [place], [address] [facility]  Telephone: [phone]    NAME:  Michelle Headley OF BIRTH:  1950  MEDICAL RECORD NUMBER:  [id number]  DATE:  1/23/2020    Wound Cleansing:   Do not scrub or use excessive force. Cleanse wound prior to applying a clean dressing with:  [x] Normal Saline [] Keep Wound Dry in Shower    [] Wound Cleanser   [x] Cleanse wound with Mild Soap & Water  [] May Shower at Discharge   [] Other:      Topical Treatments:  Do not apply lotions, creams, or ointments to wound bed unless directed. [] Apply moisturizing lotion to skin surrounding the wound prior to dressing change.  [] Apply antifungal ointment to skin surrounding the wound prior to dressing change.  [] Apply thin film of moisture barrier ointment to skin immediately around wound. [] Other:       Dressings:           Wound Location Bilateral lower extremities  [x] Apply Primary Dressing:       [] MediHoney Gel [] Alginate with Silver [] Alginate   [] Collagen [] Collagen with Silver   [] Santyl with Moisten saline gauze     [] Hydrocolloid   [] MediHoney Alginate [] Foam with Silver   [] Foam   [] Hydrofera Blue    [] Mepilex Border    [] Moisten with Saline [] Hydrogel [] Mepitel     [] Bactroban/Mupirocin [] Polysporin  [] Other:    [] Pack wound loosely with  [] Iodoform   [] Plain Packing  [x] Other xeroform  [] Cover and Secure with:     [] Gauze [x] Alva [] Kerlix   [] Ace Wrap [] Cover Roll Tape [x] ABD     [x] Other: Exudry   Avoid contact of tape with skin.   [x] Change dressing: [] Daily    [] Every Other Day [x] Three times per week   [] Once a week [] Do Not Change Dressing   [] Other:       Edema Control:  Apply: [] Compression Stocking []Right Leg []Left Leg   [x] Tubigrip [x]Right Leg Double Layer [x]Left Leg Double Layer Size F     []Right Leg Single Layer []Left Leg Single Layer   [] SpandaGrip []Right Leg  []Left Leg      []Low compression 5-10 mm/Hg      []Medium compression 10-20 mm/Hg     []High compression  20-30 mm/Hg   every morning immediately when getting up should be applied to affected leg(s) from mid foot to knee making sure to cover the heel. Remove every night before going to bed. [x] Elevate leg(s) above the level of the heart when sitting. [x] Avoid prolonged standing in one place. [] Elevate arm/hand above the level of the heart []RightArm []LeftArm       Dietary:  [x] Diet as tolerated: [x] Calorie Diabetic Diet: no sugar, low carbs [x] No Added Salt:  [] Increase Protein: [] Other:   Activity:  [x] Activity as tolerated:  [] Patient has no activity restrictions     [] Strict Bedrest: [] Remain off Work:     [] May return to full duty work:                                   [] Return to work with restrictions:             Return Appointment:  [] Wound and dressing supply provider:   [] ECF or Home Healthcare:  [] Wound Assessment: [] Physician or NP scheduled for Wound Assessment:   [x] Return Appointment: With Dr. Sam Howell  in [facility])  [] Ordered tests:      Electronically signed Shira Maravilla RN on 1/23/2020 at 10:38 AM     [address]: Should you experience any significant changes in your wound(s) or have questions about your wound care, please contact the [facility] at [address] [place] [address] 8:00 am - 4:30. If you need help with your wound outside these hours and cannot wait until we are again available, contact your PCP or go to the hospital emergency room. PLEASE NOTE: IF YOU ARE UNABLE TO OBTAIN WOUND SUPPLIES, CONTINUE TO USE THE SUPPLIES YOU HAVE AVAILABLE UNTIL YOU ARE ABLE TO REACH US. IT IS MOST IMPORTANT TO KEEP THE WOUND COVERED AT ALL TIMES.      Physician Signature:_______________________    Date: ___________ Time:  ____________

## 2020-01-23 NOTE — WOUND CARE
Clinic Level of Care Assessment    NAME:  Alcides Escamilla  YOB: 1950 GENDER: male  MEDICAL RECORD NUMBER:  [id number]   DATE:  1/23/2020      Wound Count Document in [facility]  Number of Wounds Assessed Points   No Wounds/Ulcers []   0   Less than Three Wounds/Ulcers [x]   1   3-6 Wounds/Ulcers []   2   Greater than 6 Wounds/Ulcers []   3     Ambulation Status Document in Coord/IVY/Mobility tab  Status Definition Points   Independent Independently able to ambulate. Fully able (without any assistance) to get on/off exam table/chair. [x]   0   Minimal Physical Assistance Requires physical assistance of one person to ambulate and/or position patient to be examined. Includes necessary physical assistance to position lower extremities on/off stool. []   1   Moderate Physical Assistance Requires at least one staff member to physically assist patient in ambulating into treatment room, and on/off exam table. []   2   Full Assistance Requires assistance of at least two staff members to transfer patient into treatment room and/or on/off exam table/chair. \"Total Transfer\". []   3     Dressing Complexity Document in LDA and Write Appropriate Order  Complexity Definition Points   No Dressing  []   0   Simple Minimal, simple dressing. i.e. Band-aid, gauze, simple wrap. []   1   Intermediate Moderately complicated requiring licensed personnel to apply i.e. collagen matrix, ointments, gels, alginates. [x]   2   Complex Complicated requiring licensed personnel to apply dressings 6 or more wounds. []   3     Teaching Effort Document in Education Tab   Effort Definition Points   No Teaching  []   0   Simple Reinforce two or less topics. Document in Education navigator.  []   1   Intermediate Reinforce three to five topics and/or one additional   new topic. Document in Education navigator. [x]   2   Complex Teach more than one new topic.  New patient information   packet reviewed and/or reinforce more than three topics. Document in Education navigator. HBO initial instruction. []   3       Patient Assessment and Planning  Planning Definition Points   Simple Multiple System Simple: Simple follow-up with routine assessment and planning. If Discharged, instructions and long term/follow-up care given to patient/caregiver. Discharged, instructions and/or After Visit Summary given to patient/caregiver and instructions completed. [x]   1   Intermediate Multiple System Intermediate: Contact with outside resources; i.e. Telephone calls to home health, [facility]. May include filling out forms and writing letters, arranging transportation, communication with insurance , vendors, etc.  Discharged, instructions and/or After Visit Summary given to patient/caregiver and instructions completed. []   2   Complex Multiple System Complex: Full, comprehensive assessment and planning. Follow the entire navigator under Wound Visit charting filling out each tab which includes OP Adm Database Screening, Education and CarePlan  HBO risk assessment completed. Discharged, instructions and/or After Visit Summary given to patient/caregiver and instructions completed.    []   3           Is this the Patient's First Visit to the [address] [place] [address] Road  No      Is this Patient Established @ [facility]  Yes             Clinical Level of Care      Points  0-2  Level 1 []     Points  3-5  Level 2 []     Points  6-9  Level 3 [x]     Points  10-12  Level 4 []     Points  13-15  Level 5 []       Electronically signed by Dinah Meza RN on 1/23/2020 at 1:03 PM

## 2020-01-23 NOTE — WOUND CARE
01/23/20 0946   Wound Leg lower Medial;Right #1 01/23/20   Date First Assessed: 01/23/20   Present on Hospital Admission: No  Wound Approximate Age at First Assessment (Weeks): 1 weeks  Primary Wound Type: (c)   Location: Leg lower  Wound Location Orientation: Medial;Right  Wound Description: #1  Date of Firs. .. Dressing Status Removed   Dressing Type Gauze   Non-staged Wound Description Partial thickness   Wound Length (cm) 9.3 cm   Wound Width (cm) 4.3 cm   Wound Depth (cm) 0.1 cm   Wound Surface Area (cm^2) 39.99 cm^2   Wound Volume (cm^3) 4 cm^3   Condition of Base Pink   Condition of Edges Open   Drainage Amount Moderate   Drainage Color Serosanguinous   Wound Odor None   Christy-wound Assessment   (staining)   Cleansing and Cleansing Agents  Normal saline   Wound Leg lower Right;Lateral #2 01/23/20   Date First Assessed/Time First Assessed: 01/23/20 0936   Wound Approximate Age at First Assessment (Weeks): 1 weeks  Primary Wound Type: Venous Ulcer  Location: Leg lower  Wound Location Orientation: Right;Lateral  Wound Description: #2  Date of First... Dressing Status Removed   Dressing Type Gauze   Non-staged Wound Description Partial thickness   Wound Length (cm) 1.9 cm   Wound Width (cm) 0.7 cm   Wound Depth (cm) 0.1 cm   Wound Surface Area (cm^2) 1.33 cm^2   Wound Volume (cm^3) 0.13 cm^3   Condition of Base Pink   Condition of Edges Open   Drainage Amount Moderate   Drainage Color Serosanguinous   Wound Odor None   Christy-wound Assessment   (staining)   Cleansing and Cleansing Agents  Normal saline   Wound Toe (Comment  which one) Right;Medial #3 01/23/20   Date First Assessed/Time First Assessed: 01/23/20 0939   Wound Approximate Age at First Assessment (Weeks): 4 weeks  Primary Wound Type: (c) Friction  Location: (c) Toe (Comment  which one)  Wound Location Orientation: Right;Medial  Wound Description:. ..    Dressing Status   (none)   Dressing Type Open to air   Non-staged Wound Description   (scabbed) Wound Length (cm) 1.4 cm   Wound Width (cm) 0.6 cm   Wound Depth (cm) 0.1 cm   Wound Surface Area (cm^2) 0.84 cm^2   Wound Volume (cm^3) 0.08 cm^3   Condition of Base   (scabbed)   Wound Toe (Comment  which one) Right;Lateral #4 01/23/20   Date First Assessed/Time First Assessed: 01/23/20 0943   Wound Approximate Age at First Assessment (Weeks): 2 weeks  Primary Wound Type: Friction  Location: (c) Toe (Comment  which one)  Wound Location Orientation: Right;Lateral  Wound Description: #4. .. Dressing Status   (open to air)   Dressing Type Open to air   Non-staged Wound Description Full thickness   Wound Length (cm) 0.6 cm   Wound Width (cm) 0.6 cm   Wound Depth (cm) 0.1 cm   Wound Surface Area (cm^2) 0.36 cm^2   Wound Volume (cm^3) 0.04 cm^3   Condition of Base Pink   Drainage Amount None   Wound Odor None   Christy-wound Assessment Intact   Cleansing and Cleansing Agents  Normal saline   Wound Leg lower Left;Medial #5 01/23/20   Date First Assessed/Time First Assessed: 01/23/20 0944   Wound Approximate Age at First Assessment (Weeks): 1 weeks  Primary Wound Type: (c)   Location: Leg lower  Wound Location Orientation: Left;Medial  Wound Description: #5  Date of First Observati. ..    Dressing Status Removed   Dressing Type Gauze   Non-staged Wound Description Partial thickness   Wound Length (cm) 11.5 cm   Wound Width (cm) 5.7 cm   Wound Depth (cm) 0.1 cm   Wound Surface Area (cm^2) 65.55 cm^2   Wound Volume (cm^3) 6.56 cm^3   Condition of Base Pink   Condition of Edges Open   Drainage Amount Moderate   Drainage Color Serosanguinous   Wound Odor None   Christy-wound Assessment   (staining)   Cleansing and Cleansing Agents  Normal saline     Visit Vitals  /76 (BP 1 Location: Left arm, BP Patient Position: At rest)   Pulse 75   Temp 96.7 °F (35.9 °C)

## 2020-01-23 NOTE — PROGRESS NOTES
[facility]    Note dictated. Diagnosis:    Z63.972, L97.922, R60.0, L97.519, E11.621    NICOLE Almonte MD

## 2020-01-23 NOTE — WOUND CARE
Nutritional Screen   NAME:  Cathy Walker  YOB: 1950 GENDER: male  MEDICAL RECORD NUMBER:  [id number]   DATE:  1/23/2020     Has food intake declined over the past 3 months due to loss of appetite, digestive problems, chewing or swallowing difficulties? Points   Severe decrease in food intake  []   0   Moderate decrease in food intake  []   1   No decrease in food intake  [x]   2     Weight loss during the last 3 months    Points   Weight loss greater than 3 kg (6.6 lbs)  []   0   Does not know []   1   Weight loss between 1 and 3 kg (2.2 and 6.6 lbs)  [x]   2   o weight loss  []   3     Mobility    Points   Bed or chair bound  []   0   Able to get out of bed / chair but does not go out  []   1   Goes out [x]   2     Has suffered psychological stress or acute disease in the past 3 months? Points   Yes []   0   No [x]   2     Neuropsychological problems    Points   Severe dementia or depression  []   0   Mild dementia  []   1   No psychological problems  []   2    [x]   3     F1 Body Mass Index (BMI) (weight in kg) / (height in m)2    Points   BMI less then 19  []   0   BMI 19 to less than 21  []   1   BMI 21 to less than 23  []   2   BMI greater than 23  [x]   3     Wt Readings from Last 1 Encounters:   01/07/20 114.5 kg (252 lb 6.4 oz)       IF BMI IS NOT AVAILABLE, REPLACE QUESTION F1 WITH QUESTION F2.   DO NOT ANSWER QUESTION F2 IF QUESTION F1 IS ALREADY COMPLETED.          F2 Calf circumference (CC) in cm    Points   Calf circumference less than 31  []   0   Calf circumference 31or greater []   3     Screening Score     Points 12-14    [x] Normal nutritional status    Points  8-11   [] At risk of malnutrition    Points  0-7   [] Malnourished      Electronically signed by Jock Apley on 1/23/2020 at 9:59 AM

## 2020-01-30 ENCOUNTER — HOSPITAL ENCOUNTER (OUTPATIENT)
Dept: WOUND CARE | Age: 70
Discharge: HOME | End: 2020-01-30
Payer: MEDICARE

## 2020-01-30 VITALS
SYSTOLIC BLOOD PRESSURE: 163 MMHG | RESPIRATION RATE: 18 BRPM | HEART RATE: 75 BPM | TEMPERATURE: 97.2 F | DIASTOLIC BLOOD PRESSURE: 79 MMHG
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
## 2020-01-30 DIAGNOSIS — L22 DIAPER OR NAPKIN RASH: ICD-10-CM

## 2020-01-30 DIAGNOSIS — L89.312 STAGE II PRESSURE ULCER OF RIGHT BUTTOCK (HCC): ICD-10-CM

## 2020-01-30 PROCEDURE — 99212 OFFICE O/P EST SF 10 MIN: CPT

## 2020-01-30 NOTE — WOUND CARE
01/30/20 1246   Wound Leg lower Medial;Right #1 01/23/20   Date First Assessed: 01/23/20   Present on Hospital Admission: No  Wound Approximate Age at First Assessment (Weeks): 1 weeks  Primary Wound Type: (c)   Location: Leg lower  Wound Location Orientation: Medial;Right  Wound Description: #1  Date of Firs. .. Dressing Type Applied Xeroform; Absorptive;Gauze wrap (kami); Special tape (comment)  (Double tubi )   Wound Toe (Comment  which one) Right;Medial #3 01/23/20   Date First Assessed/Time First Assessed: 01/23/20 0939   Wound Approximate Age at First Assessment (Weeks): 4 weeks  Primary Wound Type: (c) Friction  Location: (c) Toe (Comment  which one)  Wound Location Orientation: Right;Medial  Wound Description:. .. Dressing Type Applied Xeroform;Gauze;Special tape (comment)   Wound Leg lower Left;Medial #5 01/23/20   Date First Assessed/Time First Assessed: 01/23/20 0944   Wound Approximate Age at First Assessment (Weeks): 1 weeks  Primary Wound Type: (c)   Location: Leg lower  Wound Location Orientation: Left;Medial  Wound Description: #5  Date of First Observati. .. Dressing Type Applied Xeroform; Absorptive;Gauze wrap (kami); Special tape (comment)  (Double tubi)   Wound Leg lower Right;Lateral #2 01/23/20   Date First Assessed/Time First Assessed: 01/23/20 0936   Wound Approximate Age at First Assessment (Weeks): 1 weeks  Primary Wound Type: Venous Ulcer  Location: Leg lower  Wound Location Orientation: Right;Lateral  Wound Description: #2  Date of First... Dressing Type Applied Xeroform;Gauze;Special tape (comment)   Wound Toe (Comment  which one) Right;Lateral #4 01/23/20   Date First Assessed/Time First Assessed: 01/23/20 0943   Wound Approximate Age at First Assessment (Weeks): 2 weeks  Primary Wound Type: Friction  Location: (c) Toe (Comment  which one)  Wound Location Orientation: Right;Lateral  Wound Description: #4. ..    Dressing Type Applied Xeroform;Gauze;Special tape (comment)   Discharge Condition: Stable     Pain: 0    Ambulatory Status: Walking and Walker     Discharge Destination: Home     Transportation: Car    Accompanied by: Russel Armendariz    Discharge instructions reviewed with Patient and Family/Caregiver  and copy or written instructions have been provided. All questions/concerns have been addressed at this time.

## 2020-01-30 NOTE — PROGRESS NOTES
HISTORY OF PRESENT ILLNESS:  The patient is a 65-year-old man referred to the [address] [place] [address] regarding open wounds on both lower legs. The patient reports that he had blisters, which recently opened on the lower legs. He has previous history of ulcerations in the same area in the past.     The patient has history of chronic lower extremity edema. He takes Bumex 2 mg p.o. b.i.d. and metolazone three times per week. He reports that the diuretics do produce significant diuretic response.     The patient reports that he has been drinking a lot of fluid over the course of the day, mainly water. One week ago, he reduced fluid intake. He weighed himself daily and his weight has been slowly declining since changing fluid intake.     The patient has history of cardiomyopathy and congestive heart failure. He had chronic AFib and was treated with pacemaker and AV node ablation. There is history of coronary artery disease, diabetes mellitus type 2 controlled, hypertension, morbid obesity, sleep apnea not presently treated, and history of coronary bypass. The patient reports that he can walk short distances using a walker. He has a lot of joint problems in knee and hip as well as back problems. He reports that he would get short of breath on walking 100 feet with his walker. The patient does sleep lying in bed at night.     The patient has history of chronic kidney disease stage IV. He is followed by Dr. Meggan Barrera. He had blood work done within the last day or two for Dr. Antonio Waldrop.     The patient has never smoked. He rarely uses alcohol.     PHYSICAL EXAMINATION:  GENERAL:  The patient is an alert, overweight man in no acute distress. WOUND EXAMINATION:  Examination of the lower extremities revealed no palpable dorsalis pedis or posterior tibial pulse on either side. There is hyperpigmentation of the lower half of the right and left lower legs.   There is trace pitting edema in the lower legs bilaterally.     On the right side, there is an ulcer on the right medial lower leg, having an appearance of a ruptured bulla.     Left lower leg medially, there is a wound shallow. Thin slough removed left ulcer with dry gauze.     ASSESSMENT AND PLAN:  I explained to the patient that he should continue taking his diuretics and continue limiting fluid intake particularly between meals, so that he achieves a gradual increase in weight related to loss of edema. He should follow up with Dr. Oswald Khalil as planned regarding his renal function.     I have ordered bilateral ankle-brachial indices to better determine the arterial perfusion to his legs and feet. We are limited now in terms of amount of compression that can be applied until we obtain those results.     Toe wounds will be treated with Xeroform and foam dressing. His wounds are medial and lateral right great toe. Lower leg wounds will be treated with Xeroform gauze and roll gauze.   [place] will be placed bilaterally to apply mild compression from the base of the toes to the upper calf.     Dressings will be changed at least three times per week, and more often if there is a lot of drainage.     He may at some point need venous testing, but I think it is likely that his edema is mainly a systemic origin at present.     The patient will follow up in one week.     FINAL DIAGNOSES:  Bilateral leg edema, bilateral lower leg nonpressure ulcers, right great toe ulcer x2, diabetes mellitus.      D35.173, K80.683, R60.0     Phillip Jernigan MD

## 2020-01-30 NOTE — DISCHARGE INSTRUCTIONS
Bilateral lower leg wounds - Cleanse with saline or soap and water, rinse and pat dry, apply xeroform, cover with Exudry or ABD, secure with roll gauze, apply double layer tubigrip size F.     RIght great toe wounds - cleanse with saline or soap and water, rinse, pat dry, apply xeroform and cover with gauze, roll gauze, tape. Change dressings 3 times a week and as needed, for soiling/compromise of dressing. Follow up w/Dr Yeny Ferris in 1 week. Discharge Instructions for  [place] [id number] [address]  [address] [facility] [address], [address]  Telephone: [phone]     FAX [phone]    NAME: Shayne Smith OF BIRTH:  1950  MEDICAL RECORD NUMBER:  [id number]  DATE:  1/30/2020    Wound Cleansing:   Do not scrub or use excessive force. Cleanse wound prior to applying a clean dressing with:  [x] Normal Saline [ ] Keep Wound Dry in Shower    [ ] Wound Cleanser   [x] Cleanse wound with Mild Soap & Water  [ ] May Shower at Discharge   [x] Other: Saline OR Mild soap and water. Topical Treatments:  Do not apply lotions, creams, or ointments to wound bed unless directed. [x] Apply moisturizing lotion or ointment to dry, intact skin surrounding the wound prior to dressing change, as needed. [ ] Apply antifungal ointment to skin surrounding the wound prior to dressing change. [ ] Apply thin film of moisture barrier ointment to skin immediately around wound. [ ] Other:      Dressings:         Wound Locations -  Bilateral Lower Legs and Right Great Toe   [x] Apply Primary Dressing:   [ ] Xeroform gauze  Avoid contact of tape with skin. [x] Change dressing: [ ] Daily   [ ] Every Other Day [x] Three times per week  [ ] Once a week [ ] Do Not Change Dressing  [ ] Other: 3 times a week and as needed.       Edema Control:  Apply: [ ] Compression Stocking [ ]Right Leg [ ]Left Leg  [x] Tubigrip [x] Right Leg Double Layer [x]Left Leg Double Layer    [ ]Right Leg Single Layer [ ]Left Leg Single Layer    Apply Tubigrip every morning immediately after getting out of bed.  Remove every night before going to bed. [x] Elevate leg(s) above the level of the heart when sitting. [x] Avoid prolonged standing in one place. Dietary:  [ ] Diet as tolerated: [ ] Calorie Diabetic Diet: [x] No Added Salt:  [ ] Increase Protein: [x] Other: Limit amount of fluids you drink. Activity:  [x] Activity as tolerated:  [ ] Patient has no activity restrictions     [ ] Strict Bedrest: [ ] Remain off Work:     [ ] May return to full duty work:                                   [ ] Return to work with restrictions:             Return Appointment:  [ ] Wound and dressing supply provider:   [ ] ECF or Home Healthcare:  [ ] Wound Assessment: [ ] Physician or NP scheduled for Wound Assessment:   [x] Return Appointment: With Dr. Milton Goodpasture 1 Week(s)  [ ] Ordered tests:       [facility] Information: Should you experience any significant changes in your wound(s) or have questions about your wound care, please contact the [facility] Main at [address] [place] [address] 8:00 am - 4:30.  If you need help with your wound outside these hours and cannot wait until we are again available, contact your PCP or go to the hospital emergency room. PLEASE NOTE: IF YOU ARE UNABLE TO OBTAIN WOUND SUPPLIES, CONTINUE TO USE THE SUPPLIES YOU HAVE AVAILABLE UNTIL YOU ARE ABLE TO REACH US. IT IS MOST IMPORTANT TO KEEP THE WOUND COVERED AT ALL TIMES.

## 2020-01-30 NOTE — WOUND CARE
Clinic Level of Care Assessment    NAME:  Tsering Franz  YOB: 1950 GENDER: male  MEDICAL RECORD NUMBER:  [id number]   DATE:  1/30/2020      Wound Count Document in [facility]  Number of Wounds Assessed Points   No Wounds/Ulcers []   0   Less than Three Wounds/Ulcers [x]   1   3-6 Wounds/Ulcers []   2   Greater than 6 Wounds/Ulcers []   3     Ambulation Status Document in Coord/IVY/Mobility tab  Status Definition Points   Independent Independently able to ambulate. Fully able (without any assistance) to get on/off exam table/chair. [x]   0   Minimal Physical Assistance Requires physical assistance of one person to ambulate and/or position patient to be examined. Includes necessary physical assistance to position lower extremities on/off stool. []   1   Moderate Physical Assistance Requires at least one staff member to physically assist patient in ambulating into treatment room, and on/off exam table. []   2   Full Assistance Requires assistance of at least two staff members to transfer patient into treatment room and/or on/off exam table/chair. \"Total Transfer\". []   3     Dressing Complexity Document in LDA and Write Appropriate Order  Complexity Definition Points   No Dressing  []   0   Simple Minimal, simple dressing. i.e. Band-aid, gauze, simple wrap. []   1   Intermediate Moderately complicated requiring licensed personnel to apply i.e. collagen matrix, ointments, gels, alginates. [x]   2   Complex Complicated requiring licensed personnel to apply dressings 6 or more wounds. []   3     Teaching Effort Document in Education Tab   Effort Definition Points   No Teaching  []   0   Simple Reinforce two or less topics. Document in Education navigator. [x]   1   Intermediate Reinforce three to five topics and/or one additional   new topic. Document in Education navigator. []   2   Complex Teach more than one new topic.  New patient information   packet reviewed and/or reinforce more than three topics. Document in Education navigator. HBO initial instruction. []   3       Patient Assessment and Planning  Planning Definition Points   Simple Multiple System Simple: Simple follow-up with routine assessment and planning. If Discharged, instructions and long term/follow-up care given to patient/caregiver. Discharged, instructions and/or After Visit Summary given to patient/caregiver and instructions completed. [x]   1   Intermediate Multiple System Intermediate: Contact with outside resources; i.e. Telephone calls to home health, [facility]. May include filling out forms and writing letters, arranging transportation, communication with insurance , vendors, etc.  Discharged, instructions and/or After Visit Summary given to patient/caregiver and instructions completed. []   2   Complex Multiple System Complex: Full, comprehensive assessment and planning. Follow the entire navigator under Wound Visit charting filling out each tab which includes OP Adm Database Screening, Education and CarePlan  HBO risk assessment completed. Discharged, instructions and/or After Visit Summary given to patient/caregiver and instructions completed.    []   3           Is this the Patient's First Visit to the [facility]  No      Is this Patient Established @ [facility]  Yes             Clinical Level of Care      Points  0-2  Level 1 []     Points  3-5  Level 2 [x]     Points  6-9  Level 3 []     Points  10-12  Level 4 []     Points  13-15  Level 5 []       Electronically signed by Heidi Arita RN on 1/30/2020 at 4:23 PM

## 2020-02-06 ENCOUNTER — HOSPITAL ENCOUNTER (OUTPATIENT)
Dept: WOUND CARE | Age: 70
Discharge: HOME | End: 2020-02-06
Payer: MEDICARE

## 2020-02-06 VITALS
SYSTOLIC BLOOD PRESSURE: 167 MMHG | HEART RATE: 76 BPM | TEMPERATURE: 97 F | DIASTOLIC BLOOD PRESSURE: 75 MMHG | RESPIRATION RATE: 18 BRPM

## 2020-02-06 PROCEDURE — 99213 OFFICE O/P EST LOW 20 MIN: CPT

## 2020-02-06 NOTE — DISCHARGE INSTRUCTIONS
Discharge Instructions for  [facility]  [address], [address] [facility]  Telephone: [phone]    NAME:  Mark Emmanuel OF BIRTH:  1950  MEDICAL RECORD NUMBER:  [id number]  DATE:  2/6/2020    Discharge Condition: Stable     Pain: 0    Ambulatory Status: Walker     Discharge Destination: Home     Transportation: Car    Accompanied by: Self  and Family/Caregiver     Discharge instructions reviewed with Patient and Family/Caregiver  and copy or written instructions have been provided. All questions/concerns have been addressed at this time. WOUND CARE ORDERS:    Bilateral lower leg wounds - Cleanse with saline or soap and water, rinse and pat dry, apply xeroform, cover with Exudry or ABD, secure with roll gauze, apply double layer tubigrip size F.     RIght great toe wounds - cleanse with saline or soap and water, rinse, pat dry, apply xeroform and cover with gauze, roll gauze, tape. Change dressing 3 x week. Return to clinic in one week for MD follow-up    TREATMENT ORDERS:    Elevate leg(s) above the level of the heart when sitting. Avoid prolonged standing in one place. Do no get dressing/wrap wet. Follow Diet as prescribed:   [] Diet as tolerated: [x] Calorie Diabetic Diet: No sugar, low carbs [] No Added Salt:  [x] Increase Protein: [] Other:             Return Appointment:  [x] Return Appointment: With Dr. Davie Marshall  in [facility])  [] Ordered tests:      Electronically signed Valentino Curry, RN on 2/6/2020 at 11:30 AM     Ciera [address]: Should you experience any significant changes in your wound(s) or have questions about your wound care, please contact the [address] [place] [address] at [address] [place] [address] 8:00 am - 4:30. If you need help with your wound outside these hours and cannot wait until we are again available, contact your PCP or go to the hospital emergency room.      PLEASE NOTE: IF YOU ARE UNABLE TO OBTAIN WOUND SUPPLIES, CONTINUE TO USE THE SUPPLIES YOU HAVE AVAILABLE UNTIL YOU ARE ABLE TO REACH US. IT IS MOST IMPORTANT TO KEEP THE WOUND COVERED AT ALL TIMES.      Physician Signature:_______________________    Date: ___________ Time:  ____________

## 2020-02-06 NOTE — WOUND CARE
02/06/20 1152   Wound Toe (Comment  which one) Right;Medial #3 great 01/23/20   Date First Assessed/Time First Assessed: 01/23/20 0939   Wound Approximate Age at First Assessment (Weeks): 4 weeks  Primary Wound Type: (c) Friction  Location: (c) Toe (Comment  which one)  Wound Location Orientation: Right;Medial  Wound Description:. .. Dressing Type Applied Xeroform   Wound Leg lower Medial;Right #1 01/23/20   Date First Assessed: 01/23/20   Present on Hospital Admission: No  Wound Approximate Age at First Assessment (Weeks): 1 weeks  Primary Wound Type: (c)   Location: Leg lower  Wound Location Orientation: Medial;Right  Wound Description: #1  Date of Firs. .. Dressing Type Applied Xeroform;Gauze;ABD pad;Gauze wrap (kami); Special tape (comment)  (Double tubi to BLE)   Wound Leg lower Left;Medial #5 01/23/20   Date First Assessed/Time First Assessed: 01/23/20 0944   Wound Approximate Age at First Assessment (Weeks): 1 weeks  Primary Wound Type: (c)   Location: Leg lower  Wound Location Orientation: Left;Medial  Wound Description: #5  Date of First Observati. .. Dressing Type Applied Xeroform;Gauze;ABD pad;Gauze wrap (kami); Special tape (comment)   Wound Leg lower Right;Lateral #2 01/23/20   Date First Assessed/Time First Assessed: 01/23/20 0936   Wound Approximate Age at First Assessment (Weeks): 1 weeks  Primary Wound Type: Venous Ulcer  Location: Leg lower  Wound Location Orientation: Right;Lateral  Wound Description: #2  Date of First... Dressing Type Applied Xeroform   Wound Toe (Comment  which one) Right;Lateral #4 01/23/20   Date First Assessed/Time First Assessed: 01/23/20 0943   Wound Approximate Age at First Assessment (Weeks): 2 weeks  Primary Wound Type: Friction  Location: (c) Toe (Comment  which one)  Wound Location Orientation: Right;Lateral  Wound Description: #4. ..    Dressing Type Applied Xeroform;Gauze;Special tape (comment)

## 2020-02-06 NOTE — PROGRESS NOTES
HISTORY OF PRESENT ILLNESS:  The patient is a 69-year-old man referred to the [address] [place] [address] Road regarding open wounds on both lower legs.  The patient reports that he had blisters, which recently opened on the lower legs.  He has previous history of ulcerations in the same area in the past.     The patient has history of chronic lower extremity edema.  He takes Bumex 2 mg p.o. b.i.d. and metolazone three times per week. [facility] reports that the diuretics do produce significant diuretic response.     The patient reports that he had been drinking a lot of fluid over the course of the day, mainly water.  He has reduced fluid intake.  He weighed himself daily and his weight has been slowly declining since changing fluid intake, down about 6 pounds.     The patient has history of cardiomyopathy and congestive heart failure.  He had chronic AFib and was treated with pacemaker and AV node ablation.  There is history of coronary artery disease, diabetes mellitus type 2 controlled, hypertension, morbid obesity, sleep apnea unable to tolerate treatment, and history of coronary bypass.  The patient reports that he can walk short distances using a walker. [facility] has a lot of joint problems in knee and hip as well as back problems.  He reports that he would get short of breath on walking 100 feet with his walker.  The patient does sleep lying in bed at night.     The patient has history of chronic kidney disease stage IV.  He is followed by Dr. Jeremie Farrell.       The patient has never smoked. [facility] rarely uses alcohol.     PHYSICAL EXAMINATION:  GENERAL:  The patient is an alert, overweight man in no acute distress.     WOUND EXAMINATION:  Examination of the lower extremities revealed no palpable dorsalis pedis or posterior tibial pulse on either side.  There is hyperpigmentation of the lower half of the right and left lower legs.  There is 1 + pitting edema in the lower legs bilaterally.     On the right side, there is an ulcer on the right medial lower leg, 8 x 3 x 0.1 cm with clean granulation.     Right great toe has medial and lateral ulcers.     Left lower leg medially, there is a wound 11.3 x 4.6 x 0.1 cm with granulation on 95 % of surface.           ASSESSMENT AND PLAN:  I explained to the patient that he should continue taking his diuretics and continue limiting fluid intake particularly between meals, so that he achieves a gradual increase in weight related to loss of edema.  He should follow up with Dr. Laura Malagon as planned regarding his renal function.     I have ordered bilateral ankle-brachial indices (scheduled for 2/14/2020) to better determine the arterial perfusion to his legs and feet.  We are limited now in terms of amount of compression that can be applied until we obtain those results.     Toe wounds will be treated with Xeroform and foam dressing.  His wounds are medial and lateral right great toe.  Lower leg wounds will be treated with Xeroform gauze and roll gauze.  Tubigrips will be placed bilaterally to apply mild compression from the base of the toes to the upper calf.     Dressings will be changed at least three times per week, and more often if there is a lot of drainage.     He may at some point need venous testing, but I think it is likely that his edema is mainly a systemic origin at present.     The patient will follow up in one week.     FINAL DIAGNOSES:  Bilateral leg edema, bilateral lower leg nonpressure ulcers, right great toe ulcer x2, diabetes mellitus.      R86.785, C04.725, R60.0     Hernando Abdullahi MD

## 2020-02-06 NOTE — WOUND CARE
02/06/20 1102   Wound Toe (Comment  which one) Right;Medial #3 great 01/23/20   Date First Assessed/Time First Assessed: 01/23/20 0939   Wound Approximate Age at First Assessment (Weeks): 4 weeks  Primary Wound Type: (c) Friction  Location: (c) Toe (Comment  which one)  Wound Location Orientation: Right;Medial  Wound Description:. .. Dressing Status Removed   Dressing Type Adhesive wound closure strips (Steri-Strips)   Wound Length (cm) 1 cm   Wound Width (cm) 1.5 cm   Wound Depth (cm) 0.1 cm   Wound Surface Area (cm^2) 1.5 cm^2   Wound Volume (cm^3) 0.15 cm^3   Condition of Base Pink;Slough   Drainage Amount Moderate   Drainage Color Serous   Wound Odor None   Christy-wound Assessment Intact   Cleansing and Cleansing Agents  Normal saline   Wound Leg lower Medial;Right #1 01/23/20   Date First Assessed: 01/23/20   Present on Hospital Admission: No  Wound Approximate Age at First Assessment (Weeks): 1 weeks  Primary Wound Type: (c)   Location: Leg lower  Wound Location Orientation: Medial;Right  Wound Description: #1  Date of Firs. .. Dressing Status Removed   Dressing Type Xeroform;ABD pad;Gauze;Gauze wrap (kami)   Wound Length (cm) 8 cm   Wound Width (cm) 3 cm   Wound Depth (cm) 0.1 cm   Wound Surface Area (cm^2) 24 cm^2   Wound Volume (cm^3) 2.4 cm^3   Condition of Base Pink   Condition of Edges Open   Drainage Amount Moderate   Drainage Color Serosanguinous   Wound Odor None   Christy-wound Assessment Intact   Cleansing and Cleansing Agents  Normal saline   Wound Leg lower Left;Medial #5 01/23/20   Date First Assessed/Time First Assessed: 01/23/20 0944   Wound Approximate Age at First Assessment (Weeks): 1 weeks  Primary Wound Type: (c)   Location: Leg lower  Wound Location Orientation: Left;Medial  Wound Description: #5  Date of First Observati. ..    Wound Length (cm) 11.3 cm   Wound Width (cm) 4.6 cm   Wound Depth (cm) 0.1 cm   Wound Surface Area (cm^2) 51.98 cm^2   Wound Volume (cm^3) 5.2 cm^3   Drainage Amount Moderate   Drainage Color Serosanguinous   Wound Odor None   Christy-wound Assessment Intact   Cleansing and Cleansing Agents  Normal saline   Wound Leg lower Right;Lateral #2 01/23/20   Date First Assessed/Time First Assessed: 01/23/20 0936   Wound Approximate Age at First Assessment (Weeks): 1 weeks  Primary Wound Type: Venous Ulcer  Location: Leg lower  Wound Location Orientation: Right;Lateral  Wound Description: #2  Date of First... Dressing Type Open to air   Wound Length (cm) 0.1 cm   Wound Width (cm) 0.1 cm   Wound Depth (cm) 0.1 cm   Wound Surface Area (cm^2) 0.01 cm^2   Wound Volume (cm^3) 0 cm^3   Drainage Amount None   Wound Odor None   Wound Toe (Comment  which one) Right;Lateral #4 01/23/20   Date First Assessed/Time First Assessed: 01/23/20 0943   Wound Approximate Age at First Assessment (Weeks): 2 weeks  Primary Wound Type: Friction  Location: (c) Toe (Comment  which one)  Wound Location Orientation: Right;Lateral  Wound Description: #4. ..    Dressing Status Removed   Dressing Type Gauze;Gauze wrap (kami)   Wound Length (cm) 0.1 cm   Wound Width (cm) 0.1 cm   Wound Depth (cm) 0.1 cm   Wound Surface Area (cm^2) 0.01 cm^2   Wound Volume (cm^3) 0 cm^3   Drainage Amount None   Wound Odor None   Cleansing and Cleansing Agents  Normal saline

## 2020-02-13 ENCOUNTER — OFFICE VISIT (OUTPATIENT)
Dept: ENDOCRINOLOGY | Age: 70
End: 2020-02-13

## 2020-02-13 ENCOUNTER — HOSPITAL ENCOUNTER (OUTPATIENT)
Dept: WOUND CARE | Age: 70
Discharge: HOME | End: 2020-02-13
Payer: MEDICARE

## 2020-02-13 VITALS
SYSTOLIC BLOOD PRESSURE: 155 MMHG | HEART RATE: 75 BPM | RESPIRATION RATE: 18 BRPM | TEMPERATURE: 97.8 F | DIASTOLIC BLOOD PRESSURE: 74 MMHG

## 2020-02-13 VITALS
SYSTOLIC BLOOD PRESSURE: 164 MMHG | HEIGHT: 69 IN | BODY MASS INDEX: 36.14 KG/M2 | DIASTOLIC BLOOD PRESSURE: 82 MMHG | WEIGHT: 244 LBS | HEART RATE: 83 BPM

## 2020-02-13 DIAGNOSIS — I10 ESSENTIAL HYPERTENSION WITH GOAL BLOOD PRESSURE LESS THAN 130/80: ICD-10-CM

## 2020-02-13 DIAGNOSIS — Z79.4 TYPE 2 DIABETES MELLITUS WITH STAGE 4 CHRONIC KIDNEY DISEASE, WITH LONG-TERM CURRENT USE OF INSULIN (HCC): Primary | ICD-10-CM

## 2020-02-13 DIAGNOSIS — E03.9 HYPOTHYROIDISM, UNSPECIFIED TYPE: ICD-10-CM

## 2020-02-13 DIAGNOSIS — E11.22 TYPE 2 DIABETES MELLITUS WITH STAGE 4 CHRONIC KIDNEY DISEASE, WITH LONG-TERM CURRENT USE OF INSULIN (HCC): Primary | ICD-10-CM

## 2020-02-13 DIAGNOSIS — N18.4 TYPE 2 DIABETES MELLITUS WITH STAGE 4 CHRONIC KIDNEY DISEASE, WITH LONG-TERM CURRENT USE OF INSULIN (HCC): Primary | ICD-10-CM

## 2020-02-13 DIAGNOSIS — E78.5 HYPERLIPIDEMIA, UNSPECIFIED HYPERLIPIDEMIA TYPE: ICD-10-CM

## 2020-02-13 DIAGNOSIS — E55.9 VITAMIN D DEFICIENCY: ICD-10-CM

## 2020-02-13 LAB — HBA1C MFR BLD HPLC: 6.7 %

## 2020-02-13 PROCEDURE — 99213 OFFICE O/P EST LOW 20 MIN: CPT

## 2020-02-13 NOTE — WOUND CARE
02/13/20 1014   Wound Leg lower Medial;Right #1 01/23/20   Date First Assessed: 01/23/20   Present on Hospital Admission: No  Wound Approximate Age at First Assessment (Weeks): 1 weeks  Primary Wound Type: (c)   Location: Leg lower  Wound Location Orientation: Medial;Right  Wound Description: #1  Date of Firs. .. Dressing Status Removed   Dressing Type Xeroform;ABD pad;Gauze;Gauze wrap (kami)  (tubi to BLL)   Wound Length (cm) 7.7 cm   Wound Width (cm) 2.9 cm   Wound Depth (cm) 0.1 cm   Wound Surface Area (cm^2) 22.33 cm^2   Wound Volume (cm^3) 2.23 cm^3   Condition of Base Pink   Condition of Edges Open   Drainage Amount Moderate   Drainage Color Serosanguinous   Wound Odor None   Christy-wound Assessment Maceration   Cleansing and Cleansing Agents  Normal saline   Wound Leg lower Right;Lateral #2 01/23/20   Date First Assessed/Time First Assessed: 01/23/20 0936   Wound Approximate Age at First Assessment (Weeks): 1 weeks  Primary Wound Type: Venous Ulcer  Location: Leg lower  Wound Location Orientation: Right;Lateral  Wound Description: #2  Date of First... Dressing Type Open to air   Wound Length (cm) 0.1 cm   Wound Width (cm) 0.1 cm   Wound Depth (cm) 0.1 cm   Wound Surface Area (cm^2) 0.01 cm^2   Wound Volume (cm^3) 0 cm^3   Condition of Base   (scab)   Wound Toe (Comment  which one) Right;Medial #3 great 01/23/20   Date First Assessed/Time First Assessed: 01/23/20 0939   Wound Approximate Age at First Assessment (Weeks): 4 weeks  Primary Wound Type: (c) Friction  Location: (c) Toe (Comment  which one)  Wound Location Orientation: Right;Medial  Wound Description:. ..    Dressing Status Removed   Dressing Type Xeroform;Gauze;Special tape (comment)   Wound Length (cm) 1.5 cm   Wound Width (cm) 1 cm   Wound Depth (cm) 0.1 cm   Wound Surface Area (cm^2) 1.5 cm^2   Wound Volume (cm^3) 0.15 cm^3   Condition of Base Slough   Condition of Edges Open   Wound Toe (Comment  which one) Right;Lateral #4 01/23/20   Date First Assessed/Time First Assessed: 01/23/20 0943   Wound Approximate Age at First Assessment (Weeks): 2 weeks  Primary Wound Type: Friction  Location: (c) Toe (Comment  which one)  Wound Location Orientation: Right;Lateral  Wound Description: #4. .. Dressing Status Removed   Dressing Type Xeroform   Wound Length (cm) 0.1 cm   Wound Width (cm) 0.1 cm   Wound Depth (cm) 0.1 cm   Wound Surface Area (cm^2) 0.01 cm^2   Wound Volume (cm^3) 0 cm^3   Condition of Base   (scab)   Wound Leg lower Left;Medial #5 01/23/20   Date First Assessed/Time First Assessed: 01/23/20 0944   Wound Approximate Age at First Assessment (Weeks): 1 weeks  Primary Wound Type: (c)   Location: Leg lower  Wound Location Orientation: Left;Medial  Wound Description: #5  Date of First Observati. .. Dressing Status Removed   Dressing Type Xeroform;ABD pad;Gauze;Gauze wrap (kami); Special tape (comment)   Wound Length (cm) 11 cm   Wound Width (cm) 4.6 cm   Wound Depth (cm) 0.1 cm   Wound Surface Area (cm^2) 50.6 cm^2   Wound Volume (cm^3) 5.06 cm^3   Condition of Base Pink;Slough   Condition of Edges Open   Drainage Amount Moderate   Drainage Color Serosanguinous   Wound Odor None   Christy-wound Assessment Maceration   Cleansing and Cleansing Agents  Normal saline     Visit Vitals  /74   Pulse 75   Temp 97.8 °F (36.6 °C)   Resp 18     LLE Peripheral Vascular   Capillary Refill: Less than/equal to 3 seconds (02/13/20 1012)  Color: Appropriate for race (02/13/20 1012)  Temperature: Warm (02/13/20 1012)  Sensation: Decreased (02/13/20 1012)  Pedal Pulse: Present (02/13/20 1012)  Circumference of Calf (cm): 39 cm (02/13/20 1012)  Location of Measurement (Calf): Mid  (02/13/20 1012)  Circumference of Ankle (cm): 23.5 cm (02/13/20 1012)  Location of Measurement (Ankle): Upper  (02/13/20 1012)  RLE Peripheral Vascular   Capillary Refill: Less than/equal to 3 seconds (02/13/20 1012)  Color: Appropriate for race (02/13/20 1012)  Temperature: Warm (02/13/20 1012)  Sensation: Decreased (02/13/20 1012)  Pedal Pulse: Present (02/13/20 1012)  Circumference of Calf (cm): 38 cm (02/13/20 1012)  Location of Measurement (Calf): Mid  (02/13/20 1012)  Circumference of Ankle (cm): 23 cm (02/13/20 1012)  Location of Measurement (Ankle): Upper  (02/13/20 1012)

## 2020-02-13 NOTE — PROGRESS NOTES
HISTORY OF PRESENT ILLNESS:  The patient is a 21-year-old man referred to the [address] [place] [address] regarding open wounds on both lower legs.  The patient reports that he had blisters, which recently opened on the lower legs.  He has previous history of ulcerations in the same area in the past.     The patient has history of chronic lower extremity edema.  He takes Bumex 2 mg p.o. b.i.d. and metolazone three times per week. Alo Gordon reports that the diuretics do produce significant diuretic response.     The patient reports that he had been drinking a lot of fluid over the course of the day, mainly water.  He has reduced fluid intake.  He weighed himself daily and his weight has been slowly declining since changing fluid intake, down about 6 pounds.     The patient has history of cardiomyopathy and congestive heart failure.  He had chronic AFib and was treated with pacemaker and AV node ablation.  There is history of coronary artery disease, diabetes mellitus type 2 controlled, hypertension, morbid obesity, sleep apnea unable to tolerate treatment, and history of coronary bypass.  The patient reports that he can walk short distances using a walker. Alo Gordon has a lot of joint problems in knee and hip as well as back problems.  He reports that he would get short of breath on walking 100 feet with his walker.  The patient does sleep lying in bed at night.     The patient has history of chronic kidney disease stage IV.  He is followed by Dr. Mita Obrien.       The patient has never smoked. Alo Gordon rarely uses alcohol.     PHYSICAL EXAMINATION:  GENERAL:  The patient is an alert, overweight man in no acute distress.     WOUND EXAMINATION:  Examination of the lower extremities revealed no palpable dorsalis pedis or posterior tibial pulse on either side.  There is hyperpigmentation of the lower half of the right and left lower legs.  There is 1 + pitting edema in the lower legs bilaterally.     On the right side, there is an ulcer on the right medial lower leg, 7.7 x 2.9 x 0.1 cm with clean granulation.     Right great toe has medial and lateral ulcers.     Left lower leg medially, there is a wound 11 x 4.6 x 0.1 cm with granulation on 95 % of surface.            ASSESSMENT AND PLAN:  I explained to the patient that he should continue taking his diuretics and continue limiting fluid intake particularly between meals, so that he achieves a gradual increase in weight related to loss of edema.  He should follow up with Dr. Russ Booth as planned regarding his renal function.     I have ordered bilateral ankle-brachial indices (scheduled for 2/14/2020) to better determine the arterial perfusion to his legs and feet.  We are limited now in terms of amount of compression that can be applied until we obtain those results.     Toe wounds will be treated with Xeroform and foam dressing.  His wounds are medial and lateral right great toe.  Lower leg wounds will be treated with Xeroform gauze and roll gauze.  Tubigrips will be placed bilaterally to apply mild compression from the base of the toes to the upper calf.     Dressings will be changed at least three times per week, and more often if there is a lot of drainage.     He may at some point need venous testing, but I think it is likely that his edema is mainly a systemic origin at present.     The patient will follow up in one week.     FINAL DIAGNOSES:  Bilateral leg edema, bilateral lower leg nonpressure ulcers, right great toe ulcer x2, diabetes mellitus.      V58.000, L34.730, R60.0     Art Garcia MD

## 2020-02-13 NOTE — DISCHARGE INSTRUCTIONS
Discharge Instructions/Wound [facility]  [address] [facility], [address] [facility]  Telephone: [phone]     FAX [phone]    NAME:  Angelica Pearson OF BIRTH:  1950  MEDICAL RECORD NUMBER:  [id number]  DATE:  2/13/2020      WOUND CARE ORDERS:  Bilateral lower leg wounds - Cleanse with saline or soap and water, rinse and pat dry, apply xeroform, cover with Exudry or ABD, secure with roll gauze, apply double layer tubigrip size F.     RIght great toe wounds - cleanse with saline or soap and water, rinse, pat dry, apply xeroform and cover with gauze, roll gauze, tape. Change dressing 3 x week. Return to clinic in one week for MD follow-up. TREATMENT ORDERS:    Elevate leg(s) above the level of the heart when sitting. Avoid prolonged standing in one place. Do no get dressing/wrap wet. Follow Diet as prescribed:   [x] Diet as tolerated: [x] Calorie Diabetic Diet: No sugar, low carb [x] No Added Salt:  [x] Increase Protein: [] Other:     Return Appointment:  [x] Return Appointment: With Dr. Yeny Ferris  in [facility])  [] Ordered tests:      Electronically signed Reij Casillas RN on 2/13/2020 at [address]: Should you experience any significant changes in your wound(s) or have questions about your wound care, please contact the [address] [place] [address] at [address] [place] [address] 8:00 am - 4:30. If you need help with your wound outside these hours and cannot wait until we are again available, contact your PCP or go to the hospital emergency room. PLEASE NOTE: IF YOU ARE UNABLE TO OBTAIN WOUND SUPPLIES, CONTINUE TO USE THE SUPPLIES YOU HAVE AVAILABLE UNTIL YOU ARE ABLE TO REACH US. IT IS MOST IMPORTANT TO KEEP THE WOUND COVERED AT ALL TIMES.      Physician Signature:_______________________    Date: ___________ Time:  ____________

## 2020-02-13 NOTE — PATIENT INSTRUCTIONS
Lantus: 30 units once each bedtime    Novolog:        Breakfast: 14 units        Lunch:       10 units        Dinner:       10 units    Correction Scale: 1 unit for every 50 above 150    IF GLUCOSE IS:                 THEN TAKE:      0   Extra Unit  151-200   1   Extra Unit  201-250   2   Extra Units  251-300   3   Extra Units  301-350   4   Extra Units  351-400   5   Extra Units    Example: My planned insulin dose:    ____ Units    +    ____ Extra Correction Units  =  ____ total units to take together as one injection. * Remember to have your blood work collected at the laboratory 3 days before your next visit using the lab sheet provided during your visit. Please note our new policy, you must arrive to the clinic 15 minutes before your appointment time to allow enough time for proper check-in, adequate time to spend with your doctor, and also to respect the appointment time of the next patient. Not arriving 15 minutes in advance may result in having your appointment rescheduled for the next available day/time.  ----------------------------------------------------------------------------------------------------------------------    Below you will find a glucose log sheet which you can use to record your blood sugars. Without checking and recording what your home glucose levels are, it will be difficult to make any changes to your medication dose, even when significant changes may be needed. Please feel free to use the log below to record your home glucose levels. At the very least, I would like for you to login the entire 2-3 weeks just before your visit so we can make your visit much more productive and beneficial to you. GLUCOSE LOG SHEET:    Date Breakfast Lunch Dinner Bedtime Comments ? GLUCOSE LOG SHEET:    Date Breakfast Lunch Dinner Bedtime Comments ? GLUCOSE LOG SHEET:    Date Breakfast Lunch Dinner Bedtime Comments ?

## 2020-02-13 NOTE — PROGRESS NOTES
CHIEF COMPLAINT: f/u uncontrolled type 2 diabetes    HISTORY OF PRESENT ILLNESS:   Inez Payne is a 69 y.o. male with a PMHx as noted below who presents for evaluation of uncontrolled type 2 diabetes. POC A1c today is 6.7%. Previously we stopped the 70/30 insulin and started a classic basal bolus regimen to help address some of the main issues with his blood sugars.      Regimen: (prior instructions)  Lantus Insulin: 30 units once each bedtime  Novolog: 10 units with each meal    Review of home glucose:   Log reviewed:   AM: 140-170 mostly  Lunch: 170-200 mostly, occasional 140  Dinner: variable: 105-180  Bedtime: 120-150 average    Hypothyroidism: On 137 mcg of levothyroxine  Vitamin D deficiency: was taken off D by nephrologist per patient    Review of most recent diabetes-related labs:  Lab Results   Component Value Date    HBA1C 7.0 (H) 04/10/2018    HBA1C 7.6 (H) 06/22/2017    HBA1C 8.0 (H) 03/13/2017    SYR2PJFP 6.6 10/16/2019    XNQ0YEYP 6.5 06/12/2019    LLC7SIDT 6.7 02/11/2019    CHOL 109 06/12/2019    LDLC 51 06/12/2019    LDL 43 04/03/2017    GFRAA 23 (L) 06/12/2019    GFRNA 20 (L) 06/12/2019    CREATEXT 3.17 12/05/2019    MCACR 137.2 (H) 06/12/2019    TSH 0.957 06/12/2019    VITD3 46.3 04/03/2017     Lab Key:  [id number] = IA-2 pancreatic islet cell autoantibody  CPEPL = C-peptide level  :EXT = External Lab  GADLT = ANGELIQUE-65 autoantibody   INSUL = Insulin level  MCACR (or MALBEXT) = Urine Microalbumin (or External UM)      PAST MEDICAL/SURGICAL HISTORY:   Past Medical History:   Diagnosis Date    Arrhythmia     atrial fibrillation 2017    Arthritis     CAD (coronary atherosclerotic disease)     Chronic kidney disease     Chronic pain     [address]estive heart failure ([address]     Diabetes ([address]     Diabetes mellitus type 2, controlled ([address] 3/13/2017    Heart failure ([address]     Hx of CABG 3/13/2017    CABG in 2010 in [address] [place] [address] Hypertension     Long term current use of anticoagulant therapy     Morbid obesity ([facility] 3/13/2017    JACEY (obstructive sleep apnea) 6/23/2017    S/P CABG x 2 2010    Skin cancer     Thyroid disease      Past Surgical History:   Procedure Laterality Date    CARDIAC SURG PROCEDURE UNLIST      CABGx2 in 2010    HX ORTHOPAEDIC      L 3rd toe amputation in 1999    HX PACEMAKER  2018    Dr Krsyten Reeves       ALLERGIES:   Allergies   Allergen Reactions    Allopurinol Rash    Gabapentin Drowsiness    Ciprofloxacin Nausea Only    Percocet [Oxycodone-Acetaminophen] Other (comments)     hallucinations       MEDICATIONS ON ADMISSION:     Current Outpatient Medications:     pravastatin (PRAVACHOL) 80 mg tablet, TAKE 1 TABLET BY MOUTH IN THE EVENING, Disp: 90 Tab, Rfl: 0    potassium chloride SR (KLOR-CON 8) 8 mEq tablet, Take 16 mEq by mouth daily. , Disp: , Rfl:     insulin aspart U-100 (NOVOLOG FLEXPEN U-100 INSULIN) 100 unit/mL (3 mL) inpn, Inject 10 units with each meal, 3 times daily (may substitute humalog if preferred), Disp: 10 Pen, Rfl: 3    insulin glargine (LANTUS,BASAGLAR) 100 unit/mL (3 mL) inpn, 30 unit injection once each bedtime (basaglar vs lantus whichever preferred), Disp: 10 Pen, Rfl: 3    metoprolol succinate (TOPROL XL) 50 mg XL tablet, Take 1 Tab by mouth daily. Patient takes 150mg total daily, Disp: 90 Tab, Rfl: 3    apixaban (ELIQUIS) 5 mg tablet, TAKE 1 TABLET BY MOUTH TWICE A DAY, Disp: 180 Tab, Rfl: 3    levothyroxine (SYNTHROID) 137 mcg tablet, TAKE 1 TABLET(S) EVERY DAY BY ORAL ROUTE FOR 90 DAYS., Disp: 90 Tab, Rfl: 3    ferrous sulfate (IRON) 325 mg (65 mg iron) tablet, Take 325 mg by mouth Daily (before breakfast). , Disp: , Rfl:     bumetanide (BUMEX) 1 mg tablet, 3 tabs BID, Disp: , Rfl: 3    dutasteride (AVODART) 0.5 mg capsule, Take 0.5 mg by mouth daily. , Disp: , Rfl:     metoprolol succinate (TOPROL-XL) 100 mg tablet, Take 100 mg by mouth daily.  Patient takes 150mg total daily, Disp: , Rfl:     MYRBETRIQ 25 mg ER tablet, TAKE 1 TABLET BY MOUTH EVERY DAY, Disp: , Rfl: 12    febuxostat (ULORIC) 40 mg tab tablet, Take 40 mg by mouth daily. , Disp: , Rfl:     ketoconazole (NIZORAL) 2 % topical cream, Apply  to affected area two (2) times daily as needed for Skin Irritation. Indications: rash, Disp: , Rfl:     metOLazone (ZAROXOLYN) 2.5 mg tablet, Take 2.5 mg by mouth daily. Taking Tuesday, Thursday, Saturday  Indications: TAKING TUESDAY AND SATURDAY, Disp: , Rfl:     multivitamin (ONE A DAY) tablet, Take 1 Tab by mouth daily. , Disp: , Rfl:     acetaminophen (TYLENOL EXTRA STRENGTH) 500 mg tablet, Take 1,000 mg by mouth every eight (8) hours as needed for Pain., Disp: , Rfl:     glucose blood VI test strips (ONETOUCH ULTRA BLUE TEST STRIP) strip, Use to test blood sugars 3 times per day, per Medicare requirement. DX Code: E11.9, Disp: 300 Strip, Rfl: 0    Insulin Needles, Disposable, (BD ULTRA-FINE MINI PEN NEEDLE) 31 gauge x 3/16\" ndle, Use with insulin pens 4 times daily, Disp: 400 Pen Needle, Rfl: 3    polyethylene glycol (MIRALAX) 17 gram packet, Take 17 g by mouth as needed (constipation). , Disp: , Rfl:   No current facility-administered medications for this visit.      SOCIAL HISTORY:   Social History     Socioeconomic History    Marital status:      Spouse name: Not on file    Number of children: Not on file    Years of education: Not on file    Highest education level: Not on file   Occupational History    Not on file   Social Needs    Financial resource strain: Not on file    Food insecurity:     Worry: Not on file     Inability: Not on file    Transportation needs:     Medical: Not on file     Non-medical: Not on file   Tobacco Use    Smoking status: Never Smoker    Smokeless tobacco: Never Used   Substance and Sexual Activity    Alcohol use: Yes     Comment: rare    Drug use: No    Sexual activity: Not on file   Lifestyle    Physical activity:     Days per week: Not on file     Minutes per session: Not on file    Stress: Not on file   Relationships    Social connections:     Talks on phone: Not on file     Gets together: Not on file     Attends Hoahaoism service: Not on file     Active member of club or organization: Not on file     Attends meetings of clubs or organizations: Not on file     Relationship status: Not on file    Intimate partner violence:     Fear of current or ex partner: Not on file     Emotionally abused: Not on file     Physically abused: Not on file     Forced sexual activity: Not on file   Other Topics Concern     Service Not Asked    Blood Transfusions Not Asked    Caffeine Concern Not Asked    Occupational Exposure Not Asked   Gisell Plate Hazards Not Asked    Sleep Concern Not Asked    Stress Concern Not Asked    Weight Concern Not Asked    Special Diet Not Asked    Back Care Not Asked    Exercise Not Asked    Bike Helmet Not Asked    Seat Belt Not Asked    Self-Exams Not Asked   Social History Narrative    Not on file       FAMILY HISTORY:  Family History   Problem Relation Age of Onset    Heart Attack Father     Cancer Father         lymph node    Cancer Mother         breast       REVIEW OF SYSTEMS: Complete ROS assessed and noted for that which is described above, all else are negative.   Eyes: normal  ENT: normal  CVS: normal  Resp: normal  GI: normal  : normal  GYN: normal  Endocrine: normal  Integument: normal  Musculoskeletal: normal  Neuro: normal  Psych: normal    PHYSICAL EXAMINATION:    VITAL SIGNS:  Visit Vitals  /82 (BP 1 Location: Left arm, BP Patient Position: Sitting)   Pulse 83   Ht 5' 9\" (1.753 m)   Wt 244 lb (110.7 kg)   BMI 36.03 kg/m²       GENERAL: NCAT, Sitting comfortably, NAD  EYES: EOMI, non-icteric, no proptosis  Ear/Nose/Throat: NCAT, no inflammation, no masses  LYMPH NODES: No LAD  CARDIOVASCULAR: S1 S2, RRR, No murmur, 2+ radial pulses  RESPIRATORY: CTA b/l, no wheeze/rales  GASTROINTESTINAL:  NT, ND  MUSCULOSKELETAL: Normal ROM, no atrophy  SKIN: warm, no edema/rash/ or other skin changes  NEUROLOGIC: 5/5 power all extremities, see foot exam below, no tremor, AAOx3  PSYCHIATRIC: Normal affect, Normal insight and judgement    REVIEW OF LABORATORY AND RADIOLOGY DATA:   Labs and documentation have been reviewed as described above. ASSESSMENT AND PLAN:   Cami Eden is a 69 y.o. male with a PMHx as noted above who presents for f/u evaluation of uncontrolled type 2 diabetes. Problems:  Type 2 diabetes  Hyperlipidemia  Hypertension  Hypothyroidism  Vitamin D deficiency     He is handling 4 injections daily just fine however we need to modify his breakfast dose to account for his rising sugars which are highest by lunch time. We noted his fingers are quite bruised from fingersticks and I provided him the instructions for the freestyle ajqueline (to obtain it). Plan as below:     PLAN  Type 2 Diabetes  Medications:  Lantus: 30 units once each bedtime  Novolog:        Breakfast: 14 units        Lunch:       10 units        Dinner:       10 units  Correction 1:50>150  Advised to check glucose 4 times daily  Provided with glucose log sheets for later review. HTN: BP reviewed  HLD: on statin, new panel for next visit  Vitamin D deficiency: off per nephrologist, will check level with prelabs   Hypothyroidism: 137 mcg once daily, prev stable, will assess with prelabs    LABS: 2020 DM/Thyroid/D prelabs ordered for next visit    RTC: I would like to see him back in 4 months.  >25 minutes spent together with patient today of which >50% of this time was spent in counseling and coordination of care. Kedar Alexander.  [address] [facility]

## 2020-02-17 ENCOUNTER — OFFICE VISIT (OUTPATIENT)
Dept: DERMATOLOGY | Facility: AMBULATORY SURGERY CENTER | Age: 70
End: 2020-02-17

## 2020-02-17 VITALS
WEIGHT: 244 LBS | DIASTOLIC BLOOD PRESSURE: 80 MMHG | OXYGEN SATURATION: 92 % | TEMPERATURE: 98 F | SYSTOLIC BLOOD PRESSURE: 144 MMHG | HEART RATE: 76 BPM | BODY MASS INDEX: 36.14 KG/M2 | HEIGHT: 69 IN

## 2020-02-17 DIAGNOSIS — D04.39 SQUAMOUS CELL CARCINOMA IN SITU (SCCIS) OF SKIN OF LEFT CHEEK: Primary | ICD-10-CM

## 2020-02-17 DIAGNOSIS — L57.0 ACTINIC KERATOSIS: ICD-10-CM

## 2020-02-17 DIAGNOSIS — D48.5 NEOPLASM OF UNCERTAIN BEHAVIOR OF SKIN OF CHEEK: ICD-10-CM

## 2020-02-17 NOTE — PROGRESS NOTES
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 year old male referred by  Dorita Kennedy NP to treat a biopsy-proven actinic keratosis (cannot rule out SCC) on the left cheek. Patient was seen last year in consultation in the plan was to repeat the biopsy to evaluate for SCC as well as to biopsy a lesion posterior to the lesion in question that was suspicious for SCC. The patient has had several medical problems since that time resulting in his delay. Rebiopsy of the anterior lesion and biopsy of the posterior lesion showed no evidence of SCC anteriorly and squamous cell carcinoma in situ posteriorly. The following lesion was treated with Mohs surgery today:  Lesion Info  Location: left cheek posterior  Size: 1.0 cm x 1.0 cm  Type: squamous in-situ  Duration: months  Path Lab: [facility] surgical dermatology  Path #: [id number]  Prior Treatment: none     Symptoms of the lesion include none. ROS:  Lois Kolb is feeling well and in their usual state of health today. He is not in pain. He does not have any other skin concerns. Exam:  Lois Kolb is an awake, alert, oriented, well-appearing male in no distress. There is not preauricular, submandibular, or cervical lymphadenopathy. The face was examined. Findings are:  On the left cheek there is a healed biopsy scar. Posteriorly there is a crusted and eroded scaly plaque. There are scattered scaly plaques on the left cheek. 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 The diagnosis was reviewed. The Mohs surgery procedure was reviewed. Indications, risks, and options were discussed with Mr. Jag Gomez preoperatively. Risks including, but not limited to: pain, bleeding, infection, tumor recurrence, scarring and damage to motor and/or sensory nerves, were discussed. Mr. Jag Gomez chose Mohs surgery.   Mr. Jag Gomez was an acceptable surgery candidate. I performed Mohs surgery using standard technique after verbal and written consent were obtained. The lesion was identified and confirmed with the patient and photograph, if available. The surgical site was marked with gentian violet, prepped, draped and anesthetized in standard fashion. The tumor was debulked by curettage and orientation hashes were placed. The tumor and any associated scar was excised using beveled incision. Hemostasis was achieved, the site was bandaged, and the tissue was transported to the Mohs lab. While maintaining anatomic orientation the tissue was divided, if needed, and marked with colored inks that were noted on the corresponding Mohs map. The tissue was prepared by Mohs en-face technique for fresh frozen section analysis. The resulting slides were examined for residual tumor, scar and other concerns, all of which were marked on the corresponding Mohs map, if present. The Mohs map was used to guide subsequent stages of surgery, if necessary, and the above process was repeated until a tumor-free plane was achieved. Once the tumor was cleared the map was marked as such and signed. Dr. Damien Marie acted as surgeon and pathologist for the entire case, performing all stages of the surgical excision as well as examination and interpretation of the histologic slides. See table below for details regarding the surgical case. 1 stage(s) were required to reach a tumor-free plane, resulting in a 1.5 cm x 2.3 cm defect extending to the subcutaneous fat. There were not complications. Of note, there was scattered actinic keratosis at the surgical margins. It would be worthwhile to treat this with field therapy once healed, however I will defer this back to Ailin Moses NP. Gabriel Sams will follow up as needed in the postoperative period. Regular skin examinations will be with  Ailin Moses NP.     The wound management options of second intent healing, layered closure, local flap, and/or full thickness skin graft were discussed. Mr. Edwar Cabral understands the aims, risks, alternatives, and possible complications and elects to proceed with a complex layered closure. Wound margins were debeveled, standing cones were corrected and the defect was widely undermined in the subcutaneous plane for a distance of 1.5 cm. The wound was closed with buried 4-0 vicryl suture in the muscle and deep subcutis to reduce width of the wound and a second layer in the dermis to reduce tension on the skin edges with careful attention to edge apposition and eversion for optimal cosmesis. Epidermal edges were carefully approximated with 5-0 fast absorbing gut suture, again with careful attention to apposition and eversion. The final closure length was Closure Length: 5.3 cm. The wound was bandaged with Petrolatum ointment, Telfa, gauze and Coverroll. Wound care instructions (written and/or verbal) and a follow up appointment were given to Mr. Edwar Cabral before discharge. Mr. Edwar Cabral was discharged in good condition. 2.  Neoplasm uncertain behavior left cheek-rule out SCC  Shave biopsy:    A shave biopsy was advised to address this lesion because no prior biopsy had been done and the intention was to treat with Mohs surgery today. The procedure was reviewed and verbal and written consents were obtained. The risks of pain, bleeding, infection, and scar were discussed. I performed the procedure. The site was cleansed and anesthetized with 1% lidocaine with epinephrine 1:100,000. A shave biopsy was performed to sample the lesion. The specimen was taken to the Mohs lab for processing for frozen section analysis by me. I evaluated the histologic specimen and rendered a diagnosis of squamous cell carcinoma in situ. I generated a pathology report to be entered into the medical record. The lesion was treated with Mohs surgery as above today.     3.  Actinic keratosis  This lesion was previously biopsied showing actinic keratosis that was transected and the pathologist was unable to rule out invasive neoplasm. This lesion was rebiopsied today because of the intent to treat with Mohs surgery should there be invasive squamous cell carcinoma. The procedure was reviewed and verbal and written consents were obtained. The risks of pain, bleeding, infection, and scar were discussed. I performed the procedure. The site was cleansed and anesthetized with 1% lidocaine with epinephrine 1:100,000. A shave biopsy was performed to sample the lesion. The specimen was taken to the Mohs lab for processing for frozen section analysis by me. I evaluated the histologic specimen and rendered a diagnosis of scar with no evidence of squamous cell carcinoma. I generated a pathology report to be entered into the medical record. left cheek posterior  Mohs Lesion Operative Report  Date: 02/17/20  Room: Exam room 2  Indications: Site, Size, Poor definition  Pre-op Meds: none  Pre-op BP: 152/82  Pre-op pulse: 76  1st Assistant: Radha Webb  Stage #: 1  Stage 1 Sections: 1  Stage 1 # Pos: 0  Perineural Involvement: No  Lymphadenopathy: No  Defect Size: 1.5 cm x 2.3 cm  Depth: subcutaneous fat  Wound Mgt: complex  Suture: Buried, Surface  Buried details: 4.0 vicryl  Surface Details: 5.0 fast gut  Undermining: SubQ  Closure Length: 5.3 cm  Estimated Blood Loss: 2 ml  Hemostasis: Electrosurgery, Pressure  Anesthesia: 1% Lidocaine w/1:100,000 epi  1% Lidocaine: 7.5 cc  Complications: none  Dressing: pressure  Post-op BP: 144/80  Post-op Pulse: 76  Pos-op Meds: none  W/C Instructions: Verbal, Written  Follow-up: 3 weeks     [facility]   OFFICE PROCEDURE PROGRESS NOTE   Chart reviewed for the following:   Deena Vance MD have reviewed the History, Physical and updated the Allergic reactions for Yesica Henriquez.     TIME OUT performed immediately prior to start of procedure:   Alejandro Granados MD, have performed the following reviews on Todd Ponce   prior to the start of the procedure:     * Patient was identified by name and date of birth   * Agreement on procedure being performed was verified   * Risks and Benefits explained to the patient   * Procedure site verified and marked as necessary   * Patient was positioned for comfort   * Consent was signed and verified     Time: 9 AM  Date of procedure: 2/17/2020  Procedure performed by:  Jorge Drake MD  Provider assisted by: Yobani Schofield  Patient assisted by: self   How tolerated by patient: tolerated the procedure well with no complications   Comments: none

## 2020-02-17 NOTE — LETTER
2/17/2020 11:59 AM 
 
Patient:  Gita Allen YOB: 1950 Date of Visit: 2/17/2020 Dear Bull Bah, NP 
[address] [facility] [address] [address] VIA Facsimile: [phone] Thank you for referring Gita Allen to me for evaluation/treatment. Below are the relevant portions of my assessment and plan of care. Mr. Agustín Segura presented today for Mohs surgery to treat a biopsy-proven actinic keratosis (cannot rule out SCC) of the left side of face. Because of the uncertainty of the diagnosis I repeated the biopsy today in order to evaluate for invasive squamous cell carcinoma. I also biopsied a lesion immediately posterior to it that was also suspicious for SCC. Upon evaluating the slides, there was only scar and no evidence of squamous cell carcinoma for the initial site and there was squamous cell carcinoma in situ at the posterior site. I treated this second lesion today with Mohs surgery and the area involved with the primary lesion was included in the repair. 1 stage(s) of Mohs surgery were required to achieve tumor free margins. I repaired the defect with a(n) primary closure. He tolerated the procedure well. Please see the attached procedure note(s) for additional details. Mr. Agustín Segura will return to me for suture removal and/or wound checks at an appropriate interval and I will follow-up with him regarding any issues arising from or relating to this surgery. I will otherwise defer any additional dermatologic care back to you. Of note, there was residual actinic keratosis at the surgical margins. I discussed further treatment of this with field therapy with the patient, however I will defer decisions regarding additional treatment of this back to you. If you have questions, please do not hesitate to call me. I look forward to following Mr. Agustín Segura along with you. Sincerely, Jose Antonio Lai MD 
 [phone] (gzzn)

## 2020-02-17 NOTE — PATIENT INSTRUCTIONS
WOUND CARE INSTRUCTIONS     1. Keep the dressing clean and dry and do not remove for 48 hours. 2. Then change the dressing once a day as follows:  a. Wash hands before and after each dressing change. b. Remove dressing and wash site gently with mild soap and water, rinse, and pat dry.  c. Apply liberal amounts of an ointment (Petroleum jelly or Aquaphor). d. Apply a non-stick (Telfa) dressing or Band-Aid to cover the wound. 3. Watch for:  BLEEDING: A small amount of drainage may occur. If bleeding occurs, elevate and rest the surgery site. Apply gauze and steady pressure for 20 minutes. If bleeding continues repeat pressure once more. If bleeding still does not stop, call this office. If after hours, call Dr. Jolene Haro at [phone]. INFECTION: Signs of infection include increased redness, pain, warmth, drainage of pus, and fever. If this occurs, please call this office. 4. Special Instructions (follow any that are checked):  · [x] You have stitches that DO          need to be removed. · [x] Avoid bending at the waist and heavy lifting for two days. · [x] Sleep with your head elevated for the next two nights. · [] Rest the surgery site and keep it elevated as much as possible for two days. · [] You may apply an ice-pack for 10-15 minutes every waking hour for the rest of the day. · [] Eat a soft diet and avoid hot food and hot drinks for the rest of the day. · [] Other instructions: Follow up as directed. Take Tylenol  for pain as needed. Once the site is healed with no remaining bandages or open areas, protect your surgical site and scar from the sun, as this area will be more sensitive. Use a broad spectrum sunscreen SPF 30 or higher daily, and a chemical free product (one containing zinc oxide or titanium dioxide) is a good choice if the area is sensitive. You may begin to gently massage the surgical site in 3 weeks, rubbing in a circular motion along the scar.  This can help reduce swelling and thickness of a scar. If you have any questions or concerns, please call our office Monday through Friday at [phone]. If after hours, please call Dr. Carolyn Bustamante at [phone].

## 2020-02-20 ENCOUNTER — HOSPITAL ENCOUNTER (OUTPATIENT)
Dept: WOUND CARE | Age: 70
Discharge: HOME | End: 2020-02-20
Payer: MEDICARE

## 2020-02-20 ENCOUNTER — TELEPHONE (OUTPATIENT)
Dept: ENDOCRINOLOGY | Age: 70
End: 2020-02-20

## 2020-02-20 VITALS
SYSTOLIC BLOOD PRESSURE: 152 MMHG | DIASTOLIC BLOOD PRESSURE: 67 MMHG | RESPIRATION RATE: 18 BRPM | TEMPERATURE: 97.2 F | HEART RATE: 75 BPM

## 2020-02-20 PROBLEM — I73.9 PAD (PERIPHERAL ARTERY DISEASE) (HCC): Status: ACTIVE | Noted: 2020-02-20

## 2020-02-20 PROCEDURE — 99213 OFFICE O/P EST LOW 20 MIN: CPT

## 2020-02-20 NOTE — DISCHARGE INSTRUCTIONS
Discharge Instructions/Wound [address] [place] [address]  [address] [facility], [address] [facility]  Telephone: [phone]    NAME:  Lilly Nickerson OF BIRTH:  1950  MEDICAL RECORD NUMBER:  [id number]  DATE:  2/20/2020      WOUND CARE ORDERS:Bilateral lower leg wounds - Cleanse with Normal Saline or soap and water, rinse and pat dry. Apply Xeroform, cover with Exudry or ABD Pads. Secure with roll gauze. Apply double layer tubigrip size F from base of toes to just below knees. Remove Tubigrip at bedtime & Reapply every morning, immediately after getting out of bed. RIght great toe wounds - cleanse with Normal Saline or soap and water, rinse, pat dry, apply xeroform and cover with gauze, roll gauze, tape. Change dressing 3 x week. Return to clinic in two weeks for follow-up with Dr. Shilpa Del Cid. Make an appointment with [facility] for consultation regarding possible arterial intervention (Right great toe ulcer with Abnormal KERI). TREATMENT ORDERS:    Elevate leg(s) above the level of the heart when sitting. Avoid prolonged standing in one place. Do no get dressing/wrap wet. Follow Diet as prescribed:   [] Diet as tolerated: [x] Calorie Diabetic Diet: [x] No Added Salt:  [] Increase Protein: [] Other:                 Return Appointment:  [x] Return Appointment: With Dr. Shilpa Del Cid in 2 [facility])  [x] Consult: Make an appointment with [facility] to assess if you are a candidate for possible arterial intervention (to improve blood flow). * (Right foot wound with Abnormal KERI)     Electronically signed Lopez Hwang RN on 2/20/2020 at 3 [facility]: Should you experience any significant changes in your wound(s) or have questions about your wound care, please contact the [address] [place] [address] at [address] [place] [address] 8:00 am - 4:30.   If you need help with your wound outside these hours and cannot wait until we are again available, contact your PCP or go to the hospital emergency room. PLEASE NOTE: IF YOU ARE UNABLE TO OBTAIN WOUND SUPPLIES, CONTINUE TO USE THE SUPPLIES YOU HAVE AVAILABLE UNTIL YOU ARE ABLE TO REACH US. IT IS MOST IMPORTANT TO KEEP THE WOUND COVERED AT ALL TIMES.      Physician Signature:_______________________    Date: ___________ Time:  ____________

## 2020-02-20 NOTE — WOUND CARE
02/20/20 1025   Wound Leg lower Medial;Right #1 01/23/20   Date First Assessed: 01/23/20   Present on Hospital Admission: No  Wound Approximate Age at First Assessment (Weeks): 1 weeks  Primary Wound Type: (c)   Location: Leg lower  Wound Location Orientation: Medial;Right  Wound Description: #1  Date of Firs. .. Dressing Status Removed   Dressing Type Xeroform;ABD pad;Gauze wrap (kami); Special tape (comment)  (Single tubi to catherine leg)   Wound Length (cm) 7 cm   Wound Width (cm) 2 cm   Wound Depth (cm) 0.1 cm   Wound Surface Area (cm^2) 14 cm^2   Wound Volume (cm^3) 1.4 cm^3   Condition of Base Pink;Slough   Condition of Edges Open   Drainage Amount Moderate   Drainage Color Serosanguinous   Wound Odor None   Christy-wound Assessment Intact   Cleansing and Cleansing Agents  Normal saline   Wound Leg lower Right;Lateral #2 01/23/20   Date First Assessed/Time First Assessed: 01/23/20 0936   Wound Approximate Age at First Assessment (Weeks): 1 weeks  Primary Wound Type: Venous Ulcer  Location: Leg lower  Wound Location Orientation: Right;Lateral  Wound Description: #2  Date of First... Wound Length (cm) 0 cm   Wound Width (cm) 0 cm   Wound Depth (cm) 0 cm   Wound Surface Area (cm^2) 0 cm^2   Wound Volume (cm^3) 0 cm^3   Wound Toe (Comment  which one) Right;Medial #3 great 01/23/20   Date First Assessed/Time First Assessed: 01/23/20 0939   Wound Approximate Age at First Assessment (Weeks): 4 weeks  Primary Wound Type: (c) Friction  Location: (c) Toe (Comment  which one)  Wound Location Orientation: Right;Medial  Wound Description:. .. Dressing Status Removed   Dressing Type Xeroform;Gauze;Gauze wrap (kami); Special tape (comment)   Wound Length (cm) 1 cm   Wound Width (cm) 1 cm   Wound Depth (cm) 0.1 cm   Wound Surface Area (cm^2) 1 cm^2   Wound Volume (cm^3) 0.1 cm^3   Drainage Amount Moderate   Drainage Color Serous   Wound Odor None   Christy-wound Assessment Intact   Cleansing and Cleansing Agents  Normal saline Wound Toe (Comment  which one) Right;Lateral #4 01/23/20   Date First Assessed/Time First Assessed: 01/23/20 0943   Wound Approximate Age at First Assessment (Weeks): 2 weeks  Primary Wound Type: Friction  Location: (c) Toe (Comment  which one)  Wound Location Orientation: Right;Lateral  Wound Description: #4. .. Dressing Status Removed   Dressing Type Xeroform   Wound Length (cm) 0.1 cm   Wound Width (cm) 0.1 cm   Wound Depth (cm) 0.1 cm   Wound Surface Area (cm^2) 0.01 cm^2   Wound Volume (cm^3) 0 cm^3   Condition of Base   (scab)   Drainage Amount None   Wound Odor None   Christy-wound Assessment Intact   Cleansing and Cleansing Agents  Normal saline   Wound Leg lower Left;Medial #5 01/23/20   Date First Assessed/Time First Assessed: 01/23/20 0944   Wound Approximate Age at First Assessment (Weeks): 1 weeks  Primary Wound Type: (c)   Location: Leg lower  Wound Location Orientation: Left;Medial  Wound Description: #5  Date of First Observati. .. Dressing Status Removed   Dressing Type Xeroform;ABD pad;Gauze wrap (kami); Special tape (comment)   Wound Length (cm) 10.1 cm   Wound Width (cm) 4.5 cm   Wound Depth (cm) 0.1 cm   Wound Surface Area (cm^2) 45.45 cm^2   Wound Volume (cm^3) 4.54 cm^3   Condition of Base Pink;Slough   Condition of Edges Open   Drainage Amount Moderate   Drainage Color Serosanguinous   Wound Odor None   Christy-wound Assessment Intact   Cleansing and Cleansing Agents  Normal saline     Visit Vitals  /67 (BP 1 Location: Right arm, BP Patient Position: Sitting)   Pulse 75   Temp 97.2 °F (36.2 °C)   Resp 18     LLE Peripheral Vascular   Capillary Refill: Less than/equal to 3 seconds (02/20/20 1024)  Color: Appropriate for race (02/20/20 1024)  Temperature: Warm (02/20/20 1024)  Sensation: Decreased (02/20/20 1024)  Pedal Pulse: Present (02/20/20 1024)  Circumference of Calf (cm): 41.5 cm (02/20/20 1024)  Location of Measurement (Calf): Mid  (02/20/20 1024)  Circumference of Ankle (cm): 23.5 cm (02/20/20 1024)  Location of Measurement (Ankle): Upper  (02/20/20 1024)  RLE Peripheral Vascular   Capillary Refill: Less than/equal to 3 seconds (02/20/20 1024)  Color: Appropriate for race (02/20/20 1024)  Temperature: Warm (02/20/20 1024)  Sensation: Decreased (02/20/20 1024)  Pedal Pulse: Present (02/20/20 1024)  Circumference of Calf (cm): 41 cm (02/20/20 1024)  Location of Measurement (Calf): Mid  (02/20/20 1024)  Circumference of Ankle (cm): 23 cm (02/20/20 1024)  Location of Measurement (Ankle): Upper  (02/20/20 1024)

## 2020-02-20 NOTE — PROGRESS NOTES
HISTORY OF PRESENT ILLNESS:  The patient is a 58-year-old man referred to the [address] [place] [address] Road regarding open wounds on both lower legs.  The patient reports that he had blisters, which recently opened on the lower legs.  He has previous history of ulcerations in the same area in the past.     The patient has history of chronic lower extremity edema.  He takes Bumex 2 mg p.o. b.i.d. and metolazone three times per week. Ann Simeon reports that the diuretics do produce significant diuretic response.     The patient reports that he had been drinking a lot of fluid over the course of the day, mainly water.  He has reduced fluid intake.  He weighed himself daily and his weight has been slowly declining since changing fluid intake, down about 6 pounds, now stabilized. .     The patient has history of cardiomyopathy and congestive heart failure.  He had chronic AFib and was treated with pacemaker and AV node ablation. Raman Joseph is history of coronary artery disease, diabetes mellitus type 2 controlled, hypertension, morbid obesity, sleep apnea unable to tolerate treatment, and history of coronary bypass.  The patient reports that he can walk short distances using a walker. Ann Simeon has a lot of joint problems in knee and hip as well as back problems. Ann Simeon reports that he would get short of breath on walking 100 feet with his walker.  The patient does sleep lying in bed at night.     The patient has history of chronic kidney disease stage IV.  He is followed by Dr. Radha Arguelles.       The patient has never smoked. Ann Simeon rarely uses alcohol. Patient on anticoagulation with Eliquis. KERI on 2/14/2020  Severe calcification. Abnormal right and left ankle PVR.   Rt TBI 0.67, left TBI 0.54.     PHYSICAL EXAMINATION:  GENERAL:  The patient is an alert, overweight man in no acute distress.     WOUND EXAMINATION:  Examination of the lower extremities revealed no palpable dorsalis pedis or posterior tibial pulse on either side. Raman Joseph is hyperpigmentation of the lower half of the right and left lower legs.  There is 1 + pitting edema in the lower legs bilaterally.     On the right side, there is an ulcer on the right medial lower leg, 7 x 2 x 0.1 cm with clean granulation.     Right great toe has medial ulcer.     Left lower leg medially, there is a wound 10.1 x 4.5 x 0.1 cm with granulation on 95 % of surface.       Patient on anticoagulation with Eliquis - no debridement performed.     ASSESSMENT AND PLAN:  I explained to the patient that he should continue taking his diuretics and continue limiting fluid intake particularly between meals, so that he achieves a gradual increase in weight related to loss of edema.  He should follow up with Dr. Ashley Nj as planned regarding his renal function.     Toe wounds will be treated with Xeroform and foam dressing.  His wounds are medial and lateral right great toe.  Lower leg wounds will be treated with Xeroform gauze and roll gauze.  Tubigrips will be placed bilaterally to apply mild compression from the base of the toes to the upper calf.     Dressings will be changed at least three times per week, and more often if there is a lot of drainage.     He may at some point need venous testing, but I think it is likely that his edema is mainly a systemic origin at present. He is seeing Dr Ashley Nj soon - he is to ask about edema control. He is to see the vascular surgeons at [facility] regarding ulcer right great toe and abnormal KERI.         The patient will follow up in two weeks.     FINAL DIAGNOSES:  Bilateral leg edema, bilateral lower leg nonpressure ulcers, right great toe ulcer x2, diabetes mellitus.      M75.536, L97.922, R60.0, I73.9     Tamera Fragoso MD

## 2020-02-25 ENCOUNTER — TELEPHONE (OUTPATIENT)
Dept: ENDOCRINOLOGY | Age: 70
End: 2020-02-25

## 2020-02-25 NOTE — TELEPHONE ENCOUNTER
----- Message from Dc Jenkins sent at 2/25/2020  2:40 PM EST -----  Regarding: /Telephone  General Message/Vendor Calls    Caller's first and last name: Chad Hong       Reason for call: clinical notes request       Callback required yes/no and why:yes      Best contact number(s):[phone] ext [phone] fax [phone]    Details to clarify the request:called from [facility] medical supply in regards to following up on pt clinical note for the pt   .  Pt acct [de-identified]       Dc Jenkins

## 2020-02-28 ENCOUNTER — TELEPHONE (OUTPATIENT)
Dept: ENDOCRINOLOGY | Age: 70
End: 2020-02-28

## 2020-02-28 NOTE — TELEPHONE ENCOUNTER
----- Message from [employer] sent at 2/28/2020  9:24 AM EST -----  Regarding: /Telephone  General Message/Vendor Calls    Caller's first and last name: Ean Forrest      Reason for call:Paperwork       Callback required yes/no and why:yes      Best contact number(s): [phone]      Details to clarify the request: Pt called requesting a call back following up on paperwork for monitor .        Vania Akers

## 2020-02-28 NOTE — TELEPHONE ENCOUNTER
I returned this call and let Junie Aj Brock know that the paperwork had been faxed yesterday. He understood this information.   Chong Carbajal

## 2020-02-28 NOTE — TELEPHONE ENCOUNTER
----- Message from Camelia Mcmanus sent at 2/28/2020  1:34 PM EST -----  Regarding: Dr Opal Ko  General Message/Vendor Calls    Caller's first and last name: Michelle Grover from [facility] and [employer]      Reason for call: to check the status of the clinical notes request form that they re-faxed on the 25th of Feb for the Jeremy Wilder sensor and reader      Callback required yes/no and why:yes  for reason  given above      Best contact number(s):[phone] [phone]  , Ref # [id number]  fax [phone],     Details to clarify the request:  This is their 3rd attempt in calling the office for this information     Camelia Mcmanus

## 2020-03-02 NOTE — TELEPHONE ENCOUNTER
I returned this call Friday afternoon and spoke with a Rep there. I let her know that these forms and office visit had already been faxed to them. She asked me to fax it to another number. As it was lat Friday night, I told her that I would fax it Monday morning and she agreed with this. This information was refaxed this morning.   Rafael

## 2020-03-04 ENCOUNTER — TELEPHONE (OUTPATIENT)
Dept: ENDOCRINOLOGY | Age: 70
End: 2020-03-04

## 2020-03-04 NOTE — TELEPHONE ENCOUNTER
I returned this call and let Mr. Dede Fortune know that we had faxed forms and office notes to [facility] on 3 different occasions. I gave him the dates that these had been faxed and what they were. He will call Herminio again.   Julio Cesar Bradshaw

## 2020-03-04 NOTE — TELEPHONE ENCOUNTER
Patient is calling saying that [facility] stated that the forms we sent them for his glucose monitor. Patient is very and would like this taken care of today. Please advise. Thanks.

## 2020-03-05 ENCOUNTER — HOSPITAL ENCOUNTER (OUTPATIENT)
Dept: WOUND CARE | Age: 70
Discharge: HOME | End: 2020-03-05
Payer: MEDICARE

## 2020-03-05 VITALS
TEMPERATURE: 98.2 F | HEART RATE: 75 BPM | RESPIRATION RATE: 18 BRPM | DIASTOLIC BLOOD PRESSURE: 70 MMHG | SYSTOLIC BLOOD PRESSURE: 148 MMHG

## 2020-03-05 PROCEDURE — 99213 OFFICE O/P EST LOW 20 MIN: CPT

## 2020-03-05 NOTE — PROGRESS NOTES
HISTORY OF PRESENT ILLNESS:  The patient is a 59-year-old man referred to the [address] [place] [address] Road regarding open wounds on both lower legs.  The patient reports that he had blisters, which recently opened on the lower legs.  He has previous history of ulcerations in the same area in the past.     The patient has history of chronic lower extremity edema.  He takes Bumex 2 mg p.o. b.i.d. and metolazone three times per week. Brian Satniago reports that the diuretics do produce significant diuretic response.     The patient reports that he had been drinking a lot of fluid over the course of the day, mainly water.  He has reduced fluid intake.  He weighed himself daily and his weight has been slowly declining since changing fluid intake, down about 6 pounds, now stabilized. .     The patient has history of cardiomyopathy and congestive heart failure.  He had chronic AFib and was treated with pacemaker and AV node ablation. Efrain Rangel is history of coronary artery disease, diabetes mellitus type 2 controlled, hypertension, morbid obesity, sleep apnea unable to tolerate treatment, and history of coronary bypass.  The patient reports that he can walk short distances using a walker. Brian Santiago has a lot of joint problems in knee and hip as well as back problems. Brian Santiago reports that he would get short of breath on walking 100 feet with his walker.  The patient does sleep lying in bed at night.     The patient has history of chronic kidney disease stage IV.  He is followed by Dr. Efrain Nagy.       The patient has never smoked. Brian Santiago rarely uses alcohol.     Patient on anticoagulation with Eliquis.     KERI on 2/14/2020  Severe calcification. Abnormal right and left ankle PVR. Rt TBI 0.67, left TBI 0.54. He saw vascualr surgeon at [facility]. Patient is not able to have angiogram because of poor renal function.   He will have venous testing.     PHYSICAL EXAMINATION:  GENERAL:  The patient is an alert, overweight man in no acute distress.     WOUND EXAMINATION:  Examination of the lower extremities revealed no palpable dorsalis pedis or posterior tibial pulse on either side.  There is hyperpigmentation of the lower half of the right and left lower legs.  There istrace  pitting edema in the lower legs bilaterally.     On the right side, there is an ulcer on the right medial lower leg, 3.8 x 1.2 x 0.1 cm with clean granulation.     Right great toe has medial ulcer 1.5 x 0.5 x 0.1 cm.     Left lower leg medially, there is a wound 9.6 x 3.3x 0.1 cm with granulation on 95 % of surface.       Patient on anticoagulation with Eliquis - no debridement performed. Silver nitrate applied to exuberant granulation at outer margin of both ulcers.     ASSESSMENT AND PLAN:  I explained to the patient that he should continue taking his diuretics and continue limiting fluid intake particularly between meals, so that he achieves a gradual increase in weight related to loss of edema.  He should follow up with Dr. Fei Duong as planned regarding his renal function.     Toe wounds will be treated with Xeroform and foam dressing.  His wounds are medial and lateral right great toe.  Lower leg wounds will be treated with Xeroform gauze and roll gauze.  Tubigrips will be placed bilaterally to apply mild compression from the base of the toes to the upper calf.     Dressings will be changed at least three times per week, and more often if there is a lot of drainage.     He is scheduled for venous testing.   Much of his edema is systemic.          The patient will follow up in one week.     FINAL DIAGNOSES:  Bilateral leg edema, bilateral lower leg nonpressure ulcers, right great toe ulcer x2, diabetes mellitus.      F69.037, L97.922, R60.0, I73.9     Eric Harrison MD

## 2020-03-05 NOTE — PROGRESS NOTES
03/05/20 1023   Wound Leg lower Medial;Right #1 01/23/20   Date First Assessed: 01/23/20   Present on Hospital Admission: No  Wound Approximate Age at First Assessment (Weeks): 1 weeks  Primary Wound Type: (c)   Location: Leg lower  Wound Location Orientation: Medial;Right  Wound Description: #1  Date of Firs. .. Dressing Status Removed   Dressing Type ABD binder;Xeroform;Gauze wrap (kami)   Wound Length (cm) 3.8 cm   Wound Width (cm) 1.2 cm   Wound Depth (cm) 0.1 cm   Wound Surface Area (cm^2) 4.56 cm^2   Wound Volume (cm^3) 0.46 cm^3   Change in Wound Size % 88.6   Condition of Base Pink;Granulation   Condition of Edges Open   Drainage Amount Moderate   Drainage Color Serosanguinous   Wound Odor None   Christy-wound Assessment Intact   Cleansing and Cleansing Agents  Normal saline   Wound Leg lower Right;Lateral #2 01/23/20   Date First Assessed/Time First Assessed: 01/23/20 0936   Wound Approximate Age at First Assessment (Weeks): 1 weeks  Primary Wound Type: Venous Ulcer  Location: Leg lower  Wound Location Orientation: Right;Lateral  Wound Description: #2  Date of First... Dressing Type Open to air   Wound Toe (Comment  which one) Right;Medial #3 great 01/23/20   Date First Assessed/Time First Assessed: 01/23/20 0939   Wound Approximate Age at First Assessment (Weeks): 4 weeks  Primary Wound Type: (c) Friction  Location: (c) Toe (Comment  which one)  Wound Location Orientation: Right;Medial  Wound Description:. ..    Dressing Status Removed   Dressing Type Xeroform;Gauze   Wound Length (cm) 1.5 cm   Wound Width (cm) 0.5 cm   Wound Depth (cm) 0.1 cm   Wound Surface Area (cm^2) 0.75 cm^2   Wound Volume (cm^3) 0.08 cm^3   Change in Wound Size % 10.71   Condition of Base Slough   Condition of Edges Open   Drainage Amount Moderate   Drainage Color Serous   Wound Odor None   Christy-wound Assessment Intact   Cleansing and Cleansing Agents  Normal saline   Wound Toe (Comment  which one) Right;Lateral #4 01/23/20   Date First Assessed/Time First Assessed: 01/23/20 0943   Wound Approximate Age at First Assessment (Weeks): 2 weeks  Primary Wound Type: Friction  Location: (c) Toe (Comment  which one)  Wound Location Orientation: Right;Lateral  Wound Description: #4. .. Dressing Type Open to air   Wound Leg lower Left;Medial #5 01/23/20   Date First Assessed/Time First Assessed: 01/23/20 0944   Wound Approximate Age at First Assessment (Weeks): 1 weeks  Primary Wound Type: (c)   Location: Leg lower  Wound Location Orientation: Left;Medial  Wound Description: #5  Date of First Observati. ..    Dressing Status Removed   Dressing Type ABD pad;Xeroform;Gauze wrap (kami)   Wound Length (cm) 9.6 cm   Wound Width (cm) 3.3 cm   Wound Depth (cm) 0.1 cm   Wound Surface Area (cm^2) 31.68 cm^2   Wound Volume (cm^3) 3.17 cm^3   Change in Wound Size % 51.67   Condition of Base Pink;Granulation   Condition of Edges Open   Drainage Amount Moderate   Drainage Color Serosanguinous   Wound Odor None   Christy-wound Assessment Blanchable erythema   Cleansing and Cleansing Agents  Normal saline     Visit Vitals  /70   Pulse 75   Temp 98.2 °F (36.8 °C)   Resp 18

## 2020-03-05 NOTE — DISCHARGE INSTRUCTIONS
Discharge Instructions/Wound [address], [address] [facility]  Telephone: [phone]    NAME:  Radha Russell OF BIRTH:  1950  MEDICAL RECORD NUMBER:  [id number]  DATE:  3/5/2020      WOUND CARE ORDERS:Bilateral lower leg wounds - Cleanse with Normal Saline or soap and water, rinse and pat dry. Apply Xeroform, cover with Exudry or ABD Pads. Secure with roll gauze. Apply double layer tubigrip size F from base of toes to just below knees. Remove Tubigrip at bedtime & Reapply every morning, immediately after getting out of bed. RIght great toe wounds - cleanse with Normal Saline or soap and water, rinse, pat dry, apply xeroform and cover with gauze, roll gauze, tape. Change dressing 3 x week. Return to clinic in one week for MD follow-up. TREATMENT ORDERS:    Elevate leg(s) above the level of the heart when sitting. Avoid prolonged standing in one place. Return Appointment:  [x] Return Appointment: With Dr. Ana Luisa Enriquez in 1 week(s)  [] Ordered tests:      Electronically signed Jhonatan Martínez RN on 3/5/2020 at [address] Information: Should you experience any significant changes in your wound(s) or have questions about your wound care, please contact the [address] [place] [address] at [address] [place] [address] 8:00 am - 4:30. If you need help with your wound outside these hours and cannot wait until we are again available, contact your PCP or go to the hospital emergency room. PLEASE NOTE: IF YOU ARE UNABLE TO OBTAIN WOUND SUPPLIES, CONTINUE TO USE THE SUPPLIES YOU HAVE AVAILABLE UNTIL YOU ARE ABLE TO REACH US. IT IS MOST IMPORTANT TO KEEP THE WOUND COVERED AT ALL TIMES.      Physician Signature:_______________________    Date: ___________ Time:  ____________

## 2020-03-05 NOTE — WOUND CARE
03/05/20 1128   Wound Leg lower Medial;Right #1 01/23/20   Date First Assessed: 01/23/20   Present on Hospital Admission: No  Wound Approximate Age at First Assessment (Weeks): 1 weeks  Primary Wound Type: (c)   Location: Leg lower  Wound Location Orientation: Medial;Right  Wound Description: #1  Date of Firs. .. Dressing Type Applied Xeroform;ABD pad;Gauze wrap (kami); Special tape (comment)  (double Tubi to BLL)   Wound Toe (Comment  which one) Right;Medial #3 great 01/23/20   Date First Assessed/Time First Assessed: 01/23/20 0939   Wound Approximate Age at First Assessment (Weeks): 4 weeks  Primary Wound Type: (c) Friction  Location: (c) Toe (Comment  which one)  Wound Location Orientation: Right;Medial  Wound Description:. .. Dressing Type Applied Xeroform;Gauze;Special tape (comment)   Wound Leg lower Left;Medial #5 01/23/20   Date First Assessed/Time First Assessed: 01/23/20 0944   Wound Approximate Age at First Assessment (Weeks): 1 weeks  Primary Wound Type: (c)   Location: Leg lower  Wound Location Orientation: Left;Medial  Wound Description: #5  Date of First Observati. .. Dressing Type Applied Xeroform;ABD pad;Gauze wrap (kami); Special tape (comment)

## 2020-03-06 RX ORDER — METOPROLOL SUCCINATE 100 MG/1
TABLET, EXTENDED RELEASE ORAL
Qty: 135 TAB | Refills: 1 | Status: SHIPPED | OUTPATIENT
Start: 2020-03-06 | End: 2020-08-27 | Stop reason: DRUGHIGH

## 2020-03-09 ENCOUNTER — OFFICE VISIT (OUTPATIENT)
Dept: DERMATOLOGY | Facility: AMBULATORY SURGERY CENTER | Age: 70
End: 2020-03-09

## 2020-03-09 DIAGNOSIS — D04.39 SQUAMOUS CELL CARCINOMA IN SITU (SCCIS) OF SKIN OF LEFT CHEEK: Primary | ICD-10-CM

## 2020-03-09 NOTE — PROGRESS NOTES
Wound check/suture removal:    Chief complaint: wound check. 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 The patient notes that a small lump appeared under 1 tip of the surgical site about a week ago. It has not been changing since that time. Exam: The surgical site was examined. There is not evidence of infection. There is not erythema. There is edema. There is a likely small hematoma under the medial tip of the surgical scar. A/P:  Wound check. The surgical site is healing well. Additional care was reviewed including liberal application of Vaseline several times daily and gentle scar massage starting at 4-6 weeks postop. Follow up will be in 1 month.

## 2020-03-12 ENCOUNTER — HOSPITAL ENCOUNTER (OUTPATIENT)
Dept: WOUND CARE | Age: 70
Discharge: HOME | End: 2020-03-12
Payer: MEDICARE

## 2020-03-12 VITALS
SYSTOLIC BLOOD PRESSURE: 167 MMHG | TEMPERATURE: 96.4 F | DIASTOLIC BLOOD PRESSURE: 79 MMHG | HEART RATE: 75 BPM | RESPIRATION RATE: 18 BRPM

## 2020-03-12 PROCEDURE — 17250 CHEM CAUT OF GRANLTJ TISSUE: CPT

## 2020-03-12 NOTE — DISCHARGE INSTRUCTIONS
Discharge Instructions/Wound [address] [facility]  [address] [facility]  [place], [address] [facility]  Telephone: [phone]    NAME:  Nadia Maria OF BIRTH:  1950  MEDICAL RECORD NUMBER:  [id number]  DATE:  3/12/2020      WOUND CARE ORDERS:   Bilateral lower leg wounds - Cleanse with saline or soap and water, rinse and pat dry, apply Xeroform, cover with Exudry or ABD, secure with roll gauze, apply double layer tubigrip size F.     RIght great toe wound - cleanse with saline or soap and water, rinse, pat dry, apply Xeroform and cover with gauze, roll gauze, tape. Change dressing 3 x week. Return to clinic in 2 weeks for MD follow-up. TREATMENT ORDERS:    Elevate leg(s) above the level of the heart when sitting. Avoid prolonged standing in one place. Do no get dressing/wrap wet. Follow Diet as prescribed:   [] Diet as tolerated: [x] Calorie Diabetic Diet [x] No Added Salt  [] Increase Protein: [] Other:                 Return Appointment:  [x] Return Appointment: With Dr. Eriberto Cohen  in 2 [facility])  [] Ordered tests:      Electronically signed Dinah Meza RN on 3/12/2020 at 11:58 AM     [address] [facility] Information: Should you experience any significant changes in your wound(s) or have questions about your wound care, please contact the [address] [place] [address] at [address] [place] [address] 8:00 am - 4:30. If you need help with your wound outside these hours and cannot wait until we are again available, contact your PCP or go to the hospital emergency room. PLEASE NOTE: IF YOU ARE UNABLE TO OBTAIN WOUND SUPPLIES, CONTINUE TO USE THE SUPPLIES YOU HAVE AVAILABLE UNTIL YOU ARE ABLE TO REACH US. IT IS MOST IMPORTANT TO KEEP THE WOUND COVERED AT ALL TIMES.      Physician Signature:_______________________    Date: ___________ Time:  ____________

## 2020-03-12 NOTE — PROGRESS NOTES
HISTORY OF PRESENT ILLNESS:  The patient is a 27-year-old man referred to the [address] [place] [address] Road regarding open wounds on both lower legs.  The patient reports that he had blisters, which recently opened on the lower legs.  He has previous history of ulcerations in the same area in the past.     The patient has history of chronic lower extremity edema.  He takes Bumex 2 mg p.o. b.i.d. and metolazone three times per week. Sandrita Nance reports that the diuretics do produce significant diuretic response.     The patient reports that he had been drinking a lot of fluid over the course of the day, mainly water.  He has reduced fluid intake.  He weighed himself daily and his weight has been slowly declining since changing fluid intake, down about 6 pounds, now stabilized. .     The patient has history of cardiomyopathy and congestive heart failure.  He had chronic AFib and was treated with pacemaker and AV node ablation. Judah Cardona is history of coronary artery disease, diabetes mellitus type 2 controlled, hypertension, morbid obesity, sleep apnea unable to tolerate treatment, and history of coronary bypass.  The patient reports that he can walk short distances using a walker. Sandrita Nance has a lot of joint problems in knee and hip as well as back problems.  He reports that he would get short of breath on walking 100 feet with his walker.  The patient does sleep lying in bed at night.     The patient has history of chronic kidney disease stage IV.  He is followed by Dr. Melly Lamar.       The patient has never smoked. Sandrita Nance rarely uses alcohol.     Patient on anticoagulation with Eliquis.     KERI on 2/14/2020  Severe calcification.  Abnormal right and left ankle PVR.  Rt TBI 0.67, left TBI 0.54.     He saw vascualr surgeon at [address] [facility]. Patient is not able to have angiogram because of poor renal function.   He will have venous testing.     PHYSICAL EXAMINATION:  GENERAL:  The patient is an alert, overweight man in no acute distress.     WOUND EXAMINATION:  Examination of the lower extremities revealed no palpable dorsalis pedis or posterior tibial pulse on either side.  There is hyperpigmentation of the lower half of the right and left lower legs.  There istrace  pitting edema in the lower legs bilaterally.     On the right side, there is an ulcer on the right medial lower leg, 3.8 x 1.6 x 0.1 cm with clean granulation.     Right great toe has medial ulcer 1.1 x 0.6 x 0.1 cm.     Left lower leg medially, there is a wound 9 x 2.9 x 0.1 cm with granulation on 95 % of surface.       Patient on anticoagulation with Eliquis - no debridement performed.     Silver nitrate applied to exuberant granulation at outer margin of both ulcers.     ASSESSMENT AND PLAN:  I explained to the patient that he should continue taking his diuretics and continue limiting fluid intake particularly between meals, so that he achieves a gradual increase in weight related to loss of edema.  He should follow up with Dr. Fei Duong as planned regarding his renal function.     Toe wounds will be treated with Xeroform and foam dressing.  His wounds are medial and lateral right great toe.  Lower leg wounds will be treated with Xeroform gauze and roll gauze.  Tubigrips will be placed bilaterally to apply mild compression from the base of the toes to the upper calf.     Dressings will be changed at least three times per week, and more often if there is a lot of drainage.     He is scheduled for venous testing.   Much of his edema is systemic.           The patient will follow up in one week.     FINAL DIAGNOSES:  Bilateral leg edema, bilateral lower leg nonpressure ulcers, right great toe ulcer x2, diabetes mellitus.      B48.761, L97.922, R60.0, I73.9     Eric Harrison MD

## 2020-03-12 NOTE — WOUND CARE
03/12/20 1117   Wound Toe (Comment  which one) Right;Medial #3 great 01/23/20   Date First Assessed/Time First Assessed: 01/23/20 0939   Wound Approximate Age at First Assessment (Weeks): 4 weeks  Primary Wound Type: (c) Friction  Location: (c) Toe (Comment  which one)  Wound Location Orientation: Right;Medial  Wound Description:. .. Dressing Status Removed   Dressing Type Xeroform;Gauze   Wound Length (cm) 1.1 cm   Wound Width (cm) 0.6 cm   Wound Depth (cm) 0.1 cm   Wound Surface Area (cm^2) 0.66 cm^2   Wound Volume (cm^3) 0.07 cm^3   Change in Wound Size % 21.43   Condition of Base Slough;Pink   Condition of Edges Open   Drainage Amount Small   Drainage Color Serosanguinous   Wound Odor None   Cleansing and Cleansing Agents  Normal saline   Wound Leg lower Left;Medial #5 01/23/20   Date First Assessed/Time First Assessed: 01/23/20 0944   Wound Approximate Age at First Assessment (Weeks): 1 weeks  Primary Wound Type: (c)   Location: Leg lower  Wound Location Orientation: Left;Medial  Wound Description: #5  Date of First Observati. .. Dressing Status Removed   Dressing Type Xeroform;ABD pad   Non-staged Wound Description Full thickness   Wound Length (cm) 9 cm   Wound Width (cm) 2.9 cm   Wound Depth (cm) 0.1 cm   Wound Surface Area (cm^2) 26.1 cm^2   Wound Volume (cm^3) 2.61 cm^3   Change in Wound Size % 60.18   Condition of Base Pink;Granulation   Condition of Edges Open   Drainage Amount Moderate   Drainage Color Serosanguinous   Wound Odor None   Christy-wound Assessment Blanchable erythema   Cleansing and Cleansing Agents  Normal saline   Wound Leg lower Medial;Right #1 01/23/20   Date First Assessed: 01/23/20   Present on Hospital Admission: No  Wound Approximate Age at First Assessment (Weeks): 1 weeks  Primary Wound Type: (c)   Location: Leg lower  Wound Location Orientation: Medial;Right  Wound Description: #1  Date of Firs. ..    Dressing Status Removed   Dressing Type Xeroform;ABD pad   Non-staged Wound Description Full thickness   Wound Length (cm) 3.8 cm   Wound Width (cm) 1.6 cm   Wound Depth (cm) 0.1 cm   Wound Surface Area (cm^2) 6.08 cm^2   Wound Volume (cm^3) 0.61 cm^3   Change in Wound Size % 84.8   Condition of Base Pink;Granulation   Condition of Edges Open   Drainage Amount Moderate   Drainage Color Serosanguinous   Wound Odor None   Christy-wound Assessment Intact   Cleansing and Cleansing Agents  Normal saline               Visit Vitals  /79 (BP 1 Location: Right arm, BP Patient Position: Sitting)   Pulse 75   Temp 96.4 °F (35.8 °C)   Resp 18

## 2020-03-26 ENCOUNTER — HOSPITAL ENCOUNTER (OUTPATIENT)
Dept: WOUND CARE | Age: 70
Discharge: HOME | End: 2020-03-26
Payer: MEDICARE

## 2020-03-26 VITALS
RESPIRATION RATE: 18 BRPM | DIASTOLIC BLOOD PRESSURE: 75 MMHG | HEART RATE: 74 BPM | TEMPERATURE: 96.1 F | SYSTOLIC BLOOD PRESSURE: 167 MMHG

## 2020-03-26 PROCEDURE — 17250 CHEM CAUT OF GRANLTJ TISSUE: CPT

## 2020-03-26 NOTE — PROGRESS NOTES
HISTORY OF PRESENT ILLNESS:  The patient is a 13-year-old man referred to the [address] [place] [address] Road regarding open wounds on both lower legs.  First seen 1/23/2020. The patient reports that he had blisters, which opened on the lower legs.  He has previous history of ulcerations in the same area in the past.     The patient has history of chronic lower extremity edema.  He takes Bumex 2 mg p.o. b.i.d. and metolazone three times per week. [facility] reports that the diuretics do produce significant diuretic response.     The patient reports that he had been drinking a lot of fluid over the course of the day, mainly water.  He has reduced fluid intake.  He weighed himself daily and his weight has been slowly declining since changing fluid intake.     The patient has history of cardiomyopathy and congestive heart failure.  He had chronic AFib and was treated with pacemaker and AV node ablation.  There is history of coronary artery disease, diabetes mellitus type 2 controlled, hypertension, morbid obesity, sleep apnea unable to tolerate treatment, and history of coronary bypass.  The patient reports that he can walk short distances using a walker. [facility] has a lot of joint problems in knee and hip as well as back problems.  He reports that he would get short of breath on walking 100 feet with his walker.  The patient does sleep lying in bed at night.     The patient has history of chronic kidney disease stage IV.  He is followed by Dr. Matias Mcnamara.       The patient has never smoked. [facility] rarely uses alcohol.     Patient on anticoagulation with Eliquis.     KERI on 2/14/2020  Severe calcification.  Abnormal right and left ankle PVR.  Rt TBI 0.67, left TBI 0.54.     He saw vascualr surgeon at [address] [place] [address] is not able to have angiogram because of poor renal function. [facility] will have venous testing.     PHYSICAL EXAMINATION:  GENERAL:  The patient is an alert, overweight man in no acute distress.     WOUND EXAMINATION:  Examination of the lower extremities revealed no palpable dorsalis pedis or posterior tibial pulse on either side.  There is hyperpigmentation of the lower half of the right and left lower legs.  There is now no  pitting edema in the lower legs bilaterally.     On the right side, there is an ulcer on the right medial lower leg, 0.4 x 0.3  x 0.1 cm with clean granulation.     Right great toe has medial dry scab.     Left lower leg medially, there is a wound 4.5 x 1.8  x 0.1 cm with granulation on  surface. Left medial proximal wound 1.2 x 1.5 x 0.1 cm.      Patient on anticoagulation with Eliquis - no debridement performed.     Silver nitrate applied to exuberant granulation at outer margin of left medial ulcer.     ASSESSMENT AND PLAN:  I explained to the patient that he should continue taking his diuretics and continue limiting fluid intake particularly between meals, so that he achieves a gradual increase in weight related to loss of edema.  He should follow up with Dr. Phuc Dietrich as planned regarding his renal function.     He has done a good job recently with edema control.     Toe scab site will be left open.   Lower leg wounds will be treated with Xeroform gauze and roll gauze.  Tubigrips will be placed bilaterally to apply mild compression from the base of the toes to the upper calf.     Dressings will be changed at least three times per week, and more often if there is a lot of drainage.     He is scheduled for venous testing.  Much of his edema is systemic.           The patient will follow up in one week.     FINAL DIAGNOSES:  Bilateral leg edema, bilateral lower leg nonpressure ulcers, diabetes mellitus, PAD.      W19.047, L97.922, R60.0, I73.9     Fer Ramires MD

## 2020-03-26 NOTE — WOUND CARE
03/26/20 1114   Wound Leg lower Medial;Right #1 01/23/20   Date First Assessed: 01/23/20   Present on Hospital Admission: No  Wound Approximate Age at First Assessment (Weeks): 1 weeks  Primary Wound Type: (c)   Location: Leg lower  Wound Location Orientation: Medial;Right  Wound Description: #1  Date of Firs. .. Dressing Status Removed   Dressing Type   (Xeroform, gauze, RG, tubigrip)   Non-staged Wound Description Full thickness   Wound Length (cm) 0.4 cm   Wound Width (cm) 0.3 cm   Wound Depth (cm) 0.1 cm   Wound Surface Area (cm^2) 0.12 cm^2   Wound Volume (cm^3) 0.01 cm^3   Change in Wound Size % 99.7   Condition of Base Epithelializing;Pink   Condition of Edges Open   Drainage Amount Scant   Drainage Color Serosanguinous   Wound Odor None   Christy-wound Assessment Intact   Cleansing and Cleansing Agents  Normal saline; Soap and water   Wound Leg lower Left;Medial;Proximal #40   Date First Assessed/Time First Assessed: 03/26/20 1114   Present on Hospital Admission: Yes  Wound Approximate Age at First Assessment (Weeks): 1 weeks  Primary Wound Type: Vascular  Location: Leg lower  Wound Location Orientation: Left;Medial;Proxima. .. Dressing Status Removed   Dressing Type   (Xeroform, gauze, RG, tubigrip)   Non-staged Wound Description Partial thickness   Wound Length (cm) 1.2 cm   Wound Width (cm) 1.5 cm   Wound Depth (cm) 0.1 cm   Wound Surface Area (cm^2) 1.8 cm^2   Wound Volume (cm^3) 0.18 cm^3   Condition of Base Pink   Condition of Edges Open   Drainage Amount Small   Drainage Color Serosanguinous   Wound Odor None   Christy-wound Assessment Intact   Cleansing and Cleansing Agents  Normal saline; Soap and water   Wound Toe (Comment  which one) Right;Medial #3 great 01/23/20   Date First Assessed/Time First Assessed: 01/23/20 0939   Wound Approximate Age at First Assessment (Weeks): 4 weeks  Primary Wound Type: (c) Friction  Location: (c) Toe (Comment  which one)  Wound Location Orientation: Right;Medial  Wound Description:. .. Dressing Status Removed   Dressing Type   (Xeroform, gauze, RG, tubigrip)   Wound Length (cm) 0.7 cm   Wound Width (cm) 0.7 cm   Wound Depth (cm) 0.1 cm   Wound Surface Area (cm^2) 0.49 cm^2   Wound Volume (cm^3) 0.05 cm^3   Change in Wound Size % 41.67   Condition of Base Pink;Slough   Condition of Edges Open   Drainage Amount Small   Drainage Color Serosanguinous   Wound Odor None   Christy-wound Assessment Intact   Wound Leg lower Left;Medial #5 01/23/20   Date First Assessed/Time First Assessed: 01/23/20 0944   Wound Approximate Age at First Assessment (Weeks): 1 weeks  Primary Wound Type: (c)   Location: Leg lower  Wound Location Orientation: Left;Medial  Wound Description: #5  Date of First Observati. ..    Dressing Status Removed   Dressing Type   (Xeroform, gauze, RG, tubigrip)   Non-staged Wound Description Full thickness   Wound Length (cm) 4.5 cm   Wound Width (cm) 1.8 cm   Wound Depth (cm) 0.1 cm   Wound Surface Area (cm^2) 8.1 cm^2   Wound Volume (cm^3) 0.81 cm^3   Change in Wound Size % 87.64   Condition of Base Granulation;Pink   Condition of Edges Open   Drainage Amount Moderate   Drainage Color Serosanguinous   Wound Odor None   Christy-wound Assessment Intact     Visit Vitals  /75 (BP 1 Location: Right arm, BP Patient Position: At rest;Sitting)   Pulse 74   Temp 96.1 °F (35.6 °C)   Resp 18

## 2020-03-26 NOTE — DISCHARGE INSTRUCTIONS
Discharge Instructions/Wound [address] [facility]  [address] [facility]  [place], [address] [facility]  Telephone: [phone]     FAX [phone]    NAME:  Mark Emmanuel OF BIRTH:  1950  MEDICAL RECORD NUMBER:  [id number]  DATE:  3/26/2020      WOUND CARE ORDERS:  Bilateral lower leg wounds - Cleanse with saline or soap and water, rinse and pat dry, apply Xeroform, cover with Exudry or ABD, secure with roll gauze, apply double layer tubigrip size F.     RIght great toe wounds - cleanse with saline or soap and water, rinse, pat dry, apply Xeroform and cover with gauze, roll gauze, tape. Change dressing 3 x week. Return to clinic in 2 weeks for MD follow-up. TREATMENT ORDERS:    Elevate leg(s) above the level of the heart when sitting. Avoid prolonged standing in one place. Do no get dressing/wrap wet. Follow Diet as prescribed:   [x] Diet as tolerated: [x] Calorie Diabetic Diet: No sugar, low carb [x] No Added Salt:  [x] Increase Protein: [] Other:                 Return Appointment:  [x] Return Appointment: With Dr. Davie Marshall  in 2 [facility])  [] Ordered tests:      Electronically signed Valentino Curry, RN on 3/26/2020 at 11:26 AM     [address] [facility] [address] Information: Should you experience any significant changes in your wound(s) or have questions about your wound care, please contact the [address] [place] [address] at [address] [place] [address] 8:00 am - 4:30. If you need help with your wound outside these hours and cannot wait until we are again available, contact your PCP or go to the hospital emergency room. PLEASE NOTE: IF YOU ARE UNABLE TO OBTAIN WOUND SUPPLIES, CONTINUE TO USE THE SUPPLIES YOU HAVE AVAILABLE UNTIL YOU ARE ABLE TO REACH US. IT IS MOST IMPORTANT TO KEEP THE WOUND COVERED AT ALL TIMES.      Physician Signature:_______________________    Date: ___________ Time:  ____________

## 2020-04-03 RX ORDER — PRAVASTATIN SODIUM 80 MG/1
TABLET ORAL
Qty: 90 TAB | Refills: 0 | Status: SHIPPED | OUTPATIENT
Start: 2020-04-03 | End: 2020-07-06

## 2020-04-08 ENCOUNTER — OFFICE VISIT (OUTPATIENT)
Dept: CARDIOLOGY CLINIC | Age: 70
End: 2020-04-08

## 2020-04-08 DIAGNOSIS — Z98.890 H/O ATRIOVENTRICULAR NODAL ABLATION: ICD-10-CM

## 2020-04-08 DIAGNOSIS — Z95.0 CARDIAC PACEMAKER IN SITU: Primary | ICD-10-CM

## 2020-04-16 ENCOUNTER — HOSPITAL ENCOUNTER (OUTPATIENT)
Dept: WOUND CARE | Age: 70
Discharge: HOME | End: 2020-04-16
Payer: MEDICARE

## 2020-04-16 VITALS
TEMPERATURE: 97.7 F | HEART RATE: 75 BPM | DIASTOLIC BLOOD PRESSURE: 68 MMHG | SYSTOLIC BLOOD PRESSURE: 137 MMHG | RESPIRATION RATE: 16 BRPM

## 2020-04-16 PROCEDURE — 99213 OFFICE O/P EST LOW 20 MIN: CPT

## 2020-04-16 NOTE — DISCHARGE INSTRUCTIONS
Discharge Instructions/Wound [address] [place] [address]. [address]  [place], [address] [facility] [address]  Telephone: [phone]    NAME:  Rose Edmonds OF BIRTH:  1950  MEDICAL RECORD NUMBER:  [id number]  DATE:  4/16/2020      WOUND CARE ORDERS:Comments: Bilateral lower legs - Cleanse with saline or soap and water, rinse and pat dry, apply Xeroform, cover with gauze or ABD pad. Secure with roll gauze, apply double layer tubigrip size F to bilateral lower extremities. RIght great toe wounds - cleanse with saline or soap and water, rinse, pat dry, leave open to air. No dressing needed. Dressing to be changed 3 x week. Return to clinic in 2 weeks for MD follow-up. TREATMENT ORDERS:    Elevate leg(s) above the level of the heart when sitting. Avoid prolonged standing in one place. Do no get dressing/wrap wet. Follow Diet as prescribed:   [] Diet as tolerated: [x] Diabetic Diet [x] No Added Salt:  [] Increase Protein: [x] Other: Limit the amount of liquids you are drinking. Avoid drinking between meals. Return Appointment:  [x] Return Appointment: With Dr. Shira Boss in [address] [place] [address])  [] Ordered tests:      Electronically signed Palmer Goodrich RN on 4/16/2020 at 11:02 AM     [address] [facility] Information: Should you experience any significant changes in your wound(s) or have questions about your wound care, please contact the [address] [place] [address] at [address] [place] [address] Street 8:00 am - 4:30. If you need help with your wound outside these hours and cannot wait until we are again available, contact your PCP or go to the hospital emergency room. PLEASE NOTE: IF YOU ARE UNABLE TO OBTAIN WOUND SUPPLIES, CONTINUE TO USE THE SUPPLIES YOU HAVE AVAILABLE UNTIL YOU ARE ABLE TO REACH US. IT IS MOST IMPORTANT TO KEEP THE WOUND COVERED AT ALL TIMES.      Physician Signature:_______________________    Date: ___________ Time:  ____________

## 2020-04-16 NOTE — PROGRESS NOTES
HISTORY OF PRESENT ILLNESS:  The patient is a 76-year-old man referred to the [address] [place] [address] regarding open wounds on both lower legs.  First seen 1/23/2020. The patient reports that he had blisters, which opened on the lower legs.  He has previous history of ulcerations in the same area in the past.     The patient has history of chronic lower extremity edema.  He takes Bumex 2 mg p.o. b.i.d. and metolazone three times per week. Yuko Estrada reports that the diuretics do produce significant diuretic response.     The patient reports that he had been drinking a lot of fluid over the course of the day, mainly water.  He has reduced fluid intake.  He weighed himself daily and his weight has been slowly declining since changing fluid intake.     The patient has history of cardiomyopathy and congestive heart failure.  He had chronic AFib and was treated with pacemaker and AV node ablation.  There is history of coronary artery disease, diabetes mellitus type 2 controlled, hypertension, morbid obesity, sleep apnea unable to tolerate treatment, and history of coronary bypass.  The patient reports that he can walk short distances using a walker. Yuko Estrada has a lot of joint problems in knee and hip as well as back problems.  He reports that he would get short of breath on walking 100 feet with his walker.  The patient does sleep lying in bed at night.     The patient has history of chronic kidney disease stage IV.  He is followed by Dr. Tim Steiner.       The patient has never smoked. Yuko Estrada rarely uses alcohol.     Patient on anticoagulation with Eliquis.     KERI on 2/14/2020  Severe calcification.  Abnormal right and left ankle PVR.  Rt TBI 0.67, left TBI 0.54.     He saw vascualr surgeon at [address] [place] [address] is not able to have angiogram because of poor renal function.  He will have ot have venous testing as his legs wounds are making progress.     PHYSICAL EXAMINATION:  GENERAL:  The patient is an alert, overweight man in no acute distress.     WOUND EXAMINATION:  Examination of the lower extremities revealed no palpable dorsalis pedis or posterior tibial pulse on either side.  There is hyperpigmentation of the lower half of the right and left lower legs.  There is now no  pitting edema in the lower legs bilaterally.     On the right side, there are no active ulcers.     Right great toe has medial dry scab.     Left lower leg medially, there is a wound 3.1 x 1  x 0.1 cm with granulation on  surface.      Patient on anticoagulation with Eliquis - no debridement performed.        ASSESSMENT AND PLAN:  I explained to the patient that he should continue taking his diuretics and continue limiting fluid intake particularly between meals.  He should follow up with Dr. Tiarra Naik as planned regarding his renal function.     He has done a good job with edema control.     Toe scab site will be left open.   Lower left leg wound will be treated with Xeroform gauze and roll gauze.  Tubigrips will be placed bilaterally to apply mild compression from the base of the toes to the upper calf.     Dressings will be changed at least three times per week, and more often if there is a lot of drainage.        The patient will follow up in two weeks.     FINAL DIAGNOSES:  Bilateral leg edema, left lower leg nonpressure ulcers, diabetes mellitus, PAD.      N29.286, R60.0, I73.9     Marleen Esquivel MD

## 2020-04-16 NOTE — WOUND CARE
04/16/20 1025   Wound Leg lower Left;Medial;Proximal #40   Date First Assessed/Time First Assessed: 03/26/20 1114   Present on Hospital Admission: Yes  Wound Approximate Age at First Assessment (Weeks): 1 weeks  Primary Wound Type: Vascular  Location: Leg lower  Wound Location Orientation: Left;Medial;Proxima. .. Dressing Status Removed   Dressing Type Gauze;Gauze wrap (kami); Xeroform;Special tape (comment)   Non-staged Wound Description Partial thickness   Wound Length (cm) 3.1 cm   Wound Width (cm) 1 cm   Wound Depth (cm) 0.1 cm   Wound Surface Area (cm^2) 3.1 cm^2   Wound Volume (cm^3) 0.31 cm^3   Change in Wound Size % -72.22   Condition of Base Pink   Condition of Edges Open   Drainage Amount Moderate   Drainage Color Serosanguinous   Wound Odor None   Christy-wound Assessment Intact   Cleansing and Cleansing Agents  Normal saline   Wound Leg lower Medial;Right #1 01/23/20   Date First Assessed: 01/23/20   Present on Hospital Admission: No  Wound Approximate Age at First Assessment (Weeks): 1 weeks  Primary Wound Type: (c)   Location: Leg lower  Wound Location Orientation: Medial;Right  Wound Description: #1  Date of Firs. .. Dressing Status Removed   Dressing Type Xeroform;Gauze;Gauze wrap (kami); Special tape (comment)   Wound Length (cm) 0.1 cm   Wound Width (cm) 0.1 cm   Wound Depth (cm) 0.1 cm   Wound Surface Area (cm^2) 0.01 cm^2   Wound Volume (cm^3) 0 cm^3   Change in Wound Size % 99.97   Condition of Base Epithelializing   Drainage Amount None   Wound Toe (Comment  which one) Right;Medial #3 great 01/23/20   Date First Assessed/Time First Assessed: 01/23/20 0939   Wound Approximate Age at First Assessment (Weeks): 4 weeks  Primary Wound Type: (c) Friction  Location: (c) Toe (Comment  which one)  Wound Location Orientation: Right;Medial  Wound Description:. .. Dressing Status Removed   Dressing Type Xeroform;Gauze;Gauze wrap (kami); Special tape (comment)   Wound Length (cm) 0.1 cm   Wound Width (cm) 0.1 cm   Wound Depth (cm) 0.1 cm   Wound Surface Area (cm^2) 0.01 cm^2   Wound Volume (cm^3) 0 cm^3   Change in Wound Size % 98.81   Condition of Base   (Callus)   Drainage Amount None   Wound Leg lower Left;Medial #5 01/23/20   Date First Assessed/Time First Assessed: 01/23/20 0944   Wound Approximate Age at First Assessment (Weeks): 1 weeks  Primary Wound Type: (c)   Location: Leg lower  Wound Location Orientation: Left;Medial  Wound Description: #5  Date of First Observati. .. Dressing Status Removed   Dressing Type Xeroform;Gauze;Gauze wrap (kami); Special tape (comment)   Wound Length (cm) 0.1 cm   Wound Width (cm) 0.1 cm   Wound Depth (cm) 0.1 cm   Wound Surface Area (cm^2) 0.01 cm^2   Wound Volume (cm^3) 0 cm^3   Change in Wound Size % 99.98   Condition of Base Granulation   Drainage Amount None                   Visit Vitals  /68   Pulse 75   Temp 97.7 °F (36.5 °C)   Resp 16     LLE Peripheral Vascular   Capillary Refill: Less than/equal to 3 seconds (04/16/20 1033)  Color: Appropriate for race (04/16/20 1033)  Temperature: Warm (04/16/20 1033)  Sensation: Decreased (04/16/20 1033)  Pedal Pulse: Palpable (04/16/20 1033)  Circumference of Calf (cm): 38 cm (04/16/20 1033)  Location of Measurement (Calf): Mid  (04/16/20 1033)  Circumference of Ankle (cm): 22.5 cm (04/16/20 1033)  Location of Measurement (Ankle): Upper  (04/16/20 1033)  RLE Peripheral Vascular   Capillary Refill: Less than/equal to 3 seconds (04/16/20 1033)  Color: Appropriate for race (04/16/20 1033)  Temperature: Warm (04/16/20 1033)  Sensation: Decreased (04/16/20 1033)  Pedal Pulse: Palpable (04/16/20 1033)  Circumference of Calf (cm): 37 cm (04/16/20 1033)  Location of Measurement (Calf): Mid  (04/16/20 1033)  Circumference of Ankle (cm): 22 cm (04/16/20 1033)  Location of Measurement (Ankle): Upper  (04/16/20 1033)

## 2020-04-30 ENCOUNTER — APPOINTMENT (OUTPATIENT)
Dept: WOUND CARE | Age: 70
End: 2020-04-30
Payer: MEDICARE

## 2020-05-07 ENCOUNTER — HOSPITAL ENCOUNTER (OUTPATIENT)
Dept: WOUND CARE | Age: 70
Discharge: HOME | End: 2020-05-07
Payer: MEDICARE

## 2020-05-07 VITALS
SYSTOLIC BLOOD PRESSURE: 134 MMHG | HEART RATE: 75 BPM | RESPIRATION RATE: 16 BRPM | TEMPERATURE: 97.9 F | DIASTOLIC BLOOD PRESSURE: 63 MMHG

## 2020-05-07 PROCEDURE — 99213 OFFICE O/P EST LOW 20 MIN: CPT

## 2020-05-07 NOTE — WOUND CARE
05/07/20 1042   [REMOVED] Wound Leg lower Medial;Right #1 01/23/20   Final Assessment Date/Final Assessment Time: 05/07/20 (c) 1047  Date First Assessed: 01/23/20   Present on Hospital Admission: No  Wound Approximate Age at First Assessment (Weeks): 1 weeks  Primary Wound Type: (c)   Location: Leg lower  Wound Locatio. .. Dressing Type Open to air   Wound Length (cm) 0 cm   Wound Width (cm) 0 cm   Wound Depth (cm) 0 cm   Wound Surface Area (cm^2) 0 cm^2   Wound Volume (cm^3) 0 cm^3   Change in Wound Size % 100   Condition of Base Epithelializing   Condition of Edges Closed   Wound Leg lower Left;Medial;Proximal #40   Date First Assessed/Time First Assessed: 03/26/20 1114   Present on Hospital Admission: Yes  Wound Approximate Age at First Assessment (Weeks): 1 weeks  Primary Wound Type: Vascular  Location: Leg lower  Wound Location Orientation: Left;Medial;Proxima. .. Dressing Status Removed   Dressing Type Xeroform;Gauze;Gauze wrap (kami); Special tape (comment)   Non-staged Wound Description Partial thickness   Wound Length (cm) 0.7 cm   Wound Width (cm) 0.5 cm   Wound Depth (cm) 0.1 cm   Wound Surface Area (cm^2) 0.35 cm^2   Wound Volume (cm^3) 0.04 cm^3   Change in Wound Size % 80.56   Condition of Base Pink  (scab)   Condition of Edges Open   Drainage Amount Moderate   Drainage Color Serosanguinous   Wound Odor None   Christy-wound Assessment Intact   Cleansing and Cleansing Agents  Normal saline   Wound Toe (Comment  which one) Right;Medial #3 great 01/23/20   Date First Assessed/Time First Assessed: 01/23/20 0939   Wound Approximate Age at First Assessment (Weeks): 4 weeks  Primary Wound Type: (c) Friction  Location: (c) Toe (Comment  which one)  Wound Location Orientation: Right;Medial  Wound Description:. ..    Dressing Type Open to air   Wound Length (cm) 0.1 cm   Wound Width (cm) 0.1 cm   Wound Depth (cm) 0.1 cm   Wound Surface Area (cm^2) 0.01 cm^2   Wound Volume (cm^3) 0 cm^3   Change in Wound Size % 98.81   Condition of Edges   (Calloused)   Drainage Amount None   Wound Leg lower Left;Medial #5 01/23/20   Date First Assessed/Time First Assessed: 01/23/20 0944   Wound Approximate Age at First Assessment (Weeks): 1 weeks  Primary Wound Type: (c)   Location: Leg lower  Wound Location Orientation: Left;Medial  Wound Description: #5  Date of First Observati. ..    Dressing Status Removed   Dressing Type Xeroform;Gauze;Gauze wrap (kami)   Wound Length (cm) 0.1 cm   Wound Width (cm) 0.1 cm   Wound Depth (cm) 0.1 cm   Wound Surface Area (cm^2) 0.01 cm^2   Wound Volume (cm^3) 0 cm^3   Change in Wound Size % 99.98   Condition of Base Granulation   Drainage Amount None                   Visit Vitals  /63 (BP 1 Location: Left arm, BP Patient Position: At rest;Sitting)   Pulse 75   Temp 97.9 °F (36.6 °C)   Resp 16     LLE Peripheral Vascular   Capillary Refill: Less than/equal to 3 seconds (05/07/20 1041)  Color: Appropriate for race (05/07/20 1041)  Temperature: Warm (05/07/20 1041)  Sensation: Decreased (05/07/20 1041)  Pedal Pulse: Palpable (05/07/20 1041)  Circumference of Calf (cm): 39 cm (05/07/20 1041)  Location of Measurement (Calf): Mid  (05/07/20 1041)  Circumference of Ankle (cm): 22.7 cm (05/07/20 1041)  Location of Measurement (Ankle): Upper  (05/07/20 1041)  RLE Peripheral Vascular   Capillary Refill: Less than/equal to 3 seconds (05/07/20 1041)  Color: Appropriate for race (05/07/20 1041)  Temperature: Warm (05/07/20 1041)  Sensation: Decreased (05/07/20 1041)  Pedal Pulse: Palpable (05/07/20 1041)  Circumference of Calf (cm): 38.5 cm (05/07/20 1041)  Location of Measurement (Calf): Mid  (05/07/20 1041)  Circumference of Ankle (cm): 23 cm (05/07/20 1041)  Location of Measurement (Ankle): Upper  (05/07/20 1041)

## 2020-05-07 NOTE — PROGRESS NOTES
HISTORY OF PRESENT ILLNESS:  The patient is a 65-year-old man referred to the [address] [place] [address] regarding open wounds on both lower legs.  First seen 1/23/2020. The patient reports that he had blisters, which opened on the lower legs.  He has previous history of ulcerations in the same area in the past.     The patient has history of chronic lower extremity edema.  He takes Bumex 2 mg p.o. b.i.d. and metolazone three times per week. Romulo Villegas reports that the diuretics do produce significant diuretic response.     The patient reports that he had been drinking a lot of fluid over the course of the day, mainly water.  He has reduced fluid intake.  He weighed himself daily and his weight has been slowly declining since changing fluid intake.     The patient has history of cardiomyopathy and congestive heart failure.  He had chronic AFib and was treated with pacemaker and AV node ablation.  There is history of coronary artery disease, diabetes mellitus type 2 controlled, hypertension, morbid obesity, sleep apnea unable to tolerate treatment, and history of coronary bypass.  The patient reports that he can walk short distances using a walker. Romulo Villegas has a lot of joint problems in knee and hip as well as back problems.  He reports that he would get short of breath on walking 100 feet with his walker.  The patient does sleep lying in bed at night.     The patient has history of chronic kidney disease stage IV.  He is followed by Dr. Brandon Olivo.       The patient has never smoked. Romulo Villegas rarely uses alcohol.     Patient on anticoagulation with Eliquis.     KERI on 2/14/2020  Severe calcification.  Abnormal right and left ankle PVR.  Rt TBI 0.67, left TBI 0.54.     He saw vascualr surgeon at [address] [place] [address] is not able to have angiogram because of poor renal function.  He will have vnot have venous testing as his legs wounds are making progress.     PHYSICAL EXAMINATION:  GENERAL:  The patient is an alert, overweight man in no acute distress.     WOUND EXAMINATION:  Examination of the lower extremities revealed no palpable dorsalis pedis or posterior tibial pulse on either side.  There is hyperpigmentation of the lower half of the right and left lower legs.  There is now no  pitting edema in the lower legs bilaterally.     On the right side, there are no active ulcers.     Right great toe has medial dry scab.     Left lower leg medially, there is a wound 0.7 x 0.5  x 0.1 cm with granulation on  surface.      Patient on anticoagulation with Eliquis - no debridement performed.        ASSESSMENT AND PLAN:  I explained to the patient that he should continue taking his diuretics and continue limiting fluid intake particularly between meals.  He should follow up with Dr. Eddie Morrison as planned regarding his renal function.     He has done a good job with edema control.     Toe scab site will be left open.   Lower left leg wound will be treated with Xeroform gauze and roll gauze.  Tubigrips will be placed bilaterally to apply mild compression from the base of the toes to the upper calf.     Dressings will be changed at least three times per week, and more often if there is a lot of drainage.        The patient will follow up in three weeks.     FINAL DIAGNOSES:  Bilateral leg edema, left lower leg nonpressure ulcers, diabetes mellitus, PAD.      T67.335, R60.0, I73.9     Zac Cristobal MD

## 2020-05-07 NOTE — WOUND CARE
05/07/20 1147   Wound Leg lower Left;Medial;Proximal #40   Date First Assessed/Time First Assessed: 03/26/20 1114   Present on Hospital Admission: Yes  Wound Approximate Age at First Assessment (Weeks): 1 weeks  Primary Wound Type: Vascular  Location: Leg lower  Wound Location Orientation: Left;Medial;Proxima. .. Dressing Type Applied Xeroform;Gauze;Gauze wrap (kami); Special tape (comment)  (Double Tubi BLE)   Wound Toe (Comment  which one) Right;Medial #3 great 01/23/20   Date First Assessed/Time First Assessed: 01/23/20 0939   Wound Approximate Age at First Assessment (Weeks): 4 weeks  Primary Wound Type: (c) Friction  Location: (c) Toe (Comment  which one)  Wound Location Orientation: Right;Medial  Wound Description:. ..    Dressing Type Applied Open to air

## 2020-05-07 NOTE — DISCHARGE INSTRUCTIONS
Discharge Instructions for  [facility]  [address], [address] [place] [address]  Telephone: [phone]    NAME:  Pete Span OF BIRTH:  1950  MEDICAL RECORD NUMBER:  [id number]  DATE:  5/7/2020      WOUND CARE ORDERS:  Left lower legs - Cleanse with saline or soap and water, rinse and pat dry, apply Xeroform, cover with gauze or ABD pad. Secure with roll gauze, apply double layer tubigrip size F to bilateral lower extremities. Return to clinic in 3 weeks for MD follow-up. TREATMENT ORDERS:    Elevate leg(s) above the level of the heart when sitting. Avoid prolonged standing in one place. Do no get dressing/wrap wet. Follow Diet as prescribed:   [x] Diet as tolerated: [] Calorie Diabetic Diet: [x] No Added Salt:  [x] Increase Protein: [] Other:               Return Appointment:  [x] Return Appointment: With DR Bassem Crespo  in  [facility])  [] Nurse Visit :   [] Ordered tests:      Electronically signed Ana María Pizano RN on 5/7/2020 at 11:35 AM     [facility] Information: Should you experience any significant changes in your wound(s) or have questions about your wound care, please contact the [address] [place] [address] at [address] [place] [address] 8:00 am - 4:30. If you need help with your wound outside these hours and cannot wait until we are again available, contact your PCP or go to the hospital emergency room. PLEASE NOTE: IF YOU ARE UNABLE TO OBTAIN WOUND SUPPLIES, CONTINUE TO USE THE SUPPLIES YOU HAVE AVAILABLE UNTIL YOU ARE ABLE TO REACH US. IT IS MOST IMPORTANT TO KEEP THE WOUND COVERED AT ALL TIMES.      Physician Signature:_______________________    Date: ___________ Time:  ____________

## 2020-05-27 ENCOUNTER — TELEPHONE (OUTPATIENT)
Dept: ENDOCRINOLOGY | Age: 70
End: 2020-05-27

## 2020-05-27 NOTE — TELEPHONE ENCOUNTER
----- Message from Marino Null sent at 2020 11:03 AM EDT -----  Regarding: /Telephone  Contact: [phone]  Pt requesting a call back with instruction for cortisone injection scheduled for tomorrow.  Pt's best contact number is [phone]

## 2020-05-27 NOTE — TELEPHONE ENCOUNTER
He can try increasing his lantus by 10 units the AM of his injection. Not every one requires more treatment for cortisone injections compared with oral steroids however would be reasonable. He can do this until his sugars seem to be improving again. If needed, can also add 2-4 units to each novolog dose depending on how his sugars are doing, with caution for any potential low blood sugars. Ultimately will need to reduce insulin back to baseline after things improve. Stay hydrated with water.

## 2020-05-27 NOTE — TELEPHONE ENCOUNTER
I returned this call and Mr. Branch Home said he is getting a cortisone shot tomorrow and wonders what he should do regarding his sugars rising. I told him I would pass this on to Dr. Ken Hennessy and see what he says.   Radha Russell

## 2020-05-28 ENCOUNTER — TELEPHONE (OUTPATIENT)
Dept: ENDOCRINOLOGY | Age: 70
End: 2020-05-28

## 2020-05-28 NOTE — TELEPHONE ENCOUNTER
----- Message from Arsalna Gaxiola sent at 5/28/2020  4:09 PM EDT -----  Regarding: Dr Ana Lilia May  General Message/Vendor Calls    Caller's first and last name:      Reason for call: Pt is reporting that he received a cortisone shot today and was advised that his sugar might spike and  was also advised to alert his doctor      Callback required yes/no and why: yes      Best contact number(s):[phone]      Details to clarify the request:      Arsalan Gaxiola

## 2020-05-29 ENCOUNTER — HOSPITAL ENCOUNTER (OUTPATIENT)
Dept: WOUND CARE | Age: 70
Discharge: HOME | End: 2020-05-29
Payer: MEDICARE

## 2020-05-29 VITALS
TEMPERATURE: 97.7 F | HEART RATE: 75 BPM | SYSTOLIC BLOOD PRESSURE: 193 MMHG | RESPIRATION RATE: 16 BRPM | DIASTOLIC BLOOD PRESSURE: 85 MMHG

## 2020-05-29 PROCEDURE — 99212 OFFICE O/P EST SF 10 MIN: CPT

## 2020-05-29 NOTE — TELEPHONE ENCOUNTER
I called  Kristen Woo and relayed the message from Dr. Alicia Berkowitz. He understood this information and will do as instructed.   Ernie Jordan

## 2020-05-29 NOTE — TELEPHONE ENCOUNTER
I called Mr. Elio Lanier and relayed the message from Dr. Rocky Cisneros. He understood this information and will do as instructed.   [facility] (1-)

## 2020-05-29 NOTE — WOUND CARE
05/29/20 1105   Wound Leg lower Left;Medial;Proximal #40   Date First Assessed/Time First Assessed: 03/26/20 1114   Present on Hospital Admission: Yes  Wound Approximate Age at First Assessment (Weeks): 1 weeks  Primary Wound Type: Vascular  Location: Leg lower  Wound Location Orientation: Left;Medial;Proxima. .. Dressing Status Removed   Dressing Type Xeroform;Gauze;Gauze wrap (kami); Special tape (comment)   Shape   (scab)   Wound Length (cm) 0.1 cm   Wound Width (cm) 0.1 cm   Wound Depth (cm) 0.1 cm   Wound Surface Area (cm^2) 0.01 cm^2   Wound Volume (cm^3) 0 cm^3   Change in Wound Size % 99.44   Drainage Amount None   Wound Toe (Comment  which one) Right;Medial #3 great 01/23/20   Date First Assessed/Time First Assessed: 01/23/20 0939   Wound Approximate Age at First Assessment (Weeks): 4 weeks  Primary Wound Type: (c) Friction  Location: (c) Toe (Comment  which one)  Wound Location Orientation: Right;Medial  Wound Description:. .. Dressing Type Open to air   Wound Length (cm) 0.1 cm  (scab)   Wound Width (cm) 0.1 cm   Wound Depth (cm) 0.1 cm   Wound Surface Area (cm^2) 0.01 cm^2   Wound Volume (cm^3) 0 cm^3   Change in Wound Size % 98.81   Drainage Amount None   Wound Leg lower Left;Medial #5 01/23/20   Date First Assessed/Time First Assessed: 01/23/20 0944   Wound Approximate Age at First Assessment (Weeks): 1 weeks  Primary Wound Type: (c)   Location: Leg lower  Wound Location Orientation: Left;Medial  Wound Description: #5  Date of First Observati. .. Dressing Status Removed   Dressing Type Xeroform;Gauze;Gauze wrap (kami); Special tape (comment)   Wound Length (cm) 0 cm   Wound Width (cm) 0 cm   Wound Depth (cm) 0 cm   Wound Surface Area (cm^2) 0 cm^2   Wound Volume (cm^3) 0 cm^3   Change in Wound Size % 100   Condition of Edges Closed   Drainage Amount None               Visit Vitals  /85 (BP 1 Location: Left arm, BP Patient Position: At rest;Sitting) Comment: No headache nor dizziness   Pulse 75   Temp 97.7 °F (36.5 °C)   Resp 16     LLE Peripheral Vascular   Capillary Refill: Less than/equal to 3 seconds (05/29/20 1103)  Color: Appropriate for race (05/29/20 1103)  Temperature: Warm (05/29/20 1103)  Sensation: Decreased (05/29/20 1103)  Pedal Pulse: Palpable (05/29/20 1103)  Circumference of Calf (cm): 39 cm (05/29/20 1103)  Location of Measurement (Calf): Mid  (05/29/20 1103)  Circumference of Ankle (cm): 22.2 cm (05/29/20 1103)  Location of Measurement (Ankle): Upper  (05/29/20 1103)  RLE Peripheral Vascular   Capillary Refill: Less than/equal to 3 seconds (05/29/20 1103)  Color: Appropriate for race (05/29/20 1103)  Temperature: Warm (05/29/20 1103)  Sensation: Decreased (05/29/20 1103)  Pedal Pulse: Palpable (05/29/20 1103)  Circumference of Calf (cm): 38 cm (05/29/20 1103)  Location of Measurement (Calf): Mid  (05/29/20 1103)  Circumference of Ankle (cm): 23.3 cm (05/29/20 1103)  Location of Measurement (Ankle): Upper  (05/29/20 1103)

## 2020-05-29 NOTE — DISCHARGE INSTRUCTIONS
Discharge Instructions for  [facility]  [address] [facility], [address] [facility]  Telephone: [phone]    NAME:  Terri Riedel  YOB: 1950  MEDICAL RECORD NUMBER:  [id number]  DATE:  5/29/2020      WOUND CARE ORDERS:  Bilateral lower legs - cleanse with soap and water, rinse, pat dry, apply knee high compression stockings (15-20mm/hg) on in AM upon arising, off at bedtime. No further follow-up in clinic needed. Double tubigrip size F placed in clinic today until patient can obtain stockings. TREATMENT ORDERS:    Elevate leg(s) above the level of the heart when sitting. Avoid prolonged standing in one place. Do no get dressing/wrap wet. Follow Diet as prescribed:   [x] Diet as tolerated: [] Calorie Diabetic Diet: [x] No Added Salt:  [x] Increase Protein: [] Other:               Return Appointment:  [x] Return Appointment: No further follow-up needed. [] Nurse Visit :   [] Ordered tests:      Electronically signed Naresh Willis RN on 5/29/2020 at 11:51 AM     [address] [place] [address] Information: Should you experience any significant changes in your wound(s) or have questions about your wound care, please contact the [address] [place] [address] at [address] [place] [address] 8:00 am - 4:30. If you need help with your wound outside these hours and cannot wait until we are again available, contact your PCP or go to the hospital emergency room. PLEASE NOTE: IF YOU ARE UNABLE TO OBTAIN WOUND SUPPLIES, CONTINUE TO USE THE SUPPLIES YOU HAVE AVAILABLE UNTIL YOU ARE ABLE TO REACH US. IT IS MOST IMPORTANT TO KEEP THE WOUND COVERED AT ALL TIMES.      Physician Signature:_______________________    Date: ___________ Time:  ____________

## 2020-05-29 NOTE — WOUND CARE
05/29/20 1201   Wound Leg lower Left;Medial;Proximal #40   Date First Assessed/Time First Assessed: 03/26/20 1114   Present on Hospital Admission: Yes  Wound Approximate Age at First Assessment (Weeks): 1 weeks  Primary Wound Type: Vascular  Location: Leg lower  Wound Location Orientation: Left;Medial;Proxima. ..    Dressing Type Applied   (double tubi F to Billateral LL)

## 2020-05-29 NOTE — PROGRESS NOTES
HISTORY OF PRESENT ILLNESS:  The patient is a 41-year-old man referred to the [address] [place] [address] regarding open wounds on both lower legs.  First seen 1/23/2020. The patient reports that he had blisters, which opened on the lower legs.  He has previous history of ulcerations in the same area in the past.     The patient has history of chronic lower extremity edema.  He takes Bumex 2 mg p.o. b.i.d. and metolazone three times per week. Elizabeth Barrera reports that the diuretics do produce significant diuretic response.     The patient reports that he had been drinking a lot of fluid over the course of the day, mainly water.  He has reduced fluid intake.  He weighed himself daily and his weight has been slowly declining since changing fluid intake.     The patient has history of cardiomyopathy and congestive heart failure.  He had chronic AFib and was treated with pacemaker and AV node ablation.  There is history of coronary artery disease, diabetes mellitus type 2 controlled, hypertension, morbid obesity, sleep apnea unable to tolerate treatment, and history of coronary bypass.  The patient reports that he can walk short distances using a walker. Elizabeth Barrera has a lot of joint problems in knee and hip as well as back problems.  He reports that he would get short of breath on walking 100 feet with his walker.  The patient does sleep lying in bed at night.     The patient has history of chronic kidney disease stage IV.  He is followed by Dr. Faviola Oliveros.       The patient has never smoked. Elizabeth Barrera rarely uses alcohol.     Patient on anticoagulation with Eliquis.     KERI on 2/14/2020  Severe calcification.  Abnormal right and left ankle PVR.  Rt TBI 0.67, left TBI 0.54.     He saw vascualr surgeon at [address] [place] [address] is not able to have angiogram because of poor renal function.  He will have vnot have venous testing as his legs wounds are making progress.     PHYSICAL EXAMINATION:  GENERAL:  The patient is an alert, overweight man in no acute distress.     WOUND EXAMINATION:  Examination of the lower extremities revealed no palpable dorsalis pedis or posterior tibial pulse on either side.  There is hyperpigmentation of the lower half of the right and left lower legs.  There is now no  pitting edema in the lower legs bilaterally.     On the right side, there are no active ulcers.     Right great toe has medial dry scab.     Left lower leg medially, wound is healed.             ASSESSMENT AND PLAN:  I explained to the patient that he should continue taking his diuretics and continue limiting fluid intake particularly between meals.  He should follow up with Dr. Cole Narayan as planned regarding his renal function.     He has done a good job with edema control.      Rx given for 15-20  mm Hg knee length compression stockings. The patient was instructed to wear the stockings at all times except when in bed or bathing.     Follow up prn.     FINAL DIAGNOSES:  Bilateral leg edema, left lower leg nonpressure ulcers, diabetes mellitus, PAD.      G45.920, R60.0, I73.9     Shahrzad Crane MD

## 2020-07-06 ENCOUNTER — TELEPHONE (OUTPATIENT)
Dept: CARDIOLOGY CLINIC | Age: 70
End: 2020-07-06

## 2020-07-06 DIAGNOSIS — E78.5 HYPERLIPIDEMIA, UNSPECIFIED HYPERLIPIDEMIA TYPE: Primary | ICD-10-CM

## 2020-07-06 RX ORDER — PRAVASTATIN SODIUM 80 MG/1
TABLET ORAL
Qty: 30 TAB | Refills: 0 | Status: SHIPPED | OUTPATIENT
Start: 2020-07-06 | End: 2020-08-03 | Stop reason: SDUPTHER

## 2020-07-06 NOTE — TELEPHONE ENCOUNTER
Spoke with patient. Verified patient with two patient identifiers. Advised he is overdue for fasting labs and appt. States he is calling to schedule appt. Will send one month supply of Pravastatin. Mailing lab slip to pt to have done ASAP. Patient verbalized understanding.

## 2020-07-06 NOTE — TELEPHONE ENCOUNTER
Spoke with patient. Verified patient with two patient identifiers. Overdue for appt and fasting labs. Will give one month supply only until seen and labs done. Mailing lab slip to pt. Patient verbalized understanding.

## 2020-07-09 ENCOUNTER — OFFICE VISIT (OUTPATIENT)
Dept: CARDIOLOGY CLINIC | Age: 70
End: 2020-07-09

## 2020-07-09 DIAGNOSIS — Z98.890 H/O ATRIOVENTRICULAR NODAL ABLATION: ICD-10-CM

## 2020-07-09 DIAGNOSIS — Z95.0 CARDIAC PACEMAKER IN SITU: Primary | ICD-10-CM

## 2020-07-13 RX ORDER — LEVOTHYROXINE SODIUM 137 UG/1
TABLET ORAL
Qty: 90 TAB | Refills: 3 | Status: SHIPPED | OUTPATIENT
Start: 2020-07-13 | End: 2021-01-01 | Stop reason: SDUPTHER

## 2020-07-30 ENCOUNTER — HOSPITAL ENCOUNTER (OUTPATIENT)
Dept: LAB | Age: 70
Discharge: HOME | End: 2020-07-30
Payer: MEDICARE

## 2020-07-30 ENCOUNTER — HOSPITAL ENCOUNTER (OUTPATIENT)
Dept: WOUND CARE | Age: 70
Discharge: HOME | End: 2020-07-30
Admitting: SURGERY
Payer: MEDICARE

## 2020-07-30 VITALS
TEMPERATURE: 98.3 F | DIASTOLIC BLOOD PRESSURE: 66 MMHG | SYSTOLIC BLOOD PRESSURE: 144 MMHG | RESPIRATION RATE: 16 BRPM | HEART RATE: 75 BPM

## 2020-07-30 PROBLEM — L97.922 NON-PRESSURE CHRONIC ULCER OF LEFT LOWER LEG WITH FAT LAYER EXPOSED (HCC): Status: RESOLVED | Noted: 2020-01-23 | Resolved: 2020-07-30

## 2020-07-30 PROBLEM — L97.912 NON-PRESSURE CHRONIC ULCER OF RIGHT LOWER LEG WITH FAT LAYER EXPOSED (HCC): Status: RESOLVED | Noted: 2020-01-23 | Resolved: 2020-07-30

## 2020-07-30 PROCEDURE — 82550 ASSAY OF CK (CPK): CPT

## 2020-07-30 PROCEDURE — 80061 LIPID PANEL: CPT

## 2020-07-30 PROCEDURE — 99213 OFFICE O/P EST LOW 20 MIN: CPT

## 2020-07-30 PROCEDURE — 36415 COLL VENOUS BLD VENIPUNCTURE: CPT

## 2020-07-30 PROCEDURE — 80053 COMPREHEN METABOLIC PANEL: CPT

## 2020-07-30 NOTE — DISCHARGE INSTRUCTIONS
Discharge Instructions/Wound [address] [facility]  [address] [facility]  [place], [address]  Telephone: [phone]     FAX [phone]      WOUND CARE ORDERS:    Right Buttock Wound- Cleanse with Normal Saline or mild soap and water & thoroughly pat dry. Cover wound with Duoderm Extra Thin. Change dressing every 2 to 3 days, and as needed for compromise of dressing. Return to clinic for MD follow up in 3 weeks. TREATMENT ORDERS:  Turn/reposition every 2 hours when in bed, avoid direct pressure on wound site. When sitting, shift position or do seat lifts every 15 minutes. Limit side lying to 30 degree tilt. Limit HOB elevation to 30 degrees. Use speciality pressure relief cushion, as appropriate. (RoHo Low Profile Cushion 18 x 18\")                  Return Appointment:  [x] Return Appointment: With Dr. Queenie Ackerman in [facility])  [] Ordered tests:      Electronically signed Petr Ledezma RN on 7/30/2020 at 9:57 AM     [address] [facility] Information: Should you experience any significant changes in your wound(s) or have questions about your wound care, please contact the [address] [place] [address] at [address] [place] [address] 8:00 am - 4:30. If you need help with your wound outside these hours and cannot wait until we are again available, contact your PCP or go to the hospital emergency room. PLEASE NOTE: IF YOU ARE UNABLE TO OBTAIN WOUND SUPPLIES, CONTINUE TO USE THE SUPPLIES YOU HAVE AVAILABLE UNTIL YOU ARE ABLE TO REACH US. IT IS MOST IMPORTANT TO KEEP THE WOUND COVERED AT ALL TIMES.      Physician Signature:_______________________    Date: ___________ Time:  ____________

## 2020-07-30 NOTE — WOUND CARE
07/30/20 5931 Wound Buttocks Right #1 07/30/20 Date First Assessed/Time First Assessed: 07/30/20 0925   Wound Approximate Age at First Assessment (Weeks): 28 weeks  Primary Wound Type: Pressure Injury  Location: Buttocks  Wound Location Orientation: Right  Wound Description: #1  Date of First Obse. .. Dressing Status Removed Dressing Type (Desitin) Pressure Injury Stage 2 Wound Length (cm) 0.5 cm Wound Width (cm) 0.5 cm Wound Depth (cm) 0.1 cm Wound Surface Area (cm^2) 0.25 cm^2 Wound Volume (cm^3) 0.02 cm^3 Condition of Base Pink Condition of Edges Open Visit Vitals /66 (BP 1 Location: Left arm, BP Patient Position: At rest;Sitting) Pulse 75 Temp 98.3 °F (36.8 °C) Resp 16

## 2020-07-30 NOTE — PROGRESS NOTES
HISTORY OF PRESENT ILLNESS:  The patient is a 79-year-old man referred to the [address] [place] [address] now because of buttock ulcer. He had been seen at the [facility] regarding open wounds on both lower legs. First seen 1/23/2020. Wounds healed and he was last seen on 5/29/2020. He reports the buttock ulcer has been present for several years off and on. It is more painful recently. He is using zinc oxide ointment on the area daily.     The patient has history of chronic lower extremity edema.  He takes Bumex 2 mg p.o. b.i.d. and metolazone three times per week.      The patient has history of cardiomyopathy and congestive heart failure.  He had chronic AFib and was treated with pacemaker and AV node ablation.  There is history of coronary artery disease, diabetes mellitus type 2 controlled, hypertension, morbid obesity, sleep apnea unable to tolerate treatment, and history of coronary bypass.  The patient reports that he can walk short distances using a walker.  He has a lot of joint problems in knee and hip as well as back problems.  He reports that he would get short of breath on walking 100 feet with his walker.  The patient does sleep lying in bed at night.     The patient has history of chronic kidney disease stage IV.  He is followed by Dr. William Dawson.       The patient has never smoked. Rosetta Becker rarely uses alcohol.     Patient on anticoagulation with Eliquis.     KERI on 2/14/2020  Severe calcification.  Abnormal right and left ankle PVR.  Rt TBI 0.67, left TBI 0.54.     He saw vascular surgeon at [address] [place] [address] is not able to have angiogram because of poor renal function. Rosetta Becker will have [facility] have venous testing as his legs wounds have healed.     PHYSICAL EXAMINATION:  GENERAL:  The patient is an alert, overweight man in no acute distress.     WOUND EXAMINATION:      Left medial buttock about 4 cm off midline has an ulcer 0.5 x 0.5 x 0.1 cm in dimension with pale pink surface.   Mildly irritated skin in a linear manner on both buttocks in that area.     ASSESSMENT AND PLAN:  I recommended he obtain better padding for his recliner where he spends much of his time. Info given for ROHO pad. Offloading discussed. Dressing  -  Thin Duoderm over ulcer, changed every other day and prn. Follow up in 3 weeks.     FINAL DIAGNOSES:  Bilateral leg edema, left lower leg nonpressure ulcers, diabetes mellitus, PAD.     L89. [id number] [facility], [place]

## 2020-07-31 ENCOUNTER — TELEPHONE (OUTPATIENT)
Dept: CARDIOLOGY CLINIC | Age: 70
End: 2020-07-31

## 2020-07-31 LAB
ALBUMIN SERPL-MCNC: 3.4 G/DL (ref 3.7–4.7)
ALBUMIN/GLOB SERPL: 1.1 {RATIO} (ref 1.2–2.2)
ALP SERPL-CCNC: 61 IU/L (ref 39–117)
ALT SERPL-CCNC: 19 IU/L (ref 0–44)
AST SERPL-CCNC: 27 IU/L (ref 0–40)
BILIRUB SERPL-MCNC: 0.5 MG/DL (ref 0–1.2)
BUN SERPL-MCNC: 102 MG/DL (ref 8–27)
BUN/CREAT SERPL: 33 (ref 10–24)
CALCIUM SERPL-MCNC: 9 MG/DL (ref 8.6–10.2)
CHLORIDE SERPL-SCNC: 90 MMOL/L (ref 96–106)
CHOLEST SERPL-MCNC: 106 MG/DL (ref 100–199)
CK SERPL-CCNC: 44 U/L (ref 41–331)
CO2 SERPL-SCNC: 25 MMOL/L (ref 20–29)
CREAT SERPL-MCNC: 3.07 MG/DL (ref 0.76–1.27)
GLOBULIN SER CALC-MCNC: 3.2 G/DL (ref 1.5–4.5)
GLUCOSE SERPL-MCNC: 208 MG/DL (ref 65–99)
HDLC SERPL-MCNC: 38 MG/DL
INTERPRETATION, 910389: NORMAL
INTERPRETATION: NORMAL
LDLC SERPL CALC-MCNC: 51 MG/DL (ref 0–99)
PDF IMAGE, 910387: NORMAL
POTASSIUM SERPL-SCNC: 4.3 MMOL/L (ref 3.5–5.2)
PROT SERPL-MCNC: 6.6 G/DL (ref 6–8.5)
SODIUM SERPL-SCNC: 139 MMOL/L (ref 134–144)
TRIGL SERPL-MCNC: 84 MG/DL (ref 0–149)
VLDLC SERPL CALC-MCNC: 17 MG/DL (ref 5–40)

## 2020-07-31 NOTE — TELEPHONE ENCOUNTER
----- Message from Jasvir Madrigal MD sent at 7/31/2020  3:04 PM EDT -----  Lipids at target.   Cr in same range

## 2020-07-31 NOTE — TELEPHONE ENCOUNTER
Spoke with patient. Verified patient with two patient identifiers. Advised lipids at target. Cr stable. Continue all meds unchanged. Patient verbalized understanding.

## 2020-08-03 ENCOUNTER — OFFICE VISIT (OUTPATIENT)
Dept: CARDIOLOGY CLINIC | Age: 70
End: 2020-08-03
Payer: MEDICARE

## 2020-08-03 VITALS
BODY MASS INDEX: 36.29 KG/M2 | HEIGHT: 69 IN | DIASTOLIC BLOOD PRESSURE: 70 MMHG | WEIGHT: 245 LBS | SYSTOLIC BLOOD PRESSURE: 110 MMHG | HEART RATE: 78 BPM | RESPIRATION RATE: 18 BRPM | OXYGEN SATURATION: 97 %

## 2020-08-03 DIAGNOSIS — I42.0 DILATED CARDIOMYOPATHY (HCC): ICD-10-CM

## 2020-08-03 DIAGNOSIS — N18.4 CKD (CHRONIC KIDNEY DISEASE), STAGE IV (HCC): ICD-10-CM

## 2020-08-03 DIAGNOSIS — I48.20 CHRONIC A-FIB (HCC): ICD-10-CM

## 2020-08-03 DIAGNOSIS — I25.10 ASHD (ARTERIOSCLEROTIC HEART DISEASE): Primary | ICD-10-CM

## 2020-08-03 DIAGNOSIS — Z95.1 HX OF CABG: ICD-10-CM

## 2020-08-03 DIAGNOSIS — E78.2 MIXED HYPERLIPIDEMIA: ICD-10-CM

## 2020-08-03 DIAGNOSIS — I10 ESSENTIAL HYPERTENSION: ICD-10-CM

## 2020-08-03 DIAGNOSIS — Z95.0 CARDIAC PACEMAKER IN SITU: ICD-10-CM

## 2020-08-03 PROCEDURE — 3017F COLORECTAL CA SCREEN DOC REV: CPT | Performed by: INTERNAL MEDICINE

## 2020-08-03 PROCEDURE — 93000 ELECTROCARDIOGRAM COMPLETE: CPT | Performed by: INTERNAL MEDICINE

## 2020-08-03 PROCEDURE — G8427 DOCREV CUR MEDS BY ELIG CLIN: HCPCS | Performed by: INTERNAL MEDICINE

## 2020-08-03 PROCEDURE — G8510 SCR DEP NEG, NO PLAN REQD: HCPCS | Performed by: INTERNAL MEDICINE

## 2020-08-03 PROCEDURE — 1101F PT FALLS ASSESS-DOCD LE1/YR: CPT | Performed by: INTERNAL MEDICINE

## 2020-08-03 PROCEDURE — G8417 CALC BMI ABV UP PARAM F/U: HCPCS | Performed by: INTERNAL MEDICINE

## 2020-08-03 PROCEDURE — G9231 DOC ESRD DIA TRANS PREG: HCPCS | Performed by: INTERNAL MEDICINE

## 2020-08-03 PROCEDURE — 99214 OFFICE O/P EST MOD 30 MIN: CPT | Performed by: INTERNAL MEDICINE

## 2020-08-03 PROCEDURE — G8536 NO DOC ELDER MAL SCRN: HCPCS | Performed by: INTERNAL MEDICINE

## 2020-08-03 RX ORDER — PRAVASTATIN SODIUM 80 MG/1
80 TABLET ORAL
Qty: 90 TAB | Refills: 4 | Status: SHIPPED | OUTPATIENT
Start: 2020-08-03 | End: 2021-01-01

## 2020-08-03 RX ORDER — OMEPRAZOLE 40 MG/1
40 CAPSULE, DELAYED RELEASE ORAL DAILY
COMMUNITY
Start: 2020-05-21

## 2020-08-03 NOTE — PROGRESS NOTES
Jin Cruz MD          NAME:  Amber Haskins   :   1950   MRN:   [id number]   PCP:  Parth Nath MD      Subjective:     Amber Haskins reports he is feeling well with no interval cardiac sxs. Last seen by us in . Swelling well controlled since diuretic adjustment by renal.  Also saw Dr Joyce Cowan for his legs but since swelling has improved, will see PRN. Patient denies any exertional chest pain, dyspnea, palpitations, syncope, orthopnea, or paroxysmal nocturnal dyspnea. Past Medical History:   Diagnosis Date    Arrhythmia     atrial fibrillation     Arthritis     CAD (coronary atherosclerotic disease)     Chronic kidney disease     Chronic pain     Congestive heart failure ([address]     Diabetes [facility]     Diabetes mellitus type 2, controlled ([address] 3/13/2017    Heart failure ([address]     Hx of CABG 3/13/2017    CABG in  in [address] [place] [address] Street Hypertension     Long term current use of anticoagulant therapy     Morbid obesity ([facility] 3/13/2017    JACEY (obstructive sleep apnea) 2017    S/P CABG x 2     Skin cancer     Thyroid disease         ICD-10-CM ICD-9-CM    1. ASHD (arteriosclerotic heart disease)  I25.10 414.00 AMB POC EKG ROUTINE W/ 12 LEADS, INTER & REP   2. Essential hypertension  I10 401.9    3. Mixed hyperlipidemia  E78.2 272.2    4. Dilated cardiomyopathy (HCC)  I42.0 425.4    5. Chronic a-fib (HCC)  I48.20 427.31    6. Cardiac pacemaker in situ  Z95.0 V45.01    7. Hx of CABG  Z95.1 V45.81    8.  CKD (chronic kidney disease), stage IV (HCC)  N18.4 585.4       Social History     Tobacco Use    Smoking status: Never Smoker    Smokeless tobacco: Never Used   Substance Use Topics    Alcohol use: Yes     Comment: rare      Family History   Problem Relation Age of Onset    Heart Attack Father     Cancer Father         lymph node    Cancer Mother         breast        Review of Systems  Cardiovascular: Negative except as noted in HPI      Objective: Vitals:    08/03/20 1338   BP: 110/70   Pulse: 78   Resp: 18   SpO2: 97%   Weight: 245 lb (111.1 kg)   Height: 5' 9\" (1.753 m)    Body mass index is 36.18 kg/m². General PE  Mental Status - Alert. General Appearance - Not in acute distress. Chest and Lung Exam   Inspection: Accessory muscles - No use of accessory muscles in breathing. Auscultation:   Breath sounds: - Normal.    Cardiovascular   Inspection: Jugular vein - Bilateral - Inspection Normal.  Palpation/Percussion:   Apical Impulse: - Normal.  Auscultation: Rhythm - Regular. Heart Sounds - S1 WNL and S2 WNL. No S3 or S4. Murmurs & Other Heart Sounds: Auscultation of the heart reveals - No Murmurs. Peripheral Vascular   Upper Extremity: Inspection - Bilateral - No Cyanotic nailbeds or Digital clubbing. Lower Extremity:   Palpation: Edema - Bilateral - mild dependent swelling    Skin: +ecchymoses    Data Review:     EKG - Paced underlying AF    Allergies reviewed  Allergies   Allergen Reactions    Allopurinol Rash    Gabapentin Drowsiness    Ciprofloxacin Nausea Only    Percocet [Oxycodone-Acetaminophen] Other (comments)     hallucinations     Medications reviewed  Current Outpatient Medications   Medication Sig    omeprazole (PRILOSEC) 40 mg capsule TAKE 1 CAPSULE BY MOUTH EVERY DAY    pravastatin (PRAVACHOL) 80 mg tablet Take 1 Tab by mouth nightly.  levothyroxine (SYNTHROID) 137 mcg tablet TAKE 1 TABLET BY MOUTH EVERY DAY    apixaban (Eliquis) 5 mg tablet TAKE 1 TABLET BY MOUTH TWICE A DAY    glucose blood VI test strips (blood glucose test) strip Use to test blood sugars 3 times per day. DX Code: E11.22. USE BRAND PREFERRED BY INSURANCE    OneTouch Ultra Blue Test Strip strip USE TO TEST BLOOD SUGARS 3 TIMES PER DAY    metoprolol succinate (TOPROL-XL) 100 mg tablet TAKE 1 AND 1/2 TABLETS BY MOUTH EVERY DAY    potassium chloride SR (KLOR-CON 8) 8 mEq tablet Take 16 mEq by mouth daily.     insulin aspart U-100 (NOVOLOG FLEXPEN U-100 INSULIN) 100 unit/mL (3 mL) inpn Inject 10 units with each meal, 3 times daily (may substitute humalog if preferred)    Insulin Needles, Disposable, (BD ULTRA-FINE MINI PEN NEEDLE) 31 gauge x 3/16\" ndle Use with insulin pens 4 times daily    insulin glargine (LANTUS,BASAGLAR) 100 unit/mL (3 mL) inpn 30 unit injection once each bedtime (basaglar vs lantus whichever preferred)    metoprolol succinate (TOPROL XL) 50 mg XL tablet Take 1 Tab by mouth daily. Patient takes 150mg total daily    polyethylene glycol (MIRALAX) 17 gram packet Take 17 g by mouth as needed (constipation).  ferrous sulfate (IRON) 325 mg (65 mg iron) tablet Take 325 mg by mouth Daily (before breakfast).  bumetanide (BUMEX) 1 mg tablet 3 tabs BID    dutasteride (AVODART) 0.5 mg capsule Take 0.5 mg by mouth daily.  MYRBETRIQ 25 mg ER tablet TAKE 1 TABLET BY MOUTH EVERY DAY    febuxostat (ULORIC) 40 mg tab tablet Take 40 mg by mouth daily.  ketoconazole (NIZORAL) 2 % topical cream Apply  to affected area two (2) times daily as needed for Skin Irritation. Indications: rash    metOLazone (ZAROXOLYN) 2.5 mg tablet Take 2.5 mg by mouth daily. Taking Tuesday, Thursday, Saturday  Indications: TAKING TUESDAY AND SATURDAY    multivitamin (ONE A DAY) tablet Take 1 Tab by mouth daily.  acetaminophen (TYLENOL EXTRA STRENGTH) 500 mg tablet Take 1,000 mg by mouth every eight (8) hours as needed for Pain. No current facility-administered medications for this visit. Assessment:       ICD-10-CM ICD-9-CM    1. ASHD (arteriosclerotic heart disease)  I25.10 414.00 AMB POC EKG ROUTINE W/ 12 LEADS, INTER & REP   2. Essential hypertension  I10 401.9    3. Mixed hyperlipidemia  E78.2 272.2    4. Dilated cardiomyopathy (HCC)  I42.0 425.4    5. Chronic a-fib (HCC)  I48.20 427.31    6. Cardiac pacemaker in situ  Z95.0 V45.01    7. Hx of CABG  Z95.1 V45.81    8.  CKD (chronic kidney disease), stage IV ([facility]  N18.4 585.4      Orders Placed This Encounter    AMB POC EKG ROUTINE W/ 12 LEADS, INTER & REP     Order Specific Question:   Reason for Exam:     Answer:   routine    omeprazole (PRILOSEC) 40 mg capsule     Sig: TAKE 1 CAPSULE BY MOUTH EVERY DAY    pravastatin (PRAVACHOL) 80 mg tablet     Sig: Take 1 Tab by mouth nightly. Dispense:  90 Tab     Refill:  4     Overdue for appt and fasting labs. No further refills until seen. Plan:     Patient presents for follow up. Last seen by us in 11/19. Swelling well controlled since diuretic adjustment by renal.    ASHD  Hx CABG x 2 in 2010   patient is without angina/anginal equivalent complaints. Cont on BB, statin  Not taking ASA d/t ecchymosis from eliquis    HTN:   BP well controlled. Cont current medical management, consume a diet low in sodium. Hyperlipidemia:   7/2020 LDL at 51  On Pravachol 80mg. Sending Refill today    Dilated cardiomyopathy:   Normal EF per echo in 3/2019  appears compensated, euvolemic. Cont on BB, Bumex,and Zaroxolyn. Cr 3.07 in 7/2020---renal following    Chronic afib  SSS BiV-PM  on BB and Eliquis. No bleeding but has ecchymosis  No events on device interrogation 7/2020  Device followed by Dr Marci Hughes, last seen 1/2020    CKD IV  Cr 3.07 in 7/2020  Followed by Dr Isaura Alanis      Follow up in 6 months or sooner PRN.           Jamaica Cox MD

## 2020-08-03 NOTE — PROGRESS NOTES
1. Have you been to the ER, urgent care clinic since your last visit? Hospitalized since your last visit? No    2. Have you seen or consulted any other health care providers outside of the [address] [place] [address] since your last visit? Include any pap smears or colon screening. No    Chief Complaint   Patient presents with    Coronary Artery Disease     10 mo appt. Denied cardiac symptoms.

## 2020-08-27 RX ORDER — METOPROLOL SUCCINATE 50 MG/1
TABLET, EXTENDED RELEASE ORAL
Qty: 90 TAB | Refills: 3 | Status: SHIPPED | OUTPATIENT
Start: 2020-08-27 | End: 2020-09-24

## 2020-08-31 ENCOUNTER — VIRTUAL VISIT (OUTPATIENT)
Dept: ENDOCRINOLOGY | Age: 70
End: 2020-08-31
Payer: MEDICARE

## 2020-08-31 DIAGNOSIS — E55.9 VITAMIN D DEFICIENCY: ICD-10-CM

## 2020-08-31 DIAGNOSIS — Z79.4 TYPE 2 DIABETES MELLITUS WITH STAGE 4 CHRONIC KIDNEY DISEASE, WITH LONG-TERM CURRENT USE OF INSULIN (HCC): Primary | ICD-10-CM

## 2020-08-31 DIAGNOSIS — E03.9 HYPOTHYROIDISM, UNSPECIFIED TYPE: ICD-10-CM

## 2020-08-31 DIAGNOSIS — N18.4 TYPE 2 DIABETES MELLITUS WITH STAGE 4 CHRONIC KIDNEY DISEASE, WITH LONG-TERM CURRENT USE OF INSULIN (HCC): Primary | ICD-10-CM

## 2020-08-31 DIAGNOSIS — I10 ESSENTIAL HYPERTENSION WITH GOAL BLOOD PRESSURE LESS THAN 130/80: ICD-10-CM

## 2020-08-31 DIAGNOSIS — E11.22 TYPE 2 DIABETES MELLITUS WITH STAGE 4 CHRONIC KIDNEY DISEASE, WITH LONG-TERM CURRENT USE OF INSULIN (HCC): Primary | ICD-10-CM

## 2020-08-31 DIAGNOSIS — E78.5 HYPERLIPIDEMIA, UNSPECIFIED HYPERLIPIDEMIA TYPE: ICD-10-CM

## 2020-08-31 PROCEDURE — 3044F HG A1C LEVEL LT 7.0%: CPT | Performed by: INTERNAL MEDICINE

## 2020-08-31 PROCEDURE — G9231 DOC ESRD DIA TRANS PREG: HCPCS | Performed by: INTERNAL MEDICINE

## 2020-08-31 PROCEDURE — 3017F COLORECTAL CA SCREEN DOC REV: CPT | Performed by: INTERNAL MEDICINE

## 2020-08-31 PROCEDURE — G8427 DOCREV CUR MEDS BY ELIG CLIN: HCPCS | Performed by: INTERNAL MEDICINE

## 2020-08-31 PROCEDURE — 99214 OFFICE O/P EST MOD 30 MIN: CPT | Performed by: INTERNAL MEDICINE

## 2020-08-31 PROCEDURE — 1101F PT FALLS ASSESS-DOCD LE1/YR: CPT | Performed by: INTERNAL MEDICINE

## 2020-08-31 PROCEDURE — G8432 DEP SCR NOT DOC, RNG: HCPCS | Performed by: INTERNAL MEDICINE

## 2020-08-31 PROCEDURE — 2022F DILAT RTA XM EVC RTNOPTHY: CPT | Performed by: INTERNAL MEDICINE

## 2020-08-31 RX ORDER — FLUCONAZOLE 150 MG/1
TABLET ORAL
COMMUNITY
Start: 2020-08-18 | End: 2020-09-24

## 2020-08-31 RX ORDER — METOPROLOL SUCCINATE 100 MG/1
100 TABLET, EXTENDED RELEASE ORAL DAILY
COMMUNITY
End: 2020-09-15

## 2020-08-31 RX ORDER — KETOCONAZOLE 20 MG/ML
SHAMPOO TOPICAL
COMMUNITY
Start: 2020-08-18 | End: 2021-03-19

## 2020-08-31 NOTE — PROGRESS NOTES
**DUE TO PANDEMIC AND HEALTH CONCERNS IN THE COMMUNITY, THIS PATIENT WAS EITHER ILL OR FOUND TO BE HIGH RISK FOR IN-PERSON EVALUATION WITHIN THE CLINIC. THE FOLLOWING IS A VIRTUAL TELEMEDICINE VIDEO ENCOUNTER VIA [email or web address] ME, TO WHICH THE PATIENT AGREED. THE PURPOSE IS TO LIMIT INTERRUPTIONS IN HEALTHCARE AND TO PROVIDE FOR ONGOING URGENT NEEDS UNDER THE CURRENT CONDITIONS. CHIEF COMPLAINT: f/u uncontrolled type 2 diabetes    HISTORY OF PRESENT ILLNESS:   Linh Michele is a 69 y.o. male with a PMHx as noted below who presents for evaluation of uncontrolled type 2 diabetes. Patient was last see in Feb of this year. Follows with Dr. Jennifer Hancock with stable findings. His A1c was 6.7% at that time. Reports recent A1c outside of 6.4%. Will be having a second cortisone injection to his lower back soon. Did not last as long as he would have liked.  I had recommended the following insulin dose changes based on his blood sugars at the time:     Regimen: (prior instructions)  Lantus: 30 units once each bedtime  Novolog:        Breakfast: 14 units        Lunch:       10 units        Dinner:       10 units  Correction 1:50>150    Review of home glucose:   Log reviewed:   Freestyle Nav 14 day average  AM: [address]  Lunch: [address]  Bedtime: [address]  Rarely <70, every other week on average    Hypothyroidism: On 137 mcg of levothyroxine  Vitamin D deficiency: was taken off D by nephrologist per patient    Review of most recent diabetes-related labs:  Lab Results   Component Value Date    HBA1C 7.0 (H) 04/10/2018    HBA1C 7.6 (H) 06/22/2017    HBA1C 8.0 (H) 03/13/2017    MYH4OULF 6.7 02/13/2020    GMX9QELE 6.6 10/16/2019    GQR5WYUM 6.5 06/12/2019    CHOL 106 07/30/2020    LDLC 51 07/30/2020    LDL 43 04/03/2017    GFRAA 22 (L) 07/30/2020    GFRNA 19 (L) 07/30/2020    CREATEXT 3.17 12/05/2019    MCACR 137.2 (H) 06/12/2019    TSH 0.957 06/12/2019    VITD3 46.3 04/03/2017     Lab Key:  [id number] = IA-2 pancreatic islet cell autoantibody  CPEPL = C-peptide level  :EXT = External Lab  GADLT = ANGELIQUE-65 autoantibody   INSUL = Insulin level  MCACR (or MALBEXT) = Urine Microalbumin (or External UM)      PAST MEDICAL/SURGICAL HISTORY:   Past Medical History:   Diagnosis Date    Arrhythmia     atrial fibrillation 2017    Arthritis     CAD (coronary atherosclerotic disease)     Chronic kidney disease     Chronic pain     [facility]     Diabetes ([facility]     Diabetes mellitus type 2, controlled ([facility] 3/13/2017    Heart failure ([facility]     Hx of CABG 3/13/2017    CABG in 2010 in [address] Hypertension     Long term current use of anticoagulant therapy     Morbid obesity ([facility] 3/13/2017    JACEY (obstructive sleep apnea) 6/23/2017    S/P CABG x 2 2010    Skin cancer     Thyroid disease      Past Surgical History:   Procedure Laterality Date    CARDIAC SURG PROCEDURE UNLIST      CABGx2 in 2010    HX ORTHOPAEDIC      L 3rd toe amputation in 1999    HX PACEMAKER  2018    [place]       ALLERGIES:   Allergies   Allergen Reactions    Allopurinol Rash    Gabapentin Drowsiness    Ciprofloxacin Nausea Only    Percocet [Oxycodone-Acetaminophen] Other (comments)     hallucinations       MEDICATIONS ON ADMISSION:     Current Outpatient Medications:     metoprolol succinate (TOPROL-XL) 100 mg tablet, Take 100 mg by mouth daily. , Disp: , Rfl:     ketoconazole (NIZORAL) 2 % shampoo, 1 APPLICATION TO BACK, BUTTOCKS, THIGHS TWICE WEEKLY EXTERNALLY 30 DAYS, Disp: , Rfl:     metoprolol succinate (TOPROL-XL) 50 mg XL tablet, TAKE 1 TABLET BY MOUTH DAILY. , Disp: 90 Tab, Rfl: 3    insulin aspart U-100 (NovoLOG Flexpen U-100 Insulin) 100 unit/mL (3 mL) inpn, INJECT 10 UNITS SUBCUTANEOUSLY 3 TIMES A DAY WITH EACH MEAL (Patient taking differently: Inject 14 units at Breakfast, 10 with Lunch and Dinner), Disp: 10 Adjustable Dose Pre-filled Pen Syringe, Rfl: 3    omeprazole (PRILOSEC) 40 mg capsule, TAKE 1 CAPSULE BY MOUTH EVERY DAY, Disp: , Rfl:     pravastatin (PRAVACHOL) 80 mg tablet, Take 1 Tab by mouth nightly., Disp: 90 Tab, Rfl: 4    levothyroxine (SYNTHROID) 137 mcg tablet, TAKE 1 TABLET BY MOUTH EVERY DAY, Disp: 90 Tab, Rfl: 3    apixaban (Eliquis) 5 mg tablet, TAKE 1 TABLET BY MOUTH TWICE A DAY, Disp: 180 Tab, Rfl: 3    potassium chloride SR (KLOR-CON 8) 8 mEq tablet, Take 16 mEq by mouth daily. , Disp: , Rfl:     insulin glargine (LANTUS,BASAGLAR) 100 unit/mL (3 mL) inpn, 30 unit injection once each bedtime (basaglar vs lantus whichever preferred), Disp: 10 Pen, Rfl: 3    ferrous sulfate (IRON) 325 mg (65 mg iron) tablet, Take 325 mg by mouth Daily (before breakfast). , Disp: , Rfl:     bumetanide (BUMEX) 1 mg tablet, 3 tabs BID, Disp: , Rfl: 3    dutasteride (AVODART) 0.5 mg capsule, Take 0.5 mg by mouth daily. , Disp: , Rfl:     MYRBETRIQ 25 mg ER tablet, TAKE 1 TABLET BY MOUTH EVERY DAY, Disp: , Rfl: 12    febuxostat (ULORIC) 40 mg tab tablet, Take 40 mg by mouth daily. , Disp: , Rfl:     ketoconazole (NIZORAL) 2 % topical cream, Apply  to affected area two (2) times daily as needed for Skin Irritation. Indications: rash, Disp: , Rfl:     metOLazone (ZAROXOLYN) 2.5 mg tablet, Take 2.5 mg by mouth daily. Taking Tuesday, Thursday, Saturday, Disp: , Rfl:     multivitamin (ONE A DAY) tablet, Take 1 Tab by mouth daily. , Disp: , Rfl:     acetaminophen (TYLENOL EXTRA STRENGTH) 500 mg tablet, Take 1,000 mg by mouth every eight (8) hours as needed for Pain., Disp: , Rfl:     fluconazole (DIFLUCAN) 150 mg tablet, TAKE 1 TABLET BY MOUTH ONCE A WEEK, Disp: , Rfl:     glucose blood VI test strips (blood glucose test) strip, Use to test blood sugars 3 times per day.  DX Code: E11.22. USE BRAND PREFERRED BY INSURANCE, Disp: 300 Strip, Rfl: 3    OneTouch Ultra Blue Test Strip strip, USE TO TEST BLOOD SUGARS 3 TIMES PER DAY, Disp: 300 Strip, Rfl: 0    Insulin Needles, Disposable, (BD ULTRA-FINE MINI PEN NEEDLE) 31 gauge x 3/16\" ndle, Use with insulin pens 4 times daily, Disp: 400 Pen Needle, Rfl: 3    polyethylene glycol (MIRALAX) 17 gram packet, Take 17 g by mouth as needed (constipation). , Disp: , Rfl:     SOCIAL HISTORY:   Social History     Socioeconomic History    Marital status:      Spouse name: Not on file    Number of children: Not on file    Years of education: Not on file    Highest education level: Not on file   Occupational History    Not on file   Social Needs    Financial resource strain: Not on file    Food insecurity     Worry: Not on file     Inability: Not on file    Transportation needs     Medical: Not on file     Non-medical: Not on file   Tobacco Use    Smoking status: Never Smoker    Smokeless tobacco: Never Used   Substance and Sexual Activity    Alcohol use: Yes     Comment: rare    Drug use: No    Sexual activity: Not on file   Lifestyle    Physical activity     Days per week: Not on file     Minutes per session: Not on file    Stress: Not on file   Relationships    Social connections     Talks on phone: Not on file     Gets together: Not on file     Attends Muslim service: Not on file     Active member of club or organization: Not on file     Attends meetings of clubs or organizations: Not on file     Relationship status: Not on file    Intimate partner violence     Fear of current or ex partner: Not on file     Emotionally abused: Not on file     Physically abused: Not on file     Forced sexual activity: Not on file   Other Topics Concern     Service Not Asked    Blood Transfusions Not Asked    Caffeine Concern Not Asked    Occupational Exposure Not Asked   Geoffery Fillers Hazards Not Asked    Sleep Concern Not Asked    Stress Concern Not Asked    Weight Concern Not Asked    Special Diet Not Asked    Back Care Not Asked    Exercise Not Asked    Bike Helmet Not Asked   [address] Not Asked    Self-Exams Not Asked   Social History Narrative    Not on file       FAMILY HISTORY:  Family History   Problem Relation Age of Onset    Heart Attack Father     Cancer Father         lymph node    Cancer Mother         breast       REVIEW OF SYSTEMS: Complete ROS assessed and noted for that which is described above, all else are negative. Eyes: normal  ENT: normal  CVS: normal  Resp: normal  GI: normal  : normal  GYN: normal  Endocrine: normal  Integument: normal  Musculoskeletal: normal  Neuro: normal  Psych: normal    PHYSICAL EXAMINATION:  Telemedicine Visit    GENERAL: NCAT, Appears well nourished  EYES: EOMI, non-icteric, no proptosis  Ear/Nose/Throat: NCAT, no visible inflammation or masses  CARDIOVASCULAR: no cyanosis, no visible JVD  RESPIRATORY: comfortable respirations observed, no cyanosis  MUSCULOSKELETAL: Normal ROM of upper extremities observed  SKIN: No edema, rash, or other significant changes observed  NEUROLOGIC:  AAOx3  PSYCHIATRIC: Normal affect, Normal insight and judgement    REVIEW OF LABORATORY AND RADIOLOGY DATA:   Labs and documentation have been reviewed as described above. ASSESSMENT AND PLAN:   Kevin Benjamin is a 69 y.o. male with a PMHx as noted above who presents for f/u evaluation of uncontrolled type 2 diabetes. Problems:  Type 2 diabetes  Hyperlipidemia  Hypertension  Hypothyroidism  Vitamin D deficiency     Patient is experiencing low sugars at bedtime every now and then, on average once every 2 weeks or so, noting his bedtime average is 121, there is room to reduce his novolog at dinner time to reduce the likelihood of bedtime lows. Plan as follows:     PLAN  Type 2 Diabetes  Medications:  Lantus: 30 units once each bedtime  Novolog:        Breakfast: 14 units        Lunch:       10 units        Dinner:       7 units  Correction 1:50>150  Advised to check glucose 4 times daily  Provided with glucose log sheets for later review.     HTN: Telemedicine visit, reports has been stable, follows with cardiology  HLD: on statin, reviewed, stable  Vitamin D deficiency: off per nephrologist, stable in january  Hypothyroidism: 137 mcg once daily, prev stable, will assess with prelabs    LABS: 2021 DM/Thyroid panel ordered for next visit,     RTC: I would like to see him back in 4 months. January 13 at 2:50,    20 minutes spent toward telemedicine visit today of which >50% of this time was spent in counseling and coordination of care. Sergey Bravo.  [address] [facility]

## 2020-09-15 RX ORDER — METOPROLOL SUCCINATE 100 MG/1
TABLET, EXTENDED RELEASE ORAL
Qty: 135 TAB | Refills: 1 | Status: SHIPPED | OUTPATIENT
Start: 2020-09-15 | End: 2020-12-29

## 2020-09-24 ENCOUNTER — HOSPITAL ENCOUNTER (OUTPATIENT)
Dept: WOUND CARE | Age: 70
Discharge: HOME | End: 2020-09-24
Admitting: SURGERY
Payer: MEDICARE

## 2020-09-24 VITALS
DIASTOLIC BLOOD PRESSURE: 74 MMHG | RESPIRATION RATE: 18 BRPM | TEMPERATURE: 97.8 F | SYSTOLIC BLOOD PRESSURE: 155 MMHG | HEART RATE: 75 BPM

## 2020-09-24 DIAGNOSIS — R60.0 BILATERAL LEG EDEMA: ICD-10-CM

## 2020-09-24 DIAGNOSIS — L97.912 NON-PRESSURE CHRONIC ULCER OF RIGHT LOWER LEG WITH FAT LAYER EXPOSED (HCC): ICD-10-CM

## 2020-09-24 DIAGNOSIS — I73.9 PAD (PERIPHERAL ARTERY DISEASE) (HCC): ICD-10-CM

## 2020-09-24 DIAGNOSIS — L97.922 NON-PRESSURE CHRONIC ULCER OF LEFT LOWER LEG WITH FAT LAYER EXPOSED (HCC): ICD-10-CM

## 2020-09-24 PROCEDURE — 99213 OFFICE O/P EST LOW 20 MIN: CPT | Performed by: SURGERY

## 2020-09-24 PROCEDURE — 99213 OFFICE O/P EST LOW 20 MIN: CPT

## 2020-09-24 NOTE — WOUND CARE
09/24/20 1130   Wound Leg lower Lateral;Right #1 09/24/20   Date First Assessed/Time First Assessed: 09/24/20 1127   Wound Approximate Age at First Assessment (Weeks): 1 weeks  Primary Wound Type: (c)   Location: Leg lower  Wound Location Orientation: Lateral;Right  Wound Description: #1  Date of First Observa. ..    Dressing Type Open to air   Non-staged Wound Description Partial thickness   Wound Length (cm) 1.8 cm   Wound Width (cm) 1.2 cm   Wound Depth (cm) 0.1 cm   Wound Surface Area (cm^2) 2.16 cm^2   Wound Volume (cm^3) 0.22 cm^3   Condition of Base Pink   Condition of Edges Open   Drainage Amount None   Christy-wound Assessment Edema   Cleansing and Cleansing Agents  Normal saline

## 2020-09-24 NOTE — DISCHARGE INSTRUCTIONS
Discharge Instructions for  [place] [address] [facility], [address] [facility]  Telephone: [phone]    NAME:  Asmita Menon OF BIRTH:  1950  MEDICAL RECORD NUMBER:  [id number]  DATE:  9/24/2020      WOUND CARE ORDERS: Bilateral LEs - Cleanse with mild soap and water or normal saline. Cover open and intact blisters with Xeroform gauze, gauze and roll gauze. Apply Double layer Tubigrip to bilateral Lower Legs. Pt/PCG to change dressings 3 times a week. RTC for MD follow up in 1 week. TREATMENT ORDERS:    Elevate leg(s) when sitting. Avoid prolonged standing in one place. Do not get dressing/wrap wet. Follow Diet as prescribed:   [] Diet as tolerated: [x]  Diabetic Diet [x] No Added Salt  [] Increase Protein: [x] Limit the amount of liquid you are drinking and avoid drinking in between meals               Return Appointment:  [x] Return Appointment: With DR Paras Steel in [facility])  [] Nurse Visit : *** days  [] Ordered tests:      Electronically signed Hannah Mathew RN on 9/24/2020 at 11:42 AM     [facility] Information: Should you experience any significant changes in your wound(s) or have questions about your wound care, please contact the [address] [place] [address] at [address] [place] [address] Street 8:00 am - 4:30. If you need help with your wound outside these hours and cannot wait until we are again available, contact your PCP or go to the hospital emergency room. PLEASE NOTE: IF YOU ARE UNABLE TO OBTAIN WOUND SUPPLIES, CONTINUE TO USE THE SUPPLIES YOU HAVE AVAILABLE UNTIL YOU ARE ABLE TO REACH US. IT IS MOST IMPORTANT TO KEEP THE WOUND COVERED AT ALL TIMES.      Physician Signature:_______________________    Date: ___________ Time:  ____________

## 2020-09-24 NOTE — PROGRESS NOTES
HISTORY OF PRESENT ILLNESS:  The patient is a 79-year-old man referred to the [address] [place] [address] Road now because of buttock ulcer. Supposed to get new ROHO cushion. He reports the recurrence of leg ulcers right and left. He says the buttock site is doing better and declines exam of that site.      He had been seen at the [address] [place] [address] regarding open wounds on both lower legs. First seen 1/23/2020. Wounds healed and he was last seen on 5/29/2020.     He reports the buttock ulcer has been present for several years off and on.   He had been using zinc oxide ointment on the area daily.     The patient has history of chronic lower extremity edema.  He takes Bumex  b.i.d. and metolazone three times per week.      The patient has history of cardiomyopathy and congestive heart failure.  He had chronic AFib and was treated with pacemaker and AV node ablation.  There is history of coronary artery disease, diabetes mellitus type 2 controlled, hypertension, morbid obesity, sleep apnea unable to tolerate treatment, and history of coronary bypass.  The patient reports that he can walk short distances using a walker.  He has a lot of joint problems in knee and hip as well as back problems.  He reports that he would get short of breath on walking 100 feet with his walker.  The patient does sleep lying in bed at night.     The patient has history of chronic kidney disease stage IV.  He is followed by Dr. Jose De Jesus Patel.       The patient has never smoked. [facility] rarely uses alcohol.     Patient on anticoagulation with Eliquis.     KERI on 2/14/2020  Severe calcification.  Abnormal right and left ankle PVR.  Rt TBI 0.67, left TBI 0.54.     He saw vascular surgeon at [address] [place] [address] is not able to have angiogram because of poor renal function.  He did not have venous testing as his legs wounds hhad healed.     PHYSICAL EXAMINATION:  GENERAL:  The patient is an alert, overweight man in no acute distress.     WOUND EXAMINATION:   (declined buttock exam)     1- 2 + edema, pitting right and left legs to knees. Scattered small bullae and ulcerations in lower legs. No palpable DP or PT pulses either side.     ASSESSMENT AND PLAN:    Leg edema, resulting bullae and ulcers. Can't apply pressure dressings because of PAD. Take diuretic; reduce fluid intake.     Dressing ordered:  Xeroform, dry gauze, roll gauze, double Tubigrips changed 3 times per week.     Follow up in 1 week.     FINAL DIAGNOSES:  Buttock ulcer, Bilateral leg edema, left lower leg nonpressure ulcers, diabetes mellitus, PAD.     L89.313, R60.6, L97.912, M10.320, I73.9     Lima Avila MD

## 2020-09-24 NOTE — WOUND CARE
09/24/20 1146   Wound Leg lower Lateral;Right #1 09/24/20   Date First Assessed/Time First Assessed: 09/24/20 1127   Wound Approximate Age at First Assessment (Weeks): 1 weeks  Primary Wound Type: (c)   Location: Leg lower  Wound Location Orientation: Lateral;Right  Wound Description: #1  Date of First Observa. .. Dressing Type Applied Xeroform;ABD pad;Gauze wrap (kami); Special tape (comment)  (Double tubi)   Xeroform , cover and secure per order, Double tubi to LLL

## 2020-10-08 ENCOUNTER — HOSPITAL ENCOUNTER (OUTPATIENT)
Dept: WOUND CARE | Age: 70
Discharge: HOME | End: 2020-10-08
Admitting: SURGERY
Payer: MEDICARE

## 2020-10-08 ENCOUNTER — OFFICE VISIT (OUTPATIENT)
Dept: CARDIOLOGY CLINIC | Age: 70
End: 2020-10-08
Payer: MEDICARE

## 2020-10-08 VITALS
DIASTOLIC BLOOD PRESSURE: 81 MMHG | HEART RATE: 78 BPM | RESPIRATION RATE: 18 BRPM | SYSTOLIC BLOOD PRESSURE: 173 MMHG | TEMPERATURE: 97.8 F

## 2020-10-08 DIAGNOSIS — L97.912 NON-PRESSURE CHRONIC ULCER OF RIGHT LOWER LEG WITH FAT LAYER EXPOSED (HCC): ICD-10-CM

## 2020-10-08 DIAGNOSIS — I48.20 CHRONIC A-FIB (HCC): ICD-10-CM

## 2020-10-08 DIAGNOSIS — R60.0 BILATERAL LEG EDEMA: ICD-10-CM

## 2020-10-08 DIAGNOSIS — Z95.0 CARDIAC PACEMAKER IN SITU: Primary | ICD-10-CM

## 2020-10-08 PROBLEM — L02.419 THIGH ABSCESS: Status: RESOLVED | Noted: 2019-10-18 | Resolved: 2020-10-08
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
## 2020-10-08 PROBLEM — L89.313 DECUBITUS ULCER OF RIGHT BUTTOCK, STAGE 3 (HCC): Status: RESOLVED | Noted: 2019-04-01 | Resolved: 2020-10-08

## 2020-10-08 PROBLEM — J96.01 ACUTE RESPIRATORY FAILURE WITH HYPOXIA (HCC): Status: RESOLVED | Noted: 2019-03-23 | Resolved: 2020-10-08

## 2020-10-08 PROBLEM — L97.922 NON-PRESSURE CHRONIC ULCER OF LEFT LOWER LEG WITH FAT LAYER EXPOSED (HCC): Status: RESOLVED | Noted: 2020-01-23 | Resolved: 2020-10-08

## 2020-10-08 PROCEDURE — 99213 OFFICE O/P EST LOW 20 MIN: CPT

## 2020-10-08 PROCEDURE — 93296 REM INTERROG EVL PM/IDS: CPT | Performed by: INTERNAL MEDICINE

## 2020-10-08 PROCEDURE — 93294 REM INTERROG EVL PM/LDLS PM: CPT | Performed by: INTERNAL MEDICINE

## 2020-10-08 PROCEDURE — 99213 OFFICE O/P EST LOW 20 MIN: CPT | Performed by: SURGERY

## 2020-10-08 NOTE — PROGRESS NOTES
HISTORY OF PRESENT ILLNESS:  The patient is a 25-year-old man [facility] now because of buttock ulcer.      Supposed to get new ROHO cushion.     He reports the recurrence of leg ulcers right and left. He says the buttock site has healed and declines exam of that site.      He had been seen at the [facility] open wounds on both lower legs. First seen 1/23/2020. Wounds healed and he was last seen on 5/29/2020.       The patient has history of chronic lower extremity edema.  He takes Bumex  b.i.d. and metolazone three times per week. He has now reduced fluid intake.     The patient has history of cardiomyopathy and congestive heart failure.  He had chronic AFib and was treated with pacemaker and AV node ablation. Noris Riley is history of coronary artery disease, diabetes mellitus type 2 controlled, hypertension, morbid obesity, sleep apnea unable to tolerate treatment, and history of coronary bypass.  The patient reports that he can walk short distances using a walker. Martha Blanchard has a lot of joint problems in knee and hip as well as back problems.  He reports that he would get short of breath on walking 100 feet with his walker.  The patient does sleep lying in bed at night.     The patient has history of chronic kidney disease stage IV.  He is followed by Dr. Luwana Peabody.       The patient has never smoked. Martha Blanchard rarely uses alcohol.     Patient on anticoagulation with Eliquis.     KERI on 2/14/2020  Severe calcification.  Abnormal right and left ankle PVR.  Rt TBI 0.67, left TBI 0.54.     He saw vascular surgeon at [address] [place] [address] is not able to have angiogram because of poor renal function.  He did not have venous testing as his legs wounds hhad healed.     PHYSICAL EXAMINATION:  GENERAL:  The patient is an alert, overweight man in no acute distress.     WOUND EXAMINATION:       Trace edema, right and left legs to knees. No palpable DP or PT pulses either side. No ulcers left leg.     Right leg one mid lower 1.2 x 1 cm superficial ulcer, one 1 cm proximal abrasion.       ASSESSMENT AND PLAN:    Leg edema markedly improved.   Prior bullae sites healed.     Take diuretic; control fluid intake.     Dressing ordered:  Xeroform, dry gauze on right leg ulcers, roll gauze;   double Tubigrips both legs;  changed 3 times per week.     Follow up in 3 weeks.     FINAL DIAGNOSES:  Bilateral leg edema, right lower leg nonpressure ulcers, diabetes mellitus, PAD.      R60.0, L97.912, I73.9     Jorge A Lund MD

## 2020-10-08 NOTE — DISCHARGE INSTRUCTIONS
Discharge Instructions for  [address], [address] [facility]  Telephone: [phone]    NAME:  Rochelle Armstrong OF BIRTH:  1950  MEDICAL RECORD NUMBER:  [id number]  DATE:  10/8/2020    WOUND CARE ORDERS:  LLEs - Cleanse with mild soap and water or normal saline. Cover open and intact blisters with Xeroform gauze, gauze and roll gauze. Apply Double layer Tubigrip to bilateral Lower Legs. Pt/PCG to change dressings 3 times a week. RTC for MD follow up in 1 week. TREATMENT ORDERS:    Elevate leg(s) above the level of the heart when sitting. Avoid prolonged standing in one place. Do no get dressing/wrap wet. Follow Diet as prescribed:   [x] Diet as tolerated: [] Calorie Diabetic Diet: [x] No Added Salt:  [] Increase Protein: [] Limit the amount of liquid you are drinking and avoid drinking in between meals           Return Appointment:  [x] Return Appointment: With DR Jaret Ramires  in  3 Week(s)  [] Nurse Visit : *** days  [] Ordered tests:    Electronically signed Annemarie Mauricio RN on 10/8/2020 at [address] [place] [address]: Should you experience any significant changes in your wound(s) or have questions about your wound care, please contact the [address] [place] [address] at [address] [place] [address] 8:00 am - 4:30. If you need help with your wound outside these hours and cannot wait until we are again available, contact your PCP or go to the hospital emergency room. PLEASE NOTE: IF YOU ARE UNABLE TO OBTAIN WOUND SUPPLIES, CONTINUE TO USE THE SUPPLIES YOU HAVE AVAILABLE UNTIL YOU ARE ABLE TO REACH US. IT IS MOST IMPORTANT TO KEEP THE WOUND COVERED AT ALL TIMES.      Physician Signature:_______________________    Date: ___________ Time:  ____________

## 2020-10-08 NOTE — WOUND CARE
10/08/20 1049 Vital Signs Temp 97.8 °F (36.6 °C) Temp Source Temporal  
Pulse (Heart Rate) 78 Heart Rate Source Brachial  
Resp Rate 18  
BP (!) 173/81 MAP (Calculated) 112 Pain 1 Pain Scale 1 Numeric (0 - 10) Pain Intensity 1 5 Pain Reassessment 1 Yes Pain Location 1  
(neck and back) No open wounds documented in flow sheet

## 2020-10-22 ENCOUNTER — TRANSCRIBE ORDER (OUTPATIENT)
Dept: SCHEDULING | Age: 70
End: 2020-10-22

## 2020-10-22 DIAGNOSIS — R29.898 LEG WEAKNESS, BILATERAL: ICD-10-CM

## 2020-10-22 DIAGNOSIS — M51.36 DDD (DEGENERATIVE DISC DISEASE), LUMBAR: Primary | ICD-10-CM

## 2020-10-22 DIAGNOSIS — M47.816 LUMBAR SPONDYLOSIS: ICD-10-CM

## 2020-12-08 ENCOUNTER — HOSPITAL ENCOUNTER (OUTPATIENT)
Dept: MRI IMAGING | Age: 70
Discharge: HOME | End: 2020-12-08
Attending: NURSE PRACTITIONER
Payer: MEDICARE

## 2020-12-08 DIAGNOSIS — M51.36 DDD (DEGENERATIVE DISC DISEASE), LUMBAR: ICD-10-CM

## 2020-12-08 DIAGNOSIS — R29.898 LEG WEAKNESS, BILATERAL: ICD-10-CM

## 2020-12-08 DIAGNOSIS — M47.816 LUMBAR SPONDYLOSIS: ICD-10-CM

## 2020-12-08 PROCEDURE — 72148 MRI LUMBAR SPINE W/O DYE: CPT

## 2020-12-29 RX ORDER — METOPROLOL SUCCINATE 100 MG/1
TABLET, EXTENDED RELEASE ORAL
Qty: 135 TAB | Refills: 1 | Status: ON HOLD | OUTPATIENT
Start: 2020-12-29 | End: 2021-03-19 | Stop reason: SDUPTHER

## 2021-01-01 ENCOUNTER — DOCUMENTATION ONLY (OUTPATIENT)
Dept: CARDIOLOGY CLINIC | Age: 71
End: 2021-01-01

## 2021-01-01 ENCOUNTER — TELEPHONE (OUTPATIENT)
Dept: ENDOCRINOLOGY | Age: 71
End: 2021-01-01

## 2021-01-01 ENCOUNTER — OFFICE VISIT (OUTPATIENT)
Dept: CARDIOLOGY CLINIC | Age: 71
End: 2021-01-01
Payer: MEDICARE

## 2021-01-01 ENCOUNTER — APPOINTMENT (OUTPATIENT)
Dept: GENERAL RADIOLOGY | Age: 71
End: 2021-01-01
Attending: PHYSICAL MEDICINE & REHABILITATION
Payer: MEDICARE

## 2021-01-01 ENCOUNTER — TELEPHONE (OUTPATIENT)
Dept: CARDIOLOGY CLINIC | Age: 71
End: 2021-01-01

## 2021-01-01 ENCOUNTER — VIRTUAL VISIT (OUTPATIENT)
Dept: ENDOCRINOLOGY | Age: 71
End: 2021-01-01
Payer: MEDICARE

## 2021-01-01 ENCOUNTER — OFFICE VISIT (OUTPATIENT)
Dept: ENDOCRINOLOGY | Age: 71
End: 2021-01-01
Payer: MEDICARE

## 2021-01-01 ENCOUNTER — HOSPITAL ENCOUNTER (OUTPATIENT)
Age: 71
Setting detail: OUTPATIENT SURGERY
Discharge: HOME | End: 2021-12-21
Attending: SURGERY | Admitting: SURGERY
Payer: MEDICARE

## 2021-01-01 ENCOUNTER — HOSPITAL ENCOUNTER (OUTPATIENT)
Dept: WOUND CARE | Age: 71
Discharge: HOME | End: 2021-08-16
Admitting: SURGERY
Payer: MEDICARE

## 2021-01-01 ENCOUNTER — HOSPITAL ENCOUNTER (OUTPATIENT)
Dept: WOUND CARE | Age: 71
Discharge: HOME | End: 2021-06-21
Admitting: SURGERY
Payer: MEDICARE

## 2021-01-01 ENCOUNTER — ANESTHESIA (OUTPATIENT)
Dept: SURGERY | Age: 71
End: 2021-01-01
Payer: MEDICARE

## 2021-01-01 ENCOUNTER — TRANSCRIBE ORDER (OUTPATIENT)
Dept: ENDOCRINOLOGY | Age: 71
End: 2021-01-01

## 2021-01-01 ENCOUNTER — HOSPITAL ENCOUNTER (OUTPATIENT)
Age: 71
Setting detail: OUTPATIENT SURGERY
Discharge: HOME | End: 2021-09-28
Attending: PHYSICAL MEDICINE & REHABILITATION | Admitting: PHYSICAL MEDICINE & REHABILITATION
Payer: MEDICARE

## 2021-01-01 ENCOUNTER — ANESTHESIA EVENT (OUTPATIENT)
Dept: SURGERY | Age: 71
End: 2021-01-01
Payer: MEDICARE

## 2021-01-01 ENCOUNTER — HOSPITAL ENCOUNTER (OUTPATIENT)
Dept: PREADMISSION TESTING | Age: 71
Discharge: HOME | End: 2021-10-18
Payer: MEDICARE

## 2021-01-01 ENCOUNTER — HOSPITAL ENCOUNTER (OUTPATIENT)
Dept: PREADMISSION TESTING | Age: 71
Discharge: HOME | End: 2021-12-17
Payer: MEDICARE

## 2021-01-01 VITALS
TEMPERATURE: 98.2 F | HEIGHT: 69 IN | WEIGHT: 250.88 LBS | OXYGEN SATURATION: 95 % | BODY MASS INDEX: 37.16 KG/M2 | DIASTOLIC BLOOD PRESSURE: 43 MMHG | SYSTOLIC BLOOD PRESSURE: 111 MMHG | HEART RATE: 75 BPM | RESPIRATION RATE: 12 BRPM

## 2021-01-01 VITALS
RESPIRATION RATE: 16 BRPM | HEART RATE: 75 BPM | TEMPERATURE: 97.6 F | WEIGHT: 249.6 LBS | DIASTOLIC BLOOD PRESSURE: 80 MMHG | SYSTOLIC BLOOD PRESSURE: 158 MMHG | HEIGHT: 70 IN | OXYGEN SATURATION: 98 % | BODY MASS INDEX: 35.73 KG/M2

## 2021-01-01 VITALS
DIASTOLIC BLOOD PRESSURE: 72 MMHG | DIASTOLIC BLOOD PRESSURE: 72 MMHG | BODY MASS INDEX: 36.43 KG/M2 | RESPIRATION RATE: 18 BRPM | SYSTOLIC BLOOD PRESSURE: 148 MMHG | OXYGEN SATURATION: 99 % | HEIGHT: 69 IN | WEIGHT: 246 LBS | RESPIRATION RATE: 16 BRPM | HEART RATE: 74 BPM | TEMPERATURE: 98.4 F | HEART RATE: 75 BPM | SYSTOLIC BLOOD PRESSURE: 130 MMHG

## 2021-01-01 VITALS
BODY MASS INDEX: 37.16 KG/M2 | WEIGHT: 250.9 LBS | SYSTOLIC BLOOD PRESSURE: 147 MMHG | OXYGEN SATURATION: 98 % | DIASTOLIC BLOOD PRESSURE: 75 MMHG | RESPIRATION RATE: 20 BRPM | HEART RATE: 75 BPM | HEIGHT: 69 IN

## 2021-01-01 VITALS
HEART RATE: 76 BPM | SYSTOLIC BLOOD PRESSURE: 116 MMHG | WEIGHT: 248 LBS | BODY MASS INDEX: 36.73 KG/M2 | RESPIRATION RATE: 18 BRPM | DIASTOLIC BLOOD PRESSURE: 70 MMHG | HEIGHT: 69 IN

## 2021-01-01 VITALS
WEIGHT: 241.6 LBS | OXYGEN SATURATION: 98 % | HEART RATE: 77 BPM | HEIGHT: 69 IN | SYSTOLIC BLOOD PRESSURE: 134 MMHG | BODY MASS INDEX: 35.78 KG/M2 | DIASTOLIC BLOOD PRESSURE: 66 MMHG | RESPIRATION RATE: 18 BRPM

## 2021-01-01 VITALS
TEMPERATURE: 98 F | HEART RATE: 74 BPM | RESPIRATION RATE: 18 BRPM | SYSTOLIC BLOOD PRESSURE: 168 MMHG | DIASTOLIC BLOOD PRESSURE: 74 MMHG

## 2021-01-01 DIAGNOSIS — I48.20 CHRONIC A-FIB (HCC): ICD-10-CM

## 2021-01-01 DIAGNOSIS — E55.9 VITAMIN D DEFICIENCY: ICD-10-CM

## 2021-01-01 DIAGNOSIS — Z95.1 HX OF CABG: ICD-10-CM

## 2021-01-01 DIAGNOSIS — Z95.0 CARDIAC PACEMAKER IN SITU: Primary | ICD-10-CM

## 2021-01-01 DIAGNOSIS — I25.10 ASHD (ARTERIOSCLEROTIC HEART DISEASE): Primary | ICD-10-CM

## 2021-01-01 DIAGNOSIS — Z95.0 CARDIAC PACEMAKER IN SITU: ICD-10-CM

## 2021-01-01 DIAGNOSIS — Z79.4 TYPE 2 DIABETES MELLITUS WITH HYPERGLYCEMIA, WITH LONG-TERM CURRENT USE OF INSULIN (HCC): ICD-10-CM

## 2021-01-01 DIAGNOSIS — N18.6 ESRD (END STAGE RENAL DISEASE) (HCC): Primary | ICD-10-CM

## 2021-01-01 DIAGNOSIS — N18.6 CKD (CHRONIC KIDNEY DISEASE) STAGE V REQUIRING CHRONIC DIALYSIS (HCC): ICD-10-CM

## 2021-01-01 DIAGNOSIS — E78.5 HYPERLIPIDEMIA, UNSPECIFIED HYPERLIPIDEMIA TYPE: ICD-10-CM

## 2021-01-01 DIAGNOSIS — E11.22 TYPE 2 DIABETES MELLITUS WITH STAGE 4 CHRONIC KIDNEY DISEASE, WITH LONG-TERM CURRENT USE OF INSULIN (HCC): Primary | ICD-10-CM

## 2021-01-01 DIAGNOSIS — I42.0 DILATED CARDIOMYOPATHY (HCC): ICD-10-CM

## 2021-01-01 DIAGNOSIS — Z99.2 CKD (CHRONIC KIDNEY DISEASE) STAGE V REQUIRING CHRONIC DIALYSIS (HCC): ICD-10-CM

## 2021-01-01 DIAGNOSIS — L97.922 NON-PRESSURE CHRONIC ULCER OF LEFT LOWER LEG WITH FAT LAYER EXPOSED (HCC): ICD-10-CM

## 2021-01-01 DIAGNOSIS — R60.0 BILATERAL LEG EDEMA: ICD-10-CM

## 2021-01-01 DIAGNOSIS — E11.21 TYPE 2 DIABETES WITH NEPHROPATHY (HCC): ICD-10-CM

## 2021-01-01 DIAGNOSIS — Z79.4 TYPE 2 DIABETES MELLITUS WITH STAGE 4 CHRONIC KIDNEY DISEASE, WITH LONG-TERM CURRENT USE OF INSULIN (HCC): Primary | ICD-10-CM

## 2021-01-01 DIAGNOSIS — I48.20 CHRONIC A-FIB (HCC): Primary | ICD-10-CM

## 2021-01-01 DIAGNOSIS — E11.51 TYPE II DIABETES MELLITUS WITH PERIPHERAL CIRCULATORY DISORDER (HCC): ICD-10-CM

## 2021-01-01 DIAGNOSIS — Z98.890 H/O ATRIOVENTRICULAR NODAL ABLATION: ICD-10-CM

## 2021-01-01 DIAGNOSIS — E03.9 ACQUIRED HYPOTHYROIDISM: ICD-10-CM

## 2021-01-01 DIAGNOSIS — E11.319 TYPE 2 DIABETES MELLITUS WITH RETINOPATHY, WITH LONG-TERM CURRENT USE OF INSULIN, MACULAR EDEMA PRESENCE UNSPECIFIED, UNSPECIFIED LATERALITY, UNSPECIFIED RETINOPATHY SEVERITY (HCC): ICD-10-CM

## 2021-01-01 DIAGNOSIS — E66.01 MORBID OBESITY (HCC): ICD-10-CM

## 2021-01-01 DIAGNOSIS — I73.9 PAD (PERIPHERAL ARTERY DISEASE) (HCC): ICD-10-CM

## 2021-01-01 DIAGNOSIS — N18.4 TYPE 2 DIABETES MELLITUS WITH STAGE 4 CHRONIC KIDNEY DISEASE, WITH LONG-TERM CURRENT USE OF INSULIN (HCC): Primary | ICD-10-CM

## 2021-01-01 DIAGNOSIS — E11.65 TYPE 2 DIABETES MELLITUS WITH HYPERGLYCEMIA, WITH LONG-TERM CURRENT USE OF INSULIN (HCC): ICD-10-CM

## 2021-01-01 DIAGNOSIS — L89.46: ICD-10-CM

## 2021-01-01 DIAGNOSIS — I10 ESSENTIAL HYPERTENSION: ICD-10-CM

## 2021-01-01 DIAGNOSIS — N18.4 CKD (CHRONIC KIDNEY DISEASE), STAGE IV (HCC): ICD-10-CM

## 2021-01-01 DIAGNOSIS — E78.2 MIXED HYPERLIPIDEMIA: ICD-10-CM

## 2021-01-01 DIAGNOSIS — Z79.4 TYPE 2 DIABETES MELLITUS WITH RETINOPATHY, WITH LONG-TERM CURRENT USE OF INSULIN, MACULAR EDEMA PRESENCE UNSPECIFIED, UNSPECIFIED LATERALITY, UNSPECIFIED RETINOPATHY SEVERITY (HCC): ICD-10-CM

## 2021-01-01 DIAGNOSIS — I10 ESSENTIAL HYPERTENSION WITH GOAL BLOOD PRESSURE LESS THAN 130/80: ICD-10-CM

## 2021-01-01 DIAGNOSIS — I25.10 CORONARY ARTERY DISEASE INVOLVING NATIVE CORONARY ARTERY OF NATIVE HEART WITHOUT ANGINA PECTORIS: Primary | ICD-10-CM

## 2021-01-01 DIAGNOSIS — E11.42 DIABETIC POLYNEUROPATHY ASSOCIATED WITH TYPE 2 DIABETES MELLITUS (HCC): ICD-10-CM

## 2021-01-01 DIAGNOSIS — E03.9 HYPOTHYROIDISM, UNSPECIFIED TYPE: ICD-10-CM

## 2021-01-01 DIAGNOSIS — I25.10 CORONARY ARTERY DISEASE INVOLVING NATIVE HEART, UNSPECIFIED VESSEL OR LESION TYPE, UNSPECIFIED WHETHER ANGINA PRESENT: ICD-10-CM

## 2021-01-01 LAB
ALBUMIN SERPL-MCNC: 4.2 G/DL (ref 3.7–4.7)
ALBUMIN/GLOB SERPL: 1.8 {RATIO} (ref 1.2–2.2)
ALP SERPL-CCNC: 54 IU/L (ref 44–121)
ALT SERPL-CCNC: 21 IU/L (ref 0–44)
ANION GAP BLD CALC-SCNC: 11 MMOL/L (ref 10–20)
AST SERPL-CCNC: 25 IU/L (ref 0–40)
BILIRUB SERPL-MCNC: 0.3 MG/DL (ref 0–1.2)
BUN SERPL-MCNC: 57 MG/DL (ref 8–27)
BUN/CREAT SERPL: 15 (ref 10–24)
CA-I BLD-MCNC: 1.05 MMOL/L (ref 1.12–1.32)
CALCIUM SERPL-MCNC: 8.4 MG/DL (ref 8.6–10.2)
CHLORIDE BLD-SCNC: 101 MMOL/L (ref 98–107)
CHLORIDE SERPL-SCNC: 101 MMOL/L (ref 96–106)
CHOLEST SERPL-MCNC: 121 MG/DL (ref 100–199)
CO2 BLD-SCNC: 31.3 MMOL/L (ref 21–32)
CO2 SERPL-SCNC: 24 MMOL/L (ref 20–29)
CREAT BLD-MCNC: 3.33 MG/DL (ref 0.6–1.3)
CREAT SERPL-MCNC: 3.76 MG/DL (ref 0.76–1.27)
GLOBULIN SER CALC-MCNC: 2.3 G/DL (ref 1.5–4.5)
GLUCOSE BLD STRIP.AUTO-MCNC: 160 MG/DL (ref 65–117)
GLUCOSE BLD STRIP.AUTO-MCNC: 160 MG/DL (ref 65–117)
GLUCOSE BLD-MCNC: 160 MG/DL (ref 65–100)
GLUCOSE SERPL-MCNC: 245 MG/DL (ref 65–99)
HBA1C MFR BLD HPLC: 6.5 %
HDLC SERPL-MCNC: 45 MG/DL
LDLC SERPL CALC-MCNC: 56 MG/DL (ref 0–99)
POTASSIUM BLD-SCNC: 3.9 MMOL/L (ref 3.5–5.1)
POTASSIUM SERPL-SCNC: 4.7 MMOL/L (ref 3.5–5.2)
PROT SERPL-MCNC: 6.5 G/DL (ref 6–8.5)
SARS-COV-2, XPLCVT: NOT DETECTED
SARS-COV-2, XPLCVT: NOT DETECTED
SERVICE CMNT-IMP: ABNORMAL
SODIUM BLD-SCNC: 142 MMOL/L (ref 136–145)
SODIUM SERPL-SCNC: 141 MMOL/L (ref 134–144)
SOURCE, COVRS: NORMAL
SOURCE, COVRS: NORMAL
TRIGL SERPL-MCNC: 106 MG/DL (ref 0–149)
VLDLC SERPL CALC-MCNC: 20 MG/DL (ref 5–40)

## 2021-01-01 PROCEDURE — 82962 GLUCOSE BLOOD TEST: CPT

## 2021-01-01 PROCEDURE — 76000 FLUOROSCOPY <1 HR PHYS/QHP: CPT

## 2021-01-01 PROCEDURE — G8427 DOCREV CUR MEDS BY ELIG CLIN: HCPCS | Performed by: NURSE PRACTITIONER

## 2021-01-01 PROCEDURE — G0463 HOSPITAL OUTPT CLINIC VISIT: HCPCS | Performed by: INTERNAL MEDICINE

## 2021-01-01 PROCEDURE — 1101F PT FALLS ASSESS-DOCD LE1/YR: CPT | Performed by: INTERNAL MEDICINE

## 2021-01-01 PROCEDURE — 77030031139 HC SUT VCRL2 J&J -A: Performed by: SURGERY

## 2021-01-01 PROCEDURE — 77030038692 HC WND DEB SYS IRMX -B: Performed by: SURGERY

## 2021-01-01 PROCEDURE — 99215 OFFICE O/P EST HI 40 MIN: CPT | Performed by: INTERNAL MEDICINE

## 2021-01-01 PROCEDURE — 76210000017 HC OR PH I REC 1.5 TO 2 HR: Performed by: SURGERY

## 2021-01-01 PROCEDURE — 2709999900 HC NON-CHARGEABLE SUPPLY: Performed by: PHYSICAL MEDICINE & REHABILITATION

## 2021-01-01 PROCEDURE — 74011250636 HC RX REV CODE- 250/636: Performed by: ANESTHESIOLOGY

## 2021-01-01 PROCEDURE — 93296 REM INTERROG EVL PM/IDS: CPT | Performed by: INTERNAL MEDICINE

## 2021-01-01 PROCEDURE — 77030002986 HC SUT PROL J&J -A: Performed by: SURGERY

## 2021-01-01 PROCEDURE — G8432 DEP SCR NOT DOC, RNG: HCPCS | Performed by: INTERNAL MEDICINE

## 2021-01-01 PROCEDURE — G8417 CALC BMI ABV UP PARAM F/U: HCPCS | Performed by: NURSE PRACTITIONER

## 2021-01-01 PROCEDURE — A4565 SLINGS: HCPCS

## 2021-01-01 PROCEDURE — 99214 OFFICE O/P EST MOD 30 MIN: CPT | Performed by: NURSE PRACTITIONER

## 2021-01-01 PROCEDURE — 99214 OFFICE O/P EST MOD 30 MIN: CPT | Performed by: INTERNAL MEDICINE

## 2021-01-01 PROCEDURE — 74011250636 HC RX REV CODE- 250/636: Performed by: NURSE ANESTHETIST, CERTIFIED REGISTERED

## 2021-01-01 PROCEDURE — G8510 SCR DEP NEG, NO PLAN REQD: HCPCS | Performed by: INTERNAL MEDICINE

## 2021-01-01 PROCEDURE — 72020 X-RAY EXAM OF SPINE 1 VIEW: CPT

## 2021-01-01 PROCEDURE — 74011000250 HC RX REV CODE- 250: Performed by: SURGERY

## 2021-01-01 PROCEDURE — 74011250637 HC RX REV CODE- 250/637: Performed by: SURGERY

## 2021-01-01 PROCEDURE — 95251 CONT GLUC MNTR ANALYSIS I&R: CPT | Performed by: INTERNAL MEDICINE

## 2021-01-01 PROCEDURE — 74011250636 HC RX REV CODE- 250/636: Performed by: SURGERY

## 2021-01-01 PROCEDURE — 3046F HEMOGLOBIN A1C LEVEL >9.0%: CPT | Performed by: INTERNAL MEDICINE

## 2021-01-01 PROCEDURE — 77030003665 HC NDL SPN BBMI -A: Performed by: PHYSICAL MEDICINE & REHABILITATION

## 2021-01-01 PROCEDURE — G8417 CALC BMI ABV UP PARAM F/U: HCPCS | Performed by: INTERNAL MEDICINE

## 2021-01-01 PROCEDURE — G9231 DOC ESRD DIA TRANS PREG: HCPCS | Performed by: INTERNAL MEDICINE

## 2021-01-01 PROCEDURE — 2709999900 HC NON-CHARGEABLE SUPPLY: Performed by: SURGERY

## 2021-01-01 PROCEDURE — 77030002916 HC SUT ETHLN J&J -A: Performed by: SURGERY

## 2021-01-01 PROCEDURE — 1111F DSCHRG MED/CURRENT MED MERGE: CPT | Performed by: INTERNAL MEDICINE

## 2021-01-01 PROCEDURE — 99213 OFFICE O/P EST LOW 20 MIN: CPT | Performed by: SURGERY

## 2021-01-01 PROCEDURE — 3017F COLORECTAL CA SCREEN DOC REV: CPT | Performed by: INTERNAL MEDICINE

## 2021-01-01 PROCEDURE — G8432 DEP SCR NOT DOC, RNG: HCPCS | Performed by: NURSE PRACTITIONER

## 2021-01-01 PROCEDURE — 1101F PT FALLS ASSESS-DOCD LE1/YR: CPT | Performed by: NURSE PRACTITIONER

## 2021-01-01 PROCEDURE — 99214 OFFICE O/P EST MOD 30 MIN: CPT | Performed by: SURGERY

## 2021-01-01 PROCEDURE — G0463 HOSPITAL OUTPT CLINIC VISIT: HCPCS | Performed by: NURSE PRACTITIONER

## 2021-01-01 PROCEDURE — 76210000020 HC REC RM PH II FIRST 0.5 HR: Performed by: SURGERY

## 2021-01-01 PROCEDURE — 76010000133 HC OR TIME 3 TO 3.5 HR: Performed by: SURGERY

## 2021-01-01 PROCEDURE — G8427 DOCREV CUR MEDS BY ELIG CLIN: HCPCS | Performed by: INTERNAL MEDICINE

## 2021-01-01 PROCEDURE — 99212 OFFICE O/P EST SF 10 MIN: CPT

## 2021-01-01 PROCEDURE — 77030002924 HC SUT GORTX WLGO -B: Performed by: SURGERY

## 2021-01-01 PROCEDURE — 93294 REM INTERROG EVL PM/LDLS PM: CPT | Performed by: INTERNAL MEDICINE

## 2021-01-01 PROCEDURE — 83036 HEMOGLOBIN GLYCOSYLATED A1C: CPT | Performed by: INTERNAL MEDICINE

## 2021-01-01 PROCEDURE — 93280 PM DEVICE PROGR EVAL DUAL: CPT | Performed by: INTERNAL MEDICINE

## 2021-01-01 PROCEDURE — 76060000037 HC ANESTHESIA 3 TO 3.5 HR: Performed by: SURGERY

## 2021-01-01 PROCEDURE — 93010 ELECTROCARDIOGRAM REPORT: CPT | Performed by: INTERNAL MEDICINE

## 2021-01-01 PROCEDURE — 99213 OFFICE O/P EST LOW 20 MIN: CPT

## 2021-01-01 PROCEDURE — 76030000002 HC AMB SURG OR TIME FIRST 0.: Performed by: PHYSICAL MEDICINE & REHABILITATION

## 2021-01-01 PROCEDURE — 77030008463 HC STPLR SKN PROX J&J -B: Performed by: SURGERY

## 2021-01-01 PROCEDURE — 80047 BASIC METABLC PNL IONIZED CA: CPT

## 2021-01-01 PROCEDURE — 77030010938 HC CLP LIG TELE -A: Performed by: SURGERY

## 2021-01-01 PROCEDURE — G9231 DOC ESRD DIA TRANS PREG: HCPCS | Performed by: NURSE PRACTITIONER

## 2021-01-01 PROCEDURE — 2022F DILAT RTA XM EVC RTNOPTHY: CPT | Performed by: INTERNAL MEDICINE

## 2021-01-01 PROCEDURE — 76210000046 HC AMBSU PH II REC FIRST 0.5 HR: Performed by: PHYSICAL MEDICINE & REHABILITATION

## 2021-01-01 PROCEDURE — G8536 NO DOC ELDER MAL SCRN: HCPCS | Performed by: NURSE PRACTITIONER

## 2021-01-01 PROCEDURE — 3017F COLORECTAL CA SCREEN DOC REV: CPT | Performed by: NURSE PRACTITIONER

## 2021-01-01 PROCEDURE — 74011000250 HC RX REV CODE- 250: Performed by: NURSE ANESTHETIST, CERTIFIED REGISTERED

## 2021-01-01 PROCEDURE — 93005 ELECTROCARDIOGRAM TRACING: CPT | Performed by: INTERNAL MEDICINE

## 2021-01-01 PROCEDURE — 74011000250 HC RX REV CODE- 250: Performed by: ANESTHESIOLOGY

## 2021-01-01 PROCEDURE — U0005 INFEC AGEN DETEC AMPLI PROBE: HCPCS

## 2021-01-01 PROCEDURE — 74011000250 HC RX REV CODE- 250: Performed by: PHYSICAL MEDICINE & REHABILITATION

## 2021-01-01 PROCEDURE — 77030002987 HC SUT PROL J&J -B: Performed by: SURGERY

## 2021-01-01 PROCEDURE — 77030014008 HC SPNG HEMSTAT J&J -C: Performed by: SURGERY

## 2021-01-01 PROCEDURE — 74011000636 HC RX REV CODE- 636: Performed by: PHYSICAL MEDICINE & REHABILITATION

## 2021-01-01 PROCEDURE — G8536 NO DOC ELDER MAL SCRN: HCPCS | Performed by: INTERNAL MEDICINE

## 2021-01-01 PROCEDURE — 77030031753 HC SHR ENDO COAG HARM J&J -E: Performed by: SURGERY

## 2021-01-01 PROCEDURE — 74011250636 HC RX REV CODE- 250/636: Performed by: PHYSICAL MEDICINE & REHABILITATION

## 2021-01-01 RX ORDER — LORATADINE 10 MG/1
10 TABLET ORAL DAILY
COMMUNITY

## 2021-01-01 RX ORDER — PRAVASTATIN SODIUM 80 MG/1
TABLET ORAL
Qty: 90 TABLET | Refills: 3 | Status: SHIPPED | OUTPATIENT
Start: 2021-01-01

## 2021-01-01 RX ORDER — DICLOFENAC SODIUM 10 MG/G
GEL TOPICAL
COMMUNITY
Start: 2021-01-21 | End: 2022-01-01 | Stop reason: ALTCHOICE

## 2021-01-01 RX ORDER — SODIUM CHLORIDE 9 MG/ML
25 INJECTION, SOLUTION INTRAVENOUS CONTINUOUS
Status: DISCONTINUED | OUTPATIENT
Start: 2021-01-01 | End: 2021-01-01 | Stop reason: HOSPADM

## 2021-01-01 RX ORDER — SODIUM CHLORIDE, SODIUM LACTATE, POTASSIUM CHLORIDE, CALCIUM CHLORIDE 600; 310; 30; 20 MG/100ML; MG/100ML; MG/100ML; MG/100ML
100 INJECTION, SOLUTION INTRAVENOUS CONTINUOUS
Status: DISCONTINUED | OUTPATIENT
Start: 2021-01-01 | End: 2021-01-01 | Stop reason: HOSPADM

## 2021-01-01 RX ORDER — FENTANYL CITRATE 50 UG/ML
25 INJECTION, SOLUTION INTRAMUSCULAR; INTRAVENOUS
Status: DISCONTINUED | OUTPATIENT
Start: 2021-01-01 | End: 2021-01-01 | Stop reason: HOSPADM

## 2021-01-01 RX ORDER — KETOCONAZOLE 20 MG/G
CREAM TOPICAL DAILY
COMMUNITY

## 2021-01-01 RX ORDER — FENTANYL CITRATE 50 UG/ML
INJECTION, SOLUTION INTRAMUSCULAR; INTRAVENOUS AS NEEDED
Status: DISCONTINUED | OUTPATIENT
Start: 2021-01-01 | End: 2021-01-01 | Stop reason: HOSPADM

## 2021-01-01 RX ORDER — DIPHENHYDRAMINE HYDROCHLORIDE 50 MG/ML
12.5 INJECTION, SOLUTION INTRAMUSCULAR; INTRAVENOUS AS NEEDED
Status: DISCONTINUED | OUTPATIENT
Start: 2021-01-01 | End: 2021-01-01 | Stop reason: HOSPADM

## 2021-01-01 RX ORDER — HYDROMORPHONE HYDROCHLORIDE 1 MG/ML
0.5 INJECTION, SOLUTION INTRAMUSCULAR; INTRAVENOUS; SUBCUTANEOUS
Status: DISCONTINUED | OUTPATIENT
Start: 2021-01-01 | End: 2021-01-01 | Stop reason: HOSPADM

## 2021-01-01 RX ORDER — BUPIVACAINE HYDROCHLORIDE 5 MG/ML
10 INJECTION, SOLUTION EPIDURAL; INTRACAUDAL ONCE
Status: COMPLETED | OUTPATIENT
Start: 2021-01-01 | End: 2021-01-01

## 2021-01-01 RX ORDER — SODIUM CHLORIDE 0.9 % (FLUSH) 0.9 %
5-40 SYRINGE (ML) INJECTION EVERY 8 HOURS
Status: DISCONTINUED | OUTPATIENT
Start: 2021-01-01 | End: 2021-01-01 | Stop reason: HOSPADM

## 2021-01-01 RX ORDER — HEPARIN SODIUM 1000 [USP'U]/ML
INJECTION, SOLUTION INTRAVENOUS; SUBCUTANEOUS AS NEEDED
Status: DISCONTINUED | OUTPATIENT
Start: 2021-01-01 | End: 2021-01-01 | Stop reason: HOSPADM

## 2021-01-01 RX ORDER — HYDROMORPHONE HYDROCHLORIDE 2 MG/ML
INJECTION, SOLUTION INTRAMUSCULAR; INTRAVENOUS; SUBCUTANEOUS AS NEEDED
Status: DISCONTINUED | OUTPATIENT
Start: 2021-01-01 | End: 2021-01-01 | Stop reason: HOSPADM

## 2021-01-01 RX ORDER — EPHEDRINE SULFATE/0.9% NACL/PF 50 MG/5 ML
SYRINGE (ML) INTRAVENOUS AS NEEDED
Status: DISCONTINUED | OUTPATIENT
Start: 2021-01-01 | End: 2021-01-01 | Stop reason: HOSPADM

## 2021-01-01 RX ORDER — SODIUM CHLORIDE 0.9 % (FLUSH) 0.9 %
5-40 SYRINGE (ML) INJECTION AS NEEDED
Status: DISCONTINUED | OUTPATIENT
Start: 2021-01-01 | End: 2021-01-01 | Stop reason: HOSPADM

## 2021-01-01 RX ORDER — LIDOCAINE HYDROCHLORIDE 20 MG/ML
INJECTION, SOLUTION EPIDURAL; INFILTRATION; INTRACAUDAL; PERINEURAL
Status: COMPLETED | OUTPATIENT
Start: 2021-01-01 | End: 2021-01-01

## 2021-01-01 RX ORDER — MIDAZOLAM HYDROCHLORIDE 1 MG/ML
INJECTION, SOLUTION INTRAMUSCULAR; INTRAVENOUS AS NEEDED
Status: DISCONTINUED | OUTPATIENT
Start: 2021-01-01 | End: 2021-01-01 | Stop reason: HOSPADM

## 2021-01-01 RX ORDER — LIDOCAINE HYDROCHLORIDE 20 MG/ML
10 INJECTION, SOLUTION INFILTRATION; PERINEURAL ONCE
Status: COMPLETED | OUTPATIENT
Start: 2021-01-01 | End: 2021-01-01

## 2021-01-01 RX ORDER — VANCOMYCIN/0.9 % SOD CHLORIDE 1.5G/250ML
1500 PLASTIC BAG, INJECTION (ML) INTRAVENOUS ONCE
Status: COMPLETED | OUTPATIENT
Start: 2021-01-01 | End: 2021-01-01

## 2021-01-01 RX ORDER — ONDANSETRON 2 MG/ML
4 INJECTION INTRAMUSCULAR; INTRAVENOUS AS NEEDED
Status: DISCONTINUED | OUTPATIENT
Start: 2021-01-01 | End: 2021-01-01 | Stop reason: HOSPADM

## 2021-01-01 RX ORDER — PHENYLEPHRINE HCL IN 0.9% NACL 0.4MG/10ML
SYRINGE (ML) INTRAVENOUS AS NEEDED
Status: DISCONTINUED | OUTPATIENT
Start: 2021-01-01 | End: 2021-01-01 | Stop reason: HOSPADM

## 2021-01-01 RX ORDER — LIDOCAINE HYDROCHLORIDE 10 MG/ML
0.1 INJECTION, SOLUTION EPIDURAL; INFILTRATION; INTRACAUDAL; PERINEURAL AS NEEDED
Status: DISCONTINUED | OUTPATIENT
Start: 2021-01-01 | End: 2021-01-01 | Stop reason: HOSPADM

## 2021-01-01 RX ORDER — MORPHINE SULFATE 2 MG/ML
2 INJECTION, SOLUTION INTRAMUSCULAR; INTRAVENOUS
Status: DISCONTINUED | OUTPATIENT
Start: 2021-01-01 | End: 2021-01-01 | Stop reason: HOSPADM

## 2021-01-01 RX ORDER — DEXAMETHASONE SODIUM PHOSPHATE 4 MG/ML
INJECTION, SOLUTION INTRA-ARTICULAR; INTRALESIONAL; INTRAMUSCULAR; INTRAVENOUS; SOFT TISSUE AS NEEDED
Status: DISCONTINUED | OUTPATIENT
Start: 2021-01-01 | End: 2021-01-01 | Stop reason: HOSPADM

## 2021-01-01 RX ORDER — INSULIN GLARGINE 100 [IU]/ML
20 INJECTION, SOLUTION SUBCUTANEOUS
Qty: 15 PEN | Refills: 1 | Status: SHIPPED | OUTPATIENT
Start: 2021-01-01

## 2021-01-01 RX ORDER — MIDAZOLAM HYDROCHLORIDE 1 MG/ML
1 INJECTION, SOLUTION INTRAMUSCULAR; INTRAVENOUS AS NEEDED
Status: DISCONTINUED | OUTPATIENT
Start: 2021-01-01 | End: 2021-01-01 | Stop reason: HOSPADM

## 2021-01-01 RX ORDER — HYDROMORPHONE HYDROCHLORIDE 2 MG/1
2 TABLET ORAL
Qty: 25 TABLET | Refills: 0 | Status: SHIPPED | OUTPATIENT
Start: 2021-01-01 | End: 2021-01-01

## 2021-01-01 RX ORDER — ACETAMINOPHEN 325 MG/1
650 TABLET ORAL ONCE
Status: DISCONTINUED | OUTPATIENT
Start: 2021-01-01 | End: 2021-01-01 | Stop reason: HOSPADM

## 2021-01-01 RX ORDER — ROPIVACAINE HYDROCHLORIDE 5 MG/ML
30 INJECTION, SOLUTION EPIDURAL; INFILTRATION; PERINEURAL AS NEEDED
Status: DISCONTINUED | OUTPATIENT
Start: 2021-01-01 | End: 2021-01-01 | Stop reason: HOSPADM

## 2021-01-01 RX ORDER — LEVOTHYROXINE SODIUM 137 UG/1
TABLET ORAL
Qty: 90 TABLET | Refills: 1 | Status: SHIPPED | OUTPATIENT
Start: 2021-01-01

## 2021-01-01 RX ORDER — FENTANYL CITRATE 50 UG/ML
50 INJECTION, SOLUTION INTRAMUSCULAR; INTRAVENOUS AS NEEDED
Status: DISCONTINUED | OUTPATIENT
Start: 2021-01-01 | End: 2021-01-01 | Stop reason: HOSPADM

## 2021-01-01 RX ORDER — PHENYLEPHRINE HCL IN 0.9% NACL 0.4MG/10ML
SYRINGE (ML) INTRAVENOUS
Status: DISCONTINUED | OUTPATIENT
Start: 2021-01-01 | End: 2021-01-01

## 2021-01-01 RX ORDER — METHYLPREDNISOLONE ACETATE 40 MG/ML
80 INJECTION, SUSPENSION INTRA-ARTICULAR; INTRALESIONAL; INTRAMUSCULAR; SOFT TISSUE ONCE
Status: COMPLETED | OUTPATIENT
Start: 2021-01-01 | End: 2021-01-01

## 2021-01-01 RX ORDER — MIDAZOLAM HYDROCHLORIDE 1 MG/ML
0.5 INJECTION, SOLUTION INTRAMUSCULAR; INTRAVENOUS
Status: DISCONTINUED | OUTPATIENT
Start: 2021-01-01 | End: 2021-01-01 | Stop reason: HOSPADM

## 2021-01-01 RX ORDER — HEPARIN SODIUM 1000 [USP'U]/ML
1900 INJECTION, SOLUTION INTRAVENOUS; SUBCUTANEOUS ONCE
Status: COMPLETED | OUTPATIENT
Start: 2021-01-01 | End: 2021-01-01

## 2021-01-01 RX ORDER — FENTANYL CITRATE 50 UG/ML
25 INJECTION, SOLUTION INTRAMUSCULAR; INTRAVENOUS ONCE
Status: COMPLETED | OUTPATIENT
Start: 2021-01-01 | End: 2021-01-01

## 2021-01-01 RX ORDER — ONDANSETRON 2 MG/ML
INJECTION INTRAMUSCULAR; INTRAVENOUS AS NEEDED
Status: DISCONTINUED | OUTPATIENT
Start: 2021-01-01 | End: 2021-01-01 | Stop reason: HOSPADM

## 2021-01-01 RX ORDER — LEVOTHYROXINE SODIUM 137 UG/1
137 TABLET ORAL
Qty: 90 TABLET | Refills: 1 | Status: SHIPPED | OUTPATIENT
Start: 2021-01-01 | End: 2021-01-01

## 2021-01-01 RX ORDER — TRIAMCINOLONE ACETONIDE 1 MG/G
CREAM TOPICAL
COMMUNITY
Start: 2021-01-01 | End: 2021-01-01

## 2021-01-01 RX ORDER — PROTAMINE SULFATE 10 MG/ML
INJECTION, SOLUTION INTRAVENOUS AS NEEDED
Status: DISCONTINUED | OUTPATIENT
Start: 2021-01-01 | End: 2021-01-01 | Stop reason: HOSPADM

## 2021-01-01 RX ORDER — DEXMEDETOMIDINE HYDROCHLORIDE 100 UG/ML
INJECTION, SOLUTION INTRAVENOUS AS NEEDED
Status: DISCONTINUED | OUTPATIENT
Start: 2021-01-01 | End: 2021-01-01 | Stop reason: HOSPADM

## 2021-01-01 RX ORDER — PROPOFOL 10 MG/ML
INJECTION, EMULSION INTRAVENOUS AS NEEDED
Status: DISCONTINUED | OUTPATIENT
Start: 2021-01-01 | End: 2021-01-01 | Stop reason: HOSPADM

## 2021-01-01 RX ADMIN — Medication 50 MCG/MIN: at 10:15

## 2021-01-01 RX ADMIN — MIDAZOLAM HYDROCHLORIDE 2 MG: 1 INJECTION, SOLUTION INTRAMUSCULAR; INTRAVENOUS at 10:02

## 2021-01-01 RX ADMIN — PROPOFOL 25 MG: 10 INJECTION, EMULSION INTRAVENOUS at 10:09

## 2021-01-01 RX ADMIN — Medication 3 AMPULE: at 08:09

## 2021-01-01 RX ADMIN — FENTANYL CITRATE 25 MCG: 50 INJECTION, SOLUTION INTRAMUSCULAR; INTRAVENOUS at 11:43

## 2021-01-01 RX ADMIN — FENTANYL CITRATE 25 MCG: 50 INJECTION, SOLUTION INTRAMUSCULAR; INTRAVENOUS at 08:40

## 2021-01-01 RX ADMIN — DEXAMETHASONE SODIUM PHOSPHATE 4 MG: 4 INJECTION, SOLUTION INTRAMUSCULAR; INTRAVENOUS at 11:30

## 2021-01-01 RX ADMIN — VANCOMYCIN HYDROCHLORIDE 1500 MG: 10 INJECTION, POWDER, LYOPHILIZED, FOR SOLUTION INTRAVENOUS at 08:09

## 2021-01-01 RX ADMIN — FENTANYL CITRATE 25 MCG: 50 INJECTION, SOLUTION INTRAMUSCULAR; INTRAVENOUS at 11:03

## 2021-01-01 RX ADMIN — HEPARIN SODIUM 6000 UNITS: 1000 INJECTION, SOLUTION INTRAVENOUS; SUBCUTANEOUS at 12:10

## 2021-01-01 RX ADMIN — Medication 10 MG: at 11:35

## 2021-01-01 RX ADMIN — HYDROMORPHONE HYDROCHLORIDE 0.4 MG: 2 INJECTION, SOLUTION INTRAMUSCULAR; INTRAVENOUS; SUBCUTANEOUS at 13:01

## 2021-01-01 RX ADMIN — Medication 5 MG: at 12:24

## 2021-01-01 RX ADMIN — PROTAMINE SULFATE 40 MG: 10 INJECTION, SOLUTION INTRAVENOUS at 12:47

## 2021-01-01 RX ADMIN — LIDOCAINE HYDROCHLORIDE 30 ML: 20 INJECTION, SOLUTION INTRAVENOUS at 08:30

## 2021-01-01 RX ADMIN — FENTANYL CITRATE 25 MCG: 50 INJECTION, SOLUTION INTRAMUSCULAR; INTRAVENOUS at 11:04

## 2021-01-01 RX ADMIN — ONDANSETRON HYDROCHLORIDE 4 MG: 2 INJECTION, SOLUTION INTRAMUSCULAR; INTRAVENOUS at 11:30

## 2021-01-01 RX ADMIN — HEPARIN SODIUM 1900 UNITS: 1000 INJECTION INTRAVENOUS; SUBCUTANEOUS at 15:32

## 2021-01-01 RX ADMIN — SODIUM CHLORIDE 25 ML/HR: 9 INJECTION, SOLUTION INTRAVENOUS at 08:10

## 2021-01-01 RX ADMIN — WATER 2 G: 1 INJECTION INTRAMUSCULAR; INTRAVENOUS; SUBCUTANEOUS at 10:56

## 2021-01-01 RX ADMIN — FENTANYL CITRATE 25 MCG: 50 INJECTION, SOLUTION INTRAMUSCULAR; INTRAVENOUS at 12:18

## 2021-01-01 RX ADMIN — PROPOFOL 35 MCG/KG/MIN: 10 INJECTION, EMULSION INTRAVENOUS at 10:10

## 2021-01-01 RX ADMIN — Medication 10 MG: at 13:13

## 2021-01-01 RX ADMIN — Medication 5 MG: at 10:17

## 2021-01-01 RX ADMIN — PROPOFOL 70 MG: 10 INJECTION, EMULSION INTRAVENOUS at 10:51

## 2021-01-01 RX ADMIN — DEXMEDETOMIDINE HYDROCHLORIDE 5 MCG: 100 INJECTION, SOLUTION, CONCENTRATE INTRAVENOUS at 10:02

## 2021-01-13 ENCOUNTER — VIRTUAL VISIT (OUTPATIENT)
Dept: ENDOCRINOLOGY | Age: 71
End: 2021-01-13
Payer: MEDICARE

## 2021-01-13 DIAGNOSIS — E03.9 HYPOTHYROIDISM, UNSPECIFIED TYPE: ICD-10-CM

## 2021-01-13 DIAGNOSIS — N18.4 TYPE 2 DIABETES MELLITUS WITH STAGE 4 CHRONIC KIDNEY DISEASE, WITH LONG-TERM CURRENT USE OF INSULIN (HCC): Primary | ICD-10-CM

## 2021-01-13 DIAGNOSIS — E78.5 HYPERLIPIDEMIA, UNSPECIFIED HYPERLIPIDEMIA TYPE: ICD-10-CM

## 2021-01-13 DIAGNOSIS — E11.22 TYPE 2 DIABETES MELLITUS WITH STAGE 4 CHRONIC KIDNEY DISEASE, WITH LONG-TERM CURRENT USE OF INSULIN (HCC): Primary | ICD-10-CM

## 2021-01-13 DIAGNOSIS — I10 ESSENTIAL HYPERTENSION WITH GOAL BLOOD PRESSURE LESS THAN 130/80: ICD-10-CM

## 2021-01-13 DIAGNOSIS — E55.9 VITAMIN D DEFICIENCY: ICD-10-CM

## 2021-01-13 DIAGNOSIS — Z79.4 TYPE 2 DIABETES MELLITUS WITH STAGE 4 CHRONIC KIDNEY DISEASE, WITH LONG-TERM CURRENT USE OF INSULIN (HCC): Primary | ICD-10-CM

## 2021-01-13 PROCEDURE — 3017F COLORECTAL CA SCREEN DOC REV: CPT | Performed by: INTERNAL MEDICINE

## 2021-01-13 PROCEDURE — G8432 DEP SCR NOT DOC, RNG: HCPCS | Performed by: INTERNAL MEDICINE

## 2021-01-13 PROCEDURE — 3046F HEMOGLOBIN A1C LEVEL >9.0%: CPT | Performed by: INTERNAL MEDICINE

## 2021-01-13 PROCEDURE — 1101F PT FALLS ASSESS-DOCD LE1/YR: CPT | Performed by: INTERNAL MEDICINE

## 2021-01-13 PROCEDURE — 2022F DILAT RTA XM EVC RTNOPTHY: CPT | Performed by: INTERNAL MEDICINE

## 2021-01-13 PROCEDURE — G8427 DOCREV CUR MEDS BY ELIG CLIN: HCPCS | Performed by: INTERNAL MEDICINE

## 2021-01-13 PROCEDURE — G9231 DOC ESRD DIA TRANS PREG: HCPCS | Performed by: INTERNAL MEDICINE

## 2021-01-13 PROCEDURE — 99214 OFFICE O/P EST MOD 30 MIN: CPT | Performed by: INTERNAL MEDICINE

## 2021-01-13 NOTE — PROGRESS NOTES
**DUE TO PANDEMIC AND HEALTH CONCERNS IN THE COMMUNITY, THIS PATIENT WAS EITHER ILL OR FOUND TO BE HIGH RISK FOR IN-PERSON EVALUATION WITHIN THE CLINIC. THE FOLLOWING IS A VIRTUAL TELEMEDICINE VIDEO ENCOUNTER VIA [employer], TO WHICH THE PATIENT AGREED. THE PURPOSE IS TO LIMIT INTERRUPTIONS IN HEALTHCARE AND TO PROVIDE FOR ONGOING URGENT NEEDS UNDER THE CURRENT CONDITIONS. CHIEF COMPLAINT: f/u uncontrolled type 2 diabetes    HISTORY OF PRESENT ILLNESS:   Silver Everett is a 69 y.o. male with a PMHx as noted below who presents for evaluation of uncontrolled type 2 diabetes. Last seen in Aug, has not yet completed prelabs for today's visit.      Regimen: (prior instructions)  Lantus: 30 units once each bedtime  Novolog:        Breakfast: 14 units        Lunch:       10 units        Dinner:       10 units  Correction 1:50>150    Review of home glucose:   Log reviewed:   Freestyle Nav 90 day average  AM: [address]  Lunch: [address]  Bedtime: [address]    Hypothyroidism: On 137 mcg of levothyroxine  Vitamin D deficiency: was taken off D by nephrologist per patient    Review of most recent diabetes-related labs:  Lab Results   Component Value Date    HBA1C 7.0 (H) 04/10/2018    HBA1C 7.6 (H) 06/22/2017    HBA1C 8.0 (H) 03/13/2017    NTG9SSBF 6.7 02/13/2020    VEN6CXLP 6.6 10/16/2019    RYR6TAHN 6.5 06/12/2019    CHOL 106 07/30/2020    LDLC 51 07/30/2020    LDL 43 04/03/2017    GFRAA 22 (L) 07/30/2020    GFRNA 19 (L) 07/30/2020    CREATEXT 3.17 12/05/2019    MCACR 137.2 (H) 06/12/2019    TSH 0.957 06/12/2019    VITD3 46.3 04/03/2017     Lab Key:  [id number] = IA-2 pancreatic islet cell autoantibody  CPEPL = C-peptide level  :EXT = External Lab  GADLT = ANGELIQUE-65 autoantibody   INSUL = Insulin level  MCACR (or MALBEXT) = Urine Microalbumin (or External UM)      PAST MEDICAL/SURGICAL HISTORY:   Past Medical History:   Diagnosis Date    Arrhythmia     atrial fibrillation 2017    Arthritis     CAD (coronary atherosclerotic disease)     Chronic kidney disease     Chronic pain     [facility]     Diabetes [facility]     Diabetes mellitus type 2, controlled ([address] 3/13/2017    Heart failure ([address]     Hx of CABG 3/13/2017    CABG in 2010 in [address] Hypertension     Long term current use of anticoagulant therapy     Morbid obesity ([facility] 3/13/2017    JACEY (obstructive sleep apnea) 6/23/2017    S/P CABG x 2 2010    Skin cancer     Thyroid disease      Past Surgical History:   Procedure Laterality Date    CARDIAC SURG PROCEDURE UNLIST      CABGx2 in 2010    HX ORTHOPAEDIC      L 3rd toe amputation in 1999    HX PACEMAKER  2018    Dr Janette Barger       ALLERGIES:   Allergies   Allergen Reactions    Allopurinol Rash    Gabapentin Drowsiness    Ciprofloxacin Nausea Only    Percocet [Oxycodone-Acetaminophen] Other (comments)     hallucinations       MEDICATIONS ON ADMISSION:     Current Outpatient Medications:     metoprolol succinate (TOPROL-XL) 100 mg tablet, TAKE 1 AND 1/2 TABLETS BY MOUTH EVERY DAY, Disp: 135 Tab, Rfl: 1    insulin glargine (Lantus Solostar U-100 Insulin) 100 unit/mL (3 mL) inpn, INJECT 30 UNITS SUBCUTANEOUSLY AT BEDTIME, Disp: 10 Adjustable Dose Pre-filled Pen Syringe, Rfl: 3    psyllium (METAMUCIL) packet, Take 1 Packet by mouth daily. , Disp: , Rfl:     ketoconazole (NIZORAL) 2 % shampoo, 1 APPLICATION TO BACK, BUTTOCKS, THIGHS TWICE WEEKLY EXTERNALLY 30 DAYS, Disp: , Rfl:     insulin aspart U-100 (NovoLOG Flexpen U-100 Insulin) 100 unit/mL (3 mL) inpn, INJECT 10 UNITS SUBCUTANEOUSLY 3 TIMES A DAY WITH EACH MEAL (Patient taking differently: Inject 14 units at Breakfast, 10 with Lunch and Dinner), Disp: 10 Adjustable Dose Pre-filled Pen Syringe, Rfl: 3    omeprazole (PRILOSEC) 40 mg capsule, TAKE 1 CAPSULE BY MOUTH EVERY DAY, Disp: , Rfl:     pravastatin (PRAVACHOL) 80 mg tablet, Take 1 Tab by mouth nightly., Disp: 90 Tab, Rfl: 4    levothyroxine (SYNTHROID) 137 mcg tablet, TAKE 1 TABLET BY MOUTH EVERY DAY, Disp: 90 Tab, Rfl: 3    apixaban (Eliquis) 5 mg tablet, TAKE 1 TABLET BY MOUTH TWICE A DAY, Disp: 180 Tab, Rfl: 3    potassium chloride SR (KLOR-CON 8) 8 mEq tablet, Take 16 mEq by mouth daily. , Disp: , Rfl:     ferrous sulfate (IRON) 325 mg (65 mg iron) tablet, Take 325 mg by mouth Daily (before breakfast). , Disp: , Rfl:     bumetanide (BUMEX) 1 mg tablet, 3 tabs BID, Disp: , Rfl: 3    dutasteride (AVODART) 0.5 mg capsule, Take 0.5 mg by mouth daily. , Disp: , Rfl:     MYRBETRIQ 25 mg ER tablet, TAKE 1 TABLET BY MOUTH EVERY DAY, Disp: , Rfl: 12    febuxostat (ULORIC) 40 mg tab tablet, Take 40 mg by mouth daily. , Disp: , Rfl:     metOLazone (ZAROXOLYN) 2.5 mg tablet, Take 2.5 mg by mouth daily. Taking Tuesday, Thursday, Saturday, Disp: , Rfl:     multivitamin (ONE A DAY) tablet, Take 1 Tab by mouth daily. , Disp: , Rfl:     acetaminophen (TYLENOL EXTRA STRENGTH) 500 mg tablet, Take 1,000 mg by mouth every eight (8) hours as needed for Pain., Disp: , Rfl:     glucose blood VI test strips (blood glucose test) strip, Use to test blood sugars 3 times per day. DX Code: E11.22. USE BRAND PREFERRED BY INSURANCE, Disp: 300 Strip, Rfl: 3    OneTouch Ultra Blue Test Strip strip, USE TO TEST BLOOD SUGARS 3 TIMES PER DAY, Disp: 300 Strip, Rfl: 0    Insulin Needles, Disposable, (BD ULTRA-FINE MINI PEN NEEDLE) 31 gauge x 3/16\" ndle, Use with insulin pens 4 times daily, Disp: 400 Pen Needle, Rfl: 3    ketoconazole (NIZORAL) 2 % topical cream, Apply  to affected area two (2) times daily as needed for Skin Irritation.  Indications: rash, Disp: , Rfl:     SOCIAL HISTORY:   Social History     Socioeconomic History    Marital status:      Spouse name: Not on file    Number of children: Not on file    Years of education: Not on file    Highest education level: Not on file   Occupational History    Not on file   Social Needs    Financial resource strain: Not on file    Food insecurity Worry: Not on file     Inability: Not on file    Transportation needs     Medical: Not on file     Non-medical: Not on file   Tobacco Use    Smoking status: Never Smoker    Smokeless tobacco: Never Used   Substance and Sexual Activity    Alcohol use: Yes     Comment: rare    Drug use: No    Sexual activity: Not on file   Lifestyle    Physical activity     Days per week: Not on file     Minutes per session: Not on file    Stress: Not on file   Relationships    Social connections     Talks on phone: Not on file     Gets together: Not on file     Attends Sabianist service: Not on file     Active member of club or organization: Not on file     Attends meetings of clubs or organizations: Not on file     Relationship status: Not on file    Intimate partner violence     Fear of current or ex partner: Not on file     Emotionally abused: Not on file     Physically abused: Not on file     Forced sexual activity: Not on file   Other Topics Concern     Service Not Asked    Blood Transfusions Not Asked    Caffeine Concern Not Asked    Occupational Exposure Not Asked   Rafiq Abraham Hazards Not Asked    Sleep Concern Not Asked    Stress Concern Not Asked    Weight Concern Not Asked    Special Diet Not Asked    Back Care Not Asked    Exercise Not Asked    Bike Helmet Not Asked    Seat Belt Not Asked    Self-Exams Not Asked   Social History Narrative    Not on file       FAMILY HISTORY:  Family History   Problem Relation Age of Onset    Heart Attack Father     Cancer Father         lymph node    Cancer Mother         breast       REVIEW OF SYSTEMS: Complete ROS assessed and noted for that which is described above, all else are negative.   Eyes: normal  ENT: normal  CVS: normal  Resp: normal  GI: normal  : normal  GYN: normal  Endocrine: normal  Integument: normal  Musculoskeletal: normal  Neuro: normal  Psych: normal    PHYSICAL EXAMINATION:  Telemedicine Visit    GENERAL: NCAT, Appears well nourished  EYES: EOMI, non-icteric, no proptosis  Ear/Nose/Throat: NCAT, no visible inflammation or masses  CARDIOVASCULAR: no cyanosis, no visible JVD  RESPIRATORY: comfortable respirations observed, no cyanosis  MUSCULOSKELETAL: Normal ROM of upper extremities observed  SKIN: No edema, rash, or other significant changes observed  NEUROLOGIC:  AAOx3  PSYCHIATRIC: Normal affect, Normal insight and judgement    REVIEW OF LABORATORY AND RADIOLOGY DATA:   Labs and documentation have been reviewed as described above. ASSESSMENT AND PLAN:   Jaspreet Tran is a 69 y.o. male with a PMHx as noted above who presents for f/u evaluation of uncontrolled type 2 diabetes. Problems:  Type 2 diabetes  Hyperlipidemia  Hypertension  Hypothyroidism  Vitamin D deficiency     Patient did increase his dinner dose to 10 units but his sugars did stay stable. He is inquiring today about the possibility of using trulicity which he did see advertised on TV, and we did note that it is an option, however he did need to know that once he is no longer on insulin 3-4 times per day, he would no longer qualify for his sensor, and it was his preference to continue using the sensor instead. I did advise him that he can always update me with his preferences however. He was having some issues getting his supplies through [employer], so I offered him a list of the other supply stores that he could check in with. I sent him the link as a DATY message that he can access from home. He has not yet completed his labs but he will do so in the next couple weeks, and I will spend additional time reviewing it and advising, and will be sure to share the findings with Dr. Edson Lee.  Plan as follows:    PLAN  Type 2 Diabetes  Medications:  Lantus: 30 units once each bedtime  Novolog:        Breakfast: 14 units        Lunch:       10 units        Dinner:       10 units  Correction 1:50>150  Advised to check glucose 4 times daily    HTN: Telemedicine visit, follows with cardiology, reports blood pressures stable,  HLD: on statin, will update  Vitamin D deficiency: off per nephrologist  Hypothyroidism: 137 mcg once daily, prev stable, will assess with prelabs    LABS: labs prev ordered, he plans to obtain in the new few weeks,     RTC: June 23 at 12:10,    20 minutes spent toward telemedicine visit today of which >50% of this time was spent in counseling and coordination of care. Nish Ba.  [address] [facility]

## 2021-03-04 ENCOUNTER — APPOINTMENT (OUTPATIENT)
Dept: GENERAL RADIOLOGY | Age: 71
DRG: 291 | End: 2021-03-04
Attending: STUDENT IN AN ORGANIZED HEALTH CARE EDUCATION/TRAINING PROGRAM
Payer: MEDICARE

## 2021-03-04 ENCOUNTER — HOSPITAL ENCOUNTER (INPATIENT)
Age: 71
LOS: 15 days | Discharge: HOME HEALTH | DRG: 291 | End: 2021-03-19
Attending: EMERGENCY MEDICINE | Admitting: STUDENT IN AN ORGANIZED HEALTH CARE EDUCATION/TRAINING PROGRAM
Payer: MEDICARE

## 2021-03-04 ENCOUNTER — APPOINTMENT (OUTPATIENT)
Dept: CT IMAGING | Age: 71
DRG: 291 | End: 2021-03-04
Attending: STUDENT IN AN ORGANIZED HEALTH CARE EDUCATION/TRAINING PROGRAM
Payer: MEDICARE

## 2021-03-04 DIAGNOSIS — I50.32 CHRONIC DIASTOLIC HEART FAILURE (HCC): ICD-10-CM

## 2021-03-04 DIAGNOSIS — Z86.39 HISTORY OF DIABETES MELLITUS, TYPE II: ICD-10-CM

## 2021-03-04 DIAGNOSIS — G47.33 OSA (OBSTRUCTIVE SLEEP APNEA): ICD-10-CM

## 2021-03-04 DIAGNOSIS — I50.9 ACUTE ON CHRONIC CONGESTIVE HEART FAILURE, UNSPECIFIED HEART FAILURE TYPE (HCC): Primary | ICD-10-CM

## 2021-03-04 DIAGNOSIS — N17.9 ACUTE KIDNEY INJURY (HCC): ICD-10-CM

## 2021-03-04 DIAGNOSIS — R06.02 SOB (SHORTNESS OF BREATH): ICD-10-CM

## 2021-03-04 DIAGNOSIS — N18.4 CKD (CHRONIC KIDNEY DISEASE) STAGE 4, GFR 15-29 ML/MIN (HCC): ICD-10-CM

## 2021-03-04 PROBLEM — J96.00 ACUTE RESPIRATORY FAILURE (HCC): Status: ACTIVE | Noted: 2021-03-04

## 2021-03-04 LAB
ALBUMIN SERPL-MCNC: 3.3 G/DL (ref 3.5–5)
ALBUMIN/GLOB SERPL: 0.9 {RATIO} (ref 1.1–2.2)
ALP SERPL-CCNC: 42 U/L (ref 45–117)
ALT SERPL-CCNC: 19 U/L (ref 12–78)
ANION GAP SERPL CALC-SCNC: 7 MMOL/L (ref 5–15)
AST SERPL-CCNC: 32 U/L (ref 15–37)
BASOPHILS # BLD: 0.1 K/UL (ref 0–0.1)
BASOPHILS NFR BLD: 1 % (ref 0–1)
BILIRUB SERPL-MCNC: 0.5 MG/DL (ref 0.2–1)
BNP SERPL-MCNC: ABNORMAL PG/ML
BUN SERPL-MCNC: 132 MG/DL (ref 6–20)
BUN/CREAT SERPL: 28 (ref 12–20)
CALCIUM SERPL-MCNC: 8.2 MG/DL (ref 8.5–10.1)
CHLORIDE SERPL-SCNC: 99 MMOL/L (ref 97–108)
CK MB CFR SERPL CALC: 3.1 % (ref 0–2.5)
CK MB SERPL-MCNC: 3.4 NG/ML (ref 5–25)
CK SERPL-CCNC: 111 U/L (ref 39–308)
CO2 SERPL-SCNC: 34 MMOL/L (ref 21–32)
CREAT SERPL-MCNC: 4.76 MG/DL (ref 0.7–1.3)
DIFFERENTIAL METHOD BLD: ABNORMAL
EOSINOPHIL # BLD: 0.3 K/UL (ref 0–0.4)
EOSINOPHIL NFR BLD: 3 % (ref 0–7)
ERYTHROCYTE [DISTWIDTH] IN BLOOD BY AUTOMATED COUNT: 15.3 % (ref 11.5–14.5)
GLOBULIN SER CALC-MCNC: 3.6 G/DL (ref 2–4)
GLUCOSE SERPL-MCNC: 99 MG/DL (ref 65–100)
HCT VFR BLD AUTO: 44.3 % (ref 36.6–50.3)
HGB BLD-MCNC: 13.2 G/DL (ref 12.1–17)
IMM GRANULOCYTES # BLD AUTO: 0 K/UL (ref 0–0.04)
IMM GRANULOCYTES NFR BLD AUTO: 0 % (ref 0–0.5)
LYMPHOCYTES # BLD: 0.8 K/UL (ref 0.8–3.5)
LYMPHOCYTES NFR BLD: 8 % (ref 12–49)
MCH RBC QN AUTO: 26.7 PG (ref 26–34)
MCHC RBC AUTO-ENTMCNC: 29.8 G/DL (ref 30–36.5)
MCV RBC AUTO: 89.5 FL (ref 80–99)
MONOCYTES # BLD: 1.3 K/UL (ref 0–1)
MONOCYTES NFR BLD: 13 % (ref 5–13)
NEUTS SEG # BLD: 7.2 K/UL (ref 1.8–8)
NEUTS SEG NFR BLD: 75 % (ref 32–75)
NRBC # BLD: 0 K/UL (ref 0–0.01)
NRBC BLD-RTO: 0 PER 100 WBC
PLATELET # BLD AUTO: 187 K/UL (ref 150–400)
PMV BLD AUTO: 10.3 FL (ref 8.9–12.9)
POTASSIUM SERPL-SCNC: 4.3 MMOL/L (ref 3.5–5.1)
PROT SERPL-MCNC: 6.9 G/DL (ref 6.4–8.2)
RBC # BLD AUTO: 4.95 M/UL (ref 4.1–5.7)
RBC MORPH BLD: ABNORMAL
SODIUM SERPL-SCNC: 140 MMOL/L (ref 136–145)
TROPONIN I SERPL-MCNC: 0.08 NG/ML
TROPONIN I SERPL-MCNC: 0.08 NG/ML
WBC # BLD AUTO: 9.7 K/UL (ref 4.1–11.1)

## 2021-03-04 PROCEDURE — 85025 COMPLETE CBC W/AUTO DIFF WBC: CPT

## 2021-03-04 PROCEDURE — 82550 ASSAY OF CK (CPK): CPT

## 2021-03-04 PROCEDURE — 71250 CT THORAX DX C-: CPT

## 2021-03-04 PROCEDURE — 74011000250 HC RX REV CODE- 250: Performed by: STUDENT IN AN ORGANIZED HEALTH CARE EDUCATION/TRAINING PROGRAM

## 2021-03-04 PROCEDURE — 74176 CT ABD & PELVIS W/O CONTRAST: CPT

## 2021-03-04 PROCEDURE — 65660000000 HC RM CCU STEPDOWN

## 2021-03-04 PROCEDURE — 83880 ASSAY OF NATRIURETIC PEPTIDE: CPT

## 2021-03-04 PROCEDURE — 36415 COLL VENOUS BLD VENIPUNCTURE: CPT

## 2021-03-04 PROCEDURE — 93005 ELECTROCARDIOGRAM TRACING: CPT

## 2021-03-04 PROCEDURE — 74011250637 HC RX REV CODE- 250/637: Performed by: STUDENT IN AN ORGANIZED HEALTH CARE EDUCATION/TRAINING PROGRAM

## 2021-03-04 PROCEDURE — 96374 THER/PROPH/DIAG INJ IV PUSH: CPT

## 2021-03-04 PROCEDURE — 99285 EMERGENCY DEPT VISIT HI MDM: CPT

## 2021-03-04 PROCEDURE — 84484 ASSAY OF TROPONIN QUANT: CPT

## 2021-03-04 PROCEDURE — 80053 COMPREHEN METABOLIC PANEL: CPT

## 2021-03-04 PROCEDURE — 71045 X-RAY EXAM CHEST 1 VIEW: CPT

## 2021-03-04 RX ORDER — ONDANSETRON 4 MG/1
4 TABLET, ORALLY DISINTEGRATING ORAL
Status: DISCONTINUED | OUTPATIENT
Start: 2021-03-04 | End: 2021-03-19 | Stop reason: HOSPADM

## 2021-03-04 RX ORDER — INSULIN LISPRO 100 [IU]/ML
10 INJECTION, SOLUTION INTRAVENOUS; SUBCUTANEOUS
Status: DISCONTINUED | OUTPATIENT
Start: 2021-03-05 | End: 2021-03-07

## 2021-03-04 RX ORDER — BUMETANIDE 0.25 MG/ML
3 INJECTION INTRAMUSCULAR; INTRAVENOUS 2 TIMES DAILY
Status: DISCONTINUED | OUTPATIENT
Start: 2021-03-05 | End: 2021-03-12

## 2021-03-04 RX ORDER — LANOLIN ALCOHOL/MO/W.PET/CERES
325 CREAM (GRAM) TOPICAL
Status: DISCONTINUED | OUTPATIENT
Start: 2021-03-05 | End: 2021-03-16

## 2021-03-04 RX ORDER — INSULIN LISPRO 100 [IU]/ML
14 INJECTION, SOLUTION INTRAVENOUS; SUBCUTANEOUS
Status: DISCONTINUED | OUTPATIENT
Start: 2021-03-05 | End: 2021-03-07

## 2021-03-04 RX ORDER — MAGNESIUM SULFATE 100 %
4 CRYSTALS MISCELLANEOUS AS NEEDED
Status: DISCONTINUED | OUTPATIENT
Start: 2021-03-04 | End: 2021-03-19 | Stop reason: HOSPADM

## 2021-03-04 RX ORDER — METOLAZONE 2.5 MG/1
2.5 TABLET ORAL DAILY
Status: DISCONTINUED | OUTPATIENT
Start: 2021-03-05 | End: 2021-03-16

## 2021-03-04 RX ORDER — INSULIN GLARGINE 100 [IU]/ML
30 INJECTION, SOLUTION SUBCUTANEOUS
Status: DISCONTINUED | OUTPATIENT
Start: 2021-03-04 | End: 2021-03-12

## 2021-03-04 RX ORDER — PANTOPRAZOLE SODIUM 40 MG/1
40 TABLET, DELAYED RELEASE ORAL
Status: DISCONTINUED | OUTPATIENT
Start: 2021-03-05 | End: 2021-03-19 | Stop reason: HOSPADM

## 2021-03-04 RX ORDER — INSULIN LISPRO 100 [IU]/ML
INJECTION, SOLUTION INTRAVENOUS; SUBCUTANEOUS
Status: DISCONTINUED | OUTPATIENT
Start: 2021-03-05 | End: 2021-03-19 | Stop reason: HOSPADM

## 2021-03-04 RX ORDER — ONDANSETRON 2 MG/ML
4 INJECTION INTRAMUSCULAR; INTRAVENOUS
Status: DISCONTINUED | OUTPATIENT
Start: 2021-03-04 | End: 2021-03-19 | Stop reason: HOSPADM

## 2021-03-04 RX ORDER — DEXTROSE 50 % IN WATER (D50W) INTRAVENOUS SYRINGE
12.5-25 AS NEEDED
Status: DISCONTINUED | OUTPATIENT
Start: 2021-03-04 | End: 2021-03-19 | Stop reason: HOSPADM

## 2021-03-04 RX ORDER — SODIUM CHLORIDE 0.9 % (FLUSH) 0.9 %
5-40 SYRINGE (ML) INJECTION EVERY 8 HOURS
Status: DISCONTINUED | OUTPATIENT
Start: 2021-03-04 | End: 2021-03-19 | Stop reason: HOSPADM

## 2021-03-04 RX ORDER — PRAVASTATIN SODIUM 40 MG/1
80 TABLET ORAL
Status: DISCONTINUED | OUTPATIENT
Start: 2021-03-04 | End: 2021-03-19 | Stop reason: HOSPADM

## 2021-03-04 RX ORDER — METOPROLOL SUCCINATE 50 MG/1
150 TABLET, EXTENDED RELEASE ORAL DAILY
Status: DISCONTINUED | OUTPATIENT
Start: 2021-03-05 | End: 2021-03-18

## 2021-03-04 RX ORDER — BUMETANIDE 0.25 MG/ML
3 INJECTION INTRAMUSCULAR; INTRAVENOUS
Status: COMPLETED | OUTPATIENT
Start: 2021-03-04 | End: 2021-03-04

## 2021-03-04 RX ORDER — POLYETHYLENE GLYCOL 3350 17 G/17G
17 POWDER, FOR SOLUTION ORAL DAILY PRN
Status: DISCONTINUED | OUTPATIENT
Start: 2021-03-04 | End: 2021-03-19 | Stop reason: HOSPADM

## 2021-03-04 RX ORDER — NITROGLYCERIN 0.4 MG/1
0.8 TABLET SUBLINGUAL
Status: COMPLETED | OUTPATIENT
Start: 2021-03-04 | End: 2021-03-04

## 2021-03-04 RX ORDER — SODIUM CHLORIDE 0.9 % (FLUSH) 0.9 %
5-40 SYRINGE (ML) INJECTION AS NEEDED
Status: DISCONTINUED | OUTPATIENT
Start: 2021-03-04 | End: 2021-03-19 | Stop reason: HOSPADM

## 2021-03-04 RX ORDER — DUTASTERIDE 0.5 MG/1
0.5 CAPSULE, LIQUID FILLED ORAL DAILY
Status: DISCONTINUED | OUTPATIENT
Start: 2021-03-05 | End: 2021-03-19 | Stop reason: HOSPADM

## 2021-03-04 RX ADMIN — NITROGLYCERIN 0.8 MG: 0.4 TABLET, ORALLY DISINTEGRATING SUBLINGUAL at 21:06

## 2021-03-04 RX ADMIN — BUMETANIDE 3 MG: 0.25 INJECTION INTRAMUSCULAR; INTRAVENOUS at 21:00

## 2021-03-05 ENCOUNTER — APPOINTMENT (OUTPATIENT)
Dept: NON INVASIVE DIAGNOSTICS | Age: 71
DRG: 291 | End: 2021-03-05
Attending: STUDENT IN AN ORGANIZED HEALTH CARE EDUCATION/TRAINING PROGRAM
Payer: MEDICARE

## 2021-03-05 ENCOUNTER — APPOINTMENT (OUTPATIENT)
Dept: ULTRASOUND IMAGING | Age: 71
DRG: 291 | End: 2021-03-05
Attending: INTERNAL MEDICINE
Payer: MEDICARE

## 2021-03-05 ENCOUNTER — HOSPITAL ENCOUNTER (OUTPATIENT)
Dept: ULTRASOUND IMAGING | Age: 71
Discharge: HOME | DRG: 291 | End: 2021-03-05
Attending: STUDENT IN AN ORGANIZED HEALTH CARE EDUCATION/TRAINING PROGRAM
Payer: MEDICARE

## 2021-03-05 ENCOUNTER — APPOINTMENT (OUTPATIENT)
Dept: CT IMAGING | Age: 71
DRG: 291 | End: 2021-03-05
Attending: STUDENT IN AN ORGANIZED HEALTH CARE EDUCATION/TRAINING PROGRAM
Payer: MEDICARE

## 2021-03-05 LAB
ANION GAP SERPL CALC-SCNC: 5 MMOL/L (ref 5–15)
APPEARANCE UR: CLEAR
BACTERIA URNS QL MICRO: NEGATIVE /HPF
BILIRUB UR QL: NEGATIVE
BUN SERPL-MCNC: 134 MG/DL (ref 6–20)
BUN/CREAT SERPL: 29 (ref 12–20)
CALCIUM SERPL-MCNC: 8.7 MG/DL (ref 8.5–10.1)
CHLORIDE SERPL-SCNC: 101 MMOL/L (ref 97–108)
CO2 SERPL-SCNC: 34 MMOL/L (ref 21–32)
COLOR UR: NORMAL
COMMENT, HOLDF: NORMAL
CREAT SERPL-MCNC: 4.63 MG/DL (ref 0.7–1.3)
CREAT UR-MCNC: 30.9 MG/DL
EPITH CASTS URNS QL MICRO: NORMAL /LPF
ERYTHROCYTE [DISTWIDTH] IN BLOOD BY AUTOMATED COUNT: 15.4 % (ref 11.5–14.5)
GLUCOSE BLD STRIP.AUTO-MCNC: 128 MG/DL (ref 65–100)
GLUCOSE BLD STRIP.AUTO-MCNC: 67 MG/DL (ref 65–100)
GLUCOSE BLD STRIP.AUTO-MCNC: 83 MG/DL (ref 65–100)
GLUCOSE BLD STRIP.AUTO-MCNC: 90 MG/DL (ref 65–100)
GLUCOSE BLD STRIP.AUTO-MCNC: 95 MG/DL (ref 65–100)
GLUCOSE SERPL-MCNC: 114 MG/DL (ref 65–100)
GLUCOSE UR STRIP.AUTO-MCNC: NEGATIVE MG/DL
HCT VFR BLD AUTO: 42.9 % (ref 36.6–50.3)
HGB BLD-MCNC: 12.6 G/DL (ref 12.1–17)
HGB UR QL STRIP: NEGATIVE
HYALINE CASTS URNS QL MICRO: NORMAL /LPF (ref 0–5)
KETONES UR QL STRIP.AUTO: NEGATIVE MG/DL
LEUKOCYTE ESTERASE UR QL STRIP.AUTO: NEGATIVE
MAGNESIUM SERPL-MCNC: 2.7 MG/DL (ref 1.6–2.4)
MCH RBC QN AUTO: 26.5 PG (ref 26–34)
MCHC RBC AUTO-ENTMCNC: 29.4 G/DL (ref 30–36.5)
MCV RBC AUTO: 90.1 FL (ref 80–99)
NITRITE UR QL STRIP.AUTO: NEGATIVE
NRBC # BLD: 0 K/UL (ref 0–0.01)
NRBC BLD-RTO: 0 PER 100 WBC
PH UR STRIP: 6 [PH] (ref 5–8)
PLATELET # BLD AUTO: 176 K/UL (ref 150–400)
PMV BLD AUTO: 10.8 FL (ref 8.9–12.9)
POTASSIUM SERPL-SCNC: 4.2 MMOL/L (ref 3.5–5.1)
PROCALCITONIN SERPL-MCNC: 0.12 NG/ML
PROT UR STRIP-MCNC: NEGATIVE MG/DL
RBC # BLD AUTO: 4.76 M/UL (ref 4.1–5.7)
RBC #/AREA URNS HPF: NORMAL /HPF (ref 0–5)
SAMPLES BEING HELD,HOLD: NORMAL
SERVICE CMNT-IMP: ABNORMAL
SERVICE CMNT-IMP: NORMAL
SODIUM SERPL-SCNC: 140 MMOL/L (ref 136–145)
SODIUM UR-SCNC: 85 MMOL/L
SP GR UR REFRACTOMETRY: 1.01 (ref 1–1.03)
TROPONIN I SERPL-MCNC: 0.08 NG/ML
TSH SERPL DL<=0.05 MIU/L-ACNC: 0.9 UIU/ML (ref 0.36–3.74)
UROBILINOGEN UR QL STRIP.AUTO: 0.2 EU/DL (ref 0.2–1)
WBC # BLD AUTO: 9.4 K/UL (ref 4.1–11.1)
WBC URNS QL MICRO: NORMAL /HPF (ref 0–4)

## 2021-03-05 PROCEDURE — 74011636637 HC RX REV CODE- 636/637: Performed by: STUDENT IN AN ORGANIZED HEALTH CARE EDUCATION/TRAINING PROGRAM

## 2021-03-05 PROCEDURE — 82962 GLUCOSE BLOOD TEST: CPT

## 2021-03-05 PROCEDURE — 80048 BASIC METABOLIC PNL TOTAL CA: CPT

## 2021-03-05 PROCEDURE — 65660000000 HC RM CCU STEPDOWN

## 2021-03-05 PROCEDURE — 84145 PROCALCITONIN (PCT): CPT

## 2021-03-05 PROCEDURE — 84484 ASSAY OF TROPONIN QUANT: CPT

## 2021-03-05 PROCEDURE — 83735 ASSAY OF MAGNESIUM: CPT

## 2021-03-05 PROCEDURE — 84443 ASSAY THYROID STIM HORMONE: CPT

## 2021-03-05 PROCEDURE — 84300 ASSAY OF URINE SODIUM: CPT

## 2021-03-05 PROCEDURE — 74011250637 HC RX REV CODE- 250/637: Performed by: NURSE PRACTITIONER

## 2021-03-05 PROCEDURE — 76770 US EXAM ABDO BACK WALL COMP: CPT

## 2021-03-05 PROCEDURE — 74011000250 HC RX REV CODE- 250: Performed by: STUDENT IN AN ORGANIZED HEALTH CARE EDUCATION/TRAINING PROGRAM

## 2021-03-05 PROCEDURE — 71250 CT THORAX DX C-: CPT

## 2021-03-05 PROCEDURE — 36415 COLL VENOUS BLD VENIPUNCTURE: CPT

## 2021-03-05 PROCEDURE — 93970 EXTREMITY STUDY: CPT

## 2021-03-05 PROCEDURE — 81001 URINALYSIS AUTO W/SCOPE: CPT

## 2021-03-05 PROCEDURE — 74011250637 HC RX REV CODE- 250/637: Performed by: STUDENT IN AN ORGANIZED HEALTH CARE EDUCATION/TRAINING PROGRAM

## 2021-03-05 PROCEDURE — 99223 1ST HOSP IP/OBS HIGH 75: CPT | Performed by: INTERNAL MEDICINE

## 2021-03-05 PROCEDURE — 82570 ASSAY OF URINE CREATININE: CPT

## 2021-03-05 PROCEDURE — 85027 COMPLETE CBC AUTOMATED: CPT

## 2021-03-05 PROCEDURE — 77010033678 HC OXYGEN DAILY

## 2021-03-05 PROCEDURE — APPSS60 APP SPLIT SHARED TIME 46-60 MINUTES: Performed by: NURSE PRACTITIONER

## 2021-03-05 RX ORDER — HYDRALAZINE HYDROCHLORIDE 20 MG/ML
20 INJECTION INTRAMUSCULAR; INTRAVENOUS
Status: DISCONTINUED | OUTPATIENT
Start: 2021-03-05 | End: 2021-03-19 | Stop reason: HOSPADM

## 2021-03-05 RX ORDER — ACETAMINOPHEN 325 MG/1
650 TABLET ORAL
Status: DISCONTINUED | OUTPATIENT
Start: 2021-03-05 | End: 2021-03-19 | Stop reason: HOSPADM

## 2021-03-05 RX ADMIN — INSULIN GLARGINE 30 UNITS: 100 INJECTION, SOLUTION SUBCUTANEOUS at 00:46

## 2021-03-05 RX ADMIN — ACETAMINOPHEN 650 MG: 325 TABLET ORAL at 15:16

## 2021-03-05 RX ADMIN — PRAVASTATIN SODIUM 80 MG: 40 TABLET ORAL at 00:47

## 2021-03-05 RX ADMIN — METOPROLOL SUCCINATE 150 MG: 50 TABLET, EXTENDED RELEASE ORAL at 08:37

## 2021-03-05 RX ADMIN — PANTOPRAZOLE SODIUM 40 MG: 40 TABLET, DELAYED RELEASE ORAL at 08:36

## 2021-03-05 RX ADMIN — PRAVASTATIN SODIUM 80 MG: 40 TABLET ORAL at 21:00

## 2021-03-05 RX ADMIN — DUTASTERIDE 0.5 MG: 0.5 CAPSULE, LIQUID FILLED ORAL at 08:36

## 2021-03-05 RX ADMIN — APIXABAN 5 MG: 5 TABLET, FILM COATED ORAL at 17:23

## 2021-03-05 RX ADMIN — ACETAMINOPHEN 650 MG: 325 TABLET ORAL at 04:36

## 2021-03-05 RX ADMIN — BUMETANIDE 3 MG: 0.25 INJECTION, SOLUTION INTRAMUSCULAR; INTRAVENOUS at 17:23

## 2021-03-05 RX ADMIN — METOLAZONE 2.5 MG: 2.5 TABLET ORAL at 08:37

## 2021-03-05 RX ADMIN — BUMETANIDE 3 MG: 0.25 INJECTION, SOLUTION INTRAMUSCULAR; INTRAVENOUS at 08:42

## 2021-03-05 RX ADMIN — FERROUS SULFATE TAB 325 MG (65 MG ELEMENTAL FE) 325 MG: 325 (65 FE) TAB at 08:36

## 2021-03-05 RX ADMIN — Medication 10 ML: at 06:22

## 2021-03-05 RX ADMIN — ACETAMINOPHEN 650 MG: 325 TABLET ORAL at 08:36

## 2021-03-05 RX ADMIN — INSULIN GLARGINE 30 UNITS: 100 INJECTION, SOLUTION SUBCUTANEOUS at 21:45

## 2021-03-05 RX ADMIN — Medication 5 ML: at 14:00

## 2021-03-05 RX ADMIN — APIXABAN 5 MG: 5 TABLET, FILM COATED ORAL at 08:37

## 2021-03-05 RX ADMIN — LEVOTHYROXINE SODIUM 137 MCG: 0.03 TABLET ORAL at 06:21

## 2021-03-05 RX ADMIN — Medication 10 ML: at 21:00

## 2021-03-05 NOTE — H&P
Hospitalist Admission Note    NAME: Carrie Key   :  1950   MRN:  [id number]     Date/Time:  3/4/2021 10:28 PM    Patient PCP: Yobany Mercado MD  ______________________________________________________________________  Given the patient's current clinical presentation, I have a high level of concern for decompensation if discharged from the emergency department. Complex decision making was performed, which includes reviewing the patient's available past medical records, laboratory results, and x-ray films. My assessment of this patient's clinical condition and my plan of care is as follows. Assessment / Plan:    Acute hypoxic respiratory failure  Acute on chronic diastolic CHF exacerbation  ? ?CAP  -Chest x-rayEnlarged cardiac silhouette, new bilateral basilar opacities either  representing atelectasis or airspace disease. Question small left pleural  Effusion  -CT chest without contrast pending.  -Procalcitonin pending. -Afebrile, WBC 9.7.  -Currently on 2 L nasal cannula. -NT BNP   -Trop 0.08, will repeat 1 more. -Procalcitonin is high or CT chest shows evidence of pneumonia and then likely start on Rocephin azithromycin.  -Bumex 3 mg IV twice daily, fluid restriction. Cardiology consulted. -Echo pending.  -Last echo in  shows EF 55 to 60%. Bilateral leg swelling and pain  Lower extremity Doppler pending. Acute kidney injury on CKD stage IV  -BUN/creatinine132/4.76, baseline creatinine is around 3.0.  -Nephrology consulted. Hypertension  Diabetes type 2  Hypothyroidism  Hyperlipidemia  BPH  -Continue the home medications, TSH pending, continue Lantus, 1 Novolin, sliding scale insulin. Chronic A. fib  Eliquis, beta-blocker continued.     Code Status: Full code  Surrogate Decision Maker: Wife    DVT Prophylaxis: Eliquis  GI Prophylaxis: not indicated    Baseline: Ambulatory at home      Subjective:   CHIEF COMPLAINT: Shortness of breath    HISTORY OF PRESENT ILLNESS:     Valeria Alfaro is a 66 y.o.  male who presents with shortness of breath for 1 week. Patient past medical history of CHF, hypertension, diabetes type 2, A. fib, CAD s/p CABG, hyperlipidemia. Patient states that he is having shortness of breath over the last 1 week which is getting worse. Patient does not use home oxygen and currently on 2 L in the ER. Patient also complains of worsening of the lower extremity swelling along with the pain with worsening on the right side. Denies any chest pain, no fever or chills, no cough, no chest congestion, no headache, no dizziness, no passing out episodes. Patient compliant with the medications. No other complaints. We were asked to admit for work up and evaluation of the above problems.      Past Medical History:   Diagnosis Date    Arrhythmia     atrial fibrillation 2017    Arthritis     CAD (coronary atherosclerotic disease)     Chronic kidney disease     Chronic pain     Congestive heart failure ([address]     Diabetes ([address]     Diabetes mellitus type 2, controlled ([address] 3/13/2017    Heart failure ([address]     Hx of CABG 3/13/2017    CABG in 2010 in [address] Hypertension     Long term current use of anticoagulant therapy     Morbid obesity ([address] 3/13/2017    JACEY (obstructive sleep apnea) 6/23/2017    S/P CABG x 2 2010    Skin cancer     Thyroid disease         Past Surgical History:   Procedure Laterality Date    HX ORTHOPAEDIC      L 3rd toe amputation in 1999    HX PACEMAKER  2018    Dr Stiven Peña      CABGx2 in 2010       Social History     Tobacco Use    Smoking status: Never Smoker    Smokeless tobacco: Never Used   Substance Use Topics    Alcohol use: Yes     Comment: rare        Family History   Problem Relation Age of Onset    Heart Attack Father     Cancer Father         lymph node    Cancer Mother         breast     Allergies   Allergen Reactions    Allopurinol Rash   • Gabapentin Drowsiness   • Ciprofloxacin Nausea Only   • Percocet [Oxycodone-Acetaminophen] Other (comments)     hallucinations        Prior to Admission medications    Medication Sig Start Date End Date Taking? Authorizing Provider   insulin aspart U-100 (NovoLOG Flexpen U-100 Insulin) 100 unit/mL (3 mL) inpn Inject 14 units at Breakfast, 10 with Lunch and Dinner 2/19/21   Vikas Alvarez MD   metoprolol succinate (TOPROL-XL) 100 mg tablet TAKE 1 AND 1/2 TABLETS BY MOUTH EVERY DAY  Patient taking differently: 150 mg. 12/29/20   Marybel Clifford NP   insulin glargine (Lantus Solostar U-100 Insulin) 100 unit/mL (3 mL) inpn INJECT 30 UNITS SUBCUTANEOUSLY AT BEDTIME 12/28/20   Vikas Alvarez MD   psyllium (METAMUCIL) packet Take 1 Packet by mouth daily.    Provider, Historical   ketoconazole (NIZORAL) 2 % shampoo 1 APPLICATION TO BACK, BUTTOCKS, THIGHS TWICE WEEKLY EXTERNALLY 30 DAYS 8/18/20   Provider, Historical   omeprazole (PRILOSEC) 40 mg capsule TAKE 1 CAPSULE BY MOUTH EVERY DAY 5/21/20   Provider, Historical   pravastatin (PRAVACHOL) 80 mg tablet Take 1 Tab by mouth nightly. 8/3/20   Cooper Kwon MD   levothyroxine (SYNTHROID) 137 mcg tablet TAKE 1 TABLET BY MOUTH EVERY DAY 7/13/20   Vikas Alvarez MD   apixaban (Eliquis) 5 mg tablet TAKE 1 TABLET BY MOUTH TWICE A DAY 6/8/20   Shala Flores NP   glucose blood VI test strips (blood glucose test) strip Use to test blood sugars 3 times per day. DX Code: E11.22. USE BRAND PREFERRED BY INSURANCE 4/21/20   Vikas Alvarez MD   OneTouch Ultra Blue Test Strip strip USE TO TEST BLOOD SUGARS 3 TIMES PER DAY 3/23/20   Vikas Alvarez MD   potassium chloride SR (KLOR-CON 8) 8 mEq tablet Take 16 mEq by mouth daily.    Provider, Historical   Insulin Needles, Disposable, (BD ULTRA-FINE MINI PEN NEEDLE) 31 gauge x 3/16\" ndle Use with insulin pens 4 times daily 10/16/19   Vikas Alvarez MD   ferrous sulfate (IRON) 325 mg (65  mg iron) tablet Take 325 mg by mouth Daily (before breakfast). Provider, Historical   bumetanide (BUMEX) 1 mg tablet 3 tabs BID 3/17/19   Provider, Historical   dutasteride (AVODART) 0.5 mg capsule Take 0.5 mg by mouth daily. Provider, Historical   MYRBETRIQ 25 mg ER tablet TAKE 1 TABLET BY MOUTH EVERY DAY 10/2/18   Provider, Historical   febuxostat (ULORIC) 40 mg tab tablet Take 40 mg by mouth daily. Provider, Historical   ketoconazole (NIZORAL) 2 % topical cream Apply  to affected area two (2) times daily as needed for Skin Irritation. Indications: rash    Provider, Historical   metOLazone (ZAROXOLYN) 2.5 mg tablet Take 2.5 mg by mouth daily. Taking Tuesday, Thursday, Saturday    Provider, Historical   multivitamin (ONE A DAY) tablet Take 1 Tab by mouth daily. Provider, Historical   acetaminophen (TYLENOL EXTRA STRENGTH) 500 mg tablet Take 1,000 mg by mouth every eight (8) hours as needed for Pain. Provider, Historical       REVIEW OF SYSTEMS:     I am not able to complete the review of systems because:    The patient is intubated and sedated    The patient has altered mental status due to his acute medical problems    The patient has baseline aphasia from prior stroke(s)    The patient has baseline dementia and is not reliable historian    The patient is in acute medical distress and unable to provide information           Total of 12 systems reviewed as follows:       POSITIVE= underlined text  Negative = text not underlined  General:  fever, chills, sweats, generalized weakness, weight loss/gain,      loss of appetite   Eyes:    blurred vision, eye pain, loss of vision, double vision  ENT:    rhinorrhea, pharyngitis   Respiratory:   cough, sputum production, SOB, BETTS, wheezing, pleuritic pain   Cardiology:   chest pain, palpitations, orthopnea, PND, edema, syncope   Gastrointestinal:  abdominal pain , N/V, diarrhea, dysphagia, constipation, bleeding   Genitourinary:  frequency, urgency, dysuria, hematuria, incontinence   Muskuloskeletal :  arthralgia, myalgia, back pain  Hematology:  easy bruising, nose or gum bleeding, lymphadenopathy   Dermatological: rash, ulceration, pruritis, color change / jaundice  Endocrine:   hot flashes or polydipsia   Neurological:  headache, dizziness, confusion, focal weakness, paresthesia,     Speech difficulties, memory loss, gait difficulty  Psychological: Feelings of anxiety, depression, agitation    Objective:   VITALS:    Visit Vitals  BP (!) 143/74   Pulse 75   Temp 97.6 °F (36.4 °C)   Resp 21   Ht 5' 9\" (1.753 m)   Wt 123.3 kg (271 lb 13.2 oz)   SpO2 100%   BMI 40.14 kg/m²       PHYSICAL EXAM:    General:    Alert, cooperative, no distress, appears stated age. HEENT: Atraumatic, anicteric sclerae, pink conjunctivae     No oral ulcers, mucosa moist, throat clear, dentition fair  Neck:  Supple, symmetrical,  thyroid: non tender  Lungs:   Crackles lower lobe, JVD distended. No Wheezing or Rhonchi. No rales. Chest wall:  No tenderness  No Accessory muscle use. Heart:   Regular  rhythm,  No  murmur 2+ edema bilaterally lower extremity  Abdomen:   Soft, non-tender. Not distended. Bowel sounds normal  Extremities: No cyanosis. No clubbing,      Skin turgor normal, Capillary refill normal, Radial dial pulse 2+  Skin:     Not pale. Not Jaundiced  No rashes   Psych:  Good insight. Not depressed. Not anxious or agitated. Neurologic: EOMs intact. No facial asymmetry. No aphasia or slurred speech. Symmetrical strength, Sensation grossly intact.  Alert and oriented X 4.     _______________________________________________________________________  Care Plan discussed with:    Comments   Patient x    Family      RN x    Care Manager                    Consultant:      _______________________________________________________________________  Expected  Disposition:   Home with Family x   HH/PT/OT/RN    SNF/LTC    [facility] ________________________________________________________________________  TOTAL TIME:  60 Minutes    Critical Care Provided     Minutes non procedure based      Comments     Reviewed previous records   >50% of visit spent in counseling and coordination of care  Discussion with patient and/or family and questions answered       ________________________________________________________________________  Signed: Usman Bond MD    Procedures: see electronic medical records for all procedures/Xrays and details which were not copied into this note but were reviewed prior to creation of Plan. LAB DATA REVIEWED:    Recent Results (from the past 24 hour(s))   EKG, 12 LEAD, INITIAL    Collection Time: 03/04/21  7:53 PM   Result Value Ref Range    Ventricular Rate 76 BPM    Atrial Rate 53 BPM    QRS Duration 150 ms    Q-T Interval 476 ms    QTC Calculation (Bezet) 535 ms    Calculated R Axis -85 degrees    Calculated T Axis 125 degrees    Diagnosis       Ventricular-paced rhythm with occasional premature ventricular complexes  Biventricular pacemaker detected  When compared with ECG of 23-MAR-2019 02:16,  premature ventricular complexes are now present     CBC WITH AUTOMATED DIFF    Collection Time: 03/04/21  8:01 PM   Result Value Ref Range    WBC 9.7 4.1 - 11.1 K/uL    RBC 4.95 4.10 - 5.70 M/uL    HGB 13.2 12.1 - 17.0 g/dL    HCT 44.3 36.6 - 50.3 %    MCV 89.5 80.0 - 99.0 FL    MCH 26.7 26.0 - 34.0 PG    MCHC 29.8 (L) 30.0 - 36.5 g/dL    RDW 15.3 (H) 11.5 - 14.5 %    PLATELET 894 280 - 745 K/uL    MPV 10.3 8.9 - 12.9 FL    NRBC 0.0 0  WBC    ABSOLUTE NRBC 0.00 0.00 - 0.01 K/uL    NEUTROPHILS 75 32 - 75 %    LYMPHOCYTES 8 (L) 12 - 49 %    MONOCYTES 13 5 - 13 %    EOSINOPHILS 3 0 - 7 %    BASOPHILS 1 0 - 1 %    IMMATURE GRANULOCYTES 0 0.0 - 0.5 %    ABS. NEUTROPHILS 7.2 1.8 - 8.0 K/UL    ABS. LYMPHOCYTES 0.8 0.8 - 3.5 K/UL    ABS. MONOCYTES 1.3 (H) 0.0 - 1.0 K/UL    ABS. EOSINOPHILS 0.3 0.0 - 0.4 K/UL    ABS. BASOPHILS 0.1 0.0 - 0.1 K/UL    ABS. IMM. GRANS. 0.0 0.00 - 0.04 K/UL    DF SMEAR SCANNED      RBC COMMENTS NORMOCYTIC, NORMOCHROMIC     METABOLIC PANEL, COMPREHENSIVE    Collection Time: 03/04/21  8:01 PM   Result Value Ref Range    Sodium 140 136 - 145 mmol/L    Potassium 4.3 3.5 - 5.1 mmol/L    Chloride 99 97 - 108 mmol/L    CO2 34 (H) 21 - 32 mmol/L    Anion gap 7 5 - 15 mmol/L    Glucose 99 65 - 100 mg/dL     (H) 6 - 20 MG/DL    Creatinine 4.76 (H) 0.70 - 1.30 MG/DL    BUN/Creatinine ratio 28 (H) 12 - 20      GFR est AA 15 (L) >60 ml/min/1.73m2    GFR est non-AA 12 (L) >60 ml/min/1.73m2    Calcium 8.2 (L) 8.5 - 10.1 MG/DL    Bilirubin, total 0.5 0.2 - 1.0 MG/DL    ALT (SGPT) 19 12 - 78 U/L    AST (SGOT) 32 15 - 37 U/L    Alk.  phosphatase 42 (L) 45 - 117 U/L    Protein, total 6.9 6.4 - 8.2 g/dL    Albumin 3.3 (L) 3.5 - 5.0 g/dL    Globulin 3.6 2.0 - 4.0 g/dL    A-G Ratio 0.9 (L) 1.1 - 2.2     CK W/ CKMB & INDEX    Collection Time: 03/04/21  8:01 PM   Result Value Ref Range    CK - MB 3.4 <3.6 NG/ML    CK-MB Index 3.1 (H) 0.0 - 2.5       39 - 308 U/L   TROPONIN I    Collection Time: 03/04/21  8:01 PM   Result Value Ref Range    Troponin-I, Qt. 0.08 (H) <0.05 ng/mL   NT-PRO BNP    Collection Time: 03/04/21  8:01 PM   Result Value Ref Range    NT pro-BNP 19,641 (H) <125 PG/ML

## 2021-03-05 NOTE — PROGRESS NOTES
Problem: Falls - Risk of  Goal: *Absence of Falls  Description: Document Kevin Louie Fall Risk and appropriate interventions in the flowsheet.   Outcome: Progressing Towards Goal  Note: Fall Risk Interventions:  Mobility Interventions: Bed/chair exit alarm, Patient to call before getting OOB         Medication Interventions: Bed/chair exit alarm, Patient to call before getting OOB

## 2021-03-05 NOTE — PROGRESS NOTES
[facility] INTERDISCIPLINARY ROUNDS    Cardiopulmonary Care Interdisciplinary Rounds were held today to discuss patient's plan of care and outcomes. The following members were present: NP/Physician, Pharmacy, Nursing and Case Management. PLAN OF CARE:   Continue current treatment plan, continue to diurese.      Expected Length of Stay:  - - -

## 2021-03-05 NOTE — CONSULTS
[address] [facility]     Date of  Admission: 3/4/2021  7:45 PM     Admission type:Emergency    Consult for: CHF  Consult by: hospitalist      Subjective:     Silvia Moreno is a 66 y.o. male with PMH CAD s/p CABG, a fib,  CKD, DM, HTN, JACEY, thyroid who was  admitted for CHF exacerbation ([address] [I50.9]  Acute respiratory failure ([address] [J96.00]  INO (acute kidney injury) ([address] [N17.9]. Per ED provider note Silvia Moreno presented to the ED with c/o SOB for a week with some worsening leg swelling . Cardiology consulted for CHF. On assessment, Silvia Moreno endorses progressive worsening SOB for ~2 weeks. No c/o chest pain. Denies orthopnea.  + BETTS. 15-20lb weight gain in the past few weeks. No cough, fevers, palpitations, dizziness. Silvia Moreno  follows with Dr. Juno Wilhelm for cardiology. Last ECHO 03/19 with EF 56-60%. Stress 04/17 low risk.   CABG 2010    Patient Active Problem List    Diagnosis Date Noted    Acute respiratory failure ([address] 03/04/2021    CHF exacerbation ([address] 03/04/2021    INO (acute kidney injury) ([address] 03/04/2021    PAD (peripheral artery disease) ([address] 02/20/2020    Non-pressure chronic ulcer of right lower leg with fat layer exposed ([address] 01/23/2020    Diabetic ulcer of right great toe ([address] 01/23/2020    Diaper or napkin rash 07/26/2019    HTN (hypertension) 03/23/2019    Idiopathic chronic venous hypertension of right leg with ulcer ([address] 03/13/2019    Type 2 diabetes with nephropathy ([address] 10/23/2018    Cardiomyopathy ([address] 10/10/2017    Pacemaker 06/26/2017    H/O atrioventricular anastacio ablation 06/26/2017    JACEY (obstructive sleep apnea) 95/49/2313    Diastolic heart failure ([address] 06/22/2017    Irregular heart beat 03/22/2017    Fatigue 03/15/2017    Bilateral leg edema 03/15/2017    Counseling regarding advanced care planning and goals of care 03/15/2017    Hx of CABG 03/13/2017    Morbid obesity ([facility] 03/13/2017    Diabetes mellitus type 2, controlled ([facility] 03/13/2017    Heart failure ([address] 03/13/2017    SOB (shortness of breath) 03/07/2017    Chronic a-fib ([facility] 03/07/2017    Coronary artery disease involving native coronary artery without angina pectoris 03/07/2017    CKD (chronic kidney disease) stage 4, GFR 15-29 ml/min ([address] 03/07/2017      Stephanie Parikh MD  Past Medical History:   Diagnosis Date    Arrhythmia     atrial fibrillation 2017    Arthritis     CAD (coronary atherosclerotic disease)     Chronic kidney disease     Chronic pain     Congestive heart failure ([address]     Diabetes ([address]     Diabetes mellitus type 2, controlled ([facility] 3/13/2017    Heart failure ([facility]     Hx of CABG 3/13/2017    CABG in 2010 in [address] Hypertension     Long term current use of anticoagulant therapy     Morbid obesity ([facility] 3/13/2017    JACEY (obstructive sleep apnea) 6/23/2017    S/P CABG x 2 2010    Skin cancer     Thyroid disease       Social History     Socioeconomic History    Marital status:      Spouse name: Not on file    Number of children: Not on file    Years of education: Not on file    Highest education level: Not on file   Tobacco Use    Smoking status: Never Smoker    Smokeless tobacco: Never Used   Substance and Sexual Activity    Alcohol use: Yes     Comment: rare    Drug use: No   Other Topics Concern     Allergies   Allergen Reactions    Allopurinol Rash    Gabapentin Drowsiness    Ciprofloxacin Nausea Only    Percocet [Oxycodone-Acetaminophen] Other (comments)     hallucinations      Family History   Problem Relation Age of Onset    Heart Attack Father     Cancer Father         lymph node    Cancer Mother         breast      Current Facility-Administered Medications   Medication Dose Route Frequency    acetaminophen (TYLENOL) tablet 650 mg  650 mg Oral Q4H PRN    apixaban (ELIQUIS) tablet 5 mg  5 mg Oral BID    ferrous sulfate tablet 325 mg  325 mg Oral ACB    dutasteride (AVODART) capsule 0.5 mg  0.5 mg Oral DAILY    insulin glargine (LANTUS) injection 30 Units  30 Units SubCUTAneous QHS    levothyroxine (SYNTHROID) tablet 137 mcg  137 mcg Oral 6am    metOLazone (ZAROXOLYN) tablet 2.5 mg  2.5 mg Oral DAILY    metoprolol succinate (TOPROL-XL) XL tablet 150 mg  150 mg Oral DAILY    pravastatin (PRAVACHOL) tablet 80 mg  80 mg Oral QHS    pantoprazole (PROTONIX) tablet 40 mg  40 mg Oral ACB    bumetanide (BUMEX) injection 3 mg  3 mg IntraVENous BID    insulin lispro (HUMALOG) injection 14 Units  14 Units SubCUTAneous ACB    insulin lispro (HUMALOG) injection   SubCUTAneous AC&HS    glucose chewable tablet 16 g  4 Tab Oral PRN    dextrose (D50W) injection syrg 12.5-25 g  12.5-25 g IntraVENous PRN    glucagon (GLUCAGEN) injection 1 mg  1 mg IntraMUSCular PRN    sodium chloride (NS) flush 5-40 mL  5-40 mL IntraVENous Q8H    sodium chloride (NS) flush 5-40 mL  5-40 mL IntraVENous PRN    polyethylene glycol (MIRALAX) packet 17 g  17 g Oral DAILY PRN    ondansetron (ZOFRAN ODT) tablet 4 mg  4 mg Oral Q8H PRN    Or    ondansetron (ZOFRAN) injection 4 mg  4 mg IntraVENous Q6H PRN    insulin lispro (HUMALOG) injection 10 Units  10 Units SubCUTAneous ACD    insulin lispro (HUMALOG) injection 10 Units  10 Units SubCUTAneous ACL        Review of Symptoms:   11 systems reviewed, negative other than as stated in the HPI        Objective:      Visit Vitals  BP (!) 170/74 (BP 1 Location: Left upper arm, BP Patient Position: Sitting)   Pulse 75   Temp 97.3 °F (36.3 °C)   Resp 16   Ht 5' 9\" (1.753 m)   Wt 123.3 kg (271 lb 13.2 oz)   SpO2 100%   BMI 40.14 kg/m²       Physical:   General: elderly  male sitting in chair in NAD  Heart: RRR, no m/S3/JVD, no carotid bruits   Lungs: dim bases  Abdomen: Soft, +BS, NTND   Extremities: LE catherine +DP/PT, 2+ pitting edema up to waist.  Ruddiness bilateral gaitor region  Neurologic: Grossly normal    Data Review: Recent Labs     03/05/21  0143 03/04/21 2001   WBC 9.4 9.7   HGB 12.6 13.2   HCT 42.9 44.3    187     Recent Labs     03/05/21  0143 03/04/21 2001    140   K 4.2 4.3    99   CO2 34* 34*   * 99   * 132*   CREA 4.63* 4.76*   CA 8.7 8.2*   MG 2.7*  --    ALB  --  3.3*   TBILI  --  0.5   ALT  --  19       Recent Labs     03/05/21  0050 03/04/21  2251 03/04/21 2001   TROIQ 0.08* 0.08* 0.08*   CPK  --   --  111   CKMB  --   --  3.4         Intake/Output Summary (Last 24 hours) at 3/5/2021 1028  Last data filed at 3/5/2021 5881  Gross per 24 hour   Intake 240 ml   Output 550 ml   Net -310 ml        Cardiographics    Telemetry: pacing with underlying a fib  ECG: v paced  Echocardiogram: last as above; repeat pending  CT chest: \"1. Moderate bilateral pleural effusions with bibasilar and right middle lobe  atelectasis. 2. Cardiomegaly with coronary artery atherosclerosis. 3. No evidence of pneumonia. \"  CT abd/pelv: \" 1. No acute abdominal or pelvic pathology. 2. Cirrhotic liver. 3. Mild abdominal and pelvic ascites. 4. Moderate layering bilateral pleural effusions with bibasilar atelectasis. 5. Severe atherosclerosis. \"       Assessment:       Active Problems:    Acute respiratory failure ([facility] (3/4/2021)      CHF exacerbation ([address] (3/4/2021)      INO (acute kidney injury) ([facility] (3/4/2021)         Plan:     Arlyn Waller is a 66 y.o. male who presented to the hospital with c/o SOB. CT with bilateral effusions; BP elevated; creat 4.63; proBNP 19641; troponin 0.08x3. Patient states weight up 15-20#  · Likely a component of acute diastolic heart failure with patients INO on CKD. · Nephrology following and managing patient's large amount of diuretics  · ECHO pending  · HTN uncontrolled this morning prior to BB. Continue BB. Add PRN hydralazine. Follow to see if further titration needed  · Mild troponin elevation in setting of severe kidney disease and fluid over load. Patient without chest pain. Not ACS. ECHO pending. Further ischemia evaluation based upon clinical course and Echo findings. · CAD history: Statin and BB. Not on ASA due to eliquis  · A fib, permanent s/p AVN ablation:  Paced. Continue eliquis and BB  D/w patient.    Thank you for consulting [facility] Daniela Espino MD

## 2021-03-05 NOTE — ED NOTES
Assumed care of pt from triage. Pt CC of SOB x1 week. Pt 86% on RA. Pt placed on 2L NC in room and is now 100%. Pt has hx of CAD, CABG x2, HF, HTN. Pt does not wear O2 at baseline. Pt is v-paced on the monitor. Pt also reporting lower right abd pain. Pt denies n/v/d, cough, CP. Pt has lower extremity edema 2+, red and purple in color, and decreased sensation. Pt has hx of DM2. Pt also has a wound on his 5th right toes that is dime sized. Pt fingers also appear purple but pulses are present. Pt on monitor x 3. VSS. Call bell in reach. Will continue to monitor.

## 2021-03-05 NOTE — ED PROVIDER NOTES
EMERGENCY DEPARTMENT HISTORY AND PHYSICAL EXAM          Date: 3/4/2021  Patient Name: Moses Christian  Attending of Record: Blayne Fountain    History of Presenting Illness     Chief Complaint   Patient presents with    Shortness of Breath     x 1 week, hx of CHF, but states that he has not reached out to his MD, as he was scheduled for an appt tomorrow       History Provided By: Patient    HPI: Moses Christian is a 66 y.o. male, hx of DM, CAD, CABG x2 (s/p pacemaker), HF (on bumex 3mg BID), HTN, presenting with shortness of breath. Patient reports worsening exertional SOB for the past week, progressing to feeling it at rest now. Patient was in triage satting 86% on RA, up to 100 with 2L NC. Patient does not wear oxygen at home. Denies associate CP, fevers/chills, cough, congestion. Said he had cardiology follow-up scheduled for early next week, but felt he had to come to the hospital earlier. Reports lower extremity swelling at baseline, but thinks it is worsening. Has baseline neuropathy from diabetes. Reports mild RUQ pain for the past 3 weeks, unchanged, otherwise no other acute complaints. Denies cigarettes/drugs. Social drinker    PCP: Avila Everett MD    There are no other complaints, changes, or physical findings at this time. Current Facility-Administered Medications   Medication Dose Route Frequency Provider Last Rate Last Admin    [START ON 3/5/2021] apixaban (ELIQUIS) tablet 5 mg  5 mg Oral BID Rajesh Lopez MD        [START ON 3/5/2021] ferrous sulfate tablet 325 mg  325 mg Oral ACB Rajesh Lopez MD        [START ON 3/5/2021] dutasteride (AVODART) capsule 0.5 mg  0.5 mg Oral DAILY Rajesh Lopez MD        . PHARMACY TO SUBSTITUTE PER PROTOCOL (Reordered from: insulin glargine (Lantus Solostar U-100 Insulin) 100 unit/mL (3 mL) inpn)    Per Protocol Rajesh Lopez MD        [START ON 3/5/2021] levothyroxine (SYNTHROID) tablet 137 mcg  137 mcg Oral MD Phuong Woody [START ON 3/5/2021] metOLazone (ZAROXOLYN) tablet 2.5 mg  2.5 mg Oral DAILY Cheryl Mckeon MD        [START ON 3/5/2021] metoprolol succinate (TOPROL-XL) XL tablet 150 mg  150 mg Oral DAILY Cheryl Mckeon MD        pravastatin (PRAVACHOL) tablet 80 mg  80 mg Oral QHS Cheryl Mckeon MD       Amador Pickett Pacer ON 3/5/2021] pantoprazole (PROTONIX) tablet 40 mg  40 mg Oral ACB Cheryl Mcekon MD         Current Outpatient Medications   Medication Sig Dispense Refill    insulin aspart U-100 (NovoLOG Flexpen U-100 Insulin) 100 unit/mL (3 mL) inpn Inject 14 units at Breakfast, 10 with Lunch and Dinner 15 Adjustable Dose Pre-filled Pen Syringe 3    metoprolol succinate (TOPROL-XL) 100 mg tablet TAKE 1 AND 1/2 TABLETS BY MOUTH EVERY DAY (Patient taking differently: 150 mg.) 135 Tab 1    insulin glargine (Lantus Solostar U-100 Insulin) 100 unit/mL (3 mL) inpn INJECT 30 UNITS SUBCUTANEOUSLY AT BEDTIME 10 Adjustable Dose Pre-filled Pen Syringe 3    psyllium (METAMUCIL) packet Take 1 Packet by mouth daily.  ketoconazole (NIZORAL) 2 % shampoo 1 APPLICATION TO BACK, BUTTOCKS, THIGHS TWICE WEEKLY EXTERNALLY 30 DAYS      omeprazole (PRILOSEC) 40 mg capsule TAKE 1 CAPSULE BY MOUTH EVERY DAY      pravastatin (PRAVACHOL) 80 mg tablet Take 1 Tab by mouth nightly. 90 Tab 4    levothyroxine (SYNTHROID) 137 mcg tablet TAKE 1 TABLET BY MOUTH EVERY DAY 90 Tab 3    apixaban (Eliquis) 5 mg tablet TAKE 1 TABLET BY MOUTH TWICE A  Tab 3    glucose blood VI test strips (blood glucose test) strip Use to test blood sugars 3 times per day. DX Code: E11.22. USE BRAND PREFERRED BY INSURANCE 300 Strip 3    OneTouch Ultra Blue Test Strip strip USE TO TEST BLOOD SUGARS 3 TIMES PER  Strip 0    potassium chloride SR (KLOR-CON 8) 8 mEq tablet Take 16 mEq by mouth daily.       Insulin Needles, Disposable, (BD ULTRA-FINE MINI PEN NEEDLE) 31 gauge x 3/16\" ndle Use with insulin pens 4 times daily 400 Pen Needle 3    ferrous sulfate (IRON) 325 mg (65 mg iron) tablet Take 325 mg by mouth Daily (before breakfast).  bumetanide (BUMEX) 1 mg tablet 3 tabs BID  3    dutasteride (AVODART) 0.5 mg capsule Take 0.5 mg by mouth daily.  MYRBETRIQ 25 mg ER tablet TAKE 1 TABLET BY MOUTH EVERY DAY  12    febuxostat (ULORIC) 40 mg tab tablet Take 40 mg by mouth daily.  ketoconazole (NIZORAL) 2 % topical cream Apply  to affected area two (2) times daily as needed for Skin Irritation. Indications: rash      metOLazone (ZAROXOLYN) 2.5 mg tablet Take 2.5 mg by mouth daily. Taking Tuesday, Thursday, Saturday      multivitamin (ONE A DAY) tablet Take 1 Tab by mouth daily.  acetaminophen (TYLENOL EXTRA STRENGTH) 500 mg tablet Take 1,000 mg by mouth every eight (8) hours as needed for Pain.          Past History     Past Medical History:  Past Medical History:   Diagnosis Date    Arrhythmia     atrial fibrillation 2017    Arthritis     CAD (coronary atherosclerotic disease)     Chronic kidney disease     Chronic pain     Congestive heart failure ([address]     Diabetes ([address]     Diabetes mellitus type 2, controlled ([address] 3/13/2017    Heart failure ([address]     Hx of CABG 3/13/2017    CABG in 2010 in [address] [place] [address] Street Hypertension     Long term current use of anticoagulant therapy     Morbid obesity ([address] 3/13/2017    JACEY (obstructive sleep apnea) 6/23/2017    S/P CABG x 2 2010    Skin cancer     Thyroid disease        Past Surgical History:  Past Surgical History:   Procedure Laterality Date    HX ORTHOPAEDIC      L 3rd toe amputation in 1999    HX PACEMAKER  2018    Dr Yovani Cutler      CABGx2 in 2010       Family History:  Family History   Problem Relation Age of Onset    Heart Attack Father     Cancer Father         lymph node    Cancer Mother         breast       Social History:  Social History     Tobacco Use    Smoking status: Never Smoker    Smokeless tobacco: Never Used   Substance Use Topics    Alcohol use: Yes     Comment: rare    Drug use: No       Allergies: Allergies   Allergen Reactions    Allopurinol Rash    Gabapentin Drowsiness    Ciprofloxacin Nausea Only    Percocet [Oxycodone-Acetaminophen] Other (comments)     hallucinations         Review of Systems   Review of Systems   Constitutional: Negative for chills and fever. HENT: Negative for congestion. Eyes: Negative for visual disturbance. Respiratory: Positive for shortness of breath. Negative for cough and chest tightness. Cardiovascular: Positive for leg swelling. Negative for chest pain. Gastrointestinal: Positive for abdominal pain. Negative for diarrhea, nausea and vomiting. Genitourinary: Negative for difficulty urinating and dysuria. Musculoskeletal: Negative for arthralgias and myalgias. Skin: Positive for wound. Negative for rash. Neurological: Positive for numbness. Negative for weakness. All other systems reviewed and are negative. Physical Exam   Physical Exam  Constitutional:       Appearance: He is obese. HENT:      Head: Normocephalic and atraumatic. Mouth/Throat:      Mouth: Mucous membranes are moist.   Eyes:      Extraocular Movements: Extraocular movements intact. Pupils: Pupils are equal, round, and reactive to light. Neck:      Vascular: JVD present. Cardiovascular:      Rate and Rhythm: Normal rate and regular rhythm. Heart sounds: Normal heart sounds. Pulmonary:      Effort: Tachypnea and accessory muscle usage present. Breath sounds: Rhonchi present. Comments: Significant conversational dyspnea, dyspnea on exertion. Diminished breath sounds bilaterally. Abdominal:      Palpations: Abdomen is soft. Tenderness: There is no guarding. Comments: Mild RUQ tenderness to palpation   Musculoskeletal: Normal range of motion. Right lower leg: Edema present. Left lower leg: Edema present.       Comments: Pt has bilateral lower extremity edema 2+ with chronic edematous skin changes  Right elbow mass, nontender   Skin:     General: Skin is warm and dry. Capillary Refill: Capillary refill takes less than 2 seconds. Comments: Purple-red discoloration of bilateral lower extremities  Dime-sized chronic wound on dorsal aspect of R 5th toe   Neurological:      General: No focal deficit present. Mental Status: He is alert. Comments: Moving all extremities, responding appropriately to questions. Chronic peripheral neuropathy. Diagnostic Study Results     Labs -     Recent Results (from the past 12 hour(s))   EKG, 12 LEAD, INITIAL    Collection Time: 03/04/21  7:53 PM   Result Value Ref Range    Ventricular Rate 76 BPM    Atrial Rate 53 BPM    QRS Duration 150 ms    Q-T Interval 476 ms    QTC Calculation (Bezet) 535 ms    Calculated R Axis -85 degrees    Calculated T Axis 125 degrees    Diagnosis       Ventricular-paced rhythm with occasional premature ventricular complexes  Biventricular pacemaker detected  When compared with ECG of 23-MAR-2019 02:16,  premature ventricular complexes are now present     CBC WITH AUTOMATED DIFF    Collection Time: 03/04/21  8:01 PM   Result Value Ref Range    WBC 9.7 4.1 - 11.1 K/uL    RBC 4.95 4.10 - 5.70 M/uL    HGB 13.2 12.1 - 17.0 g/dL    HCT 44.3 36.6 - 50.3 %    MCV 89.5 80.0 - 99.0 FL    MCH 26.7 26.0 - 34.0 PG    MCHC 29.8 (L) 30.0 - 36.5 g/dL    RDW 15.3 (H) 11.5 - 14.5 %    PLATELET 209 986 - 649 K/uL    MPV 10.3 8.9 - 12.9 FL    NRBC 0.0 0  WBC    ABSOLUTE NRBC 0.00 0.00 - 0.01 K/uL    NEUTROPHILS 75 32 - 75 %    LYMPHOCYTES 8 (L) 12 - 49 %    MONOCYTES 13 5 - 13 %    EOSINOPHILS 3 0 - 7 %    BASOPHILS 1 0 - 1 %    IMMATURE GRANULOCYTES 0 0.0 - 0.5 %    ABS. NEUTROPHILS 7.2 1.8 - 8.0 K/UL    ABS. LYMPHOCYTES 0.8 0.8 - 3.5 K/UL    ABS. MONOCYTES 1.3 (H) 0.0 - 1.0 K/UL    ABS. EOSINOPHILS 0.3 0.0 - 0.4 K/UL    ABS. BASOPHILS 0.1 0.0 - 0.1 K/UL    ABS. IMM.  GRANS. 0.0 0.00 - 0.04 K/UL    DF SMEAR SCANNED      RBC COMMENTS NORMOCYTIC, NORMOCHROMIC     METABOLIC PANEL, COMPREHENSIVE    Collection Time: 03/04/21  8:01 PM   Result Value Ref Range    Sodium 140 136 - 145 mmol/L    Potassium 4.3 3.5 - 5.1 mmol/L    Chloride 99 97 - 108 mmol/L    CO2 34 (H) 21 - 32 mmol/L    Anion gap 7 5 - 15 mmol/L    Glucose 99 65 - 100 mg/dL     (H) 6 - 20 MG/DL    Creatinine 4.76 (H) 0.70 - 1.30 MG/DL    BUN/Creatinine ratio 28 (H) 12 - 20      GFR est AA 15 (L) >60 ml/min/1.73m2    GFR est non-AA 12 (L) >60 ml/min/1.73m2    Calcium 8.2 (L) 8.5 - 10.1 MG/DL    Bilirubin, total 0.5 0.2 - 1.0 MG/DL    ALT (SGPT) 19 12 - 78 U/L    AST (SGOT) 32 15 - 37 U/L    Alk. phosphatase 42 (L) 45 - 117 U/L    Protein, total 6.9 6.4 - 8.2 g/dL    Albumin 3.3 (L) 3.5 - 5.0 g/dL    Globulin 3.6 2.0 - 4.0 g/dL    A-G Ratio 0.9 (L) 1.1 - 2.2     CK W/ CKMB & INDEX    Collection Time: 03/04/21  8:01 PM   Result Value Ref Range    CK - MB 3.4 <3.6 NG/ML    CK-MB Index 3.1 (H) 0.0 - 2.5       39 - 308 U/L   TROPONIN I    Collection Time: 03/04/21  8:01 PM   Result Value Ref Range    Troponin-I, Qt. 0.08 (H) <0.05 ng/mL   NT-PRO BNP    Collection Time: 03/04/21  8:01 PM   Result Value Ref Range    NT pro-BNP 19,641 (H) <125 PG/ML       Radiologic Studies -   XR CHEST PORT   Final Result   1. Enlarged cardiac silhouette, new bilateral basilar opacities either   representing atelectasis or airspace disease. Question small left pleural   effusion           CT Results  (Last 48 hours)    None        CXR Results  (Last 48 hours)               03/04/21 2036  XR CHEST PORT Final result    Impression:  1. Enlarged cardiac silhouette, new bilateral basilar opacities either   representing atelectasis or airspace disease. Question small left pleural   effusion           Narrative:  INDICATION:  SOB        EXAM: Single portable view of chest 2028 . Comparison:3/23/2019       Findings: Cardiac silhouette is enlarged.  Patient is status post median   sternotomy Pacer unchanged Pulmonary vasculature is not engorged. New bibasilar   opacities. Blunting of the left costophrenic angle. No pneumothorax                   Medical Decision Making   I am the first provider for this patient. I reviewed the vital signs, available nursing notes, past medical history, past surgical history, family history and social history. Vital Signs-Reviewed the patient's vital signs. Patient Vitals for the past 12 hrs:   Temp Pulse Resp BP SpO2   03/04/21 2100  75  (!) 143/74    03/04/21 2005     100 %   03/04/21 1957 97.6 °F (36.4 °C) 75 21 (!) 151/93 97 %   03/04/21 1955  75 25  96 %   03/04/21 1941 97.5 °F (36.4 °C) 75 24 108/64 (!) 87 %       ECG Interpretation: V-paced rhythm with occasional premature ventricular complexes when compared to previous EKG 3/23/19     Records Reviewed: Nursing Notes and Old Medical Records    Provider Notes (Medical Decision Making):   DDx: Sivakumar Carreon is a 66 y.o. male, hx of DM, CAD, CABG x2 (s/p pacemaker), HF (on bumex 3mg BID), HTN, presenting with shortness of breath. Patient is hemodynamically stable, afebrile. Satting mid [de-identified] in triage with increased work of breathing and conversational dyspnea, appears overtly fluid overloaded. Will get EKG/troponin to rule out ACS/MI. CXR for pneumonia and BNP to evaluate possible volume overload. Will keep patient on NC to maintain saturations, patient's BP has room to come down, will give SL nitro x2. Bumex 3mg IV to start diuresis. Basic labs to evaluate blood counts and electrolytes. ED Course and Progress Notes:   Initial assessment performed. The patients presenting problems have been discussed, and they are in agreement with the care plan formulated and outlined with them. I have encouraged them to ask questions as they arise throughout their visit.     DISPOSITION: ADMIT  Patient is being admitted to the hospital.  Their test results and reasons for admission have been discussed. The patient and/or available family express agreement with and understanding of the need to be admitted and their admission diagnosis. Thank you for resuming the care of this patient. Please don't hesitate to contact me in the emergency department if you  have any additional questions. Fátima Garcia MD, MSc      Attestation    I personally performed a history and physical examination of the patient and discussed the management with the resident. I have reviewed the resident's note and agree with the resident's findings, including all diagnostic interpretations, treatment and plan of care, except as documented below. I was present during the key portions of separately billed procedures. I've reviewed the nursing note and vitals. My exam shows:   Constitutional: awake, obese  Cv: RRR, intact pulses in all four extremities  Pulm: diminished breath sounds at bases, coarse at bases, increased wob, accessory muscle use. Abd: Soft, not tender, +BS  Neuro: No focal deficits  I've personally reviewed lab results and imaging studies. Impression/Plan:    Patient presents in hypoxemic respiratory failure, edema, wt gain, all cw fluid overload likely due to acute on chronic chf.   Due to new oxygen demand and respiratory distress, he will need to be admitted for diuresis and further mgmt.       Fátima Garcia MD, MSc    CRITICAL CARE NOTE :    IMPENDING DETERIORATION -Respiratory, Cardiovascular and Metabolic  ASSOCIATED RISK FACTORS - Hypoxia and Metabolic changes  MANAGEMENT- Bedside Assessment and Supervision of Care  INTERPRETATION -  Xrays, ECG and Blood Pressure  INTERVENTIONS - hemodynamic mngmt, Metobolic interventions and respiratory intervention  CASE REVIEW - Hospitalist/Intensivist and Nursing  TREATMENT RESPONSE -Improved  PERFORMED BY - Self and Resident    NOTES   :    I have spent 45 minutes of critical care time involved in lab review, consultations with specialist, family decision- making, bedside attention and documentation. During this entire length of time I was immediately available to the patient . Critical Care: The reason for providing this level of medical care for this critically ill patient was due to a critical illness that impaired one or more vital organ systems, such that there was a high probability of imminent or life threatening deterioration in the patient's condition. This care involved high complexity decision making to assess, manipulate, and support vital system functions, to treat this degree of vital organ system failure, and to prevent further life threatening deterioration of the patients condition. Denice Sutton MD             Diagnosis     Clinical Impression:   1. Acute on chronic congestive heart failure, unspecified heart failure type ([address]    2. History of diabetes mellitus, type II    3. Acute kidney injury ([address]    4.  Acute respiratory failure with hypoxemia    Disposition:  Admit        Resident Signature:  Lazarus Dover, MD  PGY2 Emergency Medicine

## 2021-03-05 NOTE — ED NOTES
TRANSFER - OUT REPORT:    Verbal report given to Alireza(name) on Madeline Witt  being transferred to [facility](unit) for routine progression of care       Report consisted of patients Situation, Background, Assessment and   Recommendations(SBAR). Information from the following report(s) SBAR, ED Summary, [facility] and Recent Results was reviewed with the receiving nurse. Lines:   Peripheral IV 03/04/21 Right Antecubital (Active)   Site Assessment Clean, dry, & intact 03/04/21 2004   Phlebitis Assessment 0 03/04/21 2004   Infiltration Assessment 0 03/04/21 2004   Dressing Status Clean, dry, & intact 03/04/21 2004   Dressing Type Tape;Transparent 03/04/21 2004   Hub Color/Line Status Pink;Flushed;Capped; Patent 03/04/21 2004   Action Taken Blood drawn 03/04/21 2004        Opportunity for questions and clarification was provided.       Patient transported with:   Tech   O2 2 L NC

## 2021-03-05 NOTE — PROGRESS NOTES
Received notification from bedside RN about patient with regards to: patient requesting Tylenol for back pain, as per patient this is what she takes at home for it.   VS: /82, HR 76, RR 14, O2 sat 98%    Intervention given: Tylenol prn ordered

## 2021-03-05 NOTE — PROGRESS NOTES
End of Shift Note    Bedside shift change report given to Jennifer Levi RN (oncoming nurse) by Ricardo Jimenez (offgoing nurse). Report included the following information SBAR, Kardex and MAR    Shift worked:  night     Shift summary and any significant changes:          Concerns for physician to address:       Zone phone for oncoming shift:          Activity:  Activity Level: Up with Assistance  Number times ambulated in hallways past shift: 0  Number of times OOB to chair past shift: 3    Cardiac:   Cardiac Monitoring: Yes      Cardiac Rhythm: Paced    Access:   Current line(s): PIV     Genitourinary:   Urinary status: voiding    Respiratory:   O2 Device: Nasal cannula  Chronic home O2 use?: NO  Incentive spirometer at bedside: NO     GI:  Last Bowel Movement Date: 03/04/21  Current diet:  DIET DIABETIC CONSISTENT CARB Regular; 2 GM NA (House Low NA); FR 1500ML  Passing flatus: YES  Tolerating current diet: YES       Pain Management:   Patient states pain is manageable on current regimen: YES    Skin:  Romeo Score: 19  Interventions: increase time out of bed    Patient Safety:  Fall Score:  Total Score: 2  Interventions: bed/chair alarm, assistive device (walker, cane, etc), gripper socks and pt to call before getting OOB       Length of Stay:  Expected LOS: - - -  Actual LOS: [address]

## 2021-03-05 NOTE — PROGRESS NOTES
Transition of Care Plan:    Disposition: Home vs Home with Home Health   Follow up appointments: PCP  DME needed: None  Transportation at Discharge: Pt's wife will transport at d/c.   [address] or means to access home:    Pt has his keys    IM Medicare letter: Pt will need 2nd IM Letter  Caregiver Contact: Brody Cruz- Wife [phone]  Discharge Caregiver contacted prior to discharge? Will contact at d/c    Reason for Admission:   CHF exacerbation, acute respiratory failure and AKA                    RUR Score:     16 Moderate risk for readmission             PCP: First and Last name:   Yoandy Fisher MD     Name of Practice:    Are you a current patient: Yes/No: Yes   Approximate date of last visit: 3 months ago   Can you participate in a virtual visit if needed: No    Do you (patient/family) have any concerns for transition/discharge? None              Plan for utilizing home health:   Pt has had home health in the past. Pt is receptive to home health at d/c     Current Advanced Directive/Advance Care Plan:  Full Code. No ACP on file. CM address with pt about ACP. Pt stated that he would like to address ACP. Pt's wife Brody Cruz would be primary decision maker. CM left a voicemail message with [facility] to address AMD.     Advance Care Planning     General Advance Care Planning (ACP) Conversation      Date of Conversation: 3/5/2021  Conducted with: Patient with Decision Making Capacity    Healthcare Decision Maker: Brody Cruz - Wife      Content/Action Overview:   Has NO ACP documents/care preferences - requested patient complete ACP documents  Reviewed DNR/DNI and patient elects Full Code (Attempt Resuscitation)     Length of Voluntary ACP Conversation in minutes:  16 minutes           CM met with pt at bedside to discuss d/c plan. Pt was alert and oriented. CM verified pt's demographics, insurance and PCP.      Pt is a 65 y/o  male admitted to [facility] on 3/4/2021 for CHF exacerbation, acute respiratory failure and AKA. Pt's PCP is Dr. Doc Yi. Pt sees PCP every 3 months. Pt uses [facility] pharmacy on [address] for Rx. Pt resides with his wife in an one level home with one SERENITY. Pt is independent with ADL's and IADL's. Pt does not drive. Pt's wife transports when necessary. Pt has a rollator and cane. Pt has had home health in the past. No SNF and IPR in the past. Pt is FULL code status. Pt does not have an ACP on file. Pt's wife will transport at d/c     CM addressed with pt about home health. Pt stated that he would like to wait closer to d/c to decide on [facility]. Care Management Interventions  PCP Verified by CM: Yes(Pt's PCP is Dr. Doc Yi. Pt sees PCP every 3 months.)  Palliative Care Criteria Met (RRAT>21 & CHF Dx)?: No  Mode of Transport at Discharge: Other (see comment)(Pt's wife will transport at d/c )  Transition of Care Consult (CM Consult): Other(Home vs Home with Home Health )  Discharge Durable Medical Equipment: No(Pt has a rollator and cane)  Physical Therapy Consult: No  Occupational Therapy Consult: No  Speech Therapy Consult: No  Current Support Network: Lives with Spouse, Own Home  Confirm Follow Up Transport: Family(Pt does not drive. Pt's wife transports when necessary.)  [employer] Provided?: No  Discharge Location  Discharge Placement: (Home vs Home with [address] [place] [address] [place] )    CM will continue to follow patient for discharge planning needs and arrange for services as deemed necessary.     Jamie Rodrigues [address] [place] [address]  [phone]

## 2021-03-05 NOTE — PROGRESS NOTES
0700 Bedside shift change report given to 3 Kristy Mcdonough and Asia RN (oncoming nurse) by Robert Sims (offgoing nurse). Report included the following information SBAR.

## 2021-03-05 NOTE — PROGRESS NOTES
2490: [address] [facility]    Patient with hypoglycemic episode(s) at [phone] (time) on 3/5/21 (date). BG value(s) pre-treatment 79    Was patient symptomatic? [] yes, [x] no  Patient was treated with the following rescue medications/treatments: [] D50                [] Glucose tablets                [] Glucagon                [x] 4oz juice                [] 6oz reg soda                [] 8oz low fat milk  BG value post-treatment: 83  Once BG treated and value greater than 80mg/dl, pt was provided with the following:  [] snack  [x] meal  Name of MD notified:MD Angeles  The following orders were received: MD held SSI and premeal insulin orders.

## 2021-03-05 NOTE — PROGRESS NOTES
Hospitalist Progress Note    NAME: Naina Clemente   :  1950   MRN:  [id number]       Assessment / Plan:  Acute hypoxic respiratory failure  Acute on chronic diastolic CHF exacerbation  INO on CKD4  -86% on RA in ED, maintaining sats on 2L NC  -Switch po Bumex to 3 mg IV twice daily   -continue home Zaroxolyn  -Appreciate nephrology consult  -Melly [place], UA pending  -patient reports good UA output  -baseline Cr around 3, elevated to 4.76 on admission  -renally dose meds, hold nephrotoxic agents  -does not wear O2 at home and currently on 2L NC. Will try to wean    Acute on chronic dCHF  -Diuresis as above  -TTE pending  -Continue BB  -Appreciate cardiology consult  -Mild troponin elevation in setting of uncontrolled hypertension and INO  -BNP > 19k    Uncontrolled hypertension  -As above, continue beta-blocker.  -As needed hydralazine  -BP improved    IDDM  -Continue Lantus per home dose  -Hold mealtime insulin given lower blood sugars  -SSI with POC's. Adjust insulin as indicated based on POC values    Lower extremity edema  -Bilateral lower extremity venous Dopplers negative  -Diuresis as above    Chronic A. fib  -Continue Eliquis  -Continue metoprolol     Hypothyroidism  -Continue Synthroid     Code Status: Full code  Surrogate Decision Maker: Wife     DVT Prophylaxis: Eliquis  GI Prophylaxis: not indicated     Baseline: Ambulatory at home     Subjective:     Chief Complaint / Reason for Physician Visit  Presented with progressive dyspnea. Shortness of breath improved. Has bilateral lower extremity edema. Denies cough, chest pain, fevers or chills    Discussed with RN events overnight.      Review of Systems:  Symptom Y/N Comments  Symptom Y/N Comments   Fever/Chills n   Chest Pain n    Poor Appetite n   Edema y B/l LEs   Cough n   Abdominal Pain  n   Sputum n   Joint Pain     SOB/BETTS y   Pruritis/Rash     Nausea/vomit n   Tolerating PT/OT     Diarrhea n   Tolerating Diet y    Constipation n   Other Could NOT obtain due to:      Objective:     VITALS:   Last 24hrs VS reviewed since prior progress note. Most recent are:  Patient Vitals for the past 24 hrs:   Temp Pulse Resp BP SpO2   03/05/21 1125 97.6 °F (36.4 °C) 74 22 (!) 164/82 100 %   03/05/21 0743 97.3 °F (36.3 °C) 75 16 (!) 170/74 100 %   03/05/21 0128 98.2 °F (36.8 °C) 75 18 (!) 123/100 97 %   03/04/21 2345 98 °F (36.7 °C) 76 14 121/82 98 %   03/04/21 2330  76 15 (!) 162/80 98 %   03/04/21 2200  76 16 (!) 152/71 100 %   03/04/21 2100  75  (!) 143/74    03/04/21 2005     100 %   03/04/21 1957 97.6 °F (36.4 °C) 75 21 (!) 151/93 97 %   03/04/21 1955  75 25  96 %   03/04/21 1941 97.5 °F (36.4 °C) 75 24 108/64 (!) 87 %       Intake/Output Summary (Last 24 hours) at 3/5/2021 1248  Last data filed at 3/5/2021 1039  Gross per 24 hour   Intake 240 ml   Output 675 ml   Net -435 ml        I had a face to face encounter and independently examined this patient on 3/5/2021, as outlined below:  PHYSICAL EXAM:  General: WD, WN. Alert, cooperative, no acute distress    EENT:  EOMI. Anicteric sclerae. MMM  Resp:  Bibasilar rales. No accessory muscle use  CV:  Regular  rhythm,  tight edema b/l LEs  GI:  Soft, Non distended, Non tender. +Bowel sounds  Neurologic:  Alert and oriented X 3, normal speech,   Psych:   Good insight. Not anxious nor agitated  Skin:  No rashes. No jaundice    Reviewed most current lab test results and cultures  YES  Reviewed most current radiology test results   YES  Review and summation of old records today    NO  Reviewed patient's current orders and MAR    YES  PMH/SH reviewed - no change compared to H&P  ________________________________________________________________________  Care Plan discussed with:    Comments   Patient x    Family      RN x    Care Manager     Consultant                       x Multidiciplinary team rounds were held today with , nursing, pharmacist and clinical coordinator.   Patient's plan of care was discussed; medications were reviewed and discharge planning was addressed. ________________________________________________________________________  Total NON critical care TIME: 35   Minutes    Total CRITICAL CARE TIME Spent:   Minutes non procedure based      Comments   >50% of visit spent in counseling and coordination of care x    ________________________________________________________________________  Minta Starring, DO     Procedures: see electronic medical records for all procedures/Xrays and details which were not copied into this note but were reviewed prior to creation of Plan. LABS:  I reviewed today's most current labs and imaging studies.   Pertinent labs include:  Recent Labs     03/05/21  0143 03/04/21 2001   WBC 9.4 9.7   HGB 12.6 13.2   HCT 42.9 44.3    187     Recent Labs     03/05/21 0143 03/04/21 2001    140   K 4.2 4.3    99   CO2 34* 34*   * 99   * 132*   CREA 4.63* 4.76*   CA 8.7 8.2*   MG 2.7*  --    ALB  --  3.3*   TBILI  --  0.5   ALT  --  19       Signed: Trixie Reynoso DO

## 2021-03-05 NOTE — CONSULTS
Consultation Note    NAME: Silvia Moreno   :  1950   MRN:  [id number]     Date/Time:  3/5/2021 10:31 AM    I have been asked to see this patient by Dr. Marilee Madrigal  for advice/opinion re: CKD-4/INO. Assessment :    Plan:  CKD-4 - followed by me - baseline creatinine around 3 although  It fluctuates quite a bit depending on volume status. INO  H/o hyperkalemia  htn  DM  Volume overload  Obesity  CAD His creatinine is well above baseline and unclear why. I will check a UA, FENA, and renal US. I will check metabolic bone labs. Continue with current diuretics. He is not uremic and there is no acute need for HD. I talked with him about the possible need for Hd if his renal function worsens with continued diuresis. Subjective:   CHIEF COMPLAINT:  \"I was short of breath. \"    HISTORY OF PRESENT ILLNESS:     Nallely Stubbs is a 66 y.o.   male who has a history of CKD-4 followed my me. I last saw him on . His baseline creatinine has been around 3. He says that he has had progressive dyspnea over the past week. He says that his edema is about the same. He says that the dyspnea is worse when he moves around. He says that he has been compliant with his meds/diuretics. He has not changed his low sodium diet. No change in urinary habits.     Past Medical History:   Diagnosis Date    Arrhythmia     atrial fibrillation     Arthritis     CAD (coronary atherosclerotic disease)     Chronic kidney disease     Chronic pain     [address]estive heart failure ([address]     Diabetes ([address]     Diabetes mellitus type 2, controlled ([address] 3/13/2017    Heart failure ([address]     Hx of CABG 3/13/2017    CABG in  in [address] [place] [address] Street Hypertension     Long term current use of anticoagulant therapy     Morbid obesity ([address] 3/13/2017    JACEY (obstructive sleep apnea) 2017    S/P CABG x 2     Skin cancer     Thyroid disease       Past Surgical History:   Procedure Laterality Date    HX ORTHOPAEDIC L 3rd toe amputation in 1999    HX PACEMAKER  2018    Dr Tr Bah      CABGx2 in 2010     Social History     Tobacco Use    Smoking status: Never Smoker    Smokeless tobacco: Never Used   Substance Use Topics    Alcohol use: Yes     Comment: rare      Family History   Problem Relation Age of Onset    Heart Attack Father     Cancer Father         lymph node    Cancer Mother         breast      Allergies   Allergen Reactions    Allopurinol Rash    Gabapentin Drowsiness    Ciprofloxacin Nausea Only    Percocet [Oxycodone-Acetaminophen] Other (comments)     hallucinations      Prior to Admission medications    Medication Sig Start Date End Date Taking? Authorizing Provider   insulin aspart U-100 (NovoLOG Flexpen U-100 Insulin) 100 unit/mL (3 mL) inpn Inject 14 units at Breakfast, 10 with Lunch and Dinner 2/19/21  Yes Shamar Aponte MD   metoprolol succinate (TOPROL-XL) 100 mg tablet TAKE 1 AND 1/2 TABLETS BY MOUTH EVERY DAY  Patient taking differently: 150 mg. 12/29/20  Yes Cole PATEL NP   insulin glargine (Lantus Solostar U-100 Insulin) 100 unit/mL (3 mL) inpn INJECT 30 UNITS SUBCUTANEOUSLY AT BEDTIME 12/28/20  Yes Shamar Aponte MD   omeprazole (PRILOSEC) 40 mg capsule TAKE 1 CAPSULE BY MOUTH EVERY DAY 5/21/20  Yes Provider, Historical   pravastatin (PRAVACHOL) 80 mg tablet Take 1 Tab by mouth nightly. 8/3/20  Yes Gretel Simons MD   levothyroxine (SYNTHROID) 137 mcg tablet TAKE 1 TABLET BY MOUTH EVERY DAY 7/13/20  Yes Shamar Aponte MD   apixaban (Eliquis) 5 mg tablet TAKE 1 TABLET BY MOUTH TWICE A DAY 6/8/20  Yes Karime Flores NP   glucose blood VI test strips (blood glucose test) strip Use to test blood sugars 3 times per day. DX Code: E11.22. USE BRAND PREFERRED BY INSURANCE 4/21/20  Yes Shamar Aponte MD   potassium chloride SR (KLOR-CON 8) 8 mEq tablet Take 16 mEq by mouth daily.    Yes Provider, Historical   Insulin Needles, Disposable, (BD ULTRA-FINE MINI PEN NEEDLE) 31 gauge x 3/16\" ndle Use with insulin pens 4 times daily 10/16/19  Yes Miguelangel Yi MD   ferrous sulfate (IRON) 325 mg (65 mg iron) tablet Take 325 mg by mouth Daily (before breakfast). Yes Provider, Historical   bumetanide (BUMEX) 1 mg tablet 3 tabs BID 3/17/19  Yes Provider, Historical   dutasteride (AVODART) 0.5 mg capsule Take 0.5 mg by mouth daily. Yes Provider, Historical   MYRBETRIQ 25 mg ER tablet TAKE 1 TABLET BY MOUTH EVERY DAY 10/2/18  Yes Provider, Historical   febuxostat (ULORIC) 40 mg tab tablet Take 40 mg by mouth daily. Yes Provider, Historical   metOLazone (ZAROXOLYN) 2.5 mg tablet Take 2.5 mg by mouth daily. Taking Tuesday, Thursday, Saturday   Yes Provider, Historical   psyllium (METAMUCIL) packet Take 1 Packet by mouth daily. Provider, Historical   ketoconazole (NIZORAL) 2 % shampoo 1 APPLICATION TO BACK, BUTTOCKS, THIGHS TWICE WEEKLY EXTERNALLY 30 DAYS 8/18/20   Provider, Historical   OneTouch Ultra Blue Test Strip strip USE TO TEST BLOOD SUGARS 3 TIMES PER DAY 3/23/20   Miguelangel Yi MD   ketoconazole (NIZORAL) 2 % topical cream Apply  to affected area two (2) times daily as needed for Skin Irritation. Indications: rash    Provider, Historical   multivitamin (ONE A DAY) tablet Take 1 Tab by mouth daily. Provider, Historical   acetaminophen (TYLENOL EXTRA STRENGTH) 500 mg tablet Take 1,000 mg by mouth every eight (8) hours as needed for Pain.     Provider, Historical     REVIEW OF SYSTEMS:     []  Unable to obtain reliable ROS due to  [] mental status  [] sedated   [] intubated   [x] Total of 12 systems reviewed as follows:  Constitutional: negative fever, negative chills, negative weight loss  Eyes:   negative visual changes  ENT:   negative sore throat, tongue or lip swelling  Respiratory:  negative cough, pos dyspnea  Cards:  negative for chest pain, palpitations,pos lower extremity edema  GI:   negative for nausea, vomiting, diarrhea, and abdominal pain  :  negative for frequency, dysuria  Integument:  negative for rash and pruritus  Heme:  negative for easy bruising and gum/nose bleeding  Musculoskel: negative for myalgias,  back pain and muscle weakness  Neuro:  negative for headaches, dizziness, vertigo  Psych:  negative for feelings of anxiety, depression   Travel?: none    Objective:   VITALS:    Visit Vitals  BP (!) 170/74 (BP 1 Location: Left upper arm, BP Patient Position: Sitting)   Pulse 75   Temp 97.3 °F (36.3 °C)   Resp 16   Ht 5' 9\" (1.753 m)   Wt 123.3 kg (271 lb 13.2 oz)   SpO2 100%   BMI 40.14 kg/m²     PHYSICAL EXAM:  Gen:  []  WD []  WN  [] cachectic []  thin []  obese []  disheveled             []  ill apearing  []   Critical  [x]   Chronic    [x]  No acute distress    HEENT:   [x] NC/AT/PERRL    [x] pink conjunctivae      [] pale conjunctivae                  PERRL  [] yes  [] no      [] moist mucosa    [] dry mucosa    hearing intact to voice [] yes  [] No                 NECK:   supple [x] yes  [] no        masses [] yes  [] No               thyroid  []  non tender  []  tender    RESP:   [] CTA bilaterally/no wheezing/rhonchi/rales/crackles    [x] rhonchi bilaterally - no dullness  [] wheezing   [] rhonchi   [] crackles     use of accessory muscles [] yes [] no    CARD:   [x]  regular rate and rhythm/No murmurs/rubs/gallops    murmur  [] yes ()  [] no      Rubs  [] yes  [] no       Gallops [] yes  [] no    Rate []  regular  []  irregular        carotid bruits  [] Right  []  Left                 LE edema [x] yes  [] no           JVP  []  yes   []  no    ABD:    [x] soft/non distended/non tender/+bowel sounds/no HSM    []  Rigid    tenderness [] yes [] no   Liver enlargement  []  yes []  no                Spleen enlargement  []  yes []  no     distended []  yes [] no     bowel sound  [] hypoactive   [] hyperactive    LYMPH:    Neck []  yes [x]  no       Axillae []  yes [x]  no    SKIN:   Rashes []  yes   [x]  no Ulcers []  yes   [x]  no               [] tight to palpitation    skin turgor []  good  [] poor  [] decreased               Cyanosis/clubbing []  yes []  no    NEUR:   [x] cranial nerves II-XII grossly intact       [] Cranial nerves deficit                 []  facial droop    []  slurred speech   [] aphasic     [] Strength normal     []  weakness  []  LUE  []   RUE/ []  LLE  []   RLE    follows commands  [x]  yes []  no           PSYCH:   insight [] poor [x] good   Alert and Oriented to  [x] person  [x] place  [x]  time                    [] depressed [] anxious [] agitated  [] lethargic [] stuporous  [] sedated     LAB DATA REVIEWED:    Recent Labs     03/05/21 0143 03/04/21 2001   WBC 9.4 9.7   HGB 12.6 13.2   HCT 42.9 44.3    187     Recent Labs     03/05/21 0143 03/04/21 2001    140   K 4.2 4.3    99   CO2 34* 34*   * 132*   CREA 4.63* 4.76*   * 99   CA 8.7 8.2*   MG 2.7*  --      Recent Labs     03/04/21 2001   ALT 19   AP 42*   TBILI 0.5   ALB 3.3*   GLOB 3.6     No results for input(s): INR, PTP, APTT, INREXT in the last 72 hours. No results for input(s): FE, TIBC, PSAT, FERR in the last 72 hours. No results for input(s): PH, PCO2, PO2 in the last 72 hours.   Recent Labs     03/04/21 2001      CKMB 3.4     Lab Results   Component Value Date/Time    Glucose (POC) 83 03/05/2021 08:35 AM    Glucose (POC) 67 03/05/2021 08:17 AM    Glucose (POC) 124 (H) 03/27/2019 07:36 AM    Glucose (POC) 194 (H) 03/26/2019 09:34 PM    Glucose (POC) 201 (H) 03/26/2019 04:41 PM       Procedures: see electronic medical records for all procedures/Xrays and details which were not copied into this note but were reviewed prior to creation of Plan.    ________________________________________________________________________       ___________________________________________________  Consulting Physician: Levester Smiling, MD

## 2021-03-06 ENCOUNTER — APPOINTMENT (OUTPATIENT)
Dept: NON INVASIVE DIAGNOSTICS | Age: 71
DRG: 291 | End: 2021-03-06
Attending: STUDENT IN AN ORGANIZED HEALTH CARE EDUCATION/TRAINING PROGRAM
Payer: MEDICARE

## 2021-03-06 LAB
25(OH)D3 SERPL-MCNC: 23.9 NG/ML (ref 30–100)
ANION GAP SERPL CALC-SCNC: 7 MMOL/L (ref 5–15)
ATRIAL RATE: 53 BPM
BUN SERPL-MCNC: 124 MG/DL (ref 6–20)
BUN/CREAT SERPL: 29 (ref 12–20)
CALCIUM SERPL-MCNC: 8.4 MG/DL (ref 8.5–10.1)
CALCIUM SERPL-MCNC: 8.7 MG/DL (ref 8.5–10.1)
CALCULATED R AXIS, ECG10: -85 DEGREES
CALCULATED T AXIS, ECG11: 125 DEGREES
CHLORIDE SERPL-SCNC: 100 MMOL/L (ref 97–108)
CO2 SERPL-SCNC: 34 MMOL/L (ref 21–32)
CREAT SERPL-MCNC: 4.22 MG/DL (ref 0.7–1.3)
DIAGNOSIS, 93000: NORMAL
GLUCOSE BLD STRIP.AUTO-MCNC: 126 MG/DL (ref 65–100)
GLUCOSE BLD STRIP.AUTO-MCNC: 158 MG/DL (ref 65–100)
GLUCOSE BLD STRIP.AUTO-MCNC: 197 MG/DL (ref 65–100)
GLUCOSE BLD STRIP.AUTO-MCNC: 261 MG/DL (ref 65–100)
GLUCOSE SERPL-MCNC: 129 MG/DL (ref 65–100)
PHOSPHATE SERPL-MCNC: 6.9 MG/DL (ref 2.6–4.7)
POTASSIUM SERPL-SCNC: 3.8 MMOL/L (ref 3.5–5.1)
PTH-INTACT SERPL-MCNC: 533 PG/ML (ref 18.4–88)
Q-T INTERVAL, ECG07: 476 MS
QRS DURATION, ECG06: 150 MS
QTC CALCULATION (BEZET), ECG08: 535 MS
SERVICE CMNT-IMP: ABNORMAL
SODIUM SERPL-SCNC: 141 MMOL/L (ref 136–145)
VENTRICULAR RATE, ECG03: 76 BPM

## 2021-03-06 PROCEDURE — 94760 N-INVAS EAR/PLS OXIMETRY 1: CPT

## 2021-03-06 PROCEDURE — 74011250637 HC RX REV CODE- 250/637: Performed by: STUDENT IN AN ORGANIZED HEALTH CARE EDUCATION/TRAINING PROGRAM

## 2021-03-06 PROCEDURE — 74011000250 HC RX REV CODE- 250: Performed by: STUDENT IN AN ORGANIZED HEALTH CARE EDUCATION/TRAINING PROGRAM

## 2021-03-06 PROCEDURE — 74011250637 HC RX REV CODE- 250/637: Performed by: NURSE PRACTITIONER

## 2021-03-06 PROCEDURE — 82962 GLUCOSE BLOOD TEST: CPT

## 2021-03-06 PROCEDURE — 74011636637 HC RX REV CODE- 636/637: Performed by: STUDENT IN AN ORGANIZED HEALTH CARE EDUCATION/TRAINING PROGRAM

## 2021-03-06 PROCEDURE — 99233 SBSQ HOSP IP/OBS HIGH 50: CPT | Performed by: INTERNAL MEDICINE

## 2021-03-06 PROCEDURE — 74011250637 HC RX REV CODE- 250/637: Performed by: INTERNAL MEDICINE

## 2021-03-06 PROCEDURE — 65660000000 HC RM CCU STEPDOWN

## 2021-03-06 PROCEDURE — 84100 ASSAY OF PHOSPHORUS: CPT

## 2021-03-06 PROCEDURE — 93306 TTE W/DOPPLER COMPLETE: CPT

## 2021-03-06 PROCEDURE — 82306 VITAMIN D 25 HYDROXY: CPT

## 2021-03-06 PROCEDURE — 77010033678 HC OXYGEN DAILY

## 2021-03-06 PROCEDURE — 36415 COLL VENOUS BLD VENIPUNCTURE: CPT

## 2021-03-06 PROCEDURE — 83970 ASSAY OF PARATHORMONE: CPT

## 2021-03-06 PROCEDURE — 80048 BASIC METABOLIC PNL TOTAL CA: CPT

## 2021-03-06 PROCEDURE — 93306 TTE W/DOPPLER COMPLETE: CPT | Performed by: INTERNAL MEDICINE

## 2021-03-06 RX ORDER — BENZONATATE 100 MG/1
100 CAPSULE ORAL
Status: DISCONTINUED | OUTPATIENT
Start: 2021-03-06 | End: 2021-03-19 | Stop reason: HOSPADM

## 2021-03-06 RX ORDER — AMLODIPINE BESYLATE 5 MG/1
5 TABLET ORAL DAILY
Status: DISCONTINUED | OUTPATIENT
Start: 2021-03-07 | End: 2021-03-09

## 2021-03-06 RX ADMIN — BUMETANIDE 3 MG: 0.25 INJECTION, SOLUTION INTRAMUSCULAR; INTRAVENOUS at 08:52

## 2021-03-06 RX ADMIN — APIXABAN 5 MG: 5 TABLET, FILM COATED ORAL at 08:52

## 2021-03-06 RX ADMIN — BUMETANIDE 3 MG: 0.25 INJECTION, SOLUTION INTRAMUSCULAR; INTRAVENOUS at 17:17

## 2021-03-06 RX ADMIN — APIXABAN 5 MG: 5 TABLET, FILM COATED ORAL at 17:17

## 2021-03-06 RX ADMIN — METOLAZONE 2.5 MG: 2.5 TABLET ORAL at 08:53

## 2021-03-06 RX ADMIN — PANTOPRAZOLE SODIUM 40 MG: 40 TABLET, DELAYED RELEASE ORAL at 06:15

## 2021-03-06 RX ADMIN — INSULIN LISPRO 2 UNITS: 100 INJECTION, SOLUTION INTRAVENOUS; SUBCUTANEOUS at 13:11

## 2021-03-06 RX ADMIN — PRAVASTATIN SODIUM 80 MG: 40 TABLET ORAL at 20:58

## 2021-03-06 RX ADMIN — Medication 10 ML: at 22:36

## 2021-03-06 RX ADMIN — METOPROLOL SUCCINATE 150 MG: 50 TABLET, EXTENDED RELEASE ORAL at 08:53

## 2021-03-06 RX ADMIN — Medication 5 ML: at 14:00

## 2021-03-06 RX ADMIN — BENZONATATE 100 MG: 100 CAPSULE ORAL at 17:17

## 2021-03-06 RX ADMIN — Medication 10 ML: at 06:15

## 2021-03-06 RX ADMIN — DUTASTERIDE 0.5 MG: 0.5 CAPSULE, LIQUID FILLED ORAL at 08:53

## 2021-03-06 RX ADMIN — INSULIN GLARGINE 30 UNITS: 100 INJECTION, SOLUTION SUBCUTANEOUS at 22:43

## 2021-03-06 RX ADMIN — ACETAMINOPHEN 650 MG: 325 TABLET ORAL at 20:58

## 2021-03-06 RX ADMIN — LEVOTHYROXINE SODIUM 137 MCG: 0.03 TABLET ORAL at 06:15

## 2021-03-06 RX ADMIN — FERROUS SULFATE TAB 325 MG (65 MG ELEMENTAL FE) 325 MG: 325 (65 FE) TAB at 08:53

## 2021-03-06 RX ADMIN — INSULIN LISPRO 5 UNITS: 100 INJECTION, SOLUTION INTRAVENOUS; SUBCUTANEOUS at 17:18

## 2021-03-06 NOTE — PROGRESS NOTES
1300: Pt complained of chronic chest pain with acute intensity. Pt describes it as muscle soreness when he coughs, stating \"I feel so sore on my right side when I cough\". He says right chest and right abdomen pain is chronic, but the muscle pain related to coughing is acute. Tylenol given. 1400: per pt, Tylenol gave no improvement of pain. MD notified. New orders for thessalon given. Dr. Mendoza Na to consult with neph for poss diuretics adjustment.

## 2021-03-06 NOTE — PROGRESS NOTES
Problem: Falls - Risk of  Goal: *Absence of Falls  Description: Document Kristi Meo Fall Risk and appropriate interventions in the flowsheet.   Outcome: Progressing Towards Goal  Note: Fall Risk Interventions:  Mobility Interventions: Bed/chair exit alarm         Medication Interventions: Patient to call before getting OOB    Elimination Interventions: Urinal in reach              Problem: Patient Education: Go to Patient Education Activity  Goal: Patient/Family Education  Outcome: Progressing Towards Goal     Problem: Patient Education: Go to Patient Education Activity  Goal: Patient/Family Education  Outcome: Progressing Towards Goal     Problem: Heart Failure: Day 1  Goal: Off Pathway (Use only if patient is Off Pathway)  Outcome: Progressing Towards Goal  Goal: Activity/Safety  Outcome: Progressing Towards Goal  Goal: Consults, if ordered  Outcome: Progressing Towards Goal  Goal: Diagnostic Test/Procedures  Outcome: Progressing Towards Goal  Goal: Nutrition/Diet  Outcome: Progressing Towards Goal  Goal: Discharge Planning  Outcome: Progressing Towards Goal  Goal: Medications  Outcome: Progressing Towards Goal  Goal: Respiratory  Outcome: Progressing Towards Goal  Goal: Treatments/Interventions/Procedures  Outcome: Progressing Towards Goal  Goal: Psychosocial  Outcome: Progressing Towards Goal  Goal: *Oxygen saturation within defined limits  Outcome: Progressing Towards Goal  Goal: *Hemodynamically stable  Outcome: Progressing Towards Goal  Goal: *Optimal pain control at patient's stated goal  Outcome: Progressing Towards Goal  Goal: *Anxiety reduced or absent  Outcome: Progressing Towards Goal

## 2021-03-06 NOTE — PROGRESS NOTES
Hospitalist Progress Note    NAME: Diana Patel   :  1950   MRN:  [id number]       Assessment / Plan:  Acute hypoxic respiratory failure  Acute on chronic diastolic CHF exacerbation  INO on CKD4  -86% on RA in ED, maintaining sats on 2L NC  -cont IV bumex 3mg IV bid  -continue daily Zaroxolyn  -Appreciate nephrology consult  -Macey Kyle, UA pending  -baseline Cr around 3, elevated to 4.76 on admission  -renally dose meds, hold nephrotoxic agents  -about 1L UO documented last 24hrs (3/6)  -does not wear O2 at home and currently on 2L NC. Will try to wean    Acute on chronic dCHF  -Diuresis as above  -TTE pending  -Continue BB  -Appreciate cardiology consult  -Mild troponin elevation in setting of uncontrolled hypertension and INO  -BNP > 19k    Uncontrolled hypertension  -SBP still > 150. Will add amlodipine  -As above, continue beta-blocker.  -As needed hydralazine    IDDM  -Continue Lantus per home dose  -Hold mealtime insulin given lower blood sugars  -SSI with POC's. Adjust insulin as indicated based on POC values    Lower extremity edema  -Bilateral lower extremity venous Dopplers negative  -Diuresis as above    Chronic A. fib  -Continue Eliquis  -Continue metoprolol     Hypothyroidism  -Continue Synthroid     Code Status: Full code  Surrogate Decision Maker: Wife     DVT Prophylaxis: Eliquis  GI Prophylaxis: not indicated     Baseline: Ambulatory at home     Subjective:     Chief Complaint / Reason for Physician Visit  No new complaints. Sitting in bedside chair    Discussed with RN events overnight.      Review of Systems:  Symptom Y/N Comments  Symptom Y/N Comments   Fever/Chills n   Chest Pain n    Poor Appetite n   Edema y B/l LEs   Cough n   Abdominal Pain  n   Sputum n   Joint Pain     SOB/BETTS y   Pruritis/Rash     Nausea/vomit n   Tolerating PT/OT     Diarrhea n   Tolerating Diet y    Constipation n   Other       Could NOT obtain due to:      Objective:     VITALS:   Last 24hrs VS reviewed since prior progress note. Most recent are:  Patient Vitals for the past 24 hrs:   Temp Pulse Resp BP SpO2   03/06/21 1048 97.6 °F (36.4 °C) 86 18 (!) 156/75 96 %   03/06/21 0847     97 %   03/06/21 0737 97.2 °F (36.2 °C) 75 20 (!) 142/73 97 %   03/06/21 0211 97.4 °F (36.3 °C)  20 (!) 162/80 98 %   03/05/21 2323 97.4 °F (36.3 °C)  20 (!) 154/79 96 %   03/05/21 1927 97.5 °F (36.4 °C) 75 20 (!) 159/79 100 %   03/05/21 1731 97.2 °F (36.2 °C) 75 25 107/68 99 %   03/05/21 1723  75  107/86    03/05/21 1125 97.6 °F (36.4 °C) 74 22 (!) 164/82 100 %       Intake/Output Summary (Last 24 hours) at 3/6/2021 1103  Last data filed at 3/6/2021 0331  Gross per 24 hour   Intake 400 ml   Output 1275 ml   Net -875 ml        I had a face to face encounter and independently examined this patient on 3/6/2021, as outlined below:  PHYSICAL EXAM:  General: WD, WN. Alert, cooperative, no acute distress    EENT:  EOMI. Anicteric sclerae. MMM  Resp:  Bibasilar rales. No accessory muscle use  CV:  Regular  rhythm,  tight edema b/l LEs  GI:  Soft, Non distended, Non tender. +Bowel sounds  Neurologic:  Alert and oriented X 3, normal speech,   Psych:   Good insight. Not anxious nor agitated  Skin:  No rashes. No jaundice    Reviewed most current lab test results and cultures  YES  Reviewed most current radiology test results   YES  Review and summation of old records today    NO  Reviewed patient's current orders and MAR    YES  PMH/SH reviewed - no change compared to H&P  ________________________________________________________________________  Care Plan discussed with:    Comments   Patient x    Family      RN x    Care Manager     Consultant                       x Multidiciplinary team rounds were held today with , nursing, pharmacist and clinical coordinator. Patient's plan of care was discussed; medications were reviewed and discharge planning was addressed. ________________________________________________________________________  Total NON critical care TIME: 35   Minutes    Total CRITICAL CARE TIME Spent:   Minutes non procedure based      Comments   >50% of visit spent in counseling and coordination of care x    ________________________________________________________________________  Geronimo Herrera DO     Procedures: see electronic medical records for all procedures/Xrays and details which were not copied into this note but were reviewed prior to creation of Plan. LABS:  I reviewed today's most current labs and imaging studies.   Pertinent labs include:  Recent Labs     03/05/21  0143 03/04/21 2001   WBC 9.4 9.7   HGB 12.6 13.2   HCT 42.9 44.3    187     Recent Labs     03/06/21  0211 03/05/21  0143 03/04/21 2001    140 140   K 3.8 4.2 4.3    101 99   CO2 34* 34* 34*   * 114* 99   * 134* 132*   CREA 4.22* 4.63* 4.76*   CA 8.4*  8.7 8.7 8.2*   MG  --  2.7*  --    PHOS 6.9*  --   --    ALB  --   --  3.3*   TBILI  --   --  0.5   ALT  --   --  19       Signed: Kat Reynoso DO

## 2021-03-06 NOTE — PROGRESS NOTES
3/6/2021 8:41 AM    Admit Date: 3/4/2021    Admit Diagnosis: CHF exacerbation ([facility] [I50.9]; Acute respiratory failure ([facility] [J96.00]; INO (acute kidney injury) ([facility] [N17.9]    Subjective:     Jaspreet Tran reports breathing is slightly better. He denies chest pain, chest pressure/discomfort, palpitations, irregular heart beats, near-syncope, syncope, fatigue, orthopnea, paroxysmal nocturnal dyspnea.     Visit Vitals  BP (!) 142/73   Pulse 75   Temp 97.2 °F (36.2 °C)   Resp 20   Ht 5' 9\" (1.753 m)   Wt 262 lb 4.8 oz (119 kg)   SpO2 97%   BMI 38.73 kg/m²     Current Facility-Administered Medications   Medication Dose Route Frequency    acetaminophen (TYLENOL) tablet 650 mg  650 mg Oral Q4H PRN    hydrALAZINE (APRESOLINE) 20 mg/mL injection 20 mg  20 mg IntraVENous Q6H PRN    apixaban (ELIQUIS) tablet 5 mg  5 mg Oral BID    ferrous sulfate tablet 325 mg  325 mg Oral ACB    dutasteride (AVODART) capsule 0.5 mg  0.5 mg Oral DAILY    insulin glargine (LANTUS) injection 30 Units  30 Units SubCUTAneous QHS    levothyroxine (SYNTHROID) tablet 137 mcg  137 mcg Oral 6am    metOLazone (ZAROXOLYN) tablet 2.5 mg  2.5 mg Oral DAILY    metoprolol succinate (TOPROL-XL) XL tablet 150 mg  150 mg Oral DAILY    pravastatin (PRAVACHOL) tablet 80 mg  80 mg Oral QHS    pantoprazole (PROTONIX) tablet 40 mg  40 mg Oral ACB    bumetanide (BUMEX) injection 3 mg  3 mg IntraVENous BID    [Held by provider] insulin lispro (HUMALOG) injection 14 Units  14 Units SubCUTAneous ACB    insulin lispro (HUMALOG) injection   SubCUTAneous AC&HS    glucose chewable tablet 16 g  4 Tab Oral PRN    dextrose (D50W) injection syrg 12.5-25 g  12.5-25 g IntraVENous PRN    glucagon (GLUCAGEN) injection 1 mg  1 mg IntraMUSCular PRN    sodium chloride (NS) flush 5-40 mL  5-40 mL IntraVENous Q8H    sodium chloride (NS) flush 5-40 mL  5-40 mL IntraVENous PRN    polyethylene glycol (MIRALAX) packet 17 g  17 g Oral DAILY PRN    ondansetron (ZOFRAN ODT) tablet 4 mg  4 mg Oral Q8H PRN    Or    ondansetron (ZOFRAN) injection 4 mg  4 mg IntraVENous Q6H PRN    [Held by provider] insulin lispro (HUMALOG) injection 10 Units  10 Units SubCUTAneous ACD    [Held by provider] insulin lispro (HUMALOG) injection 10 Units  10 Units SubCUTAneous ACL         Objective:      Visit Vitals  BP (!) 142/73   Pulse 75   Temp 97.2 °F (36.2 °C)   Resp 20   Ht 5' 9\" (1.753 m)   Wt 262 lb 4.8 oz (119 kg)   SpO2 97%   BMI 38.73 kg/m²       Physical Exam:  Resting comfortably. No resp distress. Extremities: 1-2+ edema. Chronic venous stasis.    Heart: regular rate and rhythm, S1, S2 normal, no murmur, click, rub or gallop  Lungs: clear to auscultation bilaterally  Neurologic: Grossly normal    Data Review:   Labs:    Recent Results (from the past 24 hour(s))   GLUCOSE, POC    Collection Time: 03/05/21 11:31 AM   Result Value Ref Range    Glucose (POC) 90 65 - 100 mg/dL    Performed by Sierra Burkitt PCT    URINALYSIS W/MICROSCOPIC    Collection Time: 03/05/21  1:28 PM   Result Value Ref Range    Color YELLOW/STRAW      Appearance CLEAR CLEAR      Specific gravity 1.009 1.003 - 1.030      pH (UA) 6.0 5.0 - 8.0      Protein Negative NEG mg/dL    Glucose Negative NEG mg/dL    Ketone Negative NEG mg/dL    Bilirubin Negative NEG      Blood Negative NEG      Urobilinogen 0.2 0.2 - 1.0 EU/dL    Nitrites Negative NEG      Leukocyte Esterase Negative NEG      WBC 0-4 0 - 4 /hpf    RBC 0-5 0 - 5 /hpf    Epithelial cells FEW FEW /lpf    Bacteria Negative NEG /hpf    Hyaline cast 0-2 0 - 5 /lpf   CREATININE, UR, RANDOM    Collection Time: 03/05/21  1:28 PM   Result Value Ref Range    Creatinine, urine 30.90 mg/dL   SODIUM, UR, RANDOM    Collection Time: 03/05/21  1:28 PM   Result Value Ref Range    Sodium,urine random 85 MMOL/L   GLUCOSE, POC    Collection Time: 03/05/21  5:13 PM   Result Value Ref Range    Glucose (POC) 95 65 - 100 mg/dL    Performed by Sydneyrra Burkitt PCT GLUCOSE, POC    Collection Time: 03/05/21  8:57 PM   Result Value Ref Range    Glucose (POC) 128 (H) 65 - 100 mg/dL    Performed by Sammy Phillips    METABOLIC PANEL, BASIC    Collection Time: 03/06/21  2:11 AM   Result Value Ref Range    Sodium 141 136 - 145 mmol/L    Potassium 3.8 3.5 - 5.1 mmol/L    Chloride 100 97 - 108 mmol/L    CO2 34 (H) 21 - 32 mmol/L    Anion gap 7 5 - 15 mmol/L    Glucose 129 (H) 65 - 100 mg/dL     (H) 6 - 20 MG/DL    Creatinine 4.22 (H) 0.70 - 1.30 MG/DL    BUN/Creatinine ratio 29 (H) 12 - 20      GFR est AA 17 (L) >60 ml/min/1.73m2    GFR est non-AA 14 (L) >60 ml/min/1.73m2    Calcium 8.7 8.5 - 10.1 MG/DL   PHOSPHORUS    Collection Time: 03/06/21  2:11 AM   Result Value Ref Range    Phosphorus 6.9 (H) 2.6 - 4.7 MG/DL   GLUCOSE, POC    Collection Time: 03/06/21  6:24 AM   Result Value Ref Range    Glucose (POC) 126 (H) 65 - 100 mg/dL    Performed by Kinde Butt (PCT)        Telemetry: SR      Assessment:     Active Problems:    Acute respiratory failure (HCC) (3/4/2021)      CHF exacerbation ([facility] (3/4/2021)      INO (acute kidney injury) ([facility] (3/4/2021)        Plan:     SOB: Likely a component of diastolic heart failure with patients INO on CKD. Continue diuresis. -ve fluid balance. Wt is down. Await Echo. Leximyoview at some point. Renal insuff: Nephrology following and managing patient's large amount of diuretic. HTN: monitor. Continue current meds. CAD: continue current meds. As above. A fib, permanent s/p AVN ablation:  Paced.   Continue eliquis and BB

## 2021-03-06 NOTE — PROGRESS NOTES
Problem: Falls - Risk of  Goal: *Absence of Falls  Description: Document Trinidad Marta Fall Risk and appropriate interventions in the flowsheet.   Outcome: Progressing Towards Goal  Note: Fall Risk Interventions:  Mobility Interventions: Bed/chair exit alarm, Patient to call before getting OOB         Medication Interventions: Patient to call before getting OOB

## 2021-03-06 NOTE — PROGRESS NOTES
Name: Isela Avery   MRN: [id number]  : 1950      Assessment   :                                               Plan:  CKD-4 - (Dr Cristobal Mcdowell) - baseline creatinine around 3 although  It fluctuates quite a bit depending on volume status. INO  H/o hyperkalemia  High Mg  Severe Azotemia- H/H normal. No UGI bleed. BUN trending down    HTN  DM  Volume overload  A on C D-CHF  Obesity  CAD Suspect pt has CRS- with rise in Cr from needed diuresis. Renal US with cortical thinning but no hydro. UA is bland. Renal fxn improved in spite of diuresis. Ct IV Bumex  + Metolazone  Daily labs  Daily wt  Salt/fluid restriction  F/u Echo     Continue with current diuretics. Avoid Mg supplements or Mg containing laxatives or antacids     He is not uremic and there is no acute need for HD.      D/W Pt       Subjective:  Pain on R side of abdomen earlier when he coughs    ROS:   No nausea, no vomiting  No chest pain, + shortness of breath and leg edema    Exam:  Visit Vitals  BP (!) 156/75 (BP 1 Location: Left upper arm, BP Patient Position: At rest;Sitting)   Pulse 86   Temp 97.6 °F (36.4 °C)   Resp 18   Ht 5' 9\" (1.753 m)   Wt 119 kg (262 lb 4.8 oz)   SpO2 96%   BMI 38.73 kg/m²     WB/WN in NAD  Dec BS  Irregular, distant, no tachycardia  1-2+ leg edema  AOx3    Current Facility-Administered Medications   Medication Dose Route Frequency Last Admin    [START ON 3/7/2021] amLODIPine (NORVASC) tablet 5 mg  5 mg Oral DAILY      benzonatate (TESSALON) capsule 100 mg  100 mg Oral TID PRN      acetaminophen (TYLENOL) tablet 650 mg  650 mg Oral Q4H  mg at 21 1516    hydrALAZINE (APRESOLINE) 20 mg/mL injection 20 mg  20 mg IntraVENous Q6H PRN      apixaban (ELIQUIS) tablet 5 mg  5 mg Oral BID 5 mg at 21 0852    ferrous sulfate tablet 325 mg  325 mg Oral  mg at 03/06/21 0853    dutasteride (AVODART) capsule 0.5 mg  0.5 mg Oral DAILY 0.5 mg at 03/06/21 0853    insulin glargine (LANTUS) injection 30 Units  30 Units SubCUTAneous QHS 30 Units at 03/05/21 2145    levothyroxine (SYNTHROID) tablet 137 mcg  137 mcg Oral 6am 137 mcg at 03/06/21 0615    metOLazone (ZAROXOLYN) tablet 2.5 mg  2.5 mg Oral DAILY 2.5 mg at 03/06/21 0853    metoprolol succinate (TOPROL-XL) XL tablet 150 mg  150 mg Oral DAILY 150 mg at 03/06/21 0853    pravastatin (PRAVACHOL) tablet 80 mg  80 mg Oral QHS 80 mg at 03/05/21 2100    pantoprazole (PROTONIX) tablet 40 mg  40 mg Oral ACB 40 mg at 03/06/21 0615    bumetanide (BUMEX) injection 3 mg  3 mg IntraVENous BID 3 mg at 03/06/21 0852    [Held by provider] insulin lispro (HUMALOG) injection 14 Units  14 Units SubCUTAneous ACB Stopped at 03/05/21 0730    insulin lispro (HUMALOG) injection   SubCUTAneous AC&HS 2 Units at 03/06/21 1311    glucose chewable tablet 16 g  4 Tab Oral PRN      dextrose (D50W) injection syrg 12.5-25 g  12.5-25 g IntraVENous PRN      glucagon (GLUCAGEN) injection 1 mg  1 mg IntraMUSCular PRN      sodium chloride (NS) flush 5-40 mL  5-40 mL IntraVENous Q8H 5 mL at 03/06/21 1400    sodium chloride (NS) flush 5-40 mL  5-40 mL IntraVENous PRN      polyethylene glycol (MIRALAX) packet 17 g  17 g Oral DAILY PRN      ondansetron (ZOFRAN ODT) tablet 4 mg  4 mg Oral Q8H PRN      Or    ondansetron (ZOFRAN) injection 4 mg  4 mg IntraVENous Q6H PRN      [Held by provider] insulin lispro (HUMALOG) injection 10 Units  10 Units SubCUTAneous ACD      [Held by provider] insulin lispro (HUMALOG) injection 10 Units  10 Units SubCUTAneous ACL         Labs/Data:    Lab Results   Component Value Date/Time    WBC 9.4 03/05/2021 01:43 AM    HGB 12.6 03/05/2021 01:43 AM    HCT 42.9 03/05/2021 01:43 AM    PLATELET 689 47/65/6660 01:43 AM    MCV 90.1 03/05/2021 01:43 AM       Lab Results   Component Value Date/Time    Sodium 141 03/06/2021 02:11 AM    Potassium 3.8 03/06/2021 02:11 AM    Chloride 100 03/06/2021 02:11 AM    CO2 34 (H) 03/06/2021 02:11 AM    Anion gap 7 03/06/2021 02:11 AM    Glucose 129 (H) 03/06/2021 02:11 AM     (H) 03/06/2021 02:11 AM    Creatinine 4.22 (H) 03/06/2021 02:11 AM    BUN/Creatinine ratio 29 (H) 03/06/2021 02:11 AM    GFR est AA 17 (L) 03/06/2021 02:11 AM    GFR est non-AA 14 (L) 03/06/2021 02:11 AM    Calcium 8.7 03/06/2021 02:11 AM    Calcium 8.4 (L) 03/06/2021 02:11 AM       Wt Readings from Last 3 Encounters:   03/06/21 119 kg (262 lb 4.8 oz)   08/03/20 111.1 kg (245 lb)   02/17/20 110.7 kg (244 lb)         Intake/Output Summary (Last 24 hours) at 3/6/2021 1650  Last data filed at 3/6/2021 1438  Gross per 24 hour   Intake 250 ml   Output 1925 ml   Net -1675 ml       Patient seen and examined. Chart reviewed. Labs, data and other pertinent notes reviewed in last 24 hrs.     PMH/SH/FH reviewed and unchanged compared to H&P    Pernell Joaquin MD

## 2021-03-07 LAB
ANION GAP SERPL CALC-SCNC: 6 MMOL/L (ref 5–15)
BUN SERPL-MCNC: 124 MG/DL (ref 6–20)
BUN/CREAT SERPL: 33 (ref 12–20)
CALCIUM SERPL-MCNC: 8.8 MG/DL (ref 8.5–10.1)
CHLORIDE SERPL-SCNC: 98 MMOL/L (ref 97–108)
CO2 SERPL-SCNC: 37 MMOL/L (ref 21–32)
CREAT SERPL-MCNC: 3.73 MG/DL (ref 0.7–1.3)
ERYTHROCYTE [DISTWIDTH] IN BLOOD BY AUTOMATED COUNT: 15.1 % (ref 11.5–14.5)
GLUCOSE BLD STRIP.AUTO-MCNC: 141 MG/DL (ref 65–100)
GLUCOSE BLD STRIP.AUTO-MCNC: 156 MG/DL (ref 65–100)
GLUCOSE BLD STRIP.AUTO-MCNC: 213 MG/DL (ref 65–100)
GLUCOSE BLD STRIP.AUTO-MCNC: 226 MG/DL (ref 65–100)
GLUCOSE SERPL-MCNC: 175 MG/DL (ref 65–100)
HCT VFR BLD AUTO: 42.2 % (ref 36.6–50.3)
HGB BLD-MCNC: 12.3 G/DL (ref 12.1–17)
MCH RBC QN AUTO: 26.3 PG (ref 26–34)
MCHC RBC AUTO-ENTMCNC: 29.1 G/DL (ref 30–36.5)
MCV RBC AUTO: 90.4 FL (ref 80–99)
NRBC # BLD: 0 K/UL (ref 0–0.01)
NRBC BLD-RTO: 0 PER 100 WBC
PLATELET # BLD AUTO: 157 K/UL (ref 150–400)
PMV BLD AUTO: 10.4 FL (ref 8.9–12.9)
POTASSIUM SERPL-SCNC: 3.7 MMOL/L (ref 3.5–5.1)
RBC # BLD AUTO: 4.67 M/UL (ref 4.1–5.7)
SERVICE CMNT-IMP: ABNORMAL
SODIUM SERPL-SCNC: 141 MMOL/L (ref 136–145)
WBC # BLD AUTO: 10.1 K/UL (ref 4.1–11.1)

## 2021-03-07 PROCEDURE — 80048 BASIC METABOLIC PNL TOTAL CA: CPT

## 2021-03-07 PROCEDURE — 82962 GLUCOSE BLOOD TEST: CPT

## 2021-03-07 PROCEDURE — 99233 SBSQ HOSP IP/OBS HIGH 50: CPT | Performed by: INTERNAL MEDICINE

## 2021-03-07 PROCEDURE — 36415 COLL VENOUS BLD VENIPUNCTURE: CPT

## 2021-03-07 PROCEDURE — 77010033678 HC OXYGEN DAILY

## 2021-03-07 PROCEDURE — 74011636637 HC RX REV CODE- 636/637: Performed by: STUDENT IN AN ORGANIZED HEALTH CARE EDUCATION/TRAINING PROGRAM

## 2021-03-07 PROCEDURE — 74011250637 HC RX REV CODE- 250/637: Performed by: INTERNAL MEDICINE

## 2021-03-07 PROCEDURE — 85027 COMPLETE CBC AUTOMATED: CPT

## 2021-03-07 PROCEDURE — 74011250637 HC RX REV CODE- 250/637: Performed by: STUDENT IN AN ORGANIZED HEALTH CARE EDUCATION/TRAINING PROGRAM

## 2021-03-07 PROCEDURE — 74011000250 HC RX REV CODE- 250: Performed by: STUDENT IN AN ORGANIZED HEALTH CARE EDUCATION/TRAINING PROGRAM

## 2021-03-07 PROCEDURE — 65660000000 HC RM CCU STEPDOWN

## 2021-03-07 PROCEDURE — 74011250637 HC RX REV CODE- 250/637: Performed by: NURSE PRACTITIONER

## 2021-03-07 RX ORDER — INSULIN LISPRO 100 [IU]/ML
3 INJECTION, SOLUTION INTRAVENOUS; SUBCUTANEOUS
Status: DISCONTINUED | OUTPATIENT
Start: 2021-03-08 | End: 2021-03-19 | Stop reason: HOSPADM

## 2021-03-07 RX ORDER — BALSAM PERU/CASTOR OIL
OINTMENT (GRAM) TOPICAL 2 TIMES DAILY
Status: DISCONTINUED | OUTPATIENT
Start: 2021-03-07 | End: 2021-03-19 | Stop reason: HOSPADM

## 2021-03-07 RX ADMIN — BENZONATATE 100 MG: 100 CAPSULE ORAL at 12:17

## 2021-03-07 RX ADMIN — INSULIN GLARGINE 30 UNITS: 100 INJECTION, SOLUTION SUBCUTANEOUS at 22:41

## 2021-03-07 RX ADMIN — INSULIN LISPRO 2 UNITS: 100 INJECTION, SOLUTION INTRAVENOUS; SUBCUTANEOUS at 22:40

## 2021-03-07 RX ADMIN — INSULIN LISPRO 3 UNITS: 100 INJECTION, SOLUTION INTRAVENOUS; SUBCUTANEOUS at 16:59

## 2021-03-07 RX ADMIN — CASTOR OIL AND BALSAM, PERU: 788; 87 OINTMENT TOPICAL at 22:41

## 2021-03-07 RX ADMIN — Medication 10 ML: at 06:01

## 2021-03-07 RX ADMIN — BENZONATATE 100 MG: 100 CAPSULE ORAL at 01:27

## 2021-03-07 RX ADMIN — AMLODIPINE BESYLATE 5 MG: 5 TABLET ORAL at 08:37

## 2021-03-07 RX ADMIN — BUMETANIDE 3 MG: 0.25 INJECTION, SOLUTION INTRAMUSCULAR; INTRAVENOUS at 08:42

## 2021-03-07 RX ADMIN — BENZONATATE 100 MG: 100 CAPSULE ORAL at 22:40

## 2021-03-07 RX ADMIN — METOLAZONE 2.5 MG: 2.5 TABLET ORAL at 08:37

## 2021-03-07 RX ADMIN — ACETAMINOPHEN 650 MG: 325 TABLET ORAL at 16:59

## 2021-03-07 RX ADMIN — BUMETANIDE 3 MG: 0.25 INJECTION, SOLUTION INTRAMUSCULAR; INTRAVENOUS at 17:45

## 2021-03-07 RX ADMIN — LEVOTHYROXINE SODIUM 137 MCG: 0.03 TABLET ORAL at 05:57

## 2021-03-07 RX ADMIN — APIXABAN 5 MG: 5 TABLET, FILM COATED ORAL at 17:44

## 2021-03-07 RX ADMIN — Medication 10 ML: at 22:41

## 2021-03-07 RX ADMIN — INSULIN LISPRO 2 UNITS: 100 INJECTION, SOLUTION INTRAVENOUS; SUBCUTANEOUS at 12:16

## 2021-03-07 RX ADMIN — ACETAMINOPHEN 650 MG: 325 TABLET ORAL at 12:17

## 2021-03-07 RX ADMIN — PANTOPRAZOLE SODIUM 40 MG: 40 TABLET, DELAYED RELEASE ORAL at 05:56

## 2021-03-07 RX ADMIN — APIXABAN 5 MG: 5 TABLET, FILM COATED ORAL at 08:37

## 2021-03-07 RX ADMIN — INSULIN LISPRO 2 UNITS: 100 INJECTION, SOLUTION INTRAVENOUS; SUBCUTANEOUS at 08:36

## 2021-03-07 RX ADMIN — FERROUS SULFATE TAB 325 MG (65 MG ELEMENTAL FE) 325 MG: 325 (65 FE) TAB at 05:57

## 2021-03-07 RX ADMIN — METOPROLOL SUCCINATE 150 MG: 50 TABLET, EXTENDED RELEASE ORAL at 08:37

## 2021-03-07 RX ADMIN — Medication 10 ML: at 13:51

## 2021-03-07 RX ADMIN — ACETAMINOPHEN 650 MG: 325 TABLET ORAL at 22:40

## 2021-03-07 RX ADMIN — PRAVASTATIN SODIUM 80 MG: 40 TABLET ORAL at 22:40

## 2021-03-07 RX ADMIN — DUTASTERIDE 0.5 MG: 0.5 CAPSULE, LIQUID FILLED ORAL at 08:37

## 2021-03-07 NOTE — PROGRESS NOTES
0700: Bedside shift change report given to Korin Smith RN (oncoming nurse) by Princess Austin RN (offgoing nurse). Report included the following information SBAR, Intake/Output, MAR, Recent Results and Cardiac Rhythm NSR (paced). 1130: patient in chair resting with feet elevated. 1230: gave tylenol for mild pain (see MAR). Pt is in the chair watching tv. No additional complaints/discomfort reported at this time. 1400: pt ambulated (with x1 assist) to the bathroom. 1700: gave tylenol again for mild generalized pain (see MAR). Pt has ambulated to the bathroom (with x1 assist) and is now resting comfortably in the chair. No additional updates at this time. 1900: End of Shift Note    Bedside shift change report given to NADINE Cline (oncoming nurse) by Letty Rodriguez RN (offgoing nurse).   Report included the following information SBAR, Intake/Output, MAR, Recent Results and Cardiac Rhythm paced    Shift worked:  [id number]     Shift summary and any significant changes:     see progress note above     Concerns for physician to address:  none     Zone phone for oncoming shift:   [phone]         Activity:  Activity Level: Bath Room Privileges, Up with Assistance  Number times ambulated in hallways past shift: 0  Number of times OOB to chair past shift: 3    Cardiac:   Cardiac Monitoring: Yes      Cardiac Rhythm: Paced    Access:   Current line(s): PIV     Genitourinary:   Urinary status: voiding    Respiratory:   O2 Device: Nasal cannula  Chronic home O2 use?: NO  Incentive spirometer at bedside: YES     GI:  Last Bowel Movement Date: 03/05/21  Current diet:  DIET DIABETIC CONSISTENT CARB Regular; 2 GM NA (House Low NA); FR 1500ML  DIET NPO Except Meds  Passing flatus: YES  Tolerating current diet: YES  % Diet Eaten: 90 %    Pain Management:   Patient states pain is manageable on current regimen: YES    Skin:  Romeo Score: 19  Interventions: float heels and increase time out of bed    Patient Safety:  Fall Score: Total Score: 3  Interventions: bed/chair alarm, assistive device (walker, cane, etc), gripper socks and pt to call before getting OOB  High Fall Risk: Yes    Length of Stay:  Expected LOS: 4d 0h  Actual LOS: 3      Letty Rodriguez RN

## 2021-03-07 NOTE — PROGRESS NOTES
3/7/2021 8:29 AM    Admit Date: 3/4/2021    Admit Diagnosis: CHF exacerbation ([facility] [I50.9]; Acute respiratory failure ([facility] [J96.00]; INO (acute kidney injury) ([facility] [N17.9]    Subjective:     Carrie Key reports SOB is better. He denies chest pain, chest pressure/discomfort, palpitations, irregular heart beats, near-syncope, orthopnea, paroxysmal nocturnal dyspnea. Visit Vitals  BP (!) 148/67 (BP 1 Location: Left upper arm, BP Patient Position: Sitting; At rest)   Pulse 75   Temp 98.3 °F (36.8 °C)   Resp 20   Ht 5' 9\" (1.753 m)   Wt 257 lb 12.8 oz (116.9 kg)   SpO2 97%   BMI 38.07 kg/m²     Current Facility-Administered Medications   Medication Dose Route Frequency    amLODIPine (NORVASC) tablet 5 mg  5 mg Oral DAILY    benzonatate (TESSALON) capsule 100 mg  100 mg Oral TID PRN    acetaminophen (TYLENOL) tablet 650 mg  650 mg Oral Q4H PRN    hydrALAZINE (APRESOLINE) 20 mg/mL injection 20 mg  20 mg IntraVENous Q6H PRN    apixaban (ELIQUIS) tablet 5 mg  5 mg Oral BID    ferrous sulfate tablet 325 mg  325 mg Oral ACB    dutasteride (AVODART) capsule 0.5 mg  0.5 mg Oral DAILY    insulin glargine (LANTUS) injection 30 Units  30 Units SubCUTAneous QHS    levothyroxine (SYNTHROID) tablet 137 mcg  137 mcg Oral 6am    metOLazone (ZAROXOLYN) tablet 2.5 mg  2.5 mg Oral DAILY    metoprolol succinate (TOPROL-XL) XL tablet 150 mg  150 mg Oral DAILY    pravastatin (PRAVACHOL) tablet 80 mg  80 mg Oral QHS    pantoprazole (PROTONIX) tablet 40 mg  40 mg Oral ACB    bumetanide (BUMEX) injection 3 mg  3 mg IntraVENous BID    [Held by provider] insulin lispro (HUMALOG) injection 14 Units  14 Units SubCUTAneous ACB    insulin lispro (HUMALOG) injection   SubCUTAneous AC&HS    glucose chewable tablet 16 g  4 Tab Oral PRN    dextrose (D50W) injection syrg 12.5-25 g  12.5-25 g IntraVENous PRN    glucagon (GLUCAGEN) injection 1 mg  1 mg IntraMUSCular PRN    sodium chloride (NS) flush 5-40 mL  5-40 mL IntraVENous Q8H    sodium chloride (NS) flush 5-40 mL  5-40 mL IntraVENous PRN    polyethylene glycol (MIRALAX) packet 17 g  17 g Oral DAILY PRN    ondansetron (ZOFRAN ODT) tablet 4 mg  4 mg Oral Q8H PRN    Or    ondansetron (ZOFRAN) injection 4 mg  4 mg IntraVENous Q6H PRN    [Held by provider] insulin lispro (HUMALOG) injection 10 Units  10 Units SubCUTAneous ACD    [Held by provider] insulin lispro (HUMALOG) injection 10 Units  10 Units SubCUTAneous ACL         Objective:      Visit Vitals  BP (!) 148/67 (BP 1 Location: Left upper arm, BP Patient Position: Sitting; At rest)   Pulse 75   Temp 98.3 °F (36.8 °C)   Resp 20   Ht 5' 9\" (1.753 m)   Wt 257 lb 12.8 oz (116.9 kg)   SpO2 97%   BMI 38.07 kg/m²       Physical Exam:  Resting comfortably. No resp distress.    Extremities: extremities normal, atraumatic, no cyanosis or edema  Heart: regular rate and rhythm, S1, S2 normal, no murmur, click, rub or gallop  Lungs: clear to auscultation bilaterally  Neurologic: Grossly normal    Data Review:   Labs:    Recent Results (from the past 24 hour(s))   GLUCOSE, POC    Collection Time: 03/06/21 10:55 AM   Result Value Ref Range    Glucose (POC) 197 (H) 65 - 100 mg/dL    Performed by Kiana Medina    GLUCOSE, POC    Collection Time: 03/06/21  4:45 PM   Result Value Ref Range    Glucose (POC) 261 (H) 65 - 100 mg/dL    Performed by [place]    GLUCOSE, POC    Collection Time: 03/06/21  8:55 PM   Result Value Ref Range    Glucose (POC) 158 (H) 65 - 100 mg/dL    Performed by [facility]    METABOLIC PANEL, BASIC    Collection Time: 03/07/21  1:34 AM   Result Value Ref Range    Sodium 141 136 - 145 mmol/L    Potassium 3.7 3.5 - 5.1 mmol/L    Chloride 98 97 - 108 mmol/L    CO2 37 (H) 21 - 32 mmol/L    Anion gap 6 5 - 15 mmol/L    Glucose 175 (H) 65 - 100 mg/dL     (H) 6 - 20 MG/DL    Creatinine 3.73 (H) 0.70 - 1.30 MG/DL    BUN/Creatinine ratio 33 (H) 12 - 20      GFR est AA 19 (L) >60 ml/min/1.73m2    GFR est non-AA 16 (L) >60 ml/min/1.73m2    Calcium 8.8 8.5 - 10.1 MG/DL   CBC W/O DIFF    Collection Time: 03/07/21  1:34 AM   Result Value Ref Range    WBC 10.1 4.1 - 11.1 K/uL    RBC 4.67 4.10 - 5.70 M/uL    HGB 12.3 12.1 - 17.0 g/dL    HCT 42.2 36.6 - 50.3 %    MCV 90.4 80.0 - 99.0 FL    MCH 26.3 26.0 - 34.0 PG    MCHC 29.1 (L) 30.0 - 36.5 g/dL    RDW 15.1 (H) 11.5 - 14.5 %    PLATELET 893 421 - 013 K/uL    MPV 10.4 8.9 - 12.9 FL    NRBC 0.0 0  WBC    ABSOLUTE NRBC 0.00 0.00 - 0.01 K/uL   GLUCOSE, POC    Collection Time: 03/07/21  7:58 AM   Result Value Ref Range    Glucose (POC) 141 (H) 65 - 100 mg/dL    Performed by Tory Ruvalcaba PCT        Telemetry: paced      Assessment:     Active Problems:    Acute respiratory failure ([facility] (3/4/2021)      CHF exacerbation ([facility] (3/4/2021)      INO (acute kidney injury) ([facility] (3/4/2021)        Plan:     SOB: Likely a component of diastolic heart failure with patients INO on CKD. Continue diuresis. Wt is down. Echo. Leximyoview tomorrow.      Renal insuff: Nephrology following and managing patient's large amount of diuretic.     HTN: Continue current meds.      CAD: continue current meds.  As above.      A fib, permanent s/p AVN ablation:  Paced.  Continue eliquis and BB

## 2021-03-07 NOTE — PROGRESS NOTES
Problem: Falls - Risk of  Goal: *Absence of Falls  Description: Document Khadijah Gong Fall Risk and appropriate interventions in the flowsheet.   Outcome: Progressing Towards Goal  Note: Fall Risk Interventions:  Mobility Interventions: Bed/chair exit alarm         Medication Interventions: Patient to call before getting OOB    Elimination Interventions: Urinal in reach

## 2021-03-07 NOTE — PROGRESS NOTES
Name: Bronson Del Real   MRN: [id number]  : 1950      Assessment   :                                               Plan:  CKD-4 - (Dr Bhatt Men) - baseline creatinine around 3 although  It fluctuates quite a bit depending on volume status. INO  H/o hyperkalemia  High Mg  Severe Azotemia- H/H normal. No UGI bleed. BUN trending down    HTN  DM-2  Volume overload  A on C D-CHF  Obesity  CAD Suspect pt has CRS- with rise in Cr from needed diuresis. Renal US with cortical thinning but no hydro. UA is bland. Renal fxn improving slowly in spite of diuresis. Ct IV Bumex  + Metolazone    Daily labs  Daily wt  Salt/fluid restriction  F/u Echo     Continue with current diuretics. Avoid Mg supplements or Mg containing laxatives or antacids     He is not uremic and there is no acute need for HD. D/W Pt       Subjective:  oob in chair. Feels OK.  No acute c/o    ROS:   No nausea, no vomiting  No chest pain, + shortness of breath and leg edema-improved    Exam:  Visit Vitals  BP (!) 160/67 (BP 1 Location: Left upper arm, BP Patient Position: Sitting)   Pulse 75   Temp 98 °F (36.7 °C)   Resp 20   Ht 5' 9\" (1.753 m)   Wt 116.9 kg (257 lb 12.8 oz)   SpO2 97%   BMI 38.07 kg/m²     WB/WN in NAD  Dec BS  Irregular, distant, no tachycardia  1-2+ leg edema  AOx3    Current Facility-Administered Medications   Medication Dose Route Frequency Last Admin    amLODIPine (NORVASC) tablet 5 mg  5 mg Oral DAILY 5 mg at 21 0837    benzonatate (TESSALON) capsule 100 mg  100 mg Oral TID  mg at 21 1217    acetaminophen (TYLENOL) tablet 650 mg  650 mg Oral Q4H  mg at 21 1217    hydrALAZINE (APRESOLINE) 20 mg/mL injection 20 mg  20 mg IntraVENous Q6H PRN      apixaban (ELIQUIS) tablet 5 mg  5 mg Oral BID 5 mg at 21 0837    ferrous sulfate tablet 325 mg 325 mg Oral  mg at 03/07/21 0557    dutasteride (AVODART) capsule 0.5 mg  0.5 mg Oral DAILY 0.5 mg at 03/07/21 0837    insulin glargine (LANTUS) injection 30 Units  30 Units SubCUTAneous QHS 30 Units at 03/06/21 2243    levothyroxine (SYNTHROID) tablet 137 mcg  137 mcg Oral 6am 137 mcg at 03/07/21 0557    metOLazone (ZAROXOLYN) tablet 2.5 mg  2.5 mg Oral DAILY 2.5 mg at 03/07/21 0837    metoprolol succinate (TOPROL-XL) XL tablet 150 mg  150 mg Oral DAILY 150 mg at 03/07/21 0837    pravastatin (PRAVACHOL) tablet 80 mg  80 mg Oral QHS 80 mg at 03/06/21 2058    pantoprazole (PROTONIX) tablet 40 mg  40 mg Oral ACB 40 mg at 03/07/21 0556    bumetanide (BUMEX) injection 3 mg  3 mg IntraVENous BID 3 mg at 03/07/21 0842    [Held by provider] insulin lispro (HUMALOG) injection 14 Units  14 Units SubCUTAneous ACB Stopped at 03/05/21 0730    insulin lispro (HUMALOG) injection   SubCUTAneous AC&HS 2 Units at 03/07/21 1216    glucose chewable tablet 16 g  4 Tab Oral PRN      dextrose (D50W) injection syrg 12.5-25 g  12.5-25 g IntraVENous PRN      glucagon (GLUCAGEN) injection 1 mg  1 mg IntraMUSCular PRN      sodium chloride (NS) flush 5-40 mL  5-40 mL IntraVENous Q8H 10 mL at 03/07/21 1351    sodium chloride (NS) flush 5-40 mL  5-40 mL IntraVENous PRN      polyethylene glycol (MIRALAX) packet 17 g  17 g Oral DAILY PRN      ondansetron (ZOFRAN ODT) tablet 4 mg  4 mg Oral Q8H PRN      Or    ondansetron (ZOFRAN) injection 4 mg  4 mg IntraVENous Q6H PRN      [Held by provider] insulin lispro (HUMALOG) injection 10 Units  10 Units SubCUTAneous ACD      [Held by provider] insulin lispro (HUMALOG) injection 10 Units  10 Units SubCUTAneous ACL         Labs/Data:    Lab Results   Component Value Date/Time    WBC 10.1 03/07/2021 01:34 AM    HGB 12.3 03/07/2021 01:34 AM    HCT 42.2 03/07/2021 01:34 AM    PLATELET 252 80/96/2716 01:34 AM    MCV 90.4 03/07/2021 01:34 AM       Lab Results   Component Value Date/Time    Sodium 141 03/07/2021 01:34 AM    Potassium 3.7 03/07/2021 01:34 AM    Chloride 98 03/07/2021 01:34 AM    CO2 37 (H) 03/07/2021 01:34 AM    Anion gap 6 03/07/2021 01:34 AM    Glucose 175 (H) 03/07/2021 01:34 AM     (H) 03/07/2021 01:34 AM    Creatinine 3.73 (H) 03/07/2021 01:34 AM    BUN/Creatinine ratio 33 (H) 03/07/2021 01:34 AM    GFR est AA 19 (L) 03/07/2021 01:34 AM    GFR est non-AA 16 (L) 03/07/2021 01:34 AM    Calcium 8.8 03/07/2021 01:34 AM       Wt Readings from Last 3 Encounters:   03/07/21 116.9 kg (257 lb 12.8 oz)   08/03/20 111.1 kg (245 lb)   02/17/20 110.7 kg (244 lb)         Intake/Output Summary (Last 24 hours) at 3/7/2021 1512  Last data filed at 3/7/2021 1316  Gross per 24 hour   Intake 168 ml   Output 600 ml   Net -432 ml       Patient seen and examined. Chart reviewed. Labs, data and other pertinent notes reviewed in last 24 hrs.     PMH/SH/FH reviewed and unchanged compared to H&P    Adele Marinelli MD

## 2021-03-07 NOTE — PROGRESS NOTES
Physician Progress Note      PATIENTRuth Boeck  [facility] #:                  [id number]  :                       1950  ADMIT DATE:       3/4/2021 7:45 PM  DISCH DATE:  RESPONDING  PROVIDER #:        Christy Edmondson DO          QUERY TEXT:    Patient admitted with chf. Noted documentation of acute respiratory failure in H&P. In order to support the diagnosis of acute respiratory failure, please include additional clinical indicators in your documentation. Or please document if the diagnosis of acute respiratory failure has been ruled out after further study. The medical record reflects the following:  Risk Factors: chf, ?pna  Clinical Indicators: H&P- Crackles lower lobe, JVD distended. No Wheezing or Rhonchi. No rales. No Accessory muscle use. Treatment: ct chest, O2, IV Bumex, Rocephin azithromycin  Thanks,  Eloisa Villegas Rn/CDI     Acute Respiratory Failure Clinical Indicators per  MS-DRG Training Guide and Quick Reference Guide:  pO2 < 60 mmHg or SpO2 (pulse oximetry) < 91% breathing room air  pCO2 > 50 and pH < 7.35  P/F ratio (pO2 / FIO2) < 300  pO2 decrease or pCO2 increase by 10 mmHg from baseline (if known)  Supplemental oxygen of 40% or more  Presence of respiratory distress, tachypnea, dyspnea, shortness of breath, wheezing  Unable to speak in complete sentences  Use of accessory muscles to breathe  Extreme anxiety and feeling of impending doom  Tripod position  Confusion/altered mental status/obtunded  Options provided:  -- Acute Respiratory Failure as evidenced by, Please document evidence.   -- Acute Respiratory Failure ruled out after study  -- Other - I will add my own diagnosis  -- Disagree - Not applicable / Not valid  -- Disagree - Clinically unable to determine / Unknown  -- Refer to Clinical Documentation Reviewer    PROVIDER RESPONSE TEXT:    This patient is in acute respiratory failure as evidenced by DOCUMENTED as 86% on room air in ED note of Stacey Valle 3/4 at 2010    Query created by: Ray Appiah on 3/5/2021 1:18 PM      Electronically signed by:  Nava De La Fuente DO 3/7/2021 11:14 AM

## 2021-03-07 NOTE — PROGRESS NOTES
Hospitalist Progress Note    NAME: Nelly Altamirano   :  1950   MRN:  [id number]       Assessment / Plan:  Acute hypoxic respiratory failure  Acute on chronic diastolic CHF exacerbation  INO on CKD4  -86% on RA in ED, maintaining sats on 2L NC  -cont IV bumex 3mg IV bid  -continue daily Zaroxolyn  -Appreciate nephrology consult  -Lisa Villegas, UA pending  -baseline Cr around 3, elevated to 4.76 on admission  -renally dose meds, hold nephrotoxic agents  -about 1L UO documented last 24hrs (3/6)  -does not wear O2 at home and currently on 2L NC. Will try to wean    Acute on chronic dCHF  -Diuresis as above  -TTE pending  -Continue BB  -Appreciate cardiology consult  -Mild troponin elevation in setting of uncontrolled hypertension and INO  -BNP > 19k    Uncontrolled hypertension  -SBP still > 150. Amlodipine added  -As above, continue beta-blocker.  -As needed hydralazine    IDDM  -hyperglycemia  -Continue Lantus per home dose  -add back mealtime insulin at reduced dose   -SSI with POC's. Adjust insulin as indicated based on POC values    Lower extremity edema  -Bilateral lower extremity venous Dopplers negative  -Diuresis as above    Chronic A. fib  -Continue Eliquis  -s/p PPM  -Continue metoprolol     Hypothyroidism  -Continue Synthroid     Code Status: Full code  Surrogate Decision Maker: Wife     DVT Prophylaxis: Eliquis  GI Prophylaxis: not indicated     Baseline: Ambulatory at home     Subjective:     Chief Complaint / Reason for Physician Visit  Patient sitting in bedside chair. No distress. Denies SOB/CP    Discussed with RN events overnight.      Review of Systems:  Symptom Y/N Comments  Symptom Y/N Comments   Fever/Chills n   Chest Pain n    Poor Appetite n   Edema y B/l LEs   Cough n   Abdominal Pain  n   Sputum n   Joint Pain     SOB/BETTS y   Pruritis/Rash     Nausea/vomit n   Tolerating PT/OT     Diarrhea n   Tolerating Diet y    Constipation n   Other       Could NOT obtain due to:      Objective: VITALS:   Last 24hrs VS reviewed since prior progress note. Most recent are:  Patient Vitals for the past 24 hrs:   Temp Pulse Resp BP SpO2   03/07/21 0800 98.3 °F (36.8 °C) 75 20 (!) 148/67 97 %   03/07/21 0127 98 °F (36.7 °C) 75 20 (!) 151/72 96 %   03/06/21 2237 97.5 °F (36.4 °C) 75 20 135/71 96 %   03/06/21 1951 98.1 °F (36.7 °C) 77 20 137/67 96 %       Intake/Output Summary (Last 24 hours) at 3/7/2021 1116  Last data filed at 3/7/2021 0800  Gross per 24 hour   Intake 50 ml   Output 1175 ml   Net -1125 ml        I had a face to face encounter and independently examined this patient on 3/7/2021, as outlined below:  PHYSICAL EXAM:  General: WD, WN. Alert, cooperative, no acute distress    EENT:  EOMI. Anicteric sclerae. MMM  Resp:  Bibasilar rales. No accessory muscle use  CV:  Regular  rhythm,  tight edema b/l LEs  GI:  Soft, Non distended, Non tender. +Bowel sounds  Neurologic:  Alert and oriented X 3, normal speech,   Psych:   Good insight. Not anxious nor agitated  Skin:  No rashes. No jaundice    Reviewed most current lab test results and cultures  YES  Reviewed most current radiology test results   YES  Review and summation of old records today    NO  Reviewed patient's current orders and MAR    YES  PMH/ reviewed - no change compared to H&P  ________________________________________________________________________  Care Plan discussed with:    Comments   Patient x    Family      RN x    Care Manager     Consultant                       x Multidiciplinary team rounds were held today with , nursing, pharmacist and clinical coordinator. Patient's plan of care was discussed; medications were reviewed and discharge planning was addressed.      ________________________________________________________________________  Total NON critical care TIME: 35   Minutes    Total CRITICAL CARE TIME Spent:   Minutes non procedure based      Comments   >50% of visit spent in counseling and coordination of care x    ________________________________________________________________________  Nila Rodas, DO     Procedures: see electronic medical records for all procedures/Xrays and details which were not copied into this note but were reviewed prior to creation of Plan. LABS:  I reviewed today's most current labs and imaging studies.   Pertinent labs include:  Recent Labs     03/07/21  0134 03/05/21  0143 03/04/21 2001   WBC 10.1 9.4 9.7   HGB 12.3 12.6 13.2   HCT 42.2 42.9 44.3    176 187     Recent Labs     03/07/21  0134 03/06/21  0211 03/05/21  0143 03/04/21 2001    141 140 140   K 3.7 3.8 4.2 4.3   CL 98 100 101 99   CO2 37* 34* 34* 34*   * 129* 114* 99   * 124* 134* 132*   CREA 3.73* 4.22* 4.63* 4.76*   CA 8.8 8.4*  8.7 8.7 8.2*   MG  --   --  2.7*  --    PHOS  --  6.9*  --   --    ALB  --   --   --  3.3*   TBILI  --   --   --  0.5   ALT  --   --   --  19       Signed: Herb Reynoso, DO

## 2021-03-08 ENCOUNTER — HOSPITAL ENCOUNTER (OUTPATIENT)
Dept: NON INVASIVE DIAGNOSTICS | Age: 71
Discharge: HOME | DRG: 291 | End: 2021-03-08
Attending: INTERNAL MEDICINE
Payer: MEDICARE

## 2021-03-08 ENCOUNTER — APPOINTMENT (OUTPATIENT)
Dept: NUCLEAR MEDICINE | Age: 71
DRG: 291 | End: 2021-03-08
Attending: INTERNAL MEDICINE
Payer: MEDICARE

## 2021-03-08 LAB
ANION GAP SERPL CALC-SCNC: 4 MMOL/L (ref 5–15)
BUN SERPL-MCNC: 126 MG/DL (ref 6–20)
BUN/CREAT SERPL: 35 (ref 12–20)
CALCIUM SERPL-MCNC: 8.6 MG/DL (ref 8.5–10.1)
CHLORIDE SERPL-SCNC: 95 MMOL/L (ref 97–108)
CO2 SERPL-SCNC: 39 MMOL/L (ref 21–32)
CREAT SERPL-MCNC: 3.6 MG/DL (ref 0.7–1.3)
GLUCOSE BLD STRIP.AUTO-MCNC: 136 MG/DL (ref 65–100)
GLUCOSE BLD STRIP.AUTO-MCNC: 146 MG/DL (ref 65–100)
GLUCOSE BLD STRIP.AUTO-MCNC: 182 MG/DL (ref 65–100)
GLUCOSE BLD STRIP.AUTO-MCNC: 305 MG/DL (ref 65–100)
GLUCOSE SERPL-MCNC: 189 MG/DL (ref 65–100)
POTASSIUM SERPL-SCNC: 3.4 MMOL/L (ref 3.5–5.1)
SERVICE CMNT-IMP: ABNORMAL
SODIUM SERPL-SCNC: 138 MMOL/L (ref 136–145)

## 2021-03-08 PROCEDURE — 77010033678 HC OXYGEN DAILY

## 2021-03-08 PROCEDURE — 80048 BASIC METABOLIC PNL TOTAL CA: CPT

## 2021-03-08 PROCEDURE — 94761 N-INVAS EAR/PLS OXIMETRY MLT: CPT

## 2021-03-08 PROCEDURE — 74011636637 HC RX REV CODE- 636/637: Performed by: STUDENT IN AN ORGANIZED HEALTH CARE EDUCATION/TRAINING PROGRAM

## 2021-03-08 PROCEDURE — 74011250637 HC RX REV CODE- 250/637: Performed by: NURSE PRACTITIONER

## 2021-03-08 PROCEDURE — 65660000000 HC RM CCU STEPDOWN

## 2021-03-08 PROCEDURE — 74011250637 HC RX REV CODE- 250/637: Performed by: STUDENT IN AN ORGANIZED HEALTH CARE EDUCATION/TRAINING PROGRAM

## 2021-03-08 PROCEDURE — 77030041070 HC DRSG ALG MDII -A

## 2021-03-08 PROCEDURE — 82962 GLUCOSE BLOOD TEST: CPT

## 2021-03-08 PROCEDURE — 99232 SBSQ HOSP IP/OBS MODERATE 35: CPT | Performed by: INTERNAL MEDICINE

## 2021-03-08 PROCEDURE — 74011000250 HC RX REV CODE- 250: Performed by: STUDENT IN AN ORGANIZED HEALTH CARE EDUCATION/TRAINING PROGRAM

## 2021-03-08 PROCEDURE — 74011250636 HC RX REV CODE- 250/636: Performed by: INTERNAL MEDICINE

## 2021-03-08 PROCEDURE — 74011250637 HC RX REV CODE- 250/637: Performed by: INTERNAL MEDICINE

## 2021-03-08 PROCEDURE — 2709999900 HC NON-CHARGEABLE SUPPLY

## 2021-03-08 PROCEDURE — 78452 HT MUSCLE IMAGE SPECT MULT: CPT

## 2021-03-08 PROCEDURE — 93017 CV STRESS TEST TRACING ONLY: CPT

## 2021-03-08 PROCEDURE — 74011636637 HC RX REV CODE- 636/637: Performed by: INTERNAL MEDICINE

## 2021-03-08 PROCEDURE — 36415 COLL VENOUS BLD VENIPUNCTURE: CPT

## 2021-03-08 RX ORDER — GUAIFENESIN/DEXTROMETHORPHAN 100-10MG/5
10 SYRUP ORAL
Status: DISCONTINUED | OUTPATIENT
Start: 2021-03-08 | End: 2021-03-19 | Stop reason: HOSPADM

## 2021-03-08 RX ORDER — POTASSIUM CHLORIDE 750 MG/1
20 TABLET, FILM COATED, EXTENDED RELEASE ORAL ONCE
Status: COMPLETED | OUTPATIENT
Start: 2021-03-09 | End: 2021-03-09

## 2021-03-08 RX ORDER — BISMUTH SUBSALICYLATE 262 MG/15ML
30 LIQUID ORAL
Status: DISCONTINUED | OUTPATIENT
Start: 2021-03-08 | End: 2021-03-19 | Stop reason: HOSPADM

## 2021-03-08 RX ORDER — SODIUM CHLORIDE 0.9 % (FLUSH) 0.9 %
10 SYRINGE (ML) INJECTION AS NEEDED
Status: DISCONTINUED | OUTPATIENT
Start: 2021-03-08 | End: 2021-03-12 | Stop reason: HOSPADM

## 2021-03-08 RX ADMIN — INSULIN GLARGINE 30 UNITS: 100 INJECTION, SOLUTION SUBCUTANEOUS at 22:02

## 2021-03-08 RX ADMIN — DUTASTERIDE 0.5 MG: 0.5 CAPSULE, LIQUID FILLED ORAL at 08:23

## 2021-03-08 RX ADMIN — INSULIN LISPRO 2 UNITS: 100 INJECTION, SOLUTION INTRAVENOUS; SUBCUTANEOUS at 19:25

## 2021-03-08 RX ADMIN — FERROUS SULFATE TAB 325 MG (65 MG ELEMENTAL FE) 325 MG: 325 (65 FE) TAB at 08:23

## 2021-03-08 RX ADMIN — CASTOR OIL AND BALSAM, PERU: 788; 87 OINTMENT TOPICAL at 08:22

## 2021-03-08 RX ADMIN — APIXABAN 5 MG: 5 TABLET, FILM COATED ORAL at 19:24

## 2021-03-08 RX ADMIN — ACETAMINOPHEN 650 MG: 325 TABLET ORAL at 05:15

## 2021-03-08 RX ADMIN — PANTOPRAZOLE SODIUM 40 MG: 40 TABLET, DELAYED RELEASE ORAL at 08:27

## 2021-03-08 RX ADMIN — GUAIFENESIN AND DEXTROMETHORPHAN 10 ML: 100; 10 SYRUP ORAL at 22:03

## 2021-03-08 RX ADMIN — INSULIN LISPRO 2 UNITS: 100 INJECTION, SOLUTION INTRAVENOUS; SUBCUTANEOUS at 08:24

## 2021-03-08 RX ADMIN — BUMETANIDE 3 MG: 0.25 INJECTION, SOLUTION INTRAMUSCULAR; INTRAVENOUS at 08:23

## 2021-03-08 RX ADMIN — PRAVASTATIN SODIUM 80 MG: 40 TABLET ORAL at 22:01

## 2021-03-08 RX ADMIN — Medication 10 ML: at 05:16

## 2021-03-08 RX ADMIN — LEVOTHYROXINE SODIUM 137 MCG: 0.03 TABLET ORAL at 05:03

## 2021-03-08 RX ADMIN — APIXABAN 5 MG: 5 TABLET, FILM COATED ORAL at 08:22

## 2021-03-08 RX ADMIN — INSULIN LISPRO 4 UNITS: 100 INJECTION, SOLUTION INTRAVENOUS; SUBCUTANEOUS at 22:01

## 2021-03-08 RX ADMIN — BENZONATATE 100 MG: 100 CAPSULE ORAL at 16:00

## 2021-03-08 RX ADMIN — Medication 10 ML: at 22:03

## 2021-03-08 RX ADMIN — BENZONATATE 100 MG: 100 CAPSULE ORAL at 05:15

## 2021-03-08 RX ADMIN — ACETAMINOPHEN 650 MG: 325 TABLET ORAL at 19:23

## 2021-03-08 RX ADMIN — METOLAZONE 2.5 MG: 2.5 TABLET ORAL at 08:26

## 2021-03-08 RX ADMIN — INSULIN LISPRO 3 UNITS: 100 INJECTION, SOLUTION INTRAVENOUS; SUBCUTANEOUS at 14:21

## 2021-03-08 RX ADMIN — AMLODIPINE BESYLATE 5 MG: 5 TABLET ORAL at 08:21

## 2021-03-08 RX ADMIN — CASTOR OIL AND BALSAM, PERU: 788; 87 OINTMENT TOPICAL at 22:04

## 2021-03-08 RX ADMIN — METOPROLOL SUCCINATE 150 MG: 50 TABLET, EXTENDED RELEASE ORAL at 14:30

## 2021-03-08 RX ADMIN — Medication 10 ML: at 10:51

## 2021-03-08 RX ADMIN — REGADENOSON 0.4 MG: 0.08 INJECTION, SOLUTION INTRAVENOUS at 10:51

## 2021-03-08 RX ADMIN — ACETAMINOPHEN 650 MG: 325 TABLET ORAL at 08:28

## 2021-03-08 RX ADMIN — BUMETANIDE 3 MG: 0.25 INJECTION, SOLUTION INTRAMUSCULAR; INTRAVENOUS at 19:30

## 2021-03-08 RX ADMIN — Medication 10 ML: at 16:01

## 2021-03-08 RX ADMIN — INSULIN LISPRO 3 UNITS: 100 INJECTION, SOLUTION INTRAVENOUS; SUBCUTANEOUS at 19:24

## 2021-03-08 NOTE — PROGRESS NOTES
0700: End of Shift Note    Bedside shift change report given to NADINE Canada (oncoming nurse) by Crissy Newberry (offgoing nurse). Report included the following information SBAR, Kardex, Intake/Output, MAR, Recent Results and Cardiac Rhythm Paced    Shift worked:  Night     Shift summary and any significant changes:     PRN tylenol and tessalon given     Concerns for physician to address:       Zone phone for oncoming shift:          Activity:  Activity Level: Up with Assistance  Number times ambulated in hallways past shift: 0  Number of times OOB to chair past shift: 3    Cardiac:   Cardiac Monitoring: Yes      Cardiac Rhythm: Paced    Access:   Current line(s): PIV     Genitourinary:   Urinary status: voiding    Respiratory:   O2 Device: Nasal cannula  Chronic home O2 use?: NO  Incentive spirometer at bedside: NO     GI:  Last Bowel Movement Date: 03/05/21  Current diet:  DIET NPO Except Meds  Passing flatus: YES  Tolerating current diet: YES  % Diet Eaten: 90 %    Pain Management:   Patient states pain is manageable on current regimen: YES    Skin:  Romeo Score: 19  Interventions: turn team, speciality bed, float heels, increase time out of bed and limit briefs    Patient Safety:  Fall Score:  Total Score: 3  Interventions: bed/chair alarm, gripper socks and pt to call before getting OOB  High Fall Risk: Yes    Length of Stay:  Expected LOS: 4d 0h  Actual LOS: 4      [employer]

## 2021-03-08 NOTE — ROUTINE PROCESS
Report received from [facility], [facility]. SBAR were discussed.  
 
Clarice Laboy RN

## 2021-03-08 NOTE — PROGRESS NOTES
Name: Hugh Mohan   MRN: [id number]  : 1950      Assessment   :                                               Plan:  CKD-4 -     INO  H/o hyperkalemia  High Mg  Severe Azotemia- H/H normal. No UGI bleed. BUN trending down    HTN  DM-2  Volume overload  A on C D-CHF  Obesity  CAD Follows with Dr Escobar Coello - baseline creatinine around 3 although  It fluctuates quite a bit depending on volume status    Suspect cardiorenal syndrome with rise a in Creatinine from needed diuresis. Renal US with cortical thinning but no hydro. UA is bland. Creatinine improving slowly with needed diuresis (4.2 to 3.7 to pending today). Continue IV Bumex  + Metolazone    Daily labs  Daily wt  Salt/fluid restriction      He is not uremic and there is no acute need for HD. Subjective:  Sitting on the side of the bed about to go for stress test.. Feels OK.  No acute c/o    ROS:   No nausea, no vomiting  No chest pain, + shortness of breath and leg edema-improved    Exam:  Visit Vitals  BP (!) 144/68   Pulse 75   Temp 98.1 °F (36.7 °C)   Resp 21   Ht 5' 9\" (1.753 m)   Wt 116.9 kg (257 lb 12.8 oz)   SpO2 97%   BMI 38.07 kg/m²     WB/WN in NAD  Rales right base  Irregular, distant, no tachycardia  1-2+ leg edema  AOx3    Current Facility-Administered Medications   Medication Dose Route Frequency Last Admin    balsam peru-castor oiL (VENELEX) ointment   Topical BID Given at 21 2241    insulin lispro (HUMALOG) injection 3 Units  3 Units SubCUTAneous TIDAC      amLODIPine (NORVASC) tablet 5 mg  5 mg Oral DAILY 5 mg at 21 0837    benzonatate (TESSALON) capsule 100 mg  100 mg Oral TID  mg at 21 0515    acetaminophen (TYLENOL) tablet 650 mg  650 mg Oral Q4H  mg at 21 0515    hydrALAZINE (APRESOLINE) 20 mg/mL injection 20 mg  20 mg IntraVENous Q6H PRN      apixaban (ELIQUIS) tablet 5 mg  5 mg Oral BID 5 mg at 03/07/21 1744    ferrous sulfate tablet 325 mg  325 mg Oral  mg at 03/07/21 0557    dutasteride (AVODART) capsule 0.5 mg  0.5 mg Oral DAILY 0.5 mg at 03/07/21 0837    insulin glargine (LANTUS) injection 30 Units  30 Units SubCUTAneous QHS 30 Units at 03/07/21 2241    levothyroxine (SYNTHROID) tablet 137 mcg  137 mcg Oral 6am 137 mcg at 03/08/21 0503    metOLazone (ZAROXOLYN) tablet 2.5 mg  2.5 mg Oral DAILY 2.5 mg at 03/07/21 0837    metoprolol succinate (TOPROL-XL) XL tablet 150 mg  150 mg Oral DAILY 150 mg at 03/07/21 0837    pravastatin (PRAVACHOL) tablet 80 mg  80 mg Oral QHS 80 mg at 03/07/21 2240    pantoprazole (PROTONIX) tablet 40 mg  40 mg Oral ACB 40 mg at 03/07/21 0556    bumetanide (BUMEX) injection 3 mg  3 mg IntraVENous BID 3 mg at 03/07/21 1745    insulin lispro (HUMALOG) injection   SubCUTAneous AC&HS 2 Units at 03/07/21 2240    glucose chewable tablet 16 g  4 Tab Oral PRN      dextrose (D50W) injection syrg 12.5-25 g  12.5-25 g IntraVENous PRN      glucagon (GLUCAGEN) injection 1 mg  1 mg IntraMUSCular PRN      sodium chloride (NS) flush 5-40 mL  5-40 mL IntraVENous Q8H 10 mL at 03/08/21 0516    sodium chloride (NS) flush 5-40 mL  5-40 mL IntraVENous PRN      polyethylene glycol (MIRALAX) packet 17 g  17 g Oral DAILY PRN      ondansetron (ZOFRAN ODT) tablet 4 mg  4 mg Oral Q8H PRN      Or    ondansetron (ZOFRAN) injection 4 mg  4 mg IntraVENous Q6H PRN         Labs/Data:    Lab Results   Component Value Date/Time    WBC 10.1 03/07/2021 01:34 AM    HGB 12.3 03/07/2021 01:34 AM    HCT 42.2 03/07/2021 01:34 AM    PLATELET 957 30/75/0253 01:34 AM    MCV 90.4 03/07/2021 01:34 AM       Lab Results   Component Value Date/Time    Sodium 141 03/07/2021 01:34 AM    Potassium 3.7 03/07/2021 01:34 AM    Chloride 98 03/07/2021 01:34 AM    CO2 37 (H) 03/07/2021 01:34 AM    Anion gap 6 03/07/2021 01:34 AM    Glucose 175 (H) 03/07/2021 01:34 AM     (H) 03/07/2021 01:34 AM    Creatinine 3.73 (H) 03/07/2021 01:34 AM    BUN/Creatinine ratio 33 (H) 03/07/2021 01:34 AM    GFR est AA 19 (L) 03/07/2021 01:34 AM    GFR est non-AA 16 (L) 03/07/2021 01:34 AM    Calcium 8.8 03/07/2021 01:34 AM       Wt Readings from Last 3 Encounters:   03/07/21 116.9 kg (257 lb 12.8 oz)   08/03/20 111.1 kg (245 lb)   02/17/20 110.7 kg (244 lb)         Intake/Output Summary (Last 24 hours) at 3/8/2021 0603  Last data filed at 3/7/2021 1740  Gross per 24 hour   Intake 389 ml   Output 100 ml   Net 289 ml       Patient seen and examined. Chart reviewed. Labs, data and other pertinent notes reviewed in last 24 hrs.     PMH/SH/FH reviewed and unchanged compared to H&P    Edilia Anne MD

## 2021-03-08 NOTE — PROGRESS NOTES
Transition of Care Plan:     Disposition: Home   Follow up appointments: PCP  DME needed: None  Transportation at Discharge: Pt's wife will transport at d/c.   Kori Kim or means to access home:    Pt has his keys    IM Medicare letter: Pt will need 2nd IM Letter  Caregiver Contact: Brody Cruz- Wife [phone]  Discharge Caregiver contacted prior to discharge? Will contact at d/c    4:08pm- CM called Shadi Ireland [phone] via phone to schedule appointment. No answer. CM left a voicemail message. 3:30pm- CM met with pt and pt's wife at bedside to discuss d/c plan. CM inquired with pt about home health. Pt declined home health. CM will continue to follow patient for discharge planning needs and arrange for services as deemed necessary.     Jamie Oconnor [address] [place] [address]  [phone]

## 2021-03-08 NOTE — PROGRESS NOTES
[address] [place] [address]  [phone]      Cardiology Progress Note      3/9/2021 1230PM    Admit Date: 3/4/2021    Admit Diagnosis:   CHF exacerbation ([address] [I50.9]  Acute respiratory failure ([address] [J96.00]  INO (acute kidney injury) ([address] [N17.9]    Interval History/Subjective:     Liza Monge is a 66 y.o. male with PMH CAD s/p CABG, a fib,  CKD, DM, HTN, JACEY, thyroid who was  admitted for CHF exacerbation ([facility] [I50.9]  Acute respiratory failure ([address] [J96.00]  INO (acute kidney injury) ([facility] [N17.9]. -BP slightly elevated  -K 3.2; creat steady around 3.63; proBNP decrease 9780  -weight down 2#; I/O neg 1L  -Mr. Hong Hauser is feeling okay. He had a \"terrible\" night. SOB feeling okay. Chronic pains.        Visit Vitals  BP (!) 143/63   Pulse 75   Temp 99.1 °F (37.3 °C)   Resp 22   Ht 5' 9\" (1.753 m)   Wt 116.4 kg (256 lb 9.6 oz)   SpO2 98%   BMI 37.89 kg/m²       Current Facility-Administered Medications   Medication Dose Route Frequency    albuterol-ipratropium (DUO-NEB) 2.5 MG-0.5 MG/3 ML  3 mL Nebulization Q4H PRN    piperacillin-tazobactam (ZOSYN) 3.375 g in 0.9% sodium chloride (MBP/ADV) 100 mL MBP  3.375 g IntraVENous NOW    Followed by    piperacillin-tazobactam (ZOSYN) 3.375 g in 0.9% sodium chloride (MBP/ADV) 100 mL MBP  3.375 g IntraVENous Q12H    vancomycin (VANCOCIN) 2000 mg in  ml infusion  2,000 mg IntraVENous ONCE    [START ON 3/10/2021] vancomycin (VANCOCIN) 1250 mg in  ml infusion  1,250 mg IntraVENous Q36H    [START ON 3/10/2021] amLODIPine (NORVASC) tablet 10 mg  10 mg Oral DAILY    diphenhydrAMINE-zinc acetate 2%-0.1% (BENADRYL) cream   Topical TID PRN    bismuth subsalicylate (PEPTO-BISMOL) oral suspension 30 mL  30 mL Oral Q6H PRN    guaiFENesin-dextromethorphan (ROBITUSSIN DM) 100-10 mg/5 mL syrup 10 mL  10 mL Oral Q6H PRN    balsam peru-castor oiL (VENELEX) ointment   Topical BID    insulin lispro (HUMALOG) injection 3 Units 3 Units SubCUTAneous TIDAC    benzonatate (TESSALON) capsule 100 mg  100 mg Oral TID PRN    acetaminophen (TYLENOL) tablet 650 mg  650 mg Oral Q4H PRN    hydrALAZINE (APRESOLINE) 20 mg/mL injection 20 mg  20 mg IntraVENous Q6H PRN    apixaban (ELIQUIS) tablet 5 mg  5 mg Oral BID    ferrous sulfate tablet 325 mg  325 mg Oral ACB    dutasteride (AVODART) capsule 0.5 mg  0.5 mg Oral DAILY    insulin glargine (LANTUS) injection 30 Units  30 Units SubCUTAneous QHS    levothyroxine (SYNTHROID) tablet 137 mcg  137 mcg Oral 6am    metOLazone (ZAROXOLYN) tablet 2.5 mg  2.5 mg Oral DAILY    metoprolol succinate (TOPROL-XL) XL tablet 150 mg  150 mg Oral DAILY    pravastatin (PRAVACHOL) tablet 80 mg  80 mg Oral QHS    pantoprazole (PROTONIX) tablet 40 mg  40 mg Oral ACB    bumetanide (BUMEX) injection 3 mg  3 mg IntraVENous BID    insulin lispro (HUMALOG) injection   SubCUTAneous AC&HS    glucose chewable tablet 16 g  4 Tab Oral PRN    dextrose (D50W) injection syrg 12.5-25 g  12.5-25 g IntraVENous PRN    glucagon (GLUCAGEN) injection 1 mg  1 mg IntraMUSCular PRN    sodium chloride (NS) flush 5-40 mL  5-40 mL IntraVENous Q8H    sodium chloride (NS) flush 5-40 mL  5-40 mL IntraVENous PRN    polyethylene glycol (MIRALAX) packet 17 g  17 g Oral DAILY PRN    ondansetron (ZOFRAN ODT) tablet 4 mg  4 mg Oral Q8H PRN    Or    ondansetron (ZOFRAN) injection 4 mg  4 mg IntraVENous Q6H PRN     Facility-Administered Medications Ordered in Other Encounters   Medication Dose Route Frequency    sodium chloride (NS) flush 10 mL  10 mL IntraVENous PRN       Objective:      Physical Exam:  General: elderly  male sitting in chair in NAD   Heart: RRR, no m/S3/JVD  Lungs: dim bases  Abdomen: Soft, +BS, NTND   Extremities: LE catherine +DP/PT, 2+ pitting edema up to waist.    Neurologic: Grossly normal  Skin:  Warm and dry. Dressings on legs.    Ruddiness bilateral gaitor region    Data Review:   Recent Labs 03/09/21 0135 03/07/21  0134   WBC 12.0* 10.1   HGB 12.0* 12.3   HCT 40.9 42.2   * 157     Recent Labs     03/09/21 0135 03/08/21  1624 03/07/21 0134    138 141   K 3.2* 3.4* 3.7   CL 97 95* 98   CO2 37* 39* 37*   * 189* 175*   * 126* 124*   CREA 3.63* 3.60* 3.73*   CA 8.4* 8.6 8.8   ALB 2.9*  --   --    TBILI 1.0  --   --    ALT 15  --   --        No results for input(s): TROIQ, CPK, CKMB in the last 72 hours. Intake/Output Summary (Last 24 hours) at 3/9/2021 1304  Last data filed at 3/9/2021 1200  Gross per 24 hour   Intake 600 ml   Output 2110 ml   Net -1510 ml        Telemetry: pacing with underlying a fib;  PVCs  ECG: v paced  Echocardiogram: EF 50-55%; mild concentric hypertrophy; mild to mod AS;   CT chest: \"1. Moderate bilateral pleural effusions with bibasilar and right middle lobe  atelectasis. 2. Cardiomegaly with coronary artery atherosclerosis. 3. No evidence of pneumonia. \"  CT abd/pelv: \" 1. No acute abdominal or pelvic pathology. 2. Cirrhotic liver. 3. Mild abdominal and pelvic ascites. 4. Moderate layering bilateral pleural effusions with bibasilar atelectasis. 5. Severe atherosclerosis. \"  Assessment:     Active Problems:    Acute respiratory failure ([address] (3/4/2021)      CHF exacerbation ([address] (3/4/2021)      INO (acute kidney injury) ([address] (3/4/2021)        Plan:     Acute diastolic heart failure:  Improving. With contribution from INO on CKD ECHO with EF 50-55%. Weight improving   · Nephrology managing patient's large amount of diuretics  · HTN still slightly increased. Increase amlodipine       CAD history/mild troponin elevation:  No chest pain. Not ACS  · EF 50-55%  · Statin and BB.   Not on ASA due to eliquis  · Stress test with no ischemia noted       A fib s/p AVN ablation:  Paced  · eliquis and BB        Kristie Converse, NP  DNP, RN, AGACNP-BC

## 2021-03-08 NOTE — PROGRESS NOTES
[facility]      [address]  [phone]      Cardiology Progress Note      3/8/2021 1:10 PM    Admit Date: 3/4/2021    Admit Diagnosis:   CHF exacerbation ([facility] [I50.9]; Acute respiratory failure ([facility] [J96.00]; INO (acute kidney injury) ([facility] [N17.9]    Subjective:     Naina Meo     Breathing better, no CP    Visit Vitals  BP (!) 146/68 (BP 1 Location: Left upper arm, BP Patient Position: At rest;Sitting)   Pulse 75   Temp 98.2 °F (36.8 °C)   Resp 20   Ht 5' 9\" (1.753 m)   Wt 258 lb 9.6 oz (117.3 kg)   SpO2 93%   BMI 38.19 kg/m²       Current Facility-Administered Medications   Medication Dose Route Frequency    diphenhydrAMINE-zinc acetate 2%-0.1% (BENADRYL) cream   Topical TID PRN    balsam peru-castor oiL (VENELEX) ointment   Topical BID    insulin lispro (HUMALOG) injection 3 Units  3 Units SubCUTAneous TIDAC    amLODIPine (NORVASC) tablet 5 mg  5 mg Oral DAILY    benzonatate (TESSALON) capsule 100 mg  100 mg Oral TID PRN    acetaminophen (TYLENOL) tablet 650 mg  650 mg Oral Q4H PRN    hydrALAZINE (APRESOLINE) 20 mg/mL injection 20 mg  20 mg IntraVENous Q6H PRN    apixaban (ELIQUIS) tablet 5 mg  5 mg Oral BID    ferrous sulfate tablet 325 mg  325 mg Oral ACB    dutasteride (AVODART) capsule 0.5 mg  0.5 mg Oral DAILY    insulin glargine (LANTUS) injection 30 Units  30 Units SubCUTAneous QHS    levothyroxine (SYNTHROID) tablet 137 mcg  137 mcg Oral 6am    metOLazone (ZAROXOLYN) tablet 2.5 mg  2.5 mg Oral DAILY    metoprolol succinate (TOPROL-XL) XL tablet 150 mg  150 mg Oral DAILY    pravastatin (PRAVACHOL) tablet 80 mg  80 mg Oral QHS    pantoprazole (PROTONIX) tablet 40 mg  40 mg Oral ACB    bumetanide (BUMEX) injection 3 mg  3 mg IntraVENous BID    insulin lispro (HUMALOG) injection   SubCUTAneous AC&HS    glucose chewable tablet 16 g  4 Tab Oral PRN    dextrose (D50W) injection syrg 12.5-25 g  12.5-25 g IntraVENous PRN    glucagon (GLUCAGEN) injection 1 mg  1 mg IntraMUSCular PRN    sodium chloride (NS) flush 5-40 mL  5-40 mL IntraVENous Q8H    sodium chloride (NS) flush 5-40 mL  5-40 mL IntraVENous PRN    polyethylene glycol (MIRALAX) packet 17 g  17 g Oral DAILY PRN    ondansetron (ZOFRAN ODT) tablet 4 mg  4 mg Oral Q8H PRN    Or    ondansetron (ZOFRAN) injection 4 mg  4 mg IntraVENous Q6H PRN     Facility-Administered Medications Ordered in Other Encounters   Medication Dose Route Frequency    sodium chloride (NS) flush 10 mL  10 mL IntraVENous PRN       Objective:      Physical Exam:  General Appearance:    Chest:   Clear  Cardiovascular: RRR  Extremities: 2+ edema  Skin:  Warm and dry.     Data Review:   Recent Labs     03/07/21 0134   WBC 10.1   HGB 12.3   HCT 42.2        Recent Labs     03/07/21 0134 03/06/21  0211    141   K 3.7 3.8   CL 98 100   CO2 37* 34*   * 129*   * 124*   CREA 3.73* 4.22*   CA 8.8 8.4*  8.7   PHOS  --  6.9*       No results for input(s): TROIQ, CPK, CKMB in the last 72 hours. Intake/Output Summary (Last 24 hours) at 3/8/2021 1310  Last data filed at 3/8/2021 0753  Gross per 24 hour   Intake 389 ml   Output 550 ml   Net -161 ml        Telemetry:   EKG:  Cxray:    Assessment:     Active Problems:    Acute respiratory failure ([facility] [address] (3/4/2021)      CHF exacerbation ([facility] (3/4/2021)      INO (acute kidney injury) ([facility] (3/4/2021)        Plan:     3 liters negative with improvement in breathing. No angiina.   Marlyn today for mild trop elevation; bar will be high for coronary angio in light of his advanced CKD    Kayleigh Hamlin M.D., [facility]

## 2021-03-08 NOTE — INTERDISCIPLINARY ROUNDS
[facility] Interdisciplinary Rounds were held today to discuss patient's plan of care and outcomes. The following members were present: NP/Physician, Pharmacy, Nursing and Case Management. PLAN OF CARE:  
Continue current treatment plan Expected Length of Stay:  4d 0h

## 2021-03-08 NOTE — PROGRESS NOTES
Hospitalist Progress Note    NAME: Naina Clemente   :  1950   MRN:  [id number]       Assessment / Plan:  Acute hypoxic respiratory failure  Acute on chronic diastolic CHF exacerbation  INO on CKD4  -86% on RA in ED, maintaining sats on 2L NC  -cont IV bumex 3mg IV bid  -continue daily Zaroxolyn  -Appreciate nephrology consult  -baseline Cr around 3, elevated to 4.76 on admission  -renally dose meds, hold nephrotoxic agents  -about 3L negative so far  -does not wear O2 at home and currently on 2L NC. Will try to wean    Hypokalemia  -replace PO  -recheck am bmp    Acute on chronic dCHF  -Diuresis as above  -TTE pending  -Continue BB  -Appreciate cardiology consult  -Mild troponin elevation in setting of uncontrolled hypertension and INO  -BNP > 19k    Skin rash  -Rt flank, macular lesion  -no vesicles. Does not appear dermatomal   -states area became painful after sliding on CT scanner when he was admitted  -monitor, try topical benadryl    Uncontrolled hypertension  -SBP still > 150. Amlodipine added  -As above, continue beta-blocker.  -As needed hydralazine    IDDM  -hyperglycemia  -Continue Lantus per home dose  -add back mealtime insulin at reduced dose   -SSI with POC's. Adjust insulin as indicated based on POC values    Lower extremity edema  -Bilateral lower extremity venous Dopplers negative  -Diuresis as above    LE/foot wounds POA  -large RLE medical ankle bullae from fluid overload, b/l heel DTIs, Lt 5th toe blister  -appreciate wound care help  -to follow at [facility], where he has been seen before    Chronic A. fib  -Continue Eliquis  -s/p PPM  -Continue metoprolol     Hypothyroidism  -Continue Synthroid     Code Status: Full code  Surrogate Decision Maker: Wife     DVT Prophylaxis: Eliquis  GI Prophylaxis: not indicated     Baseline: Ambulatory at home     Subjective:     Chief Complaint / Reason for Physician Visit  Denies SOB, cough.  Reports making good     Discussed with RN events overnight. Review of Systems:  Symptom Y/N Comments  Symptom Y/N Comments   Fever/Chills n   Chest Pain n    Poor Appetite n   Edema y B/l LEs   Cough n   Abdominal Pain  n   Sputum n   Joint Pain     SOB/BETTS y   Pruritis/Rash     Nausea/vomit n   Tolerating PT/OT     Diarrhea n   Tolerating Diet y    Constipation n   Other       Could NOT obtain due to:      Objective:     VITALS:   Last 24hrs VS reviewed since prior progress note. Most recent are:  Patient Vitals for the past 24 hrs:   Temp Pulse Resp BP SpO2   03/08/21 0744 98.2 °F (36.8 °C) 75 22 (!) 146/68 96 %   03/08/21 0317 98.1 °F (36.7 °C) 75 21 (!) 144/68 97 %   03/07/21 2339 98.2 °F (36.8 °C) 75 19 (!) 150/79 100 %   03/07/21 1925 98 °F (36.7 °C) 75 17 (!) 158/78 98 %   03/07/21 1600 97.3 °F (36.3 °C) 75 20 (!) 182/83 96 %   03/07/21 1143 98 °F (36.7 °C) 75 20 (!) 160/67 97 %       Intake/Output Summary (Last 24 hours) at 3/8/2021 1069  Last data filed at 3/8/2021 0744  Gross per 24 hour   Intake 339 ml   Output 550 ml   Net -211 ml        I had a face to face encounter and independently examined this patient on 3/8/2021, as outlined below:  PHYSICAL EXAM:  General: WD, WN. Alert, cooperative, no acute distress    EENT:  EOMI. Anicteric sclerae. MMM  Resp:  Bibasilar rales. No accessory muscle use  CV:  Regular  rhythm,  tight edema b/l LEs  GI:  Soft, Non distended, Non tender. +Bowel sounds  Neurologic:  Alert and oriented X 3, normal speech,   Psych:   Good insight. Not anxious nor agitated  Skin:  No rashes.   No jaundice    Reviewed most current lab test results and cultures  YES  Reviewed most current radiology test results   YES  Review and summation of old records today    NO  Reviewed patient's current orders and MAR    YES  PMH/SH reviewed - no change compared to H&P  ________________________________________________________________________  Care Plan discussed with:    Comments   Patient x    Family      RN x    Care Manager     Consultant x Multidiciplinary team rounds were held today with , nursing, pharmacist and clinical coordinator. Patient's plan of care was discussed; medications were reviewed and discharge planning was addressed. ________________________________________________________________________  Total NON critical care TIME: 35   Minutes    Total CRITICAL CARE TIME Spent:   Minutes non procedure based      Comments   >50% of visit spent in counseling and coordination of care x    ________________________________________________________________________  Candice Jorgensen DO     Procedures: see electronic medical records for all procedures/Xrays and details which were not copied into this note but were reviewed prior to creation of Plan. LABS:  I reviewed today's most current labs and imaging studies.   Pertinent labs include:  Recent Labs     03/07/21 0134   WBC 10.1   HGB 12.3   HCT 42.2        Recent Labs     03/07/21 0134 03/06/21  0211    141   K 3.7 3.8   CL 98 100   CO2 37* 34*   * 129*   * 124*   CREA 3.73* 4.22*   CA 8.8 8.4*  8.7   PHOS  --  6.9*       Signed: Candice Jorgensen DO

## 2021-03-08 NOTE — WOUND CARE
Wound care nurse consult: consult from staff nurse stating \" Seen at wound care clinic, plan of care\". Patient is a 67 y/o CM admitted 3/4 for acute respiratory failure and acute on chronic CHF exacerbation. Past Medical History:  
Diagnosis Date  Arrhythmia   
 atrial fibrillation 2017  Arthritis  CAD (coronary atherosclerotic disease)  Chronic kidney disease  Chronic pain  Congest[facility] failure ([facility] [address]  Diabetes ([facility] [address]  Diabetes mellitus type 2, controlled ([facility] [address] 3/13/2017  Heart failure ([facility] [address]  Hx of CABG 3/13/2017 CABG in 2010 in [place]  Hypertension  Long term current use of anticoagulant therapy  Morbid obesity ([facility] [address] 3/13/2017  JACEY (obstructive sleep apnea) 6/23/2017  S/P CABG x 2 2010  
 Skin cancer  Thyroid disease Patient has been seen at [facility] in past for BLE venous stasis wounds. He now has a large RLE medial ankle bullae from fluid overload and left shin pin-point sized wound with serous drainage. Also present on admission: 
Left 5th toe, plantar blood blister Left heel DTPI Right heel DTPI 
 
WOUND POA CONDITIONS Wound Toe (Comment  which one) Right black, round dime sized 03/04/21 (Active) Wound Image   03/08/21 1208 Wound Etiology Other (Comment) 03/08/21 1208 Dressing Status Clean 03/07/21 1600 Dressing/Treatment Open to air 03/08/21 1208 Wound Length (cm) 1.5 cm 03/08/21 1208 Wound Width (cm) 1.5 cm 03/08/21 1208 Wound Surface Area (cm^2) 2.25 cm^2 03/08/21 1208 Wound Assessment Blood filled blister 03/08/21 1208 Drainage Amount None 03/08/21 1208 Wound Odor None 03/08/21 1208 Christy-Wound/Incision Assessment Intact 03/08/21 1208 Number of days: 4 Wound Heel Right (Active) Wound Image   03/08/21 1253 Wound Etiology Deep Tissue/Injury 03/08/21 1253 Dressing/Treatment Open to air 03/08/21 1253 Wound Length (cm) 2 cm 03/08/21 1253 Wound Width (cm) 5 cm 03/08/21 1253 Wound Surface Area (cm^2) 10 cm^2 03/08/21 1253 Wound Assessment Purple/maroon 03/08/21 1253 Drainage Amount None 03/08/21 1253 Wound Odor None 03/08/21 1253 Christy-Wound/Incision Assessment Intact 03/08/21 1253 Number of days: 0 Wound Heel Left (Active) Wound Image   03/08/21 1253 Wound Etiology Deep Tissue/Injury 03/08/21 1253 Dressing/Treatment Open to air 03/08/21 1253 Wound Length (cm) 1.5 cm 03/08/21 1253 Wound Width (cm) 1.5 cm 03/08/21 1253 Wound Surface Area (cm^2) 2.25 cm^2 03/08/21 1253 Wound Assessment Purple/maroon 03/08/21 1253 Drainage Amount None 03/08/21 1253 Wound Odor None 03/08/21 1253 Christy-Wound/Incision Assessment Intact 03/08/21 1253 Number of days: 0 Wound Leg lower Distal;Right;Medial serous bullae 03/08/21 (Active) Wound Image   03/08/21 1253 Wound Etiology Venous 03/08/21 1253 Dressing Status New dressing applied 03/08/21 1253 Cleansed Wound cleanser 03/08/21 1253 Dressing/Treatment Xeroform;Dry dressing 03/08/21 1253 Wound Length (cm) 8 cm 03/08/21 1253 Wound Width (cm) 6 cm 03/08/21 1253 Wound Surface Area (cm^2) 48 cm^2 03/08/21 1253 Wound Assessment Other (Comment) 03/08/21 1253 Drainage Amount Small 03/08/21 1253 Drainage Description Serous 03/08/21 1253 Wound Odor None 03/08/21 1253 Christy-Wound/Incision Assessment Edematous 03/08/21 1253 Edges Flat/open edges 03/08/21 1253 Wound Thickness Description Partial thickness 03/08/21 1253 Number of days: 0 Wound Leg lower Left shin 03/08/21 (Active) Wound Etiology Venous 03/08/21 1253 Dressing Status New dressing applied 03/08/21 1253 Cleansed Wound cleanser 03/08/21 1253 Dressing/Treatment Xeroform;Dry dressing 03/08/21 1253 Wound Length (cm) 0.2 cm 03/08/21 1253 Wound Width (cm) 0.2 cm 03/08/21 1253 Wound Surface Area (cm^2) 0.04 cm^2 03/08/21 1253 Wound Assessment Pink/red 03/08/21 1253 Drainage Amount Small 03/08/21 1253 Drainage Description Serous 03/08/21 1253 Wound Odor None 03/08/21 1253 Christy-Wound/Incision Assessment Edematous 03/08/21 1253 Number of days: 0 Large venous stasis bullae: 
 
Right 5th toe Left heel Right heel Recommend: 1. Follow-up at [id number] [facility] for new RLE venous stasis wound and right 5th toe wound. 2. 5th toe and bilateral heels: apply Venelex ointment BID and leave BERTA 3. RLE and Left shin wounds: daily, cleanse wounds with wound cleanser spray and 4x4. Cover wounds with a piece of Xeroform gauze and then cover with a dry dressing secured with gauze kami. 4. Keep BLE elevated with heels floated in and out of bed to decrease edema and keep pressure off heels.  
 
Leslie Kurtz RN, [employer]

## 2021-03-08 NOTE — PROGRESS NOTES
Problem: Falls - Risk of  Goal: *Absence of Falls  Description: Document Clydene Bone Fall Risk and appropriate interventions in the flowsheet.   Outcome: Progressing Towards Goal  Note: Fall Risk Interventions:  Mobility Interventions: Bed/chair exit alarm, Communicate number of staff needed for ambulation/transfer, Patient to call before getting OOB         Medication Interventions: Bed/chair exit alarm, Evaluate medications/consider consulting pharmacy, Patient to call before getting OOB, Teach patient to arise slowly    Elimination Interventions: Bed/chair exit alarm, Call light in reach, Patient to call for help with toileting needs

## 2021-03-08 NOTE — PROGRESS NOTES
Nuclear Med - Need resting part of MPI.  Will scan Tuesday Morning am. No prep   Will notify Cardiologist

## 2021-03-09 ENCOUNTER — APPOINTMENT (OUTPATIENT)
Dept: GENERAL RADIOLOGY | Age: 71
DRG: 291 | End: 2021-03-09
Attending: NURSE PRACTITIONER
Payer: MEDICARE

## 2021-03-09 LAB
ALBUMIN SERPL-MCNC: 2.9 G/DL (ref 3.5–5)
ALBUMIN/GLOB SERPL: 0.8 {RATIO} (ref 1.1–2.2)
ALP SERPL-CCNC: 42 U/L (ref 45–117)
ALT SERPL-CCNC: 15 U/L (ref 12–78)
ANION GAP SERPL CALC-SCNC: 4 MMOL/L (ref 5–15)
AST SERPL-CCNC: 23 U/L (ref 15–37)
BILIRUB SERPL-MCNC: 1 MG/DL (ref 0.2–1)
BNP SERPL-MCNC: 9780 PG/ML
BUN SERPL-MCNC: 121 MG/DL (ref 6–20)
BUN/CREAT SERPL: 33 (ref 12–20)
CALCIUM SERPL-MCNC: 8.4 MG/DL (ref 8.5–10.1)
CHLORIDE SERPL-SCNC: 97 MMOL/L (ref 97–108)
CO2 SERPL-SCNC: 37 MMOL/L (ref 21–32)
COVID-19 RAPID TEST, COVR: NOT DETECTED
CREAT SERPL-MCNC: 3.63 MG/DL (ref 0.7–1.3)
ERYTHROCYTE [DISTWIDTH] IN BLOOD BY AUTOMATED COUNT: 14.8 % (ref 11.5–14.5)
GLOBULIN SER CALC-MCNC: 3.6 G/DL (ref 2–4)
GLUCOSE BLD STRIP.AUTO-MCNC: 127 MG/DL (ref 65–100)
GLUCOSE BLD STRIP.AUTO-MCNC: 129 MG/DL (ref 65–100)
GLUCOSE BLD STRIP.AUTO-MCNC: 154 MG/DL (ref 65–100)
GLUCOSE BLD STRIP.AUTO-MCNC: 177 MG/DL (ref 65–100)
GLUCOSE BLD STRIP.AUTO-MCNC: 215 MG/DL (ref 65–100)
GLUCOSE SERPL-MCNC: 200 MG/DL (ref 65–100)
HCT VFR BLD AUTO: 40.9 % (ref 36.6–50.3)
HGB BLD-MCNC: 12 G/DL (ref 12.1–17)
LACTATE SERPL-SCNC: 1.7 MMOL/L (ref 0.4–2)
MCH RBC QN AUTO: 26.4 PG (ref 26–34)
MCHC RBC AUTO-ENTMCNC: 29.3 G/DL (ref 30–36.5)
MCV RBC AUTO: 89.9 FL (ref 80–99)
NRBC # BLD: 0 K/UL (ref 0–0.01)
NRBC BLD-RTO: 0 PER 100 WBC
PLATELET # BLD AUTO: 135 K/UL (ref 150–400)
PMV BLD AUTO: 10.7 FL (ref 8.9–12.9)
POTASSIUM SERPL-SCNC: 3.2 MMOL/L (ref 3.5–5.1)
PROT SERPL-MCNC: 6.5 G/DL (ref 6.4–8.2)
RBC # BLD AUTO: 4.55 M/UL (ref 4.1–5.7)
SARS-COV-2, COV2: NORMAL
SERVICE CMNT-IMP: ABNORMAL
SODIUM SERPL-SCNC: 138 MMOL/L (ref 136–145)
SOURCE, COVRS: NORMAL
STRESS BASELINE DIAS BP: 61 MMHG
STRESS BASELINE HR: 75 BPM
STRESS BASELINE SYS BP: 165 MMHG
STRESS ESTIMATED WORKLOAD: 1 METS
STRESS EXERCISE DUR MIN: NORMAL
STRESS O2 SAT PEAK: 100 %
STRESS O2 SAT REST: 100 %
STRESS PEAK DIAS BP: 69 MMHG
STRESS PEAK SYS BP: 166 MMHG
STRESS PERCENT HR ACHIEVED: 56 %
STRESS POST PEAK HR: 83 BPM
STRESS RATE PRESSURE PRODUCT: NORMAL BPM*MMHG
STRESS TARGET HR: 148 BPM
WBC # BLD AUTO: 12 K/UL (ref 4.1–11.1)

## 2021-03-09 PROCEDURE — APPSS45 APP SPLIT SHARED TIME 31-45 MINUTES: Performed by: NURSE PRACTITIONER

## 2021-03-09 PROCEDURE — 36415 COLL VENOUS BLD VENIPUNCTURE: CPT

## 2021-03-09 PROCEDURE — 74011250637 HC RX REV CODE- 250/637: Performed by: INTERNAL MEDICINE

## 2021-03-09 PROCEDURE — 74011000250 HC RX REV CODE- 250: Performed by: STUDENT IN AN ORGANIZED HEALTH CARE EDUCATION/TRAINING PROGRAM

## 2021-03-09 PROCEDURE — 65660000000 HC RM CCU STEPDOWN

## 2021-03-09 PROCEDURE — 2709999900 HC NON-CHARGEABLE SUPPLY

## 2021-03-09 PROCEDURE — 74011250636 HC RX REV CODE- 250/636: Performed by: NURSE PRACTITIONER

## 2021-03-09 PROCEDURE — 74011250636 HC RX REV CODE- 250/636: Performed by: INTERNAL MEDICINE

## 2021-03-09 PROCEDURE — 87635 SARS-COV-2 COVID-19 AMP PRB: CPT

## 2021-03-09 PROCEDURE — 74011250637 HC RX REV CODE- 250/637: Performed by: NURSE PRACTITIONER

## 2021-03-09 PROCEDURE — 74011636637 HC RX REV CODE- 636/637: Performed by: INTERNAL MEDICINE

## 2021-03-09 PROCEDURE — 71045 X-RAY EXAM CHEST 1 VIEW: CPT

## 2021-03-09 PROCEDURE — 74011250637 HC RX REV CODE- 250/637: Performed by: STUDENT IN AN ORGANIZED HEALTH CARE EDUCATION/TRAINING PROGRAM

## 2021-03-09 PROCEDURE — 83605 ASSAY OF LACTIC ACID: CPT

## 2021-03-09 PROCEDURE — 80053 COMPREHEN METABOLIC PANEL: CPT

## 2021-03-09 PROCEDURE — 87040 BLOOD CULTURE FOR BACTERIA: CPT

## 2021-03-09 PROCEDURE — 83880 ASSAY OF NATRIURETIC PEPTIDE: CPT

## 2021-03-09 PROCEDURE — 74011636637 HC RX REV CODE- 636/637: Performed by: STUDENT IN AN ORGANIZED HEALTH CARE EDUCATION/TRAINING PROGRAM

## 2021-03-09 PROCEDURE — 85027 COMPLETE CBC AUTOMATED: CPT

## 2021-03-09 PROCEDURE — 99232 SBSQ HOSP IP/OBS MODERATE 35: CPT | Performed by: INTERNAL MEDICINE

## 2021-03-09 PROCEDURE — 74011000258 HC RX REV CODE- 258: Performed by: INTERNAL MEDICINE

## 2021-03-09 PROCEDURE — 82962 GLUCOSE BLOOD TEST: CPT

## 2021-03-09 PROCEDURE — 77010033678 HC OXYGEN DAILY

## 2021-03-09 RX ORDER — VANCOMYCIN HYDROCHLORIDE
1250
Status: DISCONTINUED | OUTPATIENT
Start: 2021-03-11 | End: 2021-03-12

## 2021-03-09 RX ORDER — FUROSEMIDE 10 MG/ML
40 INJECTION INTRAMUSCULAR; INTRAVENOUS
Status: COMPLETED | OUTPATIENT
Start: 2021-03-09 | End: 2021-03-09

## 2021-03-09 RX ORDER — AMLODIPINE BESYLATE 5 MG/1
10 TABLET ORAL DAILY
Status: DISCONTINUED | OUTPATIENT
Start: 2021-03-10 | End: 2021-03-17

## 2021-03-09 RX ORDER — AMLODIPINE BESYLATE 5 MG/1
5 TABLET ORAL ONCE
Status: COMPLETED | OUTPATIENT
Start: 2021-03-09 | End: 2021-03-09

## 2021-03-09 RX ORDER — IPRATROPIUM BROMIDE AND ALBUTEROL SULFATE 2.5; .5 MG/3ML; MG/3ML
3 SOLUTION RESPIRATORY (INHALATION)
Status: DISCONTINUED | OUTPATIENT
Start: 2021-03-09 | End: 2021-03-11

## 2021-03-09 RX ORDER — VANCOMYCIN 2 GRAM/500 ML IN 0.9 % SODIUM CHLORIDE INTRAVENOUS
2000 ONCE
Status: COMPLETED | OUTPATIENT
Start: 2021-03-09 | End: 2021-03-09

## 2021-03-09 RX ADMIN — GUAIFENESIN AND DEXTROMETHORPHAN 10 ML: 100; 10 SYRUP ORAL at 09:20

## 2021-03-09 RX ADMIN — POTASSIUM BICARBONATE 40 MEQ: 391 TABLET, EFFERVESCENT ORAL at 13:02

## 2021-03-09 RX ADMIN — FERROUS SULFATE TAB 325 MG (65 MG ELEMENTAL FE) 325 MG: 325 (65 FE) TAB at 09:05

## 2021-03-09 RX ADMIN — DUTASTERIDE 0.5 MG: 0.5 CAPSULE, LIQUID FILLED ORAL at 09:18

## 2021-03-09 RX ADMIN — INSULIN LISPRO 3 UNITS: 100 INJECTION, SOLUTION INTRAVENOUS; SUBCUTANEOUS at 17:55

## 2021-03-09 RX ADMIN — FUROSEMIDE 40 MG: 10 INJECTION, SOLUTION INTRAMUSCULAR; INTRAVENOUS at 00:46

## 2021-03-09 RX ADMIN — INSULIN LISPRO 3 UNITS: 100 INJECTION, SOLUTION INTRAVENOUS; SUBCUTANEOUS at 09:05

## 2021-03-09 RX ADMIN — GUAIFENESIN AND DEXTROMETHORPHAN 10 ML: 100; 10 SYRUP ORAL at 17:46

## 2021-03-09 RX ADMIN — Medication 10 ML: at 21:51

## 2021-03-09 RX ADMIN — CASTOR OIL AND BALSAM, PERU: 788; 87 OINTMENT TOPICAL at 09:30

## 2021-03-09 RX ADMIN — ACETAMINOPHEN 650 MG: 325 TABLET ORAL at 00:12

## 2021-03-09 RX ADMIN — ACETAMINOPHEN 650 MG: 325 TABLET ORAL at 17:46

## 2021-03-09 RX ADMIN — VANCOMYCIN HYDROCHLORIDE 2000 MG: 10 INJECTION, POWDER, LYOPHILIZED, FOR SOLUTION INTRAVENOUS at 14:46

## 2021-03-09 RX ADMIN — LEVOTHYROXINE SODIUM 137 MCG: 0.03 TABLET ORAL at 05:14

## 2021-03-09 RX ADMIN — BUMETANIDE 3 MG: 0.25 INJECTION, SOLUTION INTRAMUSCULAR; INTRAVENOUS at 17:50

## 2021-03-09 RX ADMIN — POTASSIUM CHLORIDE 20 MEQ: 750 TABLET, FILM COATED, EXTENDED RELEASE ORAL at 00:12

## 2021-03-09 RX ADMIN — PIPERACILLIN AND TAZOBACTAM 3.38 G: 3; .375 INJECTION, POWDER, LYOPHILIZED, FOR SOLUTION INTRAVENOUS at 13:08

## 2021-03-09 RX ADMIN — INSULIN LISPRO 2 UNITS: 100 INJECTION, SOLUTION INTRAVENOUS; SUBCUTANEOUS at 13:01

## 2021-03-09 RX ADMIN — INSULIN GLARGINE 30 UNITS: 100 INJECTION, SOLUTION SUBCUTANEOUS at 21:50

## 2021-03-09 RX ADMIN — Medication 10 ML: at 05:15

## 2021-03-09 RX ADMIN — METOPROLOL SUCCINATE 150 MG: 50 TABLET, EXTENDED RELEASE ORAL at 09:17

## 2021-03-09 RX ADMIN — Medication 10 ML: at 18:01

## 2021-03-09 RX ADMIN — INSULIN LISPRO 2 UNITS: 100 INJECTION, SOLUTION INTRAVENOUS; SUBCUTANEOUS at 21:50

## 2021-03-09 RX ADMIN — METOLAZONE 2.5 MG: 2.5 TABLET ORAL at 09:18

## 2021-03-09 RX ADMIN — INSULIN LISPRO 2 UNITS: 100 INJECTION, SOLUTION INTRAVENOUS; SUBCUTANEOUS at 17:55

## 2021-03-09 RX ADMIN — PANTOPRAZOLE SODIUM 40 MG: 40 TABLET, DELAYED RELEASE ORAL at 09:05

## 2021-03-09 RX ADMIN — BUMETANIDE 3 MG: 0.25 INJECTION, SOLUTION INTRAMUSCULAR; INTRAVENOUS at 10:47

## 2021-03-09 RX ADMIN — CASTOR OIL AND BALSAM, PERU: 788; 87 OINTMENT TOPICAL at 21:56

## 2021-03-09 RX ADMIN — APIXABAN 5 MG: 5 TABLET, FILM COATED ORAL at 09:17

## 2021-03-09 RX ADMIN — AMLODIPINE BESYLATE 5 MG: 5 TABLET ORAL at 12:58

## 2021-03-09 RX ADMIN — PRAVASTATIN SODIUM 80 MG: 40 TABLET ORAL at 21:49

## 2021-03-09 RX ADMIN — AMLODIPINE BESYLATE 5 MG: 5 TABLET ORAL at 09:18

## 2021-03-09 RX ADMIN — APIXABAN 5 MG: 5 TABLET, FILM COATED ORAL at 17:46

## 2021-03-09 RX ADMIN — ACETAMINOPHEN 650 MG: 325 TABLET ORAL at 09:19

## 2021-03-09 RX ADMIN — INSULIN LISPRO 3 UNITS: 100 INJECTION, SOLUTION INTRAVENOUS; SUBCUTANEOUS at 13:01

## 2021-03-09 NOTE — PROGRESS NOTES
0700: End of Shift Note    Bedside shift change report given to NADINE Canada (oncoming nurse) by Nasim Armendariz (offgoing nurse). Report included the following information SBAR, Kardex, Intake/Output, MAR, Recent Results and Cardiac Rhythm Paced    Shift worked:  Night     Shift summary and any significant changes:     Patient became coughing more at the beginning of the night and asked for a different cough medication. Message sent to NP and PRN robitussin was ordered. Patient O2 sat began to drop so he was bumped up to 3 liters nasal cannula. He started to sound more congested and spiked a temperature of 100.4. Message sent to NP and orders were added for stat chest xray, rapid covid swab, paired blood cultures, lactic acid, pro bnp, and lasix IV. Patient states he is feeling a little better this AM. Wound care done this morning. Concerns for physician to address:  Fever, WBC elevation     Zone phone for oncoming shift:          Activity:  Activity Level: Up with Assistance  Number times ambulated in hallways past shift: 0  Number of times OOB to chair past shift: 4    Cardiac:   Cardiac Monitoring: Yes      Cardiac Rhythm: Paced    Access:   Current line(s): PIV     Genitourinary:   Urinary status: voiding    Respiratory:   O2 Device: Nasal cannula  Chronic home O2 use?: NO  Incentive spirometer at bedside: YES     GI:  Last Bowel Movement Date: 03/05/21  Current diet:  DIET DIABETIC CONSISTENT CARB Regular; 2 GM NA (House Low NA); FR 1500ML  DIET ONE TIME MESSAGE  Passing flatus: YES  Tolerating current diet: YES  % Diet Eaten: 100 %    Pain Management:   Patient states pain is manageable on current regimen: YES    Skin:  Romeo Score: 16  Interventions: float heels, increase time out of bed and PT/OT consult    Patient Safety:  Fall Score:  Total Score: 3  Interventions: bed/chair alarm, assistive device (walker, cane, etc), gripper socks and pt to call before getting OOB  High Fall Risk: Yes    Length of Stay:  Expected LOS: 4d 0h  Actual LOS: 5      Marlyn Ramirez

## 2021-03-09 NOTE — PROGRESS NOTES
Name: Moses Christian   MRN: [id number]  : 1950      Assessment   :                                               Plan:  INO on CKD-4 -     Hypokalemia    High Mg    Severe Azotemia- H/H normal. No UGI bleed. BUN trending down    HTN  DM-2  Volume overload  A on C D-CHF  Obesity  CAD Follows with Dr Zach Grossman - baseline creatinine around 3 although  It fluctuates quite a bit depending on volume status    Suspect cardiorenal syndrome with rise a in Creatinine from needed diuresis. Renal US with cortical thinning but no hydro. UA is bland. Creatinine peaked at 4.7, now stable at ~ 3.7. Continue IV Bumex  + Metolazone (O>I and weight is down and he feels some better as well)    Daily labs  Daily wt  Salt/fluid restriction      He is not uremic and there is no acute need for HD. Subjective:  oob in chair eating breakfast at the time of my rounds. Breathing and edema a bit better. Feels OK.  No acute c/o    ROS:   No nausea, no vomiting  No chest pain, + shortness of breath and leg edema-improved    Exam:  Visit Vitals  BP (!) 158/63   Pulse 76   Temp 98.7 °F (37.1 °C)   Resp 22   Ht 5' 9\" (1.753 m)   Wt 116.4 kg (256 lb 9.6 oz)   SpO2 94%   BMI 37.89 kg/m²     WB/WN in NAD  Rales right base  Irregular, distant, no tachycardia  1-2+ leg edema  AOx3    Current Facility-Administered Medications   Medication Dose Route Frequency Last Admin    diphenhydrAMINE-zinc acetate 2%-0.1% (BENADRYL) cream   Topical TID PRN      bismuth subsalicylate (PEPTO-BISMOL) oral suspension 30 mL  30 mL Oral Q6H PRN      guaiFENesin-dextromethorphan (ROBITUSSIN DM) 100-10 mg/5 mL syrup 10 mL  10 mL Oral Q6H PRN 10 mL at 21    balsam peru-castor oiL (VENELEX) ointment   Topical BID Given at 21    insulin lispro (HUMALOG) injection 3 Units  3 Units SubCUTAneous TIDAC 3 Units at 03/08/21 1924    amLODIPine (NORVASC) tablet 5 mg  5 mg Oral DAILY 5 mg at 03/08/21 6149    benzonatate (TESSALON) capsule 100 mg  100 mg Oral TID  mg at 03/08/21 1600    acetaminophen (TYLENOL) tablet 650 mg  650 mg Oral Q4H  mg at 03/09/21 0012    hydrALAZINE (APRESOLINE) 20 mg/mL injection 20 mg  20 mg IntraVENous Q6H PRN      apixaban (ELIQUIS) tablet 5 mg  5 mg Oral BID 5 mg at 03/08/21 1924    ferrous sulfate tablet 325 mg  325 mg Oral  mg at 03/08/21 0823    dutasteride (AVODART) capsule 0.5 mg  0.5 mg Oral DAILY 0.5 mg at 03/08/21 4143    insulin glargine (LANTUS) injection 30 Units  30 Units SubCUTAneous QHS 30 Units at 03/08/21 2202    levothyroxine (SYNTHROID) tablet 137 mcg  137 mcg Oral 6am 137 mcg at 03/09/21 0514    metOLazone (ZAROXOLYN) tablet 2.5 mg  2.5 mg Oral DAILY 2.5 mg at 03/08/21 0826    metoprolol succinate (TOPROL-XL) XL tablet 150 mg  150 mg Oral DAILY 150 mg at 03/08/21 1430    pravastatin (PRAVACHOL) tablet 80 mg  80 mg Oral QHS 80 mg at 03/08/21 2201    pantoprazole (PROTONIX) tablet 40 mg  40 mg Oral ACB 40 mg at 03/08/21 0827    bumetanide (BUMEX) injection 3 mg  3 mg IntraVENous BID 3 mg at 03/08/21 1930    insulin lispro (HUMALOG) injection   SubCUTAneous AC&HS 4 Units at 03/08/21 2201    glucose chewable tablet 16 g  4 Tab Oral PRN      dextrose (D50W) injection syrg 12.5-25 g  12.5-25 g IntraVENous PRN      glucagon (GLUCAGEN) injection 1 mg  1 mg IntraMUSCular PRN      sodium chloride (NS) flush 5-40 mL  5-40 mL IntraVENous Q8H 10 mL at 03/09/21 0515    sodium chloride (NS) flush 5-40 mL  5-40 mL IntraVENous PRN 10 mL at 03/08/21 1051    polyethylene glycol (MIRALAX) packet 17 g  17 g Oral DAILY PRN      ondansetron (ZOFRAN ODT) tablet 4 mg  4 mg Oral Q8H PRN      Or    ondansetron (ZOFRAN) injection 4 mg  4 mg IntraVENous Q6H PRN       Facility-Administered Medications Ordered in Other Encounters Medication Dose Route Frequency Last Admin    sodium chloride (NS) flush 10 mL  10 mL IntraVENous PRN         Labs/Data:    Lab Results   Component Value Date/Time    WBC 12.0 (H) 03/09/2021 01:35 AM    HGB 12.0 (L) 03/09/2021 01:35 AM    HCT 40.9 03/09/2021 01:35 AM    PLATELET 927 (L) 48/21/7220 01:35 AM    MCV 89.9 03/09/2021 01:35 AM       Lab Results   Component Value Date/Time    Sodium 138 03/09/2021 01:35 AM    Potassium 3.2 (L) 03/09/2021 01:35 AM    Chloride 97 03/09/2021 01:35 AM    CO2 37 (H) 03/09/2021 01:35 AM    Anion gap 4 (L) 03/09/2021 01:35 AM    Glucose 200 (H) 03/09/2021 01:35 AM     (H) 03/09/2021 01:35 AM    Creatinine 3.63 (H) 03/09/2021 01:35 AM    BUN/Creatinine ratio 33 (H) 03/09/2021 01:35 AM    GFR est AA 20 (L) 03/09/2021 01:35 AM    GFR est non-AA 17 (L) 03/09/2021 01:35 AM    Calcium 8.4 (L) 03/09/2021 01:35 AM       Wt Readings from Last 3 Encounters:   03/09/21 116.4 kg (256 lb 9.6 oz)   08/03/20 111.1 kg (245 lb)   02/17/20 110.7 kg (244 lb)         Intake/Output Summary (Last 24 hours) at 3/9/2021 0544  Last data filed at 3/9/2021 0400  Gross per 24 hour   Intake 650 ml   Output 1610 ml   Net -960 ml       Patient seen and examined. Chart reviewed. Labs, data and other pertinent notes reviewed in last 24 hrs.     PMH/SH/FH reviewed and unchanged compared to H&P    Lady Chilo MD

## 2021-03-09 NOTE — PROGRESS NOTES
Problem: Falls - Risk of  Goal: *Absence of Falls  Description: Document Sae Bang Fall Risk and appropriate interventions in the flowsheet.   Outcome: Progressing Towards Goal  Note: Fall Risk Interventions:  Mobility Interventions: Bed/chair exit alarm, Patient to call before getting OOB         Medication Interventions: Bed/chair exit alarm, Patient to call before getting OOB    Elimination Interventions: Bed/chair exit alarm, Call light in reach, Patient to call for help with toileting needs

## 2021-03-09 NOTE — ROUTINE PROCESS
End of Shift Note Bedside shift change report given to [address] [place] [address]  (oncoming nurse) by Nico Davis RN (offgoing nurse). Report included the following information SBAR Shift worked:  day Shift summary and any significant changes:  
  patient needing assistance with walker. Up to bathroom and voiiding well. 2 formed stools and 1 loose one per patient. venelex appled to buttocks and wound care to ankles done on nights. He has DTI on heels. Wound care saw patient. Patient to off load heels. Concerns for physician to address:  skin injury, fluid rentention, continue to diurese. Zone phone for oncoming shift:   na Activity: 
Activity Level: Up with Assistance Number times ambulated in hallways past shift: 0 Number of times OOB to chair past shift: 1 Cardiac:  
Cardiac Monitoring: Yes     
Cardiac Rhythm: Paced Access:  
Current line(s): PIV Genitourinary:  
Urinary status: voiding Respiratory:  
O2 Device: Nasal cannula Chronic home O2 use?: NO Incentive spirometer at bedside: NO 
  
GI: 
Last Bowel Movement Date: 03/05/21 Current diet:  DIET DIABETIC CONSISTENT CARB Regular; 2 GM NA (House Low NA); FR 1500ML 
DIET ONE TIME MESSAGE Passing flatus: NO Tolerating current diet: YES 
% Diet Eaten: 100 % Pain Management:  
Patient states pain is manageable on current regimen: YES Skin: 
Romeo Score: 16 Interventions: increase time out of bed Patient Safety: 
Fall Score: Total Score: 3 Interventions: gripper socks and pt to call before getting OOB High Fall Risk: Yes Length of Stay: 
Expected LOS: 4d 0h 
Actual LOS: 4 Nico Davis RN

## 2021-03-09 NOTE — PROGRESS NOTES
Transition of Care Plan:     Disposition: Home   Follow up appointments: PCP  DME needed: None  Transportation at Discharge: Pt's wife will transport at d/c.   [place] or means to access home:    Pt has his keys    IM Medicare letter: Pt will need 2nd IM Letter  Caregiver Contact: Ana Maria Leiva- Wife [phone]  Discharge Caregiver contacted prior to discharge?  Will contact at d/c    8:48am-CM sent emailed to [facility] Specialist to schedule pt's follow-up appointment. 8:45am- CM called Yobani Ramsay [phone] via phone to schedule appointment. Appointment scheduled for 3/12/2021 at 9:30am.     CM will continue to follow patient for discharge planning needs and arrange for services as deemed necessary.     Jamie Bass [address] [place] [address]  [phone]

## 2021-03-09 NOTE — PROGRESS NOTES
[address] INTERDISCIPLINARY ROUNDS    Cardiopulmonary Care Interdisciplinary Rounds were held today to discuss patient's plan of care and outcomes. The following members were present: NP/Physician, Pharmacy, Nursing and Case Management. PLAN OF CARE:   Continue current treatment plan, pt started on IV abx for hospital-acquired PNA.      Expected Length of Stay:  4d 0h

## 2021-03-09 NOTE — PROGRESS NOTES
Hospitalist Progress Note    NAME: Rendall Canavan   :  1950   MRN:  [id number]       Assessment / Plan:  Acute hypoxic respiratory failure  Acute on chronic diastolic CHF exacerbation  INO on CKD4  Hospital-acquired pneumonia  -86% on RA in ED, maintaining sats on 3 L oxygen via nasal cannula this morning  -cont to be on IV bumex 3mg IV bid  -continue daily metolazone 2.5 mg daily  -Appreciate nephrology recommendations  -baseline Cr around 3, elevated to 4.76 on admission. Creatinine 3.63 today  -renally dose meds, hold nephrotoxic agents if possible  -JANINE's revealed -1000 cc. Weight almost 7 pounds less than admission weight  -does not wear O2 at home and currently on 2L NC. Continue try weaning oxygen as tolerated  Respiratory distress overnight, leukocytosis, fever and cough  X-ray with bibasilar opacities. Will start vancomycin and Zosyn treating hospital-acquired pneumonia. Send procalcitonin. Procalcitonin on  was 0.12  Repeat proBNP overnight 9700 from over 19 K on admission    Hypokalemia, persistent  -replace PO  -Continue checking BMP daily    Acute on chronic diastolic CHF  -Diuresis as above  -TTE with EF 50 to 55%  -Continue BB  -Appreciate cardiology recommendations  -Mild troponin elevation in setting of uncontrolled hypertension and INO  -BNP > 19k  Nuclear cardiac stress test pending    Skin rash  -Rt flank, mild contusion  -no vesicles. Does not appear dermatomal   -states area became painful after sliding on CT scanner when he was admitted  -monitor, try topical benadryl    Uncontrolled hypertension  -Well-controlled today  -As above, continue beta-blocker.  -As needed hydralazine    IDDM  -Goal blood sugar 1 40-1 90  -Continue Lantus per home dose 30 units nightly  -added back mealtime insulin at reduced dose   -SSI with POC's.   Adjust insulin as indicated based on POC values    Lower extremity edema  -Bilateral lower extremity venous Dopplers negative  -Diuresis as above    LE/foot wounds POA  -large RLE medical ankle bullae from fluid overload, b/l heel DTIs, Lt 5th toe blister  -appreciate wound care help  -to follow at [facility], where he has been seen before    Chronic A. fib  -Continue Eliquis  -s/p PPM  -Continue metoprolol     Hypothyroidism  -Continue Synthroid     Code Status: Full code  Surrogate Decision Maker: Wife     DVT Prophylaxis: Eliquis  GI Prophylaxis: not indicated     Baseline: Ambulatory at home     Subjective:     Chief Complaint / Reason for Physician Visit  Patient was seen and examined. Overnight he developed fever, respiratory distress. X-ray revealed bibasilar infiltrates. Discussed with RN events overnight. \" Feeling better but still coughing sometimes\"    Review of Systems:  Symptom Y/N Comments  Symptom Y/N Comments   Fever/Chills n   Chest Pain n    Poor Appetite    Edema y  bilateral lower extremities   Cough y   Abdominal Pain n    Sputum    Joint Pain     SOB/BETTS n   Pruritis/Rash     Nausea/vomit n   Tolerating PT/OT     Diarrhea    Tolerating Diet y    Constipation    Other       Could NOT obtain due to:          Objective:     VITALS:   Last 24hrs VS reviewed since prior progress note.  Most recent are:  Patient Vitals for the past 24 hrs:   Temp Pulse Resp BP SpO2   03/09/21 0911 98.6 °F (37 °C) 75 20 (!) 142/72 94 %   03/09/21 0259 98.7 °F (37.1 °C) 76 22 (!) 158/63 94 %   03/09/21 0046  75  (!) 148/76    03/09/21 0000 100.4 °F (38 °C)       03/08/21 2208 97.8 °F (36.6 °C) 75 22 (!) 153/81 95 %   03/08/21 1930 98 °F (36.7 °C) 73 24 (!) 160/74 96 %   03/08/21 1532 97.4 °F (36.3 °C) 75 24 (!) 149/67 94 %   03/08/21 1332 97.6 °F (36.4 °C) 75 20 (!) 157/74 95 %   03/08/21 1207  75 20  93 %       Intake/Output Summary (Last 24 hours) at 3/9/2021 1000  Last data filed at 3/9/2021 0940  Gross per 24 hour   Intake 600 ml   Output 1810 ml   Net -1210 ml        I had a face to face encounter and independently examined this patient on 3/9/2021, as outlined below:  PHYSICAL EXAM:  General: WD, WN. Alert, cooperative, no acute distress    EENT:  EOMI. Anicteric sclerae. MMM  Resp:  Bibasilar rales. No accessory muscle use  CV:  Regular  rhythm,  +2 pitting edema bilateral lower extremities  GI:  Soft, Non distended, Non tender. +Bowel sounds  Neurologic:  Alert and oriented X 3, normal speech,   Psych:   Good insight. Not anxious nor agitated  Skin:  No rashes. No jaundice. Mild contusion in the right flank    Reviewed most current lab test results and cultures  YES  Reviewed most current radiology test results   YES  Review and summation of old records today    NO  Reviewed patient's current orders and MAR    YES  PMH/SH reviewed - no change compared to H&P  ________________________________________________________________________  Care Plan discussed with:    Comments   Patient x    Family      RN x    Care Manager x    Consultant                       x Multidiciplinary team rounds were held today with , nursing, pharmacist and clinical coordinator. Patient's plan of care was discussed; medications were reviewed and discharge planning was addressed. ________________________________________________________________________  Total NON critical care TIME: 35   Minutes    Total CRITICAL CARE TIME Spent:   Minutes non procedure based      Comments   >50% of visit spent in counseling and coordination of care x    ________________________________________________________________________  Guyann Prader, MD     Procedures: see electronic medical records for all procedures/Xrays and details which were not copied into this note but were reviewed prior to creation of Plan. LABS:  I reviewed today's most current labs and imaging studies.   Pertinent labs include:  Recent Labs     03/09/21  0135 03/07/21  0134   WBC 12.0* 10.1   HGB 12.0* 12.3   HCT 40.9 42.2   * 157     Recent Labs     03/09/21  0135 03/08/21  1624 03/07/21  0134    138 141   K 3.2* 3.4* 3.7   CL 97 95* 98   CO2 37* 39* 37*   * 189* 175*   * 126* 124*   CREA 3.63* 3.60* 3.73*   CA 8.4* 8.6 8.8   ALB 2.9*  --   --    TBILI 1.0  --   --    ALT 15  --   --        Signed: Alanis Bhatt MD

## 2021-03-09 NOTE — PROGRESS NOTES
Received message from patient's nurse Stacy Fowler stating :    Patient is becoming increasingly congested and now has a temperature of 100.4. He is also now requiring more oxygen through nasal cannula. Would you like to order a new chest xray? Patient was also never swabbed for COVID 19 but came in for decreased O2 sats and shortness of breath. Discussion / orders:    Reviewed patient record  Admitted for CHF exacerbation with proBNP at time of admission on March 4, 2021 significantly elevated at 19,641. Currently on Bumex 3 mg IV 2 times daily  EF per recent echo on March 6, 2021 to 55%    · Stat chest x-ray  · Stat proBNP  · Lasix 40 mg iv now  · Paired blood cultures with serial lactic acid levels  · Rapid COVID         Please note that this note was dictated using Dragon computer voice recognition software. Quite often unanticipated grammatical, syntax, homophones, and other interpretive errors are inadvertently transcribed by the computer software. Please disregard these errors. Please excuse any errors that have escaped final proofreading.

## 2021-03-09 NOTE — PROGRESS NOTES
Pharmacy Automatic Renal Dosing Protocol - Antimicrobials    Indication for Antimicrobials: HAP    Current Regimen of Each Antimicrobial:  Zosyn IV  (Start Date 3/9; Day # )  Vancomycin IV (Start Date 3/9; Day #)    Previous Antimicrobial Therapy:      Goal Level: VANCOMYCIN TROUGH GOAL RANGE    Vancomycin Trough: 15 - 20 mcg/mL  (AUC: 400 - 600 mg/hr/Liter/day)     Date Dose & Interval Measured (mcg/mL) Extrapolated (mcg/mL)                       Date & time of next level: TBD    Significant Cultures:   3/9 - Blood - pending    Radiology / Imaging results: (X-ray, CT scan or MRI):   3/5 - CT chest no evidence of PNA; Moderate bilateral pleural effusions with bibasilar and right middle lobe  atelectasis  3/9 - cxry - ordered      Labs:  Recent Labs     21  0135 21  1624 21  0134   CREA 3.63* 3.60* 3.73*   * 126* 124*   WBC 12.0*  --  10.1     Temp (24hrs), Av.3 °F (36.8 °C), Min:97.4 °F (36.3 °C), Max:100.4 °F (38 °C)      Is the Patient on Dialysis? no    Creatinine Clearance (mL/min):   CrCl (Actual Body Weight): 30.3  CrCl (Adjusted Body Weight): 23.1  CrCl (Ideal Body Weight): 18.4    Impression/Plan:   Will adjust zosyn to 3.375 gm IV q12h for CrCL < 20 ml/min  Vancomycin 2000 mg IV x 1 dose, then 1250 mg IV q36h for expected trough of ~15 and AUC of 515  Pharmacy to follow and adjust for renal function  BMP x 6 days  Antimicrobial stop date 7 days     Pharmacy will follow daily and adjust medications as appropriate for renal function and/or serum levels.     Thank you,  Gavin Rea, SANDID

## 2021-03-10 PROBLEM — I50.31 ACUTE DIASTOLIC CHF (CONGESTIVE HEART FAILURE) (HCC): Status: ACTIVE | Noted: 2017-06-22

## 2021-03-10 LAB
ANION GAP SERPL CALC-SCNC: 4 MMOL/L (ref 5–15)
BUN SERPL-MCNC: 125 MG/DL (ref 6–20)
BUN/CREAT SERPL: 36 (ref 12–20)
CALCIUM SERPL-MCNC: 8.4 MG/DL (ref 8.5–10.1)
CHLORIDE SERPL-SCNC: 97 MMOL/L (ref 97–108)
CO2 SERPL-SCNC: 38 MMOL/L (ref 21–32)
CREAT SERPL-MCNC: 3.5 MG/DL (ref 0.7–1.3)
ERYTHROCYTE [DISTWIDTH] IN BLOOD BY AUTOMATED COUNT: 14.8 % (ref 11.5–14.5)
GLUCOSE BLD STRIP.AUTO-MCNC: 128 MG/DL (ref 65–100)
GLUCOSE BLD STRIP.AUTO-MCNC: 179 MG/DL (ref 65–100)
GLUCOSE BLD STRIP.AUTO-MCNC: 210 MG/DL (ref 65–100)
GLUCOSE BLD STRIP.AUTO-MCNC: 211 MG/DL (ref 65–100)
GLUCOSE SERPL-MCNC: 143 MG/DL (ref 65–100)
HCT VFR BLD AUTO: 42.4 % (ref 36.6–50.3)
HGB BLD-MCNC: 12.4 G/DL (ref 12.1–17)
MCH RBC QN AUTO: 26.5 PG (ref 26–34)
MCHC RBC AUTO-ENTMCNC: 29.2 G/DL (ref 30–36.5)
MCV RBC AUTO: 90.6 FL (ref 80–99)
NRBC # BLD: 0 K/UL (ref 0–0.01)
NRBC BLD-RTO: 0 PER 100 WBC
PLATELET # BLD AUTO: 127 K/UL (ref 150–400)
PMV BLD AUTO: 11.1 FL (ref 8.9–12.9)
POTASSIUM SERPL-SCNC: 3.6 MMOL/L (ref 3.5–5.1)
PROCALCITONIN SERPL-MCNC: 0.38 NG/ML
RBC # BLD AUTO: 4.68 M/UL (ref 4.1–5.7)
SERVICE CMNT-IMP: ABNORMAL
SODIUM SERPL-SCNC: 139 MMOL/L (ref 136–145)
WBC # BLD AUTO: 10.8 K/UL (ref 4.1–11.1)

## 2021-03-10 PROCEDURE — 65660000000 HC RM CCU STEPDOWN

## 2021-03-10 PROCEDURE — 82962 GLUCOSE BLOOD TEST: CPT

## 2021-03-10 PROCEDURE — 74011636637 HC RX REV CODE- 636/637: Performed by: STUDENT IN AN ORGANIZED HEALTH CARE EDUCATION/TRAINING PROGRAM

## 2021-03-10 PROCEDURE — 36415 COLL VENOUS BLD VENIPUNCTURE: CPT

## 2021-03-10 PROCEDURE — 85027 COMPLETE CBC AUTOMATED: CPT

## 2021-03-10 PROCEDURE — 74011000258 HC RX REV CODE- 258: Performed by: INTERNAL MEDICINE

## 2021-03-10 PROCEDURE — 77010033678 HC OXYGEN DAILY

## 2021-03-10 PROCEDURE — 74011250637 HC RX REV CODE- 250/637: Performed by: NURSE PRACTITIONER

## 2021-03-10 PROCEDURE — 74011000250 HC RX REV CODE- 250: Performed by: STUDENT IN AN ORGANIZED HEALTH CARE EDUCATION/TRAINING PROGRAM

## 2021-03-10 PROCEDURE — 84145 PROCALCITONIN (PCT): CPT

## 2021-03-10 PROCEDURE — APPSS45 APP SPLIT SHARED TIME 31-45 MINUTES: Performed by: NURSE PRACTITIONER

## 2021-03-10 PROCEDURE — 74011250637 HC RX REV CODE- 250/637: Performed by: STUDENT IN AN ORGANIZED HEALTH CARE EDUCATION/TRAINING PROGRAM

## 2021-03-10 PROCEDURE — 80048 BASIC METABOLIC PNL TOTAL CA: CPT

## 2021-03-10 PROCEDURE — 74011636637 HC RX REV CODE- 636/637: Performed by: INTERNAL MEDICINE

## 2021-03-10 PROCEDURE — 74011250636 HC RX REV CODE- 250/636: Performed by: INTERNAL MEDICINE

## 2021-03-10 RX ADMIN — DUTASTERIDE 0.5 MG: 0.5 CAPSULE, LIQUID FILLED ORAL at 08:58

## 2021-03-10 RX ADMIN — Medication 10 ML: at 21:34

## 2021-03-10 RX ADMIN — INSULIN LISPRO 3 UNITS: 100 INJECTION, SOLUTION INTRAVENOUS; SUBCUTANEOUS at 17:33

## 2021-03-10 RX ADMIN — CASTOR OIL AND BALSAM, PERU: 788; 87 OINTMENT TOPICAL at 21:34

## 2021-03-10 RX ADMIN — ACETAMINOPHEN 650 MG: 325 TABLET ORAL at 01:31

## 2021-03-10 RX ADMIN — BUMETANIDE 3 MG: 0.25 INJECTION, SOLUTION INTRAMUSCULAR; INTRAVENOUS at 17:33

## 2021-03-10 RX ADMIN — METOPROLOL SUCCINATE 150 MG: 50 TABLET, EXTENDED RELEASE ORAL at 08:59

## 2021-03-10 RX ADMIN — BUMETANIDE 3 MG: 0.25 INJECTION, SOLUTION INTRAMUSCULAR; INTRAVENOUS at 08:59

## 2021-03-10 RX ADMIN — PIPERACILLIN AND TAZOBACTAM 3.38 G: 3; .375 INJECTION, POWDER, LYOPHILIZED, FOR SOLUTION INTRAVENOUS at 01:55

## 2021-03-10 RX ADMIN — INSULIN GLARGINE 30 UNITS: 100 INJECTION, SOLUTION SUBCUTANEOUS at 21:30

## 2021-03-10 RX ADMIN — AMLODIPINE BESYLATE 10 MG: 5 TABLET ORAL at 08:58

## 2021-03-10 RX ADMIN — METOLAZONE 2.5 MG: 2.5 TABLET ORAL at 08:58

## 2021-03-10 RX ADMIN — PRAVASTATIN SODIUM 80 MG: 40 TABLET ORAL at 21:31

## 2021-03-10 RX ADMIN — INSULIN LISPRO 2 UNITS: 100 INJECTION, SOLUTION INTRAVENOUS; SUBCUTANEOUS at 21:30

## 2021-03-10 RX ADMIN — INSULIN LISPRO 2 UNITS: 100 INJECTION, SOLUTION INTRAVENOUS; SUBCUTANEOUS at 12:26

## 2021-03-10 RX ADMIN — Medication 10 ML: at 05:33

## 2021-03-10 RX ADMIN — GUAIFENESIN AND DEXTROMETHORPHAN 10 ML: 100; 10 SYRUP ORAL at 21:31

## 2021-03-10 RX ADMIN — ACETAMINOPHEN 650 MG: 325 TABLET ORAL at 08:58

## 2021-03-10 RX ADMIN — APIXABAN 5 MG: 5 TABLET, FILM COATED ORAL at 17:33

## 2021-03-10 RX ADMIN — LEVOTHYROXINE SODIUM 137 MCG: 0.03 TABLET ORAL at 05:07

## 2021-03-10 RX ADMIN — GUAIFENESIN AND DEXTROMETHORPHAN 10 ML: 100; 10 SYRUP ORAL at 14:52

## 2021-03-10 RX ADMIN — Medication 10 ML: at 14:53

## 2021-03-10 RX ADMIN — GUAIFENESIN AND DEXTROMETHORPHAN 10 ML: 100; 10 SYRUP ORAL at 08:58

## 2021-03-10 RX ADMIN — GUAIFENESIN AND DEXTROMETHORPHAN 10 ML: 100; 10 SYRUP ORAL at 01:55

## 2021-03-10 RX ADMIN — ACETAMINOPHEN 650 MG: 325 TABLET ORAL at 14:52

## 2021-03-10 RX ADMIN — INSULIN LISPRO 3 UNITS: 100 INJECTION, SOLUTION INTRAVENOUS; SUBCUTANEOUS at 12:27

## 2021-03-10 RX ADMIN — PANTOPRAZOLE SODIUM 40 MG: 40 TABLET, DELAYED RELEASE ORAL at 08:59

## 2021-03-10 RX ADMIN — ACETAMINOPHEN 650 MG: 325 TABLET ORAL at 21:31

## 2021-03-10 RX ADMIN — APIXABAN 5 MG: 5 TABLET, FILM COATED ORAL at 08:58

## 2021-03-10 RX ADMIN — CASTOR OIL AND BALSAM, PERU: 788; 87 OINTMENT TOPICAL at 09:14

## 2021-03-10 RX ADMIN — PIPERACILLIN AND TAZOBACTAM 3.38 G: 3; .375 INJECTION, POWDER, LYOPHILIZED, FOR SOLUTION INTRAVENOUS at 12:27

## 2021-03-10 RX ADMIN — FERROUS SULFATE TAB 325 MG (65 MG ELEMENTAL FE) 325 MG: 325 (65 FE) TAB at 08:59

## 2021-03-10 NOTE — PROGRESS NOTES
Hospitalist Progress Note    NAME: Carrie Key   :  1950   MRN:  [id number]       Assessment / Plan:  Acute hypoxic respiratory failure  Acute on chronic diastolic CHF exacerbation  INO on CKD4  Hospital-acquired pneumonia  -86% on RA in ED, maintaining sats on 2 L oxygen via nasal cannula this morning  -cont to be on IV bumex 3mg IV bid  -continue daily metolazone 2.5 mg daily  -Appreciate nephrology recommendations  -baseline Cr around 3, elevated to 4.76 on admission. Creatinine 3.50 today  -renally dose meds, hold nephrotoxic agents if possible  -JANINE's revealed -1205 cc/24-hr. Weight almost 10 pounds less than admission weight  -does not wear O2 at home and currently on 2L NC. Continue try weaning oxygen as tolerated  Respiratory distress *, leukocytosis, fever and cough  X-ray with bibasilar opacities. started vancomycin and Zosyn treating hospital-acquired pneumonia . procalcitonin 0.38 from Procalcitonin on  was 0.12. Repeat proBNP overnight 9700 from over 19 K on admission    Hypokalemia, resolved  -Continue checking BMP daily given he is on Bumex and metolazone     Acute on chronic diastolic CHF  -Diuresis as above  -TTE with EF 50 to 55%  -Continue BB  -Appreciate cardiology recommendations  -Mild troponin elevation in setting of uncontrolled hypertension and INO  -BNP > 19k  Nuclear cardiac stress test Inconclusive stress test/No ischemia or infarct demonstrated    Skin rash  -Rt flank, mild contusion  -no vesicles. Does not appear dermatomal   -states area became painful after sliding on CT scanner when he was admitted  -monitor, try topical benadryl    Uncontrolled hypertension  -better-controlled today  -As above, continue beta-blocker.  -As needed hydralazine    IDDM  -Goal blood sugar 1 40-1 90. Hyperglycemic this AM  -Continue Lantus per home dose 30 units nightly  -added back mealtime insulin at reduced dose   -SSI with POC's.   Adjust insulin as indicated based on POC values    Lower extremity edema  -Bilateral lower extremity venous Dopplers negative  -Diuresis as above    LE/foot wounds POA  -large RLE medical ankle bullae from fluid overload, b/l heel DTIs, Lt 5th toe blister  -appreciate wound care help  -to follow at [facility], where he has been seen before    Chronic A. fib  -Continue Eliquis  -s/p PPM  -Continue metoprolol     Hypothyroidism  -Continue Synthroid     Code Status: Full code  Surrogate Decision Maker: Wife     DVT Prophylaxis: Eliquis  GI Prophylaxis: not indicated     Baseline: Ambulatory at home     Subjective:     Chief Complaint / Reason for Physician Visit  Patient was seen and examined. No acute events overnight. Discussed with RN events overnight. \"doing fine\"    Review of Systems:  Symptom Y/N Comments  Symptom Y/N Comments   Fever/Chills n   Chest Pain n    Poor Appetite    Edema y  bilateral lower extremities   Cough y   Abdominal Pain n    Sputum    Joint Pain     SOB/BETTS n   Pruritis/Rash     Nausea/vomit n   Tolerating PT/OT     Diarrhea    Tolerating Diet y    Constipation    Other       Could NOT obtain due to:          Objective:     VITALS:   Last 24hrs VS reviewed since prior progress note.  Most recent are:  Patient Vitals for the past 24 hrs:   Temp Pulse Resp BP SpO2   03/10/21 0745 98.5 °F (36.9 °C) 75 18 (!) 141/64 96 %   03/10/21 0157 97.8 °F (36.6 °C) 75 18 (!) 154/72    03/09/21 2208 98.4 °F (36.9 °C) 76 18 138/79 92 %   03/09/21 1918 98.2 °F (36.8 °C) 75 18 (!) 141/66 96 %   03/09/21 1750  75  (!) 142/64    03/09/21 1527 98.9 °F (37.2 °C) 75 24 (!) 153/67 96 %   03/09/21 1258  75  (!) 143/63    03/09/21 1100 99.1 °F (37.3 °C) 75 22 138/63 98 %   03/09/21 1047  75  138/62    03/09/21 0911 98.6 °F (37 °C) 75 20 (!) 142/72 94 %       Intake/Output Summary (Last 24 hours) at 3/10/2021 0747  Last data filed at 3/10/2021 2010  Gross per 24 hour   Intake 370 ml   Output 1575 ml   Net -1205 ml        I had a face to face encounter and independently examined this patient on 3/10/2021, as outlined below:  PHYSICAL EXAM:  General: WD, WN. Alert, cooperative, no acute distress    EENT:  EOMI. Anicteric sclerae. MMM  Resp:  Bibasilar rales. No accessory muscle use  CV:  Regular  rhythm,  +2 pitting edema bilateral lower extremities  GI:  Soft, Non distended, Non tender. +Bowel sounds  Neurologic:  Alert and oriented X 3, normal speech,   Psych:   Good insight. Not anxious nor agitated  Skin:  No rashes. No jaundice. Mild contusion in the right flank    Reviewed most current lab test results and cultures  YES  Reviewed most current radiology test results   YES  Review and summation of old records today    NO  Reviewed patient's current orders and MAR    YES  PMH/SH reviewed - no change compared to H&P  ________________________________________________________________________  Care Plan discussed with:    Comments   Patient x    Family      RN x    Care Manager x    Consultant                       x Multidiciplinary team rounds were held today with , nursing, pharmacist and clinical coordinator. Patient's plan of care was discussed; medications were reviewed and discharge planning was addressed. ________________________________________________________________________  Total NON critical care TIME: 35   Minutes    Total CRITICAL CARE TIME Spent:   Minutes non procedure based      Comments   >50% of visit spent in counseling and coordination of care x    ________________________________________________________________________  Jami Escamilla MD     Procedures: see electronic medical records for all procedures/Xrays and details which were not copied into this note but were reviewed prior to creation of Plan. LABS:  I reviewed today's most current labs and imaging studies.   Pertinent labs include:  Recent Labs     03/09/21  0135   WBC 12.0*   HGB 12.0*   HCT 40.9   *     Recent Labs 03/10/21  0140 03/09/21  0135 03/08/21  1624    138 138   K 3.6 3.2* 3.4*   CL 97 97 95*   CO2 38* 37* 39*   * 200* 189*   * 121* 126*   CREA 3.50* 3.63* 3.60*   CA 8.4* 8.4* 8.6   ALB  --  2.9*  --    TBILI  --  1.0  --    ALT  --  15  --        Signed: Pura Scott MD

## 2021-03-10 NOTE — PROGRESS NOTES
Continue to assess for alteration in skin integrity  Apply venalex as scheduled and add zinc to inner buttocks  Off load heels

## 2021-03-10 NOTE — PROGRESS NOTES
0400: Patient had 16 beats of v tach but his pacer paced him out. He was asymptomatic at the time. NP notified, will continue to monitor. 0700: End of Shift Note    Bedside shift change report given to Lynn Billings RN (oncoming nurse) by Priyanka Mancera (offgoing nurse). Report included the following information SBAR, Kardex, Intake/Output, MAR, Recent Results and Cardiac Rhythm Paced    Shift worked:  Night     Shift summary and any significant changes:     Patient was up to the bathroom numerous times with assistance throughout the night. Daily wound care was done this AM. Notified NP of patient having 16 beats of vtach this AM but his pacer paced him out. Concerns for physician to address:       Zone phone for oncoming shift:          Activity:  Activity Level: Up with Assistance  Number times ambulated in hallways past shift: 0  Number of times OOB to chair past shift: 5    Cardiac:   Cardiac Monitoring: Yes      Cardiac Rhythm: Paced    Access:   Current line(s): PIV     Genitourinary:   Urinary status: voiding    Respiratory:   O2 Device: Nasal cannula  Chronic home O2 use?: NO  Incentive spirometer at bedside: YES     GI:  Last Bowel Movement Date: 03/08/21  Current diet:  DIET DIABETIC CONSISTENT CARB Regular; 2 GM NA (House Low NA); FR 1500ML  DIET ONE TIME MESSAGE  Passing flatus: YES  Tolerating current diet: YES  % Diet Eaten: 100 %    Pain Management:   Patient states pain is manageable on current regimen: YES    Skin:  Romeo Score: 17  Interventions: float heels, increase time out of bed, PT/OT consult and limit briefs    Patient Safety:  Fall Score:  Total Score: 3  Interventions: assistive device (walker, cane, etc), gripper socks and pt to call before getting OOB  High Fall Risk: Yes    Length of Stay:  Expected LOS: 4d 0h  Actual LOS: 6      Marlyn Bryant

## 2021-03-10 NOTE — PROGRESS NOTES
1900: End of Shift Note    Bedside shift change report given to Tsering Goyal RN (oncoming nurse) by Ishmael Warner (offgoing nurse). Report included the following information SBAR    Shift worked:  [id number]     Shift summary and any significant changes:     No significant events     Concerns for physician to address:       Zone phone for oncoming shift:   [phone]       Activity:  Activity Level: Up with Assistance  Number times ambulated in hallways past shift: 0  Number of times OOB to chair past shift: 1    Cardiac:   Cardiac Monitoring: Yes      Cardiac Rhythm: Paced    Access:   Current line(s): PIV     Genitourinary:   Urinary status: voiding    Respiratory:   O2 Device: Nasal cannula  Chronic home O2 use?: NO  Incentive spirometer at bedside: YES     GI:  Last Bowel Movement Date: 03/08/21  Current diet:  DIET DIABETIC CONSISTENT CARB Regular; 2 GM NA (House Low NA); FR 1500ML  DIET ONE TIME MESSAGE  Passing flatus: YES  Tolerating current diet: YES  % Diet Eaten: 100 %    Pain Management:   Patient states pain is manageable on current regimen: YES    Skin:  Romeo Score: 17  Interventions: float heels, increase time out of bed, internal/external urinary devices and nutritional support     Patient Safety:  Fall Score:  Total Score: 3  Interventions: bed/chair alarm, assistive device (walker, cane, etc), gripper socks and pt to call before getting OOB  High Fall Risk: Yes    Length of Stay:  Expected LOS: 4d 0h  Actual LOS: [address]

## 2021-03-10 NOTE — PROGRESS NOTES
Problem: Falls - Risk of  Goal: *Absence of Falls  Description: Document Asael Velez Fall Risk and appropriate interventions in the flowsheet. Outcome: Progressing Towards Goal  Note: Fall Risk Interventions:  Mobility Interventions: Bed/chair exit alarm, Communicate number of staff needed for ambulation/transfer, Patient to call before getting OOB, Utilize walker, cane, or other assistive device         Medication Interventions: Bed/chair exit alarm, Patient to call before getting OOB, Teach patient to arise slowly    Elimination Interventions: Bed/chair exit alarm, Call light in reach, Patient to call for help with toileting needs, Toileting schedule/hourly rounds              Problem: Pressure Injury - Risk of  Goal: *Prevention of pressure injury  Description: Document Romeo Scale and appropriate interventions in the flowsheet.   Outcome: Progressing Towards Goal  Note: Pressure Injury Interventions:  Sensory Interventions: Assess changes in LOC, Assess need for specialty bed, Float heels, Keep linens dry and wrinkle-free, Maintain/enhance activity level, Minimize linen layers    Moisture Interventions: Minimize layers, Absorbent underpads, Apply protective barrier, creams and emollients    Activity Interventions: Assess need for specialty bed, Increase time out of bed, Pressure redistribution bed/mattress(bed type)    Mobility Interventions: Assess need for specialty bed, HOB 30 degrees or less, Pressure redistribution bed/mattress (bed type)    Nutrition Interventions: Document food/fluid/supplement intake, Offer support with meals,snacks and hydration    Friction and Shear Interventions: Apply protective barrier, creams and emollients, Minimize layers

## 2021-03-10 NOTE — INTERDISCIPLINARY ROUNDS
[place] [id number] Cardiopulmonary Care Interdisciplinary Rounds were held today to discuss patient's plan of care and outcomes. The following members were present: NP/Physician, Pharmacy, Nursing and Case Management. PLAN OF CARE:  
Continue current treatment plan Expected Length of Stay:  4d 0h

## 2021-03-10 NOTE — PROGRESS NOTES
[address]  [phone]      Cardiology Progress Note      3/10/2021 230PM    Admit Date: 3/4/2021    Admit Diagnosis:   CHF exacerbation ([facility] [I50.9]  Acute respiratory failure ([facility] [J96.00]  INO (acute kidney injury) ([facility] [N17.9]    Interval History/Subjective:     Jaspreet Tran is a 66 y.o. male with PMH CAD s/p CABG, a fib,  CKD, DM, HTN, JACEY, thyroid who was  admitted for CHF exacerbation ([facility] [I50.9]  Acute respiratory failure ([facility] [J96.00]  [facility]) [facility] [N17.9]. -VSS  -labs steady  -weight down 4#; I/O neg   -Mr. Obi Shane is feeling okay. He had a poor night again. SOB feeling okay. Chronic pains.        Visit Vitals  /70 (BP 1 Location: Left upper arm, BP Patient Position: Sitting)   Pulse 75   Temp 98.2 °F (36.8 °C)   Resp 22   Ht 5' 9\" (1.753 m)   Wt 114.6 kg (252 lb 9.6 oz)   SpO2 95%   BMI 37.30 kg/m²       Current Facility-Administered Medications   Medication Dose Route Frequency    albuterol-ipratropium (DUO-NEB) 2.5 MG-0.5 MG/3 ML  3 mL Nebulization Q4H PRN    piperacillin-tazobactam (ZOSYN) 3.375 g in 0.9% sodium chloride (MBP/ADV) 100 mL MBP  3.375 g IntraVENous Q12H    [START ON 3/11/2021] vancomycin (VANCOCIN) 1250 mg in  ml infusion  1,250 mg IntraVENous Q36H    amLODIPine (NORVASC) tablet 10 mg  10 mg Oral DAILY    diphenhydrAMINE-zinc acetate 2%-0.1% (BENADRYL) cream   Topical TID PRN    bismuth subsalicylate (PEPTO-BISMOL) oral suspension 30 mL  30 mL Oral Q6H PRN    guaiFENesin-dextromethorphan (ROBITUSSIN DM) 100-10 mg/5 mL syrup 10 mL  10 mL Oral Q6H PRN    balsam peru-castor oiL (VENELEX) ointment   Topical BID    insulin lispro (HUMALOG) injection 3 Units  3 Units SubCUTAneous TIDAC    benzonatate (TESSALON) capsule 100 mg  100 mg Oral TID PRN    acetaminophen (TYLENOL) tablet 650 mg  650 mg Oral Q4H PRN    hydrALAZINE (APRESOLINE) 20 mg/mL injection 20 mg  20 mg IntraVENous Q6H PRN  apixaban (ELIQUIS) tablet 5 mg  5 mg Oral BID    ferrous sulfate tablet 325 mg  325 mg Oral ACB    dutasteride (AVODART) capsule 0.5 mg  0.5 mg Oral DAILY    insulin glargine (LANTUS) injection 30 Units  30 Units SubCUTAneous QHS    levothyroxine (SYNTHROID) tablet 137 mcg  137 mcg Oral 6am    metOLazone (ZAROXOLYN) tablet 2.5 mg  2.5 mg Oral DAILY    metoprolol succinate (TOPROL-XL) XL tablet 150 mg  150 mg Oral DAILY    pravastatin (PRAVACHOL) tablet 80 mg  80 mg Oral QHS    pantoprazole (PROTONIX) tablet 40 mg  40 mg Oral ACB    bumetanide (BUMEX) injection 3 mg  3 mg IntraVENous BID    insulin lispro (HUMALOG) injection   SubCUTAneous AC&HS    glucose chewable tablet 16 g  4 Tab Oral PRN    dextrose (D50W) injection syrg 12.5-25 g  12.5-25 g IntraVENous PRN    glucagon (GLUCAGEN) injection 1 mg  1 mg IntraMUSCular PRN    sodium chloride (NS) flush 5-40 mL  5-40 mL IntraVENous Q8H    sodium chloride (NS) flush 5-40 mL  5-40 mL IntraVENous PRN    polyethylene glycol (MIRALAX) packet 17 g  17 g Oral DAILY PRN    ondansetron (ZOFRAN ODT) tablet 4 mg  4 mg Oral Q8H PRN    Or    ondansetron (ZOFRAN) injection 4 mg  4 mg IntraVENous Q6H PRN     Facility-Administered Medications Ordered in Other Encounters   Medication Dose Route Frequency    sodium chloride (NS) flush 10 mL  10 mL IntraVENous PRN       Objective:      Physical Exam:  General: elderly  male sitting in chair in NAD   Heart: RRR, no m/S3/JVD  Lungs: basilar crackles  Abdomen: Soft, +BS, NTND   Extremities: LE catherine +DP/PT, 2+ pitting edema up to waist.    Neurologic: Grossly normal  Skin:  Warm and dry. Dressings on legs.    Ruddiness bilateral gaitor region    Data Review:   Recent Labs     03/10/21  0753 03/09/21 0135   WBC 10.8 12.0*   HGB 12.4 12.0*   HCT 42.4 40.9   * 135*     Recent Labs     03/10/21  0140 03/09/21  0135 03/08/21  1624    138 138   K 3.6 3.2* 3.4*   CL 97 97 95*   CO2 38* 37* 39*   GLU 143* 200* 189*   * 121* 126*   CREA 3.50* 3.63* 3.60*   CA 8.4* 8.4* 8.6   ALB  --  2.9*  --    TBILI  --  1.0  --    ALT  --  15  --        No results for input(s): TROIQ, CPK, CKMB in the last 72 hours. Intake/Output Summary (Last 24 hours) at 3/10/2021 1443  Last data filed at 3/10/2021 1302  Gross per 24 hour   Intake 1010 ml   Output 1475 ml   Net -465 ml        Telemetry: pacing with underlying a fib;  PVCs; NSVT  ECG: v paced  Echocardiogram: EF 50-55%; mild concentric hypertrophy; mild to mod AS;   CT chest: \"1. Moderate bilateral pleural effusions with bibasilar and right middle lobe  atelectasis. 2. Cardiomegaly with coronary artery atherosclerosis. 3. No evidence of pneumonia. \"  CT abd/pelv: \" 1. No acute abdominal or pelvic pathology. 2. Cirrhotic liver. 3. Mild abdominal and pelvic ascites. 4. Moderate layering bilateral pleural effusions with bibasilar atelectasis. 5. Severe atherosclerosis. \"  Assessment:     Active Problems:    Acute respiratory failure ([facility] (3/4/2021)      CHF exacerbation ([facility] (3/4/2021)      INO (acute kidney injury) ([facility] (3/4/2021)        Plan:     Acute diastolic heart failure:  Improving. With contribution from INO on CKD ECHO with EF 50-55%. Weight improving   · Nephrology managing patient's large amount of diuretics  · HTN stable      CAD history/mild troponin elevation:  No chest pain. Not ACS  · EF 50-55%  · Statin and BB.   Not on ASA due to eliquis  · Stress test with no ischemia noted       A fib s/p AVN ablation:  Paced  · eliquis and BB        Chato Santiago NP  DNP, RN, AGACNP-BC

## 2021-03-10 NOTE — PROGRESS NOTES
0700: Bedside shift change report given to María Elena Fernandez RN (oncoming nurse) by Grace Jean Baptiste RN (offgoing nurse). Report included the following information SBAR.

## 2021-03-10 NOTE — PROGRESS NOTES
Problem: Falls - Risk of  Goal: *Absence of Falls  Description: Document Darren Hicks Fall Risk and appropriate interventions in the flowsheet.   Outcome: Progressing Towards Goal  Note: Fall Risk Interventions:  Mobility Interventions: Communicate number of staff needed for ambulation/transfer, Patient to call before getting OOB, PT Consult for mobility concerns, PT Consult for assist device competence, Utilize walker, cane, or other assistive device         Medication Interventions: Evaluate medications/consider consulting pharmacy, Patient to call before getting OOB, Teach patient to arise slowly    Elimination Interventions: Call light in reach, Patient to call for help with toileting needs, Toileting schedule/hourly rounds

## 2021-03-10 NOTE — PROGRESS NOTES
Name: Nicolasa Koo   MRN: [id number]  : 1950      Assessment   :                                               Plan:  INO on CKD-4 -     Hypokalemia    High Mg    Severe Azotemia- H/H normal. No UGI bleed. BUN trending down    HTN  DM-2  Volume overload  A on C D-CHF  Obesity  CAD Follows with Dr Tomasa Martinez - baseline creatinine around 3 although  It fluctuates quite a bit depending on volume status    Suspect cardiorenal syndrome with rise a in Creatinine from needed diuresis. Renal US with cortical thinning but no hydro. UA is bland. Creatinine peaked at 4.7, now a bit better (~ 3.7 to 3.5). Continue IV Bumex  + Metolazone (O>I and weight is down and he feels some better as well)    Daily labs  Daily wt  Salt/fluid restriction      He is not uremic and there is no acute need for HD. Subjective:  oob in chair . Breathing and edema a bit better, but neither to baseline.  No acute c/o    ROS:   No nausea, no vomiting  No chest pain, + shortness of breath and leg edema-improved    Exam:  Visit Vitals  BP (!) 154/72   Pulse 75   Temp 97.8 °F (36.6 °C)   Resp 18   Ht 5' 9\" (1.753 m)   Wt 116.4 kg (256 lb 9.6 oz)   SpO2 92%   BMI 37.89 kg/m²     WB/WN in NAD  Rales right base  Irregular, distant, no tachycardia  1-2+ leg edema  AOx3    Current Facility-Administered Medications   Medication Dose Route Frequency Last Admin    albuterol-ipratropium (DUO-NEB) 2.5 MG-0.5 MG/3 ML  3 mL Nebulization Q4H PRN      piperacillin-tazobactam (ZOSYN) 3.375 g in 0.9% sodium chloride (MBP/ADV) 100 mL MBP  3.375 g IntraVENous Q12H 3.375 g at 03/10/21 0155    [START ON 3/11/2021] vancomycin (VANCOCIN) 1250 mg in  ml infusion  1,250 mg IntraVENous Q36H      amLODIPine (NORVASC) tablet 10 mg  10 mg Oral DAILY      diphenhydrAMINE-zinc acetate 2%-0.1% (BENADRYL) cream Topical TID PRN      bismuth subsalicylate (PEPTO-BISMOL) oral suspension 30 mL  30 mL Oral Q6H PRN      guaiFENesin-dextromethorphan (ROBITUSSIN DM) 100-10 mg/5 mL syrup 10 mL  10 mL Oral Q6H PRN 10 mL at 03/10/21 0155    balsam peru-castor oiL (VENELEX) ointment   Topical BID Given at 03/09/21 2156    insulin lispro (HUMALOG) injection 3 Units  3 Units SubCUTAneous TIDAC 3 Units at 03/09/21 1755    benzonatate (TESSALON) capsule 100 mg  100 mg Oral TID  mg at 03/08/21 1600    acetaminophen (TYLENOL) tablet 650 mg  650 mg Oral Q4H  mg at 03/10/21 0131    hydrALAZINE (APRESOLINE) 20 mg/mL injection 20 mg  20 mg IntraVENous Q6H PRN      apixaban (ELIQUIS) tablet 5 mg  5 mg Oral BID 5 mg at 03/09/21 1746    ferrous sulfate tablet 325 mg  325 mg Oral  mg at 03/09/21 0905    dutasteride (AVODART) capsule 0.5 mg  0.5 mg Oral DAILY 0.5 mg at 03/09/21 0918    insulin glargine (LANTUS) injection 30 Units  30 Units SubCUTAneous QHS 30 Units at 03/09/21 2150    levothyroxine (SYNTHROID) tablet 137 mcg  137 mcg Oral 6am 137 mcg at 03/10/21 0507    metOLazone (ZAROXOLYN) tablet 2.5 mg  2.5 mg Oral DAILY 2.5 mg at 03/09/21 6587    metoprolol succinate (TOPROL-XL) XL tablet 150 mg  150 mg Oral DAILY 150 mg at 03/09/21 0917    pravastatin (PRAVACHOL) tablet 80 mg  80 mg Oral QHS 80 mg at 03/09/21 2149    pantoprazole (PROTONIX) tablet 40 mg  40 mg Oral ACB 40 mg at 03/09/21 0905    bumetanide (BUMEX) injection 3 mg  3 mg IntraVENous BID 3 mg at 03/09/21 1750    insulin lispro (HUMALOG) injection   SubCUTAneous AC&HS 2 Units at 03/09/21 2150    glucose chewable tablet 16 g  4 Tab Oral PRN      dextrose (D50W) injection syrg 12.5-25 g  12.5-25 g IntraVENous PRN      glucagon (GLUCAGEN) injection 1 mg  1 mg IntraMUSCular PRN      sodium chloride (NS) flush 5-40 mL  5-40 mL IntraVENous Q8H 10 mL at 03/10/21 0533    sodium chloride (NS) flush 5-40 mL  5-40 mL IntraVENous PRN 10 mL at 03/08/21 1051    polyethylene glycol (MIRALAX) packet 17 g  17 g Oral DAILY PRN      ondansetron (ZOFRAN ODT) tablet 4 mg  4 mg Oral Q8H PRN      Or    ondansetron (ZOFRAN) injection 4 mg  4 mg IntraVENous Q6H PRN       Facility-Administered Medications Ordered in Other Encounters   Medication Dose Route Frequency Last Admin    sodium chloride (NS) flush 10 mL  10 mL IntraVENous PRN         Labs/Data:    Lab Results   Component Value Date/Time    WBC 12.0 (H) 03/09/2021 01:35 AM    HGB 12.0 (L) 03/09/2021 01:35 AM    HCT 40.9 03/09/2021 01:35 AM    PLATELET 503 (L) 67/43/4773 01:35 AM    MCV 89.9 03/09/2021 01:35 AM       Lab Results   Component Value Date/Time    Sodium 139 03/10/2021 01:40 AM    Potassium 3.6 03/10/2021 01:40 AM    Chloride 97 03/10/2021 01:40 AM    CO2 38 (H) 03/10/2021 01:40 AM    Anion gap 4 (L) 03/10/2021 01:40 AM    Glucose 143 (H) 03/10/2021 01:40 AM     (H) 03/10/2021 01:40 AM    Creatinine 3.50 (H) 03/10/2021 01:40 AM    BUN/Creatinine ratio 36 (H) 03/10/2021 01:40 AM    GFR est AA 21 (L) 03/10/2021 01:40 AM    GFR est non-AA 17 (L) 03/10/2021 01:40 AM    Calcium 8.4 (L) 03/10/2021 01:40 AM       Wt Readings from Last 3 Encounters:   03/09/21 116.4 kg (256 lb 9.6 oz)   08/03/20 111.1 kg (245 lb)   02/17/20 110.7 kg (244 lb)         Intake/Output Summary (Last 24 hours) at 3/10/2021 0616  Last data filed at 3/10/2021 0500  Gross per 24 hour   Intake 370 ml   Output 1500 ml   Net -1130 ml       Patient seen and examined. Chart reviewed. Labs, data and other pertinent notes reviewed in last 24 hrs.     PMH/SH/FH reviewed and unchanged compared to H&P    Julianne Cousin, MD

## 2021-03-10 NOTE — ROUTINE PROCESS
End of Shift Note Bedside shift change report given to Marlyn  (oncoming nurse) by Yasmany Gonsalves RN (offgoing nurse). Report included the following information SBAR Shift worked:  day Shift summary and any significant changes:  
  patient up with assistance, started on antibiotics. Has the incentive spirometer to use. Skin care done to sacrum, inner buttocks and heels and toes with the venelex. Zinc cream added to buttocks. Concerns for physician to address:  continue assessment of output, weight loss, signs of infection Zone phone for oncoming shift:   na Activity: 
Activity Level: [facility] Room Privileges Number times ambulated in hallways past shift: 0 Number of times OOB to chair past shift: 1 Cardiac:  
Cardiac Monitoring: Yes     
Cardiac Rhythm: Paced Access:  
Current line(s): PIV Genitourinary:  
Urinary status: voiding Respiratory:  
O2 Device: Nasal cannula Chronic home O2 use?: NO Incentive spirometer at bedside: YES 
  
GI: 
Last Bowel Movement Date: 03/08/21 Current diet:  DIET DIABETIC CONSISTENT CARB Regular; 2 GM NA (House Low NA); FR 1500ML 
DIET ONE TIME MESSAGE Passing flatus: NO Tolerating current diet: YES 
% Diet Eaten: 100 % Pain Management:  
Patient states pain is manageable on current regimen: YES Skin: 
Romeo Score: 17 Interventions: increase time out of bed and PT/OT consult Patient Safety: 
Fall Score: Total Score: 3 Interventions: pt to call before getting OOB High Fall Risk: Yes Length of Stay: 
Expected LOS: 4d 0h 
Actual LOS: 5 Yasmany Gonsalves RN

## 2021-03-10 NOTE — PROGRESS NOTES
Spiritual Care Assessment/Progress Note  [facility]      NAME: Moses Christian      MRN: [id number]  AGE: 66 y.o.  SEX: male  Sabianist Affiliation: No preference   Language: English     3/10/2021     Total Time (in minutes): 8     Spiritual Assessment begun in [facility] CARDIOPULMONARY CARE through conversation with:         [x]Patient        [] Family    [] Friend(s)        Reason for Consult: Initial/Spiritual assessment, patient floor     Spiritual beliefs: (Please include comment if needed)     [] Identifies with a stefan tradition:         [] Supported by a stefan community:            [] Claims no spiritual orientation:           [] Seeking spiritual identity:                [] Adheres to an individual form of spirituality:           [x] Not able to assess:                           Identified resources for coping:      [] Prayer                               [] Music                  [] Guided Imagery     [x] Family/friends                 [] Pet visits     [] Devotional reading                         [] Unknown     [] Other:                                               Interventions offered during this visit: (See comments for more details)    Patient Interventions: Affirmation of emotions/emotional suffering, Coping skills reviewed/reinforced, Initial/Spiritual assessment, patient floor, Life review/legacy, Normalization of emotional/spiritual concerns           Plan of Care:     [] Support spiritual and/or cultural needs    [] Support AMD and/or advance care planning process      [] Support grieving process   [] Coordinate Rites and/or Rituals    [] Coordination with community clergy   [x] No spiritual needs identified at this time   [] Detailed Plan of Care below (See Comments)  [] Make referral to Music Therapy  [] Make referral to Pet Therapy     [] Make referral to Addiction services  [] Make referral to [facility]  [] Make referral to Spiritual Care Partner  [] No future visits requested        [x] Follow up upon further referrals     Comments: Provided initial spiritual assessment for pt Bettie on [facility]. No family/friends present, but spouse was on way to provide presence and support. Processed events leading up to LOS. Affirmed coping mechanisms and assured of good thoughts. Advised of  services.     [address] 1 PETE [place] 1 Provider   Paging Service [phone])

## 2021-03-11 LAB
ANION GAP SERPL CALC-SCNC: 2 MMOL/L (ref 5–15)
BUN SERPL-MCNC: 124 MG/DL (ref 6–20)
BUN/CREAT SERPL: 31 (ref 12–20)
CALCIUM SERPL-MCNC: 8.6 MG/DL (ref 8.5–10.1)
CHLORIDE SERPL-SCNC: 96 MMOL/L (ref 97–108)
CO2 SERPL-SCNC: 40 MMOL/L (ref 21–32)
CREAT SERPL-MCNC: 3.95 MG/DL (ref 0.7–1.3)
ERYTHROCYTE [DISTWIDTH] IN BLOOD BY AUTOMATED COUNT: 14.7 % (ref 11.5–14.5)
GLUCOSE BLD STRIP.AUTO-MCNC: 119 MG/DL (ref 65–100)
GLUCOSE BLD STRIP.AUTO-MCNC: 138 MG/DL (ref 65–100)
GLUCOSE BLD STRIP.AUTO-MCNC: 156 MG/DL (ref 65–100)
GLUCOSE BLD STRIP.AUTO-MCNC: 95 MG/DL (ref 65–100)
GLUCOSE SERPL-MCNC: 118 MG/DL (ref 65–100)
HCT VFR BLD AUTO: 40.8 % (ref 36.6–50.3)
HGB BLD-MCNC: 11.8 G/DL (ref 12.1–17)
MCH RBC QN AUTO: 26.2 PG (ref 26–34)
MCHC RBC AUTO-ENTMCNC: 28.9 G/DL (ref 30–36.5)
MCV RBC AUTO: 90.7 FL (ref 80–99)
NRBC # BLD: 0 K/UL (ref 0–0.01)
NRBC BLD-RTO: 0 PER 100 WBC
PLATELET # BLD AUTO: 146 K/UL (ref 150–400)
PMV BLD AUTO: 11.1 FL (ref 8.9–12.9)
POTASSIUM SERPL-SCNC: 3.6 MMOL/L (ref 3.5–5.1)
RBC # BLD AUTO: 4.5 M/UL (ref 4.1–5.7)
SERVICE CMNT-IMP: ABNORMAL
SERVICE CMNT-IMP: NORMAL
SODIUM SERPL-SCNC: 138 MMOL/L (ref 136–145)
WBC # BLD AUTO: 12.1 K/UL (ref 4.1–11.1)

## 2021-03-11 PROCEDURE — 74011250636 HC RX REV CODE- 250/636: Performed by: STUDENT IN AN ORGANIZED HEALTH CARE EDUCATION/TRAINING PROGRAM

## 2021-03-11 PROCEDURE — 74011250637 HC RX REV CODE- 250/637: Performed by: INTERNAL MEDICINE

## 2021-03-11 PROCEDURE — APPSS45 APP SPLIT SHARED TIME 31-45 MINUTES: Performed by: NURSE PRACTITIONER

## 2021-03-11 PROCEDURE — 74011250637 HC RX REV CODE- 250/637: Performed by: NURSE PRACTITIONER

## 2021-03-11 PROCEDURE — 36415 COLL VENOUS BLD VENIPUNCTURE: CPT

## 2021-03-11 PROCEDURE — 74011250636 HC RX REV CODE- 250/636: Performed by: INTERNAL MEDICINE

## 2021-03-11 PROCEDURE — 74011250637 HC RX REV CODE- 250/637: Performed by: STUDENT IN AN ORGANIZED HEALTH CARE EDUCATION/TRAINING PROGRAM

## 2021-03-11 PROCEDURE — 74011636637 HC RX REV CODE- 636/637: Performed by: STUDENT IN AN ORGANIZED HEALTH CARE EDUCATION/TRAINING PROGRAM

## 2021-03-11 PROCEDURE — 77010033678 HC OXYGEN DAILY

## 2021-03-11 PROCEDURE — 74011000258 HC RX REV CODE- 258: Performed by: STUDENT IN AN ORGANIZED HEALTH CARE EDUCATION/TRAINING PROGRAM

## 2021-03-11 PROCEDURE — 99232 SBSQ HOSP IP/OBS MODERATE 35: CPT | Performed by: INTERNAL MEDICINE

## 2021-03-11 PROCEDURE — 74011636637 HC RX REV CODE- 636/637: Performed by: INTERNAL MEDICINE

## 2021-03-11 PROCEDURE — 80048 BASIC METABOLIC PNL TOTAL CA: CPT

## 2021-03-11 PROCEDURE — 74011000258 HC RX REV CODE- 258: Performed by: INTERNAL MEDICINE

## 2021-03-11 PROCEDURE — 65660000000 HC RM CCU STEPDOWN

## 2021-03-11 PROCEDURE — 82962 GLUCOSE BLOOD TEST: CPT

## 2021-03-11 PROCEDURE — 85027 COMPLETE CBC AUTOMATED: CPT

## 2021-03-11 PROCEDURE — 74011000250 HC RX REV CODE- 250: Performed by: STUDENT IN AN ORGANIZED HEALTH CARE EDUCATION/TRAINING PROGRAM

## 2021-03-11 RX ORDER — ASPIRIN 325 MG/1
200 TABLET, FILM COATED ORAL DAILY
Status: DISCONTINUED | OUTPATIENT
Start: 2021-03-12 | End: 2021-03-16

## 2021-03-11 RX ADMIN — ACETAMINOPHEN 650 MG: 325 TABLET ORAL at 04:27

## 2021-03-11 RX ADMIN — DUTASTERIDE 0.5 MG: 0.5 CAPSULE, LIQUID FILLED ORAL at 09:05

## 2021-03-11 RX ADMIN — ACETAMINOPHEN 650 MG: 325 TABLET ORAL at 16:29

## 2021-03-11 RX ADMIN — THIAMINE HYDROCHLORIDE 500 MG: 100 INJECTION, SOLUTION INTRAMUSCULAR; INTRAVENOUS at 11:53

## 2021-03-11 RX ADMIN — PANTOPRAZOLE SODIUM 40 MG: 40 TABLET, DELAYED RELEASE ORAL at 09:08

## 2021-03-11 RX ADMIN — PIPERACILLIN AND TAZOBACTAM 3.38 G: 3; .375 INJECTION, POWDER, LYOPHILIZED, FOR SOLUTION INTRAVENOUS at 01:32

## 2021-03-11 RX ADMIN — INSULIN GLARGINE 30 UNITS: 100 INJECTION, SOLUTION SUBCUTANEOUS at 21:42

## 2021-03-11 RX ADMIN — CASTOR OIL AND BALSAM, PERU: 788; 87 OINTMENT TOPICAL at 09:11

## 2021-03-11 RX ADMIN — GUAIFENESIN AND DEXTROMETHORPHAN 10 ML: 100; 10 SYRUP ORAL at 16:29

## 2021-03-11 RX ADMIN — CASTOR OIL AND BALSAM, PERU: 788; 87 OINTMENT TOPICAL at 21:00

## 2021-03-11 RX ADMIN — FERROUS SULFATE TAB 325 MG (65 MG ELEMENTAL FE) 325 MG: 325 (65 FE) TAB at 09:07

## 2021-03-11 RX ADMIN — Medication 10 ML: at 21:48

## 2021-03-11 RX ADMIN — INSULIN LISPRO 3 UNITS: 100 INJECTION, SOLUTION INTRAVENOUS; SUBCUTANEOUS at 12:32

## 2021-03-11 RX ADMIN — Medication 1 CAPSULE: at 16:29

## 2021-03-11 RX ADMIN — INSULIN LISPRO 3 UNITS: 100 INJECTION, SOLUTION INTRAVENOUS; SUBCUTANEOUS at 09:03

## 2021-03-11 RX ADMIN — PIPERACILLIN AND TAZOBACTAM 3.38 G: 3; .375 INJECTION, POWDER, LYOPHILIZED, FOR SOLUTION INTRAVENOUS at 13:16

## 2021-03-11 RX ADMIN — BUMETANIDE 3 MG: 0.25 INJECTION, SOLUTION INTRAMUSCULAR; INTRAVENOUS at 09:04

## 2021-03-11 RX ADMIN — GUAIFENESIN AND DEXTROMETHORPHAN 10 ML: 100; 10 SYRUP ORAL at 23:49

## 2021-03-11 RX ADMIN — AMLODIPINE BESYLATE 10 MG: 5 TABLET ORAL at 09:09

## 2021-03-11 RX ADMIN — INSULIN LISPRO 2 UNITS: 100 INJECTION, SOLUTION INTRAVENOUS; SUBCUTANEOUS at 12:33

## 2021-03-11 RX ADMIN — VANCOMYCIN HYDROCHLORIDE 1250 MG: 10 INJECTION, POWDER, LYOPHILIZED, FOR SOLUTION INTRAVENOUS at 01:33

## 2021-03-11 RX ADMIN — GUAIFENESIN AND DEXTROMETHORPHAN 10 ML: 100; 10 SYRUP ORAL at 09:20

## 2021-03-11 RX ADMIN — APIXABAN 5 MG: 5 TABLET, FILM COATED ORAL at 09:06

## 2021-03-11 RX ADMIN — METOLAZONE 2.5 MG: 2.5 TABLET ORAL at 09:06

## 2021-03-11 RX ADMIN — PRAVASTATIN SODIUM 80 MG: 40 TABLET ORAL at 21:43

## 2021-03-11 RX ADMIN — LEVOTHYROXINE SODIUM 137 MCG: 0.03 TABLET ORAL at 06:29

## 2021-03-11 RX ADMIN — Medication 10 ML: at 14:00

## 2021-03-11 RX ADMIN — METOPROLOL SUCCINATE 150 MG: 50 TABLET, EXTENDED RELEASE ORAL at 09:05

## 2021-03-11 RX ADMIN — ACETAMINOPHEN 650 MG: 325 TABLET ORAL at 09:08

## 2021-03-11 RX ADMIN — BENZONATATE 100 MG: 100 CAPSULE ORAL at 19:48

## 2021-03-11 RX ADMIN — APIXABAN 5 MG: 5 TABLET, FILM COATED ORAL at 17:34

## 2021-03-11 RX ADMIN — INSULIN LISPRO 3 UNITS: 100 INJECTION, SOLUTION INTRAVENOUS; SUBCUTANEOUS at 17:34

## 2021-03-11 RX ADMIN — ACETAMINOPHEN 650 MG: 325 TABLET ORAL at 21:43

## 2021-03-11 RX ADMIN — GUAIFENESIN AND DEXTROMETHORPHAN 10 ML: 100; 10 SYRUP ORAL at 04:27

## 2021-03-11 NOTE — PROGRESS NOTES
[address] INTERDISCIPLINARY ROUNDS    Cardiopulmonary Care Interdisciplinary Rounds were held today to discuss patient's plan of care and outcomes. The following members were present: NP/Physician, Pharmacy, Nursing and Case Management. PLAN OF CARE:   Continue current treatment plan, adjustments made to diuretics by nephrology. Wean O2 as tolerated.      Expected Length of Stay:  4d 0h

## 2021-03-11 NOTE — PROGRESS NOTES
0700: Bedside shift change report given to NADINE Rangel (oncoming nurse) by Rocael Quan RN (offgoing nurse). Report included the following information SBAR.

## 2021-03-11 NOTE — PROGRESS NOTES
0700: End of Shift Note    Bedside shift change report given to Jolene Lanza RN (oncoming nurse) by Sebastián Alejandro RN (offgoing nurse). Report included the following information SBAR, Kardex, Procedure Summary, Intake/Output, MAR, Recent Results and Cardiac Rhythm Paced    Shift worked:  [id number]     Shift summary and any significant changes:          Concerns for physician to address:       Zone phone for oncoming shift:   [phone]       Activity:  Activity Level: Up with Assistance  Number times ambulated in hallways past shift: 0  Number of times OOB to chair past shift: all night     Cardiac:   Cardiac Monitoring: Yes      Cardiac Rhythm: Paced    Access:   Current line(s): PIV     Genitourinary:   Urinary status: voiding    Respiratory:   O2 Device: Nasal cannula  Chronic home O2 use?: NO  Incentive spirometer at bedside: NO     GI:  Last Bowel Movement Date: 03/08/21  Current diet:  DIET DIABETIC CONSISTENT CARB Regular; 2 GM NA (House Low NA); FR 1500ML  DIET ONE TIME MESSAGE  Passing flatus: YES  Tolerating current diet: YES  % Diet Eaten: 100 %    Pain Management:   Patient states pain is manageable on current regimen: YES    Skin:  Romeo Score: 17  Interventions: increase time out of bed    Patient Safety:  Fall Score:  Total Score: 3  Interventions: bed/chair alarm, assistive device (walker, cane, etc) and gripper socks  High Fall Risk: Yes    Length of Stay:  Expected LOS: 4d 0h  Actual LOS: [address] Tomlinson RN

## 2021-03-11 NOTE — PROGRESS NOTES
[address], [address] [facility]  [phone]      Cardiology Progress Note      3/11/2021 120PM    Admit Date: 3/4/2021     Admit Diagnosis:   CHF exacerbation ([facility] [address] [I50.9]  Acute respiratory failure ([address] [J96.00]  INO (acute kidney injury) ([facility] [address] [N17.9]    Interval History/Subjective:     Diana Patel is a 66 y.o. male with PMH CAD s/p CABG, a fib,  CKD, DM, HTN, JACEY, thyroid who was  admitted for CHF exacerbation ([facility] [address] [I50.9]  Acute respiratory failure ([facility] [address] [J96.00]  INO (acute kidney injury) ([facility] [address] [N17.9]. -VSS  -labs steady  -weight likely not accurate (documented up 7#); I/O neg   -Mr. Udell Meigs is in a poor mood today. He is irritated that his body is not cooperating and he's still here. He was under the impression that he was leaving the next day, each day this week. No breathing or pain complaints at this time.      Visit Vitals  BP (!) 155/71 (BP 1 Location: Left upper arm, BP Patient Position: Sitting)   Pulse 75   Temp 98.3 °F (36.8 °C)   Resp 16   Ht 5' 9\" (1.753 m)   Wt 117.5 kg (259 lb 0.7 oz)   SpO2 95%   BMI 38.25 kg/m²       Current Facility-Administered Medications   Medication Dose Route Frequency    [START ON 3/12/2021] thiamine mononitrate (B-1) tablet 200 mg  200 mg Oral DAILY    L.acidophilus-paracasei-S.thermophil-bifidobacter (RISAQUAD) 8 billion cell capsule  1 Cap Oral DAILY    piperacillin-tazobactam (ZOSYN) 3.375 g in 0.9% sodium chloride (MBP/ADV) 100 mL MBP  3.375 g IntraVENous Q12H    vancomycin (VANCOCIN) 1250 mg in  ml infusion  1,250 mg IntraVENous Q36H    amLODIPine (NORVASC) tablet 10 mg  10 mg Oral DAILY    diphenhydrAMINE-zinc acetate 2%-0.1% (BENADRYL) cream   Topical TID PRN    bismuth subsalicylate (PEPTO-BISMOL) oral suspension 30 mL  30 mL Oral Q6H PRN    guaiFENesin-dextromethorphan (ROBITUSSIN DM) 100-10 mg/5 mL syrup 10 mL  10 mL Oral Q6H PRN    balsam peru-castor oiL (VENELEX) ointment   Topical BID    insulin lispro (HUMALOG) injection 3 Units  3 Units SubCUTAneous TIDAC    benzonatate (TESSALON) capsule 100 mg  100 mg Oral TID PRN    acetaminophen (TYLENOL) tablet 650 mg  650 mg Oral Q4H PRN    hydrALAZINE (APRESOLINE) 20 mg/mL injection 20 mg  20 mg IntraVENous Q6H PRN    apixaban (ELIQUIS) tablet 5 mg  5 mg Oral BID    ferrous sulfate tablet 325 mg  325 mg Oral ACB    dutasteride (AVODART) capsule 0.5 mg  0.5 mg Oral DAILY    insulin glargine (LANTUS) injection 30 Units  30 Units SubCUTAneous QHS    levothyroxine (SYNTHROID) tablet 137 mcg  137 mcg Oral 6am    metOLazone (ZAROXOLYN) tablet 2.5 mg  2.5 mg Oral DAILY    metoprolol succinate (TOPROL-XL) XL tablet 150 mg  150 mg Oral DAILY    pravastatin (PRAVACHOL) tablet 80 mg  80 mg Oral QHS    pantoprazole (PROTONIX) tablet 40 mg  40 mg Oral ACB    [Held by provider] bumetanide (BUMEX) injection 3 mg  3 mg IntraVENous BID    insulin lispro (HUMALOG) injection   SubCUTAneous AC&HS    glucose chewable tablet 16 g  4 Tab Oral PRN    dextrose (D50W) injection syrg 12.5-25 g  12.5-25 g IntraVENous PRN    glucagon (GLUCAGEN) injection 1 mg  1 mg IntraMUSCular PRN    sodium chloride (NS) flush 5-40 mL  5-40 mL IntraVENous Q8H    sodium chloride (NS) flush 5-40 mL  5-40 mL IntraVENous PRN    polyethylene glycol (MIRALAX) packet 17 g  17 g Oral DAILY PRN    ondansetron (ZOFRAN ODT) tablet 4 mg  4 mg Oral Q8H PRN    Or    ondansetron (ZOFRAN) injection 4 mg  4 mg IntraVENous Q6H PRN     Facility-Administered Medications Ordered in Other Encounters   Medication Dose Route Frequency    sodium chloride (NS) flush 10 mL  10 mL IntraVENous PRN       Objective:      Physical Exam:  General: elderly  male sitting in chair in NAD   Heart: RRR, no m/S3/JVD  Lungs: basilar crackles  Abdomen: Soft, +BS, NTND   Extremities: LE catherine +DP/PT, 2+ pitting edema up to waist.    Neurologic: Grossly normal  Skin:  Warm and dry. Dressings on legs.    Ruddiness bilateral gaitor region    Data Review:   Recent Labs     03/11/21  0419 03/10/21  0753 03/09/21  0135   WBC 12.1* 10.8 12.0*   HGB 11.8* 12.4 12.0*   HCT 40.8 42.4 40.9   * 127* 135*     Recent Labs     03/11/21  0419 03/10/21  0140 03/09/21  0135    139 138   K 3.6 3.6 3.2*   CL 96* 97 97   CO2 40* 38* 37*   * 143* 200*   * 125* 121*   CREA 3.95* 3.50* 3.63*   CA 8.6 8.4* 8.4*   ALB  --   --  2.9*   TBILI  --   --  1.0   ALT  --   --  15       No results for input(s): TROIQ, CPK, CKMB in the last 72 hours. Intake/Output Summary (Last 24 hours) at 3/11/2021 1341  Last data filed at 3/11/2021 1323  Gross per 24 hour   Intake 960 ml   Output 1500 ml   Net -540 ml        Telemetry: pacing with underlying a fib;    ECG: v paced  Echocardiogram: EF 50-55%; mild concentric hypertrophy; mild to mod AS;   CT chest: \"1. Moderate bilateral pleural effusions with bibasilar and right middle lobe  atelectasis. 2. Cardiomegaly with coronary artery atherosclerosis. 3. No evidence of pneumonia. \"  CT abd/pelv: \" 1. No acute abdominal or pelvic pathology. 2. Cirrhotic liver. 3. Mild abdominal and pelvic ascites. 4. Moderate layering bilateral pleural effusions with bibasilar atelectasis. 5. Severe atherosclerosis. \"  Assessment:     Active Problems:    Acute diastolic CHF (congestive heart failure) ([facility] [address] (6/22/2017)      Acute respiratory failure ([facility] [address] (3/4/2021)      CHF exacerbation ([facility] [address] (3/4/2021)      INO (acute kidney injury) ([facility] [address] (3/4/2021)        Plan:     Acute diastolic heart failure:  Improving. With contribution from INO on CKD ECHO with EF 50-55%. Weight improving   · Nephrology managing patient's large amount of diuretics  · HTN stable      CAD history/mild troponin elevation:  No chest pain. Not ACS  · EF 50-55%  · Statin and BB.   Not on ASA due to eliquis  · Stress test with no ischemia noted       A fib s/p AVN ablation:  Paced  · eliquis and BB    No additional cardiology recommendations at this time. Will sign off. Please call with questions.   Will need out patient follow up     Elizabeth Crowley NP DNP, RN, AGACNP-BC

## 2021-03-11 NOTE — PROGRESS NOTES
Problem: Falls - Risk of  Goal: *Absence of Falls  Description: Document Chris Gonzalez Fall Risk and appropriate interventions in the flowsheet. Outcome: Progressing Towards Goal  Note: Fall Risk Interventions:  Mobility Interventions: Communicate number of staff needed for ambulation/transfer, Patient to call before getting OOB, Utilize walker, cane, or other assistive device, Bed/chair exit alarm         Medication Interventions: Bed/chair exit alarm, Patient to call before getting OOB, Teach patient to arise slowly    Elimination Interventions: Bed/chair exit alarm, Call light in reach, Patient to call for help with toileting needs, Toileting schedule/hourly rounds              Problem: Pressure Injury - Risk of  Goal: *Prevention of pressure injury  Description: Document Romeo Scale and appropriate interventions in the flowsheet.   Outcome: Progressing Towards Goal  Note: Pressure Injury Interventions:  Sensory Interventions: Assess changes in LOC, Assess need for specialty bed, Float heels, Keep linens dry and wrinkle-free, Minimize linen layers, Maintain/enhance activity level    Moisture Interventions: Absorbent underpads, Apply protective barrier, creams and emollients, Minimize layers    Activity Interventions: Assess need for specialty bed, Increase time out of bed, Pressure redistribution bed/mattress(bed type)    Mobility Interventions: Assess need for specialty bed, HOB 30 degrees or less, Pressure redistribution bed/mattress (bed type)    Nutrition Interventions: Document food/fluid/supplement intake, Offer support with meals,snacks and hydration    Friction and Shear Interventions: Apply protective barrier, creams and emollients, Lift team/patient mobility team, Minimize layers                Problem: Impaired Skin Integrity/Pressure Injury Treatment  Goal: *Improvement of Existing Pressure Injury  Outcome: Progressing Towards Goal

## 2021-03-11 NOTE — PROGRESS NOTES
Transition of Care Plan:     Disposition: Home   Follow up appointments: PCP  DME needed: None  Transportation at Discharge: Pt's wife will transport at d/c.   [place] or means to access home:    Pt has his keys    IM Medicare letter: Pt will need 2nd IM Letter  Caregiver Contact: Ana Maria Cervantes- Wife [phone]  Discharge Caregiver contacted prior to discharge?  Will contact at d/c    3:00pm-CM met with pt and pt's wife to discuss d/c plan. No new needs at this time. CM will continue to follow pt for d/c planning needs. 8:30am- CM called Sarath Hawley [phone] via phone to reschedule appointment. Appointment rescheduled for 3/18/2021 at 9:15am    CM will continue to follow patient for discharge planning needs and arrange for services as deemed necessary.     Jamie Lopes [address]  [phone]

## 2021-03-11 NOTE — PROGRESS NOTES
Comprehensive Nutrition Assessment    Type and Reason for Visit: Initial, RD nutrition re-screen/LOS    Nutrition Recommendations/Plan:   Continue CCD, low Na, 1500mL FR per MD  Add Glucerna to promote adequate nutrition for wound healing  Please document % meals and supplements consumed in flowsheet I/O's under intake     Nutrition Assessment:      Chart reviewed for LOS. Case discussed during IDR. Pt noted for acute hypoxic respiratory failure, CHF, INO on CKD4, HAP, HTN, IDDM, LE foot wounds, and hypothyroidism. Pt sleeping at time of visit. He is eating well per RN. MD plans to add probiotic d/t loose stools from abx. Will add ONS to promote nutrition for wound healing. Wt hx shows increasing wt since August. Pt being diuresed. MST negative for malnutrition risk factors. Patient Vitals for the past 72 hrs:   % Diet Eaten   03/09/21 1710 100 %   03/09/21 0900 100 %   03/08/21 1730 100 %   03/08/21 1447 100 %     Wt Readings from Last 5 Encounters:   03/11/21 117.5 kg (259 lb 0.7 oz)   08/03/20 111.1 kg (245 lb)   02/17/20 110.7 kg (244 lb)   02/13/20 110.7 kg (244 lb)   01/07/20 114.5 kg (252 lb 6.4 oz)   ]      Estimated Daily Nutrient Needs:  Energy (kcal): 2190 kcal (BMR x 1. 3AF - 300); Weight Used for Energy Requirements: Current  Protein (g): 94g (0.8g/kg); Weight Used for Protein Requirements: Current  Fluid (ml/day): 1500mL per MD; Method Used for Fluid Requirements:        Nutrition Related Findings:  Labs: -120mg/dL. Meds: bumex, ferrous sulfate, lantus, humalog, synthroid, zosyn, vancomycin. Edema 2+generalized & BLE. BM 3/8.       Wounds:    Pressure injury, Deep tissue injury, Venous stasis       Current Nutrition Therapies:  DIET DIABETIC CONSISTENT CARB Regular; 2 GM NA (House Low NA); FR 1500ML  DIET ONE TIME MESSAGE    Anthropometric Measures:  · Height:  5' 9\" (175.3 cm)  · Current Body Wt:  117.5 kg (259 lb 0.7 oz)     · Ideal Body Wt:  160 lbs:  161.9 %   · BMI Category:  Obese class 2 (BMI 35.0-39. 9)       Nutrition Diagnosis:   · Increased nutrient needs related to (wound healing) as evidenced by (PI, DTI, venous wounds)      Nutrition Interventions:   Food and/or Nutrient Delivery: Continue current diet  Nutrition Education and Counseling: No recommendations at this time  Coordination of Nutrition Care: Continue to monitor while inpatient    Goals:  Continue PO intake >75% meals with BG <200mg/dL next 5-4 days       Nutrition Monitoring and Evaluation:   Behavioral-Environmental Outcomes: None identified  Food/Nutrient Intake Outcomes: Food and nutrient intake  Physical Signs/Symptoms Outcomes: Biochemical data, Weight, Fluid status or edema, GI status, Skin    Discharge Planning:    Continue current diet, Continue oral nutrition supplement     Electronically signed by Popeye Lemus RD on 3/11/2021 at 1:19 PM    Contact: NETTE-Ector  Pager [phone]

## 2021-03-11 NOTE — PROGRESS NOTES
Hospitalist Progress Note    NAME: Adithya Crum   :  1950   MRN:  [id number]       Assessment / Plan:  Acute hypoxic respiratory failure  Acute on chronic diastolic CHF exacerbation  INO on CKD4  Question of Hospital-acquired pneumonia (?has not been hospitalized since 2019)  Loose bowel movements for 2 days (antibiotic associated diarrhea)  -86% on RA in ED, maintaining sats on 2 L oxygen via nasal cannula this morning  -cont to be on IV bumex 3mg IV bid  -continue daily metolazone 2.5 mg daily  -Appreciate nephrology recommendations  -baseline Cr around 3, elevated to 4.76 on admission. Creatinine appears plateaued bellow 4  -renally dose meds, hold nephrotoxic agents if possible  -JANINE's revealed -1205 cc/24-hr. Weight almost 10 pounds less than admission weight  -does not wear O2 at home and currently on 2L NC. Continue try weaning oxygen as tolerated  Respiratory distress , leukocytosis, fever and cough  X-ray with bibasilar opacities. started vancomycin and Zosyn treating hospital-acquired pneumonia . procalcitonin 0.38 from Procalcitonin on  was 0.12.   3/11 no fever in last 48 hours. Will repeat porcal in the a.m. and if decreasing will stop Abx. Will stop piperacillin and switch   Add probiotic. proBNP imrpving  will add thiamine 500 mg IV today and then 200 mg po daily thereafter        Hypokalemia, resolved  -Continue checking BMP daily given he is on Bumex and metolazone     Acute on chronic diastolic CHF  -Diuresis as above  -TTE with EF 50 to 55%  -Continue BB  -Appreciate cardiology recommendations  -Mild troponin elevation in setting of uncontrolled hypertension and INO  -BNP > 19k  Nuclear cardiac stress test Inconclusive stress test/No ischemia or infarct demonstrated    Skin rash  -Rt flank, mild contusion  -no vesicles.  Does not appear dermatomal   -states area became painful after sliding on CT scanner when he was admitted  -monitor, try topical benadryl    Uncontrolled hypertension  -better-controlled today  -As above, continue beta-blocker.  -As needed hydralazine    IDDM  -Goal blood sugar 1 40-1 90. Hyperglycemic this AM  -Continue Lantus per home dose 30 units nightly  -added back mealtime insulin at reduced dose   -SSI with POC's. Adjust insulin as indicated based on POC values    Lower extremity edema  -Bilateral lower extremity venous Dopplers negative  -Diuresis as above    LE/foot wounds POA  -large RLE medical ankle bullae from fluid overload, b/l heel DTIs, Lt 5th toe blister  -appreciate wound care help  -to follow at [facility], where he has been seen before    Chronic A. fib  -Continue Eliquis  -s/p PPM  -Continue metoprolol     Hypothyroidism  -Continue Synthroid     Code Status: Full code  Surrogate Decision Maker: Wife     DVT Prophylaxis: Eliquis  GI Prophylaxis: not indicated     Baseline: Ambulatory at home     Subjective:     Chief Complaint / Reason for Physician Visit  Patient was seen and examined. No acute events overnight. Discussed with RN events overnight. \"having loose bowel movments\"    Review of Systems:  Symptom Y/N Comments  Symptom Y/N Comments   Fever/Chills n   Chest Pain n    Poor Appetite    Edema y  bilateral lower extremities   Cough y   Abdominal Pain n    Sputum    Joint Pain     SOB/BETTS n   Pruritis/Rash     Nausea/vomit n   Tolerating PT/OT     Diarrhea    Tolerating Diet y    Constipation    Other       Could NOT obtain due to:          Objective:     VITALS:   Last 24hrs VS reviewed since prior progress note.  Most recent are:  Patient Vitals for the past 24 hrs:   Temp Pulse Resp BP SpO2   03/11/21 0903  75  (!) 146/67    03/11/21 0806 98.3 °F (36.8 °C) 75 20 136/64 93 %   03/11/21 0417 98.2 °F (36.8 °C) 75 24 (!) 148/67 94 %   03/10/21 2319 97.4 °F (36.3 °C) 75 24 (!) 142/70 93 %   03/10/21 2009 98 °F (36.7 °C) 75 22 (!) 157/72 95 %   03/10/21 1733  77  132/65    03/10/21 1440 98.2 °F (36.8 °C) 75 22 111/70 95 %   03/10/21 1135 98.4 °F (36.9 °C) 75 22 (!) 140/70 93 %       Intake/Output Summary (Last 24 hours) at 3/11/2021 7726  Last data filed at 3/11/2021 0805  Gross per 24 hour   Intake 1520 ml   Output 1700 ml   Net -180 ml        I had a face to face encounter and independently examined this patient on 3/11/2021, as outlined below:  PHYSICAL EXAM:  General: WD, WN. Alert, cooperative, no acute distress    EENT:  EOMI. Anicteric sclerae. MMM  Resp:  Decreased breath sounds in the subscapular areas. No accessory muscle use  CV:  Regular  rhythm,  +3 pitting edema bilateral lower extremities  GI:  Soft, Non distended, Non tender. +Bowel sounds  Neurologic:  Alert and oriented X 3, normal speech,   Psych:   Good insight. Not anxious nor agitated  Skin:  No rashes. No jaundice. Mild contusion in the right flank    Reviewed most current lab test results and cultures  YES  Reviewed most current radiology test results   YES  Review and summation of old records today    NO  Reviewed patient's current orders and MAR    YES  PMH/SH reviewed - no change compared to H&P  ________________________________________________________________________  Care Plan discussed with:    Comments   Patient x    Family      RN x    Care Manager x    Consultant                       x Multidiciplinary team rounds were held today with , nursing, pharmacist and clinical coordinator. Patient's plan of care was discussed; medications were reviewed and discharge planning was addressed.      ________________________________________________________________________  Total NON critical care TIME: 36   Minutes    Total CRITICAL CARE TIME Spent:   Minutes non procedure based      Comments   >50% of visit spent in counseling and coordination of care x    ________________________________________________________________________  Iona Redmond MD     Procedures: see electronic medical records for all procedures/Xrays and details which were not copied into this note but were reviewed prior to creation of Plan. LABS:  I reviewed today's most current labs and imaging studies.   Pertinent labs include:  Recent Labs     03/11/21  0419 03/10/21  0753 03/09/21 0135   WBC 12.1* 10.8 12.0*   HGB 11.8* 12.4 12.0*   HCT 40.8 42.4 40.9   * 127* 135*     Recent Labs     03/11/21  0419 03/10/21  0140 03/09/21  0135    139 138   K 3.6 3.6 3.2*   CL 96* 97 97   CO2 40* 38* 37*   * 143* 200*   * 125* 121*   CREA 3.95* 3.50* 3.63*   CA 8.6 8.4* 8.4*   ALB  --   --  2.9*   TBILI  --   --  1.0   ALT  --   --  15       Signed: Ankit Hector MD

## 2021-03-11 NOTE — PROGRESS NOTES
Name: Aamir Mauricio   MRN: [id number]  : 1950      Assessment   :                                               Plan:  INO on CKD-4 -     Hypokalemia    High Mg    Severe Azotemia- H/H normal. No UGI bleed. BUN trending down    HTN  DM-2  Volume overload  A on C D-CHF  Obesity  CAD Follows with Dr Ben Machado - baseline creatinine around 3 although  It fluctuates quite a bit depending on volume status    Suspect cardiorenal syndrome with rise a in Creatinine from needed diuresis. Renal US with cortical thinning but no hydro. UA is bland. Creatinine peaked at 4.7, worse today (3.5 to 4). Continue IV Bumex  + Metolazone, but will slow the pace of volume off -- hold evening dose of bumex today; O>I and weight are down, but still not back to baseline    Daily labs  Daily wt  Salt/fluid restriction      He is not uremic and there is no acute need for HD. Subjective:  oob in chair . Breathing and edema a bit better, but neither to baseline. No acute c/o. We discussed the above.     ROS:   No nausea, no vomiting  No chest pain, + shortness of breath and leg edema-improved    Exam:  Visit Vitals  BP (!) 148/67 (BP 1 Location: Left upper arm, BP Patient Position: Sitting)   Pulse 75   Temp 98.2 °F (36.8 °C)   Resp 24   Ht 5' 9\" (1.753 m)   Wt 117.5 kg (259 lb 0.7 oz)   SpO2 94%   BMI 38.25 kg/m²     WB/WN in NAD  Rales right base  Irregular, distant, no tachycardia  1-2+ leg edema  AOx3    Current Facility-Administered Medications   Medication Dose Route Frequency Last Admin    albuterol-ipratropium (DUO-NEB) 2.5 MG-0.5 MG/3 ML  3 mL Nebulization Q4H PRN      piperacillin-tazobactam (ZOSYN) 3.375 g in 0.9% sodium chloride (MBP/ADV) 100 mL MBP  3.375 g IntraVENous Q12H 3.375 g at 21 0132    vancomycin (VANCOCIN) 1250 mg in  ml infusion  1,250 mg IntraVENous Q36H 1,250 mg at 03/11/21 0133    amLODIPine (NORVASC) tablet 10 mg  10 mg Oral DAILY 10 mg at 03/10/21 0858    diphenhydrAMINE-zinc acetate 2%-0.1% (BENADRYL) cream   Topical TID PRN      bismuth subsalicylate (PEPTO-BISMOL) oral suspension 30 mL  30 mL Oral Q6H PRN      guaiFENesin-dextromethorphan (ROBITUSSIN DM) 100-10 mg/5 mL syrup 10 mL  10 mL Oral Q6H PRN 10 mL at 03/11/21 0427    balsam peru-castor oiL (VENELEX) ointment   Topical BID Given at 03/10/21 2134    insulin lispro (HUMALOG) injection 3 Units  3 Units SubCUTAneous TIDAC 3 Units at 03/10/21 1733    benzonatate (TESSALON) capsule 100 mg  100 mg Oral TID  mg at 03/08/21 1600    acetaminophen (TYLENOL) tablet 650 mg  650 mg Oral Q4H  mg at 03/11/21 0427    hydrALAZINE (APRESOLINE) 20 mg/mL injection 20 mg  20 mg IntraVENous Q6H PRN      apixaban (ELIQUIS) tablet 5 mg  5 mg Oral BID 5 mg at 03/10/21 1733    ferrous sulfate tablet 325 mg  325 mg Oral  mg at 03/10/21 0859    dutasteride (AVODART) capsule 0.5 mg  0.5 mg Oral DAILY 0.5 mg at 03/10/21 0858    insulin glargine (LANTUS) injection 30 Units  30 Units SubCUTAneous QHS 30 Units at 03/10/21 2130    levothyroxine (SYNTHROID) tablet 137 mcg  137 mcg Oral 6am 137 mcg at 03/11/21 0629    metOLazone (ZAROXOLYN) tablet 2.5 mg  2.5 mg Oral DAILY 2.5 mg at 03/10/21 0858    metoprolol succinate (TOPROL-XL) XL tablet 150 mg  150 mg Oral DAILY 150 mg at 03/10/21 0859    pravastatin (PRAVACHOL) tablet 80 mg  80 mg Oral QHS 80 mg at 03/10/21 2131    pantoprazole (PROTONIX) tablet 40 mg  40 mg Oral ACB 40 mg at 03/10/21 0859    bumetanide (BUMEX) injection 3 mg  3 mg IntraVENous BID 3 mg at 03/10/21 1733    insulin lispro (HUMALOG) injection   SubCUTAneous AC&HS 2 Units at 03/10/21 2130    glucose chewable tablet 16 g  4 Tab Oral PRN      dextrose (D50W) injection syrg 12.5-25 g  12.5-25 g IntraVENous PRN      glucagon (GLUCAGEN) injection 1 mg  1 mg IntraMUSCular PRN      sodium chloride (NS) flush 5-40 mL  5-40 mL IntraVENous Q8H 10 mL at 03/10/21 2134    sodium chloride (NS) flush 5-40 mL  5-40 mL IntraVENous PRN 10 mL at 03/08/21 1051    polyethylene glycol (MIRALAX) packet 17 g  17 g Oral DAILY PRN      ondansetron (ZOFRAN ODT) tablet 4 mg  4 mg Oral Q8H PRN      Or    ondansetron (ZOFRAN) injection 4 mg  4 mg IntraVENous Q6H PRN       Facility-Administered Medications Ordered in Other Encounters   Medication Dose Route Frequency Last Admin    sodium chloride (NS) flush 10 mL  10 mL IntraVENous PRN         Labs/Data:    Lab Results   Component Value Date/Time    WBC 12.1 (H) 03/11/2021 04:19 AM    HGB 11.8 (L) 03/11/2021 04:19 AM    HCT 40.8 03/11/2021 04:19 AM    PLATELET 992 (L) 81/16/2281 04:19 AM    MCV 90.7 03/11/2021 04:19 AM       Lab Results   Component Value Date/Time    Sodium 138 03/11/2021 04:19 AM    Potassium 3.6 03/11/2021 04:19 AM    Chloride 96 (L) 03/11/2021 04:19 AM    CO2 40 (H) 03/11/2021 04:19 AM    Anion gap 2 (L) 03/11/2021 04:19 AM    Glucose 118 (H) 03/11/2021 04:19 AM     (H) 03/11/2021 04:19 AM    Creatinine 3.95 (H) 03/11/2021 04:19 AM    BUN/Creatinine ratio 31 (H) 03/11/2021 04:19 AM    GFR est AA 18 (L) 03/11/2021 04:19 AM    GFR est non-AA 15 (L) 03/11/2021 04:19 AM    Calcium 8.6 03/11/2021 04:19 AM       Wt Readings from Last 3 Encounters:   03/11/21 117.5 kg (259 lb 0.7 oz)   08/03/20 111.1 kg (245 lb)   02/17/20 110.7 kg (244 lb)         Intake/Output Summary (Last 24 hours) at 3/11/2021 0631  Last data filed at 3/11/2021 0132  Gross per 24 hour   Intake 1040 ml   Output 1775 ml   Net -735 ml       Patient seen and examined. Chart reviewed. Labs, data and other pertinent notes reviewed in last 24 hrs.     PMH/SH/FH reviewed and unchanged compared to H&P    Rock Cj MD

## 2021-03-12 ENCOUNTER — APPOINTMENT (OUTPATIENT)
Dept: GENERAL RADIOLOGY | Age: 71
DRG: 291 | End: 2021-03-12
Attending: STUDENT IN AN ORGANIZED HEALTH CARE EDUCATION/TRAINING PROGRAM
Payer: MEDICARE

## 2021-03-12 LAB
ALBUMIN SERPL-MCNC: 2.7 G/DL (ref 3.5–5)
ALBUMIN/GLOB SERPL: 0.7 {RATIO} (ref 1.1–2.2)
ALP SERPL-CCNC: 46 U/L (ref 45–117)
ALT SERPL-CCNC: 13 U/L (ref 12–78)
ANION GAP SERPL CALC-SCNC: 6 MMOL/L (ref 5–15)
AST SERPL-CCNC: 21 U/L (ref 15–37)
BASOPHILS # BLD: 0.1 K/UL (ref 0–0.1)
BASOPHILS NFR BLD: 1 % (ref 0–1)
BILIRUB SERPL-MCNC: 0.6 MG/DL (ref 0.2–1)
BNP SERPL-MCNC: 9369 PG/ML
BUN SERPL-MCNC: 126 MG/DL (ref 6–20)
BUN/CREAT SERPL: 32 (ref 12–20)
CALCIUM SERPL-MCNC: 9.3 MG/DL (ref 8.5–10.1)
CHLORIDE SERPL-SCNC: 97 MMOL/L (ref 97–108)
CO2 SERPL-SCNC: 36 MMOL/L (ref 21–32)
CREAT SERPL-MCNC: 3.92 MG/DL (ref 0.7–1.3)
DIFFERENTIAL METHOD BLD: ABNORMAL
EOSINOPHIL # BLD: 0.3 K/UL (ref 0–0.4)
EOSINOPHIL NFR BLD: 3 % (ref 0–7)
ERYTHROCYTE [DISTWIDTH] IN BLOOD BY AUTOMATED COUNT: 14.5 % (ref 11.5–14.5)
GLOBULIN SER CALC-MCNC: 3.9 G/DL (ref 2–4)
GLUCOSE BLD STRIP.AUTO-MCNC: 117 MG/DL (ref 65–100)
GLUCOSE BLD STRIP.AUTO-MCNC: 146 MG/DL (ref 65–100)
GLUCOSE BLD STRIP.AUTO-MCNC: 159 MG/DL (ref 65–100)
GLUCOSE BLD STRIP.AUTO-MCNC: 196 MG/DL (ref 65–100)
GLUCOSE SERPL-MCNC: 126 MG/DL (ref 65–100)
HCT VFR BLD AUTO: 41.4 % (ref 36.6–50.3)
HGB BLD-MCNC: 12 G/DL (ref 12.1–17)
IMM GRANULOCYTES # BLD AUTO: 0 K/UL (ref 0–0.04)
IMM GRANULOCYTES NFR BLD AUTO: 0 % (ref 0–0.5)
LYMPHOCYTES # BLD: 0.7 K/UL (ref 0.8–3.5)
LYMPHOCYTES NFR BLD: 8 % (ref 12–49)
MAGNESIUM SERPL-MCNC: 2.6 MG/DL (ref 1.6–2.4)
MCH RBC QN AUTO: 26.1 PG (ref 26–34)
MCHC RBC AUTO-ENTMCNC: 29 G/DL (ref 30–36.5)
MCV RBC AUTO: 90.2 FL (ref 80–99)
MONOCYTES # BLD: 0.8 K/UL (ref 0–1)
MONOCYTES NFR BLD: 10 % (ref 5–13)
NEUTS SEG # BLD: 6.4 K/UL (ref 1.8–8)
NEUTS SEG NFR BLD: 78 % (ref 32–75)
NRBC # BLD: 0 K/UL (ref 0–0.01)
NRBC BLD-RTO: 0 PER 100 WBC
PLATELET # BLD AUTO: 145 K/UL (ref 150–400)
PMV BLD AUTO: 10.9 FL (ref 8.9–12.9)
POTASSIUM SERPL-SCNC: 3.5 MMOL/L (ref 3.5–5.1)
PROCALCITONIN SERPL-MCNC: 0.24 NG/ML
PROT SERPL-MCNC: 6.6 G/DL (ref 6.4–8.2)
RBC # BLD AUTO: 4.59 M/UL (ref 4.1–5.7)
RBC MORPH BLD: ABNORMAL
SERVICE CMNT-IMP: ABNORMAL
SODIUM SERPL-SCNC: 139 MMOL/L (ref 136–145)
WBC # BLD AUTO: 8.3 K/UL (ref 4.1–11.1)

## 2021-03-12 PROCEDURE — 65660000000 HC RM CCU STEPDOWN

## 2021-03-12 PROCEDURE — 74011250637 HC RX REV CODE- 250/637: Performed by: STUDENT IN AN ORGANIZED HEALTH CARE EDUCATION/TRAINING PROGRAM

## 2021-03-12 PROCEDURE — 85025 COMPLETE CBC W/AUTO DIFF WBC: CPT

## 2021-03-12 PROCEDURE — 36415 COLL VENOUS BLD VENIPUNCTURE: CPT

## 2021-03-12 PROCEDURE — 74011636637 HC RX REV CODE- 636/637: Performed by: INTERNAL MEDICINE

## 2021-03-12 PROCEDURE — 74011250637 HC RX REV CODE- 250/637: Performed by: NURSE PRACTITIONER

## 2021-03-12 PROCEDURE — 74011250636 HC RX REV CODE- 250/636: Performed by: INTERNAL MEDICINE

## 2021-03-12 PROCEDURE — 80053 COMPREHEN METABOLIC PANEL: CPT

## 2021-03-12 PROCEDURE — 74011636637 HC RX REV CODE- 636/637: Performed by: STUDENT IN AN ORGANIZED HEALTH CARE EDUCATION/TRAINING PROGRAM

## 2021-03-12 PROCEDURE — 74011000250 HC RX REV CODE- 250: Performed by: INTERNAL MEDICINE

## 2021-03-12 PROCEDURE — 77010033678 HC OXYGEN DAILY

## 2021-03-12 PROCEDURE — 83880 ASSAY OF NATRIURETIC PEPTIDE: CPT

## 2021-03-12 PROCEDURE — 74011000258 HC RX REV CODE- 258: Performed by: INTERNAL MEDICINE

## 2021-03-12 PROCEDURE — 82962 GLUCOSE BLOOD TEST: CPT

## 2021-03-12 PROCEDURE — 84145 PROCALCITONIN (PCT): CPT

## 2021-03-12 PROCEDURE — 71045 X-RAY EXAM CHEST 1 VIEW: CPT

## 2021-03-12 PROCEDURE — 83735 ASSAY OF MAGNESIUM: CPT

## 2021-03-12 RX ORDER — BUMETANIDE 0.25 MG/ML
3 INJECTION INTRAMUSCULAR; INTRAVENOUS DAILY
Status: DISCONTINUED | OUTPATIENT
Start: 2021-03-12 | End: 2021-03-12

## 2021-03-12 RX ORDER — BUMETANIDE 0.25 MG/ML
2 INJECTION INTRAMUSCULAR; INTRAVENOUS EVERY 12 HOURS
Status: DISCONTINUED | OUTPATIENT
Start: 2021-03-12 | End: 2021-03-14

## 2021-03-12 RX ORDER — INSULIN GLARGINE 100 [IU]/ML
20 INJECTION, SOLUTION SUBCUTANEOUS
Status: DISCONTINUED | OUTPATIENT
Start: 2021-03-12 | End: 2021-03-19 | Stop reason: HOSPADM

## 2021-03-12 RX ADMIN — INSULIN LISPRO 2 UNITS: 100 INJECTION, SOLUTION INTRAVENOUS; SUBCUTANEOUS at 08:32

## 2021-03-12 RX ADMIN — INSULIN GLARGINE 20 UNITS: 100 INJECTION, SOLUTION SUBCUTANEOUS at 21:53

## 2021-03-12 RX ADMIN — PRAVASTATIN SODIUM 80 MG: 40 TABLET ORAL at 21:53

## 2021-03-12 RX ADMIN — GUAIFENESIN AND DEXTROMETHORPHAN 10 ML: 100; 10 SYRUP ORAL at 12:38

## 2021-03-12 RX ADMIN — APIXABAN 5 MG: 5 TABLET, FILM COATED ORAL at 18:18

## 2021-03-12 RX ADMIN — INSULIN LISPRO 3 UNITS: 100 INJECTION, SOLUTION INTRAVENOUS; SUBCUTANEOUS at 08:33

## 2021-03-12 RX ADMIN — PANTOPRAZOLE SODIUM 40 MG: 40 TABLET, DELAYED RELEASE ORAL at 08:30

## 2021-03-12 RX ADMIN — INSULIN LISPRO 3 UNITS: 100 INJECTION, SOLUTION INTRAVENOUS; SUBCUTANEOUS at 18:18

## 2021-03-12 RX ADMIN — ACETAMINOPHEN 650 MG: 325 TABLET ORAL at 23:50

## 2021-03-12 RX ADMIN — INSULIN LISPRO 3 UNITS: 100 INJECTION, SOLUTION INTRAVENOUS; SUBCUTANEOUS at 11:30

## 2021-03-12 RX ADMIN — Medication 10 ML: at 05:47

## 2021-03-12 RX ADMIN — LEVOTHYROXINE SODIUM 137 MCG: 0.03 TABLET ORAL at 05:46

## 2021-03-12 RX ADMIN — INSULIN LISPRO 2 UNITS: 100 INJECTION, SOLUTION INTRAVENOUS; SUBCUTANEOUS at 16:30

## 2021-03-12 RX ADMIN — DUTASTERIDE 0.5 MG: 0.5 CAPSULE, LIQUID FILLED ORAL at 08:31

## 2021-03-12 RX ADMIN — Medication 10 ML: at 21:57

## 2021-03-12 RX ADMIN — Medication 1 CAPSULE: at 08:31

## 2021-03-12 RX ADMIN — APIXABAN 5 MG: 5 TABLET, FILM COATED ORAL at 08:30

## 2021-03-12 RX ADMIN — PIPERACILLIN AND TAZOBACTAM 3.38 G: 3; .375 INJECTION, POWDER, LYOPHILIZED, FOR SOLUTION INTRAVENOUS at 02:45

## 2021-03-12 RX ADMIN — CASTOR OIL AND BALSAM, PERU: 788; 87 OINTMENT TOPICAL at 22:02

## 2021-03-12 RX ADMIN — FERROUS SULFATE TAB 325 MG (65 MG ELEMENTAL FE) 325 MG: 325 (65 FE) TAB at 08:32

## 2021-03-12 RX ADMIN — GUAIFENESIN AND DEXTROMETHORPHAN 10 ML: 100; 10 SYRUP ORAL at 18:52

## 2021-03-12 RX ADMIN — ACETAMINOPHEN 650 MG: 325 TABLET ORAL at 05:46

## 2021-03-12 RX ADMIN — Medication 10 ML: at 14:00

## 2021-03-12 RX ADMIN — CASTOR OIL AND BALSAM, PERU: 788; 87 OINTMENT TOPICAL at 08:00

## 2021-03-12 RX ADMIN — ACETAMINOPHEN 650 MG: 325 TABLET ORAL at 12:38

## 2021-03-12 RX ADMIN — METOLAZONE 2.5 MG: 2.5 TABLET ORAL at 08:31

## 2021-03-12 RX ADMIN — METOPROLOL SUCCINATE 150 MG: 50 TABLET, EXTENDED RELEASE ORAL at 08:31

## 2021-03-12 RX ADMIN — BUMETANIDE 2 MG: 0.25 INJECTION, SOLUTION INTRAMUSCULAR; INTRAVENOUS at 21:53

## 2021-03-12 RX ADMIN — Medication 200 MG: at 08:31

## 2021-03-12 RX ADMIN — BUMETANIDE 2 MG: 0.25 INJECTION, SOLUTION INTRAMUSCULAR; INTRAVENOUS at 08:34

## 2021-03-12 RX ADMIN — ACETAMINOPHEN 650 MG: 325 TABLET ORAL at 18:52

## 2021-03-12 RX ADMIN — AMLODIPINE BESYLATE 10 MG: 5 TABLET ORAL at 08:29

## 2021-03-12 RX ADMIN — GUAIFENESIN AND DEXTROMETHORPHAN 10 ML: 100; 10 SYRUP ORAL at 05:46

## 2021-03-12 NOTE — PROGRESS NOTES
End of Shift Note    Bedside shift change report given to RN (oncoming nurse) by Kelly Calloway (offgoing nurse). Report included the following information SBAR and Kardex    Shift worked:  DAY     Shift summary and any significant changes:     O2 challenge completed. Continue IV bumex. Possible discharge tomorrow. Concerns for physician to address:       Zone phone for oncoming shift:          Activity:  Activity Level: Up with Assistance  Number times ambulated in hallways past shift: 0  Number of times OOB to chair past shift: 5    Cardiac:   Cardiac Monitoring: Yes      Cardiac Rhythm: Paced    Access:   Current line(s): PIV     Genitourinary:   Urinary status: voiding    Respiratory:   O2 Device: Nasal cannula  Chronic home O2 use?: NO  Incentive spirometer at bedside: YES     GI:  Last Bowel Movement Date: 03/11/21  Current diet:  DIET DIABETIC CONSISTENT CARB Regular; 2 GM NA (House Low NA); FR 1500ML  DIET ONE TIME MESSAGE  Passing flatus: YES  Tolerating current diet: YES  % Diet Eaten: 100 %    Pain Management:   Patient states pain is manageable on current regimen: YES    Skin:  Romeo Score: 17  Interventions: increase time out of bed    Patient Safety:  Fall Score:  Total Score: 3  Interventions: assistive device (walker, cane, etc), gripper socks and pt to call before getting OOB  High Fall Risk: Yes    Length of Stay:  Expected LOS: 4d 0h  Actual LOS: [address]

## 2021-03-12 NOTE — PROGRESS NOTES
Name: Davy Alexandre   MRN: [id number]  : 1950      Assessment   :                                               Plan:  INO on CKD-4 -     Hypokalemia    High Mg    Severe Azotemia- H/H normal. No UGI bleed. BUN trending down    HTN  DM-2  Volume overload  A on C D-CHF  Obesity  CAD Follows with Dr Ulrich - baseline creatinine around 3 although  It fluctuates quite a bit depending on volume status    Suspect cardiorenal syndrome with rise a in Creatinine from needed diuresis. Renal US with cortical thinning but no hydro. UA is bland.     Creatinine peaked at 4.7, slightly better today (4 to 3.9). Continue IV Bumex 2 mg iv bid  + Metolazone    O>I and weight are down, but still not back to baseline (his weight now is 259 -- previously weighing 240-245)    Daily labs  Daily wt  Salt/fluid restriction               Subjective:  oob in chair . Breathing and edema a bit better, but neither to baseline. No acute c/o. We discussed the above.    ROS:   No nausea, no vomiting  No chest pain, + shortness of breath and leg edema-improved    Exam:  Visit Vitals  BP (!) 147/71   Pulse 78   Temp 97.9 °F (36.6 °C)   Resp 20   Ht 5' 9\" (1.753 m)   Wt 117.7 kg (259 lb 7.7 oz)   SpO2 93%   BMI 38.32 kg/m²     WB/WN in NAD  Rales right base  Irregular, distant, no tachycardia  1-2+ leg edema  AOx3    Current Facility-Administered Medications   Medication Dose Route Frequency Last Admin   • bumetanide (BUMEX) injection 3 mg  3 mg IntraVENous DAILY     • insulin glargine (LANTUS) injection 20 Units  20 Units SubCUTAneous QHS     • thiamine mononitrate (B-1) tablet 200 mg  200 mg Oral DAILY     • L.acidophilus-paracasei-S.thermophil-bifidobacter (RISAQUAD) 8 billion cell capsule  1 Cap Oral DAILY 1 Cap at 21 1629   • Vancomycin trough - Please draw 3/12 prior to 1400 dose.  Thank  you!  1 Each Other ONCE      amLODIPine (NORVASC) tablet 10 mg  10 mg Oral DAILY 10 mg at 03/11/21 0909    diphenhydrAMINE-zinc acetate 2%-0.1% (BENADRYL) cream   Topical TID PRN      bismuth subsalicylate (PEPTO-BISMOL) oral suspension 30 mL  30 mL Oral Q6H PRN      guaiFENesin-dextromethorphan (ROBITUSSIN DM) 100-10 mg/5 mL syrup 10 mL  10 mL Oral Q6H PRN 10 mL at 03/12/21 0546    balsam peru-castor oiL (VENELEX) ointment   Topical BID Given at 03/11/21 2100    insulin lispro (HUMALOG) injection 3 Units  3 Units SubCUTAneous TIDAC 3 Units at 03/11/21 1734    benzonatate (TESSALON) capsule 100 mg  100 mg Oral TID  mg at 03/11/21 1948    acetaminophen (TYLENOL) tablet 650 mg  650 mg Oral Q4H  mg at 03/12/21 0546    hydrALAZINE (APRESOLINE) 20 mg/mL injection 20 mg  20 mg IntraVENous Q6H PRN      apixaban (ELIQUIS) tablet 5 mg  5 mg Oral BID 5 mg at 03/11/21 1734    ferrous sulfate tablet 325 mg  325 mg Oral  mg at 03/11/21 0907    dutasteride (AVODART) capsule 0.5 mg  0.5 mg Oral DAILY 0.5 mg at 03/11/21 0905    levothyroxine (SYNTHROID) tablet 137 mcg  137 mcg Oral 6am 137 mcg at 03/12/21 0546    metOLazone (ZAROXOLYN) tablet 2.5 mg  2.5 mg Oral DAILY 2.5 mg at 03/11/21 9944    metoprolol succinate (TOPROL-XL) XL tablet 150 mg  150 mg Oral DAILY 150 mg at 03/11/21 0905    pravastatin (PRAVACHOL) tablet 80 mg  80 mg Oral QHS 80 mg at 03/11/21 2143    pantoprazole (PROTONIX) tablet 40 mg  40 mg Oral ACB 40 mg at 03/11/21 0908    insulin lispro (HUMALOG) injection   SubCUTAneous AC&HS Stopped at 03/11/21 1630    glucose chewable tablet 16 g  4 Tab Oral PRN      dextrose (D50W) injection syrg 12.5-25 g  12.5-25 g IntraVENous PRN      glucagon (GLUCAGEN) injection 1 mg  1 mg IntraMUSCular PRN      sodium chloride (NS) flush 5-40 mL  5-40 mL IntraVENous Q8H 10 mL at 03/12/21 0507    sodium chloride (NS) flush 5-40 mL  5-40 mL IntraVENous PRN 10 mL at 03/08/21 1050    polyethylene glycol (MIRALAX) packet 17 g  17 g Oral DAILY PRN      ondansetron (ZOFRAN ODT) tablet 4 mg  4 mg Oral Q8H PRN      Or    ondansetron (ZOFRAN) injection 4 mg  4 mg IntraVENous Q6H PRN         Labs/Data:    Lab Results   Component Value Date/Time    WBC 8.3 03/12/2021 02:48 AM    HGB 12.0 (L) 03/12/2021 02:48 AM    HCT 41.4 03/12/2021 02:48 AM    PLATELET 832 (L) 07/85/4283 02:48 AM    MCV 90.2 03/12/2021 02:48 AM       Lab Results   Component Value Date/Time    Sodium 139 03/12/2021 02:48 AM    Potassium 3.5 03/12/2021 02:48 AM    Chloride 97 03/12/2021 02:48 AM    CO2 36 (H) 03/12/2021 02:48 AM    Anion gap 6 03/12/2021 02:48 AM    Glucose 126 (H) 03/12/2021 02:48 AM     (H) 03/12/2021 02:48 AM    Creatinine 3.92 (H) 03/12/2021 02:48 AM    BUN/Creatinine ratio 32 (H) 03/12/2021 02:48 AM    GFR est AA 18 (L) 03/12/2021 02:48 AM    GFR est non-AA 15 (L) 03/12/2021 02:48 AM    Calcium 9.3 03/12/2021 02:48 AM       Wt Readings from Last 3 Encounters:   03/12/21 117.7 kg (259 lb 7.7 oz)   08/03/20 111.1 kg (245 lb)   02/17/20 110.7 kg (244 lb)         Intake/Output Summary (Last 24 hours) at 3/12/2021 0710  Last data filed at 3/11/2021 1436  Gross per 24 hour   Intake 720 ml   Output 400 ml   Net 320 ml       Patient seen and examined. Chart reviewed. Labs, data and other pertinent notes reviewed in last 24 hrs.     PMH/SH/FH reviewed and unchanged compared to H&P    Ben Ponce MD

## 2021-03-12 NOTE — INTERDISCIPLINARY ROUNDS
[place] [address] Cardiopulmonary Care Interdisciplinary Rounds were held today to discuss patient's plan of care and outcomes. The following members were present: NP/Physician, Pharmacy, Nursing and Case Management. PLAN OF CARE:  
Continue current treatment plan Expected Length of Stay:  4d 0h

## 2021-03-12 NOTE — PROGRESS NOTES
Hospitalist Progress Note    NAME: Nelly Altamirano   :  1950   MRN:  [id number]       Assessment / Plan:  Acute hypoxic respiratory failure  Acute on chronic diastolic CHF exacerbation  INO on CKD4  Question of Hospital-acquired pneumonia (?has not been hospitalized since 2019)  Loose bowel movements for 2 days (antibiotic associated diarrhea)  -86% on RA in ED, maintaining sats on 2 L oxygen via nasal cannula this morning  -cont to be on IV bumex 3mg IV bid  -continue daily metolazone 2.5 mg daily  -Appreciate nephrology recommendations  -baseline Cr around 3, elevated to 4.76 on admission. Creatinine appears plateaued bellow 4  -renally dose meds, hold nephrotoxic agents if possible  -JANNIE's revealed -1205 cc/24-hr. Weight almost 10 pounds less than admission weight  -does not wear O2 at home and currently on 2L NC. Continue try weaning oxygen as tolerated  Respiratory distress , leukocytosis, fever and cough  X-ray with bibasilar opacities. started vancomycin and Zosyn treating hospital-acquired pneumonia . procalcitonin 0.38 from Procalcitonin on  was 0.12.   3/11 no fever in last 48 hours. repeat porcal repeated 3/12, trending down. Will stop piperacillin and vancomycin  Added probiotic. proBNP imrpving  added thiamine 500 mg IV 11/3 and then 200 mg po daily thereafter        Hypokalemia, resolved  -Continue checking BMP daily given he is on Bumex and metolazone     Acute on chronic diastolic CHF  -Diuresis as above  -TTE with EF 50 to 55%  -Continue BB  -Appreciate cardiology recommendations  -Mild troponin elevation in setting of uncontrolled hypertension and INO  -BNP > 19k  Nuclear cardiac stress test Inconclusive stress test/No ischemia or infarct demonstrated    Skin rash  -Rt flank, mild contusion  -no vesicles.  Does not appear dermatomal   -states area became painful after sliding on CT scanner when he was admitted  -monitor, try topical benadryl    Uncontrolled hypertension  -better-controlled today  -As above, continue beta-blocker.  -As needed hydralazine    IDDM  -Goal blood sugar 1 40-1 90. Hyperglycemic this AM  -Was on Lantus per home dose 30 units nightly,, blood sugars below target so we will decrease the dose to 20 (3/12/2021)  -added back mealtime insulin at reduced dose   -SSI with POC's. Adjust insulin as indicated based on POC values    Lower extremity edema  -Bilateral lower extremity venous Dopplers negative  -Diuresis as above    LE/foot wounds POA  -large RLE medical ankle bullae from fluid overload, b/l heel DTIs, Lt 5th toe blister  -appreciate wound care help  -to follow at [facility], where he has been seen before    Chronic A. fib  -Continue Eliquis  -s/p PPM  -Continue metoprolol     Hypothyroidism  -Continue Synthroid     Code Status: Full code  Surrogate Decision Maker: Wife     DVT Prophylaxis: Eliquis  GI Prophylaxis: not indicated     Baseline: Ambulatory at home     Subjective:     Chief Complaint / Reason for Physician Visit  Patient was seen and examined. No acute events overnight. Discussed with RN events overnight. \"having loose bowel movments\"    Review of Systems:  Symptom Y/N Comments  Symptom Y/N Comments   Fever/Chills n   Chest Pain n    Poor Appetite    Edema y  bilateral lower extremities   Cough y   Abdominal Pain n    Sputum    Joint Pain     SOB/BETTS n   Pruritis/Rash     Nausea/vomit n   Tolerating PT/OT     Diarrhea    Tolerating Diet y    Constipation    Other       Could NOT obtain due to:          Objective:     VITALS:   Last 24hrs VS reviewed since prior progress note.  Most recent are:  Patient Vitals for the past 24 hrs:   Temp Pulse Resp BP SpO2   03/12/21 0244 97.9 °F (36.6 °C) 78 20 (!) 147/71 93 %   03/11/21 2322 98.3 °F (36.8 °C) 75 18 (!) 165/83 96 %   03/11/21 1945 98 °F (36.7 °C) 75 20 137/64 97 %   03/11/21 1500     97 %   03/11/21 1434 97.6 °F (36.4 °C) 76 20 (!) 143/68 90 %   03/11/21 1036 98.3 °F (36.8 °C) 75 16 (!) 155/71 95 %   03/11/21 0903  75  (!) 146/67    03/11/21 0806 98.3 °F (36.8 °C) 75 20 136/64 93 %       Intake/Output Summary (Last 24 hours) at 3/12/2021 2112  Last data filed at 3/11/2021 1436  Gross per 24 hour   Intake 720 ml   Output 400 ml   Net 320 ml        I had a face to face encounter and independently examined this patient on 3/12/2021, as outlined below:  PHYSICAL EXAM:  General: WD, WN. Alert, cooperative, no acute distress    EENT:  EOMI. Anicteric sclerae. MMM  Resp:  Decreased breath sounds in the subscapular areas. No accessory muscle use  CV:  Regular  rhythm,  +3 pitting edema bilateral lower extremities  GI:  Soft, Non distended, Non tender. +Bowel sounds  Neurologic:  Alert and oriented X 3, normal speech,   Psych:   Good insight. Not anxious nor agitated  Skin:  No rashes. No jaundice. Mild contusion in the right flank    Reviewed most current lab test results and cultures  YES  Reviewed most current radiology test results   YES  Review and summation of old records today    NO  Reviewed patient's current orders and MAR    YES  PMH/ reviewed - no change compared to H&P  ________________________________________________________________________  Care Plan discussed with:    Comments   Patient x    Family      RN x    Care Manager x    Consultant                       x Multidiciplinary team rounds were held today with , nursing, pharmacist and clinical coordinator. Patient's plan of care was discussed; medications were reviewed and discharge planning was addressed.      ________________________________________________________________________  Total NON critical care TIME: 34   Minutes    Total CRITICAL CARE TIME Spent:   Minutes non procedure based      Comments   >50% of visit spent in counseling and coordination of care x    ________________________________________________________________________  Katherine [place], MD     Procedures: see electronic medical records for all procedures/Xrays and details which were not copied into this note but were reviewed prior to creation of Plan. LABS:  I reviewed today's most current labs and imaging studies.   Pertinent labs include:  Recent Labs     03/12/21  0248 03/11/21  0419 03/10/21  0753   WBC 8.3 12.1* 10.8   HGB 12.0* 11.8* 12.4   HCT 41.4 40.8 42.4   * 146* 127*     Recent Labs     03/12/21  0248 03/11/21  0419 03/10/21  0140    138 139   K 3.5 3.6 3.6   CL 97 96* 97   CO2 36* 40* 38*   * 118* 143*   * 124* 125*   CREA 3.92* 3.95* 3.50*   CA 9.3 8.6 8.4*   MG 2.6*  --   --    ALB 2.7*  --   --    TBILI 0.6  --   --    ALT 13  --   --        Signed: Cari Lee MD

## 2021-03-12 NOTE — PROGRESS NOTES
Transition of Care Plan:     Disposition: Home with Home Health   Follow up appointments: PCP  DME needed: None  Transportation at Discharge: Pt's wife will transport at d/c.   [place] or means to access home:    Pt has his keys    IM Medicare letter: Given  Caregiver Contact: Ana Maria Huffman- Wife [phone]  Discharge Caregiver contacted prior to discharge?  Will contact at d/c    12:15pm- AT [address] accepted referral. AVS update    10:45am- CM met with pt at bedside to discuss d/c plan. CM discussed with pt about home health. CM provided pt with a home health list. Pt selected [facility]. [facility] document signed by pt and placed in pt's bedside chart. Referral was sent to [facility] via [facility] and [employer]. Medicare pt has received, reviewed, and signed 2nd IM letter informing them of their right to appeal the discharge. Signed copy has been placed on pt bedside chart. 9:00am- CM called Dr. Liliam Hall, Cardiologist to schedule pt's follow-up appointment. Appointment scheduled for 3/26/2021 at 9:30am. AVS updated    CM will continue to follow patient for discharge planning needs and arrange for services as deemed necessary.     Jamie Cerda [address]  [phone]

## 2021-03-12 NOTE — PROGRESS NOTES
0700: End of Shift Note    Bedside shift change report given to Khalida Granger RN  (oncoming nurse) by Emily Rollins RN (offgoing nurse). Report included the following information SBAR, Kardex, Procedure Summary, Intake/Output, MAR, Recent Results and Cardiac Rhythm Paced    Shift worked:  [id number]     Shift summary and any significant changes:     Pt having multiple small stools, requesting imodium. BLE wound care completed this AM for 3/12. Concerns for physician to address:       Zone phone for oncoming shift:   [phone]       Activity:  Activity Level: Up with Assistance  Number times ambulated in hallways past shift: 0  Number of times OOB to chair past shift: Stays in chair     Cardiac:   Cardiac Monitoring: Yes      Cardiac Rhythm: Paced    Access:   Current line(s): PIV     Genitourinary:   Urinary status: voiding    Respiratory:   O2 Device: Nasal cannula  Chronic home O2 use?: NO  Incentive spirometer at bedside: NO     GI:  Last Bowel Movement Date: 03/11/21  Current diet:  DIET DIABETIC CONSISTENT CARB Regular; 2 GM NA (House Low NA); FR 1500ML  DIET ONE TIME MESSAGE  Passing flatus: YES  Tolerating current diet: YES  % Diet Eaten: 100 %    Pain Management:   Patient states pain is manageable on current regimen: YES    Skin:  Romeo Score: 17  Interventions: float heels and increase time out of bed    Patient Safety:  Fall Score:  Total Score: 3  Interventions: assistive device (walker, cane, etc) and gripper socks  High Fall Risk: Yes    Length of Stay:  Expected LOS: 4d 0h  Actual LOS: [address] Christensen, RN I recommend a trial of metoprolol succinate 25 mg daily; if recurrent arrhythmia then he will be referred to EP for ablation.  I ordered a 7 day Extended Length Holter monitor earlier this week -- he should receive it next week.

## 2021-03-12 NOTE — PROGRESS NOTES
Home Oxygen Test  Date of test: 3/12/2021  Time of test: 1100    Sa02 87 % on room air AT REST. Sa02 91 % on 5 Liters DURING AMBULATION. Sa02 95 % on 3 Liters AT REST/AFTER AMBULATION.

## 2021-03-13 LAB
ANION GAP SERPL CALC-SCNC: 5 MMOL/L (ref 5–15)
BUN SERPL-MCNC: 129 MG/DL (ref 6–20)
BUN/CREAT SERPL: 32 (ref 12–20)
CALCIUM SERPL-MCNC: 8.6 MG/DL (ref 8.5–10.1)
CHLORIDE SERPL-SCNC: 97 MMOL/L (ref 97–108)
CO2 SERPL-SCNC: 37 MMOL/L (ref 21–32)
CREAT SERPL-MCNC: 4.05 MG/DL (ref 0.7–1.3)
ERYTHROCYTE [DISTWIDTH] IN BLOOD BY AUTOMATED COUNT: 14.3 % (ref 11.5–14.5)
GLUCOSE BLD STRIP.AUTO-MCNC: 130 MG/DL (ref 65–100)
GLUCOSE BLD STRIP.AUTO-MCNC: 145 MG/DL (ref 65–100)
GLUCOSE BLD STRIP.AUTO-MCNC: 147 MG/DL (ref 65–100)
GLUCOSE BLD STRIP.AUTO-MCNC: 153 MG/DL (ref 65–100)
GLUCOSE SERPL-MCNC: 161 MG/DL (ref 65–100)
HCT VFR BLD AUTO: 39.4 % (ref 36.6–50.3)
HGB BLD-MCNC: 11.9 G/DL (ref 12.1–17)
MAGNESIUM SERPL-MCNC: 2.6 MG/DL (ref 1.6–2.4)
MCH RBC QN AUTO: 27 PG (ref 26–34)
MCHC RBC AUTO-ENTMCNC: 30.2 G/DL (ref 30–36.5)
MCV RBC AUTO: 89.3 FL (ref 80–99)
NRBC # BLD: 0 K/UL (ref 0–0.01)
NRBC BLD-RTO: 0 PER 100 WBC
PHOSPHATE SERPL-MCNC: 5.9 MG/DL (ref 2.6–4.7)
PLATELET # BLD AUTO: 156 K/UL (ref 150–400)
PMV BLD AUTO: 11.1 FL (ref 8.9–12.9)
POTASSIUM SERPL-SCNC: 3.5 MMOL/L (ref 3.5–5.1)
RBC # BLD AUTO: 4.41 M/UL (ref 4.1–5.7)
SERVICE CMNT-IMP: ABNORMAL
SODIUM SERPL-SCNC: 139 MMOL/L (ref 136–145)
WBC # BLD AUTO: 7 K/UL (ref 4.1–11.1)

## 2021-03-13 PROCEDURE — 84100 ASSAY OF PHOSPHORUS: CPT

## 2021-03-13 PROCEDURE — 85027 COMPLETE CBC AUTOMATED: CPT

## 2021-03-13 PROCEDURE — 74011636637 HC RX REV CODE- 636/637: Performed by: STUDENT IN AN ORGANIZED HEALTH CARE EDUCATION/TRAINING PROGRAM

## 2021-03-13 PROCEDURE — 36415 COLL VENOUS BLD VENIPUNCTURE: CPT

## 2021-03-13 PROCEDURE — 74011250637 HC RX REV CODE- 250/637: Performed by: STUDENT IN AN ORGANIZED HEALTH CARE EDUCATION/TRAINING PROGRAM

## 2021-03-13 PROCEDURE — 83735 ASSAY OF MAGNESIUM: CPT

## 2021-03-13 PROCEDURE — 74011250637 HC RX REV CODE- 250/637: Performed by: NURSE PRACTITIONER

## 2021-03-13 PROCEDURE — 82962 GLUCOSE BLOOD TEST: CPT

## 2021-03-13 PROCEDURE — 74011636637 HC RX REV CODE- 636/637: Performed by: INTERNAL MEDICINE

## 2021-03-13 PROCEDURE — 80048 BASIC METABOLIC PNL TOTAL CA: CPT

## 2021-03-13 PROCEDURE — 65660000000 HC RM CCU STEPDOWN

## 2021-03-13 PROCEDURE — 74011000250 HC RX REV CODE- 250: Performed by: INTERNAL MEDICINE

## 2021-03-13 RX ADMIN — LEVOTHYROXINE SODIUM 137 MCG: 0.03 TABLET ORAL at 05:07

## 2021-03-13 RX ADMIN — BUMETANIDE 2 MG: 0.25 INJECTION, SOLUTION INTRAMUSCULAR; INTRAVENOUS at 20:07

## 2021-03-13 RX ADMIN — GUAIFENESIN AND DEXTROMETHORPHAN 10 ML: 100; 10 SYRUP ORAL at 16:36

## 2021-03-13 RX ADMIN — CASTOR OIL AND BALSAM, PERU: 788; 87 OINTMENT TOPICAL at 08:00

## 2021-03-13 RX ADMIN — INSULIN LISPRO 3 UNITS: 100 INJECTION, SOLUTION INTRAVENOUS; SUBCUTANEOUS at 08:11

## 2021-03-13 RX ADMIN — Medication 10 ML: at 14:00

## 2021-03-13 RX ADMIN — INSULIN GLARGINE 20 UNITS: 100 INJECTION, SOLUTION SUBCUTANEOUS at 22:33

## 2021-03-13 RX ADMIN — GUAIFENESIN AND DEXTROMETHORPHAN 10 ML: 100; 10 SYRUP ORAL at 22:33

## 2021-03-13 RX ADMIN — ACETAMINOPHEN 650 MG: 325 TABLET ORAL at 20:07

## 2021-03-13 RX ADMIN — Medication 200 MG: at 08:11

## 2021-03-13 RX ADMIN — GUAIFENESIN AND DEXTROMETHORPHAN 10 ML: 100; 10 SYRUP ORAL at 08:13

## 2021-03-13 RX ADMIN — Medication 1 CAPSULE: at 08:11

## 2021-03-13 RX ADMIN — BUMETANIDE 2 MG: 0.25 INJECTION, SOLUTION INTRAMUSCULAR; INTRAVENOUS at 08:10

## 2021-03-13 RX ADMIN — APIXABAN 5 MG: 5 TABLET, FILM COATED ORAL at 08:11

## 2021-03-13 RX ADMIN — ACETAMINOPHEN 650 MG: 325 TABLET ORAL at 13:09

## 2021-03-13 RX ADMIN — ACETAMINOPHEN 650 MG: 325 TABLET ORAL at 05:08

## 2021-03-13 RX ADMIN — Medication 10 ML: at 05:09

## 2021-03-13 RX ADMIN — CASTOR OIL AND BALSAM, PERU: 788; 87 OINTMENT TOPICAL at 20:25

## 2021-03-13 RX ADMIN — GUAIFENESIN AND DEXTROMETHORPHAN 10 ML: 100; 10 SYRUP ORAL at 01:02

## 2021-03-13 RX ADMIN — FERROUS SULFATE TAB 325 MG (65 MG ELEMENTAL FE) 325 MG: 325 (65 FE) TAB at 08:11

## 2021-03-13 RX ADMIN — AMLODIPINE BESYLATE 10 MG: 5 TABLET ORAL at 08:10

## 2021-03-13 RX ADMIN — PRAVASTATIN SODIUM 80 MG: 40 TABLET ORAL at 20:08

## 2021-03-13 RX ADMIN — INSULIN LISPRO 3 UNITS: 100 INJECTION, SOLUTION INTRAVENOUS; SUBCUTANEOUS at 16:37

## 2021-03-13 RX ADMIN — PANTOPRAZOLE SODIUM 40 MG: 40 TABLET, DELAYED RELEASE ORAL at 05:08

## 2021-03-13 RX ADMIN — INSULIN LISPRO 2 UNITS: 100 INJECTION, SOLUTION INTRAVENOUS; SUBCUTANEOUS at 11:27

## 2021-03-13 RX ADMIN — APIXABAN 5 MG: 5 TABLET, FILM COATED ORAL at 17:10

## 2021-03-13 RX ADMIN — DUTASTERIDE 0.5 MG: 0.5 CAPSULE, LIQUID FILLED ORAL at 08:11

## 2021-03-13 RX ADMIN — METOPROLOL SUCCINATE 150 MG: 50 TABLET, EXTENDED RELEASE ORAL at 08:11

## 2021-03-13 RX ADMIN — Medication 10 ML: at 20:08

## 2021-03-13 RX ADMIN — INSULIN LISPRO 3 UNITS: 100 INJECTION, SOLUTION INTRAVENOUS; SUBCUTANEOUS at 11:25

## 2021-03-13 RX ADMIN — INSULIN LISPRO 2 UNITS: 100 INJECTION, SOLUTION INTRAVENOUS; SUBCUTANEOUS at 08:15

## 2021-03-13 RX ADMIN — METOLAZONE 2.5 MG: 2.5 TABLET ORAL at 08:10

## 2021-03-13 NOTE — PROGRESS NOTES
End of Shift Note    Bedside shift change report given to Ciera Cristina (oncoming nurse) by Valente Arguelles (offgoing nurse). Report included the following information SBAR and Kardex    Shift worked:  DAY     Shift summary and any significant changes:     Wound care completed, possible candidate for dialysis, continue IV diuresing, and cont. PRN tylenol and robitussin. Concerns for physician to address:       Zone phone for oncoming shift:          Activity:  Activity Level: Up with Assistance  Number times ambulated in hallways past shift: 0  Number of times OOB to chair past shift: 0    Cardiac:   Cardiac Monitoring: Yes      Cardiac Rhythm: Paced    Access:   Current line(s): PIV     Genitourinary:   Urinary status: voiding    Respiratory:   O2 Device: Nasal cannula  Chronic home O2 use?: NO  Incentive spirometer at bedside: YES     GI:  Last Bowel Movement Date: 03/13/21  Current diet:  DIET DIABETIC CONSISTENT CARB Regular; 2 GM NA (House Low NA); FR 1500ML  DIET ONE TIME MESSAGE  Passing flatus: YES  Tolerating current diet: YES  % Diet Eaten: 100 %    Pain Management:   Patient states pain is manageable on current regimen: N/A    Skin:  Romeo Score: 17  Interventions: float heels and increase time out of bed    Patient Safety:  Fall Score:  Total Score: 3  Interventions: assistive device (walker, cane, etc)  High Fall Risk: Yes    Length of Stay:  Expected LOS: 4d 0h  Actual LOS: 9      Valente Arguelles

## 2021-03-13 NOTE — PROGRESS NOTES
Problem: Falls - Risk of  Goal: *Absence of Falls  Description: Document Danney Mess Fall Risk and appropriate interventions in the flowsheet.   Note: Fall Risk Interventions:  Mobility Interventions: Communicate number of staff needed for ambulation/transfer         Medication Interventions: Patient to call before getting OOB, Teach patient to arise slowly    Elimination Interventions: Stay With Me (per policy), Toilet paper/wipes in reach, Patient to call for help with toileting needs

## 2021-03-13 NOTE — PROGRESS NOTES
Name: Brielle Restrepo   MRN: [id number]  : 1950      Assessment   :                                               Plan:  INO on CKD-4 -     Hypokalemia    High Mg    Severe Azotemia-   HTN  DM-2  Volume overload  A on C D-CHF  Obesity  CAD Follows with Dr Nahun Wilson - baseline creatinine around 3 although  It fluctuates quite a bit depending on volume status    Suspect cardiorenal syndrome with rise a in Creatinine from needed diuresis. Renal US with cortical thinning but no hydro. UA is bland. Creatinine peaked at 4.7, now stable at ~ 4. Continue IV Bumex  + Metolazone while here. O>I and weight are down, but still not back to baseline (his weight now is 258 -- previously weighing 240-245)    Daily wt  Salt/fluid restriction    Close outpatient f/u on discharge    he is not too far from needing KRT. We discussed HD and he would try if it is needed.               Subjective:  oob in chair . Breathing and edema a bit better, but neither to baseline. No acute c/o. We discussed the above. Specifically he and I discussed that he has borderline kidney function (borderline clearance, borderline ability to maintain fluid/sodium). We discussed that he is near needing kidney replacement therapy. We reviewed the HD procedure, including the outpatient HD procedure. He would try if it came down to it.     ROS:   No nausea, no vomiting  No chest pain, + shortness of breath and leg edema-improved    Exam:  Visit Vitals  BP (!) 152/70 (BP 1 Location: Left upper arm, BP Patient Position: At rest)   Pulse 75   Temp 98 °F (36.7 °C)   Resp 20   Ht 5' 9\" (1.753 m)   Wt 117.1 kg (258 lb 1.6 oz)   SpO2 96%   BMI 38.11 kg/m²     WB/WN in NAD  Rales right base  Irregular, distant, no tachycardia  1-2+ leg edema  AOx3    Current Facility-Administered Medications   Medication Dose Route Frequency Last Admin    insulin glargine (LANTUS) injection 20 Units  20 Units SubCUTAneous QHS 20 Units at 03/12/21 2153    bumetanide (BUMEX) injection 2 mg  2 mg IntraVENous Q12H 2 mg at 03/12/21 2153    thiamine mononitrate (B-1) tablet 200 mg  200 mg Oral DAILY 200 mg at 03/12/21 0831    L.acidophilus-paracasei-S.thermophil-bifidobacter (RISAQUAD) 8 billion cell capsule  1 Cap Oral DAILY 1 Cap at 03/12/21 0831    amLODIPine (NORVASC) tablet 10 mg  10 mg Oral DAILY 10 mg at 03/12/21 0829    diphenhydrAMINE-zinc acetate 2%-0.1% (BENADRYL) cream   Topical TID PRN      bismuth subsalicylate (PEPTO-BISMOL) oral suspension 30 mL  30 mL Oral Q6H PRN      guaiFENesin-dextromethorphan (ROBITUSSIN DM) 100-10 mg/5 mL syrup 10 mL  10 mL Oral Q6H PRN 10 mL at 03/13/21 0102    balsam peru-castor oiL (VENELEX) ointment   Topical BID Given at 03/12/21 2202    insulin lispro (HUMALOG) injection 3 Units  3 Units SubCUTAneous TIDAC 3 Units at 03/12/21 1818    benzonatate (TESSALON) capsule 100 mg  100 mg Oral TID  mg at 03/11/21 1948    acetaminophen (TYLENOL) tablet 650 mg  650 mg Oral Q4H  mg at 03/13/21 0508    hydrALAZINE (APRESOLINE) 20 mg/mL injection 20 mg  20 mg IntraVENous Q6H PRN      apixaban (ELIQUIS) tablet 5 mg  5 mg Oral BID 5 mg at 03/12/21 1818    ferrous sulfate tablet 325 mg  325 mg Oral  mg at 03/12/21 5684    dutasteride (AVODART) capsule 0.5 mg  0.5 mg Oral DAILY 0.5 mg at 03/12/21 0831    levothyroxine (SYNTHROID) tablet 137 mcg  137 mcg Oral 6am 137 mcg at 03/13/21 0507    metOLazone (ZAROXOLYN) tablet 2.5 mg  2.5 mg Oral DAILY 2.5 mg at 03/12/21 0831    metoprolol succinate (TOPROL-XL) XL tablet 150 mg  150 mg Oral DAILY 150 mg at 03/12/21 0831    pravastatin (PRAVACHOL) tablet 80 mg  80 mg Oral QHS 80 mg at 03/12/21 2153    pantoprazole (PROTONIX) tablet 40 mg  40 mg Oral ACB 40 mg at 03/13/21 0508    insulin lispro (HUMALOG) injection   SubCUTAneous AC&HS Stopped at 03/12/21 2200    glucose chewable tablet 16 g  4 Tab Oral PRN      dextrose (D50W) injection syrg 12.5-25 g  12.5-25 g IntraVENous PRN      glucagon (GLUCAGEN) injection 1 mg  1 mg IntraMUSCular PRN      sodium chloride (NS) flush 5-40 mL  5-40 mL IntraVENous Q8H 10 mL at 03/13/21 0509    sodium chloride (NS) flush 5-40 mL  5-40 mL IntraVENous PRN 10 mL at 03/08/21 1051    polyethylene glycol (MIRALAX) packet 17 g  17 g Oral DAILY PRN      ondansetron (ZOFRAN ODT) tablet 4 mg  4 mg Oral Q8H PRN      Or    ondansetron (ZOFRAN) injection 4 mg  4 mg IntraVENous Q6H PRN         Labs/Data:    Lab Results   Component Value Date/Time    WBC 7.0 03/13/2021 02:33 AM    HGB 11.9 (L) 03/13/2021 02:33 AM    HCT 39.4 03/13/2021 02:33 AM    PLATELET 456 71/23/6745 02:33 AM    MCV 89.3 03/13/2021 02:33 AM       Lab Results   Component Value Date/Time    Sodium 139 03/13/2021 02:33 AM    Potassium 3.5 03/13/2021 02:33 AM    Chloride 97 03/13/2021 02:33 AM    CO2 37 (H) 03/13/2021 02:33 AM    Anion gap 5 03/13/2021 02:33 AM    Glucose 161 (H) 03/13/2021 02:33 AM     (H) 03/13/2021 02:33 AM    Creatinine 4.05 (H) 03/13/2021 02:33 AM    BUN/Creatinine ratio 32 (H) 03/13/2021 02:33 AM    GFR est AA 18 (L) 03/13/2021 02:33 AM    GFR est non-AA 15 (L) 03/13/2021 02:33 AM    Calcium 8.6 03/13/2021 02:33 AM       Wt Readings from Last 3 Encounters:   03/13/21 117.1 kg (258 lb 1.6 oz)   08/03/20 111.1 kg (245 lb)   02/17/20 110.7 kg (244 lb)         Intake/Output Summary (Last 24 hours) at 3/13/2021 0626  Last data filed at 3/13/2021 0456  Gross per 24 hour   Intake    Output 750 ml   Net -750 ml       Patient seen and examined. Chart reviewed. Labs, data and other pertinent notes reviewed in last 24 hrs.     PMH/SH/FH reviewed and unchanged compared to H&P    Dale Lopez MD

## 2021-03-13 NOTE — PROGRESS NOTES
Hospitalist Progress Note    NAME: Ray Carr   :  1950   MRN:  [id number]       Assessment / Plan:  Acute hypoxic respiratory failure POA (improving)  Acute on chronic diastolic CHF exacerbation POA (improving)  INO on CKD4  Question of Hospital-acquired pneumonia (?has not been hospitalized since 2019)  Loose bowel movements for 2 days (likely antibiotic associated diarrhea)  -86% on RA in ED, maintaining sats on 2 L oxygen via nasal cannula this morning  -cont to be on IV bumex 3mg IV bid  -continue daily metolazone 2.5 mg daily  -Appreciate nephrology recommendations  -baseline Cr around 3, elevated to 4.76 on admission. Creatinine appears plateaued bellow 4  -renally dose meds, hold nephrotoxic agents if possible  -JANINE's revealed -1205 cc/24-hr. Weight almost 10 pounds less than admission weight  -does not wear O2 at home and currently on 2L NC. Continue try weaning oxygen as tolerated  Respiratory distress , leukocytosis, fever and cough  X-ray with bibasilar opacities. started vancomycin and Zosyn treating hospital-acquired pneumonia . procalcitonin 0.38 from Procalcitonin on  was 0.12.   3/11 no fever in last 48 hours. repeat porcal repeated 3/12, trending down. stopped piperacillin and vancomycin  Added probiotic. proBNP imrpving  added thiamine 500 mg IV 11/3 and then 200 mg po daily thereafter        Hypokalemia, resolved  -Continue checking BMP daily given he is on Bumex and metolazone     Acute on chronic diastolic CHF  -Diuresis as above  -TTE with EF 50 to 55%  -Continue BB  -Appreciate cardiology recommendations  -Mild troponin elevation in setting of uncontrolled hypertension and INO  -BNP > 19k  Nuclear cardiac stress test Inconclusive stress test/No ischemia or infarct demonstrated    Skin rash  -Rt flank, mild contusion  -no vesicles.  Does not appear dermatomal   -states area became painful after sliding on CT scanner when he was admitted  -monitor, try topical benadryl    Uncontrolled hypertension  -better-controlled today  -As above, continue beta-blocker.  -As needed hydralazine    IDDM  -Goal blood sugar 140-190.   -Was on Lantus per home dose 30 units nightly,, blood sugars below target so we will decrease the dose to 20 (3/12/2021)  -added back mealtime insulin at reduced dose   -SSI with POC's. Adjust insulin as indicated based on POC values    Lower extremity edema  -Bilateral lower extremity venous Dopplers negative  -Diuresis as above    LE/foot wounds POA  -large RLE medical ankle bullae from fluid overload, b/l heel DTIs, Lt 5th toe blister  -appreciate wound care help  -to follow at [facility], where he has been seen before    Chronic A. fib  -Continue Eliquis  -s/p PPM  -Continue metoprolol     Hypothyroidism  -Continue Synthroid     Code Status: Full code  Surrogate Decision Maker: Wife     DVT Prophylaxis: Eliquis  GI Prophylaxis: not indicated     Baseline: Ambulatory at home     Subjective:     Chief Complaint / Reason for Physician Visit  Patient was seen and examined. No acute events overnight. Discussed with RN events overnight. \"having loose bowel movments\"    Review of Systems:  Symptom Y/N Comments  Symptom Y/N Comments   Fever/Chills n   Chest Pain n    Poor Appetite    Edema y  bilateral lower extremities   Cough y   Abdominal Pain n    Sputum    Joint Pain     SOB/BETTS n   Pruritis/Rash     Nausea/vomit n   Tolerating PT/OT     Diarrhea    Tolerating Diet y    Constipation    Other       Could NOT obtain due to:          Objective:     VITALS:   Last 24hrs VS reviewed since prior progress note.  Most recent are:  Patient Vitals for the past 24 hrs:   Temp Pulse Resp BP SpO2   03/13/21 1123 97.6 °F (36.4 °C) 75 20 (!) 145/72 98 %   03/13/21 0728 97.5 °F (36.4 °C) 75 20 (!) 163/72 95 %   03/13/21 0234 98 °F (36.7 °C) 75 20 (!) 152/70 96 %   03/12/21 2352 97.8 °F (36.6 °C) 75 20 (!) 141/68 95 %   03/12/21 2156 97.8 °F (36.6 °C) 75 20 (!) 144/63 95 %   03/12/21 1458  76 20 (!) 156/72 98 %       Intake/Output Summary (Last 24 hours) at 3/13/2021 1306  Last data filed at 3/13/2021 0700  Gross per 24 hour   Intake    Output 950 ml   Net -950 ml        I had a face to face encounter and independently examined this patient on 3/13/2021, as outlined below:  PHYSICAL EXAM:  General: WD, WN. Alert, cooperative, no acute distress    EENT:  EOMI. Anicteric sclerae. MMM  Resp:  Decreased breath sounds in the subscapular areas. No accessory muscle use  CV:  Regular  rhythm,  +3 pitting edema bilateral lower extremities  GI:  Soft, Non distended, Non tender. +Bowel sounds  Neurologic:  Alert and oriented X 3, normal speech,   Psych:   Good insight. Not anxious nor agitated  Skin:  No rashes. No jaundice. Mild contusion in the right flank    Reviewed most current lab test results and cultures  YES  Reviewed most current radiology test results   YES  Review and summation of old records today    NO  Reviewed patient's current orders and MAR    YES  PMH/ reviewed - no change compared to H&P  ________________________________________________________________________  Care Plan discussed with:    Comments   Patient x    Family      RN x    Care Manager     Consultant                        Multidiciplinary team rounds were held today with , nursing, pharmacist and clinical coordinator. Patient's plan of care was discussed; medications were reviewed and discharge planning was addressed.      ________________________________________________________________________  Total NON critical care TIME: 26   Minutes    Total CRITICAL CARE TIME Spent:   Minutes non procedure based      Comments   >50% of visit spent in counseling and coordination of care x    ________________________________________________________________________  Hayden Estes MD     Procedures: see electronic medical records for all procedures/Xrays and details which were not copied into this note but were reviewed prior to creation of Plan. LABS:  I reviewed today's most current labs and imaging studies.   Pertinent labs include:  Recent Labs     03/13/21 0233 03/12/21 0248 03/11/21 0419   WBC 7.0 8.3 12.1*   HGB 11.9* 12.0* 11.8*   HCT 39.4 41.4 40.8    145* 146*     Recent Labs     03/13/21 0233 03/12/21 0248 03/11/21 0419    139 138   K 3.5 3.5 3.6   CL 97 97 96*   CO2 37* 36* 40*   * 126* 118*   * 126* 124*   CREA 4.05* 3.92* 3.95*   CA 8.6 9.3 8.6   MG 2.6* 2.6*  --    PHOS 5.9*  --   --    ALB  --  2.7*  --    TBILI  --  0.6  --    ALT  --  13  --        Signed: Hayden Estes MD

## 2021-03-13 NOTE — PROGRESS NOTES
Problem: Falls - Risk of  Goal: *Absence of Falls  Description: Document Asael Velez Fall Risk and appropriate interventions in the flowsheet.   Outcome: Progressing Towards Goal  Note: Fall Risk Interventions:  Mobility Interventions: Communicate number of staff needed for ambulation/transfer         Medication Interventions: Patient to call before getting OOB    Elimination Interventions: Call light in reach

## 2021-03-14 LAB
ANION GAP SERPL CALC-SCNC: 2 MMOL/L (ref 5–15)
BACTERIA SPEC CULT: NORMAL
BUN SERPL-MCNC: 130 MG/DL (ref 6–20)
BUN/CREAT SERPL: 33 (ref 12–20)
CALCIUM SERPL-MCNC: 8.6 MG/DL (ref 8.5–10.1)
CHLORIDE SERPL-SCNC: 97 MMOL/L (ref 97–108)
CO2 SERPL-SCNC: 39 MMOL/L (ref 21–32)
CREAT SERPL-MCNC: 3.98 MG/DL (ref 0.7–1.3)
GLUCOSE BLD STRIP.AUTO-MCNC: 125 MG/DL (ref 65–100)
GLUCOSE BLD STRIP.AUTO-MCNC: 145 MG/DL (ref 65–100)
GLUCOSE BLD STRIP.AUTO-MCNC: 155 MG/DL (ref 65–100)
GLUCOSE BLD STRIP.AUTO-MCNC: 180 MG/DL (ref 65–100)
GLUCOSE SERPL-MCNC: 170 MG/DL (ref 65–100)
MAGNESIUM SERPL-MCNC: 2.5 MG/DL (ref 1.6–2.4)
PHOSPHATE SERPL-MCNC: 5.9 MG/DL (ref 2.6–4.7)
POTASSIUM SERPL-SCNC: 3.7 MMOL/L (ref 3.5–5.1)
SERVICE CMNT-IMP: ABNORMAL
SERVICE CMNT-IMP: NORMAL
SODIUM SERPL-SCNC: 138 MMOL/L (ref 136–145)

## 2021-03-14 PROCEDURE — 74011250637 HC RX REV CODE- 250/637: Performed by: NURSE PRACTITIONER

## 2021-03-14 PROCEDURE — 74011250637 HC RX REV CODE- 250/637: Performed by: INTERNAL MEDICINE

## 2021-03-14 PROCEDURE — 74011636637 HC RX REV CODE- 636/637: Performed by: STUDENT IN AN ORGANIZED HEALTH CARE EDUCATION/TRAINING PROGRAM

## 2021-03-14 PROCEDURE — 74011250637 HC RX REV CODE- 250/637: Performed by: STUDENT IN AN ORGANIZED HEALTH CARE EDUCATION/TRAINING PROGRAM

## 2021-03-14 PROCEDURE — 36415 COLL VENOUS BLD VENIPUNCTURE: CPT

## 2021-03-14 PROCEDURE — 74011636637 HC RX REV CODE- 636/637: Performed by: INTERNAL MEDICINE

## 2021-03-14 PROCEDURE — 65660000000 HC RM CCU STEPDOWN

## 2021-03-14 PROCEDURE — 74011000250 HC RX REV CODE- 250: Performed by: INTERNAL MEDICINE

## 2021-03-14 PROCEDURE — 83735 ASSAY OF MAGNESIUM: CPT

## 2021-03-14 PROCEDURE — 84100 ASSAY OF PHOSPHORUS: CPT

## 2021-03-14 PROCEDURE — 2709999900 HC NON-CHARGEABLE SUPPLY

## 2021-03-14 PROCEDURE — 80048 BASIC METABOLIC PNL TOTAL CA: CPT

## 2021-03-14 PROCEDURE — 82962 GLUCOSE BLOOD TEST: CPT

## 2021-03-14 RX ADMIN — APIXABAN 5 MG: 5 TABLET, FILM COATED ORAL at 17:20

## 2021-03-14 RX ADMIN — CASTOR OIL AND BALSAM, PERU: 788; 87 OINTMENT TOPICAL at 21:46

## 2021-03-14 RX ADMIN — BUMETANIDE 1 MG/HR: 0.25 INJECTION INTRAMUSCULAR; INTRAVENOUS at 09:50

## 2021-03-14 RX ADMIN — ACETAMINOPHEN 650 MG: 325 TABLET ORAL at 05:25

## 2021-03-14 RX ADMIN — INSULIN LISPRO 3 UNITS: 100 INJECTION, SOLUTION INTRAVENOUS; SUBCUTANEOUS at 11:47

## 2021-03-14 RX ADMIN — PANTOPRAZOLE SODIUM 40 MG: 40 TABLET, DELAYED RELEASE ORAL at 05:26

## 2021-03-14 RX ADMIN — BUMETANIDE 1 MG/HR: 0.25 INJECTION INTRAMUSCULAR; INTRAVENOUS at 17:22

## 2021-03-14 RX ADMIN — ACETAMINOPHEN 650 MG: 325 TABLET ORAL at 09:51

## 2021-03-14 RX ADMIN — Medication 30 ML: at 23:43

## 2021-03-14 RX ADMIN — AMLODIPINE BESYLATE 10 MG: 5 TABLET ORAL at 08:45

## 2021-03-14 RX ADMIN — METOLAZONE 2.5 MG: 2.5 TABLET ORAL at 08:45

## 2021-03-14 RX ADMIN — ACETAMINOPHEN 650 MG: 325 TABLET ORAL at 19:44

## 2021-03-14 RX ADMIN — INSULIN GLARGINE 20 UNITS: 100 INJECTION, SOLUTION SUBCUTANEOUS at 21:59

## 2021-03-14 RX ADMIN — Medication 200 MG: at 08:45

## 2021-03-14 RX ADMIN — Medication 5 ML: at 21:44

## 2021-03-14 RX ADMIN — GUAIFENESIN AND DEXTROMETHORPHAN 10 ML: 100; 10 SYRUP ORAL at 05:25

## 2021-03-14 RX ADMIN — GUAIFENESIN AND DEXTROMETHORPHAN 10 ML: 100; 10 SYRUP ORAL at 23:45

## 2021-03-14 RX ADMIN — FERROUS SULFATE TAB 325 MG (65 MG ELEMENTAL FE) 325 MG: 325 (65 FE) TAB at 05:26

## 2021-03-14 RX ADMIN — ACETAMINOPHEN 650 MG: 325 TABLET ORAL at 14:05

## 2021-03-14 RX ADMIN — INSULIN LISPRO 2 UNITS: 100 INJECTION, SOLUTION INTRAVENOUS; SUBCUTANEOUS at 17:20

## 2021-03-14 RX ADMIN — DUTASTERIDE 0.5 MG: 0.5 CAPSULE, LIQUID FILLED ORAL at 08:45

## 2021-03-14 RX ADMIN — METOPROLOL SUCCINATE 150 MG: 50 TABLET, EXTENDED RELEASE ORAL at 08:45

## 2021-03-14 RX ADMIN — LEVOTHYROXINE SODIUM 137 MCG: 0.03 TABLET ORAL at 05:25

## 2021-03-14 RX ADMIN — GUAIFENESIN AND DEXTROMETHORPHAN 10 ML: 100; 10 SYRUP ORAL at 11:47

## 2021-03-14 RX ADMIN — INSULIN LISPRO 3 UNITS: 100 INJECTION, SOLUTION INTRAVENOUS; SUBCUTANEOUS at 17:20

## 2021-03-14 RX ADMIN — PRAVASTATIN SODIUM 80 MG: 40 TABLET ORAL at 21:44

## 2021-03-14 RX ADMIN — INSULIN LISPRO 3 UNITS: 100 INJECTION, SOLUTION INTRAVENOUS; SUBCUTANEOUS at 08:46

## 2021-03-14 RX ADMIN — CASTOR OIL AND BALSAM, PERU: 788; 87 OINTMENT TOPICAL at 11:08

## 2021-03-14 RX ADMIN — INSULIN LISPRO 2 UNITS: 100 INJECTION, SOLUTION INTRAVENOUS; SUBCUTANEOUS at 08:46

## 2021-03-14 RX ADMIN — Medication 10 ML: at 05:26

## 2021-03-14 RX ADMIN — Medication 10 ML: at 14:05

## 2021-03-14 RX ADMIN — APIXABAN 5 MG: 5 TABLET, FILM COATED ORAL at 08:45

## 2021-03-14 RX ADMIN — GUAIFENESIN AND DEXTROMETHORPHAN 10 ML: 100; 10 SYRUP ORAL at 17:20

## 2021-03-14 RX ADMIN — Medication 1 CAPSULE: at 08:45

## 2021-03-14 NOTE — PROGRESS NOTES
Name: Hugh Mohan   MRN: [id number]  : 1950      Assessment   :                                               Plan:  INO on CKD-4 -     Hypokalemia    High Mg    Severe Azotemia-   HTN  DM-2  Volume overload  A on C D-CHF  Obesity  CAD Creatinine peaked at 4.7, now stable at ~ 4.     weight loss/diuresis has hit a plateau (he still has volume overload); will place on a bumex gtt (1 mg/hr) and continue on Metolazone for now --> if fails to effectively diurese with gtt or if creatinine rises, then it is likely time to start HD/UF (he and I have discussed this possibility and he is willing to try HD if needed)    Daily wt  Salt/fluid restriction                 Subjective:  oob in chair . Breathing and edema a bit better, but neither near to baseline. No acute c/o. We discussed the above.    ROS:   No nausea, no vomiting  No chest pain, + shortness of breath and leg edema-improved    Exam:  Visit Vitals  BP (!) 157/69 (BP 1 Location: Left upper arm, BP Patient Position: Sitting)   Pulse 75   Temp 98 °F (36.7 °C)   Resp 20   Ht 5' 9\" (1.753 m)   Wt 117.2 kg (258 lb 6.4 oz)   SpO2 94%   BMI 38.16 kg/m²     WB/WN in NAD  Rales right base  Irregular, distant, no tachycardia  1-2+ leg edema  AOx3    Current Facility-Administered Medications   Medication Dose Route Frequency Last Admin    insulin glargine (LANTUS) injection 20 Units  20 Units SubCUTAneous QHS 20 Units at 21 2233    bumetanide (BUMEX) injection 2 mg  2 mg IntraVENous Q12H 2 mg at 21    thiamine mononitrate (B-1) tablet 200 mg  200 mg Oral DAILY 200 mg at 21 0811    L.acidophilus-paracasei-S.thermophil-bifidobacter (RISAQUAD) 8 billion cell capsule  1 Cap Oral DAILY 1 Cap at 21 0811    amLODIPine (NORVASC) tablet 10 mg  10 mg Oral DAILY 10 mg at 21 0810    diphenhydrAMINE-zinc acetate 2%-0.1% (BENADRYL) cream   Topical TID PRN      bismuth subsalicylate (PEPTO-BISMOL) oral suspension 30 mL  30 mL Oral Q6H PRN      guaiFENesin-dextromethorphan (ROBITUSSIN DM) 100-10 mg/5 mL syrup 10 mL  10 mL Oral Q6H PRN 10 mL at 03/14/21 0525    balsam peru-castor oiL (VENELEX) ointment   Topical BID Given at 03/13/21 2025    insulin lispro (HUMALOG) injection 3 Units  3 Units SubCUTAneous TIDAC 3 Units at 03/13/21 1637    benzonatate (TESSALON) capsule 100 mg  100 mg Oral TID  mg at 03/11/21 1948    acetaminophen (TYLENOL) tablet 650 mg  650 mg Oral Q4H  mg at 03/14/21 0525    hydrALAZINE (APRESOLINE) 20 mg/mL injection 20 mg  20 mg IntraVENous Q6H PRN      apixaban (ELIQUIS) tablet 5 mg  5 mg Oral BID 5 mg at 03/13/21 1710    ferrous sulfate tablet 325 mg  325 mg Oral  mg at 03/14/21 0526    dutasteride (AVODART) capsule 0.5 mg  0.5 mg Oral DAILY 0.5 mg at 03/13/21 0811    levothyroxine (SYNTHROID) tablet 137 mcg  137 mcg Oral 6am 137 mcg at 03/14/21 0525    metOLazone (ZAROXOLYN) tablet 2.5 mg  2.5 mg Oral DAILY 2.5 mg at 03/13/21 0810    metoprolol succinate (TOPROL-XL) XL tablet 150 mg  150 mg Oral DAILY 150 mg at 03/13/21 1014    pravastatin (PRAVACHOL) tablet 80 mg  80 mg Oral QHS 80 mg at 03/13/21 2008    pantoprazole (PROTONIX) tablet 40 mg  40 mg Oral ACB 40 mg at 03/14/21 0526    insulin lispro (HUMALOG) injection   SubCUTAneous AC&HS Stopped at 03/13/21 1630    glucose chewable tablet 16 g  4 Tab Oral PRN      dextrose (D50W) injection syrg 12.5-25 g  12.5-25 g IntraVENous PRN      glucagon (GLUCAGEN) injection 1 mg  1 mg IntraMUSCular PRN      sodium chloride (NS) flush 5-40 mL  5-40 mL IntraVENous Q8H 10 mL at 03/14/21 0526    sodium chloride (NS) flush 5-40 mL  5-40 mL IntraVENous PRN 10 mL at 03/08/21 1051    polyethylene glycol (MIRALAX) packet 17 g  17 g Oral DAILY PRN      ondansetron (ZOFRAN ODT) tablet 4 mg  4 mg Oral Q8H PRN      Or    ondansetron (ZOFRAN) injection 4 mg  4 mg IntraVENous Q6H PRN         Labs/Data:    Lab Results   Component Value Date/Time    WBC 7.0 03/13/2021 02:33 AM    HGB 11.9 (L) 03/13/2021 02:33 AM    HCT 39.4 03/13/2021 02:33 AM    PLATELET 526 41/05/8206 02:33 AM    MCV 89.3 03/13/2021 02:33 AM       Lab Results   Component Value Date/Time    Sodium 138 03/14/2021 02:39 AM    Potassium 3.7 03/14/2021 02:39 AM    Chloride 97 03/14/2021 02:39 AM    CO2 39 (H) 03/14/2021 02:39 AM    Anion gap 2 (L) 03/14/2021 02:39 AM    Glucose 170 (H) 03/14/2021 02:39 AM     (H) 03/14/2021 02:39 AM    Creatinine 3.98 (H) 03/14/2021 02:39 AM    BUN/Creatinine ratio 33 (H) 03/14/2021 02:39 AM    GFR est AA 18 (L) 03/14/2021 02:39 AM    GFR est non-AA 15 (L) 03/14/2021 02:39 AM    Calcium 8.6 03/14/2021 02:39 AM       Wt Readings from Last 3 Encounters:   03/14/21 117.2 kg (258 lb 6.4 oz)   08/03/20 111.1 kg (245 lb)   02/17/20 110.7 kg (244 lb)       No intake or output data in the 24 hours ending 03/14/21 0727    Patient seen and examined. Chart reviewed. Labs, data and other pertinent notes reviewed in last 24 hrs.     PMH/SH/FH reviewed and unchanged compared to H&P    Ahsan Britton MD

## 2021-03-14 NOTE — PROGRESS NOTES
Hospitalist Progress Note    NAME: Sivakumar Carreon   :  1950   MRN:  [id number]       Assessment / Plan:  Acute hypoxic respiratory failure POA (improving)  Acute on chronic diastolic CHF exacerbation POA (improving)  INO on CKD4  Question of Hospital-acquired pneumonia (?has not been hospitalized since 2019)  Loose bowel movements for 2 days (likely antibiotic associated diarrhea) resolved  -86% on RA in ED, maintaining sats on 2 L oxygen via nasal cannula this morning  -was on IV bumex 3mg IV bid, started on infusion 3/14 as his response had plateued   -continue daily metolazone 2.5 mg daily  -Appreciate nephrology recommendations  -baseline Cr around 3, elevated to 4.76 on admission. Creatinine appears plateaued bellow 4  -renally dose meds, hold nephrotoxic agents if possible  -JANINE's revealed -1205 cc/24-hr. Weight almost 10 pounds less than admission weight  -does not wear O2 at home and currently on 2L NC. Continue try weaning oxygen as tolerated  Respiratory distress , leukocytosis, fever and cough  X-ray with bibasilar opacities. started vancomycin and Zosyn treating hospital-acquired pneumonia . procalcitonin 0.38 from Procalcitonin on  was 0.12.   3/11 no fever in last 48 hours. repeat porcal repeated 3/12, trending down. stopped piperacillin and vancomycin  Added probiotic. proBNP imrpving  added thiamine 500 mg IV 11/3 and then 200 mg po daily thereafter        Hypokalemia, resolved  -Continue checking BMP daily given he is on Bumex and metolazone     Acute on chronic diastolic CHF  -Diuresis as above  -TTE with EF 50 to 55%  -Continue BB  -Appreciate cardiology recommendations  -Mild troponin elevation in setting of uncontrolled hypertension and INO  -BNP > 19k  Nuclear cardiac stress test Inconclusive stress test/No ischemia or infarct demonstrated    Skin rash  -Rt flank, mild contusion  -no vesicles.  Does not appear dermatomal   -states area became painful after sliding on CT scanner when he was admitted  -monitor, try topical benadryl    Uncontrolled hypertension  -better-controlled today  -As above, continue beta-blocker.  -As needed hydralazine    IDDM  -Goal blood sugar 140-190.   -Was on Lantus per home dose 30 units nightly,, blood sugars below target so we will decrease the dose to 20 (3/12/2021)  -added back mealtime insulin at reduced dose   -SSI with POC's. Adjust insulin as indicated based on POC values    Lower extremity edema  -Bilateral lower extremity venous Dopplers negative  -Diuresis as above    LE/foot wounds POA  -large RLE medical ankle bullae from fluid overload, b/l heel DTIs, Lt 5th toe blister  -appreciate wound care help  -to follow at [facility], where he has been seen before    Chronic A. fib  -Continue Eliquis  -s/p PPM  -Continue metoprolol     Hypothyroidism  -Continue Synthroid     Code Status: Full code  Surrogate Decision Maker: Wife     DVT Prophylaxis: Eliquis  GI Prophylaxis: not indicated     Baseline: Ambulatory at home     Subjective:     Chief Complaint / Reason for Physician Visit  Patient was seen and examined. No acute events overnight. Discussed with RN events overnight. \"having loose bowel movments\"    Review of Systems:  Symptom Y/N Comments  Symptom Y/N Comments   Fever/Chills n   Chest Pain n    Poor Appetite    Edema y  bilateral lower extremities   Cough y   Abdominal Pain n    Sputum    Joint Pain     SOB/BETTS n   Pruritis/Rash     Nausea/vomit n   Tolerating PT/OT     Diarrhea    Tolerating Diet y    Constipation    Other       Could NOT obtain due to:          Objective:     VITALS:   Last 24hrs VS reviewed since prior progress note.  Most recent are:  Patient Vitals for the past 24 hrs:   Temp Pulse Resp BP SpO2   03/14/21 0821 97 °F (36.1 °C) 75 18 137/68 95 %   03/14/21 0140 98 °F (36.7 °C) 75 20 (!) 157/69 94 %   03/13/21 2230 97.4 °F (36.3 °C) 75 20 (!) 152/73 93 %   03/13/21 1929 97.5 °F (36.4 °C) 75 20 (!) 158/69 94 %   03/13/21 1507 97.4 °F (36.3 °C) 75 20 (!) 147/73 96 %   03/13/21 1123 97.6 °F (36.4 °C) 75 20 (!) 145/72 98 %     No intake or output data in the 24 hours ending 03/14/21 1002     I had a face to face encounter and independently examined this patient on 3/14/2021, as outlined below:  PHYSICAL EXAM:  General: WD, WN. Alert, cooperative, no acute distress    EENT:  EOMI. Anicteric sclerae. MMM  Resp:  Decreased breath sounds in the subscapular areas. No accessory muscle use  CV:  Regular  rhythm,  +3 pitting edema bilateral lower extremities  GI:  Soft, Non distended, Non tender. +Bowel sounds  Neurologic:  Alert and oriented X 3, normal speech,   Psych:   Good insight. Not anxious nor agitated  Skin:  No rashes. No jaundice. Mild contusion in the right flank    Reviewed most current lab test results and cultures  YES  Reviewed most current radiology test results   YES  Review and summation of old records today    NO  Reviewed patient's current orders and MAR    YES  PMH/SH reviewed - no change compared to H&P  ________________________________________________________________________  Care Plan discussed with:    Comments   Patient x    Family      RN x    Care Manager     Consultant                        Multidiciplinary team rounds were held today with , nursing, pharmacist and clinical coordinator. Patient's plan of care was discussed; medications were reviewed and discharge planning was addressed.      ________________________________________________________________________  Total NON critical care TIME: 22 Minutes    Total CRITICAL CARE TIME Spent:   Minutes non procedure based      Comments   >50% of visit spent in counseling and coordination of care x    ________________________________________________________________________  Leilani Price MD     Procedures: see electronic medical records for all procedures/Xrays and details which were not copied into this note but were reviewed prior to creation of Plan. LABS:  I reviewed today's most current labs and imaging studies.   Pertinent labs include:  Recent Labs     03/13/21 0233 03/12/21 0248   WBC 7.0 8.3   HGB 11.9* 12.0*   HCT 39.4 41.4    145*     Recent Labs     03/14/21 0239 03/13/21 0233 03/12/21 0248    139 139   K 3.7 3.5 3.5   CL 97 97 97   CO2 39* 37* 36*   * 161* 126*   * 129* 126*   CREA 3.98* 4.05* 3.92*   CA 8.6 8.6 9.3   MG 2.5* 2.6* 2.6*   PHOS 5.9* 5.9*  --    ALB  --   --  2.7*   TBILI  --   --  0.6   ALT  --   --  13       Signed: Lavonne Powell MD

## 2021-03-14 NOTE — PROGRESS NOTES
End of Shift Note    Bedside shift change report given to Alireza (oncoming nurse) by Jil Dupree (offgoing nurse). Report included the following information SBAR, Intake/Output and Cardiac Rhythm paced    Shift worked:  day     Shift summary and any significant changes:          Concerns for physician to address:       Zone phone for oncoming shift:          Activity:  Activity Level: Up with Assistance  Number times ambulated in hallways past shift: 0  Number of times OOB to chair past shift: in the chair all day    Cardiac:   Cardiac Monitoring: Yes      Cardiac Rhythm: Paced    Access:   Current line(s): PIV     Genitourinary:   Urinary status: voiding    Respiratory:   O2 Device: Nasal cannula  Chronic home O2 use?: NO  Incentive spirometer at bedside: N/A     GI:  Last Bowel Movement Date: 03/03/21  Current diet:  DIET DIABETIC CONSISTENT CARB Regular; 2 GM NA (House Low NA); FR 1500ML  DIET ONE TIME MESSAGE  Passing flatus: YES  Tolerating current diet: YES  % Diet Eaten: 100 %    Pain Management:   Patient states pain is manageable on current regimen: NO    Skin:  Romeo Score: 17  Interventions: float heels and increase time out of bed    Patient Safety:  Fall Score:  Total Score: 3  Interventions: bed/chair alarm, gripper socks and pt to call before getting OOB  High Fall Risk: Yes    Length of Stay:  Expected LOS: 4d 0h  Actual LOS: [address] Levine, Susan. \Hospital Has a New Name and Outlook.\""

## 2021-03-14 NOTE — PROGRESS NOTES
Problem: Falls - Risk of  Goal: *Absence of Falls  Description: Document [place] Led Fall Risk and appropriate interventions in the flowsheet.   3/13/2021 1957 by Naila Daniels, RN  Outcome: Progressing Towards Goal  Note: Fall Risk Interventions:  Mobility Interventions: Communicate number of staff needed for ambulation/transfer         Medication Interventions: Patient to call before getting OOB, Teach patient to arise slowly    Elimination Interventions: Stay With Me (per policy), Toilet paper/wipes in reach, Call light in reach, Patient to call for help with toileting needs           3/13/2021 0630 by Naila Daniels, RN  Note: Fall Risk Interventions:  Mobility Interventions: Communicate number of staff needed for ambulation/transfer         Medication Interventions: Patient to call before getting OOB, Teach patient to arise slowly    Elimination Interventions: Stay With Me (per policy), Toilet paper/wipes in reach, Patient to call for help with toileting needs

## 2021-03-15 LAB
ANION GAP SERPL CALC-SCNC: 5 MMOL/L (ref 5–15)
BUN SERPL-MCNC: 132 MG/DL (ref 6–20)
BUN/CREAT SERPL: 33 (ref 12–20)
CALCIUM SERPL-MCNC: 8.5 MG/DL (ref 8.5–10.1)
CHLORIDE SERPL-SCNC: 96 MMOL/L (ref 97–108)
CO2 SERPL-SCNC: 38 MMOL/L (ref 21–32)
CREAT SERPL-MCNC: 3.97 MG/DL (ref 0.7–1.3)
ERYTHROCYTE [DISTWIDTH] IN BLOOD BY AUTOMATED COUNT: 13.8 % (ref 11.5–14.5)
GLUCOSE BLD STRIP.AUTO-MCNC: 139 MG/DL (ref 65–100)
GLUCOSE BLD STRIP.AUTO-MCNC: 184 MG/DL (ref 65–100)
GLUCOSE BLD STRIP.AUTO-MCNC: 193 MG/DL (ref 65–100)
GLUCOSE BLD STRIP.AUTO-MCNC: 221 MG/DL (ref 65–100)
GLUCOSE SERPL-MCNC: 151 MG/DL (ref 65–100)
HCT VFR BLD AUTO: 40.8 % (ref 36.6–50.3)
HGB BLD-MCNC: 11.8 G/DL (ref 12.1–17)
MAGNESIUM SERPL-MCNC: 2.6 MG/DL (ref 1.6–2.4)
MCH RBC QN AUTO: 25.5 PG (ref 26–34)
MCHC RBC AUTO-ENTMCNC: 28.9 G/DL (ref 30–36.5)
MCV RBC AUTO: 88.3 FL (ref 80–99)
NRBC # BLD: 0 K/UL (ref 0–0.01)
NRBC BLD-RTO: 0 PER 100 WBC
PHOSPHATE SERPL-MCNC: 4.9 MG/DL (ref 2.6–4.7)
PLATELET # BLD AUTO: 156 K/UL (ref 150–400)
PMV BLD AUTO: 11 FL (ref 8.9–12.9)
POTASSIUM SERPL-SCNC: 3.5 MMOL/L (ref 3.5–5.1)
RBC # BLD AUTO: 4.62 M/UL (ref 4.1–5.7)
SERVICE CMNT-IMP: ABNORMAL
SODIUM SERPL-SCNC: 139 MMOL/L (ref 136–145)
WBC # BLD AUTO: 7 K/UL (ref 4.1–11.1)

## 2021-03-15 PROCEDURE — 74011250637 HC RX REV CODE- 250/637: Performed by: NURSE PRACTITIONER

## 2021-03-15 PROCEDURE — 84100 ASSAY OF PHOSPHORUS: CPT

## 2021-03-15 PROCEDURE — 85027 COMPLETE CBC AUTOMATED: CPT

## 2021-03-15 PROCEDURE — 74011636637 HC RX REV CODE- 636/637: Performed by: STUDENT IN AN ORGANIZED HEALTH CARE EDUCATION/TRAINING PROGRAM

## 2021-03-15 PROCEDURE — 36415 COLL VENOUS BLD VENIPUNCTURE: CPT

## 2021-03-15 PROCEDURE — 83735 ASSAY OF MAGNESIUM: CPT

## 2021-03-15 PROCEDURE — 2709999900 HC NON-CHARGEABLE SUPPLY

## 2021-03-15 PROCEDURE — 74011000250 HC RX REV CODE- 250: Performed by: INTERNAL MEDICINE

## 2021-03-15 PROCEDURE — 74011250637 HC RX REV CODE- 250/637: Performed by: STUDENT IN AN ORGANIZED HEALTH CARE EDUCATION/TRAINING PROGRAM

## 2021-03-15 PROCEDURE — 77010033678 HC OXYGEN DAILY

## 2021-03-15 PROCEDURE — 65660000000 HC RM CCU STEPDOWN

## 2021-03-15 PROCEDURE — 82962 GLUCOSE BLOOD TEST: CPT

## 2021-03-15 PROCEDURE — 80048 BASIC METABOLIC PNL TOTAL CA: CPT

## 2021-03-15 PROCEDURE — 74011636637 HC RX REV CODE- 636/637: Performed by: INTERNAL MEDICINE

## 2021-03-15 RX ADMIN — INSULIN LISPRO 3 UNITS: 100 INJECTION, SOLUTION INTRAVENOUS; SUBCUTANEOUS at 12:17

## 2021-03-15 RX ADMIN — METOLAZONE 2.5 MG: 2.5 TABLET ORAL at 09:06

## 2021-03-15 RX ADMIN — Medication 5 ML: at 05:00

## 2021-03-15 RX ADMIN — Medication 30 ML: at 04:56

## 2021-03-15 RX ADMIN — APIXABAN 5 MG: 5 TABLET, FILM COATED ORAL at 09:06

## 2021-03-15 RX ADMIN — BUMETANIDE 1 MG/HR: 0.25 INJECTION INTRAMUSCULAR; INTRAVENOUS at 18:15

## 2021-03-15 RX ADMIN — PANTOPRAZOLE SODIUM 40 MG: 40 TABLET, DELAYED RELEASE ORAL at 09:06

## 2021-03-15 RX ADMIN — Medication 200 MG: at 09:05

## 2021-03-15 RX ADMIN — ACETAMINOPHEN 650 MG: 325 TABLET ORAL at 22:25

## 2021-03-15 RX ADMIN — INSULIN LISPRO 2 UNITS: 100 INJECTION, SOLUTION INTRAVENOUS; SUBCUTANEOUS at 17:21

## 2021-03-15 RX ADMIN — DUTASTERIDE 0.5 MG: 0.5 CAPSULE, LIQUID FILLED ORAL at 09:05

## 2021-03-15 RX ADMIN — GUAIFENESIN AND DEXTROMETHORPHAN 10 ML: 100; 10 SYRUP ORAL at 04:56

## 2021-03-15 RX ADMIN — Medication 1 CAPSULE: at 09:06

## 2021-03-15 RX ADMIN — Medication 10 ML: at 17:21

## 2021-03-15 RX ADMIN — GUAIFENESIN AND DEXTROMETHORPHAN 10 ML: 100; 10 SYRUP ORAL at 22:25

## 2021-03-15 RX ADMIN — CASTOR OIL AND BALSAM, PERU: 788; 87 OINTMENT TOPICAL at 22:25

## 2021-03-15 RX ADMIN — CASTOR OIL AND BALSAM, PERU: 788; 87 OINTMENT TOPICAL at 09:06

## 2021-03-15 RX ADMIN — ACETAMINOPHEN 650 MG: 325 TABLET ORAL at 04:56

## 2021-03-15 RX ADMIN — BUMETANIDE 1 MG/HR: 0.25 INJECTION INTRAMUSCULAR; INTRAVENOUS at 04:50

## 2021-03-15 RX ADMIN — INSULIN LISPRO 3 UNITS: 100 INJECTION, SOLUTION INTRAVENOUS; SUBCUTANEOUS at 09:02

## 2021-03-15 RX ADMIN — Medication 10 ML: at 22:26

## 2021-03-15 RX ADMIN — METOPROLOL SUCCINATE 150 MG: 50 TABLET, EXTENDED RELEASE ORAL at 09:06

## 2021-03-15 RX ADMIN — AMLODIPINE BESYLATE 10 MG: 5 TABLET ORAL at 09:06

## 2021-03-15 RX ADMIN — FERROUS SULFATE TAB 325 MG (65 MG ELEMENTAL FE) 325 MG: 325 (65 FE) TAB at 09:05

## 2021-03-15 RX ADMIN — LEVOTHYROXINE SODIUM 137 MCG: 0.03 TABLET ORAL at 05:00

## 2021-03-15 RX ADMIN — INSULIN GLARGINE 20 UNITS: 100 INJECTION, SOLUTION SUBCUTANEOUS at 22:26

## 2021-03-15 RX ADMIN — INSULIN LISPRO 3 UNITS: 100 INJECTION, SOLUTION INTRAVENOUS; SUBCUTANEOUS at 17:20

## 2021-03-15 RX ADMIN — PRAVASTATIN SODIUM 80 MG: 40 TABLET ORAL at 22:25

## 2021-03-15 NOTE — INTERDISCIPLINARY ROUNDS
[address] INTERDISCIPLINARY ROUNDS Cardiopulmonary Care Interdisciplinary Rounds were held today to discuss patient's plan of care and outcomes. The following members were present: NP/Physician, Pharmacy, Nursing and Case Management. PLAN OF CARE:  
Continue current treatment plan Expected Length of Stay:  4d 0h

## 2021-03-15 NOTE — PROGRESS NOTES
End of Shift Note    Bedside shift change report given to Alireza MCCOY (oncoming nurse) by Candy Shearer RN (offgoing nurse). Report included the following information SBAR and Kardex    Shift worked:  Day     Shift summary and any significant changes: Tolerated treatment well. Bumex drip running. Wound care and dressing change done. Accurate I/O documented      Concerns for physician to address:  NA     Zone phone for oncoming shift:          Activity:  Activity Level: Up with Assistance  Number times ambulated in hallways past shift: 0  Number of times OOB to chair past shift: 1    Cardiac:   Cardiac Monitoring: Yes      Cardiac Rhythm: Paced    Access:   Current line(s): PIV     Genitourinary:   Urinary status: voiding    Respiratory:   O2 Device: Nasal cannula  Chronic home O2 use?: NO  Incentive spirometer at bedside: NO     GI:  Last Bowel Movement Date: 03/15/21  Current diet:  DIET ONE TIME MESSAGE  DIET NPO  DIET DIABETIC CONSISTENT CARB Regular; 2 GM NA (House Low NA); FR 1500ML; 80-80-80  Passing flatus: YES  Tolerating current diet: YES  % Diet Eaten: 100 %    Pain Management:   Patient states pain is manageable on current regimen: YES    Skin:  Romeo Score: 17  Interventions: float heels, increase time out of bed and PT/OT consult    Patient Safety:  Fall Score:  Total Score: 3  Interventions: bed/chair alarm, assistive device (walker, cane, etc) and pt to call before getting OOB  High Fall Risk: Yes    Length of Stay:  Expected LOS: 4d 0h  Actual LOS: 700 Tho Carbone RN

## 2021-03-15 NOTE — PROGRESS NOTES
Hospitalist Progress Note    NAME: Clarita Gallego   :  1950   MRN:  [id number]       Assessment / Plan:  Acute hypoxic respiratory failure POA (improving)  Acute on chronic diastolic CHF exacerbation POA (improving)  INO on CKD4  Question of Hospital-acquired pneumonia (?has not been hospitalized since 2019)  Loose bowel movements for 2 days (likely antibiotic associated diarrhea) resolved  -86% on RA in ED, maintaining sats on 2 L oxygen via nasal cannula this morning  -was on IV bumex 3mg IV bid, started on infusion 3/14 as his response had plateued   -continue daily metolazone 2.5 mg daily  -Appreciate nephrology recommendations  -baseline Cr around 3, elevated to 4.76 on admission. Creatinine appears plateaued bellow 4  -renally dose meds, hold nephrotoxic agents if possible  -JANINE's revealed -1205 cc/24-hr. Weight almost 10 pounds less than admission weight  -does not wear O2 at home and currently on 2L NC. Continue try weaning oxygen as tolerated  Respiratory distress , leukocytosis, fever and cough  X-ray with bibasilar opacities. started vancomycin and Zosyn treating hospital-acquired pneumonia . procalcitonin 0.38 from Procalcitonin on  was 0.12.   3/11 no fever in last 48 hours. repeat porcal repeated 3/12, trending down. stopped piperacillin and vancomycin  Added probiotic.    proBNP imrpving  added thiamine 500 mg IV 11/3 and then 200 mg po daily thereafter    3/15 continues on bumetanide infusion nephrology discussed with patient to initiate dialysis tomorrow    Hypokalemia, resolved  -Continue checking BMP daily given he is on Bumex and metolazone     Acute on chronic diastolic CHF  -Diuresis as above  -TTE with EF 50 to 55%  -Continue BB  -Appreciate cardiology recommendations  -Mild troponin elevation in setting of uncontrolled hypertension and INO  -BNP > 19k  Nuclear cardiac stress test Inconclusive stress test/No ischemia or infarct demonstrated    Skin rash  -Rt flank, mild contusion  -no vesicles. Does not appear dermatomal   -states area became painful after sliding on CT scanner when he was admitted  -monitor, try topical benadryl    Uncontrolled hypertension  -better-controlled today  -As above, continue beta-blocker.  -As needed hydralazine    IDDM  -Goal blood sugar 140-190.   -Was on Lantus per home dose 30 units nightly,, blood sugars below target so we will decrease the dose to 20 (3/12/2021)  -added back mealtime insulin at reduced dose   -SSI with POC's. Adjust insulin as indicated based on POC values    Lower extremity edema  -Bilateral lower extremity venous Dopplers negative  -Diuresis as above    LE/foot wounds POA  -large RLE medical ankle bullae from fluid overload, b/l heel DTIs, Lt 5th toe blister  -appreciate wound care help  -to follow at [facility], where he has been seen before    Chronic A. fib  -Continue Eliquis  -s/p PPM  -Continue metoprolol     Hypothyroidism  -Continue Synthroid     Code Status: Full code  Surrogate Decision Maker: Wife     DVT Prophylaxis: Eliquis on hold today for insertion of dialysis catheter tomorrow  GI Prophylaxis: not indicated     Baseline: Ambulatory at home     Subjective:     Chief Complaint / Reason for Physician Visit  Patient was seen and examined. No acute events overnight. Discussed with RN events overnight. \"I feel okay\"    Review of Systems:  Symptom Y/N Comments  Symptom Y/N Comments   Fever/Chills n   Chest Pain n    Poor Appetite    Edema y  bilateral lower extremities   Cough y   Abdominal Pain n    Sputum    Joint Pain     SOB/BETTS n   Pruritis/Rash     Nausea/vomit n   Tolerating PT/OT     Diarrhea    Tolerating Diet y    Constipation    Other       Could NOT obtain due to:          Objective:     VITALS:   Last 24hrs VS reviewed since prior progress note.  Most recent are:  Patient Vitals for the past 24 hrs:   Temp Pulse Resp BP SpO2   03/15/21 1509 97.6 °F (36.4 °C) 75 18 (!) 142/66 92 % 03/15/21 0736 98.1 °F (36.7 °C) 75 18 (!) 151/82 92 %   03/15/21 0340 98.1 °F (36.7 °C) 75 20 (!) 142/63 91 %   03/14/21 2317 97.6 °F (36.4 °C) 77 18 (!) 151/67 90 %   03/14/21 2012 98 °F (36.7 °C) 75 18 134/71 94 %   03/14/21 1722  75  (!) 145/62    03/14/21 1645 97.2 °F (36.2 °C) 75 18 (!) 143/66 95 %       Intake/Output Summary (Last 24 hours) at 3/15/2021 1636  Last data filed at 3/15/2021 1541  Gross per 24 hour   Intake 1240 ml   Output 700 ml   Net 540 ml        I had a face to face encounter and independently examined this patient on 3/15/2021, as outlined below:  PHYSICAL EXAM:  General: WD, WN. Alert, cooperative, no acute distress    EENT:  EOMI. Anicteric sclerae. MMM  Resp:  Decreased breath sounds in the subscapular areas. No accessory muscle use  CV:  Regular  rhythm,  +3 pitting edema bilateral lower extremities  GI:  Soft, Non distended, Non tender. +Bowel sounds  Neurologic:  Alert and oriented X 3, normal speech,   Psych:   Good insight. Not anxious nor agitated  Skin:  No rashes. No jaundice. Mild contusion in the right flank    Reviewed most current lab test results and cultures  YES  Reviewed most current radiology test results   YES  Review and summation of old records today    NO  Reviewed patient's current orders and MAR    YES  PMH/SH reviewed - no change compared to H&P  ________________________________________________________________________  Care Plan discussed with:    Comments   Patient x    Family      RN x    Care Manager     Consultant                        Multidiciplinary team rounds were held today with , nursing, pharmacist and clinical coordinator. Patient's plan of care was discussed; medications were reviewed and discharge planning was addressed.      ________________________________________________________________________  Total NON critical care TIME: 22 Minutes    Total CRITICAL CARE TIME Spent:   Minutes non procedure based      Comments   >50% of visit spent in counseling and coordination of care x    ________________________________________________________________________  Leilani Price MD     Procedures: see electronic medical records for all procedures/Xrays and details which were not copied into this note but were reviewed prior to creation of Plan. LABS:  I reviewed today's most current labs and imaging studies.   Pertinent labs include:  Recent Labs     03/15/21  0206 03/13/21  0233   WBC 7.0 7.0   HGB 11.8* 11.9*   HCT 40.8 39.4    156     Recent Labs     03/15/21  0206 03/14/21  0239 03/13/21  0233    138 139   K 3.5 3.7 3.5   CL 96* 97 97   CO2 38* 39* 37*   * 170* 161*   * 130* 129*   CREA 3.97* 3.98* 4.05*   CA 8.5 8.6 8.6   MG 2.6* 2.5* 2.6*   PHOS 4.9* 5.9* 5.9*       Signed: Leilani Price MD

## 2021-03-15 NOTE — PROGRESS NOTES
Problem: Falls - Risk of  Goal: *Absence of Falls  Description: Document Colby Rosa Fall Risk and appropriate interventions in the flowsheet.   Outcome: Progressing Towards Goal  Note: Fall Risk Interventions:  Mobility Interventions: Assess mobility with egress test, Bed/chair exit alarm, Patient to call before getting OOB         Medication Interventions: Bed/chair exit alarm, Patient to call before getting OOB    Elimination Interventions: Bed/chair exit alarm, Call light in reach, Patient to call for help with toileting needs

## 2021-03-15 NOTE — PROGRESS NOTES
Problem: Falls - Risk of  Goal: *Absence of Falls  Description: Document Chris Gonzalez Fall Risk and appropriate interventions in the flowsheet.   Outcome: Progressing Towards Goal  Note: Fall Risk Interventions:  Mobility Interventions: Communicate number of staff needed for ambulation/transfer, Patient to call before getting OOB, Utilize walker, cane, or other assistive device         Medication Interventions: Patient to call before getting OOB, Teach patient to arise slowly    Elimination Interventions: Call light in reach, Patient to call for help with toileting needs, Toileting schedule/hourly rounds

## 2021-03-15 NOTE — PROGRESS NOTES
Name: Nelly Altamirano   MRN: [id number]  : 1950      Assessment   :                                               Plan:  INO on CKD-4 -     Hypokalemia    High Mg    Severe Azotemia-   HTN  DM-2  Volume overload  A on C D-CHF  Obesity  CAD Creatinine peaked at 4.7, now stable at ~ 4. On bumex gtt with inadequate response  I think he is slightly uremic. D/W pt and Dr. Marlyn Ruizw, if no significant improvement tomorrow initiate HD. Holding eliquis  INR in am with labs  Remains on                Subjective:  oob in chair . remains edematousWe discussed the above.    ROS:   No nausea, no vomiting  No chest pain, + shortness of breath and leg edema persists    Exam:  Visit Vitals  BP (!) 151/82   Pulse 75   Temp 98.1 °F (36.7 °C)   Resp 18   Ht 5' 9\" (1.753 m)   Wt 117.2 kg (258 lb 4.8 oz)   SpO2 92%   BMI 38.14 kg/m²     WB/WN in NAD  Decreased bs  Irregular, distant, no tachycardia  1-2+ leg edema  Alert    Current Facility-Administered Medications   Medication Dose Route Frequency Last Admin    bumetanide (BUMEX) 0.25 mg/mL infusion  1 mg/hr IntraVENous CONTINUOUS 1 mg/hr at 03/15/21 0450    insulin glargine (LANTUS) injection 20 Units  20 Units SubCUTAneous QHS 20 Units at 21 215    thiamine mononitrate (B-1) tablet 200 mg  200 mg Oral DAILY 200 mg at 03/15/21 0905    L.acidophilus-paracasei-S.thermophil-bifidobacter (RISAQUAD) 8 billion cell capsule  1 Cap Oral DAILY 1 Cap at 03/15/21 0906    amLODIPine (NORVASC) tablet 10 mg  10 mg Oral DAILY 10 mg at 03/15/21 0906    diphenhydrAMINE-zinc acetate 2%-0.1% (BENADRYL) cream   Topical TID PRN      bismuth subsalicylate (PEPTO-BISMOL) oral suspension 30 mL  30 mL Oral Q6H PRN 30 mL at 03/15/21 0456    guaiFENesin-dextromethorphan (ROBITUSSIN DM) 100-10 mg/5 mL syrup 10 mL  10 mL Oral Q6H PRN 10 mL at 03/15/21 0456    balsam peru-castor oiL (VENELEX) ointment   Topical BID Given at 03/15/21 0906    insulin lispro (HUMALOG) injection 3 Units  3 Units SubCUTAneous TIDAC 3 Units at 03/15/21 0902    benzonatate (TESSALON) capsule 100 mg  100 mg Oral TID  mg at 03/11/21 1948    acetaminophen (TYLENOL) tablet 650 mg  650 mg Oral Q4H  mg at 03/15/21 0456    hydrALAZINE (APRESOLINE) 20 mg/mL injection 20 mg  20 mg IntraVENous Q6H PRN      [Held by provider] apixaban (ELIQUIS) tablet 5 mg  5 mg Oral BID 5 mg at 03/15/21 0383    ferrous sulfate tablet 325 mg  325 mg Oral  mg at 03/15/21 0905    dutasteride (AVODART) capsule 0.5 mg  0.5 mg Oral DAILY 0.5 mg at 03/15/21 0905    levothyroxine (SYNTHROID) tablet 137 mcg  137 mcg Oral 6am 137 mcg at 03/15/21 0500    metOLazone (ZAROXOLYN) tablet 2.5 mg  2.5 mg Oral DAILY 2.5 mg at 03/15/21 4056    metoprolol succinate (TOPROL-XL) XL tablet 150 mg  150 mg Oral DAILY 150 mg at 03/15/21 0906    pravastatin (PRAVACHOL) tablet 80 mg  80 mg Oral QHS 80 mg at 03/14/21 2144    pantoprazole (PROTONIX) tablet 40 mg  40 mg Oral ACB 40 mg at 03/15/21 0906    insulin lispro (HUMALOG) injection   SubCUTAneous AC&HS Stopped at 03/14/21 2200    glucose chewable tablet 16 g  4 Tab Oral PRN      dextrose (D50W) injection syrg 12.5-25 g  12.5-25 g IntraVENous PRN      glucagon (GLUCAGEN) injection 1 mg  1 mg IntraMUSCular PRN      sodium chloride (NS) flush 5-40 mL  5-40 mL IntraVENous Q8H 5 mL at 03/15/21 0500    sodium chloride (NS) flush 5-40 mL  5-40 mL IntraVENous PRN 10 mL at 03/08/21 1051    polyethylene glycol (MIRALAX) packet 17 g  17 g Oral DAILY PRN      ondansetron (ZOFRAN ODT) tablet 4 mg  4 mg Oral Q8H PRN      Or    ondansetron (ZOFRAN) injection 4 mg  4 mg IntraVENous Q6H PRN         Labs/Data:    Lab Results   Component Value Date/Time    WBC 7.0 03/15/2021 02:06 AM    HGB 11.8 (L) 03/15/2021 02:06 AM    HCT 40.8 03/15/2021 02:06 AM PLATELET 191 04/77/5158 02:06 AM    MCV 88.3 03/15/2021 02:06 AM       Lab Results   Component Value Date/Time    Sodium 139 03/15/2021 02:06 AM    Potassium 3.5 03/15/2021 02:06 AM    Chloride 96 (L) 03/15/2021 02:06 AM    CO2 38 (H) 03/15/2021 02:06 AM    Anion gap 5 03/15/2021 02:06 AM    Glucose 151 (H) 03/15/2021 02:06 AM     (H) 03/15/2021 02:06 AM    Creatinine 3.97 (H) 03/15/2021 02:06 AM    BUN/Creatinine ratio 33 (H) 03/15/2021 02:06 AM    GFR est AA 18 (L) 03/15/2021 02:06 AM    GFR est non-AA 15 (L) 03/15/2021 02:06 AM    Calcium 8.5 03/15/2021 02:06 AM       Wt Readings from Last 3 Encounters:   03/15/21 117.2 kg (258 lb 4.8 oz)   08/03/20 111.1 kg (245 lb)   02/17/20 110.7 kg (244 lb)         Intake/Output Summary (Last 24 hours) at 3/15/2021 1157  Last data filed at 3/15/2021 0513  Gross per 24 hour   Intake 360 ml   Output 400 ml   Net -40 ml       Patient seen and examined. Chart reviewed. Labs, data and other pertinent notes reviewed in last 24 hrs.     PMH/SH/FH reviewed and unchanged compared to H&P    Delmi Harrell MD

## 2021-03-15 NOTE — PROGRESS NOTES
End of Shift Note    Bedside shift change report given to Isabela Flores (oncoming nurse) by Anurag Matias RN (offgoing nurse). Report included the following information Kardex, MAR and Recent Results    Shift worked:  9p-7q     Shift summary and any significant changes:     No significant changes     Concerns for physician to address:     Zone phone for oncoming shift:        Activity:  Activity Level: Up with Assistance  Number times ambulated in hallways past shift: 0  Number of times OOB to chair past shift: 1, up in rcliner most of the night    Cardiac:   Cardiac Monitoring: Yes      Cardiac Rhythm: Paced    Access:   Current line(s): PIV     Genitourinary:   Urinary status: voiding    Respiratory:   O2 Device: Nasal cannula  Chronic home O2 use?:   Incentive spirometer at bedside:      GI:  Last Bowel Movement Date: 03/03/21  Current diet:  DIET DIABETIC CONSISTENT CARB Regular; 2 GM NA (House Low NA); FR 1500ML  DIET ONE TIME MESSAGE  Passing flatus: Tolerating current diet: YES  % Diet Eaten: 100 %    Pain Management:   Patient states pain is manageable on current regimen: NO    Skin:  Romeo Score: 17  Interventions: float heels    Patient Safety:  Fall Score:  Total Score: 3  Interventions: assistive device (walker, cane, etc), gripper socks and pt to call before getting OOB  High Fall Risk: Yes    Length of Stay:  Expected LOS: 4d 0h  Actual LOS: Marin Neil, RN

## 2021-03-16 ENCOUNTER — HOSPITAL ENCOUNTER (OUTPATIENT)
Dept: INTERVENTIONAL RADIOLOGY/VASCULAR | Age: 71
Discharge: HOME | DRG: 291 | End: 2021-03-16
Attending: INTERNAL MEDICINE
Payer: MEDICARE

## 2021-03-16 LAB
ANION GAP SERPL CALC-SCNC: 5 MMOL/L (ref 5–15)
BUN SERPL-MCNC: 131 MG/DL (ref 6–20)
BUN/CREAT SERPL: 33 (ref 12–20)
CALCIUM SERPL-MCNC: 8.7 MG/DL (ref 8.5–10.1)
CHLORIDE SERPL-SCNC: 95 MMOL/L (ref 97–108)
CO2 SERPL-SCNC: 38 MMOL/L (ref 21–32)
CREAT SERPL-MCNC: 4 MG/DL (ref 0.7–1.3)
GLUCOSE BLD STRIP.AUTO-MCNC: 103 MG/DL (ref 65–100)
GLUCOSE BLD STRIP.AUTO-MCNC: 128 MG/DL (ref 65–100)
GLUCOSE BLD STRIP.AUTO-MCNC: 137 MG/DL (ref 65–100)
GLUCOSE BLD STRIP.AUTO-MCNC: 157 MG/DL (ref 65–100)
GLUCOSE SERPL-MCNC: 206 MG/DL (ref 65–100)
HBV SURFACE AB SER QL: NONREACTIVE
HBV SURFACE AB SER-ACNC: <3.1 MIU/ML
HBV SURFACE AG SER QL: <0.1 INDEX
HBV SURFACE AG SER QL: NEGATIVE
HCV AB SERPL QL IA: NONREACTIVE
HCV COMMENT,HCGAC: NORMAL
INR PPP: 1.2 (ref 0.9–1.1)
MAGNESIUM SERPL-MCNC: 2.5 MG/DL (ref 1.6–2.4)
PHOSPHATE SERPL-MCNC: 4.4 MG/DL (ref 2.6–4.7)
POTASSIUM SERPL-SCNC: 3.4 MMOL/L (ref 3.5–5.1)
PROTHROMBIN TIME: 12.2 SEC (ref 9–11.1)
SERVICE CMNT-IMP: ABNORMAL
SODIUM SERPL-SCNC: 138 MMOL/L (ref 136–145)

## 2021-03-16 PROCEDURE — 65660000000 HC RM CCU STEPDOWN

## 2021-03-16 PROCEDURE — 86706 HEP B SURFACE ANTIBODY: CPT

## 2021-03-16 PROCEDURE — 82962 GLUCOSE BLOOD TEST: CPT

## 2021-03-16 PROCEDURE — 84100 ASSAY OF PHOSPHORUS: CPT

## 2021-03-16 PROCEDURE — 74011000250 HC RX REV CODE- 250: Performed by: RADIOLOGY

## 2021-03-16 PROCEDURE — 83735 ASSAY OF MAGNESIUM: CPT

## 2021-03-16 PROCEDURE — 2709999900 HC NON-CHARGEABLE SUPPLY

## 2021-03-16 PROCEDURE — 86803 HEPATITIS C AB TEST: CPT

## 2021-03-16 PROCEDURE — 94760 N-INVAS EAR/PLS OXIMETRY 1: CPT

## 2021-03-16 PROCEDURE — C1750 CATH, HEMODIALYSIS,LONG-TERM: HCPCS

## 2021-03-16 PROCEDURE — 77001 FLUOROGUIDE FOR VEIN DEVICE: CPT

## 2021-03-16 PROCEDURE — 74011636637 HC RX REV CODE- 636/637: Performed by: INTERNAL MEDICINE

## 2021-03-16 PROCEDURE — 74011250636 HC RX REV CODE- 250/636: Performed by: RADIOLOGY

## 2021-03-16 PROCEDURE — 90935 HEMODIALYSIS ONE EVALUATION: CPT

## 2021-03-16 PROCEDURE — 74011250637 HC RX REV CODE- 250/637: Performed by: STUDENT IN AN ORGANIZED HEALTH CARE EDUCATION/TRAINING PROGRAM

## 2021-03-16 PROCEDURE — 74011250637 HC RX REV CODE- 250/637: Performed by: NURSE PRACTITIONER

## 2021-03-16 PROCEDURE — 77030002986 HC SUT PROL J&J -A

## 2021-03-16 PROCEDURE — 87340 HEPATITIS B SURFACE AG IA: CPT

## 2021-03-16 PROCEDURE — 80048 BASIC METABOLIC PNL TOTAL CA: CPT

## 2021-03-16 PROCEDURE — 74011636637 HC RX REV CODE- 636/637: Performed by: STUDENT IN AN ORGANIZED HEALTH CARE EDUCATION/TRAINING PROGRAM

## 2021-03-16 PROCEDURE — 85610 PROTHROMBIN TIME: CPT

## 2021-03-16 PROCEDURE — 02HV33Z INSERTION OF INFUSION DEVICE INTO SUPERIOR VENA CAVA, PERCUTANEOUS APPROACH: ICD-10-PCS | Performed by: RADIOLOGY

## 2021-03-16 PROCEDURE — 5A1D70Z PERFORMANCE OF URINARY FILTRATION, INTERMITTENT, LESS THAN 6 HOURS PER DAY: ICD-10-PCS | Performed by: INTERNAL MEDICINE

## 2021-03-16 PROCEDURE — 77010033678 HC OXYGEN DAILY

## 2021-03-16 PROCEDURE — C1892 INTRO/SHEATH,FIXED,PEEL-AWAY: HCPCS

## 2021-03-16 PROCEDURE — 36415 COLL VENOUS BLD VENIPUNCTURE: CPT

## 2021-03-16 RX ORDER — HEPARIN SODIUM 1000 [USP'U]/ML
10000 INJECTION, SOLUTION INTRAVENOUS; SUBCUTANEOUS ONCE
Status: COMPLETED | OUTPATIENT
Start: 2021-03-16 | End: 2021-03-16

## 2021-03-16 RX ORDER — HEPARIN SODIUM 200 [USP'U]/100ML
400 INJECTION, SOLUTION INTRAVENOUS ONCE
Status: DISCONTINUED | OUTPATIENT
Start: 2021-03-16 | End: 2021-03-17 | Stop reason: HOSPADM

## 2021-03-16 RX ORDER — MIDAZOLAM HYDROCHLORIDE 1 MG/ML
5 INJECTION, SOLUTION INTRAMUSCULAR; INTRAVENOUS
Status: DISCONTINUED | OUTPATIENT
Start: 2021-03-16 | End: 2021-03-16

## 2021-03-16 RX ORDER — LIDOCAINE HYDROCHLORIDE 20 MG/ML
18 INJECTION, SOLUTION INFILTRATION; PERINEURAL ONCE
Status: COMPLETED | OUTPATIENT
Start: 2021-03-16 | End: 2021-03-16

## 2021-03-16 RX ORDER — FENTANYL CITRATE 50 UG/ML
100 INJECTION, SOLUTION INTRAMUSCULAR; INTRAVENOUS
Status: DISCONTINUED | OUTPATIENT
Start: 2021-03-16 | End: 2021-03-16

## 2021-03-16 RX ADMIN — CASTOR OIL AND BALSAM, PERU: 788; 87 OINTMENT TOPICAL at 10:17

## 2021-03-16 RX ADMIN — INSULIN LISPRO 2 UNITS: 100 INJECTION, SOLUTION INTRAVENOUS; SUBCUTANEOUS at 08:44

## 2021-03-16 RX ADMIN — ACETAMINOPHEN 650 MG: 325 TABLET ORAL at 03:34

## 2021-03-16 RX ADMIN — HEPARIN SODIUM 4 UNITS: 1000 INJECTION, SOLUTION INTRAVENOUS; SUBCUTANEOUS at 15:30

## 2021-03-16 RX ADMIN — PRAVASTATIN SODIUM 80 MG: 40 TABLET ORAL at 22:31

## 2021-03-16 RX ADMIN — Medication 10 ML: at 05:00

## 2021-03-16 RX ADMIN — FENTANYL CITRATE 25 MCG: 50 INJECTION, SOLUTION INTRAMUSCULAR; INTRAVENOUS at 14:58

## 2021-03-16 RX ADMIN — CASTOR OIL AND BALSAM, PERU: 788; 87 OINTMENT TOPICAL at 22:31

## 2021-03-16 RX ADMIN — INSULIN LISPRO 3 UNITS: 100 INJECTION, SOLUTION INTRAVENOUS; SUBCUTANEOUS at 08:44

## 2021-03-16 RX ADMIN — MIDAZOLAM 2 MG: 1 INJECTION INTRAMUSCULAR; INTRAVENOUS at 14:51

## 2021-03-16 RX ADMIN — LIDOCAINE HYDROCHLORIDE 10 MG: 20 INJECTION, SOLUTION INFILTRATION; PERINEURAL at 15:27

## 2021-03-16 RX ADMIN — FENTANYL CITRATE 50 MCG: 50 INJECTION, SOLUTION INTRAMUSCULAR; INTRAVENOUS at 14:53

## 2021-03-16 RX ADMIN — INSULIN GLARGINE 20 UNITS: 100 INJECTION, SOLUTION SUBCUTANEOUS at 22:33

## 2021-03-16 RX ADMIN — CEFAZOLIN SODIUM 2 G: 1 INJECTION, POWDER, FOR SOLUTION INTRAMUSCULAR; INTRAVENOUS at 14:40

## 2021-03-16 RX ADMIN — Medication 10 ML: at 22:32

## 2021-03-16 RX ADMIN — MIDAZOLAM 1 MG: 1 INJECTION INTRAMUSCULAR; INTRAVENOUS at 14:56

## 2021-03-16 NOTE — H&P
Interventional and Vascular Radiology History and Physical    Patient: Terri Riedel 66 y.o. male       Chief Complaint: Shortness of Breath (x 1 week, hx of CHF, but states that he has not reached out to his MD, as he was scheduled for an appt tomorrow)      History of Present Illness: dialysis     History:    Past Medical History:   Diagnosis Date    Arrhythmia     atrial fibrillation 2017    Arthritis     CAD (coronary atherosclerotic disease)     Chronic kidney disease     Chronic pain     Congestive heart failure ([address]     Diabetes ([address]     Diabetes mellitus type 2, controlled ([address] 3/13/2017    Heart failure ([address]     Hx of CABG 3/13/2017    CABG in 2010 in [address] Hypertension     Long term current use of anticoagulant therapy     Morbid obesity ([facility] 3/13/2017    JACEY (obstructive sleep apnea) 6/23/2017    S/P CABG x 2 2010    Skin cancer     Thyroid disease      Family History   Problem Relation Age of Onset    Heart Attack Father     Cancer Father         lymph node    Cancer Mother         breast     Social History     Socioeconomic History    Marital status:      Spouse name: Not on file    Number of children: Not on file    Years of education: Not on file    Highest education level: Not on file   Occupational History    Not on file   Social Needs    Financial resource strain: Not on file    Food insecurity     Worry: Not on file     Inability: Not on file    Transportation needs     Medical: Not on file     Non-medical: Not on file   Tobacco Use    Smoking status: Never Smoker    Smokeless tobacco: Never Used   Substance and Sexual Activity    Alcohol use: Yes     Comment: rare    Drug use: No    Sexual activity: Not on file   Lifestyle    Physical activity     Days per week: Not on file     Minutes per session: Not on file    Stress: Not on file   Relationships    Social connections     Talks on phone: Not on file     Gets together: Not on file Attends Gnosticism service: Not on file     Active member of club or organization: Not on file     Attends meetings of clubs or organizations: Not on file     Relationship status: Not on file   • Intimate partner violence     Fear of current or ex partner: Not on file     Emotionally abused: Not on file     Physically abused: Not on file     Forced sexual activity: Not on file   Other Topics Concern   •  Service Not Asked   • Blood Transfusions Not Asked   • Caffeine Concern Not Asked   • Occupational Exposure Not Asked   • Hobby Hazards Not Asked   • Sleep Concern Not Asked   • Stress Concern Not Asked   • Weight Concern Not Asked   • Special Diet Not Asked   • Back Care Not Asked   • Exercise Not Asked   • Bike Helmet Not Asked   • Seat Belt Not Asked   • Self-Exams Not Asked   Social History Narrative   • Not on file       Allergies:   Allergies   Allergen Reactions   • Allopurinol Rash   • Gabapentin Drowsiness   • Ciprofloxacin Nausea Only   • Percocet [Oxycodone-Acetaminophen] Other (comments)     hallucinations       Current Medications:  Current Facility-Administered Medications   Medication Dose Route Frequency   • B complex-vitaminC-folic acid (NEPHROCAP) cap  1 Cap Oral DAILY   • fentaNYL citrate (PF) injection 100 mcg  100 mcg IntraVENous Multiple   • midazolam (VERSED) injection 5 mg  5 mg IntraVENous Multiple   • insulin glargine (LANTUS) injection 20 Units  20 Units SubCUTAneous QHS   • L.acidophilus-paracasei-S.thermophil-bifidobacter (RISAQUAD) 8 billion cell capsule  1 Cap Oral DAILY   • amLODIPine (NORVASC) tablet 10 mg  10 mg Oral DAILY   • diphenhydrAMINE-zinc acetate 2%-0.1% (BENADRYL) cream   Topical TID PRN   • bismuth subsalicylate (PEPTO-BISMOL) oral suspension 30 mL  30 mL Oral Q6H PRN   • guaiFENesin-dextromethorphan (ROBITUSSIN DM) 100-10 mg/5 mL syrup 10 mL  10 mL Oral Q6H PRN   • balsam peru-castor oiL (VENELEX) ointment   Topical BID   • insulin lispro (HUMALOG) injection 3  Units  3 Units SubCUTAneous TIDAC    benzonatate (TESSALON) capsule 100 mg  100 mg Oral TID PRN    acetaminophen (TYLENOL) tablet 650 mg  650 mg Oral Q4H PRN    hydrALAZINE (APRESOLINE) 20 mg/mL injection 20 mg  20 mg IntraVENous Q6H PRN    [Held by provider] apixaban (ELIQUIS) tablet 5 mg  5 mg Oral BID    dutasteride (AVODART) capsule 0.5 mg  0.5 mg Oral DAILY    levothyroxine (SYNTHROID) tablet 137 mcg  137 mcg Oral 6am    metoprolol succinate (TOPROL-XL) XL tablet 150 mg  150 mg Oral DAILY    pravastatin (PRAVACHOL) tablet 80 mg  80 mg Oral QHS    pantoprazole (PROTONIX) tablet 40 mg  40 mg Oral ACB    insulin lispro (HUMALOG) injection   SubCUTAneous AC&HS    glucose chewable tablet 16 g  4 Tab Oral PRN    dextrose (D50W) injection syrg 12.5-25 g  12.5-25 g IntraVENous PRN    glucagon (GLUCAGEN) injection 1 mg  1 mg IntraMUSCular PRN    sodium chloride (NS) flush 5-40 mL  5-40 mL IntraVENous Q8H    sodium chloride (NS) flush 5-40 mL  5-40 mL IntraVENous PRN    polyethylene glycol (MIRALAX) packet 17 g  17 g Oral DAILY PRN    ondansetron (ZOFRAN ODT) tablet 4 mg  4 mg Oral Q8H PRN    Or    ondansetron (ZOFRAN) injection 4 mg  4 mg IntraVENous Q6H PRN     Facility-Administered Medications Ordered in Other Encounters   Medication Dose Route Frequency    lidocaine (XYLOCAINE) 20 mg/mL (2 %) injection 360 mg  18 mL SubCUTAneous ONCE    heparinized saline 2 units/mL infusion 800 Units  400 mL Irrigation ONCE    heparin (porcine) 1,000 unit/mL injection 10,000 Units  10,000 Units IntraVENous ONCE        Physical Exam:  Blood pressure (!) 143/58, pulse 75, temperature 98.1 °F (36.7 °C), resp. rate 19, height 5' 9\" (1.753 m), weight 114.8 kg (253 lb 1.6 oz), SpO2 100 %.   LUNG: clear to auscultation bilaterally, HEART: regular rate and rhythm, S1, S2 normal, no murmur, click, rub or gallop      Alerts:    Hospital Problems  Date Reviewed: 1/13/2021          Codes Class Noted POA    Acute respiratory failure [facility]) ICD-10-CM: J96.00  ICD-9-CM: 518.81  3/4/2021 Unknown        CHF exacerbation ([facility] ICD-10-CM: I50.9  ICD-9-CM: 428.0  3/4/2021 Unknown        INO (acute kidney injury) ([facility] ICD-10-CM: N17.9  ICD-9-CM: 584.9  3/4/2021 Unknown        Acute diastolic CHF (congestive heart failure) ([facility]) ICD-10-CM: I50.31  ICD-9-CM: 428.31, 428.0  6/22/2017 Unknown              Laboratory:      Recent Labs     03/16/21  0003 03/15/21  0206   HGB  --  11.8*   HCT  --  40.8   WBC  --  7.0   PLT  --  156   INR 1.2*  --    * 132*   CREA 4.00* 3.97*   K 3.4* 3.5         Plan of Care/Planned Procedure:  Risks, benefits, and alternatives reviewed with patient and he agrees to proceed with the procedure. Conscious sedation will be performed with IV fentanyl and versed.  Plan is for permcath placement       Dinesh Smith MD

## 2021-03-16 NOTE — PROGRESS NOTES
Transition of Care Plan:       RUR: 25%  Disposition: Home with Home Health   Follow up appointments: PCP  DME needed: None  Transportation at Discharge: Pt's wife will transport at d/c.   Dennis or means to access home:    Pt has his keys     Medicare letter: Given  Caregiver Contact: Aan Maria Davila- Wife [phone]  Discharge Caregiver contacted prior to discharge?  Will contact at d/c    8:41am-CM sent clinical documentation to Cookie ([phone])    8:15am- CM met with pt at bedside to discuss d/c plan. CM inquired with pt about any new needs. No new needs at this time. Pt will start dialysis today. CM will continue to follow patient for discharge planning needs and arrange for services as deemed necessary.     Jamie Multani [address] [place] [address]  [phone]

## 2021-03-16 NOTE — PROGRESS NOTES
Problem: Falls - Risk of  Goal: *Absence of Falls  Description: Document Feliciano Reason Fall Risk and appropriate interventions in the flowsheet.   Outcome: Progressing Towards Goal  Note: Fall Risk Interventions:  Mobility Interventions: Bed/chair exit alarm, Patient to call before getting OOB         Medication Interventions: Bed/chair exit alarm, Patient to call before getting OOB    Elimination Interventions: Bed/chair exit alarm, Call light in reach, Patient to call for help with toileting needs

## 2021-03-16 NOTE — PROGRESS NOTES
Name: Bronson Del Real   MRN: [id number]  : 1950      Assessment   :                                               Plan:  INO on CKD-4 -     Hypokalemia    High Mg    Severe Azotemia-   HTN  DM-2  Volume overload  A on C D-CHF  Obesity  CAD BUN/creat 131/4 with continued poorly controlled edema/blisters/legs wrapped  I don't see this improving without dialysis. D/W him today-will place PC and start HD  UF x 1 hour today and HD x 2 hours  He can dialyze at [facility] on second shift MWF 11:15 time slot  Pt is ADAMANT about leaving hospital on Thursday b/c he is scheduled for the covid shot at [facility] on Friday at 3:30  Our units will be getting the covid shots soon hopefully within the week  D/W his primary nephrologist yesterday               Subjective:  oob in chair . remains edematous, with weeping edemaWe discussed the above.    ROS:   No nausea, no vomiting  No chest pain, + shortness of breath and leg edema persists    Exam:  Visit Vitals  BP (!) 152/66 (BP 1 Location: Left upper arm, BP Patient Position: Sitting)   Pulse 74   Temp 98.1 °F (36.7 °C)   Resp 18   Ht 5' 9\" (1.753 m)   Wt 114.8 kg (253 lb 1.6 oz)   SpO2 94%   BMI 37.38 kg/m²     WB/WN in NAD  Decreased bs  Irregular, distant, no tachycardia  1-2+ leg edema/wrapped;weeping  Alert    Current Facility-Administered Medications   Medication Dose Route Frequency Last Admin    B complex-vitaminC-folic acid (NEPHROCAP) cap  1 Cap Oral DAILY      insulin glargine (LANTUS) injection 20 Units  20 Units SubCUTAneous QHS 20 Units at 03/15/21 2226    L.acidophilus-paracasei-S.thermophil-bifidobacter (RISAQUAD) 8 billion cell capsule  1 Cap Oral DAILY 1 Cap at 03/15/21 0906    amLODIPine (NORVASC) tablet 10 mg  10 mg Oral DAILY 10 mg at 03/15/21 0906    diphenhydrAMINE-zinc acetate 2%-0.1% (BENADRYL) cream   Topical TID PRN      bismuth subsalicylate (PEPTO-BISMOL) oral suspension 30 mL  30 mL Oral Q6H PRN 30 mL at 03/15/21 0456    guaiFENesin-dextromethorphan (ROBITUSSIN DM) 100-10 mg/5 mL syrup 10 mL  10 mL Oral Q6H PRN 10 mL at 03/15/21 2225    balsam peru-castor oiL (VENELEX) ointment   Topical BID Given at 03/15/21 2225    insulin lispro (HUMALOG) injection 3 Units  3 Units SubCUTAneous TIDAC 3 Units at 03/15/21 1720    benzonatate (TESSALON) capsule 100 mg  100 mg Oral TID  mg at 03/11/21 1948    acetaminophen (TYLENOL) tablet 650 mg  650 mg Oral Q4H  mg at 03/16/21 0334    hydrALAZINE (APRESOLINE) 20 mg/mL injection 20 mg  20 mg IntraVENous Q6H PRN      [Held by provider] apixaban (ELIQUIS) tablet 5 mg  5 mg Oral BID 5 mg at 03/15/21 0906    dutasteride (AVODART) capsule 0.5 mg  0.5 mg Oral DAILY 0.5 mg at 03/15/21 0905    levothyroxine (SYNTHROID) tablet 137 mcg  137 mcg Oral 6am Stopped at 03/16/21 0600    metoprolol succinate (TOPROL-XL) XL tablet 150 mg  150 mg Oral DAILY 150 mg at 03/15/21 0906    pravastatin (PRAVACHOL) tablet 80 mg  80 mg Oral QHS 80 mg at 03/15/21 2225    pantoprazole (PROTONIX) tablet 40 mg  40 mg Oral ACB 40 mg at 03/15/21 0906    insulin lispro (HUMALOG) injection   SubCUTAneous AC&HS Stopped at 03/15/21 2200    glucose chewable tablet 16 g  4 Tab Oral PRN      dextrose (D50W) injection syrg 12.5-25 g  12.5-25 g IntraVENous PRN      glucagon (GLUCAGEN) injection 1 mg  1 mg IntraMUSCular PRN      sodium chloride (NS) flush 5-40 mL  5-40 mL IntraVENous Q8H 10 mL at 03/16/21 0500    sodium chloride (NS) flush 5-40 mL  5-40 mL IntraVENous PRN 10 mL at 03/08/21 1051    polyethylene glycol (MIRALAX) packet 17 g  17 g Oral DAILY PRN      ondansetron (ZOFRAN ODT) tablet 4 mg  4 mg Oral Q8H PRN      Or    ondansetron (ZOFRAN) injection 4 mg  4 mg IntraVENous Q6H PRN         Labs/Data:    Lab Results   Component Value Date/Time    WBC 7.0 03/15/2021 02:06 AM    HGB 11.8 (L) 03/15/2021 02:06 AM    HCT 40.8 03/15/2021 02:06 AM    PLATELET 986 98/59/5097 02:06 AM    MCV 88.3 03/15/2021 02:06 AM       Lab Results   Component Value Date/Time    Sodium 138 03/16/2021 12:03 AM    Potassium 3.4 (L) 03/16/2021 12:03 AM    Chloride 95 (L) 03/16/2021 12:03 AM    CO2 38 (H) 03/16/2021 12:03 AM    Anion gap 5 03/16/2021 12:03 AM    Glucose 206 (H) 03/16/2021 12:03 AM     (H) 03/16/2021 12:03 AM    Creatinine 4.00 (H) 03/16/2021 12:03 AM    BUN/Creatinine ratio 33 (H) 03/16/2021 12:03 AM    GFR est AA 18 (L) 03/16/2021 12:03 AM    GFR est non-AA 15 (L) 03/16/2021 12:03 AM    Calcium 8.7 03/16/2021 12:03 AM       Wt Readings from Last 3 Encounters:   03/16/21 114.8 kg (253 lb 1.6 oz)   08/03/20 111.1 kg (245 lb)   02/17/20 110.7 kg (244 lb)         Intake/Output Summary (Last 24 hours) at 3/16/2021 0816  Last data filed at 3/16/2021 0737  Gross per 24 hour   Intake 1330 ml   Output 2950 ml   Net -1620 ml       Patient seen and examined. Chart reviewed. Labs, data and other pertinent notes reviewed in last 24 hrs.     PMH/SH/FH reviewed and unchanged compared to H&P    Jessica Mcknight MD

## 2021-03-16 NOTE — DIALYSIS
[facility]                         [phone]  Vitals Pre Post Assessment Pre Post   /75 133/79 LOC A&Ox4 A&Ox4   HR 75 75 Pain denies denies   Temp 98.5 98.1 Lungs Diminishedx5, even&unlaboured Diminishedx5, even&unlaboured   Resp 22 20 Cardiac Reg rate & rhythm Reg rate & rhythm   Weight 115Kg 112.5  Kg Skin Warm & dry Warm & dry      Edema Below waist +3 noted Below waist +3 noted     Orders   Duration: Start: 1530 End: 1830 Total: 3hrs   Dialyzer: Gambro Revaclear   K Bath: 4   Ca Bath: 2.5   Na / Bicarb: 138/35   Target Fluid Removal: 2.5L as tolerated     Access   Type: IJ Catheter   Location: Rt (placement verified radiologically)   Comments: New dressing dry and intact and without drainage; no redness, no edema, no palor, no heat, nor odor noted around Biopatch; acceptable flows upon aspiration. Post-TX all possible blood returned via full rinceback; both cath ports flushed with 10ml 0.9% NS, and capped and taped. Labs   Obtained/Reviewed  Critical Results Called Hepatitis B SAg       Meds Given   Name Dose Route   None ordered                 Total Liters Process: 24.8   Net Fluid Removed: 2.5L      Comments   Pt very anxious and irritable. Pre-tx consents obtained/time-out performed. Tx progressed well and s incident. Writer left pt in no new acute distress and denies complaints c stable vss WNL, bed in low position with side rails upx3, wheels locked x4, and call bell within reach. Reported off to [employer].

## 2021-03-16 NOTE — PROGRESS NOTES
[address] INTERDISCIPLINARY ROUNDS    Cardiopulmonary Care Interdisciplinary Rounds were held today to discuss patient's plan of care and outcomes. The following members were present: NP/Physician, Pharmacy, Nursing and Case Management. PLAN OF CARE:   Continue current treatment plan. Plan to consult PT/OT.      Expected Length of Stay:  4d 0h

## 2021-03-16 NOTE — ROUTINE PROCESS
Name of procedure: Permcath Placement Sedation medications given: 3mg Versed, 75mcg Fentanyl Sedation tolerated: Well Vital Signs: Stable Fluids removed: N/A Samples sent to lab:N/A Any complications related to procedure: None Post Procedure Care Needed/order sets placed in [facility]. Yes Report Called to Guerda Lacy RN at ext. [phone]. SBAR items covered.

## 2021-03-16 NOTE — PROGRESS NOTES
End of Shift Note    Bedside shift change report given to Anastasiya Jovel (oncoming nurse) by Dominick Callejas (offgoing nurse). Report included the following information SBAR, Kardex and MAR    Shift worked:  night     Shift summary and any significant changes:     PRN tylenol given for pain. Still on bumex drip. Has been NPO since MN. Concerns for physician to address:       Zone phone for oncoming shift:          Activity:  Activity Level: Up with Assistance  Number times ambulated in hallways past shift: 0  Number of times OOB to chair past shift: 15    Cardiac:   Cardiac Monitoring: Yes      Cardiac Rhythm: Paced    Access:   Current line(s): PIV     Genitourinary:   Urinary status: voiding    Respiratory:   O2 Device: Nasal cannula  Chronic home O2 use?: NO  Incentive spirometer at bedside: NO     GI:  Last Bowel Movement Date: 03/15/21  Current diet:  DIET ONE TIME MESSAGE  DIET NPO  Passing flatus: YES  Tolerating current diet: YES  % Diet Eaten: 100 %    Pain Management:   Patient states pain is manageable on current regimen: YES    Skin:  Romeo Score: 17  Interventions: float heels, increase time out of bed and PT/OT consult    Patient Safety:  Fall Score:  Total Score: 3  Interventions: bed/chair alarm, assistive device (walker, cane, etc), gripper socks, pt to call before getting OOB and stay with me (per policy)  High Fall Risk: Yes    Length of Stay:  Expected LOS: 4d 0h  Actual LOS: [address] [facility]

## 2021-03-16 NOTE — PROGRESS NOTES
End of Shift Note    Bedside shift change report given to hamilton MCCOY (oncoming nurse) by Nathaniel Bernheim, RN (offgoing nurse). Report included the following information SBAR and Kardex    Shift worked:  Day     Shift summary and any significant changes: Tolerated treatment well. NPO during day for placement of HD port and start of dialysis. Concerns for physician to address: NA     Zone phone for oncoming shift:          Activity:  Activity Level: Up with Assistance  Number times ambulated in hallways past shift: 0  Number of times OOB to chair past shift: 1    Cardiac:   Cardiac Monitoring: Yes      Cardiac Rhythm: Paced    Access:   Current line(s): PIV and HD access     Genitourinary:   Urinary status: voiding    Respiratory:   O2 Device: Nasal cannula  Chronic home O2 use?: NO  Incentive spirometer at bedside: YES     GI:  Last Bowel Movement Date: 03/15/21  Current diet:  DIET ONE TIME MESSAGE  DIET NPO  Passing flatus: YES  Tolerating current diet: YES  % Diet Eaten: 100 %    Pain Management:   Patient states pain is manageable on current regimen: YES    Skin:  Romeo Score: 17  Interventions: increase time out of bed and PT/OT consult    Patient Safety:  Fall Score:  Total Score: 3  Interventions: bed/chair alarm, assistive device (walker, cane, etc) and pt to call before getting OOB  High Fall Risk: Yes    Length of Stay:  Expected LOS: 4d 0h  Actual LOS: NADINE Russell

## 2021-03-16 NOTE — PROGRESS NOTES
Hospitalist Progress Note    NAME: Arlyn Waller   :  1950   MRN:  [id number]       Assessment / Plan:  Acute hypoxic respiratory failure POA (improving)  Acute on chronic diastolic CHF exacerbation POA (improving)  INO on CKD4 progressing  Question of Hospital-acquired pneumonia (?has not been hospitalized since 2019)  Loose bowel movements for 2 days (likely antibiotic associated diarrhea) resolved  -86% on RA in ED, maintaining sats on 2 L oxygen via nasal cannula this morning  -was on IV bumex 3mg IV bid, started on infusion 3/14 as his response had plateued   -continue daily metolazone 2.5 mg daily  -Appreciate nephrology recommendations  -baseline Cr around 3, elevated to 4.76 on admission. Creatinine appears plateaued bellow 4, but BUN continues to rise above 130 now nephrology had opted to initiate dialysis the patient is started to become uremic  3/16/2021 permacath inserted and patient is getting his first dialysis session  -renally dose meds, hold nephrotoxic agents if possible  -JANINE's revealed -1205 cc/24-hr. Weight almost 10 pounds less than admission weight  -does not wear O2 at home and currently on 2L NC. Continue try weaning oxygen as tolerated  Respiratory distress , leukocytosis, fever and cough  X-ray with bibasilar opacities. started vancomycin and Zosyn treating hospital-acquired pneumonia . procalcitonin 0.38 from Procalcitonin on  was 0.12.   3/11 no fever in last 48 hours. repeat porcal repeated 3/12, trending down. stopped piperacillin and vancomycin  Added probiotic.    proBNP improving  added thiamine 500 mg IV 11/3 and then 200 mg po daily thereafter        Hypokalemia, resolved  -Continue checking BMP daily given he is on Bumex and metolazone     Acute on chronic diastolic CHF  -Diuresis as above  -TTE with EF 50 to 55%  -Continue BB  -Appreciate cardiology recommendations  -Mild troponin elevation in setting of uncontrolled hypertension and INO  -BNP > 19k  Nuclear cardiac stress test Inconclusive stress test/No ischemia or infarct demonstrated    Skin rash  -Rt flank, mild contusion  -no vesicles. Does not appear dermatomal   -states area became painful after sliding on CT scanner when he was admitted  -monitor, try topical benadryl    Uncontrolled hypertension  -better-controlled today  -As above, continue beta-blocker.  -As needed hydralazine    IDDM  -Goal blood sugar 140-190.   -Was on Lantus per home dose 30 units nightly,, blood sugars below target so we will decrease the dose to 20 (3/12/2021)  -added back mealtime insulin at reduced dose   -SSI with POC's. Adjust insulin as indicated based on POC values    Lower extremity edema  -Bilateral lower extremity venous Dopplers negative  -Diuresis as above    LE/foot wounds POA  -large RLE medical ankle bullae from fluid overload, b/l heel DTIs, Lt 5th toe blister  -appreciate wound care help  -to follow at [facility], where he has been seen before    Chronic A. fib  -Continue Eliquis  -s/p PPM  -Continue metoprolol     Hypothyroidism  -Continue Synthroid     Code Status: Full code  Surrogate Decision Maker: Wife     DVT Prophylaxis: Eliquis on hold today for insertion of dialysis catheter tomorrow  GI Prophylaxis: not indicated     Baseline: Ambulatory at home     Subjective:     Chief Complaint / Reason for Physician Visit  Patient was seen and examined. No acute events overnight. Continue follow-up of Mr. Lopez Landis, with pulmonary edema, volume overload, advancing kidney disease now started on dialysis. Discussed with RN events overnight.      \"I feel okay\"    Review of Systems:  Symptom Y/N Comments  Symptom Y/N Comments   Fever/Chills n   Chest Pain n    Poor Appetite    Edema y  bilateral lower extremities   Cough y   Abdominal Pain n    Sputum    Joint Pain     SOB/BETTS n   Pruritis/Rash     Nausea/vomit n   Tolerating PT/OT     Diarrhea    Tolerating Diet y    Constipation    Other       Could NOT obtain due to:          Objective:     VITALS:   Last 24hrs VS reviewed since prior progress note. Most recent are:  Patient Vitals for the past 24 hrs:   Temp Pulse Resp BP SpO2   03/16/21 1800  75 18 129/75 98 %   03/16/21 1745  75 18 134/73 98 %   03/16/21 1730  75 18 136/71 99 %   03/16/21 1715  75 18 (!) 146/78 99 %   03/16/21 1700  75 20 (!) 149/78 99 %   03/16/21 1645  75 20 138/67 99 %   03/16/21 1630  75 18 (!) 141/72 98 %   03/16/21 1615  75 18 127/69 99 %   03/16/21 1600 97.6 °F (36.4 °C) 75 20 (!) 123/55    03/16/21 1545  75 22 (!) 153/68    03/16/21 1530 98.5 °F (36.9 °C) 75 22 (!) 157/75    03/16/21 1520  75 20 (!) 123/59 99 %   03/16/21 1505  75 20 (!) 113/56 98 %   03/16/21 1500  75 19 (!) 143/58 100 %   03/16/21 1455  75 17 (!) 131/56 100 %   03/16/21 1450  75 18 (!) 156/58 98 %   03/16/21 1139 98.1 °F (36.7 °C) 75 18 (!) 141/65 (!) 88 %   03/16/21 0735 98.1 °F (36.7 °C) 74 18 (!) 152/66 94 %   03/16/21 0353 98 °F (36.7 °C) 76 16 139/71 94 %   03/15/21 2320 97.7 °F (36.5 °C) 75 16 136/64 94 %   03/15/21 2037 97.7 °F (36.5 °C) 75 18 135/67 92 %       Intake/Output Summary (Last 24 hours) at 3/16/2021 1809  Last data filed at 3/16/2021 1800  Gross per 24 hour   Intake 450 ml   Output 41679 ml   Net -82943 ml        I had a face to face encounter and independently examined this patient on 3/16/2021, as outlined below:  PHYSICAL EXAM:  General: WD, WN. Alert, cooperative, no acute distress    EENT:  EOMI. Anicteric sclerae. MMM  Resp:  Decreased breath sounds in the subscapular areas. No accessory muscle use  CV:  Regular  rhythm,  +3 pitting edema bilateral lower extremities  GI:  Soft, Non distended, Non tender. +Bowel sounds  Neurologic:  Alert and oriented X 3, normal speech,   Psych:   Good insight. Not anxious nor agitated  Skin:  No rashes. No jaundice.   Mild contusion in the right flank    Reviewed most current lab test results and cultures  YES  Reviewed most current radiology test results   YES  Review and summation of old records today    NO  Reviewed patient's current orders and MAR    YES  PMH/SH reviewed - no change compared to H&P  ________________________________________________________________________  Care Plan discussed with:    Comments   Patient x    Family      RN x    Care Manager     Consultant                        Multidiciplinary team rounds were held today with , nursing, pharmacist and clinical coordinator. Patient's plan of care was discussed; medications were reviewed and discharge planning was addressed. ________________________________________________________________________  Total NON critical care TIME: 23 Minutes    Total CRITICAL CARE TIME Spent:   Minutes non procedure based      Comments   >50% of visit spent in counseling and coordination of care x    ________________________________________________________________________  Geronimo Granados MD     Procedures: see electronic medical records for all procedures/Xrays and details which were not copied into this note but were reviewed prior to creation of Plan. LABS:  I reviewed today's most current labs and imaging studies.   Pertinent labs include:  Recent Labs     03/15/21  0206   WBC 7.0   HGB 11.8*   HCT 40.8        Recent Labs     03/16/21  0003 03/15/21  0206 03/14/21  0239    139 138   K 3.4* 3.5 3.7   CL 95* 96* 97   CO2 38* 38* 39*   * 151* 170*   * 132* 130*   CREA 4.00* 3.97* 3.98*   CA 8.7 8.5 8.6   MG 2.5* 2.6* 2.5*   PHOS 4.4 4.9* 5.9*   INR 1.2*  --   --        Signed: Geronimo Granados MD

## 2021-03-17 LAB
ALBUMIN SERPL-MCNC: 3 G/DL (ref 3.5–5)
ALBUMIN/GLOB SERPL: 0.7 {RATIO} (ref 1.1–2.2)
ALP SERPL-CCNC: 51 U/L (ref 45–117)
ALT SERPL-CCNC: 18 U/L (ref 12–78)
ANION GAP SERPL CALC-SCNC: 7 MMOL/L (ref 5–15)
AST SERPL-CCNC: 25 U/L (ref 15–37)
BILIRUB SERPL-MCNC: 0.8 MG/DL (ref 0.2–1)
BUN SERPL-MCNC: 89 MG/DL (ref 6–20)
BUN/CREAT SERPL: 27 (ref 12–20)
CALCIUM SERPL-MCNC: 9 MG/DL (ref 8.5–10.1)
CHLORIDE SERPL-SCNC: 98 MMOL/L (ref 97–108)
CO2 SERPL-SCNC: 31 MMOL/L (ref 21–32)
CREAT SERPL-MCNC: 3.32 MG/DL (ref 0.7–1.3)
ERYTHROCYTE [DISTWIDTH] IN BLOOD BY AUTOMATED COUNT: 13.8 % (ref 11.5–14.5)
GLOBULIN SER CALC-MCNC: 4.2 G/DL (ref 2–4)
GLUCOSE BLD STRIP.AUTO-MCNC: 155 MG/DL (ref 65–100)
GLUCOSE BLD STRIP.AUTO-MCNC: 194 MG/DL (ref 65–100)
GLUCOSE BLD STRIP.AUTO-MCNC: 195 MG/DL (ref 65–100)
GLUCOSE BLD STRIP.AUTO-MCNC: 201 MG/DL (ref 65–100)
GLUCOSE SERPL-MCNC: 223 MG/DL (ref 65–100)
HCT VFR BLD AUTO: 42.5 % (ref 36.6–50.3)
HGB BLD-MCNC: 12.5 G/DL (ref 12.1–17)
MAGNESIUM SERPL-MCNC: 2.4 MG/DL (ref 1.6–2.4)
MCH RBC QN AUTO: 25.9 PG (ref 26–34)
MCHC RBC AUTO-ENTMCNC: 29.4 G/DL (ref 30–36.5)
MCV RBC AUTO: 88 FL (ref 80–99)
NRBC # BLD: 0 K/UL (ref 0–0.01)
NRBC BLD-RTO: 0 PER 100 WBC
PHOSPHATE SERPL-MCNC: 4 MG/DL (ref 2.6–4.7)
PLATELET # BLD AUTO: 164 K/UL (ref 150–400)
PMV BLD AUTO: 10.7 FL (ref 8.9–12.9)
POTASSIUM SERPL-SCNC: 3.9 MMOL/L (ref 3.5–5.1)
PROT SERPL-MCNC: 7.2 G/DL (ref 6.4–8.2)
RBC # BLD AUTO: 4.83 M/UL (ref 4.1–5.7)
SERVICE CMNT-IMP: ABNORMAL
SODIUM SERPL-SCNC: 136 MMOL/L (ref 136–145)
WBC # BLD AUTO: 8.5 K/UL (ref 4.1–11.1)

## 2021-03-17 PROCEDURE — 74011250637 HC RX REV CODE- 250/637: Performed by: INTERNAL MEDICINE

## 2021-03-17 PROCEDURE — 74011250637 HC RX REV CODE- 250/637: Performed by: NURSE PRACTITIONER

## 2021-03-17 PROCEDURE — 82962 GLUCOSE BLOOD TEST: CPT

## 2021-03-17 PROCEDURE — 97116 GAIT TRAINING THERAPY: CPT

## 2021-03-17 PROCEDURE — 97165 OT EVAL LOW COMPLEX 30 MIN: CPT | Performed by: OCCUPATIONAL THERAPIST

## 2021-03-17 PROCEDURE — 74011250637 HC RX REV CODE- 250/637: Performed by: STUDENT IN AN ORGANIZED HEALTH CARE EDUCATION/TRAINING PROGRAM

## 2021-03-17 PROCEDURE — 74011250636 HC RX REV CODE- 250/636: Performed by: INTERNAL MEDICINE

## 2021-03-17 PROCEDURE — 83735 ASSAY OF MAGNESIUM: CPT

## 2021-03-17 PROCEDURE — 74011636637 HC RX REV CODE- 636/637: Performed by: STUDENT IN AN ORGANIZED HEALTH CARE EDUCATION/TRAINING PROGRAM

## 2021-03-17 PROCEDURE — 97161 PT EVAL LOW COMPLEX 20 MIN: CPT

## 2021-03-17 PROCEDURE — 65660000000 HC RM CCU STEPDOWN

## 2021-03-17 PROCEDURE — 74011636637 HC RX REV CODE- 636/637: Performed by: INTERNAL MEDICINE

## 2021-03-17 PROCEDURE — 77010033678 HC OXYGEN DAILY

## 2021-03-17 PROCEDURE — 36415 COLL VENOUS BLD VENIPUNCTURE: CPT

## 2021-03-17 PROCEDURE — 97530 THERAPEUTIC ACTIVITIES: CPT

## 2021-03-17 PROCEDURE — 85027 COMPLETE CBC AUTOMATED: CPT

## 2021-03-17 PROCEDURE — 84100 ASSAY OF PHOSPHORUS: CPT

## 2021-03-17 PROCEDURE — 80053 COMPREHEN METABOLIC PANEL: CPT

## 2021-03-17 PROCEDURE — 90935 HEMODIALYSIS ONE EVALUATION: CPT

## 2021-03-17 PROCEDURE — 97535 SELF CARE MNGMENT TRAINING: CPT | Performed by: OCCUPATIONAL THERAPIST

## 2021-03-17 RX ORDER — HEPARIN SODIUM 1000 [USP'U]/ML
3800 INJECTION, SOLUTION INTRAVENOUS; SUBCUTANEOUS
Status: DISCONTINUED | OUTPATIENT
Start: 2021-03-17 | End: 2021-03-19 | Stop reason: HOSPADM

## 2021-03-17 RX ADMIN — PRAVASTATIN SODIUM 80 MG: 40 TABLET ORAL at 22:26

## 2021-03-17 RX ADMIN — Medication 10 ML: at 06:29

## 2021-03-17 RX ADMIN — ACETAMINOPHEN 650 MG: 325 TABLET ORAL at 01:29

## 2021-03-17 RX ADMIN — GUAIFENESIN AND DEXTROMETHORPHAN 10 ML: 100; 10 SYRUP ORAL at 01:29

## 2021-03-17 RX ADMIN — ACETAMINOPHEN 650 MG: 325 TABLET ORAL at 09:18

## 2021-03-17 RX ADMIN — INSULIN LISPRO 3 UNITS: 100 INJECTION, SOLUTION INTRAVENOUS; SUBCUTANEOUS at 12:35

## 2021-03-17 RX ADMIN — GUAIFENESIN AND DEXTROMETHORPHAN 10 ML: 100; 10 SYRUP ORAL at 09:18

## 2021-03-17 RX ADMIN — ACETAMINOPHEN 650 MG: 325 TABLET ORAL at 22:32

## 2021-03-17 RX ADMIN — INSULIN LISPRO 3 UNITS: 100 INJECTION, SOLUTION INTRAVENOUS; SUBCUTANEOUS at 08:20

## 2021-03-17 RX ADMIN — HEPARIN SODIUM 3800 UNITS: 1000 INJECTION INTRAVENOUS; SUBCUTANEOUS at 17:35

## 2021-03-17 RX ADMIN — INSULIN LISPRO 2 UNITS: 100 INJECTION, SOLUTION INTRAVENOUS; SUBCUTANEOUS at 08:20

## 2021-03-17 RX ADMIN — PANTOPRAZOLE SODIUM 40 MG: 40 TABLET, DELAYED RELEASE ORAL at 06:28

## 2021-03-17 RX ADMIN — Medication 10 ML: at 23:52

## 2021-03-17 RX ADMIN — Medication 5 ML: at 14:00

## 2021-03-17 RX ADMIN — INSULIN LISPRO 2 UNITS: 100 INJECTION, SOLUTION INTRAVENOUS; SUBCUTANEOUS at 12:35

## 2021-03-17 RX ADMIN — INSULIN GLARGINE 20 UNITS: 100 INJECTION, SOLUTION SUBCUTANEOUS at 22:24

## 2021-03-17 RX ADMIN — Medication 1 CAPSULE: at 08:21

## 2021-03-17 RX ADMIN — CASTOR OIL AND BALSAM, PERU: 788; 87 OINTMENT TOPICAL at 08:32

## 2021-03-17 RX ADMIN — DUTASTERIDE 0.5 MG: 0.5 CAPSULE, LIQUID FILLED ORAL at 08:21

## 2021-03-17 RX ADMIN — LEVOTHYROXINE SODIUM 137 MCG: 0.03 TABLET ORAL at 06:28

## 2021-03-17 RX ADMIN — METOPROLOL SUCCINATE 150 MG: 50 TABLET, EXTENDED RELEASE ORAL at 08:21

## 2021-03-17 RX ADMIN — INSULIN LISPRO 3 UNITS: 100 INJECTION, SOLUTION INTRAVENOUS; SUBCUTANEOUS at 18:38

## 2021-03-17 RX ADMIN — INSULIN LISPRO 2 UNITS: 100 INJECTION, SOLUTION INTRAVENOUS; SUBCUTANEOUS at 22:25

## 2021-03-17 RX ADMIN — APIXABAN 5 MG: 5 TABLET, FILM COATED ORAL at 18:39

## 2021-03-17 RX ADMIN — NEPHROCAP 1 CAPSULE: 1 CAP ORAL at 08:21

## 2021-03-17 RX ADMIN — CASTOR OIL AND BALSAM, PERU: 788; 87 OINTMENT TOPICAL at 22:24

## 2021-03-17 RX ADMIN — GUAIFENESIN AND DEXTROMETHORPHAN 10 ML: 100; 10 SYRUP ORAL at 22:32

## 2021-03-17 RX ADMIN — INSULIN LISPRO 2 UNITS: 100 INJECTION, SOLUTION INTRAVENOUS; SUBCUTANEOUS at 18:39

## 2021-03-17 RX ADMIN — AMLODIPINE BESYLATE 10 MG: 5 TABLET ORAL at 08:21

## 2021-03-17 NOTE — PROGRESS NOTES
Name: Doc Rowell   MRN: [id number]  : 1950      Assessment   :                                               Plan:  INO on CKD-4 -     Hypokalemia    High Mg    Severe Azotemia-   HTN  DM-2  Volume overload  A on C D-CHF  Obesity  CAD Tolerated hd / uf yesterday with 2.5 l pulled ! UF x 1 hour today and HD x 2 hours daily while here  He can dialyze at [facility] on second shift MWF 11:15 time slot  Pt is ADAMANT about leaving hospital on Thursday b/c he is scheduled for the covid shot at [facility] on Friday at 3:30    DC planning-needs to be off 02  Hopefully after hd today and tomorrow he won't need 02  As we are aggressively pulling fluid. Ideally would dialyze thru fri am-dc and report to unit on Monday for  Outpatient hd. Subjective:  oob in chair . remains edematous, but subjectively he feels improved  ROS:   No nausea, no vomiting  No chest pain, + shortness of breath and leg edema a little better    Exam:  Visit Vitals  /63 (BP 1 Location: Left upper arm, BP Patient Position: At rest)   Pulse 75   Temp 98.1 °F (36.7 °C)   Resp 16   Ht 5' 9\" (1.753 m)   Wt 111.8 kg (246 lb 7.6 oz)   SpO2 96%   BMI 36.40 kg/m²     WB/WN in NAD  Decreased bs  Irregular, distant, no tachycardia  1-2+ leg edema/wrapped;weeping  Alert    Current Facility-Administered Medications   Medication Dose Route Frequency Last Admin    B complex-vitaminC-folic acid (NEPHROCAP) cap  1 Cap Oral DAILY 1 Cap at 21 0821    insulin glargine (LANTUS) injection 20 Units  20 Units SubCUTAneous QHS 20 Units at 21 2233    L.acidophilus-paracasei-S.thermophil-bifidobacter (RISAQUAD) 8 billion cell capsule  1 Cap Oral DAILY 1 Cap at 21 0821    diphenhydrAMINE-zinc acetate 2%-0.1% (BENADRYL) cream   Topical TID PRN      bismuth subsalicylate (PEPTO-BISMOL) oral suspension 30 mL  30 mL Oral Q6H PRN 30 mL at 03/15/21 0456    guaiFENesin-dextromethorphan (ROBITUSSIN DM) 100-10 mg/5 mL syrup 10 mL  10 mL Oral Q6H PRN 10 mL at 03/17/21 0918    balsam peru-castor oiL (VENELEX) ointment   Topical BID Given at 03/17/21 3209    insulin lispro (HUMALOG) injection 3 Units  3 Units SubCUTAneous TIDAC 3 Units at 03/17/21 0820    benzonatate (TESSALON) capsule 100 mg  100 mg Oral TID  mg at 03/11/21 1948    acetaminophen (TYLENOL) tablet 650 mg  650 mg Oral Q4H  mg at 03/17/21 0918    hydrALAZINE (APRESOLINE) 20 mg/mL injection 20 mg  20 mg IntraVENous Q6H PRN      apixaban (ELIQUIS) tablet 5 mg  5 mg Oral BID 5 mg at 03/15/21 0906    dutasteride (AVODART) capsule 0.5 mg  0.5 mg Oral DAILY 0.5 mg at 03/17/21 1380    levothyroxine (SYNTHROID) tablet 137 mcg  137 mcg Oral 6am 137 mcg at 03/17/21 5999    metoprolol succinate (TOPROL-XL) XL tablet 150 mg  150 mg Oral DAILY 150 mg at 03/17/21 4891    pravastatin (PRAVACHOL) tablet 80 mg  80 mg Oral QHS 80 mg at 03/16/21 2231    pantoprazole (PROTONIX) tablet 40 mg  40 mg Oral ACB 40 mg at 03/17/21 7738    insulin lispro (HUMALOG) injection   SubCUTAneous AC&HS 2 Units at 03/17/21 0820    glucose chewable tablet 16 g  4 Tab Oral PRN      dextrose (D50W) injection syrg 12.5-25 g  12.5-25 g IntraVENous PRN      glucagon (GLUCAGEN) injection 1 mg  1 mg IntraMUSCular PRN      sodium chloride (NS) flush 5-40 mL  5-40 mL IntraVENous Q8H 10 mL at 03/17/21 0629    sodium chloride (NS) flush 5-40 mL  5-40 mL IntraVENous PRN 10 mL at 03/08/21 1051    polyethylene glycol (MIRALAX) packet 17 g  17 g Oral DAILY PRN      ondansetron (ZOFRAN ODT) tablet 4 mg  4 mg Oral Q8H PRN      Or    ondansetron (ZOFRAN) injection 4 mg  4 mg IntraVENous Q6H PRN         Labs/Data:    Lab Results   Component Value Date/Time    WBC 8.5 03/17/2021 03:24 AM    HGB 12.5 03/17/2021 03:24 AM    HCT 42.5 03/17/2021 03:24 AM    PLATELET 955 03/17/2021 03:24 AM    MCV 88.0 03/17/2021 03:24 AM       Lab Results   Component Value Date/Time    Sodium 136 03/17/2021 03:24 AM    Potassium 3.9 03/17/2021 03:24 AM    Chloride 98 03/17/2021 03:24 AM    CO2 31 03/17/2021 03:24 AM    Anion gap 7 03/17/2021 03:24 AM    Glucose 223 (H) 03/17/2021 03:24 AM    BUN 89 (H) 03/17/2021 03:24 AM    Creatinine 3.32 (H) 03/17/2021 03:24 AM    BUN/Creatinine ratio 27 (H) 03/17/2021 03:24 AM    GFR est AA 22 (L) 03/17/2021 03:24 AM    GFR est non-AA 18 (L) 03/17/2021 03:24 AM    Calcium 9.0 03/17/2021 03:24 AM       Wt Readings from Last 3 Encounters:   03/17/21 111.8 kg (246 lb 7.6 oz)   08/03/20 111.1 kg (245 lb)   02/17/20 110.7 kg (244 lb)         Intake/Output Summary (Last 24 hours) at 3/17/2021 1234  Last data filed at 3/17/2021 0743  Gross per 24 hour   Intake 120 ml   Output 62087 ml   Net -49419 ml       Patient seen and examined. Chart reviewed. Labs, data and other pertinent notes reviewed in last 24 hrs.     PMH/SH/FH reviewed and unchanged compared to H&P    Meaghan Marroquin MD

## 2021-03-17 NOTE — PROGRESS NOTES
Problem: Mobility Impaired (Adult and Pediatric)  Goal: *Acute Goals and Plan of Care (Insert Text)  Description: FUNCTIONAL STATUS PRIOR TO ADMISSION: Patient was ambulatory with rollator at home and community, uses cane to step into house    [facility]: Patient lives with wife in a single story home, indep with ADLs except required assist with shoes and socks    Physical Therapy Goals  Initiated 3/17/2021  1. Patient will move from supine to sit and sit to supine  in bed with independence within 7 day(s). 2.  Patient will transfer from bed to chair and chair to bed with supervision/set-up using the least restrictive device within 7 day(s). 3.  Patient will perform sit to stand with supervision/set-up within 7 day(s). 4.  Patient will ambulate with supervision/set-up for 100 feet with the least restrictive device within 7 day(s). 5.  Patient will ascend/descend 1 step with  handrail(s) with minimal assistance/contact guard assist within 7 day(s). Outcome: Not Met   PHYSICAL THERAPY EVALUATION  Patient: Doc Rowell (74 y.o. male)  Date: 3/17/2021  Primary Diagnosis: CHF exacerbation ([facility] [I50.9]  Acute respiratory failure ([facility] [J96.00]  INO (acute kidney injury) ([facility] [N17.9]        Precautions: falls       ASSESSMENT  Based on the objective data described below, the patient presents with general weakness, decreased tolerance of activity, and impaired functional mobility following admission for CHF, ARF and INO. Patient received in bathroom with PCA, was able to stand with use of grab bar but needed assist to pull up underwear. Patient able to ambulate in room x approx 30 feet with rollator and CGA, mild unsteadiness but no overt LOB. Patient returned to sitting in chair, instructed in seated exercises and falls prevention and left with call bell in reach. Patient is below baseline but should be able to discharge home with increased assistance from wife and HHPT. Current Level of Function Impacting Discharge (mobility/balance): CGA to ambulate    Functional Outcome Measure: The patient scored 50/100 on the Barthel  outcome measure which is indicative of moderate functional impairment     Other factors to consider for discharge: falls risk, below baseline     Patient will benefit from skilled therapy intervention to address the above noted impairments. PLAN :  Recommendations and Planned Interventions: bed mobility training, transfer training, gait training, therapeutic exercises, patient and family training/education, and therapeutic activities      Frequency/Duration: Patient will be followed by physical therapy:  4 times a week to address goals. Recommendation for discharge: (in order for the patient to meet his/her long term goals)  Physical therapy at least 2 days/week in the home AND ensure assist and/or supervision for safety with mobility    This discharge recommendation:  A follow-up discussion with the attending provider and/or case management is planned    IF patient discharges home will need the following DME: patient owns DME required for discharge         SUBJECTIVE:   Patient stated I am weaker than usual, I used to be really strong.     OBJECTIVE DATA SUMMARY:   HISTORY:    Past Medical History:   Diagnosis Date    Arrhythmia     atrial fibrillation 2017    Arthritis     CAD (coronary atherosclerotic disease)     Chronic kidney disease     Chronic pain     Congestive heart failure ([address]     Diabetes ([address]     Diabetes mellitus type 2, controlled ([address] 3/13/2017    Heart failure ([address]     Hx of CABG 3/13/2017    CABG in 2010 in [place]    Hypertension     Long term current use of anticoagulant therapy     Morbid obesity ([address] 3/13/2017    JACEY (obstructive sleep apnea) 6/23/2017    S/P CABG x 2 2010    Skin cancer     Thyroid disease      Past Surgical History:   Procedure Laterality Date    HX ORTHOPAEDIC      L 3rd toe amputation in 1999 HX PACEMAKER  2018    Dr Sada Pate    IR INSERT TUNL CVC W/O PORT OVER 5 YR  3/16/2021    [place] CARDIAC SURG PROCEDURE UNLIST      CABGx2 in 2010       Personal factors and/or comorbidities impacting plan of care: multiple co-morbidities,wounds    Home Situation  Home Environment: Private residence  # Steps to Enter: 1  Rails to [employer]: (uses cane on step)  One/Two Story Residence: One story  Living Alone: No  Support Systems: Spouse/Significant Other/Partner, Family member(s)  Patient Expects to be Discharged to[de-identified] Patient room  Current DME Used/Available at Home: Juan Canary, quad, Izell Sarks, rollator, Shower chair, Grab bars  Tub or Shower Type: Shower    EXAMINATION/PRESENTATION/DECISION MAKING:   Critical Behavior:  Neurologic State: Alert  Orientation Level: Oriented X4  Cognition: Appropriate for age attention/concentration, Follows commands  Safety/Judgement: Home safety, Fall prevention  Hearing: Auditory  Auditory Impairment: None  Range Of Motion:  AROM: Within functional limits                       Strength:    Strength: Generally decreased, functional                    Tone & Sensation:   Tone: Normal              Sensation: Impaired(neuropathy)               Coordination:     Vision:   Acuity: Impaired near vision; Impaired far vision  Corrective Lenses: Reading glasses  Functional Mobility:  Bed Mobility:              Transfers:  Sit to Stand: Contact guard assistance  Stand to Sit: Contact guard assistance        Bed to Chair: Contact guard assistance              Balance:   Sitting: Intact  Standing: Impaired  Standing - Static: Fair  Standing - Dynamic : Fair  Ambulation/Gait Training:  Distance (ft): 30 Feet (ft)  Assistive Device: Gait belt;Walker, rollator  Ambulation - Level of Assistance: Contact guard assistance        Gait Abnormalities: Decreased step clearance        Base of Support: Widened     Speed/Ariane: Pace decreased (<100 feet/min)  Step Length: Left shortened;Right shortened Stairs:                  Functional Measure:  Barthel Index:    Bathin  Bladder: 10  Bowels: 10  Groomin  Dressin  Feeding: 10  Mobility: 0  Stairs: 0  Toilet Use: 5  Transfer (Bed to Chair and Back): 10  Total: 50/100       The Barthel ADL Index: Guidelines  1. The index should be used as a record of what a patient does, not as a record of what a patient could do. 2. The main aim is to establish degree of independence from any help, physical or verbal, however minor and for whatever reason. 3. The need for supervision renders the patient not independent. 4. A patient's performance should be established using the best available evidence. Asking the patient, friends/relatives and nurses are the usual sources, but direct observation and common sense are also important. However direct testing is not needed. 5. Usually the patient's performance over the preceding 24-48 hours is important, but occasionally longer periods will be relevant. 6. Middle categories imply that the patient supplies over 50 per cent of the effort. 7. Use of aids to be independent is allowed. Cecelia Rodriguez., Barthel, D.W. ([id number]). Functional evaluation: the Barthel Index. [facility] (14)2. EZRA King, Adal Morales., Alie Ceja., [place], [address] [place] [address] (). Measuring the change indisability after inpatient rehabilitation; comparison of the responsiveness of the Barthel Index and Functional [place] Measure. Journal of Neurology, Neurosurgery, and Psychiatry, 66(4), 330-311. Fredrick Blue N.J.A, BENITO Nava, & Storm Betts M.A. (2004.) Assessment of post-stroke quality of life in cost-effectiveness studies: The usefulness of the Barthel Index and the EuroQoL-5D.  Quality of Life Research, 15, 245-64           Physical Therapy Evaluation Charge Determination   History Examination Presentation Decision-Making   MEDIUM  Complexity : 1-2 comorbidities / personal factors will impact the outcome/ POC LOW Complexity : 1-2 Standardized tests and measures addressing body structure, function, activity limitation and / or participation in recreation  LOW Complexity : Stable, uncomplicated  LOW Complexity : FOTO score of       Based on the above components, the patient evaluation is determined to be of the following complexity level: LOW     Pain Rating:      Activity Tolerance:   Fair    After treatment patient left in no apparent distress:   Sitting in chair and Call bell within reach    COMMUNICATION/EDUCATION:   The patients plan of care was discussed with: Occupational therapist and Registered nurse. Fall prevention education was provided and the patient/caregiver indicated understanding. and Patient/family agree to work toward stated goals and plan of care.     Thank you for this referral.  Javier Villegas, PT   Time Calculation: 28 mins

## 2021-03-17 NOTE — PROGRESS NOTES
0700: Bedside shift change report given to Baljit Barajas RN (oncoming nurse) by Angelique Jorgensen RN (offgoing nurse). Report included the following information SBAR, ED Summary, Intake/Output, MAR, Recent Results and Cardiac Rhythm Paced. 1230: TRANSFER - OUT REPORT:    Verbal report given to Flex Ku RN (name) on Richard Mcclendon  being transferred to Dialysis (unit) for ordered procedure       Report consisted of patients Situation, Background, Assessment and   Recommendations(SBAR). Information from the following report(s) SBAR and MAR was reviewed with the receiving nurse. Lines:   Peripheral IV 03/04/21 Right Antecubital (Active)   Site Assessment Clean, dry, & intact 03/17/21 0743   Phlebitis Assessment 0 03/17/21 0743   Infiltration Assessment 0 03/17/21 0743   Dressing Status Clean, dry, & intact 03/17/21 0743   Dressing Type Transparent 03/17/21 0743   Hub Color/Line Status Pink;Capped 03/17/21 0743   Action Taken Open ports on tubing capped;Dressing reinforced 03/17/21 0743   Alcohol Cap Used Yes 03/17/21 0743        Opportunity for questions and clarification was provided. Patient transported with:   Monitor  O2 @ 2 liters    1300: Pt refused dialysis. Pt was confused on dialysis procedure. Dr. Perez Huang was at the bedside and we educated the patient on plan of care. Pt more willing. Will call dialysis for transport. 1400: Pt off floor for dialysis. 1700: TRANSFER - IN REPORT:    Verbal report received from Flex Ku, [facility] (name) on Richard Mcclendon  being received from Dialysis (unit) for routine progression of care      Report consisted of patients Situation, Background, Assessment and   Recommendations(SBAR). Information from the following report(s) Procedure Summary was reviewed with the receiving nurse. Opportunity for questions and clarification was provided. Assessment completed upon patients arrival to unit and care assumed. 1800: pt in chair eating dinner.  No further complaints at this time. 1900: End of Shift Note    Bedside shift change report given to Renata Andrade RN (oncoming nurse) by Marisela Callaway RN (offgoing nurse). Report included the following information SBAR, ED Summary, Procedure Summary, Intake/Output, MAR, Recent Results and Cardiac Rhythm Paced    Shift worked:  [id number]     Shift summary and any significant changes:     see above     Concerns for physician to address:  none     Zone phone for oncoming shift:   [phone]       Activity:  Activity Level: Up with Assistance  Number times ambulated in hallways past shift: 0  Number of times OOB to chair past shift: 2    Cardiac:   Cardiac Monitoring: Yes      Cardiac Rhythm: Paced    Access:   Current line(s): PIV and central line     Genitourinary:   Urinary status: voiding    Respiratory:   O2 Device: Nasal cannula  Chronic home O2 use?: YES  Incentive spirometer at bedside: YES     GI:  Last Bowel Movement Date: 03/15/21  Current diet:  DIET ONE TIME MESSAGE  DIET DIABETIC CONSISTENT CARB Regular; 2 GM NA (House Low NA); FR 1500ML; 80-80-80  DIET ONE TIME MESSAGE  Passing flatus: YES  Tolerating current diet: YES  % Diet Eaten: 100 %    Pain Management:   Patient states pain is manageable on current regimen: YES    Skin:  Romeo Score: 17  Interventions: float heels, increase time out of bed and PT/OT consult    Patient Safety:  Fall Score:  Total Score: 3  Interventions: bed/chair alarm, assistive device (walker, cane, etc), gripper socks and pt to call before getting OOB  High Fall Risk: Yes    Length of Stay:  Expected LOS: 4d 0h  Actual LOS: 13      Marisela Callaway RN

## 2021-03-17 NOTE — PROGRESS NOTES
TRANSFER - IN REPORT:    Verbal report received from Amando Brower RN on Esaw Evelia  being received from [facility] for ordered procedure      Report consisted of patients Situation, Background, Assessment and   Recommendations(SBAR). Information from the following report(s) SBAR, Procedure Summary, Intake/Output, Recent Results and Cardiac Rhythm paceed was reviewed with the receiving nurse. Opportunity for questions and clarification was provided. Assessment completed upon patients arrival to unit and care assumed.

## 2021-03-17 NOTE — PROGRESS NOTES
Problem: Self Care Deficits Care Plan (Adult)  Goal: *Acute Goals and Plan of Care (Insert Text)  Description: FUNCTIONAL STATUS PRIOR TO ADMISSION: Patient uses rollator. His wife assists with shoes and socks, but he is able to manage remaining ADL. Patient does not drive due to vision. HOME SUPPORT: Lives with his wife. Reports no other local family to assist.    Occupational Therapy Goals  Initiated 3/17/2021  1. Patient will perform grooming standing at the sink with modified independence within 7 day(s). 2.  Patient will perform bathing with supervision/SBA within 7 day(s). 3.  Patient will perform lower body dressing with minimal assistance within 7 day(s). 4.  Patient will perform toilet transfers with modified independence within 7 day(s). 5.  Patient will perform all aspects of toileting with modified independence within 7 day(s). Outcome: Progressing Towards Goal    OCCUPATIONAL THERAPY EVALUATION  Patient: Arlyn Waller (7 y.o. male)  Date: 3/17/2021  Primary Diagnosis: CHF exacerbation ([facility] [I50.9]  Acute respiratory failure ([facility] [J96.00]  INO (acute kidney injury) ([facility] [N17.9]        Precautions:        ASSESSMENT  Based on the objective data described below, the patient presents with deficits in self-care, primarily due to decreased activity tolerance, impaired standing tolerance and balance, and mild weakness. He demonstrates good safety awareness. Patient's vital signs were stable on 2 liters O2. Patient would like to go home with home health and indicates that his wife will be able to assist with ADL upon discharge, if needed. Patient is functioning below his baseline and will benefit from skilled OT treatment to maximize safety and independence in ADL. Current Level of Function Impacting Discharge (ADLs/self-care): Mod A to CGA    Functional Outcome Measure: The patient scored 50/100 on the Barthel Index outcome measure which is indicative of moderate functional deficits. Other factors to consider for discharge: LE wounds; wife will be able to assist with ADL, if needed     Patient will benefit from skilled therapy intervention to address the above noted impairments. PLAN :  Recommendations and Planned Interventions: self care training, functional mobility training, therapeutic exercise, balance training, therapeutic activities, endurance activities, patient education, home safety training, and family training/education    Frequency/Duration: Patient will be followed by occupational therapy 4 times a week to address goals. Recommendation for discharge: (in order for the patient to meet his/her long term goals)  Occupational therapy at least 2 days/week in the home     This discharge recommendation:  A follow-up discussion with the attending provider and/or case management is planned    IF patient discharges home will need the following DME: TBD       SUBJECTIVE:   Patient stated I'm getting so weak by just sitting around so much.     OBJECTIVE DATA SUMMARY:   HISTORY:   Past Medical History:   Diagnosis Date    Arrhythmia     atrial fibrillation 2017    Arthritis     CAD (coronary atherosclerotic disease)     Chronic kidney disease     Chronic pain     Congestive heart failure ([address]     Diabetes ([address]     Diabetes mellitus type 2, controlled ([address] 3/13/2017    Heart failure ([address]     Hx of CABG 3/13/2017    CABG in 2010 in [place]    Hypertension     Long term current use of anticoagulant therapy     Morbid obesity ([address] 3/13/2017    JACEY (obstructive sleep apnea) 6/23/2017    S/P CABG x 2 2010    Skin cancer     Thyroid disease      Past Surgical History:   Procedure Laterality Date    HX ORTHOPAEDIC      L 3rd toe amputation in 1999    HX PACEMAKER  2018    Dr Naima Sevilla    IR [facility] 5 YR  3/16/2021    [place] CARDIAC SURG PROCEDURE UNLIST      CABGx2 in 2010       Expanded or extensive additional review of patient history:     [address] [place] [address] Environment: Private residence  # Steps to Enter: 1  Rails to [employer]: (uses cane on step)  One/Two Story Residence: One story  Living Alone: No  Support Systems: Spouse/Significant Other/Partner, Family member(s)  Patient Expects to be Discharged to[de-identified] Patient room  Current DME Used/Available at Home: Karen Rios, quad, Walker, rollator, Shower chair, Grab bars  Tub or Shower Type: Shower    Hand dominance: Right    EXAMINATION OF PERFORMANCE DEFICITS:  Cognitive/Behavioral Status:  Neurologic State: Alert  Orientation Level: Oriented X4  Cognition: Appropriate for age attention/concentration; Follows commands  Perception: Appears intact  Perseveration: No perseveration noted  Safety/Judgement: Home safety; Fall prevention    Hearing: Auditory  Auditory Impairment: None    Vision/Perceptual:                           Acuity: Impaired near vision; Impaired far vision    Corrective Lenses: Reading glasses    Range of Motion:  AROM: Within functional limits                         Strength:  Strength: Generally decreased, functional                Coordination:     Fine Motor Skills-Upper: Left Intact; Right Intact(grossly)    Gross Motor Skills-Upper: Left Intact; Right Intact    Tone & Sensation:  Tone: Normal  Sensation: Impaired(neuropathy)                      Balance:  Sitting: Intact  Standing: Impaired  Standing - Static: Fair  Standing - Dynamic : Fair    Functional Mobility and Transfers for ADLs:  Bed Mobility:       Transfers:  Sit to Stand: Contact guard assistance  Stand to Sit: Contact guard assistance  Bed to Chair: Contact guard assistance  Bathroom Mobility: Contact guard assistance  Toilet Transfer : Contact guard assistance    ADL Assessment:  Feeding: Setup;Supervision    Oral Facial Hygiene/Grooming: Minimum assistance    Bathing: Moderate assistance    Upper Body Dressing: Supervision; Additional time    Lower Body Dressing: Moderate assistance(wife assists with shoes and socks)    Toileting:  Moderate assistance                ADL Intervention and task modifications:  Patient instructed and indicated understanding the benefits of maintaining activity tolerance, functional mobility, and independence with self care tasks during acute stay  to ensure safe return home and to baseline. Encouraged patient to increase frequency and duration OOB, be out of bed for all meals, perform daily ADLs (as approved by RN/MD regarding bathing etc), and performing functional mobility to/from bathroom (with assistance). Pt educated on safe transfer techniques, with specific emphasis on proper hand placement to push up from seated surface rather than attempt to pull self up, fully positioning self in-front of desired seated location, feeling chair on back of legs and reaching back with 1-2 UE to slowly lower self to seated position. Provided verbal cuing for education for breathing techniques including pursed lip breathing techniques (PLB) and circulatory breathing. Encouraged use of incentive spirometer. Additionally, patient was briefly educated on energy conservation techniques, including how they specifically apply to functional activities; This includes pacing, rest breaks, and planning. Patient with good verbal understanding however needing additional cuing through out tasks to use techniques. Additional time needed during tasks. Cognitive Retraining  Safety/Judgement: Home safety; Fall prevention    Functional Measure:  Barthel Index:    Bathin  Bladder: 10  Bowels: 10  Groomin  Dressin  Feeding: 10  Mobility: 0  Stairs: 0  Toilet Use: 5  Transfer (Bed to Chair and Back): 10  Total: 50/100        The Barthel ADL Index: Guidelines  1. The index should be used as a record of what a patient does, not as a record of what a patient could do. 2. The main aim is to establish degree of independence from any help, physical or verbal, however minor and for whatever reason.   3. The need for supervision renders the patient not independent. 4. A patient's performance should be established using the best available evidence. Asking the patient, friends/relatives and nurses are the usual sources, but direct observation and common sense are also important. However direct testing is not needed. 5. Usually the patient's performance over the preceding 24-48 hours is important, but occasionally longer periods will be relevant. 6. Middle categories imply that the patient supplies over 50 per cent of the effort. 7. Use of aids to be independent is allowed. Zay Freeman., Barthel, D.W. (3049). Functional evaluation: the Barthel Index. [address] [facility] (14)2. Elieser Britton peg EZRA Dutta, Mera Dhillon., Nila Mckeon., [place], [address] (1999). Measuring the change indisability after inpatient rehabilitation; comparison of the responsiveness of the Barthel Index and Functional [place] Measure. Journal of Neurology, Neurosurgery, and Psychiatry, 66(4), 877-665. Analy Rose, N.J.A, BENITO Nava, & Eleonora Sood M.A. (2004.) Assessment of post-stroke quality of life in cost-effectiveness studies: The usefulness of the Barthel Index and the EuroQoL-5D. Quality of Life Research, 15, 902-15        Occupational Therapy Evaluation Charge Determination   History Examination Decision-Making   LOW Complexity : Brief history review  MEDIUM Complexity : 3-5 performance deficits relating to physical, cognitive , or psychosocial skils that result in activity limitations and / or participation restrictions MEDIUM Complexity : Patient may present with comorbidities that affect occupational performnce.  Miniml to moderate modification of tasks or assistance (eg, physical or verbal ) with assesment(s) is necessary to enable patient to complete evaluation       Based on the above components, the patient evaluation is determined to be of the following complexity level: LOW   Pain Rating:  Pain did not interfere with therapy    Activity Tolerance:   Fair    After treatment patient left in no apparent distress:    Sitting in chair, Heels elevated for pressure relief, and Call bell within reach    COMMUNICATION/EDUCATION:   The patients plan of care was discussed with: Physical therapist and Registered nurse. Patient/family agree to work toward stated goals and plan of care. This patients plan of care is appropriate for delegation to [facility].     Thank you for this referral.  Nahomy Joseph, OTR/L  Time Calculation: 28 mins

## 2021-03-17 NOTE — PROGRESS NOTES
Hospitalist Progress Note    NAME: Sivakumar Carreon   :  1950   MRN:  [id number]       Assessment / Plan:  Acute hypoxic respiratory failure POA (improving)  Acute on chronic diastolic CHF exacerbation POA (improving)  INO on CKD4 progressing now on dialysis  Question of Hospital-acquired pneumonia (?has not been hospitalized since 2019)  Loose bowel movements for 2 days (likely antibiotic associated diarrhea) resolved  -86% on RA in ED, maintaining sats on 2 L oxygen via nasal cannula this morning  -was on IV bumex 3mg IV bid, started on infusion 3/14 as his response had plateued   -continue daily metolazone 2.5 mg daily  -Appreciate nephrology recommendations  -baseline Cr around 3, elevated to 4.76 on admission. Creatinine appears plateaued bellow 4, but BUN continues to rise above 130 now nephrology had opted to initiate dialysis the patient is started to become uremic  3/16/2021 permacath inserted and patient is getting his first dialysis session  Plans to continue daily dialysis until 3/18 or 3/19. Patient would like to be able to make it for his COVID vaccine appointment on Friday.    -renally dose meds, hold nephrotoxic agents if possible  -JANINE's revealed -0662 cc/24-hr. Weight almost 10 pounds less than admission weight  -does not wear O2 at home and currently on 2L NC. Continue try weaning oxygen as tolerated  Respiratory distress , leukocytosis, fever and cough  X-ray with bibasilar opacities. started vancomycin and Zosyn treating hospital-acquired pneumonia . procalcitonin 0.38 from Procalcitonin on  was 0.12.   3/11 no fever in last 48 hours. repeat porcal repeated 3/12, trending down. stopped piperacillin and vancomycin  Added probiotic.    proBNP improving  added thiamine 500 mg IV 11/3 and then 200 mg po daily thereafter        Hypokalemia, resolved  -Continue checking BMP daily given he is on Bumex and metolazone     Acute on chronic diastolic CHF  -Diuresis as above  -TTE with EF 50 to 55%  -Continue BB  -Appreciate cardiology recommendations  -Mild troponin elevation in setting of uncontrolled hypertension and INO  -BNP > 19k  Nuclear cardiac stress test Inconclusive stress test/No ischemia or infarct demonstrated    Skin rash  -Rt flank, mild contusion  -no vesicles. Does not appear dermatomal   -states area became painful after sliding on CT scanner when he was admitted  -monitor, try topical benadryl    Uncontrolled hypertension  -better-controlled today  -As above, continue beta-blocker.  -As needed hydralazine    IDDM  -Goal blood sugar 140-190.   -Was on Lantus per home dose 30 units nightly,, blood sugars below target so we will decrease the dose to 20 (3/12/2021)  -added back mealtime insulin at reduced dose   -SSI with POC's. Adjust insulin as indicated based on POC values    Lower extremity edema  -Bilateral lower extremity venous Dopplers negative  -Diuresis as above    LE/foot wounds POA  -large RLE medical ankle bullae from fluid overload, b/l heel DTIs, Lt 5th toe blister  -appreciate wound care help  -to follow at [facility], where he has been seen before    Chronic A. fib  -Continue Eliquis  -s/p PPM  -Continue metoprolol     Hypothyroidism  -Continue Synthroid     Code Status: Full code  Surrogate Decision Maker: Wife     DVT Prophylaxis: Eliquis on hold today for insertion of dialysis catheter tomorrow  GI Prophylaxis: not indicated     Baseline: Ambulatory at home     Subjective:     Chief Complaint / Reason for Physician Visit  Patient was seen and examined. No acute events overnight. Expressed frustration that he had to fast most of the day yesterday, and he was concerned that he will have to investigate today for dialysis. Continue follow-up of Mr. Freida Ricardo, with pulmonary edema, volume overload, advancing kidney disease now started on dialysis. Discussed with RN events overnight.      \"I feel okay\"    Review of Systems:  Symptom Y/N Comments Symptom Y/N Comments   Fever/Chills n   Chest Pain n    Poor Appetite    Edema y  bilateral lower extremities   Cough n   Abdominal Pain n    Sputum    Joint Pain     SOB/BETTS n   Pruritis/Rash     Nausea/vomit n   Tolerating PT/OT     Diarrhea    Tolerating Diet y    Constipation    Other       Could NOT obtain due to:          Objective:     VITALS:   Last 24hrs VS reviewed since prior progress note. Most recent are:  Patient Vitals for the past 24 hrs:   Temp Pulse Resp BP SpO2   03/17/21 1545  74 16 133/77    03/17/21 1530  74 16 138/74    03/17/21 1515  74 16 132/71    03/17/21 1500  74 16 129/71    03/17/21 1445  74 16 136/62    03/17/21 1430 98.1 °F (36.7 °C) 74 16 (!) 158/81    03/17/21 1117 98.1 °F (36.7 °C) 75 16 130/63 96 %   03/17/21 0743 97.9 °F (36.6 °C) 75 21 (!) 153/67 96 %   03/17/21 0315 97.8 °F (36.6 °C) 75 20 (!) 151/63 100 %   03/16/21 2237 97.7 °F (36.5 °C) 77 20 (!) 150/67 95 %   03/16/21 1830 98.1 °F (36.7 °C) 75 20 133/79 99 %   03/16/21 1815  75 18 121/70 98 %   03/16/21 1800  75 18 129/75 98 %   03/16/21 1745  75 18 134/73 98 %   03/16/21 1730  75 18 136/71 99 %   03/16/21 1715  75 18 (!) 146/78 99 %   03/16/21 1700  75 20 (!) 149/78 99 %   03/16/21 1645  75 20 138/67 99 %   03/16/21 1630  75 18 (!) 141/72 98 %   03/16/21 1615  75 18 127/69 99 %       Intake/Output Summary (Last 24 hours) at 3/17/2021 1603  Last data filed at 3/17/2021 1237  Gross per 24 hour   Intake 341 ml   Output 71308 ml   Net -83806 ml        I had a face to face encounter and independently examined this patient on 3/17/2021, as outlined below:  PHYSICAL EXAM:  General: WD, WN. Alert, cooperative, no acute distress    EENT:  EOMI. Anicteric sclerae. MMM  Resp:  Decreased breath sounds in the subscapular areas. No accessory muscle use  CV:  Regular  rhythm,  +3 pitting edema bilateral lower extremities  GI:  Soft, Non distended, Non tender.   +Bowel sounds  Neurologic:  Alert and oriented X 3, normal speech,   Psych:   Good insight. Not anxious nor agitated  Skin:  No rashes. No jaundice. Mild contusion in the right flank    Reviewed most current lab test results and cultures  YES  Reviewed most current radiology test results   YES  Review and summation of old records today    NO  Reviewed patient's current orders and MAR    YES  PMH/SH reviewed - no change compared to H&P  ________________________________________________________________________  Care Plan discussed with:    Comments   Patient x    Family      RN x    Care Manager     Consultant                        Multidiciplinary team rounds were held today with , nursing, pharmacist and clinical coordinator. Patient's plan of care was discussed; medications were reviewed and discharge planning was addressed. ________________________________________________________________________  Total NON critical care TIME: 26 Minutes    Total CRITICAL CARE TIME Spent:   Minutes non procedure based      Comments   >50% of visit spent in counseling and coordination of care x    ________________________________________________________________________  Hossein Castellano MD     Procedures: see electronic medical records for all procedures/Xrays and details which were not copied into this note but were reviewed prior to creation of Plan. LABS:  I reviewed today's most current labs and imaging studies.   Pertinent labs include:  Recent Labs     03/17/21  0324 03/15/21  0206   WBC 8.5 7.0   HGB 12.5 11.8*   HCT 42.5 40.8    156     Recent Labs     03/17/21  0324 03/16/21  0003 03/15/21  0206    138 139   K 3.9 3.4* 3.5   CL 98 95* 96*   CO2 31 38* 38*   * 206* 151*   BUN 89* 131* 132*   CREA 3.32* 4.00* 3.97*   CA 9.0 8.7 8.5   MG 2.4 2.5* 2.6*   PHOS 4.0 4.4 4.9*   ALB 3.0*  --   --    TBILI 0.8  --   --    ALT 18  --   --    INR  --  1.2*  --        Signed: Hossein Castellano MD

## 2021-03-17 NOTE — PROGRESS NOTES
Problem: Falls - Risk of  Goal: *Absence of Falls  Description: Document Ludwig Bare Fall Risk and appropriate interventions in the flowsheet.   Outcome: Progressing Towards Goal  Note: Fall Risk Interventions:  Mobility Interventions: Bed/chair exit alarm, Patient to call before getting OOB         Medication Interventions: Patient to call before getting OOB    Elimination Interventions: Call light in reach, Toilet paper/wipes in reach, Patient to call for help with toileting needs

## 2021-03-17 NOTE — PROGRESS NOTES
HD TRANSFER - OUT REPORT:    Verbal report given to Josefina Breaux RN on Nelia Mayes being transferred to [facility] for routine progression of care       Report consisted of patient's Situation, Background, Assessment and   Recommendations(SBAR). Information from the following report(s) SBAR, Procedure Summary, Intake/Output, MAR and Recent Results was reviewed with the receiving nurse. Method:  $$ Method: Hemodialysis (03/17/21 1430)    Fluid Removed  NET Fluid Removed (mL): 2500 ml (03/17/21 1730)     Patient response to treatment:  Stable    End Time  Hemodialysis End Time: 7850 (03/17/21 1730)  If not documented, dialysis nurse to update post-dialysis row in HD/Filtration flowsheet     Medications /Volume expansion agents or Fluid boluses administered during treatment? no    Post-dialysis medication administration due?  yes  Remind nurse to administer post-HD medication upon return to unit. Fistula hemostasis? no    Line heparinization? yes    Lines: RIJ permacath, dressing changed    Opportunity for questions and clarification was provided.       Patient transported with: O2 @ 2 liters  Tech

## 2021-03-17 NOTE — PROGRESS NOTES
Problem: Falls - Risk of  Goal: *Absence of Falls  Description: Document Feliciano Reason Fall Risk and appropriate interventions in the flowsheet.   Outcome: Progressing Towards Goal  Note: Fall Risk Interventions:  Mobility Interventions: Bed/chair exit alarm, Patient to call before getting OOB         Medication Interventions: Patient to call before getting OOB    Elimination Interventions: Bed/chair exit alarm, Call light in reach              Problem: Patient Education: Go to Patient Education Activity  Goal: Patient/Family Education  Outcome: Progressing Towards Goal     Problem: Patient Education: Go to Patient Education Activity  Goal: Patient/Family Education  Outcome: Progressing Towards Goal     Problem: Heart Failure: Day 1  Goal: Off Pathway (Use only if patient is Off Pathway)  Outcome: Progressing Towards Goal  Goal: Activity/Safety  Outcome: Progressing Towards Goal  Goal: Consults, if ordered  Outcome: Progressing Towards Goal  Goal: Diagnostic Test/Procedures  Outcome: Progressing Towards Goal  Goal: Nutrition/Diet  Outcome: Progressing Towards Goal  Goal: Discharge Planning  Outcome: Progressing Towards Goal  Goal: Medications  Outcome: Progressing Towards Goal  Goal: Respiratory  Outcome: Progressing Towards Goal  Goal: Treatments/Interventions/Procedures  Outcome: Progressing Towards Goal  Goal: Psychosocial  Outcome: Progressing Towards Goal  Goal: *Oxygen saturation within defined limits  Outcome: Progressing Towards Goal  Goal: *Hemodynamically stable  Outcome: Progressing Towards Goal  Goal: *Optimal pain control at patient's stated goal  Outcome: Progressing Towards Goal  Goal: *Anxiety reduced or absent  Outcome: Progressing Towards Goal     Problem: Heart Failure: Day 2  Goal: Off Pathway (Use only if patient is Off Pathway)  Outcome: Progressing Towards Goal  Goal: Activity/Safety  Outcome: Progressing Towards Goal  Goal: Consults, if ordered  Outcome: Progressing Towards Goal  Goal: Diagnostic Test/Procedures  Outcome: Progressing Towards Goal  Goal: Nutrition/Diet  Outcome: Progressing Towards Goal  Goal: Discharge Planning  Outcome: Progressing Towards Goal  Goal: Medications  Outcome: Progressing Towards Goal  Goal: Respiratory  Outcome: Progressing Towards Goal  Goal: Treatments/Interventions/Procedures  Outcome: Progressing Towards Goal  Goal: Psychosocial  Outcome: Progressing Towards Goal  Goal: *Oxygen saturation within defined limits  Outcome: Progressing Towards Goal  Goal: *Hemodynamically stable  Outcome: Progressing Towards Goal  Goal: *Optimal pain control at patient's stated goal  Outcome: Progressing Towards Goal  Goal: *Anxiety reduced or absent  Outcome: Progressing Towards Goal  Goal: *Demonstrates progressive activity  Outcome: Progressing Towards Goal     Problem: Heart Failure: Day 3  Goal: Off Pathway (Use only if patient is Off Pathway)  Outcome: Progressing Towards Goal  Goal: Activity/Safety  Outcome: Progressing Towards Goal  Goal: Diagnostic Test/Procedures  Outcome: Progressing Towards Goal  Goal: Nutrition/Diet  Outcome: Progressing Towards Goal  Goal: Discharge Planning  Outcome: Progressing Towards Goal  Goal: Medications  Outcome: Progressing Towards Goal  Goal: Respiratory  Outcome: Progressing Towards Goal  Goal: Treatments/Interventions/Procedures  Outcome: Progressing Towards Goal  Goal: Psychosocial  Outcome: Progressing Towards Goal  Goal: *Oxygen saturation within defined limits  Outcome: Progressing Towards Goal  Goal: *Hemodynamically stable  Outcome: Progressing Towards Goal  Goal: *Optimal pain control at patient's stated goal  Outcome: Progressing Towards Goal  Goal: *Anxiety reduced or absent  Outcome: Progressing Towards Goal  Goal: *Demonstrates progressive activity  Outcome: Progressing Towards Goal     Problem: Heart Failure: Day 4  Goal: Off Pathway (Use only if patient is Off Pathway)  Outcome: Progressing Towards Goal  Goal: Activity/Safety  Outcome: Progressing Towards Goal  Goal: Diagnostic Test/Procedures  Outcome: Progressing Towards Goal  Goal: Nutrition/Diet  Outcome: Progressing Towards Goal  Goal: Discharge Planning  Outcome: Progressing Towards Goal  Goal: Medications  Outcome: Progressing Towards Goal  Goal: Respiratory  Outcome: Progressing Towards Goal  Goal: Treatments/Interventions/Procedures  Outcome: Progressing Towards Goal  Goal: Psychosocial  Outcome: Progressing Towards Goal  Goal: *Oxygen saturation within defined limits  Outcome: Progressing Towards Goal  Goal: *Hemodynamically stable  Outcome: Progressing Towards Goal  Goal: *Optimal pain control at patient's stated goal  Outcome: Progressing Towards Goal  Goal: *Anxiety reduced or absent  Outcome: Progressing Towards Goal  Goal: *Demonstrates progressive activity  Outcome: Progressing Towards Goal     Problem: Heart Failure: Day 5  Goal: Off Pathway (Use only if patient is Off Pathway)  Outcome: Progressing Towards Goal  Goal: Activity/Safety  Outcome: Progressing Towards Goal  Goal: Diagnostic Test/Procedures  Outcome: Progressing Towards Goal  Goal: Nutrition/Diet  Outcome: Progressing Towards Goal  Goal: Discharge Planning  Outcome: Progressing Towards Goal  Goal: Medications  Outcome: Progressing Towards Goal  Goal: Respiratory  Outcome: Progressing Towards Goal  Goal: Treatments/Interventions/Procedures  Outcome: Progressing Towards Goal  Goal: Psychosocial  Outcome: Progressing Towards Goal     Problem: Heart Failure: Discharge Outcomes  Goal: *Demonstrates ability to perform prescribed activity without shortness of breath or discomfort  Outcome: Progressing Towards Goal  Goal: *Left ventricular function assessment completed prior to or during stay, or planned for post-discharge  Outcome: Progressing Towards Goal  Goal: *ACEI prescribed if LVEF less than 40% and no contraindications or ARB prescribed  Outcome: Progressing Towards Goal  Goal: *Verbalizes understanding and describes prescribed diet  Outcome: Progressing Towards Goal  Goal: *Verbalizes understanding/describes prescribed medications  Outcome: Progressing Towards Goal  Goal: *Describes available resources and support systems  Description: (eg: Home Health, Palliative Care, Advanced Medical Directive)  Outcome: Progressing Towards Goal  Goal: *Describes smoking cessation resources  Outcome: Progressing Towards Goal  Goal: *Understands and describes signs and symptoms to report to providers(Stroke Metric)  Outcome: Progressing Towards Goal  Goal: *Describes/verbalizes understanding of follow-up/return appt  Description: (eg: to physicians, diabetes treatment coordinator, and other resources  Outcome: Progressing Towards Goal  Goal: *Describes importance of continuing daily weights and changes to report to physician  Outcome: Progressing Towards Goal

## 2021-03-17 NOTE — PROGRESS NOTES
[address] INTERDISCIPLINARY ROUNDS    Cardiopulmonary Care Interdisciplinary Rounds were held today to discuss patient's plan of care and outcomes. The following members were present: NP/Physician, Pharmacy, Nursing and Case Management. PLAN OF CARE:   Continue current treatment plan, permcath placed yesterday and HD started. Possible discharge Friday per primary RNZoë.      Expected Length of Stay:  4d 0h

## 2021-03-17 NOTE — PROCEDURES
[facility]  [phone]    Vitals   Pre   Post   Assessment   Pre   Post     Temp  Temp: 98.1 °F (36.7 °C) (03/17/21 1430)  97.8, oral LOC  A&O x4, periodic confusion A&Ox4, periodic confusion   HR   Pulse (Heart Rate): 74 (03/17/21 1430) 77 Lungs   Dim bases Dim bases   B/P   BP: (!) 158/81 (03/17/21 1430) 136/73 Cardiac   Tele, paced Tele, paced   Resp   Resp Rate: 16 (03/17/21 1430) 16 Skin   BLE weeping  BLE weeping    Pain level  Pain Intensity 1: 0 (03/17/21 1018) 0 Edema  4+ BLE, abd distention; generalized  4+ BLE, abd distention; generalized    Orders:    Duration:   Start:   1430 End:    1730 Total:   3hr   Dialyzer:   Dialyzer/Set Up Inspection: Howard Munguia (03/17/21 1430)   K Bath:   Dialysate K (mEq/L): 3 (03/17/21 1430)   Ca Bath:   Dialysate CA (mEq/L): 2.5 (03/17/21 1430)   Na/Bicarb:   Dialysate NA (mEq/L): 138 (03/17/21 1430)   Target Fluid Removal:   Goal/Amount of Fluid to Remove (mL): 2500 mL (03/17/21 1430)   Access     Type & Location:   RIJ permcath: Dressing with quarter sized dried blood visible. Dressing changed and dated for today. No s/s of infection. Both lumens aspirate & flush well. Running well at Rx .     Labs     Obtained/Reviewed   Critical Results Called   Date when labs were drawn-  Hgb-    HGB   Date Value Ref Range Status   03/17/2021 12.5 12.1 - 17.0 g/dL Final     K-    Potassium   Date Value Ref Range Status   03/17/2021 3.9 3.5 - 5.1 mmol/L Final     Ca-   Calcium   Date Value Ref Range Status   03/17/2021 9.0 8.5 - 10.1 MG/DL Final     Bun-   BUN   Date Value Ref Range Status   03/17/2021 89 (H) 6 - 20 MG/DL Final     Creat-   Creatinine   Date Value Ref Range Status   03/17/2021 3.32 (H) 0.70 - 1.30 MG/DL Final        Medications/ Blood Products Given     Name   Dose   Route and Time     Heparin 1:1000 1.9ml & 1.9ml Dwell in CVC after HD             Blood Volume Processed (BVP):    28.5L Net Fluid   Removed:  2500ml   Comments Time Out Done: 1428  Primary Nurse Rpt Pre: Brown Pino RN  Primary Nurse Rpt Post: Brown Pino RN  Pt Education: need for daily HD this week  Care Plan: ongoing, Daily HD this week  Tx Summary:  SBAR received from Primary RN. Pt arrived to HD suite A&Ox4, periodic confusion. Consent signed & on file. 1430: Each catheter limb disinfected per p&p, caps removed, hubs disinfected per p&p. Each lumen aspirated for blood return and flushed with Normal Saline per policy. VSS. Dialysis Tx initiated. UF first 1 hr.  1530: UF only ended. Machine set for HD orders now for remainder of treatment. VSS. * no further issues with HD*  1730: Tx ended. VSS. Each dialysis catheter limb disinfected per p&p, all possible blood returned per p&p, and each dialysis hub disinfected per p&p. Each lumen flushed, post dialysis catheter Heparin dwell instilled per order, and caps applied. Bed locked and in the lowest position, call bell and belongings in reach. SBAR given to Primary, RN. Patient is stable at time of their departure. All Dialysis related medications have been reviewed. Admiting Diagnosis: INO/ CHF exacerbation  Pt's previous clinic- N/A  Consent signed - Informed Consent Verified: Yes (03/17/21 1430)  [facility] Consent - on file  Hepatitis Status- Neg AG, <10 Ab 3/16/21  Machine #- Machine Number: [id number] (03/17/21 1430)  Telemetry status- Paced 70s  Pre-dialysis wt. - Pre-Dialysis Weight: 115 kg (253 lb 8.5 oz) (03/16/21 1530)

## 2021-03-18 LAB
ANION GAP SERPL CALC-SCNC: 5 MMOL/L (ref 5–15)
BUN SERPL-MCNC: 70 MG/DL (ref 6–20)
BUN/CREAT SERPL: 22 (ref 12–20)
CALCIUM SERPL-MCNC: 8.6 MG/DL (ref 8.5–10.1)
CALCIUM SERPL-MCNC: 8.8 MG/DL (ref 8.5–10.1)
CHLORIDE SERPL-SCNC: 99 MMOL/L (ref 97–108)
CO2 SERPL-SCNC: 32 MMOL/L (ref 21–32)
CREAT SERPL-MCNC: 3.16 MG/DL (ref 0.7–1.3)
GLUCOSE BLD STRIP.AUTO-MCNC: 150 MG/DL (ref 65–100)
GLUCOSE BLD STRIP.AUTO-MCNC: 165 MG/DL (ref 65–100)
GLUCOSE BLD STRIP.AUTO-MCNC: 187 MG/DL (ref 65–100)
GLUCOSE BLD STRIP.AUTO-MCNC: 221 MG/DL (ref 65–100)
GLUCOSE SERPL-MCNC: 198 MG/DL (ref 65–100)
MAGNESIUM SERPL-MCNC: 2.2 MG/DL (ref 1.6–2.4)
PHOSPHATE SERPL-MCNC: 3.4 MG/DL (ref 2.6–4.7)
POTASSIUM SERPL-SCNC: 3.7 MMOL/L (ref 3.5–5.1)
PTH-INTACT SERPL-MCNC: 498.5 PG/ML (ref 18.4–88)
SERVICE CMNT-IMP: ABNORMAL
SODIUM SERPL-SCNC: 136 MMOL/L (ref 136–145)
URATE SERPL-MCNC: 7.3 MG/DL (ref 3.5–7.2)

## 2021-03-18 PROCEDURE — 74011250637 HC RX REV CODE- 250/637: Performed by: STUDENT IN AN ORGANIZED HEALTH CARE EDUCATION/TRAINING PROGRAM

## 2021-03-18 PROCEDURE — 74011636637 HC RX REV CODE- 636/637: Performed by: STUDENT IN AN ORGANIZED HEALTH CARE EDUCATION/TRAINING PROGRAM

## 2021-03-18 PROCEDURE — 74011250637 HC RX REV CODE- 250/637: Performed by: NURSE PRACTITIONER

## 2021-03-18 PROCEDURE — 84100 ASSAY OF PHOSPHORUS: CPT

## 2021-03-18 PROCEDURE — 80048 BASIC METABOLIC PNL TOTAL CA: CPT

## 2021-03-18 PROCEDURE — 84550 ASSAY OF BLOOD/URIC ACID: CPT

## 2021-03-18 PROCEDURE — 36415 COLL VENOUS BLD VENIPUNCTURE: CPT

## 2021-03-18 PROCEDURE — 77010033678 HC OXYGEN DAILY

## 2021-03-18 PROCEDURE — 74011250636 HC RX REV CODE- 250/636: Performed by: INTERNAL MEDICINE

## 2021-03-18 PROCEDURE — 90935 HEMODIALYSIS ONE EVALUATION: CPT

## 2021-03-18 PROCEDURE — 82962 GLUCOSE BLOOD TEST: CPT

## 2021-03-18 PROCEDURE — 83970 ASSAY OF PARATHORMONE: CPT

## 2021-03-18 PROCEDURE — 83735 ASSAY OF MAGNESIUM: CPT

## 2021-03-18 PROCEDURE — 74011250637 HC RX REV CODE- 250/637: Performed by: INTERNAL MEDICINE

## 2021-03-18 PROCEDURE — 65660000000 HC RM CCU STEPDOWN

## 2021-03-18 PROCEDURE — 74011636637 HC RX REV CODE- 636/637: Performed by: INTERNAL MEDICINE

## 2021-03-18 RX ORDER — METOPROLOL SUCCINATE 50 MG/1
100 TABLET, EXTENDED RELEASE ORAL DAILY
Status: DISCONTINUED | OUTPATIENT
Start: 2021-03-19 | End: 2021-03-19 | Stop reason: HOSPADM

## 2021-03-18 RX ORDER — LANOLIN ALCOHOL/MO/W.PET/CERES
3 CREAM (GRAM) TOPICAL
Status: DISCONTINUED | OUTPATIENT
Start: 2021-03-18 | End: 2021-03-19 | Stop reason: HOSPADM

## 2021-03-18 RX ADMIN — APIXABAN 5 MG: 5 TABLET, FILM COATED ORAL at 09:38

## 2021-03-18 RX ADMIN — INSULIN LISPRO 3 UNITS: 100 INJECTION, SOLUTION INTRAVENOUS; SUBCUTANEOUS at 17:28

## 2021-03-18 RX ADMIN — PANTOPRAZOLE SODIUM 40 MG: 40 TABLET, DELAYED RELEASE ORAL at 09:38

## 2021-03-18 RX ADMIN — MELATONIN 3 MG: at 01:58

## 2021-03-18 RX ADMIN — METOPROLOL SUCCINATE 150 MG: 50 TABLET, EXTENDED RELEASE ORAL at 09:38

## 2021-03-18 RX ADMIN — Medication 1 CAPSULE: at 09:38

## 2021-03-18 RX ADMIN — GUAIFENESIN AND DEXTROMETHORPHAN 10 ML: 100; 10 SYRUP ORAL at 22:49

## 2021-03-18 RX ADMIN — CASTOR OIL AND BALSAM, PERU: 788; 87 OINTMENT TOPICAL at 22:49

## 2021-03-18 RX ADMIN — Medication 10 ML: at 05:59

## 2021-03-18 RX ADMIN — GUAIFENESIN AND DEXTROMETHORPHAN 10 ML: 100; 10 SYRUP ORAL at 17:28

## 2021-03-18 RX ADMIN — LEVOTHYROXINE SODIUM 137 MCG: 0.03 TABLET ORAL at 05:58

## 2021-03-18 RX ADMIN — Medication 10 ML: at 22:48

## 2021-03-18 RX ADMIN — CASTOR OIL AND BALSAM, PERU: 788; 87 OINTMENT TOPICAL at 08:00

## 2021-03-18 RX ADMIN — DUTASTERIDE 0.5 MG: 0.5 CAPSULE, LIQUID FILLED ORAL at 09:38

## 2021-03-18 RX ADMIN — Medication 5 ML: at 14:00

## 2021-03-18 RX ADMIN — INSULIN LISPRO 3 UNITS: 100 INJECTION, SOLUTION INTRAVENOUS; SUBCUTANEOUS at 09:37

## 2021-03-18 RX ADMIN — INSULIN LISPRO 3 UNITS: 100 INJECTION, SOLUTION INTRAVENOUS; SUBCUTANEOUS at 12:13

## 2021-03-18 RX ADMIN — INSULIN GLARGINE 20 UNITS: 100 INJECTION, SOLUTION SUBCUTANEOUS at 22:47

## 2021-03-18 RX ADMIN — ACETAMINOPHEN 650 MG: 325 TABLET ORAL at 22:49

## 2021-03-18 RX ADMIN — INSULIN LISPRO 2 UNITS: 100 INJECTION, SOLUTION INTRAVENOUS; SUBCUTANEOUS at 17:28

## 2021-03-18 RX ADMIN — ACETAMINOPHEN 650 MG: 325 TABLET ORAL at 17:29

## 2021-03-18 RX ADMIN — APIXABAN 5 MG: 5 TABLET, FILM COATED ORAL at 17:28

## 2021-03-18 RX ADMIN — INSULIN LISPRO 3 UNITS: 100 INJECTION, SOLUTION INTRAVENOUS; SUBCUTANEOUS at 12:14

## 2021-03-18 RX ADMIN — ACETAMINOPHEN 650 MG: 325 TABLET ORAL at 05:57

## 2021-03-18 RX ADMIN — HEPARIN SODIUM 3800 UNITS: 1000 INJECTION INTRAVENOUS; SUBCUTANEOUS at 15:25

## 2021-03-18 RX ADMIN — INSULIN LISPRO 2 UNITS: 100 INJECTION, SOLUTION INTRAVENOUS; SUBCUTANEOUS at 09:37

## 2021-03-18 RX ADMIN — PRAVASTATIN SODIUM 80 MG: 40 TABLET ORAL at 22:48

## 2021-03-18 RX ADMIN — NEPHROCAP 1 CAPSULE: 1 CAP ORAL at 09:38

## 2021-03-18 NOTE — PROGRESS NOTES
TRANSFER - IN REPORT:    Verbal report received from Jose Francisco DOWELL RN on Sandra Kris  being received from [facility](unit) for ordered procedure      Report consisted of patients Situation, Background, Assessment and   Recommendations(SBAR). Information from the following report(s) Kardex was reviewed with the receiving nurse. Opportunity for questions and clarification was provided. Assessment completed upon patients arrival to unit and care assumed.

## 2021-03-18 NOTE — PROGRESS NOTES
Attempted to see pt this pm and pt just left for dialysis. Will continue to follow and tx when pt is available.

## 2021-03-18 NOTE — PROGRESS NOTES
Comprehensive Nutrition Assessment    Type and Reason for Visit: Reassess    Nutrition Recommendations/Plan:   Continue current diet  Please document % meals consumed in flowsheet I/O's under intake     Nutrition Assessment:      Chart reviewed for f/u. Pt has been receiving daily HD. He is down 37# since admission from fluid loss. Pt reports a fair appetite. He is eating the majority of his meals, though he does not always love the food. Pt interested in diet education for d/c. RD reviewed diet edu for wound healing, CHF, CKD and DM. Information left for pt to take home, he cannot see well enough to read it but will have his wife review the info at home. Plans to d/c tomorrow for his second covid shot. Patient Vitals for the past 72 hrs:   % Diet Eaten   03/18/21 0943 100 %   03/17/21 1820 100 %   03/17/21 1237 100 %   03/17/21 0743 0 %   03/16/21 2309 100 %       Estimated Daily Nutrient Needs:  Energy (kcal): 2190 kcal (BMR x 1. 3AF - 300); Weight Used for Energy Requirements: Current  Protein (g): 94g (0.8g/kg); Weight Used for Protein Requirements: Current  Fluid (ml/day): 1500mL per MD; Method Used for Fluid Requirements:        Nutrition Related Findings:  Labs: -201mg/dL. Meds: nephrocap, lantus, humalog, risaquad, synthroid, protonix. Edema 2+pitting BLE, 2+generalized. BM 3/15. Wounds:    Pressure injury, Deep tissue injury, Venous stasis       Current Nutrition Therapies:  DIET ONE TIME MESSAGE  DIET DIABETIC CONSISTENT CARB Regular; 2 GM NA (House Low NA); FR 1500ML; 80-80-80  DIET ONE TIME MESSAGE    Anthropometric Measures:  · Height:  5' 9\" (175.3 cm)  · Current Body Wt:  106.5 kg (234 lb 12.6 oz)   · Ideal Body Wt:  160 lbs:  161.9 %   · BMI Category:  Obese class 1 (BMI 30.0-34. 9)       Nutrition Diagnosis:   · Increased nutrient needs related to (wound healing) as evidenced by (PI, DTI, venous wounds)  Diagnosis continues    Nutrition Interventions:   Food and/or Nutrient Delivery: Continue current diet  Nutrition Education and Counseling: No recommendations at this time  Coordination of Nutrition Care: Continue to monitor while inpatient    Goals:  Continue PO intake >75/% meals next 5-7 days       Nutrition Monitoring and Evaluation:   Behavioral-Environmental Outcomes: None identified  Food/Nutrient Intake Outcomes: Food and nutrient intake  Physical Signs/Symptoms Outcomes: Biochemical data, Weight, GI status, Fluid status or edema, Skin    Discharge Planning:    Continue current diet     Electronically signed by Saleem Marshall RD on 3/18/2021 at 1:08 PM    Contact: GILBERT  Pager [phone]

## 2021-03-18 NOTE — PROGRESS NOTES
[address] INTERDISCIPLINARY ROUNDS    Cardiopulmonary Care Interdisciplinary Rounds were held today to discuss patient's plan of care and outcomes. The following members were present: NP/Physician, Pharmacy, Nursing and Case Management. PLAN OF CARE:   Continue current treatment plan, plan to discharge after dialysis tomorrow.     Expected Length of Stay:  4d 0h

## 2021-03-18 NOTE — PROGRESS NOTES
Received notification from bedside RN about patient with regards to: requesting sleep aid  VS: /79, HR 75, RR 18, O2 sat 99% on NC 2 L    Intervention given: Melatonin prn ordered

## 2021-03-18 NOTE — PROCEDURES
[facility]  [phone]    Vitals   Pre   Post   Assessment   Pre   Post     Temp  Temp: 98.5 °F (36.9 °C) (03/18/21 1330) 98.1 LOC  A  & O x 4 No change   HR   Pulse (Heart Rate): 74 (03/18/21 1430) 74 Lungs   Crackles R/L LL  no change   B/P   BP: 135/77 (03/18/21 1430) 135/78 Cardiac   NSR  No change   Resp   Resp Rate: 18 (03/18/21 1430) 18 Skin   Warm, dry & intact  No change   Pain level  Pain Intensity 1: 0 (03/18/21 1112) 0 Edema    +2 lower ext   No change   Orders:    Duration:   Start:  1430 Procedure Start Time: 1450 End:    1630 Total:   3.0   Dialyzer:   Dialyzer/Set Up Inspection: Revaclear (03/18/21 1330)   K Bath:   Dialysate K (mEq/L): 4 (03/18/21 1330)   Ca Bath:   Dialysate CA (mEq/L): 2.5 (03/18/21 1330)   Na/Bicarb:   Dialysate NA (mEq/L): 140 (03/18/21 1330)   Target Fluid Removal:   Goal/Amount of Fluid to Remove (mL): 3500 mL (03/18/21 1330)   Access  CVC   Type & Location:   Right sub clav pc   Labs     Obtained/Reviewed   Critical Results Called   Date when labs were drawn-  Hgb-    HGB   Date Value Ref Range Status   03/17/2021 12.5 12.1 - 17.0 g/dL Final     K-    Potassium   Date Value Ref Range Status   03/18/2021 3.7 3.5 - 5.1 mmol/L Final     Ca-   Calcium   Date Value Ref Range Status   03/18/2021 8.8 8.5 - 10.1 MG/DL Final     Bun-   BUN   Date Value Ref Range Status   03/18/2021 70 (H) 6 - 20 MG/DL Final     Creat-   Creatinine   Date Value Ref Range Status   03/18/2021 3.16 (H) 0.70 - 1.30 MG/DL Final        Medications/ Blood Products Given     Name   Dose   Route and Time     Heparin arterial port  1.9ml IV 1630   Heparin venous port 1.9ml IV 1630        Blood Volume Processed (BVP):    53.0 Net Fluid   Removed:  3500ml   Comments RN reviewed LPN assessment and completed RN assessment. RN completed patient assessment. RN reviewed technicians vital signs and procedure note. Tx completed. Reviewed by NADINE Perales  Time Out Done: 1245  Primary Nurse Rpt Pre: ROSARIO Zuniga RN  Primary Nurse Rpt Post:. Kurt Law RN  Pt Education:access care  Care Plan:continue HD  Tx Summary:RIJ CVC: Dressing CDI. No s/s of infection. Both lumens aspirate & flush well. Running well at . SBAR received from Primary RN. Pt arrived to HD suite A&Ox4. Consent signed & on file. 1330: Each catheter limb disinfected per p&p, caps removed, hubs disinfected per p&p. Each lumen aspirated for blood return and flushed with Normal Saline per policy. Labs drawn per request/ order. VSS. Dialysis Tx initiated. 1400: Pt. Stable, qing. Hd well. Lines secure   1430: Pt. Stable, lines secure and visible  1500: pt. Stable, lines secure and visible  1530: Pt. Stable, watching tv  1600: pt. Stable,lines secure and visible  1630: Tx ended. VSS. Each dialysis catheter limb disinfected per p&p, all possible blood returned per p&p, and each dialysis hub disinfected per p&p. Each lumen flushed, post dialysis catheter Heparin dwell instilled per order, and caps applied. Bed locked and in the lowest position, call bell and belongings in reach. SBAR given to Primary, RN. Patient is stable at time of their/ my departure. All Dialysis related medications have been reviewed. Admiting Diagnosis:INO/ CHF exacerbation  Pt's previous clinic- Acute  Consent signed - Informed Consent Verified: Yes (03/18/21 1330)  Karleneita Consent - verified  Hepatitis Status- neg/jaci 3/16/21  Machine #- Machine Number: [id number] (03/18/21 1330)  Telemetry status- yes  Pre-dialysis wt. - Pre-Dialysis Weight: 115 kg (253 lb 8.5 oz) (03/16/21 1530)

## 2021-03-18 NOTE — PROGRESS NOTES
HD TRANSFER - OUT REPORT:    Verbal report given to ROSARIO Kang on Madeline Witt being transferred to [facility]  (Unit) for ordered procedure       Report consisted of patient's Situation, Background, Assessment and   Recommendations(SBAR). Information from the following report(s) Kardex was reviewed with the receiving nurse. Method:  $$ Method: Hemodialysis (03/18/21 1630)    Fluid Removed  NET Fluid Removed (mL): 3500 ml (03/18/21 1630)     Patient response to treatment:  Stable    End Time  Hemodialysis End Time: 1630 (03/18/21 1630)  If not documented, dialysis nurse to update post-dialysis row in HD/Filtration flowsheet     Medications /Volume expansion agents or Fluid boluses administered during treatment? no    Post-dialysis medication administration due?  no  Remind nurse to administer post-HD medication upon return to unit. Fistula hemostasis? no    Line heparinization? yes    Lines: cvc    Opportunity for questions and clarification was provided.       Patient transported with: [employer]

## 2021-03-18 NOTE — PROGRESS NOTES
Transition of Care Plan:        RUR: 24%  [facility])  Follow up appointments: PCP  DME needed: None  Transportation at Discharge: Pt's wife will transport at d/c.   [place] or means to access home:    Pt has his keys    IM Medicare letter: Given  Caregiver Contact: Ana Maria Sexton- Wife [phone]  Discharge Caregiver contacted prior to discharge?  Will contact at d/c    11:25am- called [address] [place] [address] to reschedule pt's follow-up appointment. Appointment scheduled for 3/25/2021 at 8:15am. AVS updated    10:40am- CM called PCP office to schedule pt's follow-up appointment. Appointment schedule for 3/30/2021 at 11:00am    9:45am- CM met with pt at bedside to discuss d/c plan. CM inquired with pt about any new needs. No new needs at this time. Medicare pt has received, reviewed, and signed 2nd IM letter informing them of their right to appeal the discharge. Signed copy has been placed on pt bedside chart. CM will continue to follow patient for discharge planning needs and arrange for services as deemed necessary.     Jamie Arreola [address] [place] [address]  [phone]

## 2021-03-18 NOTE — PROGRESS NOTES
Attempted to see patient for OT treatment, but he politely declined participation, asking to eat his lunch first. OT deferred per patient request, but will follow back as able, later today or tomorrow.

## 2021-03-18 NOTE — PROGRESS NOTES
0700: Bedside shift change report given to NADINE Greenberg (oncoming nurse) by NADINE Kimble (offgoing nurse). Report included the following information SBAR, ED Summary, Intake/Output, MAR, Recent Results and Cardiac Rhythm Paced.     0800: wound care on bilateral lower extremities performed.     1300: TRANSFER - OUT REPORT:    Verbal report given to NADINE Elkins (name) on Davy Alexandre  being transferred to Dialysis (unit) for ordered procedure       Report consisted of patient’s Situation, Background, Assessment and   Recommendations(SBAR).     Information from the following report(s) SBAR and Cardiac Rhythm Paced was reviewed with the receiving nurse.    Lines:   Peripheral IV 03/04/21 Right Antecubital (Active)   Site Assessment Clean, dry, & intact 03/18/21 0849   Phlebitis Assessment 0 03/18/21 0849   Infiltration Assessment 0 03/18/21 0849   Dressing Status Clean, dry, & intact 03/18/21 0849   Dressing Type Transparent 03/18/21 0849   Hub Color/Line Status Pink;Capped 03/18/21 0849   Action Taken Dressing reinforced 03/18/21 0849   Alcohol Cap Used Yes 03/18/21 0849        Opportunity for questions and clarification was provided.      Patient transported with:   O2 @ 2 liters    1650: TRANSFER - IN REPORT:    Verbal report received from NADINE Elkins(name) on Davy Alexandre  being received from Dialysis (unit) for ordered procedure      Report consisted of patient’s Situation, Background, Assessment and   Recommendations(SBAR).     Information from the following report(s) Procedure Summary was reviewed with the receiving nurse.    Opportunity for questions and clarification was provided.      Assessment completed upon patient’s arrival to unit and care assumed.     1730: patient settled into bed, vitals taken, PM meds given. Wife at bedside. Not ambulating d/t being unsteady on his feet after dialysis. Complained of mild pain PRN tylenol given (see MAR). Wife is at bedside, will continue to monitor.     1900: End  of Shift Note    Bedside shift change report given to Nate Grewal RN (oncoming nurse) by Sil Barr RN (offgoing nurse). Report included the following information SBAR, ED Summary, Procedure Summary, Intake/Output, Recent Results and Cardiac Rhythm paced    Shift worked:  1884-4495     Shift summary and any significant changes:     see above     Concerns for physician to address:  none     Zone phone for oncoming shift:   [phone]       Activity:  Activity Level: Up with Assistance  Number times ambulated in hallways past shift: 0  Number of times OOB to chair past shift: 2    Cardiac:   Cardiac Monitoring: Yes      Cardiac Rhythm: Normal sinus rhythm    Access:   Current line(s): PIV and central line     Genitourinary:   Urinary status: voiding    Respiratory:   O2 Device: Nasal cannula  Chronic home O2 use?: YES  Incentive spirometer at bedside: YES     GI:  Last Bowel Movement Date: 03/18/21  Current diet:  DIET ONE TIME MESSAGE  DIET DIABETIC CONSISTENT CARB Regular; 2 GM NA (House Low NA); FR 1500ML; 80-80-80  DIET ONE TIME MESSAGE  DIET ONE TIME MESSAGE  Passing flatus: YES  Tolerating current diet: YES  % Diet Eaten: 100 %    Pain Management:   Patient states pain is manageable on current regimen: YES    Skin:  Romeo Score: 17  Interventions: float heels, increase time out of bed and PT/OT consult    Patient Safety:  Fall Score:  Total Score: 3  Interventions: bed/chair alarm, assistive device (walker, cane, etc), gripper socks and pt to call before getting OOB  High Fall Risk: Yes    Length of Stay:  Expected LOS: 4d 0h  Actual LOS: [address] RN

## 2021-03-18 NOTE — PROGRESS NOTES
Name: Madeline Witt   MRN: [id number]  : 1950      Assessment   :                                               Plan:  New ESRD  Hypokalemia  HTN  DM-2  Volume overload  A on C D-CHF  Obesity  CAD Tolerated hd / uf yesterday with 2.5 l pulled ! UF x 1 hour today and HD x 2 hours daily while here  He can dialyze at [facility] on second shift MWF 11:15 time slot    DC planning-needs to be off 02  Hopefully after hd today and tomorrow he won't need 02  As we are aggressively pulling fluid. Ideally would dialyze thru fri am-dc and report to unit on Monday for  Outpatient hd.    DOwn over 25 pounds in a couple days  uf set up with [facility] for am-d/w pt/hospitalist/[facility]  Off norvasc now, reducing beta blocker  Wean off 02               Subjective:  oob in chair . remains edematous, but subjectively he feels improved  ROS:   No nausea, no vomiting  No chest pain, + shortness of breath and leg edema improving    Exam:  Visit Vitals  /65 (BP 1 Location: Left upper arm, BP Patient Position: At rest)   Pulse 75   Temp 97.8 °F (36.6 °C)   Resp 16   Ht 5' 9\" (1.753 m)   Wt 106.5 kg (234 lb 12.6 oz)   SpO2 100%   BMI 34.67 kg/m²     WB/WN in NAD  Decreased bs  Irregular, distant, no tachycardia  Edema improved-legs wrapped  Alert    Current Facility-Administered Medications   Medication Dose Route Frequency Last Admin    melatonin tablet 3 mg  3 mg Oral QHS PRN 3 mg at 21 0158    heparin (porcine) 1,000 unit/mL injection 3,800 Units  3,800 Units Hemodialysis DIALYSIS PRN 3,800 Units at 21 1735    B complex-vitaminC-folic acid (NEPHROCAP) cap  1 Cap Oral DAILY 1 Cap at 21 0938    insulin glargine (LANTUS) injection 20 Units  20 Units SubCUTAneous QHS 20 Units at 21 2224    L.acidophilus-paracasei-S.thermophil-bifidobacter (RISAQUAD) 8 billion cell capsule  1 Cap Oral DAILY 1 Cap at 03/18/21 0938    diphenhydrAMINE-zinc acetate 2%-0.1% (BENADRYL) cream   Topical TID PRN      bismuth subsalicylate (PEPTO-BISMOL) oral suspension 30 mL  30 mL Oral Q6H PRN 30 mL at 03/15/21 0456    guaiFENesin-dextromethorphan (ROBITUSSIN DM) 100-10 mg/5 mL syrup 10 mL  10 mL Oral Q6H PRN 10 mL at 03/17/21 2232    balsam peru-castor oiL (VENELEX) ointment   Topical BID Given at 03/18/21 0800    insulin lispro (HUMALOG) injection 3 Units  3 Units SubCUTAneous TIDAC 3 Units at 03/18/21 1213    benzonatate (TESSALON) capsule 100 mg  100 mg Oral TID  mg at 03/11/21 1948    acetaminophen (TYLENOL) tablet 650 mg  650 mg Oral Q4H  mg at 03/18/21 0557    hydrALAZINE (APRESOLINE) 20 mg/mL injection 20 mg  20 mg IntraVENous Q6H PRN      apixaban (ELIQUIS) tablet 5 mg  5 mg Oral BID 5 mg at 03/18/21 5188    dutasteride (AVODART) capsule 0.5 mg  0.5 mg Oral DAILY 0.5 mg at 03/18/21 0154    levothyroxine (SYNTHROID) tablet 137 mcg  137 mcg Oral 6am 137 mcg at 03/18/21 0558    metoprolol succinate (TOPROL-XL) XL tablet 150 mg  150 mg Oral DAILY 150 mg at 03/18/21 0060    pravastatin (PRAVACHOL) tablet 80 mg  80 mg Oral QHS 80 mg at 03/17/21 2226    pantoprazole (PROTONIX) tablet 40 mg  40 mg Oral ACB 40 mg at 03/18/21 1984    insulin lispro (HUMALOG) injection   SubCUTAneous AC&HS 3 Units at 03/18/21 1214    glucose chewable tablet 16 g  4 Tab Oral PRN      dextrose (D50W) injection syrg 12.5-25 g  12.5-25 g IntraVENous PRN      glucagon (GLUCAGEN) injection 1 mg  1 mg IntraMUSCular PRN      sodium chloride (NS) flush 5-40 mL  5-40 mL IntraVENous Q8H 5 mL at 03/18/21 1400    sodium chloride (NS) flush 5-40 mL  5-40 mL IntraVENous PRN 10 mL at 03/08/21 1051    polyethylene glycol (MIRALAX) packet 17 g  17 g Oral DAILY PRN      ondansetron (ZOFRAN ODT) tablet 4 mg  4 mg Oral Q8H PRN      Or    ondansetron (ZOFRAN) injection 4 mg  4 mg IntraVENous Q6H PRN Labs/Data:    Lab Results   Component Value Date/Time    WBC 8.5 03/17/2021 03:24 AM    HGB 12.5 03/17/2021 03:24 AM    HCT 42.5 03/17/2021 03:24 AM    PLATELET 950 51/35/9454 03:24 AM    MCV 88.0 03/17/2021 03:24 AM       Lab Results   Component Value Date/Time    Sodium 136 03/18/2021 02:00 AM    Potassium 3.7 03/18/2021 02:00 AM    Chloride 99 03/18/2021 02:00 AM    CO2 32 03/18/2021 02:00 AM    Anion gap 5 03/18/2021 02:00 AM    Glucose 198 (H) 03/18/2021 02:00 AM    BUN 70 (H) 03/18/2021 02:00 AM    Creatinine 3.16 (H) 03/18/2021 02:00 AM    BUN/Creatinine ratio 22 (H) 03/18/2021 02:00 AM    GFR est AA 24 (L) 03/18/2021 02:00 AM    GFR est non-AA 19 (L) 03/18/2021 02:00 AM    Calcium 8.8 03/18/2021 02:00 AM       Wt Readings from Last 3 Encounters:   03/18/21 106.5 kg (234 lb 12.6 oz)   08/03/20 111.1 kg (245 lb)   02/17/20 110.7 kg (244 lb)         Intake/Output Summary (Last 24 hours) at 3/18/2021 1421  Last data filed at 3/18/2021 0943  Gross per 24 hour   Intake 620 ml   Output 2600 ml   Net -1980 ml       Patient seen and examined. Chart reviewed. Labs, data and other pertinent notes reviewed in last 24 hrs.     PMH/SH/FH reviewed and unchanged compared to H&P    Alexander oPmpa MD

## 2021-03-18 NOTE — PROGRESS NOTES
Hospitalist Progress Note    NAME: Merissa Cormier   :  1950   MRN:  [id number]       Assessment / Plan:  Acute hypoxic respiratory failure POA (improving)  Acute on chronic diastolic CHF exacerbation POA (improving)  INO on CKD4 progressing now on dialysis  Hospital-acquired pneumonia  Loose bowel movements for 2 days (likely antibiotic associated diarrhea) resolved  -86% on RA in ED, maintaining sats on 2 L oxygen via nasal cannula this morning  -was on IV bumex 3mg IV bid, started on infusion 3/14 as his response had plateued   -continue daily metolazone 2.5 mg daily  -Appreciate nephrology recommendations. Plans to discharge tomorrow after HD   -baseline Cr around 3, elevated to 4.76 on admission. Creatinine appears plateaued bellow 4, but BUN continues to rise above 130 for which he was started on HD  3/16/2021 permacath inserted and patient got his first dialysis session  Plans to continue daily dialysis until 3/19. Patient would like to be able to make it for his COVID vaccine appointment on Friday.    -renally dose meds, hold nephrotoxic agents if possible  -JANINE's revealed -2.09 L/24-hr. Weight >10 pounds less than admission weight  -does not wear O2 at home and currently on 2L NC. Continue try weaning oxygen as tolerated  Respiratory distress , leukocytosis, fever and cough, no more episodes overnight  X-ray with bibasilar opacities. Completed3 days vancomycin and Zosyn treating hospital-acquired pneumonia . procalcitonin 0.38 from Procalcitonin on  was 0.12.    no fever in last 48 hours. repeat porcal repeated 3/12, trending down. stopped piperacillin and vancomycin  Added probiotic.    proBNP improving      Hypokalemia, resolved  -Continue checking BMP daily given he is on Bumex and metolazone     Acute on chronic diastolic CHF  -Diuresis as above  -TTE with EF 50 to 55%  -Continue BB  -Appreciate cardiology recommendations  -Mild troponin elevation in setting of uncontrolled hypertension and INO  -BNP > 19k  Nuclear cardiac stress test Inconclusive stress test/No ischemia or infarct demonstrated    Skin rash  -Rt flank, mild contusion  -no vesicles. Does not appear dermatomal   -states area became painful after sliding on CT scanner when he was admitted  -monitor, try topical benadryl    Uncontrolled hypertension  -better-controlled today  -As above, continue beta-blocker.  -As needed hydralazine    IDDM  -Goal blood sugar 140-190.   -Was on Lantus per home dose 30 units nightly,, blood sugars below target so we will decrease the dose to 20 (3/12/2021)  -added back mealtime insulin at reduced dose   -SSI with POC's. Adjust insulin as indicated based on POC values    Lower extremity edema  -Bilateral lower extremity venous Dopplers negative  -Diuresis as above    LE/foot wounds POA  -large RLE medical ankle bullae from fluid overload, b/l heel DTIs, Lt 5th toe blister  -appreciate wound care help  -to follow at [facility], where he has been seen before    Chronic A. fib  -Continue Eliquis  -s/p PPM  -Continue metoprolol     Hypothyroidism  -Continue Synthroid     Code Status: Full code  Surrogate Decision Maker: Wife     DVT Prophylaxis: Eliquis    GI Prophylaxis: not indicated    Discharge tomorrow after HD     Baseline: Ambulatory at home     Subjective:     Chief Complaint / Reason for Physician Visit  Patient was seen and examined. No acute events overnight. Discussed with RN events overnight.      \"feeling fine but I want to go tomorrow for my vaccine\"    Review of Systems:  Symptom Y/N Comments  Symptom Y/N Comments   Fever/Chills n   Chest Pain n    Poor Appetite    Edema y  bilateral lower extremities   Cough n   Abdominal Pain n    Sputum    Joint Pain     SOB/BETTS n   Pruritis/Rash     Nausea/vomit n   Tolerating PT/OT     Diarrhea    Tolerating Diet y    Constipation    Other       Could NOT obtain due to:          Objective:     VITALS:   Last 24hrs VS reviewed since prior progress note. Most recent are:  Patient Vitals for the past 24 hrs:   Temp Pulse Resp BP SpO2   03/18/21 0220 98.5 °F (36.9 °C) 75 20 132/67 99 %   03/17/21 2348 98 °F (36.7 °C) 75 18 (!) 145/68 100 %   03/17/21 2114 98.4 °F (36.9 °C) 75 18  99 %   03/17/21 1820 97.3 °F (36.3 °C) 76 20 125/79 92 %   03/17/21 1730 97.8 °F (36.6 °C) 77 16 136/73    03/17/21 1715  77 16 132/75    03/17/21 1700  74 16 131/74    03/17/21 1645  74 16 125/75    03/17/21 1630  74 16 130/72    03/17/21 1615  74 16 127/68    03/17/21 1600  74 16 135/75    03/17/21 1545  74 16 133/77    03/17/21 1530  74 16 138/74    03/17/21 1515  74 16 132/71    03/17/21 1500  74 16 129/71    03/17/21 1445  74 16 136/62    03/17/21 1430 98.1 °F (36.7 °C) 74 16 (!) 158/81    03/17/21 1117 98.1 °F (36.7 °C) 75 16 130/63 96 %       Intake/Output Summary (Last 24 hours) at 3/18/2021 0843  Last data filed at 3/18/2021 0602  Gross per 24 hour   Intake 721 ml   Output 2600 ml   Net -1879 ml        I had a face to face encounter and independently examined this patient on 3/18/2021, as outlined below:  PHYSICAL EXAM:  General: WD, WN. Alert, cooperative, no acute distress    EENT:  EOMI. Anicteric sclerae. MMM  Resp:  Decreased breath sounds in the subscapular areas. No accessory muscle use  CV:  Regular  rhythm,  +3 pitting edema bilateral lower extremities  GI:  Soft, Non distended, Non tender. +Bowel sounds  Neurologic:  Alert and oriented X 3, normal speech,   Psych:   Good insight. Not anxious nor agitated  Skin:  No rashes. No jaundice.   Mild contusion in the right flank    Reviewed most current lab test results and cultures  YES  Reviewed most current radiology test results   YES  Review and summation of old records today    NO  Reviewed patient's current orders and MAR    YES  PMH/ reviewed - no change compared to H&P  ________________________________________________________________________  Care Plan discussed with:    Comments   Patient x    Family      RN x    Care Manager     Consultant  X  nephrology                     Multidiciplinary team rounds were held today with , nursing, pharmacist and clinical coordinator. Patient's plan of care was discussed; medications were reviewed and discharge planning was addressed. ________________________________________________________________________  Total NON critical care TIME: 35 Minutes    Total CRITICAL CARE TIME Spent:   Minutes non procedure based      Comments   >50% of visit spent in counseling and coordination of care x    ________________________________________________________________________  Rodger Kauffman MD     Procedures: see electronic medical records for all procedures/Xrays and details which were not copied into this note but were reviewed prior to creation of Plan. LABS:  I reviewed today's most current labs and imaging studies.   Pertinent labs include:  Recent Labs     03/17/21  0324   WBC 8.5   HGB 12.5   HCT 42.5        Recent Labs     03/18/21  0200 03/17/21  0324 03/16/21  0003    136 138   K 3.7 3.9 3.4*   CL 99 98 95*   CO2 32 31 38*   * 223* 206*   BUN 70* 89* 131*   CREA 3.16* 3.32* 4.00*   CA 8.8 9.0 8.7   MG 2.2 2.4 2.5*   PHOS 3.4 4.0 4.4   ALB  --  3.0*  --    TBILI  --  0.8  --    ALT  --  18  --    INR  --   --  1.2*       Signed: Rodger Kauffman MD

## 2021-03-18 NOTE — PROGRESS NOTES
End of Shift Note    Bedside shift change report given to Magi Escudero Jessie  (oncoming nurse) by Dot Runner (offgoing nurse). Report included the following information SBAR and Kardex    Shift worked:  2385-9913     Shift summary and any significant changes:    none   Concerns for physician to address: none   Zone phone for oncoming shift:  [phone]     Activity:  Activity Level: Up with Assistance  Number times ambulated in hallways past shift: 0  Number of times OOB to chair past shift: 0    Cardiac:   Cardiac Monitoring: Yes      Cardiac Rhythm: Paced    Access:   Current line(s): PIV and HD access     Genitourinary:   Urinary status: voiding    Respiratory:   O2 Device: Nasal cannula  Chronic home O2 use?: YES  Incentive spirometer at bedside: YES     GI:  Last Bowel Movement Date: 03/15/21  Current diet:  DIET ONE TIME MESSAGE  DIET DIABETIC CONSISTENT CARB Regular; 2 GM NA (House Low NA); FR 1500ML; 80-80-80  DIET ONE TIME MESSAGE  Passing flatus: YES  Tolerating current diet: YES  % Diet Eaten: 100 %    Pain Management:   Patient states pain is manageable on current regimen: YES    Skin:  Romeo Score: 17  Interventions: increase time out of bed    Patient Safety:  Fall Score:  Total Score: 3  Interventions: gripper socks  High Fall Risk: Yes    Length of Stay:  Expected LOS: 4d 0h  Actual LOS: [address]

## 2021-03-19 VITALS
HEIGHT: 69 IN | BODY MASS INDEX: 35.1 KG/M2 | HEART RATE: 75 BPM | RESPIRATION RATE: 19 BRPM | SYSTOLIC BLOOD PRESSURE: 132 MMHG | TEMPERATURE: 98 F | DIASTOLIC BLOOD PRESSURE: 67 MMHG | OXYGEN SATURATION: 97 % | WEIGHT: 236.99 LBS

## 2021-03-19 PROBLEM — I50.31 ACUTE DIASTOLIC CHF (CONGESTIVE HEART FAILURE) (HCC): Status: RESOLVED | Noted: 2017-06-22 | Resolved: 2021-03-19

## 2021-03-19 PROBLEM — N17.9 AKI (ACUTE KIDNEY INJURY) (HCC): Status: RESOLVED | Noted: 2021-03-04 | Resolved: 2021-03-19

## 2021-03-19 PROBLEM — J96.00 ACUTE RESPIRATORY FAILURE (HCC): Status: RESOLVED | Noted: 2021-03-04 | Resolved: 2021-03-19

## 2021-03-19 LAB
ANION GAP SERPL CALC-SCNC: 7 MMOL/L (ref 5–15)
BUN SERPL-MCNC: 59 MG/DL (ref 6–20)
BUN/CREAT SERPL: 15 (ref 12–20)
CALCIUM SERPL-MCNC: 8.7 MG/DL (ref 8.5–10.1)
CHLORIDE SERPL-SCNC: 97 MMOL/L (ref 97–108)
CO2 SERPL-SCNC: 31 MMOL/L (ref 21–32)
CREAT SERPL-MCNC: 3.89 MG/DL (ref 0.7–1.3)
GLUCOSE BLD STRIP.AUTO-MCNC: 133 MG/DL (ref 65–100)
GLUCOSE BLD STRIP.AUTO-MCNC: 191 MG/DL (ref 65–100)
GLUCOSE SERPL-MCNC: 201 MG/DL (ref 65–100)
PHOSPHATE SERPL-MCNC: 3.8 MG/DL (ref 2.6–4.7)
POTASSIUM SERPL-SCNC: 4.1 MMOL/L (ref 3.5–5.1)
SERVICE CMNT-IMP: ABNORMAL
SERVICE CMNT-IMP: ABNORMAL
SODIUM SERPL-SCNC: 135 MMOL/L (ref 136–145)

## 2021-03-19 PROCEDURE — 74011250637 HC RX REV CODE- 250/637: Performed by: NURSE PRACTITIONER

## 2021-03-19 PROCEDURE — 77010033678 HC OXYGEN DAILY

## 2021-03-19 PROCEDURE — 90935 HEMODIALYSIS ONE EVALUATION: CPT

## 2021-03-19 PROCEDURE — 80048 BASIC METABOLIC PNL TOTAL CA: CPT

## 2021-03-19 PROCEDURE — 74011636637 HC RX REV CODE- 636/637: Performed by: INTERNAL MEDICINE

## 2021-03-19 PROCEDURE — 82962 GLUCOSE BLOOD TEST: CPT

## 2021-03-19 PROCEDURE — 74011250637 HC RX REV CODE- 250/637: Performed by: INTERNAL MEDICINE

## 2021-03-19 PROCEDURE — 74011250636 HC RX REV CODE- 250/636: Performed by: INTERNAL MEDICINE

## 2021-03-19 PROCEDURE — 74011250637 HC RX REV CODE- 250/637: Performed by: STUDENT IN AN ORGANIZED HEALTH CARE EDUCATION/TRAINING PROGRAM

## 2021-03-19 PROCEDURE — 84100 ASSAY OF PHOSPHORUS: CPT

## 2021-03-19 PROCEDURE — 36415 COLL VENOUS BLD VENIPUNCTURE: CPT

## 2021-03-19 RX ORDER — BENZONATATE 100 MG/1
100 CAPSULE ORAL
Qty: 15 CAP | Refills: 0 | Status: SHIPPED | OUTPATIENT
Start: 2021-03-19 | End: 2021-01-01

## 2021-03-19 RX ORDER — BALSAM PERU/CASTOR OIL
OINTMENT (GRAM) TOPICAL 2 TIMES DAILY
Qty: 3 TUBE | Refills: 0 | Status: SHIPPED | OUTPATIENT
Start: 2021-03-19

## 2021-03-19 RX ORDER — BUMETANIDE 2 MG/1
TABLET ORAL
Qty: 30 TAB | Refills: 0 | Status: SHIPPED | OUTPATIENT
Start: 2021-03-19 | End: 2022-01-01 | Stop reason: ALTCHOICE

## 2021-03-19 RX ORDER — INSULIN GLARGINE 100 [IU]/ML
20 INJECTION, SOLUTION SUBCUTANEOUS
Qty: 10 ADJUSTABLE DOSE PRE-FILLED PEN SYRINGE | Refills: 3 | Status: SHIPPED | OUTPATIENT
Start: 2021-03-19 | End: 2021-01-01 | Stop reason: SDUPTHER

## 2021-03-19 RX ORDER — METOPROLOL SUCCINATE 100 MG/1
100 TABLET, EXTENDED RELEASE ORAL DAILY
Qty: 135 TAB | Refills: 1 | Status: SHIPPED | OUTPATIENT
Start: 2021-03-19

## 2021-03-19 RX ADMIN — INSULIN LISPRO 3 UNITS: 100 INJECTION, SOLUTION INTRAVENOUS; SUBCUTANEOUS at 11:51

## 2021-03-19 RX ADMIN — ACETAMINOPHEN 650 MG: 325 TABLET ORAL at 11:58

## 2021-03-19 RX ADMIN — NEPHROCAP 1 CAPSULE: 1 CAP ORAL at 11:51

## 2021-03-19 RX ADMIN — Medication 1 CAPSULE: at 11:51

## 2021-03-19 RX ADMIN — Medication 10 ML: at 06:35

## 2021-03-19 RX ADMIN — APIXABAN 5 MG: 5 TABLET, FILM COATED ORAL at 11:51

## 2021-03-19 RX ADMIN — DUTASTERIDE 0.5 MG: 0.5 CAPSULE, LIQUID FILLED ORAL at 11:51

## 2021-03-19 RX ADMIN — GUAIFENESIN AND DEXTROMETHORPHAN 10 ML: 100; 10 SYRUP ORAL at 11:58

## 2021-03-19 RX ADMIN — METOPROLOL SUCCINATE 100 MG: 50 TABLET, EXTENDED RELEASE ORAL at 11:51

## 2021-03-19 RX ADMIN — HEPARIN SODIUM 3800 UNITS: 1000 INJECTION INTRAVENOUS; SUBCUTANEOUS at 09:48

## 2021-03-19 NOTE — PROGRESS NOTES
Discharge reviewed at bedside. All lines and tele removed. Vitals stable. Wheeled to main entrance to be discharge home by wife.

## 2021-03-19 NOTE — PROCEDURES
[facility] Dialysis Team [facility]  [phone] Vitals   Pre   Post   Assessment   Pre   Post    
Temp  Temp: 98.4 °F (36.9 °C) (03/19/21 0830)  98.2 LOC  A & O x 3 No change HR   Pulse (Heart Rate): 75 (03/19/21 0915) 75 Lungs   Slightly dim  No change B/P   BP: (!) 110/55 (03/19/21 0915) 143/79 Cardiac   S1S2  No change Resp   Resp Rate: 16 (03/19/21 0915) 16 Skin   Warm, dry and intact  No change Pain level  Pain Intensity 1: 0 (03/19/21 0351) 0 Edema Trace lower ext No change Orders: Duration:   Start:  0830 Procedure Start Time: 1450 End:  1030   Total:   2.0 Dialyzer:   Dialyzer/Set Up Inspection: Sharif Orellana (03/19/21 0830) K Bath:   Dialysate K (mEq/L): 4 (03/19/21 0830) Ca Bath:   Dialysate CA (mEq/L): 2.5 (03/19/21 0830) Na/Bicarb:   Dialysate NA (mEq/L): 140 (03/19/21 0830) Target Fluid Removal:   Goal/Amount of Fluid to Remove (mL): 2000 mL (03/19/21 0830) Access  CVC Type & Location:   Right sub clav pc Labs Obtained/Reviewed Critical Results Called   Date when labs were drawn- 
Hgb-   
HGB Date Value Ref Range Status 03/17/2021 12.5 12.1 - 17.0 g/dL Final  
 
K-   
Potassium Date Value Ref Range Status 03/19/2021 4.1 3.5 - 5.1 mmol/L Final  
 
Ca-  
Calcium Date Value Ref Range Status 03/19/2021 8.7 8.5 - 10.1 MG/DL Final  
 
Bun-  
BUN Date Value Ref Range Status 03/19/2021 59 (H) 6 - 20 MG/DL Final  
 
Creat-  
Creatinine Date Value Ref Range Status 03/19/2021 3.89 (H) 0.70 - 1.30 MG/DL Final  
 
  
Medications/ Blood Products Given Name   Dose   Route and Time Heparin arterial port  1.9ml   
Heparin venous port 1.9ml Blood Volume Processed (BVP):    34.7 Net Fluid Removed:  1000ml Comments RN reviewed LPN assessment and completed RN assessment. RN completed patient assessment. RN reviewed technicians vital signs and procedure note. Tx completed. Reviewed by RN LORENA Medina Time Out Done: 4506 Primary Nurse Rpt Pre:NADINE Quintero Primary Nurse Rpt Post: 
Pt Education:access care Care Plan:continue HD Tx Summary:RIJ CVC: Dressing CDI. No s/s of infection. Both lumens aspirate & flush well. Running well at . SBAR received from Primary RN. Pt arrived to HD suite A&Ox4. Consent signed & on file. 0830: Each catheter limb disinfected per p&p, caps removed, hubs disinfected per p&p. Each lumen aspirated for blood return and flushed with Normal Saline per policy. Labs drawn per request/ order. VSS. Dialysis Tx initiated. 0845: DR. Debi will. 0900: Pt. Stable, talking on cell phone 0915: pt. Stable, lines stable 0930: Pt. Sonia. Hd well. Watching tv 
0945: pt. Stable. Resting well. BP trending down, decreased goal. 
1000: Pt. Stable, lines secure 1030: Tx ended. VSS. Each dialysis catheter limb disinfected per p&p, all possible blood returned per p&p, and each dialysis hub disinfected per p&p. Each lumen flushed, post dialysis catheter Heparin dwell instilled per order, and caps applied. Bed locked and in the lowest position, call bell and belongings in reach. SBAR given to Primary, RN. Patient is stable at time of their/ my departure. All Dialysis related medications have been reviewed. Admiting Diagnosis:New ESRD, Hypokalemia, HTN 
Pt's previous clinic- n/a Consent signed - Informed Consent Verified: Yes (03/19/21 0830) Librado Consent - yes Hepatitis Status- neg/jaci 3/16/21 Machine #- Machine Number: [id number] (03/19/21 0830) Telemetry status- yes Pre-dialysis wt. - Pre-Dialysis Weight: 115 kg (253 lb 8.5 oz) (03/16/21 1530)

## 2021-03-19 NOTE — PROGRESS NOTES
HD TRANSFER - OUT REPORT:    Verbal report given to Jc Sebastian on Doc Rowell being transferred to [facility] (Unit) for ordered procedure       Report consisted of patient's Situation, Background, Assessment and   Recommendations(SBAR). Information from the following report(s) Kardex was reviewed with the receiving nurse. Method:  $$ Method: Hemodialysis (03/19/21 1030)    Fluid Removed  NET Fluid Removed (mL): 1000 ml (03/19/21 1030)     Patient response to treatment:  Stable    End Time  Hemodialysis End Time: 6895 (03/19/21 1030)  If not documented, dialysis nurse to update post-dialysis row in HD/Filtration flowsheet     Medications /Volume expansion agents or Fluid boluses administered during treatment? no    Post-dialysis medication administration due?  no  Remind nurse to administer post-HD medication upon return to unit. Fistula hemostasis? no    Line heparinization? yes    Lines: cvc    Opportunity for questions and clarification was provided.       Patient transported with: [employer]

## 2021-03-19 NOTE — PROGRESS NOTES
Renal-pt seen/examined on hd. bp stable. For dc today. Pt to take bumex on NON HD days-d/w pt yesterday on rounds.  Pt will report to [facility] on Mon at 11 am  Rodney Shearer MD

## 2021-03-19 NOTE — PROGRESS NOTES
Problem: Pressure Injury - Risk of  Goal: *Prevention of pressure injury  Description: Document Romeo Scale and appropriate interventions in the flowsheet.   Outcome: Progressing Towards Goal  Note: Pressure Injury Interventions:  Sensory Interventions: Assess changes in LOC    Moisture Interventions: Apply protective barrier, creams and emollients    Activity Interventions: Chair cushion    Mobility Interventions: Chair cushion    Nutrition Interventions: Document food/fluid/supplement intake    Friction and Shear Interventions: Apply protective barrier, creams and emollients                Problem: Patient Education: Go to Patient Education Activity  Goal: Patient/Family Education  Outcome: Progressing Towards Goal

## 2021-03-19 NOTE — PROGRESS NOTES
Occupational Therapy    Chart reviewed, patient in HD, will follow up as able, noted potential discharge. Dominic Ponce.  MS Yudi OTR/L

## 2021-03-19 NOTE — PROGRESS NOTES
Physical Therapy    Chart reviewed, patient is currently off the floor for HD, will defer and continue to follow.     Samra Lane

## 2021-03-19 NOTE — PROGRESS NOTES
Transition of Care Plan:        RUR: 22%  [address] [facility]  Follow up appointments: PCP-Dr. Yumiko Forrest - 3/30/21, Cardiology-Dr. Kimberlee Cesar- 3/26/21  DME needed: None  Transportation at Discharge: Pt's wife will transport at d/c.   [place] or means to access home:    Pt has his keys    [place] Medicare letter: Given  Caregiver Contact: Ana Maria Rockwell- Wife [phone]  Discharge Caregiver contacted prior to discharge?  CM contacted wife to discuss d/c plan. Pt cleared for d/c from CM stand point. CM faxed progress notes of dialysis to [address] [place] [address]- (f) [phone]    CM discussed d/c plan with pt at bedside. Pt agreed with plan. No questions or concerns at this time. Care Management Interventions  PCP Verified by CM: Yes(Pt's PCP is Dr. Blanche Blanchard. Pt sees PCP every 3 months.)  Palliative Care Criteria Met (RRAT>21 & CHF Dx)?: No  Mode of Transport at Discharge: Other (see comment)(wife to provide transportation)  Transition of Care Consult (CM Consult): Home Health, Discharge Planning(At Home Care)  Discharge Durable Medical Equipment: No(Pt has a rollator and cane)  Physical Therapy Consult: No  Occupational Therapy Consult: No  Speech Therapy Consult: No  Current Support Network: Lives with Spouse, Own Home  Confirm Follow Up Transport: Family(Pt does not drive.  Pt's wife transports when necessary.)  [employer] Provided?: No  Discharge Location  Discharge Placement: Home with home health    [address] [place] [address]  [address]  Phone: [phone]

## 2021-03-19 NOTE — PROGRESS NOTES
End of Shift Note    Bedside shift change report given to Liliane Newell RN  (oncoming nurse) by Oscar Gómez (offgoing nurse). Report included the following information SBAR and Kardex    Shift worked: [id number]     Shift summary and any significant changes:     Patient is reconsidering discharge after dialysis as he was weak after his last dialysis. He wanted to leave to get his 2nd pfizer covid vaccine, but he was told he could get that here. If so, he would prefer not to discharge. Concerns for physician to address:  see above     Zone phone for oncoming shift:  [phone]     Activity:  Activity Level: Up with Assistance  Number times ambulated in hallways past shift: 0  Number of times OOB to chair past shift: 1    Cardiac:   Cardiac Monitoring: Yes      Cardiac Rhythm: Normal sinus rhythm    Access:   Current line(s): PIV     Genitourinary:   Urinary status: voiding    Respiratory:   O2 Device: Nasal cannula  Chronic home O2 use?: NO  Incentive spirometer at bedside: NO     GI:  Last Bowel Movement Date: 03/18/21  Current diet:  DIET ONE TIME MESSAGE  DIET DIABETIC CONSISTENT CARB Regular; 2 GM NA (House Low NA); FR 1500ML; 80-80-80  DIET ONE TIME MESSAGE  DIET ONE TIME MESSAGE  Passing flatus: YES  Tolerating current diet: YES  % Diet Eaten: 100 %    Pain Management:   Patient states pain is manageable on current regimen: YES    Skin:  Romeo Score: 17  Interventions: increase time out of bed    Patient Safety:  Fall Score:  Total Score: 3  Interventions: gripper socks  High Fall Risk: Yes    Length of Stay:  Expected LOS: 4d 0h  Actual LOS: [facility]

## 2021-03-19 NOTE — PROGRESS NOTES
TRANSFER - IN REPORT:    Verbal report received from Navya Curtis RN on Chel Wagneros  being received from [address] (unit) for ordered procedure      Report consisted of patients Situation, Background, Assessment and   Recommendations(SBAR). Information from the following report(s) Kardex was reviewed with the receiving nurse. Opportunity for questions and clarification was provided. Assessment completed upon patients arrival to unit and care assumed.

## 2021-03-19 NOTE — DISCHARGE SUMMARY
Hospitalist Discharge Summary     Patient ID:  Clarita Gallego  [id number]  78 y.o.  1950  3/4/2021    PCP on record: Guerrero Winston MD    Admit date: 3/4/2021  Discharge date and time: 3/19/2021    DISCHARGE DIAGNOSIS:      Acute hypoxic respiratory failure POA (improving)  Acute on chronic diastolic CHF exacerbation POA (improving)  INO on CKD4 progressing now on dialysis  Hospital-acquired pneumonia  Loose bowel movements for 2 days (likely antibiotic associated diarrhea) resolved            Hypokalemia, resolved     Acute on chronic diastolic CHF       Skin rash    Uncontrolled hypertension       IDDM       Lower extremity edema       LE/foot wounds POA          CONSULTATIONS:  IP CONSULT TO HOSPITALIST  IP CONSULT TO CARDIOLOGY  IP CONSULT TO NEPHROLOGY    Excerpted HPI from H&P of Jg Quintanilla MD:  Aliyah Cervantes is a 66 y.o.  male who presents with shortness of breath for 1 week. Patient past medical history of CHF, hypertension, diabetes type 2, A. fib, CAD s/p CABG, hyperlipidemia. Patient states that he is having shortness of breath over the last 1 week which is getting worse. Patient does not use home oxygen and currently on 2 L in the ER. Patient also complains of worsening of the lower extremity swelling along with the pain with worsening on the right side. Denies any chest pain, no fever or chills, no cough, no chest congestion, no headache, no dizziness, no passing out episodes. Patient compliant with the medications. No other complaints.       ______________________________________________________________________  DISCHARGE SUMMARY/HOSPITAL COURSE:  for full details see H&P, daily progress notes, labs, consult notes.      Acute hypoxic respiratory failure POA (improving)  Acute on chronic diastolic CHF exacerbation POA (improving)  INO on CKD4 progressing now on dialysis  Hospital-acquired pneumonia  Loose bowel movements for 2 days (likely antibiotic associated diarrhea) resolved  -86% on RA in ED, maintaining sats on 2 L oxygen via nasal cannula this morning  -was on IV bumex 3mg IV bid, started on infusion 3/14 as his response had plateued   -continue daily metolazone 2.5 mg daily  -Appreciate nephrology recommendations.   -baseline Cr around 3, elevated to 4.76 on admission. Creatinine appears plateaued bellow 4, but BUN continues to rise above 130 for which he was started on HD  3/16/2021 permacath inserted and patient got his first dialysis session       -renally dose meds, hold nephrotoxic agents if possible  -JANINE's revealed -2.09 L/24-hr. Weight >25 pounds less than admission weight  -does not wear O2 at home and currently on 2L NC. Continue try weaning oxygen as tolerated  Respiratory distress 03/08, leukocytosis, fever and cough, no more episodes overnight  X-ray with bibasilar opacities. Completed3 days vancomycin and Zosyn treating hospital-acquired pneumonia 03/09. procalcitonin 0.38 from Procalcitonin on March 5 was 0.12.   -bumix on non-dialysis days only. no fever in last 48 hours. repeat porcal repeated 3/12, trending down. stopped piperacillin and vancomycin  proBNP improving        Hypokalemia, resolved     Acute on chronic diastolic CHF  -TTE with EF 50 to 55%  -Continue BB  -Appreciate cardiology recommendations  -Mild troponin elevation in setting of uncontrolled hypertension and INO  -BNP > 19k on admission  Nuclear cardiac stress test Inconclusive stress test/No ischemia or infarct demonstrated     Skin rash  -Rt flank, mild contusion  -no vesicles.  Does not appear dermatomal   -states area became painful after sliding on CT scanner when he was admitted  -monitor, try topical benadryl     Uncontrolled hypertension  -better-controlled   -As above, continue beta-blocker.     IDDM  -Was on Lantus per home dose 30 units nightly,, blood sugars below target so we will decrease the dose to 20 (3/12/2021)  -added back mealtime insulin at reduced dose      Lower extremity edema  -Bilateral lower extremity venous Dopplers negative     LE/foot wounds POA  -large RLE medical ankle bullae from fluid overload, b/l heel DTIs, Lt 5th toe blister  -appreciate wound care help  -to follow at [facility], where he has been seen before        _______________________________________________________________________  Patient seen and examined by me on discharge day. Pertinent Findings:  Gen:    Not in distress  Chest: Clear lungs  CVS:   Regular rhythm. No edema  Abd:  Soft, not distended, not tender  Neuro:  Alert,   _______________________________________________________________________  DISCHARGE MEDICATIONS:   Current Discharge Medication List      START taking these medications    Details   b complex-vitamin c-folic acid (NEPHROCAPS) 1 mg capsule Take 1 Cap by mouth daily. Qty: 30 Cap, Refills: 0      balsam peru-castor oiL (VENELEX) ointment Apply  to affected area two (2) times a day. Qty: 3 Tube, Refills: 0      benzonatate (TESSALON) 100 mg capsule Take 1 Cap by mouth three (3) times daily as needed for Cough for up to 7 days. Qty: 15 Cap, Refills: 0         CONTINUE these medications which have CHANGED    Details   insulin glargine (Lantus Solostar U-100 Insulin) 100 unit/mL (3 mL) inpn 20 Units by SubCUTAneous route nightly. INJECT 30 UNITS SUBCUTANEOUSLY AT BEDTIME  Qty: 10 Adjustable Dose Pre-filled Pen Syringe, Refills: 3      metoprolol succinate (TOPROL-XL) 100 mg tablet Take 1 Tab by mouth daily. Qty: 135 Tab, Refills: 1         CONTINUE these medications which have NOT CHANGED    Details   insulin aspart U-100 (NovoLOG Flexpen U-100 Insulin) 100 unit/mL (3 mL) inpn Inject 14 units at Breakfast, 10 with Lunch and Dinner  Qty: 15 Adjustable Dose Pre-filled Pen Syringe, Refills: 3      omeprazole (PRILOSEC) 40 mg capsule TAKE 1 CAPSULE BY MOUTH EVERY DAY      pravastatin (PRAVACHOL) 80 mg tablet Take 1 Tab by mouth nightly.   Qty: 90 Tab, Refills: 4 Comments: Overdue for appt and fasting labs. No further refills until seen. levothyroxine (SYNTHROID) 137 mcg tablet TAKE 1 TABLET BY MOUTH EVERY DAY  Qty: 90 Tab, Refills: 3      apixaban (Eliquis) 5 mg tablet TAKE 1 TABLET BY MOUTH TWICE A DAY  Qty: 180 Tab, Refills: 3      ferrous sulfate (IRON) 325 mg (65 mg iron) tablet Take 325 mg by mouth Daily (before breakfast). dutasteride (AVODART) 0.5 mg capsule Take 0.5 mg by mouth daily. psyllium (METAMUCIL) packet Take 1 Packet by mouth daily. acetaminophen (TYLENOL EXTRA STRENGTH) 500 mg tablet Take 1,000 mg by mouth every eight (8) hours as needed for Pain. STOP taking these medications       glucose blood VI test strips (blood glucose test) strip Comments:   Reason for Stopping:         potassium chloride SR (KLOR-CON 8) 8 mEq tablet Comments:   Reason for Stopping:         Insulin Needles, Disposable, (BD ULTRA-FINE MINI PEN NEEDLE) 31 gauge x 3/16\" ndle Comments:   Reason for Stopping:         bumetanide (BUMEX) 1 mg tablet Comments:   Reason for Stopping:         MYRBETRIQ 25 mg ER tablet Comments:   Reason for Stopping:         febuxostat (ULORIC) 40 mg tab tablet Comments:   Reason for Stopping:         metOLazone (ZAROXOLYN) 2.5 mg tablet Comments:   Reason for Stopping:         ketoconazole (NIZORAL) 2 % shampoo Comments:   Reason for Stopping:         OneTouch Ultra Blue Test Strip strip Comments:   Reason for Stopping:         ketoconazole (NIZORAL) 2 % topical cream Comments:   Reason for Stopping:         multivitamin (ONE A DAY) tablet Comments:   Reason for Stopping:                 Patient Follow Up Instructions:    Activity: Activity as tolerated  Diet: Cardiac Diet and Renal Diet  Wound Care: As directed      Follow-up Information     Follow up With Specialties Details Why Contact Info    [address] AT [address] [facility] on 3/25/2021 at 8:15am [address]  [address] [facility]  [phone] Escobar Young MD Family Medicine On 3/30/2021 For hospital follow up appointment at 11:00AM  [address]  [phone]      Gretel Simons MD Cardiology, [address] [place] [address] Vascular Surgery Go on 3/26/2021 for cardiology follow up at 9:30am [address]  [place]  [phone]       [address]  This is your home health agency. If you have not heard from them in 2-3 days, please give them a call. [address]   M/W/F at 11:15am. Please arrive at 10:30am for first dialysis.  Dany Coffman  64.  [phone]        ________________________________________________________________    Risk of deterioration: Low    Condition at Discharge:  Stable  __________________________________________________________________    Disposition  Home with family and home health services    ____________________________________________________________________    Code Status: Full Code  ___________________________________________________________________      Total time in minutes spent coordinating this discharge (includes going over instructions, follow-up, prescriptions, and preparing report for sign off to her PCP) :  >30 minutes    Signed:  Yohannes Guerrero MD

## 2021-03-19 NOTE — PROGRESS NOTES
Patient taken off O2 for discharge. 95% on RA at rest. Ambulated in [place] and back to room O2 at 97% while ambulating. MD notified of sats.

## 2021-03-19 NOTE — PROGRESS NOTES
Problem: Falls - Risk of  Goal: *Absence of Falls  Description: Document Darren Hicks Fall Risk and appropriate interventions in the flowsheet.   Outcome: Resolved/Met     Problem: Patient Education: Go to Patient Education Activity  Goal: Patient/Family Education  Outcome: Resolved/Met     Problem: Patient Education: Go to Patient Education Activity  Goal: Patient/Family Education  Outcome: Resolved/Met     Problem: Heart Failure: Day 1  Goal: Off Pathway (Use only if patient is Off Pathway)  Outcome: Resolved/Met  Goal: Activity/Safety  Outcome: Resolved/Met  Goal: Consults, if ordered  Outcome: Resolved/Met  Goal: Diagnostic Test/Procedures  Outcome: Resolved/Met  Goal: Nutrition/Diet  Outcome: Resolved/Met  Goal: Discharge Planning  Outcome: Resolved/Met  Goal: Medications  Outcome: Resolved/Met  Goal: Respiratory  Outcome: Resolved/Met  Goal: Treatments/Interventions/Procedures  Outcome: Resolved/Met  Goal: Psychosocial  Outcome: Resolved/Met  Goal: *Oxygen saturation within defined limits  Outcome: Resolved/Met  Goal: *Hemodynamically stable  Outcome: Resolved/Met  Goal: *Optimal pain control at patient's stated goal  Outcome: Resolved/Met  Goal: *Anxiety reduced or absent  Outcome: Resolved/Met     Problem: Heart Failure: Day 2  Goal: Off Pathway (Use only if patient is Off Pathway)  Outcome: Resolved/Met  Goal: Activity/Safety  Outcome: Resolved/Met  Goal: Consults, if ordered  Outcome: Resolved/Met  Goal: Diagnostic Test/Procedures  Outcome: Resolved/Met  Goal: Nutrition/Diet  Outcome: Resolved/Met  Goal: Discharge Planning  Outcome: Resolved/Met  Goal: Medications  Outcome: Resolved/Met  Goal: Respiratory  Outcome: Resolved/Met  Goal: Treatments/Interventions/Procedures  Outcome: Resolved/Met  Goal: Psychosocial  Outcome: Resolved/Met  Goal: *Oxygen saturation within defined limits  Outcome: Resolved/Met  Goal: *Hemodynamically stable  Outcome: Resolved/Met  Goal: *Optimal pain control at patient's stated goal  Outcome: Resolved/Met  Goal: *Anxiety reduced or absent  Outcome: Resolved/Met  Goal: *Demonstrates progressive activity  Outcome: Resolved/Met     Problem: Heart Failure: Day 3  Goal: Off Pathway (Use only if patient is Off Pathway)  Outcome: Resolved/Met  Goal: Activity/Safety  Outcome: Resolved/Met  Goal: Diagnostic Test/Procedures  Outcome: Resolved/Met  Goal: Nutrition/Diet  Outcome: Resolved/Met  Goal: Discharge Planning  Outcome: Resolved/Met  Goal: Medications  Outcome: Resolved/Met  Goal: Respiratory  Outcome: Resolved/Met  Goal: Treatments/Interventions/Procedures  Outcome: Resolved/Met  Goal: Psychosocial  Outcome: Resolved/Met  Goal: *Oxygen saturation within defined limits  Outcome: Resolved/Met  Goal: *Hemodynamically stable  Outcome: Resolved/Met  Goal: *Optimal pain control at patient's stated goal  Outcome: Resolved/Met  Goal: *Anxiety reduced or absent  Outcome: Resolved/Met  Goal: *Demonstrates progressive activity  Outcome: Resolved/Met     Problem: Heart Failure: Day 4  Goal: Off Pathway (Use only if patient is Off Pathway)  Outcome: Resolved/Met  Goal: Activity/Safety  Outcome: Resolved/Met  Goal: Diagnostic Test/Procedures  Outcome: Resolved/Met  Goal: Nutrition/Diet  Outcome: Resolved/Met  Goal: Discharge Planning  Outcome: Resolved/Met  Goal: Medications  Outcome: Resolved/Met  Goal: Respiratory  Outcome: Resolved/Met  Goal: Treatments/Interventions/Procedures  Outcome: Resolved/Met  Goal: Psychosocial  Outcome: Resolved/Met  Goal: *Oxygen saturation within defined limits  Outcome: Resolved/Met  Goal: *Hemodynamically stable  Outcome: Resolved/Met  Goal: *Optimal pain control at patient's stated goal  Outcome: Resolved/Met  Goal: *Anxiety reduced or absent  Outcome: Resolved/Met  Goal: *Demonstrates progressive activity  Outcome: Resolved/Met     Problem: Heart Failure: Day 5  Goal: Off Pathway (Use only if patient is Off Pathway)  Outcome: Resolved/Met  Goal: Activity/Safety  Outcome: Resolved/Met  Goal: Diagnostic Test/Procedures  Outcome: Resolved/Met  Goal: Nutrition/Diet  Outcome: Resolved/Met  Goal: Discharge Planning  Outcome: Resolved/Met  Goal: Medications  Outcome: Resolved/Met  Goal: Respiratory  Outcome: Resolved/Met  Goal: Treatments/Interventions/Procedures  Outcome: Resolved/Met  Goal: Psychosocial  Outcome: Resolved/Met     Problem: Heart Failure: Discharge Outcomes  Goal: *Demonstrates ability to perform prescribed activity without shortness of breath or discomfort  Outcome: Resolved/Met  Goal: *Left ventricular function assessment completed prior to or during stay, or planned for post-discharge  Outcome: Resolved/Met  Goal: *ACEI prescribed if LVEF less than 40% and no contraindications or ARB prescribed  Outcome: Resolved/Met  Goal: *Verbalizes understanding and describes prescribed diet  Outcome: Resolved/Met  Goal: *Verbalizes understanding/describes prescribed medications  Outcome: Resolved/Met  Goal: *Describes available resources and support systems  Description: (eg: Home Health, Palliative Care, Advanced Medical Directive)  Outcome: Resolved/Met  Goal: *Describes smoking cessation resources  Outcome: Resolved/Met  Goal: *Understands and describes signs and symptoms to report to providers(Stroke Metric)  Outcome: Resolved/Met  Goal: *Describes/verbalizes understanding of follow-up/return appt  Description: (eg: to physicians, diabetes treatment coordinator, and other resources  Outcome: Resolved/Met  Goal: *Describes importance of continuing daily weights and changes to report to physician  Outcome: Resolved/Met

## 2021-03-22 ENCOUNTER — PATIENT OUTREACH (OUTPATIENT)
Dept: CASE MANAGEMENT | Age: 71
End: 2021-03-22

## 2021-03-22 NOTE — PROGRESS NOTES
3-22-21 at 11:26am: Care Transitions Nurse (CTN) attempted to reach patient by phone for transitions of care call in regards to ED [facility] admit 3-4-21 to 3-19-21; however CTN is unable to reach patient at this time. CTN left a message for patient, with contact information for this CTN, requesting call back. CTN attempted to reach Sae Jones (on [address] dated 8-3-2020) by phone; however the automated message at the phone number for Ms. Miguel Ángel Linda states the mailbox is full and cannot accept any messages at this time. CTN will await call bacfbrom patient and CTN will continue to monitor. 3-23-21 at 2:44pm:   Care Transitions Initial Follow Up Call    Call within 2 business days of discharge: Yes     Patient: Carrie Key Patient : 1950 MRN: [id number]    Last Discharge [address]       Complaint Diagnosis Description Type Department Provider    3/4/21 Shortness of Breath Acute on chronic congestive heart failure, unspecified heart failure type ([facility] [address] . .. ED to Hosp-Admission (Discharged) (ADMIT) Paul Miller MD; Jag Bansal . .. Was this an external facility discharge? No     Challenges to be reviewed by the provider   Additional needs identified to be addressed with provider:  no  - Patient states he is not currently weighing at home daily and not recording daily weights; however patient weighs when he attends outpatient hemodialysis treatments on , , and  at [facility]. Patient states he will begin weighing daily at home and begin recording his daily weights tomorrow morning, 3-24-21. The rules of weight gain:  1. If patient should gain three pounds or more in 24-hours and/or  2. If patient should gain five pounds or more in one week he should contact his cardiologist/PCP immediately. Patient verbalized an understanding of the rules of weight gain and patient states he is attending outpatient dialysis treatments as scheduled.  Patient reports his last weight was 232.0 pounds on 3-22-21 at [facility]. Patient complains of shortness of breath upon exertion and generalized weakness.   - Patient reports he is utilizing a renal diet at home and states his appetite is good. - Patient reports one fall in the last six months and states he did not sustain any traumatic injury with this one fall. Patient reports a total of one fall in the last twelve months. Method of communication with provider : Chart copied to Dr. Reuben Infante/PCP.      Component      Latest Ref Rng & Units 3/19/2021 3/19/2021          11:39 AM  7:43 AM   GLUCOSE,FAST - POC      65 - 100 mg/dL 133 (H) 191 (H)     Component      Latest Ref Rng & Units 3/19/2021           3:52 AM   Glucose      65 - 100 mg/dL 201 (H)     Component      Latest Ref Rng & Units 3/18/2021 3/18/2021 3/18/2021           9:01 PM  5:07 PM 12:00 PM   GLUCOSE,FAST - POC      65 - 100 mg/dL 165 (H) 150 (H) 221 (H)     Component      Latest Ref Rng & Units 3/19/2021 3/18/2021 3/18/2021 3/18/2021           3:52 AM  2:00 AM  2:00 AM  2:00 AM   Sodium      136 - 145 mmol/L 135 (L)   136     Component      Latest Ref Rng & Units 3/17/2021 3/17/2021 3/17/2021           3:24 AM  3:24 AM  3:24 AM   Sodium      136 - 145 mmol/L   136     Component      Latest Ref Rng & Units 3/19/2021 3/18/2021 3/18/2021 3/18/2021           3:52 AM  2:00 AM  2:00 AM  2:00 AM   BUN      6 - 20 MG/DL 59 (H)   70 (H)   Creatinine      0.70 - 1.30 MG/DL 3.89 (H)   3.16 (H)   BUN/Creatinine ratio      12 - 20   15   22 (H)   GFR est AA      >60 ml/min/1.73m2 19 (L)   24 (L)   GFR est non-AA      >60 ml/min/1.73m2 15 (L)   19 (L)     Component      Latest Ref Rng & Units 3/17/2021 3/17/2021 3/17/2021           3:24 AM  3:24 AM  3:24 AM   BUN      6 - 20 MG/DL   89 (H)   Creatinine      0.70 - 1.30 MG/DL   3.32 (H)   BUN/Creatinine ratio      12 - 20     27 (H)   GFR est AA      >60 ml/min/1.73m2   22 (L)   GFR est non-AA      >60 ml/min/1.73m2 18 (L)     Component      Latest Ref Rng & Units 3/17/2021           3:24 AM   Albumin      3.5 - 5.0 g/dL 3.0 (L)   Globulin      2.0 - 4.0 g/dL 4.2 (H)   A-G Ratio      1.1 - 2.2   0.7 (L)     Component      Latest Ref Rng & Units 3/18/2021 3/18/2021           2:25 PM  2:00 AM   PTH, Intact      18.4 - 88.0 pg/mL 498.5 (H)    Uric acid      3.5 - 7.2 MG/DL  7.3 (H)     Component      Latest Ref Rng & Units 3/17/2021           3:24 AM   MCH      26.0 - 34.0 PG 25.9 (L)   MCHC      30.0 - 36.5 g/dL 29.4 (L)     Discussed COVID-19 related testing which was available at this time. Test results were negative. Patient informed of results, if available? yes     Advance Care Planning:   Does patient have an Advance Directive: not on file; education provided. Patient states he does not have an ACP document. Inpatient Readmission Risk score: Unplanned Readmit Risk Score: 21    Was this a readmission? no   Patient stated reason for the admission: \"Breathing was difficult and had been for a few days. \"     Patients top risk factors for readmission: medical condition heart failure, diabetes, chronic kidney disease and PAD per chart. Interventions to address risk factors: Obtained and reviewed discharge summary and/or continuity of care documents and Communication with home health agencies or other community services the patient is currently [id number] [facility] for SN and PT. Care Transition Nurse (CTN) contacted the patient by telephone to perform post hospital discharge assessment. Verified name and  with patient as identifiers. Provided introduction to self, and explanation of the CTN role. CTN reviewed discharge instructions, medical action plan and red flags with patient who verbalized understanding. Were discharge instructions available to patient? yes.  Reviewed appropriate site of care based on symptoms and resources available to patient including: PCP, Specialist,  [facility] [place] and When to call 911. Patient given an opportunity to ask questions and does not have any further questions or concerns at this time. The patient agrees to contact the PCP office for questions related to their healthcare. Medication reconciliation was performed with patient, who verbalizes understanding of administration of home medications. Advised obtaining a 90-day supply of all daily and as-needed medications. Referral to Pharm D needed: no     Home Health/Outpatient orders at discharge: PT and [address]: At [facility]  Date of initial visit: SN on 3-23-21 and PT visit scheduled for 3-25-21 per Elian Miranda, with At [facility]. Durable Medical Equipment ordered at discharge: None  [address] [facility] [address] [facility] [facility] [address]: n/a  Durable Medical Equipment received: n/a    Covid Risk Education    Patient has following risk factors of: heart failure, diabetes, chronic kidney disease and PAD per chart. Education provided regarding infection prevention, and signs and symptoms of COVID-19 and when to seek medical attention with patient who verbalized understanding. Discussed exposure protocols and quarantine From CDC: Are you at higher risk for severe illness?  and given an opportunity for questions and concerns. The patient agrees to contact the COVID-19 hotline 144-493-5763 or PCP office for questions related to COVID-19. For more information on steps you can take to protect yourself, see CDC's How to Protect Yourself     Was patient discharged with a pulse oximeter? no     Patient/family/caregiver given information for [employer] and agrees to enroll no  Patient's preferred e-mail: declines  Patient's preferred phone number: declines    Discussed follow-up appointments. If no appointment was previously scheduled, appointment scheduling offered: yes Is follow up appointment scheduled within 7 days of discharge? yes, cardio appointment is scheduled within 7 days of discharge.    [facility] follow up appointment(s):   Future Appointments   Date Time Provider James Gayle   3/26/2021  9:30 AM Adriano Martinez MD [facility] BS AMB   6/23/2021 12:10 PM Rayne Alejandra MD [facility] BS AMB     [facility] follow up appointment(s): Please see below for appointments. Plan for follow-up call in 10-14 days based on severity of symptoms and risk factors. Plan for next call: self management-Review red flags of heart failure, review daily home weights. CTN provided contact information for future needs. Goals Addressed                 This Visit's Progress     Attends follow-up appointments as directed. 3-23-21: Patient has [facility] appointment scheduled with Dr. Oni Infante/PCP on 3-30-21 and cardio follow-up appointment scheduled with Dr. Ekaterina Adair on 3-26-21. Patient states he cancelled his [facility] appointment that was scheduled for 3-25-21 and patient states he will reschedule soon. Patient reports his wife is currently providing transportation. Mark Muñoz Understands red flags post discharge. 3-23-21: Red flags of heart failure reviewed with patient and patient verbalized an understanding. Patient states he is not currently weighing at home daily and not recording daily weights; however patient weighs when he attends outpatient hemodialysis treatments on Mondays, Wednesdays, and Fridays at [address]. Patient states he will begin weighing daily at home and begin recording his daily weights tomorrow morning, 3-24-21. The rules of weight gain:  1. If patient should gain three pounds or more in 24-hours and/or  2. If patient should gain five pounds or more in one week he should contact his cardiologist/PCP immediately. Patient verbalized an understanding of the rules of weight gain and patient states he is attending outpatient dialysis treatments as scheduled. Patient reports his last weight was 232.0 pounds on 3-22-21 at [address].  Patient complains of shortness of breath upon exertion and generalized weakness. Care Transitions Nurse will review red flags again on next phone conversation with patient.  Jm Phan

## 2021-03-26 NOTE — PROGRESS NOTES
Estelita Cloud MD          NAME:  Arlyn Waller   :   1950   MRN:   [id number]   PCP:  Hellen Lou MD           Subjective: The patient is a 66y.o. year old male  who returns for a routine follow-up. Since the last visit, patient reports no change in exercise tolerance, chest pain, edema, medication intolerance, palpitations, shortness of breath, PND/orthopnea wheezing, sputum, syncope, dizziness or light headedness. Doing well. Past Medical History:   Diagnosis Date    Arrhythmia     atrial fibrillation     Arthritis     CAD (coronary atherosclerotic disease)     Chronic kidney disease     Chronic pain     Congestive heart failure ([address]     Diabetes ([facility] [address]     Diabetes mellitus type 2, controlled ([facility] [address] 3/13/2017    Heart failure ([facility] [address]     Hx of CABG 3/13/2017    CABG in  in [address] [place] [address] Hypertension     Long term current use of anticoagulant therapy     Morbid obesity ([facility] [address] 3/13/2017    JACEY (obstructive sleep apnea) 2017    S/P CABG x 2     Skin cancer     Thyroid disease         ICD-10-CM ICD-9-CM    1. ASHD (arteriosclerotic heart disease)  I25.10 414.00 AMB POC EKG ROUTINE W/ 12 LEADS, INTER & REP   2. Hx of CABG  Z95.1 V45.81    3. Chronic a-fib (HCC)  I48.20 427.31    4. Cardiac pacemaker in situ  Z95.0 V45.01    5. Essential hypertension  I10 401.9    6.  CKD (chronic kidney disease), stage IV (HCC)  N18.4 585.4       Social History     Tobacco Use    Smoking status: Never Smoker    Smokeless tobacco: Never Used   Substance Use Topics    Alcohol use: Yes     Comment: rare      Family History   Problem Relation Age of Onset    Heart Attack Father     Cancer Father         lymph node    Cancer Mother         breast        Review of Systems  Cardiovascular: Negative except as noted in HPI      Objective:       Vitals:    21 0937   BP: 134/66   Pulse: 77   Resp: 18   SpO2: 98%   Weight: 241 lb 9.6 oz (109.6 kg)   Height: 5' 9\" (1.753 m) Body mass index is 35.68 kg/m². General PE  Mental Status - Alert. General Appearance - Not in acute distress. Chest and Lung Exam   Inspection: Accessory muscles - No use of accessory muscles in breathing. Auscultation:   Breath sounds: - Normal.    Cardiovascular   Inspection: Jugular vein - Bilateral - Inspection Normal.  Palpation/Percussion:   Apical Impulse: - Normal.  Auscultation: Rhythm - Regular. Heart Sounds - S1 WNL and S2 WNL. No S3 or S4. Murmurs & Other Heart Sounds: Auscultation of the heart reveals - No Murmurs. Peripheral Vascular   Upper Extremity: Inspection - Bilateral - No Cyanotic nailbeds or Digital clubbing. Lower Extremity:   Palpation: Edema - Bilateral - 1+ edema. Data Review:     EKG -  EKG: paced    LABS- @brieflabs@      Allergies reviewed  Allergies   Allergen Reactions    Allopurinol Rash    Gabapentin Drowsiness    Ciprofloxacin Nausea Only    Percocet [Oxycodone-Acetaminophen] Other (comments)     hallucinations       Medications reviewed  Current Outpatient Medications   Medication Sig    diclofenac (VOLTAREN) 1 % gel APPLY 1 APPLICATION TOPICALLY 4 (FOUR) TIMES A DAY AS NEEDED (PAIN)    insulin glargine (Lantus Solostar U-100 Insulin) 100 unit/mL (3 mL) inpn 20 Units by SubCUTAneous route nightly. INJECT 30 UNITS SUBCUTANEOUSLY AT BEDTIME    metoprolol succinate (TOPROL-XL) 100 mg tablet Take 1 Tab by mouth daily.  b complex-vitamin c-folic acid (NEPHROCAPS) 1 mg capsule Take 1 Cap by mouth daily.  balsam peru-castor oiL (VENELEX) ointment Apply  to affected area two (2) times a day.  bumetanide (BUMEX) 2 mg tablet 1 tablet on non-dialysis days only.  insulin aspart U-100 (NovoLOG Flexpen U-100 Insulin) 100 unit/mL (3 mL) inpn Inject 14 units at Breakfast, 10 with Lunch and Dinner    psyllium (METAMUCIL) packet Take 1 Packet by mouth daily.  omeprazole (PRILOSEC) 40 mg capsule Take 40 mg by mouth.  Indications: 3-23-21: Patient states he takes 40mg in the morning and 40mg in the evening.  pravastatin (PRAVACHOL) 80 mg tablet Take 1 Tab by mouth nightly.  levothyroxine (SYNTHROID) 137 mcg tablet TAKE 1 TABLET BY MOUTH EVERY DAY    apixaban (Eliquis) 5 mg tablet TAKE 1 TABLET BY MOUTH TWICE A DAY    ferrous sulfate (IRON) 325 mg (65 mg iron) tablet Take 325 mg by mouth Daily (before breakfast).  dutasteride (AVODART) 0.5 mg capsule Take 0.5 mg by mouth daily.  acetaminophen (TYLENOL EXTRA STRENGTH) 500 mg tablet Take 1,000 mg by mouth every eight (8) hours as needed for Pain.  benzonatate (TESSALON) 100 mg capsule Take 1 Cap by mouth three (3) times daily as needed for Cough for up to 7 days. No current facility-administered medications for this visit. Assessment:       ICD-10-CM ICD-9-CM    1. ASHD (arteriosclerotic heart disease)  I25.10 414.00 AMB POC EKG ROUTINE W/ 12 LEADS, INTER & REP   2. Hx of CABG  Z95.1 V45.81    3. Chronic a-fib (HCC)  I48.20 427.31    4. Cardiac pacemaker in situ  Z95.0 V45.01    5. Essential hypertension  I10 401.9    6. CKD (chronic kidney disease), stage IV ([facility])  N18.4 585.4         Orders Placed This Encounter    AMB POC EKG ROUTINE W/ 12 LEADS, INTER & REP     Order Specific Question:   Reason for Exam:     Answer:   routine    diclofenac (VOLTAREN) 1 % gel     Sig: APPLY 1 APPLICATION TOPICALLY 4 (FOUR) TIMES A DAY AS NEEDED (PAIN)       Plan:     No angina. Rate OK. BP at target. CHF compensated.   F/U 6 mo    Marcus Suggs MD 112

## 2021-03-26 NOTE — PROGRESS NOTES
1. Have you been to the ER, urgent care clinic since your last visit? Hospitalized since your last visit? Yes, 3/4/21, [facility], CHF exacerbation     2. Have you seen or consulted any other health care providers outside of the [address] [place] [address] since your last visit? Include any pap smears or colon screening.  No        Chief Complaint   Patient presents with   [facility][1] Follow Up     pt denies cardiac symptoms

## 2021-03-26 NOTE — LETTER
3/26/2021 Patient: Jaspreet Tran YOB: 1950 Date of Visit: 3/26/2021 [address] [address] [address] [employer] Dear Ari Calvin MD, Thank you for referring Mr. Gina Nunn to [facility] for evaluation. My notes for this consultation are attached. If you have questions, please do not hesitate to call me. I look forward to following your patient along with you.  
 
 
Sincerely, 
 
Kira Love MD

## 2021-03-29 NOTE — TELEPHONE ENCOUNTER
----- Message from Kingston Pondippo sent at 3/29/2021 12:45 PM EDT -----  Regarding: /Telephone  Caller's first and last name and relationship (if not the patient): N/A    Best contact number(s): [phone]    Whose call is being returned:PT     Details to clarify the request: PT understands that the doctor will be leaving soon in May and he has an appt with  06/23/21. Pt is requesting his appt to be moved up sooner preferably on a Tuesday or Friday. so he can have his last VV appt with . I attempted to make that possible but could not. I explained to the PT that he would have to pick a different provider. He did not want that so he ask could somebody else make that possible.

## 2021-03-29 NOTE — TELEPHONE ENCOUNTER
----- Message from Aashish Hooper sent at 3/29/2021 12:54 PM EDT -----  Regarding: Dr. Linh Magdaleno General Message/Vendor Calls    Caller's first and last name:      Reason for call: Regarding scheduling VV w/another provider due to Dr. Mcdowell Route leaving preferably Tue or Thurs due to dialysis.        Call back required yes/no and why: Yes       Best contact number(s): [phone]      Details to clarify the request:      Aashish Hooper

## 2021-04-02 NOTE — TELEPHONE ENCOUNTER
----- Message from Caleb Gonzalez sent at 4/2/2021  2:25 PM EDT -----  Regarding: Dr Chavira Staff  General Message/Vendor Calls    Caller's first and last name:      Reason for call:pt is scheduled to see Dr Vasquez Hdz on 6/23/2021 he is aware h e is leaving in May and would like to know if he can schedule an virtual or in office appt before Dr Vasquez Hdz leave, Mon, Wed and Fri and   9: and 9:30, ,  and the days that work for him  are Tuesday and Thursday are the best days, he is not on  dialysis on those two days       Callback required yes/no and why:yes for reason given above      Best contact number(s):[phone]      Details to clarify the request:pt said if he can not be worked in will need to be scheduled with another provider, he said he been a pt of Dr Vasquez Hdz for the last 5 yrs and would like to get one more visit in with Dr Vasquez Hdz before he leaves.       Caleb Gonzalez

## 2021-04-02 NOTE — TELEPHONE ENCOUNTER
Mr. Lance Savage has a appointment set for 5/11/2021 at 1:30 PM. He is aware of this. We are just waiting for the schedule to open up so it can be booked.   Bess Saver

## 2021-04-02 NOTE — TELEPHONE ENCOUNTER
----- Message from Jocelin Riggs sent at 2021  2:09 PM EDT -----  Regarding: MD Alvarez/ telephone  Patient's first and last name:  Hakeem Emmanuel  : 1950    Caller's first and last name:    pt    Reason for call:  Appointment    Callback required yes/no and why: yes     Best contact number(s):  [phone]    Details to clarify the request: Pt is requesting call to speak with nurse or MD Ольга Moe in reference to appointment. Pt states he has requested multiple calls about appointment, office has no responded. Pt is very upset. Pt is requesting suggestion for continue care with office.

## 2021-04-02 NOTE — TELEPHONE ENCOUNTER
I have called Mr. Marleen Cochran and let him know that we had spoken on 3/26 and set him up for a appointment for 5/11/2021 at 1:30 PM. We are just waiting for the schedule to be opened up so that can be booked but he is all set.   Brad Benton

## 2021-04-02 NOTE — TELEPHONE ENCOUNTER
I returned this call and let Mr. Masood Mcclain know that we had already spoken on 3/26 and set him up for 5/11/2021 at 1:30 PM. We are just waiting for the schedule to be opened so that can be booked. He said I know that.   Rei Álvarez

## 2021-05-05 NOTE — PROGRESS NOTES
Chief Complaint   Patient presents with    Pacemaker Check     annual follow up -    Leg Swelling     catherine' lower      1. Have you been to the ER, urgent care clinic since your last visit? Hospitalized since your last visit? Yes ED [facility] 3/4-3/19/21 for CHF exaccerbation     2. Have you seen or consulted any other health care providers outside of the [address] [place] [address] since your last visit? Include any pap smears or colon screening.   Yes PCP

## 2021-05-05 NOTE — PROGRESS NOTES
ELECTROPHYSIOLOGY        Subjective:      Emilee Lopez is a 66 y.o. male is here for EP follow up. The patient denies chest pain/ shortness of breath, orthopnea, PND, LE edema, palpitations, syncope, presyncope or fatigue. Is going away for 4 days for the first time in a year. Looking forward to traveling again. Emilee Lopez  is on [address], reports no melena, hematuria, or obvious signs of bleeding. No falls. Frequent skin tears.        Patient Active Problem List    Diagnosis Date Noted    CHF exacerbation ([address] 03/04/2021    PAD (peripheral artery disease) ([address] 02/20/2020    Non-pressure chronic ulcer of right lower leg with fat layer exposed ([address] 01/23/2020    Diabetic ulcer of right great toe ([address] 01/23/2020    Diaper or napkin rash 07/26/2019    HTN (hypertension) 03/23/2019    Idiopathic chronic venous hypertension of right leg with ulcer ([address] 03/13/2019    Type 2 diabetes with nephropathy ([address] 10/23/2018    Cardiomyopathy ([address] 10/10/2017    Pacemaker 06/26/2017    H/O atrioventricular anastacio ablation 06/26/2017    JACEY (obstructive sleep apnea) 06/23/2017    Irregular heart beat 03/22/2017    Fatigue 03/15/2017    Bilateral leg edema 03/15/2017    Counseling regarding advanced care planning and goals of care 03/15/2017    Hx of CABG 03/13/2017    Morbid obesity ([address] 03/13/2017    Diabetes mellitus type 2, controlled ([address] 03/13/2017    Heart failure ([address] 03/13/2017    SOB (shortness of breath) 03/07/2017    Chronic a-fib ([address] 03/07/2017    Coronary artery disease involving native coronary artery without angina pectoris 03/07/2017    CKD (chronic kidney disease) stage 4, GFR 15-29 ml/min ([address] 03/07/2017      Chrissy Brown MD  Past Medical History:   Diagnosis Date    Arrhythmia     atrial fibrillation 2017    Arthritis     CAD (coronary atherosclerotic disease)     Chronic kidney disease     Chronic pain     Congestive heart failure ([address]     Diabetes [facility]     Diabetes mellitus type 2, controlled ([facility] 3/13/2017    Heart failure ([facility]     Hx of CABG 3/13/2017    CABG in 2010 in [address] Hypertension     Long term current use of anticoagulant therapy     Morbid obesity ([facility] 3/13/2017    JACEY (obstructive sleep apnea) 6/23/2017    S/P CABG x 2 2010    Skin cancer     Thyroid disease       Past Surgical History:   Procedure Laterality Date    HX ORTHOPAEDIC      L 3rd toe amputation in 1999    HX PACEMAKER  2018    Dr Emanuel Bradford CVC W/O PORT OVER 5 YR  3/16/2021    [place] CARDIAC SURG PROCEDURE UNLIST      CABGx2 in 2010     Allergies   Allergen Reactions    Allopurinol Rash    Gabapentin Drowsiness    Ciprofloxacin Nausea Only    Percocet [Oxycodone-Acetaminophen] Other (comments)     hallucinations      Family History   Problem Relation Age of Onset    Heart Attack Father     Cancer Father         lymph node    Cancer Mother         breast    negative for cardiac disease  Social History     Socioeconomic History    Marital status:      Spouse name: Not on file    Number of children: Not on file    Years of education: Not on file    Highest education level: Not on file   Tobacco Use    Smoking status: Never Smoker    Smokeless tobacco: Never Used   Substance and Sexual Activity    Alcohol use: Yes     Comment: rare    Drug use: No   Other Topics Concern     Current Outpatient Medications   Medication Sig    Insulin Needles, Disposable, (BD Ultra-Fine Mini Pen Needle) 31 gauge x 3/16\" ndle USE WITH INSULIN PENS 4 TIMES DAILY    diclofenac (VOLTAREN) 1 % gel APPLY 1 APPLICATION TOPICALLY 4 (FOUR) TIMES A DAY AS NEEDED (PAIN)    insulin glargine (Lantus Solostar U-100 Insulin) 100 unit/mL (3 mL) inpn 20 Units by SubCUTAneous route nightly. INJECT 30 UNITS SUBCUTANEOUSLY AT BEDTIME (Patient taking differently: 20 Units by SubCUTAneous route nightly.  INJECT 20 UNITS SUBCUTANEOUSLY AT BEDTIME)    metoprolol succinate (TOPROL-XL) 100 mg tablet Take 1 Tab by mouth daily.  b complex-vitamin c-folic acid (NEPHROCAPS) 1 mg capsule Take 1 Cap by mouth daily.  balsam peru-castor oiL (VENELEX) ointment Apply  to affected area two (2) times a day.  bumetanide (BUMEX) 2 mg tablet 1 tablet on non-dialysis days only.  insulin aspart U-100 (NovoLOG Flexpen U-100 Insulin) 100 unit/mL (3 mL) inpn Inject 14 units at Breakfast, 10 with Lunch and Dinner    omeprazole (PRILOSEC) 40 mg capsule Take 40 mg by mouth. Indications: 3-23-21: Patient states he takes 40mg in the morning and 40mg in the evening.  pravastatin (PRAVACHOL) 80 mg tablet Take 1 Tab by mouth nightly.  levothyroxine (SYNTHROID) 137 mcg tablet TAKE 1 TABLET BY MOUTH EVERY DAY    apixaban (Eliquis) 5 mg tablet TAKE 1 TABLET BY MOUTH TWICE A DAY    dutasteride (AVODART) 0.5 mg capsule Take 0.5 mg by mouth daily.  acetaminophen (TYLENOL EXTRA STRENGTH) 500 mg tablet Take 1,000 mg by mouth every eight (8) hours as needed for Pain.  psyllium (METAMUCIL) packet Take 1 Packet by mouth daily.  ferrous sulfate (IRON) 325 mg (65 mg iron) tablet Take 325 mg by mouth Daily (before breakfast). No current facility-administered medications for this visit. Vitals:    05/05/21 0901   BP: 116/70   Pulse: 76   Resp: 18   Weight: 248 lb (112.5 kg)   Height: 5' 9\" (1.753 m)   PF: 98 L/min       I have reviewed the nurses notes, vitals, problem list, allergy list, medical history, family, social history and medications. Review of Symptoms:    General: Pt denies excessive weight gain or loss. Pt is able to conduct ADL's  HEENT: Denies blurred vision, headaches, epistaxis and difficulty swallowing. Respiratory: Denies shortness of breath, BETTS, wheezing or stridor.   Cardiovascular: Denies precordial pain, palpitations, edema or PND  Gastrointestinal: Denies poor appetite, indigestion, abdominal pain or blood in stool  Urinary: Denies dysuria, pyuria  Musculoskeletal: Denies pain or swelling from muscles or joints  Neurologic: Denies tremor, paresthesias, or sensory motor disturbance  Skin: Denies rash, itching or texture change. Psych: Denies depression      Physical Exam:      General: Well developed, in no acute distress. HEENT: Eyes - PERRL  Heart:  Normal S1/S2 negative S3 or S4. Regular, no murmur  Respiratory: Clear bilaterally x 4, no wheezing or rales  Extremities:  No edema, no cyanosis. Musculoskeletal: No clubbing  Neuro: A&Ox3, speech clear  Skin: No visible rashes or lesions. Non diaphoretic.  No visible ulcers  Vascular: 2+ pulses symmetric in all extremities  Psych - judgement intact and orientation is wnl       Cardiographics    Results for orders placed or performed during the hospital encounter of 03/04/21   EKG, 12 LEAD, INITIAL   Result Value Ref Range    Ventricular Rate 76 BPM    Atrial Rate 53 BPM    QRS Duration 150 ms    Q-T Interval 476 ms    QTC Calculation (Bezet) 535 ms    Calculated R Axis -85 degrees    Calculated T Axis 125 degrees    Diagnosis       Ventricular-paced rhythm with occasional premature ventricular complexes  Biventricular pacemaker detected  When compared with ECG of 23-MAR-2019 02:16,  premature ventricular complexes are now present  Confirmed by Tanya Russo ([id number]) on 3/6/2021 5:33:28 PM           Lab Results   Component Value Date/Time    WBC 8.5 03/17/2021 03:24 AM    HGB 12.5 03/17/2021 03:24 AM    HCT 42.5 03/17/2021 03:24 AM    PLATELET 059 71/88/4718 03:24 AM    MCV 88.0 03/17/2021 03:24 AM      Lab Results   Component Value Date/Time    Sodium 135 (L) 03/19/2021 03:52 AM    Potassium 4.1 03/19/2021 03:52 AM    Chloride 97 03/19/2021 03:52 AM    CO2 31 03/19/2021 03:52 AM    Anion gap 7 03/19/2021 03:52 AM    Glucose 201 (H) 03/19/2021 03:52 AM    BUN 59 (H) 03/19/2021 03:52 AM    Creatinine 3.89 (H) 03/19/2021 03:52 AM    BUN/Creatinine ratio 15 03/19/2021 03:52 AM    GFR est AA 19 (L) 03/19/2021 03:52 AM    GFR est non-AA 15 (L) 03/19/2021 03:52 AM    Calcium 8.7 03/19/2021 03:52 AM    Bilirubin, total 0.8 03/17/2021 03:24 AM    Alk. phosphatase 51 03/17/2021 03:24 AM    Protein, total 7.2 03/17/2021 03:24 AM    Albumin 3.0 (L) 03/17/2021 03:24 AM    Globulin 4.2 (H) 03/17/2021 03:24 AM    A-G Ratio 0.7 (L) 03/17/2021 03:24 AM    ALT (SGPT) 18 03/17/2021 03:24 AM      Lab Results   Component Value Date/Time    TSH 0.90 03/05/2021 12:50 AM           Assessment:           ICD-10-CM ICD-9-CM    1. Chronic a-fib (HCC)  I48.20 427.31    2. Cardiac pacemaker in situ  Z95.0 V45.01    3. Dilated cardiomyopathy (HCC)  I42.0 425.4    4. CKD (chronic kidney disease), stage IV (HCC)  N18.4 585.4    5. Morbid obesity ([facility]  E66.01 278.01    6. H/O atrioventricular anastacio ablation  Z98.890 V15.1      No orders of the defined types were placed in this encounter. Plan:     Mr Wendy Giron is here for annual follow up. He is s/p bivpm and AVN ablation. He is 96% BiV paced with 2 VHR up to 16 beats. Battery life remaining is 8 years. Last echo 3/21 with EF 55-60%. Nuclear stress test 3/21 with No ischemia or infarct demonstrated. LVEF 49%. On bb. During this visit,  the patient and I had a comprehensive discussion of device management using principles of shared decision making. we reviewed device therapy, including the potential risks and benefits of device management. These risks include death, myocardial infarction, stroke, cardiac perforation, vascular injury, injury, pacing induced cardiomyopathy, inappropriate shocks (defibrillator) and other less severe complications. The patient demonstrated a clear understanding of these issues during out discussion. Our plans, determined together after thorough consideration, are outlined else where in this note. Discussed [facility] as an option for him should he experience bleeding on [address] [place] [address]. Brochure provided.      Addressed all patient questions and concerns at this visit. Continue medical management for cardiomyopathy. Discussed side effects, efficacy and good medication compliance with pt re: bb. Thank you for allowing me to participate in Alicia Paredes 's care.       Viridiana Bejarano NP

## 2021-05-11 NOTE — PROGRESS NOTES
**DUE TO PANDEMIC AND HEALTH CONCERNS IN THE COMMUNITY, THIS PATIENT WAS EITHER ILL OR FOUND TO BE HIGH RISK FOR IN-PERSON EVALUATION WITHIN THE CLINIC. THE FOLLOWING IS A VIRTUAL TELEMEDICINE VIDEO ENCOUNTER VIA [employer], TO WHICH THE PATIENT AGREED. THE PURPOSE IS TO LIMIT INTERRUPTIONS IN HEALTHCARE AND TO PROVIDE FOR ONGOING URGENT NEEDS UNDER THE CURRENT CONDITIONS. CHIEF COMPLAINT: f/u uncontrolled type 2 diabetes    HISTORY OF PRESENT ILLNESS:   Kelley Bello is a 66 y.o. male with a PMHx as noted below who presents for evaluation of uncontrolled type 2 diabetes. Has not yet repeated his labs for review, including the A1c. Reports he started dialysis 6 weeks ago after a hospitalization. Using catheter, has not started on a fistula yet.     Regimen: (prior instructions)  Lantus: 20 units once each bedtime (reduced from 30)  Novolog:        Breakfast: 14 units        Lunch:       10 units        Dinner:       10 units  Correction 1:50>150    Review of home glucose:   Log reviewed:   Freestyle Nav 90 day average  AM: 125, 123, 114, 119  Lunch: 140 average  Dinner 119 average  Bedtime: 115 average  Lows: infrequent, once per two weeks at the most, lowest was a 68    Hypothyroidism: On 137 mcg of levothyroxine    Vitamin D deficiency: was taken off D by nephrologist per patient    Review of most recent diabetes-related labs:  Lab Results   Component Value Date    HBA1C 7.0 (H) 04/10/2018    HBA1C 7.6 (H) 06/22/2017    HBA1C 8.0 (H) 03/13/2017    OSS1MTIY 6.7 02/13/2020    SSU2ILNN 6.6 10/16/2019    WDJ7PXLC 6.5 06/12/2019    CHOL 106 07/30/2020    LDLC 51 07/30/2020    LDL 43 04/03/2017    GFRAA 19 (L) 03/19/2021    GFRNA 15 (L) 03/19/2021    CREATEXT 3.17 12/05/2019    MCACR 137.2 (H) 06/12/2019    TSH 0.90 03/05/2021    VITD3 23.9 (L) 03/06/2021     Lab Key:  [id number] = IA-2 pancreatic islet cell autoantibody  CPEPL = C-peptide level  :EXT = External Lab  GADLT = ANGELIQUE-65 autoantibody   INSUL = Insulin level  MCACR (or MALBEXT) = Urine Microalbumin (or External UM)      PAST MEDICAL/SURGICAL HISTORY:   Past Medical History:   Diagnosis Date    Arrhythmia     atrial fibrillation 2017    Arthritis     CAD (coronary atherosclerotic disease)     Chronic kidney disease     Chronic pain     Congestive heart failure ([address]     Diabetes ([address]     Diabetes mellitus type 2, controlled ([facility] 3/13/2017    Heart failure ([facility]     Hx of CABG 3/13/2017    CABG in 2010 in [address] Hypertension     Long term current use of anticoagulant therapy     Morbid obesity ([facility] 3/13/2017    JACEY (obstructive sleep apnea) 6/23/2017    S/P CABG x 2 2010    Skin cancer     Thyroid disease      Past Surgical History:   Procedure Laterality Date    HX ORTHOPAEDIC      L 3rd toe amputation in 1999    HX PACEMAKER  2018    Dr Jerrell Lopez  CVC W/O PORT OVER 5 YR  3/16/2021    [place] CARDIAC SURG PROCEDURE UNLIST      CABGx2 in 2010       ALLERGIES:   Allergies   Allergen Reactions    Allopurinol Rash    Gabapentin Drowsiness    Ciprofloxacin Nausea Only    Percocet [Oxycodone-Acetaminophen] Other (comments)     hallucinations       MEDICATIONS ON ADMISSION:     Current Outpatient Medications:     folic acid/vit B complex and C (DIALYVITE 800 PO), Take  by mouth., Disp: , Rfl:     insulin glargine (Lantus Solostar U-100 Insulin) 100 unit/mL (3 mL) inpn, 20 Units by SubCUTAneous route nightly. INJECT 30 UNITS SUBCUTANEOUSLY AT BEDTIME (Patient taking differently: 20 Units by SubCUTAneous route nightly. INJECT 20 UNITS SUBCUTANEOUSLY AT BEDTIME), Disp: 10 Adjustable Dose Pre-filled Pen Syringe, Rfl: 3    metoprolol succinate (TOPROL-XL) 100 mg tablet, Take 1 Tab by mouth daily. , Disp: 135 Tab, Rfl: 1    balsam peru-castor oiL (VENELEX) ointment, Apply  to affected area two (2) times a day., Disp: 3 Tube, Rfl: 0    bumetanide (BUMEX) 2 mg tablet, 1 tablet on non-dialysis days only. , Disp: 30 Tab, Rfl: 0   insulin aspart U-100 (NovoLOG Flexpen U-100 Insulin) 100 unit/mL (3 mL) inpn, Inject 14 units at Breakfast, 10 with Lunch and Dinner, Disp: 15 Adjustable Dose Pre-filled Pen Syringe, Rfl: 3    omeprazole (PRILOSEC) 40 mg capsule, Take 40 mg by mouth. Indications: 3-23-21: Patient states he takes 40mg in the morning and 40mg in the evening., Disp: , Rfl:     pravastatin (PRAVACHOL) 80 mg tablet, Take 1 Tab by mouth nightly., Disp: 90 Tab, Rfl: 4    levothyroxine (SYNTHROID) 137 mcg tablet, TAKE 1 TABLET BY MOUTH EVERY DAY, Disp: 90 Tab, Rfl: 3    apixaban (Eliquis) 5 mg tablet, TAKE 1 TABLET BY MOUTH TWICE A DAY, Disp: 180 Tab, Rfl: 3    dutasteride (AVODART) 0.5 mg capsule, Take 0.5 mg by mouth daily. , Disp: , Rfl:     Insulin Needles, Disposable, (BD Ultra-Fine Mini Pen Needle) 31 gauge x 3/16\" ndle, USE WITH INSULIN PENS 4 TIMES DAILY, Disp: 400 Pen Needle, Rfl: 3    diclofenac (VOLTAREN) 1 % gel, APPLY 1 APPLICATION TOPICALLY 4 (FOUR) TIMES A DAY AS NEEDED (PAIN), Disp: , Rfl:     b complex-vitamin c-folic acid (NEPHROCAPS) 1 mg capsule, Take 1 Cap by mouth daily. , Disp: 30 Cap, Rfl: 0    psyllium (METAMUCIL) packet, Take 1 Packet by mouth daily. , Disp: , Rfl:     ferrous sulfate (IRON) 325 mg (65 mg iron) tablet, Take 325 mg by mouth Daily (before breakfast). , Disp: , Rfl:     acetaminophen (TYLENOL EXTRA STRENGTH) 500 mg tablet, Take 1,000 mg by mouth every eight (8) hours as needed for Pain., Disp: , Rfl:     SOCIAL HISTORY:   Social History     Socioeconomic History    Marital status:      Spouse name: Not on file    Number of children: Not on file    Years of education: Not on file    Highest education level: Not on file   Occupational History    Not on file   Social Needs    Financial resource strain: Not on file    Food insecurity     Worry: Not on file     Inability: Not on file    Transportation needs     Medical: Not on file     Non-medical: Not on file   Tobacco Use    Smoking status: Never Smoker    Smokeless tobacco: Never Used   Substance and Sexual Activity    Alcohol use: Yes     Comment: rare    Drug use: No    Sexual activity: Not on file   Lifestyle    Physical activity     Days per week: Not on file     Minutes per session: Not on file    Stress: Not on file   Relationships    Social connections     Talks on phone: Not on file     Gets together: Not on file     Attends [employer] service: Not on file     Active member of club or organization: Not on file     Attends meetings of clubs or organizations: Not on file     Relationship status: Not on file    Intimate partner violence     Fear of current or ex partner: Not on file     Emotionally abused: Not on file     Physically abused: Not on file     Forced sexual activity: Not on file   Other Topics Concern     Service Not Asked    Blood Transfusions Not Asked    Caffeine Concern Not Asked    Occupational Exposure Not Asked   Sj Shadow Hazards Not Asked    Sleep Concern Not Asked    Stress Concern Not Asked    Weight Concern Not Asked    Special Diet Not Asked    Back Care Not Asked    Exercise Not Asked    Bike Helmet Not Asked    Seat Belt Not Asked    Self-Exams Not Asked   Social History Narrative    Not on file       FAMILY HISTORY:  Family History   Problem Relation Age of Onset    Heart Attack Father     Cancer Father         lymph node    Cancer Mother         breast       REVIEW OF SYSTEMS: Complete ROS assessed and noted for that which is described above, all else are negative.   Eyes: normal  ENT: normal  CVS: normal  Resp: normal  GI: normal  : normal  GYN: normal  Endocrine: normal  Integument: normal  Musculoskeletal: normal  Neuro: normal  Psych: normal    PHYSICAL EXAMINATION:  Telemedicine Visit    GENERAL: NCAT, Appears well nourished  EYES: EOMI, non-icteric, no proptosis  Ear/Nose/Throat: NCAT, no visible inflammation or masses  CARDIOVASCULAR: no cyanosis, no visible JVD  RESPIRATORY: comfortable respirations observed, no cyanosis  MUSCULOSKELETAL: Normal ROM of upper extremities observed  SKIN: No edema, rash, or other significant changes observed  NEUROLOGIC:  AAOx3  PSYCHIATRIC: Normal affect, Normal insight and judgement    REVIEW OF LABORATORY AND RADIOLOGY DATA:   Labs and documentation have been reviewed as described above. ASSESSMENT AND PLAN:   Ema Omalley is a 66 y.o. male with a PMHx as noted above who presents for f/u evaluation of uncontrolled type 2 diabetes. Problems:  Type 2 diabetes  Hyperlipidemia  Hypertension  Hypothyroidism  Vitamin D deficiency     Discussed his diabetes and his ESRD on dialysis today. We noted how dialysis can affect his blood sugars and the things he should look out for and discuss with the nephrologist. His blood sugars look good at the present time and he is not having frequent lows. He is comfortable with his average blood sugars at this time. We will plan as follows:     PLAN  Type 2 Diabetes  Medications:  Lantus: 20 units once each bedtime  Novolog:        Breakfast: 14 units        Lunch:       10 units        Dinner:       10 units  Correction 1:50>150  Advised to check glucose 4 times daily    HTN: Telemedicine visit today, follows with cardiology. Reports last BP was stable. HLD: on statin, prev ordered repeat level but not completed    Vitamin D deficiency: off per nephrologist    Hypothyroidism: 137 mcg once daily, reviewed recent labs, TSH 0.9 in March of this year, Aggie olmedo  [address] [facility]

## 2021-05-26 NOTE — WOUND CARE
03/13/19 1215   Wound Leg Lower Right;Medial   Date First Assessed/Time First Assessed: 03/13/19 1213   Wound Type: (c) Venous; Blister/bullae  Location: Leg Lower  Orientation: Right;Medial   Dressing Status  Removed   Dressing Type  Non-adherent   Wound Length (cm) 4.5 cm   Wound Width (cm) 0.5 cm   Wound Depth (cm) 0.1   Wound Surface area (cm^2) 2.25 cm^2   Drainage Amount  Moderate   Drainage Color Serous   Wound Odor None   Periwound Skin Condition Edematous   Cleansing and Cleansing Agents  Normal saline     Visit Vitals  /79 (BP 1 Location: Right arm, BP Patient Position: Sitting)   Pulse 80   Temp 97.7 °F (36.5 °C)   Resp 18 119

## 2021-06-21 PROBLEM — L97.912 NON-PRESSURE CHRONIC ULCER OF RIGHT LOWER LEG WITH FAT LAYER EXPOSED (HCC): Status: RESOLVED | Noted: 2020-01-23 | Resolved: 2021-01-01

## 2021-06-21 PROBLEM — Z99.2 CKD (CHRONIC KIDNEY DISEASE) STAGE V REQUIRING CHRONIC DIALYSIS (HCC): Status: ACTIVE | Noted: 2021-01-01

## 2021-06-21 PROBLEM — N18.6 CKD (CHRONIC KIDNEY DISEASE) STAGE V REQUIRING CHRONIC DIALYSIS (HCC): Status: ACTIVE | Noted: 2021-01-01

## 2021-06-21 PROBLEM — L89.46: Status: ACTIVE | Noted: 2021-01-01

## 2021-06-21 NOTE — DISCHARGE INSTRUCTIONS
Discharge Instructions for  [facility]  [address] [facility] [place] [address] [place] [address]  Telephone: [phone]    NAME:  Jenna Tellez OF BIRTH:  1950  MEDICAL RECORD NUMBER:  [id number]  DATE:  6/21/2021  WOUND CARE ORDERS:    Left lower Leg Anterior Leg Wound - Cleanse site with Normal Saline, pat dry. Apply Foam with Silver  (Mepilex Ag). Change dressings 3x a week. Right buttock  - Keep site clean. Apply Venelex daily and as needed. Needs a Memory Foam with sacral cut out. Return in 3 weeks to see Dr. Raoul Mendoza. TREATMENT ORDERS:    Elevate leg(s) above the level of the heart when sitting. Avoid prolonged standing in one place. Do no get dressing/wrap wet. Follow Diet as prescribed:   [] Diet as tolerated: [x] Calorie Diabetic Diet: Low carb and no Sugar [] No Added Salt:  [] Increase Protein: [] Limit the amount of liquid you are drinking and avoid drinking in between meals           Return Appointment:  [x] Return Appointment: With DR Raoul Mendoza  in  [facility])  [] Nurse Visit : *** days  [] Ordered tests:    Electronically signed Funmi Epi on 6/21/2021 at 10:17 AM   We're moving, Effective July 6, 2021  New Address is  [address] [facility] [address], [address] Information: Should you experience any significant changes in your wound(s) or have questions about your wound care, please contact the [address] [place] [address] at [address] [place] [address] 8:00 am - 4:30. If you need help with your wound outside these hours and cannot wait until we are again available, contact your PCP or go to the hospital emergency room. PLEASE NOTE: IF YOU ARE UNABLE TO OBTAIN WOUND SUPPLIES, CONTINUE TO USE THE SUPPLIES YOU HAVE AVAILABLE UNTIL YOU ARE ABLE TO REACH US. IT IS MOST IMPORTANT TO KEEP THE WOUND COVERED AT ALL TIMES.      Physician Signature:_______________________    Date: ___________ Time:  ____________

## 2021-06-21 NOTE — WOUND CARE
06/21/21 2437 Right Leg Edema Point of Measurement Leg circumference 39 cm Ankle circumference 23 cm Left Leg Edema Point of Measurement Leg circumference 39.5 cm Ankle circumference 24 cm  
LLE Peripheral Vascular Capillary Refill Less than/equal to 3 seconds Color Ashen Temperature Warm Sensation Present Pedal Pulse Doppler Wound Leg lower Anterior;Proximal # 1 06/21/21 Date First Assessed/Time First Assessed: 06/21/21 0935   Present on Hospital Admission: Yes  Wound Approximate Age at First Assessment (Weeks): 1 weeks  Primary Wound Type: Traumatic  Location: Leg lower  Wound Location Orientation: Anterior;Proximal ... Wound Image Wound Etiology Traumatic Cleansed Cleansed with saline Wound Length (cm) 1.8 cm Wound Width (cm) 2.8 cm Wound Depth (cm) 0.1 cm Wound Surface Area (cm^2) 5.04 cm^2 Wound Volume (cm^3) 0.5 cm^3 Wound Assessment Pink/red Drainage Amount Moderate Drainage Description Serous Wound Odor None Christy-Wound/Incision Assessment Ecchymosis Edges Flat/open edges Wound Thickness Description Full thickness Pain 1 Pain Scale 1 Numeric (0 - 10) Pain Intensity 1 8 Patient Stated Pain Goal 0 Pain Reassessment 1 Yes Pain Location 1 Buttocks;Back;Neck Pain Description 1 Aching Pain Intervention(s) 1 Medication (see MAR) Visit Vitals BP (!) 168/74 Pulse 74 Temp 98 °F (36.7 °C) Resp 18

## 2021-06-21 NOTE — H&P
[address]  HISTORY AND PHYSICAL    Name:  Dee Hamlin  MR#:  [id number]  :  1950  ACCOUNT #:  [de-identified]  ADMIT DATE:  2021    HISTORY OF PRESENT ILLNESS:  The patient is a 65-year-old man who is referred to the Wound Care center regarding a traumatic wound of the left lower leg and chronic wound on the buttock. The patient started hemodialysis by way of a right-sided central catheter in 2021. He is scheduled to see Dr. Georgie Mayen regarding long term dialysis access. The patient has history of diabetes mellitus. He has significant peripheral neuropathy. He has poor balance and uses a rollator for ambulation. He only ambulates short distances. He had a fall about a week ago with resulting wound on the left lower leg below the knee. The patient has had chronic wound on the buttock in the lower sacral coccygeal area to the right of midline. He has been using Venelex on that area. He also reports some irritation closer to the anus on the left side. The patient has history of coronary artery disease. He had coronary artery bypass grafting approximately 10 years ago. He has history of congestive heart failure. He has history of chronic atrial fibrillation. His diabetes is presently well controlled. He has history of AV node ablation and permanent pacemaker insertion, hypertension, morbid obesity, obstructive sleep apnea, peripheral artery disease. The patient has never smoked. Reported weight 248 pounds, height 5 feet 9 inches. The patient reports that at dialysis he is typically 2-3 kg over his dry weight. PHYSICAL EXAMINATION:  Alert man, no acute distress. VITAL SIGNS:  Blood pressure 160/74, pulse 74, respirations 18, temperature 98 degrees. HEAD AND NECK:  Examination showed no jaundice. LUNGS:  Clear bilaterally without rales, rhonchi or wheezes. HEART:  Regular without murmur, gallop or rub.   NEUROLOGIC:  The patient is alert and oriented. He moves all extremities. Facial movement is symmetrical.  Speech is normal.  LOWER EXTREMITIES:  Examination of lower extremities did not reveal palpable dorsalis pedis or posterior tibial pulses on either side. There were brisk biphasic Doppler signals at the dorsalis pedis position in right and left feet. Examination of right lower extremity revealed trace pitting edema in the lower leg. There were scattered small dry scabs with no active open wounds. Examination of left lower extremity revealed trace to 1+ pitting edema. There were few scattered dry scabs. There was a wound approximately overlying the tibial tuberosity on the left which is 1.8 x 2.8 x 0.1 cm in dimension with a pale pink base. There was no surrounding erythema. The patient then stood while holding onto his a rollator. Examination of the buttock region revealed a small callus perhaps 0.5 cm across to the right of midline on the buttock. This was approximately 2.5 cm off the midline. There was no erythema and no true ulceration there. Examining skin closer to the anus, there was no visible wound in the perianal region. There was slight sensitivity to touch to the left of the anus. The patient has been using a waffle type air cushion on the sofa where he typically spends most his time during the day sitting. I recommended the patient obtain a memory foam seat cushion with sacral cutout and use this on the sofa to avoid pressure on the area of irritation on the buttock. I have recommended the patient continue his current regimen which is the use of Venelex on the site to the right of midline and the use of a A and D with zinc oxide on the site in the perianal area to the left. Dressing ordered for leg:  Mepilex Ag foam overlying the ulcer site, to be changed 3 times per week. The patient was advised to limit fluid intake as instructed by his nephrologist, Dr. Wilda Miguel.     If his left perianal skin irritation continues despite these measures, he should consult a colorectal surgeon. The patient will follow up in the [address] [place] [address] in 3 weeks. FINAL DIAGNOSES:  Traumatic ulcer left lower leg with fat layer exposed, pressure site with callus formation right buttock, left perianal skin irritation.       Alfred Phan MD      [id number]  D:  06/21/2021 10:28  T:  06/21/2021 11:40  JOB #:  [id number]

## 2021-06-21 NOTE — PROGRESS NOTES
Wound care    Addendum:  KERI ordered because of lower extremity ulcer and absence of palpable foot  Pulses.     Monique Arthur MD

## 2021-07-06 NOTE — TELEPHONE ENCOUNTER
----- Message from Mendez Lombardi sent at 7/2/2021  5:20 PM EDT -----  Regarding: Dr Yany Taylor first and last [address]      Reason for call:checking on med rec  request faxed on 6/18/2021 for office notes with DOS for 1/13/2021  please fax to number below       Callback required yes/no and why:yes to confirm it was faxed       Best contact number(s):[phone]/(f) [phone]      Details to clarify the request:      Mendez Lombardi

## 2021-07-12 NOTE — TELEPHONE ENCOUNTER
----- Message from Raoul Dex sent at 2021 12:54 PM EDT -----  Regarding: MD [place]/ telephone  Patient's first and last name:    Lance Juan  : 1950    Caller's first and last name:   pt    Reason for call: [address] required yes/no and why: yes    Best contact number(s):  [phone]    Details to clarify the request: Pt is requesting call to follow up with renewal of [facility] sensors. Pt is on last sensor. [employer] has not received documents for supply order.

## 2021-08-02 NOTE — TELEPHONE ENCOUNTER
Pt called about his Dialysis nurse(sreedhar) needing to get a call about reducing patients Eliquis from 5 mg to 2 1/2 mg- please call kaz at [phone].     Thank you  marcus

## 2021-08-06 NOTE — PROGRESS NOTES
Spoke with Delvin Hernandez at dialysis center. Verified patient with two patient identifiers. Advised we would keep Eliquis 5 mg BID unless pt 80 or older, under 60 kg which it appears he is not. If pt has bleeding issues we would reconsider. Delvin Hernandez will give message to [facility], who can pull up message in [employer]. Delvin Hernandez verbalized understanding.

## 2021-08-06 NOTE — PROGRESS NOTES
Per [facility] you do not decrease dose of Eliquis unless patient is also 80 or older and/or under 60kg which it appears this patient is not. If he is having bleeding issues then we may need to reconsider.

## 2021-08-07 NOTE — TELEPHONE ENCOUNTER
Received labs from PCP from 8-4-2021 including:    Chol 110  HDL 45  LDL 51  TG 68    Lab reviewed by ROSARIO Albarado NP, sent for scanning.

## 2021-08-07 NOTE — TELEPHONE ENCOUNTER
See other note already charted. Dakota Barahona LPN   Licensed Nurse      Progress Notes      Signed   Encounter Date:  8/6/2021                  Spoke with Neale Schlatter at dialysis center. Verified patient with two patient identifiers.     Advised we would keep Eliquis 5 mg BID unless pt 80 or older, under 60 kg which it appears he is not. If pt has bleeding issues we would reconsider.      Neale Schlatter will give message to [facility], who can pull up message in [employer]. Neale Schlatter verbalized understanding.       Electronically signed by Dakota Barahona LPN at 88/37/15 0377

## 2021-08-16 NOTE — DISCHARGE INSTRUCTIONS
Discharge Instructions/Wound Orders  [address], [address] [place] [address] Indu Christianson, [id number]  Telephone: [phone]    NAME:  Caren Reid OF BIRTH:  1950  MEDICAL RECORD NUMBER:  [id number]  DATE:  8/16/2021  Wound Care Orders:  Right lower leg wound - no open wounds, no further follow-up needed. Dietary:  [x] Diet as tolerated: Renal diet [] Calorie Diabetic Diet:Low carb and no Sugar [] No Added Salt:[] Increase Protein: [] Other:Limit the amount of liquid you are drinking and avoid drinking in between meals   Activity:  [x] Activity as tolerated:  [] Patient has no activity restrictions     [] Strict Bedrest: [] Remain off Work:     [] May return to full duty work:                                   [] Return to work with restrictions:             Return Appointment:  [] Return Appointment: No further follow-up needed. [] Ordered tests:    Electronically signed Shiela Courtney RN on 8/16/2021 at [address] [place] [address] Information: Should you experience any significant changes in your wound(s) or have questions about your wound care, please contact the [address] [place] [address] at [address] [place] [address] 8:00 am - 4:30. If you need help with your wound outside these hours and cannot wait until we are again available, contact your PCP or go to the hospital emergency room. PLEASE NOTE: IF YOU ARE UNABLE TO OBTAIN WOUND SUPPLIES, CONTINUE TO USE THE SUPPLIES YOU HAVE AVAILABLE UNTIL YOU ARE ABLE TO REACH US. IT IS MOST IMPORTANT TO KEEP THE WOUND COVERED AT ALL TIMES.      Physician Signature:_______________________    Date: ___________ Time:  ____________

## 2021-08-16 NOTE — WOUND CARE
08/16/21 1033   Wound Leg lower Anterior;Proximal # 1 06/21/21   Date First Assessed/Time First Assessed: 06/21/21 0935   Present on Hospital Admission: Yes  Wound Approximate Age at First Assessment (Weeks): 1 weeks  Primary Wound Type: Traumatic  Location: Leg lower  Wound Location Orientation: Anterior;Proximal ... Wound Image    Wound Etiology Traumatic   Dressing Status   (Open to air)   Cleansed Cleansed with saline   Wound Length (cm) 0.1 cm   Wound Width (cm) 0.1 cm   Wound Depth (cm) 0.1 cm   Wound Surface Area (cm^2) 0.01 cm^2   Change in Wound Size % 99.8   Wound Volume (cm^3) 0.001 cm^3   Wound Healing % 100   Wound Assessment Epithelialization   Drainage Amount None   Wound Odor None   Christy-Wound/Incision Assessment Ecchymosis; Intact   Edges Attached edges   Wound Thickness Description Full thickness     Visit Vitals  BP (!) 148/72 (BP 1 Location: Right upper arm, BP Patient Position: At rest;Sitting)   Pulse 74   Temp 98.4 °F (36.9 °C)   Resp 16

## 2021-08-16 NOTE — PROGRESS NOTES
HISTORY OF PRESENT ILLNESS:  The patient is a 76-year-old man who is referred to the Wound Care center regarding a traumatic wound of the left lower leg and chronic wound on the buttock. Buttock site healed. The patient started hemodialysis by way of a right-sided central catheter in 03/2021. He is scheduled to see Dr. Heri Garcia regarding long term dialysis access.     The patient has history of diabetes mellitus. He has significant peripheral neuropathy. He has poor balance and uses a rollator for ambulation. He only ambulates short distances. He had a fall about a week ago with resulting wound on the left lower leg below the knee.     The patient has history of coronary artery disease. He had coronary artery bypass grafting approximately 10 years ago. He has history of congestive heart failure. He has history of chronic atrial fibrillation. His diabetes is presently well controlled. He has history of AV node ablation and permanent pacemaker insertion, hypertension, morbid obesity, obstructive sleep apnea, peripheral artery disease.     The patient has never smoked.     Reported weight 248 pounds, height 5 feet 9 inches.     The patient reports that at dialysis he is typically 2-3 kg over his dry weight.     PHYSICAL EXAMINATION:    Alert man, no acute distress. LOWER EXTREMITIES:  Examination of lower extremities did not reveal palpable dorsalis pedis or posterior tibial pulses on either side. There were brisk biphasic Doppler signals at the dorsalis pedis position in right and left feet.     Examination of right lower extremity revealed trace pitting edema in the lower leg. There were scattered small dry scabs with no active open wounds.     Examination of left lower extremity revealed trace to trace to 1+ pitting edema. There were few scattered dry scabs. No active ulcers. There was no surrounding erythema.        Edemas improved. Wounds healed. Use elastic compression stockings.     No follow up appointment needed.           FINAL DIAGNOSES:  Traumatic ulcer left lower leg with fat layer exposed (healed), pressure site with callus formation right buttock (healed)        John Suarez MD

## 2021-09-24 NOTE — PERIOP NOTES
[facility]  Ambulatory Surgery Unit  Pre-operative Instructions    Procedure Date  Tuesday, September 28, 2021            Tentative Arrival Time 1500      1. On the day of your procedure, please report to the Ambulatory Surgery Unit Registration Desk and sign in at your designated time. The Ambulatory Surgery Unit is located in [facility] on the [facility] side of the \[facility]\"" across from the [address] [place] [address]. Please have all of your health insurance cards and a photo ID. 2. You must have someone with you to drive you home as directed by your surgeon. 3. You may have a light breakfast and take normal morning medications. 4. We recommend you do not drink any alcoholic beverages for 24 hours before and after your procedure. 5. Contact your surgeons office for instructions on the following medications: non-steroidal anti-inflammatory drugs (i.e. Advil, Aleve), vitamins, and supplements. (Some surgeons will want you to stop these medications prior to surgery and others may allow you to take them)   **If you are currently taking Plavix, Coumadin, Aspirin and/or other blood-thinning agents, contact your surgeon for instructions. ** Your surgeon will partner with the physician prescribing these medications to determine if it is safe to stop or if you need to continue taking. Please do not stop taking these medications without instructions from your surgeon. 6. In an effort to help prevent surgical site infection, we ask that you shower with an anti-bacterial soap (i.e. Dial or Safeguard) on the morning of your procedure. Do not apply any lotions, powders, or deodorants after showering. 7. Wear comfortable clothes. Wear glasses instead of contacts. Do not bring any jewelry or money (other than copays or fees as instructed). Do not wear make-up, particularly mascara, the morning of your procedure. Wear your hair loose or down, no ponytails, buns, adal pins or clips.  All body piercings must be removed. 8. You should understand that if you do not follow these instructions your procedure may be cancelled. If your physical condition changes (i.e. fever, cold or flu) please contact your surgeon as soon as possible. 9. It is important that you be on time. If a situation occurs where you may be late, or if you have any questions or problems, please call [phone].    10. Your procedure time may be subject to change. You will receive a phone call the day prior to confirm your arrival time. I understand a pre-operative phone call will be made to verify my procedure time. In the event that I am not available, I give permission for a message to be left on my answering service and/or with another person?       yes    Preop instructions reviewed  Pt verbalized understanding.      ___________________      ___________________      ___________________  (Signature of Patient)          (Witness)                   (Date and Time)

## 2021-09-28 NOTE — H&P
Procedural Case Note    9/28/2021    (3:00 PM)    Gaye Kuo    1950   (66 y.o.)    [id number]    CC:  pain    ROS:   Complete ROS obtained, no CP, no SOB, no N or V    PMH:     Past Medical History:   Diagnosis Date    Arrhythmia     atrial fibrillation 2017    Arthritis     spine, all joints    CAD (coronary atherosclerotic disease)     Chronic kidney disease     [facility] [place] [place]    Chronic pain     back pain - unable to lay flat    Congestive heart failure ([address]     Diabetes ([address]     Diabetes mellitus type 2, controlled ([address] 3/13/2017    GERD (gastroesophageal reflux disease)     Heart failure ([address]     Hx of CABG 3/13/2017    CABG in 2010 in  [address] Hypertension     Long term current use of anticoagulant therapy     Morbid obesity ([facility] 3/13/2017    JACEY (obstructive sleep apnea) 6/23/2017    no CPAP    S/P CABG x 2 2010    Skin cancer     Thyroid disease     hypothyroid       ALLERGIES:     Allergies   Allergen Reactions    Allopurinol Rash    Gabapentin Drowsiness    Ciprofloxacin Nausea Only    Percocet [Oxycodone-Acetaminophen] Other (comments)     hallucinations       MEDS:     No current facility-administered medications for this encounter. Visit Vitals  Ht 5' 9.5\" (1.765 m)   Wt 108.9 kg (240 lb)   BMI 34.93 kg/m²     PE:  Gen: NAD  Head: normocephalic  Heart: RRR  Lungs: CTA catherine  Abd: NT, ND, soft  Neuro: awake and alert  Skin: intact    IMPRESSION:   LS DDD    Note:  The clinical status was discussed in detail with the patient. The procedure was again discussed and described in detail. All understand and accept the planned procedure and risks; reject other forms of treatment. All questions are answered.     Maurice Arana MD

## 2021-09-28 NOTE — OP NOTES
Epidural Steroid Injection Operative Report    Indications: This is a 66 y.o. male who presents with low back pain. He was positive for LS DDD. The patient was admitted for surgery as conservative measures have failed. Date of Surgery: 9/28/2021    Preoperative Diagnosis: LS DDD    Postoperative Diagnosis: LS DDD    Surgeon(s) and Role:     * Gladys Urbina MD - Primary     Procedure:  Procedure(s):  BILATERAL L4 TRANSFORAMINAL EPIDURAL STEROID INJECTION (URGENT)    Procedure in Detail:  After appropriate informed consent was obtained, the patient was taken to the operating suite and placed in the prone position on the operating table on appropriate padding. The LS region was prepped and draped in the usual sterile fashion. Intraoperative fluoroscopy was used to localize the LS spine. The skin was infiltrated with 2% lidocaine. An 22-g needle was advanced into the Trev L4 neuroforamen under fluoroscopic guidance. A small amount of contrast was injected into the epidural space, confirming appropriate needle placement on fluoroscopy. Next, 2ml of 2% lidocaine and 80mg of Depo-Medrol were injected. The needle was removed from the patient. The patient was then turned back into the supine position on the stretcher and was taken to the Recovery Room in stable condition.     Estimated Blood Loss:  none     Specimens: None       Drains: None          Complications:  None    Signed By: Ayaz Pathak MD                        September 28, 2021

## 2021-09-28 NOTE — DISCHARGE INSTRUCTIONS
Dr. Yves Riggs Discharge Instructions  Transforaminal Epidural Steroid Injection/ Selective Nerve Block    You had a transforaminal epidural steroid injection/ selective nerve block today. You will probably have some numbness, and possibly weakness, in your leg for the next 6 to 8 hours. The steroids will slowly become effective, reducing your pain, over the next 2 weeks. You should begin feeling better after a few days, but it may take up to 2 weeks to notice the difference. The benefit you get from your injection will last a variable amount of time, depending on the severity of your lumbar spine problem.  Pain: Most people do not have any increase in pain after this injection. However, you might experience some soreness in your low back. If this happens, putting an ice pack over the sore area will help.  Bandage: You will have a small bandage covering the site of the injection. You may remove it once you get home.  Restrictions: Someone should drive you home after the injection. After that, you have no restrictions. You need to be careful while walking, as you may still have some numbness or weakness in your leg. You may resume your normal level of activity. You may take a shower or bath, and you may eat normally. You should continue your current exercises and/or therapy routine.   Medications: Continue your current medications as prescribed. If your pain decreases, you may reduce the amount of your pain medicines. If you stopped taking anticoagulants or blood-thinners before the injection, start them tomorrow. If you have diabetes, your blood sugar may be elevated for a few days. Call your primary doctor with any questions.   Call Dr. Yves Riggs at [phone] if you experience:   Fever (101 degrees Fahrenheit or greater)   Nausea or vomiting   Headache unrelieved by your normal pain medicine   Redness or swelling at the injection site that lasts more than 1 day   New numbness, tingling, weakness, or pain that you didnt have before the injection    Follow-up appointment:   If still having pain in 1-2 weeks, call office at [phone] for a follow up appointment. DISCHARGE SUMMARY from Nurse    The following personal items collected during your admission are returned to you:   Dental Appliance: Dental Appliances: None  Vision: Visual Aid: Glasses  Hearing Aid:    Jewelry:    Clothing:    Other Valuables:    Valuables sent to safe: If you were given prescriptions, please review the written information on prescribed medications. · You will receive a Post Operative Call from one of the Recovery Room Nurses on the day after your surgery to check on you. It is very important for us to know how you are recovering after your surgery. · You may receive an e-mail or letter in the mail from [employer] regarding your experience with us in the Ambulatory Surgery Unit. Your feedback is valuable to us and we appreciate your participation in the survey. If you have not had your influenza or pneumococcal vaccines, please follow up with your primary care physician. The discharge information has been reviewed with the patient. The patient verbalized understanding.

## 2021-09-28 NOTE — PERIOP NOTES
Patient received to PACU, VSS. Patient awake and alert with no complaints of pain. Injection site intact. Neuro:  Push/Pull assessment:     LLE Response: strong push/pull   RLE Response: strong push/pull     Discharge instructions given. Patient verbalized understanding of instructions and follow up. Patient states ready for discharge - patient discharged at this time by wheelchair with all belongings. Wife [place] to provide transportation home.

## 2021-09-28 NOTE — PERIOP NOTES
Neuro:  Push/Pull assessment:     LUE Response: strong    LLE Response: strong   RUE Response: strong   RLE Response: strong

## 2021-10-11 NOTE — PROGRESS NOTES
Vidya Patel NP          NAME:  Malka Moe   :   1950   MRN:   [id number]   PCP:  Doc Julio MD           Subjective: The patient is a 66y.o. year old male  Is here today for a routine follow-up. Last seen 3/26/21. He denies chest pain, edema, SOB/BETTS, PND or orthopnea. Denies syncope, palpitations, dizziness or light headedness. Past Medical History:   Diagnosis Date    Arrhythmia     atrial fibrillation     Arthritis     spine, all joints    Atrial fibrillation (HCC)     CAD (coronary atherosclerotic disease)     Chronic kidney disease     [facility]    Chronic pain     back pain - unable to lay flat    [facility]est[facility] ([facility]     Diabetes ([facility]     Diabetes mellitus type 2, controlled ([address] 3/13/2017    GERD (gastroesophageal reflux disease)     Heart failure ([facility]     Hx of CABG 3/13/2017    CABG in  in  [employer] Hyperlipidemia     Hypertension     Long term current use of anticoagulant therapy     Morbid obesity ([facility] 3/13/2017    JACEY (obstructive sleep apnea) 2017    no CPAP    Pacemaker     S/P CABG x 2     Skin cancer     Thyroid disease     hypothyroid        ICD-10-CM ICD-9-CM    1. Coronary artery disease involving native coronary artery of native heart without angina pectoris  I25.10 414.01 LIPID PANEL      METABOLIC PANEL, COMPREHENSIVE   2. Dilated cardiomyopathy ([address]  I42.0 425.4 LIPID PANEL      METABOLIC PANEL, COMPREHENSIVE   3. Essential hypertension  I10 401.9 LIPID PANEL      METABOLIC PANEL, COMPREHENSIVE   4. Mixed hyperlipidemia  E78.2 272.2 LIPID PANEL      METABOLIC PANEL, COMPREHENSIVE   5. Type 2 diabetes with nephropathy (HCC)  E11.21 250.40 LIPID PANEL     407.44 METABOLIC PANEL, COMPREHENSIVE   6. H/O atrioventricular anastacio ablation  Z98.890 V15.1 LIPID PANEL      METABOLIC PANEL, COMPREHENSIVE   7. Cardiac pacemaker in situ  Z95.0 V45.01 LIPID PANEL      METABOLIC PANEL, COMPREHENSIVE   8.  Hx of CABG  Z95.1 V45.81 LIPID PANEL      METABOLIC PANEL, COMPREHENSIVE   9. CKD (chronic kidney disease) stage V requiring chronic dialysis (HCC)  N18.6 585.6     Z99.2 V45.11       Social History     Tobacco Use    Smoking status: Never Smoker    Smokeless tobacco: Never Used   Substance Use Topics    Alcohol use: Yes     Comment: rare      Family History   Problem Relation Age of Onset    Heart Attack Father     Cancer Father         lymph node    Cancer Mother         breast        Review of Systems  Cardiovascular: Negative except as noted in HPI      Objective:       Vitals:    10/12/21 1011   BP: 130/72   Pulse: 75   Resp: 18   SpO2: 99%   Weight: 246 lb (111.6 kg)   Height: 5' 9\" (1.753 m)    Body mass index is 36.33 kg/m². General PE  Mental Status - Alert. General Appearance - Not in acute distress. Chest and Lung Exam   Inspection: Accessory muscles - No use of accessory muscles in breathing. Auscultation:   Breath sounds: - Normal.    Cardiovascular   Inspection: Jugular vein - Bilateral - Inspection Normal.  Palpation/Percussion:   Apical Impulse: - Normal.  Auscultation: Rhythm - Regular. Heart Sounds - S1 WNL and S2 WNL. No S3 or S4. Murmurs & Other Heart Sounds: Auscultation of the heart reveals - No Murmurs. Peripheral Vascular   Upper Extremity: Inspection - Bilateral - No Cyanotic nailbeds or Digital clubbing. Lower Extremity:   Palpation: Edema - Bilateral - no edema. Scabbing catherine calves. Ecchymosis on catherine forearms with bandaids from skin tears  Using walker      Data Review: Allergies reviewed  Allergies   Allergen Reactions    Allopurinol Rash    Gabapentin Drowsiness    Ciprofloxacin Nausea Only    Percocet [Oxycodone-Acetaminophen] Other (comments)     hallucinations       Medications reviewed  Current Outpatient Medications   Medication Sig    multivitamin (ONE A DAY) tablet Take 1 Tablet by mouth daily.     loratadine (Claritin) 10 mg tablet Take 10 mg by mouth daily.    pravastatin (PRAVACHOL) 80 mg tablet TAKE 1 TABLET BY MOUTH EVERY DAY AT NIGHT (Patient taking differently: Take 80 mg by mouth nightly.)    levothyroxine (SYNTHROID) 137 mcg tablet Take 1 Tablet by mouth Daily (before breakfast). SEND FUTURE REFILL REQUESTS TO PCP/ NEW ENDOCRINOLOGIST. DR BRUCE NO LONGER AT THIS PRACTICE    apixaban (Eliquis) 5 mg tablet TAKE 1 TABLET BY MOUTH TWICE A DAY (Patient taking differently: Take 5 mg by mouth two (2) times a day. TAKE 1 TABLET BY MOUTH TWICE A DAY)    Insulin Needles, Disposable, (BD Ultra-Fine Mini Pen Needle) 31 gauge x 3/16\" ndle USE WITH INSULIN PENS 4 TIMES DAILY    diclofenac (VOLTAREN) 1 % gel APPLY 1 APPLICATION TOPICALLY 4 (FOUR) TIMES A DAY AS NEEDED (PAIN)    insulin glargine (Lantus Solostar U-100 Insulin) 100 unit/mL (3 mL) inpn 20 Units by SubCUTAneous route nightly. INJECT 30 UNITS SUBCUTANEOUSLY AT BEDTIME (Patient taking differently: 20 Units by SubCUTAneous route nightly. INJECT 20 UNITS SUBCUTANEOUSLY AT BEDTIME)    metoprolol succinate (TOPROL-XL) 100 mg tablet Take 1 Tab by mouth daily.  balsam peru-castor oiL (VENELEX) ointment Apply  to affected area two (2) times a day.  bumetanide (BUMEX) 2 mg tablet 1 tablet on non-dialysis days only. (Patient taking differently: Take 2 mg by mouth daily. 1 tablet on non-dialysis days only.)    insulin aspart U-100 (NovoLOG Flexpen U-100 Insulin) 100 unit/mL (3 mL) inpn Inject 14 units at Breakfast, 10 with Lunch and Dinner    omeprazole (PRILOSEC) 40 mg capsule Take 40 mg by mouth daily. Indications: 3-23-21: Patient states he takes 40mg in the morning and 40mg in the evening.  dutasteride (AVODART) 0.5 mg capsule Take 0.5 mg by mouth daily.  acetaminophen (TYLENOL EXTRA STRENGTH) 500 mg tablet Take 1,000 mg by mouth every eight (8) hours as needed for Pain. No current facility-administered medications for this visit. Assessment:       ICD-10-CM ICD-9-CM    1. Coronary artery disease involving native coronary artery of native heart without angina pectoris  I25.10 414.01 LIPID PANEL      METABOLIC PANEL, COMPREHENSIVE   2. Dilated cardiomyopathy ([address]  I42.0 425.4 LIPID PANEL      METABOLIC PANEL, COMPREHENSIVE   3. Essential hypertension  I10 401.9 LIPID PANEL      METABOLIC PANEL, COMPREHENSIVE   4. Mixed hyperlipidemia  E78.2 272.2 LIPID PANEL      METABOLIC PANEL, COMPREHENSIVE   5. Type 2 diabetes with nephropathy (HCC)  E11.21 250.40 LIPID PANEL     922.54 METABOLIC PANEL, COMPREHENSIVE   6. H/O atrioventricular naastacio ablation  Z98.890 V15.1 LIPID PANEL      METABOLIC PANEL, COMPREHENSIVE   7. Cardiac pacemaker in situ  Z95.0 V45.01 LIPID PANEL      METABOLIC PANEL, COMPREHENSIVE   8. Hx of CABG  Z95.1 V45.81 LIPID PANEL      METABOLIC PANEL, COMPREHENSIVE   9. CKD (chronic kidney disease) stage V requiring chronic dialysis (HCC)  N18.6 585.6     Z99.2 V45.11         Orders Placed This Encounter    LIPID PANEL    METABOLIC PANEL, COMPREHENSIVE       Plan:   CAD  Hx CABG x 2 in 2010   Denies angina or anginal equivalent symptoms. Cont on BB, statin  Not taking ASA d/t ecchymosis from eliquis     HTN:   BP well controlled. Cont current medical management, consume a diet low in sodium.     Hyperlipidemia:   7/2020 LDL at 51  On Pravachol 80mg. no recent lipids for review. We are prescribing statin. Obtain lipid/CMP now.      Dilated cardiomyopathy:   Normal EF per echo in 3/2019  appears compensated, euvolemic. Weight 246lbs. Cont on BB, Bumex       Chronic afib  SSS BiV-PM. taking BB and Eliquis. No bleeding but has ecchymosis. Watchman device discussed at last EP appt 5/5/21. Brochure given at that time.    No events on device interrogation 8/5/21  Device followed by Dr Naima Sevilla     CKD IV  On dialysis MWF  Followed by Dr Mady Beck    10/21/21 AV fistula graft, he can hold Eliquis 48hrs prior to procedure, resume as soon as stable to do so.     F/U in 6 months, sooner prargelia Quinones, NP

## 2021-10-12 NOTE — LETTER
10/12/2021    Patient: Nelly Altamirano   YOB: 1950   Date of Visit: 10/12/2021     [place] [address]  Via Fax: [phone]    Dear Puma Hylton MD,      Thank you for referring Mr. Catalino Suarez to [address] [place] [address] for evaluation. My notes for this consultation are attached. If you have questions, please do not hesitate to call me. I look forward to following your patient along with you.       Sincerely,    Karen Farrar MD

## 2021-10-12 NOTE — PROGRESS NOTES
1. Have you been to the ER, urgent care clinic since your last visit? Hospitalized since your last visit? No.    2. Have you seen or consulted any other health care providers outside of the [address] [place] [address] since your last visit? Include any pap smears or colon screening. Getting a fistula on 10/21/21.         Chief Complaint   Patient presents with    Hypertension     6 month- pt denies any cardiac symptoms    Irregular Heart Beat

## 2021-10-14 NOTE — LETTER
10/14/2021    Patient: Marlene Haywood   YOB: 1950   Date of Visit: 10/14/2021     [place] [address]  Via Fax: [phone]    Dear Peace Green MD,      Thank you for referring Mr. Dalton Cornelius to [facility] for evaluation. My notes for this consultation are attached. If you have questions, please do not hesitate to call me. I look forward to following your patient along with you.       Sincerely,    Fabio Roberts MD

## 2021-10-14 NOTE — PROGRESS NOTES
General:     Patient was last seen: New Patient visit  Last visit Dr Shyam Arreaga (virtual) 5/21  Has [employer] 2nd insurance  No donut hole    A1c: last a1c was and current a1cc is 6.5    DM Medications:   Lantus 20 HS  Nlog 14/10/10  CF 1/50>150    Last Changes: no insulin changes at last endo visit    Sugar Checks: check more than 4 times a day and uses [email or web address] CGM     AM: see scanned CGM reports rare highs and lows - usually knows why    PM: see above     LOWs:  has low sugars ~lunch, sometimes overnight     DIET: feels does well with diabetic diet, weight is stable, is careful, stays away from sweets/carbs, only 1-2 slice pizza, bread only with sandwich     EXERCISE: exercise limited due to pain, home PT at times; balance/mobility issues     HTN: at goal, on B-Blk, on diuretics, HTN followed by Nephrology     LIPIDS: at goal, on statin, lipids followed by Cardiology    RENAL: has end stage renal disease, is on dialysis, is followed by Nephrology     EYES: eye exam in past year, has retinopathy     FEET: sees podiatry, is on meds for neuropathy - neuroRx (bought from other provider) - [facility] h/o toe amputation 3rd toe left    DENTAL:  has seen dentist in past year     HEART:  is followed by Cardiology, has coronary artery disease, h/o CABG 2010, has pacemaker    ASA:  is on blood thinner     SYMPTOMS: no polyuria, thirst or blurred vision     THYROID: known thyroid condition, on T4, generic 137mcg, takes correctly    DIABETES HISTORY:   Dx > 20 yrs ago  Insulin most of time  H/o metformin, glyburide, +TZD, no DPP4, no SGLT, no GLP1      LABS/STUDIES:    Lab Results   Component Value Date/Time    Hemoglobin A1c 7.0 (H) 04/10/2018 08:43 AM    Hemoglobin A1c 7.6 (H) 06/22/2017 09:50 AM    Hemoglobin A1c 8.0 (H) 03/13/2017 12:15 PM    Glucose 201 (H) 03/19/2021 03:52 AM    Glucose (POC) 160 (H) 09/28/2021 03:08 PM    Microalb/Creat ratio (ug/mg creat.) 137.2 (H) 06/12/2019 10:56 AM    LDL,Direct 43 04/03/2017 04:00 PM    LDL, calculated 51 07/30/2020 08:35 AM    Creatinine 3.89 (H) 03/19/2021 03:52 AM            Current Outpatient Medications   Medication Sig    multivitamin (ONE A DAY) tablet Take 1 Tablet by mouth daily.  loratadine (Claritin) 10 mg tablet Take 10 mg by mouth daily.  pravastatin (PRAVACHOL) 80 mg tablet TAKE 1 TABLET BY MOUTH EVERY DAY AT NIGHT (Patient taking differently: Take 80 mg by mouth nightly.)    levothyroxine (SYNTHROID) 137 mcg tablet Take 1 Tablet by mouth Daily (before breakfast). SEND FUTURE REFILL REQUESTS TO PCP/ NEW ENDOCRINOLOGIST. DR BRUCE NO LONGER AT THIS PRACTICE    apixaban (Eliquis) 5 mg tablet TAKE 1 TABLET BY MOUTH TWICE A DAY (Patient taking differently: Take 5 mg by mouth two (2) times a day. TAKE 1 TABLET BY MOUTH TWICE A DAY)    Insulin Needles, Disposable, (BD Ultra-Fine Mini Pen Needle) 31 gauge x 3/16\" ndle USE WITH INSULIN PENS 4 TIMES DAILY    diclofenac (VOLTAREN) 1 % gel APPLY 1 APPLICATION TOPICALLY 4 (FOUR) TIMES A DAY AS NEEDED (PAIN)    insulin glargine (Lantus Solostar U-100 Insulin) 100 unit/mL (3 mL) inpn 20 Units by SubCUTAneous route nightly. INJECT 30 UNITS SUBCUTANEOUSLY AT BEDTIME (Patient taking differently: 20 Units by SubCUTAneous route nightly. INJECT 20 UNITS SUBCUTANEOUSLY AT BEDTIME)    metoprolol succinate (TOPROL-XL) 100 mg tablet Take 1 Tab by mouth daily.  balsam peru-castor oiL (VENELEX) ointment Apply  to affected area two (2) times a day.  bumetanide (BUMEX) 2 mg tablet 1 tablet on non-dialysis days only. (Patient taking differently: Take 2 mg by mouth daily. 1 tablet on non-dialysis days only.)    insulin aspart U-100 (NovoLOG Flexpen U-100 Insulin) 100 unit/mL (3 mL) inpn Inject 14 units at Breakfast, 10 with Lunch and Dinner    omeprazole (PRILOSEC) 40 mg capsule Take 40 mg by mouth daily. Indications: 3-23-21: Patient states he takes 40mg in the morning and 40mg in the evening.     dutasteride (AVODART) 0.5 mg capsule Take 0.5 mg by mouth daily.  acetaminophen (TYLENOL EXTRA STRENGTH) 500 mg tablet Take 1,000 mg by mouth every eight (8) hours as needed for Pain. No current facility-administered medications for this visit. Past Medical History:   Diagnosis Date    Arrhythmia     atrial fibrillation 2017    Arthritis     spine, all joints    Atrial fibrillation (HCC)     CAD (coronary atherosclerotic disease)     Chronic kidney disease     [facility]    Chronic pain     back pain - unable to lay flat    Congestive [facility] ([facility]     Diabetes ([facility]     Diabetes mellitus type 2, controlled ([address] 3/13/2017    GERD (gastroesophageal reflux disease)     Heart failure ([facility]     Hx of CABG 3/13/2017    CABG in 2010 in [address] Hyperlipidemia     Hypertension     Long term current use of anticoagulant therapy     Morbid obesity ([facility] 3/13/2017    JACEY (obstructive sleep apnea) 6/23/2017    no CPAP    Pacemaker     S/P CABG x 2 2010    Skin cancer     Thyroid disease     hypothyroid        Past Surgical History:   Procedure Laterality Date    HX ORTHOPAEDIC      L 3rd toe amputation in 1999    HX PACEMAKER Left 12/26/2018    Dr Quintin Mancini x4 crt-p    HX VASCULAR ACCESS  03/2021    R chest    IR INSERT TUNL CVC W/O PORT OVER 5 YR  3/16/2021    RI CARDIAC SURG PROCEDURE UNLIST      CABGx2 in 2010        Social History     Tobacco Use    Smoking status: Never Smoker    Smokeless tobacco: Never Used   Substance Use Topics    Alcohol use: Yes     Comment: rare      Employer:  Retired           There were no vitals filed for this visit.    Weight Metrics 10/12/2021 10/11/2021 9/28/2021 8/5/2021 7/29/2021 5/5/2021 3/26/2021   Weight 246 lb 246 lb 14.6 oz 249 lb 9.6 oz - 240 lb 248 lb 241 lb 9.6 oz   BMI 36.33 kg/m2 - 36.33 kg/m2 34.93 kg/m2 - 36.62 kg/m2 35.68 kg/m2        EXAM  - GENERAL: NCAT, Appears well nourished   - EYES: EOMI, non-icteric, no proptosis   - Ear/Nose/Throat: NCAT, no visible inflammation or masses   - CARDIOVASCULAR: no cyanosis, no visible JVD e11.3  - RESPIRATORY: respiratory effort normal without any distress or labored breathing   - MUSCULOSKELETAL: Normal ROM of neck and upper extremities observed   - SKIN: No rash on face  - NEUROLOGIC:  No facial asymmetry (Cranial nerve 7 motor function), No gaze palsy   - PSYCHIATRIC: Normal affect, Normal insight and judgement      Assessment/Plan:   1. Type 2 diabetes mellitus with hyperglycemia, with long-term current use of insulin (HCC)  Overall stable  Lows mid-day at times - pt to watch this  Not seeing lows reported at night on CGM - if feels an issue can lower basal insulin 2-3 units long term      2. Type 2 diabetes mellitus with stage 4 chronic kidney disease, with long-term current use of insulin (HCC)  On HD M-W-F - per Nephrology    - AMB POC HEMOGLOBIN A1C  - CT CONTINUOUS GLUCOSE MONITORING ANALYSIS I&R    3. Type 2 diabetes mellitus with retinopathy, with long-term current use of insulin, macular edema presence unspecified, unspecified laterality, unspecified retinopathy severity ([address]  Per ophtho    4. Diabetic polyneuropathy associated with type 2 diabetes mellitus ([address]  Sees podiatry every 2 mo  Uses supplement obtained from other provider    5. Type II diabetes mellitus with peripheral circulatory disorder (HCC)  H/o toe amputation, followed by podiatry closely    6. Coronary Artery Disease  S/p CABG, Per Cards    7. Hypothyroidism  On T4, generic, takes correctly; stable      40+ minutes spend face to face and reviewing records (labs/notes/studies)    Follow-up and Dispositions    · Return in about 4 months (around 2/14/2022) for diabetes.

## 2021-10-14 NOTE — PROGRESS NOTES
Lab Results   Component Value Date/Time    Hemoglobin A1c 7.0 (H) 04/10/2018 08:43 AM    Hemoglobin A1c (POC) 6.5 10/14/2021 10:23 AM

## 2021-10-18 NOTE — TELEPHONE ENCOUNTER
Spoke with patient. Verified patient with two patient identifiers. Discussed all labs in depth with pt as noted below. States his PCP is working on his BS and has already seen improvements. Patient verbalized understanding. Elijah Khanna NP sent to Tashia Robertson LPN  Sent via [employer]   Cholesterol is at goal. LDL 56, HDL improved at 45, trig 106. His sugar was very high if fasting at 245. Renal function stable.  LFTs normal           Associated Results

## 2021-10-18 NOTE — TELEPHONE ENCOUNTER
----- Message from Marleni Hinson NP sent at 10/18/2021  8:47 AM EDT -----  Sent via [employer]  Cholesterol is at goal. LDL 56, HDL improved at 45, trig 106. His sugar was very high if fasting at 245. Renal function stable.  LFTs normal

## 2021-10-18 NOTE — PROGRESS NOTES
Sent via [employer]  Cholesterol is at goal. LDL 56, HDL improved at 45, trig 106. His sugar was very high if fasting at 245. Renal function stable.  LFTs normal

## 2021-10-18 NOTE — TELEPHONE ENCOUNTER
----- Message from Frida Dupree NP sent at 10/18/2021  8:47 AM EDT -----  Sent via [facility]  Cholesterol is at goal. LDL 56, HDL improved at 45, trig 106. His sugar was very high if fasting at 245. Renal function stable.  LFTs normal

## 2021-11-05 NOTE — TELEPHONE ENCOUNTER
Spoke with Inocente Alvares and she stated that Alma Elvis order that was received, the provider signature was cut off, therefore she will need to send a new order for the provider to sign. Roberto was made aware that due to out fax machine out of service, I will not be able to send or receive faxes on today, however, I can resend her the signed form on this upcoming  Monday or Tuesday. She voiced understanding and said [place]. \"

## 2021-11-05 NOTE — TELEPHONE ENCOUNTER
----- Message from Malina Major sent at 11/5/2021  1:51 PM EDT -----  Regarding: /Telephone  General Message/Vendor Calls         Caller's first and last name: Daphne Caro from [facility]               Reason for call: Needs to know if Dr Bruno Prater received New Rx form that was faxed over 11/03/21, Prior form had discrepancy on it Fax # [phone] required yes/no and why: Yes               Best contact number(s): [phone]              Details to clarify the request: Needs to know if Dr Brnuo Prater received New Rx form that was faxed over 11/03/21, Prior form had discrepancy on it , Fax # [phone]                  Malina Major

## 2021-12-09 NOTE — TELEPHONE ENCOUNTER
12/09/21  10:04 AM    He needs approval for his pharmacy to fill his lantus prescription.  Please call him back at [phone] thanks

## 2021-12-13 NOTE — TELEPHONE ENCOUNTER
Spoke with Doe Parkinson to make her aware that the initial fax requested for the [facility]. She then stated that she has not yet been able to contact the pt to see if he cancel his orders from [facility]. While Elham was on th line, I called to the pt to see if could contact, but I too was unsuccessful  In reaching him. Elham then stated that she will call the pt again later this week.

## 2021-12-13 NOTE — TELEPHONE ENCOUNTER
Lis from [employer] called stating she did receive Rx and notes but needed information on which CGM [place] is Pt using (Nav 1, Nav 2 or Dexcom). They have left several messages for Pt but have not received a response. She'd like to make sure to send the correct supplies.   Thank you

## 2021-12-13 NOTE — PERIOP NOTES
[facility]  Preoperative Instructions        Surgery Date 12/21/21          Time of Arrival to be called @ [phone]  covid test scheduled for 12/17    1. On the day of your surgery, please report to the Surgical Services Registration Desk and sign in at your designated time. The Surgery Center is located to the right of the Emergency Room. 2. You must have someone with you to drive you home. You should not drive a car for 24 hours following surgery. Please make arrangements for a friend or family member to stay with you for the first 24 hours after your surgery. 3. Do not have anything to eat or drink (including water, gum, mints, coffee, juice) after midnight ?? .? This may not apply to medications prescribed by your physician. ?(Please note below the special instructions with medications to take the morning of your procedure.)    4. We recommend you do not drink any alcoholic beverages for 24 hours before and after your surgery. 5. Contact your surgeons office for instructions on the following medications: non-steroidal anti-inflammatory drugs (i.e. Advil, Aleve), vitamins, and supplements. (Some surgeons will want you to stop these medications prior to surgery and others may allow you to take them)  **If you are currently taking Plavix, Coumadin, Aspirin and/or other blood-thinning agents, contact your surgeon for instructions. ** Your surgeon will partner with the physician prescribing these medications to determine if it is safe to stop or if you need to continue taking. Please do not stop taking these medications without instructions from your surgeon    6. Wear comfortable clothes. Wear glasses instead of contacts. Do not bring any money or jewelry. Please bring picture ID, insurance card, and any prearranged co-payment or hospital payment. Do not wear make-up, particularly mascara the morning of your surgery.   Do not wear nail polish, particularly if you are having foot /hand surgery. Wear your hair loose or down, no ponytails, buns, adal pins or clips. All body piercings must be removed. Please shower with antibacterial soap for three consecutive days before and on the morning of surgery, but do not apply any lotions, powders or deodorants after the shower on the day of surgery. Please use a fresh towels after each shower. Please sleep in clean clothes and change bed linens the night before surgery. Please do not shave for 48 hours prior to surgery. Shaving of the face is acceptable. 7. You should understand that if you do not follow these instructions your surgery may be cancelled. If your physical condition changes (I.e. fever, cold or flu) please contact your surgeon as soon as possible. 8. It is important that you be on time. If a situation occurs where you may be late, please call [phone] (OR Holding Area). 9. If you have any questions and or problems, please call [phone] (Pre-admission Testing). 10. Your surgery time may be subject to change. You will receive a phone call the evening prior if your time changes. 11.  If having outpatient surgery, you must have someone to drive you here, stay with you during the duration of your stay, and to drive you home at time of discharge. Special Instructions: stop eliquis 2 days prior to surgery     TAKE ALL MEDICATIONS DAY OF SURGERY EXCEPT: no ointments, creams, gels, no insulin, no multivitamin  May take tylenol, bumex, avodart, synthroid, claritin, metoprolol, omeprazole with a sip of water      I understand a pre-operative phone call will be made to verify my surgery time. In the event that I am not available, I give permission for a message to be left on my answering service and/or with another person?   yes         ___________________      __________   _________    (Signature of Patient)             (Witness)                (Date and Time)

## 2021-12-21 NOTE — PERIOP NOTES
[facility] Wound Debridement and Cleansing System  Ref: Robert Pu: [id number] LOT: [id number] Expiration Date: 09/30/2023

## 2021-12-21 NOTE — BRIEF OP NOTE
Brief Postoperative Note    Patient: Slava Mcmanus  YOB: 1950  MRN: [id number]    Date of Procedure: 12/21/2021     Pre-Op Diagnosis: ESRD    Post-Op Diagnosis: Same as preoperative diagnosis. Procedure(s):  INSERTION LEFT ARM BVT AV FISTULA    Surgeon(s):  Garrett Rubinstein, MD    Surgical Assistant: Surg Asst-1: Mamadou Santillan    Anesthesia: Other     Estimated Blood Loss (mL): less than 352     Complications: None    Specimens: * No specimens in log *     Implants: * No implants in log *    Drains: * No LDAs found *    Findings: BVT AVF. Good artery and vein. Good thrill and radial signal on completion.     Electronically Signed by Georgina Omalley MD on 12/21/2021 at 12:57 PM

## 2021-12-21 NOTE — DISCHARGE INSTRUCTIONS
Patient Discharge Instructions    Eloisa Quintanilla / [id number] : 1950    Admitted 2021 Discharged: 2021     Take Home Medications     · It is important that you take the medication exactly as they are prescribed. · Keep your medication in the bottles provided by the pharmacist and keep a list of the medication names, dosages, and times to be taken in your wallet. · Do not take other medications without consulting your doctor. What to do at [address] [place] [address] [place]: Remove current bandages Thursday then apply dry dressing to incisions daily    Recommended diet: Renal    Recommended activity: As Tolerated. No Strenuous activity or heavy lifting with left arm. If you experience any of the following symptoms severe left hand pain, weakness, numbness, or discoloration, please follow up with Dr Anna Marquez immediately. Follow-up with Dr Anna Marquez in 2-3 weeks at the [address] [place] [address] Thursday morning      DISCHARGE SUMMARY from Nurse    PATIENT INSTRUCTIONS:    After general anesthesia or intravenous sedation, for 24 hours or while taking prescription Narcotics:  · Limit your activities  · Do not drive and operate hazardous machinery  · Do not make important personal or business decisions  · Do  not drink alcoholic beverages  · If you have not urinated within 8 hours after discharge, please contact your surgeon on call. Report the following to your surgeon:  · Excessive pain, swelling, redness or odor of or around the surgical area  · Temperature over 100.5  · Nausea and vomiting lasting longer than 4 hours or if unable to take medications  · Any signs of decreased circulation or nerve impairment to extremity: change in color, persistent  numbness, tingling, coldness or increase pain  · Any questions    A common side effect of anesthesia following surgery is nausea and/or vomiting.  In order to decrease symptoms, it is wise to avoid foods that are high in fat, greasy foods, milk products, and spicy foods for the first 24 hours. Acceptable foods for the first 24 hours following surgery include but are not limited to:     soup   broth    toast    crackers    applesauce    bananas    mashed potatoes,   soft or scrambled eggs   oatmeal    jello    It is important to eat when taking your pain medication. This will help to prevent nausea. If possible, please try to time your meals with your medications. It is very important to stay hydrated following surgery. Sip fluids frequently while awake. Avoid acidic drinks such as citrus juices and soda for 24 hours. Carbonated beverages may cause bloating and gas. Acceptable fluids include:    - water (flavor packets may add variety)  - coffee or tea (in moderation)  - Gatorade  - Antwan-aid  - apple juice  - cranberry juice    You are encouraged to cough and deep breathe every hour when awake. This will help to prevent respiratory complications following anesthesia. You may want to hug a pillow when coughing and sneezing to add additional support to the surgical area and to decrease discomfort if you had abdominal or chest surgery. If you are discharged home with support stockings, you may remove them after 24 hours. Support stockings are used to help prevent blood clots in the legs following surgery. Please take time to review all of your Home Care Instructions and Medication Information sheets provided in your discharge packet. If you have any questions, please contact your surgeons office. Thank you. TO PREVENT AN INFECTION      1. [address] YOUR HANDS     To prevent infection, good handwashing is the most important thing you or your caregiver can do.  Wash your hands with soap and water or use the hand  we gave you before you touch any wounds. 2. SHOWER     Use the antibacterial soap we gave you when you take a shower.  Shower with this soap until your wounds are healed.        To reach all areas of your body, you may need someone to help you.  Dont forget to clean your belly button with every shower. 3.  USE CLEAN SHEETS     Use freshly cleaned sheets on your bed after surgery.  To keep the surgery site clean, do not allow pets to sleep with you while your wound is still healing. 4. STOP SMOKING     Stop smoking, or at least cut back on smoking     Smoking slows your healing. 5.  CONTROL YOUR BLOOD SUGAR     High blood sugars slow wound healing. If you are diabetic, control your blood sugar levels before and after your surgery. Patient Education      Hydromorphone (Dilaudid, Exalgo) - (By mouth)   Why this medicine is used:   Treats moderate to severe pain. This medicine is a narcotic pain reliever. Contact a nurse or doctor right away if you have:  Extreme weakness, shallow breathing, fast or slow heartbeat  Severe confusion, lightheadedness, dizziness, fainting  Sweating or cold, clammy skin  Anxiety, restlessness, fever, sweating, muscle spasms, twitching, seeing or hearing things that are not there  Severe constipation, stomach pain, vomiting     Common side effects:  Mild constipation, nausea, diarrhea  Sleepiness, tiredness  Itching, rash  © 2017 [facility] Information is for End User's use only and may not be sold, redistributed or otherwise used for commercial purposes.                                                                                                                                                Physician's or R.N.'s Signature                                                                  Date/Time                                                                                                                                              Patient or Representative Signature                                                          Date/Time

## 2021-12-21 NOTE — PERIOP NOTES
1500- Pt's wife updated. Discharged instructions given to wife by Nalini Howe. Opportunity for questions provided. Will continue to update as pt wakes up more.

## 2021-12-21 NOTE — PERIOP NOTES
Pt consented for right arm AV fistula, per Dr. Kwan Trujillo- this procedure is not advised due to pacemaker being on the right side. Dr. Kwan Trujillo called patient's wife from [address] [place] [address] and explained this and offered to place fistula in left arm. The wife consented to continue with left arm procedure. Verbal consent given over phone and confirmed by myself.

## 2021-12-21 NOTE — PERIOP NOTES
dbp 20's-30's. Dr Dennis Romero at bedside. bp cuff moved to R upper arm w/out chg.  Pt drowsy, easily roused. Other vswnl. No distress noted. No new orders.

## 2021-12-21 NOTE — PERIOP NOTES
For dc home. Vswnl. Reports pain as 4/10 which is tolerable per pt. Denies nausea. dsg to L arm w/quarter sized area of shadowing unchged since PACU admit. Went over [employer] instructions w/pt and wife including Rx and f/up. Verbalized understanding. dc'd home.

## 2021-12-21 NOTE — ANESTHESIA POSTPROCEDURE EVALUATION
Procedure(s):  INSERTION LEFT ARM AV FISTULA. MAC, regional, general    Anesthesia Post Evaluation        Patient location during evaluation: PACU  Note status: Adequate. Level of consciousness: responsive to verbal stimuli and sleepy but conscious  Pain management: satisfactory to patient  Airway patency: patent  Anesthetic complications: no  Cardiovascular status: acceptable  Respiratory status: acceptable  Hydration status: acceptable  Comments: +Post-Anesthesia Evaluation and Assessment    Patient: Clarita Gallego MRN: [id number]  SSN: [id number]   YOB: 1950  Age: 66 y.o. Sex: male      Cardiovascular Function/Vital Signs    BP (!) 105/45   Pulse 75   Temp 37.1 °C (98.7 °F)   Resp 13   Ht 5' 9\" (1.753 m)   Wt 113.8 kg (250 lb 14.1 oz)   SpO2 96%   BMI 37.05 kg/m²     Patient is status post Procedure(s):  INSERTION LEFT ARM AV FISTULA. Nausea/Vomiting: Controlled. Postoperative hydration reviewed and adequate. Pain:  Pain Scale 1: Numeric (0 - 10) (12/21/21 1515)  Pain Intensity 1: 2 (12/21/21 1515)   Managed. Neurological Status:   Neuro (WDL): Exceptions to WDL (12/21/21 1338)   At baseline. Mental Status and Level of Consciousness: Arousable. Pulmonary Status:   O2 Device: None (Room air) (12/21/21 1515)   Adequate oxygenation and airway patent. Complications related to anesthesia: None    Post-anesthesia assessment completed. No concerns. Signed By: Bob Councilman, MD    12/21/2021  Post anesthesia nausea and vomiting:  controlled      INITIAL Post-op Vital signs:   Vitals Value Taken Time   /45 12/21/21 1515   Temp 37.1 °C (98.7 °F) 12/21/21 1338   Pulse 75 12/21/21 1524   Resp 15 12/21/21 1524   SpO2 94 % 12/21/21 1524   Vitals shown include unvalidated device data.

## 2021-12-21 NOTE — PERIOP NOTES
Handoff Report from Operating Room to PACU    Report received from BARBY Romero RN and Cristobal Cowden, CRNA regarding Zahra Sox. Surgeon(s):  Katharine Hernandez MD  And Procedure(s) (LRB):  INSERTION LEFT ARM AV FISTULA (Right)  confirmed   with dressings discussed. Anesthesia type, drugs, patient history, complications, estimated blood loss, vital signs, intake and output, and last pain medication, lines and temperature were reviewed.

## 2021-12-21 NOTE — ANESTHESIA PROCEDURE NOTES
Peripheral Block    Start time: 12/21/2021 8:21 AM  End time: 12/21/2021 8:29 AM  Performed by: Shiraz Gonzalez MD  Authorized by: Shiraz Gonzalez MD       Pre-procedure:    Indications: at surgeon's request and post-op pain management    Preanesthetic Checklist: patient identified, risks and benefits discussed, site marked, timeout performed, anesthesia consent given and patient being monitored      Block Type:   Block Type:  Supraclavicular  Laterality:  Right  Monitoring:  Standard ASA monitoring, continuous pulse ox, frequent vital sign checks, heart rate, responsive to questions and oxygen  Injection Technique:  Single shot  Procedures: ultrasound guided    Patient Position: supine  Prep: chlorhexidine    Location:  Supraclavicular  Needle Type:  Stimuplex  Needle Gauge:  22 G  Needle Localization:  Ultrasound guidance  Motor Response comment:   Motor Response: minimal motor response >0.4 mA   Medication Injected:  Lidocaine (XYLOCAINE) Preserv-Free 2% injection, 30 mL    Assessment:  Number of attempts:  1  Injection Assessment:  Incremental injection every 5 mL, local visualized surrounding nerve on ultrasound, negative aspiration for blood, no intravascular symptoms and no paresthesia  Patient tolerance:  Patient tolerated the procedure well with no immediate complications

## 2021-12-21 NOTE — ANESTHESIA PREPROCEDURE EVALUATION
Relevant Problems   No relevant active problems       Anesthetic History   No history of anesthetic complications            Review of Systems / Medical History  Patient summary reviewed, nursing notes reviewed and pertinent labs reviewed    Pulmonary  Within defined limits      Sleep apnea           Neuro/Psych   Within defined limits           Cardiovascular  Within defined limits  Hypertension      CHF  Dysrhythmias : atrial fibrillation  Pacemaker, past MI, CAD, PAD and CABG    Exercise tolerance: <4 METS  Comments: NM: No ischemia or infarct demonstrated. LVEF 49%.      GI/Hepatic/Renal  Within defined limits   GERD           Endo/Other  Within defined limits  Diabetes  Hypothyroidism  Morbid obesity and arthritis     Other Findings            Physical Exam    Airway  Mallampati: III  TM Distance: 4 - 6 cm  Neck ROM: normal range of motion   Mouth opening: Normal     Cardiovascular  Regular rate and rhythm,  S1 and S2 normal,  no murmur, click, rub, or gallop             Dental    Dentition: Poor dentition     Pulmonary  Breath sounds clear to auscultation               Abdominal  GI exam deferred       Other Findings            Anesthetic Plan    ASA: 4  Anesthesia type: MAC and regional - supraclavicular block          Induction: Intravenous  Anesthetic plan and risks discussed with: Patient

## 2021-12-21 NOTE — PERIOP NOTES
Patient has wound on right buttock that has mepliex that was applied by home health per patient. Patient also has a wound on his second toe on his right foot. He has bruising around his belt line and scattered bruising on his body.

## 2022-01-01 ENCOUNTER — OFFICE VISIT (OUTPATIENT)
Dept: CARDIOLOGY CLINIC | Age: 72
End: 2022-01-01
Payer: MEDICARE

## 2022-01-01 ENCOUNTER — APPOINTMENT (OUTPATIENT)
Dept: GENERAL RADIOLOGY | Age: 72
DRG: 239 | End: 2022-01-01
Attending: NURSE PRACTITIONER
Payer: MEDICARE

## 2022-01-01 ENCOUNTER — OFFICE VISIT (OUTPATIENT)
Dept: ENDOCRINOLOGY | Age: 72
End: 2022-01-01
Payer: MEDICARE

## 2022-01-01 ENCOUNTER — ANESTHESIA (OUTPATIENT)
Dept: SURGERY | Age: 72
DRG: 239 | End: 2022-01-01
Payer: MEDICARE

## 2022-01-01 ENCOUNTER — APPOINTMENT (OUTPATIENT)
Dept: CT IMAGING | Age: 72
DRG: 239 | End: 2022-01-01
Attending: PHYSICIAN ASSISTANT
Payer: MEDICARE

## 2022-01-01 ENCOUNTER — APPOINTMENT (OUTPATIENT)
Dept: ULTRASOUND IMAGING | Age: 72
DRG: 239 | End: 2022-01-01
Attending: NURSE PRACTITIONER
Payer: MEDICARE

## 2022-01-01 ENCOUNTER — APPOINTMENT (OUTPATIENT)
Dept: GENERAL RADIOLOGY | Age: 72
DRG: 239 | End: 2022-01-01
Attending: ANESTHESIOLOGY
Payer: MEDICARE

## 2022-01-01 ENCOUNTER — APPOINTMENT (OUTPATIENT)
Dept: CT IMAGING | Age: 72
DRG: 239 | End: 2022-01-01
Attending: EMERGENCY MEDICINE
Payer: MEDICARE

## 2022-01-01 ENCOUNTER — APPOINTMENT (OUTPATIENT)
Dept: GENERAL RADIOLOGY | Age: 72
DRG: 239 | End: 2022-01-01
Attending: INTERNAL MEDICINE
Payer: MEDICARE

## 2022-01-01 ENCOUNTER — APPOINTMENT (OUTPATIENT)
Dept: GENERAL RADIOLOGY | Age: 72
DRG: 239 | End: 2022-01-01
Attending: EMERGENCY MEDICINE
Payer: MEDICARE

## 2022-01-01 ENCOUNTER — HOSPITAL ENCOUNTER (INPATIENT)
Age: 72
LOS: 8 days | DRG: 239 | End: 2022-03-26
Attending: EMERGENCY MEDICINE | Admitting: HOSPITALIST
Payer: MEDICARE

## 2022-01-01 ENCOUNTER — APPOINTMENT (OUTPATIENT)
Dept: GENERAL RADIOLOGY | Age: 72
DRG: 239 | End: 2022-01-01
Attending: HOSPITALIST
Payer: MEDICARE

## 2022-01-01 ENCOUNTER — ANESTHESIA EVENT (OUTPATIENT)
Dept: SURGERY | Age: 72
DRG: 239 | End: 2022-01-01
Payer: MEDICARE

## 2022-01-01 VITALS
SYSTOLIC BLOOD PRESSURE: 122 MMHG | BODY MASS INDEX: 38.95 KG/M2 | DIASTOLIC BLOOD PRESSURE: 64 MMHG | OXYGEN SATURATION: 94 % | RESPIRATION RATE: 22 BRPM | WEIGHT: 263 LBS | HEART RATE: 75 BPM | HEIGHT: 69 IN

## 2022-01-01 VITALS
DIASTOLIC BLOOD PRESSURE: 69 MMHG | WEIGHT: 256 LBS | SYSTOLIC BLOOD PRESSURE: 121 MMHG | HEART RATE: 75 BPM | BODY MASS INDEX: 37.92 KG/M2 | HEIGHT: 69 IN

## 2022-01-01 VITALS
OXYGEN SATURATION: 96 % | SYSTOLIC BLOOD PRESSURE: 96 MMHG | DIASTOLIC BLOOD PRESSURE: 15 MMHG | HEIGHT: 69 IN | BODY MASS INDEX: 38.95 KG/M2 | TEMPERATURE: 97.7 F | WEIGHT: 263 LBS

## 2022-01-01 DIAGNOSIS — E11.22 TYPE 2 DIABETES MELLITUS WITH STAGE 4 CHRONIC KIDNEY DISEASE, WITH LONG-TERM CURRENT USE OF INSULIN (HCC): ICD-10-CM

## 2022-01-01 DIAGNOSIS — R06.02 SOB (SHORTNESS OF BREATH): ICD-10-CM

## 2022-01-01 DIAGNOSIS — E78.2 MIXED HYPERLIPIDEMIA: ICD-10-CM

## 2022-01-01 DIAGNOSIS — I48.20 CHRONIC A-FIB (HCC): ICD-10-CM

## 2022-01-01 DIAGNOSIS — I42.0 DILATED CARDIOMYOPATHY (HCC): ICD-10-CM

## 2022-01-01 DIAGNOSIS — Z95.0 CARDIAC PACEMAKER IN SITU: Primary | ICD-10-CM

## 2022-01-01 DIAGNOSIS — N18.4 TYPE 2 DIABETES MELLITUS WITH STAGE 4 CHRONIC KIDNEY DISEASE, WITH LONG-TERM CURRENT USE OF INSULIN (HCC): ICD-10-CM

## 2022-01-01 DIAGNOSIS — R53.83 FATIGUE, UNSPECIFIED TYPE: ICD-10-CM

## 2022-01-01 DIAGNOSIS — I95.9 HYPOTENSION, UNSPECIFIED HYPOTENSION TYPE: Primary | ICD-10-CM

## 2022-01-01 DIAGNOSIS — I73.9 PAD (PERIPHERAL ARTERY DISEASE) (HCC): ICD-10-CM

## 2022-01-01 DIAGNOSIS — E03.9 HYPOTHYROIDISM, UNSPECIFIED TYPE: ICD-10-CM

## 2022-01-01 DIAGNOSIS — Z79.4 TYPE 2 DIABETES MELLITUS WITH HYPERGLYCEMIA, WITH LONG-TERM CURRENT USE OF INSULIN (HCC): Primary | ICD-10-CM

## 2022-01-01 DIAGNOSIS — E78.5 HYPERLIPIDEMIA, UNSPECIFIED HYPERLIPIDEMIA TYPE: ICD-10-CM

## 2022-01-01 DIAGNOSIS — E55.9 VITAMIN D DEFICIENCY: ICD-10-CM

## 2022-01-01 DIAGNOSIS — Z71.89 COUNSELING REGARDING ADVANCED CARE PLANNING AND GOALS OF CARE: ICD-10-CM

## 2022-01-01 DIAGNOSIS — Z98.890 H/O ATRIOVENTRICULAR NODAL ABLATION: ICD-10-CM

## 2022-01-01 DIAGNOSIS — E11.65 TYPE 2 DIABETES MELLITUS WITH HYPERGLYCEMIA, WITH LONG-TERM CURRENT USE OF INSULIN (HCC): Primary | ICD-10-CM

## 2022-01-01 DIAGNOSIS — I10 ESSENTIAL HYPERTENSION WITH GOAL BLOOD PRESSURE LESS THAN 130/80: ICD-10-CM

## 2022-01-01 DIAGNOSIS — J96.01 ACUTE RESPIRATORY FAILURE WITH HYPOXEMIA (HCC): ICD-10-CM

## 2022-01-01 DIAGNOSIS — I25.10 CORONARY ARTERY DISEASE INVOLVING NATIVE CORONARY ARTERY OF NATIVE HEART WITHOUT ANGINA PECTORIS: Primary | ICD-10-CM

## 2022-01-01 DIAGNOSIS — Z95.1 S/P CABG X 2: ICD-10-CM

## 2022-01-01 DIAGNOSIS — I10 ESSENTIAL HYPERTENSION: ICD-10-CM

## 2022-01-01 DIAGNOSIS — E11.51 TYPE II DIABETES MELLITUS WITH PERIPHERAL CIRCULATORY DISORDER (HCC): ICD-10-CM

## 2022-01-01 DIAGNOSIS — E11.319 TYPE 2 DIABETES MELLITUS WITH RETINOPATHY, WITH LONG-TERM CURRENT USE OF INSULIN, MACULAR EDEMA PRESENCE UNSPECIFIED, UNSPECIFIED LATERALITY, UNSPECIFIED RETINOPATHY SEVERITY (HCC): ICD-10-CM

## 2022-01-01 DIAGNOSIS — E03.9 ACQUIRED HYPOTHYROIDISM: ICD-10-CM

## 2022-01-01 DIAGNOSIS — Z79.4 TYPE 2 DIABETES MELLITUS WITH RETINOPATHY, WITH LONG-TERM CURRENT USE OF INSULIN, MACULAR EDEMA PRESENCE UNSPECIFIED, UNSPECIFIED LATERALITY, UNSPECIFIED RETINOPATHY SEVERITY (HCC): ICD-10-CM

## 2022-01-01 DIAGNOSIS — R53.81 PHYSICAL DEBILITY: ICD-10-CM

## 2022-01-01 DIAGNOSIS — E11.42 DIABETIC POLYNEUROPATHY ASSOCIATED WITH TYPE 2 DIABETES MELLITUS (HCC): ICD-10-CM

## 2022-01-01 DIAGNOSIS — N18.6 ESRD (END STAGE RENAL DISEASE) ON DIALYSIS (HCC): ICD-10-CM

## 2022-01-01 DIAGNOSIS — I25.10 CORONARY ARTERY DISEASE INVOLVING NATIVE HEART, UNSPECIFIED VESSEL OR LESION TYPE, UNSPECIFIED WHETHER ANGINA PRESENT: ICD-10-CM

## 2022-01-01 DIAGNOSIS — Z79.4 TYPE 2 DIABETES MELLITUS WITH STAGE 4 CHRONIC KIDNEY DISEASE, WITH LONG-TERM CURRENT USE OF INSULIN (HCC): ICD-10-CM

## 2022-01-01 DIAGNOSIS — Z51.5 PALLIATIVE CARE ENCOUNTER: ICD-10-CM

## 2022-01-01 DIAGNOSIS — Z95.0 CARDIAC PACEMAKER IN SITU: ICD-10-CM

## 2022-01-01 DIAGNOSIS — Z99.2 ESRD (END STAGE RENAL DISEASE) ON DIALYSIS (HCC): ICD-10-CM

## 2022-01-01 LAB
ALBUMIN SERPL-MCNC: 2.6 G/DL (ref 3.5–5)
ALBUMIN SERPL-MCNC: 2.8 G/DL (ref 3.5–5)
ALBUMIN SERPL-MCNC: 2.8 G/DL (ref 3.5–5)
ALBUMIN SERPL-MCNC: 2.9 G/DL (ref 3.5–5)
ALBUMIN SERPL-MCNC: 3 G/DL (ref 3.5–5)
ALBUMIN SERPL-MCNC: 3.1 G/DL (ref 3.5–5)
ALBUMIN SERPL-MCNC: 3.1 G/DL (ref 3.5–5)
ALBUMIN/GLOB SERPL: 0.8 {RATIO} (ref 1.1–2.2)
ALBUMIN/GLOB SERPL: 0.9 {RATIO} (ref 1.1–2.2)
ALBUMIN/GLOB SERPL: 1 {RATIO} (ref 1.1–2.2)
ALBUMIN/GLOB SERPL: 1 {RATIO} (ref 1.1–2.2)
ALP SERPL-CCNC: 34 U/L (ref 45–117)
ALP SERPL-CCNC: 36 U/L (ref 45–117)
ALP SERPL-CCNC: 40 U/L (ref 45–117)
ALP SERPL-CCNC: 42 U/L (ref 45–117)
ALP SERPL-CCNC: 42 U/L (ref 45–117)
ALP SERPL-CCNC: 44 U/L (ref 45–117)
ALP SERPL-CCNC: 46 U/L (ref 45–117)
ALT SERPL-CCNC: 18 U/L (ref 12–78)
ALT SERPL-CCNC: 18 U/L (ref 12–78)
ALT SERPL-CCNC: 20 U/L (ref 12–78)
ALT SERPL-CCNC: 21 U/L (ref 12–78)
ALT SERPL-CCNC: 21 U/L (ref 12–78)
ALT SERPL-CCNC: 22 U/L (ref 12–78)
ALT SERPL-CCNC: 23 U/L (ref 12–78)
AMMONIA PLAS-SCNC: 35 UMOL/L
ANION GAP SERPL CALC-SCNC: 10 MMOL/L (ref 5–15)
ANION GAP SERPL CALC-SCNC: 12 MMOL/L (ref 5–15)
ANION GAP SERPL CALC-SCNC: 13 MMOL/L (ref 5–15)
ANION GAP SERPL CALC-SCNC: 16 MMOL/L (ref 5–15)
ANION GAP SERPL CALC-SCNC: 8 MMOL/L (ref 5–15)
ANION GAP SERPL CALC-SCNC: 9 MMOL/L (ref 5–15)
ANION GAP SERPL CALC-SCNC: 9 MMOL/L (ref 5–15)
APTT PPP: 32.8 SEC (ref 22.1–31)
ARTERIAL PATENCY WRIST A: POSITIVE
AST SERPL-CCNC: 26 U/L (ref 15–37)
AST SERPL-CCNC: 30 U/L (ref 15–37)
AST SERPL-CCNC: 31 U/L (ref 15–37)
AST SERPL-CCNC: 32 U/L (ref 15–37)
AST SERPL-CCNC: 37 U/L (ref 15–37)
AST SERPL-CCNC: 37 U/L (ref 15–37)
AST SERPL-CCNC: 44 U/L (ref 15–37)
AST SERPL-CCNC: 49 U/L (ref 15–37)
AST SERPL-CCNC: 52 U/L (ref 15–37)
ATRIAL RATE: 79 BPM
BACTERIA SPEC CULT: NORMAL
BASE DEFICIT BLD-SCNC: 6.3 MMOL/L
BASOPHILS # BLD: 0 K/UL (ref 0–0.1)
BASOPHILS # BLD: 0 K/UL (ref 0–0.1)
BASOPHILS # BLD: 0.1 K/UL (ref 0–0.1)
BASOPHILS NFR BLD: 0 % (ref 0–1)
BASOPHILS NFR BLD: 0 % (ref 0–1)
BASOPHILS NFR BLD: 1 % (ref 0–1)
BDY SITE: ABNORMAL
BILIRUB SERPL-MCNC: 0.4 MG/DL (ref 0.2–1)
BILIRUB SERPL-MCNC: 0.4 MG/DL (ref 0.2–1)
BILIRUB SERPL-MCNC: 0.5 MG/DL (ref 0.2–1)
BILIRUB SERPL-MCNC: 0.7 MG/DL (ref 0.2–1)
BILIRUB SERPL-MCNC: 0.8 MG/DL (ref 0.2–1)
BNP SERPL-MCNC: ABNORMAL PG/ML
BUN SERPL-MCNC: 41 MG/DL (ref 6–20)
BUN SERPL-MCNC: 51 MG/DL (ref 6–20)
BUN SERPL-MCNC: 54 MG/DL (ref 6–20)
BUN SERPL-MCNC: 55 MG/DL (ref 6–20)
BUN SERPL-MCNC: 60 MG/DL (ref 6–20)
BUN SERPL-MCNC: 67 MG/DL (ref 6–20)
BUN SERPL-MCNC: 77 MG/DL (ref 6–20)
BUN SERPL-MCNC: 92 MG/DL (ref 6–20)
BUN SERPL-MCNC: 93 MG/DL (ref 6–20)
BUN/CREAT SERPL: 7 (ref 12–20)
BUN/CREAT SERPL: 8 (ref 12–20)
BUN/CREAT SERPL: 9 (ref 12–20)
BUN/CREAT SERPL: 9 (ref 12–20)
CALCIUM SERPL-MCNC: 7.5 MG/DL (ref 8.5–10.1)
CALCIUM SERPL-MCNC: 7.6 MG/DL (ref 8.5–10.1)
CALCIUM SERPL-MCNC: 7.7 MG/DL (ref 8.5–10.1)
CALCIUM SERPL-MCNC: 7.7 MG/DL (ref 8.5–10.1)
CALCIUM SERPL-MCNC: 7.8 MG/DL (ref 8.5–10.1)
CALCIUM SERPL-MCNC: 8.1 MG/DL (ref 8.5–10.1)
CALCIUM SERPL-MCNC: 8.2 MG/DL (ref 8.5–10.1)
CALCULATED R AXIS, ECG10: -116 DEGREES
CALCULATED T AXIS, ECG11: 115 DEGREES
CHLORIDE SERPL-SCNC: 100 MMOL/L (ref 97–108)
CHLORIDE SERPL-SCNC: 101 MMOL/L (ref 97–108)
CHLORIDE SERPL-SCNC: 101 MMOL/L (ref 97–108)
CHLORIDE SERPL-SCNC: 97 MMOL/L (ref 97–108)
CHLORIDE SERPL-SCNC: 97 MMOL/L (ref 97–108)
CHLORIDE SERPL-SCNC: 98 MMOL/L (ref 97–108)
CHLORIDE SERPL-SCNC: 99 MMOL/L (ref 97–108)
CO2 SERPL-SCNC: 22 MMOL/L (ref 21–32)
CO2 SERPL-SCNC: 23 MMOL/L (ref 21–32)
CO2 SERPL-SCNC: 24 MMOL/L (ref 21–32)
CO2 SERPL-SCNC: 24 MMOL/L (ref 21–32)
CO2 SERPL-SCNC: 25 MMOL/L (ref 21–32)
CO2 SERPL-SCNC: 25 MMOL/L (ref 21–32)
CO2 SERPL-SCNC: 26 MMOL/L (ref 21–32)
CORTIS SERPL-MCNC: 24.8 UG/DL
COVID-19 RAPID TEST, COVR: NOT DETECTED
CREAT SERPL-MCNC: 10.1 MG/DL (ref 0.7–1.3)
CREAT SERPL-MCNC: 10.2 MG/DL (ref 0.7–1.3)
CREAT SERPL-MCNC: 10.3 MG/DL (ref 0.7–1.3)
CREAT SERPL-MCNC: 5.94 MG/DL (ref 0.7–1.3)
CREAT SERPL-MCNC: 6.98 MG/DL (ref 0.7–1.3)
CREAT SERPL-MCNC: 7.47 MG/DL (ref 0.7–1.3)
CREAT SERPL-MCNC: 7.81 MG/DL (ref 0.7–1.3)
CREAT SERPL-MCNC: 8.63 MG/DL (ref 0.7–1.3)
CREAT SERPL-MCNC: 8.74 MG/DL (ref 0.7–1.3)
DIAGNOSIS, 93000: NORMAL
DIFFERENTIAL METHOD BLD: ABNORMAL
EOSINOPHIL # BLD: 0 K/UL (ref 0–0.4)
EOSINOPHIL # BLD: 0 K/UL (ref 0–0.4)
EOSINOPHIL # BLD: 0.1 K/UL (ref 0–0.4)
EOSINOPHIL NFR BLD: 0 % (ref 0–7)
EOSINOPHIL NFR BLD: 0 % (ref 0–7)
EOSINOPHIL NFR BLD: 1 % (ref 0–7)
ERYTHROCYTE [DISTWIDTH] IN BLOOD BY AUTOMATED COUNT: 19 % (ref 11.5–14.5)
ERYTHROCYTE [DISTWIDTH] IN BLOOD BY AUTOMATED COUNT: 19.3 % (ref 11.5–14.5)
ERYTHROCYTE [DISTWIDTH] IN BLOOD BY AUTOMATED COUNT: 19.6 % (ref 11.5–14.5)
ERYTHROCYTE [DISTWIDTH] IN BLOOD BY AUTOMATED COUNT: 19.9 % (ref 11.5–14.5)
ERYTHROCYTE [DISTWIDTH] IN BLOOD BY AUTOMATED COUNT: 20.5 % (ref 11.5–14.5)
ERYTHROCYTE [DISTWIDTH] IN BLOOD BY AUTOMATED COUNT: 21.3 % (ref 11.5–14.5)
ERYTHROCYTE [DISTWIDTH] IN BLOOD BY AUTOMATED COUNT: 21.7 % (ref 11.5–14.5)
ERYTHROCYTE [DISTWIDTH] IN BLOOD BY AUTOMATED COUNT: 21.9 % (ref 11.5–14.5)
ERYTHROCYTE [DISTWIDTH] IN BLOOD BY AUTOMATED COUNT: 22.2 % (ref 11.5–14.5)
GAS FLOW.O2 O2 DELIVERY SYS: ABNORMAL L/MIN
GLOBULIN SER CALC-MCNC: 3 G/DL (ref 2–4)
GLOBULIN SER CALC-MCNC: 3.1 G/DL (ref 2–4)
GLOBULIN SER CALC-MCNC: 3.2 G/DL (ref 2–4)
GLOBULIN SER CALC-MCNC: 3.3 G/DL (ref 2–4)
GLOBULIN SER CALC-MCNC: 3.4 G/DL (ref 2–4)
GLOBULIN SER CALC-MCNC: 3.4 G/DL (ref 2–4)
GLOBULIN SER CALC-MCNC: 3.5 G/DL (ref 2–4)
GLUCOSE BLD STRIP.AUTO-MCNC: 107 MG/DL (ref 65–117)
GLUCOSE BLD STRIP.AUTO-MCNC: 129 MG/DL (ref 65–117)
GLUCOSE BLD STRIP.AUTO-MCNC: 131 MG/DL (ref 65–117)
GLUCOSE BLD STRIP.AUTO-MCNC: 132 MG/DL (ref 65–117)
GLUCOSE BLD STRIP.AUTO-MCNC: 138 MG/DL (ref 65–117)
GLUCOSE BLD STRIP.AUTO-MCNC: 141 MG/DL (ref 65–117)
GLUCOSE BLD STRIP.AUTO-MCNC: 149 MG/DL (ref 65–117)
GLUCOSE BLD STRIP.AUTO-MCNC: 150 MG/DL (ref 65–117)
GLUCOSE BLD STRIP.AUTO-MCNC: 150 MG/DL (ref 65–117)
GLUCOSE BLD STRIP.AUTO-MCNC: 152 MG/DL (ref 65–117)
GLUCOSE BLD STRIP.AUTO-MCNC: 152 MG/DL (ref 65–117)
GLUCOSE BLD STRIP.AUTO-MCNC: 155 MG/DL (ref 65–117)
GLUCOSE BLD STRIP.AUTO-MCNC: 156 MG/DL (ref 65–117)
GLUCOSE BLD STRIP.AUTO-MCNC: 158 MG/DL (ref 65–117)
GLUCOSE BLD STRIP.AUTO-MCNC: 161 MG/DL (ref 65–117)
GLUCOSE BLD STRIP.AUTO-MCNC: 162 MG/DL (ref 65–117)
GLUCOSE BLD STRIP.AUTO-MCNC: 168 MG/DL (ref 65–117)
GLUCOSE BLD STRIP.AUTO-MCNC: 168 MG/DL (ref 65–117)
GLUCOSE BLD STRIP.AUTO-MCNC: 171 MG/DL (ref 65–117)
GLUCOSE BLD STRIP.AUTO-MCNC: 171 MG/DL (ref 65–117)
GLUCOSE BLD STRIP.AUTO-MCNC: 178 MG/DL (ref 65–117)
GLUCOSE BLD STRIP.AUTO-MCNC: 178 MG/DL (ref 65–117)
GLUCOSE BLD STRIP.AUTO-MCNC: 180 MG/DL (ref 65–117)
GLUCOSE BLD STRIP.AUTO-MCNC: 182 MG/DL (ref 65–117)
GLUCOSE BLD STRIP.AUTO-MCNC: 205 MG/DL (ref 65–117)
GLUCOSE BLD STRIP.AUTO-MCNC: 216 MG/DL (ref 65–117)
GLUCOSE BLD STRIP.AUTO-MCNC: 230 MG/DL (ref 65–117)
GLUCOSE BLD STRIP.AUTO-MCNC: 243 MG/DL (ref 65–117)
GLUCOSE SERPL-MCNC: 139 MG/DL (ref 65–100)
GLUCOSE SERPL-MCNC: 161 MG/DL (ref 65–100)
GLUCOSE SERPL-MCNC: 163 MG/DL (ref 65–100)
GLUCOSE SERPL-MCNC: 172 MG/DL (ref 65–100)
GLUCOSE SERPL-MCNC: 182 MG/DL (ref 65–100)
GLUCOSE SERPL-MCNC: 182 MG/DL (ref 65–100)
GLUCOSE SERPL-MCNC: 186 MG/DL (ref 65–100)
GLUCOSE SERPL-MCNC: 203 MG/DL (ref 65–100)
GLUCOSE SERPL-MCNC: 234 MG/DL (ref 65–100)
HAV IGM SER QL: NONREACTIVE
HBA1C MFR BLD HPLC: 6.2 %
HBV CORE IGM SER QL: NONREACTIVE
HBV SURFACE AB SER QL: NONREACTIVE
HBV SURFACE AB SER-ACNC: <3.1 MIU/ML
HBV SURFACE AG SER QL: <0.1 INDEX
HBV SURFACE AG SER QL: <0.1 INDEX
HBV SURFACE AG SER QL: NEGATIVE
HBV SURFACE AG SER QL: NEGATIVE
HCO3 BLD-SCNC: 21.7 MMOL/L (ref 22–26)
HCT VFR BLD AUTO: 35.8 % (ref 36.6–50.3)
HCT VFR BLD AUTO: 36.9 % (ref 36.6–50.3)
HCT VFR BLD AUTO: 37.8 % (ref 36.6–50.3)
HCT VFR BLD AUTO: 38.4 % (ref 36.6–50.3)
HCT VFR BLD AUTO: 39.4 % (ref 36.6–50.3)
HCT VFR BLD AUTO: 39.6 % (ref 36.6–50.3)
HCT VFR BLD AUTO: 40.1 % (ref 36.6–50.3)
HCT VFR BLD AUTO: 40.6 % (ref 36.6–50.3)
HCT VFR BLD AUTO: 40.8 % (ref 36.6–50.3)
HCT VFR BLD AUTO: 43.4 % (ref 36.6–50.3)
HCV AB SERPL QL IA: NONREACTIVE
HEMOCCULT STL QL: POSITIVE
HEMOCCULT STL QL: POSITIVE
HGB BLD-MCNC: 11 G/DL (ref 12.1–17)
HGB BLD-MCNC: 11.2 G/DL (ref 12.1–17)
HGB BLD-MCNC: 11.5 G/DL (ref 12.1–17)
HGB BLD-MCNC: 11.6 G/DL (ref 12.1–17)
HGB BLD-MCNC: 11.7 G/DL (ref 12.1–17)
HGB BLD-MCNC: 11.8 G/DL (ref 12.1–17)
HGB BLD-MCNC: 12 G/DL (ref 12.1–17)
HGB BLD-MCNC: 12.1 G/DL (ref 12.1–17)
HGB BLD-MCNC: 12.2 G/DL (ref 12.1–17)
HGB BLD-MCNC: 13.1 G/DL (ref 12.1–17)
IMM GRANULOCYTES # BLD AUTO: 0.1 K/UL (ref 0–0.04)
IMM GRANULOCYTES NFR BLD AUTO: 1 % (ref 0–0.5)
INR PPP: 1.1 (ref 0.9–1.1)
LACTATE SERPL-SCNC: 1.3 MMOL/L (ref 0.4–2)
LACTATE SERPL-SCNC: 2.2 MMOL/L (ref 0.4–2)
LACTATE SERPL-SCNC: 2.3 MMOL/L (ref 0.4–2)
LACTATE SERPL-SCNC: 3 MMOL/L (ref 0.4–2)
LYMPHOCYTES # BLD: 0.4 K/UL (ref 0.8–3.5)
LYMPHOCYTES # BLD: 0.4 K/UL (ref 0.8–3.5)
LYMPHOCYTES # BLD: 0.5 K/UL (ref 0.8–3.5)
LYMPHOCYTES NFR BLD: 4 % (ref 12–49)
LYMPHOCYTES NFR BLD: 4 % (ref 12–49)
LYMPHOCYTES NFR BLD: 6 % (ref 12–49)
Lab: NORMAL
MAGNESIUM SERPL-MCNC: 2.6 MG/DL (ref 1.6–2.4)
MAGNESIUM SERPL-MCNC: 2.9 MG/DL (ref 1.6–2.4)
MAGNESIUM SERPL-MCNC: 2.9 MG/DL (ref 1.6–2.4)
MAGNESIUM SERPL-MCNC: 3 MG/DL (ref 1.6–2.4)
MAGNESIUM SERPL-MCNC: 3.2 MG/DL (ref 1.6–2.4)
MAGNESIUM SERPL-MCNC: 3.2 MG/DL (ref 1.6–2.4)
MAGNESIUM SERPL-MCNC: 3.5 MG/DL (ref 1.6–2.4)
MAGNESIUM SERPL-MCNC: 3.6 MG/DL (ref 1.6–2.4)
MCH RBC QN AUTO: 29.9 PG (ref 26–34)
MCH RBC QN AUTO: 30.6 PG (ref 26–34)
MCH RBC QN AUTO: 30.7 PG (ref 26–34)
MCH RBC QN AUTO: 31 PG (ref 26–34)
MCH RBC QN AUTO: 31.4 PG (ref 26–34)
MCHC RBC AUTO-ENTMCNC: 28.9 G/DL (ref 30–36.5)
MCHC RBC AUTO-ENTMCNC: 29.5 G/DL (ref 30–36.5)
MCHC RBC AUTO-ENTMCNC: 29.8 G/DL (ref 30–36.5)
MCHC RBC AUTO-ENTMCNC: 29.9 G/DL (ref 30–36.5)
MCHC RBC AUTO-ENTMCNC: 29.9 G/DL (ref 30–36.5)
MCHC RBC AUTO-ENTMCNC: 30.2 G/DL (ref 30–36.5)
MCHC RBC AUTO-ENTMCNC: 30.4 G/DL (ref 30–36.5)
MCHC RBC AUTO-ENTMCNC: 30.7 G/DL (ref 30–36.5)
MCHC RBC AUTO-ENTMCNC: 31.7 G/DL (ref 30–36.5)
MCV RBC AUTO: 102.2 FL (ref 80–99)
MCV RBC AUTO: 102.6 FL (ref 80–99)
MCV RBC AUTO: 102.7 FL (ref 80–99)
MCV RBC AUTO: 103.4 FL (ref 80–99)
MCV RBC AUTO: 103.6 FL (ref 80–99)
MCV RBC AUTO: 105 FL (ref 80–99)
MCV RBC AUTO: 105.5 FL (ref 80–99)
MCV RBC AUTO: 97.7 FL (ref 80–99)
MCV RBC AUTO: 99.7 FL (ref 80–99)
MONOCYTES # BLD: 1 K/UL (ref 0–1)
MONOCYTES # BLD: 1.2 K/UL (ref 0–1)
MONOCYTES # BLD: 1.3 K/UL (ref 0–1)
MONOCYTES NFR BLD: 11 % (ref 5–13)
MONOCYTES NFR BLD: 12 % (ref 5–13)
MONOCYTES NFR BLD: 14 % (ref 5–13)
NEUTS SEG # BLD: 6.2 K/UL (ref 1.8–8)
NEUTS SEG # BLD: 7.5 K/UL (ref 1.8–8)
NEUTS SEG # BLD: 8.8 K/UL (ref 1.8–8)
NEUTS SEG NFR BLD: 79 % (ref 32–75)
NEUTS SEG NFR BLD: 80 % (ref 32–75)
NEUTS SEG NFR BLD: 84 % (ref 32–75)
NRBC # BLD: 0.04 K/UL (ref 0–0.01)
NRBC # BLD: 0.05 K/UL (ref 0–0.01)
NRBC # BLD: 0.07 K/UL (ref 0–0.01)
NRBC # BLD: 0.07 K/UL (ref 0–0.01)
NRBC # BLD: 0.1 K/UL (ref 0–0.01)
NRBC # BLD: 0.11 K/UL (ref 0–0.01)
NRBC # BLD: 0.15 K/UL (ref 0–0.01)
NRBC BLD-RTO: 0.5 PER 100 WBC
NRBC BLD-RTO: 0.6 PER 100 WBC
NRBC BLD-RTO: 0.9 PER 100 WBC
NRBC BLD-RTO: 1 PER 100 WBC
NRBC BLD-RTO: 1.2 PER 100 WBC
NRBC BLD-RTO: 1.4 PER 100 WBC
NRBC BLD-RTO: 1.4 PER 100 WBC
PCO2 BLD: 52.3 MMHG (ref 35–45)
PH BLD: 7.23 [PH] (ref 7.35–7.45)
PHOSPHATE SERPL-MCNC: 5.7 MG/DL (ref 2.6–4.7)
PHOSPHATE SERPL-MCNC: 7.7 MG/DL (ref 2.6–4.7)
PHOSPHATE SERPL-MCNC: 8 MG/DL (ref 2.6–4.7)
PHOSPHATE SERPL-MCNC: 8.1 MG/DL (ref 2.6–4.7)
PHOSPHATE SERPL-MCNC: 8.3 MG/DL (ref 2.6–4.7)
PHOSPHATE SERPL-MCNC: 8.6 MG/DL (ref 2.6–4.7)
PHOSPHATE SERPL-MCNC: 8.7 MG/DL (ref 2.6–4.7)
PHOSPHATE SERPL-MCNC: 8.9 MG/DL (ref 2.6–4.7)
PLATELET # BLD AUTO: 121 K/UL (ref 150–400)
PLATELET # BLD AUTO: 137 K/UL (ref 150–400)
PLATELET # BLD AUTO: 147 K/UL (ref 150–400)
PLATELET # BLD AUTO: 151 K/UL (ref 150–400)
PLATELET # BLD AUTO: 151 K/UL (ref 150–400)
PLATELET # BLD AUTO: 153 K/UL (ref 150–400)
PLATELET # BLD AUTO: 173 K/UL (ref 150–400)
PLATELET # BLD AUTO: 189 K/UL (ref 150–400)
PLATELET # BLD AUTO: 199 K/UL (ref 150–400)
PMV BLD AUTO: 10 FL (ref 8.9–12.9)
PMV BLD AUTO: 10.2 FL (ref 8.9–12.9)
PMV BLD AUTO: 10.2 FL (ref 8.9–12.9)
PMV BLD AUTO: 10.9 FL (ref 8.9–12.9)
PMV BLD AUTO: 10.9 FL (ref 8.9–12.9)
PMV BLD AUTO: 11.3 FL (ref 8.9–12.9)
PMV BLD AUTO: 12.3 FL (ref 8.9–12.9)
PMV BLD AUTO: 12.5 FL (ref 8.9–12.9)
PMV BLD AUTO: 9.8 FL (ref 8.9–12.9)
PO2 BLD: 255 MMHG (ref 80–100)
POTASSIUM SERPL-SCNC: 4.7 MMOL/L (ref 3.5–5.1)
POTASSIUM SERPL-SCNC: 4.9 MMOL/L (ref 3.5–5.1)
POTASSIUM SERPL-SCNC: 5 MMOL/L (ref 3.5–5.1)
POTASSIUM SERPL-SCNC: 5 MMOL/L (ref 3.5–5.1)
POTASSIUM SERPL-SCNC: 5.1 MMOL/L (ref 3.5–5.1)
POTASSIUM SERPL-SCNC: 5.1 MMOL/L (ref 3.5–5.1)
POTASSIUM SERPL-SCNC: 5.3 MMOL/L (ref 3.5–5.1)
POTASSIUM SERPL-SCNC: 5.5 MMOL/L (ref 3.5–5.1)
POTASSIUM SERPL-SCNC: 5.7 MMOL/L (ref 3.5–5.1)
PROCALCITONIN SERPL-MCNC: 0.38 NG/ML
PROT SERPL-MCNC: 5.8 G/DL (ref 6.4–8.2)
PROT SERPL-MCNC: 6.1 G/DL (ref 6.4–8.2)
PROT SERPL-MCNC: 6.2 G/DL (ref 6.4–8.2)
PROT SERPL-MCNC: 6.2 G/DL (ref 6.4–8.2)
PROT SERPL-MCNC: 6.3 G/DL (ref 6.4–8.2)
PROT SERPL-MCNC: 6.5 G/DL (ref 6.4–8.2)
PROT SERPL-MCNC: 6.5 G/DL (ref 6.4–8.2)
PROTHROMBIN TIME: 11.4 SEC (ref 9–11.1)
Q-T INTERVAL, ECG07: 472 MS
QRS DURATION, ECG06: 166 MS
QTC CALCULATION (BEZET), ECG08: 527 MS
RBC # BLD AUTO: 3.59 M/UL (ref 4.1–5.7)
RBC # BLD AUTO: 3.61 M/UL (ref 4.1–5.7)
RBC # BLD AUTO: 3.74 M/UL (ref 4.1–5.7)
RBC # BLD AUTO: 3.77 M/UL (ref 4.1–5.7)
RBC # BLD AUTO: 3.85 M/UL (ref 4.1–5.7)
RBC # BLD AUTO: 3.87 M/UL (ref 4.1–5.7)
RBC # BLD AUTO: 3.88 M/UL (ref 4.1–5.7)
RBC # BLD AUTO: 3.94 M/UL (ref 4.1–5.7)
RBC # BLD AUTO: 4.23 M/UL (ref 4.1–5.7)
RBC MORPH BLD: ABNORMAL
REFERENCE LAB,REFLB: NORMAL
SAO2 % BLD: 99.8 % (ref 92–97)
SERVICE CMNT-IMP: ABNORMAL
SERVICE CMNT-IMP: NORMAL
SERVICE CMNT-IMP: NORMAL
SODIUM SERPL-SCNC: 133 MMOL/L (ref 136–145)
SODIUM SERPL-SCNC: 135 MMOL/L (ref 136–145)
SOURCE, COVRS: NORMAL
SP1: NORMAL
SP2: NORMAL
SP3: NORMAL
SPECIMEN TYPE: ABNORMAL
TEST DESCRIPTION:,ATST: NORMAL
THERAPEUTIC RANGE,PTTT: ABNORMAL SECS (ref 58–77)
TROPONIN-HIGH SENSITIVITY: 354 NG/L (ref 0–76)
TROPONIN-HIGH SENSITIVITY: 374 NG/L (ref 0–76)
TROPONIN-HIGH SENSITIVITY: 428 NG/L (ref 0–76)
TROPONIN-HIGH SENSITIVITY: 505 NG/L (ref 0–76)
TSH SERPL DL<=0.05 MIU/L-ACNC: 0.98 UIU/ML (ref 0.36–3.74)
VANCOMYCIN SERPL-MCNC: 20.7 UG/ML
VENTRICULAR RATE, ECG03: 75 BPM
WBC # BLD AUTO: 10.5 K/UL (ref 4.1–11.1)
WBC # BLD AUTO: 6.6 K/UL (ref 4.1–11.1)
WBC # BLD AUTO: 6.8 K/UL (ref 4.1–11.1)
WBC # BLD AUTO: 7.1 K/UL (ref 4.1–11.1)
WBC # BLD AUTO: 7.3 K/UL (ref 4.1–11.1)
WBC # BLD AUTO: 7.7 K/UL (ref 4.1–11.1)
WBC # BLD AUTO: 7.8 K/UL (ref 4.1–11.1)
WBC # BLD AUTO: 8 K/UL (ref 4.1–11.1)
WBC # BLD AUTO: 9.4 K/UL (ref 4.1–11.1)

## 2022-01-01 PROCEDURE — 74011250636 HC RX REV CODE- 250/636: Performed by: NURSE PRACTITIONER

## 2022-01-01 PROCEDURE — 94760 N-INVAS EAR/PLS OXIMETRY 1: CPT

## 2022-01-01 PROCEDURE — 74011250637 HC RX REV CODE- 250/637: Performed by: HOSPITALIST

## 2022-01-01 PROCEDURE — 74011250636 HC RX REV CODE- 250/636

## 2022-01-01 PROCEDURE — 74011250636 HC RX REV CODE- 250/636: Performed by: HOSPITALIST

## 2022-01-01 PROCEDURE — 74011250636 HC RX REV CODE- 250/636: Performed by: INTERNAL MEDICINE

## 2022-01-01 PROCEDURE — 92950 HEART/LUNG RESUSCITATION CPR: CPT

## 2022-01-01 PROCEDURE — 74011250637 HC RX REV CODE- 250/637: Performed by: PODIATRIST

## 2022-01-01 PROCEDURE — 74011250636 HC RX REV CODE- 250/636: Performed by: ANESTHESIOLOGY

## 2022-01-01 PROCEDURE — 85027 COMPLETE CBC AUTOMATED: CPT

## 2022-01-01 PROCEDURE — 2709999900 HC NON-CHARGEABLE SUPPLY

## 2022-01-01 PROCEDURE — 36415 COLL VENOUS BLD VENIPUNCTURE: CPT

## 2022-01-01 PROCEDURE — 99232 SBSQ HOSP IP/OBS MODERATE 35: CPT | Performed by: NURSE PRACTITIONER

## 2022-01-01 PROCEDURE — 82962 GLUCOSE BLOOD TEST: CPT

## 2022-01-01 PROCEDURE — 74011000250 HC RX REV CODE- 250: Performed by: HOSPITALIST

## 2022-01-01 PROCEDURE — 85018 HEMOGLOBIN: CPT

## 2022-01-01 PROCEDURE — 83605 ASSAY OF LACTIC ACID: CPT

## 2022-01-01 PROCEDURE — 74011000636 HC RX REV CODE- 636

## 2022-01-01 PROCEDURE — 82272 OCCULT BLD FECES 1-3 TESTS: CPT

## 2022-01-01 PROCEDURE — P9047 ALBUMIN (HUMAN), 25%, 50ML: HCPCS | Performed by: NURSE PRACTITIONER

## 2022-01-01 PROCEDURE — 90935 HEMODIALYSIS ONE EVALUATION: CPT

## 2022-01-01 PROCEDURE — P9047 ALBUMIN (HUMAN), 25%, 50ML: HCPCS | Performed by: INTERNAL MEDICINE

## 2022-01-01 PROCEDURE — 80053 COMPREHEN METABOLIC PANEL: CPT

## 2022-01-01 PROCEDURE — 87506 IADNA-DNA/RNA PROBE TQ 6-11: CPT

## 2022-01-01 PROCEDURE — 71045 X-RAY EXAM CHEST 1 VIEW: CPT

## 2022-01-01 PROCEDURE — 3017F COLORECTAL CA SCREEN DOC REV: CPT | Performed by: INTERNAL MEDICINE

## 2022-01-01 PROCEDURE — G0463 HOSPITAL OUTPT CLINIC VISIT: HCPCS | Performed by: INTERNAL MEDICINE

## 2022-01-01 PROCEDURE — 84484 ASSAY OF TROPONIN QUANT: CPT

## 2022-01-01 PROCEDURE — 74178 CT ABD&PLV WO CNTR FLWD CNTR: CPT

## 2022-01-01 PROCEDURE — C9113 INJ PANTOPRAZOLE SODIUM, VIA: HCPCS | Performed by: NURSE PRACTITIONER

## 2022-01-01 PROCEDURE — 74011636637 HC RX REV CODE- 636/637: Performed by: INTERNAL MEDICINE

## 2022-01-01 PROCEDURE — 84145 PROCALCITONIN (PCT): CPT

## 2022-01-01 PROCEDURE — 93296 REM INTERROG EVL PM/IDS: CPT | Performed by: INTERNAL MEDICINE

## 2022-01-01 PROCEDURE — 77030040392 HC DRSG OPTIFOAM MDII -A

## 2022-01-01 PROCEDURE — 74011250636 HC RX REV CODE- 250/636: Performed by: PODIATRIST

## 2022-01-01 PROCEDURE — 77010033678 HC OXYGEN DAILY

## 2022-01-01 PROCEDURE — 1101F PT FALLS ASSESS-DOCD LE1/YR: CPT | Performed by: INTERNAL MEDICINE

## 2022-01-01 PROCEDURE — 88305 TISSUE EXAM BY PATHOLOGIST: CPT

## 2022-01-01 PROCEDURE — 83735 ASSAY OF MAGNESIUM: CPT

## 2022-01-01 PROCEDURE — 84100 ASSAY OF PHOSPHORUS: CPT

## 2022-01-01 PROCEDURE — 74011250636 HC RX REV CODE- 250/636: Performed by: NURSE ANESTHETIST, CERTIFIED REGISTERED

## 2022-01-01 PROCEDURE — 99223 1ST HOSP IP/OBS HIGH 75: CPT | Performed by: NURSE PRACTITIONER

## 2022-01-01 PROCEDURE — 65610000006 HC RM INTENSIVE CARE

## 2022-01-01 PROCEDURE — 74011000250 HC RX REV CODE- 250: Performed by: INTERNAL MEDICINE

## 2022-01-01 PROCEDURE — 74011636637 HC RX REV CODE- 636/637: Performed by: HOSPITALIST

## 2022-01-01 PROCEDURE — 99214 OFFICE O/P EST MOD 30 MIN: CPT | Performed by: INTERNAL MEDICINE

## 2022-01-01 PROCEDURE — 97165 OT EVAL LOW COMPLEX 30 MIN: CPT

## 2022-01-01 PROCEDURE — 74011000258 HC RX REV CODE- 258: Performed by: HOSPITALIST

## 2022-01-01 PROCEDURE — 65270000015 HC RM PRIVATE ONCOLOGY

## 2022-01-01 PROCEDURE — 77010033711 HC HIGH FLOW OXYGEN

## 2022-01-01 PROCEDURE — 85730 THROMBOPLASTIN TIME PARTIAL: CPT

## 2022-01-01 PROCEDURE — 95251 CONT GLUC MNTR ANALYSIS I&R: CPT | Performed by: INTERNAL MEDICINE

## 2022-01-01 PROCEDURE — 74011250637 HC RX REV CODE- 250/637: Performed by: NURSE PRACTITIONER

## 2022-01-01 PROCEDURE — 74011250636 HC RX REV CODE- 250/636: Performed by: EMERGENCY MEDICINE

## 2022-01-01 PROCEDURE — 77030000032 HC CUF TRNQT ZIMM -B: Performed by: PODIATRIST

## 2022-01-01 PROCEDURE — 97535 SELF CARE MNGMENT TRAINING: CPT

## 2022-01-01 PROCEDURE — G8417 CALC BMI ABV UP PARAM F/U: HCPCS | Performed by: INTERNAL MEDICINE

## 2022-01-01 PROCEDURE — G8427 DOCREV CUR MEDS BY ELIG CLIN: HCPCS | Performed by: INTERNAL MEDICINE

## 2022-01-01 PROCEDURE — 74011250637 HC RX REV CODE- 250/637: Performed by: INTERNAL MEDICINE

## 2022-01-01 PROCEDURE — 77030022017 HC DRSG HEMO QCLOT ZMED -A

## 2022-01-01 PROCEDURE — 97162 PT EVAL MOD COMPLEX 30 MIN: CPT

## 2022-01-01 PROCEDURE — 86706 HEP B SURFACE ANTIBODY: CPT

## 2022-01-01 PROCEDURE — 85025 COMPLETE CBC W/AUTO DIFF WBC: CPT

## 2022-01-01 PROCEDURE — 88311 DECALCIFY TISSUE: CPT

## 2022-01-01 PROCEDURE — 74011000250 HC RX REV CODE- 250: Performed by: NURSE PRACTITIONER

## 2022-01-01 PROCEDURE — 99285 EMERGENCY DEPT VISIT HI MDM: CPT

## 2022-01-01 PROCEDURE — 77030002916 HC SUT ETHLN J&J -A: Performed by: PODIATRIST

## 2022-01-01 PROCEDURE — 87635 SARS-COV-2 COVID-19 AMP PRB: CPT

## 2022-01-01 PROCEDURE — 74011000258 HC RX REV CODE- 258: Performed by: NURSE ANESTHETIST, CERTIFIED REGISTERED

## 2022-01-01 PROCEDURE — 02HV33Z INSERTION OF INFUSION DEVICE INTO SUPERIOR VENA CAVA, PERCUTANEOUS APPROACH: ICD-10-PCS | Performed by: INTERNAL MEDICINE

## 2022-01-01 PROCEDURE — 80202 ASSAY OF VANCOMYCIN: CPT

## 2022-01-01 PROCEDURE — 77030041076 HC DRSG AG OPTICELL MDII -A

## 2022-01-01 PROCEDURE — G9231 DOC ESRD DIA TRANS PREG: HCPCS | Performed by: INTERNAL MEDICINE

## 2022-01-01 PROCEDURE — 82140 ASSAY OF AMMONIA: CPT

## 2022-01-01 PROCEDURE — 87040 BLOOD CULTURE FOR BACTERIA: CPT

## 2022-01-01 PROCEDURE — 5A1D70Z PERFORMANCE OF URINARY FILTRATION, INTERMITTENT, LESS THAN 6 HOURS PER DAY: ICD-10-PCS | Performed by: INTERNAL MEDICINE

## 2022-01-01 PROCEDURE — 87340 HEPATITIS B SURFACE AG IA: CPT

## 2022-01-01 PROCEDURE — 85610 PROTHROMBIN TIME: CPT

## 2022-01-01 PROCEDURE — 83880 ASSAY OF NATRIURETIC PEPTIDE: CPT

## 2022-01-01 PROCEDURE — 36600 WITHDRAWAL OF ARTERIAL BLOOD: CPT

## 2022-01-01 PROCEDURE — 74176 CT ABD & PELVIS W/O CONTRAST: CPT

## 2022-01-01 PROCEDURE — 82803 BLOOD GASES ANY COMBINATION: CPT

## 2022-01-01 PROCEDURE — 97530 THERAPEUTIC ACTIVITIES: CPT

## 2022-01-01 PROCEDURE — 84443 ASSAY THYROID STIM HORMONE: CPT

## 2022-01-01 PROCEDURE — 82533 TOTAL CORTISOL: CPT

## 2022-01-01 PROCEDURE — 93005 ELECTROCARDIOGRAM TRACING: CPT

## 2022-01-01 PROCEDURE — 76010000138 HC OR TIME 0.5 TO 1 HR: Performed by: PODIATRIST

## 2022-01-01 PROCEDURE — 74011000250 HC RX REV CODE- 250: Performed by: PODIATRIST

## 2022-01-01 PROCEDURE — 2022F DILAT RTA XM EVC RTNOPTHY: CPT | Performed by: INTERNAL MEDICINE

## 2022-01-01 PROCEDURE — 2709999900 HC NON-CHARGEABLE SUPPLY: Performed by: PODIATRIST

## 2022-01-01 PROCEDURE — 82105 ALPHA-FETOPROTEIN SERUM: CPT

## 2022-01-01 PROCEDURE — 77030039825 HC MSK NSL PAP SUPERNO2VA VYRM -B: Performed by: ANESTHESIOLOGY

## 2022-01-01 PROCEDURE — 76210000005 HC OR PH I REC 5 TO 5.5 HR: Performed by: PODIATRIST

## 2022-01-01 PROCEDURE — 0Y6M0ZB DETACHMENT AT RIGHT FOOT, PARTIAL 2ND RAY, OPEN APPROACH: ICD-10-PCS | Performed by: PODIATRIST

## 2022-01-01 PROCEDURE — G8432 DEP SCR NOT DOC, RNG: HCPCS | Performed by: INTERNAL MEDICINE

## 2022-01-01 PROCEDURE — 3046F HEMOGLOBIN A1C LEVEL >9.0%: CPT | Performed by: INTERNAL MEDICINE

## 2022-01-01 PROCEDURE — G8536 NO DOC ELDER MAL SCRN: HCPCS | Performed by: INTERNAL MEDICINE

## 2022-01-01 PROCEDURE — 76060000032 HC ANESTHESIA 0.5 TO 1 HR: Performed by: PODIATRIST

## 2022-01-01 PROCEDURE — 74011000258 HC RX REV CODE- 258: Performed by: EMERGENCY MEDICINE

## 2022-01-01 PROCEDURE — 92610 EVALUATE SWALLOWING FUNCTION: CPT

## 2022-01-01 PROCEDURE — 74011000250 HC RX REV CODE- 250: Performed by: NURSE ANESTHETIST, CERTIFIED REGISTERED

## 2022-01-01 PROCEDURE — 74011000636 HC RX REV CODE- 636: Performed by: INTERNAL MEDICINE

## 2022-01-01 PROCEDURE — 65270000029 HC RM PRIVATE

## 2022-01-01 PROCEDURE — 93294 REM INTERROG EVL PM/LDLS PM: CPT | Performed by: INTERNAL MEDICINE

## 2022-01-01 PROCEDURE — 80074 ACUTE HEPATITIS PANEL: CPT

## 2022-01-01 PROCEDURE — 96374 THER/PROPH/DIAG INJ IV PUSH: CPT

## 2022-01-01 PROCEDURE — 74011000258 HC RX REV CODE- 258: Performed by: INTERNAL MEDICINE

## 2022-01-01 RX ORDER — ALBUMIN HUMAN 250 G/1000ML
12.5 SOLUTION INTRAVENOUS EVERY 6 HOURS
Status: COMPLETED | OUTPATIENT
Start: 2022-01-01 | End: 2022-01-01

## 2022-01-01 RX ORDER — PANTOPRAZOLE SODIUM 40 MG/1
40 TABLET, DELAYED RELEASE ORAL
Status: DISCONTINUED | OUTPATIENT
Start: 2022-01-01 | End: 2022-01-01

## 2022-01-01 RX ORDER — FENTANYL CITRATE 50 UG/ML
50 INJECTION, SOLUTION INTRAMUSCULAR; INTRAVENOUS AS NEEDED
Status: DISCONTINUED | OUTPATIENT
Start: 2022-01-01 | End: 2022-01-01

## 2022-01-01 RX ORDER — TRAMADOL HYDROCHLORIDE 50 MG/1
50 TABLET ORAL ONCE
Status: COMPLETED | OUTPATIENT
Start: 2022-01-01 | End: 2022-01-01

## 2022-01-01 RX ORDER — NOREPINEPHRINE BITARTRATE/D5W 8 MG/250ML
PLASTIC BAG, INJECTION (ML) INTRAVENOUS
Status: DISCONTINUED
Start: 2022-01-01 | End: 2022-01-01 | Stop reason: HOSPADM

## 2022-01-01 RX ORDER — SODIUM CHLORIDE, SODIUM LACTATE, POTASSIUM CHLORIDE, CALCIUM CHLORIDE 600; 310; 30; 20 MG/100ML; MG/100ML; MG/100ML; MG/100ML
50 INJECTION, SOLUTION INTRAVENOUS CONTINUOUS
Status: DISCONTINUED | OUTPATIENT
Start: 2022-01-01 | End: 2022-01-01

## 2022-01-01 RX ORDER — LIDOCAINE HYDROCHLORIDE 10 MG/ML
0.1 INJECTION, SOLUTION EPIDURAL; INFILTRATION; INTRACAUDAL; PERINEURAL AS NEEDED
Status: DISCONTINUED | OUTPATIENT
Start: 2022-01-01 | End: 2022-01-01

## 2022-01-01 RX ORDER — ACETAMINOPHEN 325 MG/1
650 TABLET ORAL
Status: DISCONTINUED | OUTPATIENT
Start: 2022-01-01 | End: 2022-01-01 | Stop reason: HOSPADM

## 2022-01-01 RX ORDER — SODIUM CHLORIDE 0.9 % (FLUSH) 0.9 %
5-40 SYRINGE (ML) INJECTION EVERY 8 HOURS
Status: DISCONTINUED | OUTPATIENT
Start: 2022-01-01 | End: 2022-01-01 | Stop reason: HOSPADM

## 2022-01-01 RX ORDER — MIDODRINE HYDROCHLORIDE 5 MG/1
10 TABLET ORAL
Status: DISCONTINUED | OUTPATIENT
Start: 2022-01-01 | End: 2022-01-01

## 2022-01-01 RX ORDER — VANCOMYCIN 2 GRAM/500 ML IN 0.9 % SODIUM CHLORIDE INTRAVENOUS
2000 ONCE
Status: COMPLETED | OUTPATIENT
Start: 2022-01-01 | End: 2022-01-01

## 2022-01-01 RX ORDER — CALCIUM ACETATE 667 MG/1
1 CAPSULE ORAL
Status: DISCONTINUED | OUTPATIENT
Start: 2022-01-01 | End: 2022-01-01 | Stop reason: HOSPADM

## 2022-01-01 RX ORDER — MIDAZOLAM HYDROCHLORIDE 1 MG/ML
1 INJECTION, SOLUTION INTRAMUSCULAR; INTRAVENOUS AS NEEDED
Status: DISCONTINUED | OUTPATIENT
Start: 2022-01-01 | End: 2022-01-01

## 2022-01-01 RX ORDER — LIDOCAINE 4 G/100G
1 PATCH TOPICAL EVERY 24 HOURS
Status: COMPLETED | OUTPATIENT
Start: 2022-01-01 | End: 2022-01-01

## 2022-01-01 RX ORDER — VIT B COMP NO.3/FOLIC/C/BIOTIN 1 MG-60 MG
1 TABLET ORAL DAILY
COMMUNITY
Start: 2022-01-01 | End: 2022-01-01

## 2022-01-01 RX ORDER — BUPIVACAINE HYDROCHLORIDE 5 MG/ML
INJECTION, SOLUTION PERINEURAL AS NEEDED
Status: DISCONTINUED | OUTPATIENT
Start: 2022-01-01 | End: 2022-01-01 | Stop reason: HOSPADM

## 2022-01-01 RX ORDER — SODIUM CHLORIDE 0.9 % (FLUSH) 0.9 %
5-40 SYRINGE (ML) INJECTION AS NEEDED
Status: DISCONTINUED | OUTPATIENT
Start: 2022-01-01 | End: 2022-01-01 | Stop reason: HOSPADM

## 2022-01-01 RX ORDER — SODIUM CHLORIDE 9 MG/ML
INJECTION, SOLUTION INTRAVENOUS
Status: DISCONTINUED | OUTPATIENT
Start: 2022-01-01 | End: 2022-01-01 | Stop reason: HOSPADM

## 2022-01-01 RX ORDER — SODIUM CHLORIDE 0.9 % (FLUSH) 0.9 %
5-40 SYRINGE (ML) INJECTION AS NEEDED
Status: DISCONTINUED | OUTPATIENT
Start: 2022-01-01 | End: 2022-01-01

## 2022-01-01 RX ORDER — MAGNESIUM SULFATE 100 %
4 CRYSTALS MISCELLANEOUS AS NEEDED
Status: DISCONTINUED | OUTPATIENT
Start: 2022-01-01 | End: 2022-01-01 | Stop reason: HOSPADM

## 2022-01-01 RX ORDER — CALCIUM ACETATE 667 MG/1
CAPSULE ORAL
COMMUNITY
Start: 2022-01-01

## 2022-01-01 RX ORDER — LIDOCAINE HYDROCHLORIDE 10 MG/ML
10 INJECTION, SOLUTION EPIDURAL; INFILTRATION; INTRACAUDAL; PERINEURAL ONCE
Status: DISCONTINUED | OUTPATIENT
Start: 2022-01-01 | End: 2022-01-01 | Stop reason: HOSPADM

## 2022-01-01 RX ORDER — ALBUMIN HUMAN 250 G/1000ML
12.5 SOLUTION INTRAVENOUS ONCE
Status: DISCONTINUED | OUTPATIENT
Start: 2022-01-01 | End: 2022-01-01 | Stop reason: HOSPADM

## 2022-01-01 RX ORDER — LIDOCAINE 4 G/100G
2 PATCH TOPICAL EVERY 24 HOURS
Status: DISCONTINUED | OUTPATIENT
Start: 2022-01-01 | End: 2022-01-01 | Stop reason: HOSPADM

## 2022-01-01 RX ORDER — ASPIRIN 81 MG/1
81 TABLET ORAL DAILY
Status: DISCONTINUED | OUTPATIENT
Start: 2022-01-01 | End: 2022-01-01 | Stop reason: HOSPADM

## 2022-01-01 RX ORDER — ALBUMIN HUMAN 250 G/1000ML
12.5 SOLUTION INTRAVENOUS
Status: DISCONTINUED | OUTPATIENT
Start: 2022-01-01 | End: 2022-01-01 | Stop reason: HOSPADM

## 2022-01-01 RX ORDER — CETIRIZINE HCL 10 MG
10 TABLET ORAL DAILY
Status: DISCONTINUED | OUTPATIENT
Start: 2022-01-01 | End: 2022-01-01 | Stop reason: HOSPADM

## 2022-01-01 RX ORDER — DEXTROSE MONOHYDRATE 100 MG/ML
0-250 INJECTION, SOLUTION INTRAVENOUS AS NEEDED
Status: DISCONTINUED | OUTPATIENT
Start: 2022-01-01 | End: 2022-01-01 | Stop reason: HOSPADM

## 2022-01-01 RX ORDER — POLYETHYLENE GLYCOL 3350 17 G/17G
17 POWDER, FOR SOLUTION ORAL DAILY PRN
Status: DISCONTINUED | OUTPATIENT
Start: 2022-01-01 | End: 2022-01-01 | Stop reason: HOSPADM

## 2022-01-01 RX ORDER — MIDAZOLAM HYDROCHLORIDE 1 MG/ML
INJECTION, SOLUTION INTRAMUSCULAR; INTRAVENOUS AS NEEDED
Status: DISCONTINUED | OUTPATIENT
Start: 2022-01-01 | End: 2022-01-01 | Stop reason: HOSPADM

## 2022-01-01 RX ORDER — TRAMADOL HYDROCHLORIDE 50 MG/1
50 TABLET ORAL
Status: DISCONTINUED | OUTPATIENT
Start: 2022-01-01 | End: 2022-01-01 | Stop reason: HOSPADM

## 2022-01-01 RX ORDER — FENTANYL CITRATE 50 UG/ML
25 INJECTION, SOLUTION INTRAMUSCULAR; INTRAVENOUS
Status: DISCONTINUED | OUTPATIENT
Start: 2022-01-01 | End: 2022-01-01 | Stop reason: HOSPADM

## 2022-01-01 RX ORDER — NOREPINEPHRINE BITARTRATE/D5W 8 MG/250ML
2-16 PLASTIC BAG, INJECTION (ML) INTRAVENOUS
Status: DISCONTINUED | OUTPATIENT
Start: 2022-01-01 | End: 2022-01-01 | Stop reason: HOSPADM

## 2022-01-01 RX ORDER — INSULIN LISPRO 100 [IU]/ML
INJECTION, SOLUTION INTRAVENOUS; SUBCUTANEOUS
Status: DISCONTINUED | OUTPATIENT
Start: 2022-01-01 | End: 2022-01-01 | Stop reason: HOSPADM

## 2022-01-01 RX ORDER — DUTASTERIDE 0.5 MG/1
0.5 CAPSULE, LIQUID FILLED ORAL DAILY
Status: DISCONTINUED | OUTPATIENT
Start: 2022-01-01 | End: 2022-01-01 | Stop reason: HOSPADM

## 2022-01-01 RX ORDER — DOXYLAMINE SUCCINATE 25 MG
TABLET ORAL
COMMUNITY
Start: 2022-01-01

## 2022-01-01 RX ORDER — FENTANYL CITRATE 50 UG/ML
25 INJECTION, SOLUTION INTRAMUSCULAR; INTRAVENOUS ONCE
Status: COMPLETED | OUTPATIENT
Start: 2022-01-01 | End: 2022-01-01

## 2022-01-01 RX ORDER — SODIUM CHLORIDE 0.9 % (FLUSH) 0.9 %
5-40 SYRINGE (ML) INJECTION EVERY 8 HOURS
Status: DISCONTINUED | OUTPATIENT
Start: 2022-01-01 | End: 2022-01-01

## 2022-01-01 RX ORDER — MIDODRINE HYDROCHLORIDE 5 MG/1
10 TABLET ORAL ONCE
Status: COMPLETED | OUTPATIENT
Start: 2022-01-01 | End: 2022-01-01

## 2022-01-01 RX ORDER — ONDANSETRON 2 MG/ML
4 INJECTION INTRAMUSCULAR; INTRAVENOUS
Status: DISCONTINUED | OUTPATIENT
Start: 2022-01-01 | End: 2022-01-01 | Stop reason: HOSPADM

## 2022-01-01 RX ORDER — KETOROLAC TROMETHAMINE 30 MG/ML
30 INJECTION, SOLUTION INTRAMUSCULAR; INTRAVENOUS
Status: ACTIVE | OUTPATIENT
Start: 2022-01-01 | End: 2022-01-01

## 2022-01-01 RX ORDER — BALSAM PERU/CASTOR OIL
OINTMENT (GRAM) TOPICAL 2 TIMES DAILY
Status: DISCONTINUED | OUTPATIENT
Start: 2022-01-01 | End: 2022-01-01 | Stop reason: HOSPADM

## 2022-01-01 RX ORDER — PRAVASTATIN SODIUM 40 MG/1
80 TABLET ORAL
Status: DISCONTINUED | OUTPATIENT
Start: 2022-01-01 | End: 2022-01-01 | Stop reason: HOSPADM

## 2022-01-01 RX ORDER — EPINEPHRINE 0.1 MG/ML
INJECTION INTRACARDIAC; INTRAVENOUS
Status: DISCONTINUED | OUTPATIENT
Start: 2022-01-01 | End: 2022-01-01 | Stop reason: HOSPADM

## 2022-01-01 RX ORDER — ONDANSETRON 4 MG/1
4 TABLET, ORALLY DISINTEGRATING ORAL
Status: DISCONTINUED | OUTPATIENT
Start: 2022-01-01 | End: 2022-01-01 | Stop reason: HOSPADM

## 2022-01-01 RX ORDER — ALBUMIN HUMAN 250 G/1000ML
25 SOLUTION INTRAVENOUS ONCE
Status: DISCONTINUED | OUTPATIENT
Start: 2022-01-01 | End: 2022-01-01 | Stop reason: HOSPADM

## 2022-01-01 RX ORDER — PHENYLEPHRINE HCL IN 0.9% NACL 0.4MG/10ML
SYRINGE (ML) INTRAVENOUS AS NEEDED
Status: DISCONTINUED | OUTPATIENT
Start: 2022-01-01 | End: 2022-01-01 | Stop reason: HOSPADM

## 2022-01-01 RX ORDER — ONDANSETRON 2 MG/ML
INJECTION INTRAMUSCULAR; INTRAVENOUS AS NEEDED
Status: DISCONTINUED | OUTPATIENT
Start: 2022-01-01 | End: 2022-01-01 | Stop reason: HOSPADM

## 2022-01-01 RX ORDER — FLUCONAZOLE 50 MG/1
TABLET ORAL
COMMUNITY
Start: 2022-01-01 | End: 2022-01-01

## 2022-01-01 RX ORDER — ONDANSETRON 2 MG/ML
4 INJECTION INTRAMUSCULAR; INTRAVENOUS AS NEEDED
Status: DISCONTINUED | OUTPATIENT
Start: 2022-01-01 | End: 2022-01-01 | Stop reason: HOSPADM

## 2022-01-01 RX ORDER — LOPERAMIDE HYDROCHLORIDE 2 MG/1
2 CAPSULE ORAL
Status: DISCONTINUED | OUTPATIENT
Start: 2022-01-01 | End: 2022-01-01 | Stop reason: HOSPADM

## 2022-01-01 RX ORDER — CLOBETASOL PROPIONATE 0.5 MG/G
OINTMENT TOPICAL
COMMUNITY
Start: 2022-01-01

## 2022-01-01 RX ORDER — HEPARIN SODIUM 1000 [USP'U]/ML
1800 INJECTION, SOLUTION INTRAVENOUS; SUBCUTANEOUS ONCE
Status: COMPLETED | OUTPATIENT
Start: 2022-01-01 | End: 2022-01-01

## 2022-01-01 RX ORDER — ASPIRIN 81 MG/1
81 TABLET ORAL DAILY
COMMUNITY
Start: 2022-01-01

## 2022-01-01 RX ORDER — MIDODRINE HYDROCHLORIDE 5 MG/1
20 TABLET ORAL
Status: DISCONTINUED | OUTPATIENT
Start: 2022-01-01 | End: 2022-01-01 | Stop reason: HOSPADM

## 2022-01-01 RX ORDER — ACETAMINOPHEN 650 MG/1
650 SUPPOSITORY RECTAL
Status: DISCONTINUED | OUTPATIENT
Start: 2022-01-01 | End: 2022-01-01 | Stop reason: HOSPADM

## 2022-01-01 RX ORDER — HYDROMORPHONE HYDROCHLORIDE 1 MG/ML
0.2 INJECTION, SOLUTION INTRAMUSCULAR; INTRAVENOUS; SUBCUTANEOUS
Status: DISCONTINUED | OUTPATIENT
Start: 2022-01-01 | End: 2022-01-01 | Stop reason: HOSPADM

## 2022-01-01 RX ORDER — NALOXONE HYDROCHLORIDE 1 MG/ML
0.4 INJECTION INTRAMUSCULAR; INTRAVENOUS; SUBCUTANEOUS ONCE
Status: DISCONTINUED | OUTPATIENT
Start: 2022-01-01 | End: 2022-01-01 | Stop reason: HOSPADM

## 2022-01-01 RX ORDER — ACETAMINOPHEN 325 MG/1
650 TABLET ORAL ONCE
Status: DISCONTINUED | OUTPATIENT
Start: 2022-01-01 | End: 2022-01-01

## 2022-01-01 RX ORDER — IPRATROPIUM BROMIDE AND ALBUTEROL SULFATE 2.5; .5 MG/3ML; MG/3ML
3 SOLUTION RESPIRATORY (INHALATION)
Status: DISCONTINUED | OUTPATIENT
Start: 2022-01-01 | End: 2022-01-01 | Stop reason: HOSPADM

## 2022-01-01 RX ORDER — FENTANYL CITRATE 50 UG/ML
12.5 INJECTION, SOLUTION INTRAMUSCULAR; INTRAVENOUS ONCE
Status: COMPLETED | OUTPATIENT
Start: 2022-01-01 | End: 2022-01-01

## 2022-01-01 RX ADMIN — PIPERACILLIN AND TAZOBACTAM 3.38 G: 3; .375 INJECTION, POWDER, LYOPHILIZED, FOR SOLUTION INTRAVENOUS at 05:01

## 2022-01-01 RX ADMIN — Medication: at 09:29

## 2022-01-01 RX ADMIN — CETIRIZINE HYDROCHLORIDE 10 MG: 10 TABLET ORAL at 09:29

## 2022-01-01 RX ADMIN — NOREPINEPHRINE BITARTRATE 9 MCG/MIN: 1 INJECTION, SOLUTION, CONCENTRATE INTRAVENOUS at 21:50

## 2022-01-01 RX ADMIN — DUTASTERIDE 0.5 MG: 0.5 CAPSULE, LIQUID FILLED ORAL at 12:03

## 2022-01-01 RX ADMIN — SODIUM CHLORIDE, PRESERVATIVE FREE 10 ML: 5 INJECTION INTRAVENOUS at 15:26

## 2022-01-01 RX ADMIN — HEPARIN SODIUM 1800 UNITS: 1000 INJECTION INTRAVENOUS; SUBCUTANEOUS at 12:22

## 2022-01-01 RX ADMIN — TRAMADOL HYDROCHLORIDE 50 MG: 50 TABLET, COATED ORAL at 17:29

## 2022-01-01 RX ADMIN — PIPERACILLIN AND TAZOBACTAM 3.38 G: 3; .375 INJECTION, POWDER, LYOPHILIZED, FOR SOLUTION INTRAVENOUS at 14:19

## 2022-01-01 RX ADMIN — Medication 1 AMPULE: at 21:10

## 2022-01-01 RX ADMIN — PIPERACILLIN AND TAZOBACTAM 3.38 G: 3; .375 INJECTION, POWDER, LYOPHILIZED, FOR SOLUTION INTRAVENOUS at 20:04

## 2022-01-01 RX ADMIN — ALBUMIN (HUMAN) 12.5 G: 0.25 INJECTION, SOLUTION INTRAVENOUS at 20:22

## 2022-01-01 RX ADMIN — CALCIUM ACETATE 667 MG: 667 CAPSULE ORAL at 17:29

## 2022-01-01 RX ADMIN — SODIUM CHLORIDE, PRESERVATIVE FREE 10 ML: 5 INJECTION INTRAVENOUS at 17:33

## 2022-01-01 RX ADMIN — CETIRIZINE HYDROCHLORIDE 10 MG: 10 TABLET ORAL at 09:19

## 2022-01-01 RX ADMIN — PIPERACILLIN AND TAZOBACTAM 3.38 G: 3; .375 INJECTION, POWDER, LYOPHILIZED, FOR SOLUTION INTRAVENOUS at 05:35

## 2022-01-01 RX ADMIN — Medication: at 21:05

## 2022-01-01 RX ADMIN — NEPHROCAP 1 CAPSULE: 1 CAP ORAL at 12:22

## 2022-01-01 RX ADMIN — Medication: at 12:31

## 2022-01-01 RX ADMIN — Medication: at 22:28

## 2022-01-01 RX ADMIN — CALCIUM ACETATE 667 MG: 667 CAPSULE ORAL at 20:36

## 2022-01-01 RX ADMIN — PRAVASTATIN SODIUM 80 MG: 40 TABLET ORAL at 21:19

## 2022-01-01 RX ADMIN — SODIUM CHLORIDE, PRESERVATIVE FREE 10 ML: 5 INJECTION INTRAVENOUS at 21:19

## 2022-01-01 RX ADMIN — MIDODRINE HYDROCHLORIDE 20 MG: 5 TABLET ORAL at 18:37

## 2022-01-01 RX ADMIN — APIXABAN 5 MG: 5 TABLET, FILM COATED ORAL at 09:29

## 2022-01-01 RX ADMIN — PIPERACILLIN AND TAZOBACTAM 3.38 G: 3; .375 INJECTION, POWDER, LYOPHILIZED, FOR SOLUTION INTRAVENOUS at 06:11

## 2022-01-01 RX ADMIN — LEVOTHYROXINE SODIUM 137 MCG: 0.11 TABLET ORAL at 12:29

## 2022-01-01 RX ADMIN — ASPIRIN 81 MG: 81 TABLET, COATED ORAL at 09:29

## 2022-01-01 RX ADMIN — MIDODRINE HYDROCHLORIDE 20 MG: 5 TABLET ORAL at 13:33

## 2022-01-01 RX ADMIN — ALBUMIN (HUMAN) 12.5 G: 0.25 INJECTION, SOLUTION INTRAVENOUS at 10:05

## 2022-01-01 RX ADMIN — PANTOPRAZOLE SODIUM 40 MG: 40 TABLET, DELAYED RELEASE ORAL at 09:29

## 2022-01-01 RX ADMIN — VANCOMYCIN HYDROCHLORIDE 1000 MG: 1 INJECTION, POWDER, LYOPHILIZED, FOR SOLUTION INTRAVENOUS at 15:29

## 2022-01-01 RX ADMIN — PIPERACILLIN AND TAZOBACTAM 3.38 G: 3; .375 INJECTION, POWDER, LYOPHILIZED, FOR SOLUTION INTRAVENOUS at 03:06

## 2022-01-01 RX ADMIN — Medication: at 22:42

## 2022-01-01 RX ADMIN — CALCIUM ACETATE 667 MG: 667 CAPSULE ORAL at 17:24

## 2022-01-01 RX ADMIN — APIXABAN 5 MG: 5 TABLET, FILM COATED ORAL at 17:28

## 2022-01-01 RX ADMIN — LOPERAMIDE HYDROCHLORIDE 2 MG: 2 CAPSULE ORAL at 02:26

## 2022-01-01 RX ADMIN — ONDANSETRON 4 MG: 2 INJECTION INTRAMUSCULAR; INTRAVENOUS at 07:25

## 2022-01-01 RX ADMIN — MIDODRINE HYDROCHLORIDE 20 MG: 5 TABLET ORAL at 12:12

## 2022-01-01 RX ADMIN — SODIUM CHLORIDE 40 MG: 9 INJECTION INTRAMUSCULAR; INTRAVENOUS; SUBCUTANEOUS at 21:19

## 2022-01-01 RX ADMIN — CALCIUM ACETATE 667 MG: 667 CAPSULE ORAL at 12:04

## 2022-01-01 RX ADMIN — NEPHROCAP 1 CAPSULE: 1 CAP ORAL at 12:05

## 2022-01-01 RX ADMIN — CALCIUM ACETATE 667 MG: 667 CAPSULE ORAL at 09:20

## 2022-01-01 RX ADMIN — Medication: at 09:17

## 2022-01-01 RX ADMIN — MIDAZOLAM HYDROCHLORIDE 0.5 MG: 1 INJECTION, SOLUTION INTRAMUSCULAR; INTRAVENOUS at 10:39

## 2022-01-01 RX ADMIN — Medication: at 22:51

## 2022-01-01 RX ADMIN — SODIUM CHLORIDE, PRESERVATIVE FREE 10 ML: 5 INJECTION INTRAVENOUS at 05:18

## 2022-01-01 RX ADMIN — PRAVASTATIN SODIUM 80 MG: 40 TABLET ORAL at 22:41

## 2022-01-01 RX ADMIN — ALBUMIN (HUMAN) 12.5 G: 0.25 INJECTION, SOLUTION INTRAVENOUS at 23:10

## 2022-01-01 RX ADMIN — ONDANSETRON 4 MG: 2 INJECTION INTRAMUSCULAR; INTRAVENOUS at 20:05

## 2022-01-01 RX ADMIN — Medication 2 UNITS: at 18:37

## 2022-01-01 RX ADMIN — LEVOTHYROXINE SODIUM 137 MCG: 0.11 TABLET ORAL at 12:11

## 2022-01-01 RX ADMIN — ASPIRIN 81 MG: 81 TABLET, COATED ORAL at 12:22

## 2022-01-01 RX ADMIN — MIDODRINE HYDROCHLORIDE 20 MG: 5 TABLET ORAL at 06:40

## 2022-01-01 RX ADMIN — ALBUMIN (HUMAN) 12.5 G: 0.25 INJECTION, SOLUTION INTRAVENOUS at 09:08

## 2022-01-01 RX ADMIN — PIPERACILLIN AND TAZOBACTAM 3.38 G: 3; .375 INJECTION, POWDER, LYOPHILIZED, FOR SOLUTION INTRAVENOUS at 06:47

## 2022-01-01 RX ADMIN — CALCIUM ACETATE 667 MG: 667 CAPSULE ORAL at 12:22

## 2022-01-01 RX ADMIN — APIXABAN 5 MG: 5 TABLET, FILM COATED ORAL at 18:37

## 2022-01-01 RX ADMIN — CETIRIZINE HYDROCHLORIDE 10 MG: 10 TABLET ORAL at 12:03

## 2022-01-01 RX ADMIN — ALBUMIN (HUMAN) 12.5 G: 0.25 INJECTION, SOLUTION INTRAVENOUS at 09:22

## 2022-01-01 RX ADMIN — PIPERACILLIN AND TAZOBACTAM 3.38 G: 3; .375 INJECTION, POWDER, LYOPHILIZED, FOR SOLUTION INTRAVENOUS at 02:26

## 2022-01-01 RX ADMIN — FENTANYL CITRATE 25 MCG: 50 INJECTION INTRAMUSCULAR; INTRAVENOUS at 03:18

## 2022-01-01 RX ADMIN — NEPHROCAP 1 CAPSULE: 1 CAP ORAL at 09:29

## 2022-01-01 RX ADMIN — SODIUM CHLORIDE, PRESERVATIVE FREE 10 ML: 5 INJECTION INTRAVENOUS at 14:21

## 2022-01-01 RX ADMIN — CALCIUM ACETATE 667 MG: 667 CAPSULE ORAL at 12:13

## 2022-01-01 RX ADMIN — MIDODRINE HYDROCHLORIDE 20 MG: 5 TABLET ORAL at 12:05

## 2022-01-01 RX ADMIN — MIDODRINE HYDROCHLORIDE 20 MG: 5 TABLET ORAL at 17:26

## 2022-01-01 RX ADMIN — APIXABAN 5 MG: 5 TABLET, FILM COATED ORAL at 09:17

## 2022-01-01 RX ADMIN — MIDODRINE HYDROCHLORIDE 20 MG: 5 TABLET ORAL at 20:05

## 2022-01-01 RX ADMIN — PRAVASTATIN SODIUM 80 MG: 40 TABLET ORAL at 22:26

## 2022-01-01 RX ADMIN — APIXABAN 5 MG: 5 TABLET, FILM COATED ORAL at 20:05

## 2022-01-01 RX ADMIN — SODIUM CHLORIDE, PRESERVATIVE FREE 10 ML: 5 INJECTION INTRAVENOUS at 23:17

## 2022-01-01 RX ADMIN — VANCOMYCIN HYDROCHLORIDE 1000 MG: 1 INJECTION, POWDER, LYOPHILIZED, FOR SOLUTION INTRAVENOUS at 17:19

## 2022-01-01 RX ADMIN — PIPERACILLIN AND TAZOBACTAM 3.38 G: 3; .375 INJECTION, POWDER, LYOPHILIZED, FOR SOLUTION INTRAVENOUS at 17:16

## 2022-01-01 RX ADMIN — PIPERACILLIN AND TAZOBACTAM 3.38 G: 3; .375 INJECTION, POWDER, LYOPHILIZED, FOR SOLUTION INTRAVENOUS at 21:19

## 2022-01-01 RX ADMIN — DEXMEDETOMIDINE HYDROCHLORIDE 4 MCG: 100 INJECTION, SOLUTION, CONCENTRATE INTRAVENOUS at 10:52

## 2022-01-01 RX ADMIN — DUTASTERIDE 0.5 MG: 0.5 CAPSULE, LIQUID FILLED ORAL at 12:22

## 2022-01-01 RX ADMIN — DEXMEDETOMIDINE HYDROCHLORIDE 6 MCG: 100 INJECTION, SOLUTION, CONCENTRATE INTRAVENOUS at 10:48

## 2022-01-01 RX ADMIN — FENTANYL CITRATE 25 MCG: 50 INJECTION INTRAMUSCULAR; INTRAVENOUS at 05:33

## 2022-01-01 RX ADMIN — VANCOMYCIN HYDROCHLORIDE 500 MG: 500 INJECTION, POWDER, LYOPHILIZED, FOR SOLUTION INTRAVENOUS at 12:05

## 2022-01-01 RX ADMIN — DUTASTERIDE 0.5 MG: 0.5 CAPSULE, LIQUID FILLED ORAL at 09:21

## 2022-01-01 RX ADMIN — MIDODRINE HYDROCHLORIDE 20 MG: 5 TABLET ORAL at 18:19

## 2022-01-01 RX ADMIN — OCTREOTIDE ACETATE 50 MCG/HR: 500 INJECTION, SOLUTION INTRAVENOUS; SUBCUTANEOUS at 21:10

## 2022-01-01 RX ADMIN — ONDANSETRON HYDROCHLORIDE 4 MG: 2 INJECTION, SOLUTION INTRAMUSCULAR; INTRAVENOUS at 11:02

## 2022-01-01 RX ADMIN — NEPHROCAP 1 CAPSULE: 1 CAP ORAL at 12:13

## 2022-01-01 RX ADMIN — NOREPINEPHRINE BITARTRATE 14 MCG/MIN: 1 INJECTION, SOLUTION, CONCENTRATE INTRAVENOUS at 17:00

## 2022-01-01 RX ADMIN — APIXABAN 5 MG: 5 TABLET, FILM COATED ORAL at 12:05

## 2022-01-01 RX ADMIN — SODIUM CHLORIDE, PRESERVATIVE FREE 10 ML: 5 INJECTION INTRAVENOUS at 22:32

## 2022-01-01 RX ADMIN — TRAMADOL HYDROCHLORIDE 50 MG: 50 TABLET, COATED ORAL at 22:26

## 2022-01-01 RX ADMIN — IOPAMIDOL 100 ML: 755 INJECTION, SOLUTION INTRAVENOUS at 20:45

## 2022-01-01 RX ADMIN — CALCIUM ACETATE 667 MG: 667 CAPSULE ORAL at 20:05

## 2022-01-01 RX ADMIN — SODIUM CHLORIDE, PRESERVATIVE FREE 10 ML: 5 INJECTION INTRAVENOUS at 22:53

## 2022-01-01 RX ADMIN — Medication 80 MCG: at 11:02

## 2022-01-01 RX ADMIN — MIDODRINE HYDROCHLORIDE 20 MG: 5 TABLET ORAL at 17:57

## 2022-01-01 RX ADMIN — ASPIRIN 81 MG: 81 TABLET, COATED ORAL at 12:04

## 2022-01-01 RX ADMIN — SODIUM CHLORIDE, PRESERVATIVE FREE 10 ML: 5 INJECTION INTRAVENOUS at 22:39

## 2022-01-01 RX ADMIN — MIDODRINE HYDROCHLORIDE 20 MG: 5 TABLET ORAL at 12:52

## 2022-01-01 RX ADMIN — FENTANYL CITRATE 25 MCG: 50 INJECTION INTRAMUSCULAR; INTRAVENOUS at 14:31

## 2022-01-01 RX ADMIN — VANCOMYCIN HYDROCHLORIDE 2000 MG: 10 INJECTION, POWDER, LYOPHILIZED, FOR SOLUTION INTRAVENOUS at 23:17

## 2022-01-01 RX ADMIN — NEPHROCAP 1 CAPSULE: 1 CAP ORAL at 09:20

## 2022-01-01 RX ADMIN — Medication 1 UNITS: at 22:39

## 2022-01-01 RX ADMIN — TRAMADOL HYDROCHLORIDE 50 MG: 50 TABLET, COATED ORAL at 20:05

## 2022-01-01 RX ADMIN — Medication: at 08:04

## 2022-01-01 RX ADMIN — TRAMADOL HYDROCHLORIDE 50 MG: 50 TABLET, COATED ORAL at 04:45

## 2022-01-01 RX ADMIN — APIXABAN 5 MG: 5 TABLET, FILM COATED ORAL at 17:57

## 2022-01-01 RX ADMIN — CALCIUM ACETATE 667 MG: 667 CAPSULE ORAL at 18:19

## 2022-01-01 RX ADMIN — PIPERACILLIN AND TAZOBACTAM 3.38 G: 3; .375 INJECTION, POWDER, LYOPHILIZED, FOR SOLUTION INTRAVENOUS at 02:57

## 2022-01-01 RX ADMIN — SODIUM CHLORIDE, PRESERVATIVE FREE 10 ML: 5 INJECTION INTRAVENOUS at 13:40

## 2022-01-01 RX ADMIN — PANTOPRAZOLE SODIUM 40 MG: 40 TABLET, DELAYED RELEASE ORAL at 12:04

## 2022-01-01 RX ADMIN — TRAMADOL HYDROCHLORIDE 50 MG: 50 TABLET, COATED ORAL at 00:10

## 2022-01-01 RX ADMIN — SODIUM CHLORIDE 40 MG: 9 INJECTION INTRAMUSCULAR; INTRAVENOUS; SUBCUTANEOUS at 13:39

## 2022-01-01 RX ADMIN — DUTASTERIDE 0.5 MG: 0.5 CAPSULE, LIQUID FILLED ORAL at 12:13

## 2022-01-01 RX ADMIN — ALBUMIN (HUMAN) 12.5 G: 0.25 INJECTION, SOLUTION INTRAVENOUS at 12:43

## 2022-01-01 RX ADMIN — Medication: at 08:00

## 2022-01-01 RX ADMIN — MIDODRINE HYDROCHLORIDE 20 MG: 5 TABLET ORAL at 08:06

## 2022-01-01 RX ADMIN — ACETAMINOPHEN 325MG 650 MG: 325 TABLET ORAL at 09:16

## 2022-01-01 RX ADMIN — SODIUM CHLORIDE, PRESERVATIVE FREE 10 ML: 5 INJECTION INTRAVENOUS at 06:12

## 2022-01-01 RX ADMIN — MIDODRINE HYDROCHLORIDE 20 MG: 5 TABLET ORAL at 09:28

## 2022-01-01 RX ADMIN — SODIUM CHLORIDE, PRESERVATIVE FREE 10 ML: 5 INJECTION INTRAVENOUS at 14:03

## 2022-01-01 RX ADMIN — SODIUM CHLORIDE, PRESERVATIVE FREE 10 ML: 5 INJECTION INTRAVENOUS at 05:01

## 2022-01-01 RX ADMIN — MIDODRINE HYDROCHLORIDE 10 MG: 5 TABLET ORAL at 09:15

## 2022-01-01 RX ADMIN — SODIUM CHLORIDE, PRESERVATIVE FREE 10 ML: 5 INJECTION INTRAVENOUS at 14:00

## 2022-01-01 RX ADMIN — FENTANYL CITRATE 12.5 MCG: 50 INJECTION, SOLUTION INTRAMUSCULAR; INTRAVENOUS at 02:15

## 2022-01-01 RX ADMIN — CALCIUM ACETATE 667 MG: 667 CAPSULE ORAL at 18:37

## 2022-01-01 RX ADMIN — SODIUM CHLORIDE: 0.9 INJECTION, SOLUTION INTRAVENOUS at 10:39

## 2022-01-01 RX ADMIN — TRAMADOL HYDROCHLORIDE 50 MG: 50 TABLET, COATED ORAL at 06:42

## 2022-01-01 RX ADMIN — CALCIUM ACETATE 667 MG: 667 CAPSULE ORAL at 09:29

## 2022-01-01 RX ADMIN — SODIUM CHLORIDE, PRESERVATIVE FREE 10 ML: 5 INJECTION INTRAVENOUS at 06:48

## 2022-01-01 RX ADMIN — APIXABAN 5 MG: 5 TABLET, FILM COATED ORAL at 12:13

## 2022-01-01 RX ADMIN — SODIUM CHLORIDE, PRESERVATIVE FREE 10 ML: 5 INJECTION INTRAVENOUS at 21:46

## 2022-01-01 RX ADMIN — EPINEPHRINE 1 MG: 0.1 INJECTION, SOLUTION ENDOTRACHEAL; INTRACARDIAC; INTRAVENOUS at 01:13

## 2022-01-01 RX ADMIN — LEVOTHYROXINE SODIUM 137 MCG: 0.11 TABLET ORAL at 12:05

## 2022-01-01 RX ADMIN — FENTANYL CITRATE 25 MCG: 50 INJECTION INTRAMUSCULAR; INTRAVENOUS at 22:37

## 2022-01-01 RX ADMIN — ALBUMIN (HUMAN) 12.5 G: 0.25 INJECTION, SOLUTION INTRAVENOUS at 03:56

## 2022-01-01 RX ADMIN — CETIRIZINE HYDROCHLORIDE 10 MG: 10 TABLET ORAL at 12:22

## 2022-01-01 RX ADMIN — Medication: at 20:22

## 2022-01-01 RX ADMIN — Medication: at 12:14

## 2022-01-01 RX ADMIN — ASPIRIN 81 MG: 81 TABLET, COATED ORAL at 12:14

## 2022-01-01 RX ADMIN — DUTASTERIDE 0.5 MG: 0.5 CAPSULE, LIQUID FILLED ORAL at 09:29

## 2022-01-01 RX ADMIN — CALCIUM ACETATE 667 MG: 667 CAPSULE ORAL at 17:57

## 2022-01-01 RX ADMIN — ONDANSETRON 4 MG: 2 INJECTION INTRAMUSCULAR; INTRAVENOUS at 07:57

## 2022-01-01 RX ADMIN — ACETAMINOPHEN 325MG 650 MG: 325 TABLET ORAL at 23:24

## 2022-01-01 RX ADMIN — PIPERACILLIN AND TAZOBACTAM 3.38 G: 3; .375 INJECTION, POWDER, LYOPHILIZED, FOR SOLUTION INTRAVENOUS at 15:01

## 2022-01-01 RX ADMIN — PRAVASTATIN SODIUM 80 MG: 40 TABLET ORAL at 21:46

## 2022-01-01 RX ADMIN — PRAVASTATIN SODIUM 80 MG: 40 TABLET ORAL at 22:36

## 2022-01-01 RX ADMIN — TRAMADOL HYDROCHLORIDE 50 MG: 50 TABLET, COATED ORAL at 12:07

## 2022-01-01 RX ADMIN — Medication 2 UNITS: at 13:34

## 2022-01-01 RX ADMIN — PANTOPRAZOLE SODIUM 40 MG: 40 TABLET, DELAYED RELEASE ORAL at 13:35

## 2022-01-01 RX ADMIN — PRAVASTATIN SODIUM 80 MG: 40 TABLET ORAL at 23:25

## 2022-01-01 RX ADMIN — TRAMADOL HYDROCHLORIDE 50 MG: 50 TABLET, COATED ORAL at 22:41

## 2022-01-01 RX ADMIN — Medication: at 22:38

## 2022-01-01 RX ADMIN — ASPIRIN 81 MG: 81 TABLET, COATED ORAL at 09:17

## 2022-01-01 RX ADMIN — APIXABAN 5 MG: 5 TABLET, FILM COATED ORAL at 19:22

## 2022-01-01 RX ADMIN — MIDODRINE HYDROCHLORIDE 20 MG: 5 TABLET ORAL at 12:21

## 2022-01-01 RX ADMIN — MIDODRINE HYDROCHLORIDE 10 MG: 5 TABLET ORAL at 04:26

## 2022-01-01 RX ADMIN — MIDODRINE HYDROCHLORIDE 20 MG: 5 TABLET ORAL at 17:24

## 2022-01-01 RX ADMIN — Medication 2 UNITS: at 18:22

## 2022-01-01 RX ADMIN — EPINEPHRINE 1 MG: 0.1 INJECTION, SOLUTION ENDOTRACHEAL; INTRACARDIAC; INTRAVENOUS at 01:17

## 2022-01-01 RX ADMIN — PIPERACILLIN AND TAZOBACTAM 3.38 G: 3; .375 INJECTION, POWDER, LYOPHILIZED, FOR SOLUTION INTRAVENOUS at 17:33

## 2022-01-01 RX ADMIN — ALBUMIN (HUMAN) 12.5 G: 0.25 INJECTION, SOLUTION INTRAVENOUS at 08:27

## 2022-01-01 RX ADMIN — PANTOPRAZOLE SODIUM 40 MG: 40 TABLET, DELAYED RELEASE ORAL at 12:29

## 2022-01-01 RX ADMIN — FENTANYL CITRATE 25 MCG: 0.05 INJECTION, SOLUTION INTRAMUSCULAR; INTRAVENOUS at 14:06

## 2022-01-01 RX ADMIN — APIXABAN 5 MG: 5 TABLET, FILM COATED ORAL at 18:19

## 2022-01-01 RX ADMIN — Medication: at 04:30

## 2022-01-01 RX ADMIN — PIPERACILLIN AND TAZOBACTAM 3.38 G: 3; .375 INJECTION, POWDER, LYOPHILIZED, FOR SOLUTION INTRAVENOUS at 15:25

## 2022-01-01 RX ADMIN — MIDODRINE HYDROCHLORIDE 20 MG: 5 TABLET ORAL at 07:24

## 2022-01-01 RX ADMIN — LEVOTHYROXINE SODIUM 137 MCG: 0.11 TABLET ORAL at 08:09

## 2022-01-01 RX ADMIN — SODIUM CHLORIDE, PRESERVATIVE FREE 10 ML: 5 INJECTION INTRAVENOUS at 05:40

## 2022-01-01 RX ADMIN — ALBUMIN (HUMAN) 12.5 G: 0.25 INJECTION, SOLUTION INTRAVENOUS at 18:12

## 2022-01-01 RX ADMIN — PANTOPRAZOLE SODIUM 40 MG: 40 TABLET, DELAYED RELEASE ORAL at 09:19

## 2022-01-01 RX ADMIN — MIDODRINE HYDROCHLORIDE 10 MG: 5 TABLET ORAL at 18:10

## 2022-01-01 RX ADMIN — SODIUM CHLORIDE, PRESERVATIVE FREE 5 ML: 5 INJECTION INTRAVENOUS at 21:03

## 2022-01-01 RX ADMIN — PIPERACILLIN AND TAZOBACTAM 3.38 G: 3; .375 INJECTION, POWDER, LYOPHILIZED, FOR SOLUTION INTRAVENOUS at 18:37

## 2022-01-01 RX ADMIN — FENTANYL CITRATE 25 MCG: 0.05 INJECTION, SOLUTION INTRAMUSCULAR; INTRAVENOUS at 11:55

## 2022-01-01 RX ADMIN — CETIRIZINE HYDROCHLORIDE 10 MG: 10 TABLET ORAL at 12:14

## 2022-01-01 RX ADMIN — CALCIUM ACETATE 667 MG: 667 CAPSULE ORAL at 12:52

## 2022-01-01 RX ADMIN — CALCIUM ACETATE 667 MG: 667 CAPSULE ORAL at 13:33

## 2022-01-01 RX ADMIN — FENTANYL CITRATE 25 MCG: 50 INJECTION INTRAMUSCULAR; INTRAVENOUS at 07:58

## 2022-01-01 RX ADMIN — ACETAMINOPHEN 325MG 650 MG: 325 TABLET ORAL at 21:46

## 2022-01-04 NOTE — OP NOTES
[address]  OPERATIVE REPORT    Name:  Jessica Nash  MR#:  [id number]  :  1950  ACCOUNT #:  [de-identified]  DATE OF SERVICE:  2021      PREOPERATIVE DIAGNOSIS:  End-stage renal disease. POSTOPERATIVE DIAGNOSIS:  End-stage renal disease. PROCEDURE PERFORMED:  Creation of left upper arm basilic vein transposition arteriovenous fistula. SURGEON:  Julia Buckley MD    ASSISTANT:  None. ANESTHESIA:  Block. COMPLICATIONS:  None. SPECIMENS REMOVED:  None. DRAINS:  None. IMPLANTS:  None. ESTIMATED BLOOD LOSS:  Less than 100 mL. INDICATIONS:  The patient is a 65-year-old male with end-stage renal disease who requires permanent dialysis access. He presents for creation of a left arm basilic vein transposition fistula. PROCEDURE:  The patient had been sedated in the preoperative holding area with a right upper extremity block in place in accordance with the surgical posting. He was taken to the operating room where it was realized prior to any intervention that he had a right-sided pacemaker. I felt that there would be an unacceptable risk of severe right upper extremity edema with right arm fistula creation and therefore, I discussed the options with the patient's wife, which included canceling and rescheduling the procedure for a left arm fistula or simply converting to general anesthesia and performing a left arm fistula. His wife consented to proceed with a left arm fistula. The patient was converted to a general anesthetic and the left arm was prepped and draped as a sterile field. The superficial veins in the left upper extremity were examined with a real-time ultrasound. The left forearm veins were not satisfactory. The left upper arm cephalic vein was not adequate for fistula creation. The left upper arm basilic vein was an adequate conduit for fistula creation.   Through two interrupted incisions on the medial upper arm, the basilic vein was dissected free, taking care to avoid injury to the medial antebrachial cutaneous nerve. Side branches were divided between silk ties and clips. After the basilic vein was mobilized from the antecubital crease to the axilla, the brachial artery was isolated through a distal medial upper arm incision. The vein was ligated at the distal extent of dissection and transected just proximal to this. The vein was flushed with heparinized saline and was found to be of good conduit and was hemostatic. The vein was marked on its anterior surface to assist with tunneling. A small counterincision was made on the proximal anteromedial upper arm. The vein was tunneled in a subdermal plane on the anteromedial surface of the upper arm using the counterincision to assist with tunneling. Next, the patient was systemically heparinized and an end-to-side anastomosis was created between the basilic vein and the brachial artery using running 5-0 Prolene suture. Upon completion, flow was restored first to the fistula and then to the hand. There was a palpable thrill within the fistula and a palpable radial pulse. The Doppler signal at the wrist was adequate with the fistula open and did augment with compression of the fistula. The wounds were irrigated with antibiotic irrigation and were hemostatic. The wounds were closed with running Vicryl suture and skin staples. Dry dressings were applied and the patient was transferred to the PACU in stable condition. All counts were correct.         Wiliam Katz MD      [id number]  D:  01/03/2022 23:03  T:  01/03/2022 23:51  JOB #:  [id number]

## 2022-01-04 NOTE — H&P
History and Physical    Subjective:     Marzena Lindsey is a 66 y.o. male who has ESRD and needs an AVF. Past Medical History:   Diagnosis Date    Arrhythmia     atrial fibrillation 2017    Arthritis     spine, all joints    Atrial fibrillation (HCC)     CAD (coronary atherosclerotic disease)     Chronic kidney disease     [facility]    Chronic pain     back pain - unable to lay flat    Congestive heart failure ([address]     Diabetes ([facility]     Diabetes mellitus type 2, controlled ([address] 3/13/2017    GERD (gastroesophageal reflux disease)     Heart failure ([facility]     Hx of CABG 3/13/2017    CABG in 2010 in  [facility] [place] Hyperlipidemia     Hypertension     Long term current use of anticoagulant therapy     Morbid obesity ([facility] 3/13/2017    Neuropathy     in both feet    JACEY (obstructive sleep apnea) 6/23/2017    no CPAP, pt states never tested    Pacemaker     S/P CABG x 2 2010    Skin cancer     Thyroid disease     hypothyroid      Past Surgical History:   Procedure Laterality Date    HX CATARACT REMOVAL Bilateral     HX ORTHOPAEDIC      L 3rd toe amputation in 1999    HX PACEMAKER Left 12/26/2018    Dr Johnathan Guillen x4 crt-p    HX VASCULAR ACCESS  03/2021    R chest    IR INSERT TUNL CVC W/O PORT OVER 5 YR  3/16/2021    [place] CARDIAC SURG PROCEDURE UNLIST      CABGx2 in 2010     Family History   Problem Relation Age of Onset    Heart Attack Father     Cancer Father         lymph node    Cancer Mother         breast    No Known Problems Brother       Social History     Tobacco Use    Smoking status: Never Smoker    Smokeless tobacco: Never Used   Substance Use Topics    Alcohol use: Not Currently     Comment: rare       Prior to Admission medications    Medication Sig Start Date End Date Taking? Authorizing Provider   ketoconazole (NIZORAL) 2 % topical cream Apply  to affected area daily.    Yes Provider, Historical   insulin glargine (Lantus Solostar U-100 Insulin) 100 unit/mL (3 mL) inpn 20 Units by SubCUTAneous route nightly. INJECT 20 UNITS SUBCUTANEOUSLY AT BEDTIME 12/10/21  Yes Bri Del Cid MD   levothyroxine (SYNTHROID) 137 mcg tablet TAKE 1 TABLET BY MOUTH DAILY (BEFORE BREAKFAST). Patient taking differently: Take 137 mcg by mouth Daily (before breakfast). 10/31/21  Yes Bri Del Cid MD   multivitamin (ONE A DAY) tablet Take 1 Tablet by mouth daily. Yes Provider, Historical   loratadine (Claritin) 10 mg tablet Take 10 mg by mouth daily. Yes Provider, Historical   pravastatin (PRAVACHOL) 80 mg tablet TAKE 1 TABLET BY MOUTH EVERY DAY AT NIGHT  Patient taking differently: Take 80 mg by mouth nightly. 8/2/21  Yes Catrina Hernandez NP   apixaban (Eliquis) 5 mg tablet TAKE 1 TABLET BY MOUTH TWICE A DAY  Patient taking differently: Take 5 mg by mouth two (2) times a day. TAKE 1 TABLET BY MOUTH TWICE A DAY 6/14/21  Yes Cleo Chawla MD   Insulin Needles, Disposable, (BD Ultra-Fine Mini Pen Needle) 31 gauge x 3/16\" ndle USE WITH INSULIN PENS 4 TIMES DAILY 4/7/21  Yes Naina Darden MD   metoprolol succinate (TOPROL-XL) 100 mg tablet Take 1 Tab by mouth daily. 3/19/21  Yes Velasquez Duverney, MD   balsam peru-castor oiL (VENELEX) ointment Apply  to affected area two (2) times a day. 3/19/21  Yes Biagio Duverney, MD   bumetanide (BUMEX) 2 mg tablet 1 tablet on non-dialysis days only. Patient taking differently: Take 2 mg by mouth daily. 1 tablet on non-dialysis days only. 3/19/21  Yes Biagio Duverney, MD   insulin aspart U-100 (NovoLOG Flexpen U-100 Insulin) 100 unit/mL (3 mL) inpn Inject 14 units at Breakfast, 10 with Lunch and Dinner 2/19/21  Yes Naina Darden MD   omeprazole (PRILOSEC) 40 mg capsule Take 40 mg by mouth daily. Indications: 3-23-21: Patient states he takes 40mg in the morning and 40mg in the evening. 5/21/20  Yes Provider, Historical   dutasteride (AVODART) 0.5 mg capsule Take 0.5 mg by mouth daily.    Yes Provider, Historical   acetaminophen (TYLENOL EXTRA STRENGTH) 500 mg tablet Take 1,000 mg by mouth every eight (8) hours as needed for Pain. Yes Provider, Historical   diclofenac (VOLTAREN) 1 % gel APPLY 1 APPLICATION TOPICALLY 4 (FOUR) TIMES A DAY AS NEEDED (PAIN)  Patient not taking: Reported on 12/21/2021 1/21/21   Provider, Historical     Allergies   Allergen Reactions    Allopurinol Rash    Gabapentin Drowsiness    Ciprofloxacin Nausea Only    Percocet [Oxycodone-Acetaminophen] Other (comments)     Hallucinations, not allergic to tylenol        Review of Systems:  Denies CP/SOB    Objective:     Physical Exam:   Visit Vitals  BP (!) 111/43 (BP 1 Location: Right upper arm, BP Patient Position: At rest)   Pulse 75   Temp 98.2 °F (36.8 °C)   Resp 12   Ht 5' 9\" (1.753 m)   Wt 113.8 kg (250 lb 14.1 oz)   SpO2 95%   BMI 37.05 kg/m²     General:  Alert, cooperative, no distress, appears stated age. Head:  Normocephalic, without obvious abnormality, atraumatic. Neck: Supple, symmetrical, trachea midline, no adenopathy, thyroid: no enlargement/tenderness/nodules, no carotid bruit and no JVD. Lungs:   Clear to auscultation bilaterally. Heart:  Regular rate and rhythm, S1, S2 normal, no murmur, click, rub or gallop. Abdomen:   Soft, non-tender. Bowel sounds normal. No masses,  No organomegaly. Extremities: Upper extremities normal, atraumatic, no cyanosis or edema. Neurologic: Normal strength, sensation and throughout.        Assessment:     ESRD    Plan:     EARL WITT    Signed By: Lor Haynes MD     January 3, 2022

## 2022-02-15 NOTE — PROGRESS NOTES
General:   In hospital recently - they were pricking finger - they then let him use the libre2    Patient was last seen: New Patient visit  Last visit Dr Kyra Ahn (virtual) 5/21  Has [facility] 2nd insurance  No donut hole    A1c: current a1c is 6.2    DM Medications:   Lantus 15 HS  Nlog 14/10/10  CF 1/50>150    Last Changes: lowered lantus    Sugar Checks: check more than 4 times a day and uses [employer] CGM     AM: see scanned CGM reports in range 83%, high 13%, very 1%, low 2%, very low 1%    PM: see scanned CGM reports     LOWs:  has low sugars low 2%, very low 1% -- does not have alarms on     DIET: feels does well with diabetic diet, weight is stable, is careful, stays away from sweets/carbs, only 1-2 slice pizza, bread only with sandwich     EXERCISE: exercise limited due to pain, home PT at times; balance/mobility issues     HTN: at goal, on B-Blk, on diuretics, HTN followed by Nephrology     LIPIDS: at goal, on statin, lipids followed by Cardiology    RENAL: has end stage renal disease, is on dialysis, is followed by Nephrology     EYES: eye exam in past year, has retinopathy     FEET: sees podiatry, is on meds for neuropathy - neuroRx (bought from other provider) - Pod every 2 mo (kevin), h/o toe amputation 3rd toe left    DENTAL:  has seen dentist in past year     HEART:  is followed by Cardiology, has coronary artery disease, h/o CABG 2010, has pacemaker    ASA:  is on blood thinner     SYMPTOMS: no polyuria, thirst or blurred vision     THYROID: known thyroid condition, on T4, generic 137mcg, takes correctly- followed by PCP frequently    DIABETES HISTORY:   Dx > 20 yrs ago  Insulin most of time  H/o metformin, glyburide, +TZD, no DPP4, no SGLT, no GLP1      LABS/STUDIES:  8/20/20 COLETTE-r 41, a1c 6.4, FT4 1.82, TT3 81  11/19/20 a1c 6.9, FT4 1.8, TT3 100,   8/3/21 FT4 1.45, CK 56  10/15/21 chol 121/106/45/56  2/10/22  (PCP) a1c 6.2, GFR 8%, Ca 7.8, FT4 1.3, TT3 66, CK 85    Lab Results   Component Value Date/Time    Hemoglobin A1c 7.0 (H) 04/10/2018 08:43 AM    Hemoglobin A1c 7.6 (H) 06/22/2017 09:50 AM    Hemoglobin A1c 8.0 (H) 03/13/2017 12:15 PM    Glucose 245 (H) 10/15/2021 10:29 AM    Glucose (POC) 160 (H) 12/21/2021 01:44 PM    Microalb/Creat ratio (ug/mg creat.) 137.2 (H) 06/12/2019 10:56 AM    LDL,Direct 43 04/03/2017 04:00 PM    LDL, calculated 56 10/15/2021 10:29 AM    LDL, calculated 51 07/30/2020 08:35 AM    Creatinine (POC) 3.33 (H) 12/21/2021 08:00 AM    Creatinine 3.76 (H) 10/15/2021 10:29 AM           Past Medical History:   Diagnosis Date    Arrhythmia     atrial fibrillation 2017    Arthritis     spine, all joints    Atrial fibrillation ([address]     CAD (coronary atherosclerotic disease)     Chronic kidney disease     [facility]    Chronic pain     back pain - unable to lay flat    Congestive heart failure ([address]     Diabetes ([address]     Diabetes mellitus type 2, controlled ([address] 3/13/2017    GERD (gastroesophageal reflux disease)     Heart failure ([address]     Hx of CABG 3/13/2017    CABG in 2010 in [address] [place] [address] Street Hyperlipidemia     Hypertension     Long term current use of anticoagulant therapy     Morbid obesity ([address] 3/13/2017    Neuropathy     in both feet    JACEY (obstructive sleep apnea) 6/23/2017    no CPAP, pt states never tested    Pacemaker     S/P CABG x 2 2010    Skin cancer     Thyroid disease     hypothyroid         Social History     Tobacco Use    Smoking status: Never Smoker    Smokeless tobacco: Never Used   Substance Use Topics    Alcohol use: Not Currently     Comment: rare      Employer:  Retired           There were no vitals filed for this visit.    Weight Metrics 12/21/2021 10/21/2021 10/14/2021 10/12/2021 10/11/2021 9/28/2021 8/5/2021   Weight 250 lb 14.1 oz - 250 lb 14.4 oz 246 lb 246 lb 14.6 oz 249 lb 9.6 oz -   BMI 37.05 kg/m2 35.94 kg/m2 37.05 kg/m2 36.33 kg/m2 - 36.33 kg/m2 34.93 kg/m2        EXAM  - GENERAL: NCAT, Appears well nourished   - EYES: EOMI, non-icteric, no proptosis   - Ear/Nose/Throat: NCAT, no visible inflammation or masses   - CARDIOVASCULAR: no cyanosis, no visible JVD e11.3  - RESPIRATORY: respiratory effort normal without any distress or labored breathing   - MUSCULOSKELETAL: Normal ROM of neck and upper extremities observed   - SKIN: No rash on face  - NEUROLOGIC:  No facial asymmetry (Cranial nerve 7 motor function), No gaze palsy   - PSYCHIATRIC: Normal affect, Normal insight and judgement    Assessment/Plan:   1. Type 2 diabetes mellitus with hyperglycemia, with long-term current use of insulin ([address]  [facility] and 240 so will be warned now  Advised to increase dinner meal for those foods that affect sugar the most (that meal is most variable on libre2 readings)  Refer to [facility] to see if any gaps in education    2. Type 2 diabetes mellitus with stage 4 chronic kidney disease, with long-term current use of insulin ([address]  On HD M-W-F - per Nephrology    3. Type 2 diabetes mellitus with retinopathy, with long-term current use of insulin, macular edema presence unspecified, unspecified laterality, unspecified retinopathy severity ([address]  Per ophtho    4. Diabetic polyneuropathy associated with type 2 diabetes mellitus ([address]  Sees podiatry every 2 mo  Uses supplement obtained from other provider    5. Type II diabetes mellitus with peripheral circulatory disorder (HCC)  H/o toe amputation, followed by podiatry closely    6. Coronary Artery Disease  S/p CABG, Per Cards    7.  Hypothyroidism  On T4, generic, takes correctly; stable per PCP  *But note PCP only checking FT4 and TT3 - needs TSH instead    Orders Placed This Encounter    REFERRAL TO DIABETIC EDUCATION     Referral Priority:   Routine     Referral Type:   Consultation     Referral Reason:   Specialty Services Required     Number of Visits Requested:   1    UT CONTINUOUS GLUCOSE MONITORING ANALYSIS I&R     Follow-up and Dispositions    · Return in about 4 months (around 6/15/2022) for diabetes.

## 2022-02-15 NOTE — LETTER
2/15/2022    Patient: Natalio Byrne   YOB: 1950   Date of Visit: 2/15/2022     [place] [id number]  Via Fax: [phone]    Dear Vitaly Lopez MD,      Thank you for referring Mr. Adilene Maloeny to [facility] for evaluation. My notes for this consultation are attached. If you have questions, please do not hesitate to call me. I look forward to following your patient along with you.       Sincerely,    Kely Bragg MD

## 2022-03-08 NOTE — PROGRESS NOTES
Dar [place]ados, [facility]    Subjective/HPI:     Audrey Espinal is a 67 y.o. male is here for routine f/u. He has a PMHx of CAD s/p CABG x 2, PAF s/p AV anastacio ablation with PPM, PAD, ESRD on HD MWF, HTN, HLD, DM2, GERD, hypothyroidism and JACEY not on CPAP. He was admitted to [address] [place] [address] in 1/2022 for sepsis, NSTEMI. NSTEMI felt to be 2/2 type II mismatch due to sepsis. Did not recommend LHC as pt was asymptomatic. Echo with preserved LVEF 55%, mod dilated RV with mod PHTN. Felt to be due to volume overload and untreated JACEY, did recommend RHC as outpatient. Tread for RLE cellulitis as cause for sepsis. LE arterial dopplers with bilateral SFA stenosis, left worse than right. Seen by vascular surgery, recommended medical management at that time. Since being home, he has been struggling with dialysis. This is a chronic problem. He has always had to end sessions prematurely due to significant upper extremity cramping. Attempts to take off 4 kg but generally can only tolerate about 3 kg removal.  Needs to get more fluid off, but can't stand to be on the machine any longer than a few hours. Now they are trying to add in an extra session to see if this will help with fluid management. He denies complaints of chest pains. He does have shortness of breath but not worse from baseline. He denies LE edema. Feels bloated in his abdomen. Denies dizziness. Current Outpatient Medications on File Prior to Visit   Medication Sig Dispense Refill    clobetasoL (TEMOVATE) 0.05 % ointment       cholecalciferol, vitamin D3, (DIALYVITE VITAMIN D PO) Take  by mouth.  aspirin delayed-release 81 mg tablet Take 81 mg by mouth daily.  miconazole (MICOTIN) 2 % topical cream APPLY TOPICALLY 2 TIMES DAILY. APPLY TO AFFECTED AREAS      ketoconazole (NIZORAL) 2 % topical cream Apply  to affected area daily.       insulin glargine (Lantus Solostar U-100 Insulin) 100 unit/mL (3 mL) inpn 20 Units by SubCUTAneous route nightly. INJECT 20 UNITS SUBCUTANEOUSLY AT BEDTIME (Patient taking differently: 15 Units by SubCUTAneous route nightly.) 15 Pen 1    levothyroxine (SYNTHROID) 137 mcg tablet TAKE 1 TABLET BY MOUTH DAILY (BEFORE BREAKFAST). (Patient taking differently: Take 137 mcg by mouth Daily (before breakfast). ) 90 Tablet 1    loratadine (Claritin) 10 mg tablet Take 10 mg by mouth daily.  pravastatin (PRAVACHOL) 80 mg tablet TAKE 1 TABLET BY MOUTH EVERY DAY AT NIGHT (Patient taking differently: Take 80 mg by mouth nightly.) 90 Tablet 3    apixaban (Eliquis) 5 mg tablet TAKE 1 TABLET BY MOUTH TWICE A DAY (Patient taking differently: Take 5 mg by mouth two (2) times a day. TAKE 1 TABLET BY MOUTH TWICE A DAY) 180 Tablet 3    Insulin Needles, Disposable, (BD Ultra-Fine Mini Pen Needle) 31 gauge x 3/16\" ndle USE WITH INSULIN PENS 4 TIMES DAILY 400 Pen Needle 3    metoprolol succinate (TOPROL-XL) 100 mg tablet Take 1 Tab by mouth daily. 135 Tab 1    balsam peru-castor oiL (VENELEX) ointment Apply  to affected area two (2) times a day. 3 Tube 0    insulin aspart U-100 (NovoLOG Flexpen U-100 Insulin) 100 unit/mL (3 mL) inpn Inject 14 units at Breakfast, 10 with Lunch and Dinner 15 Adjustable Dose Pre-filled Pen Syringe 3    omeprazole (PRILOSEC) 40 mg capsule Take 40 mg by mouth daily. Indications: 3-23-21: Patient states he takes 40mg in the morning and 40mg in the evening.  dutasteride (AVODART) 0.5 mg capsule Take 0.5 mg by mouth daily.  acetaminophen (TYLENOL EXTRA STRENGTH) 500 mg tablet Take 1,000 mg by mouth every eight (8) hours as needed for Pain.  calcium acetate,phosphat bind, (PHOSLO) 667 mg cap TAKE 1 CAPSULE BY MOUTH 3 TIMES A DAY WITH MEALS. No current facility-administered medications on file prior to visit. Review of Symptoms:    Review of Systems   Constitutional: Negative for chills, fever and weight loss. HENT: Negative for nosebleeds.     Eyes: Negative for blurred vision and double vision. Respiratory: Negative for cough, shortness of breath and wheezing. Cardiovascular: Negative for chest pain, palpitations, orthopnea, leg swelling and PND. Skin: Negative for rash. Neurological: Negative for dizziness and loss of consciousness. Physical Exam:      General: Well developed, in no acute distress, cooperative and alert  Heart:  reg rate and rhythm; normal S1/S2; no murmurs, no gallops or rubs. Respiratory: Clear bilaterally x 4, no wheezing or rales  Extremities:  Normal cap refill, no cyanosis, atraumatic. No edema. Vascular: 2+ pulses symmetric in all extremities    Vitals:    03/08/22 1422   BP: 122/64   BP 1 Location: Right arm   BP Patient Position: Sitting   BP Cuff Size: Large adult   Pulse: 75   Resp: 22   Height: 5' 9\" (1.753 m)   Weight: 263 lb (119.3 kg)   SpO2: 94%        Assessment:       ICD-10-CM ICD-9-CM    1. Coronary artery disease involving native coronary artery of native heart without angina pectoris  I25.10 414.01    2. Chronic a-fib ([facility])  I48.20 427.31 AMB POC EKG ROUTINE W/ 12 LEADS, INTER & REP   3. Dilated cardiomyopathy ([facility])  I42.0 425.4    4. PAD (peripheral artery disease) ([facility])  I73.9 443.9    5. Essential hypertension  I10 401.9    6. Mixed hyperlipidemia  E78.2 272.2    7. S/P CABG x 2  Z95.1 V45.81    8. Cardiac pacemaker in situ  Z95.0 V45.01    9. H/O atrioventricular anastacio ablation  Z98.890 V15.1         Plan:     1. Coronary artery disease involving native coronary artery of native heart without angina pectoris  Hx of CABG x 2  Lexiscan stress test done 3/2021 was inconclusive  Currently asymptomatic; recent NSTEMI was likely Type II in the setting of sepsis  Continue present medical management -- BB, ASA, and statin therapy    2. Chronic a-fib ([facility])  S/p AV anastacio ablation  Continue BB therapy  On Eliquis for stroke prevention; only meets renal criteria for low dose.     3. Dilated cardiomyopathy ([facility])  Hx of non-ischemic cardiomyopathy, now resolved  Echo done 1/2022 at [facility] with preserved LVEF 55%, mod MR, dilated RV with reduced RVSF and mod PHTN, PASP 54 mmHg  Do not feel RHC is necessary at this stage  Continue BB therapy  Fluid management per dialysis    4. PAD (peripheral artery disease) (HCC)  Arterial duplex done 1/2022 at [facility] with 50-75% stenosis of right SFA; > 75% stenosis of right popliteal artery, occluded RPT and RPA arteries; with sev > 75% stenosis of the left SFA and occluded left PT and PA arteries  Refer to Dr. Bere Toth; likely medical management so long as he is asymptomatic without evidence of CLI    5. Essential hypertension  BP controlled. Continue anti-hypertensive therapy and low sodium diet    6. Mixed hyperlipidemia  LDL 41 in 1/2022  Continue statin therapy and low fat, low cholesterol diet  Lipids managed by PCP    7. S/P CABG x 2  Hx of CABG x 2    8. Cardiac pacemaker in situ  Continue with routine device interrogation with Dr. Gualberto Motley    9. H/O atrioventricular anastacio ablation    Patient seen and examined by me with nurse practitioner. Tika Langley personally performed all components of the history, physical, and medical decision making and agree with the assessment and plan with minor modifications as noted. PA sytolic 02'N mostly likely secondary to elevated L heart pressures.   Do not rec Lieutenant Jude MD

## 2022-03-08 NOTE — LETTER
3/8/2022    Patient: Dell Guido   YOB: 1950   Date of Visit: 3/8/2022     [facility], [place] [address]  Via In Basket    Dear Priya Vincent MD,      Thank you for referring Mr. Rashmi Trejo to [facility] for evaluation. My notes for this consultation are attached. If you have questions, please do not hesitate to call me. I look forward to following your patient along with you.       Sincerely,    Lee Lind MD

## 2022-03-08 NOTE — PROGRESS NOTES
Chief Complaint   Patient presents with   [facility] [place] [place]     [facility] f[facility]u       1. Have you been to the ER, urgent care clinic since your last visit? Hospitalized since your last visit? [facility] infection in dialysis cath. 2. Have you seen or consulted any other health care providers outside of the [address] [place] [address] since your last visit? Include any pap smears or colon screening. Dialysis Mon, Wed and Friday and now Saturday for fluid buildup. Pt states EKG done at [facility] and  should have it.

## 2022-03-18 PROBLEM — I95.9 HYPOTENSION: Status: ACTIVE | Noted: 2022-01-01

## 2022-03-18 PROBLEM — J96.01 ACUTE RESPIRATORY FAILURE WITH HYPOXEMIA (HCC): Status: ACTIVE | Noted: 2022-01-01

## 2022-03-18 NOTE — REMOTE MONITORING
Spoke with primary RN Yari regarding 6 hour Sepsis bundle.   Thank you,    Ciera Stauffer 227 RN Chencho Gottlieb, VESNAN, Kim Mcgill [address]  Callback # [phone]

## 2022-03-18 NOTE — ED NOTES
Spoke with PICC team. Unable to place line as patient receives dialysis. Spoke with Dr. Emanuel Lai who is aware.

## 2022-03-18 NOTE — CONSULTS
Consultation Note    NAME: Hari Koch   :  1950   MRN:  [id number]     Date/Time:  3/18/2022 4:47 PM    I have been asked to see this patient by Dr. Karen Dupree  for advice/opinion re: ESRD. Assessment :    [id number]  Hypotension  Anemia  Fatigue   Will try HD today. 2K bath. Pull fluid as tolerated. Albumin for BP support. H/H at goal.  Hold TE. He has been on dialysis for about a year and says that he is getting tired of it. May need to have a discussion about whether to continue dialysis. D/W pt and wife. Subjective:   CHIEF COMPLAINT:  \"I feel bad. \"    HISTORY OF PRESENT ILLNESS:     Olga Keen is a 67 y.o.   male who has a history of ESRD admitted with hypotension. I saw him on Wednesday on dialysis and he was very weak. He says that he feels as if he has been carrying extra fluid. His wife says that he has not been eating much lately. She says that she tried to get him into the car for dialysis but he was so weak she couldn't get him and and  He was brought here.     Past Medical History:   Diagnosis Date    Arrhythmia     atrial fibrillation     Arthritis     spine, all joints    Atrial fibrillation (HCC)     CAD (coronary atherosclerotic disease)     Chronic kidney disease     [facility]    Chronic pain     back pain - unable to lay flat    Congestive heart failure ([address]     Diabetes ([address]     Diabetes mellitus type 2, controlled ([address] 3/13/2017    GERD (gastroesophageal reflux disease)     Heart failure ([address]     Hx of CABG 3/13/2017    CABG in  in  [employer] Hyperlipidemia     Hypertension     Long term current use of anticoagulant therapy     Morbid obesity ([address] 3/13/2017    Neuropathy     in both feet    JCAEY (obstructive sleep apnea) 2017    no CPAP, pt states never tested    Pacemaker     S/P CABG x 2     Skin cancer     Thyroid disease     hypothyroid      Past Surgical History: Procedure Laterality Date    HX CATARACT REMOVAL Bilateral     HX ORTHOPAEDIC      L 3rd toe amputation in 1999    HX PACEMAKER Left 12/26/2018    Dr Candace Maxwell x4 crt-p    HX VASCULAR ACCESS  03/2021    R chest    IR INSERT TUNL CVC W/O PORT OVER 5 YR  3/16/2021    [place] CARDIAC SURG PROCEDURE UNLIST      CABGx2 in 2010     Social History     Tobacco Use    Smoking status: Never Smoker    Smokeless tobacco: Never Used   Substance Use Topics    Alcohol use: Not Currently     Comment: rare      Family History   Problem Relation Age of Onset    Heart Attack Father     Cancer Father         lymph node    Cancer Mother         breast    No Known Problems Brother       Allergies   Allergen Reactions    Allopurinol Rash    Gabapentin Drowsiness    Ciprofloxacin Nausea Only    Percocet [Oxycodone-Acetaminophen] Other (comments)     Hallucinations, not allergic to tylenol      Prior to Admission medications    Medication Sig Start Date End Date Taking? Authorizing Provider   clobetasoL (TEMOVATE) 0.05 % ointment  3/2/22   Provider, Historical   cholecalciferol, vitamin D3, (DIALYVITE VITAMIN D PO) Take  by mouth. Provider, Historical   aspirin delayed-release 81 mg tablet Take 81 mg by mouth daily. 2/3/22   Provider, Historical   calcium acetate,phosphat bind, (PHOSLO) 667 mg cap TAKE 1 CAPSULE BY MOUTH 3 TIMES A DAY WITH MEALS. 2/3/22   Provider, Historical   miconazole (MICOTIN) 2 % topical cream APPLY TOPICALLY 2 TIMES DAILY. APPLY TO AFFECTED AREAS 2/3/22   Provider, Historical   ketoconazole (NIZORAL) 2 % topical cream Apply  to affected area daily. Provider, Historical   insulin glargine (Lantus Solostar U-100 Insulin) 100 unit/mL (3 mL) inpn 20 Units by SubCUTAneous route nightly. INJECT 20 UNITS SUBCUTANEOUSLY AT BEDTIME  Patient taking differently: 15 Units by SubCUTAneous route nightly.  12/10/21   Ben Lo MD   levothyroxine (SYNTHROID) 137 mcg tablet TAKE 1 TABLET BY MOUTH DAILY (BEFORE BREAKFAST). Patient taking differently: Take 137 mcg by mouth Daily (before breakfast). 10/31/21   Kat Brambila MD   loratadine (Claritin) 10 mg tablet Take 10 mg by mouth daily. Provider, Historical   pravastatin (PRAVACHOL) 80 mg tablet TAKE 1 TABLET BY MOUTH EVERY DAY AT NIGHT  Patient taking differently: Take 80 mg by mouth nightly. 8/2/21   Candy PATEL NP   apixaban (Eliquis) 5 mg tablet TAKE 1 TABLET BY MOUTH TWICE A DAY  Patient taking differently: Take 5 mg by mouth two (2) times a day. TAKE 1 TABLET BY MOUTH TWICE A DAY 6/14/21   Randi Alcantar MD   Insulin Needles, Disposable, (BD Ultra-Fine Mini Pen Needle) 31 gauge x 3/16\" ndle USE WITH INSULIN PENS 4 TIMES DAILY 4/7/21   Manuel Luu MD   metoprolol succinate (TOPROL-XL) 100 mg tablet Take 1 Tab by mouth daily. 3/19/21   Oni Castellon MD   balsam peru-castor oiL (VENELEX) ointment Apply  to affected area two (2) times a day. 3/19/21   Oni Castellon MD   insulin aspart U-100 (NovoLOG Flexpen U-100 Insulin) 100 unit/mL (3 mL) inpn Inject 14 units at Breakfast, 10 with Lunch and Dinner 2/19/21   Manuel Luu MD   omeprazole (PRILOSEC) 40 mg capsule Take 40 mg by mouth daily. Indications: 3-23-21: Patient states he takes 40mg in the morning and 40mg in the evening. 5/21/20   Provider, Historical   dutasteride (AVODART) 0.5 mg capsule Take 0.5 mg by mouth daily. Provider, Historical   acetaminophen (TYLENOL EXTRA STRENGTH) 500 mg tablet Take 1,000 mg by mouth every eight (8) hours as needed for Pain.     Provider, Historical     REVIEW OF SYSTEMS:     []  Unable to obtain reliable ROS due to  [] mental status  [] sedated   [] intubated   [x] Total of 12 systems reviewed as follows:  Constitutional: negative fever, negative chills, negative weight loss  Eyes:   negative visual changes  ENT:   negative sore throat, tongue or lip swelling  Respiratory:  negative cough, pos dyspnea  Cards:  negative for chest pain, palpitations, lower extremity edema  GI:   negative for nausea, vomiting, diarrhea, and abdominal pain  :  negative for frequency, dysuria  Integument:  negative for rash and pruritus  Heme:  negative for easy bruising and gum/nose bleeding  Musculoskel: negative for myalgias,  back pain and muscle weakness  Neuro:  negative for headaches, dizziness, vertigo  Psych:  negative for feelings of anxiety, depression   Travel?: none    Objective:   VITALS:    Visit Vitals  BP (!) 89/26   Pulse 75   Temp 97.7 °F (36.5 °C)   Resp 19   Ht 5' 9\" (1.753 m)   Wt 115.2 kg (254 lb)   SpO2 94%   BMI 37.51 kg/m²     PHYSICAL EXAM:  Gen:  []  WD []  WN  [] cachectic []  thin []  obese []  disheveled             []  ill apearing  []   Critical  [x]   Chronic    [x]  No acute distress    HEENT:   [x] NC/AT/PERRL    [] pink conjunctivae      [] pale conjunctivae                  PERRL  [] yes  [] no      [] moist mucosa    [] dry mucosa    hearing intact to voice [] yes  [] No                 NECK:   supple [x] yes  [] no        masses [] yes  [] No               thyroid  []  non tender  []  tender    RESP:   [] CTA bilaterally/no wheezing/rhonchi/rales/crackles    [x] rhonchi bilaterally - no dullness  [] wheezing   [] rhonchi   [] crackles     use of accessory muscles [] yes [] no    CARD:   [x]  regular rate and rhythm/No murmurs/rubs/gallops    murmur  [] yes ()  [] no      Rubs  [] yes  [] no       Gallops [] yes  [] no    Rate []  regular  []  irregular        carotid bruits  [] Right  []  Left                 LE edema [x] yes  [] no           JVP  []  yes   []  no    ABD:    [x] soft/non distended/non tender/+bowel sounds/no HSM    []  Rigid    tenderness [] yes [] no   Liver enlargement  []  yes []  no                Spleen enlargement  []  yes []  no     distended []  yes [] no     bowel sound  [] hypoactive   [] hyperactive    LYMPH:    Neck []  yes [x]  no       Axillae []  yes [x]  no    SKIN:   Rashes []  yes   [x] no    Ulcers []  yes   [x]  no               [] tight to palpitation    skin turgor []  good  [] poor  [] decreased               Cyanosis/clubbing []  yes []  no    NEUR:   [x] cranial nerves II-XII grossly intact       [] Cranial nerves deficit                 []  facial droop    []  slurred speech   [] aphasic     [] Strength normal     []  weakness  []  LUE  []   RUE/ []  LLE  []   RLE    follows commands  [x]  yes []  no           PSYCH:   insight [] poor [x] good   Alert and Oriented to  [x] person  [x] place  [x]  time                    [] depressed [] anxious [] agitated  [] lethargic [] stuporous  [] sedated     LAB DATA REVIEWED:    Recent Labs     03/18/22  1216   WBC 8.0   HGB 13.1   HCT 43.4   *     Recent Labs     03/18/22  1351   *   K 5.3*      CO2 23   BUN 93*   CREA 10.10*   *   CA 7.8*     Recent Labs     03/18/22  1351   ALT 20   AP 46   TBILI 0.5   ALB 3.0*   GLOB 3.5     No results for input(s): INR, PTP, APTT, INREXT in the last 72 hours. No results for input(s): FE, TIBC, PSAT, FERR in the last 72 hours. No results for input(s): PH, PCO2, PO2 in the last 72 hours. No results for input(s): CPK, CKMB in the last 72 hours.     No lab exists for component: TROPONINI  Lab Results   Component Value Date/Time    Glucose (POC) 160 (H) 12/21/2021 01:44 PM    Glucose (POC) 160 (H) 12/21/2021 08:00 AM    Glucose (POC) 160 (H) 09/28/2021 03:08 PM    Glucose (POC) 133 (H) 03/19/2021 11:39 AM    Glucose (POC) 191 (H) 03/19/2021 07:43 AM    Glucose (POC) 165 (H) 03/18/2021 09:01 PM       Procedures: see electronic medical records for all procedures/Xrays and details which were not copied into this note but were reviewed prior to creation of Plan.    ________________________________________________________________________       ___________________________________________________  Consulting Physician: Syble Bolster, MD

## 2022-03-18 NOTE — PROGRESS NOTES
Spiritual Care Assessment/Progress Note  [facility]      NAME: David Andrews      MRN: [id number]  AGE: 67 y.o.  SEX: male  [place] Affiliation: No preference   Language: English     3/18/2022     Total Time (in minutes): 19     Spiritual Assessment begun in [facility] EMERGENCY DEPT through conversation with:         []Patient        [x] Family    [] Friend(s)        Reason for Consult: Emergency Department visit,Initial/Spiritual assessment, patient floor     Spiritual beliefs: (Please include comment if needed)     [] Identifies with a stefan tradition:         [] Supported by a stefan community:            [] Claims no spiritual orientation:           [] Seeking spiritual identity:                [] Adheres to an individual form of spirituality:           [x] Not able to assess:     Did not indicate                      Identified resources for coping:      [] Prayer                               [] Music                  [] Guided Imagery     [x] Family/friends                 [] Pet visits     [] Devotional reading                         [] Unknown     [] Other:                                           Interventions offered during this visit: (See comments for more details)    Patient Interventions: Initial/Spiritual assessment, patient floor     Family/Friend(s): Catharsis/review of pertinent events in supportive environment,Affirmation of emotions/emotional suffering,Initial Assessment     Plan of Care:     [] Support spiritual and/or cultural needs    [] Support AMD and/or advance care planning process      [] Support grieving process   [] Coordinate Rites and/or Rituals    [] Coordination with community clergy   [x] No spiritual needs identified at this time   [] Detailed Plan of Care below (See Comments)  [] Make referral to Music Therapy  [] Make referral to Pet Therapy     [] Make referral to Addiction services  [] Make referral to [facility]  [] Make referral to Spiritual Care Partner  [] No future visits requested        [x] Contact Spiritual Care for further referrals     Comments:  Reviewed chart prior to visit in ER per in basket request for visit. Patient's wife, Ms Jayce Davila was present. Patient had his ball cap pulled down over his eyes and appeared to be sleeping. He did not respond when I greeted him and his wife. Ms Jayce Davila shared events of the morning that led to ER visit. She is coping well at this time and expressed no immediate concerns. Explained role of  and advised of ongoing availability for support.        RIVER Rosenthal, [facility], Staff [address]    [address] Paging Service  [phone]

## 2022-03-18 NOTE — ED NOTES
Pt. With very low BP readings. Limited areas that are available for BP reading. Cuff currently on R forearm. Unsure of accuracy of reading. Awaiting EJ access by Dr. Lucio Anton and will move BP cuff.

## 2022-03-18 NOTE — ED NOTES
Patient sats dropped to 83% on room air. Pt. Placed on 2 L nasal cannula. Patient BP remains low. Spoke with Dr. Janet Sherman who is aware.

## 2022-03-18 NOTE — ED NOTES
Pt arrived in triage via car. Pt is short of breath, hypotensive. Pt was supposed to have dialysis today but wife could not get him to stand due to weakness and shortness of breath.     Provider at bedside - started pt on 2 L via nasal canula    13:06 pt transported to CT

## 2022-03-18 NOTE — ED NOTES
Pressure ulcer stage 2 on left buttock. Skin tear to left knee from fall at doctor's office 3/17/2022. Weeping ulcers bilateral lower legs. Multiple open areas to feet, split open area to right 2nd toe, cut to left 3rd toe.

## 2022-03-18 NOTE — H&P
[facility] INITIAL ASSESS[facility]. Name: Michael Verdin   : 1950   MRN: [id number]   Date: 3/18/2022        Chief Complaint   Patient presents with    Shortness of Breath     short of breath and weakness for a couple of days; SOB for a couple of weeks; he couldn't get dialysis today for weakness    Fall     yesterday        HPI:     Mr. Danny Moore is 78-year-old  male with morbid obesity, end-stage renal disease, atrial fibrillation, coronary artery disease, congestive heart failure, type 2 diabetes mellitus, hypertension, hyperlipidemia, obstructive sleep apnea but not on CPAP and hypothyroidism. He presented to emergency department with complaint of worsening generalized weakness for the last 3 to 4 days. During the stay in ER his blood pressure has been labile and possible need for vasopressors. He missed his dialysis today. He has generalized edema. He denies fever, nausea, vomiting, chest pain. He does have some shortness of breath. Due to his borderline blood pressure and the need for hemodialysis today with anticipation of worsening hypotension and vasopressor needs. He is going to be admitted to ICU for further care and monitoring. He also had some worsening hypoxemia, now requiring 11 L of oxygen by nasal cannula. Prbolem list:     Acute hypoxemic respiratory failure. Volume overload pulmonary edema and bilateral pleural effusions. Congestive heart failure with acute decompensation. Type 2 diabetes mellitus. History of hypertension. Elevated troponin. Hypotension. Unexplained, no signs of infection. End-stage renal disease on hemodialysis. Anasarca. Decubitus ulcers on lower back. Mild hyperkalemia. Coronary artery disease status post CABG. History of A. fib. Assessment/Plan:     Acute hypoxemic respiratory failure. Secondary to volume overload pulmonary edema with bilateral pleural effusions.   Patient is going to get hemodialysis with volume removal.  Supplemental oxygen for goal saturation of 88 to 92%. BiPAP as needed. Possible sepsis. Patient has borderline hypotension with lactic acidosis. We will start on empiric Zosyn for possible pneumonia. Obtain blood cultures. CT abdomen is negative for any intra-abdominal source. Start on Midodrine. Goal map of more than 65.  -Has Gen abd tenderness but CT abd pelvis is neg for acute abnormality. End-stage renal disease. Had mild hyperkalemia today. Patient is going to get hemodialysis today. CAD. -Continue ASA, hold on BB due to hypotension for now. DM2.  -SSI for glucose control. H/o HTN. -Hold BP meds. Hypothyroidism. Continue levothyroxine. Decubitus ulcers. Wound care. DVT prophylaxis. On Eliquis. Stress ulcer prophylaxis. Already on Protonix. CODE STATUS. Full code    I personally spent 80 minutes of critical care time. This is time spent at this critically ill patient's bedside actively involved in patient care as well as the coordination of care and discussions with the patient's family. This does not include any procedural time which has been billed separately. Review of Systems:     A comprehensive review of systems was negative except for that written in the HPI. Objective:   Vital Signs:  Visit Vitals  BP (!) 100/37   Pulse 75   Temp 97.7 °F (36.5 °C)   Resp 16   Ht 5' 9\" (1.753 m)   Wt 115.2 kg (254 lb)   SpO2 90%   BMI 37.51 kg/m²    O2 Flow Rate (L/min): 12 l/min O2 Device: Hi flow nasal cannula Temp (24hrs), Av.6 °F (36.4 °C), Min:97.5 °F (36.4 °C), Max:97.7 °F (36.5 °C)           Intake/Output:   No intake or output data in the 24 hours ending 22 1805    Physical Exam:  Physical Exam  Constitutional:       Appearance: Normal appearance. HENT:      Head: Normocephalic and atraumatic.       Mouth/Throat:      Mouth: Mucous membranes are moist.   Eyes:      Extraocular Movements: Extraocular movements intact. Conjunctiva/sclera: Conjunctivae normal.      Pupils: Pupils are equal, round, and reactive to light. Cardiovascular:      Rate and Rhythm: Normal rate and regular rhythm. Abdominal:      General: Abdomen is flat. There is no distension. Palpations: Abdomen is soft. Tenderness: There is abdominal tenderness. There is no guarding. Musculoskeletal:      Cervical back: Normal range of motion and neck supple. Right lower leg: Edema present. Left lower leg: Edema present. Skin:     General: Skin is warm. Neurological:      General: No focal deficit present. Mental Status: He is alert and oriented to person, place, and time. Past Medical History:      has a past medical history of Arrhythmia, Arthritis, Atrial fibrillation ([address] CAD (coronary atherosclerotic disease), Chronic kidney disease, Chronic pain, Congestive heart failure ([address] Diabetes ([address] Diabetes mellitus type 2, controlled ([address] (3/13/2017), GERD (gastroesophageal reflux disease), Heart failure ([address] CABG (3/13/2017), Hyperlipidemia, Hypertension, Long term current use of anticoagulant therapy, Morbid obesity ([address] (3/13/2017), Neuropathy, JACEY (obstructive sleep apnea) (6/23/2017), Pacemaker, S/P CABG x 2 (2010), Skin cancer, and Thyroid disease. He has no past medical history of Carotid artery disease ([address] Clotting disorder ([address] Glaucoma, ICD (implantable cardioverter-defibrillator) in place, Murmur, Myocardial infarction [facility]), Pulmonary embolism ([address] Syncope, or Valvular heart disease. Past Surgical History:      has a past surgical history that includes ir insert tunl cvc w/o port over 5 yr (3/16/2021); pr cardiac surg procedure unlist; hx pacemaker (Left, 12/26/2018); hx vascular access (03/2021); hx orthopaedic; and hx cataract removal (Bilateral). Home Medications:     Prior to Admission medications    Medication Sig Start Date End Date Taking?  Authorizing Provider clobetasoL (TEMOVATE) 0.05 % ointment  3/2/22   Provider, Historical   cholecalciferol, vitamin D3, (DIALYVITE VITAMIN D PO) Take  by mouth. Provider, Historical   aspirin delayed-release 81 mg tablet Take 81 mg by mouth daily. 2/3/22   Provider, Historical   calcium acetate,phosphat bind, (PHOSLO) 667 mg cap TAKE 1 CAPSULE BY MOUTH 3 TIMES A DAY WITH MEALS. 2/3/22   Provider, Historical   miconazole (MICOTIN) 2 % topical cream APPLY TOPICALLY 2 TIMES DAILY. APPLY TO AFFECTED AREAS 2/3/22   Provider, Historical   ketoconazole (NIZORAL) 2 % topical cream Apply  to affected area daily. Provider, Historical   insulin glargine (Lantus Solostar U-100 Insulin) 100 unit/mL (3 mL) inpn 20 Units by SubCUTAneous route nightly. INJECT 20 UNITS SUBCUTANEOUSLY AT BEDTIME  Patient taking differently: 15 Units by SubCUTAneous route nightly. 12/10/21   Mariam River MD   levothyroxine (SYNTHROID) 137 mcg tablet TAKE 1 TABLET BY MOUTH DAILY (BEFORE BREAKFAST). Patient taking differently: Take 137 mcg by mouth Daily (before breakfast). 10/31/21   Mariam River MD   loratadine (Claritin) 10 mg tablet Take 10 mg by mouth daily. Provider, Historical   pravastatin (PRAVACHOL) 80 mg tablet TAKE 1 TABLET BY MOUTH EVERY DAY AT NIGHT  Patient taking differently: Take 80 mg by mouth nightly. 8/2/21   Ana PATEL NP   apixaban (Eliquis) 5 mg tablet TAKE 1 TABLET BY MOUTH TWICE A DAY  Patient taking differently: Take 5 mg by mouth two (2) times a day. TAKE 1 TABLET BY MOUTH TWICE A DAY 6/14/21   Dilan Cutler MD   Insulin Needles, Disposable, (BD Ultra-Fine Mini Pen Needle) 31 gauge x 3/16\" ndle USE WITH INSULIN PENS 4 TIMES DAILY 4/7/21   Stephanie Wahl MD   metoprolol succinate (TOPROL-XL) 100 mg tablet Take 1 Tab by mouth daily. 3/19/21   Jenny Bird, MD   balsam peru-castor oiL (VENELEX) ointment Apply  to affected area two (2) times a day.  3/19/21   Jenny Willson MD   insulin aspart U-100 (NovoLOG Flexpen U-100 Insulin) 100 unit/mL (3 mL) inpn Inject 14 units at Breakfast, 10 with Lunch and Dinner 2/19/21   Thang Oconnor MD   omeprazole (PRILOSEC) 40 mg capsule Take 40 mg by mouth daily. Indications: 3-23-21: Patient states he takes 40mg in the morning and 40mg in the evening. 5/21/20   Provider, Historical   dutasteride (AVODART) 0.5 mg capsule Take 0.5 mg by mouth daily. Provider, Historical   acetaminophen (TYLENOL EXTRA STRENGTH) 500 mg tablet Take 1,000 mg by mouth every eight (8) hours as needed for Pain. Provider, Historical       Allergies/Social/Family History: Allergies   Allergen Reactions    Allopurinol Rash    Gabapentin Drowsiness    Ciprofloxacin Nausea Only    Percocet [Oxycodone-Acetaminophen] Other (comments)     Hallucinations, not allergic to tylenol      Social History     Tobacco Use    Smoking status: Never Smoker    Smokeless tobacco: Never Used   Substance Use Topics    Alcohol use: Not Currently     Comment: rare      Family History   Problem Relation Age of Onset    Heart Attack Father     Cancer Father         lymph node    Cancer Mother         breast    No Known Problems Brother          LABS AND  DATA:   Personally reviewed      Peak airway pressure:      Minute ventilation:        CRITICAL CARE CONSULTANT NOTE  I had a face to face encounter with the patient, reviewed and interpreted patient data including clinical events, labs, images, vital signs, I/O's, and examined patient. I have discussed the case and the plan and management of the patient's care with the consulting services, the bedside nurses and the respiratory therapist.      NOTE OF PERSONAL INVOLVEMENT IN CARE   This patient has a high probability of imminent, clinically significant deterioration, which requires the highest level of preparedness to intervene urgently.  I participated in the decision-making and personally managed or directed the management of the following life and organ supporting interventions that required my frequent assessment to treat or prevent imminent deterioration.         Shlomo Carias MD  Pulmonary and critical care  3/18/2022

## 2022-03-18 NOTE — PROGRESS NOTES
Pharmacy Automatic Renal Dosing Protocol - Antimicrobials    Indication for Antimicrobials: Sepsis Unknown Origin    Current Regimen of Each Antimicrobial:  Vancomycin 2000 mg x 1 then 1000 mg post HD (Start Date 3/18; Day # 1)  Piperacillin 3.375 g Q12H (Start 3/18, Day 1)     Previous Antimicrobial Therapy:    Vancomycin Goal Level Tough: 20-25 mcg/ml    Vancomycin Levels  Date Dose & Interval Measured (mcg/mL) Steady State (mcg/mL)                       Date & time of next level:     Significant Cultures:     Radiology / Imaging results: (X-ray, CT scan or MRI):       Labs:  Recent Labs     22  1351 22  1216   CREA 10.10*  --    BUN 93*  --    WBC  --  8.0     Temp (24hrs), Av.6 °F (36.4 °C), Min:97.5 °F (36.4 °C), Max:97.7 °F (36.5 °C)      Paralysis, amputations, malnutrition:   Creatinine Clearance (mL/min) or Dialysis: HD    Impression/Plan:   Antibiotics as above. Pharmacy will follow daily and adjust medications as appropriate for renal function and/or serum levels. Thank you,  Annemarie Botello, [address] [facility]      Recommended duration of therapy  http://[facility]/[place]/[facility]/Pharmacy/Clinical%20Companion/[email or web address]%[email or web address]. docx    Renal Dosing  http://[facility]/[place]/[facility]/Pharmacy/Clinical%20Companion/Renal%[email or web address]. pdf

## 2022-03-18 NOTE — PROGRESS NOTES
Transition of Care Plan:    RUR: Not listed on chart   Disposition: Home with spouse, resumption of [facility] (SN, possible need for PT/OT)  Follow up appointments: PCP, Nephrology  OP HD: [facility] MWF 11 AM chair time - will need to send nephrology notes and flowsheets prior to d/c   DME needed: Pt has a walker. Pt currently on oxygen, will need to evaluate for home oxygen needs. Transportation at Discharge: Pt's spouse  Keys or means to access home: Has access        IM Medicare Letter: Will need 2nd IMM letter prior to d/c  Is patient a BCPI-A Bundle: N/A          If yes, was Bundle Letter given?:    Is patient a  and connected with the [place]? N/A              If yes, was [employer] transfer form completed and [facility] notified? Caregiver Contact: Pt's spouse, Benjy Lacy) [phone]  Discharge Caregiver contacted prior to discharge? Care Conference needed?:                   Reason for Admission:   Hypotension, ESRD on dialysis                     RUR Score:  Not listed on chart                 PCP: First and Last name:   Kailyn Breaux MD     Name of Practice:    Are you a current patient: Yes/No: Yes   Approximate date of last visit: Yesterday   Can you participate in a virtual visit if needed: Yes if needed    Do you (patient/family) have any concerns for transition/discharge? No concerns voiced at this time. Plan for utilizing home health:  Pt will need TEZ orders for [facility] (pt is currently open for  for wound care, pt's spouse feels that pt will likely need [facility] PT/OT as well.)    Current Advanced Directive/Advance Care Plan:  Prior   Advance Care Planning   Healthcare Decision Maker:       Primary Decision Maker: Ana Maria Loaiza - Spouse - [phone]    Today we documented Decision Maker(s) consistent with Legal Next of Kin hierarchy.          Healthcare Decision Maker:             Primary Decision Maker: Ana Maria Loaiza - Spouse - [phone]    Transition of Care Plan:  Home with spouse, resumption of [facility] (SN, possibly need for PT/OT)    CM reviewed chart. CM completed assessment with pt's spouse via phone due to no answer on room phone. CM introduced self, role of CM, verified demographics, and discussed transition of care planning. Pt resides with spouse in home with 1 SERENITY. Pt's spouse reports that pt's increased weakness prompted him to miss dialysis today and come to ED for evaluation. Pt seen by PCP on yesterday. Pt attends [facility] 11 AM chair time. Pt's spouse states that a friend helps transport him there and she picks him up, no concerns with transportation. Spouse will transport at d/c. Pt ambulates with cane. Spouse supports with ADLs as needed, reports that pt needs more help when he is feeling weaker. She also states that he has some hearing and vision loss. Pt is open with [facility] for SN for wound care of pressure ulcers. Pt's spouse feels that pt will benefit from [facility] therapy orders as well as SN. Unit care management will continue to follow for transition of care planning needs. Care Management Interventions  PCP Verified by CM: Yes  Palliative Care Criteria Met (RRAT>21 & CHF Dx)?:  (Pt may benefit from palliative care consult)  Mode of Transport at Discharge:  Other (see comment) (Spouse)  Transition of Care Consult (CM Consult): Discharge Planning  Discharge Durable Medical Equipment: No Neil Setting )  Physical Therapy Consult: No  Occupational Therapy Consult: No  Speech Therapy Consult: No  Support Systems: Spouse/Significant Other  Confirm Follow Up Transport: Family  Discharge Location  Patient Expects to be Discharged to[de-identified] Home with home health (Home with spouse, resumption of [facility], resumption of OP HD, outpatient follow up)          Ciera Zelaya [facility] [address]

## 2022-03-18 NOTE — ED PROVIDER NOTES
EMERGENCY DEPARTMENT HISTORY AND PHYSICAL EXAM      Date: 3/18/2022  Patient Name: Juliette Hylton  Patient Age and Sex: 67 y.o. male  MRN:  [id number]  [facility]:  [id number]    History of Presenting Illness     Chief Complaint   Patient presents with    Shortness of Breath     short of breath and weakness for a couple of days; SOB for a couple of weeks; he couldn't get dialysis today for weakness    Fall     yesterday        History Provided By: Patient and Patient's Wife    Ability to gather history was limited by:     HPI: Juliette Hylton, 67 y.o. male with extensive medical comorbidities including ESRD on HD, atrial fibrillation on Eliquis, diabetes, coronary artery disease, obesity, CABG x2, chronic wounds and decubitus ulcer, presenting with generalized malaise and fatigue for the past 3 days, shortness of breath, moderate severity. Missed dialysis today because he was too weak. Location:    Quality:      Severity:    Duration:   Timing:      Context:    Modifying factors:   Associated symptoms:     Past History      The patient's medical, surgical, and social history on file were reviewed by me today.      The family history was reviewed by me today and was non-contributory, unless otherwise specified below:    Past Medical History:  Past Medical History:   Diagnosis Date    Arrhythmia     atrial fibrillation 2017    Arthritis     spine, all joints    Atrial fibrillation ([address]     CAD (coronary atherosclerotic disease)     Chronic kidney disease     [facility]    Chronic pain     back pain - unable to lay flat    Congestive heart failure ([address]     Diabetes ([address]     Diabetes mellitus type 2, controlled ([address] 3/13/2017    GERD (gastroesophageal reflux disease)     Heart failure ([address]     Hx of CABG 3/13/2017    CABG in 2010 in [address] [place] [address] Hyperlipidemia     Hypertension     Long term current use of anticoagulant therapy     Morbid obesity ([address] 3/13/2017    Neuropathy in both feet    JACEY (obstructive sleep apnea) 6/23/2017    no CPAP, pt states never tested    Pacemaker     S/P CABG x 2 2010    Skin cancer     Thyroid disease     hypothyroid       Past Surgical History:  Past Surgical History:   Procedure Laterality Date    HX CATARACT REMOVAL Bilateral     HX ORTHOPAEDIC      L 3rd toe amputation in 1999    HX PACEMAKER Left 12/26/2018    Dr Sylvia Jones x4 crt-p    HX VASCULAR ACCESS  03/2021    R chest    IR INSERT TUNL CVC W/O PORT OVER 5 YR  3/16/2021    OK CARDIAC SURG PROCEDURE UNLIST      CABGx2 in 2010       Family History:  Family History   Problem Relation Age of Onset    Heart Attack Father     Cancer Father         lymph node    Cancer Mother         breast    No Known Problems Brother        Social History:  Social History     Tobacco Use    Smoking status: Never Smoker    Smokeless tobacco: Never Used   Vaping Use    Vaping Use: Never used   Substance Use Topics    Alcohol use: Not Currently     Comment: rare    Drug use: Never       Current Medications:  No current facility-administered medications on file prior to encounter. Current Outpatient Medications on File Prior to Encounter   Medication Sig Dispense Refill    clobetasoL (TEMOVATE) 0.05 % ointment       cholecalciferol, vitamin D3, (DIALYVITE VITAMIN D PO) Take  by mouth.  aspirin delayed-release 81 mg tablet Take 81 mg by mouth daily.  calcium acetate,phosphat bind, (PHOSLO) 667 mg cap TAKE 1 CAPSULE BY MOUTH 3 TIMES A DAY WITH MEALS.  miconazole (MICOTIN) 2 % topical cream APPLY TOPICALLY 2 TIMES DAILY. APPLY TO AFFECTED AREAS      ketoconazole (NIZORAL) 2 % topical cream Apply  to affected area daily.  insulin glargine (Lantus Solostar U-100 Insulin) 100 unit/mL (3 mL) inpn 20 Units by SubCUTAneous route nightly.  INJECT 20 UNITS SUBCUTANEOUSLY AT BEDTIME (Patient taking differently: 15 Units by SubCUTAneous route nightly.) 15 Pen 1    levothyroxine (SYNTHROID) 137 mcg tablet TAKE 1 TABLET BY MOUTH DAILY (BEFORE BREAKFAST). (Patient taking differently: Take 137 mcg by mouth Daily (before breakfast). ) 90 Tablet 1    loratadine (Claritin) 10 mg tablet Take 10 mg by mouth daily.  pravastatin (PRAVACHOL) 80 mg tablet TAKE 1 TABLET BY MOUTH EVERY DAY AT NIGHT (Patient taking differently: Take 80 mg by mouth nightly.) 90 Tablet 3    apixaban (Eliquis) 5 mg tablet TAKE 1 TABLET BY MOUTH TWICE A DAY (Patient taking differently: Take 5 mg by mouth two (2) times a day. TAKE 1 TABLET BY MOUTH TWICE A DAY) 180 Tablet 3    Insulin Needles, Disposable, (BD Ultra-Fine Mini Pen Needle) 31 gauge x 3/16\" ndle USE WITH INSULIN PENS 4 TIMES DAILY 400 Pen Needle 3    metoprolol succinate (TOPROL-XL) 100 mg tablet Take 1 Tab by mouth daily. 135 Tab 1    balsam peru-castor oiL (VENELEX) ointment Apply  to affected area two (2) times a day. 3 Tube 0    insulin aspart U-100 (NovoLOG Flexpen U-100 Insulin) 100 unit/mL (3 mL) inpn Inject 14 units at Breakfast, 10 with Lunch and Dinner 15 Adjustable Dose Pre-filled Pen Syringe 3    omeprazole (PRILOSEC) 40 mg capsule Take 40 mg by mouth daily. Indications: 3-23-21: Patient states he takes 40mg in the morning and 40mg in the evening.  dutasteride (AVODART) 0.5 mg capsule Take 0.5 mg by mouth daily.  acetaminophen (TYLENOL EXTRA STRENGTH) 500 mg tablet Take 1,000 mg by mouth every eight (8) hours as needed for Pain. Allergies: Allergies   Allergen Reactions    Allopurinol Rash    Gabapentin Drowsiness    Ciprofloxacin Nausea Only    Percocet [Oxycodone-Acetaminophen] Other (comments)     Hallucinations, not allergic to tylenol     Review of Systems    A complete ROS was reviewed by me today and was negative, unless otherwise specified below:    Review of Systems   Constitutional: Positive for fatigue. Negative for chills and fever. Respiratory: Positive for shortness of breath.     Cardiovascular: Positive for leg swelling. Negative for chest pain. Gastrointestinal: Negative for abdominal pain, nausea and vomiting. All other systems reviewed and are negative. Physical Exam   Vital Signs  Patient Vitals for the past 8 hrs:   Temp Pulse Resp BP SpO2   03/18/22 1656  75 16 104/84 90 %   03/18/22 1641     94 %   03/18/22 1623  75 19 (!) 89/26    03/18/22 1531  75 21 (!) 76/25    03/18/22 1502  75 18 (!) 84/43 96 %   03/18/22 1347  75 17 (!) 131/92 96 %   03/18/22 1332  75 16 (!) 150/28 92 %   03/18/22 1202     92 %   03/18/22 1148 97.7 °F (36.5 °C) 75 22 (!) 90/49 90 %   03/18/22 1123 97.5 °F (36.4 °C) (!) 51 28 (!) 73/50 91 %          Physical Exam  Vitals and nursing note reviewed. Constitutional:       General: He is not in acute distress. Appearance: Normal appearance. He is well-developed. He is obese. He is ill-appearing. Comments: Appears chronically ill   HENT:      Head: Normocephalic and atraumatic. Eyes:      General:         Right eye: No discharge. Left eye: No discharge. Conjunctiva/sclera: Conjunctivae normal.   Cardiovascular:      Rate and Rhythm: Normal rate and regular rhythm. Heart sounds: Normal heart sounds. No murmur heard. Pulmonary:      Effort: Pulmonary effort is normal. No respiratory distress. Breath sounds: Normal breath sounds. No wheezing. Abdominal:      General: There is no distension. Palpations: Abdomen is soft. Tenderness: There is abdominal tenderness in the periumbilical area. Musculoskeletal:         General: No deformity. Normal range of motion. Cervical back: Normal range of motion and neck supple. Right lower leg: Edema present. Left lower leg: Edema present. Skin:     General: Skin is warm and dry. Findings: No rash. Comments: Wounds on the lower extremities and sacrum   Neurological:      General: No focal deficit present.       Mental Status: He is alert and oriented to person, place, and time. Psychiatric:         Mood and Affect: Mood normal.         Behavior: Behavior normal.         Thought Content: Thought content normal.         Diagnostic Study Results   Labs  Recent Results (from the past 24 hour(s))   EKG, 12 LEAD, INITIAL    Collection Time: 03/18/22 11:35 AM   Result Value Ref Range    Ventricular Rate 75 BPM    Atrial Rate 79 BPM    QRS Duration 166 ms    Q-T Interval 472 ms    QTC Calculation (Bezet) 527 ms    Calculated R Axis -116 degrees    Calculated T Axis 115 degrees    Diagnosis       Ventricular-paced rhythm  Biventricular pacemaker detected  Confirmed by Evelyn Lake ([id number]) on 3/18/2022 4:05:43 PM     CBC WITH AUTOMATED DIFF    Collection Time: 03/18/22 12:16 PM   Result Value Ref Range    WBC 8.0 4.1 - 11.1 K/uL    RBC 4.23 4. 10 - 5.70 M/uL    HGB 13.1 12.1 - 17.0 g/dL    HCT 43.4 36.6 - 50.3 %    .6 (H) 80.0 - 99.0 FL    MCH 31.0 26.0 - 34.0 PG    MCHC 30.2 30.0 - 36.5 g/dL    RDW 22.2 (H) 11.5 - 14.5 %    PLATELET 910 (L) 578 - 400 K/uL    MPV 11.3 8.9 - 12.9 FL    NRBC 0.9 (H) 0  WBC    ABSOLUTE NRBC 0.07 (H) 0.00 - 0.01 K/uL    NEUTROPHILS 79 (H) 32 - 75 %    LYMPHOCYTES 6 (L) 12 - 49 %    MONOCYTES 12 5 - 13 %    EOSINOPHILS 1 0 - 7 %    BASOPHILS 1 0 - 1 %    IMMATURE GRANULOCYTES 1 (H) 0.0 - 0.5 %    ABS. NEUTROPHILS 6.2 1.8 - 8.0 K/UL    ABS. LYMPHOCYTES 0.5 (L) 0.8 - 3.5 K/UL    ABS. MONOCYTES 1.0 0.0 - 1.0 K/UL    ABS. EOSINOPHILS 0.1 0.0 - 0.4 K/UL    ABS. BASOPHILS 0.1 0.0 - 0.1 K/UL    ABS. IMM.  GRANS. 0.1 (H) 0.00 - 0.04 K/UL    DF SMEAR SCANNED      RBC COMMENTS ANISOCYTOSIS  1+        RBC COMMENTS ROMEO CELLS  3+        RBC COMMENTS POLYCHROMASIA  PRESENT        RBC COMMENTS TEARDROP CELLS  PRESENT       LACTIC ACID    Collection Time: 03/18/22 12:16 PM   Result Value Ref Range    Lactic acid 3.0 (HH) 0.4 - 2.0 MMOL/L   NT-PRO BNP    Collection Time: 03/18/22  1:51 PM   Result Value Ref Range    NT pro-BNP >35,000 (H) <125 PG/ML   TROPONIN-HIGH SENSITIVITY    Collection Time: 03/18/22  1:51 PM   Result Value Ref Range    Troponin-High Sensitivity 374 (HH) 0 - 76 ng/L   METABOLIC PANEL, COMPREHENSIVE    Collection Time: 03/18/22  1:51 PM   Result Value Ref Range    Sodium 135 (L) 136 - 145 mmol/L    Potassium 5.3 (H) 3.5 - 5.1 mmol/L    Chloride 100 97 - 108 mmol/L    CO2 23 21 - 32 mmol/L    Anion gap 12 5 - 15 mmol/L    Glucose 234 (H) 65 - 100 mg/dL    BUN 93 (H) 6 - 20 MG/DL    Creatinine 10.10 (H) 0.70 - 1.30 MG/DL    BUN/Creatinine ratio 9 (L) 12 - 20      GFR est AA 6 (L) >60 ml/min/1.73m2    GFR est non-AA 5 (L) >60 ml/min/1.73m2    Calcium 7.8 (L) 8.5 - 10.1 MG/DL    Bilirubin, total 0.5 0.2 - 1.0 MG/DL    ALT (SGPT) 20 12 - 78 U/L    AST (SGOT) 31 15 - 37 U/L    Alk. phosphatase 46 45 - 117 U/L    Protein, total 6.5 6.4 - 8.2 g/dL    Albumin 3.0 (L) 3.5 - 5.0 g/dL    Globulin 3.5 2.0 - 4.0 g/dL    A-G Ratio 0.9 (L) 1.1 - 2.2         Radiologic Studies  CT ABD PELV WO CONT   Final Result      1. Moderate bilateral pleural effusions, mild anasarca and small volume ascites. 2. Mild asymmetric abdominal wall stranding/edema and skin thickening on the   left, though similar to prior study. This could represent anasarca or   cellulitis. No focal collection allowing for lack of IV contrast material.   3. Cirrhotic morphology of liver. XR CHEST PORT   Final Result   There has been improvement in the bibasilar opacities left greater   than right when compared to 3/12/2021 consistent with improvement in bibasilar   atelectasis/effusions. CT Results  (Last 48 hours)               03/18/22 1315  CT ABD PELV WO CONT Final result    Impression:      1. Moderate bilateral pleural effusions, mild anasarca and small volume ascites. 2. Mild asymmetric abdominal wall stranding/edema and skin thickening on the   left, though similar to prior study. This could represent anasarca or   cellulitis. No focal collection allowing for lack of IV contrast material.   3. Cirrhotic morphology of liver. Narrative:  INDICATION: malaise, general weakness. lower abdominal tenderness on exam, seems   superficial (?cellulitis). ESRD on dialysis, diabetes, chronic decubitus ulcer       COMPARISON: 3/4/21       TECHNIQUE:    Noncontrast thin axial images were obtained through the abdomen and pelvis. Coronal and sagittal reconstructions were generated. CT dose reduction was   achieved through use of a standardized protocol tailored for this examination   and automatic exposure control for dose modulation. FINDINGS:    LUNG BASES: Moderate bilateral pleural effusions and bibasilar atelectasis. LIVER: Atrophic, nodular. GALLBLADDER: Unremarkable. SPLEEN: No enlargement or lesion. PANCREAS: No mass or ductal dilatation. ADRENALS: No mass. KIDNEYS: Bilateral cortical thinning/atrophy with several hypodensities possibly   cysts. No hydronephrosis. GI TRACT:  No bowel obstruction. Difficult to assess bowel wall thickening given   lack of oral contrast material.   PERITONEUM: Mild ascites. No free air. APPENDIX: Unremarkable. RETROPERITONEUM: Atherosclerosis of the aorta and its branches. No aneurysm. LYMPH NODES:  None enlarged. ADDITIONAL COMMENTS: Anasarca, though there is slight asymmetry in the left   lateral abdominal wall but similar to prior study. No subcutaneous emphysema or   fluid collection allowing for lack of IV contrast.       URINARY BLADDER: Unremarkable. REPRODUCTIVE ORGANS: Unremarkable. LYMPH NODES:  None enlarged. FREE FLUID:  Small amount. BONES: No destructive bone lesion. ADDITIONAL COMMENTS: Anasarca.                CXR Results  (Last 48 hours)               03/18/22 1247  XR CHEST PORT Final result    Impression:  There has been improvement in the bibasilar opacities left greater   than right when compared to 3/12/2021 consistent with improvement in bibasilar   atelectasis/effusions. Narrative:  EXAM:  XR CHEST PORT       INDICATION:  SOB, malaise, dialysis patient, fluid overload       COMPARISON:  3/12/2021       FINDINGS: A portable AP radiograph of the chest was obtained at 1238 hours. Pacer leads are unchanged. Central venous catheter tip overlies SVC right atrial   junction. .  There is slight improvement in the bibasilar opacities when compared   to the prior study left greater than right most consistent with   effusion/atelectasis. .  Cardiomegaly is stable. Patient status post median   sternotomy. Billable Procedures   Critical Care  Performed by: Crissy Cesar MD  Authorized by: Crissy Cesar MD     Critical care provider statement:     Critical care time (minutes):  40    Critical care time was exclusive of:  Separately billable procedures and treating other patients    Critical care was necessary to treat or prevent imminent or life-threatening deterioration of the following conditions:  Shock and circulatory failure    Critical care was time spent personally by me on the following activities:  Ordering and review of laboratory studies, ordering and review of radiographic studies, pulse oximetry, re-evaluation of patient's condition, examination of patient, evaluation of patient's response to treatment, ordering and performing treatments and interventions and review of old charts        Medical Decision Making     I reviewed the patient's most recent Emergency Dept notes and diagnostic tests in formulating my MDM on today's visit. Provider Notes (Medical Decision Making):   65-year-old with extensive medical comorbidities presenting with generalized malaise and fatigue, shortness of breath, edema. Missed dialysis today. On examination he appears chronically ill, frail, with diffuse edema and wounds to his lower extremities.     Patient appears clinically fluid overloaded, hypotensive, hypoxic, requiring increasing oxygen while in the emergency department. He was increased up to 6 L nasal cannula, remained hypoxic and was switched to Ventimask. Patient was also persistently hypotensive. He does not have a leukocytosis or fevers, and although he has a mild lactic acidosis, at this time it is my impression that his symptoms and vital sign abnormalities are not due to sepsis or septic shock. Rather his symptoms seem related to his fluid status, and also technical difficulties obtaining blood pressures. I was called to the patient's bedside multiple times due to his hypotension. I evaluated him in person with the ICU attending we discussed placement of a central line and vasopressors. Very difficult IV access. I placed a right EJ 20-gauge IV, and PICC team was summoned to place ultrasound-guided IV. Ultimately we have decided to attempt midodrine, hold off on pressors. Empiric Zosyn given his chronic wounds, although no sign of active infection. Admit to ICU. 40 minutes critical care. Haja Villalobos MD  5:09 PM  3/18/2022     Consults:    Social History     Tobacco Use    Smoking status: Never Smoker    Smokeless tobacco: Never Used   Vaping Use    Vaping Use: Never used   Substance Use Topics    Alcohol use: Not Currently     Comment: rare    Drug use: Never       Medications Administered during ED course:  Medications   albumin human 25% (BUMINATE) solution 12.5 g (has no administration in time range)   piperacillin-tazobactam (ZOSYN) 3.375 g in 0.9% sodium chloride (MBP/ADV) 100 mL MBP (3.375 g IntraVENous New Bag 3/18/22 1501)          Prescriptions from today's ED visit:  Current Discharge Medication List         Diagnosis and Disposition     Disposition:  Discharged    Clinical Impression:   1. Hypotension, unspecified hypotension type    2.  ESRD (end stage renal disease) on dialysis [facility])        Attestation:  I personally performed the services described in this documentation on this date 3/18/2022 for patient Julio He. David Balderas MD        I was the first provider for this patient on this visit. To the best of my ability I reviewed relevant prior medical records, electrocardiograms, laboratories, and radiologic studies. The patient's presenting problems were discussed, and the patient was in agreement with the care plan formulated and outlined with them. David Balderas MD    Please note that this dictation was completed with Dragon voice recognition software. Quite often unanticipated grammatical, syntax, homophones, and other interpretive errors are inadvertently transcribed by the computer software. Please disregard these errors and excuse any errors that have escaped final proofreading.

## 2022-03-18 NOTE — ED NOTES
Spoke with the intensivist DALTON Zapata who ordered heated high flow oxygen. Spoke with respiratory who will place patient on oxygen.

## 2022-03-19 NOTE — PROGRESS NOTES
Pharmacy Automatic Renal Dosing Protocol - Antimicrobials    Indication for Antimicrobials: Sepsis Unknown Origin    Current Regimen of Each Antimicrobial:  Vancomycin 2000 mg x 1 then 1000 mg post HD (Start Date 3/18; Day # 2)  Piperacillin 3.375 g Q12H (Start 3/18, Day 2)     Previous Antimicrobial Therapy:    Vancomycin Goal Level Tough: 20-25 mcg/ml    Vancomycin Levels  Date Dose & Interval Measured (mcg/mL) Steady State (mcg/mL)                       Date & time of next level: Wednesday, 3/23    Significant Cultures:   3/18: blood - prelim    Radiology / Imaging results: (X-ray, CT scan or MRI):       Labs:  Recent Labs     22  0123 22  1949 22  1913 22  1351 22  1216   CREA 6.98* 10.30*  --  10.10*  --    BUN 54* 92*  --  93*  --    WBC 6.6  --  7.8  --  8.0     Temp (24hrs), Av.7 °F (36.5 °C), Min:97.2 °F (36.2 °C), Max:98.1 °F (36.7 °C)      Paralysis, amputations, malnutrition:   Creatinine Clearance (mL/min) or Dialysis: HD    Impression/Plan:   Antibiotics as above. Random level with AM labs on 3/23  Vancomycin 1000mg post HD dosing     Pharmacy will follow daily and adjust medications as appropriate for renal function and/or serum levels. Thank you,  POPEYE Clark      Recommended duration of therapy  http://[facility]/[place]/[facility]/Pharmacy/Clinical%20Companion/[email or web address]%[email or web address]. docx    Renal Dosing  [email or web address]/[place]/[facility]/Pharmacy/Clinical%20Companion/Renal%[email or web address]. pdf

## 2022-03-19 NOTE — PROGRESS NOTES
NAME: Julio He        :  1950        MRN:  [id number]                    Assessment   :                                               Plan:  ESRD-    Hypotension  Anemia  Fatigue    [id number]  Signed off treatment early (only ~ 2 hours of HD last night; only 700 ml off)  iUF today to try to remove some volume, prn albumin      H/H at goal.  Hold TE.     He wants to continue HD for now, but we are discussing goals of care and I will continue to discuss.     On Midodrine 10 tid         Subjective:     Chief Complaint:  oob in chair. On hi flow. SOB. Edema. His back side hurts a lot (it is the main reason he signed off HD last night). Review of Systems:    Symptom Y/N Comments  Symptom Y/N Comments   Fever/Chills    Chest Pain     Poor Appetite    Edema     Cough    Abdominal Pain     Sputum    Joint Pain     SOB/BETTS    Pruritis/Rash     Nausea/vomit    Tolerating PT/OT     Diarrhea    Tolerating Diet     Constipation    Other       Could not obtain due to:      Objective:     VITALS:   Last 24hrs VS reviewed since prior progress note.  Most recent are:  Visit Vitals  BP (!) 100/47   Pulse 75   Temp 98.1 °F (36.7 °C)   Resp 16   Ht 5' 9\" (1.753 m)   Wt 115.2 kg (254 lb)   SpO2 100%   BMI 37.51 kg/m²       Intake/Output Summary (Last 24 hours) at 3/19/2022 0631  Last data filed at 3/19/2022 0400  Gross per 24 hour   Intake 700 ml   Output 705 ml   Net -5 ml      Telemetry Reviewed:     PHYSICAL EXAM:  General: NAD  + edema  rales    Lab Data Reviewed: (see below)    Medications Reviewed: (see below)    PMH/SH reviewed - no change compared to H&P  ________________________________________________________________________  Care Plan discussed with:  Patient     Family      RN     Care Manager                    Consultant:          Comments   >50% of visit spent in counseling and coordination of care ________________________________________________________________________  Hossein Londono MD     Procedures: see electronic medical records for all procedures/Xrays and details which  were not copied into this note but were reviewed prior to creation of Plan. LABS:  Recent Labs     03/19/22 0123 03/18/22  1913   WBC 6.6 7.8   HGB 11.0* 12.0*   HCT 35.8* 37.8   * 151     Recent Labs     03/19/22 0123 03/1950 03/18/22  1351   * 135* 135*   K 5.0 5.7* 5.3*    99 100   CO2 23 24 23   BUN 54* 92* 93*   CREA 6.98* 10.30* 10.10*   * 203* 234*   CA 7.7* 8.2* 7.8*   MG 2.9*  --  3.6*   PHOS 5.7*  --  7.7*     Recent Labs     03/19/22 0123 03/1950 03/18/22  1351   AP 36* 42* 46   TP 5.8* 6.3* 6.5   ALB 2.6* 2.8* 3.0*   GLOB 3.2 3.5 3.5     No results for input(s): INR, PTP, APTT, INREXT in the last 72 hours. No results for input(s): FE, TIBC, PSAT, FERR in the last 72 hours. No results found for: FOL, RBCF   No results for input(s): PH, PCO2, PO2 in the last 72 hours. No results for input(s): CPK, CKMB in the last 72 hours.     No lab exists for component: TROPONINI  No components found for: [place]  Lab Results   Component Value Date/Time    Color YELLOW/STRAW 03/05/2021 01:28 PM    Appearance CLEAR 03/05/2021 01:28 PM    Specific gravity 1.009 03/05/2021 01:28 PM    pH (UA) 6.0 03/05/2021 01:28 PM    Protein Negative 03/05/2021 01:28 PM    Glucose Negative 03/05/2021 01:28 PM    Ketone Negative 03/05/2021 01:28 PM    Bilirubin Negative 03/05/2021 01:28 PM    Urobilinogen 0.2 03/05/2021 01:28 PM    Nitrites Negative 03/05/2021 01:28 PM    Leukocyte Esterase Negative 03/05/2021 01:28 PM    Epithelial cells FEW 03/05/2021 01:28 PM    Bacteria Negative 03/05/2021 01:28 PM    WBC 0-4 03/05/2021 01:28 PM    RBC 0-5 03/05/2021 01:28 PM       MEDICATIONS:  Current Facility-Administered Medications   Medication Dose Route Frequency    albumin human 25% (BUMINATE) solution 12.5 g 12.5 g IntraVENous Q6H    albumin human 25% (BUMINATE) solution 12.5 g  12.5 g IntraVENous DIALYSIS PRN    midodrine (PROAMATINE) tablet 10 mg  10 mg Oral TID WITH MEALS    piperacillin-tazobactam (ZOSYN) 3.375 g in 0.9% sodium chloride (MBP/ADV) 100 mL MBP  3.375 g IntraVENous Q12H    sodium chloride (NS) flush 5-40 mL  5-40 mL IntraVENous Q8H    sodium chloride (NS) flush 5-40 mL  5-40 mL IntraVENous PRN    acetaminophen (TYLENOL) tablet 650 mg  650 mg Oral Q6H PRN    Or    acetaminophen (TYLENOL) suppository 650 mg  650 mg Rectal Q6H PRN    polyethylene glycol (MIRALAX) packet 17 g  17 g Oral DAILY PRN    ondansetron (ZOFRAN ODT) tablet 4 mg  4 mg Oral Q8H PRN    Or    ondansetron (ZOFRAN) injection 4 mg  4 mg IntraVENous Q6H PRN    famotidine (PF) (PEPCID) 20 mg in 0.9% sodium chloride 10 mL injection  20 mg IntraVENous DAILY    apixaban (ELIQUIS) tablet 5 mg  5 mg Oral BID    aspirin delayed-release tablet 81 mg  81 mg Oral DAILY    balsam peru-castor oiL (VENELEX) ointment   Topical BID    calcium acetate(phosphat bind) (PHOSLO) capsule 667 mg  1 Capsule Oral TID WITH MEALS    B complex-vitaminC-folic acid (NEPHROCAP) cap  1 Capsule Oral DAILY    dutasteride (AVODART) capsule 0.5 mg  0.5 mg Oral DAILY    cetirizine (ZYRTEC) tablet 10 mg  10 mg Oral DAILY    pantoprazole (PROTONIX) tablet 40 mg  40 mg Oral ACB    pravastatin (PRAVACHOL) tablet 80 mg  80 mg Oral QHS    insulin lispro (HUMALOG) injection   SubCUTAneous AC&HS    glucose chewable tablet 16 g  4 Tablet Oral PRN    glucagon (GLUCAGEN) injection 1 mg  1 mg IntraMUSCular PRN    dextrose 10% infusion 0-250 mL  0-250 mL IntraVENous PRN    VANCOMYCIN INFORMATION NOTE   Other Rx Dosing/Monitoring

## 2022-03-19 NOTE — PROGRESS NOTES
Critical Care Progress Note  Josep Persaud MD          Date of Service:  3/19/2022  NAME:  Edmund Carias  :  1950  MRN:  [id number]      Subjective/Hospital course:      3/19: Patient reports feeling the same and c/o lower back pain where he has decubitus ulcer. Sitting in chair. BP has been labile overnight. Problem list:        Acute hypoxemic respiratory failure. Volume overload pulmonary edema and bilateral pleural effusions. Congestive heart failure with acute decompensation. Type 2 diabetes mellitus. History of hypertension. Elevated troponin. Hypotension. Unexplained, no signs of infection. End-stage renal disease on hemodialysis. Anasarca. Decubitus ulcers on lower back. Mild hyperkalemia. Coronary artery disease status post CABG. History of A. fib. Assessment/Plan:     Acute hypoxemic respiratory failure. Secondary to volume overload pulmonary edema with bilateral pleural effusions. Needs ongoing vol removal, will get UF today. Supplemental oxygen for goal saturation of 88 to 92%. BiPAP as needed.     Possible sepsis. Patient has borderline hypotension with lactic acidosis. Continue empiric vanc and Zosyn for possible pneumonia. De escalate abx tomorrow if stable and cultures neg. F/u cultures. CT abdomen is negative for any intra-abdominal source. Increase Midodrine to 20mg TID. Goal map of more than 65.  --CT abd suggestive of cirrhosis of liver.     End-stage renal disease. Had mild hyperkalemia which is improved today. --Nephrology following for HD needs.      CAD. -Continue ASA, holding on BB due to hypotension for now.     DM2.  -SSI for glucose control.     H/o HTN. -Hold BP meds.     Hypothyroidism. Continue levothyroxine.     Decubitus ulcers. Wound care.        DVT prophylaxis. On Eliquis. Stress ulcer prophylaxis. Already on Protonix. CODE STATUS. Full code    Continue ICU level care.  Remains critically ill and has high chances of decompensation. Consult palliative care. I personally spent 40 minutes of critical care time. This is time spent at this critically ill patient's bedside actively involved in patient care as well as the coordination of care and discussions with the patient's family. This does not include any procedural time which has been billed separately. Review of Systems:   A comprehensive review of systems was negative. Vital Signs:     Patient Vitals for the past 4 hrs:   BP Temp Pulse Resp SpO2   03/19/22 1122     100 %   03/19/22 1108     96 %   03/19/22 0914 (!) 97/57  76 21 93 %   03/19/22 0814     100 %   03/19/22 0800 (!) 108/50 97.2 °F (36.2 °C) 75 21 100 %        Intake/Output Summary (Last 24 hours) at 3/19/2022 1132  Last data filed at 3/19/2022 0400  Gross per 24 hour   Intake 700 ml   Output 705 ml   Net -5 ml        Physical Examination:    Physical Exam  Constitutional:       Appearance: Normal appearance. HENT:      Head: Normocephalic and atraumatic. Mouth/Throat:      Mouth: Mucous membranes are moist.   Eyes:      Conjunctiva/sclera: Conjunctivae normal.   Cardiovascular:      Rate and Rhythm: Normal rate and regular rhythm. Pulmonary:      Effort: Pulmonary effort is normal. No respiratory distress. Breath sounds: Normal breath sounds. Comments: Diminished breath sounds at catherine bases  Abdominal:      General: Abdomen is flat. There is no distension. Tenderness: There is no abdominal tenderness. There is no guarding. Musculoskeletal:      Cervical back: Normal range of motion and neck supple. Right lower leg: Edema present. Left lower leg: Edema present. Skin:     General: Skin is warm. Neurological:      Mental Status: He is alert and oriented to person, place, and time. Psychiatric:         Mood and Affect: Mood normal.         Labs:   Labs and imaging reviewed.       Medications:     Current Facility-Administered Medications   Medication Dose Route Frequency    albumin human 25% (BUMINATE) solution 12.5 g  12.5 g IntraVENous Q6H    albumin human 25% (BUMINATE) solution 12.5 g  12.5 g IntraVENous DIALYSIS PRN    midodrine (PROAMATINE) tablet 10 mg  10 mg Oral TID WITH MEALS    piperacillin-tazobactam (ZOSYN) 3.375 g in 0.9% sodium chloride (MBP/ADV) 100 mL MBP  3.375 g IntraVENous Q12H    sodium chloride (NS) flush 5-40 mL  5-40 mL IntraVENous Q8H    sodium chloride (NS) flush 5-40 mL  5-40 mL IntraVENous PRN    acetaminophen (TYLENOL) tablet 650 mg  650 mg Oral Q6H PRN    Or    acetaminophen (TYLENOL) suppository 650 mg  650 mg Rectal Q6H PRN    polyethylene glycol (MIRALAX) packet 17 g  17 g Oral DAILY PRN    ondansetron (ZOFRAN ODT) tablet 4 mg  4 mg Oral Q8H PRN    Or    ondansetron (ZOFRAN) injection 4 mg  4 mg IntraVENous Q6H PRN    famotidine (PF) (PEPCID) 20 mg in 0.9% sodium chloride 10 mL injection  20 mg IntraVENous DAILY    apixaban (ELIQUIS) tablet 5 mg  5 mg Oral BID    aspirin delayed-release tablet 81 mg  81 mg Oral DAILY    balsam peru-castor oiL (VENELEX) ointment   Topical BID    calcium acetate(phosphat bind) (PHOSLO) capsule 667 mg  1 Capsule Oral TID WITH MEALS    B complex-vitaminC-folic acid (NEPHROCAP) cap  1 Capsule Oral DAILY    dutasteride (AVODART) capsule 0.5 mg  0.5 mg Oral DAILY    cetirizine (ZYRTEC) tablet 10 mg  10 mg Oral DAILY    pantoprazole (PROTONIX) tablet 40 mg  40 mg Oral ACB    pravastatin (PRAVACHOL) tablet 80 mg  80 mg Oral QHS    insulin lispro (HUMALOG) injection   SubCUTAneous AC&HS    glucose chewable tablet 16 g  4 Tablet Oral PRN    glucagon (GLUCAGEN) injection 1 mg  1 mg IntraMUSCular PRN    dextrose 10% infusion 0-250 mL  0-250 mL IntraVENous PRN    VANCOMYCIN INFORMATION NOTE   Other Rx Dosing/Monitoring     ______________________________________________________________________  EXPECTED LENGTH OF STAY: - - -  ACTUAL LENGTH OF STAY:          [address] , MD   Pulmonary/CCM  [address]  [phone]  3/19/2022

## 2022-03-19 NOTE — DIALYSIS
[phone]      Comments / Plan: On arrival to bedside for ordered UF: BP trends low 90s-80s/40s-30s, per nursing pt received midodrine & albumin - orders for 2-3kg UF. Contacted Dr. John Paul Hay regarding trending BP and pt status - per orders do not proceed with ordered UF. Education & pre/post report with pt & Tamara Lu, primary RN. [facility] office notified.

## 2022-03-19 NOTE — ED NOTES
Patient is being transferred to [facility] Critical Care 2, Room # 890 0451. Report given to Ariel Hirsch RN on Devi Riggs for routine progression of care. Report consisted of the following information SBAR, Kardex and ED Summary. Patient transferred to receiving unit by: Mimi (RN or tech name). Outstanding consults needed: Yes, No     Next labs due: Yes    The following personal items will be sent with the patient during transfer to the floor: All valuables:    Cardiac monitoring ordered: Yes    The following CURRENT information was reported to the receiving RN:    Code status: Full Code at time of transfer    Last set of vital signs:  Vital Signs  Level of Consciousness: Alert (0) (03/18/22 1123)  Temp: 97.7 °F (36.5 °C) (03/18/22 1148)  Temp Source: Oral (03/18/22 1148)  Pulse (Heart Rate): 76 (03/18/22 2130)  Heart Rate Source: Monitor (03/18/22 1148)  Cardiac Rhythm: Sinus Rhythm (03/18/22 1148)  Resp Rate: 24 (03/18/22 2130)  BP: (!) 118/91 (03/18/22 2130)  MAP (Monitor): (!) 62 (03/18/22 1952)  MAP (Calculated): 100 (03/18/22 2130)  BP 1 Location: Right arm (03/18/22 1148)  BP 1 Method: Automatic (03/18/22 1148)  BP Patient Position: At rest (03/18/22 1148)  MEWS Score: 4 (03/18/22 1123)         Oxygen Therapy  O2 Sat (%): 100 % (03/18/22 2130)  Pulse via Oximetry: 74 beats per minute (03/18/22 1952)  O2 Device: Heated; Hi flow nasal cannula (03/18/22 2002)  O2 Flow Rate (L/min): 55 l/min (03/18/22 2002)  FIO2 (%): 100 % (03/18/22 2002)      Last pain assessment:  Pain 1  Pain Scale 1: Numeric (0 - 10)  Pain Intensity 1: 8  Pain Orientation 1: Posterior,Lower  Pain Description 1: Constant  Additional Pain Sites: Yes      Wounds: Yes    Urinary catheter: anuric  Is there a fenton order: No     LDAs:       Peripheral IV 62/63/66 Right Basilic (Active)       Peripheral IV 03/18/22 Right External jugular (Active)   Site Assessment Clean, dry, & intact 03/18/22 1625   Phlebitis Assessment 0 03/18/22 1625   Infiltration Assessment 0 03/18/22 1625   Dressing Status Clean, dry, & intact 03/18/22 1625   Dressing Type Tape;Transparent 03/18/22 1625   Hub Color/Line Status Pink 03/18/22 1625       Peripheral IV 03/18/22 Right Forearm (Active)   Site Assessment Clean, dry, & intact 03/18/22 1705   Phlebitis Assessment 0 03/18/22 1705   Infiltration Assessment 0 03/18/22 1705   Dressing Status Clean, dry, & intact 03/18/22 1705   Dressing Type Transparent;Tape 03/18/22 1705   Hub Color/Line Status Blue 03/18/22 1705         Opportunity for questions and clarification was provided.     Genny Nice

## 2022-03-19 NOTE — PROGRESS NOTES
0700  Report from [facility]    0800  Assessment complete  Patient awake alert oriented X4 c/o of pain in buttocks repositioned for now. On Heated high flow 45 lpm 65% asking for O2 to be changed does not like the high flow. 2 PIV in place with KVO. Right elbow large fluid cyst noted. Left upper arm fistula noted thrill/bruit intact area bruised and swollen no dressing. Right upper chest perma cath in place capped. Left upper chest pacer site. 9258  Up to Recliner with 2 assist medicated for pain 8/10 buttocks see MAR    0930  Dr Maryanne Sanchez into see patient will do ultrafiltration today if patient refuses per Dr Maryanne Sanchez ok    1000  Right foot 2nd digit bleeding from old wound close assessment appears to be old open fracture Dr Shiva Vallecillo notified and came to see it cleaned and dressed with non adherent dressing 4x4 and kami wrap    1100  Up to bedside commode medium brown formed BM     1200  Assessment complete no changes    1415  Asked to get back in bed 2 person assist     1430  Patient sleeping in bed     1610  Wife at bedside    1630  Assessment complete patient drowsy just waking up after a few minutes of talking with patient he fell asleep    1800  Patient awake able to take PO meds able to eat dinner    1840  Resting comfortably in bed    1900  Report to [facility]        Several wounds noted on patient he has home health wound care seeing him dressing noted on several wounds see flow sheet.   Pictures placed of all wounds

## 2022-03-19 NOTE — PROGRESS NOTES
Occupational Therapy Screening:  Services are indicated. Order is required. An InBasket screening referral was triggered for occupational therapy based on results obtained during the nursing admission assessment. The patients chart was reviewed and the patient is appropriate for a skilled therapy evaluation. Please order a consult for occupational therapy if you are in agreement and would like an evaluation to be completed. Thank you.     Gurpreet Guzman OTR/L

## 2022-03-19 NOTE — PROGRESS NOTES
1900: [facility] 2000: Assessment completed. See flowsheet for details. Veto Prim nurse at bedside. Dialysis RN notified Dr. Brady Haile of patient's BP. Patient alert and oriented. Currently on 5 L NC.     2145: Notified NP Dario James of patient's hypotension. SBP >90 per NP.     0000: Reassessment completed. No change to previous assessment. 0400: Reassessment completed. No change to previous assessment. 0500: Patient bathed and wounds redressed. 0700: [facility].

## 2022-03-19 NOTE — PROCEDURES
[facility] / [phone]    Vitals Pre Post Assessment Pre Post   BP BP: 99/70 (03/18/22 2007) 103/41 LOC Alert and oriented x 4 Alert and oriented x 4   HR Pulse (Heart Rate): 75 (03/18/22 2007) 75 Lungs Diminished and placed on high flow oxygen  Diminished    Resp Resp Rate: 15 (03/18/22 2007) 19 Cardiac Normal Sinus Rhythm  Normal Sinus Rhythm    Temp Temp: 97.7 °F (36.5 °C) (03/18/22 1148) 97.8 Skin Warm and dry  Warm and dry    Weight  124.1kg  123.8 Edema Generalized edema  Generalized edema    Tele status Monitored at bedside  Monitored at bedside  Pain Pain Intensity 1: 8 (03/18/22 1148) 0     Orders   Duration: Start: 2007 End: 5466 Total: 2hrs 15mins   Dialyzer: Dialyzer/Set Up Inspection: Yehuda Blevins (03/18/22 2007)   K Bath: Dialysate K (mEq/L): 2 (03/18/22 2007)   Ca Bath: Dialysate CA (mEq/L): 2.5 (03/18/22 2007)   Na: Dialysate NA (mEq/L): 138 (03/18/22 2007)   Bicarb: Dialysate HCO3 (mEq/L): 35 (03/18/22 2007)   Target Fluid Removal: Goal/Amount of Fluid to Remove (mL): 1000 mL (03/18/22 2007)     Access   Type & Location: Right Subclavian CVC    Comments:                                   No s/s of infection noted. Dressing changed and dated today 03/18/2022. Left AVF redness present and present a sign of infiltration. +Thrill and +bruit.        Labs   HBsAg (Antigen) / date: UNKNOWN                                               HBsAb (Antibody) / date: Unknown    Source: Epic    Obtained/Reviewed  Critical Results Called HGB   Date Value Ref Range Status   03/18/2022 12.0 (L) 12.1 - 17.0 g/dL Final     Potassium   Date Value Ref Range Status   03/18/2022 5.3 (H) 3.5 - 5.1 mmol/L Final     Calcium   Date Value Ref Range Status   03/18/2022 7.8 (L) 8.5 - 10.1 MG/DL Final     BUN   Date Value Ref Range Status   03/18/2022 93 (H) 6 - 20 MG/DL Final     Creatinine   Date Value Ref Range Status   03/18/2022 10.10 (H) 0.70 - 1.30 MG/DL Final Meds Given   Name Dose Route   Albumin  12.5g HD circuit                 Adequacy / Fluid    Total Liters Process: 49.3   Net Fluid Removed: 700ml      Comments   Time Out Done:   (Time) 2005   Admitting Diagnosis: esrd   Consent obtained/signed: Informed Consent Verified: Yes (03/18/22 2007)   Machine / Checo Lui # Machine Number: Corby Jimenez (03/18/22 2007)   Primary Nurse Rpt Pre: My Lozano RN   Primary Nurse Rpt Post: My Lozano RN   Pt Education: Procedural and treatment compliance    Care Plan: Continue with HD care plan    Pts outpatient clinic: [facility]      Tx Summary   Comments:     Patient did not want to do his treatment stating he was tired. Patient was educated and agreed to do only two hours. 2007:Each catheter limb disinfected per p&p, caps removed, hubs disinfected per p&p. Both lines aspirated and flushed with no difficulty. Treatment initiated with 200ml nss given. CVC running well at 400. No distress reported. Will continue to monitor patient. 2100:Patient awake and asking about time and how he needs to come off . Education given and patient agreed to stay on for a while. 2122:Patient asleep, no compliant voiced at this time. Will continue to monitor. 2233:Patient asked to come off treatment. Education provided.     :Treatment terminated, with all possible blood returned. Each dialysis catheter limb disinfected per p&p, blood returned per p&p, each dialysis hub disinfected per p&p, post dialysis catheter dwell instilled per order, and caps applied. SBAR given to primary nurse. Patient bed at lowest position and call bell and patient's belongings at pt reach. At baseline upon departure.      Dr. Sanjana Canchola made aware of patient's status.

## 2022-03-19 NOTE — PROGRESS NOTES
2245: TRANSFER - IN REPORT:    Verbal report received from [facility] on Justa Rash  being received from ED for routine progression of care      Report consisted of patients Situation, Background, Assessment and   Recommendations(SBAR). Information from the following report(s) SBAR, Kardex and ED Summary was reviewed with the receiving nurse. Opportunity for questions and clarification was provided. Assessment completed upon patients arrival to unit and care assumed. 2300: Assessment completed. See flowsheet for details. Multiple existing wounds present. Wound care consult placed in ED. Patient sees wound care nurse outpatient. According to patient, new dressings had been applied earlier today by wound care nurse. Wounds assessed and documented in flow sheet. 0300: DALTON Zapata notified of troponin level. No new orders received. 0315: DALTON Zapata notified of patient's hypotension. Orders received. 0400: Reassessment completed. No change to previous. 0510: Notified DALTON Zapata of continued hypotension. No new orders. 0700: Bedside report given to Victorino Segura RN.

## 2022-03-20 NOTE — PROGRESS NOTES
End of Shift Note    Bedside shift change report given to Gilmar Panchal RN (oncoming nurse) by Elizabeth Lopez RN (offgoing nurse). Report included the following information SBAR, Kardex and MAR    Shift worked:  7a - 7p     Shift summary and any significant changes:     -Pt transferred to Oncology from the CCU   -Dual skin assessment completed   -BM x 2 on BSCC; pt heavy 2-3 person assist w/ walker     Concerns for physician to address:       Zone phone for oncoming shift:   [phone]       Activity:  Activity Level: Up with Assistance  Number times ambulated in hallways past shift: 0  Number of times OOB to chair past shift: 1    Cardiac:   Cardiac Monitoring: No      Cardiac Rhythm: Ventricular Paced    Access:   Current line(s): PIV, central line and HD access     Genitourinary:   Urinary status: anuric    Respiratory:   O2 Device: Nasal cannula  Chronic home O2 use?: NO  Incentive spirometer at bedside: NO       GI:  Last Bowel Movement Date: 03/20/22  Current diet:  ADULT DIET Regular; 4 carb choices (60 gm/meal); Low Sodium (2 gm); Low Potassium (Less than 3000 mg/day); Low Phosphorus (Less than 1000 mg)  ADULT ORAL NUTRITION SUPPLEMENT Breakfast, Dinner; Renal Supplement  Passing flatus: YES  Tolerating current diet: YES       Pain Management:   Patient states pain is manageable on current regimen: YES    Skin:  Romeo Score: 15  Interventions: float heels, increase time out of bed, foam dressing, PT/OT consult, limit briefs and nutritional support     Patient Safety:  Fall Score:  Total Score: 4  Interventions: bed/chair alarm, assistive device (walker, cane, etc), gripper socks, pt to call before getting OOB and stay with me (per policy)  High Fall Risk: Yes    Length of Stay:  Expected LOS: - - -  Actual LOS: 2      Elizabeth Lopez, RN

## 2022-03-20 NOTE — PROGRESS NOTES
TRANSFER - IN REPORT:    Verbal report received from Nata Jurado RN (name) on [place]  being received from CCU (unit) for routine progression of care      Report consisted of patients Situation, Background, Assessment and   Recommendations(SBAR). Information from the following report(s) SBAR, Kardex and MAR was reviewed with the receiving nurse. Opportunity for questions and clarification was provided. Assessment completed upon patients arrival to unit and care assumed.

## 2022-03-20 NOTE — PROGRESS NOTES
The patient is 66years old man with past medical history significant for end-stage renal disease on hemodialysis, atrial fibrillation, diabetes mellitus, coronary artery disease status post CABG was admitted to intensive care unit on March 18 due to acute hypoxic respiratory failure with possible sepsis. Patient was initiated on broad-spectrum antibiotics with vancomycin and Zosyn for possible pneumonia and he require vasopressors for short time then he was started on midodrine. Documentations reported that he was fluid overloaded, nephrology has been following patient for multiple hemodialysis sessions while hospitalized. Patient has been off vasopressor and transferred to medical telemetry today. Hospitalist team to take over as primary team.  Nonbillable rounding.

## 2022-03-20 NOTE — PROGRESS NOTES
Comprehensive Nutrition Assessment    Type and Reason for Visit: Initial,Wound    Nutrition Recommendations/Plan:   Continue [place]/Diabetic diet as ordered  RD to add Nepro BID    Nutrition Assessment:   Pt admitted with hypotension. PMH: ESRD, CAD, DM, chronic wounds. MST triggered for wounds, chart reviewed. Pt working with PT/OT at time of attempted visit. Spoke with pt's nurse who reports pt was requesting a supplement, he was given glucerna to try. MST negative for poor appetite or wt loss PTA. K 4.9 and phos 8.0, I explained Nepro is a more appropriate choice given lytes and ESRD hx.  Will add BID for pt to try. PO intake has been fairly poor since admission. HD done yesterday. Wt looks stable, up a bit even (likely due to fluid from missed/shortened HD sessions PTA). Patient Vitals for the past 168 hrs:   % Diet Eaten   03/19/22 1830 1 - 25%   03/19/22 1300 26 - 50%   03/19/22 1000 26 - 50%     Wt Readings from Last 15 Encounters:   03/20/22 118.3 kg (260 lb 12.9 oz)   03/08/22 119.3 kg (263 lb)   02/15/22 116.1 kg (256 lb)   12/21/21 113.8 kg (250 lb 14.1 oz)   10/14/21 113.8 kg (250 lb 14.4 oz)   10/12/21 111.6 kg (246 lb)   09/28/21 113.2 kg (249 lb 9.6 oz)   05/05/21 112.5 kg (248 lb)   03/26/21 109.6 kg (241 lb 9.6 oz)   03/19/21 107.5 kg (236 lb 15.9 oz)   08/03/20 111.1 kg (245 lb)   02/17/20 110.7 kg (244 lb)   02/13/20 110.7 kg (244 lb)   01/07/20 114.5 kg (252 lb 6.4 oz)   12/02/19 112.6 kg (248 lb 4.8 oz)       Estimated Daily Nutrient Needs:  Energy (kcal): MSJ 2000 (1919 x 1.2 -300 for obesity); Weight Used for Energy Requirements: Current  Protein (g): 95-118g (0.8-1gPro/kg); Weight Used for Protein Requirements: Current  Fluid (ml/day): 1800mL or per MD; Method Used for Fluid Requirements: Standard renal      Nutrition Related Findings:  Meds: nephrocap, phoslo, vancomycin, humalog, protonix, zosyn. Edema: +1 nonpitting-BLE.   BM 3/20      Wounds:    Multiple,Venous stasis,Stage II,Skin tears  (stage 2-buttocks)      Current Nutrition Therapies:  ADULT DIET Regular; 4 carb choices (60 gm/meal); Low Sodium (2 gm); Low Potassium (Less than 3000 mg/day); Low Phosphorus (Less than 1000 mg)  ADULT ORAL NUTRITION SUPPLEMENT Breakfast, Dinner; Renal Supplement    Anthropometric Measures:  · Height:  5' 9\" (175.3 cm)  · Current Body Wt:  118.3 kg (260 lb 12.9 oz)   · Ideal Body Wt:  160 lbs:  163 %   · BMI Category:  Obese class 2 (BMI 35.0-39. 9)       Nutrition Diagnosis:   · Inadequate protein-energy intake related to inadequate protein-energy intake,increased demand for energy/nutrients as evidenced by other (specify),intake 26-50%,intake 0-25% (est kcal and protein needs.)      Nutrition Interventions:   Food and/or Nutrient Delivery: Continue current diet,Start oral nutrition supplement  Nutrition Education and Counseling: No recommendations at this time  Coordination of Nutrition Care: Continue to monitor while inpatient    Goals:  Pt will consume >50% of meals/supplements in 1-3 days.        Nutrition Monitoring and Evaluation:   Behavioral-Environmental Outcomes:    Food/Nutrient Intake Outcomes: Food and nutrient intake,Supplement intake  Physical Signs/Symptoms Outcomes: Biochemical data,Nutrition focused physical findings,Skin,Weight    Discharge Planning:    Continue oral nutrition supplement,Continue current diet     Electronically signed by Cathi Borden RD, [facility] on 3/20/2022 at 2:00 PM    Contact: ext [phone]

## 2022-03-20 NOTE — PROGRESS NOTES
Problem: Self Care Deficits Care Plan (Adult)  Goal: *Acute Goals and Plan of Care (Insert Text)  Description: FUNCTIONAL STATUS PRIOR TO ADMISSION: Patient was modified independent using a rollator for functional mobility. HOME SUPPORT: The patient lived with wife and required moderate assistance  for ADLs  more for LB . Occupational Therapy Goals  Initiated 3/20/2022  1. Patient will perform grooming sitting up in chair or EOB with supervision/set-up within 7 day(s). 2.  Patient will perform bathing UB and scott area sitting up in chair or on EOb with moderate assistance  within 7 day(s). 3.  Patient will perform lower body dressing with maximal assistance within 7 day(s). 4.  Patient will perform toilet transfers with supervision/set-up within 7 day(s). 5.  Patient will perform all aspects of toileting with minimal assistance/contact guard assist within 7 day(s). 6.  Patient will participate in upper extremity therapeutic exercise/activities with independence for 5 minutes within 7 day(s). 7.  Patient will utilize energy conservation techniques during functional activities with verbal cues within 7 day(s). Outcome: Not Met   OCCUPATIONAL THERAPY EVALUATION  Patient: Juliette Hylton (81 y.o. male)  Date: 3/20/2022  Primary Diagnosis: Hypotension [I95.9]  Acute respiratory failure with hypoxemia [facility]) [J96.01]        Precautions:   Fall    ASSESSMENT  Based on the objective data described below, the patient presents with decreased endurance, strength, functional mobility, ADLs and GW. Pt was living at home with wife and stated he had falls at home and had started to use rollator at home and his wife assist with LB dressing. Pt was supine in bed and mod of 2 for supine to sit ,and CGA for sitting on EOB. He was able to stand with min of 2 and min of 2 for transfer to bed with use of RW. Pt was able to stand from chair, with min of 2 and min of 2 for transfer to [facility] .   Tried to encourage pt to clean himself and he continued to decline stating that he could not. Pt is very self limiting . Pt was total for cleaning after BM, and min to transfer to chair. He has functional range in BUE and his strength is functional.  Pt was left sitting up in chair and needed setup for his lunch and VC to assist with setup. He tends to ask for help before he tries the task himself     Current Level of Function Impacting Discharge (ADLs/self-care): max for LB ADLs and min to mod for UB ADLs     Functional Outcome Measure: The patient scored 25/100 on the Barthel outcome measure which is indicative of  Max for ADLs . Patient will benefit from skilled therapy intervention to address the above noted impairments. PLAN :  Recommendations and Planned Interventions: self care training, functional mobility training, therapeutic exercise, balance training, therapeutic activities, endurance activities, patient education and home safety training    Frequency/Duration: Patient will be followed by occupational therapy 4 times a week to address goals. Recommendation for discharge: (in order for the patient to meet his/her long term goals)  Therapy up to 5 days/week in SNF setting    This discharge recommendation:  Has been made in collaboration with the attending provider and/or case management    IF patient discharges home will need the following DME: tbd       SUBJECTIVE:   Patient stated I cant do that. I cant stand that long  My legs are really weak. Wilda Chaudhry    OBJECTIVE DATA SUMMARY:   HISTORY:   Past Medical History:   Diagnosis Date    Arrhythmia     atrial fibrillation 2017    Arthritis     spine, all joints    Atrial fibrillation (HCC)     CAD (coronary atherosclerotic disease)     [facility] [place] [facility]    Chronic pain     back pain - unable to lay flat    Congestive heart failure ([address]     Diabetes [facility]     Diabetes mellitus type 2, controlled ([facility] [facility] 3/13/2017    GERD (gastroesophageal reflux disease)     Heart failure ([address]     Hx of CABG 3/13/2017    CABG in 2010 in [address] Hyperlipidemia     Hypertension     Long term current use of anticoagulant therapy     Morbid obesity ([address] 3/13/2017    Neuropathy     in both feet    JACEY (obstructive sleep apnea) 6/23/2017    no CPAP, pt states never tested    Pacemaker     S/P CABG x 2 2010    Skin cancer     Thyroid disease     hypothyroid     Past Surgical History:   Procedure Laterality Date    HX CATARACT REMOVAL Bilateral     HX ORTHOPAEDIC      L 3rd toe amputation in 1999    HX PACEMAKER Left 12/26/2018    Dr Torrey Howell x4 crt-p    HX VASCULAR ACCESS  03/2021    R chest    IR INSERT TUNL CVC W/O PORT OVER 5 YR  3/16/2021    OH CARDIAC SURG PROCEDURE UNLIST      CABGx2 in 2010       Expanded or extensive additional review of patient history:     Home Situation  Home Environment: Private residence  # Steps to Enter: 1  One/Two Story Residence: One story  Living Alone: No  Support Systems: Spouse/Significant Other  Patient Expects to be Discharged to[de-identified] Unable to determine at this time  Current DME Used/Available at Home: Cane, straight,Walker, rolling,Walker, rollator,Grab bars    Hand dominance: Right    EXAMINATION OF PERFORMANCE DEFICITS:  Cognitive/Behavioral Status:  Neurologic State: Alert  Orientation Level: Oriented X4  Cognition: Follows commands             Skin: in poor to fair , decub on right butt cheek, wounds on leg from fall    Edema: none     Hearing: Auditory  Auditory Impairment: Hard of hearing, bilateral    Vision/Perceptual:            Pt stated he has glasses and appear to have difficulty seeing without them but would not put them on.                           Range of Motion:    AROM: Generally decreased, functional                         Strength:    Strength: Generally decreased, functional                Coordination:  Coordination: Generally decreased, functional  Fine Motor Skills-Upper: Left Intact; Right Intact    Gross Motor Skills-Upper: Left Intact; Right Intact    Tone & Sensation:    Tone: Normal  Sensation: Intact                      Balance:  Sitting: Impaired; Without support  Sitting - Static: Good (unsupported)  Sitting - Dynamic: Good (unsupported)  Standing: Impaired; With support  Standing - Static: Constant support; Fair  Standing - Dynamic : Constant support;Fair;Poor    Functional Mobility and Transfers for ADLs:  Bed Mobility:  Rolling: Minimum assistance;Assist x1; Moderate assistance  Supine to Sit: Moderate assistance;Assist x2  Scooting: Minimum assistance;Assist x2    Transfers:  Sit to Stand: Minimum assistance;Assist x2  Stand to Sit: Minimum assistance;Assist x2  Bed to Chair: Minimum assistance;Assist x2; Additional time  Toilet Transfer : Minimum assistance;Assist x1    ADL Assessment:  Feeding: Setup    Oral Facial Hygiene/Grooming: Setup    Bathing: Maximum assistance;Minimum assistance    Upper Body Dressing: Moderate assistance;Minimum assistance    Lower Body Dressing: Maximum assistance; Moderate assistance    Toileting: Maximum assistance        Functional Measure:    Barthel Index:  Bathin  Bladder: 5  Bowels: 5  Groomin  Dressin  Feedin  Mobility: 0  Stairs: 0  Toilet Use: 5  Transfer (Bed to Chair and Back): 5  Total: 25/100      The Barthel ADL Index: Guidelines  1. The index should be used as a record of what a patient does, not as a record of what a patient could do. 2. The main aim is to establish degree of independence from any help, physical or verbal, however minor and for whatever reason. 3. The need for supervision renders the patient not independent. 4. A patient's performance should be established using the best available evidence. Asking the patient, friends/relatives and nurses are the usual sources, but direct observation and common sense are also important. However direct testing is not needed.   5. Usually the patient's performance over the preceding 24-48 hours is important, but occasionally longer periods will be relevant. 6. Middle categories imply that the patient supplies over 50 per cent of the effort. 7. Use of aids to be independent is allowed. Score Interpretation (from [facility])    Independent   60-79 Minimally independent   40-59 Partially dependent   20-39 Very dependent   <20 Totally dependent     -Hakeem Soto., BarthelBEBA. (1965). Functional evaluation: the Barthel Index. [address] ([address] (1997). The Barthel activities of daily living index: self-reporting versus actual performance in the old (> or = 75 years). Journal of [address] [place] [address] 45(7), [facility], EZRA, Toby Haines., Miguelina Bach. (1999). Measuring the change in disability after inpatient rehabilitation; comparison of the responsiveness of the Barthel Index and Functional [place] Measure. Journal of Neurology, Neurosurgery, and Psychiatry, 66(4), 356-088. Tino Hamman, [place].NOE, BENITO Nava, & Farhat Galvez, MKENDALL. (2004) Assessment of post-stroke quality of life in cost-effectiveness studies: The usefulness of the Barthel Index and the EuroQoL-5D. Quality of Life Research, 15, 907-36         Occupational Therapy Evaluation Charge Determination   History Examination Decision-Making   MEDIUM Complexity : Expanded review of history including physical, cognitive and psychosocial  history  MEDIUM Complexity : 3-5 performance deficits relating to physical, cognitive , or psychosocial skils that result in activity limitations and / or participation restrictions MEDIUM Complexity : Patient may present with comorbidities that affect occupational performnce.  Miniml to moderate modification of tasks or assistance (eg, physical or verbal ) with assesment(s) is necessary to enable patient to complete evaluation       Based on the above components, the patient evaluation is determined to be of the following complexity level: MEDIUM  Pain Rating:  Stated he had pain in buttocks but did not rate    Activity Tolerance:   Fair    After treatment patient left in no apparent distress:    Sitting in chair and Call bell within reach    COMMUNICATION/EDUCATION:   The patients plan of care was discussed with: Physical therapist and Registered nurse. Patient is unable to participate in goal setting and plan of care. This patients plan of care is appropriate for delegation to BERTA.     Thank you for this referral.  Garfield Cabello OT  Time Calculation: 43 mins

## 2022-03-20 NOTE — PROGRESS NOTES
Problem: Mobility Impaired (Adult and Pediatric)  Goal: *Acute Goals and Plan of Care (Insert Text)  Description: FUNCTIONAL STATUS PRIOR TO ADMISSION: Patient was modified independent using a rollator for functional mobility. He reports his wife assists with LB dressing. He reports recent falls. HOME SUPPORT PRIOR TO ADMISSION: The patient lived with his wife. Physical Therapy Goals  Initiated 3/20/2022  1. Patient will move from supine to sit and sit to supine , scoot up and down, and roll side to side in bed with supervision/set-up within 7 day(s). 2.  Patient will transfer from bed to chair and chair to bed with supervision/set-up using the least restrictive device within 7 day(s). 3.  Patient will perform sit to stand with supervision/set-up within 7 day(s). 4.  Patient will ambulate with supervision/set-up for 50 feet with the least restrictive device within 7 day(s). 5.  Patient will ascend/descend 1 stairs with bilateral handrail(s) with supervision/set-up within 7 day(s). Outcome: Not Met   PHYSICAL THERAPY EVALUATION  Patient: Olan Collet (52 y.o. male)  Date: 3/20/2022  Primary Diagnosis: Hypotension [I95.9]  Acute respiratory failure with hypoxemia (HCC) [J96.01]        Precautions:   Fall    ASSESSMENT  Based on the objective data described below, the patient presents with decreased strength, decreased functional mobility, impaired balance, unsteady gait, and increased tremors in L hand following admission for hypotension. Patient is functioning below his baseline due to generalized weakness and hospitalization. Patient required min-mod A x 2 for all mobility. He was able to stand multiple times to transfer to the chair, BSC and back with consistently min A x 1-2 and RW. Patient requires constant verbal cues for hand placement for transfers with safety. Patient with VSS on 4L O2, BP responding well with activity.  Patient is capable of more although with increased fear of falling. Patient will maximize his independence with SNF at discharge. Current Level of Function Impacting Discharge (mobility/balance): min-mod A x 2 and RW    Functional Outcome Measure: The patient scored 25/100 on the Barthel outcome measure which is indicative of very dependent. Other factors to consider for discharge: fall risk, self limiting, poor processing and requires additional time     Patient will benefit from skilled therapy intervention to address the above noted impairments. PLAN :  Recommendations and Planned Interventions: bed mobility training, transfer training, gait training, therapeutic exercises, neuromuscular re-education, patient and family training/education, and therapeutic activities      Frequency/Duration: Patient will be followed by physical therapy:  4 times a week to address goals. Recommendation for discharge: (in order for the patient to meet his/her long term goals)  Therapy up to 5 days/week in SNF setting    This discharge recommendation:  Has not yet been discussed the attending provider and/or case management    IF patient discharges home will need the following DME: to be determined (TBD)         SUBJECTIVE:   Patient stated If I am this weak, I may have to graduate to a wheelchair.     OBJECTIVE DATA SUMMARY:   HISTORY:    Past Medical History:   Diagnosis Date    Arrhythmia     atrial fibrillation 2017    Arthritis     spine, all joints    Atrial fibrillation (HCC)     CAD (coronary atherosclerotic disease)     Chronic kidney disease     [facility] [place]    Chronic pain     back pain - unable to lay flat    Congestive heart failure ([address]     Diabetes ([address]     Diabetes mellitus type 2, controlled ([address] 3/13/2017    GERD (gastroesophageal reflux disease)     Heart failure ([address]     Hx of CABG 3/13/2017    CABG in 2010 in [place]    Hyperlipidemia     Hypertension     Long term current use of anticoagulant therapy     Morbid obesity ([address] 3/13/2017    Neuropathy     in both feet    JACEY (obstructive sleep apnea) 6/23/2017    no CPAP, pt states never tested    Pacemaker     S/P CABG x 2 2010    Skin cancer     Thyroid disease     hypothyroid     Past Surgical History:   Procedure Laterality Date    HX CATARACT REMOVAL Bilateral     HX ORTHOPAEDIC      L 3rd toe amputation in 1999    HX PACEMAKER Left 12/26/2018    Dr Sylvia Jones x4 crt-p    HX VASCULAR ACCESS  03/2021    R chest    IR INSERT TUNL CVC W/O PORT OVER 5 YR  3/16/2021    DC CARDIAC SURG PROCEDURE UNLIST      CABGx2 in 2010       Personal factors and/or comorbidities impacting plan of care: decreased endurance, fall risk    Home Situation  Home Environment: Private residence  # Steps to Enter: 1  One/Two Story Residence: One story  Living Alone: No  Support Systems: Spouse/Significant Other  Patient Expects to be Discharged to[de-identified] Unable to determine at this time  Current DME Used/Available at Home: Cane, straight,Walker, rolling,Walker, rollator,Grab bars    EXAMINATION/PRESENTATION/DECISION MAKING:   Critical Behavior:  Neurologic State: Alert  Orientation Level: Oriented X4  Cognition: Follows commands     Hearing: Auditory  Auditory Impairment: Hard of hearing, bilateral  Skin:  wounds all over body including sacral wound  Edema:   Range Of Motion:  AROM: Generally decreased, functional                       Strength:    Strength: Generally decreased, functional                    Tone & Sensation:   Tone: Normal              Sensation: Intact               Coordination:  Coordination: Generally decreased, functional  Vision:      Functional Mobility:  Bed Mobility:  Rolling: Minimum assistance;Assist x1; Moderate assistance  Supine to Sit: Moderate assistance;Assist x2     Scooting: Minimum assistance;Assist x2  Transfers:  Sit to Stand: Minimum assistance;Assist x2  Stand to Sit: Minimum assistance;Assist x2        Bed to Chair: Minimum assistance;Assist x2; Additional time Balance:   Sitting: Impaired; Without support  Sitting - Static: Good (unsupported)  Sitting - Dynamic: Good (unsupported)  Standing: Impaired; With support  Standing - Static: Constant support; Fair  Standing - Dynamic : Constant support;Fair;Poor  Ambulation/Gait Training:  Distance (ft): 4 Feet (ft)  Assistive Device: Gait belt;Walker, rolling  Ambulation - Level of Assistance: Minimal assistance;Assist x2     Gait Description (WDL): Exceptions to WDL  Gait Abnormalities: Decreased step clearance;Trunk sway increased; Shuffling gait        Base of Support: Widened;Center of gravity altered     Speed/Ariane: Pace decreased (<100 feet/min); Shuffled  Step Length: Right shortened;Left shortened               Functional Measure:  Barthel Index:    Bathin  Bladder: 5  Bowels: 5  Groomin  Dressin  Feedin  Mobility: 0  Stairs: 0  Toilet Use: 5  Transfer (Bed to Chair and Back): 5  Total: 25/100       The Barthel ADL Index: Guidelines  1. The index should be used as a record of what a patient does, not as a record of what a patient could do. 2. The main aim is to establish degree of independence from any help, physical or verbal, however minor and for whatever reason. 3. The need for supervision renders the patient not independent. 4. A patient's performance should be established using the best available evidence. Asking the patient, friends/relatives and nurses are the usual sources, but direct observation and common sense are also important. However direct testing is not needed. 5. Usually the patient's performance over the preceding 24-48 hours is important, but occasionally longer periods will be relevant. 6. Middle categories imply that the patient supplies over 50 per cent of the effort. 7. Use of aids to be independent is allowed.     Score Interpretation (from [facility])    Independent   60-79 Minimally independent   40-59 Partially dependent   20-39 Very dependent   <20 Totally dependent     Yanely Figueroa, Barthel, D.W. (1028). Functional evaluation: the Barthel Index. [address] ([address] (1997). The Barthel activities of daily living index: self-reporting versus actual performance in the old (> or = 75 years). Journal of [address] [place] [address] 45(7), [facility], ANIVAL, Governor Spencer.Constantin. (1999). Measuring the change in disability after inpatient rehabilitation; comparison of the responsiveness of the Barthel Index and Functional [place] Measure. Journal of Neurology, Neurosurgery, and Psychiatry, 66(4), 557-096. Natalie Nelson, N.J.A, BENITO Nava, & Glen Dickey MKENDALL. (2004) Assessment of post-stroke quality of life in cost-effectiveness studies: The usefulness of the Barthel Index and the EuroQoL-5D. Quality of Life Research, 15, 191-44            Physical Therapy Evaluation Charge Determination   History Examination Presentation Decision-Making   MEDIUM  Complexity : 1-2 comorbidities / personal factors will impact the outcome/ POC  MEDIUM Complexity : 3 Standardized tests and measures addressing body structure, function, activity limitation and / or participation in recreation  MEDIUM Complexity : Evolving with changing characteristics  Other outcome measures Barthel   MEDIUM      Based on the above components, the patient evaluation is determined to be of the following complexity level: MEDIUM        Activity Tolerance:   Fair, Poor, requires rest breaks, and observed SOB with activity    After treatment patient left in no apparent distress:   Sitting in chair and Call bell within reach    COMMUNICATION/EDUCATION:   The patients plan of care was discussed with: Occupational therapist, Registered nurse, and Case management. Fall prevention education was provided and the patient/caregiver indicated understanding., Patient/family have participated as able in goal setting and plan of care. , and Patient/family agree to work toward stated goals and plan of care.     Thank you for this referral.  Estevan Lesches, PT, DPT   Time Calculation: 42 mins

## 2022-03-20 NOTE — PROGRESS NOTES
0700  Report from [facility]    0800  Assessment complete  Patient awake alert oriented X4 c/o of chronic pain all over especially right buttocks prefers to stay off that side. O2 at 4 lpm NC.  2 PIV in place with KVO Right FA and wrist #22 both flush good blood return. Patient is anuric. Right upper chest permacath in place capped. Left upper arm fistula good thrill and bruit. Fistula arm is bruised and swollen. Right elbow fluid filled cyst still noted. Multiple other wounds on body all dressing intact. 1200  Assessment complete no israel    1300  PT/OT in room up to recliner    1345  TRANSFER - OUT REPORT:    Verbal report given to Brisa Rowland on Julio Reading  being transferred to 1121(unit) for routine progression of care       Report consisted of patients Situation, Background, Assessment and   Recommendations(SBAR). Information from the following report(s) SBAR, Kardex, Intake/Output, MAR, Recent Results and Cardiac Rhythm Vent. paced was reviewed with the receiving nurse.     Lines:   Peripheral IV 03/18/22 Right Forearm (Active)   Site Assessment Clean, dry, & intact 03/20/22 1405   Phlebitis Assessment 0 03/20/22 1405   Infiltration Assessment 0 03/20/22 1405   Dressing Status Clean, dry, & intact 03/20/22 1405   Dressing Type Transparent;Tape 03/20/22 1405   Hub Color/Line Status Blue;Flushed;Capped 03/20/22 1405   Action Taken Open ports on tubing capped 03/20/22 1405   Alcohol Cap Used Yes 03/20/22 1405       Peripheral IV 03/19/22 Posterior;Right Hand (Active)   Site Assessment Clean, dry, & intact 03/20/22 1405   Phlebitis Assessment 0 03/20/22 1405   Infiltration Assessment 0 03/20/22 1405   Dressing Status Clean, dry, & intact 03/20/22 1405   Dressing Type Transparent;Tape 03/20/22 1405   Hub Color/Line Status Blue;Flushed;Capped 03/20/22 1405   Action Taken Open ports on tubing capped 03/20/22 1405   Alcohol Cap Used Yes 03/20/22 1405        Opportunity for questions and clarification was provided.       Patient transported with:   Monitor  O2 @ 4 liters  Registered Nurse  [facility]

## 2022-03-20 NOTE — PROGRESS NOTES
Pharmacy Automatic Renal Dosing Protocol - Antimicrobials    Indication for Antimicrobials: Sepsis Unknown Origin    Current Regimen of Each Antimicrobial:  Vancomycin 2000 mg x 1 then 1000 mg post HD (Start Date 3/18; Day # 3)  Piperacillin 3.375 g Q12H (Start 3/18, Day 3)     Previous Antimicrobial Therapy:    Vancomycin Goal Level Tough: 20-25 mcg/ml    Vancomycin Levels  Date Dose & Interval Measured (mcg/mL) Steady State (mcg/mL)                       Date & time of next level: Wednesday, 3/23    Significant Cultures:   3/18: blood - prelim    Radiology / Imaging results: (X-ray, CT scan or MRI):   Labs:  Recent Labs     22  0258 22  0123 22  1949 22  1913 22  1351 22  1216   CREA 8.74* 6.98* 10.30*  --  10.10*  --    BUN 67* 54* 92*  --  93*  --    WBC 7.1 6.6  --  7.8  --  8.0     Temp (24hrs), Av.7 °F (36.5 °C), Min:97.3 °F (36.3 °C), Max:98.5 °F (36.9 °C)  Paralysis, amputations, malnutrition:   Creatinine Clearance (mL/min) or Dialysis: HD    Impression/Plan:   Antibiotics as above. Random level with AM labs on 3/23  Vancomycin 1000mg post HD dosing     Pharmacy will follow daily and adjust medications as appropriate for renal function and/or serum levels. Thank you,  POPEYE Simms      Recommended duration of therapy  http://[facility]/[place]/[facility]/Pharmacy/Clinical%20Companion/Duration%20of%20ABX%20therapy. docx    Renal Dosing  http://[facility]/[place]/[facility]/Pharmacy/Clinical%20Companion/Renal%[email or web address]. pdf

## 2022-03-20 NOTE — PROGRESS NOTES
Pharmacy Automatic Renal Dosing Protocol - Antimicrobials    Indication for Antimicrobials: Sepsis Unknown Origin    Current Regimen of Each Antimicrobial:  Vancomycin 2000 mg x 1 then 1000 mg post HD (Start Date 3/18; Day # 3)  Piperacillin 3.375 g Q12H (Start 3/18, Day 3)     Previous Antimicrobial Therapy:    Vancomycin Goal Level Tough: 20-25 mcg/ml    Vancomycin Levels  Date Dose & Interval Measured (mcg/mL) Steady State (mcg/mL)                       Date & time of next level: Wednesday, 3/23    Significant Cultures:   3/18: blood - prelim    Radiology / Imaging results: (X-ray, CT scan or MRI):       Labs:  Recent Labs     22  0258 22  0123 22  1949 22  1913 22  1351 22  1216   CREA 8.74* 6.98* 10.30*  --  10.10*  --    BUN 67* 54* 92*  --  93*  --    WBC 7.1 6.6  --  7.8  --  8.0     Temp (24hrs), Av.7 °F (36.5 °C), Min:97.3 °F (36.3 °C), Max:98.5 °F (36.9 °C)      Paralysis, amputations, malnutrition:   Creatinine Clearance (mL/min) or Dialysis: HD    Impression/Plan:   Antibiotics as above. Random level with AM labs on 3/23  Vancomycin 1000mg post HD dosing     Pharmacy will follow daily and adjust medications as appropriate for renal function and/or serum levels. Thank you,  POPEYE Resendez      Recommended duration of therapy  http://[facility]/[place]/[facility]/Pharmacy/Clinical%20Companion/Duration%20of%20ABX%20therapy. docx    Renal Dosing  http://[facility]/[place]/[facility]/Pharmacy/Clinical%20Companion/Renal%[id number]. pdf

## 2022-03-20 NOTE — PROGRESS NOTES
Progress Note    Patient: Edmund Carias MRN: [id number]  SSN: [id number]    YOB: 1950  Age: 67 y.o. Sex: male      Admit Date: 3/18/2022    LOS: 2 days     Subjective:   INITIAL PRESENTATION: 67 y.o. male with extensive medical comorbidities including ESRD on HD, atrial fibrillation on Eliquis, diabetes, coronary artery disease, obesity, CABG x2, chronic wounds and decubitus ulcer, presented 03/18 to [facility] ED with SOB, generalized malaise and fatigue for the past 3 days, shortness of breath, moderate severity. Missed dialysis on day of presentation because he was too weak. Adm to [facility] due to hypotension and possible need for vasopressors. 03/19: Patient reports feeling the same and c/o lower back pain where he has decubitus ulcer. Sitting in chair. BP has been labile overnight. 03/20: cognition intact. Reports improvement in dyspnea. C/O generalized weakness. NC O2 4 LPM with SpO2 98%. BP stable on midodrine. Transfer to medical bed with cardiac monitoring and to Hospitalist Service    Objective:     Vitals:    03/20/22 0700 03/20/22 0730 03/20/22 0800 03/20/22 0900   BP: (!) 114/59 117/63 (!) 102/56 104/66   Pulse: 75 75 75 75   Resp: 11 16 12 21   Temp:    98 °F (36.7 °C)   SpO2: 99% 98% 97% 96%   Weight:       Height:            Intake and Output:  Current Shift: No intake/output data recorded. Last three shifts: 03/18 1901 - 03/20 0700  In: 1150 [P.O.:100;  I.V.:1050]  Out: 705     Physical Exam:   NAD on NC O2  Cognition intact  HEENT: NCAT, sclerae white  No JVD noted  Chest clear anteriorly  Reg, no M  Obese, soft, NT, +BS  Severe chronic stasis dermatitis  Extensive excoriations on all extremities      Lab/Data Review:  BMP:   Lab Results   Component Value Date/Time     (L) 03/20/2022 02:58 AM    K 4.9 03/20/2022 02:58 AM     03/20/2022 02:58 AM    CO2 25 03/20/2022 02:58 AM    AGAP 9 03/20/2022 02:58 AM     (H) 03/20/2022 02:58 AM    BUN 67 (H) 03/20/2022 02:58 AM    CREA 8.74 (H) 03/20/2022 02:58 AM    GFRAA 7 (L) 03/20/2022 02:58 AM    GFRNA 6 (L) 03/20/2022 02:58 AM     CMP:   Lab Results   Component Value Date/Time     (L) 03/20/2022 02:58 AM    K 4.9 03/20/2022 02:58 AM     03/20/2022 02:58 AM    CO2 25 03/20/2022 02:58 AM    AGAP 9 03/20/2022 02:58 AM     (H) 03/20/2022 02:58 AM    BUN 67 (H) 03/20/2022 02:58 AM    CREA 8.74 (H) 03/20/2022 02:58 AM    GFRAA 7 (L) 03/20/2022 02:58 AM    GFRNA 6 (L) 03/20/2022 02:58 AM    CA 7.7 (L) 03/20/2022 02:58 AM    MG 3.2 (H) 03/20/2022 02:58 AM    PHOS 8.0 (H) 03/20/2022 02:58 AM    ALB 3.1 (L) 03/20/2022 02:58 AM    TP 6.1 (L) 03/20/2022 02:58 AM    GLOB 3.0 03/20/2022 02:58 AM    AGRAT 1.0 (L) 03/20/2022 02:58 AM    ALT 18 03/20/2022 02:58 AM     CBC:   Lab Results   Component Value Date/Time    WBC 7.1 03/20/2022 02:58 AM    HGB 11.2 (L) 03/20/2022 02:58 AM    HCT 36.9 03/20/2022 02:58 AM     (L) 03/20/2022 02:58 AM     All Cardiac Markers in the last 24 hours: No results found for: CPK, CK, CKMMB, CKMB, RCK3, CKMBT, CKNDX, CKND1, RADHIKA, TROPT, TROIQ, KATHY, TROPT, TNIPOC, BNP, BNPP  Recent Glucose Results:   Lab Results   Component Value Date/Time     (H) 03/20/2022 02:58 AM     ABG: No results found for: PH, PHI, PCO2, PCO2I, PO2, PO2I, HCO3, HCO3I, FIO2, FIO2I  COAGS: No results found for: APTT, PTP, INR, INREXT, INREXT  Liver Panel:   Lab Results   Component Value Date/Time    ALB 3.1 (L) 03/20/2022 02:58 AM    TP 6.1 (L) 03/20/2022 02:58 AM    GLOB 3.0 03/20/2022 02:58 AM    AGRAT 1.0 (L) 03/20/2022 02:58 AM    ALT 18 03/20/2022 02:58 AM    AP 34 (L) 03/20/2022 02:58 AM     Pancreatic Markers: No results found for: AMYLPOCT, AML, LIPPOCT, LPSE     CXR 03/18: Cardiomegaly, enlarged Pas, LLL atx    Assessment:   Acute (?on chronic) hypoxic respiratory failure - O2 requirements improving. Dyspnea improving.    Mild pulmonary edema/volume overload  Reported h/o JACEY - not on CPAP  ESRD  Hyperkalemia, resolved  DM 2  Hypotension, much improved on midodrine  Hyperkalemia  Sacral decubitus ulcer  H/O atrial fibrillation  Paced rhythm  Profound baseline debilitation  Possible severe sepsis - no clear source. Empirically started on vanc/zosyn 03/18   Blood cx NGTD      Plan:   Transfer to Medical bed with cardiac monitoring and to Hospitalist Service   Discussed with Dr Claudell Reef supplemental O2 as needed to maintain SpO2 > 90%  Cont cardiac monitoring  Cont midodrine  Cont SSI coverage  Nephrology following - deciding on further HD  Cont current abx for now  Follow culture results to completion  Check procalcitonin in AM 03/21  PT/OT evaluation requested 03/20  Might benefit from Palliative Care evaluation - have not ordered  Might benefit from discharge to Rehab facility if meets criteria    After transfer, CCM Service will sign off.  Please call if we can be of further assistance      Signed By: Shane Dhillon MD     March 20, 2022

## 2022-03-20 NOTE — PROGRESS NOTES
NAME: Devi Riggs        :  1950        MRN:  [id number]                    Assessment   :                                               Plan:  ESRD-    Hypotension  Anemia  Fatigue    [id number]  Signed off treatment early 3/18 (only ~ 2 hours and only 700 ml off)  BP too low for iUF yesterday; no HD/UF today     H/H at goal.  Hold TE.     He wants to continue HD for now, but we are discussing goals of care and I will continue to discuss.     Now on Midodrine 20 tid - he was not on this prior to admission, hopefully he will tolerated HD/UF better tomorrow         Subjective:     Chief Complaint:  In bed. Generalized uncomfortable. Some sob, not bad. We discussed the above. I reviewed the chart. Review of Systems:    Symptom Y/N Comments  Symptom Y/N Comments   Fever/Chills    Chest Pain     Poor Appetite    Edema     Cough    Abdominal Pain     Sputum    Joint Pain     SOB/BETTS    Pruritis/Rash     Nausea/vomit    Tolerating PT/OT     Diarrhea    Tolerating Diet     Constipation    Other       Could not obtain due to:      Objective:     VITALS:   Last 24hrs VS reviewed since prior progress note.  Most recent are:  Visit Vitals  BP (!) 100/55   Pulse 75   Temp 97.4 °F (36.3 °C)   Resp 15   Ht 5' 9\" (1.753 m)   Wt 118.3 kg (260 lb 12.9 oz)   SpO2 99%   BMI 38.51 kg/m²       Intake/Output Summary (Last 24 hours) at 3/20/2022 7412  Last data filed at 3/20/2022 0400  Gross per 24 hour   Intake 450 ml   Output    Net 450 ml      Telemetry Reviewed:     PHYSICAL EXAM:  General: NAD  + edema  rales    Lab Data Reviewed: (see below)    Medications Reviewed: (see below)    PMH/SH reviewed - no change compared to H&P  ________________________________________________________________________  Care Plan discussed with:  Patient     Family      RN     Care Manager                    Consultant:          Comments   >50% of visit spent in counseling and coordination of care       ________________________________________________________________________  Sharron Carrington MD     Procedures: see electronic medical records for all procedures/Xrays and details which  were not copied into this note but were reviewed prior to creation of Plan. LABS:  Recent Labs     03/20/22 0258 03/19/22 0123   WBC 7.1 6.6   HGB 11.2* 11.0*   HCT 36.9 35.8*   * 147*     Recent Labs     03/20/22 0258 03/19/22 0123 03/1950 03/18/22  1351 03/18/22  1351   * 133* 135*   < > 135*   K 4.9 5.0 5.7*   < > 5.3*    101 99   < > 100   CO2 25 23 24   < > 23   BUN 67* 54* 92*   < > 93*   CREA 8.74* 6.98* 10.30*   < > 10.10*   * 139* 203*   < > 234*   CA 7.7* 7.7* 8.2*   < > 7.8*   MG 3.2* 2.9*  --   --  3.6*   PHOS 8.0* 5.7*  --   --  7.7*    < > = values in this interval not displayed. Recent Labs     03/20/22 0258 03/19/22 0123 03/1950   AP 34* 36* 42*   TP 6.1* 5.8* 6.3*   ALB 3.1* 2.6* 2.8*   GLOB 3.0 3.2 3.5     No results for input(s): INR, PTP, APTT, INREXT, INREXT in the last 72 hours. No results for input(s): FE, TIBC, PSAT, FERR in the last 72 hours. No results found for: FOL, RBCF   No results for input(s): PH, PCO2, PO2 in the last 72 hours. No results for input(s): CPK, CKMB in the last 72 hours.     No lab exists for component: TROPONINI  No components found for: [place]  Lab Results   Component Value Date/Time    Color YELLOW/STRAW 03/05/2021 01:28 PM    Appearance CLEAR 03/05/2021 01:28 PM    Specific gravity 1.009 03/05/2021 01:28 PM    pH (UA) 6.0 03/05/2021 01:28 PM    Protein Negative 03/05/2021 01:28 PM    Glucose Negative 03/05/2021 01:28 PM    Ketone Negative 03/05/2021 01:28 PM    Bilirubin Negative 03/05/2021 01:28 PM    Urobilinogen 0.2 03/05/2021 01:28 PM    Nitrites Negative 03/05/2021 01:28 PM    Leukocyte Esterase Negative 03/05/2021 01:28 PM    Epithelial cells FEW 03/05/2021 01:28 PM Bacteria Negative 03/05/2021 01:28 PM    WBC 0-4 03/05/2021 01:28 PM    RBC 0-5 03/05/2021 01:28 PM       MEDICATIONS:  Current Facility-Administered Medications   Medication Dose Route Frequency    midodrine (PROAMATINE) tablet 20 mg  20 mg Oral TID WITH MEALS    albumin human 25% (BUMINATE) solution 12.5 g  12.5 g IntraVENous DIALYSIS PRN    piperacillin-tazobactam (ZOSYN) 3.375 g in 0.9% sodium chloride (MBP/ADV) 100 mL MBP  3.375 g IntraVENous Q12H    sodium chloride (NS) flush 5-40 mL  5-40 mL IntraVENous Q8H    sodium chloride (NS) flush 5-40 mL  5-40 mL IntraVENous PRN    acetaminophen (TYLENOL) tablet 650 mg  650 mg Oral Q6H PRN    Or    acetaminophen (TYLENOL) suppository 650 mg  650 mg Rectal Q6H PRN    polyethylene glycol (MIRALAX) packet 17 g  17 g Oral DAILY PRN    ondansetron (ZOFRAN ODT) tablet 4 mg  4 mg Oral Q8H PRN    Or    ondansetron (ZOFRAN) injection 4 mg  4 mg IntraVENous Q6H PRN    apixaban (ELIQUIS) tablet 5 mg  5 mg Oral BID    aspirin delayed-release tablet 81 mg  81 mg Oral DAILY    balsam peru-castor oiL (VENELEX) ointment   Topical BID    calcium acetate(phosphat bind) (PHOSLO) capsule 667 mg  1 Capsule Oral TID WITH MEALS    B complex-vitaminC-folic acid (NEPHROCAP) cap  1 Capsule Oral DAILY    dutasteride (AVODART) capsule 0.5 mg  0.5 mg Oral DAILY    cetirizine (ZYRTEC) tablet 10 mg  10 mg Oral DAILY    pantoprazole (PROTONIX) tablet 40 mg  40 mg Oral ACB    pravastatin (PRAVACHOL) tablet 80 mg  80 mg Oral QHS    insulin lispro (HUMALOG) injection   SubCUTAneous AC&HS    glucose chewable tablet 16 g  4 Tablet Oral PRN    glucagon (GLUCAGEN) injection 1 mg  1 mg IntraMUSCular PRN    dextrose 10% infusion 0-250 mL  0-250 mL IntraVENous PRN    VANCOMYCIN INFORMATION NOTE   Other Rx Dosing/Monitoring

## 2022-03-20 NOTE — PROGRESS NOTES
Problem: Falls - Risk of  Goal: *Absence of Falls  Description: Document Lorado Fall Risk and appropriate interventions in the flowsheet. Outcome: Progressing Towards Goal  Note: Fall Risk Interventions:  Mobility Interventions: Patient to call before getting OOB         Medication Interventions: Patient to call before getting OOB    Elimination Interventions: Call light in reach    History of Falls Interventions:  Investigate reason for fall

## 2022-03-21 NOTE — PROGRESS NOTES
End of Shift Note    Bedside shift change report given to Napoleon Blood (oncoming nurse) by Leigh Ann Ordoñez RN (offgoing nurse). Report included the following information SBAR, Kardex, Procedure Summary and MAR    Shift worked:  7am-7pm     Shift summary and any significant changes:    Pt tolerated care well during shift. Patient out of unit, in dialysis in the morning. Patient back to the unit around 11:30am. VS and assessment performed once pt came back to the unit. Morning medications and education given. Pt had one lose stool during afternoon. CHG bath done as well as skin care. Podiatry and wound care nurse. Pt repositioned before shift change. Pt clean and dry. Pt resting comfortably at the time.

## 2022-03-21 NOTE — WOUND CARE
Wound care Nurse Consult: consult placed for \"on going wound care\" by admitting hosptalist.    Patient is a 66 y/o CM admitted 3/18/22 for increased generalized weakness. Patient has multiple issues:  PMHX: Morbid obesity 5'9\", 290 lbs  ESRD - HD - missed dialysis on 3/18  A-fib - daily Asprin and Eliquis  CAD  CHF  DM type 2 - on isnsulin  HTN  Hyperlipidemia  JACEY but not on CPAP  Hypothyroidism    Patient has POA wounds:  1. Stage 3 PI to buttocks/sacrum  2. Right 2nd toe with bone exposure - Podiatry consulted  3. Left knee abrasion from fall  4. BLE venous stasis wounds  1. Right buttock    2. Right 2nd toe    Left knee abrasion    LLE    WOUND POA CONDITIONS    Wound Leg lower Left multiple 03/19/22 (Active)   Wound Image     03/19/22 0800   Wound Etiology Venous 03/21/22 1447   Dressing Status Intact 03/21/22 1447   Cleansed Not cleansed 03/19/22 0800   Dressing/Treatment Xeroform; Foam 03/21/22 1447   Wound Assessment Pink/red 03/21/22 1447   Drainage Amount Small 03/21/22 1447   Drainage Description Serosanguinous 03/21/22 1447   Wound Odor None 03/21/22 1447   Christy-Wound/Incision Assessment Edematous;Fragile;Dry/flaky 03/21/22 1447   Wound Thickness Description Partial thickness 03/21/22 1447   Number of days: 2       Wound Knee Anterior; Left skin tear (Active)   Wound Image   03/19/22 0800   Wound Etiology Skin Tear 03/19/22 0800   Dressing Status Intact 03/20/22 0900   Cleansed Not cleansed 03/19/22 0800   Dressing/Treatment Open to air 03/19/22 2000   Drainage Amount None 03/19/22 0800   Christy-Wound/Incision Assessment Fragile 03/19/22 0800   Number of days: 2       Wound Toe (Comment  which one) Anterior;Right open fracture of 2nd toe 03/19/22 (Active)   Wound Image    03/19/22 0800   Wound Etiology Traumatic 03/21/22 1447   Dressing Status Intact 03/20/22 0900   Cleansed Wound cleanser 03/19/22 0800   Dressing/Treatment Non-adherent;Gauze dressing/dressing sponge 03/19/22 2000   Wound Assessment Exposed Structure Bone 03/21/22 1447   Drainage Amount Moderate 03/19/22 0800   Drainage Description Sanguineous 03/19/22 0800   Wound Odor None 03/19/22 0800   Christy-Wound/Incision Assessment Other (Comment) 03/19/22 0800   Edges Undefined edges 03/19/22 0800   Number of days: 2       Wound Buttocks Mid;Right 03/19/22 (Active)   Wound Image   03/19/22 0800   Wound Etiology Pressure Stage 3 03/21/22 1447   Dressing Status Clean;Dry; Intact 03/19/22 2000   Cleansed Wound cleanser 03/21/22 1447   Dressing/Treatment Silver dressing; Foam 03/21/22 1447   Wound Length (cm) 1.5 cm 03/21/22 1447   Wound Width (cm) 1.5 cm 03/21/22 1447   Wound Depth (cm) 0.3 cm 03/21/22 1447   Wound Surface Area (cm^2) 2.25 cm^2 03/21/22 1447   Wound Volume (cm^3) 0.675 cm^3 03/21/22 1447   Wound Assessment Pink/red 03/21/22 1447   Drainage Amount Small 03/21/22 1447   Drainage Description Serosanguinous 03/21/22 1447   Wound Odor None 03/21/22 1447   Christy-Wound/Incision Assessment Intact 03/21/22 1447   Edges Defined edges 03/21/22 1447   Wound Thickness Description Full thickness 03/21/22 1447   Number of days: 2       Wound Arm lower Anterior;Right 03/19/22 (Active)   Wound Etiology Venous 03/19/22 0800   Dressing Status Intact 03/20/22 0900   Cleansed Not cleansed 03/19/22 0800   Dressing/Treatment Xeroform; Foam 03/19/22 2000   Wound Assessment Purple/maroon 03/19/22 0800   Drainage Amount None 03/19/22 0800   Wound Odor None 03/19/22 0800   Christy-Wound/Incision Assessment Hyperpigmented 03/19/22 0800   Number of days: 2         LLE    LLE      Recommend:    Right 2nd toe: open compound fracture with exposed bone - keep clean and covered and defer to Podiatry's orders. BLE Venous stasis wounds and abrasion to left knee and right arm: MWF, 3x/week: cleanse with NS or wound cleanser spray and 4x4's. Cover wounds with pieces of Xerofoam gauze and then foam dressing or ABD pads secured with rolled gauze.     Right buttock wound:daily, cleanse with soap and water/NS moist 4x4. Cut a piece of Zinch AG, moisten with NS cover wound and secure with a small foam dressing.     [address], RN, [employer]

## 2022-03-21 NOTE — PROGRESS NOTES
Occupational Therapy  Chart reviewed; Patient currently in dialysis. Note current hypotension. Will retry later as able.  Michela Aragon OTR/L

## 2022-03-21 NOTE — CONSULTS
Patient slept all night long. No new concerns at this time.    Seen patient in the room sleeping, non responsive for verbal communication     Subluxed open fracture at the proximal interphalangeal joint 2nd toe right with the phalangeal head exposed,unsure if it is pathological from chronic ulceration or likely from acute trauma some type of hyperflexion injury caused ruptured extensor tendon and the dorsal capsule     No acute signs of infection dry bloody covered area no active hemorrhage     Non salvageable will need an amputation if medically stable and cleared     Will ask for the input from the hospitalist and renal    Wound care to cover with sterile moist dressing with silvercell to prevent any infection      Will wait for further planning any treatment

## 2022-03-21 NOTE — PROGRESS NOTES
Pharmacy Automatic Renal Dosing Protocol - Antimicrobials    Indication for Antimicrobials: Sepsis Unknown Origin     Current Regimen of Each Antimicrobial:  Vancomycin 2000 mg x 1 then 1000 mg post HD (Start Date 3/18; Day # 4)  Piperacillin 3.375 g Q12H (Start 3/18, Day 4)     Previous Antimicrobial Therapy:    Vancomycin Goal Level Tough: 20-25 mcg/ml    Vancomycin Levels  Date Dose & Interval Measured (mcg/mL) Steady State (mcg/mL)                       Date & time of next level: Wednesday, 3/23    Significant Cultures:   3/18: blood - ngsf (prelim)    Radiology / Imaging results: (X-ray, CT scan or MRI):   Labs:  Recent Labs     22  0800 22  0258 22  0123 22  1950 22  1913 22  1351   CREA 10.20* 8.74* 6.98* 10.30*  --  10.10*   BUN 77* 67* 54* 92*  --  93*   WBC 7.7 7.1 6.6  --  7.8  --      Temp (24hrs), Av °F (36.7 °C), Min:97.4 °F (36.3 °C), Max:98.7 °F (37.1 °C)  Paralysis, amputations, malnutrition:   Creatinine Clearance (mL/min) or Dialysis: HD    Impression/Plan:   Continue vancomycin 1g IV after HD -- HD complete today and patient tolerate. Will order  Continue Zosyn as ordered  CMP ordered daily  Stop date - TBD     Pharmacy will follow daily and adjust medications as appropriate for renal function and/or serum levels. Thank you,  Blanca Marie, PHARMD      Recommended duration of therapy  [email or web address]/Pharmacy/Clinical%[email or web address]. docx    Renal Dosing  [email or web address]/[facility]/Pharmacy/[email or web address]/Renal%[email or web address]. pdf

## 2022-03-21 NOTE — PROGRESS NOTES
Physical Therapy    Pt currently in dialysis. Will defer at this point and follow.     Enid Patel PT

## 2022-03-21 NOTE — PROCEDURES
Hemodialysis / [phone]    Vitals Pre Post Assessment Pre Post   BP BP: (!) 100/53 (03/21/22 0755) 122/65 LOC Lethargic and oriented x 4 No change   HR Pulse (Heart Rate): 74 (03/21/22 0755) 74 Lungs Diminished and placed on high flow oxygen   No change   Resp Resp Rate: 20 (03/21/22 0755) 20 Cardiac Normal Sinus Rhythm  No change   Temp Temp: 97.8 °F (36.6 °C) (03/21/22 0755) 98 Skin Warm and dry  No change   Weight  124.1kg   Edema Generalized edema   No change   Tele status   Pain Pain Intensity 1: 3 (03/20/22 0400) 0     Orders   Duration: Start: 8863 End: 1125 Total: 3.5 hours   Dialyzer: Dialyzer/Set Up Inspection: June Marquis (03/21/22 0755)   John Schlichter Bath: Dialysate K (mEq/L): 2 (03/21/22 0755)   Ca Bath: Dialysate CA (mEq/L): 2.5 (03/21/22 0755)   Na: Dialysate NA (mEq/L): 138 (03/21/22 0755)   Bicarb: Dialysate HCO3 (mEq/L): 35 (03/21/22 0755)   Target Fluid Removal: Goal/Amount of Fluid to Remove (mL): 2000 mL (03/21/22 0755)     Access   Type & Location: Right Subclavian CVC    Comments:  No s/s of infection noted. Dressing  dated 03/18/2022. Left AVF redness present and present a sign of infiltration. +Thrill and +bruit.        Labs   HBsAg (Antigen) / date: Negative 3/18/2022                                           HBsAb (Antibody) / date: Susceptible 3/18/2022   Source: Epic    Obtained/Reviewed  Critical Results Called HGB   Date Value Ref Range Status   03/20/2022 11.2 (L) 12.1 - 17.0 g/dL Final     Potassium   Date Value Ref Range Status   03/20/2022 4.9 3.5 - 5.1 mmol/L Final     Calcium   Date Value Ref Range Status   03/20/2022 7.7 (L) 8.5 - 10.1 MG/DL Final     BUN   Date Value Ref Range Status   03/20/2022 67 (H) 6 - 20 MG/DL Final     Creatinine   Date Value Ref Range Status   03/20/2022 8.74 (H) 0.70 - 1.30 MG/DL Final        Meds Given   Name Dose Route   Albumin  12.5g HD                 Adequacy / Fluid    Total Liters Process: 77.7   Net Fluid Removed: 1500 ml Comments   Time Out Done:   (Time) 0750   Admitting Diagnosis: ESRD   Consent obtained/signed: Informed Consent Verified: Yes (03/21/22 5676)   Machine / RO # Machine Number: [id number] (03/21/22 6231)   Primary Nurse Rpt Pre: Fredy Scott   Primary Nurse Rpt PostStacia Barbour, RN   Pt Education: Procedural   Care Plan: Continue Nephrology plan of care   Pts outpatient clinic: [facility]      Tx Summary   Comments:     9215- Labs drawn, orders and medications were reviewed. Sbar was given by primary nurse. Hd was started using RIJ cvc with no issues. 0900-Bp low and albumin was administered    0915-Bp still low uf was turned off      1125- Treatment terminated, with all possible blood returned. Each dialysis catheter limb disinfected per p&p, blood returned per p&p, each dialysis hub disinfected per p&p, post dialysis catheter dwell instilled per order, and caps applied. SBAR given to primary nurse.  [phone]

## 2022-03-21 NOTE — PROGRESS NOTES
NAME: Mitchell Leo        :  1950        MRN:  [id number]                    Assessment   :                                               Plan:  ESRD-    Hypotension    Anemia    Fatigue    [id number]  Signed off treatment early 3/18 (only ~ 2 hours and only 700 ml off)  HD/UF today     H/H at goal.  Hold TE.     He wants to continue HD for now, but we are discussing goals of care and I will continue to discuss.     Now on Midodrine 20 tid - he was not on this prior to admission, hopefully he will tolerate HD/UF better today         Subjective:     Chief Complaint:  In bed. Generalized uncomfortable. Seems fairly miserable. The patient was seen on dialysis at 10:29 AM .  BP is stable. Catheter is functioning well. Review of Systems:    Symptom Y/N Comments  Symptom Y/N Comments   Fever/Chills    Chest Pain     Poor Appetite    Edema     Cough    Abdominal Pain     Sputum    Joint Pain     SOB/BETTS    Pruritis/Rash     Nausea/vomit    Tolerating PT/OT     Diarrhea    Tolerating Diet     Constipation    Other       Could not obtain due to:      Objective:     VITALS:   Last 24hrs VS reviewed since prior progress note.  Most recent are:  Visit Vitals  BP (!) 101/53 (BP 1 Location: Right upper arm, BP Patient Position: At rest)   Pulse 74   Temp 97.5 °F (36.4 °C)   Resp 18   Ht 5' 9\" (1.753 m)   Wt 118.3 kg (260 lb 12.9 oz)   SpO2 97%   BMI 38.51 kg/m²       Intake/Output Summary (Last 24 hours) at 3/21/2022 0547  Last data filed at 3/20/2022 1340  Gross per 24 hour   Intake 70 ml   Output    Net 70 ml      Telemetry Reviewed:     PHYSICAL EXAM:  General: NAD  + edema  rales    Lab Data Reviewed: (see below)    Medications Reviewed: (see below)    PMH/SH reviewed - no change compared to H&P  ________________________________________________________________________  Care Plan discussed with:  Patient     Family      RN Care Manager                    Consultant:          Comments   >50% of visit spent in counseling and coordination of care       ________________________________________________________________________  Fernanda Tay MD     Procedures: see electronic medical records for all procedures/Xrays and details which  were not copied into this note but were reviewed prior to creation of Plan. LABS:  Recent Labs     03/20/22 0258 03/19/22 0123   WBC 7.1 6.6   HGB 11.2* 11.0*   HCT 36.9 35.8*   * 147*     Recent Labs     03/20/22 0258 03/19/22 0123 03/1950 03/18/22  1351 03/18/22  1351   * 133* 135*   < > 135*   K 4.9 5.0 5.7*   < > 5.3*    101 99   < > 100   CO2 25 23 24   < > 23   BUN 67* 54* 92*   < > 93*   CREA 8.74* 6.98* 10.30*   < > 10.10*   * 139* 203*   < > 234*   CA 7.7* 7.7* 8.2*   < > 7.8*   MG 3.2* 2.9*  --   --  3.6*   PHOS 8.0* 5.7*  --   --  7.7*    < > = values in this interval not displayed. Recent Labs     03/20/22 0258 03/19/22 0123 03/1950   AP 34* 36* 42*   TP 6.1* 5.8* 6.3*   ALB 3.1* 2.6* 2.8*   GLOB 3.0 3.2 3.5     No results for input(s): INR, PTP, APTT, INREXT, INREXT in the last 72 hours. No results for input(s): FE, TIBC, PSAT, FERR in the last 72 hours. No results found for: FOL, RBCF   No results for input(s): PH, PCO2, PO2 in the last 72 hours. No results for input(s): CPK, CKMB in the last 72 hours.     No lab exists for component: TROPONINI  No components found for: [place]  Lab Results   Component Value Date/Time    Color YELLOW/STRAW 03/05/2021 01:28 PM    Appearance CLEAR 03/05/2021 01:28 PM    Specific gravity 1.009 03/05/2021 01:28 PM    pH (UA) 6.0 03/05/2021 01:28 PM    Protein Negative 03/05/2021 01:28 PM    Glucose Negative 03/05/2021 01:28 PM    Ketone Negative 03/05/2021 01:28 PM    Bilirubin Negative 03/05/2021 01:28 PM    Urobilinogen 0.2 03/05/2021 01:28 PM    Nitrites Negative 03/05/2021 01:28 PM    Leukocyte Esterase Negative 03/05/2021 01:28 PM    Epithelial cells FEW 03/05/2021 01:28 PM    Bacteria Negative 03/05/2021 01:28 PM    WBC 0-4 03/05/2021 01:28 PM    RBC 0-5 03/05/2021 01:28 PM       MEDICATIONS:  Current Facility-Administered Medications   Medication Dose Route Frequency    loperamide (IMODIUM) capsule 2 mg  2 mg Oral Q4H PRN    traMADoL (ULTRAM) tablet 50 mg  50 mg Oral Q6H PRN    levothyroxine (SYNTHROID) tablet 137 mcg  137 mcg Oral ACB    lidocaine 4 % patch 1 Patch  1 Patch TransDERmal Q24H    ketorolac (TORADOL) injection 30 mg  30 mg IntraVENous NOW    midodrine (PROAMATINE) tablet 20 mg  20 mg Oral TID WITH MEALS    albumin human 25% (BUMINATE) solution 12.5 g  12.5 g IntraVENous DIALYSIS PRN    piperacillin-tazobactam (ZOSYN) 3.375 g in 0.9% sodium chloride (MBP/ADV) 100 mL MBP  3.375 g IntraVENous Q12H    sodium chloride (NS) flush 5-40 mL  5-40 mL IntraVENous Q8H    sodium chloride (NS) flush 5-40 mL  5-40 mL IntraVENous PRN    acetaminophen (TYLENOL) tablet 650 mg  650 mg Oral Q6H PRN    Or    acetaminophen (TYLENOL) suppository 650 mg  650 mg Rectal Q6H PRN    polyethylene glycol (MIRALAX) packet 17 g  17 g Oral DAILY PRN    ondansetron (ZOFRAN ODT) tablet 4 mg  4 mg Oral Q8H PRN    Or    ondansetron (ZOFRAN) injection 4 mg  4 mg IntraVENous Q6H PRN    apixaban (ELIQUIS) tablet 5 mg  5 mg Oral BID    aspirin delayed-release tablet 81 mg  81 mg Oral DAILY    balsam peru-castor oiL (VENELEX) ointment   Topical BID    calcium acetate(phosphat bind) (PHOSLO) capsule 667 mg  1 Capsule Oral TID WITH MEALS    B complex-vitaminC-folic acid (NEPHROCAP) cap  1 Capsule Oral DAILY    dutasteride (AVODART) capsule 0.5 mg  0.5 mg Oral DAILY    cetirizine (ZYRTEC) tablet 10 mg  10 mg Oral DAILY    pantoprazole (PROTONIX) tablet 40 mg  40 mg Oral ACB    pravastatin (PRAVACHOL) tablet 80 mg  80 mg Oral QHS    insulin lispro (HUMALOG) injection   SubCUTAneous AC&HS    glucose chewable tablet 16 g  4 Tablet Oral PRN    glucagon (GLUCAGEN) injection 1 mg  1 mg IntraMUSCular PRN    dextrose 10% infusion 0-250 mL  0-250 mL IntraVENous PRN    VANCOMYCIN INFORMATION NOTE   Other Rx Dosing/Monitoring

## 2022-03-21 NOTE — PROGRESS NOTES
Problem: Pressure Injury - Risk of  Goal: *Prevention of pressure injury  Description: Document Romeo Scale and appropriate interventions in the flowsheet.   Outcome: Progressing Towards Goal  Note: Pressure Injury Interventions:  Sensory Interventions: Assess changes in LOC    Moisture Interventions: Absorbent underpads    Activity Interventions: PT/OT evaluation    Mobility Interventions: Float heels    Nutrition Interventions: Document food/fluid/supplement intake    Friction and Shear Interventions: HOB 30 degrees or less

## 2022-03-21 NOTE — PROGRESS NOTES
End of Shift Note    Bedside shift change report given to NADINE Lay (oncoming nurse) by Claudene Caul, RN (offgoing nurse). Report included the following information SBAR, Kardex, ED Summary, OR Summary, Intake/Output and MAR    Shift worked:  [id number]     Shift summary and any significant changes:     Pt has had multiple bowel movements during shift. Pt also complained of 9/10 pain. NP messaged for pain relief and imodium for diarrhea, orders entered and given per [facility]. Stool samples sent to lab. Pt's AM labs will be pulled in dialysis as RNs unable to attain labs. Safety rounding and toileting needs met. Would not advise getting up very weak on legs, even with x2 assist and walker Pt was not steady on feet at all. Concerns for physician to address:       Zone phone for oncoming shift:          Activity:  Activity Level: Up with Assistance  Number times ambulated in hallways past shift: 0  Number of times OOB to chair past shift: 0    Cardiac:   Cardiac Monitoring: No      Cardiac Rhythm: Ventricular Paced    Access:   Current line(s): PIV and HD access     Genitourinary:   Urinary status: anuric    Respiratory:   O2 Device: Nasal cannula  Chronic home O2 use?: NO  Incentive spirometer at bedside: NO       GI:  Last Bowel Movement Date: 03/20/22  Current diet:  ADULT DIET Regular; 4 carb choices (60 gm/meal); Low Sodium (2 gm); Low Potassium (Less than 3000 mg/day); Low Phosphorus (Less than 1000 mg)  ADULT ORAL NUTRITION SUPPLEMENT Breakfast, Dinner; Renal Supplement  Passing flatus: YES  Tolerating current diet: NO       Pain Management:   Patient states pain is manageable on current regimen: YES    Skin:  Romeo Score: 15  Interventions: PT/OT consult    Patient Safety:  Fall Score:  Total Score: 4  Interventions: {fall interventions:54762}  High Fall Risk: Yes    Length of Stay:  Expected LOS: - - -  Actual LOS: 3      Claudene Caul, RN

## 2022-03-21 NOTE — PROGRESS NOTES
Received notification from bedside RN about patient with regards to: generalized pain (neck, back, butt, foot), rayed at 9/10.  Requesting medication for relief  VS: BP 91/45, HR 75, RR 18, o2 sat 98% on NC 4 L    Intervention given: Tramadol PO x 1 dose ordered    2340: Notified of x 2 loose BM noted blood    - stool for occult blood, has ordered CBC for AM    0150: persistent frequent loose stool, requesting Imodium  VS: /53, HR 74, RR 18, O2 sat 97% on NC 4 L    - Stool culture, Imodium PO PRN ordered

## 2022-03-21 NOTE — PROGRESS NOTES
Hospitalist Progress Note              Aamir Kuo MD.                                                             Cell: [phone]                               NAME:  Seth Araujo  :  1950  MRN:  [id number]  Date of Service:  3/21/2022    Summary: 67 y.o. male  with extensive medical comorbidities including ESRD on HD, atrial fibrillation on Eliquis, diabetes, coronary artery disease, obesity, CABG x2, chronic wounds and decubitus ulcer, presented  to ED [facility] ED with SOB, generalized malaise and fatigue for the past 3 days, shortness of breath, moderate severity.  Missed dialysis on day of presentation because he was too weak. Adm to [facility] due to hypotension and possible need for vasopressors. Assessment/Plan:  Acute hypoxic respiratory failure likely due to volume overload. He missed his HD session  CT chest on admission showed moderate bilateral pleural effusion  On sat is 97% on 4 liters of oxygen via NC  Wean off oxygen for goal spo2> 94%    Volume overload: Due to missed HD  ESRD  For HD today if BP can tolerate    Hypotension: Likely due to ESRD  Continue midodrine 20 mg TID  Blood cx negative till date  BP is stable    Sacral decubitus ulcer  Bilateral venous stasis wounds  Right 2nd toe ulcer with exposure to the bone  - Continue zosyn  - F/u with podiatry. He will likely require amputation of the 2nd toe     CAD s/p CABG x 2,   PAF s/p AV anastacio ablation with PPM,   PAD,   - Continue eliquis    HTN, HLD, DM2, GERD, hypothyroidism and JACEY not on CPAP. On ISS as per protocol    Consult palliative care for care decisions     Code status:full  DVT prophylaxsis: On eliquis  Dispo: to be determined         Interval History/Subjective:  F/u for hypoxic respiratory failure, Missed HD    Review of Systems:  A comprehensive review of systems was negative except for that written in the HPI.       Objective:     VITALS:   Last 24hrs VS reviewed since prior progress note. Most recent are:  Visit Vitals  /65   Pulse 74   Temp 98.5 °F (36.9 °C)   Resp 20   Ht 5' 9\" (1.753 m)   Wt 118.3 kg (260 lb 12.9 oz)   SpO2 97%   BMI 38.51 kg/m²       Intake/Output Summary (Last 24 hours) at 3/21/2022 1453  Last data filed at 3/21/2022 1125  Gross per 24 hour   Intake    Output 1500 ml   Net -1500 ml        PHYSICAL EXAM:  General: No acute distress, cooperative, pleasant   EENT: EOMI. Anicteric sclerae. Oral mucous moist, oropharynx benign  Resp: CTA bilaterally. No wheezing/rhonchi/rales. No accessory muscle use  CV: Regular rhythm, normal rate, no murmurs, gallops, rubs  GI: Soft, non distended, non tender. normoactive bowel sounds, no hepatosplenomegaly Extremities: No edema, warm, 2+ pulses throughout  Neurologic: Moves all extremities. AAOx3, CN II-XII grossly intact  Psych: Good insight. Not anxious nor agitated. Skin: Sacral decubitus ulcer. Right 2nd toe ulcer    Lab Data Personally Reviewed: (see below)     Medications list Personally Reviewed:  x YES  NO     _______________________________________________________________________  Care Plan discussed with:  Patient/Family and Nurse    Total NON critical care TIME:  30 minutes    Jacob Koroma MD     Procedures: see electronic medical records for all procedures/Xrays and details which were not copied into this note but were reviewed prior to creation of Plan.       LABS:  Recent Labs     03/21/22  0800 03/20/22  0258   WBC 7.7 7.1   HGB 12.1 11.2*   HCT 40.6 36.9    121*     Recent Labs     03/21/22  0800 03/20/22  0258 03/19/22  0123   * 135* 133*   K 5.0 4.9 5.0   CL 99 101 101   CO2 24 25 23   BUN 77* 67* 54*   CREA 10.20* 8.74* 6.98*   * 163* 139*   CA 7.8* 7.7* 7.7*   MG 3.5* 3.2* 2.9*   PHOS 8.3* 8.0* 5.7*     Recent Labs     03/21/22  0800 03/20/22  0258 03/19/22  0123   ALT 18 18 20   AP 40* 34* 36*   TBILI 0.5 0.4 0.5   TP 6.3* 6.1* 5.8*   ALB 3.0* 3.1* 2.6* GLOB 3.3 3.0 3.2     No results for input(s): INR, PTP, APTT, INREXT in the last 72 hours. No results for input(s): FE, TIBC, PSAT, FERR in the last 72 hours. No results found for: FOL, RBCF   No results for input(s): PH, PCO2, PO2 in the last 72 hours. No results for input(s): CPK, CKNDX, TROIQ in the last 72 hours.     No lab exists for component: CPKMB  Lab Results   Component Value Date/Time    Cholesterol, total 121 10/15/2021 10:29 AM    HDL Cholesterol 45 10/15/2021 10:29 AM    LDL,Direct 43 04/03/2017 04:00 PM    LDL, calculated 56 10/15/2021 10:29 AM    LDL, calculated 51 07/30/2020 08:35 AM    Triglyceride 106 10/15/2021 10:29 AM     Lab Results   Component Value Date/Time    Glucose (POC) 129 (H) 03/21/2022 11:56 AM    Glucose (POC) 243 (H) 03/20/2022 09:54 PM    Glucose (POC) 205 (H) 03/20/2022 04:41 PM    Glucose (POC) 216 (H) 03/20/2022 12:46 PM    Glucose (POC) 150 (H) 03/20/2022 08:56 AM     Lab Results   Component Value Date/Time    Color YELLOW/STRAW 03/05/2021 01:28 PM    Appearance CLEAR 03/05/2021 01:28 PM    Specific gravity 1.009 03/05/2021 01:28 PM    pH (UA) 6.0 03/05/2021 01:28 PM    Protein Negative 03/05/2021 01:28 PM    Glucose Negative 03/05/2021 01:28 PM    Ketone Negative 03/05/2021 01:28 PM    Bilirubin Negative 03/05/2021 01:28 PM    Urobilinogen 0.2 03/05/2021 01:28 PM    Nitrites Negative 03/05/2021 01:28 PM    Leukocyte Esterase Negative 03/05/2021 01:28 PM    Epithelial cells FEW 03/05/2021 01:28 PM    Bacteria Negative 03/05/2021 01:28 PM    WBC 0-4 03/05/2021 01:28 PM    RBC 0-5 03/05/2021 01:28 PM

## 2022-03-22 PROBLEM — R53.81 PHYSICAL DEBILITY: Status: ACTIVE | Noted: 2022-01-01

## 2022-03-22 NOTE — PROGRESS NOTES
Transition of Care Plan:    RUR: 19%  Disposition: SNF Placement recommendations-SNF to be selected by pt's spouse   Follow up appointments: Follow up with PCP and/or Specialist   DME needed: N/A  Transportation at Discharge: BLS transport-CM to arrange    Keys or means to access home:  N/A    IM Medicare Letter:2nd  Medicare Letter to be given   Is patient a BCPI-A Bundle:    CM will provide if required        If yes, was Bundle Letter given?:    Is patient a  and connected with the [address] ? N/A              If yes, was [employer] transfer form completed and [facility] notified? Caregiver Contact: Reji Alaniz (spouse) [phone]  Discharge Caregiver contacted prior to discharge? Family to be contacted  Care Conference needed?:  Not at this time      CM: Анна Brownlee is currently working with pt in [facility]. CM made aware that pt is being recommended for snf placement at the time of d/c.  CM attempted to contact pt's spouse, via telephone (requested by pt), however the attempt was unsuccessful. CM left voicemail requesting a return call. If pt's spouse agreeable to snf placement referrals will be sent to snf, for placement. Pt will not require insurance auth. Pt will require rapid covid test at the time of d/c. CM will continue to follow.     JOSE Daniel, [address] [place] [address]

## 2022-03-22 NOTE — PROGRESS NOTES
Problem: Mobility Impaired (Adult and Pediatric)  Goal: *Acute Goals and Plan of Care (Insert Text)  Description: FUNCTIONAL STATUS PRIOR TO ADMISSION: Patient was modified independent using a rollator for functional mobility. He reports his wife assists with LB dressing. He reports recent falls. HOME SUPPORT PRIOR TO ADMISSION: The patient lived with his wife. Physical Therapy Goals  Initiated 3/20/2022  1. Patient will move from supine to sit and sit to supine , scoot up and down, and roll side to side in bed with supervision/set-up within 7 day(s). 2.  Patient will transfer from bed to chair and chair to bed with supervision/set-up using the least restrictive device within 7 day(s). 3.  Patient will perform sit to stand with supervision/set-up within 7 day(s). 4.  Patient will ambulate with supervision/set-up for 50 feet with the least restrictive device within 7 day(s). 5.  Patient will ascend/descend 1 stairs with bilateral handrail(s) with supervision/set-up within 7 day(s). Outcome: Not Progressing Towards Goal   PHYSICAL THERAPY TREATMENT  Patient: Josefa Anthony (12 y.o. male)  Date: 3/22/2022  Diagnosis: Hypotension [I95.9]  Acute respiratory failure with hypoxemia (HCC) [J96.01] <principal problem not specified>       Precautions: Fall  Chart, physical therapy assessment, plan of care and goals were reviewed. ASSESSMENT  Patient continues with skilled PT services and is not progressing towards goals. Pt received in bed, laying with HOB partially raised but holding head with cervical extension. (Noted by previous PT in CCU as well). Cued pt to lift head and turn head and he is able but returns to extended position. Adjusted pillows to neutralize position of head and allow him to visually scan around room but pt c/o neck in any position. Pt holds arms at sides and does not readily move them even with cues.  Often states, \"I can't\" but when initiated with assist will then move and moves both arms across body well when pulled up in bed. Raise head of bed to more upright position; pt c/o pain but does not localize. Attempted to assist pt to turn to sitting on edge of bed, assisting legs toward edge and attempting to use nahum to rotate patient toward edge but pt not assisting at all and yelling out. Total A to initiate and did not continue as pt not actively participating. Moved pt up in bed and placed bed in modified chair position, heels floated to improve his tolerance and upright positioning. Pt requesting water from his pitcher, very passive just minimally tilting head forward and waiting for straw to be brought to him. Encouraged pt to take pitcher on his own and sip and then he was able to hold and drink for himself. Pt very self-limiting often stating \"I can't\" and when encouraged that he could and needed to do more actively for himself he stated That's probably so. Current Level of Function Impacting Discharge (mobility/balance): total A, decline from eval, fall risk, max encouragement for participation    Other factors to consider for discharge: was mod I with a rollator; does have hx of falls; declining function         PLAN :  Patient continues to benefit from skilled intervention to address the above impairments. Continue treatment per established plan of care. to address goals. Recommendation for discharge: (in order for the patient to meet his/her long term goals)  Therapy up to 5 days/week in SNF setting    This discharge recommendation:  Has been made in collaboration with the attending provider and/or case management    IF patient discharges home will need the following DME: to be determined (TBD)       SUBJECTIVE:   Patient stated I can't.     OBJECTIVE DATA SUMMARY:   Critical Behavior:  Neurologic State: Alert,Eyes open spontaneously  Orientation Level: Oriented X4  Cognition: Follows commands     Functional Mobility Training:  Bed Mobility:     Supine to Sit: Total assistance; Other (comment) (Attempted but could not accomplish moving to EOB)  Sit to Supine: Total assistance           Transfers:   Unable to get to edge of bed for tranfer                                Balance:  Sitting:  (sitting in chair position in bed; could not ifeoma EOB)    Pain Rating:  Yells in pain but does not readily localize or rate    Activity Tolerance:   Poor    After treatment patient left in no apparent distress:   Call bell within reach, Side rails x 3, and modified chair position of bed    COMMUNICATION/COLLABORATION:   The patients plan of care was discussed with: Occupational therapist and Registered nurse.      Rashad Grimaldo, PT   Time Calculation: 20 mins

## 2022-03-22 NOTE — CONSULTS
Palliative Medicine Consult  Efrain: [phone] ([phone])    Patient Name: Alon Valenzuela  YOB: 1950    Date of Initial Consult: 3/19/22  Reason for Consult: Care decisions   Requesting Provider: Justyna Webster MD  Primary Care Physician: Ni Marin MD     SUMMARY:   Alon Valenzuela is a 67 y.o. with a past history of ESRD (MWF HD), Afib (on eliquis), DM, CAD, CABG x2, CHF (s/p pacer), JACEY (no cpap) who was admitted on 3/18/2022 from the ED with a diagnosis of hypotension, ESRD (HD dependent), and pulmonary edema with bilateral pleural effusions. Current medical issues leading to Palliative Medicine involvement include: ESRD, hypotension, and hypoxia. Per admission note, pt presented to ED with fatigue, malaise, dyspnea x 3 days and pt experienced a fall. Pt was scheduled to get HD the day of admission, but missed this appt due to his severe weakness. PALLIATIVE DIAGNOSES:   1. Goals of care  2. Physical debility  3. Shortness of breath  4. Renal failure  5. Hypotension      PLAN:   1. Goals of care  -I met with the patient and his wife Leah Cruz at the bedside. The pt was moderately dyspneic with conversation and asked that his wife answer all of my questions. I inquired as to their perception of his current medical condition and plan of care. Leah Cruz stated that they attribute his over all health declination to a buttox/sacral pressure ulcer that developed a few years ago and has never healed despite multiple weekly dressing changes. She states he has had ongoing pain that hinders his ability to perform day to day activities. He is unable to take pain medication because of the chronic nature of the pain and fear of prescribing narcotics by providers. He mostly sits in a chair all day because ambulation is painful, but he does use a walker to get around. He no longer drives and is dependent on her, or other family members, to drive him to appts. They have no children.  She states he has been getting HD for over a year now and this is due to progressively worsening renal failure. She states that she thinks his progressive weakness is also attributed to the pain from his ongoing decubitis ulcer. She states home health nurses come to the house a few times a week to change the dressing, and that its not getting better, or worse, its just staying unhealed. She states that they have never discussed what quality of life means to him, and states that they want everyone to be done to save him if his heart were to stop, and they wish to continue with restorative care interventions. She states that he was admitted to [facility] last month (February 2022) for line sepsis. She states at that time, it was discussed with them if they wanted to continue with full interventions which would include a line exchange, which they elected to do. Pt is now on midodrine to assist with tolerating HD. Pt and wife are aware of this. She states she has not been given an update re: her 's condition and the pt states he cannot remember what providers have been telling him regarding his plan of care. Immanuel Boggs states the only provider she has spoekn to was podiatry regarding a wound on his foot. Pt and his wife wish to continue with HD and escalatory measures as warranted to maintain restorative goals. They deny the presence of AMDs and are interested in completing them this admission. Immanuel Boggs states she will be back tomorrow 3/23. I will follow-up again re: this. 2. Physical debility  -PT/OT consulted. Con't recommendations. 3. Shortness of breath  -Con't with HD as recommended by nephrology. Con't supplemental O2 (3L home O2)    4. Renal Failure  -Nephrology following. Con't recommendations. 5. Hypotension  -d/t ESRD per notes. Con't midodrine as per recommendations. 6. Initial consult note routed to primary continuity provider and/or primary health care team members  7.  Communicated plan of care with: Palliative IDT, Hospital Health Care Team     GOALS OF CARE / TREATMENT PREFERENCES:     GOALS OF CARE: Full restorative. Patient/Health Care Proxy Stated Goals: Cure    TREATMENT PREFERENCES:   Code Status: Full Code    Patient and family's personal goals include: Restorative care including escalation of care measures. Important upcoming milestones or family events: None identified. The patient identifies the following as important for living well: Being able to return home and resume MWF HD. Advance Care Planning:  [] The [facility] Interdisciplinary Team has updated the ACP Navigator with Health Care Decision Maker and Patient Capacity      Primary Decision Maker: Ana Maria Loaiza - Spouse - [phone]  Advance Care Planning 3/18/2022   Patient's Healthcare Decision Maker is: -   Primary Decision Maker Name -   Primary Decision Maker Phone Number -   Confirm Advance Directive None   Patient Would Like to Complete Advance Directive -       Medical Interventions: Full interventions       Other:    As far as possible, the palliative care team has discussed with patient / health care proxy about goals of care / treatment preferences for patient.      HISTORY:     History obtained from: medical chart, patient, and patient's wife Christina Huang: None    HPI/SUBJECTIVE:    The patient is:   [x] Verbal and participatory  [] Non-participatory due to:     Clinical Pain Assessment (nonverbal scale for severity on nonverbal patients):   Clinical Pain Assessment  Severity: 5  Location: sacral decub site/buttox  Character: sharp, achey/throbbing  Duration: constant          Duration: for how long has pt been experiencing pain (e.g., 2 days, 1 month, years)  Frequency: how often pain is an issue (e.g., several times per day, once every few days, constant)     FUNCTIONAL ASSESSMENT:     Palliative Performance Scale (PPS):          PSYCHOSOCIAL/SPIRITUAL SCREENING:     Palliative IDT has assessed this patient for cultural preferences / practices and a referral made as appropriate to needs (Cultural Services, Patient Advocacy, Ethics, etc.)    Any spiritual / Adventism concerns:  [] Yes /  [x] No   If \"Yes\" to discuss with pastoral care during IDT     Does caregiver feel burdened by caring for their loved one:   [] Yes /  [x] No /  [] No Caregiver Present    If \"Yes\" to discuss with social work during IDT    Anticipatory grief assessment:   [x] Normal  / [] Maladaptive     If \"Maladaptive\" to discuss with social work during IDT    ESAS Anxiety: Anxiety: 0    ESAS Depression:          REVIEW OF SYSTEMS:     Positive and pertinent negative findings in ROS are noted above in HPI. The following systems were [x] reviewed / [] unable to be reviewed as noted in HPI  Other findings are noted below. Systems: constitutional, ears/nose/mouth/throat, respiratory, gastrointestinal, genitourinary, musculoskeletal, integumentary, neurologic, psychiatric, endocrine. Positive findings noted below. Modified ESAS Completed by: provider   Fatigue: 5       Pain: 5 (buttox)   Anxiety: 0 Nausea: 0     Dyspnea: 4           Stool Occurrence(s): 1        PHYSICAL EXAM:     From RN flowsheet:  Wt Readings from Last 3 Encounters:   03/22/22 262 lb (118.8 kg)   03/08/22 263 lb (119.3 kg)   02/15/22 256 lb (116.1 kg)     Blood pressure (!) 108/54, pulse 75, temperature 98 °F (36.7 °C), resp. rate 20, height 5' 9\" (1.753 m), weight 262 lb (118.8 kg), SpO2 97 %. Pain Scale 1: Numeric (0 - 10)  Pain Intensity 1: 7  Pain Onset 1: Chronic  Pain Location 1: Other (comment) (sacrum)  Pain Orientation 1: Lower  Pain Description 1: Burning  Pain Intervention(s) 1: Medication (see MAR)  Last bowel movement, if known:     Constitutional: Appears uncomfortable overall. Often moaning in pain (getting prn tramadol). Oriented to person place and situation. Dyspneic with conversation.    Eyes: pupils equal, anicteric  ENMT: no nasal discharge, moist mucous membranes  Cardiovascular: regular rhythm, on ambulatory tele  Respiratory: breathing labored with conversation or physical activity, symmetric  Gastrointestinal: soft non-tender, +bowel sounds  Musculoskeletal: no deformity, no tenderness to palpation  Skin: warm, dry, multiple scattered abrasions on upper and lower extremities. Podiatry consult for open toe fracture, recommended amputation.    Neurologic: following commands, moving all extremities  Psychiatric: full affect, no hallucinations     HISTORY:     Active Problems:    Hypotension (3/18/2022)      Acute respiratory failure with hypoxemia (HCC) (3/18/2022)      Past Medical History:   Diagnosis Date    Arrhythmia     atrial fibrillation 2017    Arthritis     spine, all joints    Atrial fibrillation ([address]     CAD (coronary atherosclerotic disease)     Chronic kidney disease     [facility]    Chronic pain     back pain - unable to lay flat    Congestive heart failure ([address]     Diabetes ([address]     Diabetes mellitus type 2, controlled ([address] 3/13/2017    GERD (gastroesophageal reflux disease)     Heart failure ([address]     Hx of CABG 3/13/2017    CABG in 2010 in [address] [place] [address] Hyperlipidemia     Hypertension     Long term current use of anticoagulant therapy     Morbid obesity ([address] 3/13/2017    Neuropathy     in both feet    JACEY (obstructive sleep apnea) 6/23/2017    no CPAP, pt states never tested    Pacemaker     S/P CABG x 2 2010    Skin cancer     Thyroid disease     hypothyroid      Past Surgical History:   Procedure Laterality Date    HX CATARACT REMOVAL Bilateral     HX ORTHOPAEDIC      L 3rd toe amputation in 1999    HX PACEMAKER Left 12/26/2018    Dr Hank David x4 crt-p    HX VASCULAR ACCESS  03/2021    R chest    IR INSERT TUNL CVC W/O PORT OVER 5 YR  3/16/2021    [place] CARDIAC SURG PROCEDURE UNLIST      CABGx2 in 2010      Family History   Problem Relation Age of Onset    Heart Attack Father     Cancer Father lymph node    Cancer Mother         breast    No Known Problems Brother       History reviewed, no pertinent family history.   Social History     Tobacco Use    Smoking status: Never Smoker    Smokeless tobacco: Never Used   Substance Use Topics    Alcohol use: Not Currently     Comment: rare     Allergies   Allergen Reactions    Allopurinol Rash    Gabapentin Drowsiness    Ciprofloxacin Nausea Only    Percocet [Oxycodone-Acetaminophen] Other (comments)     Hallucinations, not allergic to tylenol      Current Facility-Administered Medications   Medication Dose Route Frequency    loperamide (IMODIUM) capsule 2 mg  2 mg Oral Q4H PRN    traMADoL (ULTRAM) tablet 50 mg  50 mg Oral Q6H PRN    levothyroxine (SYNTHROID) tablet 137 mcg  137 mcg Oral ACB    midodrine (PROAMATINE) tablet 20 mg  20 mg Oral TID WITH MEALS    albumin human 25% (BUMINATE) solution 12.5 g  12.5 g IntraVENous DIALYSIS PRN    piperacillin-tazobactam (ZOSYN) 3.375 g in 0.9% sodium chloride (MBP/ADV) 100 mL MBP  3.375 g IntraVENous Q12H    sodium chloride (NS) flush 5-40 mL  5-40 mL IntraVENous Q8H    sodium chloride (NS) flush 5-40 mL  5-40 mL IntraVENous PRN    acetaminophen (TYLENOL) tablet 650 mg  650 mg Oral Q6H PRN    Or    acetaminophen (TYLENOL) suppository 650 mg  650 mg Rectal Q6H PRN    polyethylene glycol (MIRALAX) packet 17 g  17 g Oral DAILY PRN    ondansetron (ZOFRAN ODT) tablet 4 mg  4 mg Oral Q8H PRN    Or    ondansetron (ZOFRAN) injection 4 mg  4 mg IntraVENous Q6H PRN    apixaban (ELIQUIS) tablet 5 mg  5 mg Oral BID    aspirin delayed-release tablet 81 mg  81 mg Oral DAILY    balsam peru-castor oiL (VENELEX) ointment   Topical BID    calcium acetate(phosphat bind) (PHOSLO) capsule 667 mg  1 Capsule Oral TID WITH MEALS    B complex-vitaminC-folic acid (NEPHROCAP) cap  1 Capsule Oral DAILY    dutasteride (AVODART) capsule 0.5 mg  0.5 mg Oral DAILY    cetirizine (ZYRTEC) tablet 10 mg  10 mg Oral DAILY  pantoprazole (PROTONIX) tablet 40 mg  40 mg Oral ACB    pravastatin (PRAVACHOL) tablet 80 mg  80 mg Oral QHS    insulin lispro (HUMALOG) injection   SubCUTAneous AC&HS    glucose chewable tablet 16 g  4 Tablet Oral PRN    glucagon (GLUCAGEN) injection 1 mg  1 mg IntraMUSCular PRN    dextrose 10% infusion 0-250 mL  0-250 mL IntraVENous PRN    VANCOMYCIN INFORMATION NOTE   Other Rx Dosing/Monitoring          LAB AND IMAGING FINDINGS:     Lab Results   Component Value Date/Time    WBC 7.7 03/21/2022 08:00 AM    HGB 12.1 03/21/2022 08:00 AM    PLATELET 025 80/58/9245 08:00 AM     Lab Results   Component Value Date/Time    Sodium 135 (L) 03/21/2022 08:00 AM    Potassium 5.0 03/21/2022 08:00 AM    Chloride 99 03/21/2022 08:00 AM    CO2 24 03/21/2022 08:00 AM    BUN 77 (H) 03/21/2022 08:00 AM    Creatinine 10.20 (H) 03/21/2022 08:00 AM    Calcium 7.8 (L) 03/21/2022 08:00 AM    Magnesium 3.5 (H) 03/21/2022 08:00 AM    Phosphorus 8.3 (H) 03/21/2022 08:00 AM      Lab Results   Component Value Date/Time    Alk. phosphatase 40 (L) 03/21/2022 08:00 AM    Protein, total 6.3 (L) 03/21/2022 08:00 AM    Albumin 3.0 (L) 03/21/2022 08:00 AM    Globulin 3.3 03/21/2022 08:00 AM     Lab Results   Component Value Date/Time    INR 1.2 (H) 03/16/2021 12:03 AM    Prothrombin time 12.2 (H) 03/16/2021 12:03 AM      No results found for: IRON, FE, TIBC, IBCT, PSAT, FERR   No results found for: PH, PCO2, PO2  No components found for: [place]   Lab Results   Component Value Date/Time     03/04/2021 08:01 PM    CK - MB 3.4 03/04/2021 08:01 PM                Total time: 70 minutes   Counseling / coordination time, spent as noted above: 40 minutes   > 50% counseling / coordination?: yes    Prolonged service was provided for  []30 min   []75 min in face to face time in the presence of the patient, spent as noted above. Time Start:   Time End:   Note: this can only be billed with 70883 (initial) or 59106 (follow up).   If multiple start / stop times, list each separately.

## 2022-03-22 NOTE — PROGRESS NOTES
Hospitalist Progress Note              Mario Barajas MD.                                                             Cell: [phone]                               NAME:  Lang Katz  :  1950  MRN:  [id number]  Date of Service:  3/22/2022    Summary: 67 y.o. male  with extensive medical comorbidities including ESRD on HD, atrial fibrillation on Eliquis, diabetes, coronary artery disease, obesity, CABG x2, chronic wounds and decubitus ulcer, presented  to ED [facility] ED with SOB, generalized malaise and fatigue for the past 3 days, shortness of breath, moderate severity.  Missed dialysis on day of presentation because he was too weak. Adm to [facility] due to hypotension and possible need for vasopressors. Assessment/Plan:  Acute hypoxic respiratory failure likely due to volume overload. He missed his HD session  CT chest on admission showed moderate bilateral pleural effusion  On sat is 97% on 4 liters of oxygen via NC  Wean off oxygen for goal spo2> 94%      ESRD continue HD as per renal    Hypotension: Likely due to ESRD  Continue midodrine 20 mg TID  Blood cx negative till date  BP is labile    Sacral decubitus ulcer  Bilateral venous stasis wounds  Right 2nd toe ulcer with exposure to the bone  - Continue zosyn  - F/u with podiatry. He will likely require amputation of the 2nd toe     CAD s/p CABG x 2,   PAF s/p AV anastacio ablation with PPM,   PAD,   - Continue eliquis    HTN, HLD, DM2, GERD, hypothyroidism and JACEY not on CPAP. Continue meds    Debility and deconditioned state-palliative consulted, continue PT OT as tolerated     Code status:full  DVT prophylaxsis: On eliquis  Dispo: to be determined, pending palliative care evaluation         Interval History/Subjective:F/u for hypoxic respiratory failure, Missed HD    Patient seen and examined, chart was reviewed. Case discussed with case management. Patient overall poor historian. Patient unclear regarding his goals of care. Palliative eval is pending. No other acute issues reported to me at this time by patient or staff      Objective:     VITALS:   Last 24hrs VS reviewed since prior progress note. Most recent are:  Visit Vitals  BP (!) 121/51 (BP 1 Location: Right upper arm, BP Patient Position: At rest)   Pulse 74   Temp 98.2 °F (36.8 °C)   Resp 20   Ht 5' 9\" (1.753 m)   Wt 118.3 kg (260 lb 12.9 oz)   SpO2 98%   BMI 38.51 kg/m²     No intake or output data in the 24 hours ending 03/22/22 1222     PHYSICAL EXAM:  General:  ill-looking, no distress  EENT: Oral mucous moist  Resp:  No accessory muscle use, oxygen 4 L  CV: Regular rhythm  GI: Soft, non tende  Neurologic:   Alert normal speech  Psych: Poor insight. Not anxious nor agitated. Skin: Sacral decubitus ulcer. Right 2nd toe ulcer        LABS:  Recent Labs     03/21/22  0800 03/20/22  0258   WBC 7.7 7.1   HGB 12.1 11.2*   HCT 40.6 36.9    121*     Recent Labs     03/21/22  0800 03/20/22  0258   * 135*   K 5.0 4.9   CL 99 101   CO2 24 25   BUN 77* 67*   CREA 10.20* 8.74*   * 163*   CA 7.8* 7.7*   MG 3.5* 3.2*   PHOS 8.3* 8.0*     Recent Labs     03/21/22  0800 03/20/22  0258   ALT 18 18   AP 40* 34*   TBILI 0.5 0.4   TP 6.3* 6.1*   ALB 3.0* 3.1*   GLOB 3.3 3.0     No results for input(s): INR, PTP, APTT, INREXT, INREXT in the last 72 hours. No results for input(s): FE, TIBC, PSAT, FERR in the last 72 hours. No results found for: FOL, RBCF   No results for input(s): PH, PCO2, PO2 in the last 72 hours. No results for input(s): CPK, CKNDX, TROIQ in the last 72 hours.     No lab exists for component: CPKMB  Lab Results   Component Value Date/Time    Cholesterol, total 121 10/15/2021 10:29 AM    HDL Cholesterol 45 10/15/2021 10:29 AM    LDL,Direct 43 04/03/2017 04:00 PM    LDL, calculated 56 10/15/2021 10:29 AM    LDL, calculated 51 07/30/2020 08:35 AM    Triglyceride 106 10/15/2021 10:29 AM     Lab Results Component Value Date/Time    Glucose (POC) 161 (H) 03/22/2022 11:32 AM    Glucose (POC) 178 (H) 03/22/2022 08:29 AM    Glucose (POC) 152 (H) 03/21/2022 09:18 PM    Glucose (POC) 182 (H) 03/21/2022 04:14 PM    Glucose (POC) 129 (H) 03/21/2022 11:56 AM     Lab Results   Component Value Date/Time    Color YELLOW/STRAW 03/05/2021 01:28 PM    Appearance CLEAR 03/05/2021 01:28 PM    Specific gravity 1.009 03/05/2021 01:28 PM    pH (UA) 6.0 03/05/2021 01:28 PM    Protein Negative 03/05/2021 01:28 PM    Glucose Negative 03/05/2021 01:28 PM    Ketone Negative 03/05/2021 01:28 PM    Bilirubin Negative 03/05/2021 01:28 PM    Urobilinogen 0.2 03/05/2021 01:28 PM    Nitrites Negative 03/05/2021 01:28 PM    Leukocyte Esterase Negative 03/05/2021 01:28 PM    Epithelial cells FEW 03/05/2021 01:28 PM    Bacteria Negative 03/05/2021 01:28 PM    WBC 0-4 03/05/2021 01:28 PM    RBC 0-5 03/05/2021 01:28 PM         Orville Zavala MD

## 2022-03-22 NOTE — PROGRESS NOTES
NAME: Seth Araujo        :  1950        MRN:  [id number]                    Assessment   :                                               Plan:  ESRD-    Hypotension    Anemia    Fatigue    [id number]-[id number]; no HD today; 1.5 off with HD yesterday    HD/UF today     H/H at goal.  Hold TE.     He wants to continue HD for now, but we are discussing goals of care and I will continue to discuss.     Continue on Midodrine 20 tid          Subjective:     Chief Complaint:  In bed. Generalized uncomfortable. Seems fairly miserable. Not talkative. Review of Systems:    Symptom Y/N Comments  Symptom Y/N Comments   Fever/Chills    Chest Pain     Poor Appetite    Edema     Cough    Abdominal Pain     Sputum    Joint Pain     SOB/BETTS    Pruritis/Rash     Nausea/vomit    Tolerating PT/OT     Diarrhea    Tolerating Diet     Constipation    Other       Could not obtain due to:      Objective:     VITALS:   Last 24hrs VS reviewed since prior progress note.  Most recent are:  Visit Vitals  BP (!) 110/48   Pulse 74   Temp 97.9 °F (36.6 °C)   Resp 20   Ht 5' 9\" (1.753 m)   Wt 118.3 kg (260 lb 12.9 oz)   SpO2 99%   BMI 38.51 kg/m²       Intake/Output Summary (Last 24 hours) at 3/22/2022 2802  Last data filed at 3/21/2022 1125  Gross per 24 hour   Intake    Output 1500 ml   Net -1500 ml      Telemetry Reviewed:     PHYSICAL EXAM:  General: NAD  + edema  rales    Lab Data Reviewed: (see below)    Medications Reviewed: (see below)    PMH/SH reviewed - no change compared to H&P  ________________________________________________________________________  Care Plan discussed with:  Patient     Family      RN     Care Manager                    Consultant:          Comments   >50% of visit spent in counseling and coordination of care       ________________________________________________________________________  Humza Berry MD     Procedures: see electronic medical records for all procedures/Xrays and details which  were not copied into this note but were reviewed prior to creation of Plan. LABS:  Recent Labs     03/21/22  0800 03/20/22  0258   WBC 7.7 7.1   HGB 12.1 11.2*   HCT 40.6 36.9    121*     Recent Labs     03/21/22  0800 03/20/22  0258   * 135*   K 5.0 4.9   CL 99 101   CO2 24 25   BUN 77* 67*   CREA 10.20* 8.74*   * 163*   CA 7.8* 7.7*   MG 3.5* 3.2*   PHOS 8.3* 8.0*     Recent Labs     03/21/22  0800 03/20/22  0258   AP 40* 34*   TP 6.3* 6.1*   ALB 3.0* 3.1*   GLOB 3.3 3.0     No results for input(s): INR, PTP, APTT, INREXT, INREXT in the last 72 hours. No results for input(s): FE, TIBC, PSAT, FERR in the last 72 hours. No results found for: FOL, RBCF   No results for input(s): PH, PCO2, PO2 in the last 72 hours. No results for input(s): CPK, CKMB in the last 72 hours.     No lab exists for component: TROPONINI  No components found for: [place]  Lab Results   Component Value Date/Time    Color YELLOW/STRAW 03/05/2021 01:28 PM    Appearance CLEAR 03/05/2021 01:28 PM    Specific gravity 1.009 03/05/2021 01:28 PM    pH (UA) 6.0 03/05/2021 01:28 PM    Protein Negative 03/05/2021 01:28 PM    Glucose Negative 03/05/2021 01:28 PM    Ketone Negative 03/05/2021 01:28 PM    Bilirubin Negative 03/05/2021 01:28 PM    Urobilinogen 0.2 03/05/2021 01:28 PM    Nitrites Negative 03/05/2021 01:28 PM    Leukocyte Esterase Negative 03/05/2021 01:28 PM    Epithelial cells FEW 03/05/2021 01:28 PM    Bacteria Negative 03/05/2021 01:28 PM    WBC 0-4 03/05/2021 01:28 PM    RBC 0-5 03/05/2021 01:28 PM       MEDICATIONS:  Current Facility-Administered Medications   Medication Dose Route Frequency    loperamide (IMODIUM) capsule 2 mg  2 mg Oral Q4H PRN    traMADoL (ULTRAM) tablet 50 mg  50 mg Oral Q6H PRN    levothyroxine (SYNTHROID) tablet 137 mcg  137 mcg Oral ACB    midodrine (PROAMATINE) tablet 20 mg  20 mg Oral TID WITH MEALS    albumin human 25% (BUMINATE) solution 12.5 g  12.5 g IntraVENous DIALYSIS PRN    piperacillin-tazobactam (ZOSYN) 3.375 g in 0.9% sodium chloride (MBP/ADV) 100 mL MBP  3.375 g IntraVENous Q12H    sodium chloride (NS) flush 5-40 mL  5-40 mL IntraVENous Q8H    sodium chloride (NS) flush 5-40 mL  5-40 mL IntraVENous PRN    acetaminophen (TYLENOL) tablet 650 mg  650 mg Oral Q6H PRN    Or    acetaminophen (TYLENOL) suppository 650 mg  650 mg Rectal Q6H PRN    polyethylene glycol (MIRALAX) packet 17 g  17 g Oral DAILY PRN    ondansetron (ZOFRAN ODT) tablet 4 mg  4 mg Oral Q8H PRN    Or    ondansetron (ZOFRAN) injection 4 mg  4 mg IntraVENous Q6H PRN    apixaban (ELIQUIS) tablet 5 mg  5 mg Oral BID    aspirin delayed-release tablet 81 mg  81 mg Oral DAILY    balsam peru-castor oiL (VENELEX) ointment   Topical BID    calcium acetate(phosphat bind) (PHOSLO) capsule 667 mg  1 Capsule Oral TID WITH MEALS    B complex-vitaminC-folic acid (NEPHROCAP) cap  1 Capsule Oral DAILY    dutasteride (AVODART) capsule 0.5 mg  0.5 mg Oral DAILY    cetirizine (ZYRTEC) tablet 10 mg  10 mg Oral DAILY    pantoprazole (PROTONIX) tablet 40 mg  40 mg Oral ACB    pravastatin (PRAVACHOL) tablet 80 mg  80 mg Oral QHS    insulin lispro (HUMALOG) injection   SubCUTAneous AC&HS    glucose chewable tablet 16 g  4 Tablet Oral PRN    glucagon (GLUCAGEN) injection 1 mg  1 mg IntraMUSCular PRN    dextrose 10% infusion 0-250 mL  0-250 mL IntraVENous PRN    VANCOMYCIN INFORMATION NOTE   Other Rx Dosing/Monitoring

## 2022-03-23 PROBLEM — Z51.5 PALLIATIVE CARE ENCOUNTER: Status: ACTIVE | Noted: 2022-01-01

## 2022-03-23 NOTE — PROGRESS NOTES
Hospitalist Progress Note    NAME: David Terrazas   :  1950   MRN:  [id number]     Brief summary:  67 y.o. male  with extensive medical comorbidities including ESRD on HD, abib on apixaban, DM II, CAD s/p remote CABG, obesity, chronic wounds, and decubitus ulcer who presented on 22 to [facility] SOB, generalized malaise, and fatigue. This had been ongoing for 3 days. His SOB was moderate in severity. He missed dialysis on day of presentation because he was too weak. He was  Admitted to the critical care service 2/2 hypotension and possible need for vasopressors. Patient's condition stabilized and he was transferred to the hospitalist service. Assessment / Plan:  Acute hypoxic respiratory failure 2/2 volume overload, improved  Pleural effusions  JACEY, not on CPAP  CXR 22: There has been improvement in the bibasilar opacities left greater  than right when compared to 3/12/2021 consistent with improvement in bibasilar  Atelectasis/effusions. CT chest 22:  1. Moderate bilateral pleural effusions, mild anasarca and small volume ascites. 2.  Mild asymmetric abdominal wall stranding/edema and skin thickening on the left, though similar to prior study. This could represent anasarca or cellulitis. No focal collection allowing for lack of IV contrast material.  3. Cirrhotic morphology of liver. - Continue supplemental O2, wean as tolerated - down to 1 LPM.      ESRD  Secondary hyperparathyroidism  - Nephrology input appreciated. - Continue Nephrocap 1 po daily. - Continue calsium acetate 667 mg po tid.   - HD today,. Sacral decubitus pressure injury (POA)  Trev  Venous stasis wounds   Right 2nd toe ulcer with exposed bone  Blood culture 22:  NG at 5 days.    - Podiatry input appreciated. - Wound care input appreciated. - Plan for right 2nd toe amputation.    - Continue current wound care. - Continue current abx (vancomycin/Zosyn).     CAD s/p remote CABG  Hypotension  Dyslipidemia  PAF s/p AVN ablation with PPM implantation  PAD  - Continue asa 81 mg po daily. - Continue midodrine 20 mg po tid. - Continue pravastatin 80 mg po daily.    - Hold apixaban for surgery. DM II  Hyperglycemia   - Continue FSBS with SSI correction scale. Hypothyroidism  - Continue levothyroxine 137 mcg po daily. GERD  Hyponatremia  Hyperkalemia   - Continue pantoprazole 40 mg po daily. Chronic debility  - PT/OT. - Supportive care. Anemia of chronic disease   - No tx indicated at this time. - Monitor. Misc  - Patient on dutasteride PTA.  - Unclear why patient on this medication 2/2 he is aneuric.    - This can contribute to hypotension.  - Hold medication. 30.0 - 39.9 Obese / Body mass index is 38.69 kg/m². Estimated discharge date: March 26  Barriers:    Code status: Full  Prophylaxis: Apixaban  Recommended Disposition: SNF/LTC     Subjective:     Chief Complaint / Reason for Physician Visit  No specific complaints. Plan of care and pertinent events reviewed with bedside nurse. Review of Systems:  Symptom Y/N Comments  Symptom Y/N Comments   Fever/Chills N   Chest Pain N    Poor Appetite Y   Edema Y    Cough N   Abdominal Pain N    Sputum N   Joint Pain Y    SOB/BETTS N   Pruritis/Rash N    Nausea/vomit N   Tolerating PT/OT     Diarrhea N   Tolerating Diet Y    Constipation    Other       Could NOT obtain due to:      Objective:     VITALS:   Last 24hrs VS reviewed since prior progress note.  Most recent are:  Patient Vitals for the past 24 hrs:   Temp Pulse Resp BP SpO2   03/23/22 1130  75 20 (!) 113/49    03/23/22 1115  75 20 (!) 112/51    03/23/22 1100  75 20 (!) 113/51    03/23/22 1045  75 20 (!) 115/54    03/23/22 1030  75 20 (!) 102/51    03/23/22 1015  75 20 (!) 110/47    03/23/22 1000  75 22 (!) 103/44    03/23/22 0945  75 20 (!) 101/48    03/23/22 0930  75 20 (!) 113/46    03/23/22 0915  75 20 (!) 107/51  03/23/22 0900  75 20 (!) 115/49    03/23/22 0845  75 20 (!) 112/49    03/23/22 0830  75 20 (!) 116/51    03/23/22 0815  75 18 (!) 118/54    03/23/22 0800 98.9 °F (37.2 °C) 75 18 (!) 108/49    03/23/22 0409 98 °F (36.7 °C) 70 18 127/73 100 %   03/23/22 0032 98.5 °F (36.9 °C) 73 20 (!) 143/65 99 %   03/22/22 2138 98.7 °F (37.1 °C) 71 20 (!) 125/50 100 %   03/22/22 1604 98 °F (36.7 °C) 75 20 (!) 108/54 97 %       Intake/Output Summary (Last 24 hours) at 3/23/2022 1327  Last data filed at 3/23/2022 1130  Gross per 24 hour   Intake    Output 1500 ml   Net -1500 ml        I had a face to face encounter and independently examined this patient on 3/23/2022, as outlined below:  PHYSICAL EXAM:  General:  A/A/O.  NAD. HEENT:  Normocephalic. Sclera anicteric. Mucous membranes moist.    Chest:  Resps even/unlabored with symmetrical CWE. Air entry diminished at bases. Lungs CTA. No use of accessory muscles. CV:  RRR. Normal S1/S2. No M/C/R appreciated. No JVD. Generalized edema noted. Cap refill < 3 sec. Peripheral pulses 1+. GI:  Abdomen soft/NT/ND. ABT X 4.    :  HD. Neurologic:  Nonfocal.  CN II-XII grossly intact. Face symmetrical.  Speech normal.  Generally weak and deconditioned. Psych:  Cooperative. No anxiety or agitation. Skin:  Warm and color appropriate. No jaundice. Turgor elastic. Multiple broken areas and ecchymotic areas scattered over UE/LEs.       Reviewed most current lab test results and cultures  YES  Reviewed most current radiology test results   YES  Review and summation of old records today    NO  Reviewed patient's current orders and MAR    YES  PMH/SH reviewed - no change compared to H&P  ________________________________________________________________________  Care Plan discussed with:    Comments   Patient Y    Family      RN Y    Care Manager     Consultant                        Multidiciplinary team rounds were held today with , nursing, pharmacist and clinical coordinator. Patient's plan of care was discussed; medications were reviewed and discharge planning was addressed. ________________________________________________________________________  Arlen Caal NP     Procedures: see electronic medical records for all procedures/Xrays and details which were not copied into this note but were reviewed prior to creation of Plan. LABS:  I reviewed today's most current labs and imaging studies.   Pertinent labs include:  Recent Labs     03/23/22  0756 03/22/22  1702 03/21/22  0800   WBC 7.3 6.8 7.7   HGB 11.6* 12.2 12.1   HCT 40.1 40.8 40.6    153 151     Recent Labs     03/23/22  0756 03/22/22  1702 03/21/22  0800   * 133* 135*   K 5.5* 5.1 5.0   CL 98 99 99   CO2 25 26 24   * 172* 186*   BUN 60* 51* 77*   CREA 8.63* 7.81* 10.20*   CA 7.8* 7.5* 7.8*   MG 3.2* 3.0* 3.5*   PHOS 8.6* 8.7* 8.3*   ALB 2.9* 3.1* 3.0*   TBILI 0.5 0.5 0.5   ALT 21 21 18       Signed: Arlen Caal NP

## 2022-03-23 NOTE — PROGRESS NOTES
Spoke to patients wife and advised amputation of the right 2nd toe she agreed to the plan and verbally consented     Will schedule for the amputation for tomorrow     Npo after breakfast on 3/24/2020 hold anticoagulants

## 2022-03-23 NOTE — PROGRESS NOTES
Physical Therapy    Chart reviewed. Pt at hemodialysis and was noted to be more weak and drowsy with bright red blood in stool. Will defer at this time and follow.     Sarah Arshad, PT

## 2022-03-23 NOTE — PROGRESS NOTES
Palliative Medicine Consult  [place]: [phone])    Patient Name: Malika Amezquita  YOB: 1950    Date of Initial Consult: 3/19/22  Reason for Consult: Care decisions   Requesting Provider: Rolando Ken MD  Primary Care Physician: Kim Rabago MD     SUMMARY:   Malika Amezquita is a 67 y.o. with a past history of ESRD (MWF HD), Afib (on eliquis), DM, CAD, CABG x2, CHF (s/p pacer), JACEY (no cpap) who was admitted on 3/18/2022 from the ED with a diagnosis of hypotension, ESRD (HD dependent), and pulmonary edema with bilateral pleural effusions. Current medical issues leading to Palliative Medicine involvement include: ESRD, hypotension, and hypoxia. Per admission note, pt presented to ED with fatigue, malaise, dyspnea x 3 days and pt experienced a fall. Pt was scheduled to get HD the day of admission, but missed this appt due to his severe weakness. 3/22: Met with pt and wife Kassandra Faria. They attribute his current condition stemming from a chronic decub that he has had for years. They have been unable to heal this depsite wound care clinic and multiple dressing changes weekly by home health nurses. He has been on dialysis for 1 year. He has been progressively weak at home and increasingly more dependent for care at home. Pt and his wife wish to continue with HD and escalatory measures as warranted to maintain restorative goals. They deny the presence of AMDs and are interested in completing them this admission. PALLIATIVE DIAGNOSES:   1. Goals of care  2. Physical debility  3. Shortness of breath  4. Renal failure  5. Hypotension      PLAN:   1. Goals of care  -I met with the patient at the bedside. He was especially tired and fatigued from HD earlier in the day. He states more so than normally after HD. He states he is in agonizing pain and feels the worst he has ever felt. I provided emotional support and validated his feelings.  I acknowledged that he has been progressively feeling poor and I told him that his goals of care are evolving and on-going, and that at any point he feels he wants to focus his time and energy on comfort vs restorative, that we can have that conversation. He understood and said he wants to keep \"fighting. \" Per podiatry notes, planning for toe amputation tomorrow 3/24. I spoke to Andrew on the phone. She states that her and her  have not had time to discuss the AMD paperwork that I left with them yesterday. I will follow-up again tomorrow to attempt completion. 2. Physical debility  -PT/OT consulted. Con't recommendations. 3. Shortness of breath  -Con't with HD as recommended by nephrology. Con't supplemental O2 (3L home O2)    4. Renal Failure  -Nephrology following. Con't recommendations. 5. Hypotension  -d/t ESRD per notes. Con't midodrine as per recommendations. 6. Initial consult note routed to primary continuity provider and/or primary health care team members  7. Communicated plan of care with: Palliative ID[facility] Team     GOALS OF CARE / TREATMENT PREFERENCES:     GOALS OF CARE: Full restorative. Patient/Health Care Proxy Stated Goals: Cure    TREATMENT PREFERENCES:   Code Status: Full Code    Patient and family's personal goals include: Restorative care including escalation of care measures. Important upcoming milestones or family events: None identified. The patient identifies the following as important for living well: Being able to return home and resume MWF HD.        Advance Care Planning:  [] The [facility] Interdisciplinary Team has updated the ACP Navigator with Health Care Decision Maker and Patient Capacity      Primary Decision Maker: Ana Maria Loaiza - Spouse - [phone]  Advance Care Planning 3/18/2022   Patient's Healthcare Decision Maker is: -   Primary Decision Maker Name -   Primary Decision Maker Phone Number -   Confirm Advance Directive None   Patient Would Like to Complete Advance Directive -       Medical Interventions: Full interventions       Other:    As far as possible, the palliative care team has discussed with patient / health care proxy about goals of care / treatment preferences for patient. HISTORY:     History obtained from: medical chart, patient, and patient's wife Ilah Frankel: None    HPI/SUBJECTIVE:    The patient is:   [x] Verbal and participatory  [] Non-participatory due to:     Clinical Pain Assessment (nonverbal scale for severity on nonverbal patients):   Clinical Pain Assessment  Severity: 5  Location: sacrum  Character: sharp, achey/throbbing  Duration: constant          Duration: for how long has pt been experiencing pain (e.g., 2 days, 1 month, years)  Frequency: how often pain is an issue (e.g., several times per day, once every few days, constant)     FUNCTIONAL ASSESSMENT:     Palliative Performance Scale (PPS):          PSYCHOSOCIAL/SPIRITUAL SCREENING:     Palliative IDT has assessed this patient for cultural preferences / practices and a referral made as appropriate to needs (Cultural Services, Patient Advocacy, Ethics, etc.)    Any spiritual / Yazidi concerns:  [] Yes /  [x] No   If \"Yes\" to discuss with pastoral care during IDT     Does caregiver feel burdened by caring for their loved one:   [] Yes /  [x] No /  [] No Caregiver Present    If \"Yes\" to discuss with social work during IDT    Anticipatory grief assessment:   [x] Normal  / [] Maladaptive     If \"Maladaptive\" to discuss with social work during IDT    ESAS Anxiety: Anxiety: 0    ESAS Depression:          REVIEW OF SYSTEMS:     Positive and pertinent negative findings in ROS are noted above in HPI. The following systems were [x] reviewed / [] unable to be reviewed as noted in HPI  Other findings are noted below. Systems: constitutional, ears/nose/mouth/throat, respiratory, gastrointestinal, genitourinary, musculoskeletal, integumentary, neurologic, psychiatric, endocrine. Positive findings noted below. Modified ESAS Completed by: provider   Fatigue: 7 Drowsiness: 0     Pain: 5 (sacral decub)   Anxiety: 0 Nausea: 0     Dyspnea: 4           Stool Occurrence(s): 1        PHYSICAL EXAM:     From RN flowsheet:  Wt Readings from Last 3 Encounters:   03/22/22 262 lb (118.8 kg)   03/08/22 263 lb (119.3 kg)   02/15/22 256 lb (116.1 kg)     Blood pressure (!) 113/49, pulse 75, temperature 98.9 °F (37.2 °C), temperature source Oral, resp. rate 20, height 5' 9\" (1.753 m), weight 262 lb (118.8 kg), SpO2 100 %. Pain Scale 1: Numeric (0 - 10)  Pain Intensity 1: 8  Pain Onset 1: Chronic  Pain Location 1: Other (comment) (Sacrum)  Pain Orientation 1: Lower  Pain Description 1: Burning  Pain Intervention(s) 1: Medication (see MAR)  Last bowel movement, if known:     Constitutional: Appears uncomfortable overall. Moaning in pain (getting prn tramadol). Oriented to person place and situation. Dyspneic with conversation, more so than yesterday. Eyes: pupils equal, anicteric  ENMT: no nasal discharge, moist mucous membranes  Cardiovascular: regular rhythm, on ambulatory tele  Respiratory: breathing labored with conversation or physical activity, symmetric  Gastrointestinal: soft non-tender, +bowel sounds  Musculoskeletal: no deformity, no tenderness to palpation. Planning for toe amputation tomorrow per podiatry notes. Skin: warm, dry, multiple scattered abrasions on upper and lower extremities. Pt states 5/10 sacral decub pain.   Neurologic: following commands, moving all extremities  Psychiatric: full affect, no hallucinations     HISTORY:     Active Problems:    Hypotension (3/18/2022)      Acute respiratory failure with hypoxemia ([address] (3/18/2022)      Physical debility (3/22/2022)      Past Medical History:   Diagnosis Date    Arrhythmia     atrial fibrillation 2017    Arthritis     spine, all joints    Atrial fibrillation ([address]     CAD (coronary atherosclerotic disease)     Chronic kidney disease     [facility]    Chronic pain     back pain - unable to lay flat    [facility]     Diabetes ([facility]     Diabetes mellitus type 2, controlled ([facility] 3/13/2017    GERD (gastroesophageal reflux disease)     Heart failure ([facility]     Hx of CABG 3/13/2017    CABG in 2010 in [address] Hyperlipidemia     Hypertension     Long term current use of anticoagulant therapy     Morbid obesity ([facility] 3/13/2017    Neuropathy     in both feet    JACEY (obstructive sleep apnea) 6/23/2017    no CPAP, pt states never tested    Pacemaker     S/P CABG x 2 2010    Skin cancer     Thyroid disease     hypothyroid      Past Surgical History:   Procedure Laterality Date    HX CATARACT REMOVAL Bilateral     HX ORTHOPAEDIC      L 3rd toe amputation in 1999    HX PACEMAKER Left 12/26/2018    Dr Hank David x4 crt-p    HX VASCULAR ACCESS  03/2021    R chest    IR INSERT TUNL CVC W/O PORT OVER 5 YR  3/16/2021    MS CARDIAC SURG PROCEDURE UNLIST      CABGx2 in 2010      Family History   Problem Relation Age of Onset    Heart Attack Father     Cancer Father         lymph node    Cancer Mother         breast    No Known Problems Brother       History reviewed, no pertinent family history.   Social History     Tobacco Use    Smoking status: Never Smoker    Smokeless tobacco: Never Used   Substance Use Topics    Alcohol use: Not Currently     Comment: rare     Allergies   Allergen Reactions    Allopurinol Rash    Gabapentin Drowsiness    Ciprofloxacin Nausea Only    Percocet [Oxycodone-Acetaminophen] Other (comments)     Hallucinations, not allergic to tylenol      Current Facility-Administered Medications   Medication Dose Route Frequency    vancomycin (VANCOCIN) 1,000 mg in 0.9% sodium chloride 250 mL (VIAL-MATE)  1,000 mg IntraVENous ONCE    loperamide (IMODIUM) capsule 2 mg  2 mg Oral Q4H PRN    traMADoL (ULTRAM) tablet 50 mg  50 mg Oral Q6H PRN    levothyroxine (SYNTHROID) tablet 137 mcg  137 mcg Oral ACB    midodrine (PROAMATINE) tablet 20 mg  20 mg Oral TID WITH MEALS    albumin human 25% (BUMINATE) solution 12.5 g  12.5 g IntraVENous DIALYSIS PRN    piperacillin-tazobactam (ZOSYN) 3.375 g in 0.9% sodium chloride (MBP/ADV) 100 mL MBP  3.375 g IntraVENous Q12H    sodium chloride (NS) flush 5-40 mL  5-40 mL IntraVENous Q8H    sodium chloride (NS) flush 5-40 mL  5-40 mL IntraVENous PRN    acetaminophen (TYLENOL) tablet 650 mg  650 mg Oral Q6H PRN    Or    acetaminophen (TYLENOL) suppository 650 mg  650 mg Rectal Q6H PRN    polyethylene glycol (MIRALAX) packet 17 g  17 g Oral DAILY PRN    ondansetron (ZOFRAN ODT) tablet 4 mg  4 mg Oral Q8H PRN    Or    ondansetron (ZOFRAN) injection 4 mg  4 mg IntraVENous Q6H PRN    [Held by provider] apixaban (ELIQUIS) tablet 5 mg  5 mg Oral BID    aspirin delayed-release tablet 81 mg  81 mg Oral DAILY    balsam peru-castor oiL (VENELEX) ointment   Topical BID    calcium acetate(phosphat bind) (PHOSLO) capsule 667 mg  1 Capsule Oral TID WITH MEALS    B complex-vitaminC-folic acid (NEPHROCAP) cap  1 Capsule Oral DAILY    [Held by provider] dutasteride (AVODART) capsule 0.5 mg  0.5 mg Oral DAILY    cetirizine (ZYRTEC) tablet 10 mg  10 mg Oral DAILY    pantoprazole (PROTONIX) tablet 40 mg  40 mg Oral ACB    pravastatin (PRAVACHOL) tablet 80 mg  80 mg Oral QHS    insulin lispro (HUMALOG) injection   SubCUTAneous AC&HS    glucose chewable tablet 16 g  4 Tablet Oral PRN    glucagon (GLUCAGEN) injection 1 mg  1 mg IntraMUSCular PRN    dextrose 10% infusion 0-250 mL  0-250 mL IntraVENous PRN    VANCOMYCIN INFORMATION NOTE   Other Rx Dosing/Monitoring          LAB AND IMAGING FINDINGS:     Lab Results   Component Value Date/Time    WBC 7.3 03/23/2022 07:56 AM    HGB 11.6 (L) 03/23/2022 07:56 AM    PLATELET 589 34/20/3198 07:56 AM     Lab Results   Component Value Date/Time    Sodium 133 (L) 03/23/2022 07:56 AM    Potassium 5.5 (H) 03/23/2022 07:56 AM    Chloride 98 03/23/2022 07:56 AM    CO2 25 03/23/2022 07:56 AM    BUN 60 (H) 03/23/2022 07:56 AM    Creatinine 8.63 (H) 03/23/2022 07:56 AM    Calcium 7.8 (L) 03/23/2022 07:56 AM    Magnesium 3.2 (H) 03/23/2022 07:56 AM    Phosphorus 8.6 (H) 03/23/2022 07:56 AM      Lab Results   Component Value Date/Time    Alk. phosphatase 42 (L) 03/23/2022 07:56 AM    Protein, total 6.3 (L) 03/23/2022 07:56 AM    Albumin 2.9 (L) 03/23/2022 07:56 AM    Globulin 3.4 03/23/2022 07:56 AM     Lab Results   Component Value Date/Time    INR 1.2 (H) 03/16/2021 12:03 AM    Prothrombin time 12.2 (H) 03/16/2021 12:03 AM      No results found for: IRON, FE, TIBC, IBCT, PSAT, FERR   No results found for: PH, PCO2, PO2  No components found for: [place]   Lab Results   Component Value Date/Time     03/04/2021 08:01 PM    CK - MB 3.4 03/04/2021 08:01 PM                Total time: 35 minutes   Counseling / coordination time, spent as noted above: 20 minutes   > 50% counseling / coordination?: yes    Prolonged service was provided for  []30 min   []75 min in face to face time in the presence of the patient, spent as noted above. Time Start:   Time End:   Note: this can only be billed with 74982 (initial) or [id number] (follow up). If multiple start / stop times, list each separately.

## 2022-03-23 NOTE — PROGRESS NOTES
End of Shift Note    Bedside shift change report given to Sharmin Romano RN (oncoming nurse) by Genesis Lacy RN (offgoing nurse). Report included the following information SBAR, Kardex and MAR    Shift worked:  7a - 7p     Shift summary and any significant changes:     -PRN tramadol given x 2 for sacral wound pain   -PRN zofran given x 2 for n/v   -Very poor appetite   -Ordered labs drawn     Concerns for physician to address:   -Incontinence care numerous times, bright red bleeding in stool getting worse   -Pt much weaker and more drowsy today     Zone phone for oncoming shift:   [phone]       Activity:  Activity Level: Bed Rest  Number times ambulated in hallways past shift: 0  Number of times OOB to chair past shift: 0    Cardiac:   Cardiac Monitoring: No      Cardiac Rhythm: Ventricular Paced    Access:   Current line(s): PIV and HD access     Genitourinary:   Urinary status: incontinent    Respiratory:   O2 Device: Nasal cannula  Chronic home O2 use?: NO  Incentive spirometer at bedside: NO       GI:  Last Bowel Movement Date: 03/22/22  Current diet:  ADULT DIET Regular; 4 carb choices (60 gm/meal); Low Sodium (2 gm); Low Potassium (Less than 3000 mg/day); Low Phosphorus (Less than 1000 mg)  ADULT ORAL NUTRITION SUPPLEMENT Breakfast, Dinner; Renal Supplement  Passing flatus: YES  Tolerating current diet: NO       Pain Management:   Patient states pain is manageable on current regimen: YES    Skin:  Romeo Score: 10  Interventions: float heels, increase time out of bed, foam dressing, PT/OT consult, limit briefs and nutritional support     Patient Safety:  Fall Score:  Total Score: 3  Interventions: bed/chair alarm, gripper socks, pt to call before getting OOB and stay with me (per policy)  High Fall Risk: Yes    Length of Stay:  Expected LOS: 3d 14h  Actual LOS: 4      Genesis Lacy RN

## 2022-03-23 NOTE — PROCEDURES
Hemodialysis / [phone]    Vitals Pre Post Assessment Pre Post   BP BP: (!) 108/49 (03/23/22 0800) 113/49   LOC A&O x3 (disoriented to time) Unchanged   HR Pulse (Heart Rate): 75 (03/23/22 0800) 75 Lungs Clear diminished Unchanged   Resp Resp Rate: 18 (03/23/22 0800) 20 Cardiac Regular Unchanged   Temp Temp: 98.9 °F (37.2 °C) (03/23/22 0800) 98.0 Skin Scattered scraps/bruising Unchanged   Weight  NA NA Edema 2+ BLE Unchanged   Tele status Remote Remote Pain Pain Intensity 1: 8 (03/22/22 2000) 0       Orders   Duration: Start: 0800 End: 1130 Total: 3.5 hrs   Dialyzer: Dialyzer/Set Up Inspection: Revaclear (03/23/22 0800)   K Bath: Dialysate K (mEq/L): 2 (03/23/22 0800)   Ca Bath: Dialysate CA (mEq/L): 2.5 (03/23/22 0800)   Na: Dialysate NA (mEq/L): 138 (03/23/22 0800)   Bicarb: Dialysate HCO3 (mEq/L): 35 (03/23/22 0800)   Target Fluid Removal: Goal/Amount of Fluid to Remove (mL): 2500 mL (03/23/22 0800)     Access   Type & Location: R CVC   Comments:  Dressing CDI. No s/s of infection. Both lumens aspirate & flush well. Running well at .                                            Labs   HBsAg (Antigen) / date:            Negative     3/18/22                               HBsAb (Antibody) / date:           Susceptible 3/18/22   Source: Epic   Obtained/Reviewed  Critical Results Called HGB   Date Value Ref Range Status   03/22/2022 12.2 12.1 - 17.0 g/dL Final     Potassium   Date Value Ref Range Status   03/22/2022 5.1 3.5 - 5.1 mmol/L Final     Calcium   Date Value Ref Range Status   03/22/2022 7.5 (L) 8.5 - 10.1 MG/DL Final     BUN   Date Value Ref Range Status   03/22/2022 51 (H) 6 - 20 MG/DL Final     Comment:     INVESTIGATED PER DELTA CHECK PROTOCOL     Creatinine   Date Value Ref Range Status   03/22/2022 7.81 (H) 0.70 - 1.30 MG/DL Final     Comment:     INVESTIGATED PER DELTA CHECK PROTOCOL        Meds Given   Name Dose Route   Albumin 25% 12.5 g IV               Adequacy / Fluid    Total Liters Process: 77.9   Net Fluid Removed: 1500      Comments   Time Out Done:   (Time) 0849   Admitting Diagnosis: Acute Respiratory Failure   Consent obtained/signed: Informed Consent Verified: Yes (03/23/22 0800)   Machine / RO # Machine Number: [id number] (03/23/22 0800)   Primary Nurse Rpt Pre: Alejandra Holley RN   Primary Nurse Rpt Post: Laura Belcher RN   Pt Education: CVC care   Care Plan: On going   Pts outpatient clinic:      Tx Summary   Comments:     [place] [facility] [place] CVC: Dressing CDI. No s/s of infection. Both lumens aspirate & flush well. Running well at . SBAR received from Primary RN. Pt arrived to HD suite A&Ox4. Consent signed & on file. 0800: Each catheter limb disinfected per p&p, caps removed, hubs disinfected per p&p. Each lumen aspirated for blood return and flushed with Normal Saline per policy. Labs drawn per request/ order. VSS. Dialysis Tx initiated. 0930: BP dropping and patient complaining of cramping. 100 mL NS given    1000: BP dropping and patient cramping again. 100 mL NS given. PRN Albumin given. 1130: Tx ended. VSS. Each dialysis catheter limb disinfected per p&p, all possible blood returned per p&p, and each dialysis hub disinfected per p&p. Each lumen flushed, post dialysis catheter Heparin dwell instilled per order, and caps applied. Bed locked and in the lowest position, call bell and belongings in reach. SBAR given to Primary, RN. Patient is stable at time of their departure. All Dialysis related medications have been reviewed.

## 2022-03-23 NOTE — PROGRESS NOTES
NAME: Sumit Redman        :  1950        MRN:  [id number]                    Assessment   :                                               Plan:  ESRD-    Hypotension    Anemia    Fatigue    [facility]; HD today; 1.5 off with HD yesterday     H/H at goal.  Hold TE.     He wants to continue HD for now, but we are discussing goals of care and I will continue to discuss.     Continue on Midodrine 20 tid          Subjective:     Chief Complaint:  In bed. Resting. The patient was seen on dialysis at 8:27 AM .  BP is stable. Catheter is functioning well. Review of Systems:    Symptom Y/N Comments  Symptom Y/N Comments   Fever/Chills    Chest Pain     Poor Appetite    Edema     Cough    Abdominal Pain     Sputum    Joint Pain     SOB/BETTS    Pruritis/Rash     Nausea/vomit    Tolerating PT/OT     Diarrhea    Tolerating Diet     Constipation    Other       Could not obtain due to:      Objective:     VITALS:   Last 24hrs VS reviewed since prior progress note.  Most recent are:  Visit Vitals  BP (!) 110/47   Pulse 75   Temp 98.9 °F (37.2 °C) (Oral)   Resp 20   Ht 5' 9\" (1.753 m)   Wt 118.8 kg (262 lb)   SpO2 100%   BMI 38.69 kg/m²     No intake or output data in the 24 hours ending 22 1027   Telemetry Reviewed:     PHYSICAL EXAM:  General: NAD  + edema  rales    Lab Data Reviewed: (see below)    Medications Reviewed: (see below)    PMH/SH reviewed - no change compared to H&P  ________________________________________________________________________  Care Plan discussed with:  Patient     Family      RN     Care Manager                    Consultant:          Comments   >50% of visit spent in counseling and coordination of care       ________________________________________________________________________  Petra Roberson MD     Procedures: see electronic medical records for all procedures/Xrays and details which  were not copied into this note but were reviewed prior to creation of Plan. LABS:  Recent Labs     03/23/22  0756 03/22/22  1702   WBC 7.3 6.8   HGB 11.6* 12.2   HCT 40.1 40.8    153     Recent Labs     03/23/22  0756 03/22/22  1702 03/21/22  0800   * 133* 135*   K 5.5* 5.1 5.0   CL 98 99 99   CO2 25 26 24   BUN 60* 51* 77*   CREA 8.63* 7.81* 10.20*   * 172* 186*   CA 7.8* 7.5* 7.8*   MG 3.2* 3.0* 3.5*   PHOS 8.6* 8.7* 8.3*     Recent Labs     03/23/22 0756 03/22/22  1702 03/21/22  0800   AP 42* 44* 40*   TP 6.3* 6.2* 6.3*   ALB 2.9* 3.1* 3.0*   GLOB 3.4 3.1 3.3     No results for input(s): INR, PTP, APTT, INREXT, INREXT in the last 72 hours. No results for input(s): FE, TIBC, PSAT, FERR in the last 72 hours. No results found for: FOL, RBCF   No results for input(s): PH, PCO2, PO2 in the last 72 hours. No results for input(s): CPK, CKMB in the last 72 hours.     No lab exists for component: TROPONINI  No components found for: [place]  Lab Results   Component Value Date/Time    Color YELLOW/STRAW 03/05/2021 01:28 PM    Appearance CLEAR 03/05/2021 01:28 PM    Specific gravity 1.009 03/05/2021 01:28 PM    pH (UA) 6.0 03/05/2021 01:28 PM    Protein Negative 03/05/2021 01:28 PM    Glucose Negative 03/05/2021 01:28 PM    Ketone Negative 03/05/2021 01:28 PM    Bilirubin Negative 03/05/2021 01:28 PM    Urobilinogen 0.2 03/05/2021 01:28 PM    Nitrites Negative 03/05/2021 01:28 PM    Leukocyte Esterase Negative 03/05/2021 01:28 PM    Epithelial cells FEW 03/05/2021 01:28 PM    Bacteria Negative 03/05/2021 01:28 PM    WBC 0-4 03/05/2021 01:28 PM    RBC 0-5 03/05/2021 01:28 PM       MEDICATIONS:  Current Facility-Administered Medications   Medication Dose Route Frequency    loperamide (IMODIUM) capsule 2 mg  2 mg Oral Q4H PRN    traMADoL (ULTRAM) tablet 50 mg  50 mg Oral Q6H PRN    levothyroxine (SYNTHROID) tablet 137 mcg  137 mcg Oral ACB    midodrine (PROAMATINE) tablet 20 mg  20 mg Oral TID WITH MEALS  albumin human 25% (BUMINATE) solution 12.5 g  12.5 g IntraVENous DIALYSIS PRN    piperacillin-tazobactam (ZOSYN) 3.375 g in 0.9% sodium chloride (MBP/ADV) 100 mL MBP  3.375 g IntraVENous Q12H    sodium chloride (NS) flush 5-40 mL  5-40 mL IntraVENous Q8H    sodium chloride (NS) flush 5-40 mL  5-40 mL IntraVENous PRN    acetaminophen (TYLENOL) tablet 650 mg  650 mg Oral Q6H PRN    Or    acetaminophen (TYLENOL) suppository 650 mg  650 mg Rectal Q6H PRN    polyethylene glycol (MIRALAX) packet 17 g  17 g Oral DAILY PRN    ondansetron (ZOFRAN ODT) tablet 4 mg  4 mg Oral Q8H PRN    Or    ondansetron (ZOFRAN) injection 4 mg  4 mg IntraVENous Q6H PRN    [Held by provider] apixaban (ELIQUIS) tablet 5 mg  5 mg Oral BID    aspirin delayed-release tablet 81 mg  81 mg Oral DAILY    balsam peru-castor oiL (VENELEX) ointment   Topical BID    calcium acetate(phosphat bind) (PHOSLO) capsule 667 mg  1 Capsule Oral TID WITH MEALS    B complex-vitaminC-folic acid (NEPHROCAP) cap  1 Capsule Oral DAILY    dutasteride (AVODART) capsule 0.5 mg  0.5 mg Oral DAILY    cetirizine (ZYRTEC) tablet 10 mg  10 mg Oral DAILY    pantoprazole (PROTONIX) tablet 40 mg  40 mg Oral ACB    pravastatin (PRAVACHOL) tablet 80 mg  80 mg Oral QHS    insulin lispro (HUMALOG) injection   SubCUTAneous AC&HS    glucose chewable tablet 16 g  4 Tablet Oral PRN    glucagon (GLUCAGEN) injection 1 mg  1 mg IntraMUSCular PRN    dextrose 10% infusion 0-250 mL  0-250 mL IntraVENous PRN    VANCOMYCIN INFORMATION NOTE   Other Rx Dosing/Monitoring

## 2022-03-23 NOTE — PROGRESS NOTES
Problem: Falls - Risk of  Goal: *Absence of Falls  Description: Document Yobani Cloud Fall Risk and appropriate interventions in the flowsheet. Outcome: Not Progressing Towards Goal  Note: Fall Risk Interventions:  Mobility Interventions: Bed/chair exit alarm         Medication Interventions: Bed/chair exit alarm    Elimination Interventions: Call light in reach    History of Falls Interventions:  Investigate reason for fall

## 2022-03-23 NOTE — PROGRESS NOTES
Bedside and Verbal shift change report given to Eric Man (oncoming nurse) by Juice Gonzalez (offgoing nurse). Report included the following information SBAR, Kardex, Procedure Summary, Intake/Output and MAR.

## 2022-03-23 NOTE — PROGRESS NOTES
Transition of Care Plan:    RUR: 19%  Disposition: Palliative following, SNF Placement recommendations- [facility] (accepted)  Follow up appointments: Follow up with PCP and/or Specialist   DME needed: N/A  Transportation at Discharge: BLS transport- to arrange    Keys or means to access home:  N/A    IM Medicare Letter:2nd  Medicare Letter to be given   Is patient a BCPI-A Bundle:    CM will provide if required        If yes, was Bundle Letter given?:    Is patient a  and connected with the [place]? N/A              If yes, was [employer] transfer form completed and [facility] notified? Caregiver Contact: Adama Toledo (spouse) [phone]  Discharge Caregiver contacted prior to discharge? Family to be contacted  Care Conference needed?:  Not at this time    UPDATE: 3:36PM    CM informed that pt has been accepted to [facility]. CM informed pt's spouse of the following, and she is agreeable with snf acceptance once medically stable. Pt is scheduled to have planned procedure on 3/24/22. CM will continue to follow. JOSE Birmingham, [facility]      INITIAL NOTE: CM received call from pt's spouse, via telephone regarding voicemail that was left by CM on 3/22/22. Pt's spouse informed of therapy recommendations for snf placement. Pt's spouse is agreeable to snf placement, and wanted CM to enter referrals to snf in the [place] area: [facility] and [facility] (completed). Pt is known to be fully vaccinated (covid). Pt will not require insurance auth. PT WILL REQUIRE RAPID COVID TEST ON THE DAY OF D/C. CM will notify clinical staff. Pt currently on O2. Pt will require BLS transport at the time of d/c. CM will continue to follow.     JOSE Birmingham, [address] [place] [address]

## 2022-03-23 NOTE — PROGRESS NOTES
Occupational Therapy  Chart reviewed; currently in dialysis; note weaker and more drowsy with increased rectal bleeding reported by RN. Will retry later as able.  Claudia [place] OTR/L

## 2022-03-24 NOTE — PROGRESS NOTES
NAME: Matthew Quan        :  1950        MRN:  [id number]                    Assessment   :                                               Plan:  ESRD-    Hypotension    Anemia    Fatigue    [id number]; no HD today     H/H at goal.  Hold TE.     He wants to continue HD for now, but we are discussing goals of care and I will continue to discuss.     Continue on Midodrine 20 tid          Subjective:     Chief Complaint:  In bed. Very uncomfortable. Again seems quite miserable. Review of Systems:    Symptom Y/N Comments  Symptom Y/N Comments   Fever/Chills    Chest Pain     Poor Appetite    Edema     Cough    Abdominal Pain     Sputum    Joint Pain     SOB/BETTS    Pruritis/Rash     Nausea/vomit    Tolerating PT/OT     Diarrhea    Tolerating Diet     Constipation    Other       Could not obtain due to:      Objective:     VITALS:   Last 24hrs VS reviewed since prior progress note.  Most recent are:  Visit Vitals  /62 (BP 1 Location: Right upper arm, BP Patient Position: At rest)   Pulse 79   Temp 98.8 °F (37.1 °C)   Resp 22   Ht 5' 9\" (1.753 m)   Wt 119.3 kg (263 lb)   SpO2 91%   BMI 38.84 kg/m²       Intake/Output Summary (Last 24 hours) at 3/24/2022 0515  Last data filed at 3/23/2022 1130  Gross per 24 hour   Intake    Output 1500 ml   Net -1500 ml      Telemetry Reviewed:     PHYSICAL EXAM:  General: NAD  + dep edema      Lab Data Reviewed: (see below)    Medications Reviewed: (see below)    PMH/SH reviewed - no change compared to H&P  ________________________________________________________________________  Care Plan discussed with:  Patient     Family      RN     Care Manager                    Consultant:          Comments   >50% of visit spent in counseling and coordination of care       ________________________________________________________________________  Hossein Butts, MD     Procedures: see electronic medical records for all procedures/Xrays and details which  were not copied into this note but were reviewed prior to creation of Plan. LABS:  Recent Labs     03/23/22  0756 03/22/22  1702   WBC 7.3 6.8   HGB 11.6* 12.2   HCT 40.1 40.8    153     Recent Labs     03/23/22  0756 03/22/22  1702 03/21/22  0800   * 133* 135*   K 5.5* 5.1 5.0   CL 98 99 99   CO2 25 26 24   BUN 60* 51* 77*   CREA 8.63* 7.81* 10.20*   * 172* 186*   CA 7.8* 7.5* 7.8*   MG 3.2* 3.0* 3.5*   PHOS 8.6* 8.7* 8.3*     Recent Labs     03/23/22  0756 03/22/22  1702 03/21/22  0800   AP 42* 44* 40*   TP 6.3* 6.2* 6.3*   ALB 2.9* 3.1* 3.0*   GLOB 3.4 3.1 3.3     No results for input(s): INR, PTP, APTT, INREXT, INREXT in the last 72 hours. No results for input(s): FE, TIBC, PSAT, FERR in the last 72 hours. No results found for: FOL, RBCF   No results for input(s): PH, PCO2, PO2 in the last 72 hours. No results for input(s): CPK, CKMB in the last 72 hours.     No lab exists for component: TROPONINI  No components found for: [place]  Lab Results   Component Value Date/Time    Color YELLOW/STRAW 03/05/2021 01:28 PM    Appearance CLEAR 03/05/2021 01:28 PM    Specific gravity 1.009 03/05/2021 01:28 PM    pH (UA) 6.0 03/05/2021 01:28 PM    Protein Negative 03/05/2021 01:28 PM    Glucose Negative 03/05/2021 01:28 PM    Ketone Negative 03/05/2021 01:28 PM    Bilirubin Negative 03/05/2021 01:28 PM    Urobilinogen 0.2 03/05/2021 01:28 PM    Nitrites Negative 03/05/2021 01:28 PM    Leukocyte Esterase Negative 03/05/2021 01:28 PM    Epithelial cells FEW 03/05/2021 01:28 PM    Bacteria Negative 03/05/2021 01:28 PM    WBC 0-4 03/05/2021 01:28 PM    RBC 0-5 03/05/2021 01:28 PM       MEDICATIONS:  Current Facility-Administered Medications   Medication Dose Route Frequency    loperamide (IMODIUM) capsule 2 mg  2 mg Oral Q4H PRN    traMADoL (ULTRAM) tablet 50 mg  50 mg Oral Q6H PRN    levothyroxine (SYNTHROID) tablet 137 mcg  137 mcg Oral ACB    midodrine (PROAMATINE) tablet 20 mg  20 mg Oral TID WITH MEALS    albumin human 25% (BUMINATE) solution 12.5 g  12.5 g IntraVENous DIALYSIS PRN    piperacillin-tazobactam (ZOSYN) 3.375 g in 0.9% sodium chloride (MBP/ADV) 100 mL MBP  3.375 g IntraVENous Q12H    sodium chloride (NS) flush 5-40 mL  5-40 mL IntraVENous Q8H    sodium chloride (NS) flush 5-40 mL  5-40 mL IntraVENous PRN    acetaminophen (TYLENOL) tablet 650 mg  650 mg Oral Q6H PRN    Or    acetaminophen (TYLENOL) suppository 650 mg  650 mg Rectal Q6H PRN    polyethylene glycol (MIRALAX) packet 17 g  17 g Oral DAILY PRN    ondansetron (ZOFRAN ODT) tablet 4 mg  4 mg Oral Q8H PRN    Or    ondansetron (ZOFRAN) injection 4 mg  4 mg IntraVENous Q6H PRN    [Held by provider] apixaban (ELIQUIS) tablet 5 mg  5 mg Oral BID    aspirin delayed-release tablet 81 mg  81 mg Oral DAILY    balsam peru-castor oiL (VENELEX) ointment   Topical BID    calcium acetate(phosphat bind) (PHOSLO) capsule 667 mg  1 Capsule Oral TID WITH MEALS    B complex-vitaminC-folic acid (NEPHROCAP) cap  1 Capsule Oral DAILY    [Held by provider] dutasteride (AVODART) capsule 0.5 mg  0.5 mg Oral DAILY    cetirizine (ZYRTEC) tablet 10 mg  10 mg Oral DAILY    pantoprazole (PROTONIX) tablet 40 mg  40 mg Oral ACB    pravastatin (PRAVACHOL) tablet 80 mg  80 mg Oral QHS    insulin lispro (HUMALOG) injection   SubCUTAneous AC&HS    glucose chewable tablet 16 g  4 Tablet Oral PRN    glucagon (GLUCAGEN) injection 1 mg  1 mg IntraMUSCular PRN    dextrose 10% infusion 0-250 mL  0-250 mL IntraVENous PRN    VANCOMYCIN INFORMATION NOTE   Other Rx Dosing/Monitoring

## 2022-03-24 NOTE — PROGRESS NOTES
Bedside and Verbal shift change report given to Manda (oncoming nurse) by Aleksandar Bhatt (offgoing nurse). Report included the following information SBAR and Kardex.

## 2022-03-24 NOTE — PERIOP NOTES
TRANSFER - IN REPORT:    Verbal report received from [facility]) on Justa Rash  being received from Oncology Room 1121(unit) for ordered procedure      Report consisted of patients Situation, Background, Assessment and   Recommendations(SBAR). Information from the following report(s) SBAR, Kardex, Intake/Output, MAR, Med Rec Status, Cardiac Rhythm Paced and Procedure Verification was reviewed with the receiving nurse. Opportunity for questions and clarification was provided. Assessment completed upon patients arrival to unit and care assumed.

## 2022-03-24 NOTE — PERIOP NOTES
Negative covid test result on 03/23/22,  Has been sob with rest and or exertion  For several weeks, . Has required high flow o2 upon admission. patient has  Dressing to catherine upper ext, clean and intact. Dressing to catherine. LE also clean and intact. bruising to right A/C area, backs of both  LE , large swollen R elbow, Tender when touching. Quarter sized stage @ decub L buttocks. mepilex replaced, soiled from Bright Red Blood from rectum. No drainage noted from wound. Provider message sent regarding possible GI bleeding . Old fistula LUE, dialysis catheter R upper chest, pacemaker L upper chest.   2 iv's left arm removed, neither working. Dr Cally Segovia order to access dialysis catheter for iv access, blue port accessed . Patient is appropriate, alert and oriented.

## 2022-03-24 NOTE — BRIEF OP NOTE
Brief Postoperative Note    Patient: Elvie Max  YOB: 1950  MRN: [id number]    Date of Procedure: 3/24/2022     Pre-Op Diagnosis: Osteomyelitis right 2nd toe     Post-Op Diagnosis: Same as preoperative diagnosis.       Procedure(s):  RIGHT SECOND TOE AMPUTATION    Surgeon(s):  Niya Gutierrez DPM    Surgical Assistant: None    Anesthesia: MAC     Estimated Blood Loss (mL): Minimal    Complications: None    Specimens:   ID Type Source Tests Collected by Time Destination   1 : Right second toe Preservative Toe  Niya Gutierrez DPM 3/24/2022 1106 Pathology        Implants: * No implants in log *    Drains: * No LDAs found *    Findings: viable margins     Electronically Signed by Chavez Ferreira DPM on 3/24/2022 at 11:24 AM

## 2022-03-24 NOTE — PROGRESS NOTES
Palliative Medicine                [phone]    I was planning to follow-up today re: AMD completion with Mr. Michael Rodas. However, he is post-operative today. Will follow-up at another time to sign legal documents in setting of >24 hrs post anesthesia.      Evonne Vyas FNP-Student

## 2022-03-24 NOTE — PROGRESS NOTES
Speech Pathology Note    Chart reviewed. Patient currently JIGNA in OR for amputation of toe. Will defer and follow-up for formal SLP evaluation as patient is appropriate. Thank you.     Malia Zavala M.S., CF-SLP

## 2022-03-24 NOTE — PROGRESS NOTES
Physical Therapy    Pt salina JIGNA for surgery for amb of toe. Will defer and follow as appropriate after surgery. Will need clarification of WB status post surgery.     Jamari Saldaña PT

## 2022-03-24 NOTE — ANESTHESIA POSTPROCEDURE EVALUATION
Procedure(s):  RIGHT SECOND TOE AMPUTATION. MAC    Anesthesia Post Evaluation      Multimodal analgesia: multimodal analgesia used between 6 hours prior to anesthesia start to PACU discharge  Patient location during evaluation: PACU  Level of consciousness: sleepy but conscious  Pain management: adequate  Airway patency: patent  Anesthetic complications: no  Cardiovascular status: acceptable  Respiratory status: acceptable  Hydration status: acceptable  Comments: +Post-Anesthesia Evaluation and Assessment    Patient: Mckenzie Prescott MRN: [id number]  SSN: [id number]   YOB: 1950  Age: 67 y.o. Sex: male      Cardiovascular Function/Vital Signs    BP (!) 98/51   Pulse 75   Temp 36.9 °C (98.4 °F)   Resp 14   Ht 5' 9\" (1.753 m)   Wt 119.3 kg (263 lb)   SpO2 96%   BMI 38.84 kg/m²     Patient is status post Procedure(s):  RIGHT SECOND TOE AMPUTATION. Nausea/Vomiting: Controlled. Postoperative hydration reviewed and adequate. Pain:  Pain Scale 1: Visual (03/24/22 1215)  Pain Intensity 1: 0 (03/24/22 1215)   Managed. Neurological Status:   Neuro (WDL): Exceptions to WDL (03/24/22 1120)   At baseline. Mental Status and Level of Consciousness: Arousable. Pulmonary Status:   O2 Device: Nasal cannula (03/24/22 1215)   Adequate oxygenation and airway patent. Complications related to anesthesia: None    Post-anesthesia assessment completed. No concerns. Signed By: Chace Howell MD    3/24/2022  Post anesthesia nausea and vomiting:  controlled  Final Post Anesthesia Temperature Assessment:  Normothermia (36.0-37.5 degrees C)      INITIAL Post-op Vital signs:   Vitals Value Taken Time   BP 98/51 03/24/22 1215   Temp 36.9 °C (98.4 °F) 03/24/22 1120   Pulse 75 03/24/22 1226   Resp 16 03/24/22 1226   SpO2 96 % 03/24/22 1226   Vitals shown include unvalidated device data.

## 2022-03-24 NOTE — PROGRESS NOTES
Occupational Therapy  Chart reviewed; not cleared for OT; amputation of toe scheduled for this morning; will need re-eval next session.  Michael Memos OTR/L

## 2022-03-24 NOTE — PROGRESS NOTES
Hospitalist Progress Note    NAME: Olan Collet   :  1950   MRN:  [id number]     Brief summary:  67 y.o. male  with extensive medical comorbidities including ESRD on HD, abib on apixaban, DM II, CAD s/p remote CABG, obesity, chronic wounds, and decubitus ulcer who presented on 22 to [facility] SOB, generalized malaise, and fatigue. This had been ongoing for 3 days. His SOB was moderate in severity. He missed dialysis on day of presentation because he was too weak. He was  Admitted to the critical care service 2/2 hypotension and possible need for vasopressors. Patient's condition stabilized and he was transferred to the hospitalist service. Assessment / Plan:  Acute hypoxic respiratory failure 2/2 volume overload, improved  Pleural effusions  JACEY, not on CPAP  CXR 22: There has been improvement in the bibasilar opacities left greater  than right when compared to 3/12/2021 consistent with improvement in bibasilar  Atelectasis/effusions. CT chest 22:  1. Moderate bilateral pleural effusions, mild anasarca and small volume ascites. 2.  Mild asymmetric abdominal wall stranding/edema and skin thickening on the left, though similar to prior study. This could represent anasarca or cellulitis. No focal collection allowing for lack of IV contrast material.  3. Cirrhotic morphology of liver. - Continue supplemental O2, wean as tolerated - down to 1 LPM.    - Repeat CXR, may benefit from thoracentesis - patient off [address] for so we have a window of opportunity. ESRD  Secondary hyperparathyroidism  - Nephrology input appreciated. - Continue Nephrocap 1 po daily. - Continue calsium acetate 667 mg po tid.   - HD MWF. Sacral decubitus/pressure injury (POA)  Trev  Venous stasis wounds   Right 2nd toe ulcer with exposed bone  Blood culture 22:  NG at 5 days.    - Podiatry input appreciated, plan for 2nd toe amputation today. - Wound care input appreciated.     - Continue current wound care. - Continue current abx (vancomycin/Zosyn). Acute pain  - Patient complaining of pain, generalized but most notable buttocks. - Add fentanyl 25 mcg IV q4h. Dysphagia  - Patient began to have difficulty swallowing with feeling as if he was choking on 03/23/22.  - SLP consulted, awaiting input. CAD s/p remote CABG  Hypotension  Dyslipidemia  PAF s/p AVN ablation with PPM implantation  PAD  - Denies chest pain. - Continue asa 81 mg po daily. - Continue midodrine 20 mg po tid. - Continue pravastatin 80 mg po daily.    - Hold apixaban for surgery. DM II  Hyperglycemia   - Continue FSBS with SSI correction scale. Hypothyroidism  - Continue levothyroxine 137 mcg po daily. GERD  Hyponatremia  Hyperkalemia   - Continue pantoprazole 40 mg po daily. Chronic debility  - PT/OT. - Supportive care. Anemia of chronic disease   - No tx indicated at this time. - Monitor, labs pending this a.m. Misc  - Patient on dutasteride PTA.  - Unclear why patient on this medication 2/2 fact that he is aneuric.    - This may be contributing to hypotension.  - Continue to hold. 30.0 - 39.9 Obese / Body mass index is 38.84 kg/m². Estimated discharge date: March 26  Barriers:    Code status: Full  Prophylaxis: Apixaban  Recommended Disposition: SNF/LTC     Subjective:     Chief Complaint / Reason for Physician Visit  Complains of excruciating pain, most notably in his back and buttocks. Plan of care and pertinent events reviewed with bedside nurse.      Review of Systems:  Symptom Y/N Comments  Symptom Y/N Comments   Fever/Chills N   Chest Pain N    Poor Appetite Y   Edema Y    Cough N   Abdominal Pain N    Sputum N   Joint Pain Y    SOB/BETTS N   Pruritis/Rash N    Nausea/vomit N   Tolerating PT/OT     Diarrhea N   Tolerating Diet Y    Constipation    Other       Could NOT obtain due to:      Objective:     VITALS:   Last 24hrs VS reviewed since prior progress note. Most recent are:  Patient Vitals for the past 24 hrs:   Temp Pulse Resp BP SpO2   03/24/22 0255 98.8 °F (37.1 °C) 79 22 106/62 91 %   03/23/22 2310 98.8 °F (37.1 °C) (!) 104 24 (!) 95/51 95 %   03/23/22 1955 98.6 °F (37 °C) 75 20 115/64 99 %   03/23/22 1545 97.7 °F (36.5 °C) 73 18 (!) 144/58 97 %   03/23/22 1130  75 20 (!) 113/49    03/23/22 1115  75 20 (!) 112/51    03/23/22 1100  75 20 (!) 113/51    03/23/22 1045  75 20 (!) 115/54    03/23/22 1030  75 20 (!) 102/51    03/23/22 1015  75 20 (!) 110/47    03/23/22 1000  75 22 (!) 103/44    03/23/22 0945  75 20 (!) 101/48    03/23/22 0930  75 20 (!) 113/46    03/23/22 0915  75 20 (!) 107/51    03/23/22 0900  75 20 (!) 115/49    03/23/22 0845  75 20 (!) 112/49    03/23/22 0830  75 20 (!) 116/51    03/23/22 0815  75 18 (!) 118/54    03/23/22 0800 98.9 °F (37.2 °C) 75 18 (!) 108/49        Intake/Output Summary (Last 24 hours) at 3/24/2022 0725  Last data filed at 3/23/2022 1130  Gross per 24 hour   Intake    Output 1500 ml   Net -1500 ml        I had a face to face encounter and independently examined this patient on 3/24/2022, as outlined below:  PHYSICAL EXAM:  General:  Awake and alert. Disoriented. NAD. HEENT:  Normocephalic. Sclera anicteric. Mucous membranes moist.    Chest:  Resps even/unlabored with symmetrical CWE. Air entry diminished at bases. Lungs CTA. No use of accessory muscles. CV:  RRR. Normal S1/S2. No M/C/R appreciated. No JVD. Generalized edema noted. Cap refill < 3 sec. Peripheral pulses 1+. GI:  Abdomen soft/NT/ND. ABT X 4.    :  HD. Neurologic:  Nonfocal.  CN II-XII grossly intact. Face symmetrical.  Speech normal.  Generally weak and deconditioned. Psych:  Cooperative. Slightly agitated, likely related to pain. Skin:  Warm and color appropriate. No jaundice. Turgor elastic. Multiple broken areas and ecchymotic areas scattered over UE/LEs.       Reviewed most current lab test results and cultures  YES  Reviewed most current radiology test results   YES  Review and summation of old records today    NO  Reviewed patient's current orders and MAR    YES  PMH/SH reviewed - no change compared to H&P  ________________________________________________________________________  Care Plan discussed with:    Comments   Patient [address] [place] [address]     Consultant                        Multidiciplinary team rounds were held today with , nursing, pharmacist and clinical coordinator. Patient's plan of care was discussed; medications were reviewed and discharge planning was addressed. ________________________________________________________________________  Jimmie Smith NP     Procedures: see electronic medical records for all procedures/Xrays and details which were not copied into this note but were reviewed prior to creation of Plan. LABS:  I reviewed today's most current labs and imaging studies.   Pertinent labs include:  Recent Labs     03/23/22  0756 03/22/22  1702 03/21/22  0800   WBC 7.3 6.8 7.7   HGB 11.6* 12.2 12.1   HCT 40.1 40.8 40.6    153 151     Recent Labs     03/23/22  0756 03/22/22  1702 03/21/22  0800   * 133* 135*   K 5.5* 5.1 5.0   CL 98 99 99   CO2 25 26 24   * 172* 186*   BUN 60* 51* 77*   CREA 8.63* 7.81* 10.20*   CA 7.8* 7.5* 7.8*   MG 3.2* 3.0* 3.5*   PHOS 8.6* 8.7* 8.3*   ALB 2.9* 3.1* 3.0*   TBILI 0.5 0.5 0.5   ALT 21 21 18       Signed: Jimmie Smith NP

## 2022-03-24 NOTE — PERIOP NOTES
[address] from Operating Room to PACU    Report received from [facility] Jennifer and Darci Sosa CRNA regarding Martin De La Rosas. Surgeon(s):  Prabhjot Doyle DPM  And Procedure(s) (LRB):  RIGHT SECOND TOE AMPUTATION (Right)  confirmed   with allergies and dressings discussed. Anesthesia type, drugs, patient history, complications, estimated blood loss, vital signs, intake and output, and lines and temperature were reviewed. 1620 TRANSFER - OUT REPORT:    Verbal report given to Alireza CARRILLO RN(name) on Martin Amezquita  being transferred to Ortho(unit) for routine post - op       Report consisted of patients Situation, Background, Assessment and   Recommendations(SBAR). Information from the following report(s) SBAR, Kardex, OR Summary, Intake/Output, MAR and Cardiac Rhythm Paced was reviewed with the receiving nurse. Lines:   Peripheral IV 03/24/22 Left External jugular (Active)   Site Assessment Clean, dry, & intact 03/24/22 1145   Phlebitis Assessment 0 03/24/22 1145   Infiltration Assessment 0 03/24/22 1145   Dressing Status Clean, dry, & intact 03/24/22 1145   Dressing Type Transparent 03/24/22 1145   Hub Color/Line Status Green;Flushed;Capped 03/24/22 1145        Opportunity for questions and clarification was provided.       Patient transported with:   O2 @ 2 liters  Tech

## 2022-03-24 NOTE — PROGRESS NOTES
Comprehensive Nutrition Assessment    Type and Reason for Visit: Reassess    Nutrition Recommendations/Plan:   Resume diet and supplements as able after procedure  Please document % meals and supplements consumed in flowsheet I/O's under intake     Nutrition Assessment:      Chart reviewed for follow up. Pt off the floor all day for right 2nd toe amputation. No recent PO intake documented to assess. No chemistry labs taken today, but yesterday phos (8.6) and potassium (5.5) were elevated. Pt remains NPO from procedure. Will continue monitoring. Patient Vitals for the past 168 hrs:   % Diet Eaten   03/20/22 1340 1 - 25%   03/20/22 0900 1 - 25%   03/19/22 1830 1 - 25%   03/19/22 1300 26 - 50%   03/19/22 1000 26 - 50%     Wt Readings from Last 5 Encounters:   03/24/22 119.3 kg (263 lb)   03/08/22 119.3 kg (263 lb)   02/15/22 116.1 kg (256 lb)   12/21/21 113.8 kg (250 lb 14.1 oz)   10/14/21 113.8 kg (250 lb 14.4 oz)   ]    Estimated Daily Nutrient Needs:  Energy (kcal): MSJ 2000 (1919 x 1.2 -300 for obesity); Weight Used for Energy Requirements: Current  Protein (g): 95-118g (0.8-1gPro/kg); Weight Used for Protein Requirements: Current  Fluid (ml/day): 1800mL or per MD; Method Used for Fluid Requirements: Standard renal      Nutrition Related Findings:  Labs: -868-221. Meds: nephrocap, phoslo, humalog, synthroid, zosyn. Edema: 1+ all extremities. BM 3/23 blood/loose. Wounds:    Multiple,Venous stasis,Stage II,Skin tears       Current Nutrition Therapies:  ADULT ORAL NUTRITION SUPPLEMENT Breakfast, Dinner; Renal Supplement  DIET NPO    Anthropometric Measures:  · Height:  5' 9\" (175.3 cm)  · Current Body Wt:  119.3 kg (263 lb)   · Ideal Body Wt:  160 lbs:  163 %   · BMI Category:  Obese class 2 (BMI 35.0-39. 9)       Nutrition Diagnosis:   · Inadequate protein-energy intake related to inadequate protein-energy intake,increased demand for energy/nutrients as evidenced by other (specify),intake 26-50%,intake 0-25% (est kcal and protein needs.)    Nutrition Interventions:   Food and/or Nutrient Delivery: Continue current diet,Continue oral nutrition supplement  Nutrition Education and Counseling: No recommendations at this time  Coordination of Nutrition Care: Continue to monitor while inpatient    Goals:  Pt will resume diet with PO intake >50% meals and supplements next 2-4 days       Nutrition Monitoring and Evaluation:   Behavioral-Environmental Outcomes: None identified  Food/Nutrient Intake Outcomes: Food and nutrient intake,Supplement intake  Physical Signs/Symptoms Outcomes: Biochemical data,Nutrition focused physical findings,Skin,Weight    Discharge Planning:    Continue oral nutrition supplement,Continue current diet     Electronically signed by Jazmin Payne RD on 3/24/2022 at 3:44 PM    Contact: [phone]

## 2022-03-24 NOTE — PROGRESS NOTES
End of Shift Note    Bedside shift change report given to  Brian Awad RN(oncoming nurse) by Maya Silva RN (offgoing nurse). Report included the following information SBAR, Kardex, Intake/Output and Cardiac Rhythm . Shift worked: night     Shift summary and any significant changes:    Pt screamed for help the entire night. Pt continues to complain of choking. No PO intake given. One time order given for fentanyl IV. Pt continues to have loose bloody stools. Unable to obtain blood for lab work. Pt removes O2 via nasal cannula     Concerns for physician to address: Pain relief, bloody stools     Zone phone for oncoming shift:         Activity:  Activity Level: Bed Rest  Number times ambulated in hallways past shift: 0  Number of times OOB to chair past shift: 0    Cardiac:   Cardiac Monitoring: Yes      Cardiac Rhythm: Ventricular Paced    Access:   Current line(s): PIV and HD access     Genitourinary:   Urinary status: anuric    Respiratory:   O2 Device: Nasal cannula  Chronic home O2 use?: NO  Incentive spirometer at bedside: NO       GI:  Last Bowel Movement Date: 03/23/22  Current diet:  ADULT ORAL NUTRITION SUPPLEMENT Breakfast, Dinner; Renal Supplement  DIET NPO  Passing flatus: YES  Tolerating current diet: NO       Pain Management:   Patient states pain is manageable on current regimen: NO    Skin:  Romeo Score: 10  Interventions: speciality bed    Patient Safety:  Fall Score:  Total Score: 3  Interventions: bed/chair alarm  High Fall Risk: Yes    Length of Stay:  Expected LOS: 3d 14h  Actual LOS: [address] Whyte RN

## 2022-03-24 NOTE — PERIOP NOTES
10:44= BRB noted when cleaning pt from medium sized stool; pt states \"this has been happening for about a year now. \" Placed a note for Attending under Provider message that pt may need a GI consult.

## 2022-03-24 NOTE — ANESTHESIA PREPROCEDURE EVALUATION
Relevant Problems   RESPIRATORY SYSTEM   (+) JACEY (obstructive sleep apnea)   (+) SOB (shortness of breath)      CARDIOVASCULAR   (+) CHF exacerbation (HCC)   (+) Chronic a-fib (HCC)   (+) Coronary artery disease involving native coronary artery without angina pectoris   (+) HTN (hypertension)   (+) Hx of CABG   (+) PAD (peripheral artery disease) (HCC)   (+) Pacemaker      RENAL FAILURE   (+) CKD (chronic kidney disease) stage V requiring chronic dialysis (HCC)      ENDOCRINE   (+) Diabetes mellitus type 2, controlled ([facility]   (+) Morbid obesity (HCC)   (+) Type 2 diabetes with nephropathy (HCC)       Anesthetic History   No history of anesthetic complications            Review of Systems / Medical History  Patient summary reviewed, nursing notes reviewed and pertinent labs reviewed    Pulmonary  Within defined limits      Sleep apnea           Neuro/Psych   Within defined limits           Cardiovascular    Hypertension      CHF  Dysrhythmias : atrial fibrillation  Pacemaker, past MI, CAD, PAD and CABG (2007)    Exercise tolerance: <4 METS  Comments: 03/2021:   NM: No ischemia or infarct demonstrated. LVEF 49%.      GI/Hepatic/Renal     GERD    Renal disease: ESRD and dialysis       Endo/Other  Within defined limits  Diabetes: type 2, using insulin  Hypothyroidism  Morbid obesity and arthritis     Other Findings              Physical Exam    Airway  Mallampati: III  TM Distance: 4 - 6 cm  Neck ROM: normal range of motion   Mouth opening: Normal     Cardiovascular  Regular rate and rhythm,  S1 and S2 normal,  no murmur, click, rub, or gallop             Dental    Dentition: Poor dentition     Pulmonary  Breath sounds clear to auscultation               Abdominal  GI exam deferred       Other Findings            Anesthetic Plan    ASA: 4  Anesthesia type: MAC          Induction: Intravenous  Anesthetic plan and risks discussed with: Patient

## 2022-03-24 NOTE — PROGRESS NOTES
TRANSFER - IN REPORT:    Verbal report received from [facility]) on Keisha Perks  being received from PACU(unit) for routine post - op      Report consisted of patients Situation, Background, Assessment and   Recommendations(SBAR). Information from the following report(s) SBAR, Kardex, Intake/Output, MAR and Recent Results was reviewed with the receiving nurse. Opportunity for questions and clarification was provided. Assessment completed upon patients arrival to unit and care assumed.

## 2022-03-25 NOTE — PROGRESS NOTES
Problem: Dysphagia (Adult)  Goal: *Acute Goals and Plan of Care (Insert Text)  Description: Speech pathology goals initiated 3/25/2022   1. Patient will tolerate a puree diet/ thin liquids free of s/s aspiration within 7 days   Outcome: Progressing Towards Goal   SPEECH LANGUAGE PATHOLOGY BEDSIDE SWALLOW EVALUATION  Patient: Barbara Chu (1 y.o. male)  Date: 3/25/2022  Primary Diagnosis: Hypotension [I95.9]  Acute respiratory failure with hypoxemia (HCC) [J96.01]  Procedure(s) (LRB):  RIGHT SECOND TOE AMPUTATION (Right) 1 Day Post-Op   Precautions:   Fall    ASSESSMENT :  Based on the objective data described below, the patient presents with functional oropharyngeal swallow at least with limited trials of purees and thin liquids. Patient drowsy and confused, complaining if discomfort with any upright positioning. Utilized reverse trendelenburg to optimize positioning. Patient accepted ice chip, sips of water via straw and applesauce. Timely propulsion and no overt s/s aspiration with trials before he refused further. RN reporting that patient was complaining of choking when lying flat (on his own saliva yesterday). Would discourage having patient lay completely flat in bed. Note history of GERD. At this time, patient largely limited and at increased aspiration given confusion, lethargy and poor positioning. Patient will benefit from skilled intervention to address the above impairments.   Patients rehabilitation potential is considered to be Fair     PLAN :  Recommendations and Planned Interventions:  - Cautiously initiate a puree diet/ thin liquids via single straw sip  -- meds crushed  -- HOLD PO IF TOO LETHARGIC/CONFUSED or showing s/s aspiration  -- utilize reverse trendelenburg to improve upright positioning   -- slp to follow for po tolerance and upgrade as alertness/mentation allow    Frequency/Duration: Patient will be followed by speech-language pathology 3 times a week to address goals.  Discharge Recommendations: To Be Determined     SUBJECTIVE:   Patient stated Roman Tapia heard these beds have scales. I'm sure I lost weight because I haven't eaten.     OBJECTIVE:     Past Medical History:   Diagnosis Date    Arrhythmia     atrial fibrillation 2017    Arthritis     spine, all joints    Atrial fibrillation (HCC)     CAD (coronary atherosclerotic disease)     Chronic kidney disease     [facility]    Chronic pain     back pain - unable to lay flat    Congestive heart failure ([address]     Diabetes ([address]     Diabetes mellitus type 2, controlled ([address] 3/13/2017    GERD (gastroesophageal reflux disease)     Heart failure ([address]     Hx of CABG 3/13/2017    CABG in 2010 in [place]    Hyperlipidemia     Hypertension     Long term current use of anticoagulant therapy     Morbid obesity ([facility] [address] 3/13/2017    Neuropathy     in both feet    JACEY (obstructive sleep apnea) 6/23/2017    no CPAP, pt states never tested    Pacemaker     S/P CABG x 2 2010    Skin cancer     Thyroid disease     hypothyroid     Past Surgical History:   Procedure Laterality Date    HX CATARACT REMOVAL Bilateral     HX ORTHOPAEDIC      L 3rd toe amputation in 1999    HX PACEMAKER Left 12/26/2018    Dr العلي Patch x4 crt-p    HX VASCULAR ACCESS  03/2021    R chest    IR INSERT TUNL CVC W/O PORT OVER 5 YR  3/16/2021    VT CARDIAC SURG PROCEDURE UNLIST      CABGx2 in 2010     Prior Level of Function/Home Situation:   Home Situation  Home Environment: Private residence  # Steps to Enter: 1  One/Two Story Residence: One story  Living Alone: No  Support Systems: Spouse/Significant Other  Patient Expects to be Discharged to[de-identified] Skilled nursing facility  Current DME Used/Available at Home: [facility], [place], straight,Walker, rolling,Walker, rollator,Grab bars  Diet prior to admission: reg/thin  Current Diet:  npo   Cognitive and Communication Status:  Neurologic State: Lethargic,Confused  Orientation Level: Oriented X4  Cognition: Decreased command following           Oral Assessment:  Oral Assessment  Labial: No impairment  Dentition: Limited  Oral Hygiene: xerostomia  Lingual: No impairment  Velum: Unable to visualize  Mandible: No impairment  P.O. Trials:  Patient Position: semi upright in bed, reverse trendelenburg  Vocal quality prior to P.O.: No impairment  Consistency Presented: Thin liquid;Puree; Ice chips  How Presented: SLP-fed/presented;Spoon;Straw     Bolus Acceptance: No impairment  Bolus Formation/Control: No impairment (at least with thin and puree)     Propulsion: No impairment  Oral Residue: None  Initiation of Swallow: No impairment  Laryngeal Elevation: Functional  Aspiration Signs/Symptoms: None             NOMS:   The NOMS functional outcome measure was used to quantify this patient's level of swallowing impairment. Based on the NOMS, the patient was determined to be at level 4 for swallow function       NOMS Swallowing Levels:  Level 1 (CN): NPO  Level 2 (CM): NPO but takes consistency in therapy  Level 3 (CL): Takes less than 50% of nutrition p.o. and continues with nonoral feedings; and/or safe with mod cues; and/or max diet restriction  Level 4 (CK): Safe swallow but needs mod cues; and/or mod diet restriction; and/or still requires some nonoral feeding/supplements  Level 5 (CJ): Safe swallow with min diet restriction; and/or needs min cues  Level 6 (CI): Independent with p.o.; rare cues; usually self cues; may need to avoid some foods or needs extra time  Level 7 ([address] [place] [address]): Independent for all p.o.  TIERA. (2003). National Outcomes Measurement System (NOMS): Adult Speech-Language Pathology User's Guide.        Pain:  Pain Scale 1: Numeric (0 - 10)  Pain Intensity 1: 8  Pain Location 1: Foot;Generalized    After treatment:   Patient left in no apparent distress in bed, Call bell within reach, and Nursing notified    COMMUNICATION/EDUCATION:     The patient's plan of care including recommendations, planned interventions, and recommended diet changes were discussed with: Registered nurse. Patient understands intent and goals of therapy, but is neutral about his/her participation.     Thank you for this referral.  Garett Perry, SLP  Time Calculation: 17 mins

## 2022-03-25 NOTE — PROGRESS NOTES
Occupational Therapy  Pt has left the floor going to IR for procedure. Will defer and continue to follow.

## 2022-03-25 NOTE — PROGRESS NOTES
1455: Patient's BP 79/50 with agonal breathing. Patient responsive to sternal rub. Narcan 0.4mg IV given. 1456: Patient more alert and yelling out in pain.

## 2022-03-25 NOTE — PROGRESS NOTES
Pt has returned from dialysis and appears sleepy. Per OT and SLP, pt requires total assistance to scoot up to [facility] with complaints of increased pain on his R side post mobility. Per SLP pt demonstrated decreased tolerance for upright position at bed level. Nurse is aware. Will defer at this time and continue to follow as appropriate.     Graciela Aviles, PTA

## 2022-03-25 NOTE — PROGRESS NOTES
Hospitalist Progress Note    NAME: Shruthi Bell   :  1950   MRN:  [id number]     Brief summary:  67 y.o. male  with extensive medical comorbidities including ESRD on HD, abib on apixaban, DM II, CAD s/p remote CABG, obesity, chronic wounds, and decubitus ulcer who presented on 22 to [facility] SOB, generalized malaise, and fatigue. This had been ongoing for 3 days. His SOB was moderate in severity. He missed dialysis on day of presentation because he was too weak. He was  Admitted to the critical care service 2/2 hypotension and possible need for vasopressors. Patient's condition stabilized and he was transferred to the hospitalist service. Assessment / Plan:  Acute hypoxic respiratory failure 2/2 volume overload, improved  Pleural effusions  JACEY, not on CPAP  CXR 22: There has been improvement in the bibasilar opacities left greater  than right when compared to 3/12/2021 consistent with improvement in bibasilar  Atelectasis/effusions. CXR 22:  Stable to slightly improved size of bilateral effusions mainly on  the left. CT chest 22:  1. Moderate bilateral pleural effusions, mild anasarca and small volume ascites. 2.  Mild asymmetric abdominal wall stranding/edema and skin thickening on the left, though similar to prior study. This could represent anasarca or cellulitis. No focal collection allowing for lack of IV contrast material.  3. Cirrhotic morphology of liver. - Supplemental O2 requirements have increased, patient has not received his apixaban today - order placed for left sided thoracentesis. ESRD  Secondary hyperparathyroidism  - Nephrology input appreciated. - Continue Nephrocap 1 po daily. - Continue calsium acetate 667 mg po tid.   - HD MWF, did not tolerate full treatment today 2/2 hypotension.      Sacral decubitus/pressure injury (POA)  Trev  Venous stasis wounds   Right 2nd toe ulcer with exposed bone  Blood culture 22:  NG at 5 days.    - Podiatry input appreciated. - Wound care input appreciated. - S/P right second toe amputation on 03/24/22.  - Continue current wound care. - Continue current abx (vancomycin/Zosyn). GIB  GERD  - Stool guaiac + on 03/23/22.  - RN reports that patient had a large maroon stool this a.m.  - Hold asa and OAC.  - Change pantoprazole to 40 mg IV q12h. - Request GI consultation.    - Monitor H/H q12h. Anemia of chronic disease   - No tx indicated at this time. - Monitor blood count as noted above for signs of acute blood loss anemia. Acute pain  - Patient continues to complain of pain, generalized but most notable buttocks. - Continue fentanyl 25 mcg IV q4h. Dysphagia  - Patient began to have difficulty swallowing with feeling as if he was choking on 03/23/22.  - SLP consulted, awaiting input.    - Keep npo for now. CAD s/p remote CABG  Hypotension  Dyslipidemia  PAF s/p AVN ablation with PPM implantation  PAD  - Denies chest pain. - Hold asa 2/2 GIB.    - Continue midodrine 20 mg po tid. - Continue pravastatin 80 mg po daily.    - Hold apixaban 2/2 GIB. DM II  Hyperglycemia   - Continue FSBS with SSI correction scale. Hypothyroidism  - Continue levothyroxine 137 mcg po daily. Hyponatremia  Hyperkalemia, resolved   - Tx per nephrology/HD. Chronic debility  - PT/OT. - Supportive care. Misc  - Patient on dutasteride PTA.  - Unclear why patient on this medication 2/2 fact that he is aneuric.    - This may be contributing to hypotension.  - Continue to hold. 30.0 - 39.9 Obese / Body mass index is 38.84 kg/m². Estimated discharge date: March 26  Barriers:    Code status: Full  Prophylaxis: Apixaban  Recommended Disposition: SNF/LTC     Subjective:     Chief Complaint / Reason for Physician Visit  Complains of excruciating pain, most notably in his back and buttocks. Plan of care and pertinent events reviewed with bedside nurse.      Review of Systems:  Symptom Y/N Comments  Symptom Y/N Comments   Fever/Chills N   Chest Pain N    Poor Appetite  NPO  Edema Y    Cough N   Abdominal Pain N    Sputum N   Joint Pain Y    SOB/BETTS N   Pruritis/Rash N    Nausea/vomit N   Tolerating PT/OT     Diarrhea N   Tolerating Diet  NPO   Constipation    Other       Could NOT obtain due to:      Objective:     VITALS:   Last 24hrs VS reviewed since prior progress note.  Most recent are:  Patient Vitals for the past 24 hrs:   Temp Pulse Resp BP SpO2   03/25/22 1020    (!) 112/54    03/25/22 1018 98 °F (36.7 °C) 81 16 (!) 84/46    03/25/22 1000  75 18 (!) 99/49    03/25/22 0945  75 16 (!) 91/47    03/25/22 0930  75 18 (!) 106/51    03/25/22 0915  77 16 (!) 92/39    03/25/22 0900  75 18 (!) 99/43    03/25/22 0845  74 16 (!) 92/46    03/25/22 0830 98.4 °F (36.9 °C) 75 18 (!) 104/53    03/25/22 0725 98 °F (36.7 °C) 74 16 (!) 103/53 94 %   03/25/22 0452 98.4 °F (36.9 °C) 75 16 (!) 86/50 94 %   03/24/22 2239 98 °F (36.7 °C) 75 18 (!) 110/56 94 %   03/24/22 2024 98.4 °F (36.9 °C) 74 18 (!) 108/53 94 %   03/24/22 1836 98 °F (36.7 °C) 70 16 (!) 99/59 99 %   03/24/22 1739 98 °F (36.7 °C) 76 18 (!) 107/47 96 %   03/24/22 1637 98.9 °F (37.2 °C) 81 20 (!) 106/57 94 %   03/24/22 1605 98 °F (36.7 °C) 76 20 (!) 100/51 96 %   03/24/22 1544  75 28 (!) 101/43 94 %   03/24/22 1500  75 22 (!) 99/48 96 %   03/24/22 1430  75 17 (!) 96/46 98 %   03/24/22 1400  75 20 (!) 119/53 96 %   03/24/22 1330  75 20 (!) 108/51 96 %   03/24/22 1300  76 23 (!) 104/48 95 %   03/24/22 1245  75 15 (!) 100/47 96 %   03/24/22 1230  75 15 (!) 99/49 96 %   03/24/22 1215  75 14 (!) 98/51 96 %   03/24/22 1210  75 12 (!) 92/51 95 %   03/24/22 1200  75 13 (!) 89/49 94 %   03/24/22 1145  75 17 (!) 116/55 99 %   03/24/22 1135  75 21 (!) 111/53 98 %   03/24/22 1130  75 21 (!) 109/52 98 %       Intake/Output Summary (Last 24 hours) at 3/25/2022 1128  Last data filed at 3/25/2022 1018  Gross per 24 hour   Intake    Output -415 ml   Net 415 ml        I had a face to face encounter and independently examined this patient on 3/25/2022, as outlined below:  PHYSICAL EXAM:  General:  Awake and alert. Intermittently disoriented. Patient was moaning upon entering room and when asked why he said \"I am dying\". When asked to clarify he states, \"I just feel like I am dying. \"    HEENT:  Normocephalic. Sclera anicteric. Mucous membranes moist.    Chest:  Resps even/unlabored with symmetrical CWE. Air entry diminished at bases L>R. Lungs CTA. No use of accessory muscles. CV:  RRR. Normal S1/S2. No M/C/R appreciated. No JVD. Generalized edema noted. Cap refill < 3 sec. Peripheral pulses 1+. GI:  Abdomen soft/NT/ND. ABT X 4.    :  HD. Neurologic:  Nonfocal.  CN II-XII grossly intact. Face symmetrical.  Speech normal.  Generally weak and deconditioned. Psych:  Cooperative. Slightly agitated, likely related to pain. Skin:  Warm and color appropriate. No jaundice. Turgor elastic. Multiple broken areas and ecchymotic areas scattered over UE/LEs. Reviewed most current lab test results and cultures  YES  Reviewed most current radiology test results   YES  Review and summation of old records today    NO  Reviewed patient's current orders and MAR    YES  PMH/SH reviewed - no change compared to H&P  ________________________________________________________________________  Care Plan discussed with:    Comments   Patient [address]     Consultant                        Multidiciplinary team rounds were held today with , nursing, pharmacist and clinical coordinator. Patient's plan of care was discussed; medications were reviewed and discharge planning was addressed.      ________________________________________________________________________  Clau Craig NP     Procedures: see electronic medical records for all procedures/Xrays and details which were not copied into this note but were reviewed prior to creation of Plan. LABS:  I reviewed today's most current labs and imaging studies.   Pertinent labs include:  Recent Labs     03/25/22  0322 03/23/22  0756 03/22/22  1702   WBC 9.4 7.3 6.8   HGB 11.7* 11.6* 12.2   HCT 39.6 40.1 40.8    173 153     Recent Labs     03/25/22 0322 03/23/22  0756 03/22/22  1702   * 133* 133*   K 5.1 5.5* 5.1   CL 97 98 99   CO2 23 25 26   * 161* 172*   BUN 55* 60* 51*   CREA 7.47* 8.63* 7.81*   CA 8.1* 7.8* 7.5*   MG 2.9* 3.2* 3.0*   PHOS 8.9* 8.6* 8.7*   ALB 2.8* 2.9* 3.1*   TBILI 0.7 0.5 0.5   ALT 22 21 21       Signed: Harvel Boeck, NP

## 2022-03-25 NOTE — PROGRESS NOTES
End of Shift Note    Bedside shift change report given to Janet 1827 (oncoming nurse) by Carlos Lechuga (offgoing nurse). Report included the following information SBAR, Kardex, MAR and Cardiac Rhythm V Paced    Shift worked: [id number]     Shift summary and any significant changes:     Patient is extremely uncomfortable, calling out, unconsolable. Medicated with prn pain meds, patient would really benefit from palliative discussion and pain management. Patient unable to tolerate wound care at this time. Rodney called for error message on bed     Concerns for physician to address:  pain management     Zone phone for oncoming shift:           Activity:  Activity Level: Bed Rest  Number times ambulated in hallways past shift: 0  Number of times OOB to chair past shift: 0    Cardiac:   Cardiac Monitoring: Yes      Cardiac Rhythm: Ventricular Paced    Access:   Current line(s): PIV     Genitourinary:   Urinary status: anuric    Respiratory:   O2 Device: Nasal cannula  Chronic home O2 use?: NO  Incentive spirometer at bedside: NO       GI:  Last Bowel Movement Date: 03/24/22  Current diet:  ADULT ORAL NUTRITION SUPPLEMENT Breakfast, Dinner; Renal Supplement  DIET NPO  Passing flatus: YES  Tolerating current diet: NO       Pain Management:   Patient states pain is manageable on current regimen: NO    Skin:  Romeo Score: 10  Interventions: turn team, speciality bed, float heels and foam dressing    Patient Safety:  Fall Score:  Total Score: 3  Interventions: bed/chair alarm, assistive device (walker, cane, etc) and gripper socks  High Fall Risk: Yes    Length of Stay:  Expected LOS: 3d 14h  Actual LOS: 7      [facility]

## 2022-03-25 NOTE — PROGRESS NOTES
NAME: Edmund Carias        :  1950        MRN:  [id number]                    Assessment   :                                               Plan:  ESRD-    Hypotension    Anemia    Fatigue    [id number]; HD today     H/H at goal.  Hold TE.     He wants to continue HD for now, but we are discussing goals of care and I will continue to discuss.     Continue on Midodrine 20 tid     S/p toe amputation 3/24    Will see again on Monday, but please call with questions or changes in the meantime. Subjective:     Chief Complaint:  In bed. Very uncomfortable. Again seems quite miserable. The patient was seen on dialysis at 9:35 AM .  BP is stable. Catheter is functioning well. Review of Systems:    Symptom Y/N Comments  Symptom Y/N Comments   Fever/Chills    Chest Pain     Poor Appetite    Edema     Cough    Abdominal Pain     Sputum    Joint Pain     SOB/BETTS    Pruritis/Rash     Nausea/vomit    Tolerating PT/OT     Diarrhea    Tolerating Diet     Constipation    Other       Could not obtain due to:      Objective:     VITALS:   Last 24hrs VS reviewed since prior progress note.  Most recent are:  Visit Vitals  BP (!) 103/53   Pulse 74   Temp 98 °F (36.7 °C)   Resp 16   Ht 5' 9\" (1.753 m)   Wt 119.3 kg (263 lb)   SpO2 94%   BMI 38.84 kg/m²       Intake/Output Summary (Last 24 hours) at 3/25/2022 0744  Last data filed at 3/24/2022 1115  Gross per 24 hour   Intake 50 ml   Output    Net 50 ml      Telemetry Reviewed:     PHYSICAL EXAM:  General: NAD  + dep edema      Lab Data Reviewed: (see below)    Medications Reviewed: (see below)    PMH/SH reviewed - no change compared to H&P  ________________________________________________________________________  Care Plan discussed with:  Patient     Family      RN     Care Manager                    Consultant:          Comments   >50% of visit spent in counseling and coordination of care       ________________________________________________________________________  Saray Ku MD     Procedures: see electronic medical records for all procedures/Xrays and details which  were not copied into this note but were reviewed prior to creation of Plan. LABS:  Recent Labs     03/25/22 0322 03/23/22 0756   WBC 9.4 7.3   HGB 11.7* 11.6*   HCT 39.6 40.1    173     Recent Labs     03/25/22 0322 03/23/22 0756 03/22/22  1702   * 133* 133*   K 5.1 5.5* 5.1   CL 97 98 99   CO2 23 25 26   BUN 55* 60* 51*   CREA 7.47* 8.63* 7.81*   * 161* 172*   CA 8.1* 7.8* 7.5*   MG 2.9* 3.2* 3.0*   PHOS 8.9* 8.6* 8.7*     Recent Labs     03/25/22 0322 03/23/22 0756 03/22/22  1702   AP 40* 42* 44*   TP 6.2* 6.3* 6.2*   ALB 2.8* 2.9* 3.1*   GLOB 3.4 3.4 3.1     No results for input(s): INR, PTP, APTT, INREXT, INREXT in the last 72 hours. No results for input(s): FE, TIBC, PSAT, FERR in the last 72 hours. No results found for: FOL, RBCF   No results for input(s): PH, PCO2, PO2 in the last 72 hours. No results for input(s): CPK, CKMB in the last 72 hours.     No lab exists for component: TROPONINI  No components found for: [place]  Lab Results   Component Value Date/Time    Color YELLOW/STRAW 03/05/2021 01:28 PM    Appearance CLEAR 03/05/2021 01:28 PM    Specific gravity 1.009 03/05/2021 01:28 PM    pH (UA) 6.0 03/05/2021 01:28 PM    Protein Negative 03/05/2021 01:28 PM    Glucose Negative 03/05/2021 01:28 PM    Ketone Negative 03/05/2021 01:28 PM    Bilirubin Negative 03/05/2021 01:28 PM    Urobilinogen 0.2 03/05/2021 01:28 PM    Nitrites Negative 03/05/2021 01:28 PM    Leukocyte Esterase Negative 03/05/2021 01:28 PM    Epithelial cells FEW 03/05/2021 01:28 PM    Bacteria Negative 03/05/2021 01:28 PM    WBC 0-4 03/05/2021 01:28 PM    RBC 0-5 03/05/2021 01:28 PM       MEDICATIONS:  Current Facility-Administered Medications   Medication Dose Route Frequency    fentaNYL citrate (PF) injection 25 mcg  25 mcg IntraVENous Q4H PRN    apixaban (ELIQUIS) tablet 5 mg  5 mg Oral BID    alcohol 62% (NOZIN) nasal  1 Ampule  1 Ampule Topical Q12H    Vancomycin- Random Level  Friday 3/24 AM   Other ONCE    loperamide (IMODIUM) capsule 2 mg  2 mg Oral Q4H PRN    traMADoL (ULTRAM) tablet 50 mg  50 mg Oral Q6H PRN    levothyroxine (SYNTHROID) tablet 137 mcg  137 mcg Oral ACB    midodrine (PROAMATINE) tablet 20 mg  20 mg Oral TID WITH MEALS    albumin human 25% (BUMINATE) solution 12.5 g  12.5 g IntraVENous DIALYSIS PRN    piperacillin-tazobactam (ZOSYN) 3.375 g in 0.9% sodium chloride (MBP/ADV) 100 mL MBP  3.375 g IntraVENous Q12H    sodium chloride (NS) flush 5-40 mL  5-40 mL IntraVENous Q8H    sodium chloride (NS) flush 5-40 mL  5-40 mL IntraVENous PRN    acetaminophen (TYLENOL) tablet 650 mg  650 mg Oral Q6H PRN    Or    acetaminophen (TYLENOL) suppository 650 mg  650 mg Rectal Q6H PRN    polyethylene glycol (MIRALAX) packet 17 g  17 g Oral DAILY PRN    ondansetron (ZOFRAN ODT) tablet 4 mg  4 mg Oral Q8H PRN    Or    ondansetron (ZOFRAN) injection 4 mg  4 mg IntraVENous Q6H PRN    aspirin delayed-release tablet 81 mg  81 mg Oral DAILY    balsam peru-castor oiL (VENELEX) ointment   Topical BID    calcium acetate(phosphat bind) (PHOSLO) capsule 667 mg  1 Capsule Oral TID WITH MEALS    B complex-vitaminC-folic acid (NEPHROCAP) cap  1 Capsule Oral DAILY    [Held by provider] dutasteride (AVODART) capsule 0.5 mg  0.5 mg Oral DAILY    cetirizine (ZYRTEC) tablet 10 mg  10 mg Oral DAILY    pantoprazole (PROTONIX) tablet 40 mg  40 mg Oral ACB    pravastatin (PRAVACHOL) tablet 80 mg  80 mg Oral QHS    insulin lispro (HUMALOG) injection   SubCUTAneous AC&HS    glucose chewable tablet 16 g  4 Tablet Oral PRN    glucagon (GLUCAGEN) injection 1 mg  1 mg IntraMUSCular PRN    dextrose 10% infusion 0-250 mL  0-250 mL IntraVENous PRN    VANCOMYCIN INFORMATION NOTE   Other Rx Dosing/Monitoring

## 2022-03-25 NOTE — PROGRESS NOTES
Occupational Therapy  Medical record reviewed. Pt had amputation of toe (R 2nd toe) yesterday. Per chart, pt has been very painful and is now in HD. Will defer and continue to follow as appropriate.

## 2022-03-25 NOTE — CONSULTS
[facility] INITIAL ASSESSMENT. Name: Sami Hobbs   : 1950   MRN: [id number]   Date: 3/25/2022          HPI:     Mr. Zion Castillo is 65-year-old  male with morbid obesity, end-stage renal disease, atrial fibrillation, coronary artery disease, congestive heart failure, type 2 diabetes mellitus, hypertension, hyperlipidemia, obstructive sleep apnea but not on CPAP and hypothyroidism and newly diagnosed cirrhosis. He presented to emergency department  3/18 with complaint of worsening generalized weakness for the last 3 to 4 days. During the stay in ER his blood pressure has been labile and possible need for vasopressors. He also had some worsening hypoxemia, now requiring 11 L of oxygen by nasal cannula. He was transferred to the ICU for pressor support and HD. He was started on midodrine 20mg TID. He was then transferred to the floor. He also has significant PVD and required amputation of right 2nd toe this admission (yesterday) by podiatry due to osteomyelitis. Over his stay he has had intermittent confusion and pain complaints. He was taken to radiology today for thoracentesis but became hypotensive prior to the procedure. Primary team states that he was unable to complete HD earlier today due to his BP. He was also noted to have a bloody stool. GI recommended imaging when able. He was transferred to the ICU for vasopressors and has since had more melanotic stool x2     Prbolem list:     Acute hypoxemic respiratory failure. Volume overload pulmonary edema and bilateral pleural effusions. Congestive heart failure with acute decompensation. Type 2 diabetes mellitus. History of hypertension. Elevated troponin. Hypotension. Sepsis vs GIB  End-stage renal disease on hemodialysis. Anasarca. Decubitus ulcers on lower back. Mild hyperkalemia. Coronary artery disease status post CABG.   History of A. Fib.  - Encephalopathy    Assessment/Plan:     Acute hypoxemic respiratory failure. Secondary to volume overload pulmonary edema with bilateral pleural effusions. Supplemental oxygen for goal saturation of 88 to 92%. - Will discuss CRRT with nephrology if needed. Shock  - Sepsis vs GIB  Patient has  hypotension with lactic acidosis. Now on empiric zosyn and vanc  Obtain blood cultures. Goal map of more  than 65.  - serial h/h with goal hgb 7.   - CT abdomen when able. End-stage renal disease. Nephrology following     CAD. - holding Asprin and BB for now due to hypotension and bleed    DM2.  -SSI for glucose control. H/o HTN. -Hold BP meds. Hypothyroidism. Continue levothyroxine -> change to IV tomorrow if still NPO    Decubitus ulcers. Wound care. DVT prophylaxis. On Eliquis. - holding   Stress ulcer prophylaxis. BID protonix   CODE STATUS. Full code. Updated his wife bedside. I personally spent 60 minutes of critical care time. This is time spent at this critically ill patient's bedside actively involved in patient care as well as the coordination of care and discussions with the patient's family. This does not include any procedural time which has been billed separately. Review of Systems:     A comprehensive review of systems was negative except for that written in the HPI. Visit Vitals  BP (!) 114/55   Pulse 75   Temp 98.2 °F (36.8 °C)   Resp 16   Ht 5' 9\" (1.753 m)   Wt 119.3 kg (263 lb)   SpO2 91%   BMI 38.84 kg/m²              Intake/Output:     Intake/Output Summary (Last 24 hours) at 3/25/2022 5374  Last data filed at 3/25/2022 1018  Gross per 24 hour   Intake    Output -415 ml   Net 415 ml       Physical Exam:  Physical Exam  Constitutional:       Appearance: ill appearing, moaning \"oh God\". HENT:      Head: Normocephalic and atraumatic. Mouth/Throat:      Mouth: Mucous membranes are moist.   Eyes:      Extraocular Movements: Extraocular movements intact.       Conjunctiva/sclera: Conjunctivae normal.      Pupils: Pupils are equal, round, and reactive to light. Cardiovascular:      Rate and Rhythm: Normal rate and regular rhythm. Abdominal:      General: Abdomen is flat. There is no distension. Palpations: Abdomen is soft. Tenderness: There is abdominal tenderness especially in left upper and lower quad. There is no guarding. Musculoskeletal:      Cervical back: Normal range of motion and neck supple. Skin:     General: Skin is warm. Neurological:      General: No focal deficit present. Mental Status: altered, oriented to self      Past Medical History:      has a past medical history of Arrhythmia, Arthritis, Atrial fibrillation ([address] CAD (coronary atherosclerotic disease), Chronic kidney disease, Chronic pain, Congestive heart failure ([address] Diabetes ([address] Diabetes mellitus type 2, controlled ([address] (3/13/2017), GERD (gastroesophageal reflux disease), Heart failure ([address] CABG (3/13/2017), Hyperlipidemia, Hypertension, Long term current use of anticoagulant therapy, Morbid obesity ([address] (3/13/2017), Neuropathy, JACEY (obstructive sleep apnea) (6/23/2017), Pacemaker, S/P CABG x 2 (2010), Skin cancer, and Thyroid disease. He has no past medical history of Carotid artery disease ([address] Clotting disorder ([address] Glaucoma, ICD (implantable cardioverter-defibrillator) in place, Murmur, Myocardial infarction [facility]), Pulmonary embolism ([address] Syncope, or Valvular heart disease. Past Surgical History:      has a past surgical history that includes ir insert tunl cvc w/o port over 5 yr (3/16/2021); pr cardiac surg procedure unlist; hx pacemaker (Left, 12/26/2018); hx vascular access (03/2021); hx orthopaedic; and hx cataract removal (Bilateral). Home Medications:     Prior to Admission medications    Medication Sig Start Date End Date Taking?  Authorizing Provider   clobetasoL (TEMOVATE) 0.05 % ointment  3/2/22   Provider, Historical   cholecalciferol, vitamin D3, (Shimon Ortega VITAMIN D PO) Take  by mouth. Provider, Historical   aspirin delayed-release 81 mg tablet Take 81 mg by mouth daily. 2/3/22   Provider, Historical   calcium acetate,phosphat bind, (PHOSLO) 667 mg cap TAKE 1 CAPSULE BY MOUTH 3 TIMES A DAY WITH MEALS. 2/3/22   Provider, Historical   miconazole (MICOTIN) 2 % topical cream APPLY TOPICALLY 2 TIMES DAILY. APPLY TO AFFECTED AREAS 2/3/22   Provider, Historical   ketoconazole (NIZORAL) 2 % topical cream Apply  to affected area daily. Provider, Historical   insulin glargine (Lantus Solostar U-100 Insulin) 100 unit/mL (3 mL) inpn 20 Units by SubCUTAneous route nightly. INJECT 20 UNITS SUBCUTANEOUSLY AT BEDTIME  Patient taking differently: 15 Units by SubCUTAneous route nightly. 12/10/21   Nella Salazar MD   levothyroxine (SYNTHROID) 137 mcg tablet TAKE 1 TABLET BY MOUTH DAILY (BEFORE BREAKFAST). Patient taking differently: Take 137 mcg by mouth Daily (before breakfast). 10/31/21   Nella Salazar MD   loratadine (Claritin) 10 mg tablet Take 10 mg by mouth daily. Provider, Historical   pravastatin (PRAVACHOL) 80 mg tablet TAKE 1 TABLET BY MOUTH EVERY DAY AT NIGHT  Patient taking differently: Take 80 mg by mouth nightly. 8/2/21   Lars PATEL NP   apixaban (Eliquis) 5 mg tablet TAKE 1 TABLET BY MOUTH TWICE A DAY  Patient taking differently: Take 5 mg by mouth two (2) times a day. TAKE 1 TABLET BY MOUTH TWICE A DAY 6/14/21   Herminio Trammell MD   Insulin Needles, Disposable, (BD Ultra-Fine Mini Pen Needle) 31 gauge x 3/16\" ndle USE WITH INSULIN PENS 4 TIMES DAILY 4/7/21   Paul Wilkes MD   metoprolol succinate (TOPROL-XL) 100 mg tablet Take 1 Tab by mouth daily. 3/19/21   Wynell [place], MD   balsam peru-castor oiL (VENELEX) ointment Apply  to affected area two (2) times a day.  3/19/21   Shelli Hall MD   insulin aspart U-100 (NovoLOG Flexpen U-100 Insulin) 100 unit/mL (3 mL) inpn Inject 14 units at Breakfast, 10 with Lunch and Dinner 2/19/21 Rebekah Carnes MD   omeprazole (PRILOSEC) 40 mg capsule Take 40 mg by mouth daily. Indications: 3-23-21: Patient states he takes 40mg in the morning and 40mg in the evening. 5/21/20   Provider, Historical   dutasteride (AVODART) 0.5 mg capsule Take 0.5 mg by mouth daily. Provider, Historical   acetaminophen (TYLENOL EXTRA STRENGTH) 500 mg tablet Take 1,000 mg by mouth every eight (8) hours as needed for Pain. Provider, Historical       Allergies/Social/Family History: Allergies   Allergen Reactions    Allopurinol Rash    Gabapentin Drowsiness    Ciprofloxacin Nausea Only    Percocet [Oxycodone-Acetaminophen] Other (comments)     Hallucinations, not allergic to tylenol      Social History     Tobacco Use    Smoking status: Never Smoker    Smokeless tobacco: Never Used   Substance Use Topics    Alcohol use: Not Currently     Comment: rare      Family History   Problem Relation Age of Onset    Heart Attack Father     Cancer Father         lymph node    Cancer Mother         breast    No Known Problems Brother          LABS AND  DATA:   Personally reviewed      Peak airway pressure:      Minute ventilation:        CRITICAL CARE CONSULTANT NOTE  I had a face to face encounter with the patient, reviewed and interpreted patient data including clinical events, labs, images, vital signs, I/O's, and examined patient. I have discussed the case and the plan and management of the patient's care with the consulting services, the bedside nurses and the respiratory therapist.      NOTE OF PERSONAL INVOLVEMENT IN CARE   This patient has a high probability of imminent, clinically significant deterioration, which requires the highest level of preparedness to intervene urgently. I participated in the decision-making and personally managed or directed the management of the following life and organ supporting interventions that required my frequent assessment to treat or prevent imminent deterioration.     I personally spent 90minutes of critical care time. This is time spent actively involved in patient care as well as the coordination of care and/or discussions with the patient's family. This does not include any procedural time which has been billed separately.     Alexsander Gutierrez MD [address] [place] [address],# [phone]

## 2022-03-25 NOTE — PROGRESS NOTES
Speech pathology  Chart reviewed. Patient was unavailable for swallow evaluation yesterday as he was in the OR. Patient now JIGNA for HD. SLP will follow up this afternoon as appropriate.  Fabricio Solis M.S. DEANDRE-SLP

## 2022-03-25 NOTE — PROGRESS NOTES
Medical record reviewed. Pt had amputation of toe (R 2nd toe) yesterday. Per chart, pt has been very painful and is now in HD. Will defer and continue to follow as appropriate.     [place], PTA

## 2022-03-25 NOTE — ACP (ADVANCE CARE PLANNING)
Advance Care Planning Note      NAME: Elvie Max   :  1950   MRN:  [id number]     Date/Time:  3/25/2022 12:28 PM    Active Diagnoses:  Acute hypoxic respiratory failure 2/2 volume overload, improved  Pleural effusions  JACEY, not on CPAP  ESRD  Secondary hyperparathyroidism  Sacral decubitus/pressure injury (POA)  Trev  Venous stasis wounds   Right 2nd toe ulcer with exposed bone  GIB  GERD  Anemia of chronic disease   Acute pain  Dysphagia  CAD s/p remote CABG  Hypotension  Dyslipidemia  PAF s/p AVN ablation with PPM implantation  PAD  DM II  Hyperglycemia   Hypothyroidism  Hyponatremia  Hyperkalemia, resolved   Chronic debility    These active diagnoses are of sufficient risk that focused discussion on advance care planning is indicated in order to allow the patient to thoughtfully consider personal goals of care, and if situations arise that prevent the ability to personally give input, to ensure appropriate representation of their personal desires for different levels and aggressiveness of care. Discussion:   Code status addressed and patient wisheis to remain a Full Code. We discussed the fact that the patient is generally unwell and his condition has deteriorated while being hospitalized. The patient was drowsy and fell asleep frequently. His wife did state that at this time based on what her  has said he would want everything done to preserve his life. He is currently a dialysis patient and would want this to continue. She wishes to think about, and discuss with his family, the degree of aggressiveness she would want in his treatment beyond this. At this point she wants us to do everything that is medically necessary to preserve/prolong his life.     Persons present and participating in discussion: Alma Rosarene Bárbara (wife), and Harvel Boeck, NP      Time Spent:   Total time spent face-to-face in education and discussion: 16  minutes.          Iman Ward NP   Hospitalist

## 2022-03-25 NOTE — PROGRESS NOTES
Attempted to see x3, pt has left the floor going to IR for procedure. Will defer and continue to follow.     Pratibha Skill, PTA

## 2022-03-25 NOTE — PROGRESS NOTES
1530: Patient yelled out \"I pooped. \" Bloody stool noted. Patient cleaned, chux replaced. 1542: BP 91/19, O2 sats 68%. Patient placed in trendelenburg, O2 NRB placed on patient. 1543: Rapid response called. Rapid response team arrived. Patient stabilized with NS fluid bolus and Levophed. Transported to CCU by this nurse, Marcelo العراقي RN, and Dr. Rolando Regan.

## 2022-03-25 NOTE — ROUTINE PROCESS
TRANSFER - OUT REPORT:    Verbal report given to [place],Rn(name) on [facility] Blood  being transferred to CCU(unit) for routine progression of care       Report consisted of patients Situation, Background, Assessment and   Recommendations(SBAR). Information from the following report(s) SBAR, Kardex, Intake/Output, MAR and Accordion was reviewed with the receiving nurse. Lines:   Peripheral IV 03/24/22 Left External jugular (Active)   Site Assessment Bleeding;Drainage (comment) 03/25/22 0750   Phlebitis Assessment 0 03/25/22 0750   Infiltration Assessment 0 03/25/22 0750   Dressing Status Intact; New drainage 03/25/22 0750   Dressing Type Tape;Transparent 03/25/22 0750   Hub Color/Line Status Green;Capped;Flushed 03/25/22 0750   Alcohol Cap Used Yes 03/24/22 1637       Peripheral IV 03/25/22 Right Antecubital (Active)   Site Assessment Clean, dry, & intact 03/25/22 0750   Phlebitis Assessment 0 03/25/22 0750   Infiltration Assessment 0 03/25/22 0750   Dressing Status Clean, dry, & intact 03/25/22 0750   Dressing Type Transparent;Tape 03/25/22 0750   Hub Color/Line Status Infusing 03/25/22 0750        Opportunity for questions and clarification was provided.       Patient transported with:   Monitor

## 2022-03-25 NOTE — PROGRESS NOTES
Received message from nursing \"Patient was medicated at 0318 with 25 mcg of fentanyl. Patient still screaming in pain and discomfort. Patient's BP is now 86/50, HR 75, Pt on 4L NC.\". Bps noted to have been running soft. Order given to repeat 25 mcg fentanyl dose x 1.

## 2022-03-25 NOTE — PROGRESS NOTES
Pharmacy Automatic Renal Dosing Protocol - Antimicrobials    Indication for Antimicrobials  Sacral decubitus pressure injury (POA)  Trev  Venous stasis wounds   Right 2nd toe ulcer with exposed bone -  s/p amputation 3/24    Current Regimen of Each Antimicrobial:  Vancomycin 1000 mg post HD (Start Date 3/18; Day # 8)  Piperacillin 3.375 g Q12H (Start 3/18, Day # 8)     Vancomycin Goal Level Tough: 20-25 mcg/ml    Vancomycin Levels  Date Dose & Interval Measured (mcg/mL) Steady State (mcg/mL)   3/25 03:22 Vanc 1000 mg after HD 20.7                  Significant Cultures:   3/18: blood - NG = FINAL    Radiology / Imaging results: (X-ray, CT scan or MRI):   Labs:  Recent Labs     22  0322 22  0756 22  1702   CREA 7.47* 8.63* 7.81*   BUN 55* 60* 51*   WBC 9.4 7.3 6.8     Temp (24hrs), Av.2 °F (36.8 °C), Min:98 °F (36.7 °C), Max:98.9 °F (37.2 °C)  Paralysis, amputations, malnutrition:   Creatinine Clearance (mL/min) or Dialysis: HD MWF    Impression/Plan:   The vancomycin level resulted at 20.7 mcg/ml. Will continue with the current regimen of 1000 mg IV after HD. The patient received an abbreviated HD session today due to hypotension. Will order a reduced dose of 500 mg IV x 1 dose today. Continue Zosyn as ordered  CMP ordered daily  Stop date - TBD     Pharmacy will follow daily and adjust medications as appropriate for renal function and/or serum levels.     Thank you,  Travis Sommers, PHARMD

## 2022-03-25 NOTE — PROCEDURES
Hemodialysis / [phone]    Vitals Pre Post Assessment Pre Post   BP BP: (!) 104/53 (03/25/22 0830) 112/57 LOC Drowsy Drowsy   HR Pulse (Heart Rate): 75 (03/25/22 0830) 81 Lungs Diminished Diminished   Resp Resp Rate: 18 (03/25/22 0830) 16 Cardiac Regular rate Regular rate   Temp Temp: 98.4 °F (36.9 °C) (03/25/22 0830) 98 Skin Multiple wounds Multiple wounds   Weight  na na Edema generalized generalized   Tele status remote remote Pain Pain Intensity 1: 8 (03/25/22 0750) 0     Orders   Duration: Start: 0830 End: 1015 Total: 1.75   Dialyzer: Dialyzer/Set Up Inspection: Revaclear (03/25/22 0830)   K Bath: Dialysate K (mEq/L): 2 (03/25/22 0830)   Ca Bath: Dialysate CA (mEq/L): 2.5 (03/25/22 0830)   Na: Dialysate NA (mEq/L): 138 (03/25/22 0830)   Bicarb: Dialysate HCO3 (mEq/L): 35 (03/25/22 0830)   Target Fluid Removal: Goal/Amount of Fluid to Remove (mL): 2500 mL (03/25/22 0830)     Access   Type & Location: RIJ CVC: Dressing CDI. No s/s of infection. Both lumens aspirate & flush well. Each catheter limb disinfected per p&p, caps removed, hubs disinfected per p&p.    Comments:                                        Labs   HBsAg (Antigen) / date:3/18/22 negative   HBsAb (Antibody) / date:3/18/22 susceptible   Source: cc   Obtained/Reviewed  Critical Results Called HGB   Date Value Ref Range Status   03/25/2022 11.7 (L) 12.1 - 17.0 g/dL Final     Potassium   Date Value Ref Range Status   03/25/2022 5.1 3.5 - 5.1 mmol/L Final     Calcium   Date Value Ref Range Status   03/25/2022 8.1 (L) 8.5 - 10.1 MG/DL Final     BUN   Date Value Ref Range Status   03/25/2022 55 (H) 6 - 20 MG/DL Final     Creatinine   Date Value Ref Range Status   03/25/2022 7.47 (H) 0.70 - 1.30 MG/DL Final        Meds Given   Name Dose Route   Albumin 25g ivhd               Adequacy / Fluid    Total Liters Process: 41.5   Net Fluid Removed: +415      Comments   Time Out Done:   (Time) 5927   Admitting Diagnosis: Hypotension, Acute respiratory failure with hypoxemia [facility]   Consent obtained/signed: Informed Consent Verified: Yes (03/25/22 0830)   Machine / RO # Machine Number: [id number] (03/25/22 0830)   Primary Nurse Rpt Pre: CORRIE Harris RN   Primary Nurse Rpt Post: Tej Irving RN   Pt Education: Procedural   Care Plan: HD POC   Pts outpatient clinic: [facility] Summary * Treatment terminated early due to hypotension per Dr. Bogdan Garvey   Comments:                       0830: Treatment initiated  21 [phone] : Tx ended. VSS. Each dialysis catheter limb disinfected per p&p, all possible blood returned per p&p, and each dialysis hub disinfected per p&p. Each lumen flushed, post dialysis catheter Heparin dwell instilled per order, and caps applied. Bed locked and in the lowest position, call bell and belongings in reach. SBAR given to Primary, RN. Patient is stable at time of their departure. All Dialysis related medications have been reviewed.

## 2022-03-25 NOTE — PROCEDURES
[facility]      Procedure Note - Central Venous Access:   Performed by Dimitri Bertrand MD    Obtained informed Consent. Immediately prior to the procedure, the patient was reevaluated and found suitable for the planned procedure and any planned medications. Immediately prior to the procedure a time out was called to verify the correct patient, procedure, equipment, staff, and marking as appropriate. The site was prepped with ChloraPrep. Using Seldinger technique a quad Lumen CVC was placed in the Right, Internal Jugular Vein via direct cannulation with 1 number of attempts for IV Access. Ultrasound Guidance was utilized. Verbally/Visually confirmed wire removal with RN. Wire in IJ confirmation with US prior to dilation. There was good blood return. The following complications were encountered: None. A follow-up chest x-ray was ordered post procedure. The procedure was tolerated well.       Dimitri Bertrand MD [address] [place] [address],# [phone]

## 2022-03-25 NOTE — PROGRESS NOTES
Received call from rapid response nurse that while patient was in radiology to attempt having thoracentesis he became hypotensive and developed acute hypoxia. Procedure was aborted. Patient initially received IV bolus of crystalloids. Stat CBC was obtained with stable (H/H 11.5/38.4). 25 gm of 25% albumin ordered and Henry-Synephrine gtt initiated. - Critical care service consulted. - Transfer patient to ICU.

## 2022-03-25 NOTE — PROGRESS NOTES
Arrived to radiology hold/recovery to find patient on 100%NRB, sats in mid 80's. BP low, alert. Staff gave 25 mg Fentanyl prior at 1430, reversed with Narcan as unable to maintain saturations. Noted Liver failure. Spot glucose check recommeneded. Dr. Renata Solorzano now at bedside. 1601  Labs drawn, now getting an ABG. BP still very low. Dr. Renata Solorzano speaking to Intensivist.  1626  Levophed started at 10 mcs per Dr. Shanna Gamez request..  0252  Levophed up to 15 mcs per Dr. Fiordaliza Guido. 1645  Transferred via bed to CCU to room 2532 where monitoring continued.

## 2022-03-25 NOTE — PROGRESS NOTES
Hospitalist carmela    Responded to call from respiratory therapist requesting medical evaluation of patient in X-ray recover for whom rapid response was called. Spoke to staff at bedside. Patient came down for left thoracentesis on 3L. Was given fentanyl 25mcg. SBP dropped to 58, O2 sats dropped down to 70s with agonal respiration. Was given narcan 0.4mg and put on NRB. Patient also passed maroon blood while in IR before rapid response was called. 99% on NRB, HR 70s on monitor, 58/44-->    Gen--chronically ill appearing male, awake, responded to his name slowly, on NRB, no respiratory distress  Chest--CTAB anteriorly, no wheezing or crackles. No accessory muscle use. Permcath in right upper chest  CV--RRR, no murmur  Abd-- soft, mild diffuse tenderness  Ext--no edema, dressing on right forearm    Imp/Plan  *Shock due to hypovolemia from GI bleed vs. Septic shock. NS bolus 500ml ordered, SBP improved to 85/31. Patient was started on midodrine in ER for hypotension. * Acute hypoxic respiratory failure with bilateral pleural effusions. Acute drop in O2 due to hypotension. Stat ABG on NRB 7.23/52/255/22. Stat pCXR done, no significant change from yesterday--bilateral pleural effusions and b/l atelectasis. *Rectal bleeding x 2 today -- aspirin and eliquis held today. GI consult noted, CTA bleeding scan was ordered. *ESRD on HD MWF -- only had dialysis 1 hour today; limited by hypotension. Called intensivist and d/w Dr. Holly Vázquez due to persistent hypotension, unable to tolerate further IVF resuscitation given b/l pleural effusions and already on midodrine. Peripheral levophed ordered after d/w intensivist; she has accepted patient to ICU. Albumin 25g ordered     Reduce O2 back to 4L    Serial hgb and transfuse for hgb <8     Canceled thoracentesis and CTA abdomen until patient is stable    Iman Ward, current hospitalist provided notified of event and plan.   Called wife Soham Allred [phone] and updated of event and transfer to [address] [place] [address].     Critical Care time: 35 minutes    Yanet Macias MD

## 2022-03-25 NOTE — PROGRESS NOTES
Occupational Therapy  Pt has returned from dialysis and appears sleepy. Assisted SLP with scooting pt  up to [facility] in supine requiring total assistance. Pt c/o pain on his R side (UE) post mobility,  UEs were supported during mobility and bed pads straightened post -no further complaint. Per SLP pt demonstrated decreased tolerance for upright position at bed level. Nurse is aware. Will defer at this time and continue to follow as appropriate. Lamar Gonsalves

## 2022-03-25 NOTE — CONSULTS
Gastroenterology Consultation Note  TEJAS Olivas   for Dr. Kashif Granger     NAME: Bradford Monique : 1950 MRN: [id number]   ATTG:  Dr. Yg Howard PCP: Bebe Chopra MD  Date/Time:  3/25/2022 12:03 PM  Subjective:   REASON FOR CONSULT:      Maris Pickard is a 67 y.o.  male who I was asked to see for hematochezia. He was initially admitted on 3/18 for SOB and fall. PMHx of ESRD, CAD sp CABG as well as pacemaker, AF, T2DM, Sleep apnea, morbid obesity, HLD. Wsa found to have hyoxic respiratory faolure due to volume overload. He also has significant PVD and required amputation of right 2nd toe this admission (yesterday) by podiatry due to osteomyelitis. After review with RN and wife he has had some maroon/dark stools, one thus far today and maybe over night shift. About 1/2 a cup in quantity and BP has remained stable. He was continued on Eliuqis and ASA with last dose yesterday. Patient does not provide much history as he is confused. Wife not aware of any hx of liver disease. He drinks alcohol but not significantly and was never a heavy drinker. No hx of illicit substance use r hepatitis. No known family hx of liver disease. Since admission despite reports of bleeding hgb has remained fairly stable at 11.7 today, in 11-12 range, macrocytic, plt stable. INR on 3/16 1.2, bili normal, AST 49 today however previously normal, remaining LFTs stable. CT from 3/18:   Impression  1. Moderate bilateral pleural effusions, mild anasarca and small volume ascites. 2. Mild asymmetric abdominal wall stranding/edema and skin thickening on the left, though similar to prior study. This could represent anasarca or  cellulitis. No focal collection allowing for lack of IV contrast material.  3. Cirrhotic morphology of liver. Last colonoscopy 9-10 years ago in [place], reportedly normal.  He had polyps on his initial colonoscopy years prior but has had multiple since then.   Saw Dr. Grossman Organ for irregular BM and was to repeat colonoscopy (ordered)  but never did. No other family hx of colon, stomach or esophageal cancer.      Past Medical History:   Diagnosis Date    Arrhythmia     atrial fibrillation 2017    Arthritis     spine, all joints    Atrial fibrillation (HCC)     CAD (coronary atherosclerotic disease)     Chronic kidney disease     [facility]    Chronic pain     back pain - unable to lay flat    Congestive heart failure ([address]     Diabetes [facility]     Diabetes mellitus type 2, controlled ([address] 3/13/2017    GERD (gastroesophageal reflux disease)     Heart failure ([address]     Hx of CABG 3/13/2017    CABG in 2010 in  [facility] Hyperlipidemia     Hypertension     Long term current use of anticoagulant therapy     Morbid obesity ([facility] 3/13/2017    Neuropathy     in both feet    JACEY (obstructive sleep apnea) 6/23/2017    no CPAP, pt states never tested    Pacemaker     S/P CABG x 2 2010    Skin cancer     Thyroid disease     hypothyroid      Past Surgical History:   Procedure Laterality Date    HX CATARACT REMOVAL Bilateral     HX ORTHOPAEDIC      L 3rd toe amputation in 1999    HX PACEMAKER Left 12/26/2018    Dr Chiquita Frankel x4 crt-p    HX VASCULAR ACCESS  03/2021    R chest    IR INSERT TUNL CVC W/O PORT OVER 5 YR  3/16/2021    UT CARDIAC SURG PROCEDURE UNLIST      CABGx2 in 2010     Social History     Tobacco Use    Smoking status: Never Smoker    Smokeless tobacco: Never Used   Substance Use Topics    Alcohol use: Not Currently     Comment: rare      Family History   Problem Relation Age of Onset    Heart Attack Father     Cancer Father         lymph node    Cancer Mother         breast    No Known Problems Brother       Allergies   Allergen Reactions    Allopurinol Rash    Gabapentin Drowsiness    Ciprofloxacin Nausea Only    Percocet [Oxycodone-Acetaminophen] Other (comments)     Hallucinations, not allergic to tylenol Home Medications:  Prior to Admission Medications   Prescriptions Last Dose Informant Patient Reported? Taking? Insulin Needles, Disposable, (BD Ultra-Fine Mini Pen Needle) 31 gauge x 3/16\" ndle   No No   Sig: USE WITH INSULIN PENS 4 TIMES DAILY   acetaminophen (TYLENOL EXTRA STRENGTH) 500 mg tablet  Self Yes No   Sig: Take 1,000 mg by mouth every eight (8) hours as needed for Pain. apixaban (Eliquis) 5 mg tablet   No No   Sig: TAKE 1 TABLET BY MOUTH TWICE A DAY   Patient taking differently: Take 5 mg by mouth two (2) times a day. TAKE 1 TABLET BY MOUTH TWICE A DAY   aspirin delayed-release 81 mg tablet   Yes No   Sig: Take 81 mg by mouth daily. balsam peru-castor oiL (VENELEX) ointment   No No   Sig: Apply  to affected area two (2) times a day. calcium acetate,phosphat bind, (PHOSLO) 667 mg cap   Yes No   Sig: TAKE 1 CAPSULE BY MOUTH 3 TIMES A DAY WITH MEALS. cholecalciferol, vitamin D3, (DIALYVITE VITAMIN D PO)   Yes No   Sig: Take  by mouth.   clobetasoL (TEMOVATE) 0.05 % ointment   Yes No   dutasteride (AVODART) 0.5 mg capsule  Self Yes No   Sig: Take 0.5 mg by mouth daily. insulin aspart U-100 (NovoLOG Flexpen U-100 Insulin) 100 unit/mL (3 mL) inpn   No No   Sig: Inject 14 units at Breakfast, 10 with Lunch and Dinner   insulin glargine (Lantus Solostar U-100 Insulin) 100 unit/mL (3 mL) inpn   No No   Si Units by SubCUTAneous route nightly. INJECT 20 UNITS SUBCUTANEOUSLY AT BEDTIME   Patient taking differently: 15 Units by SubCUTAneous route nightly.   ketoconazole (NIZORAL) 2 % topical cream   Yes No   Sig: Apply  to affected area daily. levothyroxine (SYNTHROID) 137 mcg tablet   No No   Sig: TAKE 1 TABLET BY MOUTH DAILY (BEFORE BREAKFAST). Patient taking differently: Take 137 mcg by mouth Daily (before breakfast). loratadine (Claritin) 10 mg tablet   Yes No   Sig: Take 10 mg by mouth daily. metoprolol succinate (TOPROL-XL) 100 mg tablet   No No   Sig: Take 1 Tab by mouth daily. miconazole (MICOTIN) 2 % topical cream   Yes No   Sig: APPLY TOPICALLY 2 TIMES DAILY. APPLY TO AFFECTED AREAS   omeprazole (PRILOSEC) 40 mg capsule   Yes No   Sig: Take 40 mg by mouth daily. Indications: 3-23-21: Patient states he takes 40mg in the morning and 40mg in the evening. pravastatin (PRAVACHOL) 80 mg tablet   No No   Sig: TAKE 1 TABLET BY MOUTH EVERY DAY AT NIGHT   Patient taking differently: Take 80 mg by mouth nightly.       Facility-Administered Medications: None     Hospital medications:  Current Facility-Administered Medications   Medication Dose Route Frequency    vancomycin (VANCOCIN) 500 mg in 0.9% sodium chloride (MBP/ADV) 100 mL MBP  500 mg IntraVENous ONCE    fentaNYL citrate (PF) injection 25 mcg  25 mcg IntraVENous Q4H PRN    apixaban (ELIQUIS) tablet 5 mg  5 mg Oral BID    alcohol 62% (NOZIN) nasal  1 Ampule  1 Ampule Topical Q12H    loperamide (IMODIUM) capsule 2 mg  2 mg Oral Q4H PRN    traMADoL (ULTRAM) tablet 50 mg  50 mg Oral Q6H PRN    levothyroxine (SYNTHROID) tablet 137 mcg  137 mcg Oral ACB    midodrine (PROAMATINE) tablet 20 mg  20 mg Oral TID WITH MEALS    albumin human 25% (BUMINATE) solution 12.5 g  12.5 g IntraVENous DIALYSIS PRN    piperacillin-tazobactam (ZOSYN) 3.375 g in 0.9% sodium chloride (MBP/ADV) 100 mL MBP  3.375 g IntraVENous Q12H    sodium chloride (NS) flush 5-40 mL  5-40 mL IntraVENous Q8H    sodium chloride (NS) flush 5-40 mL  5-40 mL IntraVENous PRN    acetaminophen (TYLENOL) tablet 650 mg  650 mg Oral Q6H PRN    Or    acetaminophen (TYLENOL) suppository 650 mg  650 mg Rectal Q6H PRN    polyethylene glycol (MIRALAX) packet 17 g  17 g Oral DAILY PRN    ondansetron (ZOFRAN ODT) tablet 4 mg  4 mg Oral Q8H PRN    Or    ondansetron (ZOFRAN) injection 4 mg  4 mg IntraVENous Q6H PRN    aspirin delayed-release tablet 81 mg  81 mg Oral DAILY    balsam peru-castor oiL (VENELEX) ointment   Topical BID    calcium acetate(phosphat bind) (PHOSLO) capsule 667 mg  1 Capsule Oral TID WITH MEALS    B complex-vitaminC-folic acid (NEPHROCAP) cap  1 Capsule Oral DAILY    [Held by provider] dutasteride (AVODART) capsule 0.5 mg  0.5 mg Oral DAILY    cetirizine (ZYRTEC) tablet 10 mg  10 mg Oral DAILY    pantoprazole (PROTONIX) tablet 40 mg  40 mg Oral ACB    pravastatin (PRAVACHOL) tablet 80 mg  80 mg Oral QHS    insulin lispro (HUMALOG) injection   SubCUTAneous AC&HS    glucose chewable tablet 16 g  4 Tablet Oral PRN    glucagon (GLUCAGEN) injection 1 mg  1 mg IntraMUSCular PRN    dextrose 10% infusion 0-250 mL  0-250 mL IntraVENous PRN    VANCOMYCIN INFORMATION NOTE   Other Rx Dosing/Monitoring     REVIEW OF SYSTEMS:     []     Unable to obtain  ROS due to  [x]    mental status change  []    sedated   []    intubated      Objective:   VITALS:    Visit Vitals  BP (!) 112/54   Pulse 81   Temp 98 °F (36.7 °C)   Resp 16   Ht 5' 9\" (1.753 m)   Wt 119.3 kg (263 lb)   SpO2 94%   BMI 38.84 kg/m²     Temp (24hrs), Av.2 °F (36.8 °C), Min:98 °F (36.7 °C), Max:98.9 °F (37.2 °C)    PHYSICAL EXAM:   General:    Disoriented, uncooperative, no distress, appears older than stated age. Head:   Normocephalic, without obvious abnormality, atraumatic. Eyes:   Conjunctivae clear, anicteric sclerae. Pupils are equal  Nose:  Nares normal. No drainage or sinus tenderness. Throat:    Lips, mucosa, and tongue normal.  No Thrush  Neck:  Supple, symmetrical,  no adenopathy, thyroid: non tender  Lungs:   CTA bilaterally. No wheezing/rhonchi/rales. Heart:   Regular rate and rhythm,  no murmur, rub or gallop. Abdomen:   Soft, generalized abdominal tenderness without guarding or rebound. epigastric distention without fluid wave. Bowel sounds normal. No masses. No hepatosplenomegaly.    Rectal:  deferred  Extremities: Multiple amputations on BLE  Skin:     Texture, turgor normal. No rashes/lesions/jaundice  Lymph: Cervical, supraclavicular normal.  Psych:  Poor insight. Not depressed. Somewhat agitated. Neurologic: EOMs intact. No facial asymmetry. No aphasia. Unable to tell me name or date. LAB DATA REVIEWED:    Lab Results   Component Value Date/Time    WBC 9.4 03/25/2022 03:22 AM    HGB 11.7 (L) 03/25/2022 03:22 AM    HCT 39.6 03/25/2022 03:22 AM    PLATELET 275 60/87/6695 03:22 AM    .0 (H) 03/25/2022 03:22 AM     Lab Results   Component Value Date/Time    ALT (SGPT) 22 03/25/2022 03:22 AM    Alk. phosphatase 40 (L) 03/25/2022 03:22 AM    Bilirubin, total 0.7 03/25/2022 03:22 AM     Lab Results   Component Value Date/Time    Sodium 133 (L) 03/25/2022 03:22 AM    Potassium 5.1 03/25/2022 03:22 AM    Chloride 97 03/25/2022 03:22 AM    CO2 23 03/25/2022 03:22 AM    Anion gap 13 03/25/2022 03:22 AM    Glucose 182 (H) 03/25/2022 03:22 AM    BUN 55 (H) 03/25/2022 03:22 AM    Creatinine 7.47 (H) 03/25/2022 03:22 AM    BUN/Creatinine ratio 7 (L) 03/25/2022 03:22 AM    GFR est AA 9 (L) 03/25/2022 03:22 AM    GFR est non-AA 7 (L) 03/25/2022 03:22 AM    Calcium 8.1 (L) 03/25/2022 03:22 AM     No results found for: LPSE  Lab Results   Component Value Date/Time    INR 1.2 (H) 03/16/2021 12:03 AM    INR 2.0 (H) 03/13/2017 07:00 PM    Prothrombin time 12.2 (H) 03/16/2021 12:03 AM    Prothrombin time 21.1 (H) 03/13/2017 07:00 PM     CT Results (most recent):  Results from Hospital Encounter encounter on 03/18/22    CT ABD PELV WO CONT    Narrative  INDICATION: malaise, general weakness. lower abdominal tenderness on exam, seems  superficial (?cellulitis). ESRD on dialysis, diabetes, chronic decubitus ulcer    COMPARISON: 3/4/21    TECHNIQUE:  Noncontrast thin axial images were obtained through the abdomen and pelvis. Coronal and sagittal reconstructions were generated. CT dose reduction was  achieved through use of a standardized protocol tailored for this examination  and automatic exposure control for dose modulation.     FINDINGS:  LUNG BASES: Moderate bilateral pleural effusions and bibasilar atelectasis. LIVER: Atrophic, nodular. GALLBLADDER: Unremarkable. SPLEEN: No enlargement or lesion. PANCREAS: No mass or ductal dilatation. ADRENALS: No mass. KIDNEYS: Bilateral cortical thinning/atrophy with several hypodensities possibly  cysts. No hydronephrosis. GI TRACT:  No bowel obstruction. Difficult to assess bowel wall thickening given  lack of oral contrast material.  PERITONEUM: Mild ascites. No free air. APPENDIX: Unremarkable. RETROPERITONEUM: Atherosclerosis of the aorta and its branches. No aneurysm. LYMPH NODES:  None enlarged. ADDITIONAL COMMENTS: Anasarca, though there is slight asymmetry in the left  lateral abdominal wall but similar to prior study. No subcutaneous emphysema or  fluid collection allowing for lack of IV contrast.    URINARY BLADDER: Unremarkable. REPRODUCTIVE ORGANS: Unremarkable. LYMPH NODES:  None enlarged. FREE FLUID:  Small amount. BONES: No destructive bone lesion. ADDITIONAL COMMENTS: Anasarca. Impression  1. Moderate bilateral pleural effusions, mild anasarca and small volume ascites. 2. Mild asymmetric abdominal wall stranding/edema and skin thickening on the  left, though similar to prior study. This could represent anasarca or  cellulitis. No focal collection allowing for lack of IV contrast material.  3. Cirrhotic morphology of liver. Impression:  Active Problems:    Hypotension (3/18/2022)      Acute respiratory failure with hypoxemia ([address] (3/18/2022)      Physical debility (3/22/2022)      Palliative care encounter (3/23/2022)         Plan:  Patient with reports of maroon, dark stool per rectum per RN. Has remained hemodynamically stable with hgb of 11.7. Multiple other significant comorbid medical conditions ongoing as he is s/p toe amputation and thoracentesis ordered due to pulmonary effusions. Would prefer to start with noninvasive eval with CT W/ and W/O contrast to try and ID source of bleeding.   MORE for Dr. Abdifatah Burdick office to review case and see if we can coordinate contrast with dialysis. If not able to give contrast would repeat CT W/O contrast.  He likely needs to remain on Eliquis given risk of ischemia from significant PVD. If bleeding continued with drop in HgB or BP consider stopping and proceeding with EGD and colonoscopy if family is wishing for invasive procedures. He has incidental finding of cirrhosis on CT. Plt and LFTs are fairly stable. Will repeat INR today, slightly high at 1.2 on 3/16. Will check hepatitis panel and AFP. He is more confused today, check ammonia. Has multiple causes for possible confusion. If elevated recommend starting lactulose TID to 3-4 loose BM a day. This may be cirrhosis secondary to progression of fatty liver given hx of DM and HLD. Not a significant drinker. EF is fairly stable at of 3/6 but may still have passive congestion fo the liver due to CHF. Defer further w/u as an outpatient. Appears palliative is following with patient given overwhelming medical issues. As we are just getting involved not privy to exact specifics. Patient asks me to leave and stop multiple times throughout interview. Continue to access goals of care.         ___________________________________________________  Care Plan discussed with:    [x]    Patient   [x]    Family   [x]    Nursing   []    Attending  Total Time : 30   minutes   ___________________________________________________  GI: TEJAS Barboza     Discussed with Dr. Miles Quintana who cleared patient to get CT with contrast, does not need to be coordinated with dialysis. She will have call partner to see tomorrow and ensure contrast is tolerated well. TEJAS Booker  03/25/22  1:27 PM      The patient was seen and examined independently by me. I have discussed the case with the mid-level provider in detail. I have reviewed the patient's chart and the note.   I personally performed all components of the history, physical, and medical decision making and agree with the assessment and plan, with minor modifications as noted. Please see APPs note for full details. Medically complex pt with multiple medical issues that we are asked to see for blood MA. Physical exam:  General:Sleepy, poor maryann,edematous,ecchymosis  HEENT:  EOMI,   Chest:  NA Heart: S1, S2, RRR  GI: Soft, large abdomen + bowel sounds  Extremities: + edema. Rt toe amputation, purplish other toes    Data Review:  CT A/P w/o contrast 3/4/22:  Impression  1. Moderate bilateral pleural effusions, mild anasarca and small volume ascites. 2. Mild asymmetric abdominal wall stranding/edema and skin thickening on the  left, though similar to prior study. This could represent anasarca or  cellulitis. No focal collection allowing for lack of IV contrast material.    3. Cirrhotic morphology of liver. Impression:   Blood MA. On ASA and Eliquis  PVD  Anasarca  Minimal anemia  Cirrhosis, on CT  JACEY  T2Dm  CAD,  Afib  ESRD    Plan and discussion:  He has a  complex PMHx  inclusive of but not limited to ESRD, CAD s/p CABG. s/p pacemaker, AF, T2DM, Sleep apnea, morbid obesity and HLD. He was admitted with hyoxic respiratory failure due to volume overload.  + significant PVD and required amputation of right 2nd toe yesterday by podiatry (osteomyelitis). His wife was in the room when I examined him. Apparently has had previous colonoscopies in [place]. He has not had a colonoscopic evaluation since they moved here in [place], [place]. She is aware that he has multiple medical issues and may not be interested in colonoscopy/EGD if needed.      In an ideal situation I would have suggested that we stop Eliquis but he is a vasculopath and has AFib  This may jeopardize the blood supply to his extremities etc.  Other option would be to get a CT mesenteric bleed scan with contrast, if allowed by nephrology, as he is a dialysis patient to localize bleeding. If this is not possible we will repeat a CT without contrast.  Would also send serologies for abnormal liver/cirrhotic liver noted on CT. Check ammonia and start lactulose if elevated. Fortunately his hemoglobin is stable. Follow hemoglobin daily and watch for recurrent bleeding. Start PPI BID. Signed By: Randy Forrester.  Uriah Ramesh MD    3/25/2022  2:04 PM

## 2022-03-25 NOTE — ROUTINE PROCESS
Patient arrived on bed via patient transport to IR for left thoracentesis. Patient is alert and oriented to name and year. Patient is aware that he is in the hospital but does not know what procedure he needs. Patient requested mouth swab which was provided.

## 2022-03-25 NOTE — PROGRESS NOTES
[phone]:  Paged GI on call for this patient being moved to CCU from IR and is still having bloody bowel movements and is on Levophed. 1822: On call GI physician called. CT is recommended and repeat HGB. 1941:  Labs sent.   SBAR bedside to Jean-Claude Scott RN

## 2022-03-26 NOTE — PROGRESS NOTES
Interim ICU progress note:    Responded to code blue (started at 0113). Patient with pacer, but found to be pulseless and not breathing. CPR underway when I arrived in the room. Please see code blue flowsheet for full details. 40 minutes of ACLS with high quality CPR was performed with epinephrine q3 minutes. Pulse check every 2 minutes. Patient intubated during pulse check. See intubation note. Each pulse check the patient was in PEA (patient has pacemaker, no pulse was noted upon palpation at multiple sites by multiple providers) with exception at minute 8 when patient noted to exhibit V fib. At that time patient underwent defibrillation and resumption of compressions. IV amiodarone 300mg administered. At minute 28 patient exhibited faint pulse. Levophed initiated and increased to 100mcg/min. BP cycled with initial MAP of 58, next cycle MAP of 30s. Minute 30 patient again pulseless. Additional rhythms PEA. Total ACLS/code time 40 minutes. Unfortunately ROSC was not achieved. Time of death 46.   Wife informed upon her arrival to the hospital.     Pratibha Bustamante NP

## 2022-03-26 NOTE — DEATH NOTE
Death Declaration         ubbeargelia [id number]    Pt Name  Eward Mortimer date:  3/18/2022   Date and time of death:  3/26/2022 at [address]   Room Number  2532/01    Medical Record Number  [id number] @ [facility]   Age  67 y.o.    Date of Birth 1950   PCP Rosaura Lam MD   Attending physician Miguel Doherty MD     Code Status  Full Code     Patient seen and examined  Yes   Mental status   Unresponsive    Pupils Dilated and Fixed     Respiration Nil     Pulse  Absent     Heart Sounds  Absent     Rhythm  Flat line    Family  Notified by Nursing staff    Chaplan Service  Notified by Nursing staff      Death certificate and discharge summary completion remain Halle Chowdary NP's responsibility

## 2022-03-26 NOTE — PROGRESS NOTES
1900.  Received beside verbal report from  [facility]. Included were Kardex, MAR, orders, labs, ITRACE, LDA's, events from today and plans for tonight. Dual skin assessment performed at bedside with offgoing nurse. Pt on 10L NC.    2000. Shift assessment performed. See flowsheet for details. Pt transported to CT and back, no incidents. Pt is crying out in pain with movement, and asking for ice chips/water on lips. 0000. Reassessment, as noted. Pt cries out frequently for help, needing to be reoriented to situation and reason for no oral intake at this time. States he is miserable and dying because his lips are dry. Assured him we are closely monitoring his fluid status and all his labs and vitals for any concerns. 0110. Asystole arrest, PEA on monitor, CPR started  0111. Code blue called  0953. Asystole, epi #1  0115. PEA  0117-epi #2 no pulse, asystole  0119- no pulse, bicarb #1  0120. Epi #3, calcium #1  0121. vfib shock x1  0123. amio 300mg, no pulse  0124. Epi #4  0125 no pulse  0127 no pulse  0128 epi #5, bicarb #2  0129 no pulse  0130 epi #6  0131 no pulse  0132 epi #7  0133. Pulse obtained, levo @ 20  0135. No pulse  0136. Epi #8 levophed up to 100  0137 no pulse  0138 epi #9  0139 no pulse  0140 epi #10. Bicarb #3  0141 no pulse  0142. Epi #11  0143, no pulse  0144. Epi #12  3653. No pulse  0146- epi #13  0147- no pulse  0149- epi #14 no pulse  0151. Time of death. Wife is chetna. 4690. Wife at bedside, events of evening relayed by Thais Dickerson NP to wife.

## 2022-03-26 NOTE — DISCHARGE SUMMARY
Discharge Summary     Patient: Mitchell Leo MRN: [id number]  SSN: [id number]    YOB: 1950  Age: 67 y.o. Sex: male       Admit Date: 3/18/2022    Discharge Date: 3/26/2022  Time of Death: 0150    Admission Diagnoses: Hypotension [I95.9]  Acute respiratory failure with hypoxemia ([address] [J96.01]   Additional Diagnosis Treated During This Admission:   Acute hypoxemic respiratory failure. Volume overload pulmonary edema and bilateral pleural effusions. Congestive heart failure with acute decompensation. Type 2 diabetes mellitus. History of hypertension. Elevated troponin. Shock, septic vs hypovolemic  - GIB  - CKD on HD c/b hyperkelamia  End-stage renal disease on hemodialysis. Anasarca. Decubitus ulcers on lower back. Coronary artery disease status post CABG. History of A. Fib.  - Toxic metabolic encephalopathy  - Decompensated cirrhosis c/b ascites  - Deconditioning   - Hyponatremia  - Morbid obesity  - HLD    Discharge Condition:     Hospital Course:   Mr. Kingston Bell was a 65-year-old  male with morbid obesity, ESRD on HD, atrial fibrillation, coronary artery disease, HF, type 2 diabetes mellitus, hypertension, hyperlipidemia, JACEY not on CPAP, hypothyroidism and newly diagnosed cirrhosis. He presented to emergency department  3/18 with complaint of worsening generalized weakness for3-4 days PTA. In the ED, he was hypotensive and hypoxic. He required 11L oxygen. He was transferred to the ICU for pressors and HD. He was started on midodrine 20mg TID. He was then transferred to the floor. He also has significant PVD and required amputation of right 2nd toe this admission by podiatry due to osteomyelitis. Over his admission, his course was complicated by intermittent encephalopathy and and generalized pain. He was taken to radiology on 3/25 for thoracentesis secondary to bilateral pleural effusions, but became hypotensive prior to the procedure.  Early that day, his HD had been aborted prematurely due to hypotension. He was also noted to have melena x2. He was transferred to the ICU in the setting of recurrent hypotension, concern for GIB, and need for vasopressors. GI was consulted. CTAP without contrast performed that was unrevealing for acute process. H/H 11/39. K 4.7. He was started on empiric vanc/zosyn in the setting of continued hypoxia and new hypotension. He was being treated for GIB vs septic shock. He was on levophed at 9mcg/min. Unfortunately, he experienced a sudden PEA arrest and ROSC was not achieved despite 40 minutes of ACLS.     Signed By: Sindi Mcdonough NP     March 26, 2022

## 2022-03-26 NOTE — PROGRESS NOTES
Responded to page from RN who informed of pt's passing. Upon arrival consulted with RN and following chart review provided support to spouse who was sitting at bedside. Spouse appeared to be grieving appropriately and self-reported to be coping well considering the circumstances. Provided space to process. No spiritual needs expressed when asked. Provided number to Nurse Practitioner as  home is undecided. Stepped away to allow for privacy as spouse indicated she would be leaving shortly. Briefed RN. No further needs at this time, no more family/friends to come.   SULEIMAN AveryDiv, [address]   Paging Service [phone])

## 2022-03-26 NOTE — PROCEDURES
[facility]      Procedure Note - Intubation:   Performed by Imtiaz Ramirez NP . Cardiac arrest    Immediately prior to the procedure, the patient was reevaluated and found suitable for the planned procedure and any planned medications. Immediately prior to the procedure a time out was called to verify the correct patient, procedure, equipment, staff, and marking as appropriate. Medications given were None  A number 7.5 ETT was placed to 23 cm at the teeth. Placement was evaluated by noting bilateral, symmetric breath sounds, good end-tidal CO2 detector color change , no breath sounds over stomach and bulb aspirator expands promptly. Attempts required: 1. Complications: none. The procedure was tolerated well.

## 2022-03-27 LAB — AFP-TM SERPL-MCNC: 3.7 NG/ML (ref 0–8.4)

## 2022-03-27 NOTE — OP NOTES
[address]  OPERATIVE REPORT    Name:  Rafy Horton  MR#:  [id number]  :  1950  ACCOUNT #:  [de-identified]  DATE OF SERVICE:  2022    PREOPERATIVE DIAGNOSIS:  Osteomyelitis, right second toe. POSTOPERATIVE DIAGNOSIS:  Osteomyelitis, right second toe. PROCEDURE PERFORMED:  Right second toe amputation to the metatarsophalangeal joint level. SURGEON:  Ivis Santizo DPM    ASSISTANT:  None. ANESTHESIA:  MAC.    COMPLICATIONS:  None. SPECIMENS REMOVED/SPECIMENS SENT FOR PATHOLOGY:  None. IMPLANTS:  None. ESTIMATED BLOOD LOSS:  Minimal.    DRAINS:  None. PROCEDURE:  The patient was brought into OR and left on the patient's bed in supine position. The IV sedation was performed as per CRNA with local infiltration of 0.5% Marcaine plain was injected  with local block and right foot was prepped and draped via usual sterile manner. The Esmarch bandage was used as a tourniquet since the patient had cellulitis in the leg. The Esmarch was applied across the midfoot to control the bleeding and after anesthesia check, a circular incision was made at the base of the second toe, where the exposed proximal phalangeal head was noted. The dorsal circular incision was then extended proximally to the metatarsophalangeal joint using a #15 blade. Using sharp and blunt dissection, the metatarsophalangeal joint of the proximal phalanx was dislocated at the metatarsophalangeal joint and removed along with second toe and soft tissue were removed from the operative site, sent for pathology. The area was then copiously irrigated with normal saline and the stump incision was closed with 4-0 nylon sutures. The Esmarch bandage was removed. After closing the incision, mild compressive bandages were applied. The patient tolerated the procedure and anesthesia without any complications and left the OR with vital signs stable.   The patient will be followed up in the floor accordingly.         LEYDI Freedman/[id number]  D:  03/26/2022 16:00  T:  03/27/2022 2:08  JOB #:  [id number]

## 2022-05-27 ENCOUNTER — TELEPHONE (OUTPATIENT)
Dept: ENDOCRINOLOGY | Age: 72
End: 2022-05-27

## 2022-05-27 NOTE — TELEPHONE ENCOUNTER
5/27/2022  4:47 PM      Angelita Desai from [employer] called on behalf of pt. Angelita Desai stated we sent over the clinical notes for 2-. Angelita Desai stated she needs clinical notes for 10-. [facility] ext [phone]  [facility]#[phone]  [facility]#[phone]    Thanks,  Slime Bentley

## 2022-09-06 NOTE — PROGRESS NOTES
.End of Shift Note    Bedside shift change report given to Cecilia Carrasco (oncoming nurse) by Chandra Jones RN (offgoing nurse). Report included the following information SBAR, Procedure Summary, Intake/Output, MAR and Recent Results    Shift worked:  [id number]     Shift summary and any significant changes:     Patient given prescribed meds per [facility]. He had difficulty swallowing, NP notified and speech consult placed for tomorrow. Concerns for physician to address:  none     Zone phone for oncoming shift:   [phone]       Activity:  Activity Level: Bed Rest  Number times ambulated in hallways past shift: 0  Number of times OOB to chair past shift: 0    Cardiac:   Cardiac Monitoring: Yes      Cardiac Rhythm: Ventricular Paced    Access:   Current line(s): PIV and HD access     Genitourinary:   Urinary status: incontinent    Respiratory:   O2 Device: Nasal cannula  Chronic home O2 use?: YES  Incentive spirometer at bedside: NO       GI:  Last Bowel Movement Date: 03/23/22  Current diet:  ADULT DIET Regular; 4 carb choices (60 gm/meal); Low Sodium (2 gm); Low Potassium (Less than 3000 mg/day); Low Phosphorus (Less than 1000 mg)  ADULT ORAL NUTRITION SUPPLEMENT Breakfast, Dinner; Renal Supplement  DIET NPO  Passing flatus: YES  Tolerating current diet: NO       Pain Management:   Patient states pain is manageable on current regimen: YES    Skin:  Romeo Score: 10  Interventions: float heels    Patient Safety:  Fall Score:  Total Score: 3  Interventions: gripper socks  High Fall Risk: Yes    Length of Stay:  Expected LOS: 3d 14h  Actual LOS: [address] RN DISPLAY PLAN FREE TEXT

## 2022-09-11 NOTE — PROGRESS NOTES
[facility]  WOUND CARE PROGRESS NOTE    Dena Godwin  MR#: [id number]  : 1950  ACCOUNT #: [de-identified]   DATE OF SERVICE: 2018    SUBJECTIVE:  The patient returns for a stage II pressure ulcer of the right medial gluteus (S11.927) in a type 2 diabetic (E11.622). Last time, his wound was 0.2 x 0.2 x 0.1 cm and was exquisitely tender. We used DuoDERM to be changed twice a week. He was instructed to offload this are by purchasing and using an air waffle pad. It turns out he never was able to get one and he is still using a donut. There have been no other changes noted in his medications, allergies, or review of systems. OBJECTIVE:   VITAL SIGNS:  Temperature is 97.1, pulse 75, respirations 16, blood pressure 137/77. EXTREMITIES:  Examination of the right medial buttock shows a wound of 0.1 x 0.1 x 0.1 cm. It is clean, it is exquisitely tender. There is no fullness. There is no palpable collection and there is no erythema. It was painted with [place] to help protect it. ASSESSMENT AND PLAN: We called [facility] and they do have an air waffle pad and he will go there today to pick it up. He knows he must use fabric to protect it and to cover it. He must only have enough air in it so that he will be sitting 1/2-inchabove the surface of the chair beneath him. He is going to purchase Comfort Shield barrier cream cloth to use twice a day and use Sween cream twice a day to protect this area. I will see him again in two weeks, but he understands that if he has healed completely in the interim he will call us and cancel his appointment. All questions were answered. CONDITION ON DISCHARGE:  Stable.       MD DEBBY Colon / Dejah Miller  D: 2018 10:37     T: 2018 11:08  JOB #: [id number] 11-Sep-2022

## 2022-10-16 NOTE — PROGRESS NOTES
NAME: Todd Ponce :  1950 MRN:  [id number] Assessment :    Plan: 
--INO on CKD stage 4 (baseline creatinine ~ 3) Volume overload/JHON Morbid obesity CABG/diastolic dysfunction SHPT 
BPH Creatinine stable (~3) 
  
s/p Bumex 2 mg iv bid and metolazone 2.5 mg daily; O>I and weight is down 16 pounds (now 238 #'s) 
  Monitor weight, I&O and edema to direct changes in diuretics 
  
Low salt diet, daily weight Repeat PO K today I am ok with discharge. Home on Bumex 3 mg po BID and Metolazone 2.5 mg po on Thursday. I am going to see him in the office on Friday at 1 PM.  
 
 
Subjective: Chief Complaint:    Sitting in chair. Improving sob/congestion. Legs still swollen. Previous \"dry weight\" was ~ 227 pounds. We reviewed the above. His wife was present. Review of Systems: 
 
Symptom Y/N Comments  Symptom Y/N Comments Fever/Chills    Chest Pain Poor Appetite    Edema y Cough    Abdominal Pain Sputum    Joint Pain SOB/BETTS y   Pruritis/Rash Nausea/vomit    Tolerating PT/OT Diarrhea    Tolerating Diet Constipation    Other Could not obtain due to:   
 
Objective: VITALS:  
Last 24hrs VS reviewed since prior progress note. Most recent are: 
Visit Vitals /72 Pulse 75 Temp 98 °F (36.7 °C) Resp 18 Ht 5' 9\" (1.753 m) Wt 109.3 kg (240 lb 15.4 oz) SpO2 94% BMI 35.58 kg/m² Intake/Output Summary (Last 24 hours) at 3/27/2019 0313 Last data filed at 3/27/2019 8612 Gross per 24 hour Intake 870 ml Output 600 ml Net 270 ml Telemetry Reviewed: PHYSICAL EXAM: 
General: obese. Alert, cooperative, no acute distress Resp:  CTA Bilaterally. No Wheezing/Rhonchi/Rales. No access. muscle use CV:  Regular  rhythm,  No murmur (), No Rubs, No Gallops. ++ edema GI:  Soft, Non distended, Non tender.  +Bowel sounds, no HSM Lab Data Reviewed: (see below) Medications Reviewed: (see below) PMH/SH reviewed - no change compared to H&P 
________________________________________________________________________ Care Plan discussed with: 
Patient y Family  y   
RN Care Manager Consultant:     
 
  Comments >50% of visit spent in counseling and coordination of care    
 
________________________________________________________________________ Elenita Cheney MD  
 
Procedures: see electronic medical records for all procedures/Xrays and details which 
were not copied into this note but were reviewed prior to creation of Plan. LABS: 
Recent Labs  
  03/27/19 
0550 WBC 5.5 HGB 11.3* HCT 37.2 * Recent Labs  
  03/27/19 
0550 03/26/19 
0502 03/25/19 
3954  136 138  
K 3.5 3.2* 3.5 CL 97 94* 97  
CO2 34* 32 32 * 105* 109* CREA 3.05* 2.94* 2.98* * 134* 104* CA 9.2 9.0 9.1 MG 2.1  --  2.2 No results for input(s): SGOT, GPT, AP, TBIL, TP, ALB, GLOB, GGT, AML, LPSE in the last 72 hours. No lab exists for component: AMYP, HLPSE No results for input(s): INR, PTP, APTT in the last 72 hours. No lab exists for component: INREXT, INREXT No results for input(s): FE, TIBC, PSAT, FERR in the last 72 hours. No results found for: FOL, RBCF No results for input(s): PH, PCO2, PO2 in the last 72 hours. No results for input(s): CPK, CKMB in the last 72 hours. No lab exists for component: TROPONINI No components found for: [place] Lab Results Component Value Date/Time  Color YELLOW/STRAW 06/23/2017 12:15 PM  
 Appearance CLEAR 06/23/2017 12:15 PM  
 Specific gravity 1.010 06/23/2017 12:15 PM  
 pH (UA) 5.5 06/23/2017 12:15 PM  
 Protein TRACE (A) 06/23/2017 12:15 PM  
 Glucose NEGATIVE  06/23/2017 12:15 PM  
 Ketone NEGATIVE  06/23/2017 12:15 PM  
 Bilirubin NEGATIVE  06/23/2017 12:15 PM  
 Urobilinogen 0.2 06/23/2017 12:15 PM  
 Nitrites NEGATIVE  06/23/2017 12:15 PM  
 Leukocyte Esterase NEGATIVE  06/23/2017 12:15 PM  
 Epithelial cells FEW 06/23/2017 12:15 PM  
 Bacteria NEGATIVE  06/23/2017 12:15 PM  
 WBC 0-4 06/23/2017 12:15 PM  
 RBC 0-5 06/23/2017 12:15 PM  
 
 
MEDICATIONS: 
Current Facility-Administered Medications Medication Dose Route Frequency  HYDROcodone-acetaminophen (NORCO) 5-325 mg per tablet 1 Tab  1 Tab Oral Q6H PRN  
 sodium chloride (NS) flush 5-40 mL  5-40 mL IntraVENous Q8H  
 sodium chloride (NS) flush 5-40 mL  5-40 mL IntraVENous PRN  
 bumetanide (BUMEX) injection 2 mg  2 mg IntraVENous Q12H  
 sodium chloride (NS) flush 5-40 mL  5-40 mL IntraVENous Q8H  
 sodium chloride (NS) flush 5-40 mL  5-40 mL IntraVENous PRN  
 acetaminophen (TYLENOL) tablet 650 mg  650 mg Oral Q6H PRN  
 ondansetron (ZOFRAN) injection 4 mg  4 mg IntraVENous Q4H PRN  
 nitroglycerin (NITROBID) 2 % ointment 1 Inch  1 Inch Topical Q6H PRN  
 insulin lispro (HUMALOG) injection   SubCUTAneous AC&HS  
 glucose chewable tablet 16 g  4 Tab Oral PRN  
 dextrose (D50) infusion 12.5-25 g  12.5-25 g IntraVENous PRN  
 glucagon (GLUCAGEN) injection 1 mg  1 mg IntraMUSCular PRN  
 dutasteride (AVODART) capsule 0.5 mg  0.5 mg Oral DAILY  apixaban (ELIQUIS) tablet 5 mg  5 mg Oral BID  febuxostat (ULORIC) tablet 40 mg  40 mg Oral DAILY  levothyroxine (SYNTHROID) tablet 137 mcg  137 mcg Oral 6am  
 mirabegron ER (MYRBETRIQ) tablet 25 mg  25 mg Oral DAILY  metoprolol succinate (TOPROL-XL) XL tablet 150 mg  150 mg Oral DAILY  metOLazone (ZAROXOLYN) tablet 2.5 mg  2.5 mg Oral DAILY  pravastatin (PRAVACHOL) tablet 80 mg  80 mg Oral QHS  tamsulosin (FLOMAX) capsule 0.4 mg  0.4 mg Oral DAILY  insulin NPH (NOVOLIN N, HUMULIN N) injection 15 Units  15 Units SubCUTAneous ACB&D  
 
 Negative

## 2023-05-27 NOTE — PROGRESS NOTES
Aftercare:    If you would like to attend PHP or IOP programs in future, please call [facility] Scheduling at [phone], Option 2 to schedule an assessment. On-site and virtual program options are available for both mental health and substance use treatment.    PCP: Jennifer Nevarez MD  [facility]  [address] [place] [address]  Phone [phone]  Fax [phone]  Next Appt:  As needed for any medical concerns.    Medications: OSMANY Osorio  [facility]  [address] [place] [address]  Phone [phone]  Fax [phone]  Next Appt:  Please call the clinic to schedule.    Therapy: Arthur Ba LCSW  [facility]  [address] [place] [address]  Phone [phone]  Fax [phone]  Next Appt:  Please call the clinic to schedule.    *If you need to cancel or reschedule any appointments, please call at least 24 hours in advance of your appointment date and time*    If you change your mind about participating in Partial Hospitalization Program (PHP), Intensive Outpatient Program (IOP), please contact Central Scheduling to be scheduled.  [address] [id number] [phone] (central scheduling)     Behavioral Health Resources:  Please check that the provider/clinic accepts your insurance before scheduling     [facility] (Therapy/Meds)  [facility] [phone]  [facility] [phone]  [facility] [phone]  [facility] [phone]    [facility] (multiple locations) Meds   [address] [place] [address]  [phone]    [facility] (Therapy)  [address] [place] [address]  [phone]    [facility] (multiple locations) Therapy  [address]  [place], [place] [address] [phone]    [facility]  Nursing reports \"Patient yells day and night complaining of generalized pain. He has ultram PRN, however patient has been having dysphagia and speech consult is ordered for AM. Needs IV pain meds for now\". Noted creatinine clearance of 9.7. Ordered low-dose fentanyl x1. Outpatient Clinic  [address] [place] [address]  [phone]    [facility] (Therapy)  [address]   [place] [address]  [phone]    [facility] (Meds and Therapy)  [address] [place]. [address]  [phone]    [facility] (Meds and Therapy)  [address]  [facility]  [phone]    [facility] (Therapy)  [address] [place] [address]  [phone]    [facility] (Meds and Therapy)  [address] [place] [address]  [phone]    [facility] (Meds and Therapy)  [address],   [place] [address]   [phone]    [facility] (mutipule locations) Meds   [address], [place] [address], [phone]  [address], [place] [address],  [phone]  [address] [place] [address],  [phone]      Behavioral Health Services  Interdisciplinary Discharge Instructions    Transportation: taxi  Residence Upon Discharge: see chart  Telephone Number: see chart        Sharps / Valuables Returned: Yes    Patients's own medication returned: Yes    Discharge Instructions: Include work/school/activity/diet restrictions, etc as appropriate: follow provider instruction    Appointments / Referrals: Date, address, and phone of physician/psychiatrist, nurse, clinic, as appropriate.see AVS    The Behavioral Health Transition Record elements were discussed with the patient, and a copy was provided to the patient and/or caregiver prior to discharge.     The record has been sent electronically    The next level of care provider has access to the EMR    Fax    Mail    Transport Personnel    Patient refused transmission, given to patient    Patient going to IP facility.  The 24/7 day contact information including the physician for emergencies related to the IP stay,  contact information for pending studies, plan for follow up care and the PCP/Health professional/site designated for follow up care was discussed with the IP facility.

## 2023-06-29 NOTE — PROGRESS NOTES
[address] [place]   WOUND CARE PROGRESS NOTE       Name:  Tamara Zuluaga   MR#:  [id number]   :  1950   Account #:  [de-identified]        Date of Adm:  2017       DATE OF SERVICE: 2017     SUBJECTIVE: The patient returns for followup today of venous   insufficiency ulcer to his left lower leg (I87.311, L97.911 and E11.621). He reports no change in his health or medications. OBJECTIVE: He does have what looks like some new openings to the   left lower leg from shearing and his wife states that the Tubigrip seems   to slide down and may be the source of these 2 new areas. They are   clean and without any signs of infection. Together, they measure 4 x 3   x 0.1, but they are narrow, clean and we will treat them with Xeroform,   a circumferential gauze dressing and either Tubigrip or an Ace   bandage. The swelling in his legs is now markedly reduced and there   is no obvious pitting edema noted. He will continue to follow his cardiologist's prescription for his heart   failure and has lost a great deal of weight since his acute episode.         MD LEAH Sharp / DESHAWN   D:  2017   11:41   T:  2017   12:17   Job #:  [id number]

## 2025-04-08 NOTE — PERIOP NOTES
Sera Lennon  1950  [id number]    Situation:  Verbal report given from: Ya Dempsey RN  Procedure: Procedure(s):  BILATERAL L4 TRANSFORAMINAL EPIDURAL STEROID INJECTION (URGENT)    Background:    Preoperative diagnosis: Degeneration of lumbar intervertebral disc [M51.36]    Postoperative diagnosis: Degeneration of lumbar intervertebral disc [M51.36]    :  Dr. Azul Walls): Circ-1: Sancho Moyer RN  Circ-2: Kajal Dugan RN  Surg Asst-1: Tosha See Staff:  Loida Ruiz RN    Specimens: * No specimens in log *    Assessment:  Intra-procedure medications         Anesthesia gave intra-procedure sedation and medications, see anesthesia flow sheet     Intravenous fluids: LR@ KVO     Vital signs stable       Recommendation:    Permission to share finding with wife : yes dvt

## (undated) DEVICE — GLOVE ORANGE PI 7   MSG9070

## (undated) DEVICE — SUTURE GORTX SZ 4-0 L24IN NONABSORBABLE L13MM PT-13 3/8 CIR 5K08A

## (undated) DEVICE — POLYLINED TOWEL: Brand: CONVERTORS

## (undated) DEVICE — SOL INJ SOD CL 0.9% 500ML BG --

## (undated) DEVICE — ZIMMER® STERILE DISPOSABLE TOURNIQUET CUFF WITH PROTECTIVE SLEEVE AND PLC, DUAL PORT, SINGLE BLADDER, 18 IN. (46 CM)

## (undated) DEVICE — PROVE COVER: Brand: UNBRANDED

## (undated) DEVICE — PROBE VASC 8MHZ WTRPRF

## (undated) DEVICE — BANDAGE,GAUZE,BULKEE II,4.5"X4.1YD,STRL: Brand: MEDLINE

## (undated) DEVICE — SYR 5ML 1/5 GRAD LL NSAF LF --

## (undated) DEVICE — PREP SKN CHLRAPRP APL 26ML STR --

## (undated) DEVICE — SUTURE ETHLN SZ 2-0 L30IN NONABSORBABLE BLK L36MM PSLX 3/8 1697H

## (undated) DEVICE — SUTURE VCRL SZ 3-0 L27IN ABSRB UD L26MM SH 1/2 CIR J416H

## (undated) DEVICE — MARKER,SKIN,WI/RULER AND LABELS: Brand: MEDLINE

## (undated) DEVICE — SUTURE ETHLN SZ 4-0 L18IN NONABSORBABLE BLK L19MM PS-2 3/8 1667H

## (undated) DEVICE — 3M(TM) MEDIPORE(TM) H SOFT CLOTH TAPE 2866: Brand: 3M™ MEDIPORE™

## (undated) DEVICE — STAPLER SKIN H3.9MM WIRE DIA0.58MM CRWN 6.9MM 35 STPL ROT

## (undated) DEVICE — NEEDLE HYPO 25GA L1.5IN BVL ORIENTED ECLIPSE

## (undated) DEVICE — 450 ML BOTTLE OF 0.05% CHLORHEXIDINE GLUCONATE IN 99.95% STERILE WATER FOR IRRIGATION, USP AND APPLICATOR.: Brand: IRRISEPT ANTIMICROBIAL WOUND LAVAGE

## (undated) DEVICE — NEEDLE HYPO 18GA L1.5IN PNK S STL HUB POLYPR SHLD REG BVL

## (undated) DEVICE — EXTREMITY-MRMC: Brand: MEDLINE INDUSTRIES, INC.

## (undated) DEVICE — SPONGE GZ W4XL4IN COT RADPQ HIGHLY ABSRB

## (undated) DEVICE — TOWEL SURG W17XL27IN STD BLU COT NONFENESTRATED PREWASHED

## (undated) DEVICE — SPONGE GZ W4XL4IN COT 12 PLY TYP VII WVN C FLD DSGN

## (undated) DEVICE — ADULT SPO2 SENSOR: Brand: NELLCOR

## (undated) DEVICE — SUT PROL 6-0 24IN BV1 DA BLU --

## (undated) DEVICE — AGENT HEMSTAT W4XL4IN OXIDIZED REGENERATED CELOS ABSRB SFT

## (undated) DEVICE — SYR 10ML LUER LOK 1/5ML GRAD --

## (undated) DEVICE — AV FISTULA - MRMC: Brand: MEDLINE INDUSTRIES, INC.

## (undated) DEVICE — SHEAR HARMONIC FOCUS OEM 9CM --

## (undated) DEVICE — SUTURE PROL SZ 5-0 L24IN NONABSORBABLE BLU RB-2 L13IN 1/2 8554H

## (undated) DEVICE — PART NUMBER 108260, VTI 8 MHZ DISPOSABLE DOPPLER PROBE, STRAIGHT, BOX: Brand: VTI 8 MHZ DISPOSABLE DOPPLER PROBE, STRAIGHT, BOX

## (undated) DEVICE — SOL IRRIGATION INJ NACL 0.9% 500ML BTL

## (undated) DEVICE — GOWN,SIRUS,NONRNF,SETINSLV,2XL,18/CS: Brand: MEDLINE

## (undated) DEVICE — BLADE ASSEMB CLP HAIR FINE --

## (undated) DEVICE — Device: Brand: JELCO

## (undated) DEVICE — NEEDLE SPNL L4.75IN OD25GA QNCKE TYP SPINOCAN

## (undated) DEVICE — SUT ETHLN 4-0 18IN PS2 BLK --

## (undated) DEVICE — SUTURE VCRL SZ 2-0 L27IN ABSRB UD L26MM SH 1/2 CIR J417H

## (undated) DEVICE — SET EXTN PRIMING 0.59ML 8.5IN 1.55LB PRSS RATE MINIBOR PUR

## (undated) DEVICE — CLIP INT SM WIDE RED TI TRNSVRS GRV CHEVRON SHP W PRECIS

## (undated) DEVICE — GLOVE ORTHO 8   MSG9480

## (undated) DEVICE — GLOVE ORANGE PI 8   MSG9080